# Patient Record
Sex: FEMALE | Race: WHITE | NOT HISPANIC OR LATINO | URBAN - METROPOLITAN AREA
[De-identification: names, ages, dates, MRNs, and addresses within clinical notes are randomized per-mention and may not be internally consistent; named-entity substitution may affect disease eponyms.]

---

## 2017-02-03 ENCOUNTER — EMERGENCY (EMERGENCY)
Facility: HOSPITAL | Age: 72
LOS: 1 days | Discharge: HOME | End: 2017-02-03
Attending: EMERGENCY MEDICINE
Payer: MEDICARE

## 2017-02-03 VITALS
SYSTOLIC BLOOD PRESSURE: 133 MMHG | HEART RATE: 95 BPM | RESPIRATION RATE: 18 BRPM | OXYGEN SATURATION: 98 % | TEMPERATURE: 99 F | DIASTOLIC BLOOD PRESSURE: 60 MMHG

## 2017-02-03 VITALS
DIASTOLIC BLOOD PRESSURE: 80 MMHG | WEIGHT: 145.06 LBS | HEIGHT: 64 IN | TEMPERATURE: 97 F | RESPIRATION RATE: 18 BRPM | SYSTOLIC BLOOD PRESSURE: 162 MMHG | HEART RATE: 100 BPM | OXYGEN SATURATION: 97 %

## 2017-02-03 DIAGNOSIS — Z95.2 PRESENCE OF PROSTHETIC HEART VALVE: Chronic | ICD-10-CM

## 2017-02-03 DIAGNOSIS — Z95.1 PRESENCE OF AORTOCORONARY BYPASS GRAFT: Chronic | ICD-10-CM

## 2017-02-03 DIAGNOSIS — I11.0 HYPERTENSIVE HEART DISEASE WITH HEART FAILURE: ICD-10-CM

## 2017-02-03 DIAGNOSIS — E78.00 PURE HYPERCHOLESTEROLEMIA, UNSPECIFIED: ICD-10-CM

## 2017-02-03 DIAGNOSIS — Z98.89 OTHER SPECIFIED POSTPROCEDURAL STATES: Chronic | ICD-10-CM

## 2017-02-03 DIAGNOSIS — E11.9 TYPE 2 DIABETES MELLITUS WITHOUT COMPLICATIONS: ICD-10-CM

## 2017-02-03 DIAGNOSIS — I50.9 HEART FAILURE, UNSPECIFIED: ICD-10-CM

## 2017-02-03 DIAGNOSIS — J45.909 UNSPECIFIED ASTHMA, UNCOMPLICATED: ICD-10-CM

## 2017-02-03 DIAGNOSIS — Z95.5 PRESENCE OF CORONARY ANGIOPLASTY IMPLANT AND GRAFT: ICD-10-CM

## 2017-02-03 DIAGNOSIS — Z95.4 PRESENCE OF OTHER HEART-VALVE REPLACEMENT: ICD-10-CM

## 2017-02-03 DIAGNOSIS — Z98.890 OTHER SPECIFIED POSTPROCEDURAL STATES: ICD-10-CM

## 2017-02-03 DIAGNOSIS — D64.9 ANEMIA, UNSPECIFIED: ICD-10-CM

## 2017-02-03 DIAGNOSIS — I25.10 ATHEROSCLEROTIC HEART DISEASE OF NATIVE CORONARY ARTERY WITHOUT ANGINA PECTORIS: ICD-10-CM

## 2017-02-03 DIAGNOSIS — Z88.8 ALLERGY STATUS TO OTHER DRUGS, MEDICAMENTS AND BIOLOGICAL SUBSTANCES STATUS: ICD-10-CM

## 2017-02-03 LAB
ALBUMIN SERPL ELPH-MCNC: 4 G/DL — SIGNIFICANT CHANGE UP (ref 3.3–5.2)
ALP SERPL-CCNC: 98 U/L — SIGNIFICANT CHANGE UP (ref 40–120)
ALT FLD-CCNC: 50 U/L — HIGH
ANION GAP SERPL CALC-SCNC: 15 MMOL/L — SIGNIFICANT CHANGE UP (ref 5–17)
APTT BLD: 33.8 SEC — SIGNIFICANT CHANGE UP (ref 27.5–37.4)
AST SERPL-CCNC: 86 U/L — HIGH
BILIRUB SERPL-MCNC: 0.5 MG/DL — SIGNIFICANT CHANGE UP (ref 0.4–2)
BUN SERPL-MCNC: 70 MG/DL — HIGH (ref 8–20)
CALCIUM SERPL-MCNC: 9.9 MG/DL — SIGNIFICANT CHANGE UP (ref 8.6–10.2)
CHLORIDE SERPL-SCNC: 100 MMOL/L — SIGNIFICANT CHANGE UP (ref 98–107)
CO2 SERPL-SCNC: 25 MMOL/L — SIGNIFICANT CHANGE UP (ref 22–29)
CREAT SERPL-MCNC: 1.72 MG/DL — HIGH (ref 0.5–1.3)
GLUCOSE SERPL-MCNC: 278 MG/DL — HIGH (ref 70–115)
HCT VFR BLD CALC: 26.8 % — LOW (ref 37–47)
HGB BLD-MCNC: 8.5 G/DL — LOW (ref 12–16)
INR BLD: 1.16 RATIO — SIGNIFICANT CHANGE UP (ref 0.88–1.16)
MCHC RBC-ENTMCNC: 26.1 PG — LOW (ref 27–31)
MCHC RBC-ENTMCNC: 31.7 G/DL — LOW (ref 32–36)
MCV RBC AUTO: 82.2 FL — SIGNIFICANT CHANGE UP (ref 81–99)
PLATELET # BLD AUTO: 173 K/UL — SIGNIFICANT CHANGE UP (ref 150–400)
POTASSIUM SERPL-MCNC: 3.8 MMOL/L — SIGNIFICANT CHANGE UP (ref 3.5–5.3)
POTASSIUM SERPL-SCNC: 3.8 MMOL/L — SIGNIFICANT CHANGE UP (ref 3.5–5.3)
PROT SERPL-MCNC: 8.5 G/DL — SIGNIFICANT CHANGE UP (ref 6.6–8.7)
PROTHROM AB SERPL-ACNC: 12.8 SEC — SIGNIFICANT CHANGE UP (ref 10–13.1)
RBC # BLD: 3.26 M/UL — LOW (ref 4.4–5.2)
RBC # FLD: 16.2 % — HIGH (ref 11–15.6)
SODIUM SERPL-SCNC: 140 MMOL/L — SIGNIFICANT CHANGE UP (ref 135–145)
TYPE + AB SCN PNL BLD: SIGNIFICANT CHANGE UP
WBC # BLD: 6.8 K/UL — SIGNIFICANT CHANGE UP (ref 4.8–10.8)
WBC # FLD AUTO: 6.8 K/UL — SIGNIFICANT CHANGE UP (ref 4.8–10.8)

## 2017-02-03 PROCEDURE — 36430 TRANSFUSION BLD/BLD COMPNT: CPT

## 2017-02-03 PROCEDURE — 85610 PROTHROMBIN TIME: CPT

## 2017-02-03 PROCEDURE — 86850 RBC ANTIBODY SCREEN: CPT

## 2017-02-03 PROCEDURE — 99284 EMERGENCY DEPT VISIT MOD MDM: CPT | Mod: 25

## 2017-02-03 PROCEDURE — 85027 COMPLETE CBC AUTOMATED: CPT

## 2017-02-03 PROCEDURE — 86920 COMPATIBILITY TEST SPIN: CPT

## 2017-02-03 PROCEDURE — 85730 THROMBOPLASTIN TIME PARTIAL: CPT

## 2017-02-03 PROCEDURE — 86901 BLOOD TYPING SEROLOGIC RH(D): CPT

## 2017-02-03 PROCEDURE — 80053 COMPREHEN METABOLIC PANEL: CPT

## 2017-02-03 PROCEDURE — P9016: CPT

## 2017-02-03 PROCEDURE — 86900 BLOOD TYPING SEROLOGIC ABO: CPT

## 2017-02-03 PROCEDURE — 99284 EMERGENCY DEPT VISIT MOD MDM: CPT

## 2017-02-03 PROCEDURE — 96374 THER/PROPH/DIAG INJ IV PUSH: CPT

## 2017-02-03 RX ORDER — FUROSEMIDE 40 MG
40 TABLET ORAL ONCE
Qty: 0 | Refills: 0 | Status: COMPLETED | OUTPATIENT
Start: 2017-02-03 | End: 2017-02-03

## 2017-02-03 RX ADMIN — Medication 40 MILLIGRAM(S): at 19:22

## 2017-02-03 NOTE — ED ADULT NURSE NOTE - PMH
Anemia, unspecified anemia type    Aortic stenosis    Asthma    CAD (coronary artery disease)    Diabetes    Heart failure    Hepatitis C    High cholesterol    Hypertension    Low back pain  chronic over a year now.

## 2017-02-03 NOTE — ED PROVIDER NOTE - OBJECTIVE STATEMENT
70 yo female with hx htn, chf, cad sent in by Dr. Pandey for transfusion. Patient with long history of anemia, with no obvious etiology. Patient has undergone extensive workup. denies any fever or chills. denies chest pain, c/o shortness of breath, which is chronic for patient.   Last month Hgb was 9.9, and yesterday was 8.4.  Patient with hx multiple transfusions in past

## 2017-02-03 NOTE — ED ADULT NURSE REASSESSMENT NOTE - NS ED NURSE REASSESS COMMENT FT1
PRBC transfusion completed, patient resting comfortable, no acute distress noted, VS stable, afebrile, will monitor patient.

## 2017-02-03 NOTE — ED ADULT NURSE NOTE - OBJECTIVE STATEMENT
Patient AOx4 presents to ED after appointment at Dr. Pandey office sent patient to ED to get transfusion for "low platelets" Regular brown bowel movements, negative complaints of GI pain Patient AOx4 presents to ED after appointment at Dr. Pandey office sent patient to ED to get transfusion for "low platelets" Regular brown bowel movements, negative complaints of GI pain.    Addendum by RN MAEGAN Block: Patient respirations even and unlabored. Lungs clear. Abdomen soft non-tender. No signs of bleeding. Pink conjunctiva. #20 placed right wrist. Labs drawn and sent. Results and transfusion pending.

## 2017-02-03 NOTE — ED ADULT NURSE REASSESSMENT NOTE - NS ED NURSE REASSESS COMMENT FT1
transfusion initiated. 2 rns at bedside RN (ROSITA) & (NATALIA). Patient educated regarding signs of transfusion reaction; repeated by teachback. Verbalized understanding. Transfusion initiated at 60 ml/hr. Vitals as documented. RN ANITA Avendano aware of above and will reassess vitals/monitor for transfusion reaction.

## 2017-02-17 ENCOUNTER — APPOINTMENT (OUTPATIENT)
Dept: PULMONOLOGY | Facility: CLINIC | Age: 72
End: 2017-02-17

## 2017-02-17 VITALS
WEIGHT: 174 LBS | DIASTOLIC BLOOD PRESSURE: 82 MMHG | BODY MASS INDEX: 35.08 KG/M2 | SYSTOLIC BLOOD PRESSURE: 126 MMHG | HEIGHT: 59 IN

## 2017-02-17 VITALS — OXYGEN SATURATION: 98 %

## 2017-02-17 RX ORDER — FUROSEMIDE 40 MG/1
40 TABLET ORAL
Refills: 0 | Status: ACTIVE | COMMUNITY

## 2017-02-22 ENCOUNTER — OTHER (OUTPATIENT)
Age: 72
End: 2017-02-22

## 2017-03-24 ENCOUNTER — APPOINTMENT (OUTPATIENT)
Dept: PULMONOLOGY | Facility: CLINIC | Age: 72
End: 2017-03-24

## 2017-03-24 VITALS — BODY MASS INDEX: 34.54 KG/M2 | WEIGHT: 171 LBS

## 2017-03-24 VITALS — SYSTOLIC BLOOD PRESSURE: 118 MMHG | DIASTOLIC BLOOD PRESSURE: 60 MMHG

## 2017-03-24 VITALS — OXYGEN SATURATION: 97 %

## 2017-03-24 RX ORDER — BLOOD SUGAR DIAGNOSTIC
STRIP MISCELLANEOUS
Qty: 100 | Refills: 0 | Status: ACTIVE | COMMUNITY
Start: 2016-04-18

## 2017-03-24 RX ORDER — CEFPODOXIME PROXETIL 200 MG/1
200 TABLET, FILM COATED ORAL
Qty: 14 | Refills: 0 | Status: DISCONTINUED | COMMUNITY
Start: 2017-02-02

## 2017-03-24 RX ORDER — CLOTRIMAZOLE AND BETAMETHASONE DIPROPIONATE 10; .5 MG/G; MG/G
1-0.05 CREAM TOPICAL
Qty: 15 | Refills: 0 | Status: DISCONTINUED | COMMUNITY
Start: 2017-02-02

## 2017-03-24 RX ORDER — PEN NEEDLE, DIABETIC 29 G X1/2"
29G X 12.7MM NEEDLE, DISPOSABLE MISCELLANEOUS
Qty: 120 | Refills: 0 | Status: ACTIVE | COMMUNITY
Start: 2016-12-02

## 2017-03-24 RX ORDER — TIZANIDINE 2 MG/1
2 TABLET ORAL
Qty: 45 | Refills: 0 | Status: DISCONTINUED | COMMUNITY
Start: 2016-10-28

## 2017-03-31 ENCOUNTER — APPOINTMENT (OUTPATIENT)
Dept: ORTHOPEDIC SURGERY | Facility: CLINIC | Age: 72
End: 2017-03-31

## 2017-04-07 ENCOUNTER — INPATIENT (INPATIENT)
Facility: HOSPITAL | Age: 72
LOS: 0 days | Discharge: HOME | DRG: 292 | End: 2017-04-08
Attending: INTERNAL MEDICINE | Admitting: INTERNAL MEDICINE
Payer: MEDICARE

## 2017-04-07 VITALS
SYSTOLIC BLOOD PRESSURE: 130 MMHG | OXYGEN SATURATION: 99 % | HEART RATE: 82 BPM | WEIGHT: 179.9 LBS | RESPIRATION RATE: 16 BRPM | HEIGHT: 63 IN | TEMPERATURE: 97 F | DIASTOLIC BLOOD PRESSURE: 84 MMHG

## 2017-04-07 DIAGNOSIS — Z98.89 OTHER SPECIFIED POSTPROCEDURAL STATES: Chronic | ICD-10-CM

## 2017-04-07 DIAGNOSIS — Z95.1 PRESENCE OF AORTOCORONARY BYPASS GRAFT: Chronic | ICD-10-CM

## 2017-04-07 DIAGNOSIS — Z95.2 PRESENCE OF PROSTHETIC HEART VALVE: Chronic | ICD-10-CM

## 2017-04-07 DIAGNOSIS — D64.9 ANEMIA, UNSPECIFIED: ICD-10-CM

## 2017-04-07 LAB
ALBUMIN SERPL ELPH-MCNC: 3.6 G/DL — SIGNIFICANT CHANGE UP (ref 3.3–5.2)
ALP SERPL-CCNC: 100 U/L — SIGNIFICANT CHANGE UP (ref 40–120)
ALT FLD-CCNC: 45 U/L — HIGH
ANION GAP SERPL CALC-SCNC: 13 MMOL/L — SIGNIFICANT CHANGE UP (ref 5–17)
APTT BLD: 32.7 SEC — SIGNIFICANT CHANGE UP (ref 27.5–37.4)
AST SERPL-CCNC: 92 U/L — HIGH
BASOPHILS # BLD AUTO: 0 K/UL — SIGNIFICANT CHANGE UP (ref 0–0.2)
BASOPHILS NFR BLD AUTO: 0.4 % — SIGNIFICANT CHANGE UP (ref 0–2)
BILIRUB SERPL-MCNC: 0.2 MG/DL — LOW (ref 0.4–2)
BLD GP AB SCN SERPL QL: SIGNIFICANT CHANGE UP
BUN SERPL-MCNC: 41 MG/DL — HIGH (ref 8–20)
CALCIUM SERPL-MCNC: 8.9 MG/DL — SIGNIFICANT CHANGE UP (ref 8.6–10.2)
CHLORIDE SERPL-SCNC: 99 MMOL/L — SIGNIFICANT CHANGE UP (ref 98–107)
CO2 SERPL-SCNC: 23 MMOL/L — SIGNIFICANT CHANGE UP (ref 22–29)
CREAT SERPL-MCNC: 1.32 MG/DL — HIGH (ref 0.5–1.3)
EOSINOPHIL # BLD AUTO: 0.2 K/UL — SIGNIFICANT CHANGE UP (ref 0–0.5)
EOSINOPHIL NFR BLD AUTO: 3.6 % — SIGNIFICANT CHANGE UP (ref 0–6)
GLUCOSE SERPL-MCNC: 300 MG/DL — HIGH (ref 70–115)
HCT VFR BLD CALC: 21.7 % — LOW (ref 37–47)
HGB BLD-MCNC: 7.1 G/DL — LOW (ref 12–16)
INR BLD: 1.05 RATIO — SIGNIFICANT CHANGE UP (ref 0.88–1.16)
LYMPHOCYTES # BLD AUTO: 1 K/UL — SIGNIFICANT CHANGE UP (ref 1–4.8)
LYMPHOCYTES # BLD AUTO: 22.1 % — SIGNIFICANT CHANGE UP (ref 20–55)
MCHC RBC-ENTMCNC: 26.5 PG — LOW (ref 27–31)
MCHC RBC-ENTMCNC: 32.7 G/DL — SIGNIFICANT CHANGE UP (ref 32–36)
MCV RBC AUTO: 81 FL — SIGNIFICANT CHANGE UP (ref 81–99)
MONOCYTES # BLD AUTO: 0.4 K/UL — SIGNIFICANT CHANGE UP (ref 0–0.8)
MONOCYTES NFR BLD AUTO: 7.4 % — SIGNIFICANT CHANGE UP (ref 3–10)
NEUTROPHILS # BLD AUTO: 3.1 K/UL — SIGNIFICANT CHANGE UP (ref 1.8–8)
NEUTROPHILS NFR BLD AUTO: 66.3 % — SIGNIFICANT CHANGE UP (ref 37–73)
NT-PROBNP SERPL-SCNC: 1516 PG/ML — HIGH (ref 0–300)
PLATELET # BLD AUTO: 163 K/UL — SIGNIFICANT CHANGE UP (ref 150–400)
POTASSIUM SERPL-MCNC: 4.3 MMOL/L — SIGNIFICANT CHANGE UP (ref 3.5–5.3)
POTASSIUM SERPL-SCNC: 4.3 MMOL/L — SIGNIFICANT CHANGE UP (ref 3.5–5.3)
PROT SERPL-MCNC: 7.6 G/DL — SIGNIFICANT CHANGE UP (ref 6.6–8.7)
PROTHROM AB SERPL-ACNC: 11.6 SEC — SIGNIFICANT CHANGE UP (ref 9.8–12.7)
RBC # BLD: 2.68 M/UL — LOW (ref 4.4–5.2)
RBC # FLD: 15.7 % — HIGH (ref 11–15.6)
SODIUM SERPL-SCNC: 135 MMOL/L — SIGNIFICANT CHANGE UP (ref 135–145)
TROPONIN T SERPL-MCNC: 0.07 NG/ML — HIGH (ref 0–0.06)
TYPE + AB SCN PNL BLD: SIGNIFICANT CHANGE UP
WBC # BLD: 4.7 K/UL — LOW (ref 4.8–10.8)
WBC # FLD AUTO: 4.7 K/UL — LOW (ref 4.8–10.8)

## 2017-04-07 PROCEDURE — 71010: CPT | Mod: 26

## 2017-04-07 PROCEDURE — 99285 EMERGENCY DEPT VISIT HI MDM: CPT

## 2017-04-07 PROCEDURE — 93010 ELECTROCARDIOGRAM REPORT: CPT

## 2017-04-07 RX ORDER — LEVOTHYROXINE SODIUM 125 MCG
100 TABLET ORAL DAILY
Qty: 0 | Refills: 0 | Status: DISCONTINUED | OUTPATIENT
Start: 2017-04-07 | End: 2017-04-08

## 2017-04-07 RX ORDER — FUROSEMIDE 40 MG
40 TABLET ORAL ONCE
Qty: 0 | Refills: 0 | Status: COMPLETED | OUTPATIENT
Start: 2017-04-07 | End: 2017-04-07

## 2017-04-07 RX ORDER — FUROSEMIDE 40 MG
40 TABLET ORAL
Qty: 0 | Refills: 0 | Status: DISCONTINUED | OUTPATIENT
Start: 2017-04-07 | End: 2017-04-07

## 2017-04-07 RX ORDER — AMLODIPINE BESYLATE 2.5 MG/1
10 TABLET ORAL DAILY
Qty: 0 | Refills: 0 | Status: DISCONTINUED | OUTPATIENT
Start: 2017-04-07 | End: 2017-04-08

## 2017-04-07 RX ORDER — GLUCAGON INJECTION, SOLUTION 0.5 MG/.1ML
1 INJECTION, SOLUTION SUBCUTANEOUS ONCE
Qty: 0 | Refills: 0 | Status: DISCONTINUED | OUTPATIENT
Start: 2017-04-07 | End: 2017-04-08

## 2017-04-07 RX ORDER — MONTELUKAST 4 MG/1
10 TABLET, CHEWABLE ORAL DAILY
Qty: 0 | Refills: 0 | Status: DISCONTINUED | OUTPATIENT
Start: 2017-04-07 | End: 2017-04-08

## 2017-04-07 RX ORDER — PANTOPRAZOLE SODIUM 20 MG/1
40 TABLET, DELAYED RELEASE ORAL
Qty: 0 | Refills: 0 | Status: DISCONTINUED | OUTPATIENT
Start: 2017-04-07 | End: 2017-04-08

## 2017-04-07 RX ORDER — GABAPENTIN 400 MG/1
300 CAPSULE ORAL DAILY
Qty: 0 | Refills: 0 | Status: DISCONTINUED | OUTPATIENT
Start: 2017-04-07 | End: 2017-04-08

## 2017-04-07 RX ORDER — DEXTROSE 50 % IN WATER 50 %
25 SYRINGE (ML) INTRAVENOUS ONCE
Qty: 0 | Refills: 0 | Status: DISCONTINUED | OUTPATIENT
Start: 2017-04-07 | End: 2017-04-08

## 2017-04-07 RX ORDER — FUROSEMIDE 40 MG
40 TABLET ORAL
Qty: 0 | Refills: 0 | Status: DISCONTINUED | OUTPATIENT
Start: 2017-04-08 | End: 2017-04-08

## 2017-04-07 RX ORDER — FLUTICASONE PROPIONATE 50 MCG
2 SPRAY, SUSPENSION NASAL DAILY
Qty: 0 | Refills: 0 | Status: DISCONTINUED | OUTPATIENT
Start: 2017-04-07 | End: 2017-04-08

## 2017-04-07 RX ORDER — INSULIN LISPRO 100/ML
VIAL (ML) SUBCUTANEOUS
Qty: 0 | Refills: 0 | Status: DISCONTINUED | OUTPATIENT
Start: 2017-04-07 | End: 2017-04-08

## 2017-04-07 RX ORDER — FUROSEMIDE 40 MG
20 TABLET ORAL ONCE
Qty: 0 | Refills: 0 | Status: COMPLETED | OUTPATIENT
Start: 2017-04-07 | End: 2017-04-07

## 2017-04-07 RX ORDER — FOLIC ACID 0.8 MG
1 TABLET ORAL DAILY
Qty: 0 | Refills: 0 | Status: DISCONTINUED | OUTPATIENT
Start: 2017-04-07 | End: 2017-04-08

## 2017-04-07 RX ORDER — FUROSEMIDE 40 MG
20 TABLET ORAL ONCE
Qty: 0 | Refills: 0 | Status: COMPLETED | OUTPATIENT
Start: 2017-04-07 | End: 2017-04-08

## 2017-04-07 RX ORDER — ALBUTEROL 90 UG/1
2 AEROSOL, METERED ORAL EVERY 6 HOURS
Qty: 0 | Refills: 0 | Status: DISCONTINUED | OUTPATIENT
Start: 2017-04-07 | End: 2017-04-08

## 2017-04-07 RX ORDER — SODIUM CHLORIDE 9 MG/ML
1000 INJECTION, SOLUTION INTRAVENOUS
Qty: 0 | Refills: 0 | Status: DISCONTINUED | OUTPATIENT
Start: 2017-04-07 | End: 2017-04-08

## 2017-04-07 RX ORDER — DEXTROSE 50 % IN WATER 50 %
1 SYRINGE (ML) INTRAVENOUS ONCE
Qty: 0 | Refills: 0 | Status: DISCONTINUED | OUTPATIENT
Start: 2017-04-07 | End: 2017-04-08

## 2017-04-07 RX ORDER — INSULIN GLARGINE 100 [IU]/ML
10 INJECTION, SOLUTION SUBCUTANEOUS AT BEDTIME
Qty: 0 | Refills: 0 | Status: DISCONTINUED | OUTPATIENT
Start: 2017-04-07 | End: 2017-04-08

## 2017-04-07 RX ORDER — METOPROLOL TARTRATE 50 MG
25 TABLET ORAL
Qty: 0 | Refills: 0 | Status: DISCONTINUED | OUTPATIENT
Start: 2017-04-07 | End: 2017-04-08

## 2017-04-07 RX ORDER — DEXTROSE 50 % IN WATER 50 %
12.5 SYRINGE (ML) INTRAVENOUS ONCE
Qty: 0 | Refills: 0 | Status: DISCONTINUED | OUTPATIENT
Start: 2017-04-07 | End: 2017-04-08

## 2017-04-07 RX ORDER — TRAMADOL HYDROCHLORIDE 50 MG/1
50 TABLET ORAL THREE TIMES A DAY
Qty: 0 | Refills: 0 | Status: DISCONTINUED | OUTPATIENT
Start: 2017-04-07 | End: 2017-04-08

## 2017-04-07 RX ADMIN — Medication 40 MILLIGRAM(S): at 18:15

## 2017-04-07 RX ADMIN — INSULIN GLARGINE 10 UNIT(S): 100 INJECTION, SOLUTION SUBCUTANEOUS at 22:33

## 2017-04-07 RX ADMIN — Medication 20 MILLIGRAM(S): at 23:51

## 2017-04-07 RX ADMIN — TRAMADOL HYDROCHLORIDE 50 MILLIGRAM(S): 50 TABLET ORAL at 22:36

## 2017-04-07 NOTE — ED ADULT NURSE REASSESSMENT NOTE - NS ED NURSE REASSESS COMMENT FT1
first PRBC complete as charted, pt remains AOX3 in no apparent distress. even and unlabored resps present, skin warm dry and in tact. pt using bedside commode without assistance. family at bedside, awaiting bed assignment at this time. IV site patent, will monitor.

## 2017-04-07 NOTE — H&P ADULT - HISTORY OF PRESENT ILLNESS
72 y F hx HTN, CHF, CAD s/p CABG, bioprosthetic AVR, DM2, CKD, longstanding anemia, referred to ED by PMD for low Hb on outpt labs. Last transfused in Feb. Has had extensive hematologic and GI w/up in the past. Denies CP, dizziness, F/C, N/v/D, dysuria, cough, LE edema, BRBPR or melena. Chronic SOB, fatigue since CABG last year.

## 2017-04-07 NOTE — ED ADULT NURSE REASSESSMENT NOTE - NS ED NURSE REASSESS COMMENT FT1
While spiking blood for 2nd unit PRBCs transfusion, spike went through the bag and began to leak. Blood bank called and advised RN to place blood in red bag and ask MD to reorder. Dr Meyer will write order for PRBCs.

## 2017-04-07 NOTE — H&P ADULT - NSHPLABSRESULTS_GEN_ALL_CORE
CXR: No radiologically active cardiopulmonary process seen.    EKG: SR, 1st degree AVB, nonspecific ST, T wave abnormalities

## 2017-04-07 NOTE — ED ADULT NURSE REASSESSMENT NOTE - NS ED NURSE REASSESS COMMENT FT1
report given to holding RN MIRIAN at this time. pt remains awake alert and oriented, awaiting bed assignment.

## 2017-04-07 NOTE — ED PROVIDER NOTE - MEDICAL DECISION MAKING DETAILS
The patient has symptomatic anemia and Dr Pandey her PMD requesting admission for blood transfusion and further evaluation

## 2017-04-07 NOTE — ED ADULT NURSE REASSESSMENT NOTE - NS ED NURSE REASSESS COMMENT FT1
pt remains awake alert and oriented, seen by Dr Yates, hospitalist. pt skin warm dry and in tact, placed on cardiac monitor. awaiting admit orders, also awaiting lab to draw confirmation specimen for PRBC transfusion.

## 2017-04-07 NOTE — ED PROVIDER NOTE - CONSTITUTIONAL, MLM
normal... Mildly pale appearing, well nourished, awake, alert, oriented to person, place, time/situation and in no apparent distress.

## 2017-04-07 NOTE — ED ADULT NURSE NOTE - OBJECTIVE STATEMENT
pt arrives to ED via EMS, reports she has a HGB of 7.0 from blood work that was completed yesterday. pt in no apparent distress, skin warm dry and in tact, skin color normal for race. pt with no reports of SOB, states he had a blood txfusion last 6 weeks ago. had CABG Feb of 2016 and has been feeling weak since than, reports no new symptoms of weakness. denies chest pain, no SOB noted, PUENTE with strength and purpose.

## 2017-04-07 NOTE — H&P ADULT - NSHPPHYSICALEXAM_GEN_ALL_CORE
Vital Signs Last 24 Hrs  T(C): 36.2, Max: 36.2 (04-07 @ 13:29)  T(F): 97.2, Max: 97.2 (04-07 @ 13:29)  HR: 82 (82 - 82)  BP: 130/84 (130/84 - 130/84)  BP(mean): --  RR: 16 (16 - 16)  SpO2: 99% (99% - 99%)  Vital Signs Last 24 Hrs  T(C): 36.2, Max: 36.2 (04-07 @ 13:29)  T(F): 97.2, Max: 97.2 (04-07 @ 13:29)  HR: 82 (82 - 82)  BP: 130/84 (130/84 - 130/84)  BP(mean): --  RR: 16 (16 - 16)  SpO2: 99% (99% - 99%)  PHYSICAL EXAM-  GENERAL: alert, NAD  HEAD:  Atraumatic, Normocephalic  EYES: EOMI, pale conjunctiva and sclera clear  NECK: Supple   CHEST/LUNG: CTA bilaterally   HEART: Regular rate and rhythm; No murmurs   ABDOMEN: Soft, Nontender, Nondistended; Bowel sounds present  EXTREMITIES:  2+ Peripheral Pulses, No clubbing, cyanosis, or edema  NERVOUS SYSTEM:  Alert & Oriented X3, Motor Strength 5/5 B/L upper and lower extremities; CN grossly intact  SKIN: No rashes or lesions  PSYCH: normal affect

## 2017-04-07 NOTE — CONSULT NOTE ADULT - SUBJECTIVE AND OBJECTIVE BOX
[facility], THE [facility]                                   [address] [place] [address]                                                      PHONE: [phone]                                                         FAX: [phone]  -------------------------------------------------------------------------------------------------------------------------------    72y Female with past medical history as under presents with fatigue and generalized weakness and told by PMD Dr Pandey to come to ED for low H/H. Hb level was 7. No CP, Mild SOB, No abd pain, No change in color of stool. At the time of evaluation, pt is    PAST MEDICAL & SURGICAL HISTORY:  CAD (coronary artery disease)  Heart failure  Hepatitis C  Aortic stenosis  Anemia, unspecified anemia type  Asthma  Low back pain: chronic over a year now.  High cholesterol  Hypertension  Diabetes  H/O aortic valve replacement  S/P CABG x 3  History of tonsillectomy      Biaxin (Joint Pain)      Review of Systems:   Positive for fatigue, weakness  Rest of the systems were reviewed and was negative.     Social History:  No smoking   No alcohol  No other drug use    Vital Signs Last 24 Hrs  T(C): 36.2, Max: 36.2 (04-07 @ 13:29)  T(F): 97.2, Max: 97.2 (04-07 @ 13:29)  HR: 82 (82 - 82)  BP: 130/84 (130/84 - 130/84)  BP(mean): --  RR: 16 (16 - 16)  SpO2: 99% (99% - 99%)    PHYSICAL EXAM:  Constitutional: Oriented to time, place and person. Appears well developed, well nourished; alert and co-operative  HEENT:     Conjunctiva normal, Normal oral mucosa, No JVD	  Cardiovascular: Normal S1 S2, No murmurs  Respiratory: Lungs clear to auscultation; no crepitations, no wheeze  Gastrointestinal:  Soft, Non-tender, + BS	  Extremities: No cyanosis, clubbing or edema  Skin: Warm and dry  Neurologic: Alert oriented to time place and person  Psychiatric: affect was normal        LABS:                        7.1    4.7   )-----------( 163      ( 07 Apr 2017 14:28 )             21.7     04-07    135  |  99  |  41.0<H>  ----------------------------<  300<H>  4.3   |  23.0  |  1.32<H>    Ca    8.9      07 Apr 2017 14:28    TPro  7.6  /  Alb  3.6  /  TBili  0.2<L>  /  DBili  x   /  AST  92<H>  /  ALT  45<H>  /  AlkPhos  100  04-07    CARDIAC MARKERS ( 07 Apr 2017 14:28 )  x     / 0.07 ng/mL / x     / x     / x          PT/INR - ( 07 Apr 2017 14:28 )   PT: 11.6 sec;   INR: 1.05 ratio         PTT - ( 07 Apr 2017 14:28 )  PTT:32.7 sec    RADIOLOGY & ADDITIONAL STUDIES:    CARDIOLOGY TESTING:     ECG: NSR with LVH repolarization abnormalities with anteroseptal infarct    Echocardiogram:  IMPRESSION:     1. Left ventricular ejection fraction, by visual estimation, is 65 to   70%.   2. Normal global left ventricular systolic function.   3. Spectral Doppler shows impaired relaxation pattern of left   ventricular myocardial filling (Grade I diastolic dysfunction).   4. There is moderate concentric left ventricular hypertrophy.   5. Severely dilated right atrium.   6. Moderate mitral annular calcification.   7. Thickening and calcification of the anterior and posterior mitral   valve leaflets.   8. Moderate tricuspid regurgitation.   9. Moderate aortic valve stenosis.  10. Estimated pulmonary artery systolic pressure is 36.5 mmHg assuming a   right atrial pressure of 3 mmHg, which is consistent with borderline   pulmonary hypertension.  11. Peak transaortic gradient equals 54.0 mmHg, mean transaortic gradient   equals 30.2 mmHg, the calculated aortic valve area equals 1.03 cm² by the   continuity equation consistent with moderate aortic stenosis.  12. Severely enlarged left atrium.    CARDIAC CATHETERIZATION:  DIAGNOSTIC IMPRESSIONS: Severe LCX and RCA disease with AS  DIAGNOSTIC RECOMMENDATIONS: Eval for AS and CABG  INTERVENTIONAL IMPRESSIONS: Severe LCX and RCA disease with AS  INTERVENTIONAL RECOMMENDATIONS: Eval for AS and CABG  Prepared and signed by  Nathan Swanson MD        MEDICATIONS:  MEDICATIONS  (STANDING):  traMADol 50milliGRAM(s) Oral three times a day  gabapentin 300milliGRAM(s) Oral daily  insulin glargine Injectable (LANTUS) 10Unit(s) SubCutaneous at bedtime  metoprolol 25milliGRAM(s) Oral two times a day  amLODIPine   Tablet 10milliGRAM(s) Oral daily  metolazone 2.5milliGRAM(s) Oral daily  montelukast 10milliGRAM(s) Oral daily  fluticasone propionate 50 MICROgram(s)/spray Nasal Spray 2Spray(s) Both Nostrils daily  pantoprazole    Tablet 40milliGRAM(s) Oral before breakfast  levothyroxine 100MICROGram(s) Oral daily  folic acid 1milliGRAM(s) Oral daily  furosemide   Injectable 40milliGRAM(s) IV Push once  insulin lispro (HumaLOG) corrective regimen sliding scale  SubCutaneous three times a day before meals  dextrose 5%. 1000milliLiter(s) IV Continuous <Continuous>  dextrose 50% Injectable 12.5Gram(s) IV Push once  dextrose 50% Injectable 25Gram(s) IV Push once  dextrose 50% Injectable 25Gram(s) IV Push once    MEDICATIONS  (PRN):  ALBUTerol    90 MICROgram(s) HFA Inhaler 2Puff(s) Inhalation every 6 hours PRN Shortness of Breath  dextrose Gel 1Dose(s) Oral once PRN Blood Glucose LESS THAN 70 milliGRAM(s)/deciliter  glucagon  Injectable 1milliGRAM(s) IntraMuscular once PRN Glucose LESS THAN 70 milligrams/deciliter      ASSESSMENT AND PLAN:    71y Female with past medical history significant for CKD with anemia chronic disease Hg ~ 8 HFpEF CAD s/p CABG with Bio AVR 2/2016 at  Hep C DM HTN [facility], [facility]                                   [address] [place] [address]                                                      PHONE: [phone]                                                         FAX: [phone]  -------------------------------------------------------------------------------------------------------------------------------    72y Female with past medical history as under presents with fatigue and generalized weakness and told by PMD Dr Pandey to come to ED for low H/H. Hb level was 7. No CP, Mild SOB, No abd pain, No change in color of stool. At the time of evaluation, pt reports fatigue but no other symptoms. She had been following with Dr. Alvarado and had been getting iron infusions as well as procrit injections but she has not been in follow up with hematology for about 1 yr.    PAST MEDICAL & SURGICAL HISTORY:  CAD (coronary artery disease)  Heart failure  Hepatitis C  Aortic stenosis  Anemia, unspecified anemia type  Asthma  Low back pain: chronic over a year now.  High cholesterol  Hypertension  Diabetes  H/O aortic valve replacement  S/P CABG x 3  History of tonsillectomy      Biaxin (Joint Pain)      Review of Systems:   Positive for fatigue, weakness  Rest of the systems were reviewed and was negative.     Social History:  No smoking   No alcohol  No other drug use    Vital Signs Last 24 Hrs  T(C): 36.2, Max: 36.2 (04-07 @ 13:29)  T(F): 97.2, Max: 97.2 (04-07 @ 13:29)  HR: 82 (82 - 82)  BP: 130/84 (130/84 - 130/84)  BP(mean): --  RR: 16 (16 - 16)  SpO2: 99% (99% - 99%)    PHYSICAL EXAM:  Constitutional: Oriented to time, place and person. Appears well developed, well nourished; alert and co-operative  HEENT:     Conjunctiva normal, Normal oral mucosa, No JVD	  Cardiovascular: Normal S1 S2, No murmurs  Respiratory: Lungs clear to auscultation; no crepitations, no wheeze  Gastrointestinal:  Soft, Non-tender, + BS	  Extremities: No cyanosis, clubbing or edema  Skin: Warm and dry  Neurologic: Alert oriented to time place and person  Psychiatric: affect was normal        LABS:                        7.1    4.7   )-----------( 163      ( 07 Apr 2017 14:28 )             21.7     04-07    135  |  99  |  41.0<H>  ----------------------------<  300<H>  4.3   |  23.0  |  1.32<H>    Ca    8.9      07 Apr 2017 14:28    TPro  7.6  /  Alb  3.6  /  TBili  0.2<L>  /  DBili  x   /  AST  92<H>  /  ALT  45<H>  /  AlkPhos  100  04-07    CARDIAC MARKERS ( 07 Apr 2017 14:28 )  x     / 0.07 ng/mL / x     / x     / x          PT/INR - ( 07 Apr 2017 14:28 )   PT: 11.6 sec;   INR: 1.05 ratio         PTT - ( 07 Apr 2017 14:28 )  PTT:32.7 sec    RADIOLOGY & ADDITIONAL STUDIES:    CARDIOLOGY TESTING:     ECG: NSR with LVH repolarization abnormalities with anteroseptal infarct    Echocardiogram:  IMPRESSION:     1. Left ventricular ejection fraction, by visual estimation, is 65 to   70%.   2. Normal global left ventricular systolic function.   3. Spectral Doppler shows impaired relaxation pattern of left   ventricular myocardial filling (Grade I diastolic dysfunction).   4. There is moderate concentric left ventricular hypertrophy.   5. Severely dilated right atrium.   6. Moderate mitral annular calcification.   7. Thickening and calcification of the anterior and posterior mitral   valve leaflets.   8. Moderate tricuspid regurgitation.   9. Moderate aortic valve stenosis.  10. Estimated pulmonary artery systolic pressure is 36.5 mmHg assuming a   right atrial pressure of 3 mmHg, which is consistent with borderline   pulmonary hypertension.  11. Peak transaortic gradient equals 54.0 mmHg, mean transaortic gradient   equals 30.2 mmHg, the calculated aortic valve area equals 1.03 cm² by the   continuity equation consistent with moderate aortic stenosis.  12. Severely enlarged left atrium.    CARDIAC CATHETERIZATION:  DIAGNOSTIC IMPRESSIONS: Severe LCX and RCA disease with AS  DIAGNOSTIC RECOMMENDATIONS: Eval for AS and CABG  INTERVENTIONAL IMPRESSIONS: Severe LCX and RCA disease with AS  INTERVENTIONAL RECOMMENDATIONS: Eval for AS and CABG  Prepared and signed by  Nathan Swanson MD        MEDICATIONS:  MEDICATIONS  (STANDING):  traMADol 50milliGRAM(s) Oral three times a day  gabapentin 300milliGRAM(s) Oral daily  insulin glargine Injectable (LANTUS) 10Unit(s) SubCutaneous at bedtime  metoprolol 25milliGRAM(s) Oral two times a day  amLODIPine   Tablet 10milliGRAM(s) Oral daily  metolazone 2.5milliGRAM(s) Oral daily  montelukast 10milliGRAM(s) Oral daily  fluticasone propionate 50 MICROgram(s)/spray Nasal Spray 2Spray(s) Both Nostrils daily  pantoprazole    Tablet 40milliGRAM(s) Oral before breakfast  levothyroxine 100MICROGram(s) Oral daily  folic acid 1milliGRAM(s) Oral daily  furosemide   Injectable 40milliGRAM(s) IV Push once  insulin lispro (HumaLOG) corrective regimen sliding scale  SubCutaneous three times a day before meals  dextrose 5%. 1000milliLiter(s) IV Continuous <Continuous>  dextrose 50% Injectable 12.5Gram(s) IV Push once  dextrose 50% Injectable 25Gram(s) IV Push once  dextrose 50% Injectable 25Gram(s) IV Push once    MEDICATIONS  (PRN):  ALBUTerol    90 MICROgram(s) HFA Inhaler 2Puff(s) Inhalation every 6 hours PRN Shortness of Breath  dextrose Gel 1Dose(s) Oral once PRN Blood Glucose LESS THAN 70 milliGRAM(s)/deciliter  glucagon  Injectable 1milliGRAM(s) IntraMuscular once PRN Glucose LESS THAN 70 milligrams/deciliter      ASSESSMENT AND PLAN:    71y Female with past medical history significant for CKD with anemia chronic disease, CAD s/p CABG with Bio AVR 2/2016 at [facility], DM HTN presents with symptomatic anemia.     -  Agree with transfusion  -  Hematology FU -inpt/outpt   -  Minimal troponin elevation likely secondary to CAD, anemia and CKD  -  Check Echo to assess LVEF and status of bio AVR. Pt wants to go home after transfusion. Recommend pt should at least get second troponin to check trend prior to going home. If she stays and gets the echocardiogram, we will follow up on the echo in AM.

## 2017-04-07 NOTE — ED PROVIDER NOTE - CARE PLAN
Principal Discharge DX:	Symptomatic anemia  Secondary Diagnosis:	CAD (coronary artery disease)  Secondary Diagnosis:	Diabetes

## 2017-04-07 NOTE — ED PROVIDER NOTE - OBJECTIVE STATEMENT
The patient is a 72 year old female presents with fatigue and generalized weakness and told by PMD Dr Pandey to come to ED for low H/H. Hb level was 7. No CP, Mild SOB, No abd pain, No change in color of stool. No back pain.  The patient has h/o HTN, DM, CRI not on HD, CHF.

## 2017-04-07 NOTE — H&P ADULT - ASSESSMENT
72 y F hx CHF, DM2, HTN, CAD s/p CABG, bioprosthetic AVR, CKD, anemia referred by PMD for transfusion.     Anemia: 2uprbc w lasix ordered, f/u rpt labs. chronic, extensive w/up in past, transfuse prn.   CAD: positive troponin, unclear significance in setting of CKD, check serial troponins, cardiology eval. pt asymptomatic.   DM: monitor FS, cont lantus, sliding scale  CHF: no overt signs of overload, lasix chaser w prbc.   CKD: stable, monitor Cr, electrolytes.

## 2017-04-08 ENCOUNTER — TRANSCRIPTION ENCOUNTER (OUTPATIENT)
Age: 72
End: 2017-04-08

## 2017-04-08 VITALS — WEIGHT: 179.9 LBS

## 2017-04-08 LAB
ANION GAP SERPL CALC-SCNC: 14 MMOL/L — SIGNIFICANT CHANGE UP (ref 5–17)
BUN SERPL-MCNC: 44 MG/DL — HIGH (ref 8–20)
CALCIUM SERPL-MCNC: 9.5 MG/DL — SIGNIFICANT CHANGE UP (ref 8.6–10.2)
CHLORIDE SERPL-SCNC: 98 MMOL/L — SIGNIFICANT CHANGE UP (ref 98–107)
CO2 SERPL-SCNC: 26 MMOL/L — SIGNIFICANT CHANGE UP (ref 22–29)
CREAT SERPL-MCNC: 1.42 MG/DL — HIGH (ref 0.5–1.3)
GLUCOSE SERPL-MCNC: 353 MG/DL — HIGH (ref 70–115)
HBA1C BLD-MCNC: 6.5 % — HIGH (ref 4–5.6)
HCT VFR BLD CALC: 29.7 % — LOW (ref 37–47)
HGB BLD-MCNC: 10 G/DL — LOW (ref 12–16)
MCHC RBC-ENTMCNC: 26.3 PG — LOW (ref 27–31)
MCHC RBC-ENTMCNC: 33.7 G/DL — SIGNIFICANT CHANGE UP (ref 32–36)
MCV RBC AUTO: 78.2 FL — LOW (ref 81–99)
PLATELET # BLD AUTO: 143 K/UL — LOW (ref 150–400)
POTASSIUM SERPL-MCNC: 3.7 MMOL/L — SIGNIFICANT CHANGE UP (ref 3.5–5.3)
POTASSIUM SERPL-SCNC: 3.7 MMOL/L — SIGNIFICANT CHANGE UP (ref 3.5–5.3)
RBC # BLD: 3.8 M/UL — LOW (ref 4.4–5.2)
RBC # FLD: 15.2 % — SIGNIFICANT CHANGE UP (ref 11–15.6)
SODIUM SERPL-SCNC: 138 MMOL/L — SIGNIFICANT CHANGE UP (ref 135–145)
TROPONIN T SERPL-MCNC: 0.05 NG/ML — SIGNIFICANT CHANGE UP (ref 0–0.06)
WBC # BLD: 5 K/UL — SIGNIFICANT CHANGE UP (ref 4.8–10.8)
WBC # FLD AUTO: 5 K/UL — SIGNIFICANT CHANGE UP (ref 4.8–10.8)

## 2017-04-08 PROCEDURE — 99239 HOSP IP/OBS DSCHRG MGMT >30: CPT

## 2017-04-08 PROCEDURE — 96374 THER/PROPH/DIAG INJ IV PUSH: CPT

## 2017-04-08 PROCEDURE — 84484 ASSAY OF TROPONIN QUANT: CPT

## 2017-04-08 PROCEDURE — 85610 PROTHROMBIN TIME: CPT

## 2017-04-08 PROCEDURE — 36415 COLL VENOUS BLD VENIPUNCTURE: CPT

## 2017-04-08 PROCEDURE — 71045 X-RAY EXAM CHEST 1 VIEW: CPT

## 2017-04-08 PROCEDURE — 86901 BLOOD TYPING SEROLOGIC RH(D): CPT

## 2017-04-08 PROCEDURE — 85730 THROMBOPLASTIN TIME PARTIAL: CPT

## 2017-04-08 PROCEDURE — 86850 RBC ANTIBODY SCREEN: CPT

## 2017-04-08 PROCEDURE — 80053 COMPREHEN METABOLIC PANEL: CPT

## 2017-04-08 PROCEDURE — P9016: CPT

## 2017-04-08 PROCEDURE — 80048 BASIC METABOLIC PNL TOTAL CA: CPT

## 2017-04-08 PROCEDURE — 93005 ELECTROCARDIOGRAM TRACING: CPT

## 2017-04-08 PROCEDURE — 85027 COMPLETE CBC AUTOMATED: CPT

## 2017-04-08 PROCEDURE — 99285 EMERGENCY DEPT VISIT HI MDM: CPT | Mod: 25

## 2017-04-08 PROCEDURE — 83036 HEMOGLOBIN GLYCOSYLATED A1C: CPT

## 2017-04-08 PROCEDURE — 36430 TRANSFUSION BLD/BLD COMPNT: CPT

## 2017-04-08 PROCEDURE — 86920 COMPATIBILITY TEST SPIN: CPT

## 2017-04-08 PROCEDURE — 83880 ASSAY OF NATRIURETIC PEPTIDE: CPT

## 2017-04-08 PROCEDURE — 86900 BLOOD TYPING SEROLOGIC ABO: CPT

## 2017-04-08 RX ORDER — MOMETASONE FUROATE AND FORMOTEROL FUMARATE DIHYDRATE 200; 5 UG/1; UG/1
0 AEROSOL RESPIRATORY (INHALATION)
Qty: 0 | Refills: 0 | COMMUNITY

## 2017-04-08 RX ORDER — ALBUTEROL 90 UG/1
0 AEROSOL, METERED ORAL
Qty: 0 | Refills: 0 | COMMUNITY

## 2017-04-08 RX ORDER — MOMETASONE FUROATE 50 UG/1
0 SPRAY NASAL
Qty: 0 | Refills: 0 | COMMUNITY

## 2017-04-08 RX ADMIN — Medication 20 MILLIGRAM(S): at 01:59

## 2017-04-08 RX ADMIN — PANTOPRAZOLE SODIUM 40 MILLIGRAM(S): 20 TABLET, DELAYED RELEASE ORAL at 05:32

## 2017-04-08 RX ADMIN — Medication 25 MILLIGRAM(S): at 05:32

## 2017-04-08 RX ADMIN — Medication 5: at 09:26

## 2017-04-08 RX ADMIN — Medication 100 MICROGRAM(S): at 05:32

## 2017-04-08 RX ADMIN — Medication 40 MILLIGRAM(S): at 05:32

## 2017-04-08 RX ADMIN — AMLODIPINE BESYLATE 10 MILLIGRAM(S): 2.5 TABLET ORAL at 05:32

## 2017-04-08 NOTE — DISCHARGE NOTE ADULT - HOSPITAL COURSE
72y Female with past medical history significant for CKD with anemia chronic disease, CAD s/p CABG with Bio AVR 2/2016 at [facility], DM HTN presents with symptomatic anemia.  Has had BM biopsies in past which were unrevealing and negative egd/colonoscopy with capsule study 2-3 years ago.  Underwent transfusion with improvement in h/h.  seen by cardiology for troponin leak, repeat negative and plan for outpatient echo.  d/c home   d/c planning 35 min. discussed with patient and daughter over the phone.    VSS afebrile, AAOX3, NAD RRR s1s2 CTAB soft +BS no edema nonfocal neuro.  anxious to go home.

## 2017-04-08 NOTE — DISCHARGE NOTE ADULT - PLAN OF CARE
stable after transfusion. please follow up with gastroenterology in 1-2 weeks.  extensive hematologic/GI workup in past  preferred CBC check within 1 week with primary care doctor. home medications  outpatient echocardiogram home medications stable

## 2017-04-08 NOTE — PROGRESS NOTE ADULT - SUBJECTIVE AND OBJECTIVE BOX
[place] [facility], [facility]                                   [address] [place] [address]                                                      PHONE: [phone]                                                         FAX: [phone]  [email or web address]  ---------------------------------------------------------------------------------------------------------------------------------    Overnight events/patient complaints:  Pt anxious to leave, feels well.    Biaxin (Joint Pain)    MEDICATIONS  (STANDING):  traMADol 50milliGRAM(s) Oral three times a day  gabapentin 300milliGRAM(s) Oral daily  insulin glargine Injectable (LANTUS) 10Unit(s) SubCutaneous at bedtime  metoprolol 25milliGRAM(s) Oral two times a day  amLODIPine   Tablet 10milliGRAM(s) Oral daily  metolazone 2.5milliGRAM(s) Oral daily  montelukast 10milliGRAM(s) Oral daily  fluticasone propionate 50 MICROgram(s)/spray Nasal Spray 2Spray(s) Both Nostrils daily  pantoprazole    Tablet 40milliGRAM(s) Oral before breakfast  levothyroxine 100MICROGram(s) Oral daily  folic acid 1milliGRAM(s) Oral daily  furosemide    Tablet 40milliGRAM(s) Oral two times a day  insulin lispro (HumaLOG) corrective regimen sliding scale  SubCutaneous three times a day before meals  dextrose 5%. 1000milliLiter(s) IV Continuous <Continuous>  dextrose 50% Injectable 12.5Gram(s) IV Push once  dextrose 50% Injectable 25Gram(s) IV Push once  dextrose 50% Injectable 25Gram(s) IV Push once    MEDICATIONS  (PRN):  ALBUTerol    90 MICROgram(s) HFA Inhaler 2Puff(s) Inhalation every 6 hours PRN Shortness of Breath  dextrose Gel 1Dose(s) Oral once PRN Blood Glucose LESS THAN 70 milliGRAM(s)/deciliter  glucagon  Injectable 1milliGRAM(s) IntraMuscular once PRN Glucose LESS THAN 70 milligrams/deciliter      Vital Signs Last 24 Hrs  T(C): 36.8, Max: 37.2 (04-07 @ 19:33)  T(F): 98.2, Max: 99 (04-07 @ 19:33)  HR: 82 (70 - 89)  BP: 147/62 (100/64 - 159/70)  BP(mean): --  RR: 18 (16 - 18)  SpO2: 100% (98% - 100%)  ICU Vital Signs Last 24 Hrs  [facility]  I&O's Detail    Drug Dosing Weight  [facility]      PHYSICAL EXAM:  General: Appears well developed, well nourished alert and cooperative.  HEENT: Head; normocephalic, atraumatic.  Eyes: Pupils reactive, cornea wnl.  Neck: Supple, no nodes adenopathy, no NVD or carotid bruit or thyromegaly.  CARDIOVASCULAR: Normal S1 and S2, No murmur, rub, gallop or lift.   LUNGS: No rales, rhonchi or wheeze. Normal breath sounds bilaterally.  ABDOMEN: Soft, nontender without mass or organomegaly. bowel sounds normoactive.  EXTREMITIES: No clubbing, cyanosis or edema. Distal pulses wnl.   SKIN: warm and dry with normal turgor.  NEURO: Alert/oriented x 3/normal motor exam. No pathologic reflexes.    PSYCH: normal affect.        LABS:                        10.0   5.0   )-----------( 143      ( 08 Apr 2017 08:18 )             29.7     04-08    138  |  98  |  44.0<H>  ----------------------------<  353<H>  3.7   |  26.0  |  1.42<H>    Ca    9.5      08 Apr 2017 08:18    TPro  7.6  /  Alb  3.6  /  TBili  0.2<L>  /  DBili  x   /  AST  92<H>  /  ALT  45<H>  /  AlkPhos  100  04-07    NICHELLE GILL  CARDIAC MARKERS ( 07 Apr 2017 14:28 )  x     / 0.07 ng/mL / x     / x     / x          PT/INR - ( 07 Apr 2017 14:28 )   PT: 11.6 sec;   INR: 1.05 ratio         PTT - ( 07 Apr 2017 14:28 )  PTT:32.7 sec      RADIOLOGY & ADDITIONAL STUDIES:    INTERPRETATION OF TELEMETRY (personally reviewed):        ASSESSMENT AND PLAN:  In summary, NICHELLE GILL is an 72y Female with past medical history significant for CKD with anemia chronic disease, CAD s/p CABG with Bio AVR 2/2016 at [facility], DM HTN presents with symptomatic anemia.     1) Await AM labs and troponins  2) Can have echo inpatient or outpatient  3) Discussed with daughter via telephone and patient

## 2017-04-08 NOTE — DISCHARGE NOTE ADULT - MEDICATION SUMMARY - MEDICATIONS TO TAKE
I will START or STAY ON the medications listed below when I get home from the hospital:    traMADol 50 mg oral tablet  -- 1 tab(s) by mouth 3 times a day  -- Indication: For pain    gabapentin 300 mg oral tablet  -- 1 tab(s) by mouth once a day  -- Indication: For pain    Lantus 100 units/mL subcutaneous solution  -- 10 unit(s) subcutaneous once a day (at bedtime)  -- Indication: For Diabetes    insulin lispro 100 units/mL subcutaneous solution  --  subcutaneous 3 times a day (with meals) ; 2 Unit(s) if Glucose 151 - 200  4 Unit(s) if Glucose 201 - 250  6 Unit(s) if Glucose 251 - 300  8 Unit(s) if Glucose 301 - 350  10 Unit(s) if Glucose 351 - 400  12 Unit(s) if Glucose GREATER THAN 400  -- Indication: For Diabetes    metoprolol tartrate 25 mg oral tablet  -- 1 tab(s) by mouth 2 times a day  -- Indication: For CAD (coronary artery disease)    Dulera 200 mcg-5 mcg/inh inhalation aerosol  -- 2 puff(s) inhaled 2 times a day  -- Indication: For Copd    ProAir HFA 90 mcg/inh inhalation aerosol  -- 2 puff(s) inhaled 4 times a day, As Needed  -- Indication: For Copd    amLODIPine 10 mg oral tablet  -- 1 tab(s) by mouth once a day  -- Indication: For Hypertension    metOLazone 2.5 mg oral tablet  -- 1 tab(s) by mouth once a day  -- Indication: For Chf    furosemide 40 mg oral tablet  -- 1 tab(s) by mouth 2 times a day  -- Indication: For Chf    montelukast 10 mg oral tablet  -- 1 tab(s) by mouth once a day  -- Indication: For Allergy    tiZANidine 4 mg oral tablet  -- 1 tab(s) by mouth 3 times a day  -- Indication: For muscle spasms    Nasonex 50 mcg/inh nasal spray  -- 2 spray(s) into nose once a day  -- Indication: For nasal congestion    melatonin 5 mg oral capsule  -- 2 cap(s) by mouth once a day (at bedtime)  -- Indication: For Sleep aide    Protonix 40 mg oral delayed release tablet  -- 1 tab(s) by mouth once a day  -- Indication: For Peptic ulcer disease    levothyroxine 100 mcg (0.1 mg) oral tablet  -- 1 tab(s) by mouth once a day  -- Indication: For Hypothyroidism    folic acid 1 mg oral tablet  -- 1 tab(s) by mouth once a day  -- Indication: For vitamin    Vitamin D3 1000 intl units oral tablet  -- 1 tab(s) by mouth once a day  -- Indication: For vitamin

## 2017-04-08 NOTE — DISCHARGE NOTE ADULT - PATIENT PORTAL LINK FT
“You can access the Follow[employer] Patient Portal, offered by [facility], by registering with the following website: [email or web address]”

## 2017-04-08 NOTE — DISCHARGE NOTE ADULT - CARE PROVIDER_API CALL
Helio Pandey (), Family Medicine  [address] [place] [id number]  Phone: [phone]  Fax: [phone]    Yarely Penny), Cardiovascular Disease; Internal Medicine  [address] [place] [id number]  Phone: [phone]  Fax: [phone]    Gustavo Howell), Gastroenterology; Internal Medicine  [address] [place] [address]  Phone: [phone]  Fax: [phone]

## 2017-04-08 NOTE — DISCHARGE NOTE ADULT - CARE PLAN
Principal Discharge DX:	Symptomatic anemia  Goal:	stable after transfusion.  Instructions for follow-up, activity and diet:	please follow up with gastroenterology in 1-2 weeks.  extensive hematologic/GI workup in past  preferred CBC check within 1 week with primary care doctor.  Secondary Diagnosis:	CAD (coronary artery disease)  Instructions for follow-up, activity and diet:	home medications  outpatient echocardiogram  Secondary Diagnosis:	Diabetes  Instructions for follow-up, activity and diet:	home medications  Secondary Diagnosis:	CKD (chronic kidney disease) stage 3, GFR 30-59 ml/min  Instructions for follow-up, activity and diet:	stable

## 2017-04-18 ENCOUNTER — OTHER (OUTPATIENT)
Age: 72
End: 2017-04-18

## 2017-04-21 ENCOUNTER — APPOINTMENT (OUTPATIENT)
Dept: ORTHOPEDIC SURGERY | Facility: CLINIC | Age: 72
End: 2017-04-21

## 2017-04-21 VITALS
SYSTOLIC BLOOD PRESSURE: 181 MMHG | WEIGHT: 171 LBS | DIASTOLIC BLOOD PRESSURE: 75 MMHG | HEIGHT: 59 IN | BODY MASS INDEX: 34.47 KG/M2 | HEART RATE: 82 BPM

## 2017-05-16 ENCOUNTER — APPOINTMENT (OUTPATIENT)
Dept: ORTHOPEDIC SURGERY | Facility: CLINIC | Age: 72
End: 2017-05-16

## 2017-05-16 DIAGNOSIS — M43.16 SPONDYLOLISTHESIS, LUMBAR REGION: ICD-10-CM

## 2017-05-16 DIAGNOSIS — M51.26 OTHER INTERVERTEBRAL DISC DISPLACEMENT, LUMBAR REGION: ICD-10-CM

## 2017-05-16 DIAGNOSIS — M48.06 SPINAL STENOSIS, LUMBAR REGION: ICD-10-CM

## 2017-06-02 ENCOUNTER — APPOINTMENT (OUTPATIENT)
Dept: PULMONOLOGY | Facility: CLINIC | Age: 72
End: 2017-06-02

## 2017-06-02 VITALS — OXYGEN SATURATION: 97 %

## 2017-06-02 VITALS — SYSTOLIC BLOOD PRESSURE: 140 MMHG | WEIGHT: 179 LBS | DIASTOLIC BLOOD PRESSURE: 80 MMHG | BODY MASS INDEX: 36.15 KG/M2

## 2017-06-02 DIAGNOSIS — R59.0 LOCALIZED ENLARGED LYMPH NODES: ICD-10-CM

## 2017-06-02 DIAGNOSIS — I35.0 NONRHEUMATIC AORTIC (VALVE) STENOSIS: ICD-10-CM

## 2017-06-02 DIAGNOSIS — I05.0 RHEUMATIC MITRAL STENOSIS: ICD-10-CM

## 2017-06-22 ENCOUNTER — EMERGENCY (EMERGENCY)
Facility: HOSPITAL | Age: 72
LOS: 1 days | Discharge: HOME | End: 2017-06-22
Attending: EMERGENCY MEDICINE
Payer: MEDICARE

## 2017-06-22 VITALS
HEART RATE: 69 BPM | RESPIRATION RATE: 18 BRPM | DIASTOLIC BLOOD PRESSURE: 67 MMHG | SYSTOLIC BLOOD PRESSURE: 146 MMHG | OXYGEN SATURATION: 98 % | WEIGHT: 169.98 LBS | TEMPERATURE: 98 F | HEIGHT: 59 IN

## 2017-06-22 DIAGNOSIS — Z95.1 PRESENCE OF AORTOCORONARY BYPASS GRAFT: Chronic | ICD-10-CM

## 2017-06-22 DIAGNOSIS — Z98.89 OTHER SPECIFIED POSTPROCEDURAL STATES: Chronic | ICD-10-CM

## 2017-06-22 DIAGNOSIS — Z95.2 PRESENCE OF PROSTHETIC HEART VALVE: Chronic | ICD-10-CM

## 2017-06-22 LAB
ALBUMIN SERPL ELPH-MCNC: 4.2 G/DL — SIGNIFICANT CHANGE UP (ref 3.3–5.2)
ALP SERPL-CCNC: 78 U/L — SIGNIFICANT CHANGE UP (ref 40–120)
ALT FLD-CCNC: 35 U/L — HIGH
ANION GAP SERPL CALC-SCNC: 21 MMOL/L — HIGH (ref 5–17)
AST SERPL-CCNC: 78 U/L — HIGH
BASOPHILS # BLD AUTO: 0 K/UL — SIGNIFICANT CHANGE UP (ref 0–0.2)
BASOPHILS NFR BLD AUTO: 0.2 % — SIGNIFICANT CHANGE UP (ref 0–2)
BILIRUB SERPL-MCNC: 0.4 MG/DL — SIGNIFICANT CHANGE UP (ref 0.4–2)
BLD GP AB SCN SERPL QL: SIGNIFICANT CHANGE UP
BUN SERPL-MCNC: 83 MG/DL — HIGH (ref 8–20)
CALCIUM SERPL-MCNC: 9.5 MG/DL — SIGNIFICANT CHANGE UP (ref 8.6–10.2)
CHLORIDE SERPL-SCNC: 92 MMOL/L — LOW (ref 98–107)
CO2 SERPL-SCNC: 25 MMOL/L — SIGNIFICANT CHANGE UP (ref 22–29)
CREAT SERPL-MCNC: 2.24 MG/DL — HIGH (ref 0.5–1.3)
EOSINOPHIL # BLD AUTO: 0.2 K/UL — SIGNIFICANT CHANGE UP (ref 0–0.5)
EOSINOPHIL NFR BLD AUTO: 4.8 % — SIGNIFICANT CHANGE UP (ref 0–6)
GLUCOSE SERPL-MCNC: 187 MG/DL — HIGH (ref 70–115)
HCT VFR BLD CALC: 22.6 % — LOW (ref 37–47)
HGB BLD-MCNC: 7.1 G/DL — LOW (ref 12–16)
LYMPHOCYTES # BLD AUTO: 1 K/UL — SIGNIFICANT CHANGE UP (ref 1–4.8)
LYMPHOCYTES # BLD AUTO: 18.9 % — LOW (ref 20–55)
MCHC RBC-ENTMCNC: 23.9 PG — LOW (ref 27–31)
MCHC RBC-ENTMCNC: 31.4 G/DL — LOW (ref 32–36)
MCV RBC AUTO: 76.1 FL — LOW (ref 81–99)
MONOCYTES # BLD AUTO: 0.3 K/UL — SIGNIFICANT CHANGE UP (ref 0–0.8)
MONOCYTES NFR BLD AUTO: 5.4 % — SIGNIFICANT CHANGE UP (ref 3–10)
NEUTROPHILS # BLD AUTO: 3.7 K/UL — SIGNIFICANT CHANGE UP (ref 1.8–8)
NEUTROPHILS NFR BLD AUTO: 70.5 % — SIGNIFICANT CHANGE UP (ref 37–73)
PLATELET # BLD AUTO: 180 K/UL — SIGNIFICANT CHANGE UP (ref 150–400)
POTASSIUM SERPL-MCNC: 4.4 MMOL/L — SIGNIFICANT CHANGE UP (ref 3.5–5.3)
POTASSIUM SERPL-SCNC: 4.4 MMOL/L — SIGNIFICANT CHANGE UP (ref 3.5–5.3)
PROT SERPL-MCNC: 8.7 G/DL — SIGNIFICANT CHANGE UP (ref 6.6–8.7)
RBC # BLD: 2.97 M/UL — LOW (ref 4.4–5.2)
RBC # FLD: 17.7 % — HIGH (ref 11–15.6)
SODIUM SERPL-SCNC: 138 MMOL/L — SIGNIFICANT CHANGE UP (ref 135–145)
TYPE + AB SCN PNL BLD: SIGNIFICANT CHANGE UP
WBC # BLD: 5.2 K/UL — SIGNIFICANT CHANGE UP (ref 4.8–10.8)
WBC # FLD AUTO: 5.2 K/UL — SIGNIFICANT CHANGE UP (ref 4.8–10.8)

## 2017-06-22 PROCEDURE — 93010 ELECTROCARDIOGRAM REPORT: CPT

## 2017-06-22 PROCEDURE — 71010: CPT | Mod: 26

## 2017-06-22 PROCEDURE — 99291 CRITICAL CARE FIRST HOUR: CPT

## 2017-06-22 RX ORDER — FUROSEMIDE 40 MG
20 TABLET ORAL ONCE
Qty: 0 | Refills: 0 | Status: COMPLETED | OUTPATIENT
Start: 2017-06-22 | End: 2017-06-22

## 2017-06-22 RX ORDER — FUROSEMIDE 40 MG
20 TABLET ORAL ONCE
Qty: 0 | Refills: 0 | Status: COMPLETED | OUTPATIENT
Start: 2017-06-22 | End: 2017-06-23

## 2017-06-22 RX ADMIN — Medication 20 MILLIGRAM(S): at 23:14

## 2017-06-22 NOTE — ED PROVIDER NOTE - CARE PLAN
Principal Discharge DX:	Anemia, unspecified anemia type Principal Discharge DX:	Anemia, unspecified anemia type  Secondary Diagnosis:	Hyperglycemia  Secondary Diagnosis:	Renal insufficiency Principal Discharge DX:	Anemia, unspecified anemia type  Secondary Diagnosis:	Diabetes mellitus of other type without complication

## 2017-06-22 NOTE — ED PROVIDER NOTE - OBJECTIVE STATEMENT
71 yo female presents from blood transfusion. Patient has a history of recurrent anemia with extensive workup( by gi and heme/onc)  and multiple transfusions. She was seen by Dr. Pandey yesterday and looked paler then normal .Routine blood work found her with hgb of 6.8; normally she is about 9. He attempted to arrange of out-patient transfusion this evening but was unable prompting presentation to the.    PMD: Dr. Pandey

## 2017-06-22 NOTE — ED PROVIDER NOTE - PROGRESS NOTE DETAILS
Patient to be transferred. Guaiac recommended but patient refuses, stating concern of hemorrhoid irritation, and multiple examinations being negative.. pt signed out to me awaiting repeat labs after rtransfusion. pt's hgb came up to 8.1 but she was noted to have a markedly elevated creatinine. spoke with her nephrologist dr crowe and he recommends that she continue on her present lasix dose and follow up with her pmd for repeat labs

## 2017-06-22 NOTE — ED ADULT NURSE NOTE - OBJECTIVE STATEMENT
72 year old female in no acute distress, reports being sent by primary physician for hemoglobin of 6.7. Pt reports history of anemia with no know cause and reports she has had complete work up done more than once with no answers. Labs drawn as ordered, unable to obtain IV access after one attempt an pt reports history of being hard stick and states "the doctor had to use ultrasound machine and said to use that for bloodwork". Fall precautions in place, daughter at bedside, will monitor.

## 2017-06-22 NOTE — ED STATDOCS - PROGRESS NOTE DETAILS
71 y/o F with hx of CHF, anemia, thyroid problem, and anemia presenting to the ED complaining of anemia. She states that she had blood work done and told to go to the ED for a transfusion. Pt reports that the blood count is 6.7. Pt reports that "they checked every orifice on my body and they can't find a source." Pt reports PET scan 6 days ago, she is awaking results.  Medication: Lantis  PMD: Dr. Pandey  Pulmonologist: Dr. Schneider

## 2017-06-22 NOTE — ED PROVIDER NOTE - CHPI ED SYMPTOMS NEG
no fever/no nausea/no chills/no numbness/no pain/no tingling/no dizziness/no vomiting/no decreased eating/drinking/no weakness

## 2017-06-22 NOTE — ED PROVIDER NOTE - CRITICAL CARE PROVIDED
direct patient care (not related to procedure)/consultation with other physicians/additional history taking/documentation/interpretation of diagnostic studies

## 2017-06-23 VITALS
OXYGEN SATURATION: 98 % | RESPIRATION RATE: 18 BRPM | SYSTOLIC BLOOD PRESSURE: 116 MMHG | DIASTOLIC BLOOD PRESSURE: 62 MMHG | HEART RATE: 58 BPM | TEMPERATURE: 97 F

## 2017-06-23 LAB
ANION GAP SERPL CALC-SCNC: 17 MMOL/L — SIGNIFICANT CHANGE UP (ref 5–17)
BUN SERPL-MCNC: 85 MG/DL — HIGH (ref 8–20)
CALCIUM SERPL-MCNC: 9.3 MG/DL — SIGNIFICANT CHANGE UP (ref 8.6–10.2)
CHLORIDE SERPL-SCNC: 92 MMOL/L — LOW (ref 98–107)
CO2 SERPL-SCNC: 27 MMOL/L — SIGNIFICANT CHANGE UP (ref 22–29)
CREAT SERPL-MCNC: 2.18 MG/DL — HIGH (ref 0.5–1.3)
GLUCOSE SERPL-MCNC: 397 MG/DL — HIGH (ref 70–115)
HCT VFR BLD CALC: 25.2 % — LOW (ref 37–47)
HGB BLD-MCNC: 8.1 G/DL — LOW (ref 12–16)
MCHC RBC-ENTMCNC: 24.6 PG — LOW (ref 27–31)
MCHC RBC-ENTMCNC: 32.1 G/DL — SIGNIFICANT CHANGE UP (ref 32–36)
MCV RBC AUTO: 76.6 FL — LOW (ref 81–99)
PLATELET # BLD AUTO: 120 K/UL — LOW (ref 150–400)
POTASSIUM SERPL-MCNC: 4 MMOL/L — SIGNIFICANT CHANGE UP (ref 3.5–5.3)
POTASSIUM SERPL-SCNC: 4 MMOL/L — SIGNIFICANT CHANGE UP (ref 3.5–5.3)
RBC # BLD: 3.29 M/UL — LOW (ref 4.4–5.2)
RBC # FLD: 17.4 % — HIGH (ref 11–15.6)
SODIUM SERPL-SCNC: 136 MMOL/L — SIGNIFICANT CHANGE UP (ref 135–145)
WBC # BLD: 3.6 K/UL — LOW (ref 4.8–10.8)
WBC # FLD AUTO: 3.6 K/UL — LOW (ref 4.8–10.8)

## 2017-06-23 PROCEDURE — 99285 EMERGENCY DEPT VISIT HI MDM: CPT | Mod: 25

## 2017-06-23 PROCEDURE — 36415 COLL VENOUS BLD VENIPUNCTURE: CPT

## 2017-06-23 PROCEDURE — 85730 THROMBOPLASTIN TIME PARTIAL: CPT

## 2017-06-23 PROCEDURE — 96375 TX/PRO/DX INJ NEW DRUG ADDON: CPT

## 2017-06-23 PROCEDURE — 80048 BASIC METABOLIC PNL TOTAL CA: CPT

## 2017-06-23 PROCEDURE — P9016: CPT

## 2017-06-23 PROCEDURE — 36430 TRANSFUSION BLD/BLD COMPNT: CPT

## 2017-06-23 PROCEDURE — 86850 RBC ANTIBODY SCREEN: CPT

## 2017-06-23 PROCEDURE — 93005 ELECTROCARDIOGRAM TRACING: CPT

## 2017-06-23 PROCEDURE — 80053 COMPREHEN METABOLIC PANEL: CPT

## 2017-06-23 PROCEDURE — 86901 BLOOD TYPING SEROLOGIC RH(D): CPT

## 2017-06-23 PROCEDURE — 96374 THER/PROPH/DIAG INJ IV PUSH: CPT

## 2017-06-23 PROCEDURE — 85610 PROTHROMBIN TIME: CPT

## 2017-06-23 PROCEDURE — 71045 X-RAY EXAM CHEST 1 VIEW: CPT

## 2017-06-23 PROCEDURE — 85027 COMPLETE CBC AUTOMATED: CPT

## 2017-06-23 PROCEDURE — 86900 BLOOD TYPING SEROLOGIC ABO: CPT

## 2017-06-23 PROCEDURE — 86920 COMPATIBILITY TEST SPIN: CPT

## 2017-06-23 RX ORDER — INSULIN HUMAN 100 [IU]/ML
6 INJECTION, SOLUTION SUBCUTANEOUS ONCE
Qty: 0 | Refills: 0 | Status: COMPLETED | OUTPATIENT
Start: 2017-06-23 | End: 2017-06-23

## 2017-06-23 RX ADMIN — INSULIN HUMAN 6 UNIT(S): 100 INJECTION, SOLUTION SUBCUTANEOUS at 10:45

## 2017-06-23 NOTE — ED ADULT NURSE REASSESSMENT NOTE - NS ED NURSE REASSESS COMMENT FT1
Assumed pt care @ 1900 Pt is A&Ox3 in NAD. Pt denies complaint.  Pt OOB independently. Safety maintained, Bed locked in lowest position. Pt pending 2 units PRBCs. Will continue to monitor.
Pt started on 1st unit of PRBC @ 2315 unit #[id number]. Pt did not have any s/s of transfusion reactions during this time. RN remained @ bedside during the first 15 mins of infusion. As per pt she always receives Lasix prior and in-between infusions. Dr. Multani aware and ordered Lasix. Will continue to monitor pt.
Second unit of PRBCs completed and pt to have repeat CBC @ 0900 and is aware of the plan of care. Will continue to monitor.
pt s/p blood transfusion, lab work repeated. pt with elevated blood glucose. Md aware. pt to be medicated as ordered and dispoed.

## 2017-06-27 ENCOUNTER — APPOINTMENT (OUTPATIENT)
Dept: PULMONOLOGY | Facility: CLINIC | Age: 72
End: 2017-06-27

## 2017-06-27 VITALS — BODY MASS INDEX: 36.15 KG/M2 | WEIGHT: 179 LBS | DIASTOLIC BLOOD PRESSURE: 60 MMHG | SYSTOLIC BLOOD PRESSURE: 134 MMHG

## 2017-09-14 ENCOUNTER — INPATIENT (INPATIENT)
Facility: HOSPITAL | Age: 72
LOS: 0 days | Discharge: HOME | DRG: 812 | End: 2017-09-15
Attending: HOSPITALIST | Admitting: HOSPITALIST
Payer: MEDICARE

## 2017-09-14 VITALS
OXYGEN SATURATION: 98 % | DIASTOLIC BLOOD PRESSURE: 73 MMHG | WEIGHT: 166.89 LBS | RESPIRATION RATE: 17 BRPM | SYSTOLIC BLOOD PRESSURE: 126 MMHG | HEART RATE: 77 BPM | TEMPERATURE: 98 F | HEIGHT: 59 IN

## 2017-09-14 DIAGNOSIS — Z98.89 OTHER SPECIFIED POSTPROCEDURAL STATES: Chronic | ICD-10-CM

## 2017-09-14 DIAGNOSIS — Z95.2 PRESENCE OF PROSTHETIC HEART VALVE: Chronic | ICD-10-CM

## 2017-09-14 DIAGNOSIS — Z95.1 PRESENCE OF AORTOCORONARY BYPASS GRAFT: Chronic | ICD-10-CM

## 2017-09-14 DIAGNOSIS — D64.9 ANEMIA, UNSPECIFIED: ICD-10-CM

## 2017-09-14 LAB
ALBUMIN SERPL ELPH-MCNC: 3.5 G/DL — SIGNIFICANT CHANGE UP (ref 3.3–5.2)
ALP SERPL-CCNC: 81 U/L — SIGNIFICANT CHANGE UP (ref 40–120)
ALT FLD-CCNC: 37 U/L — HIGH
ANION GAP SERPL CALC-SCNC: 20 MMOL/L — HIGH (ref 5–17)
APPEARANCE UR: CLEAR — SIGNIFICANT CHANGE UP
APTT BLD: 33.7 SEC — SIGNIFICANT CHANGE UP (ref 27.5–37.4)
AST SERPL-CCNC: 90 U/L — HIGH
BASOPHILS # BLD AUTO: 0 K/UL — SIGNIFICANT CHANGE UP (ref 0–0.2)
BASOPHILS NFR BLD AUTO: 0.2 % — SIGNIFICANT CHANGE UP (ref 0–2)
BILIRUB SERPL-MCNC: 0.3 MG/DL — LOW (ref 0.4–2)
BILIRUB UR-MCNC: NEGATIVE — SIGNIFICANT CHANGE UP
BLD GP AB SCN SERPL QL: SIGNIFICANT CHANGE UP
BUN SERPL-MCNC: 148 MG/DL — HIGH (ref 8–20)
CALCIUM SERPL-MCNC: 9.4 MG/DL — SIGNIFICANT CHANGE UP (ref 8.6–10.2)
CHLORIDE SERPL-SCNC: 90 MMOL/L — LOW (ref 98–107)
CK SERPL-CCNC: 125 U/L — SIGNIFICANT CHANGE UP (ref 25–170)
CO2 SERPL-SCNC: 29 MMOL/L — SIGNIFICANT CHANGE UP (ref 22–29)
COLOR SPEC: YELLOW — SIGNIFICANT CHANGE UP
CREAT SERPL-MCNC: 2.49 MG/DL — HIGH (ref 0.5–1.3)
DIFF PNL FLD: NEGATIVE — SIGNIFICANT CHANGE UP
EOSINOPHIL # BLD AUTO: 0.2 K/UL — SIGNIFICANT CHANGE UP (ref 0–0.5)
EOSINOPHIL NFR BLD AUTO: 2 % — SIGNIFICANT CHANGE UP (ref 0–6)
GLUCOSE SERPL-MCNC: 177 MG/DL — HIGH (ref 70–115)
GLUCOSE UR QL: NEGATIVE MG/DL — SIGNIFICANT CHANGE UP
HCT VFR BLD CALC: 22.6 % — LOW (ref 37–47)
HGB BLD-MCNC: 7.2 G/DL — LOW (ref 12–16)
INR BLD: 1.12 RATIO — SIGNIFICANT CHANGE UP (ref 0.88–1.16)
KETONES UR-MCNC: NEGATIVE — SIGNIFICANT CHANGE UP
LEUKOCYTE ESTERASE UR-ACNC: NEGATIVE — SIGNIFICANT CHANGE UP
LYMPHOCYTES # BLD AUTO: 1.8 K/UL — SIGNIFICANT CHANGE UP (ref 1–4.8)
LYMPHOCYTES # BLD AUTO: 22.2 % — SIGNIFICANT CHANGE UP (ref 20–55)
MCHC RBC-ENTMCNC: 26.4 PG — LOW (ref 27–31)
MCHC RBC-ENTMCNC: 31.9 G/DL — LOW (ref 32–36)
MCV RBC AUTO: 82.8 FL — SIGNIFICANT CHANGE UP (ref 81–99)
MONOCYTES # BLD AUTO: 0.8 K/UL — SIGNIFICANT CHANGE UP (ref 0–0.8)
MONOCYTES NFR BLD AUTO: 10.2 % — HIGH (ref 3–10)
NEUTROPHILS # BLD AUTO: 5.2 K/UL — SIGNIFICANT CHANGE UP (ref 1.8–8)
NEUTROPHILS NFR BLD AUTO: 64.7 % — SIGNIFICANT CHANGE UP (ref 37–73)
NITRITE UR-MCNC: NEGATIVE — SIGNIFICANT CHANGE UP
NT-PROBNP SERPL-SCNC: 1386 PG/ML — HIGH (ref 0–300)
OB PNL STL: NEGATIVE — SIGNIFICANT CHANGE UP
PH UR: 7 — SIGNIFICANT CHANGE UP (ref 5–8)
PLATELET # BLD AUTO: 162 K/UL — SIGNIFICANT CHANGE UP (ref 150–400)
POTASSIUM SERPL-MCNC: 4 MMOL/L — SIGNIFICANT CHANGE UP (ref 3.5–5.3)
POTASSIUM SERPL-SCNC: 4 MMOL/L — SIGNIFICANT CHANGE UP (ref 3.5–5.3)
PROT SERPL-MCNC: 7.3 G/DL — SIGNIFICANT CHANGE UP (ref 6.6–8.7)
PROT UR-MCNC: NEGATIVE MG/DL — SIGNIFICANT CHANGE UP
PROTHROM AB SERPL-ACNC: 12.4 SEC — SIGNIFICANT CHANGE UP (ref 9.8–12.7)
RBC # BLD: 2.73 M/UL — LOW (ref 4.4–5.2)
RBC # FLD: 18.1 % — HIGH (ref 11–15.6)
SODIUM SERPL-SCNC: 139 MMOL/L — SIGNIFICANT CHANGE UP (ref 135–145)
SP GR SPEC: 1 — LOW (ref 1.01–1.02)
TROPONIN T SERPL-MCNC: 0.08 NG/ML — HIGH (ref 0–0.06)
TYPE + AB SCN PNL BLD: SIGNIFICANT CHANGE UP
UROBILINOGEN FLD QL: NEGATIVE MG/DL — SIGNIFICANT CHANGE UP
WBC # BLD: 8 K/UL — SIGNIFICANT CHANGE UP (ref 4.8–10.8)
WBC # FLD AUTO: 8 K/UL — SIGNIFICANT CHANGE UP (ref 4.8–10.8)

## 2017-09-14 PROCEDURE — 71010: CPT | Mod: 26

## 2017-09-14 PROCEDURE — 99284 EMERGENCY DEPT VISIT MOD MDM: CPT | Mod: 25

## 2017-09-14 PROCEDURE — 93010 ELECTROCARDIOGRAM REPORT: CPT

## 2017-09-14 RX ORDER — IPRATROPIUM/ALBUTEROL SULFATE 18-103MCG
3 AEROSOL WITH ADAPTER (GRAM) INHALATION EVERY 6 HOURS
Qty: 0 | Refills: 0 | Status: DISCONTINUED | OUTPATIENT
Start: 2017-09-14 | End: 2017-09-15

## 2017-09-14 RX ORDER — ALBUTEROL 90 UG/1
1 AEROSOL, METERED ORAL EVERY 4 HOURS
Qty: 0 | Refills: 0 | Status: DISCONTINUED | OUTPATIENT
Start: 2017-09-14 | End: 2017-09-15

## 2017-09-14 RX ORDER — PANTOPRAZOLE SODIUM 20 MG/1
40 TABLET, DELAYED RELEASE ORAL
Qty: 0 | Refills: 0 | Status: DISCONTINUED | OUTPATIENT
Start: 2017-09-14 | End: 2017-09-15

## 2017-09-14 RX ORDER — GABAPENTIN 400 MG/1
300 CAPSULE ORAL DAILY
Qty: 0 | Refills: 0 | Status: DISCONTINUED | OUTPATIENT
Start: 2017-09-14 | End: 2017-09-15

## 2017-09-14 RX ORDER — GLUCAGON INJECTION, SOLUTION 0.5 MG/.1ML
1 INJECTION, SOLUTION SUBCUTANEOUS ONCE
Qty: 0 | Refills: 0 | Status: DISCONTINUED | OUTPATIENT
Start: 2017-09-14 | End: 2017-09-15

## 2017-09-14 RX ORDER — DEXTROSE 50 % IN WATER 50 %
25 SYRINGE (ML) INTRAVENOUS ONCE
Qty: 0 | Refills: 0 | Status: DISCONTINUED | OUTPATIENT
Start: 2017-09-14 | End: 2017-09-15

## 2017-09-14 RX ORDER — SODIUM CHLORIDE 9 MG/ML
1000 INJECTION, SOLUTION INTRAVENOUS
Qty: 0 | Refills: 0 | Status: DISCONTINUED | OUTPATIENT
Start: 2017-09-14 | End: 2017-09-15

## 2017-09-14 RX ORDER — FOLIC ACID 0.8 MG
1 TABLET ORAL DAILY
Qty: 0 | Refills: 0 | Status: DISCONTINUED | OUTPATIENT
Start: 2017-09-14 | End: 2017-09-15

## 2017-09-14 RX ORDER — INSULIN LISPRO 100/ML
VIAL (ML) SUBCUTANEOUS AT BEDTIME
Qty: 0 | Refills: 0 | Status: DISCONTINUED | OUTPATIENT
Start: 2017-09-14 | End: 2017-09-15

## 2017-09-14 RX ORDER — FUROSEMIDE 40 MG
20 TABLET ORAL ONCE
Qty: 0 | Refills: 0 | Status: COMPLETED | OUTPATIENT
Start: 2017-09-14 | End: 2017-09-15

## 2017-09-14 RX ORDER — DEXTROSE 50 % IN WATER 50 %
12.5 SYRINGE (ML) INTRAVENOUS ONCE
Qty: 0 | Refills: 0 | Status: DISCONTINUED | OUTPATIENT
Start: 2017-09-14 | End: 2017-09-15

## 2017-09-14 RX ORDER — INSULIN LISPRO 100/ML
VIAL (ML) SUBCUTANEOUS
Qty: 0 | Refills: 0 | Status: DISCONTINUED | OUTPATIENT
Start: 2017-09-14 | End: 2017-09-15

## 2017-09-14 RX ORDER — TIOTROPIUM BROMIDE 18 UG/1
1 CAPSULE ORAL; RESPIRATORY (INHALATION) DAILY
Qty: 0 | Refills: 0 | Status: DISCONTINUED | OUTPATIENT
Start: 2017-09-14 | End: 2017-09-15

## 2017-09-14 RX ORDER — LEVOTHYROXINE SODIUM 125 MCG
100 TABLET ORAL DAILY
Qty: 0 | Refills: 0 | Status: DISCONTINUED | OUTPATIENT
Start: 2017-09-14 | End: 2017-09-15

## 2017-09-14 RX ORDER — SODIUM CHLORIDE 9 MG/ML
1000 INJECTION INTRAMUSCULAR; INTRAVENOUS; SUBCUTANEOUS
Qty: 0 | Refills: 0 | Status: COMPLETED | OUTPATIENT
Start: 2017-09-14 | End: 2017-09-15

## 2017-09-14 RX ORDER — METOPROLOL TARTRATE 50 MG
25 TABLET ORAL
Qty: 0 | Refills: 0 | Status: DISCONTINUED | OUTPATIENT
Start: 2017-09-14 | End: 2017-09-15

## 2017-09-14 RX ORDER — DEXTROSE 50 % IN WATER 50 %
1 SYRINGE (ML) INTRAVENOUS ONCE
Qty: 0 | Refills: 0 | Status: DISCONTINUED | OUTPATIENT
Start: 2017-09-14 | End: 2017-09-15

## 2017-09-14 NOTE — H&P ADULT - NSHPLABSRESULTS_GEN_ALL_CORE
cxr reviewd by me and possible cardiomegaly, no pl. effusion or infiltrate noted.  sinus 80, inf. lateral ischemia, ant. infarct age undetermined. no sig. change from prior.

## 2017-09-14 NOTE — ED PROVIDER NOTE - OBJECTIVE STATEMENT
This patient is a 72 year old woman with hx of AS, CHF, CAD s/p CABG x3, HTN, HLD, Hepatitis C, and DM who was sent to the ER by her PMD Dr. Pandey for anemia and needing transfusion.  Patient c/o SOB on exertion, lightheadedness and tremor.  She denies blood in stool.  Patient states that she has these symptoms whenever her hemoglobin drops.  She has been told by her cardiologist Dr. Jack that due to her heart conditions she cannot tolerate low hemoglobin levels.  Patient was recently transfused 5 days ago at [facility].  Yesterday she had blood work done and was told her hemoglobin was 7.1.

## 2017-09-14 NOTE — H&P ADULT - ASSESSMENT
pt. is admitted for symptomatic anemia, will give 2 units of prbc, follow am labs. will call  dr. Enrique hematology group.    RENAL FAILURE STAGE 4, will request . pt. appears to be over diuresed, will hold lasix but give metalazone.  dr. zelaya group to follow.    Essential htn. will continue home meds.    ELEVATED TROP, likely related to elevated Cr, no acute ekg changes, no cp,  but has cad, will trend trop, echo, cardio consult K[place].    hypothyroidism, unspecified, will continue synthroid.    DM, humalog scale.

## 2017-09-14 NOTE — ED ADULT NURSE NOTE - OBJECTIVE STATEMENT
72 yof presents to ed with abnormal lab. hgb 7.9 and BUN/ Creatinine abnormal. lung sounds clear equal bilaterally abd soft nontender, ambulates with assistance. skin dry pink warm. iv inserted via ultrasound guidance for blood draw by MD ho.

## 2017-09-14 NOTE — ED ADULT NURSE NOTE - CHPI ED SYMPTOMS NEG
no pain/no tingling/no fever/no dizziness/no numbness/no decreased eating/drinking/no chills/no nausea/no vomiting

## 2017-09-14 NOTE — H&P ADULT - HISTORY OF PRESENT ILLNESS
73 y/o female with h/o chronic anemia and has been getting PRBC  usually every 6 weeks and has been following with dr. Iva conley. AS per daughter pt. has been told that her bone marrow does not make red cells. pt. got 2 units of prbc  at [facility] about 4 days ago and today she was feeling weak and tired again. pt. has reported no cp. no abd. pain. no cough, no fever. reports no blood per rectum or dark stools. pt. is not on any asa or plavix. pt. reports that she had colonoscopy more than a year ago and it was normal. Today pt's Hb is 7.2 .

## 2017-09-15 ENCOUNTER — TRANSCRIPTION ENCOUNTER (OUTPATIENT)
Age: 72
End: 2017-09-15

## 2017-09-15 VITALS — WEIGHT: 166.89 LBS

## 2017-09-15 DIAGNOSIS — B19.20 UNSPECIFIED VIRAL HEPATITIS C WITHOUT HEPATIC COMA: ICD-10-CM

## 2017-09-15 DIAGNOSIS — E03.9 HYPOTHYROIDISM, UNSPECIFIED: ICD-10-CM

## 2017-09-15 DIAGNOSIS — E11.9 TYPE 2 DIABETES MELLITUS WITHOUT COMPLICATIONS: ICD-10-CM

## 2017-09-15 DIAGNOSIS — I25.10 ATHEROSCLEROTIC HEART DISEASE OF NATIVE CORONARY ARTERY WITHOUT ANGINA PECTORIS: ICD-10-CM

## 2017-09-15 DIAGNOSIS — D64.9 ANEMIA, UNSPECIFIED: ICD-10-CM

## 2017-09-15 DIAGNOSIS — I10 ESSENTIAL (PRIMARY) HYPERTENSION: ICD-10-CM

## 2017-09-15 DIAGNOSIS — I50.9 HEART FAILURE, UNSPECIFIED: ICD-10-CM

## 2017-09-15 LAB
ANION GAP SERPL CALC-SCNC: 16 MMOL/L — SIGNIFICANT CHANGE UP (ref 5–17)
BASOPHILS # BLD AUTO: 0 K/UL — SIGNIFICANT CHANGE UP (ref 0–0.2)
BASOPHILS NFR BLD AUTO: 0.3 % — SIGNIFICANT CHANGE UP (ref 0–2)
BUN SERPL-MCNC: 142 MG/DL — HIGH (ref 8–20)
CALCIUM SERPL-MCNC: 9.2 MG/DL — SIGNIFICANT CHANGE UP (ref 8.6–10.2)
CHLORIDE SERPL-SCNC: 95 MMOL/L — LOW (ref 98–107)
CK SERPL-CCNC: 112 U/L — SIGNIFICANT CHANGE UP (ref 25–170)
CO2 SERPL-SCNC: 29 MMOL/L — SIGNIFICANT CHANGE UP (ref 22–29)
CREAT SERPL-MCNC: 2.39 MG/DL — HIGH (ref 0.5–1.3)
EOSINOPHIL # BLD AUTO: 0.1 K/UL — SIGNIFICANT CHANGE UP (ref 0–0.5)
EOSINOPHIL NFR BLD AUTO: 1.5 % — SIGNIFICANT CHANGE UP (ref 0–6)
FERRITIN SERPL-MCNC: 44.7 NG/ML — SIGNIFICANT CHANGE UP (ref 11–306)
FOLATE SERPL-MCNC: >20 NG/ML — HIGH (ref 4–16)
GLUCOSE SERPL-MCNC: 255 MG/DL — HIGH (ref 70–115)
HBA1C BLD-MCNC: 5.6 % — SIGNIFICANT CHANGE UP (ref 4–5.6)
HCT VFR BLD CALC: 28.7 % — LOW (ref 37–47)
HGB BLD-MCNC: 9.4 G/DL — LOW (ref 12–16)
IRON SATN MFR SERPL: 149 UG/DL — HIGH (ref 37–145)
IRON SATN MFR SERPL: 32 % — SIGNIFICANT CHANGE UP (ref 14–50)
LYMPHOCYTES # BLD AUTO: 1.3 K/UL — SIGNIFICANT CHANGE UP (ref 1–4.8)
LYMPHOCYTES # BLD AUTO: 17 % — LOW (ref 20–55)
MCHC RBC-ENTMCNC: 27.6 PG — SIGNIFICANT CHANGE UP (ref 27–31)
MCHC RBC-ENTMCNC: 32.8 G/DL — SIGNIFICANT CHANGE UP (ref 32–36)
MCV RBC AUTO: 84.2 FL — SIGNIFICANT CHANGE UP (ref 81–99)
MONOCYTES # BLD AUTO: 0.8 K/UL — SIGNIFICANT CHANGE UP (ref 0–0.8)
MONOCYTES NFR BLD AUTO: 10.7 % — HIGH (ref 3–10)
NEUTROPHILS # BLD AUTO: 5.3 K/UL — SIGNIFICANT CHANGE UP (ref 1.8–8)
NEUTROPHILS NFR BLD AUTO: 69.6 % — SIGNIFICANT CHANGE UP (ref 37–73)
PLATELET # BLD AUTO: 148 K/UL — LOW (ref 150–400)
POTASSIUM SERPL-MCNC: 3.4 MMOL/L — LOW (ref 3.5–5.3)
POTASSIUM SERPL-SCNC: 3.4 MMOL/L — LOW (ref 3.5–5.3)
RBC # BLD: 3.41 M/UL — LOW (ref 4.4–5.2)
RBC # BLD: 3.41 M/UL — LOW (ref 4.4–5.2)
RBC # FLD: 16.6 % — HIGH (ref 11–15.6)
RETICS #: 12.1 K/UL — LOW (ref 25–125)
RETICS/RBC NFR: 3.6 % — HIGH (ref 0.5–2.6)
SODIUM SERPL-SCNC: 140 MMOL/L — SIGNIFICANT CHANGE UP (ref 135–145)
TIBC SERPL-MCNC: 470 UG/DL — HIGH (ref 220–430)
TRANSFERRIN SERPL-MCNC: 329 MG/DL — SIGNIFICANT CHANGE UP (ref 192–382)
TROPONIN T SERPL-MCNC: 0.16 NG/ML — HIGH (ref 0–0.06)
VIT B12 SERPL-MCNC: 508 PG/ML — SIGNIFICANT CHANGE UP (ref 180–914)
WBC # BLD: 7.6 K/UL — SIGNIFICANT CHANGE UP (ref 4.8–10.8)
WBC # FLD AUTO: 7.6 K/UL — SIGNIFICANT CHANGE UP (ref 4.8–10.8)

## 2017-09-15 PROCEDURE — 93306 TTE W/DOPPLER COMPLETE: CPT | Mod: 26

## 2017-09-15 PROCEDURE — 99223 1ST HOSP IP/OBS HIGH 75: CPT

## 2017-09-15 RX ORDER — IPRATROPIUM/ALBUTEROL SULFATE 18-103MCG
3 AEROSOL WITH ADAPTER (GRAM) INHALATION EVERY 6 HOURS
Qty: 0 | Refills: 0 | Status: DISCONTINUED | OUTPATIENT
Start: 2017-09-15 | End: 2017-09-15

## 2017-09-15 RX ORDER — FUROSEMIDE 40 MG
1 TABLET ORAL
Qty: 0 | Refills: 0 | COMMUNITY

## 2017-09-15 RX ORDER — ONDANSETRON 8 MG/1
4 TABLET, FILM COATED ORAL ONCE
Qty: 0 | Refills: 0 | Status: COMPLETED | OUTPATIENT
Start: 2017-09-15 | End: 2017-09-15

## 2017-09-15 RX ORDER — POTASSIUM CHLORIDE 20 MEQ
40 PACKET (EA) ORAL ONCE
Qty: 0 | Refills: 0 | Status: COMPLETED | OUTPATIENT
Start: 2017-09-15 | End: 2017-09-15

## 2017-09-15 RX ADMIN — Medication 4: at 13:29

## 2017-09-15 RX ADMIN — Medication 25 MILLIGRAM(S): at 05:00

## 2017-09-15 RX ADMIN — Medication 40 MILLIEQUIVALENT(S): at 16:55

## 2017-09-15 RX ADMIN — ONDANSETRON 4 MILLIGRAM(S): 8 TABLET, FILM COATED ORAL at 02:50

## 2017-09-15 RX ADMIN — SODIUM CHLORIDE 70 MILLILITER(S): 9 INJECTION INTRAMUSCULAR; INTRAVENOUS; SUBCUTANEOUS at 00:36

## 2017-09-15 RX ADMIN — Medication 1 MILLIGRAM(S): at 13:32

## 2017-09-15 RX ADMIN — Medication 100 MICROGRAM(S): at 05:00

## 2017-09-15 RX ADMIN — Medication 20 MILLIGRAM(S): at 04:35

## 2017-09-15 RX ADMIN — GABAPENTIN 300 MILLIGRAM(S): 400 CAPSULE ORAL at 13:32

## 2017-09-15 RX ADMIN — PANTOPRAZOLE SODIUM 40 MILLIGRAM(S): 20 TABLET, DELAYED RELEASE ORAL at 05:01

## 2017-09-15 RX ADMIN — Medication 12: at 09:02

## 2017-09-15 NOTE — DISCHARGE NOTE ADULT - MEDICATION SUMMARY - MEDICATIONS TO STOP TAKING
I will STOP taking the medications listed below when I get home from the hospital:  None
I will STOP taking the medications listed below when I get home from the hospital:    metOLazone 2.5 mg oral tablet  -- 1 tab(s) by mouth once a day    furosemide 40 mg oral tablet  -- 1 tab(s) by mouth 2 times a day

## 2017-09-15 NOTE — DISCHARGE NOTE ADULT - NS MD DC FALL RISK RISK
For information on Fall & Injury Prevention, visit [email or web address]
For information on Fall & Injury Prevention, visit [email or web address]

## 2017-09-15 NOTE — PROGRESS NOTE ADULT - PROBLEM SELECTOR PLAN 1
Anemia of chronic disease   As per pts PMD baseline Hbg roughly 8-9  s/p 2 units PRBC in ED, finished around 9am  Follow up repeat labs in afternoon  Dr. Valdivia hematology group to see pt in ED today - f/u consult  Physical therapy consult   Plan for discharge today if goal hemoglobin levels are reached Anemia of chronic disease   As per patient has had extensive heme and GI work up in the past. Colonoscopy 2 years ago negative.   As per pts PMD baseline Hbg roughly 8-9  s/p 2 units PRBC in ED, finished around 9am  Follow up repeat labs in afternoon  Dr. Valdivia hematology group to see pt in ED today - f/u consult  Physical therapy consult   Plan for discharge today if goal hemoglobin levels are reached

## 2017-09-15 NOTE — CONSULT NOTE ADULT - ASSESSMENT
MARGUERITE on CKD ( 80/2.7 )  Followed by renal   Component of increased BUN related to recent augmented diuresis with Metolazone added to LAsix  Would hold both for now   S/P transfusion ( chronic issue )   Echo noted   Cleared from a renal perspective for discharge , as she does have an outpatient Nephrologist  Should have BMP repeated as an outpatient in a week
71y/o female with hx chronic anemia, hepatitis C, cirrhosis, GIB secondary varices and peptic ulcer admitted with symptomatic anemia.     1. multifactorial anemia - secondary iron deficiency, and AOCD secondary CRI.   2. check post transfusion h/h  3. monitor cbc with transfusion support as needed  4. check iron studies, ferritin, B12, folate levels  5. recommend GI evaluation

## 2017-09-15 NOTE — DISCHARGE NOTE ADULT - CARE PROVIDER_API CALL
Yarely Penny), Cardiovascular Disease; Internal Medicine  [address] [place] [id number]  Phone: [phone]  Fax: [phone]    Helio Pandey (DO), Family Medicine  [address] [place] [id number]  Phone: [phone]  Fax: [phone]    Nephrologist,   Phone: (   )    -  Fax: (   )    -
Ericka Cruz  Phone: (   )    -  Fax: (   )    -    Helio Pandey (DO), Family Medicine  [address] [place] [id number]  Phone: [phone]  Fax: [phone]    Yarely Penny), Cardiovascular Disease; Internal Medicine  [address] [place] [id number]  Phone: [phone]  Fax: [phone]

## 2017-09-15 NOTE — PROGRESS NOTE ADULT - SUBJECTIVE AND OBJECTIVE BOX
73 y/o female with PMH of stage 4 kidney disease (follows with o/p nephrologist Dr. Cruz),  CAD (follow with Dr. Shelton) and h/o chronic anemia (with extensive heme adn GI work up in the past), and has been getting PRBC usually every 6 weeks and following with dr. Enrique group now in ED complaining of fatigue. Pts most recent blood transfusion was last Saturday at [facility]. Pts baseline Hbg according to Dr. Pandey PMD is 8-9.  On presentation to ED pts Hbg 7.2. Received 2 units PRBC in ED.     Pt seen and examined. Laying in bed, eating breakfast. States no medical complaints. 2 units PRBC just finished running during exam. Pt states she will not know if she feels any less fatigued until she attempts to get out of bed and move around. No other complaints.     Spoke to pts PMD Dr. Pandey via phone this am. Dr. Pandey agrees with plan to transfuse pt until hemoglobin is 8-9 and then discharge given there are no other acute issues. As per Dr. Pandey, pts kidney function has been declining over the past couple months and wants her to follow up with her outpatient nephrologist ASAP for the possible need of HD. Pt has appointment scheduled with Dr. Cruz her nephrologist for next week and agrees to see him then.     INTERVAL HPI/OVERNIGHT EVENTS: No events overnight.     MEDICATIONS  (STANDING):  gabapentin 300 milliGRAM(s) Oral daily  metoprolol 25 milliGRAM(s) Oral two times a day  metolazone 2.5 milliGRAM(s) Oral daily  pantoprazole    Tablet 40 milliGRAM(s) Oral before breakfast  levothyroxine 100 MICROGram(s) Oral daily  folic acid 1 milliGRAM(s) Oral daily  ALBUTerol    90 MICROgram(s) HFA Inhaler 1 Puff(s) Inhalation every 4 hours  tiotropium 18 MICROgram(s) Capsule 1 Capsule(s) Inhalation daily  insulin lispro (HumaLOG) corrective regimen sliding scale   SubCutaneous three times a day before meals  insulin lispro (HumaLOG) corrective regimen sliding scale   SubCutaneous at bedtime  dextrose 5%. 1000 milliLiter(s) (50 mL/Hr) IV Continuous <Continuous>  dextrose 50% Injectable 12.5 Gram(s) IV Push once  dextrose 50% Injectable 25 Gram(s) IV Push once  dextrose 50% Injectable 25 Gram(s) IV Push once    MEDICATIONS  (PRN):  dextrose Gel 1 Dose(s) Oral once PRN Blood Glucose LESS THAN 70 milliGRAM(s)/deciliter  glucagon  Injectable 1 milliGRAM(s) IntraMuscular once PRN Glucose LESS THAN 70 milligrams/deciliter  ALBUTerol/ipratropium for Nebulization 3 milliLiter(s) Nebulizer every 6 hours PRN Bronchospasm      Allergies    Biaxin (Joint Pain)    Intolerances    PMH: CAD, chronic anemia, Heart failure, AS, Hep C, HTn, DM, HLD     REVIEW OF SYSTEMS:  CONSTITUTIONAL: SEE HPI. No fever, weight loss,   RESPIRATORY: No cough, wheezing, chills or hemoptysis; No shortness of breath  CARDIOVASCULAR: No chest pain, palpitations, dizziness, or leg swelling  GASTROINTESTINAL: No abdominal or epigastric pain. No nausea, vomiting, or hematemesis; No diarrhea or constipation. No melena or hematochezia.  GENITOURINARY: No dysuria, frequency, hematuria, or incontinence  NEUROLOGICAL: No headaches, memory loss, loss of strength, numbness  HEME/LYMPH: No easy bruising, or bleeding gums    Vital Signs Last 24 Hrs  T(C): 36.8 (15 Sep 2017 08:11), Max: 37.2 (15 Sep 2017 01:24)  T(F): 98.3 (15 Sep 2017 08:11), Max: 98.9 (15 Sep 2017 01:24)  HR: 83 (15 Sep 2017 08:11) (77 - 97)  BP: 173/74 (15 Sep 2017 08:11) (126/73 - 183/74)  BP(mean): --  RR: 20 (15 Sep 2017 08:11) (17 - 20)  SpO2: 99% (15 Sep 2017 07:50) (96% - 99%)    PHYSICAL EXAM:  GENERAL: NAD, well-groomed, well-developed  HEAD:  Atraumatic, Normocephalic  NERVOUS SYSTEM:  Alert & Oriented X3, Good concentration; No focal deficit noticed   CHEST/LUNG: CTA bilaterally; No rales, rhonchi, wheezing, or rubs  HEART: RRR. No murmurs, rubs, or gallops  ABDOMEN: Soft, Nontender, Nondistended; Bowel sounds present    LABS:                        7.2    8.0   )-----------( 162      ( 14 Sep 2017 18:09 )             22.6     14 Sep 2017 18:09    139    |  90     |  148.0  ----------------------------<  177    4.0     |  29.0   |  2.49     Ca    9.4        14 Sep 2017 18:09    TPro  7.3    /  Alb  3.5    /  TBili  0.3    /  DBili  x      /  AST  90     /  ALT  37     /  AlkPhos  81     14 Sep 2017 18:09    PT/INR - ( 14 Sep 2017 18:09 )   PT: 12.4 sec;   INR: 1.12 ratio         PTT - ( 14 Sep 2017 18:09 )  PTT:33.7 sec  Urinalysis Basic - ( 14 Sep 2017 19:41 )    Color: Yellow / Appearance: Clear / S.005 / pH: x  Gluc: x / Ketone: Negative  / Bili: Negative / Urobili: Negative mg/dL   Blood: x / Protein: Negative mg/dL / Nitrite: Negative   Leuk Esterase: Negative / RBC: x / WBC x   Sq Epi: x / Non Sq Epi: x / Bacteria: x          CAPILLARY BLOOD GLUCOSE  480 (15 Sep 2017 08:48)  469 (15 Sep 2017 08:47) 73 y/o female with PMH of stage 4 kidney disease, CAD and h/o of chronic anemia and has been getting PRBC usually every 6 weeks with Dr. Valdivia's group now in ED complaining of fatigue. Pt follows with outpatient nephrologist, cardiologist, hematologist and PMD regularly. Pts most recent blood transfusion was last Saturday at [facility]. Pts baseline Hbg according to Dr. Pandey PMD is 8-9.  On presentation to ED pts Hbg 7.2. Received 2 units PRBC in ED.     Pt seen and examined. Laying in bed, eating breakfast. States no medical complaints. 2 units PRBC just finished running during exam. Pt states she will not know if she feels any less fatigued until she attempts to get out of bed and move around. No other complaints.     Spoke to pts PMD Dr. Pandey via phone this am. Dr. Pandey agrees with plan to transfuse pt until hemoglobin is 8-9 and then discharge given there are no other acute issues. As per Dr. Pandey, pts kidney function has been declining over the past couple months and wants her to follow up with her outpatient nephrologist ASAP for the possible need of HD. Pt has appointment scheduled with Dr. Cruz her nephrologist for next week and agrees to see him then. Spoke to Dr. Valdivia's hematology group, they will see pt today.     INTERVAL HPI/OVERNIGHT EVENTS: No events overnight.     MEDICATIONS  (STANDING):  gabapentin 300 milliGRAM(s) Oral daily  metoprolol 25 milliGRAM(s) Oral two times a day  metolazone 2.5 milliGRAM(s) Oral daily  pantoprazole    Tablet 40 milliGRAM(s) Oral before breakfast  levothyroxine 100 MICROGram(s) Oral daily  folic acid 1 milliGRAM(s) Oral daily  ALBUTerol    90 MICROgram(s) HFA Inhaler 1 Puff(s) Inhalation every 4 hours  tiotropium 18 MICROgram(s) Capsule 1 Capsule(s) Inhalation daily  insulin lispro (HumaLOG) corrective regimen sliding scale   SubCutaneous three times a day before meals  insulin lispro (HumaLOG) corrective regimen sliding scale   SubCutaneous at bedtime  dextrose 5%. 1000 milliLiter(s) (50 mL/Hr) IV Continuous <Continuous>  dextrose 50% Injectable 12.5 Gram(s) IV Push once  dextrose 50% Injectable 25 Gram(s) IV Push once  dextrose 50% Injectable 25 Gram(s) IV Push once    MEDICATIONS  (PRN):  dextrose Gel 1 Dose(s) Oral once PRN Blood Glucose LESS THAN 70 milliGRAM(s)/deciliter  glucagon  Injectable 1 milliGRAM(s) IntraMuscular once PRN Glucose LESS THAN 70 milligrams/deciliter  ALBUTerol/ipratropium for Nebulization 3 milliLiter(s) Nebulizer every 6 hours PRN Bronchospasm      Allergies    Biaxin (Joint Pain)    Intolerances    PMH: CAD, chronic anemia, Heart failure, AS, Hep C, HTn, DM, HLD     REVIEW OF SYSTEMS:  CONSTITUTIONAL: SEE HPI. No fever, weight loss,   RESPIRATORY: No cough, wheezing, chills or hemoptysis; No shortness of breath  CARDIOVASCULAR: No chest pain, palpitations, dizziness, or leg swelling  GASTROINTESTINAL: No abdominal or epigastric pain. No nausea, vomiting, or hematemesis; No diarrhea or constipation. No melena or hematochezia.  GENITOURINARY: No dysuria, frequency, hematuria, or incontinence  NEUROLOGICAL: No headaches, memory loss, loss of strength, numbness  HEME/LYMPH: No easy bruising, or bleeding gums    Vital Signs Last 24 Hrs  T(C): 36.8 (15 Sep 2017 08:11), Max: 37.2 (15 Sep 2017 01:24)  T(F): 98.3 (15 Sep 2017 08:11), Max: 98.9 (15 Sep 2017 01:24)  HR: 83 (15 Sep 2017 08:11) (77 - 97)  BP: 173/74 (15 Sep 2017 08:11) (126/73 - 183/74)  BP(mean): --  RR: 20 (15 Sep 2017 08:11) (17 - 20)  SpO2: 99% (15 Sep 2017 07:50) (96% - 99%)    PHYSICAL EXAM:  GENERAL: NAD, well-groomed, well-developed  HEAD:  Atraumatic, Normocephalic  NERVOUS SYSTEM:  Alert & Oriented X3, Good concentration; No focal deficit noticed   CHEST/LUNG: CTA bilaterally; No rales, rhonchi, wheezing, or rubs  HEART: RRR. No murmurs, rubs, or gallops  ABDOMEN: Soft, Nontender, Nondistended; Bowel sounds present    LABS:                        7.2    8.0   )-----------( 162      ( 14 Sep 2017 18:09 )             22.6     14 Sep 2017 18:09    139    |  90     |  148.0  ----------------------------<  177    4.0     |  29.0   |  2.49     Ca    9.4        14 Sep 2017 18:09    TPro  7.3    /  Alb  3.5    /  TBili  0.3    /  DBili  x      /  AST  90     /  ALT  37     /  AlkPhos  81     14 Sep 2017 18:09    PT/INR - ( 14 Sep 2017 18:09 )   PT: 12.4 sec;   INR: 1.12 ratio         PTT - ( 14 Sep 2017 18:09 )  PTT:33.7 sec  Urinalysis Basic - ( 14 Sep 2017 19:41 )    Color: Yellow / Appearance: Clear / S.005 / pH: x  Gluc: x / Ketone: Negative  / Bili: Negative / Urobili: Negative mg/dL   Blood: x / Protein: Negative mg/dL / Nitrite: Negative   Leuk Esterase: Negative / RBC: x / WBC x   Sq Epi: x / Non Sq Epi: x / Bacteria: x          CAPILLARY BLOOD GLUCOSE  480 (15 Sep 2017 08:48)  469 (15 Sep 2017 08:47)

## 2017-09-15 NOTE — DISCHARGE NOTE ADULT - VISION (WITH CORRECTIVE LENSES IF THE PATIENT USUALLY WEARS THEM):
Normal vision: sees adequately in most situations; can see medication labels, newsprint
Normal vision: sees adequately in most situations; can see medication labels, newsprint

## 2017-09-15 NOTE — DISCHARGE NOTE ADULT - NS MD DC PLAN IMMU FLU PROVIDE INFO
Risks/benefits discussed with patient or patient surrogate
Risks/benefits discussed with patient or patient surrogate

## 2017-09-15 NOTE — DISCHARGE NOTE ADULT - PROVIDER TOKENS
TOKEN:'[id number]',TOKEN:'[id number]',FREE:[LAST:[Nephrologist],PHONE:[(   )    -],FAX:[(   )    -]]
FREE:[LAST:[Nancy],FIRST:[Ericka],PHONE:[(   )    -],FAX:[(   )    -]],TOKEN:'[id number]',TOKEN:'[id number]'

## 2017-09-15 NOTE — CONSULT NOTE ADULT - SUBJECTIVE AND OBJECTIVE BOX
Patient is a 72y old  Female who presents with a chief complaint of feeling tired (15 Sep 2017 11:35)      HPI:  71 y/o female with h/o chronic anemia and has been getting PRBC  usually every 6 weeks and has been following with dr. Iva conley. AS per daughter pt. has been told that her bone marrow does not make red cells. pt. got 2 units of prbc  at [facility] about 4 days ago and today she was feeling weak and tired again. pt. has reported no cp. no abd. pain. no cough, no fever. reports no blood per rectum or dark stools. pt. is not on any asa or plavix. pt. reports that she had colonoscopy more than a year ago and it was normal. Today pt's Hb is 7.2 . (14 Sep 2017 22:56)  Feels better   Above noted   cardio and Heme evaluations noted   Patient's baseline renal function = 80/2.7  She follows with Renal in [place] Dr Cruz  She denies obvious blood loss , steroids , NSAIDS  She recently had diuretic regimen augmented with Zaroxolyn 5/2.5 daily added to Lasix 40 po BID  She gets leg edema and reports CHF in past     PAST MEDICAL & SURGICAL HISTORY:  CAD (coronary artery disease)  Heart failure  Hepatitis C  Aortic stenosis  Anemia, unspecified anemia type  Asthma  Low back pain: chronic over a year now.  High cholesterol  Hypertension  Diabetes  H/O aortic valve replacement: cow valve.  S/P CABG x 3  History of tonsillectomy      FAMILY HISTORY:  Family history of diabetes mellitus (Sibling)      Social History:    MEDICATIONS  (STANDING):  gabapentin 300 milliGRAM(s) Oral daily  metoprolol 25 milliGRAM(s) Oral two times a day  metolazone 2.5 milliGRAM(s) Oral daily  pantoprazole    Tablet 40 milliGRAM(s) Oral before breakfast  levothyroxine 100 MICROGram(s) Oral daily  folic acid 1 milliGRAM(s) Oral daily  ALBUTerol    90 MICROgram(s) HFA Inhaler 1 Puff(s) Inhalation every 4 hours  tiotropium 18 MICROgram(s) Capsule 1 Capsule(s) Inhalation daily  insulin lispro (HumaLOG) corrective regimen sliding scale   SubCutaneous three times a day before meals  insulin lispro (HumaLOG) corrective regimen sliding scale   SubCutaneous at bedtime  dextrose 5%. 1000 milliLiter(s) (50 mL/Hr) IV Continuous <Continuous>  dextrose 50% Injectable 12.5 Gram(s) IV Push once  dextrose 50% Injectable 25 Gram(s) IV Push once  dextrose 50% Injectable 25 Gram(s) IV Push once    MEDICATIONS  (PRN):  dextrose Gel 1 Dose(s) Oral once PRN Blood Glucose LESS THAN 70 milliGRAM(s)/deciliter  glucagon  Injectable 1 milliGRAM(s) IntraMuscular once PRN Glucose LESS THAN 70 milligrams/deciliter  ALBUTerol/ipratropium for Nebulization 3 milliLiter(s) Nebulizer every 6 hours PRN Bronchospasm      Allergies    Biaxin (Joint Pain)    Intolerances        Vital Signs Last 24 Hrs  T(C): 36.8 (15 Sep 2017 11:11), Max: 37.2 (15 Sep 2017 01:24)  T(F): 98.2 (15 Sep 2017 11:11), Max: 98.9 (15 Sep 2017 01:24)  HR: 90 (15 Sep 2017 11:11) (77 - 97)  BP: 188/77 (15 Sep 2017 11:11) (126/73 - 188/77)  BP(mean): --  RR: 19 (15 Sep 2017 11:11) (17 - 20)  SpO2: 98% (15 Sep 2017 11:11) (96% - 99%)  Daily Height in cm: 149.86 (14 Sep 2017 15:45)    Daily Weight in k.7 (15 Sep 2017 11:35)  I&O's Detail    14 Sep 2017 07:  -  15 Sep 2017 07:00  --------------------------------------------------------  IN:  Total IN: 0 mL    OUT:    Voided: 600 mL  Total OUT: 600 mL    Total NET: -600 mL        I&O's Summary    14 Sep 2017 07:  -  15 Sep 2017 07:00  --------------------------------------------------------  IN: 0 mL / OUT: 600 mL / NET: -600 mL        PHYSICAL EXAM:    GENERAL: NAD  HEAD:  No edema   NECK: Supple, No JVD,   CHEST/LUNG: Clear/ EAE   HEART: Regular rate and rhythm; No rub  ABDOMEN: Soft, Nontender, Nondistended;   EXTREMITIES:  minimal edema           LABS:                        9.4    7.6   )-----------( 148      ( 15 Sep 2017 13:22 )             28.7     09-15    140  |  95<L>  |  142.0<H>  ----------------------------<  255<H>  3.4<L>   |  29.0  |  2.39<H>    Ca    9.2      15 Sep 2017 13:20    TPro  7.3  /  Alb  3.5  /  TBili  0.3<L>  /  DBili  x   /  AST  90<H>  /  ALT  37<H>  /  AlkPhos  81      PT/INR - ( 14 Sep 2017 18:09 )   PT: 12.4 sec;   INR: 1.12 ratio         PTT - ( 14 Sep 2017 18:09 )  PTT:33.7 sec  Urinalysis Basic - ( 14 Sep 2017 19:41 )    Color: Yellow / Appearance: Clear / S.005 / pH: x  Gluc: x / Ketone: Negative  / Bili: Negative / Urobili: Negative mg/dL   Blood: x / Protein: Negative mg/dL / Nitrite: Negative   Leuk Esterase: Negative / RBC: x / WBC x   Sq Epi: x / Non Sq Epi: x / Bacteria: x              RADIOLOGY & ADDITIONAL TESTS:  ECHO   Summary:   1. Normal biventricular systolic function. Left ventricular ejection   fraction, by visual estimation, is 65 to 70%.   2. Severely biatrial enlargement.   3. Severe mitral annular calcification.   4. Mild degenerative mitral stenosis.   5. Stable aortic bioprosthesis without significant stenosis or   insufficiency.   6. There is no evidence of pericardialeffusion.   7. ** Compared to TTE dated 2016, an aortic bioprosthesis is norw   present.     Nadiya Carbajal DO Electronically signed on 9/15/2017 at 1:38:33 PM
[facility] [facility]                                   [address] [place] [address]                                                      PHONE: [phone]                                                         FAX: [phone]  [email or web address]  ---------------------------------------------------------------------------------------------------------------------------------    Reason for Consult: + trop     HPI:  NICHELLE GILL is an 72y Female with history of CAD s/p CABG with Bio AVR HTN HLD CKD Hep C cirrhosis anemia MDS HFpEF chronic anemia getting PRBC about every 6 weeks followed by DR. Enrique group now presents with weakness fatigue without chest pain or orthopnea.  Patient feeling better after 2 untis PRBC      PAST MEDICAL & SURGICAL HISTORY:  CAD (coronary artery disease)  Heart failure  Hepatitis C  Aortic stenosis  Anemia, unspecified anemia type  Asthma  Low back pain: chronic over a year now.  High cholesterol  Hypertension  Diabetes  H/O aortic valve replacement: cow valve.  S/P CABG x 3  History of tonsillectomy      Biaxin (Joint Pain)      MEDICATIONS  (STANDING):  gabapentin 300 milliGRAM(s) Oral daily  metoprolol 25 milliGRAM(s) Oral two times a day  metolazone 2.5 milliGRAM(s) Oral daily  pantoprazole    Tablet 40 milliGRAM(s) Oral before breakfast  levothyroxine 100 MICROGram(s) Oral daily  folic acid 1 milliGRAM(s) Oral daily  ALBUTerol    90 MICROgram(s) HFA Inhaler 1 Puff(s) Inhalation every 4 hours  tiotropium 18 MICROgram(s) Capsule 1 Capsule(s) Inhalation daily  insulin lispro (HumaLOG) corrective regimen sliding scale   SubCutaneous three times a day before meals  insulin lispro (HumaLOG) corrective regimen sliding scale   SubCutaneous at bedtime  dextrose 5%. 1000 milliLiter(s) (50 mL/Hr) IV Continuous <Continuous>  dextrose 50% Injectable 12.5 Gram(s) IV Push once  dextrose 50% Injectable 25 Gram(s) IV Push once  dextrose 50% Injectable 25 Gram(s) IV Push once    MEDICATIONS  (PRN):  dextrose Gel 1 Dose(s) Oral once PRN Blood Glucose LESS THAN 70 milliGRAM(s)/deciliter  glucagon  Injectable 1 milliGRAM(s) IntraMuscular once PRN Glucose LESS THAN 70 milligrams/deciliter  ALBUTerol/ipratropium for Nebulization 3 milliLiter(s) Nebulizer every 6 hours PRN Bronchospasm      Social History:  Cigarettes:       none             Alchohol:       none          Illicit Drug Abuse:  none    ROS: Negative other than as mentioned in HPI.    Vital Signs Last 24 Hrs  T(C): 36.8 (15 Sep 2017 11:11), Max: 37.2 (15 Sep 2017 01:24)  T(F): 98.2 (15 Sep 2017 11:11), Max: 98.9 (15 Sep 2017 01:24)  HR: 90 (15 Sep 2017 11:11) (77 - 97)  BP: 188/77 (15 Sep 2017 11:11) (126/73 - 188/77)  BP(mean): --  RR: 19 (15 Sep 2017 11:11) (17 - 20)  SpO2: 98% (15 Sep 2017 11:11) (96% - 99%)  ICU Vital Signs Last 24 Hrs  NICHELLE GILL  I&O's Detail    14 Sep 2017 07:  -  15 Sep 2017 07:00  --------------------------------------------------------  IN:  Total IN: 0 mL    OUT:    Voided: 600 mL  Total OUT: 600 mL    Total NET: -600 mL        I&O's Summary    14 Sep 2017 07:01  -  15 Sep 2017 07:00  --------------------------------------------------------  IN: 0 mL / OUT: 600 mL / NET: -600 mL      Drug Dosing Weight  [facility]      PHYSICAL EXAM:  General: Appears well developed, well nourished alert and cooperative.  HEENT: Head; normocephalic, atraumatic.  Eyes: Pupils reactive, cornea wnl.  Neck: Supple, no nodes adenopathy, no NVD or carotid bruit or thyromegaly.  CARDIOVASCULAR: Normal S1 and S2, 2/6 murmur, rub, gallop or lift.   LUNGS: No rales, rhonchi or wheeze. Normal breath sounds bilaterally.  ABDOMEN: Soft, nontender without mass or organomegaly. bowel sounds normoactive.  EXTREMITIES: No clubbing, cyanosis or edema. Distal pulses wnl.   SKIN: warm and dry with normal turgor.  NEURO: Alert/oriented x 3/normal motor exam. No pathologic reflexes.    PSYCH: normal affect.        LABS:                        9.4    7.6   )-----------( 148      ( 15 Sep 2017 13:22 )             28.7     09-15    140  |  95<L>  |  142.0<H>  ----------------------------<  255<H>  3.4<L>   |  29.0  |  2.39<H>    Ca    9.2      15 Sep 2017 13:20    TPro  7.3  /  Alb  3.5  /  TBili  0.3<L>  /  DBili  x   /  AST  90<H>  /  ALT  37<H>  /  AlkPhos  81      [facility]  CARDIAC MARKERS ( 15 Sep 2017 13:20 )  x     / 0.16 ng/mL / 112 U/L / x     / x      CARDIAC MARKERS ( 14 Sep 2017 21:00 )  x     / 0.08 ng/mL / 125 U/L / x     / x      CARDIAC MARKERS ( 14 Sep 2017 18:09 )  x     / 0.10 ng/mL / 139 U/L / x     / x          PT/INR - ( 14 Sep 2017 18:09 )   PT: 12.4 sec;   INR: 1.12 ratio         PTT - ( 14 Sep 2017 18:09 )  PTT:33.7 sec  Urinalysis Basic - ( 14 Sep 2017 19:41 )    Color: Yellow / Appearance: Clear / S.005 / pH: x  Gluc: x / Ketone: Negative  / Bili: Negative / Urobili: Negative mg/dL   Blood: x / Protein: Negative mg/dL / Nitrite: Negative   Leuk Esterase: Negative / RBC: x / WBC x   Sq Epi: x / Non Sq Epi: x / Bacteria: x        RADIOLOGY & ADDITIONAL STUDIES:    INTERPRETATION OF TELEMETRY (personally reviewed):    ECG:  Diagnosis Line *** Poor data quality, interpretation may be adversely affected  Sinus rhythm with 1st degree A-V block  Cannot rule out Anterior infarct , age undetermined  ST & T wave abnormality, consider inferolateral ischemia  Abnormal ECG      ECHO:     Summary:   1. Normal biventricular systolic function. Left ventricular ejection   fraction, by visual estimation, is 65 to 70%.   2. Severely biatrial enlargement.   3. Severe mitral annular calcification.   4. Mild degenerative mitral stenosis.   5. Stable aortic bioprosthesis without significant stenosis or   insufficiency.   6. There is no evidence of pericardialeffusion.   7. ** Compared to TTE dated 2016, an aortic bioprosthesis is norw   present.      CARDIAC CATHETERIZATION:  VENTRICLES: LVEF 60% with PATRICIA 0.95 cm sq with mean gradient of 39 mm Hg  CORONARY VESSELS: The coronary circulation is right dominant.  LM:   --  LM: Normal.  LAD:   --  LAD: Angiography showed minor luminal irregularities with no  flow limiting lesions.  CX:   --  Mid circumflex: There was a 95 % stenosis.  RCA:   --  Distal RCA: There was a 95 % stenosis in the distal third of the  vessel segment.  --  RPDA: There was a 70 % stenosis at the ostium of the vessel segment.  --  RPLS: There was a 70 % stenosis at the ostium of the vessel segment.  COMPLICATIONS: There were no complications.  DIAGNOSTIC IMPRESSIONS: Severe LCX and RCA disease with AS  DIAGNOSTIC RECOMMENDATIONS: Eval for AS and CABG  INTERVENTIONAL IMPRESSIONS: Severe LCX and RCA disease with AS  INTERVENTIONAL RECOMMENDATIONS: Eval for AS and CABG  Prepared and signed by          CXR NAPD     Assessment and Plan:  In summary, NICHELLE GILL is an 72y Female with past medical history significant for CAD s/p CABG with Bio AVR  HTN HLD CKD Hep C cirrhosis anemia MDS HFpEF chronic anemia getting PRBC about every 6 weeks followed by DR. Enrique group now presents with weakness fatigue anemia improved with PRBC; + trop due to renal clearness not C/W with ACS.  Volume states stable; continue current CV medications and please recall if needed
Reason for consultation :  anemia      NICHELLE GILL is a 72y  Female admitted with Anemia  .    HPI :  71y/o female with hx chronic anemia, hepatitis C, cirrhosis, GIB secondary varices and peptic ulcer being admitted with symptomatic anemia.   Pt with multifactorial anemia secondary iron deficiency, and AOCD secondary CRI.   Previous bone marrow biopsies have been negative except for decreased iron.  Pt has declined outpt IV iron and ARACELI secondary to cost.  She has been monitored for her anemia as an outpt and managed with periodic transfusion.  Her last  outpt transfusion was this past saturday.  Pt admitted now with h/h 7.2/22.6.  Pt feeling better, s/p transfusion.    PAST MEDICAL & SURGICAL HISTORY:  CAD (coronary artery disease)  Heart failure  Hepatitis C  Aortic stenosis  Anemia, unspecified anemia type  Asthma  Low back pain: chronic over a year now.  High cholesterol  Hypertension  Diabetes  H/O aortic valve replacement: cow valve.  S/P CABG x 3  History of tonsillectomy    FAMILY HISTORY:  Family history of diabetes mellitus (Sibling)    Social history :  former smoker, negative alcohol    Allergies:  Biaxin (Joint Pain)    Medications:  gabapentin 300 milliGRAM(s) Oral daily  metoprolol 25 milliGRAM(s) Oral two times a day  metolazone 2.5 milliGRAM(s) Oral daily  pantoprazole    Tablet 40 milliGRAM(s) Oral before breakfast  levothyroxine 100 MICROGram(s) Oral daily  folic acid 1 milliGRAM(s) Oral daily  ALBUTerol    90 MICROgram(s) HFA Inhaler 1 Puff(s) Inhalation every 4 hours  tiotropium 18 MICROgram(s) Capsule 1 Capsule(s) Inhalation daily  insulin lispro (HumaLOG) corrective regimen sliding scale   SubCutaneous three times a day before meals  insulin lispro (HumaLOG) corrective regimen sliding scale   SubCutaneous at bedtime  dextrose 5%. 1000 milliLiter(s) IV Continuous <Continuous>  dextrose Gel 1 Dose(s) Oral once PRN  dextrose 50% Injectable 12.5 Gram(s) IV Push once  dextrose 50% Injectable 25 Gram(s) IV Push once  dextrose 50% Injectable 25 Gram(s) IV Push once  glucagon  Injectable 1 milliGRAM(s) IntraMuscular once PRN  ALBUTerol/ipratropium for Nebulization 3 milliLiter(s) Nebulizer every 6 hours PRN      PHYSICAL EXAM:    Constitutional: pt alert and oriented in NAD  Neck:  supple, no adenopathy  Respiratory:  clear  Cardiovascular:  S1S2  Gastrointestinal:  soft, nontender,  +BS  Extremities:  no edema                          7.2    8.0   )-----------( 162      ( 14 Sep 2017 18:09 )             22.6       09-14    139  |  90<L>  |  148.0<H>  ----------------------------<  177<H>  4.0   |  29.0  |  2.49<H>    Ca    9.4      14 Sep 2017 18:09    TPro  7.3  /  Alb  3.5  /  TBili  0.3<L>  /  DBili  x   /  AST  90<H>  /  ALT  37<H>  /  AlkPhos  81  09-14

## 2017-09-15 NOTE — PROGRESS NOTE ADULT - PROBLEM SELECTOR PLAN 2
Elevated trop on presentation to ED likely secondary to CKD  Repeat trop trending down: 0.10, 0.08  No acute EKG changes  Pt asymptomatic  Continue home meds  Pt follows with outpatient cardiologist Dr. Shelton regularly

## 2017-09-15 NOTE — DISCHARGE NOTE ADULT - CARE PROVIDERS DIRECT ADDRESSES
,DirectAddress_Unknown,DirectAddress_Unknown,DirectAddress_Unknown
,DirectAddress_Unknown,DirectAddress_Unknown,DirectAddress_Unknown

## 2017-09-15 NOTE — PATIENT PROFILE ADULT. - NS TRANSFER PATIENT BELONGINGS
cellphone with , rings, earrings/Cell Phone/PDA (specify)/Jewelry/Money (specify)/Clothing/Wrist Watch

## 2017-09-15 NOTE — PROGRESS NOTE ADULT - ASSESSMENT
71 y/o female with PMH of stage 4 kidney disease, CAD and h/o chronic anemia who has been getting PRBC transfusion usually every 6 weeks and following with Dr. Enrique group. Pt comes to ED complaining of fatigue. On presentation to ED pts Hbg 7.2 and minimally elevated trop 0.10 (likely secondary to CKD). Pt received 2 units PRBC in ED. Awaiting repeat CBC. Spoke to Dr. Pandey PMD, ok with discharge after hbg levels are between 8-9 and wants her to f/u with her nephrologist ASAP for continued decline of kidney function and possible need of HD . Pt has appointment scheduled with Dr. Cruz next week.  Dr. Valdivia hematologist office called, will see pt in house today.  Plan is to repeat CBC in afternoon and if goal lab values are reached and PT clears patient, plan for discharge later this afternoon.

## 2017-09-15 NOTE — DISCHARGE NOTE ADULT - HOSPITAL COURSE
71 y/o female with PMH of stage 4 kidney disease, CAD and h/o chronic anemia who has been getting PRBC transfusion usually every 6 weeks and following with Dr. Enrique group. Pt comes to ED complaining of fatigue. On presentation to ED pts Hbg 7.2 and minimally elevated trop 0.10 (likely secondary to CKD). Pt received 2 units PRBC in ED. Repeat CBC results: Pt also seen by physical therapy prior to discharge. Spoke to Dr. Pandey PMD, ok with discharge after hbg levels are between 8-9 and wants her to f/u with her nephrologist ASAP for continued decline of kidney function and possible need of HD . Pt has appointment scheduled with Dr. Cruz next week. Pt was seen by hematology in house and agreed with plan.
73 y/o female with PMH of stage 4 kidney disease, CAD and h/o chronic anemia who has been getting PRBC transfusion usually every 6 weeks and following with Dr. Enrique group. Pt comes to ED complaining of fatigue. On presentation to ED pts Hbg 7.2 and minimally elevated trop 0.10 (likely secondary to CKD). Pt received 2 units PRBC in ED. Awaiting repeat CBC. Spoke to Dr. Pandey PMD, ok with discharge after hbg levels are between 8-9 and wants her to f/u with her nephrologist ASAP for continued decline of kidney function and possible need of HD . Pt has appointment scheduled with Dr. Cruz next week.  Dr. Valdivia hematology group consulted pt in house, agreed with transfusion plan and suggested future anemia work up including iron studies/folate/B12. Pt had last colonoscopy 2 years ago, negative.  Repeat H/H after 2 units is 9.4/28.7.  Nephrology consulted pt and cleared pt from nephro stand point, recommends repeating BMP/CMP in 1 week and stopping lasix and metalazone due to ney on cki. Pt will be discharged home. Pt instructed to follow up with nephrologist in 1 week (pt has appointment next week) and to follow up with PMD in 1 week to repeat bmp/cmp.

## 2017-09-15 NOTE — ED ADULT NURSE REASSESSMENT NOTE - NS ED NURSE REASSESS COMMENT FT1
Report received from off going RN, chart as noted, pt a&ox3 resting on stretcher in no apparent distress. RR even and unlabored, abd soft, nondistended. denies cp denies sob denies n/v/d. #20G IV noted to Left AC, site asymp. Family @ bedside, Vitals per flowsheet, updated on POC, will continue to monitor and reassess
Pt baseline MS, PRBC's infusing with no s/s transfusion rxn @ this time, pt c/o nausea, medicated with IV zofran per MD Mejia's orders. Pt in no apparent distress, assisted to bedside commode for 1x urine occurrence per Output flowsheet, clear and yellow. Pt placed back on stretcher by RN, repositioned for comfort, denies cp denies sob denies dizziness. Updated on POC, will continue to monitor and reassess
Pt baseline ms, family @ bedside, on cm, vss per flowsheet, in no apparent distress. Updated on POC for admission, pending orders from MD @ this time. Denies cp denies sob denies n/v/d. will continue to monitor and reassess
Pt baseline ms, first PRBC infusion initiated @ this time, unit number [id number] verified with second RN ross layton. VSS per flowsheet, See transfusion administration record, RN remains bedside per protocol, will continue to monitor and reassess
RN remains bedside, Pt baseline mental status, no s/s transfusion rxn @ this time, vitals per flowsheet, PRBC's infusing @ 100ml/hr. RR even and unlabored, in no apparent distress. Will continue to monitor closely

## 2017-09-15 NOTE — DISCHARGE NOTE ADULT - PLAN OF CARE
Resolved Continue outpatient follow up with hematologist and PMD as needed  Follow up with PMD for further anemia work up including iron studies, ferritin, B12 and folate  Pt educated to return to ED if symptoms persist, worsen or return Continue all home meds  Continue outpatient follow up with PMD as needed Outpatient follow up with nephrologist in 1 week  Continue outpatient follow up with PMD as needed stable during hospital visit  continue all home meds  follow up with cardiologist and PMD as needed/requested

## 2017-09-15 NOTE — DISCHARGE NOTE ADULT - PLAN OF CARE
Resolved Follow up with outpatient hematologist for further treatment/work up  Pt has had extensive heme and GI work up in the past  Follow up with PMD as needed  Return to ED if symptoms return Chronic Follow up with o/p cardiologist as needed   Continue all home meds Continue all home meds  Follow up with PMD as needed Follow up o/p as needed chronic lasix and metalazone are being discontinued as per nephrology due to MARGUERITE on CKI  Continue all other home meds  follow up with cardiologist as needed/requested Continue all home meds   Follow up as needed Follow up with nephrologist in 1 week. As per pt, appointment scheduled for next week  Lasix and Metalazone are being discontinued as per nephrology

## 2017-09-15 NOTE — DISCHARGE NOTE ADULT - PATIENT PORTAL LINK FT
“You can access the [employer] Patient Portal, offered by [facility], by registering with the following website: [email or web address]”
“You can access the [employer] Patient Portal, offered by [facility], by registering with the following website: [email or web address]”

## 2017-09-15 NOTE — DISCHARGE NOTE ADULT - MEDICATION SUMMARY - MEDICATIONS TO TAKE
I will START or STAY ON the medications listed below when I get home from the hospital:    traMADol 50 mg oral tablet  -- 1 tab(s) by mouth 3 times a day  -- Indication: For low back pain    gabapentin 300 mg oral tablet  -- 1 tab(s) by mouth once a day  -- Indication: For DM    Lantus 100 units/mL subcutaneous solution  -- 10 unit(s) subcutaneous once a day (at bedtime)  -- Indication: For DM    insulin lispro 100 units/mL subcutaneous solution  --  subcutaneous 3 times a day (with meals) ; 2 Unit(s) if Glucose 151 - 200  4 Unit(s) if Glucose 201 - 250  6 Unit(s) if Glucose 251 - 300  8 Unit(s) if Glucose 301 - 350  10 Unit(s) if Glucose 351 - 400  12 Unit(s) if Glucose GREATER THAN 400  -- Indication: For DM    metoprolol tartrate 25 mg oral tablet  -- 1 tab(s) by mouth 2 times a day  -- Indication: For HTN    Dulera 200 mcg-5 mcg/inh inhalation aerosol  -- 2 puff(s) inhaled 2 times a day  -- Indication: For Heart failure    ProAir HFA 90 mcg/inh inhalation aerosol  -- 2 puff(s) inhaled 4 times a day, As Needed  -- Indication: For Heart failure    amLODIPine 10 mg oral tablet  -- 1 tab(s) by mouth once a day  -- Indication: For HTN    metOLazone 2.5 mg oral tablet  -- 1 tab(s) by mouth once a day  -- Indication: For Asthma    furosemide 40 mg oral tablet  -- 1 tab(s) by mouth 2 times a day  -- Indication: For Asthma    montelukast 10 mg oral tablet  -- 1 tab(s) by mouth once a day  -- Indication: For Asthma    tiZANidine 4 mg oral tablet  -- 1 tab(s) by mouth 3 times a day  -- Indication: For low back pain    cyclobenzaprine 10 mg oral tablet  -- 1 tab(s) by mouth 3 times a day  -- Indication: For low back pain    Nasonex 50 mcg/inh nasal spray  -- 2 spray(s) into nose once a day  -- Indication: For Sinusitis     melatonin 5 mg oral capsule  -- 2 cap(s) by mouth once a day (at bedtime)  -- Indication: For Sleep    Protonix 40 mg oral delayed release tablet  -- 1 tab(s) by mouth once a day  -- Indication: For GERD    levothyroxine 100 mcg (0.1 mg) oral tablet  -- 1 tab(s) by mouth once a day  -- Indication: For thyroid    folic acid 1 mg oral tablet  -- 1 tab(s) by mouth once a day  -- Indication: For general health    Vitamin D3 1000 intl units oral tablet  -- 1 tab(s) by mouth once a day  -- Indication: For general health
I will START or STAY ON the medications listed below when I get home from the hospital:    traMADol 50 mg oral tablet  -- 1 tab(s) by mouth 3 times a day  -- Indication: For low back pain    gabapentin 300 mg oral tablet  -- 1 tab(s) by mouth once a day  -- Indication: For DM    insulin lispro 100 units/mL subcutaneous solution  --  subcutaneous 3 times a day (with meals) ; 2 Unit(s) if Glucose 151 - 200  4 Unit(s) if Glucose 201 - 250  6 Unit(s) if Glucose 251 - 300  8 Unit(s) if Glucose 301 - 350  10 Unit(s) if Glucose 351 - 400  12 Unit(s) if Glucose GREATER THAN 400  -- Indication: For DM    Lantus 100 units/mL subcutaneous solution  -- 10 unit(s) subcutaneous once a day (at bedtime)  -- Indication: For DM    metoprolol tartrate 25 mg oral tablet  -- 1 tab(s) by mouth 2 times a day  -- Indication: For HTN    Dulera 200 mcg-5 mcg/inh inhalation aerosol  -- 2 puff(s) inhaled 2 times a day  -- Indication: For Asthma    ProAir HFA 90 mcg/inh inhalation aerosol  -- 2 puff(s) inhaled 4 times a day, As Needed  -- Indication: For Asthma    amLODIPine 10 mg oral tablet  -- 1 tab(s) by mouth once a day  -- Indication: For HTN    montelukast 10 mg oral tablet  -- 1 tab(s) by mouth once a day  -- Indication: For Asthma    tiZANidine 4 mg oral tablet  -- 1 tab(s) by mouth 3 times a day  -- Indication: For Low back pain    cyclobenzaprine 10 mg oral tablet  -- 1 tab(s) by mouth 3 times a day  -- Indication: For low back pain    Nasonex 50 mcg/inh nasal spray  -- 2 spray(s) into nose once a day  -- Indication: For Sinusitis     melatonin 5 mg oral capsule  -- 2 cap(s) by mouth once a day (at bedtime)  -- Indication: For Sleep     Protonix 40 mg oral delayed release tablet  -- 1 tab(s) by mouth once a day  -- Indication: For GERD    levothyroxine 100 mcg (0.1 mg) oral tablet  -- 1 tab(s) by mouth once a day  -- Indication: For Thyroid     folic acid 1 mg oral tablet  -- 1 tab(s) by mouth once a day  -- Indication: For general health    Vitamin D3 1000 intl units oral tablet  -- 1 tab(s) by mouth once a day  -- Indication: For general health

## 2017-09-15 NOTE — DISCHARGE NOTE ADULT - CARE PLAN
Principal Discharge DX:	Anemia, unspecified anemia type  Goal:	Resolved  Instructions for follow-up, activity and diet:	Continue outpatient follow up with hematologist and PMD as needed  Follow up with PMD for further anemia work up including iron studies, ferritin, B12 and folate  Pt educated to return to ED if symptoms persist, worsen or return  Secondary Diagnosis:	Diabetes  Instructions for follow-up, activity and diet:	Continue all home meds  Continue outpatient follow up with PMD as needed  Secondary Diagnosis:	Hypertension  Instructions for follow-up, activity and diet:	Continue all home meds  Continue outpatient follow up with PMD as needed  Secondary Diagnosis:	Hypothyroidism  Instructions for follow-up, activity and diet:	Continue all home meds  Continue outpatient follow up with PMD as needed  Secondary Diagnosis:	Stage 4 chronic kidney disease  Instructions for follow-up, activity and diet:	Outpatient follow up with nephrologist in 1 week  Continue outpatient follow up with PMD as needed  Secondary Diagnosis:	CAD (coronary artery disease)  Instructions for follow-up, activity and diet:	stable during hospital visit  continue all home meds  follow up with cardiologist and PMD as needed/requested
Principal Discharge DX:	Anemia, unspecified anemia type  Goal:	Resolved  Instructions for follow-up, activity and diet:	Follow up with outpatient hematologist for further treatment/work up  Pt has had extensive heme and GI work up in the past  Follow up with PMD as needed  Return to ED if symptoms return  Secondary Diagnosis:	CAD (coronary artery disease)  Goal:	Chronic  Instructions for follow-up, activity and diet:	Follow up with o/p cardiologist as needed   Continue all home meds  Secondary Diagnosis:	Diabetes  Goal:	Chronic  Instructions for follow-up, activity and diet:	Continue all home meds  Follow up with PMD as needed  Secondary Diagnosis:	Hepatitis C  Goal:	Chronic  Instructions for follow-up, activity and diet:	Follow up o/p as needed  Secondary Diagnosis:	Heart failure  Goal:	chronic  Instructions for follow-up, activity and diet:	lasix and metalazone are being discontinued as per nephrology due to MARGUERITE on CKI  Continue all other home meds  follow up with cardiologist as needed/requested  Secondary Diagnosis:	Hypertension  Instructions for follow-up, activity and diet:	Continue all home meds   Follow up as needed  Secondary Diagnosis:	Chronic kidney disease  Instructions for follow-up, activity and diet:	Follow up with nephrologist in 1 week. As per pt, appointment scheduled for next week  Lasix and Metalazone are being discontinued as per nephrology

## 2017-10-04 ENCOUNTER — INPATIENT (INPATIENT)
Facility: HOSPITAL | Age: 72
LOS: 4 days | Discharge: HOME | DRG: 41 | End: 2017-10-09
Attending: HOSPITALIST | Admitting: HOSPITALIST
Payer: MEDICARE

## 2017-10-04 VITALS
OXYGEN SATURATION: 99 % | HEART RATE: 75 BPM | SYSTOLIC BLOOD PRESSURE: 173 MMHG | DIASTOLIC BLOOD PRESSURE: 83 MMHG | RESPIRATION RATE: 16 BRPM | WEIGHT: 175.05 LBS | HEIGHT: 59 IN

## 2017-10-04 DIAGNOSIS — I10 ESSENTIAL (PRIMARY) HYPERTENSION: ICD-10-CM

## 2017-10-04 DIAGNOSIS — Z98.89 OTHER SPECIFIED POSTPROCEDURAL STATES: Chronic | ICD-10-CM

## 2017-10-04 DIAGNOSIS — Z95.1 PRESENCE OF AORTOCORONARY BYPASS GRAFT: Chronic | ICD-10-CM

## 2017-10-04 DIAGNOSIS — M54.40 LUMBAGO WITH SCIATICA, UNSPECIFIED SIDE: ICD-10-CM

## 2017-10-04 DIAGNOSIS — I63.9 CEREBRAL INFARCTION, UNSPECIFIED: ICD-10-CM

## 2017-10-04 DIAGNOSIS — B18.2 CHRONIC VIRAL HEPATITIS C: ICD-10-CM

## 2017-10-04 DIAGNOSIS — Z95.2 PRESENCE OF PROSTHETIC HEART VALVE: Chronic | ICD-10-CM

## 2017-10-04 DIAGNOSIS — G45.9 TRANSIENT CEREBRAL ISCHEMIC ATTACK, UNSPECIFIED: ICD-10-CM

## 2017-10-04 DIAGNOSIS — I25.110 ATHEROSCLEROTIC HEART DISEASE OF NATIVE CORONARY ARTERY WITH UNSTABLE ANGINA PECTORIS: ICD-10-CM

## 2017-10-04 LAB
ALBUMIN SERPL ELPH-MCNC: 4.1 G/DL — SIGNIFICANT CHANGE UP (ref 3.3–5.2)
ALP SERPL-CCNC: 123 U/L — HIGH (ref 40–120)
ALT FLD-CCNC: 62 U/L — HIGH
AMMONIA BLD-MCNC: 35 UMOL/L — SIGNIFICANT CHANGE UP (ref 11–55)
ANION GAP SERPL CALC-SCNC: 12 MMOL/L — SIGNIFICANT CHANGE UP (ref 5–17)
ANION GAP SERPL CALC-SCNC: 12 MMOL/L — SIGNIFICANT CHANGE UP (ref 5–17)
APPEARANCE UR: CLEAR — SIGNIFICANT CHANGE UP
APTT BLD: 34.5 SEC — SIGNIFICANT CHANGE UP (ref 27.5–37.4)
AST SERPL-CCNC: 149 U/L — HIGH
BACTERIA # UR AUTO: ABNORMAL
BILIRUB SERPL-MCNC: 0.5 MG/DL — SIGNIFICANT CHANGE UP (ref 0.4–2)
BILIRUB UR-MCNC: NEGATIVE — SIGNIFICANT CHANGE UP
BUN SERPL-MCNC: 28 MG/DL — HIGH (ref 8–20)
BUN SERPL-MCNC: 29 MG/DL — HIGH (ref 8–20)
CALCIUM SERPL-MCNC: 9.7 MG/DL — SIGNIFICANT CHANGE UP (ref 8.6–10.2)
CALCIUM SERPL-MCNC: 9.9 MG/DL — SIGNIFICANT CHANGE UP (ref 8.6–10.2)
CHLORIDE SERPL-SCNC: 102 MMOL/L — SIGNIFICANT CHANGE UP (ref 98–107)
CHLORIDE SERPL-SCNC: 103 MMOL/L — SIGNIFICANT CHANGE UP (ref 98–107)
CK MB CFR SERPL CALC: 4 NG/ML — SIGNIFICANT CHANGE UP (ref 0–6.7)
CK SERPL-CCNC: 155 U/L — SIGNIFICANT CHANGE UP (ref 25–170)
CO2 SERPL-SCNC: 25 MMOL/L — SIGNIFICANT CHANGE UP (ref 22–29)
CO2 SERPL-SCNC: 26 MMOL/L — SIGNIFICANT CHANGE UP (ref 22–29)
COLOR SPEC: YELLOW — SIGNIFICANT CHANGE UP
CREAT SERPL-MCNC: 1.39 MG/DL — HIGH (ref 0.5–1.3)
CREAT SERPL-MCNC: 1.56 MG/DL — HIGH (ref 0.5–1.3)
DIFF PNL FLD: ABNORMAL
GLUCOSE SERPL-MCNC: 112 MG/DL — SIGNIFICANT CHANGE UP (ref 70–115)
GLUCOSE SERPL-MCNC: 96 MG/DL — SIGNIFICANT CHANGE UP (ref 70–115)
GLUCOSE UR QL: 50 MG/DL
HCT VFR BLD CALC: 35.3 % — LOW (ref 37–47)
HGB BLD-MCNC: 11.5 G/DL — LOW (ref 12–16)
INR BLD: 1.03 RATIO — SIGNIFICANT CHANGE UP (ref 0.88–1.16)
KETONES UR-MCNC: NEGATIVE — SIGNIFICANT CHANGE UP
LEUKOCYTE ESTERASE UR-ACNC: ABNORMAL
MAGNESIUM SERPL-MCNC: 2.2 MG/DL — SIGNIFICANT CHANGE UP (ref 1.6–2.6)
MCHC RBC-ENTMCNC: 28.9 PG — SIGNIFICANT CHANGE UP (ref 27–31)
MCHC RBC-ENTMCNC: 32.6 G/DL — SIGNIFICANT CHANGE UP (ref 32–36)
MCV RBC AUTO: 88.7 FL — SIGNIFICANT CHANGE UP (ref 81–99)
NITRITE UR-MCNC: NEGATIVE — SIGNIFICANT CHANGE UP
PH UR: 7 — SIGNIFICANT CHANGE UP (ref 5–8)
PHOSPHATE SERPL-MCNC: 3.2 MG/DL — SIGNIFICANT CHANGE UP (ref 2.4–4.7)
PLATELET # BLD AUTO: 121 K/UL — LOW (ref 150–400)
POTASSIUM SERPL-MCNC: 5.2 MMOL/L — SIGNIFICANT CHANGE UP (ref 3.5–5.3)
POTASSIUM SERPL-MCNC: 6.1 MMOL/L — CRITICAL HIGH (ref 3.5–5.3)
POTASSIUM SERPL-SCNC: 5.2 MMOL/L — SIGNIFICANT CHANGE UP (ref 3.5–5.3)
POTASSIUM SERPL-SCNC: 6.1 MMOL/L — CRITICAL HIGH (ref 3.5–5.3)
PROT SERPL-MCNC: 8.6 G/DL — SIGNIFICANT CHANGE UP (ref 6.6–8.7)
PROT UR-MCNC: 100 MG/DL
PROTHROM AB SERPL-ACNC: 11.3 SEC — SIGNIFICANT CHANGE UP (ref 9.8–12.7)
RBC # BLD: 3.98 M/UL — LOW (ref 4.4–5.2)
RBC # FLD: 19.2 % — HIGH (ref 11–15.6)
RBC CASTS # UR COMP ASSIST: ABNORMAL /HPF (ref 0–4)
SODIUM SERPL-SCNC: 140 MMOL/L — SIGNIFICANT CHANGE UP (ref 135–145)
SODIUM SERPL-SCNC: 140 MMOL/L — SIGNIFICANT CHANGE UP (ref 135–145)
SP GR SPEC: 1 — LOW (ref 1.01–1.02)
TROPONIN T SERPL-MCNC: 0.04 NG/ML — SIGNIFICANT CHANGE UP (ref 0–0.06)
UROBILINOGEN FLD QL: NEGATIVE MG/DL — SIGNIFICANT CHANGE UP
WBC # BLD: 4.8 K/UL — SIGNIFICANT CHANGE UP (ref 4.8–10.8)
WBC # FLD AUTO: 4.8 K/UL — SIGNIFICANT CHANGE UP (ref 4.8–10.8)
WBC UR QL: SIGNIFICANT CHANGE UP

## 2017-10-04 PROCEDURE — 70450 CT HEAD/BRAIN W/O DYE: CPT | Mod: 26

## 2017-10-04 PROCEDURE — 99291 CRITICAL CARE FIRST HOUR: CPT

## 2017-10-04 PROCEDURE — 99223 1ST HOSP IP/OBS HIGH 75: CPT

## 2017-10-04 PROCEDURE — 93010 ELECTROCARDIOGRAM REPORT: CPT

## 2017-10-04 PROCEDURE — 71010: CPT | Mod: 26

## 2017-10-04 PROCEDURE — 99222 1ST HOSP IP/OBS MODERATE 55: CPT

## 2017-10-04 PROCEDURE — 70551 MRI BRAIN STEM W/O DYE: CPT | Mod: 26

## 2017-10-04 RX ORDER — TRAMADOL HYDROCHLORIDE 50 MG/1
50 TABLET ORAL THREE TIMES A DAY
Qty: 0 | Refills: 0 | Status: DISCONTINUED | OUTPATIENT
Start: 2017-10-04 | End: 2017-10-05

## 2017-10-04 RX ORDER — MONTELUKAST 4 MG/1
10 TABLET, CHEWABLE ORAL DAILY
Qty: 0 | Refills: 0 | Status: DISCONTINUED | OUTPATIENT
Start: 2017-10-04 | End: 2017-10-09

## 2017-10-04 RX ORDER — PANTOPRAZOLE SODIUM 20 MG/1
40 TABLET, DELAYED RELEASE ORAL
Qty: 0 | Refills: 0 | Status: DISCONTINUED | OUTPATIENT
Start: 2017-10-04 | End: 2017-10-09

## 2017-10-04 RX ORDER — AMLODIPINE BESYLATE 2.5 MG/1
10 TABLET ORAL DAILY
Qty: 0 | Refills: 0 | Status: DISCONTINUED | OUTPATIENT
Start: 2017-10-04 | End: 2017-10-09

## 2017-10-04 RX ORDER — FLUTICASONE PROPIONATE 50 MCG
2 SPRAY, SUSPENSION NASAL DAILY
Qty: 0 | Refills: 0 | Status: DISCONTINUED | OUTPATIENT
Start: 2017-10-04 | End: 2017-10-09

## 2017-10-04 RX ORDER — FOLIC ACID 0.8 MG
1 TABLET ORAL DAILY
Qty: 0 | Refills: 0 | Status: DISCONTINUED | OUTPATIENT
Start: 2017-10-04 | End: 2017-10-09

## 2017-10-04 RX ORDER — SODIUM CHLORIDE 9 MG/ML
3 INJECTION INTRAMUSCULAR; INTRAVENOUS; SUBCUTANEOUS ONCE
Qty: 0 | Refills: 0 | Status: COMPLETED | OUTPATIENT
Start: 2017-10-04 | End: 2017-10-04

## 2017-10-04 RX ORDER — METOPROLOL TARTRATE 50 MG
25 TABLET ORAL
Qty: 0 | Refills: 0 | Status: DISCONTINUED | OUTPATIENT
Start: 2017-10-04 | End: 2017-10-04

## 2017-10-04 RX ORDER — LEVOTHYROXINE SODIUM 125 MCG
100 TABLET ORAL DAILY
Qty: 0 | Refills: 0 | Status: DISCONTINUED | OUTPATIENT
Start: 2017-10-04 | End: 2017-10-09

## 2017-10-04 RX ORDER — ALBUTEROL 90 UG/1
2 AEROSOL, METERED ORAL EVERY 6 HOURS
Qty: 0 | Refills: 0 | Status: DISCONTINUED | OUTPATIENT
Start: 2017-10-04 | End: 2017-10-09

## 2017-10-04 RX ORDER — LANOLIN ALCOHOL/MO/W.PET/CERES
3 CREAM (GRAM) TOPICAL AT BEDTIME
Qty: 0 | Refills: 0 | Status: DISCONTINUED | OUTPATIENT
Start: 2017-10-04 | End: 2017-10-09

## 2017-10-04 RX ORDER — METOPROLOL TARTRATE 50 MG
25 TABLET ORAL EVERY 8 HOURS
Qty: 0 | Refills: 0 | Status: DISCONTINUED | OUTPATIENT
Start: 2017-10-04 | End: 2017-10-05

## 2017-10-04 RX ORDER — BUDESONIDE AND FORMOTEROL FUMARATE DIHYDRATE 160; 4.5 UG/1; UG/1
2 AEROSOL RESPIRATORY (INHALATION)
Qty: 0 | Refills: 0 | Status: DISCONTINUED | OUTPATIENT
Start: 2017-10-04 | End: 2017-10-09

## 2017-10-04 RX ORDER — CHOLECALCIFEROL (VITAMIN D3) 125 MCG
1000 CAPSULE ORAL DAILY
Qty: 0 | Refills: 0 | Status: DISCONTINUED | OUTPATIENT
Start: 2017-10-04 | End: 2017-10-09

## 2017-10-04 RX ORDER — LANOLIN ALCOHOL/MO/W.PET/CERES
5 CREAM (GRAM) TOPICAL AT BEDTIME
Qty: 0 | Refills: 0 | Status: DISCONTINUED | OUTPATIENT
Start: 2017-10-04 | End: 2017-10-04

## 2017-10-04 RX ADMIN — Medication 25 MILLIGRAM(S): at 21:50

## 2017-10-04 RX ADMIN — Medication 30 MILLILITER(S): at 22:24

## 2017-10-04 RX ADMIN — SODIUM CHLORIDE 3 MILLILITER(S): 9 INJECTION INTRAMUSCULAR; INTRAVENOUS; SUBCUTANEOUS at 10:29

## 2017-10-04 RX ADMIN — Medication 25 MILLIGRAM(S): at 15:37

## 2017-10-04 RX ADMIN — MORPHINE SULFATE 2 MILLIGRAM(S): 50 CAPSULE, EXTENDED RELEASE ORAL at 07:21

## 2017-10-04 NOTE — ED ADULT NURSE NOTE - CHIEF COMPLAINT QUOTE
BIBA, patient is awake and oriented times 3, complains of difficulty with speech, blood sugar 62, patients last known normal last night, patient refuses oral and rectal temperature during triage, hx of similar with kidney infections

## 2017-10-04 NOTE — H&P ADULT - NSHPPHYSICALEXAM_GEN_ALL_CORE
Gen: elderly female, lying back in bed, mentating, speaking full sentences  HEENT: able to move eyes, +even smile, no droop  CVS: S1S2 RRR  PULM: CTABL, good breath sounds  ABD: soft, nontender, no rebound, no guarding  EXTREM: +upper extremity strength, able to lift legs and shrug shoulders; her feet are slightly contracted  NEURO: seems at baseline, is intact  PSYCH: appropriate

## 2017-10-04 NOTE — H&P ADULT - PROBLEM SELECTOR PLAN 4
-d/c tramadol, d/c gabapentin, d/c flexeril, decrease melatonin to 3mg qhs  -just use tylenol prn for mild pain for now

## 2017-10-04 NOTE — H&P ADULT - PROBLEM SELECTOR PROBLEM 4
Chronic low back pain with sciatica, sciatica laterality unspecified, unspecified back pain laterality

## 2017-10-04 NOTE — ED ADULT NURSE NOTE - OBJECTIVE STATEMENT
Pt received Alert and Oriented to person, place, and time pts daughter states she woke up this morning with slurred speech, all over body weakness, and all over tremors.  Pt has a hx of high ammonia level and emergent dialysis.  HR is Normal Sinus Rhythm on cardiac monitor, lungs clear b/l abd soft with positve bowel sounds in all four quadrants will cont to monitor.

## 2017-10-04 NOTE — ED PROVIDER NOTE - MEDICAL DECISION MAKING DETAILS
PT WITH SLURRED SPEECH AND EXPRESSIVE APHASIA WITH WEAKNESS ARMS AND LEGS WHEN SHE AWOKE TODAY . COMPLEX MEDICAL ISSUES. NEEDS ADMISSION PT WITH SLURRED SPEECH AND EXPRESSIVE APHASIA WITH WEAKNESS ARMS AND LEGS WHEN SHE AWOKE TODAY . COMPLEX MEDICAL ISSUES. NEEDS ADMISSION  DISCUSSED WITH DR ROMEO WHO WILL ADMIT. CALL OUT TO NEURO

## 2017-10-04 NOTE — ED CLERICAL - NS ED CLERK UNITS
[facility] gripNote CICU/CICU#6 if not going home 4[facility]/[place] DC or DG [id number]

## 2017-10-04 NOTE — ED PROVIDER NOTE - CHPI ED SYMPTOMS NEG
no numbness/no blurred vision/no vomiting/no loss of consciousness/no change in level of consciousness/no confusion/no fever

## 2017-10-04 NOTE — ED PROVIDER NOTE - CRITICAL CARE PROVIDED
documentation/direct patient care (not related to procedure)/40 MINUTES/consult w/ pt's family directly relating to pts condition/consultation with other physicians/interpretation of diagnostic studies/additional history taking

## 2017-10-04 NOTE — ED PROVIDER NOTE - CRITICAL CARE INDICATION, MLM
patient was critically ill... WOKE UP WITH STROKE SYMPTOMS. STAT IV, LABS, EKG, CT. ADMISSION AND NEUROLOGY CONSULT

## 2017-10-04 NOTE — CONSULT NOTE ADULT - SUBJECTIVE AND OBJECTIVE BOX
[facility]                                     Neu[facility] at [place]                                 [address]                                  [address]                                        [place], [place], [address]                                             [phone]    HISTORY:    The patient is a 72y Female who presented to ED with shaking of 4 extremities causing difficulty with ambulation  She also had stuttering and word finding difficulties concurrently. This started last night and she came to ED. She is now speaking more clearly and has mild tremor.  She has had tremors like this before and has had a negative neurologic workup as an outpatient with a different neurologist.  Neuro eval is requested    PAST MEDICAL & SURGICAL HISTORY:  CAD (coronary artery disease)  Heart failure  Hepatitis C  Aortic stenosis  Anemia, unspecified anemia type  Asthma  Low back pain: chronic over a year now.  High cholesterol  Hypertension  Diabetes  H/O aortic valve replacement: cow valve.  S/P CABG x 3  History of tonsillectomy      MEDICATIONS  (STANDING):  buDESOnide 160 MICROgram(s)/formoterol 4.5 MICROgram(s) Inhaler 2 Puff(s) Inhalation two times a day  cholecalciferol 1000 Unit(s) Oral daily  fluticasone propionate 50 MICROgram(s)/spray Nasal Spray 2 Spray(s) Both Nostrils daily  folic acid 1 milliGRAM(s) Oral daily  levothyroxine 100 MICROGram(s) Oral daily  melatonin 5 milliGRAM(s) Oral at bedtime  montelukast 10 milliGRAM(s) Oral daily  pantoprazole    Tablet 40 milliGRAM(s) Oral before breakfast    MEDICATIONS  (PRN):  ALBUTerol    90 MICROgram(s) HFA Inhaler 2 Puff(s) Inhalation every 6 hours PRN Shortness of Breath  traMADol 50 milliGRAM(s) Oral three times a day PRN Moderate Pain (4 - 6)      Allergies    Biaxin (Joint Pain)    Intolerances        SOCIAL HISTORY:  denies etoh/drugs/tob    FAMILY HISTORY:  Family history of diabetes mellitus (Sibling)      ROS:  (+) tremor  (+) speech change  The patient denies fevers or weight changes.  Denies headache or dizziness.  Denies chest pain.  Denies shortness of breath.  Denies abdominal pain, nausea, or vomiting.  Denies change in urinary pattern.  Denies rash.  Denies recent mood changes.    Exam:  Vital Signs Last 24 Hrs  T(C): --  T(F): --  HR: 98 (04 Oct 2017 13:04) (75 - 98)  BP: 183/79 (04 Oct 2017 13:04) (173/83 - 183/79)  BP(mean): --  RR: 16 (04 Oct 2017 13:04) (16 - 16)  SpO2: 97% (04 Oct 2017 13:04) (97% - 99%)  General: NAD    Mental status: The patient is awake, alert, and fully oriented. There is no aphasia. mild dysarthria is present    Cranial nerves: . Pupils react Symmetrically to light. There is no visual field deficit to confrontation. Extraocular motion is full with no nystagmus. There is no ptosis. Facial sensation is intact. Facial musculature is symmetric. Palate elevates symmetrically. Tongue is midline.    Motor: There is normal bulk and tone.  Strength is 5/5 in the right arm   Strength is 5/5 in the left arm   mild b/l leg weakness  questionable postural tremor vs asterixis    Sensation: Intact to light touch and pin.    Reflexes: 1+ throughout and plantar responses are flexor.    Cerebellar: There is no dysmetria on finger to nose testing.    LABS:                         11.5   4.8   )-----------( 121      ( 04 Oct 2017 09:04 )             35.3       10-04    140  |  103  |  28.0<H>  ----------------------------<  112  5.2   |  25.0  |  1.39<H>    Ca    9.9      04 Oct 2017 10:05  Phos  3.2     10-04  Mg     2.2     10-04    TPro  8.6  /  Alb  4.1  /  TBili  0.5  /  DBili  x   /  AST  149<H>  /  ALT  62<H>  /  AlkPhos  123<H>  10-04      PT/INR - ( 04 Oct 2017 11:10 )   PT: 11.3 sec;   INR: 1.03 ratio         PTT - ( 04 Oct 2017 11:10 )  PTT:34.5 sec    RADIOLOGY & ADDITIONAL STUDIES:  CT head- SVID with no evidence of acute CVA, mass or blood

## 2017-10-04 NOTE — H&P ADULT - HISTORY OF PRESENT ILLNESS
is a 73 y/o female w/PMhx of CAD, DM, heart failure, hepC who presents after waking up today and discovering that she was unable to walk. Both her daughters Daniela and Parris were at the bedside and Daniela states that she was at home and her mother was in her usual state of health, but developed today am, a lot of weakness and was not able to speak. She was pointing to her back and when the paramedics arrived, she was so weak that she could not raise her arms up in the air.     During the time of my admission,  appears to be much more comfortable, is able to speak clearly and is awake, alert and able to describe the "weakness" she had today am. A review of her medications reveals that she's on high doses of tramadol, gabapentin, melatonin and she has chronic kidney disease. She had a negative CT scan and her MRI is pending. I suspect her pain medications and polypharmacy may have caused a lot of lethargy. She had improvement of her clinical symptoms after receiving IVF in the ER.     Of note,  has some kind of intolerance to aspirin and blood thinners.

## 2017-10-04 NOTE — CONSULT NOTE ADULT - ASSESSMENT
The patient is a 72y Female with transient, almost resolved neurological symptoms.  Possible asterixis, liver enzymes creeping up   Possible TIA  I agree with orders as written except I would add EC ASA 81 daily if not otherwise contraindicated.    will follow with you    Mandeep Turner MD PhD   [id number]

## 2017-10-04 NOTE — ED PROVIDER NOTE - OBJECTIVE STATEMENT
PT WITH MULTIPLE MEDICAL ISSUES PRESENTS WITH SHAKING OF HER BUE, LBLE WEAKNESS TO THE POINT THAT SHE CANNOT WALK, SLURRED SPEECH AND DIFFICULTY FINDING WORDS. LAST NORMAL, LAST NIGHT APPROX 22:30. STATES SHE FELT WELL YESTERDAY. NO FEVER, HEADACHE OR VISUAL CHANGES.  HX KIDNEY ISSUES AND WHEN ELECTROLYTES AND KIDNEY FUNCTION "GETS OFF" AS PER DAUGHTER, HER BUE SHAKE. ALSO CARDIAC ISSUES AND HEP C.  MED HX: Anemia, unspecified anemia type    Aortic stenosis    Asthma    CAD (coronary artery disease)    Diabetes    Heart failure    Hepatitis C    High cholesterol    Hypertension    Low back pain  chronic over a year now.

## 2017-10-04 NOTE — H&P ADULT - PROBLEM SELECTOR PLAN 1
-admit to stroke unit  -NO ASPIRIN, calling cardiology about details of aspirin intolerance and family states emphatically that she cannot take aspirin  -lethargy and weakness may also have been from polypharmacy, d/c tramadol, d/c gabapentin, d/c flexeril

## 2017-10-04 NOTE — ED ADULT TRIAGE NOTE - CHIEF COMPLAINT QUOTE
BIBA, patient is awake and oriented times 3, complains of difficulty with speech, blood sugar 62, patients last known normal last night, patient refuses oral and rectal temperature during triage BIBA, patient is awake and oriented times 3, complains of difficulty with speech, blood sugar 62, patients last known normal last night, patient refuses oral and rectal temperature during triage, hx of similar with kidney infections

## 2017-10-04 NOTE — ED ADULT NURSE NOTE - NIH STROKE SCALE: 9. BEST LANGUAGE, QM
(2) Severe aphasia; all communication is through fragmentary expression; great need for inference, questioning, and guessing by the listener.  Range of information that can be exchanged is limited; listener carries burden of communication.  Examiner cannot identify materials provided from patient response.

## 2017-10-05 DIAGNOSIS — E03.9 HYPOTHYROIDISM, UNSPECIFIED: ICD-10-CM

## 2017-10-05 DIAGNOSIS — E13.9 OTHER SPECIFIED DIABETES MELLITUS WITHOUT COMPLICATIONS: ICD-10-CM

## 2017-10-05 LAB
ALBUMIN SERPL ELPH-MCNC: 3.9 G/DL — SIGNIFICANT CHANGE UP (ref 3.3–5.2)
ALP SERPL-CCNC: 138 U/L — HIGH (ref 40–120)
ALT FLD-CCNC: 58 U/L — HIGH
AMYLASE P1 CFR SERPL: 44 U/L — SIGNIFICANT CHANGE UP (ref 36–128)
ANION GAP SERPL CALC-SCNC: 15 MMOL/L — SIGNIFICANT CHANGE UP (ref 5–17)
APTT BLD: 34.1 SEC — SIGNIFICANT CHANGE UP (ref 27.5–37.4)
AST SERPL-CCNC: 86 U/L — HIGH
BILIRUB DIRECT SERPL-MCNC: 0.2 MG/DL — SIGNIFICANT CHANGE UP (ref 0–0.3)
BILIRUB INDIRECT FLD-MCNC: 0.3 MG/DL — SIGNIFICANT CHANGE UP (ref 0.2–1)
BILIRUB SERPL-MCNC: 0.5 MG/DL — SIGNIFICANT CHANGE UP (ref 0.4–2)
BUN SERPL-MCNC: 33 MG/DL — HIGH (ref 8–20)
CALCIUM SERPL-MCNC: 9.2 MG/DL — SIGNIFICANT CHANGE UP (ref 8.6–10.2)
CHLORIDE SERPL-SCNC: 106 MMOL/L — SIGNIFICANT CHANGE UP (ref 98–107)
CHOLEST SERPL-MCNC: 139 MG/DL — SIGNIFICANT CHANGE UP (ref 110–199)
CO2 SERPL-SCNC: 23 MMOL/L — SIGNIFICANT CHANGE UP (ref 22–29)
CREAT SERPL-MCNC: 1.4 MG/DL — HIGH (ref 0.5–1.3)
GLUCOSE SERPL-MCNC: 415 MG/DL — HIGH (ref 70–115)
HBA1C BLD-MCNC: 5.6 % — SIGNIFICANT CHANGE UP (ref 4–5.6)
HCT VFR BLD CALC: 35.7 % — LOW (ref 37–47)
HDLC SERPL-MCNC: 54 MG/DL — LOW
HGB BLD-MCNC: 11.5 G/DL — LOW (ref 12–16)
INR BLD: 1.12 RATIO — SIGNIFICANT CHANGE UP (ref 0.88–1.16)
LIDOCAIN IGE QN: 21 U/L — LOW (ref 22–51)
LIPID PNL WITH DIRECT LDL SERPL: 63 MG/DL — SIGNIFICANT CHANGE UP
MAGNESIUM SERPL-MCNC: 2.2 MG/DL — SIGNIFICANT CHANGE UP (ref 1.6–2.6)
MCHC RBC-ENTMCNC: 28.9 PG — SIGNIFICANT CHANGE UP (ref 27–31)
MCHC RBC-ENTMCNC: 32.2 G/DL — SIGNIFICANT CHANGE UP (ref 32–36)
MCV RBC AUTO: 89.7 FL — SIGNIFICANT CHANGE UP (ref 81–99)
PHOSPHATE SERPL-MCNC: 2.2 MG/DL — LOW (ref 2.4–4.7)
PLATELET # BLD AUTO: 89 K/UL — LOW (ref 150–400)
POTASSIUM SERPL-MCNC: 4.3 MMOL/L — SIGNIFICANT CHANGE UP (ref 3.5–5.3)
POTASSIUM SERPL-SCNC: 4.3 MMOL/L — SIGNIFICANT CHANGE UP (ref 3.5–5.3)
PROT SERPL-MCNC: 8.1 G/DL — SIGNIFICANT CHANGE UP (ref 6.6–8.7)
PROTHROM AB SERPL-ACNC: 12.4 SEC — SIGNIFICANT CHANGE UP (ref 9.8–12.7)
RBC # BLD: 3.98 M/UL — LOW (ref 4.4–5.2)
RBC # FLD: 19.3 % — HIGH (ref 11–15.6)
SODIUM SERPL-SCNC: 144 MMOL/L — SIGNIFICANT CHANGE UP (ref 135–145)
TOTAL CHOLESTEROL/HDL RATIO MEASUREMENT: 3 RATIO — LOW (ref 3.3–7.1)
TRIGL SERPL-MCNC: 108 MG/DL — SIGNIFICANT CHANGE UP (ref 10–200)
WBC # BLD: 4.2 K/UL — LOW (ref 4.8–10.8)
WBC # FLD AUTO: 4.2 K/UL — LOW (ref 4.8–10.8)

## 2017-10-05 PROCEDURE — 76705 ECHO EXAM OF ABDOMEN: CPT | Mod: 26

## 2017-10-05 PROCEDURE — 99233 SBSQ HOSP IP/OBS HIGH 50: CPT

## 2017-10-05 PROCEDURE — 74176 CT ABD & PELVIS W/O CONTRAST: CPT | Mod: 26

## 2017-10-05 PROCEDURE — 99223 1ST HOSP IP/OBS HIGH 75: CPT

## 2017-10-05 RX ORDER — GLUCAGON INJECTION, SOLUTION 0.5 MG/.1ML
1 INJECTION, SOLUTION SUBCUTANEOUS ONCE
Qty: 0 | Refills: 0 | Status: DISCONTINUED | OUTPATIENT
Start: 2017-10-05 | End: 2017-10-09

## 2017-10-05 RX ORDER — PANTOPRAZOLE SODIUM 20 MG/1
40 TABLET, DELAYED RELEASE ORAL ONCE
Qty: 0 | Refills: 0 | Status: COMPLETED | OUTPATIENT
Start: 2017-10-05 | End: 2017-10-05

## 2017-10-05 RX ORDER — MORPHINE SULFATE 50 MG/1
2 CAPSULE, EXTENDED RELEASE ORAL EVERY 6 HOURS
Qty: 0 | Refills: 0 | Status: DISCONTINUED | OUTPATIENT
Start: 2017-10-05 | End: 2017-10-05

## 2017-10-05 RX ORDER — ONDANSETRON 8 MG/1
4 TABLET, FILM COATED ORAL EVERY 6 HOURS
Qty: 0 | Refills: 0 | Status: DISCONTINUED | OUTPATIENT
Start: 2017-10-05 | End: 2017-10-05

## 2017-10-05 RX ORDER — METRONIDAZOLE 500 MG
TABLET ORAL
Qty: 0 | Refills: 0 | Status: DISCONTINUED | OUTPATIENT
Start: 2017-10-05 | End: 2017-10-05

## 2017-10-05 RX ORDER — SIMETHICONE 80 MG/1
80 TABLET, CHEWABLE ORAL ONCE
Qty: 0 | Refills: 0 | Status: COMPLETED | OUTPATIENT
Start: 2017-10-05 | End: 2017-10-05

## 2017-10-05 RX ORDER — ONDANSETRON 8 MG/1
4 TABLET, FILM COATED ORAL EVERY 6 HOURS
Qty: 0 | Refills: 0 | Status: COMPLETED | OUTPATIENT
Start: 2017-10-05 | End: 2017-10-07

## 2017-10-05 RX ORDER — CIPROFLOXACIN LACTATE 400MG/40ML
VIAL (ML) INTRAVENOUS
Qty: 0 | Refills: 0 | Status: DISCONTINUED | OUTPATIENT
Start: 2017-10-05 | End: 2017-10-05

## 2017-10-05 RX ORDER — METRONIDAZOLE 500 MG
500 TABLET ORAL EVERY 8 HOURS
Qty: 0 | Refills: 0 | Status: DISCONTINUED | OUTPATIENT
Start: 2017-10-05 | End: 2017-10-05

## 2017-10-05 RX ORDER — METOPROLOL TARTRATE 50 MG
50 TABLET ORAL EVERY 8 HOURS
Qty: 0 | Refills: 0 | Status: DISCONTINUED | OUTPATIENT
Start: 2017-10-05 | End: 2017-10-09

## 2017-10-05 RX ORDER — METHOCARBAMOL 500 MG/1
500 TABLET, FILM COATED ORAL EVERY 8 HOURS
Qty: 0 | Refills: 0 | Status: DISCONTINUED | OUTPATIENT
Start: 2017-10-05 | End: 2017-10-09

## 2017-10-05 RX ORDER — HYDRALAZINE HCL 50 MG
50 TABLET ORAL THREE TIMES A DAY
Qty: 0 | Refills: 0 | Status: DISCONTINUED | OUTPATIENT
Start: 2017-10-05 | End: 2017-10-09

## 2017-10-05 RX ORDER — CIPROFLOXACIN LACTATE 400MG/40ML
400 VIAL (ML) INTRAVENOUS EVERY 12 HOURS
Qty: 0 | Refills: 0 | Status: DISCONTINUED | OUTPATIENT
Start: 2017-10-05 | End: 2017-10-05

## 2017-10-05 RX ORDER — DEXTROSE 50 % IN WATER 50 %
25 SYRINGE (ML) INTRAVENOUS ONCE
Qty: 0 | Refills: 0 | Status: DISCONTINUED | OUTPATIENT
Start: 2017-10-05 | End: 2017-10-09

## 2017-10-05 RX ORDER — HYDRALAZINE HCL 50 MG
25 TABLET ORAL THREE TIMES A DAY
Qty: 0 | Refills: 0 | Status: DISCONTINUED | OUTPATIENT
Start: 2017-10-05 | End: 2017-10-05

## 2017-10-05 RX ORDER — METRONIDAZOLE 500 MG
500 TABLET ORAL ONCE
Qty: 0 | Refills: 0 | Status: COMPLETED | OUTPATIENT
Start: 2017-10-05 | End: 2017-10-05

## 2017-10-05 RX ORDER — SODIUM CHLORIDE 9 MG/ML
1000 INJECTION, SOLUTION INTRAVENOUS
Qty: 0 | Refills: 0 | Status: DISCONTINUED | OUTPATIENT
Start: 2017-10-05 | End: 2017-10-09

## 2017-10-05 RX ORDER — HYDRALAZINE HCL 50 MG
10 TABLET ORAL ONCE
Qty: 0 | Refills: 0 | Status: DISCONTINUED | OUTPATIENT
Start: 2017-10-05 | End: 2017-10-09

## 2017-10-05 RX ORDER — METOCLOPRAMIDE HCL 10 MG
5 TABLET ORAL EVERY 6 HOURS
Qty: 0 | Refills: 0 | Status: DISCONTINUED | OUTPATIENT
Start: 2017-10-05 | End: 2017-10-06

## 2017-10-05 RX ORDER — INSULIN GLARGINE 100 [IU]/ML
10 INJECTION, SOLUTION SUBCUTANEOUS AT BEDTIME
Qty: 0 | Refills: 0 | Status: DISCONTINUED | OUTPATIENT
Start: 2017-10-05 | End: 2017-10-09

## 2017-10-05 RX ORDER — CIPROFLOXACIN LACTATE 400MG/40ML
400 VIAL (ML) INTRAVENOUS ONCE
Qty: 0 | Refills: 0 | Status: COMPLETED | OUTPATIENT
Start: 2017-10-05 | End: 2017-10-05

## 2017-10-05 RX ORDER — DEXTROSE 50 % IN WATER 50 %
1 SYRINGE (ML) INTRAVENOUS ONCE
Qty: 0 | Refills: 0 | Status: DISCONTINUED | OUTPATIENT
Start: 2017-10-05 | End: 2017-10-09

## 2017-10-05 RX ORDER — HYDROMORPHONE HYDROCHLORIDE 2 MG/ML
2 INJECTION INTRAMUSCULAR; INTRAVENOUS; SUBCUTANEOUS ONCE
Qty: 0 | Refills: 0 | Status: DISCONTINUED | OUTPATIENT
Start: 2017-10-05 | End: 2017-10-05

## 2017-10-05 RX ORDER — ONDANSETRON 8 MG/1
4 TABLET, FILM COATED ORAL ONCE
Qty: 0 | Refills: 0 | Status: COMPLETED | OUTPATIENT
Start: 2017-10-05 | End: 2017-10-05

## 2017-10-05 RX ORDER — DEXTROSE 50 % IN WATER 50 %
12.5 SYRINGE (ML) INTRAVENOUS ONCE
Qty: 0 | Refills: 0 | Status: DISCONTINUED | OUTPATIENT
Start: 2017-10-05 | End: 2017-10-09

## 2017-10-05 RX ADMIN — Medication 5 MILLIGRAM(S): at 17:51

## 2017-10-05 RX ADMIN — SIMETHICONE 80 MILLIGRAM(S): 80 TABLET, CHEWABLE ORAL at 19:03

## 2017-10-05 RX ADMIN — AMLODIPINE BESYLATE 10 MILLIGRAM(S): 2.5 TABLET ORAL at 05:52

## 2017-10-05 RX ADMIN — Medication 1000 UNIT(S): at 11:16

## 2017-10-05 RX ADMIN — Medication 1 MILLIGRAM(S): at 11:16

## 2017-10-05 RX ADMIN — TRAMADOL HYDROCHLORIDE 50 MILLIGRAM(S): 50 TABLET ORAL at 08:50

## 2017-10-05 RX ADMIN — ONDANSETRON 4 MILLIGRAM(S): 8 TABLET, FILM COATED ORAL at 18:47

## 2017-10-05 RX ADMIN — Medication 5 MILLIGRAM(S): at 23:06

## 2017-10-05 RX ADMIN — MORPHINE SULFATE 2 MILLIGRAM(S): 50 CAPSULE, EXTENDED RELEASE ORAL at 03:56

## 2017-10-05 RX ADMIN — Medication 3 MILLIGRAM(S): at 23:06

## 2017-10-05 RX ADMIN — Medication 25 MILLIGRAM(S): at 05:52

## 2017-10-05 RX ADMIN — MONTELUKAST 10 MILLIGRAM(S): 4 TABLET, CHEWABLE ORAL at 11:16

## 2017-10-05 RX ADMIN — Medication 100 MILLIGRAM(S): at 16:39

## 2017-10-05 RX ADMIN — ONDANSETRON 4 MILLIGRAM(S): 8 TABLET, FILM COATED ORAL at 14:30

## 2017-10-05 RX ADMIN — PANTOPRAZOLE SODIUM 40 MILLIGRAM(S): 20 TABLET, DELAYED RELEASE ORAL at 08:27

## 2017-10-05 RX ADMIN — Medication 50 MILLIGRAM(S): at 23:23

## 2017-10-05 RX ADMIN — Medication 5 MILLIGRAM(S): at 14:03

## 2017-10-05 RX ADMIN — Medication 200 MILLIGRAM(S): at 14:00

## 2017-10-05 RX ADMIN — HYDROMORPHONE HYDROCHLORIDE 2 MILLIGRAM(S): 2 INJECTION INTRAMUSCULAR; INTRAVENOUS; SUBCUTANEOUS at 06:58

## 2017-10-05 RX ADMIN — Medication 50 MILLIGRAM(S): at 23:11

## 2017-10-05 RX ADMIN — Medication 100 MICROGRAM(S): at 05:52

## 2017-10-05 RX ADMIN — HYDROMORPHONE HYDROCHLORIDE 2 MILLIGRAM(S): 2 INJECTION INTRAMUSCULAR; INTRAVENOUS; SUBCUTANEOUS at 06:32

## 2017-10-05 RX ADMIN — TRAMADOL HYDROCHLORIDE 50 MILLIGRAM(S): 50 TABLET ORAL at 10:04

## 2017-10-05 RX ADMIN — Medication 50 MILLIGRAM(S): at 16:27

## 2017-10-05 RX ADMIN — Medication 50 MILLIGRAM(S): at 14:30

## 2017-10-05 RX ADMIN — PANTOPRAZOLE SODIUM 40 MILLIGRAM(S): 20 TABLET, DELAYED RELEASE ORAL at 03:55

## 2017-10-05 RX ADMIN — INSULIN GLARGINE 10 UNIT(S): 100 INJECTION, SOLUTION SUBCUTANEOUS at 23:05

## 2017-10-05 RX ADMIN — Medication 25 MILLIGRAM(S): at 08:50

## 2017-10-05 RX ADMIN — ONDANSETRON 4 MILLIGRAM(S): 8 TABLET, FILM COATED ORAL at 23:10

## 2017-10-05 RX ADMIN — Medication 50 MILLIGRAM(S): at 09:32

## 2017-10-05 NOTE — PROGRESS NOTE ADULT - ASSESSMENT
73 y/o female with hx of CAD, DM, CHF, hep C who presented with weakness and inability to speak.  Currently sxs have resolved and Head CT/ MRI neg. - Mostly TIA.  Pt has developed abd pain with nausea and vomiting- considering pt's hx of hep C and abd CT showing Nonspecific gastrohepatic, periportal and portacaval lymph nodes measuring up to 2.1 x 1.1 cm in the portacaval region (2:55). Differential diagnosis includes hepatocellular carcinoma- GI consult requested 71 y/o female with hx of CAD, DM, CHF, hep C who presented with weakness and inability to speak. Her neurological symptoms have resolved:

## 2017-10-05 NOTE — CONSULT NOTE ADULT - ASSESSMENT
Patient with new onset weakness, with h/o CHF, DM, CKD, Cirrhosis related hepatitis C with Nausea, vomiting, abdominal pain and now with abdominal lymphadenopathy. She has elevated LFTs.    1. Nausea, vomiting and abdominal pain: Will recommend to check amylase, lipase and obtain US abdomen to rule out choledocholithiasis in setting of cholelithiasis. The other possibility is to rule out posterior circulation stroke. Please have neurology follow up. The other differential can be gastroparesis. Can give empiric trial of reglan 5 mg iv tid at this time.    2. Abdominal lymphadenopathy: Non specific lymphadenopathy can be seen in hepatitis C related cirrhosis. Agree with AFP level. May need contrast enhanced imaging of the liver once nausea, vomiting is better.

## 2017-10-05 NOTE — PROGRESS NOTE ADULT - SUBJECTIVE AND OBJECTIVE BOX
[facility]    [id number]    72y      Female    INTERVAL HPI/OVERNIGHT EVENTS:   is a 71 y/o female w/PMhx of CAD, DM, heart failure, hepC who presents after waking up today and discovering that she was unable to walk. Both her daughters Daniela and Parris were at the bedside and Daniela states that she was at home and her mother was in her usual state of health, but developed today am, a lot of weakness and was not able to speak. She was pointing to her back and when the paramedics arrived, she was so weak that she could not raise her arms up in the air.     During the time of my admission,  appears to be much more comfortable, is able to speak clearly and is awake, alert and able to describe the "weakness" she had today am. A review of her medications reveals that she's on high doses of tramadol, gabapentin, melatonin and she has chronic kidney disease. She had a negative CT scan and her MRI is pending. I suspect her pain medications and polypharmacy may have caused a lot of lethargy. She had improvement of her clinical symptoms after receiving IVF in the ER.     Of note,  has some kind of intolerance to aspirin and blood thinners.    This morning, pt reports all of sxs present on admission have resolved - she is able to move all extremities and speech is normal.  Overnight pt became nauseous and began to vomit.  Pt states she had chicken parm for dinner and tolerated fine and the became nauseous and vomited at 11 PM.  Last night abd CT was ordered by covering MD to evaluate.  She vomited again this AM and continues to be nauseous.  Last BM was last night. Denies fever, admits to diffuse abd pain.    REVIEW OF SYSTEMS:    CONSTITUTIONAL: No fever, weight loss, + fatigue  RESPIRATORY: No cough, wheezing, hemoptysis; No shortness of breath  CARDIOVASCULAR: No chest pain, palpitations  GASTROINTESTINAL: + abdominal, no epigastric pain. + nausea, + vomiting  NEUROLOGICAL: No headaches, memory loss, + loss of strength.      Vital Signs Last 24 Hrs  T(C): 36.6 (05 Oct 2017 07:30), Max: 37.2 (04 Oct 2017 15:33)  T(F): 97.8 (05 Oct 2017 07:30), Max: 99 (04 Oct 2017 15:33)  HR: 90 (05 Oct 2017 09:33) (85 - 98)  BP: 179/81 (05 Oct 2017 09:33) (147/67 - 214/88)  RR: 18 (05 Oct 2017 09:33) (16 - 18)  SpO2: 98% (05 Oct 2017 09:33) (96% - 98%)    PHYSICAL EXAM:    GENERAL: NAD, well-groomed  HEENT: PERRL, +EOMI  NECK: soft, Supple, No JVD,   CHEST/LUNG: Clear to ascultation bilaterally; No wheezing  HEART: S1S2+, Regular rate and rhythm; No murmurs, rubs, or gallops  ABDOMEN: Soft, + generalized tenderness - most intense on left upper and lower quadrant, Nondistended; Bowel sounds present  EXTREMITIES:  2+ Peripheral Pulses, No clubbing, cyanosis, or edema  SKIN: No rashes or lesions  NEURO: AAOX3, speech normal , able to move all 4 extremities, face symmetrical  PSYCH: normal mood      LABS:                        11.5   4.2   )-----------( 89       ( 05 Oct 2017 09:53 )             35.7     10-05    144  |  106  |  33.0<H>  ----------------------------<  415<H>  4.3   |  23.0  |  1.40<H>    Ca    9.2      05 Oct 2017 09:36  Phos  2.2     10-05  Mg     2.2     10-05    TPro  8.1  /  Alb  3.9  /  TBili  0.5  /  DBili  0.2  /  AST  86<H>  /  ALT  58<H>  /  AlkPhos  138<H>  10-05    PT/INR - ( 05 Oct 2017 07:52 )   PT: 12.4 sec;   INR: 1.12 ratio         PTT - ( 05 Oct 2017 07:52 )  PTT:34.1 sec  Urinalysis Basic - ( 04 Oct 2017 10:16 )    Color: Yellow / Appearance: Clear / S.005 / pH: x  Gluc: x / Ketone: Negative  / Bili: Negative / Urobili: Negative mg/dL   Blood: x / Protein: 100 mg/dL / Nitrite: Negative   Leuk Esterase: Trace / RBC: 3-5 /HPF / WBC 3-5   Sq Epi: x / Non Sq Epi: x / Bacteria: Few          MEDICATIONS  (STANDING):  amLODIPine   Tablet 10 milliGRAM(s) Oral daily  buDESOnide 160 MICROgram(s)/formoterol 4.5 MICROgram(s) Inhaler 2 Puff(s) Inhalation two times a day  cholecalciferol 1000 Unit(s) Oral daily  ciprofloxacin   IVPB      ciprofloxacin   IVPB 400 milliGRAM(s) IV Intermittent once  ciprofloxacin   IVPB 400 milliGRAM(s) IV Intermittent every 12 hours  dextrose 5%. 1000 milliLiter(s) (50 mL/Hr) IV Continuous <Continuous>  dextrose 50% Injectable 12.5 Gram(s) IV Push once  dextrose 50% Injectable 25 Gram(s) IV Push once  dextrose 50% Injectable 25 Gram(s) IV Push once  fluticasone propionate 50 MICROgram(s)/spray Nasal Spray 2 Spray(s) Both Nostrils daily  folic acid 1 milliGRAM(s) Oral daily  hydrALAZINE 50 milliGRAM(s) Oral three times a day  hydrALAZINE Injectable 10 milliGRAM(s) IV Push once  insulin glargine Injectable (LANTUS) 10 Unit(s) SubCutaneous at bedtime  levothyroxine 100 MICROGram(s) Oral daily  metoclopramide Injectable 5 milliGRAM(s) IV Push every 6 hours  metoprolol 50 milliGRAM(s) Oral every 8 hours  metroNIDAZOLE  IVPB      metroNIDAZOLE  IVPB 500 milliGRAM(s) IV Intermittent once  metroNIDAZOLE  IVPB 500 milliGRAM(s) IV Intermittent every 8 hours  montelukast 10 milliGRAM(s) Oral daily  ondansetron Injectable 4 milliGRAM(s) IV Push every 6 hours  pantoprazole    Tablet 40 milliGRAM(s) Oral before breakfast    MEDICATIONS  (PRN):  ALBUTerol    90 MICROgram(s) HFA Inhaler 2 Puff(s) Inhalation every 6 hours PRN Shortness of Breath  aluminum hydroxide/magnesium hydroxide/simethicone Suspension 30 milliLiter(s) Oral every 4 hours PRN Dyspepsia  dextrose Gel 1 Dose(s) Oral once PRN Blood Glucose LESS THAN 70 milliGRAM(s)/deciliter  glucagon  Injectable 1 milliGRAM(s) IntraMuscular once PRN Glucose LESS THAN 70 milligrams/deciliter  melatonin 3 milliGRAM(s) Oral at bedtime PRN Insomnia  methocarbamol 500 milliGRAM(s) Oral every 8 hours PRN pain      RADIOLOGY & ADDITIONAL TESTS:    CT Abd/ pelvis:  No evidence of bowel obstruction, active inflammatory process or   intra-abdominal source for infection, within limits of a noncontrast CT   scan.    Cholelithiasis without CT evidence of acute cholecystitis.  Right upper   quadrant ultrasound can be performed as clinically warranted.     Cirrhotic liver.  Splenomegaly and multiple varices suggesting portal   venous hypertension.  No ascites.  MRI surveillance can be performed as   clinically warranted to exclude hepatocellular carcinoma.    Head CT:  IMPRESSION:  Mild chronic microvascular changes without evidence of an   acute transcortical infarction or hemorrhage. MR is a more sensitive   imaging modality for the evaluation of an acute infarction.     Head MRI:  No acute infarction.    CXR:  No acute cardiopulmonary disease process. Cardiomegaly.    Neurology consult appreciated  is a 73y/o female w/PMhx of CAD, DM, heart failure, hepC who presented after feeling weak, lethargic, and had an episode of slurred speech. She began to have nausea and vomiting overnight and had a CT abd/pelvis concerning for lymph nodes. Given her hx of hepatitis C, we are considering differentials including acute hepatitis versus hepatocellular carcinoma. We sent an AFP and CEA, both are pending. Furthermore, she was having some LLQ pain, and I started cipro/flagyl to treat possible colitis. I've continued her lantus as her sugars are high and I've started around the clock anti-emetics to control her nausea/vomiting. I appreciate GI consult f/u.    Summary:   REVIEW OF SYSTEMS    General:	"I'm not feeling well."    Skin/Breast:  	  Ophthalmologic:  	  ENMT:	    Respiratory and Thorax:  	  Cardiovascular:	    Gastrointestinal:	 +nausea, vomiting, LLQ pain     Genitourinary:	    Musculoskeletal:	    Neurological:	    Psychiatric:	    Hematology/Lymphatics:	    Endocrine:	    Allergic/Immunologic:	  Vital Signs Last 24 Hrs  T(C): 36.6 (05 Oct 2017 07:30), Max: 37.2 (04 Oct 2017 15:33)  T(F): 97.8 (05 Oct 2017 07:30), Max: 99 (04 Oct 2017 15:33)  HR: 90 (05 Oct 2017 09:33) (85 - 96)  BP: 179/81 (05 Oct 2017 09:33) (147/67 - 214/88)  BP(mean): --  RR: 18 (05 Oct 2017 09:33) (16 - 18)  SpO2: 98% (05 Oct 2017 09:33) (96% - 98%)  PHYSICAL EXAM:  GEN: moderate distress, speaking clearly, awake, alert  HEENT: dry MM, vomiting  CVS: S1S2 HR=90s  PULM: CTABL, good breath sounds  ABD: +LLQ tenderness on palpation, +rebound/+guarding  EXTREM: no edema                          11.5   4.2   )-----------( 89       ( 05 Oct 2017 09:53 )             35.7     10-05    144  |  106  |  33.0<H>  ----------------------------<  415<H>  4.3   |  23.0  |  1.40<H>    Ca    9.2      05 Oct 2017 09:36  Phos  2.2     10-05  Mg     2.2     10-05    TPro  8.1  /  Alb  3.9  /  TBili  0.5  /  DBili  0.2  /  AST  86<H>  /  ALT  58<H>  /  AlkPhos  138<H>  10-05    LIVER FUNCTIONS - ( 05 Oct 2017 09:53 )  Alb: 3.9 g/dL / Pro: 8.1 g/dL / ALK PHOS: 138 U/L / ALT: 58 U/L / AST: 86 U/L / GGT: x           PT/INR - ( 05 Oct 2017 07:52 )   PT: 12.4 sec;   INR: 1.12 ratio         PTT - ( 05 Oct 2017 07:52 )  PTT:34.1 sec    Radiology:     MEDICATIONS  (STANDING):  amLODIPine   Tablet 10 milliGRAM(s) Oral daily  buDESOnide 160 MICROgram(s)/formoterol 4.5 MICROgram(s) Inhaler 2 Puff(s) Inhalation two times a day  cholecalciferol 1000 Unit(s) Oral daily  ciprofloxacin   IVPB      ciprofloxacin   IVPB 400 milliGRAM(s) IV Intermittent once  ciprofloxacin   IVPB 400 milliGRAM(s) IV Intermittent every 12 hours  dextrose 5%. 1000 milliLiter(s) (50 mL/Hr) IV Continuous <Continuous>  dextrose 50% Injectable 12.5 Gram(s) IV Push once  dextrose 50% Injectable 25 Gram(s) IV Push once  dextrose 50% Injectable 25 Gram(s) IV Push once  fluticasone propionate 50 MICROgram(s)/spray Nasal Spray 2 Spray(s) Both Nostrils daily  folic acid 1 milliGRAM(s) Oral daily  hydrALAZINE 50 milliGRAM(s) Oral three times a day  hydrALAZINE Injectable 10 milliGRAM(s) IV Push once  insulin glargine Injectable (LANTUS) 10 Unit(s) SubCutaneous at bedtime  levothyroxine 100 MICROGram(s) Oral daily  metoclopramide Injectable 5 milliGRAM(s) IV Push every 6 hours  metoprolol 50 milliGRAM(s) Oral every 8 hours  metroNIDAZOLE  IVPB      metroNIDAZOLE  IVPB 500 milliGRAM(s) IV Intermittent once  metroNIDAZOLE  IVPB 500 milliGRAM(s) IV Intermittent every 8 hours  montelukast 10 milliGRAM(s) Oral daily  ondansetron  IVPB 4 milliGRAM(s) IV Intermittent every 6 hours  pantoprazole    Tablet 40 milliGRAM(s) Oral before breakfast    MEDICATIONS  (PRN):  ALBUTerol    90 MICROgram(s) HFA Inhaler 2 Puff(s) Inhalation every 6 hours PRN Shortness of Breath  aluminum hydroxide/magnesium hydroxide/simethicone Suspension 30 milliLiter(s) Oral every 4 hours PRN Dyspepsia  dextrose Gel 1 Dose(s) Oral once PRN Blood Glucose LESS THAN 70 milliGRAM(s)/deciliter  glucagon  Injectable 1 milliGRAM(s) IntraMuscular once PRN Glucose LESS THAN 70 milligrams/deciliter  melatonin 3 milliGRAM(s) Oral at bedtime PRN Insomnia  methocarbamol 500 milliGRAM(s) Oral every 8 hours PRN pain

## 2017-10-05 NOTE — CHART NOTE - NSCHARTNOTEFT_GEN_A_CORE
PA called for patient asking questions about RUQ abd U/S results  Patient and daughter at bedside given results.  Patient having a lot of gas (flatulence and burping)- Simethicone 80mg X 1 dose given  as per Dr. Brown no tylenol to be given

## 2017-10-05 NOTE — ED ADULT NURSE REASSESSMENT NOTE - NS ED NURSE REASSESS COMMENT FT1
BRIAN Nye at bedside.
[employer] called and made aware pt is any monitored bed.
MD Padilla called and asked if she can come and speak to pts family to update them on plan of care. Pt AOX4 and in no apparent distress
2nd call placed to Dr. Kathryn MD states he will put order in for medications
Pt actively vomiting, c/o abd pain, admitting MD made aware of findings. BP elevated, pt reports being medicated for elevated BP from previous RN, with no relief and continuous abd pain, call made to admitting physician. Awaiting further orders.
Pt provided with complete am care, repositioned for comfort, cardiac monitor in place, left side deficit noted, pt continues to complain of gassy abd pain, bed pan provided pt voiding without difficulty, safety measure maintained.
Pt reports 10/10 abd pain with nausea, and associated elevated BP, MD called and made aware of findings, verbal order for 2mg Dilaudid. Order initiated.
Pt reports pain relief, reports feeling much more comfortably, denies nausea, reports pain as 4/10 from 10/10 pre-medication, sleeping comfortably on stretcher, safety measures maintained.
pt dry heaving after maloxx, Called placed to Dr. Peralta, no response. will call back again.
pt vomiting, attempted to call Dr. Peralta, no answer will try again.
report received, assumed pt care at this time, pt denies CP/ SOB, resp even unlabored, Neuro intact and symptoms have resolved since she came to the ED.
resting comfortably in no distress, resp even unlabored, pt and family aware of plan of care, will continue to monitor
resting comfortably, in no distress, resp even unlabored, MAEx4, neuro intact
pt in bed resting comfortably states her pain is now 4/10, pain is in her abdomen. Pt is feeling sleepy from the pain medication. Lab at bedside drawing new blood work. IV patent and flushing without difficulty.

## 2017-10-05 NOTE — CONSULT NOTE ADULT - SUBJECTIVE AND OBJECTIVE BOX
HPI:   is a 71 y/o female w/PMhx of CAD, DM, heart failure, hepC who presents after waking up today and discovering that she was unable to walk. Both her daughters Daniela and Parris were at the bedside and Daniela states that she was at home and her mother was in her usual state of health, but developed today am, a lot of weakness and was not able to speak. She was pointing to her back and when the paramedics arrived, she was so weak that she could not raise her arms up in the air.     During the time of my admission,  appears to be much more comfortable, is able to speak clearly and is awake, alert and able to describe the "weakness" she had today am. A review of her medications reveals that she's on high doses of tramadol, gabapentin, melatonin and she has chronic kidney disease. She had a negative CT scan and her MRI is pending. I suspect her pain medications and polypharmacy may have caused a lot of lethargy. She had improvement of her clinical symptoms after receiving IVF in the ER.     Of note,  has some kind of intolerance to aspirin and blood thinners. (04 Oct 2017 15:07)      PAST MEDICAL & SURGICAL HISTORY:  CAD (coronary artery disease)  Heart failure  Hepatitis C  Aortic stenosis  Anemia, unspecified anemia type  Asthma  Low back pain: chronic over a year now.  High cholesterol  Hypertension  Diabetes  H/O aortic valve replacement: cow valve.  S/P CABG x 3  History of tonsillectomy      ROS:  No Heartburn, regurgitation, dysphagia, odynophagia.  No dyspepsia  No abdominal pain.    No Nausea, vomiting.  No Bleeding.  No hematemesis.   No diarrhea.    No hematochesia.  No weight loss, anorexia.  No edema.      MEDICATIONS  (STANDING):  amLODIPine   Tablet 10 milliGRAM(s) Oral daily  buDESOnide 160 MICROgram(s)/formoterol 4.5 MICROgram(s) Inhaler 2 Puff(s) Inhalation two times a day  cholecalciferol 1000 Unit(s) Oral daily  ciprofloxacin   IVPB      ciprofloxacin   IVPB 400 milliGRAM(s) IV Intermittent once  ciprofloxacin   IVPB 400 milliGRAM(s) IV Intermittent every 12 hours  dextrose 5%. 1000 milliLiter(s) (50 mL/Hr) IV Continuous <Continuous>  dextrose 50% Injectable 12.5 Gram(s) IV Push once  dextrose 50% Injectable 25 Gram(s) IV Push once  dextrose 50% Injectable 25 Gram(s) IV Push once  fluticasone propionate 50 MICROgram(s)/spray Nasal Spray 2 Spray(s) Both Nostrils daily  folic acid 1 milliGRAM(s) Oral daily  hydrALAZINE 50 milliGRAM(s) Oral three times a day  hydrALAZINE Injectable 10 milliGRAM(s) IV Push once  insulin glargine Injectable (LANTUS) 10 Unit(s) SubCutaneous at bedtime  levothyroxine 100 MICROGram(s) Oral daily  metoclopramide Injectable 5 milliGRAM(s) IV Push every 6 hours  metoprolol 50 milliGRAM(s) Oral every 8 hours  metroNIDAZOLE  IVPB      metroNIDAZOLE  IVPB 500 milliGRAM(s) IV Intermittent once  metroNIDAZOLE  IVPB 500 milliGRAM(s) IV Intermittent every 8 hours  montelukast 10 milliGRAM(s) Oral daily  ondansetron  IVPB 4 milliGRAM(s) IV Intermittent every 6 hours  pantoprazole    Tablet 40 milliGRAM(s) Oral before breakfast    MEDICATIONS  (PRN):  ALBUTerol    90 MICROgram(s) HFA Inhaler 2 Puff(s) Inhalation every 6 hours PRN Shortness of Breath  aluminum hydroxide/magnesium hydroxide/simethicone Suspension 30 milliLiter(s) Oral every 4 hours PRN Dyspepsia  dextrose Gel 1 Dose(s) Oral once PRN Blood Glucose LESS THAN 70 milliGRAM(s)/deciliter  glucagon  Injectable 1 milliGRAM(s) IntraMuscular once PRN Glucose LESS THAN 70 milligrams/deciliter  melatonin 3 milliGRAM(s) Oral at bedtime PRN Insomnia  methocarbamol 500 milliGRAM(s) Oral every 8 hours PRN pain      Allergies    Biaxin (Joint Pain)    Intolerances        SOCIAL HISTORY:    ENDOSCOPIC/GI HISTORY:    FAMILY HISTORY:  Family history of diabetes mellitus (Sibling)      Vital Signs Last 24 Hrs  T(C): 36.6 (05 Oct 2017 07:30), Max: 37.2 (04 Oct 2017 15:33)  T(F): 97.8 (05 Oct 2017 07:30), Max: 99 (04 Oct 2017 15:33)  HR: 90 (05 Oct 2017 09:33) (85 - 98)  BP: 179/81 (05 Oct 2017 09:33) (147/67 - 214/88)  BP(mean): --  RR: 18 (05 Oct 2017 09:33) (16 - 18)  SpO2: 98% (05 Oct 2017 09:33) (96% - 98%)    PHYSICAL EXAM:    GENERAL: NAD, well-groomed, well-developed  HEAD:  Atraumatic, Normocephalic  EYES: EOMI, PERRLA, conjunctiva and sclera clear  ENMT: No tonsillar erythema, exudates, or enlargement; Moist mucous membranes, Good dentition, No lesions  NECK: Supple, No JVD, Normal thyroid  CHEST/LUNG: Clear to percussion bilaterally; No rales, rhonchi, wheezing, or rubs  HEART: Regular rate and rhythm; No murmurs, rubs, or gallops  ABDOMEN: Soft, Nontender, Nondistended; Bowel sounds present  EXTREMITIES:  2+ Peripheral Pulses, No clubbing, cyanosis, or edema  LYMPH: No lymphadenopathy noted  SKIN: No rashes or lesions      LABS:                        11.5   4.2   )-----------( 89       ( 05 Oct 2017 09:53 )             35.7     10-    144  |  106  |  33.0<H>  ----------------------------<  415<H>  4.3   |  23.0  |  1.40<H>    Ca    9.2      05 Oct 2017 09:36  Phos  2.2     10-05  Mg     2.2     10-    TPro  8.1  /  Alb  3.9  /  TBili  0.5  /  DBili  0.2  /  AST  86<H>  /  ALT  58<H>  /  AlkPhos  138<H>  10-05    PT/INR - ( 05 Oct 2017 07:52 )   PT: 12.4 sec;   INR: 1.12 ratio         PTT - ( 05 Oct 2017 07:52 )  PTT:34.1 sec   Urinalysis Basic - ( 04 Oct 2017 10:16 )    Color: Yellow / Appearance: Clear / S.005 / pH: x  Gluc: x / Ketone: Negative  / Bili: Negative / Urobili: Negative mg/dL   Blood: x / Protein: 100 mg/dL / Nitrite: Negative   Leuk Esterase: Trace / RBC: 3-5 /HPF / WBC 3-5   Sq Epi: x / Non Sq Epi: x / Bacteria: Few        LIVER FUNCTIONS - ( 05 Oct 2017 09:53 )  Alb: 3.9 g/dL / Pro: 8.1 g/dL / ALK PHOS: 138 U/L / ALT: 58 U/L / AST: 86 U/L / GGT: x               RADIOLOGY & ADDITIONAL STUDIES: HPI: 71 y/o  woman with w/PMhx of CAD, DM, CKD, heart failure, hepatitis C related cirrhosis who presented with weakness and slurred speech. Her weakness and speech improved significantly when she presented to ED. She was evaluated by neurology and ? TIA. She underwent MRI without contrast with no evidence of any acute infarction.     However, after that she had episodes of vomiting and also passed couple of BM. She denied any blood in the vomiting or in BM. She is complaining of abdominal pain. She denies any fever,or chills.     She has history of hepatitis C related cirrhosis. She has genotype 2A or C. She has never been treated in the past, however has been evaluated in the past by a hepatologist.     She had EGD and colonoscopy performed in the past (roughly about 2 years ago). Details are not known so far.     Lately she has been anemic and has been requiring frequent PRBC transfusion. She has not seen a hepatologist or GI physician in the past couple of years. Her last dedicated liver imaging was in  which revealed cirrhosis and changes of portal hypertension.     Lately her blood glucose control has been erratic. Fluctuating from less than . However HbA1c has been ok.     PAST MEDICAL & SURGICAL HISTORY:  CAD (coronary artery disease)  Heart failure  Hepatitis C genotype 2.  Cirrhosis  Anemia, unspecified anemia type  Asthma  Low back pain: chronic over a year now.  High cholesterol  Hypertension  Diabetes  Aortic stenosis H/O aortic valve replacement: bovine valve. 2016  S/P CABG x 3. 2016  History of tonsillectomy      ROS:  No Heartburn, regurgitation, dysphagia, odynophagia.  No dyspepsia  + abdominal pain.    + Nausea, vomiting.  No Bleeding.  No hematemesis.   No diarrhea.    No hematochezia  No weight loss, anorexia.  No edema.      MEDICATIONS  (STANDING):  amLODIPine   Tablet 10 milliGRAM(s) Oral daily  buDESOnide 160 MICROgram(s)/formoterol 4.5 MICROgram(s) Inhaler 2 Puff(s) Inhalation two times a day  cholecalciferol 1000 Unit(s) Oral daily  ciprofloxacin   IVPB      ciprofloxacin   IVPB 400 milliGRAM(s) IV Intermittent once  ciprofloxacin   IVPB 400 milliGRAM(s) IV Intermittent every 12 hours  dextrose 5%. 1000 milliLiter(s) (50 mL/Hr) IV Continuous <Continuous>  dextrose 50% Injectable 12.5 Gram(s) IV Push once  dextrose 50% Injectable 25 Gram(s) IV Push once  dextrose 50% Injectable 25 Gram(s) IV Push once  fluticasone propionate 50 MICROgram(s)/spray Nasal Spray 2 Spray(s) Both Nostrils daily  folic acid 1 milliGRAM(s) Oral daily  hydrALAZINE 50 milliGRAM(s) Oral three times a day  hydrALAZINE Injectable 10 milliGRAM(s) IV Push once  insulin glargine Injectable (LANTUS) 10 Unit(s) SubCutaneous at bedtime  levothyroxine 100 MICROGram(s) Oral daily  metoclopramide Injectable 5 milliGRAM(s) IV Push every 6 hours  metoprolol 50 milliGRAM(s) Oral every 8 hours  metroNIDAZOLE  IVPB      metroNIDAZOLE  IVPB 500 milliGRAM(s) IV Intermittent once  metroNIDAZOLE  IVPB 500 milliGRAM(s) IV Intermittent every 8 hours  montelukast 10 milliGRAM(s) Oral daily  ondansetron  IVPB 4 milliGRAM(s) IV Intermittent every 6 hours  pantoprazole    Tablet 40 milliGRAM(s) Oral before breakfast    MEDICATIONS  (PRN):  ALBUTerol    90 MICROgram(s) HFA Inhaler 2 Puff(s) Inhalation every 6 hours PRN Shortness of Breath  aluminum hydroxide/magnesium hydroxide/simethicone Suspension 30 milliLiter(s) Oral every 4 hours PRN Dyspepsia  dextrose Gel 1 Dose(s) Oral once PRN Blood Glucose LESS THAN 70 milliGRAM(s)/deciliter  glucagon  Injectable 1 milliGRAM(s) IntraMuscular once PRN Glucose LESS THAN 70 milligrams/deciliter  melatonin 3 milliGRAM(s) Oral at bedtime PRN Insomnia  methocarbamol 500 milliGRAM(s) Oral every 8 hours PRN pain      Allergies    Biaxin (Joint Pain)    Intolerances        SOCIAL HISTORY: Denies x 3. Ex smoker.  1/2 PPD for 20-25 years. No alcohol or drugs    ENDOSCOPIC/GI HISTORY: EGD and colonoscopy     FAMILY HISTORY:  Family history of diabetes mellitus (Sibling)      Vital Signs Last 24 Hrs  T(C): 36.6 (05 Oct 2017 07:30), Max: 37.2 (04 Oct 2017 15:33)  T(F): 97.8 (05 Oct 2017 07:30), Max: 99 (04 Oct 2017 15:33)  HR: 90 (05 Oct 2017 09:33) (85 - 98)  BP: 179/81 (05 Oct 2017 09:33) (147/67 - 214/88)  BP(mean): --  RR: 18 (05 Oct 2017 09:33) (16 - 18)  SpO2: 98% (05 Oct 2017 09:33) (96% - 98%)    PHYSICAL EXAM:    GENERAL: NAD, well-groomed, well-developed  HEAD:  Atraumatic, Normocephalic  EYES: EOMI, PERRLA, conjunctiva and sclera clear  ENMT: No tonsillar erythema, exudates, or enlargement; Moist mucous membranes, Good dentition, No lesions  NECK: Supple, No JVD, Normal thyroid  CHEST/LUNG: Clear to percussion bilaterally; No rales, rhonchi, wheezing, or rubs  HEART: Regular rate and rhythm; No murmurs, rubs, or gallops  ABDOMEN: Soft, tender, Nondistended; Bowel sounds present.   RECTAL: Patient refused  EXTREMITIES:  2+ Peripheral Pulses, No clubbing, cyanosis, or edema  LYMPH: No lymphadenopathy noted  SKIN: No rashes or lesions      LABS:                        11.5   4.2   )-----------( 89       ( 05 Oct 2017 09:53 )             35.7     10-05    144  |  106  |  33.0<H>  ----------------------------<  415<H>  4.3   |  23.0  |  1.40<H>    Ca    9.2      05 Oct 2017 09:36  Phos  2.2     10-05  Mg     2.2     10-05    TPro  8.1  /  Alb  3.9  /  TBili  0.5  /  DBili  0.2  /  AST  86<H>  /  ALT  58<H>  /  AlkPhos  138<H>  10-05    PT/INR - ( 05 Oct 2017 07:52 )   PT: 12.4 sec;   INR: 1.12 ratio         PTT - ( 05 Oct 2017 07:52 )  PTT:34.1 sec   Urinalysis Basic - ( 04 Oct 2017 10:16 )    Color: Yellow / Appearance: Clear / S.005 / pH: x  Gluc: x / Ketone: Negative  / Bili: Negative / Urobili: Negative mg/dL   Blood: x / Protein: 100 mg/dL / Nitrite: Negative   Leuk Esterase: Trace / RBC: 3-5 /HPF / WBC 3-5   Sq Epi: x / Non Sq Epi: x / Bacteria: Few        LIVER FUNCTIONS - ( 05 Oct 2017 09:53 )  Alb: 3.9 g/dL / Pro: 8.1 g/dL / ALK PHOS: 138 U/L / ALT: 58 U/L / AST: 86 U/L / GGT: x             RADIOLOGY & ADDITIONAL STUDIES:    TTE Echo Complete w/Doppler (09.15.17 @ 12:23) >  Summary:   1. Normal biventricular systolic function. Left ventricular ejection   fraction, by visual estimation, is 65 to 70%.   2. Severely biatrial enlargement.   3. Severe mitral annular calcification.   4. Mild degenerative mitral stenosis.   5. Stable aortic bioprosthesis without significant stenosis or   insufficiency.   6. There is no evidence of pericardialeffusion.   7. ** Compared to TTE dated 2016, an aortic bioprosthesis is norw   present.    US Abdomen Limited (11.25.15 @ 13:54) >  IMPRESSION: Mild heterogeneous liver without focal mass.  Cholelithiasis  Commonbile duct normal.  Splenic, portal and hepatic veins show patency and normal flow..    CT Abdomen and Pelvis No Cont (10.05.17 @ 05:22) >  FINDINGS:  Visualized lower chest: Patient is status post aortic valve replacement.    Median sternotomy wires are partially visualized heart is enlarged.    There are atherosclerotic calcifications of the visualized coronary   arteries.  There is dystrophic    Liver: Cirrhotic morphology of the liver.  Bile ducts: Unremarkable.  Gallbladder: Cholelithiasis without evidence of gallbladder wall   thickening or surrounding inflammatory changes.  Spleen: Enlarged spleen measuring approximately 13.3 x 4.5 x 14.7 cm.    Complex 1 cm cyst in the spleen demonstrating.  Peripheral calcifications.  Pancreas: Unremarkable.  Adrenal glands: Unremarkable.  Kidneys/ureters: Unremarkable.    Bladder: Unremarkable.  Reproductive organs: Uterus and adnexa appear unremarkable.    Bowel: No bowel obstruction.  Normal appendix.  Mild diverticulosis of   the distal descending and proximal-mid sigmoid colon.  Peritoneum: No ascites.    Retroperitoneum: Nonspecific gastrohepatic, periportal and portacaval   lymph nodes measuring up to 2.1 x 1.1 cm in the portacaval region (2:55).  Vasculature: Scattered atherosclerotic calcifications of the aorta.    Proximal thigh vasculature.  There appear to be gastrosplenic varices and   recanalized umbilical vein which are suboptimally assessed without   contrast.    Abdominal wall/soft tissues: Prominent vessels in the ventral abdominal   wall likely related to portosystemic shunt.  Bones: Multilevel degenerative changes in the spine.  At L4-L5 there is   grade 1 anterolisthesis with small to moderate disc bulge and marked   facet/ligament flavum hypertrophy resulting in moderate to severe spinal   canal stenosis.  There are small Schmorl's nodes in the superior   endplates of T10 and L2 breezy large Schmorl's node in the superior   endplate of T12.      IMPRESSION:  No evidence of bowel obstruction, active inflammatory process or   intra-abdominal source for infection, within limits of a noncontrast CT   scan.    Cholelithiasis without CT evidence of acute cholecystitis.  Right upper   quadrant ultrasound can be performed as clinically warranted.     Cirrhotic liver.  Splenomegaly and multiple varices suggesting portal   venous hypertension.  No ascites.  MRI surveillance can be performed as   clinically warranted to exclude hepatocellular carcinoma.

## 2017-10-06 DIAGNOSIS — E11.22 TYPE 2 DIABETES MELLITUS WITH DIABETIC CHRONIC KIDNEY DISEASE: ICD-10-CM

## 2017-10-06 DIAGNOSIS — R11.2 NAUSEA WITH VOMITING, UNSPECIFIED: ICD-10-CM

## 2017-10-06 DIAGNOSIS — D69.6 THROMBOCYTOPENIA, UNSPECIFIED: ICD-10-CM

## 2017-10-06 DIAGNOSIS — D64.9 ANEMIA, UNSPECIFIED: ICD-10-CM

## 2017-10-06 DIAGNOSIS — Z29.9 ENCOUNTER FOR PROPHYLACTIC MEASURES, UNSPECIFIED: ICD-10-CM

## 2017-10-06 DIAGNOSIS — R11.0 NAUSEA: ICD-10-CM

## 2017-10-06 DIAGNOSIS — K80.20 CALCULUS OF GALLBLADDER WITHOUT CHOLECYSTITIS WITHOUT OBSTRUCTION: ICD-10-CM

## 2017-10-06 LAB
ALBUMIN SERPL ELPH-MCNC: 3.6 G/DL — SIGNIFICANT CHANGE UP (ref 3.3–5.2)
ALP SERPL-CCNC: 114 U/L — SIGNIFICANT CHANGE UP (ref 40–120)
ALT FLD-CCNC: 41 U/L — HIGH
ANION GAP SERPL CALC-SCNC: 14 MMOL/L — SIGNIFICANT CHANGE UP (ref 5–17)
APTT BLD: 29.3 SEC — SIGNIFICANT CHANGE UP (ref 27.5–37.4)
AST SERPL-CCNC: 45 U/L — HIGH
BILIRUB DIRECT SERPL-MCNC: 0.2 MG/DL — SIGNIFICANT CHANGE UP (ref 0–0.3)
BILIRUB INDIRECT FLD-MCNC: 0.3 MG/DL — SIGNIFICANT CHANGE UP (ref 0.2–1)
BILIRUB SERPL-MCNC: 0.5 MG/DL — SIGNIFICANT CHANGE UP (ref 0.4–2)
BUN SERPL-MCNC: 34 MG/DL — HIGH (ref 8–20)
CALCIUM SERPL-MCNC: 9.1 MG/DL — SIGNIFICANT CHANGE UP (ref 8.6–10.2)
CEA SERPL-MCNC: 3.3 NG/ML — SIGNIFICANT CHANGE UP (ref 0–3.8)
CHLORIDE SERPL-SCNC: 99 MMOL/L — SIGNIFICANT CHANGE UP (ref 98–107)
CO2 SERPL-SCNC: 23 MMOL/L — SIGNIFICANT CHANGE UP (ref 22–29)
CREAT SERPL-MCNC: 1.54 MG/DL — HIGH (ref 0.5–1.3)
GLUCOSE SERPL-MCNC: 315 MG/DL — HIGH (ref 70–115)
HAV IGM SER-ACNC: SIGNIFICANT CHANGE UP
HBV CORE IGM SER-ACNC: SIGNIFICANT CHANGE UP
HBV SURFACE AG SER-ACNC: SIGNIFICANT CHANGE UP
HCT VFR BLD CALC: 32.7 % — LOW (ref 37–47)
HCV AB S/CO SERPL IA: 12.91 S/CO — SIGNIFICANT CHANGE UP
HCV AB SERPL-IMP: REACTIVE
HCV RNA FLD QL NAA+PROBE: SIGNIFICANT CHANGE UP
HCV RNA SPEC QL PROBE+SIG AMP: DETECTED
HGB BLD-MCNC: 10.5 G/DL — LOW (ref 12–16)
INR BLD: 1.06 RATIO — SIGNIFICANT CHANGE UP (ref 0.88–1.16)
LIDOCAIN IGE QN: 26 U/L — SIGNIFICANT CHANGE UP (ref 22–51)
MCHC RBC-ENTMCNC: 28.5 PG — SIGNIFICANT CHANGE UP (ref 27–31)
MCHC RBC-ENTMCNC: 32.1 G/DL — SIGNIFICANT CHANGE UP (ref 32–36)
MCV RBC AUTO: 88.9 FL — SIGNIFICANT CHANGE UP (ref 81–99)
PLATELET # BLD AUTO: 119 K/UL — LOW (ref 150–400)
POTASSIUM SERPL-MCNC: 3.8 MMOL/L — SIGNIFICANT CHANGE UP (ref 3.5–5.3)
POTASSIUM SERPL-SCNC: 3.8 MMOL/L — SIGNIFICANT CHANGE UP (ref 3.5–5.3)
PROT SERPL-MCNC: 7.1 G/DL — SIGNIFICANT CHANGE UP (ref 6.6–8.7)
PROTHROM AB SERPL-ACNC: 11.7 SEC — SIGNIFICANT CHANGE UP (ref 9.8–12.7)
RBC # BLD: 3.68 M/UL — LOW (ref 4.4–5.2)
RBC # FLD: 20.2 % — HIGH (ref 11–15.6)
SODIUM SERPL-SCNC: 136 MMOL/L — SIGNIFICANT CHANGE UP (ref 135–145)
WBC # BLD: 6.3 K/UL — SIGNIFICANT CHANGE UP (ref 4.8–10.8)
WBC # FLD AUTO: 6.3 K/UL — SIGNIFICANT CHANGE UP (ref 4.8–10.8)

## 2017-10-06 PROCEDURE — 99232 SBSQ HOSP IP/OBS MODERATE 35: CPT

## 2017-10-06 PROCEDURE — 99233 SBSQ HOSP IP/OBS HIGH 50: CPT

## 2017-10-06 PROCEDURE — 99231 SBSQ HOSP IP/OBS SF/LOW 25: CPT

## 2017-10-06 RX ORDER — LANOLIN ALCOHOL/MO/W.PET/CERES
3 CREAM (GRAM) TOPICAL ONCE
Qty: 0 | Refills: 0 | Status: COMPLETED | OUTPATIENT
Start: 2017-10-06 | End: 2017-10-06

## 2017-10-06 RX ORDER — INFLUENZA VIRUS VACCINE 15; 15; 15; 15 UG/.5ML; UG/.5ML; UG/.5ML; UG/.5ML
0.5 SUSPENSION INTRAMUSCULAR ONCE
Qty: 0 | Refills: 0 | Status: DISCONTINUED | OUTPATIENT
Start: 2017-10-06 | End: 2017-10-09

## 2017-10-06 RX ORDER — INSULIN LISPRO 100/ML
VIAL (ML) SUBCUTANEOUS
Qty: 0 | Refills: 0 | Status: DISCONTINUED | OUTPATIENT
Start: 2017-10-06 | End: 2017-10-09

## 2017-10-06 RX ORDER — ZOLPIDEM TARTRATE 10 MG/1
5 TABLET ORAL AT BEDTIME
Qty: 0 | Refills: 0 | Status: DISCONTINUED | OUTPATIENT
Start: 2017-10-06 | End: 2017-10-07

## 2017-10-06 RX ORDER — METOCLOPRAMIDE HCL 10 MG
5 TABLET ORAL
Qty: 0 | Refills: 0 | Status: DISCONTINUED | OUTPATIENT
Start: 2017-10-06 | End: 2017-10-09

## 2017-10-06 RX ADMIN — Medication 1 MILLIGRAM(S): at 12:32

## 2017-10-06 RX ADMIN — PANTOPRAZOLE SODIUM 40 MILLIGRAM(S): 20 TABLET, DELAYED RELEASE ORAL at 06:09

## 2017-10-06 RX ADMIN — Medication 1000 UNIT(S): at 12:32

## 2017-10-06 RX ADMIN — ONDANSETRON 4 MILLIGRAM(S): 8 TABLET, FILM COATED ORAL at 12:32

## 2017-10-06 RX ADMIN — Medication 50 MILLIGRAM(S): at 06:10

## 2017-10-06 RX ADMIN — Medication 5 MILLIGRAM(S): at 21:32

## 2017-10-06 RX ADMIN — INSULIN GLARGINE 10 UNIT(S): 100 INJECTION, SOLUTION SUBCUTANEOUS at 21:30

## 2017-10-06 RX ADMIN — Medication 50 MILLIGRAM(S): at 14:25

## 2017-10-06 RX ADMIN — Medication 5 MILLIGRAM(S): at 06:09

## 2017-10-06 RX ADMIN — Medication 100 MICROGRAM(S): at 06:10

## 2017-10-06 RX ADMIN — Medication 50 MILLIGRAM(S): at 21:31

## 2017-10-06 RX ADMIN — Medication 8: at 21:32

## 2017-10-06 RX ADMIN — Medication 5 MILLIGRAM(S): at 12:52

## 2017-10-06 RX ADMIN — Medication 50 MILLIGRAM(S): at 06:09

## 2017-10-06 RX ADMIN — ONDANSETRON 4 MILLIGRAM(S): 8 TABLET, FILM COATED ORAL at 06:09

## 2017-10-06 RX ADMIN — MONTELUKAST 10 MILLIGRAM(S): 4 TABLET, CHEWABLE ORAL at 12:31

## 2017-10-06 RX ADMIN — Medication 3 MILLIGRAM(S): at 01:31

## 2017-10-06 RX ADMIN — Medication 3 MILLIGRAM(S): at 21:31

## 2017-10-06 RX ADMIN — AMLODIPINE BESYLATE 10 MILLIGRAM(S): 2.5 TABLET ORAL at 06:09

## 2017-10-06 NOTE — PROGRESS NOTE ADULT - SUBJECTIVE AND OBJECTIVE BOX
Pt seen and examined f/u hep C with cirrhosis and possible TIA  This morning she feels better with no further nausea or vomiting and the vague diffuse abdominal pain has almost completely resolved. The slurred speech is much improved as well. The sono of abdomen revealed a large gallstone but no biliary dilation.    REVIEW OF SYSTEMS:    CONSTITUTIONAL: No fever, weight loss, or fatigue  EYES: No eye pain, visual disturbances, or discharge  ENMT:  No difficulty hearing, tinnitus, vertigo; No sinus or throat pain  RESPIRATORY: No cough, wheezing, chills or hemoptysis; No shortness of breath  CARDIOVASCULAR: No chest pain, palpitations, dizziness, or leg swelling  GASTROINTESTINAL: as aboe    MEDICATIONS:  MEDICATIONS  (STANDING):  amLODIPine   Tablet 10 milliGRAM(s) Oral daily  buDESOnide 160 MICROgram(s)/formoterol 4.5 MICROgram(s) Inhaler 2 Puff(s) Inhalation two times a day  cholecalciferol 1000 Unit(s) Oral daily  dextrose 5%. 1000 milliLiter(s) (50 mL/Hr) IV Continuous <Continuous>  dextrose 50% Injectable 12.5 Gram(s) IV Push once  dextrose 50% Injectable 25 Gram(s) IV Push once  dextrose 50% Injectable 25 Gram(s) IV Push once  fluticasone propionate 50 MICROgram(s)/spray Nasal Spray 2 Spray(s) Both Nostrils daily  folic acid 1 milliGRAM(s) Oral daily  hydrALAZINE 50 milliGRAM(s) Oral three times a day  hydrALAZINE Injectable 10 milliGRAM(s) IV Push once  influenza   Vaccine 0.5 milliLiter(s) IntraMuscular once  insulin glargine Injectable (LANTUS) 10 Unit(s) SubCutaneous at bedtime  levothyroxine 100 MICROGram(s) Oral daily  metoclopramide Injectable 5 milliGRAM(s) IV Push every 6 hours  metoprolol 50 milliGRAM(s) Oral every 8 hours  montelukast 10 milliGRAM(s) Oral daily  ondansetron Injectable 4 milliGRAM(s) IV Push every 6 hours  pantoprazole    Tablet 40 milliGRAM(s) Oral before breakfast    MEDICATIONS  (PRN):  ALBUTerol    90 MICROgram(s) HFA Inhaler 2 Puff(s) Inhalation every 6 hours PRN Shortness of Breath  aluminum hydroxide/magnesium hydroxide/simethicone Suspension 30 milliLiter(s) Oral every 4 hours PRN Dyspepsia  dextrose Gel 1 Dose(s) Oral once PRN Blood Glucose LESS THAN 70 milliGRAM(s)/deciliter  glucagon  Injectable 1 milliGRAM(s) IntraMuscular once PRN Glucose LESS THAN 70 milligrams/deciliter  melatonin 3 milliGRAM(s) Oral at bedtime PRN Insomnia  methocarbamol 500 milliGRAM(s) Oral every 8 hours PRN pain      Allergies    Biaxin (Joint Pain)  morphine (Short breath)    Intolerances        Vital Signs Last 24 Hrs  T(C): 37.2 (06 Oct 2017 04:53), Max: 37.2 (06 Oct 2017 04:53)  T(F): 99 (06 Oct 2017 04:53), Max: 99 (06 Oct 2017 04:53)  HR: 84 (06 Oct 2017 04:53) (80 - 90)  BP: 161/69 (06 Oct 2017 04:53) (122/82 - 179/81)  BP(mean): --  RR: 19 (05 Oct 2017 23:04) (18 - 19)  SpO2: 97% (06 Oct 2017 01:00) (97% - 98%)    10-05 @ 07:01  -  10-06 @ 07:00  --------------------------------------------------------  IN: 0 mL / OUT: 650 mL / NET: -650 mL        PHYSICAL EXAM:    General: Overweight white female in no acute distress  HEENT: MMM, conjunctiva and sclera clear  Gastrointestinal:Abdomen: Soft non-tender non-distended; Normal bowel sounds; No hepatosplenomegaly  Extremities: no cyanosis, clubbing or edema.  Skin: Warm and dry. No obvious rash    LABS:      CBC Full  -  ( 05 Oct 2017 09:53 )  WBC Count : 4.2 K/uL  Hemoglobin : 11.5 g/dL  Hematocrit : 35.7 %  Platelet Count - Automated : 89 K/uL  Mean Cell Volume : 89.7 fl  Mean Cell Hemoglobin : 28.9 pg  Mean Cell Hemoglobin Concentration : 32.2 g/dL  Auto Neutrophil # : x  Auto Lymphocyte # : x  Auto Monocyte # : x  Auto Eosinophil # : x  Auto Basophil # : x  Auto Neutrophil % : x  Auto Lymphocyte % : x  Auto Monocyte % : x  Auto Eosinophil % : x  Auto Basophil % : x    10    144  |  106  |  33.0<H>  ----------------------------<  415<H>  4.3   |  23.0  |  1.40<H>    Ca    9.2      05 Oct 2017 09:36  Phos  2.2     10  Mg     2.2     10-05    TPro  8.1  /  Alb  3.9  /  TBili  0.5  /  DBili  0.2  /  AST  86<H>  /  ALT  58<H>  /  AlkPhos  138<H>  10    PT/INR - ( 05 Oct 2017 07:52 )   PT: 12.4 sec;   INR: 1.12 ratio         PTT - ( 05 Oct 2017 07:52 )  PTT:34.1 sec      Urinalysis Basic - ( 04 Oct 2017 10:16 )    Color: Yellow / Appearance: Clear / S.005 / pH: x  Gluc: x / Ketone: Negative  / Bili: Negative / Urobili: Negative mg/dL   Blood: x / Protein: 100 mg/dL / Nitrite: Negative   Leuk Esterase: Trace / RBC: 3-5 /HPF / WBC 3-5   Sq Epi: x / Non Sq Epi: x / Bacteria: Few                RADIOLOGY & ADDITIONAL STUDIES (The following images were personally reviewed):  < from: US Abdomen Limited (10.05.17 @ 17:03) >   EXAM:  US ABDOMEN LIMITED                          PROCEDURE DATE:  10/05/2017          INTERPRETATION:  CLINICAL INFORMATION: sever upper abdominal pain and   vomiting    COMPARISON: CT 10/5    TECHNIQUE: Sonography of the right upper quadrant.     FINDINGS:    Liver: 19 cm, Cirrhosis    Bile ducts: Normal caliber. Common bile duct not visualized.     Gallbladder: 2.3 cm stone, the lumen is distended. The wall measures 3   mm. No pericholecystic fluid.        Pancreas: Limited    Right kidney: 10.3 cm. No hydronephrosis.        Ascites: None.    IVC: Visualized portions are within normal limits.    IMPRESSION:     Cholelithiasis. Cirrhosis.            < end of copied text >  < from: US Abdomen Limited (10.05.17 @ 17:03) >   EXAM:  US ABDOMEN LIMITED                          PROCEDURE DATE:  10/05/2017

## 2017-10-06 NOTE — PROGRESS NOTE ADULT - ASSESSMENT
The patient is a 72y Female with transient symptoms likely due to metabolic etiology.    Mobilize with physical therapy.

## 2017-10-06 NOTE — OCCUPATIONAL THERAPY INITIAL EVALUATION ADULT - NS ASR OT EQUIP NEEDS DISCH
bathing/dressing/rolling walker (5 inch wheels)/wheelchair/toileting/tub bench for safety transferring in/out tub (?)

## 2017-10-06 NOTE — PROGRESS NOTE ADULT - SUBJECTIVE AND OBJECTIVE BOX
[facility]                                        [facility] [place]                                 [address]                                  [address]                                        [place], [place], [address]                                             [phone]        No new complaints.  No tremors.     Vital Signs Last 24 Hrs  T(F): 99 (06 Oct 2017 04:53), Max: 99 (06 Oct 2017 04:53)  HR: 73 (06 Oct 2017 07:49) (73 - 88)  BP: 161/69 (06 Oct 2017 04:53) (122/82 - 166/88)  RR: 19 (05 Oct 2017 23:04) (18 - 19)  SpO2: 95% (06 Oct 2017 07:49) (95% - 98%)    Awake and alert.  Speech clear.   Pupils react.  Face symmetric.  Good strength bilaterally.

## 2017-10-06 NOTE — PHYSICAL THERAPY INITIAL EVALUATION ADULT - ADDITIONAL COMMENTS
Pt lives in a 1 story house with 2 daughters, that both work. 2 steps with 1 rail to enter. Modified Independent with ADLs and self care PTA, with RW.

## 2017-10-06 NOTE — PROGRESS NOTE ADULT - PROBLEM SELECTOR PLAN 1
-admit to stroke unit  -NO ASPIRIN, calling cardiology about details of aspirin intolerance and family states emphatically that she cannot take aspirin  -lethargy and weakness may also have been from polypharmacy, d/c tramadol, d/c gabapentin, d/c flexeril.  Check carotids.

## 2017-10-06 NOTE — PROGRESS NOTE ADULT - SUBJECTIVE AND OBJECTIVE BOX
HOSPITALIST PROGRESS NOTE    NICHELLE GILL  [id number]  72yFemale    Patient is a 72y old  Female who presents with a chief complaint of "I'm was having weakness, and I couldn't walk or talk today in the morning." (04 Oct 2017 15:07)      SUBJECTIVE:   Chart reviewed since last visit.  Patient seen and examined at bedside.      OBJECTIVE:  Vital Signs Last 24 Hrs  T(C): 36.8 (06 Oct 2017 18:02), Max: 37.2 (06 Oct 2017 04:53)  T(F): 98.2 (06 Oct 2017 18:02), Max: 99 (06 Oct 2017 04:53)  HR: 82 (06 Oct 2017 18:02) (73 - 89)  BP: 140/82 (06 Oct 2017 18:02) (138/64 - 174/78)  BP(mean): --  RR: 20 (06 Oct 2017 18:02) (18 - 20)  SpO2: 93% (06 Oct 2017 18:02) (93% - 98%)    PHYSICAL EXAMINATION  General: NAD[+]   nontoxic[+]  Sitting in bed  HEENT: AT/NC[+]  PERRLA[]  EOMI[]   Moist oral mucosa[]  Pharyngeal exudates[]  NECK: Supple[+]  JVD[] Carotid bruit[]  CVS: RRR[+]  Irregular[]  S1+S2[+]   Murmur[]  RESP: Fair air entry bilaterally[+]   Clear sounds[+]   poor effort []  wheeze[]   Crackles[]  GI: Soft[+]  Nondistended[+]   tender bilateral subcostal region  Bowel Sounds[+]  Mass[]   HSM[]   Ascites[]  : suprapubic tenderness[-]   CVA Tenderness[]   Mendoza[]  MS: FROM[+]   Edema[+/-]  CNS: AAOx3[+]    Motor strength 5/5 x 4    CN intact  INTEG: Skin is Warm[+]  dry[+] Lesion[] Decubitus[]  PSYCH: Mood, Affect    MONITOR:  CAPILLARY BLOOD GLUCOSE  356 (05 Oct 2017 23:04)            I&O's Summary    05 Oct 2017 07:01  -  06 Oct 2017 07:00  --------------------------------------------------------  IN: 0 mL / OUT: 650 mL / NET: -650 mL                            10.5   6.3   )-----------( 119      ( 06 Oct 2017 07:57 )             32.7     PT/INR - ( 06 Oct 2017 07:57 )   PT: 11.7 sec;   INR: 1.06 ratio         PTT - ( 06 Oct 2017 07:57 )  PTT:29.3 sec  10-06    136  |  99  |  34.0<H>  ----------------------------<  315<H>  3.8   |  23.0  |  1.54<H>    Ca    9.1      06 Oct 2017 07:57  Phos  2.2     10-05  Mg     2.2     10-05    TPro  7.1  /  Alb  3.6  /  TBili  0.5  /  DBili  0.2  /  AST  45<H>  /  ALT  41<H>  /  AlkPhos  114  10-06            Culture:    TTE:    RADIOLOGY        MEDICATIONS  (STANDING):  amLODIPine   Tablet 10 milliGRAM(s) Oral daily  buDESOnide 160 MICROgram(s)/formoterol 4.5 MICROgram(s) Inhaler 2 Puff(s) Inhalation two times a day  cholecalciferol 1000 Unit(s) Oral daily  dextrose 5%. 1000 milliLiter(s) (50 mL/Hr) IV Continuous <Continuous>  dextrose 50% Injectable 12.5 Gram(s) IV Push once  dextrose 50% Injectable 25 Gram(s) IV Push once  dextrose 50% Injectable 25 Gram(s) IV Push once  fluticasone propionate 50 MICROgram(s)/spray Nasal Spray 2 Spray(s) Both Nostrils daily  folic acid 1 milliGRAM(s) Oral daily  hydrALAZINE 50 milliGRAM(s) Oral three times a day  hydrALAZINE Injectable 10 milliGRAM(s) IV Push once  influenza   Vaccine 0.5 milliLiter(s) IntraMuscular once  insulin glargine Injectable (LANTUS) 10 Unit(s) SubCutaneous at bedtime  insulin lispro (HumaLOG) corrective regimen sliding scale   SubCutaneous three times a day before meals  levothyroxine 100 MICROGram(s) Oral daily  metoclopramide 5 milliGRAM(s) Oral Before meals and at bedtime  metoprolol 50 milliGRAM(s) Oral every 8 hours  montelukast 10 milliGRAM(s) Oral daily  ondansetron Injectable 4 milliGRAM(s) IV Push every 6 hours  pantoprazole    Tablet 40 milliGRAM(s) Oral before breakfast      MEDICATIONS  (PRN):  ALBUTerol    90 MICROgram(s) HFA Inhaler 2 Puff(s) Inhalation every 6 hours PRN Shortness of Breath  aluminum hydroxide/magnesium hydroxide/simethicone Suspension 30 milliLiter(s) Oral every 4 hours PRN Dyspepsia  dextrose Gel 1 Dose(s) Oral once PRN Blood Glucose LESS THAN 70 milliGRAM(s)/deciliter  glucagon  Injectable 1 milliGRAM(s) IntraMuscular once PRN Glucose LESS THAN 70 milligrams/deciliter  melatonin 3 milliGRAM(s) Oral at bedtime PRN Insomnia  methocarbamol 500 milliGRAM(s) Oral every 8 hours PRN pain HOSPITALIST PROGRESS NOTE    NICHELLE GILL  [id number]  72yFemale    Patient is a 72y old  Female who presents with a chief complaint of "I'm was having weakness, and I couldn't walk or talk today in the morning." (04 Oct 2017 15:07)      SUBJECTIVE:   Chart reviewed since last admission.   GI, Neurology consults appreciated  Patient seen and examined at bedside, daughter present.  Feel better, speech back to normal.   Denies any dizziness, headache, paresthesia, paresis, visual complaints.  Denies any chest pain, dyspnea, palpitations.  Nausea and vomiting overnight improved.  Abdominal pain in bilateral flanks attributed to vomiting, retching.  Denies dysuria.      OBJECTIVE:  Vital Signs Last 24 Hrs  T(C): 36.8 (06 Oct 2017 18:02), Max: 37.2 (06 Oct 2017 04:53)  T(F): 98.2 (06 Oct 2017 18:02), Max: 99 (06 Oct 2017 04:53)  HR: 82 (06 Oct 2017 18:02) (73 - 89)  BP: 140/82 (06 Oct 2017 18:02) (138/64 - 174/78)  BP(mean): --  RR: 20 (06 Oct 2017 18:02) (18 - 20)  SpO2: 93% (06 Oct 2017 18:02) (93% - 98%)    PHYSICAL EXAMINATION  General: NAD[+]   nontoxic[+]  Sitting in bed  HEENT: AT/NC[+]  PERRLA[]  EOMI[]   Moist oral mucosa[]  Pharyngeal exudates[]  NECK: Supple[+]  JVD[] Carotid bruit[]  CVS: RRR[+]  Irregular[]  S1+S2[+]   Murmur[]  RESP: Fair air entry bilaterally[+]   Clear sounds[+]   poor effort []  wheeze[]   Crackles[]  GI: Soft[+]  Nondistended[+]   tender bilateral subcostal region  Bowel Sounds[+]  Mass[]   HSM[]   Ascites[]  : suprapubic tenderness[-]   CVA Tenderness[]   Mendoza[]  MS: FROM[+]   Edema[+/-]  CNS: AAOx3[+]    Motor strength 5/5 x 4    CN intact  INTEG: Skin is Warm[+]  dry[+] Lesion[] Decubitus[]  PSYCH: Mood, Affect    MONITOR:  CAPILLARY BLOOD GLUCOSE  356 (05 Oct 2017 23:04)            I&O's Summary    05 Oct 2017 07:01  -  06 Oct 2017 07:00  --------------------------------------------------------  IN: 0 mL / OUT: 650 mL / NET: -650 mL                            10.5   6.3   )-----------( 119      ( 06 Oct 2017 07:57 )             32.7     PT/INR - ( 06 Oct 2017 07:57 )   PT: 11.7 sec;   INR: 1.06 ratio         PTT - ( 06 Oct 2017 07:57 )  PTT:29.3 sec  10-06    136  |  99  |  34.0<H>  ----------------------------<  315<H>  3.8   |  23.0  |  1.54<H>    Ca    9.1      06 Oct 2017 07:57  Phos  2.2     10  Mg     2.2     10-05    TPro  7.1  /  Alb  3.6  /  TBili  0.5  /  DBili  0.2  /  AST  45<H>  /  ALT  41<H>  /  AlkPhos  114  10-06  Ammonia, Serum (10.04.17 @ 09:04)    Ammonia, Serum: 35: Ammonia levels will be falsely elevated if specimen is NOT collected,  processed and maintained at 4-8 C umol/L      Hemoglobin A1C, Whole Blood (10.05.17 @ 07:52)    Hemoglobin A1C, Whole Blood: 5.6 %    Lipid Profile (10.05.17 @ 05:45)    HDL/Total Cholesterol Ratio Measurement: 3.0 Ratio    Cholesterol, Serum: 139 mg/dL    Triglycerides, Serum: 108 mg/dL    HDL Cholesterol, Serum: 54 mg/dL    Direct LDL: 63: LDL Cholesterol --- Interpretive Comment (for adults 18 and over)  Optimal LDL Level may vary based on clinical situation  Below 70                  Ideal for people at very high risk of heart  disease  Below 100                Ideal for people at risk of heart disease  100 - 129                   [place]  130 - 159                   Borderline high  160 - 189                   High  190 and Above          Very high mg/dL    Vitamin B12, Serum (09.15.17 @ 13:20)    Vitamin B12, Serum: 508 pg/mL    Folate, Serum (09.15.17 @ 13:20)    Folate, Serum: >20.0 ng/mL    Iron with Total Binding Capacity (09.15.17 @ 13:20)    % Saturation, Iron: 32 %    Iron - Total Binding Capacity.: 470 ug/dL    Iron Total, Serum: 149 ug/dL    Carcinoembryonic Antigen (10.05.17 @ 19:53)    Carcinoembryonic Antigen: 3.3: METHOD: Roche EIA   The CEA assay should not be used as a cancer screening test. Serum CEA  concentrations should only be used in conjunction with   information available from the clinical evaluation of the patien and  from other diagnostic procedures.   CEA Normal Ranges   _________________   Non-smoker: less than 3.9 ng/mL       Smoker: less than 5.5 ng/mL ng/mL            Culture:  None collected    TTE:  < from: TTE Echo Complete w/Doppler (09.15.17 @ 12:23) >  EXAM:  ECHO TRANSTHORACIC COMP W DOPP      PROCEDURE DATE:  Sep 15 2017   .      INTERPRETATION:  REPORT:    TRANSTHORACIC ECHOCARDIOGRAM REPORT           Patient Name:   NICHELLE GILL Patient Location: [facility] Rec #:  [id number]           Accession #:      [id number]  Account #:                            Height:           58.7 in 149.0 cm  YOB: 1945              Weight:           165.3 lb 75.00 kg  Patient Age:    72 years              BSA:              1.69 m²  Patient Gender: F                     BP:               148/68 mmHg        Date of Exam:        9/15/2017 12:23:35 PM  Sonographer:         Amber Barajas  Referring Physician: Christopher Mejia MD     Procedure:     2D Echo/Doppler/Color Doppler Complete.  Indications:   Dyspnea, unspecified - R06.00  Diagnosis:     Dyspnea, unspecified - R06.00;  Study Details: Technically adequate study. Study quality was adversely   affected                 due to patient obesity and body habitus.           2D AND M-MODE MEASUREMENTS (normal ranges within parentheses):  Left                Normal    Aorta/Left          Normal  Ventricle:                    Atrium:  IVSd (2D):    1.06  (0.7-1.1) Aortic Root  2.30   (2.4-3.7)                cm              (Mmode):     cm  LVPWd (2D):   1.63  (0.7-1.1) Left Atrium  5.10   (1.9-4.0)                cm              (2D):        cm  LVIDd (2D):   3.32  (3.4-5.7) Left Atrium  4.30   (1.9-4.0)                cm              (Mmode):     cm  LVIDs (2D):   2.43       LA Volume    49.7                cm              Index        ml/m²  LV FS (2D):   26.8  (>25%)                %  Relative Wall 0.98  (<0.42)  Thickness     LV SYSTOLIC FUNCTION BY 2D PLANIMETRY (MOD):  EF-A4C View: 68.6 %     SPECTRAL DOPPLERANALYSIS (where applicable):  Aortic Valve: AoV Max Anibal: 2.20 m/s AoV Peak P.3 mmHg AoV Mean PG:   10.4 mmHg     LVOT Vmax:  LVOT VTI:  LVOT Diameter: 1.71 cm     AoV Area, Vmax:  AoV Area, VTI:  AoV Area, Vmn:  Ao VTI: 0.400  Tricuspid Valve and PA/RV Systolic Pressure: TR Max Velocity: 1.91 m/s RA   Pressure: 3 mmHg RVSP/PASP: 17.6 mmHg     PHYSICIAN INTERPRETATION:  Left Ventricle: The left ventricular internal cavity size is normal.   There is concentric left ventricular hypertrophy. There are no regional   wall motion abnormalities. Global LV systolic function was normal. Left   ventricular ejection fraction, by visual estimation, is 65 to 70%. The   left ventricular diastolic function could not be assessed in this study.  Right Ventricle: Normal right ventricular size and function.  Left Atrium: Severely enlarged left atrium.  Right Atrium: The right atrium is severely dilated.  Pericardium: There is no evidence of pericardial effusion. A fat pad is   present.  Mitral Valve: Thickening and calcification of the anterior and posterior   mitral valve leaflets. There is severe mitral annular calcification. Mean   transmitral gradient of 5 mmHg (HR 91 bpm), suggestive of mild mitral   stenosis. Mild mitral valve regurgitation isseen.  Tricuspid Valve: The tricuspid valve is normal in structure. Mild   tricuspid regurgitation is visualized. Incomplete TR jet envelope may   underestimate PA systolic pressure.  Aortic Valve: A bioprosthetic valve is present. The prosthesis appears   well seated without evidence for rocking motion. Leaflets are not well   visualized. Peak velocity = 2.2 m/s with a mean gradient of 10 mmHg.   Dimensionless index = 0.45. No significant stenosis. Trivial aortic valve   regurgitation is seen.  Pulmonic Valve: Structurally normal pulmonic valve, with normal leaflet   excursion. Trace pulmonic valve regurgitation.  Pulmonary Artery: The main pulmonary artery is normal in size.  Venous: The proximal inferior vena cava was normal sized, with   respiratory size variation greater than 50%, consistent with normal   central venous pressure. Estimated RA pressure = 3 mmHg.        Summary:   1. Normal biventricular systolic function. Left ventricular ejection   fraction, by visual estimation, is 65 to 70%.   2. Severely biatrial enlargement.   3. Severe mitral annular calcification.   4. Mild degenerative mitral stenosis.   5. Stable aortic bioprosthesis without significant stenosis or   insufficiency.   6. There is no evidence of pericardialeffusion.   7. ** Compared to TTE dated 2016, an aortic bioprosthesis is norw   present.     Nadiya Carbajal DO Electronically signed on 9/15/2017 at 1:38:33 PM                *** Final ***                  NADIYA LEUNG   This document has been electronically signed. Sep 15 2017 12:23PM    < end of copied text >    RADIOLOGY  < from: CT Head No Cont (10.04.17 @ 09:22) >   EXAM:  CT BRAIN                          PROCEDURE DATE:  10/04/2017          INTERPRETATION:  CLINICAL HISTORY: Woke up can't walk slurred speech and   expressive aphasia    COMPARISON: None.    TECHNIQUE: Noncontrast CT of the head. Multiplanar reformations are   submitted.    FINDINGS:  There is periventricular and subcortical white matter hypodensity without   mass effect, nonspecific, likely representing mild chronic microvascular   ischemic changes. There is no compelling evidence for anacute   transcortical infarction. There is no evidence of mass, mass effect,   midline shift or extra-axial fluid collection. The lateral ventricles and   cortical sulci are age-appropriate in size and configuration. The orbits,   mastoid air cells and visualized paranasal sinuses are unremarkable. The   calvarium is intact.    IMPRESSION:  Mild chronic microvascular changes without evidence of an   acute transcortical infarction or hemorrhage. MR is a more sensitive   imaging modality for the evaluation of an acute infarction.     HALEY ROBERTS M.D., ATTENDING RADIOLOGIST  This document has been electronically signed. Oct  4 2017  9:53AM        < end of copied text >      < from: MRI Head w/o Cont (10.04.17 @ 14:00) >  EXAM:  BRAIN (MRI) W O CON                          PROCEDURE DATE:  10/04/2017          INTERPRETATION:  CLINICAL HISTORY: Rule out stroke, cerebral infarction,   TIA    COMPARISON: CT head dated 10/4/2017    TECHNIQUE: MRI brain: Multiplanar, multisequence MR imaging of the brain   are obtained without the administration of intravenous gadolinium.     FINDINGS:  There is no abnormal restricted diffusion to suggest acute infarction.   Scattered periventricular and subcortical white matter T2 /FLAIR   hyperintensities are seen without mass effect, nonspecific, likely   representing mild chronic microvascular changes.  Normal T2 flow-voids are seen within  the intracranial vasculature. The   lateral ventricles and cortical sulci are age-appropriate in size and   configuration. There is no mass, mass effect, or extra-axial fluid   collection. There is no susceptibility artifact to suggest hemorrhage.   Midline structures are normal.  The visualized paranasal sinuses, mastoid   air cells and orbits are unremarkable.      IMPRESSION: No acute infarction.    HALEY ROBERTS M.D., ATTENDING RADIOLOGIST  This document has been electronically signed. Oct  4 2017  3:05PM    < end of copied text >      < from: US Abdomen Limited (10.05.17 @ 17:03) >     EXAM:  US ABDOMEN LIMITED                          PROCEDURE DATE:  10/05/2017    INTERPRETATION:  CLINICAL INFORMATION: sever upper abdominal pain and   vomiting    COMPARISON: CT 10/5    TECHNIQUE: Sonography of the right upper quadrant.     FINDINGS:    Liver: 19 cm, Cirrhosis    Bile ducts: Normal caliber. Common bile duct not visualized.     Gallbladder: 2.3 cm stone, the lumen is distended. The wall measures 3   mm. No pericholecystic fluid.        Pancreas: Limited    Right kidney: 10.3 cm. No hydronephrosis.        Ascites: None.    IVC: Visualized portions are within normal limits.    IMPRESSION:     Cholelithiasis. Cirrhosis.  GEORGIA CLAYTON M.D., ATTENDING RADIOLOGIST  This document has been electronically signed. Oct  5 2017  5:07PM    < end of copied text >      < from: CT Abdomen and Pelvis No Cont (10.05.17 @ 05:22) >   EXAM:  CT ABDOMEN AND PELVIS                          PROCEDURE DATE:  10/05/2017          INTERPRETATION:  EXAM: CT ABDOMEN AND PELVIS WITHOUT CONTRAST    CLINICAL INFORMATION:  Upper abdominal pain and vomiting.    TECHNIQUE:   Axial CT images were acquired through the abdomen and pelvis   Intravenous contrast:  None.  Oral contrast: None.   Coronal and sagittal reformats were generated.     COMPARISON STUDY:  CT scan from 2015 and MRI from 2015 are   not currently available for review on PACS.    FINDINGS:  Visualized lower chest: Patient is status post aortic valve replacement.    Median sternotomy wires are partially visualized heart is enlarged.    There are atherosclerotic calcifications of the visualized coronary   arteries.  There is dystrophic    Liver: Cirrhotic morphology of the liver.  Bile ducts: Unremarkable.  Gallbladder: Cholelithiasis without evidence of gallbladder wall   thickening or surrounding inflammatory changes.  Spleen: Enlarged spleen measuring approximately 13.3 x 4.5 x 14.7 cm.    Complex 1 cm cyst in the spleen demonstrating.  Peripheral calcifications.  Pancreas: Unremarkable.  Adrenal glands: Unremarkable.  Kidneys/ureters: Unremarkable.    Bladder: Unremarkable.  Reproductive organs: Uterus and adnexa appear unremarkable.    Bowel: No bowel obstruction.  Normal appendix.  Mild diverticulosis of   the distal descending and proximal-mid sigmoid colon.  Peritoneum: No ascites.    Retroperitoneum: Nonspecific gastrohepatic, periportal and portacaval   lymph nodes measuring up to 2.1 x 1.1 cm in the portacaval region (2:55).  Vasculature: Scattered atherosclerotic calcifications of the aorta.    Proximal thigh vasculature.  There appear to be gastrosplenic varices and   recanalized umbilical vein which are suboptimally assessed without   contrast.    Abdominal wall/soft tissues: Prominent vessels in the ventral abdominal   wall likely related to portosystemic shunt.  Bones: Multilevel degenerative changes in the spine.  At L4-L5 there is   grade 1 anterolisthesis with small to moderate disc bulge and marked   facet/ligament flavum hypertrophy resulting in moderate to severe spinal   canal stenosis.  There are small Schmorl's nodes in the superior   endplates of T10 and L2 breezy large Schmorl's node in the superior   endplate of T12.      IMPRESSION:  No evidence of bowel obstruction, active inflammatory process or   intra-abdominal source for infection, within limits of a noncontrast CT   scan.  Cholelithiasis without CT evidence of acute cholecystitis.  Right upper   quadrant ultrasound can be performed as clinically warranted.   Cirrhotic liver.  Splenomegaly and multiple varices suggesting portal   venous hypertension.  No ascites.  MRI surveillance can be performed as   clinically warranted to exclude hepatocellular carcinoma.    NINFA GONZALEZ M.D.,ATTENDING RADIOLOGIST  This document has been electronically signed. Oct  5 2017  5:55AM    < end of copied text >        MEDICATIONS  (STANDING):  amLODIPine   Tablet 10 milliGRAM(s) Oral daily  buDESOnide 160 MICROgram(s)/formoterol 4.5 MICROgram(s) Inhaler 2 Puff(s) Inhalation two times a day  cholecalciferol 1000 Unit(s) Oral daily  dextrose 5%. 1000 milliLiter(s) (50 mL/Hr) IV Continuous <Continuous>  dextrose 50% Injectable 12.5 Gram(s) IV Push once  dextrose 50% Injectable 25 Gram(s) IV Push once  dextrose 50% Injectable 25 Gram(s) IV Push once  fluticasone propionate 50 MICROgram(s)/spray Nasal Spray 2 Spray(s) Both Nostrils daily  folic acid 1 milliGRAM(s) Oral daily  hydrALAZINE 50 milliGRAM(s) Oral three times a day  hydrALAZINE Injectable 10 milliGRAM(s) IV Push once  influenza   Vaccine 0.5 milliLiter(s) IntraMuscular once  insulin glargine Injectable (LANTUS) 10 Unit(s) SubCutaneous at bedtime  insulin lispro (HumaLOG) corrective regimen sliding scale   SubCutaneous three times a day before meals  levothyroxine 100 MICROGram(s) Oral daily  metoclopramide 5 milliGRAM(s) Oral Before meals and at bedtime  metoprolol 50 milliGRAM(s) Oral every 8 hours  montelukast 10 milliGRAM(s) Oral daily  ondansetron Injectable 4 milliGRAM(s) IV Push every 6 hours  pantoprazole    Tablet 40 milliGRAM(s) Oral before breakfast      MEDICATIONS  (PRN):  ALBUTerol    90 MICROgram(s) HFA Inhaler 2 Puff(s) Inhalation every 6 hours PRN Shortness of Breath  aluminum hydroxide/magnesium hydroxide/simethicone Suspension 30 milliLiter(s) Oral every 4 hours PRN Dyspepsia  dextrose Gel 1 Dose(s) Oral once PRN Blood Glucose LESS THAN 70 milliGRAM(s)/deciliter  glucagon  Injectable 1 milliGRAM(s) IntraMuscular once PRN Glucose LESS THAN 70 milligrams/deciliter  melatonin 3 milliGRAM(s) Oral at bedtime PRN Insomnia  methocarbamol 500 milliGRAM(s) Oral every 8 hours PRN pain

## 2017-10-06 NOTE — PROVIDER CONTACT NOTE (MEDICATION) - ACTION/TREATMENT ORDERED:
md aware md states not giving insulin as of now continue to monitor ac& hs and md will come evaluate.

## 2017-10-06 NOTE — OCCUPATIONAL THERAPY INITIAL EVALUATION ADULT - ADDITIONAL COMMENTS
[place] 2 daughters (Daniela & Marianela) who both work full time.  Pt is home alone during day.  She has 2 steps to enter with bilateral handrails (widely spaced).  She's been using a RW in the house, w/c for community mobility long distances.  She also has a SAC.  No stairs inside the home (extra freezer in basement but dtrs help transport items).  Pt has tub with glass doors, one grab bar on long wall, removeable seat in tub.  She alerts a friend over the phone prior to and after using the shower.  Pt is no longer driving secondary to falls/decreased balance past 6 months.

## 2017-10-06 NOTE — PROGRESS NOTE ADULT - PROBLEM SELECTOR PLAN 1
appears stable with evidence for portal hypertension but already on beta blocker. Will advance diet. appears stable with evidence for portal hypertension but already on beta blocker. Will advance diet and change reglan from IV to oral.

## 2017-10-06 NOTE — OCCUPATIONAL THERAPY INITIAL EVALUATION ADULT - RANGE OF MOTION EXAMINATION, UPPER EXTREMITY
bilateral UE Active ROM was WNL (within normal limits)/left shoulder injury '97 - unable to lift items but has functional range outside of end range external rotation

## 2017-10-06 NOTE — PROGRESS NOTE ADULT - ASSESSMENT
72 year old female with PMhx HCV with liver cirrhosis, ACD s/p CABG, BioAVR, DMT2 with CKD3 presented with weakness, aphasia and confusion and admitted and being managed for transient cerebral ischemia. CTH, MRI without any evidence of stroke. Well controlled DMT2, dyslipidemia.    Encephalopathy work up un revealing with normal NH3, B12 levels. UA dirty, no cultures collected, doubt infection as afebrile, normal WBC.  Nausea, vomiting possibly related to cholelithiasis ( no evidence of cholecystitis or choledocholithiasis) or gastroparesis. Transaminases downtrending. Chronic liver disease due to HCV, with imaging consistent for liver cirrhosis with varices, portal hypertension, splenomegaly and non specific abdominal lymphadenopathy. CEA normal. AFP pending.     As per family, patient is not on ASA, Plavix due to underlying anemia, necessitating PRBCs and Epogen for which work up has been negative. Her anemia is currently stable with adequate substrate stores.    Her DMT2 seems to be  well controlled with A1c 5.6, though has hyperglycemia in hospital. She also has CKD 3 with improvement in her renal function during her stay.    No evidence of CHF clinically. Recent TTE with biatrial enlargement, preserved LVEF and functioning BioAVR.

## 2017-10-07 DIAGNOSIS — K74.60 UNSPECIFIED CIRRHOSIS OF LIVER: ICD-10-CM

## 2017-10-07 DIAGNOSIS — D64.89 OTHER SPECIFIED ANEMIAS: ICD-10-CM

## 2017-10-07 LAB
ALBUMIN SERPL ELPH-MCNC: 3.2 G/DL — LOW (ref 3.3–5.2)
ALP SERPL-CCNC: 102 U/L — SIGNIFICANT CHANGE UP (ref 40–120)
ALT FLD-CCNC: 60 U/L — HIGH
ANION GAP SERPL CALC-SCNC: 14 MMOL/L — SIGNIFICANT CHANGE UP (ref 5–17)
ANISOCYTOSIS BLD QL: SLIGHT — SIGNIFICANT CHANGE UP
AST SERPL-CCNC: 105 U/L — HIGH
BASOPHILS # BLD AUTO: 0 K/UL — SIGNIFICANT CHANGE UP (ref 0–0.2)
BASOPHILS NFR BLD AUTO: 0.1 % — SIGNIFICANT CHANGE UP (ref 0–2)
BILIRUB SERPL-MCNC: 0.6 MG/DL — SIGNIFICANT CHANGE UP (ref 0.4–2)
BUN SERPL-MCNC: 39 MG/DL — HIGH (ref 8–20)
CALCIUM SERPL-MCNC: 8.6 MG/DL — SIGNIFICANT CHANGE UP (ref 8.6–10.2)
CHLORIDE SERPL-SCNC: 99 MMOL/L — SIGNIFICANT CHANGE UP (ref 98–107)
CO2 SERPL-SCNC: 22 MMOL/L — SIGNIFICANT CHANGE UP (ref 22–29)
CREAT SERPL-MCNC: 1.56 MG/DL — HIGH (ref 0.5–1.3)
DACRYOCYTES BLD QL SMEAR: SLIGHT — SIGNIFICANT CHANGE UP
ELLIPTOCYTES BLD QL SMEAR: SLIGHT — SIGNIFICANT CHANGE UP
EOSINOPHIL # BLD AUTO: 0.1 K/UL — SIGNIFICANT CHANGE UP (ref 0–0.5)
EOSINOPHIL NFR BLD AUTO: 1.5 % — SIGNIFICANT CHANGE UP (ref 0–6)
GLUCOSE SERPL-MCNC: 209 MG/DL — HIGH (ref 70–115)
HCT VFR BLD CALC: 34.4 % — LOW (ref 37–47)
HGB BLD-MCNC: 11 G/DL — LOW (ref 12–16)
LYMPHOCYTES # BLD AUTO: 1 K/UL — SIGNIFICANT CHANGE UP (ref 1–4.8)
LYMPHOCYTES # BLD AUTO: 14.4 % — LOW (ref 20–55)
MACROCYTES BLD QL: SLIGHT — SIGNIFICANT CHANGE UP
MAGNESIUM SERPL-MCNC: 2.3 MG/DL — SIGNIFICANT CHANGE UP (ref 1.6–2.6)
MANUAL DIF COMMENT BLD-IMP: SIGNIFICANT CHANGE UP
MCHC RBC-ENTMCNC: 28.6 PG — SIGNIFICANT CHANGE UP (ref 27–31)
MCHC RBC-ENTMCNC: 32 G/DL — SIGNIFICANT CHANGE UP (ref 32–36)
MCV RBC AUTO: 89.4 FL — SIGNIFICANT CHANGE UP (ref 81–99)
MICROCYTES BLD QL: SLIGHT — SIGNIFICANT CHANGE UP
MONOCYTES # BLD AUTO: 0.8 K/UL — SIGNIFICANT CHANGE UP (ref 0–0.8)
MONOCYTES NFR BLD AUTO: 11.5 % — HIGH (ref 3–10)
NEUTROPHILS # BLD AUTO: 4.9 K/UL — SIGNIFICANT CHANGE UP (ref 1.8–8)
NEUTROPHILS NFR BLD AUTO: 71.6 % — SIGNIFICANT CHANGE UP (ref 37–73)
OVALOCYTES BLD QL SMEAR: SLIGHT — SIGNIFICANT CHANGE UP
PHOSPHATE SERPL-MCNC: 1.5 MG/DL — LOW (ref 2.4–4.7)
PLAT MORPH BLD: NORMAL — SIGNIFICANT CHANGE UP
PLATELET # BLD AUTO: 125 K/UL — LOW (ref 150–400)
POIKILOCYTOSIS BLD QL AUTO: SLIGHT — SIGNIFICANT CHANGE UP
POLYCHROMASIA BLD QL SMEAR: SLIGHT — SIGNIFICANT CHANGE UP
POTASSIUM SERPL-MCNC: 3.8 MMOL/L — SIGNIFICANT CHANGE UP (ref 3.5–5.3)
POTASSIUM SERPL-SCNC: 3.8 MMOL/L — SIGNIFICANT CHANGE UP (ref 3.5–5.3)
PROT SERPL-MCNC: 6.9 G/DL — SIGNIFICANT CHANGE UP (ref 6.6–8.7)
RBC # BLD: 3.85 M/UL — LOW (ref 4.4–5.2)
RBC # FLD: 20.5 % — HIGH (ref 11–15.6)
RBC BLD AUTO: ABNORMAL
SODIUM SERPL-SCNC: 135 MMOL/L — SIGNIFICANT CHANGE UP (ref 135–145)
WBC # BLD: 6.8 K/UL — SIGNIFICANT CHANGE UP (ref 4.8–10.8)
WBC # FLD AUTO: 6.8 K/UL — SIGNIFICANT CHANGE UP (ref 4.8–10.8)

## 2017-10-07 PROCEDURE — 93010 ELECTROCARDIOGRAM REPORT: CPT

## 2017-10-07 PROCEDURE — 99233 SBSQ HOSP IP/OBS HIGH 50: CPT

## 2017-10-07 PROCEDURE — 99231 SBSQ HOSP IP/OBS SF/LOW 25: CPT

## 2017-10-07 RX ORDER — INSULIN LISPRO 100/ML
3 VIAL (ML) SUBCUTANEOUS
Qty: 0 | Refills: 0 | Status: DISCONTINUED | OUTPATIENT
Start: 2017-10-07 | End: 2017-10-08

## 2017-10-07 RX ADMIN — Medication 6: at 12:25

## 2017-10-07 RX ADMIN — Medication 1000 UNIT(S): at 12:25

## 2017-10-07 RX ADMIN — Medication 50 MILLIGRAM(S): at 14:03

## 2017-10-07 RX ADMIN — Medication 5 MILLIGRAM(S): at 05:45

## 2017-10-07 RX ADMIN — PANTOPRAZOLE SODIUM 40 MILLIGRAM(S): 20 TABLET, DELAYED RELEASE ORAL at 05:44

## 2017-10-07 RX ADMIN — INSULIN GLARGINE 10 UNIT(S): 100 INJECTION, SOLUTION SUBCUTANEOUS at 21:50

## 2017-10-07 RX ADMIN — ZOLPIDEM TARTRATE 5 MILLIGRAM(S): 10 TABLET ORAL at 21:49

## 2017-10-07 RX ADMIN — Medication 50 MILLIGRAM(S): at 21:50

## 2017-10-07 RX ADMIN — Medication 3 UNIT(S): at 17:41

## 2017-10-07 RX ADMIN — Medication 50 MILLIGRAM(S): at 05:44

## 2017-10-07 RX ADMIN — Medication 1 MILLIGRAM(S): at 12:25

## 2017-10-07 RX ADMIN — Medication 50 MILLIGRAM(S): at 05:45

## 2017-10-07 RX ADMIN — Medication 2 SPRAY(S): at 12:25

## 2017-10-07 RX ADMIN — Medication 4: at 17:41

## 2017-10-07 RX ADMIN — Medication 5 MILLIGRAM(S): at 21:49

## 2017-10-07 RX ADMIN — ONDANSETRON 4 MILLIGRAM(S): 8 TABLET, FILM COATED ORAL at 05:44

## 2017-10-07 RX ADMIN — Medication 5 MILLIGRAM(S): at 12:26

## 2017-10-07 RX ADMIN — MONTELUKAST 10 MILLIGRAM(S): 4 TABLET, CHEWABLE ORAL at 12:27

## 2017-10-07 RX ADMIN — ONDANSETRON 4 MILLIGRAM(S): 8 TABLET, FILM COATED ORAL at 12:26

## 2017-10-07 RX ADMIN — AMLODIPINE BESYLATE 10 MILLIGRAM(S): 2.5 TABLET ORAL at 05:45

## 2017-10-07 RX ADMIN — Medication 100 MICROGRAM(S): at 05:45

## 2017-10-07 RX ADMIN — Medication 6: at 08:23

## 2017-10-07 RX ADMIN — Medication 5 MILLIGRAM(S): at 17:42

## 2017-10-07 NOTE — CONSULT NOTE ADULT - ASSESSMENT
Assessment Assessment  TIA   Remote Af ? PAF  Prior CABG with Bio AVR, mild MR, normal EF  Cirrhosis with esoph varices  myelodysplastic syndrome      Rec  Pt cannot be on anticoags due to varices  Pt cannot have BREANN due to varices, will defer  Implanatable loop on mon day to exclude PAF,   carotid doppler  will follow

## 2017-10-07 NOTE — CONSULT NOTE ADULT - SUBJECTIVE AND OBJECTIVE BOX
[facility] - [facility], THE [facility]                                   [address] [place] [address]                                                      PHONE: [phone]                                                         FAX: [phone]  [email or web address]  ---------------------------------------------------------------------------------------------------------------------------------    Reason for Consult: ? TIA    HPI:  NICHELLE GILL is an 72y Female with htn, DM, CRI, chronic anemia ? myelodysplastic syndrome, s/p CABG and Bio AVR 12/2016, normal EF, mild MR, Hep C / cirrhosis with varices, bronchiectaiss, remote AFib postop 2016 in sinus rhythmn since admitted with TIA vs encephalopathy from cirrhosis.    PAST MEDICAL & SURGICAL HISTORY:  CAD (coronary artery disease)  Heart failure  Hepatitis C  Aortic stenosis  Anemia, unspecified anemia type  Asthma  Low back pain: chronic over a year now.  High cholesterol  Hypertension  Diabetes  H/O aortic valve replacement: cow valve.  S/P CABG x 3  History of tonsillectomy      Biaxin (Joint Pain)  morphine (Short breath)      MEDICATIONS  (STANDING):  amLODIPine   Tablet 10 milliGRAM(s) Oral daily  buDESOnide 160 MICROgram(s)/formoterol 4.5 MICROgram(s) Inhaler 2 Puff(s) Inhalation two times a day  cholecalciferol 1000 Unit(s) Oral daily  dextrose 5%. 1000 milliLiter(s) (50 mL/Hr) IV Continuous <Continuous>  dextrose 50% Injectable 12.5 Gram(s) IV Push once  dextrose 50% Injectable 25 Gram(s) IV Push once  dextrose 50% Injectable 25 Gram(s) IV Push once  fluticasone propionate 50 MICROgram(s)/spray Nasal Spray 2 Spray(s) Both Nostrils daily  folic acid 1 milliGRAM(s) Oral daily  hydrALAZINE 50 milliGRAM(s) Oral three times a day  hydrALAZINE Injectable 10 milliGRAM(s) IV Push once  influenza   Vaccine 0.5 milliLiter(s) IntraMuscular once  insulin glargine Injectable (LANTUS) 10 Unit(s) SubCutaneous at bedtime  insulin lispro (HumaLOG) corrective regimen sliding scale   SubCutaneous three times a day before meals  levothyroxine 100 MICROGram(s) Oral daily  metoclopramide 5 milliGRAM(s) Oral Before meals and at bedtime  metoprolol 50 milliGRAM(s) Oral every 8 hours  montelukast 10 milliGRAM(s) Oral daily  ondansetron Injectable 4 milliGRAM(s) IV Push every 6 hours  pantoprazole    Tablet 40 milliGRAM(s) Oral before breakfast    MEDICATIONS  (PRN):  ALBUTerol    90 MICROgram(s) HFA Inhaler 2 Puff(s) Inhalation every 6 hours PRN Shortness of Breath  aluminum hydroxide/magnesium hydroxide/simethicone Suspension 30 milliLiter(s) Oral every 4 hours PRN Dyspepsia  dextrose Gel 1 Dose(s) Oral once PRN Blood Glucose LESS THAN 70 milliGRAM(s)/deciliter  glucagon  Injectable 1 milliGRAM(s) IntraMuscular once PRN Glucose LESS THAN 70 milligrams/deciliter  melatonin 3 milliGRAM(s) Oral at bedtime PRN Insomnia  methocarbamol 500 milliGRAM(s) Oral every 8 hours PRN pain  zolpidem 5 milliGRAM(s) Oral at bedtime PRN Insomnia      Social History:  Cigarettes:    neg                Alchohol:    neg             Illicit Drug Abuse:  neg  neg FH CAD  ROS: Negative other than as mentioned in HPI.    Vital Signs Last 24 Hrs  T(C): 36.8 (07 Oct 2017 09:39), Max: 37.1 (06 Oct 2017 21:25)  T(F): 98.3 (07 Oct 2017 09:39), Max: 98.8 (06 Oct 2017 21:25)  HR: 74 (07 Oct 2017 09:39) (74 - 89)  BP: 154/71 (07 Oct 2017 09:39) (140/82 - 175/75)  BP(mean): --  RR: 17 (07 Oct 2017 09:39) (17 - 20)  SpO2: 96% (07 Oct 2017 09:39) (93% - 97%)  ICU Vital Signs Last 24 Hrs  NICHELLE [facility]  I&O's Detail    I&O's Summary    Drug Dosing Weight  NICHELLE [facility]      PHYSICAL EXAM:  General: Appears well developed, well nourished alert and cooperative.  HEENT: Head; normocephalic, atraumatic.  Eyes: Pupils reactive, cornea wnl.  Neck: Supple, no nodes adenopathy, no NVD or carotid bruit or thyromegaly.  CARDIOVASCULAR: Normal S1 and S2, No murmur, rub, gallop or lift.   LUNGS: No rales, rhonchi or wheeze. Normal breath sounds bilaterally.  ABDOMEN: Soft, nontender without mass or organomegaly. bowel sounds normoactive.  EXTREMITIES: No clubbing, cyanosis or edema. Distal pulses wnl.   SKIN: warm and dry with normal turgor.  NEURO: Alert/oriented x 3/normal motor exam. No pathologic reflexes.    PSYCH: normal affect.        LABS:                        10.5   6.3   )-----------( 119      ( 06 Oct 2017 07:57 )             32.7     10-06    136  |  99  |  34.0<H>  ----------------------------<  315<H>  3.8   |  23.0  |  1.54<H>    Ca    9.1      06 Oct 2017 07:57    TPro  7.1  /  Alb  3.6  /  TBili  0.5  /  DBili  0.2  /  AST  45<H>  /  ALT  41<H>  /  AlkPhos  114  10-06    [facility]      PT/INR - ( 06 Oct 2017 07:57 )   PT: 11.7 sec;   INR: 1.06 ratio         PTT - ( 06 Oct 2017 07:57 )  PTT:29.3 sec      RADIOLOGY & ADDITIONAL STUDIES:< from: Xray Chest 1 View AP/PA. (10.04.17 @ 10:36) >  IMPRESSION: No acute cardiopulmonary disease process. Cardiomegaly.                HALEY ROBERTS M.D., ATTENDING RADIOLOGIST  This document has been electronically signed. Oct  4 2017 11:23AM          < end of copied text >      INTERPRETATION OF TELEMETRY (personally reviewed): Sr with occ sinus tachy    ECG:< from: 12 Lead ECG (10.04.17 @ 10:34) >  Diagnosis Line Sinus rhythm with 1st degree A-V block  Possible Left atrial enlargement  Anteroseptal infarct , age undetermined  NSSTT changes  Abnormal ECG    Confirmed by JEREMÍAS BURTON ([id number]) on 10/4/2017 10:29:50 PM    < end of copied text >      ECHO:< from: TTE Echo Complete w/Doppler (09.15.17 @ 12:23) >     Summary:   1. Normal biventricular systolic function. Left ventricular ejection   fraction, by visual estimation, is 65 to 70%.   2. Severely biatrial enlargement.   3. Severe mitral annular calcification.   4. Mild degenerative mitral stenosis.   5. Stable aortic bioprosthesis without significant stenosis or   insufficiency.   6. There is no evidence of pericardialeffusion.   7. ** Compared to TTE dated 2/1/2016, an aortic bioprosthesis is norw   present.     Nadiya Carbajal DO Electronically signed on 9/15/2017 at 1:38:33 PM                *** Final ***      < end of copied text >

## 2017-10-07 NOTE — PROGRESS NOTE ADULT - SUBJECTIVE AND OBJECTIVE BOX
Patient is a 72y old  Female who presents with a chief complaint of "I'm was having weakness, and I couldn't walk or talk today in the morning." (04 Oct 2017 15:07)      HPI:   is a 71 y/o female w/ PMhx of CAD, DM, heart failure, hep C. She is awake and alter , oriented X 3.  No nausea, no vomiting, no abdominal pain. Tolerating diet. Pt is afebrile. Hgb 10.5. No melena. She is having a paulino recorder place on Monday.                REVIEW OF SYSTEMS:  Constitutional: No fever, weight loss or fatigue  ENMT:  No difficulty hearing, tinnitus, vertigo; No sinus or throat pain  Respiratory: No cough, wheezing, chills or hemoptysis  Cardiovascular: No chest pain, palpitations, dizziness or leg swelling  Gastrointestinal: No abdominal or epigastric pain. No nausea, vomiting or hematemesis; No diarrhea or constipation. No melena or hematochezia.  Skin: No itching, burning, rashes or lesions   Musculoskeletal: No joint pain or swelling; No muscle, back or extremity pain    PAST MEDICAL & SURGICAL HISTORY:  CAD (coronary artery disease)  Heart failure  Hepatitis C  Aortic stenosis  Anemia, unspecified anemia type  Asthma  Low back pain: chronic over a year now.  High cholesterol  Hypertension  Diabetes  H/O aortic valve replacement: cow valve.  S/P CABG x 3  History of tonsillectomy      FAMILY HISTORY:  Family history of diabetes mellitus (Sibling)      SOCIAL HISTORY:  Smoking Status: [ ] Current, [ ] Former, [ ] Never  Pack Years:  [  ] EtOH- no  [  ] IVDA-no    MEDICATIONS:  MEDICATIONS  (STANDING):  amLODIPine   Tablet 10 milliGRAM(s) Oral daily  buDESOnide 160 MICROgram(s)/formoterol 4.5 MICROgram(s) Inhaler 2 Puff(s) Inhalation two times a day  cholecalciferol 1000 Unit(s) Oral daily  dextrose 5%. 1000 milliLiter(s) (50 mL/Hr) IV Continuous <Continuous>  dextrose 50% Injectable 12.5 Gram(s) IV Push once  dextrose 50% Injectable 25 Gram(s) IV Push once  dextrose 50% Injectable 25 Gram(s) IV Push once  fluticasone propionate 50 MICROgram(s)/spray Nasal Spray 2 Spray(s) Both Nostrils daily  folic acid 1 milliGRAM(s) Oral daily  hydrALAZINE 50 milliGRAM(s) Oral three times a day  hydrALAZINE Injectable 10 milliGRAM(s) IV Push once  influenza   Vaccine 0.5 milliLiter(s) IntraMuscular once  insulin glargine Injectable (LANTUS) 10 Unit(s) SubCutaneous at bedtime  insulin lispro (HumaLOG) corrective regimen sliding scale   SubCutaneous three times a day before meals  levothyroxine 100 MICROGram(s) Oral daily  metoclopramide 5 milliGRAM(s) Oral Before meals and at bedtime  metoprolol 50 milliGRAM(s) Oral every 8 hours  montelukast 10 milliGRAM(s) Oral daily  pantoprazole    Tablet 40 milliGRAM(s) Oral before breakfast    MEDICATIONS  (PRN):  ALBUTerol    90 MICROgram(s) HFA Inhaler 2 Puff(s) Inhalation every 6 hours PRN Shortness of Breath  aluminum hydroxide/magnesium hydroxide/simethicone Suspension 30 milliLiter(s) Oral every 4 hours PRN Dyspepsia  dextrose Gel 1 Dose(s) Oral once PRN Blood Glucose LESS THAN 70 milliGRAM(s)/deciliter  glucagon  Injectable 1 milliGRAM(s) IntraMuscular once PRN Glucose LESS THAN 70 milligrams/deciliter  melatonin 3 milliGRAM(s) Oral at bedtime PRN Insomnia  methocarbamol 500 milliGRAM(s) Oral every 8 hours PRN pain  zolpidem 5 milliGRAM(s) Oral at bedtime PRN Insomnia      Allergies    Biaxin (Joint Pain)  morphine (Short breath)    Intolerances        Vital Signs Last 24 Hrs  T(C): 36.8 (07 Oct 2017 09:39), Max: 37.1 (06 Oct 2017 21:25)  T(F): 98.3 (07 Oct 2017 09:39), Max: 98.8 (06 Oct 2017 21:25)  HR: 74 (07 Oct 2017 09:39) (74 - 89)  BP: 154/71 (07 Oct 2017 09:39) (140/82 - 175/75)  BP(mean): --  RR: 17 (07 Oct 2017 09:39) (17 - 20)  SpO2: 96% (07 Oct 2017 09:39) (93% - 97%)        PHYSICAL EXAM:    General: Well developed; well nourished; in no acute distress  HEENT: MMM, conjunctiva and sclera clear  L-CTA  H- RRR  Gastrointestinal: Soft, non-tender non-distended; Normal bowel sounds; No rebound or guarding  Extremities: Normal range of motion, No clubbing, cyanosis or edema  Neurological: Alert and oriented x3  Skin: Warm and dry. No obvious rash      LABS:                        10.5   6.3   )-----------( 119      ( 06 Oct 2017 07:57 )             32.7     06 Oct 2017 07:57    136    |  99     |  34.0   ----------------------------<  315    3.8     |  23.0   |  1.54     Ca    9.1        06 Oct 2017 07:57    TPro  7.1    /  Alb  3.6    /  TBili  0.5    /  DBili  0.2    /  AST  45     /  ALT  41     /  AlkPhos  114    / Amylase x      /Lipase 26     06 Oct 2017 07:57              RADIOLOGY & ADDITIONAL STUDIES:

## 2017-10-07 NOTE — PROGRESS NOTE ADULT - ASSESSMENT
The patient is a 72y Female who had transient tremors.   For loop recorder Monday.    Stable from neuro standpoint.

## 2017-10-07 NOTE — PROGRESS NOTE ADULT - SUBJECTIVE AND OBJECTIVE BOX
HOSPITALIST PROGRESS NOTE    NICHELLE GILL  [id number]  72yFemale    Patient is a 72y old  Female who presents with a chief complaint of "I'm was having weakness, and I couldn't walk or talk today in the morning." (04 Oct 2017 15:07)      SUBJECTIVE:   Chart reviewed since last visit.  Patient seen and examined at bedside for TIA.  Denies any symptoms, wants to go home.  No aphasia, dysarthria, dizziness, headache, palpitations, chest pain, dyspnea, cough.  Nausea resolved, no further vomiting.      OBJECTIVE:  Vital Signs Last 24 Hrs  T(C): 36.8 (07 Oct 2017 09:39), Max: 37.1 (06 Oct 2017 21:25)  T(F): 98.3 (07 Oct 2017 09:39), Max: 98.8 (06 Oct 2017 21:25)  HR: 74 (07 Oct 2017 09:39) (74 - 88)  BP: 154/71 (07 Oct 2017 09:39) (140/82 - 175/75)  RR: 17 (07 Oct 2017 09:39) (17 - 20)  SpO2: 96% (07 Oct 2017 09:39) (93% - 97%)    PHYSICAL EXAMINATION  General: NAD[+]   nontoxic[+]  Sitting in bed  HEENT: AT/NC[+]  PERRLA[]  EOMI[]   Moist oral mucosa[]  Pharyngeal exudates[]  NECK: Supple[+]  JVD[] Carotid bruit[]  CVS: RRR[+]  Irregular[]  S1+S2[+]   Murmur[]  RESP: Fair air entry bilaterally[+]   Clear sounds[+]   poor effort []  wheeze[]   Crackles[]  GI: Soft[+]  Nondistended[+]   tender bilateral subcostal region  Bowel Sounds[+]  Mass[]   HSM[]   Ascites[]  : suprapubic tenderness[-]   CVA Tenderness[]   Mendoza[]  MS: FROM[+]   Edema[+/-]  CNS: AAOx3[+]    Motor strength 5/5 x 4    CN intact  INTEG: Skin is Warm[+]  dry[+] Lesion[] Decubitus[]  PSYCH: Mood, Affect      MONITOR:       CAPILLARY BLOOD GLUCOSE  295 (07 Oct 2017 12:24)  273 (07 Oct 2017 07:57)  354 (06 Oct 2017 21:25)  308 (06 Oct 2017 16:00)            I&O's Summary                          10.5   6.3   )-----------( 119      ( 06 Oct 2017 07:57 )             32.7     PT/INR - ( 06 Oct 2017 07:57 )   PT: 11.7 sec;   INR: 1.06 ratio         PTT - ( 06 Oct 2017 07:57 )  PTT:29.3 sec  10-06    136  |  99  |  34.0<H>  ----------------------------<  315<H>  3.8   |  23.0  |  1.54<H>    Ca    9.1      06 Oct 2017 07:57    TPro  7.1  /  Alb  3.6  /  TBili  0.5  /  DBili  0.2  /  AST  45<H>  /  ALT  41<H>  /  AlkPhos  114  10-06              MEDICATIONS  (STANDING):  amLODIPine   Tablet 10 milliGRAM(s) Oral daily  buDESOnide 160 MICROgram(s)/formoterol 4.5 MICROgram(s) Inhaler 2 Puff(s) Inhalation two times a day  cholecalciferol 1000 Unit(s) Oral daily  dextrose 5%. 1000 milliLiter(s) (50 mL/Hr) IV Continuous <Continuous>  dextrose 50% Injectable 12.5 Gram(s) IV Push once  dextrose 50% Injectable 25 Gram(s) IV Push once  dextrose 50% Injectable 25 Gram(s) IV Push once  fluticasone propionate 50 MICROgram(s)/spray Nasal Spray 2 Spray(s) Both Nostrils daily  folic acid 1 milliGRAM(s) Oral daily  hydrALAZINE 50 milliGRAM(s) Oral three times a day  hydrALAZINE Injectable 10 milliGRAM(s) IV Push once  influenza   Vaccine 0.5 milliLiter(s) IntraMuscular once  insulin glargine Injectable (LANTUS) 10 Unit(s) SubCutaneous at bedtime  insulin lispro (HumaLOG) corrective regimen sliding scale   SubCutaneous three times a day before meals  levothyroxine 100 MICROGram(s) Oral daily  metoclopramide 5 milliGRAM(s) Oral Before meals and at bedtime  metoprolol 50 milliGRAM(s) Oral every 8 hours  montelukast 10 milliGRAM(s) Oral daily  pantoprazole    Tablet 40 milliGRAM(s) Oral before breakfast      MEDICATIONS  (PRN):  ALBUTerol    90 MICROgram(s) HFA Inhaler 2 Puff(s) Inhalation every 6 hours PRN Shortness of Breath  aluminum hydroxide/magnesium hydroxide/simethicone Suspension 30 milliLiter(s) Oral every 4 hours PRN Dyspepsia  dextrose Gel 1 Dose(s) Oral once PRN Blood Glucose LESS THAN 70 milliGRAM(s)/deciliter  glucagon  Injectable 1 milliGRAM(s) IntraMuscular once PRN Glucose LESS THAN 70 milligrams/deciliter  melatonin 3 milliGRAM(s) Oral at bedtime PRN Insomnia  methocarbamol 500 milliGRAM(s) Oral every 8 hours PRN pain  zolpidem 5 milliGRAM(s) Oral at bedtime PRN Insomnia

## 2017-10-07 NOTE — CHART NOTE - NSCHARTNOTEFT_GEN_A_CORE
Went back to reassess patient. She states that she feels comfortable and denies all symptoms. Alert and oriented.

## 2017-10-07 NOTE — PROGRESS NOTE ADULT - SUBJECTIVE AND OBJECTIVE BOX
[facility]                                        [facility] [place]                                 [address]                                  [address]                                        [place], [place], [address]                                             [phone]        No new complaints.    Vital Signs Last 24 Hrs  T(F): 98.3 (07 Oct 2017 09:39), Max: 98.8 (06 Oct 2017 21:25)  HR: 74 (07 Oct 2017 09:39) (74 - 89)  BP: 154/71 (07 Oct 2017 09:39) (140/82 - 175/75)  RR: 17 (07 Oct 2017 09:39) (17 - 20)  SpO2: 96% (07 Oct 2017 09:39) (93% - 97%)    Awake and alert.  Pupils react.  Face symmetric.  Symmetric strength.   Ambulating with walker.     No tremor.

## 2017-10-07 NOTE — CHART NOTE - NSCHARTNOTEFT_GEN_A_CORE
72 year old female with PMhx HCV with liver cirrhosis, ACD s/p CABG, BioAVR, DMT2 with CKD3 presented with weakness, aphasia and confusion and admitted and being managed for transient cerebral ischemia. CTH, MRI without any evidence of stroke.   Rapid response was called because as per night nurse patient was given ambien half an hour ago and aftwards the nurse walked into the room and patient was non-responsive for 15 seconds, while she had her eyes closed. Patient became responsive after 15 seconds and opened her eyes.  Patient claims she was sleeping. Patient denies history of seizures. Patient currently denies any neurological symptoms including blurry vision, double vision, weakness, loss of sensations.     Vitals during rapid:    HR: 78   Blood pressure: 132/70 HR : 71 RR: 20   TL 98.2   Blood glucose: 201    Physical exam:   General: NAD, lying comofrtably in bed, alert and oriented x 3   HEENT: EOMI/SAVANNAH, throat no exudates, no erythema    Neck: No JVD, supple, normal thyroid gland  Cardiac: s1/s2/rrr/no s4 or s3, no murmurs  Pulmonary:   clear to auscultation bilaterally, no rales, no ronchi  Abdominal: soft, no guarding or rebound, bowel sounds positive  Extremities: no calf tednerness, no edema, no cyanosis   Neurological: alert and oriented x 3, motor 5/5, sensation are intact, cranial nerves 2-12 intact      Labs:                         10.5   6.3   )-----------( 119      ( 06 Oct 2017 07:57 )             32.7       10-06    136  |  99  |  34.0<H>  ----------------------------<  315<H>  3.8   |  23.0  |  1.54<H>    Ca    9.1      06 Oct 2017 07:57    TPro  7.1  /  Alb  3.6  /  TBili  0.5  /  DBili  0.2  /  AST  45<H>  /  ALT  41<H>  /  AlkPhos  114  10-06 72 year old female with PMhx HCV with liver cirrhosis, ACD s/p CABG, BioAVR, DMT2 with CKD3 presented with weakness, aphasia and confusion and admitted and being managed for transient cerebral ischemia. CTH, MRI without any evidence of stroke.   Rapid response was called because as per night nurse patient was given ambien half an hour ago and aftwards the nurse walked into the room and patient was non-responsive for 15 seconds, while she had her eyes closed. Patient became responsive after 15 seconds and opened her eyes.  Patient claims she was sleeping. Patient denies history of seizures. Patient currently denies any neurological symptoms including blurry vision, double vision, weakness, loss of sensations.     Vitals during rapid:    HR: 78   Blood pressure: 132/70 HR : 71 RR: 20   TL 98.2   Blood glucose: 201    Monitor:   patient had bigeminy during episode     Physical exam:   General: NAD, lying comofrtably in bed, alert and oriented x 3   HEENT: EOMI/SAVANNAH, throat no exudates, no erythema    Neck: No JVD, supple, normal thyroid gland  Cardiac: s1/s2/rrr/no s4 or s3, no murmurs  Pulmonary:   clear to auscultation bilaterally, no rales, no ronchi  Abdominal: soft, no guarding or rebound, bowel sounds positive  Extremities: no calf tednerness, no edema, no cyanosis   Neurological: alert and oriented x 3, motor 5/5, sensation are intact, cranial nerves 2-12 intact      Labs:                         10.5   6.3   )-----------( 119      ( 06 Oct 2017 07:57 )             32.7       10-06    136  |  99  |  34.0<H>  ----------------------------<  315<H>  3.8   |  23.0  |  1.54<H>    Ca    9.1      06 Oct 2017 07:57    TPro  7.1  /  Alb  3.6  /  TBili  0.5  /  DBili  0.2  /  AST  45<H>  /  ALT  41<H>  /  AlkPhos  114  10-06      A/P: 72 year old female with rapid response   called because as per  nurse patient was non-responsive for 15 seconds, while she had her eyes closed. Patient became responsive after 15 seconds and opened her eyes    - patient is now fully alert and oriented x 4, neurological exam is normal  - during episode patient had bigeminy which is most likely not clinically significant   - will do 12 lead ecg, CMP, Mg, Phos  - patient's vitals are stable  - doubt seizure (no shaking movements, no loss of bowel or bladder control), absence seizure possible. Patient states that she has been on gabapentine for a long time and it was discontinued, restarting gabapentine to be discussed with hospitalist.   - case discussed with eICU who agree with management 72 year old female with PMhx HCV with liver cirrhosis, ACD s/p CABG, BioAVR, DMT2 with CKD3 presented with weakness, aphasia and confusion and admitted and being managed for transient cerebral ischemia. CTH, MRI without any evidence of stroke.   Rapid response was called because as per night nurse patient was given ambien half an hour ago and aftwards the nurse walked into the room and patient was non-responsive for 15 seconds, while she had her eyes closed. Patient became responsive after 15 seconds and opened her eyes.  Patient claims she was sleeping. Patient denies history of seizures. Patient currently denies any neurological symptoms including blurry vision, double vision, weakness, loss of sensations.     Vitals during rapid:    HR: 78   Blood pressure: 132/70 HR : 71 RR: 20   TL 98.2   Blood glucose: 201    Monitor:   patient had bigeminy during episode     Physical exam:   General: NAD, lying comofrtably in bed, alert and oriented x 3   HEENT: EOMI/SAVANNAH, throat no exudates, no erythema    Neck: No JVD, supple, normal thyroid gland  Cardiac: s1/s2/rrr/no s4 or s3, no murmurs  Pulmonary:   clear to auscultation bilaterally, no rales, no ronchi  Abdominal: soft, no guarding or rebound, bowel sounds positive  Extremities: no calf tednerness, no edema, no cyanosis   Neurological: alert and oriented x 3, motor 5/5, sensation are intact, cranial nerves 2-12 intact      Labs:                         10.5   6.3   )-----------( 119      ( 06 Oct 2017 07:57 )             32.7       10-06    136  |  99  |  34.0<H>  ----------------------------<  315<H>  3.8   |  23.0  |  1.54<H>    Ca    9.1      06 Oct 2017 07:57    TPro  7.1  /  Alb  3.6  /  TBili  0.5  /  DBili  0.2  /  AST  45<H>  /  ALT  41<H>  /  AlkPhos  114  10-06      A/P: 72 year old female with rapid response   called because as per  nurse patient was non-responsive for 15 seconds, while she had her eyes closed. Patient became responsive after 15 seconds and opened her eyes    - non-responsiveness could be due to ambien, which now discontinued  - patient is now fully alert and oriented x 4, neurological exam is normal  - during episode patient had bigeminy which is most likely not clinically significant   - will do 12 lead ecg, CMP, Mg, Phos  - patient's vitals are stable  - doubt seizure (no shaking movements, no loss of bowel or bladder control), absence seizure possible. Patient states that she has been on gabapentine for a long time and it was discontinued, restarting gabapentine to be discussed with hospitalist.   - case discussed with eICU who agree with management 72 year old female with PMhx HCV with liver cirrhosis, ACD s/p CABG, BioAVR, DMT2 with CKD3 presented with weakness, aphasia and confusion and admitted and being managed for transient cerebral ischemia. CTH, MRI without any evidence of stroke.   Rapid response was called because as per night nurse patient was given ambien half an hour ago and aftwards the nurse walked into the room and patient was non-responsive for 15 seconds, while she had her eyes closed. Patient became responsive after 15 seconds and opened her eyes.  Patient claims she was sleeping. Patient denies history of seizures. Patient currently denies any neurological symptoms including blurry vision, double vision, weakness, loss of sensations.     Vitals during rapid:    HR: 78   Blood pressure: 132/70 HR : 71 RR: 20   TL 98.2   Blood glucose: 201    Monitor:   patient had bigeminy during episode     Physical exam:   General: NAD, lying comofrtably in bed, alert and oriented x 3   HEENT: EOMI/SAVANNAH, throat no exudates, no erythema    Neck: No JVD, supple, normal thyroid gland  Cardiac: s1/s2/rrr/no s4 or s3, no murmurs  Pulmonary:   clear to auscultation bilaterally, no rales, no ronchi  Abdominal: soft, no guarding or rebound, bowel sounds positive  Extremities: no calf tednerness, no edema, no cyanosis   Neurological: alert and oriented x 3, motor 5/5, sensation are intact, cranial nerves 2-12 intact      Labs:                         10.5   6.3   )-----------( 119      ( 06 Oct 2017 07:57 )             32.7       10-06    136  |  99  |  34.0<H>  ----------------------------<  315<H>  3.8   |  23.0  |  1.54<H>    Ca    9.1      06 Oct 2017 07:57    TPro  7.1  /  Alb  3.6  /  TBili  0.5  /  DBili  0.2  /  AST  45<H>  /  ALT  41<H>  /  AlkPhos  114  10-06      A/P: 72 year old female with rapid response   called because as per  nurse patient was non-responsive for 15 seconds, while she had her eyes closed. Patient became responsive after 15 seconds and opened her eyes    - non-responsiveness could be due to ambien, which now discontinued  - patient is now fully alert and oriented x 4, neurological exam is normal  - during episode patient had bigeminy which is most likely not clinically significant   - 12 lead ECG without acute changes, will do CMP, Mg, Phos  - patient's vitals are stable  - doubt seizure (no shaking movements, no loss of bowel or bladder control), absence seizure possible. Patient states that she has been on gabapentine for a long time and it was discontinued, restarting gabapentine to be discussed with hospitalist.   - case discussed with eICU who agree with management

## 2017-10-08 LAB
ALBUMIN SERPL ELPH-MCNC: 3.5 G/DL — SIGNIFICANT CHANGE UP (ref 3.3–5.2)
ALP SERPL-CCNC: 108 U/L — SIGNIFICANT CHANGE UP (ref 40–120)
ALT FLD-CCNC: 67 U/L — HIGH
ANION GAP SERPL CALC-SCNC: 11 MMOL/L — SIGNIFICANT CHANGE UP (ref 5–17)
AST SERPL-CCNC: 113 U/L — HIGH
BILIRUB SERPL-MCNC: 0.5 MG/DL — SIGNIFICANT CHANGE UP (ref 0.4–2)
BUN SERPL-MCNC: 35 MG/DL — HIGH (ref 8–20)
CALCIUM SERPL-MCNC: 8.6 MG/DL — SIGNIFICANT CHANGE UP (ref 8.6–10.2)
CHLORIDE SERPL-SCNC: 99 MMOL/L — SIGNIFICANT CHANGE UP (ref 98–107)
CO2 SERPL-SCNC: 22 MMOL/L — SIGNIFICANT CHANGE UP (ref 22–29)
CREAT SERPL-MCNC: 1.52 MG/DL — HIGH (ref 0.5–1.3)
GLUCOSE SERPL-MCNC: 268 MG/DL — HIGH (ref 70–115)
HCT VFR BLD CALC: 35.3 % — LOW (ref 37–47)
HGB BLD-MCNC: 11.3 G/DL — LOW (ref 12–16)
MCHC RBC-ENTMCNC: 28.9 PG — SIGNIFICANT CHANGE UP (ref 27–31)
MCHC RBC-ENTMCNC: 32 G/DL — SIGNIFICANT CHANGE UP (ref 32–36)
MCV RBC AUTO: 90.3 FL — SIGNIFICANT CHANGE UP (ref 81–99)
PLATELET # BLD AUTO: 112 K/UL — LOW (ref 150–400)
POTASSIUM SERPL-MCNC: 3.7 MMOL/L — SIGNIFICANT CHANGE UP (ref 3.5–5.3)
POTASSIUM SERPL-SCNC: 3.7 MMOL/L — SIGNIFICANT CHANGE UP (ref 3.5–5.3)
PROT SERPL-MCNC: 6.7 G/DL — SIGNIFICANT CHANGE UP (ref 6.6–8.7)
RBC # BLD: 3.91 M/UL — LOW (ref 4.4–5.2)
RBC # FLD: 20.4 % — HIGH (ref 11–15.6)
SODIUM SERPL-SCNC: 132 MMOL/L — LOW (ref 135–145)
WBC # BLD: 6.1 K/UL — SIGNIFICANT CHANGE UP (ref 4.8–10.8)
WBC # FLD AUTO: 6.1 K/UL — SIGNIFICANT CHANGE UP (ref 4.8–10.8)

## 2017-10-08 PROCEDURE — 99233 SBSQ HOSP IP/OBS HIGH 50: CPT

## 2017-10-08 RX ORDER — ZOLPIDEM TARTRATE 10 MG/1
5 TABLET ORAL AT BEDTIME
Qty: 0 | Refills: 0 | Status: DISCONTINUED | OUTPATIENT
Start: 2017-10-08 | End: 2017-10-09

## 2017-10-08 RX ORDER — INSULIN LISPRO 100/ML
4 VIAL (ML) SUBCUTANEOUS
Qty: 0 | Refills: 0 | Status: DISCONTINUED | OUTPATIENT
Start: 2017-10-08 | End: 2017-10-09

## 2017-10-08 RX ADMIN — Medication 5 MILLIGRAM(S): at 06:44

## 2017-10-08 RX ADMIN — Medication 50 MILLIGRAM(S): at 21:22

## 2017-10-08 RX ADMIN — Medication 6: at 16:37

## 2017-10-08 RX ADMIN — Medication 50 MILLIGRAM(S): at 21:23

## 2017-10-08 RX ADMIN — Medication 3 UNIT(S): at 12:02

## 2017-10-08 RX ADMIN — INSULIN GLARGINE 10 UNIT(S): 100 INJECTION, SOLUTION SUBCUTANEOUS at 21:23

## 2017-10-08 RX ADMIN — Medication 5 MILLIGRAM(S): at 16:38

## 2017-10-08 RX ADMIN — Medication 1 MILLIGRAM(S): at 12:02

## 2017-10-08 RX ADMIN — ZOLPIDEM TARTRATE 5 MILLIGRAM(S): 10 TABLET ORAL at 21:23

## 2017-10-08 RX ADMIN — PANTOPRAZOLE SODIUM 40 MILLIGRAM(S): 20 TABLET, DELAYED RELEASE ORAL at 06:44

## 2017-10-08 RX ADMIN — Medication 2: at 12:02

## 2017-10-08 RX ADMIN — Medication 5 MILLIGRAM(S): at 12:39

## 2017-10-08 RX ADMIN — Medication 1000 UNIT(S): at 12:02

## 2017-10-08 RX ADMIN — AMLODIPINE BESYLATE 10 MILLIGRAM(S): 2.5 TABLET ORAL at 06:43

## 2017-10-08 RX ADMIN — Medication 5 MILLIGRAM(S): at 21:22

## 2017-10-08 RX ADMIN — Medication 4: at 08:46

## 2017-10-08 RX ADMIN — Medication 100 MICROGRAM(S): at 06:43

## 2017-10-08 RX ADMIN — Medication 50 MILLIGRAM(S): at 06:43

## 2017-10-08 RX ADMIN — Medication 3 UNIT(S): at 08:47

## 2017-10-08 RX ADMIN — Medication 50 MILLIGRAM(S): at 15:31

## 2017-10-08 RX ADMIN — Medication 4 UNIT(S): at 17:32

## 2017-10-08 NOTE — PROGRESS NOTE ADULT - PROBLEM SELECTOR PLAN 6
FS, LETA, Lantus added  Carbohydrate consistent Fulls liquids as tolerated
Monitor Clinically.

## 2017-10-08 NOTE — PROGRESS NOTE ADULT - PROBLEM SELECTOR PROBLEM 3
Anemia due to other cause
Chronic hepatitis C without hepatic coma
Chronic hepatitis C without hepatic coma
Calculus of gallbladder without cholecystitis without obstruction
Chronic hepatitis C without hepatic coma
Chronic hepatitis C without hepatic coma

## 2017-10-08 NOTE — PROGRESS NOTE ADULT - PROBLEM SELECTOR PLAN 7
Continue statin/cont metoprolol.  No antiplatelet agents secondary to anemia, cirrhosis, MDS etc.
synthroid
Continue statin/cont metoprolol.  Patient and family refusing antiplatelet.  Cardiology called.
Continue statin/cont metoprolol.  No antiplatelet agents secondary to anemia, cirrhosis, MDS etc.

## 2017-10-08 NOTE — PROGRESS NOTE ADULT - SUBJECTIVE AND OBJECTIVE BOX
HOSPITALIST PROGRESS NOTE    NICHELLE GILL  [id number]  72yFemale    Patient is a 72y old  Female who presents with a chief complaint of "I'm was having weakness, and I couldn't walk or talk today in the morning." (04 Oct 2017 15:07)      SUBJECTIVE:   Chart reviewed since last visit.  Patient seen and examined at bedside for weakness, TIA.  Overnight events noted - apparently unresponsive after Ambien - patient states she was sleeping.  Denies any dizziness, headache, paresthesia, paresis, palpitations, chest pain, nausea or vomiting.    OBJECTIVE:  Vital Signs Last 24 Hrs  T(C): 37.1 (08 Oct 2017 10:05), Max: 37.1 (08 Oct 2017 10:05)  T(F): 98.8 (08 Oct 2017 10:05), Max: 98.8 (08 Oct 2017 10:05)  HR: 72 (08 Oct 2017 10:05) (72 - 77)  BP: 154/68 (08 Oct 2017 10:05) (152/68 - 156/84)  BP(mean): --  RR: 17 (08 Oct 2017 10:05) (17 - 19)  SpO2: 96% (08 Oct 2017 10:05) (95% - 96%)    PHYSICAL EXAMINATION  General: NAD[+]   nontoxic[+]  Sitting in bed  HEENT: AT/NC[+]  PERRLA[]  EOMI[]   Moist oral mucosa[]  Pharyngeal exudates[]  NECK: Supple[+]  JVD[] Carotid bruit[]  CVS: RRR[+]  Irregular[]  S1+S2[+]   Murmur[]  RESP: Fair air entry bilaterally[+]   Clear sounds[+]   poor effort []  wheeze[]   Crackles[]  GI: Soft[+]  Nondistended[+]   tender bilateral subcostal region  Bowel Sounds[+]  Mass[]   HSM[]   Ascites[]  : suprapubic tenderness[-]   CVA Tenderness[]   Mendoza[]  MS: FROM[+]   Edema[+/-]  CNS: AAOx3[+]    Motor strength 5/5 x 4    CN intact  INTEG: Skin is Warm[+]  dry[+] Lesion[] Decubitus[]  PSYCH: Fair Mood, Affect    MONITOR: Bigeminy    CAPILLARY BLOOD GLUCOSE  223 (08 Oct 2017 08:00)  191 (07 Oct 2017 21:48)  216 (07 Oct 2017 17:10)            I&O's Summary    07 Oct 2017 07:01  -  08 Oct 2017 07:00  --------------------------------------------------------  IN: 100 mL / OUT: 0 mL / NET: 100 mL    08 Oct 2017 07:01  -  08 Oct 2017 16:00  --------------------------------------------------------  IN: 800 mL / OUT: 0 mL / NET: 800 mL                            11.3   6.1   )-----------( 112      ( 08 Oct 2017 09:05 )             35.3       10-08    132<L>  |  99  |  35.0<H>  ----------------------------<  268<H>  3.7   |  22.0  |  1.52<H>    Ca    8.6      08 Oct 2017 09:05  Phos  1.5     10-07  Mg     2.3     10-07    TPro  6.7  /  Alb  3.5  /  TBili  0.5  /  DBili  x   /  AST  113<H>  /  ALT  67<H>  /  AlkPhos  108  10-08            MEDICATIONS  (STANDING):  amLODIPine   Tablet 10 milliGRAM(s) Oral daily  buDESOnide 160 MICROgram(s)/formoterol 4.5 MICROgram(s) Inhaler 2 Puff(s) Inhalation two times a day  cholecalciferol 1000 Unit(s) Oral daily  dextrose 5%. 1000 milliLiter(s) (50 mL/Hr) IV Continuous <Continuous>  dextrose 50% Injectable 12.5 Gram(s) IV Push once  dextrose 50% Injectable 25 Gram(s) IV Push once  dextrose 50% Injectable 25 Gram(s) IV Push once  fluticasone propionate 50 MICROgram(s)/spray Nasal Spray 2 Spray(s) Both Nostrils daily  folic acid 1 milliGRAM(s) Oral daily  hydrALAZINE 50 milliGRAM(s) Oral three times a day  hydrALAZINE Injectable 10 milliGRAM(s) IV Push once  influenza   Vaccine 0.5 milliLiter(s) IntraMuscular once  insulin glargine Injectable (LANTUS) 10 Unit(s) SubCutaneous at bedtime  insulin lispro (HumaLOG) corrective regimen sliding scale   SubCutaneous three times a day before meals  insulin lispro Injectable (HumaLOG) 3 Unit(s) SubCutaneous three times a day before meals  levothyroxine 100 MICROGram(s) Oral daily  metoclopramide 5 milliGRAM(s) Oral Before meals and at bedtime  metoprolol 50 milliGRAM(s) Oral every 8 hours  montelukast 10 milliGRAM(s) Oral daily  pantoprazole    Tablet 40 milliGRAM(s) Oral before breakfast      MEDICATIONS  (PRN):  ALBUTerol    90 MICROgram(s) HFA Inhaler 2 Puff(s) Inhalation every 6 hours PRN Shortness of Breath  aluminum hydroxide/magnesium hydroxide/simethicone Suspension 30 milliLiter(s) Oral every 4 hours PRN Dyspepsia  dextrose Gel 1 Dose(s) Oral once PRN Blood Glucose LESS THAN 70 milliGRAM(s)/deciliter  glucagon  Injectable 1 milliGRAM(s) IntraMuscular once PRN Glucose LESS THAN 70 milligrams/deciliter  melatonin 3 milliGRAM(s) Oral at bedtime PRN Insomnia  methocarbamol 500 milliGRAM(s) Oral every 8 hours PRN pain  zolpidem 5 milliGRAM(s) Oral at bedtime PRN Insomnia

## 2017-10-08 NOTE — PROGRESS NOTE ADULT - PROBLEM SELECTOR PLAN 1
-admit to stroke unit  -NO ASPIRIN, calling cardiology about details of aspirin intolerance and family states emphatically that she cannot take aspirin  -lethargy and weakness may also have been from polypharmacy, d/c tramadol, d/c gabapentin, d/c flexeril.  Check carotids.  Check EEG  Plan for loop recorder

## 2017-10-08 NOTE — PROGRESS NOTE ADULT - PROBLEM SELECTOR PLAN 1
Patient with Hepatitis C. Will F/U with Dr Tong as outpatient  Hx of varices on CT. I spoke to Dr contreras. Pt will get loop recorder tomorrow. , then if BREANN is needed, she will See Dr Tong for repeat EGD to R/O esophageal varices before BREANN.     Tolerating diet. Will sign off

## 2017-10-08 NOTE — PROGRESS NOTE ADULT - ASSESSMENT
72 year old female with PMhx HCV with liver cirrhosis, CAD s/p CABG, BioAVR, DMT2 with CKD3 presented with weakness, aphasia and confusion and admitted and being managed for transient cerebral ischemia. CTH, MRI without any evidence of stroke. Well controlled DMT2, dyslipidemia.    Encephalopathy work up un revealing with normal NH3, B12 levels. UA dirty, no cultures collected, doubt infection as afebrile, normal WBC. Repeat episode of unresponsiveness after Ambien, with return to baseline.    Nausea, vomiting possibly related to cholelithiasis ( no evidence of cholecystitis or choledocholithiasis) or gastroparesis. Transaminases downtrending. Chronic liver disease due to HCV, with imaging consistent for liver cirrhosis with varices, portal hypertension, splenomegaly and non specific abdominal lymphadenopathy. CEA normal. AFP pending.     As per family, patient is not on ASA, Plavix due to underlying chronic liver disease, anemia, necessitating PRBCs, iron and Epogen for which work up has been negative. Her anemia is currently stable with adequate substrate stores.    Her DMT2 seems to be  well controlled with A1c 5.6, though has hyperglycemia in hospital. She also has CKD 3 with improvement in her renal function during her stay.    No evidence of CHF clinically. Recent TTE with biatrial enlargement, preserved LVEF and functioning BioAVR.

## 2017-10-08 NOTE — PROGRESS NOTE ADULT - PROBLEM SELECTOR PLAN 2
Patient with varices on CT- on beta blocker( will need to check with cardiology if can change to coreg or cargard)  Hepatitis C. She will F/U with Dr Tong as outpatient  Gallstones- asymptomatic
improved
-cont metoprolol
Continue Reglan.
H+H stable. Follow with hematology (?myeloproliferative disease)
Continue Reglan.
Continue Reglan.  Changed to PO

## 2017-10-08 NOTE — PROGRESS NOTE ADULT - ASSESSMENT
Assessment  TIA ? PAF  s/p CABg  cirrhosis with prob varices  myeloproliferative disorder    Rec  carotid in am   implantable loop in am and skyla staley

## 2017-10-08 NOTE — PROGRESS NOTE ADULT - PROBLEM SELECTOR PROBLEM 6
Diabetes mellitus of other type without complication, unspecified long term insulin use status
Thrombocytopenia

## 2017-10-08 NOTE — PROGRESS NOTE ADULT - SUBJECTIVE AND OBJECTIVE BOX
[facility]                                   [address] [place] [address]                                                      PHONE: [phone]                                                         FAX: [phone]  [email or web address]  ---------------------------------------------------------------------------------------------------------------------------------    Overnight events/patient complaints: tele reviewed SR with apcs and PVCs no AF      Biaxin (Joint Pain)  morphine (Short breath)    MEDICATIONS  (STANDING):  amLODIPine   Tablet 10 milliGRAM(s) Oral daily  buDESOnide 160 MICROgram(s)/formoterol 4.5 MICROgram(s) Inhaler 2 Puff(s) Inhalation two times a day  cholecalciferol 1000 Unit(s) Oral daily  dextrose 5%. 1000 milliLiter(s) (50 mL/Hr) IV Continuous <Continuous>  dextrose 50% Injectable 12.5 Gram(s) IV Push once  dextrose 50% Injectable 25 Gram(s) IV Push once  dextrose 50% Injectable 25 Gram(s) IV Push once  fluticasone propionate 50 MICROgram(s)/spray Nasal Spray 2 Spray(s) Both Nostrils daily  folic acid 1 milliGRAM(s) Oral daily  hydrALAZINE 50 milliGRAM(s) Oral three times a day  hydrALAZINE Injectable 10 milliGRAM(s) IV Push once  influenza   Vaccine 0.5 milliLiter(s) IntraMuscular once  insulin glargine Injectable (LANTUS) 10 Unit(s) SubCutaneous at bedtime  insulin lispro (HumaLOG) corrective regimen sliding scale   SubCutaneous three times a day before meals  insulin lispro Injectable (HumaLOG) 3 Unit(s) SubCutaneous three times a day before meals  levothyroxine 100 MICROGram(s) Oral daily  metoclopramide 5 milliGRAM(s) Oral Before meals and at bedtime  metoprolol 50 milliGRAM(s) Oral every 8 hours  montelukast 10 milliGRAM(s) Oral daily  pantoprazole    Tablet 40 milliGRAM(s) Oral before breakfast    MEDICATIONS  (PRN):  ALBUTerol    90 MICROgram(s) HFA Inhaler 2 Puff(s) Inhalation every 6 hours PRN Shortness of Breath  aluminum hydroxide/magnesium hydroxide/simethicone Suspension 30 milliLiter(s) Oral every 4 hours PRN Dyspepsia  dextrose Gel 1 Dose(s) Oral once PRN Blood Glucose LESS THAN 70 milliGRAM(s)/deciliter  glucagon  Injectable 1 milliGRAM(s) IntraMuscular once PRN Glucose LESS THAN 70 milligrams/deciliter  melatonin 3 milliGRAM(s) Oral at bedtime PRN Insomnia  methocarbamol 500 milliGRAM(s) Oral every 8 hours PRN pain      Vital Signs Last 24 Hrs  T(C): 37.1 (08 Oct 2017 10:05), Max: 37.1 (08 Oct 2017 10:05)  T(F): 98.8 (08 Oct 2017 10:05), Max: 98.8 (08 Oct 2017 10:05)  HR: 72 (08 Oct 2017 10:05) (72 - 77)  BP: 154/68 (08 Oct 2017 10:05) (152/68 - 156/84)  BP(mean): --  RR: 17 (08 Oct 2017 10:05) (17 - 19)  SpO2: 96% (08 Oct 2017 10:05) (95% - 96%)  Daily     Daily   ICU Vital Signs Last 24 Hrs  NICHELLE GILL  I&O's Detail    07 Oct 2017 07:01  -  08 Oct 2017 07:00  --------------------------------------------------------  IN:    Oral Fluid: 100 mL  Total IN: 100 mL    OUT:  Total OUT: 0 mL    Total NET: 100 mL        I&O's Summary    07 Oct 2017 07:01  -  08 Oct 2017 07:00  --------------------------------------------------------  IN: 100 mL / OUT: 0 mL / NET: 100 mL      Drug Dosing Weight  NICHELLE [facility]      PHYSICAL EXAM:  General: Appears well developed, well nourished alert and cooperative.  HEENT: Head; normocephalic, atraumatic.  Eyes: Pupils reactive, cornea wnl.  Neck: Supple, no nodes adenopathy, no NVD or carotid bruit or thyromegaly.  CARDIOVASCULAR: Normal S1 and S2, No murmur, rub, gallop or lift.   LUNGS: No rales, rhonchi or wheeze. Normal breath sounds bilaterally.  ABDOMEN: Soft, nontender without mass or organomegaly. bowel sounds normoactive.  EXTREMITIES: No clubbing, cyanosis or edema. Distal pulses wnl.   SKIN: warm and dry with normal turgor.  NEURO: Alert/oriented x 3/normal motor exam. No pathologic reflexes.    PSYCH: normal affect.        LABS:                        11.3   6.1   )-----------( 112      ( 08 Oct 2017 09:05 )             35.3     10-08    132<L>  |  99  |  35.0<H>  ----------------------------<  268<H>  3.7   |  22.0  |  1.52<H>    Ca    8.6      08 Oct 2017 09:05  Phos  1.5     10-07  Mg     2.3     10-07    TPro  6.7  /  Alb  3.5  /  TBili  0.5  /  DBili  x   /  AST  113<H>  /  ALT  67<H>  /  AlkPhos  108  10-08    [facility]

## 2017-10-08 NOTE — PROGRESS NOTE ADULT - SUBJECTIVE AND OBJECTIVE BOX
Patient is a 72y old  Female who presents with a chief complaint of "I'm was having weakness, and I couldn't walk or talk today in the morning." (04 Oct 2017 15:07)      HPI:   is a 73 y/o female w/PMhx of CAD, DM, heart failure, hepC . Currently she has no nausea, vomting or abdominal pain. H+H stable. No rectal bleeding.         REVIEW OF SYSTEMS:  Constitutional: No fever, weight loss or fatigue  ENMT:  No difficulty hearing, tinnitus, vertigo; No sinus or throat pain  Respiratory: No cough, wheezing, chills or hemoptysis  Cardiovascular: No chest pain, palpitations, dizziness or leg swelling  Gastrointestinal: No abdominal or epigastric pain. No nausea, vomiting or hematemesis; No diarrhea or constipation. No melena or hematochezia.  Skin: No itching, burning, rashes or lesions   Musculoskeletal: No joint pain or swelling; No muscle, back or extremity pain    PAST MEDICAL & SURGICAL HISTORY:  CAD (coronary artery disease)  Heart failure  Hepatitis C  Aortic stenosis  Anemia, unspecified anemia type  Asthma  Low back pain: chronic over a year now.  High cholesterol  Hypertension  Diabetes  H/O aortic valve replacement: cow valve.  S/P CABG x 3  History of tonsillectomy      FAMILY HISTORY:  Family history of diabetes mellitus (Sibling)      SOCIAL HISTORY:  Smoking Status: [ ] Current, [ ] Former, [ ] Never  Pack Years:  [  ] EtOH-no  [  ] IVDA-no    MEDICATIONS:  MEDICATIONS  (STANDING):  amLODIPine   Tablet 10 milliGRAM(s) Oral daily  buDESOnide 160 MICROgram(s)/formoterol 4.5 MICROgram(s) Inhaler 2 Puff(s) Inhalation two times a day  cholecalciferol 1000 Unit(s) Oral daily  dextrose 5%. 1000 milliLiter(s) (50 mL/Hr) IV Continuous <Continuous>  dextrose 50% Injectable 12.5 Gram(s) IV Push once  dextrose 50% Injectable 25 Gram(s) IV Push once  dextrose 50% Injectable 25 Gram(s) IV Push once  fluticasone propionate 50 MICROgram(s)/spray Nasal Spray 2 Spray(s) Both Nostrils daily  folic acid 1 milliGRAM(s) Oral daily  hydrALAZINE 50 milliGRAM(s) Oral three times a day  hydrALAZINE Injectable 10 milliGRAM(s) IV Push once  influenza   Vaccine 0.5 milliLiter(s) IntraMuscular once  insulin glargine Injectable (LANTUS) 10 Unit(s) SubCutaneous at bedtime  insulin lispro (HumaLOG) corrective regimen sliding scale   SubCutaneous three times a day before meals  insulin lispro Injectable (HumaLOG) 3 Unit(s) SubCutaneous three times a day before meals  levothyroxine 100 MICROGram(s) Oral daily  metoclopramide 5 milliGRAM(s) Oral Before meals and at bedtime  metoprolol 50 milliGRAM(s) Oral every 8 hours  montelukast 10 milliGRAM(s) Oral daily  pantoprazole    Tablet 40 milliGRAM(s) Oral before breakfast    MEDICATIONS  (PRN):  ALBUTerol    90 MICROgram(s) HFA Inhaler 2 Puff(s) Inhalation every 6 hours PRN Shortness of Breath  aluminum hydroxide/magnesium hydroxide/simethicone Suspension 30 milliLiter(s) Oral every 4 hours PRN Dyspepsia  dextrose Gel 1 Dose(s) Oral once PRN Blood Glucose LESS THAN 70 milliGRAM(s)/deciliter  glucagon  Injectable 1 milliGRAM(s) IntraMuscular once PRN Glucose LESS THAN 70 milligrams/deciliter  melatonin 3 milliGRAM(s) Oral at bedtime PRN Insomnia  methocarbamol 500 milliGRAM(s) Oral every 8 hours PRN pain      Allergies    Biaxin (Joint Pain)  morphine (Short breath)    Intolerances        Vital Signs Last 24 Hrs  T(C): 36.6 (08 Oct 2017 06:45), Max: 36.9 (07 Oct 2017 21:10)  T(F): 97.9 (08 Oct 2017 06:45), Max: 98.4 (07 Oct 2017 21:10)  HR: 77 (08 Oct 2017 06:45) (74 - 77)  BP: 156/84 (08 Oct 2017 06:45) (152/68 - 156/84)  BP(mean): --  RR: 18 (08 Oct 2017 06:45) (17 - 19)  SpO2: 95% (08 Oct 2017 06:45) (95% - 96%)    10-07 @ 07:01  -  10-08 @ 07:00  --------------------------------------------------------  IN: 100 mL / OUT: 0 mL / NET: 100 mL          PHYSICAL EXAM:    General:overweight  in no acute distress  HEENT: MMM, conjunctiva and sclera clear  H- RRR  L- CTA  Gastrointestinal: Soft, non-tender non-distended; Normal bowel sounds; No rebound or guarding  Extremities: Normal range of motion, No clubbing, cyanosis or edema  Neurological: Alert and oriented x3  Skin: Warm and dry. No obvious rash      LABS:                        11.0   6.8   )-----------( 125      ( 07 Oct 2017 22:58 )             34.4     07 Oct 2017 22:58    135    |  99     |  39.0   ----------------------------<  209    3.8     |  22.0   |  1.56     Ca    8.6        07 Oct 2017 22:58  Phos  1.5       07 Oct 2017 22:58  Mg     2.3       07 Oct 2017 22:58    TPro  6.9    /  Alb  3.2    /  TBili  0.6    /  DBili  x      /  AST  105    /  ALT  60     /  AlkPhos  102    / Amylase x      /Lipase x      07 Oct 2017 22:58              RADIOLOGY & ADDITIONAL STUDIES:     < from: CT Abdomen and Pelvis No Cont (10.05.17 @ 05:22) >  IMPRESSION:  No evidence of bowel obstruction, active inflammatory process or   intra-abdominal source for infection, within limits of a noncontrast CT   scan.    Cholelithiasis without CT evidence of acute cholecystitis.  Right upper   quadrant ultrasound can be performed as clinically warranted.     Cirrhotic liver.  Splenomegaly and multiple varices suggesting portal   venous hypertension.  No ascites.  MRI surveillance can be performed as   clinically warranted to exclude hepatocellular carcinoma.    < end of copied text >

## 2017-10-08 NOTE — PROGRESS NOTE ADULT - PROBLEM SELECTOR PLAN 3
chronic . Follows with hematology .  Had egd in 2014- esophagitis and colonoscopy with small polyp, Capsule endoscopy in 2015 was negative
Amonia level 35  Pt has developed n/v/ abd pain  Abd CT showing enlarged LNs , cirrhotic liver, splenomegaly- GI consulted  Zofran and Reglan to alleviate n/v  Abd sono- pending  repeat labs in AM including LFTs  Cipro/Flagyl started prophylacticly
Follow up AFP.  GI follow up.  Palliative care - will discuss with patient and family
Follow up AFP.  GI follow up.  Palliative care - will discuss with patient and family
possible episode of biliary colic but not at all certain
Follow up AFP.  GI follow up.

## 2017-10-08 NOTE — PROGRESS NOTE ADULT - PROBLEM SELECTOR PLAN 4
Assisted up to bathroom voided 150 ml of clear urine, tolerated well. Scant amount of bright red blood noted when wiping . Pad and panties on.
-d/c tramadol, d/c gabapentin, d/c flexeril, decrease melatonin to 3mg qhs  -tylenol d/c'd due to elevated LFTs  -Robaxin prn pain
Continue Lantus, SSI  Add premeal 3U TID  Monitor SCr
Continue Insulins.  Monitor SCr
possibly due to TIA or diabetic gastroparesis-will change to oral reglan as noted above
Continue Lantus, SSI  Increase premeal 4U TID  Monitor SCr

## 2017-10-08 NOTE — PROGRESS NOTE ADULT - PROBLEM SELECTOR PROBLEM 7
Coronary artery disease involving native coronary artery with unstable angina pectoris, unspecified whether native or transplanted heart
Hypothyroidism, unspecified type
Coronary artery disease involving native coronary artery with unstable angina pectoris, unspecified whether native or transplanted heart
Coronary artery disease involving native coronary artery with unstable angina pectoris, unspecified whether native or transplanted heart

## 2017-10-08 NOTE — PROGRESS NOTE ADULT - PROBLEM SELECTOR PROBLEM 4
Chronic low back pain with sciatica, sciatica laterality unspecified, unspecified back pain laterality
Type 2 diabetes mellitus with stage 3 chronic kidney disease, with long-term current use of insulin
Nausea and vomiting, intractability of vomiting not specified, unspecified vomiting type
Type 2 diabetes mellitus with stage 3 chronic kidney disease, with long-term current use of insulin
Type 2 diabetes mellitus with stage 3 chronic kidney disease, with long-term current use of insulin

## 2017-10-08 NOTE — PROGRESS NOTE ADULT - PROBLEM SELECTOR PROBLEM 2
Cirrhosis of liver without ascites, unspecified hepatic cirrhosis type
Transient cerebral ischemia, unspecified type
Anemia due to other cause
Coronary artery disease involving native coronary artery with unstable angina pectoris, unspecified whether native or transplanted heart
Nausea

## 2017-10-08 NOTE — PROGRESS NOTE ADULT - PROBLEM SELECTOR PROBLEM 1
Chronic hepatitis C without hepatic coma
Nausea and vomiting, intractability of vomiting not specified, unspecified vomiting type
Cirrhosis of liver without ascites, unspecified hepatic cirrhosis type
Transient cerebral ischemia, unspecified type

## 2017-10-09 ENCOUNTER — TRANSCRIPTION ENCOUNTER (OUTPATIENT)
Age: 72
End: 2017-10-09

## 2017-10-09 VITALS — WEIGHT: 175.05 LBS

## 2017-10-09 LAB
ANION GAP SERPL CALC-SCNC: 11 MMOL/L — SIGNIFICANT CHANGE UP (ref 5–17)
BUN SERPL-MCNC: 35 MG/DL — HIGH (ref 8–20)
CALCIUM SERPL-MCNC: 8.6 MG/DL — SIGNIFICANT CHANGE UP (ref 8.6–10.2)
CHLORIDE SERPL-SCNC: 102 MMOL/L — SIGNIFICANT CHANGE UP (ref 98–107)
CO2 SERPL-SCNC: 25 MMOL/L — SIGNIFICANT CHANGE UP (ref 22–29)
CREAT SERPL-MCNC: 1.4 MG/DL — HIGH (ref 0.5–1.3)
GLUCOSE SERPL-MCNC: 170 MG/DL — HIGH (ref 70–115)
POTASSIUM SERPL-MCNC: 3.8 MMOL/L — SIGNIFICANT CHANGE UP (ref 3.5–5.3)
POTASSIUM SERPL-SCNC: 3.8 MMOL/L — SIGNIFICANT CHANGE UP (ref 3.5–5.3)
SODIUM SERPL-SCNC: 138 MMOL/L — SIGNIFICANT CHANGE UP (ref 135–145)

## 2017-10-09 PROCEDURE — 76705 ECHO EXAM OF ABDOMEN: CPT

## 2017-10-09 PROCEDURE — 97163 PT EVAL HIGH COMPLEX 45 MIN: CPT

## 2017-10-09 PROCEDURE — 85610 PROTHROMBIN TIME: CPT

## 2017-10-09 PROCEDURE — 99239 HOSP IP/OBS DSCHRG MGMT >30: CPT

## 2017-10-09 PROCEDURE — 82553 CREATINE MB FRACTION: CPT

## 2017-10-09 PROCEDURE — 81001 URINALYSIS AUTO W/SCOPE: CPT

## 2017-10-09 PROCEDURE — 82140 ASSAY OF AMMONIA: CPT

## 2017-10-09 PROCEDURE — 80053 COMPREHEN METABOLIC PANEL: CPT

## 2017-10-09 PROCEDURE — 99221 1ST HOSP IP/OBS SF/LOW 40: CPT

## 2017-10-09 PROCEDURE — 82962 GLUCOSE BLOOD TEST: CPT

## 2017-10-09 PROCEDURE — 85027 COMPLETE CBC AUTOMATED: CPT

## 2017-10-09 PROCEDURE — C1764: CPT

## 2017-10-09 PROCEDURE — 84484 ASSAY OF TROPONIN QUANT: CPT

## 2017-10-09 PROCEDURE — 99231 SBSQ HOSP IP/OBS SF/LOW 25: CPT

## 2017-10-09 PROCEDURE — 82378 CARCINOEMBRYONIC ANTIGEN: CPT

## 2017-10-09 PROCEDURE — 80076 HEPATIC FUNCTION PANEL: CPT

## 2017-10-09 PROCEDURE — 74176 CT ABD & PELVIS W/O CONTRAST: CPT

## 2017-10-09 PROCEDURE — 80061 LIPID PANEL: CPT

## 2017-10-09 PROCEDURE — 83036 HEMOGLOBIN GLYCOSYLATED A1C: CPT

## 2017-10-09 PROCEDURE — 80074 ACUTE HEPATITIS PANEL: CPT

## 2017-10-09 PROCEDURE — 84100 ASSAY OF PHOSPHORUS: CPT

## 2017-10-09 PROCEDURE — 36415 COLL VENOUS BLD VENIPUNCTURE: CPT

## 2017-10-09 PROCEDURE — 70551 MRI BRAIN STEM W/O DYE: CPT

## 2017-10-09 PROCEDURE — 71045 X-RAY EXAM CHEST 1 VIEW: CPT

## 2017-10-09 PROCEDURE — 97167 OT EVAL HIGH COMPLEX 60 MIN: CPT

## 2017-10-09 PROCEDURE — 83690 ASSAY OF LIPASE: CPT

## 2017-10-09 PROCEDURE — 99291 CRITICAL CARE FIRST HOUR: CPT | Mod: 25

## 2017-10-09 PROCEDURE — 83735 ASSAY OF MAGNESIUM: CPT

## 2017-10-09 PROCEDURE — 94760 N-INVAS EAR/PLS OXIMETRY 1: CPT

## 2017-10-09 PROCEDURE — 82150 ASSAY OF AMYLASE: CPT

## 2017-10-09 PROCEDURE — 93880 EXTRACRANIAL BILAT STUDY: CPT | Mod: 26

## 2017-10-09 PROCEDURE — 93880 EXTRACRANIAL BILAT STUDY: CPT

## 2017-10-09 PROCEDURE — 85730 THROMBOPLASTIN TIME PARTIAL: CPT

## 2017-10-09 PROCEDURE — 70450 CT HEAD/BRAIN W/O DYE: CPT

## 2017-10-09 PROCEDURE — 94640 AIRWAY INHALATION TREATMENT: CPT

## 2017-10-09 PROCEDURE — 87521 HEPATITIS C PROBE&RVRS TRNSC: CPT

## 2017-10-09 PROCEDURE — 80048 BASIC METABOLIC PNL TOTAL CA: CPT

## 2017-10-09 PROCEDURE — 93005 ELECTROCARDIOGRAM TRACING: CPT

## 2017-10-09 PROCEDURE — 82550 ASSAY OF CK (CPK): CPT

## 2017-10-09 RX ORDER — METOPROLOL TARTRATE 50 MG
1 TABLET ORAL
Qty: 0 | Refills: 0 | DISCHARGE
Start: 2017-10-09

## 2017-10-09 RX ORDER — METOPROLOL TARTRATE 50 MG
0.5 TABLET ORAL
Qty: 0 | Refills: 0 | DISCHARGE
Start: 2017-10-09

## 2017-10-09 RX ORDER — CYCLOBENZAPRINE HYDROCHLORIDE 10 MG/1
1 TABLET, FILM COATED ORAL
Qty: 0 | Refills: 0 | COMMUNITY

## 2017-10-09 RX ORDER — ALBUTEROL 90 UG/1
2 AEROSOL, METERED ORAL
Qty: 0 | Refills: 0 | COMMUNITY
Start: 2017-10-09

## 2017-10-09 RX ORDER — METOPROLOL TARTRATE 50 MG
1 TABLET ORAL
Qty: 0 | Refills: 0 | COMMUNITY
Start: 2017-10-09

## 2017-10-09 RX ORDER — TIZANIDINE 4 MG/1
1 TABLET ORAL
Qty: 0 | Refills: 0 | COMMUNITY

## 2017-10-09 RX ORDER — METOCLOPRAMIDE HCL 10 MG
1 TABLET ORAL
Qty: 120 | Refills: 0
Start: 2017-10-09

## 2017-10-09 RX ORDER — LEVOTHYROXINE SODIUM 125 MCG
1 TABLET ORAL
Qty: 0 | Refills: 0 | DISCHARGE
Start: 2017-10-09

## 2017-10-09 RX ORDER — METOPROLOL TARTRATE 50 MG
1 TABLET ORAL
Qty: 0 | Refills: 0 | COMMUNITY

## 2017-10-09 RX ORDER — METHOCARBAMOL 500 MG/1
1 TABLET, FILM COATED ORAL
Qty: 90 | Refills: 0
Start: 2017-10-09

## 2017-10-09 RX ORDER — CEPHALEXIN 500 MG
500 CAPSULE ORAL ONCE
Qty: 0 | Refills: 0 | Status: COMPLETED | OUTPATIENT
Start: 2017-10-09 | End: 2017-10-09

## 2017-10-09 RX ORDER — PANTOPRAZOLE SODIUM 20 MG/1
1 TABLET, DELAYED RELEASE ORAL
Qty: 0 | Refills: 0 | DISCHARGE
Start: 2017-10-09

## 2017-10-09 RX ADMIN — Medication 1 MILLIGRAM(S): at 12:48

## 2017-10-09 RX ADMIN — Medication 500 MILLIGRAM(S): at 17:08

## 2017-10-09 RX ADMIN — AMLODIPINE BESYLATE 10 MILLIGRAM(S): 2.5 TABLET ORAL at 05:29

## 2017-10-09 RX ADMIN — Medication 5 MILLIGRAM(S): at 12:48

## 2017-10-09 RX ADMIN — Medication 5 MILLIGRAM(S): at 08:17

## 2017-10-09 RX ADMIN — Medication 50 MILLIGRAM(S): at 05:29

## 2017-10-09 RX ADMIN — Medication 100 MICROGRAM(S): at 05:29

## 2017-10-09 RX ADMIN — Medication 4 UNIT(S): at 11:49

## 2017-10-09 RX ADMIN — Medication 2: at 18:08

## 2017-10-09 RX ADMIN — PANTOPRAZOLE SODIUM 40 MILLIGRAM(S): 20 TABLET, DELAYED RELEASE ORAL at 08:17

## 2017-10-09 RX ADMIN — Medication 4 UNIT(S): at 18:08

## 2017-10-09 RX ADMIN — Medication 50 MILLIGRAM(S): at 18:07

## 2017-10-09 RX ADMIN — Medication 2: at 08:17

## 2017-10-09 RX ADMIN — Medication 5 MILLIGRAM(S): at 18:07

## 2017-10-09 RX ADMIN — Medication 4 UNIT(S): at 08:18

## 2017-10-09 RX ADMIN — Medication 6: at 11:49

## 2017-10-09 RX ADMIN — Medication 1000 UNIT(S): at 12:48

## 2017-10-09 NOTE — DISCHARGE NOTE ADULT - CARE PROVIDER_API CALL
Helio Pandey (DO), Family Medicine  [address] [place] [address]  Phone: [phone]  Fax: [phone]    Yarely Penny), Cardiovascular Disease; Internal Medicine  [address] [place] [id number]  Phone: [phone]  Fax: [phone]    Jeremy Wheeler), Cardiac Electrophysiology; Cardiovascular Disease  [address] [place] [address]  Phone: [phone]  Fax: [phone]    Gustavo Howell), Gastroenterology; Internal Medicine  [address] [place] [address]  Phone: [phone]  Fax: [phone]

## 2017-10-09 NOTE — DISCHARGE NOTE ADULT - HOSPITAL COURSE
72 year old female with PMhx HCV with liver cirrhosis, CAD s/p CABG, BioAVR, DMT2 with CKD3 presented with weakness, aphasia and confusion and admitted and being managed for transient cerebral ischemia. CTH, MRI without any evidence of stroke. Carotid doppler with mild right and moderate left heterogenous plaque and possible Left ICA 70% stenosis. patient refused further imaging and will do so as outpatient. She underwent inplantable loop recordere placement by Dr Olivera (10/09/17).  Well controlled DMT2, dyslipidemia.    Encephalopathy work up un revealing with normal NH3, B12 levels. UA dirty, no cultures collected, doubt infection as afebrile, normal WBC. Repeat episode of unresponsiveness after Ambien, with return to baseline. EEG ordered, but patient refused, accepting risks.    Nausea, vomiting possibly related to cholelithiasis ( no evidence of cholecystitis or choledocholithiasis) or gastroparesis. Transaminases downtrending. Chronic liver disease due to HCV, with imaging consistent for liver cirrhosis with varices, portal hypertension, splenomegaly and non specific abdominal lymphadenopathy. CEA normal. Seen in consultation by GI who started Reglan with improvement of symptoms    As per family, patient is not on ASA, Plavix due to underlying chronic liver disease, anemia, necessitating PRBCs, iron and Epogen for which work up has been negative. Her anemia is currently stable with adequate substrate stores.    Her DMT2 seems to be  well controlled with A1c 5.6, though has hyperglycemia in hospital. She also has CKD 3 with improvement in her renal function during her stay.    No evidence of CHF clinically. Recent TTE with biatrial enlargement, preserved LVEF and functioning BioAVR.     Medically stable for discharge with close outpatient follow up.    Discharge time - 45 minutes 72 year old female with PMhx HCV with liver cirrhosis, CAD s/p CABG, BioAVR, DMT2 with CKD3 presented with weakness, aphasia and confusion and admitted and being managed for transient cerebral ischemia. CTH, MRI without any evidence of stroke. Carotid doppler with mild right and moderate left heterogenous plaque and possible Left ICA 70% stenosis. patient refused further imaging and will do so as outpatient. She underwent inplantable loop recordere placement by Dr Olivera (10/09/17).  Well controlled DMT2, dyslipidemia.    Encephalopathy work up un revealing with normal NH3, B12 levels. UA dirty, no cultures collected, doubt infection as afebrile, normal WBC. Repeat episode of unresponsiveness after Ambien, with return to baseline. EEG ordered, but patient refused, accepting risks of possible seizure with recurrence, and adverse outcomes.    Nausea, vomiting possibly related to cholelithiasis ( no evidence of cholecystitis or choledocholithiasis) or gastroparesis. Transaminases downtrending. Chronic liver disease due to HCV, with imaging consistent for liver cirrhosis with varices, portal hypertension, splenomegaly and non specific abdominal lymphadenopathy. CEA normal. Seen in consultation by GI who started Reglan with improvement of symptoms    As per family, patient is not on ASA, Plavix due to underlying chronic liver disease, anemia, necessitating PRBCs, iron and Epogen for which work up has been negative. Her anemia is currently stable with adequate substrate stores.    Her DMT2 seems to be  well controlled with A1c 5.6, though has hyperglycemia in hospital. She also has CKD 3 with improvement in her renal function during her stay.    No evidence of CHF clinically. Recent TTE with biatrial enlargement, preserved LVEF and functioning BioAVR.     Medically stable for discharge with close outpatient follow up.    Discharge time - 45 minutes

## 2017-10-09 NOTE — DISCHARGE NOTE ADULT - NS AS DC STROKE ED MATERIALS
Need for Followup After Discharge/Prescribed Medications/Stroke Warning Signs and Symptoms/Call 911 for Stroke/Risk Factors for Stroke/Stroke Education Booklet

## 2017-10-09 NOTE — DISCHARGE NOTE ADULT - CARE PROVIDERS DIRECT ADDRESSES
,DirectAddress_Unknown,DirectAddress_Unknown,DirectAddress_Unknown,[email or web address]

## 2017-10-09 NOTE — PROGRESS NOTE ADULT - SUBJECTIVE AND OBJECTIVE BOX
[facility]                                                                     NEUROLOGY AT [place]                                                                       Vilma Vargas [place]                                                                      [address]                                                                           [place], [place], [address]                                                                                 [phone]                                                                           Neurology Progress Note        No new complaints.  No further tremors    Awake and alert, fully oriented  Speech, comprehension intact  Pupils =, reactive  Full visual fields and extraocular movements  Face symmetric.  Good strength bilaterally  No drift  No tremors    Neuro status stable

## 2017-10-09 NOTE — DISCHARGE NOTE ADULT - MEDICATION SUMMARY - MEDICATIONS TO STOP TAKING
I will STOP taking the medications listed below when I get home from the hospital:    gabapentin 300 mg oral tablet  -- 1 tab(s) by mouth once a day    tiZANidine 4 mg oral tablet  -- 1 tab(s) by mouth 3 times a day    traMADol 50 mg oral tablet  -- 1 tab(s) by mouth 3 times a day    cyclobenzaprine 10 mg oral tablet  -- 1 tab(s) by mouth 3 times a day I will STOP taking the medications listed below when I get home from the hospital:    tiZANidine 4 mg oral tablet  -- 1 tab(s) by mouth 3 times a day    traMADol 50 mg oral tablet  -- 1 tab(s) by mouth 3 times a day    cyclobenzaprine 10 mg oral tablet  -- 1 tab(s) by mouth 3 times a day

## 2017-10-09 NOTE — DISCHARGE NOTE ADULT - CARE PLAN
Principal Discharge DX:	Transient cerebral ischemia, unspecified type  Goal:	resolved  Instructions for follow-up, activity and diet:	Diet and medications as prescribed.  Follow up with PMD  Secondary Diagnosis:	Chronic hepatitis C without hepatic coma  Instructions for follow-up, activity and diet:	Diet and medications as prescribed.  Follow up with GI  Secondary Diagnosis:	Diabetes mellitus of other type without complication, unspecified long term insulin use status  Instructions for follow-up, activity and diet:	Diet and medications as prescribed.  Follow up with PMD  Secondary Diagnosis:	Essential hypertension  Instructions for follow-up, activity and diet:	Diet and medications as prescribed.  Follow up with PMD  Secondary Diagnosis:	Coronary artery disease involving native coronary artery of native heart without angina pectoris  Instructions for follow-up, activity and diet:	Diet and medications as prescribed.  Follow up with Cardiology  Secondary Diagnosis:	Anemia, unspecified type  Instructions for follow-up, activity and diet:	Follow up with Hematology  Secondary Diagnosis:	Thrombocytopenia

## 2017-10-09 NOTE — BRIEF OPERATIVE NOTE - PRE-OP DX
Esophagogastroduodenoscopy Procedure Note    Place of Service: [place] [id number]    Patient Name: Reanna Lee    MRN# [id number]    Date of Procedure: 8/8/2017    Surgeon: Luke Crowe MD    Referring Physician: Rosibel Mckeon MD    Primary Care Provider: Rosibel Mckeon MD    Operative Procedure: EGD - esophagogastroduodenoscopy    Preoperative Diagnosis: Mild retrosternal discomfort in a.m. with meals but denies any specific dysphagia or heartburn    Anesthesia Medications administered: as per Anesthesia  Lidocaine 4% 15 mL gargle wasadministered.        Endoscope: [id number]     Accessories: Biopsy Forceps    Procedure Description: The patient was placed in the left lateral position and monitored continuously with automatic blood pressure, ECG tracing, pulse oximetry monitoring and direct observations. Bite block placed and oxygen administered by a nasal cannula as needed. Medications were administered incrementally over the course of the procedure to achieve an adequate level of conscious sedation. After adequate sedation, the endoscope was carefully introduced into the oropharynx and passed into the esophagus. There was normal pharyngeal and laryngeal structure.  The esophagus and GE junction were carefully examined. After advancement of the endoscope into the stomach, a careful examination was performed, including views of the antrum, incisura angularis, corpus and retroflexed views of the cardia and fundus.?   The pylorus was then intubated without any difficulty and the endoscope was advanced to the second portion of the duodenum. Careful examination of the second portion of the duodenum and the bulb was then performed. Findings and intervention are detailed below. The stomach was then decompressed and the endoscope withdrawn.  Overall, the patient tolerated the procedure well without undue discomfort, hypotension or desaturation. The patient recovered in the  observation area and was discharged when adequately recovered.?    Events:     Findings:   Esophagus:   The Z-line was measured at 39 cm, GEJ at 39 cm and hiatus at 40 cm from the incisors.?  Normal    Stomach:  Biopsies obtained to rule out helicobacter pylori, Gastric erythema and small amount of acid hematin without any overt source of bleeding    Duodenum/small bowel:   Normal    Other small submucosal nodule in hypopharynx     Interventions:   Cytology\biopsy: Antrum    Complications: no    Impression:   1. Small hypopharyngeal submucosal nodule   2. Antral erythema status post biopsies with small amount of acid hematin in stomach without any overt source of bleed.    Recommendations:   Avoid aspirin/NSAIDs indefinitely, Await pathology results and follow up with ENT.     Transient cerebral ischemia, unspecified type  10/09/2017    Active  Jeremy Wheeler

## 2017-10-09 NOTE — DISCHARGE NOTE ADULT - PATIENT PORTAL LINK FT
“You can access the [facility] Patient Portal, offered by [facility], by registering with the following website: [email or web address]”

## 2017-10-09 NOTE — DISCHARGE NOTE ADULT - SECONDARY DIAGNOSIS.
Chronic hepatitis C without hepatic coma Diabetes mellitus of other type without complication, unspecified long term insulin use status Essential hypertension Coronary artery disease involving native coronary artery of native heart without angina pectoris Anemia, unspecified type Thrombocytopenia

## 2017-10-09 NOTE — DISCHARGE NOTE ADULT - MEDICATION SUMMARY - MEDICATIONS TO TAKE
I will START or STAY ON the medications listed below when I get home from the hospital:    traMADol 50 mg oral tablet  -- 1 tab(s) by mouth 3 times a day  -- Indication: For Pain as needed    gabapentin 300 mg oral tablet  -- 1 tab(s) by mouth once a day  -- Indication: For Pain    Lantus 100 units/mL subcutaneous solution  -- 10 unit(s) subcutaneous once a day (at bedtime)  -- Indication: For Diabetes mellitus of other type without complication, unspecified long term insulin use status    insulin lispro 100 units/mL subcutaneous solution  --  subcutaneous 3 times a day (with meals) ; 2 Unit(s) if Glucose 151 - 200  4 Unit(s) if Glucose 201 - 250  6 Unit(s) if Glucose 251 - 300  8 Unit(s) if Glucose 301 - 350  10 Unit(s) if Glucose 351 - 400  12 Unit(s) if Glucose GREATER THAN 400  -- Indication: For Diabetes mellitus of other type without complication, unspecified long term insulin use status    metoclopramide 5 mg oral tablet  -- 1 tab(s) by mouth 4 times a day (before meals and at bedtime)  -- Indication: For Nausea    metoprolol tartrate 50 mg oral tablet  -- 1 tab(s) by mouth every 8 hours  -- Indication: For Essential hypertension    albuterol 90 mcg/inh inhalation aerosol  -- 2 puff(s) inhaled every 6 hours, As needed, Shortness of Breath  -- Indication: For breathing    Dulera 200 mcg-5 mcg/inh inhalation aerosol  -- 2 puff(s) inhaled 2 times a day  -- Indication: For breathing    amLODIPine 10 mg oral tablet  -- 1 tab(s) by mouth once a day  -- Indication: For Essential hypertension    montelukast 10 mg oral tablet  -- 1 tab(s) by mouth once a day  -- Indication: For Asthma    methocarbamol 500 mg oral tablet  -- 1 tab(s) by mouth every 8 hours, As needed, pain  -- Indication: For Pain    Nasonex 50 mcg/inh nasal spray  -- 2 spray(s) into nose once a day  -- Indication: For rhinitis    melatonin 5 mg oral capsule  -- 2 cap(s) by mouth once a day (at bedtime)  -- Indication: For Sleep    pantoprazole 40 mg oral delayed release tablet  -- 1 tab(s) by mouth once a day (before a meal)  -- Indication: For reflux    levothyroxine 100 mcg (0.1 mg) oral tablet  -- 1 tab(s) by mouth once a day  -- Indication: For Thyroid    folic acid 1 mg oral tablet  -- 1 tab(s) by mouth once a day  -- Indication: For Supplement    Vitamin D3 1000 intl units oral tablet  -- 1 tab(s) by mouth once a day  -- Indication: For Suplement I will START or STAY ON the medications listed below when I get home from the hospital:    traMADol 50 mg oral tablet  -- 1 tab(s) by mouth 3 times a day  -- Indication: For Discontinue    gabapentin 300 mg oral tablet  -- 1 tab(s) by mouth once a day  -- Indication: For Discontinue    Lantus 100 units/mL subcutaneous solution  -- 10 unit(s) subcutaneous once a day (at bedtime)  -- Indication: For Diabetes mellitus of other type without complication, unspecified long term insulin use status    insulin lispro 100 units/mL subcutaneous solution  --  subcutaneous 3 times a day (with meals) ; 2 Unit(s) if Glucose 151 - 200  4 Unit(s) if Glucose 201 - 250  6 Unit(s) if Glucose 251 - 300  8 Unit(s) if Glucose 301 - 350  10 Unit(s) if Glucose 351 - 400  12 Unit(s) if Glucose GREATER THAN 400  -- Indication: For Diabetes mellitus of other type without complication, unspecified long term insulin use status    metoclopramide 5 mg oral tablet  -- 1 tab(s) by mouth 4 times a day (before meals and at bedtime)  -- Indication: For Nausea    metoprolol tartrate 50 mg oral tablet  -- 1 tab(s) by mouth every 8 hours  -- Indication: For Coronary artery disease involving native coronary artery of native heart without angina pectoris    albuterol 90 mcg/inh inhalation aerosol  -- 2 puff(s) inhaled every 6 hours, As needed, Shortness of Breath  -- Indication: For breathing    Dulera 200 mcg-5 mcg/inh inhalation aerosol  -- 2 puff(s) inhaled 2 times a day  -- Indication: For breathing    amLODIPine 10 mg oral tablet  -- 1 tab(s) by mouth once a day  -- Indication: For Essential hypertension    montelukast 10 mg oral tablet  -- 1 tab(s) by mouth once a day  -- Indication: For breathing    methocarbamol 500 mg oral tablet  -- 1 tab(s) by mouth every 8 hours, As needed, pain  -- Indication: For Pain    Nasonex 50 mcg/inh nasal spray  -- 2 spray(s) into nose once a day  -- Indication: For rhinitis    melatonin 5 mg oral capsule  -- 2 cap(s) by mouth once a day (at bedtime)  -- Indication: For sleep    pantoprazole 40 mg oral delayed release tablet  -- 1 tab(s) by mouth once a day (before a meal)  -- Indication: For Cirrhosis of liver without ascites, unspecified hepatic cirrhosis type    levothyroxine 100 mcg (0.1 mg) oral tablet  -- 1 tab(s) by mouth once a day  -- Indication: For Thyroid    folic acid 1 mg oral tablet  -- 1 tab(s) by mouth once a day  -- Indication: For supplement    Vitamin D3 1000 intl units oral tablet  -- 1 tab(s) by mouth once a day  -- Indication: For supplement

## 2017-10-09 NOTE — DISCHARGE NOTE ADULT - PLAN OF CARE
resolved Diet and medications as prescribed.  Follow up with PMD Diet and medications as prescribed.  Follow up with GI Diet and medications as prescribed.  Follow up with Cardiology Follow up with Hematology

## 2017-10-09 NOTE — DISCHARGE NOTE ADULT - NS AS ACTIVITY OBS
No Heavy lifting/straining/Stairs allowed/Do not drive or operate machinery/Bathing allowed/Showering allowed/Walking-Indoors allowed/Do not make important decisions/Walking-Outdoors allowed

## 2017-10-09 NOTE — PROGRESS NOTE ADULT - PROVIDER SPECIALTY LIST ADULT
Gastroenterology
Gastroenterology
Hospitalist
Hospitalist
Neurology
Gastroenterology
Hospitalist
Cardiology
Hospitalist

## 2017-10-19 PROCEDURE — 85610 PROTHROMBIN TIME: CPT

## 2017-10-19 PROCEDURE — 93306 TTE W/DOPPLER COMPLETE: CPT

## 2017-10-19 PROCEDURE — 82728 ASSAY OF FERRITIN: CPT

## 2017-10-19 PROCEDURE — 84484 ASSAY OF TROPONIN QUANT: CPT

## 2017-10-19 PROCEDURE — 93005 ELECTROCARDIOGRAM TRACING: CPT

## 2017-10-19 PROCEDURE — 36430 TRANSFUSION BLD/BLD COMPNT: CPT

## 2017-10-19 PROCEDURE — 82746 ASSAY OF FOLIC ACID SERUM: CPT

## 2017-10-19 PROCEDURE — 85045 AUTOMATED RETICULOCYTE COUNT: CPT

## 2017-10-19 PROCEDURE — 86901 BLOOD TYPING SEROLOGIC RH(D): CPT

## 2017-10-19 PROCEDURE — 71045 X-RAY EXAM CHEST 1 VIEW: CPT

## 2017-10-19 PROCEDURE — 81003 URINALYSIS AUTO W/O SCOPE: CPT

## 2017-10-19 PROCEDURE — 36000 PLACE NEEDLE IN VEIN: CPT

## 2017-10-19 PROCEDURE — 82550 ASSAY OF CK (CPK): CPT

## 2017-10-19 PROCEDURE — 86850 RBC ANTIBODY SCREEN: CPT

## 2017-10-19 PROCEDURE — 83880 ASSAY OF NATRIURETIC PEPTIDE: CPT

## 2017-10-19 PROCEDURE — 82607 VITAMIN B-12: CPT

## 2017-10-19 PROCEDURE — 84466 ASSAY OF TRANSFERRIN: CPT

## 2017-10-19 PROCEDURE — 85027 COMPLETE CBC AUTOMATED: CPT

## 2017-10-19 PROCEDURE — 83036 HEMOGLOBIN GLYCOSYLATED A1C: CPT

## 2017-10-19 PROCEDURE — P9016: CPT

## 2017-10-19 PROCEDURE — 85730 THROMBOPLASTIN TIME PARTIAL: CPT

## 2017-10-19 PROCEDURE — 36415 COLL VENOUS BLD VENIPUNCTURE: CPT

## 2017-10-19 PROCEDURE — 80048 BASIC METABOLIC PNL TOTAL CA: CPT

## 2017-10-19 PROCEDURE — 86920 COMPATIBILITY TEST SPIN: CPT

## 2017-10-19 PROCEDURE — 82270 OCCULT BLOOD FECES: CPT

## 2017-10-19 PROCEDURE — 99285 EMERGENCY DEPT VISIT HI MDM: CPT | Mod: 25

## 2017-10-19 PROCEDURE — 86900 BLOOD TYPING SEROLOGIC ABO: CPT

## 2017-10-19 PROCEDURE — 83550 IRON BINDING TEST: CPT

## 2017-10-19 PROCEDURE — 80053 COMPREHEN METABOLIC PANEL: CPT

## 2018-02-07 ENCOUNTER — APPOINTMENT (OUTPATIENT)
Dept: PULMONOLOGY | Facility: CLINIC | Age: 73
End: 2018-02-07
Payer: MEDICARE

## 2018-02-07 VITALS
SYSTOLIC BLOOD PRESSURE: 124 MMHG | BODY MASS INDEX: 34.44 KG/M2 | OXYGEN SATURATION: 98 % | DIASTOLIC BLOOD PRESSURE: 80 MMHG | WEIGHT: 170.5 LBS | HEART RATE: 84 BPM

## 2018-02-07 DIAGNOSIS — J90 PLEURAL EFFUSION, NOT ELSEWHERE CLASSIFIED: ICD-10-CM

## 2018-02-07 DIAGNOSIS — Z87.09 PERSONAL HISTORY OF OTHER DISEASES OF THE RESPIRATORY SYSTEM: ICD-10-CM

## 2018-02-07 PROCEDURE — 99214 OFFICE O/P EST MOD 30 MIN: CPT

## 2018-02-07 RX ORDER — MONTELUKAST SODIUM 10 MG/1
10 TABLET, FILM COATED ORAL
Refills: 0 | Status: DISCONTINUED | COMMUNITY
End: 2018-02-07

## 2018-02-07 RX ORDER — DARBEPOETIN ALFA 300 UG/.6ML
300 INJECTION, SOLUTION INTRAVENOUS; SUBCUTANEOUS
Qty: 2 | Refills: 0 | Status: DISCONTINUED | COMMUNITY
Start: 2017-09-21

## 2018-02-07 RX ORDER — METOPROLOL TARTRATE 25 MG/1
25 TABLET, FILM COATED ORAL
Qty: 60 | Refills: 0 | Status: DISCONTINUED | COMMUNITY
Start: 2016-07-08 | End: 2018-02-07

## 2018-02-07 RX ORDER — DOXYCYCLINE HYCLATE 100 MG/1
100 TABLET ORAL
Qty: 14 | Refills: 0 | Status: DISCONTINUED | COMMUNITY
Start: 2017-09-05

## 2018-02-07 RX ORDER — INSULIN GLARGINE 100 [IU]/ML
100 INJECTION, SOLUTION SUBCUTANEOUS
Qty: 15 | Refills: 0 | Status: DISCONTINUED | COMMUNITY
Start: 2016-03-21 | End: 2018-02-07

## 2018-02-07 RX ORDER — NAPROXEN 500 MG/1
500 TABLET ORAL
Qty: 30 | Refills: 0 | Status: DISCONTINUED | COMMUNITY
Start: 2017-09-05

## 2018-02-07 RX ORDER — INSULIN GLARGINE 100 [IU]/ML
100 INJECTION, SOLUTION SUBCUTANEOUS
Refills: 0 | Status: DISCONTINUED | COMMUNITY
End: 2018-02-07

## 2018-02-07 RX ORDER — TIZANIDINE 4 MG/1
4 TABLET ORAL
Qty: 90 | Refills: 0 | Status: DISCONTINUED | COMMUNITY
Start: 2017-08-07

## 2018-02-07 RX ORDER — TIZANIDINE HYDROCHLORIDE 4 MG/1
4 CAPSULE ORAL
Refills: 0 | Status: DISCONTINUED | COMMUNITY
End: 2018-02-07

## 2018-02-07 RX ORDER — TOBRAMYCIN AND DEXAMETHASONE 3; 1 MG/G; MG/G
0.3-0.1 OINTMENT OPHTHALMIC
Qty: 4 | Refills: 0 | Status: DISCONTINUED | COMMUNITY
Start: 2016-09-26 | End: 2018-02-07

## 2018-02-07 RX ORDER — TIZANIDINE 2 MG/1
2 TABLET ORAL
Qty: 60 | Refills: 2 | Status: DISCONTINUED | COMMUNITY
Start: 2017-04-21 | End: 2018-02-07

## 2018-02-07 RX ORDER — INSULIN ASPART 100 [IU]/ML
100 INJECTION, SOLUTION INTRAVENOUS; SUBCUTANEOUS
Qty: 90 | Refills: 0 | Status: DISCONTINUED | COMMUNITY
Start: 2017-03-27

## 2018-03-10 ENCOUNTER — EMERGENCY (EMERGENCY)
Facility: HOSPITAL | Age: 73
LOS: 1 days | Discharge: HOME | End: 2018-03-10
Attending: EMERGENCY MEDICINE
Payer: MEDICARE

## 2018-03-10 VITALS
HEIGHT: 59 IN | DIASTOLIC BLOOD PRESSURE: 79 MMHG | RESPIRATION RATE: 18 BRPM | WEIGHT: 179.9 LBS | OXYGEN SATURATION: 98 % | SYSTOLIC BLOOD PRESSURE: 159 MMHG | HEART RATE: 84 BPM

## 2018-03-10 DIAGNOSIS — Z95.2 PRESENCE OF PROSTHETIC HEART VALVE: Chronic | ICD-10-CM

## 2018-03-10 DIAGNOSIS — Z98.89 OTHER SPECIFIED POSTPROCEDURAL STATES: Chronic | ICD-10-CM

## 2018-03-10 DIAGNOSIS — Z95.1 PRESENCE OF AORTOCORONARY BYPASS GRAFT: Chronic | ICD-10-CM

## 2018-03-10 LAB
ALBUMIN SERPL ELPH-MCNC: 3.5 G/DL — SIGNIFICANT CHANGE UP (ref 3.3–5.2)
ALP SERPL-CCNC: 127 U/L — HIGH (ref 40–120)
ALT FLD-CCNC: 53 U/L — HIGH
ANION GAP SERPL CALC-SCNC: 17 MMOL/L — SIGNIFICANT CHANGE UP (ref 5–17)
ANISOCYTOSIS BLD QL: SLIGHT — SIGNIFICANT CHANGE UP
APTT BLD: 33.3 SEC — SIGNIFICANT CHANGE UP (ref 27.5–37.4)
AST SERPL-CCNC: 83 U/L — HIGH
BASOPHILS # BLD AUTO: 0 K/UL — SIGNIFICANT CHANGE UP (ref 0–0.2)
BASOPHILS NFR BLD AUTO: 0.2 % — SIGNIFICANT CHANGE UP (ref 0–2)
BILIRUB SERPL-MCNC: 0.3 MG/DL — LOW (ref 0.4–2)
BLD GP AB SCN SERPL QL: SIGNIFICANT CHANGE UP
BUN SERPL-MCNC: 50 MG/DL — HIGH (ref 8–20)
CALCIUM SERPL-MCNC: 8.7 MG/DL — SIGNIFICANT CHANGE UP (ref 8.6–10.2)
CHLORIDE SERPL-SCNC: 103 MMOL/L — SIGNIFICANT CHANGE UP (ref 98–107)
CK SERPL-CCNC: 130 U/L — SIGNIFICANT CHANGE UP (ref 25–170)
CO2 SERPL-SCNC: 19 MMOL/L — LOW (ref 22–29)
CREAT SERPL-MCNC: 1.46 MG/DL — HIGH (ref 0.5–1.3)
DACRYOCYTES BLD QL SMEAR: SLIGHT — SIGNIFICANT CHANGE UP
EOSINOPHIL # BLD AUTO: 0.2 K/UL — SIGNIFICANT CHANGE UP (ref 0–0.5)
EOSINOPHIL NFR BLD AUTO: 3 % — SIGNIFICANT CHANGE UP (ref 0–6)
GLUCOSE SERPL-MCNC: 307 MG/DL — HIGH (ref 70–115)
HCT VFR BLD CALC: 21.9 % — LOW (ref 37–47)
HGB BLD-MCNC: 7.1 G/DL — LOW (ref 12–16)
HYPOCHROMIA BLD QL: SIGNIFICANT CHANGE UP
INR BLD: 1.08 RATIO — SIGNIFICANT CHANGE UP (ref 0.88–1.16)
LYMPHOCYTES # BLD AUTO: 0.6 K/UL — LOW (ref 1–4.8)
LYMPHOCYTES # BLD AUTO: 11.9 % — LOW (ref 20–55)
MACROCYTES BLD QL: SLIGHT — SIGNIFICANT CHANGE UP
MCHC RBC-ENTMCNC: 28.4 PG — SIGNIFICANT CHANGE UP (ref 27–31)
MCHC RBC-ENTMCNC: 32.4 G/DL — SIGNIFICANT CHANGE UP (ref 32–36)
MCV RBC AUTO: 87.6 FL — SIGNIFICANT CHANGE UP (ref 81–99)
MICROCYTES BLD QL: SLIGHT — SIGNIFICANT CHANGE UP
MONOCYTES # BLD AUTO: 0.2 K/UL — SIGNIFICANT CHANGE UP (ref 0–0.8)
MONOCYTES NFR BLD AUTO: 4.8 % — SIGNIFICANT CHANGE UP (ref 3–10)
NEUTROPHILS # BLD AUTO: 4 K/UL — SIGNIFICANT CHANGE UP (ref 1.8–8)
NEUTROPHILS NFR BLD AUTO: 79.5 % — HIGH (ref 37–73)
OVALOCYTES BLD QL SMEAR: SLIGHT — SIGNIFICANT CHANGE UP
PLAT MORPH BLD: NORMAL — SIGNIFICANT CHANGE UP
PLATELET # BLD AUTO: 151 K/UL — SIGNIFICANT CHANGE UP (ref 150–400)
POIKILOCYTOSIS BLD QL AUTO: SLIGHT — SIGNIFICANT CHANGE UP
POLYCHROMASIA BLD QL SMEAR: SLIGHT — SIGNIFICANT CHANGE UP
POTASSIUM SERPL-MCNC: 4.2 MMOL/L — SIGNIFICANT CHANGE UP (ref 3.5–5.3)
POTASSIUM SERPL-SCNC: 4.2 MMOL/L — SIGNIFICANT CHANGE UP (ref 3.5–5.3)
PROT SERPL-MCNC: 7.4 G/DL — SIGNIFICANT CHANGE UP (ref 6.6–8.7)
PROTHROM AB SERPL-ACNC: 11.9 SEC — SIGNIFICANT CHANGE UP (ref 9.8–12.7)
RBC # BLD: 2.5 M/UL — LOW (ref 4.4–5.2)
RBC # FLD: 16.9 % — HIGH (ref 11–15.6)
RBC BLD AUTO: ABNORMAL
SCHISTOCYTES BLD QL AUTO: SLIGHT — SIGNIFICANT CHANGE UP
SODIUM SERPL-SCNC: 139 MMOL/L — SIGNIFICANT CHANGE UP (ref 135–145)
TROPONIN T SERPL-MCNC: 0.03 NG/ML — SIGNIFICANT CHANGE UP (ref 0–0.06)
TYPE + AB SCN PNL BLD: SIGNIFICANT CHANGE UP
WBC # BLD: 5 K/UL — SIGNIFICANT CHANGE UP (ref 4.8–10.8)
WBC # FLD AUTO: 5 K/UL — SIGNIFICANT CHANGE UP (ref 4.8–10.8)

## 2018-03-10 PROCEDURE — 99284 EMERGENCY DEPT VISIT MOD MDM: CPT

## 2018-03-10 PROCEDURE — 93010 ELECTROCARDIOGRAM REPORT: CPT

## 2018-03-10 RX ORDER — MOMETASONE FUROATE AND FORMOTEROL FUMARATE DIHYDRATE 200; 5 UG/1; UG/1
2 AEROSOL RESPIRATORY (INHALATION)
Qty: 0 | Refills: 0 | COMMUNITY

## 2018-03-10 NOTE — ED ADULT NURSE NOTE - OBJECTIVE STATEMENT
pt a+ox4, presents to ED c/o sudden onset of dizziness, sweating and weakness. pt states she experienced 2 episodes of dizziness and profuse sweating today and both times sugar was elevated. pt states she was seen by hematologist yesterday, hbg was 7.6 and was supposed to follow up with BW tomorrow incase she needed a blood transfusion. pt denies recent fever, chills, night sweats. pt denies chest pain or chest discomfort. pt reports intermittent SOB and diff breathing, reports worsening with activity. pt sitting comfortably in bed, family @ bedside. will continue to monitor.

## 2018-03-10 NOTE — ED ADULT TRIAGE NOTE - CHIEF COMPLAINT QUOTE
Pt c/o dizziness, diaphoresis, and shortness of breath prior to arrival, pt "checked sugar at home and it was 177", pt with hx of anemia and last blood transfusion in December, weekly bloodwork was done yesterday with hemoglobin 7.6

## 2018-03-10 NOTE — ED PROVIDER NOTE - OBJECTIVE STATEMENT
73 y/o F, with hx of chronic anemia, CAD, DM, HTN, Hepatitis C, and hx of CABG, presents to the ED c/o dizziness, diaphoresis, and shortness of breath, throughout the day.  Pt also states that when she checked her blood sugar after lunch it was at 177.  15 minutes later, her blood sugar was at 546.  Pt states yesterday she saw her hematologist and received blood work.  It was found that her hemoglobin levels were at 7.6.  Pt has an appointment set for Monday for a blood type match to receive a transfusion.  Pt was referred to the ED by her doctor, who wants her to received a transfusion sooner.  Denies falling, LOC, or head trauma. denies fever. denies HA or neck pain. no chest pain. no abd pain. no n/v/d. no urinary f/u/d. no back pain. no motor or sensory deficits. denies illicit drug use. no recent travel. no rash. no other acute issues symptoms or concerns.  PCP: Dr. Helio Pandey  Hematologist: Dr. Pastor.

## 2018-03-10 NOTE — ED PROVIDER NOTE - MEDICAL DECISION MAKING DETAILS
hemodynamicaay stable no sigsn acute bleeding plan for tx 2 untis prbs observe for any reaction and d/c home pt agrees to plan of care

## 2018-03-11 VITALS
TEMPERATURE: 98 F | HEART RATE: 80 BPM | DIASTOLIC BLOOD PRESSURE: 82 MMHG | SYSTOLIC BLOOD PRESSURE: 171 MMHG | RESPIRATION RATE: 20 BRPM | OXYGEN SATURATION: 98 %

## 2018-03-11 LAB — ABO RH CONFIRMATION: SIGNIFICANT CHANGE UP

## 2018-03-11 PROCEDURE — 96374 THER/PROPH/DIAG INJ IV PUSH: CPT

## 2018-03-11 PROCEDURE — 80053 COMPREHEN METABOLIC PANEL: CPT

## 2018-03-11 PROCEDURE — 82550 ASSAY OF CK (CPK): CPT

## 2018-03-11 PROCEDURE — 86901 BLOOD TYPING SEROLOGIC RH(D): CPT

## 2018-03-11 PROCEDURE — 85730 THROMBOPLASTIN TIME PARTIAL: CPT

## 2018-03-11 PROCEDURE — 99285 EMERGENCY DEPT VISIT HI MDM: CPT | Mod: 25

## 2018-03-11 PROCEDURE — 86850 RBC ANTIBODY SCREEN: CPT

## 2018-03-11 PROCEDURE — 93005 ELECTROCARDIOGRAM TRACING: CPT

## 2018-03-11 PROCEDURE — 85027 COMPLETE CBC AUTOMATED: CPT

## 2018-03-11 PROCEDURE — P9016: CPT

## 2018-03-11 PROCEDURE — 86900 BLOOD TYPING SEROLOGIC ABO: CPT

## 2018-03-11 PROCEDURE — 36415 COLL VENOUS BLD VENIPUNCTURE: CPT

## 2018-03-11 PROCEDURE — 36430 TRANSFUSION BLD/BLD COMPNT: CPT

## 2018-03-11 PROCEDURE — 86923 COMPATIBILITY TEST ELECTRIC: CPT

## 2018-03-11 PROCEDURE — 85610 PROTHROMBIN TIME: CPT

## 2018-03-11 PROCEDURE — 84484 ASSAY OF TROPONIN QUANT: CPT

## 2018-03-11 RX ORDER — FUROSEMIDE 40 MG
20 TABLET ORAL ONCE
Qty: 0 | Refills: 0 | Status: COMPLETED | OUTPATIENT
Start: 2018-03-11 | End: 2018-03-11

## 2018-03-11 RX ADMIN — Medication 20 MILLIGRAM(S): at 05:57

## 2018-03-11 NOTE — ED ADULT NURSE REASSESSMENT NOTE - NS ED NURSE REASSESS COMMENT FT1
pt status unchanged, refer to flowsheet and chart, pt safety maintained, pt hemodynamically stable, discharge pending completion of second PRBC

## 2018-03-11 NOTE — ED ADULT NURSE REASSESSMENT NOTE - NS ED NURSE REASSESS COMMENT FT1
pt hemodynamically stable, PIV removed, refer to flowsheet and chart, pt to be discharged, pt understood discharge instructions and plan of care, pt will follow up with hematologist tmrw for repeat blood work

## 2018-03-11 NOTE — ED ADULT NURSE REASSESSMENT NOTE - NS ED NURSE REASSESS COMMENT FT1
1st unit PRBC complete @ this time. pt denies any pain or discomfort. pt denies any chest pain or SOB. pt NSR on monitor @ 77, maintaining 02 on RA. pt in no apparent distress or discomfort. will continue to monitor.

## 2018-03-11 NOTE — ED ADULT NURSE REASSESSMENT NOTE - NS ED NURSE REASSESS COMMENT FT1
Per MD, 2nd Unit PRBC initiated at this time. blood verified with ALBIN Johnson @ bedside. pt educated on adverse reactions to transfusions, pt verbalizes understanding. pt denies chest pain or SOB. pt denies pain or discomfort. pt in no apparent distress, NSR on monitor @ 77 and maintaining 02 on RA. will continue to monitor.

## 2018-03-11 NOTE — ED ADULT NURSE REASSESSMENT NOTE - NS ED NURSE REASSESS COMMENT FT1
per MD orders, 1st Unit PRBC initiated @ this time. blood verified with ALBIN Lerner @ bedside. pt a+ox4, denies any chest pain or chest discomfort. pt denies any SOB or diff breathing. pt NSR on monitor @84, maintaining 02 on RA and afebrile. pt educated on adverse reaction to transfusion, pt verbalizes understanding. pt in no apparent distress, will continue to monitor.

## 2018-03-22 NOTE — ED ADULT NURSE NOTE - NIH STROKE SCALE: 8. SENSORY, QM
REVIEW OF SYSTEMS  General:	  Skin/Breast:  Ophthalmologic:  ENMT:	  Respiratory and Thorax:  Cardiovascular:	  Gastrointestinal:	  Genitourinary:	  Musculoskeletal:	  Neurological: REVIEW OF SYSTEMS  General: + Fatigue, No fever or chills  Skin/Breast: No itching or rash  Ophthalmologic: No eye pain or vision changes  ENMT: + Dry mouth, No nasal congestion  Respiratory and Thorax: No shortness of breath or cough  Cardiovascular: No chest pain or palpitations  Gastrointestinal: + Abdominal pain, No vomiting or diarrhea  Genitourinary: No dysuria or hematuria  Musculoskeletal: No joint pain or back pain  Neurological: No headache or dizziness  Endocrine: + Polyuria, + Polydipsia (0) Normal; no sensory loss

## 2018-09-19 NOTE — DISCHARGE NOTE ADULT - SECONDARY DIAGNOSIS.
Patients mother notified. CAD (coronary artery disease) Diabetes Hepatitis C Heart failure Hypertension Chronic kidney disease

## 2018-11-30 NOTE — ED ADULT NURSE NOTE - BREATH SOUNDS, MLM
Refer To    [facility]               Reason for Referral: [ ]            NEUROLOGY REFER TO:  LUKE Castillo MD; Scott Schaffer MD; [address], [address]; [place], [place] Ph: [phone]; [address] [place] [address],[address], [place], Ph [phone]   [facility] ; Brenden Yang DO; [address], [place] Ph [phone] Fax [phone]   [facility]: Dr. Rick Molina, Dr. Lynette Rivera, Dr. aMson Lee, [facility] [address] [facility] Ph: [phone]     Kalin Baker M.D. 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    
Clear

## 2019-07-12 RX ORDER — AZTREONAM 2 G
1 VIAL (EA) INJECTION
Qty: 0 | Refills: 0 | DISCHARGE
Start: 2019-07-12 | End: 2019-07-18

## 2019-07-17 ENCOUNTER — INPATIENT (INPATIENT)
Facility: HOSPITAL | Age: 74
LOS: 4 days | Discharge: HOME HEALTH | DRG: 682 | End: 2019-07-22
Attending: INTERNAL MEDICINE | Admitting: EMERGENCY MEDICINE
Payer: COMMERCIAL

## 2019-07-17 VITALS — TEMPERATURE: 98 F | HEART RATE: 32 BPM | RESPIRATION RATE: 22 BRPM | OXYGEN SATURATION: 92 %

## 2019-07-17 DIAGNOSIS — Z98.89 OTHER SPECIFIED POSTPROCEDURAL STATES: Chronic | ICD-10-CM

## 2019-07-17 DIAGNOSIS — E87.5 HYPERKALEMIA: ICD-10-CM

## 2019-07-17 DIAGNOSIS — B18.2 CHRONIC VIRAL HEPATITIS C: ICD-10-CM

## 2019-07-17 DIAGNOSIS — R74.0 NONSPECIFIC ELEVATION OF LEVELS OF TRANSAMINASE AND LACTIC ACID DEHYDROGENASE [LDH]: ICD-10-CM

## 2019-07-17 DIAGNOSIS — R00.1 BRADYCARDIA, UNSPECIFIED: ICD-10-CM

## 2019-07-17 DIAGNOSIS — N17.9 ACUTE KIDNEY FAILURE, UNSPECIFIED: ICD-10-CM

## 2019-07-17 DIAGNOSIS — T44.7X5A ADVERSE EFFECT OF BETA-ADRENORECEPTOR ANTAGONISTS, INITIAL ENCOUNTER: ICD-10-CM

## 2019-07-17 DIAGNOSIS — R09.02 HYPOXEMIA: ICD-10-CM

## 2019-07-17 DIAGNOSIS — R55 SYNCOPE AND COLLAPSE: ICD-10-CM

## 2019-07-17 DIAGNOSIS — E87.2 ACIDOSIS: ICD-10-CM

## 2019-07-17 DIAGNOSIS — Z95.2 PRESENCE OF PROSTHETIC HEART VALVE: Chronic | ICD-10-CM

## 2019-07-17 DIAGNOSIS — I50.33 ACUTE ON CHRONIC DIASTOLIC (CONGESTIVE) HEART FAILURE: ICD-10-CM

## 2019-07-17 DIAGNOSIS — Z95.1 PRESENCE OF AORTOCORONARY BYPASS GRAFT: Chronic | ICD-10-CM

## 2019-07-17 LAB
ALBUMIN SERPL ELPH-MCNC: 2.7 G/DL — LOW (ref 3.3–5.2)
ALBUMIN SERPL ELPH-MCNC: 3.6 G/DL — SIGNIFICANT CHANGE UP (ref 3.3–5.2)
ALP SERPL-CCNC: 129 U/L — HIGH (ref 40–120)
ALP SERPL-CCNC: 97 U/L — SIGNIFICANT CHANGE UP (ref 40–120)
ALT FLD-CCNC: 31 U/L — SIGNIFICANT CHANGE UP
ALT FLD-CCNC: 42 U/L — HIGH
ANION GAP SERPL CALC-SCNC: 11 MMOL/L — SIGNIFICANT CHANGE UP (ref 5–17)
ANION GAP SERPL CALC-SCNC: 13 MMOL/L — SIGNIFICANT CHANGE UP (ref 5–17)
ANION GAP SERPL CALC-SCNC: 9 MMOL/L — SIGNIFICANT CHANGE UP (ref 5–17)
APPEARANCE UR: CLEAR — SIGNIFICANT CHANGE UP
APTT BLD: 25.2 SEC — LOW (ref 27.5–36.3)
AST SERPL-CCNC: 62 U/L — HIGH
AST SERPL-CCNC: 90 U/L — HIGH
BACTERIA # UR AUTO: ABNORMAL
BASE EXCESS BLDV CALC-SCNC: -2.5 MMOL/L — LOW (ref -2–2)
BILIRUB SERPL-MCNC: 0.2 MG/DL — LOW (ref 0.4–2)
BILIRUB SERPL-MCNC: <0.2 MG/DL — LOW (ref 0.4–2)
BILIRUB UR-MCNC: NEGATIVE — SIGNIFICANT CHANGE UP
BLD GP AB SCN SERPL QL: SIGNIFICANT CHANGE UP
BUN SERPL-MCNC: 31 MG/DL — HIGH (ref 8–20)
BUN SERPL-MCNC: 47 MG/DL — HIGH (ref 8–20)
BUN SERPL-MCNC: 53 MG/DL — HIGH (ref 8–20)
CA-I SERPL-SCNC: 1.07 MMOL/L — LOW (ref 1.15–1.33)
CALCIUM SERPL-MCNC: 7 MG/DL — LOW (ref 8.6–10.2)
CALCIUM SERPL-MCNC: 8.8 MG/DL — SIGNIFICANT CHANGE UP (ref 8.6–10.2)
CALCIUM SERPL-MCNC: 8.9 MG/DL — SIGNIFICANT CHANGE UP (ref 8.6–10.2)
CHLORIDE BLDV-SCNC: 108 MMOL/L — HIGH (ref 98–107)
CHLORIDE SERPL-SCNC: 100 MMOL/L — SIGNIFICANT CHANGE UP (ref 98–107)
CHLORIDE SERPL-SCNC: 106 MMOL/L — SIGNIFICANT CHANGE UP (ref 98–107)
CHLORIDE SERPL-SCNC: 112 MMOL/L — HIGH (ref 98–107)
CO2 SERPL-SCNC: 18 MMOL/L — LOW (ref 22–29)
CO2 SERPL-SCNC: 18 MMOL/L — LOW (ref 22–29)
CO2 SERPL-SCNC: 25 MMOL/L — SIGNIFICANT CHANGE UP (ref 22–29)
COLOR SPEC: YELLOW — SIGNIFICANT CHANGE UP
CREAT SERPL-MCNC: 2.46 MG/DL — HIGH (ref 0.5–1.3)
CREAT SERPL-MCNC: 2.9 MG/DL — HIGH (ref 0.5–1.3)
CREAT SERPL-MCNC: 3.41 MG/DL — HIGH (ref 0.5–1.3)
DIFF PNL FLD: NEGATIVE — SIGNIFICANT CHANGE UP
EPI CELLS # UR: SIGNIFICANT CHANGE UP
GAS PNL BLDV: 138 MMOL/L — SIGNIFICANT CHANGE UP (ref 135–145)
GAS PNL BLDV: SIGNIFICANT CHANGE UP
GAS PNL BLDV: SIGNIFICANT CHANGE UP
GLUCOSE BLDC GLUCOMTR-MCNC: 112 MG/DL — HIGH (ref 70–99)
GLUCOSE BLDC GLUCOMTR-MCNC: 174 MG/DL — HIGH (ref 70–99)
GLUCOSE BLDV-MCNC: 109 MG/DL — HIGH (ref 70–99)
GLUCOSE SERPL-MCNC: 102 MG/DL — SIGNIFICANT CHANGE UP (ref 70–115)
GLUCOSE SERPL-MCNC: 110 MG/DL — SIGNIFICANT CHANGE UP (ref 70–115)
GLUCOSE SERPL-MCNC: 136 MG/DL — HIGH (ref 70–115)
GLUCOSE UR QL: NEGATIVE MG/DL — SIGNIFICANT CHANGE UP
HCO3 BLDV-SCNC: 22 MMOL/L — SIGNIFICANT CHANGE UP (ref 20–26)
HCT VFR BLD CALC: 31.2 % — LOW (ref 34.5–45)
HCT VFR BLDA CALC: 28 — LOW (ref 39–50)
HGB BLD CALC-MCNC: 9.2 G/DL — LOW (ref 11.5–15.5)
HGB BLD-MCNC: 9.3 G/DL — LOW (ref 11.5–15.5)
INR BLD: 1.04 RATIO — SIGNIFICANT CHANGE UP (ref 0.88–1.16)
KETONES UR-MCNC: NEGATIVE — SIGNIFICANT CHANGE UP
LACTATE BLDV-MCNC: 1.6 MMOL/L — SIGNIFICANT CHANGE UP (ref 0.5–2)
LEUKOCYTE ESTERASE UR-ACNC: NEGATIVE — SIGNIFICANT CHANGE UP
MAGNESIUM SERPL-MCNC: 2 MG/DL — SIGNIFICANT CHANGE UP (ref 1.6–2.6)
MCHC RBC-ENTMCNC: 29.4 PG — SIGNIFICANT CHANGE UP (ref 27–34)
MCHC RBC-ENTMCNC: 29.8 GM/DL — LOW (ref 32–36)
MCV RBC AUTO: 98.7 FL — SIGNIFICANT CHANGE UP (ref 80–100)
NITRITE UR-MCNC: NEGATIVE — SIGNIFICANT CHANGE UP
NT-PROBNP SERPL-SCNC: 3172 PG/ML — HIGH (ref 0–300)
OTHER CELLS CSF MANUAL: 13 ML/DL — LOW (ref 18–22)
PCO2 BLDV: 41 MMHG — SIGNIFICANT CHANGE UP (ref 35–50)
PH BLDV: 7.36 — SIGNIFICANT CHANGE UP (ref 7.32–7.43)
PH UR: 6 — SIGNIFICANT CHANGE UP (ref 5–8)
PHOSPHATE SERPL-MCNC: 3.9 MG/DL — SIGNIFICANT CHANGE UP (ref 2.4–4.7)
PLATELET # BLD AUTO: 249 K/UL — SIGNIFICANT CHANGE UP (ref 150–400)
PO2 BLDV: 150 MMHG — HIGH (ref 25–45)
POTASSIUM BLDV-SCNC: 7.1 MMOL/L — CRITICAL HIGH (ref 3.4–4.5)
POTASSIUM SERPL-MCNC: 5.2 MMOL/L — SIGNIFICANT CHANGE UP (ref 3.5–5.3)
POTASSIUM SERPL-MCNC: 5.5 MMOL/L — HIGH (ref 3.5–5.3)
POTASSIUM SERPL-MCNC: 7.3 MMOL/L — CRITICAL HIGH (ref 3.5–5.3)
POTASSIUM SERPL-SCNC: 5.2 MMOL/L — SIGNIFICANT CHANGE UP (ref 3.5–5.3)
POTASSIUM SERPL-SCNC: 5.5 MMOL/L — HIGH (ref 3.5–5.3)
POTASSIUM SERPL-SCNC: 7.3 MMOL/L — CRITICAL HIGH (ref 3.5–5.3)
PROT SERPL-MCNC: 5.7 G/DL — LOW (ref 6.6–8.7)
PROT SERPL-MCNC: 7.3 G/DL — SIGNIFICANT CHANGE UP (ref 6.6–8.7)
PROT UR-MCNC: 100 MG/DL
PROTHROM AB SERPL-ACNC: 12 SEC — SIGNIFICANT CHANGE UP (ref 10–12.9)
RBC # BLD: 3.16 M/UL — LOW (ref 3.8–5.2)
RBC # FLD: 17.8 % — HIGH (ref 10.3–14.5)
RBC CASTS # UR COMP ASSIST: ABNORMAL /HPF (ref 0–4)
SAO2 % BLDV: 100 % — SIGNIFICANT CHANGE UP
SODIUM SERPL-SCNC: 136 MMOL/L — SIGNIFICANT CHANGE UP (ref 135–145)
SODIUM SERPL-SCNC: 137 MMOL/L — SIGNIFICANT CHANGE UP (ref 135–145)
SODIUM SERPL-SCNC: 139 MMOL/L — SIGNIFICANT CHANGE UP (ref 135–145)
SP GR SPEC: 1.01 — SIGNIFICANT CHANGE UP (ref 1.01–1.02)
TROPONIN T SERPL-MCNC: 0.06 NG/ML — SIGNIFICANT CHANGE UP (ref 0–0.06)
TSH SERPL-MCNC: 1.69 UIU/ML — SIGNIFICANT CHANGE UP (ref 0.27–4.2)
UROBILINOGEN FLD QL: NEGATIVE MG/DL — SIGNIFICANT CHANGE UP
WBC # BLD: 6.88 K/UL — SIGNIFICANT CHANGE UP (ref 3.8–10.5)
WBC # FLD AUTO: 6.88 K/UL — SIGNIFICANT CHANGE UP (ref 3.8–10.5)
WBC UR QL: SIGNIFICANT CHANGE UP

## 2019-07-17 PROCEDURE — 99291 CRITICAL CARE FIRST HOUR: CPT

## 2019-07-17 PROCEDURE — 93306 TTE W/DOPPLER COMPLETE: CPT | Mod: 26

## 2019-07-17 PROCEDURE — 99222 1ST HOSP IP/OBS MODERATE 55: CPT

## 2019-07-17 PROCEDURE — 71045 X-RAY EXAM CHEST 1 VIEW: CPT | Mod: 26,77

## 2019-07-17 PROCEDURE — 71045 X-RAY EXAM CHEST 1 VIEW: CPT | Mod: 26

## 2019-07-17 RX ORDER — SODIUM BICARBONATE 1 MEQ/ML
50 SYRINGE (ML) INTRAVENOUS ONCE
Refills: 0 | Status: COMPLETED | OUTPATIENT
Start: 2019-07-17 | End: 2019-07-17

## 2019-07-17 RX ORDER — DEXTROSE 50 % IN WATER 50 %
12.5 SYRINGE (ML) INTRAVENOUS ONCE
Refills: 0 | Status: DISCONTINUED | OUTPATIENT
Start: 2019-07-17 | End: 2019-07-22

## 2019-07-17 RX ORDER — ERYTHROPOIETIN 10000 [IU]/ML
10000 INJECTION, SOLUTION INTRAVENOUS; SUBCUTANEOUS ONCE
Refills: 0 | Status: COMPLETED | OUTPATIENT
Start: 2019-07-17 | End: 2019-07-17

## 2019-07-17 RX ORDER — HEPARIN SODIUM 5000 [USP'U]/ML
5000 INJECTION INTRAVENOUS; SUBCUTANEOUS EVERY 8 HOURS
Refills: 0 | Status: DISCONTINUED | OUTPATIENT
Start: 2019-07-17 | End: 2019-07-18

## 2019-07-17 RX ORDER — CHOLECALCIFEROL (VITAMIN D3) 125 MCG
1 CAPSULE ORAL
Qty: 0 | Refills: 0 | DISCHARGE

## 2019-07-17 RX ORDER — DEXTROSE 50 % IN WATER 50 %
50 SYRINGE (ML) INTRAVENOUS ONCE
Refills: 0 | Status: COMPLETED | OUTPATIENT
Start: 2019-07-17 | End: 2019-07-17

## 2019-07-17 RX ORDER — CHLORHEXIDINE GLUCONATE 213 G/1000ML
1 SOLUTION TOPICAL
Refills: 0 | Status: DISCONTINUED | OUTPATIENT
Start: 2019-07-17 | End: 2019-07-18

## 2019-07-17 RX ORDER — MOMETASONE FUROATE 50 UG/1
2 SPRAY NASAL
Qty: 0 | Refills: 0 | DISCHARGE

## 2019-07-17 RX ORDER — ONDANSETRON 8 MG/1
4 TABLET, FILM COATED ORAL ONCE
Refills: 0 | Status: COMPLETED | OUTPATIENT
Start: 2019-07-17 | End: 2019-07-17

## 2019-07-17 RX ORDER — INSULIN LISPRO 100/ML
VIAL (ML) SUBCUTANEOUS
Refills: 0 | Status: DISCONTINUED | OUTPATIENT
Start: 2019-07-17 | End: 2019-07-22

## 2019-07-17 RX ORDER — CALCIUM GLUCONATE 100 MG/ML
2 VIAL (ML) INTRAVENOUS ONCE
Refills: 0 | Status: COMPLETED | OUTPATIENT
Start: 2019-07-17 | End: 2019-07-17

## 2019-07-17 RX ORDER — DEXTROSE 50 % IN WATER 50 %
25 SYRINGE (ML) INTRAVENOUS ONCE
Refills: 0 | Status: DISCONTINUED | OUTPATIENT
Start: 2019-07-17 | End: 2019-07-22

## 2019-07-17 RX ORDER — INSULIN GLARGINE 100 [IU]/ML
10 INJECTION, SOLUTION SUBCUTANEOUS AT BEDTIME
Refills: 0 | Status: DISCONTINUED | OUTPATIENT
Start: 2019-07-17 | End: 2019-07-22

## 2019-07-17 RX ORDER — INSULIN HUMAN 100 [IU]/ML
10 INJECTION, SOLUTION SUBCUTANEOUS ONCE
Refills: 0 | Status: COMPLETED | OUTPATIENT
Start: 2019-07-17 | End: 2019-07-17

## 2019-07-17 RX ORDER — LEVOTHYROXINE SODIUM 125 MCG
100 TABLET ORAL DAILY
Refills: 0 | Status: DISCONTINUED | OUTPATIENT
Start: 2019-07-17 | End: 2019-07-22

## 2019-07-17 RX ORDER — INSULIN GLARGINE 100 [IU]/ML
10 INJECTION, SOLUTION SUBCUTANEOUS
Qty: 0 | Refills: 0 | DISCHARGE

## 2019-07-17 RX ORDER — GLUCAGON INJECTION, SOLUTION 0.5 MG/.1ML
5 INJECTION, SOLUTION SUBCUTANEOUS ONCE
Refills: 0 | Status: COMPLETED | OUTPATIENT
Start: 2019-07-17 | End: 2019-07-17

## 2019-07-17 RX ORDER — TRAMADOL HYDROCHLORIDE 50 MG/1
1 TABLET ORAL
Qty: 0 | Refills: 0 | DISCHARGE

## 2019-07-17 RX ORDER — GABAPENTIN 400 MG/1
1 CAPSULE ORAL
Qty: 0 | Refills: 0 | DISCHARGE

## 2019-07-17 RX ORDER — VANCOMYCIN HCL 1 G
1000 VIAL (EA) INTRAVENOUS ONCE
Refills: 0 | Status: COMPLETED | OUTPATIENT
Start: 2019-07-17 | End: 2019-07-17

## 2019-07-17 RX ORDER — DOPAMINE HYDROCHLORIDE 40 MG/ML
5 INJECTION, SOLUTION, CONCENTRATE INTRAVENOUS
Qty: 400 | Refills: 0 | Status: DISCONTINUED | OUTPATIENT
Start: 2019-07-17 | End: 2019-07-18

## 2019-07-17 RX ORDER — LANOLIN ALCOHOL/MO/W.PET/CERES
2 CREAM (GRAM) TOPICAL
Qty: 0 | Refills: 0 | DISCHARGE

## 2019-07-17 RX ORDER — SODIUM CHLORIDE 9 MG/ML
1000 INJECTION, SOLUTION INTRAVENOUS
Refills: 0 | Status: DISCONTINUED | OUTPATIENT
Start: 2019-07-17 | End: 2019-07-22

## 2019-07-17 RX ORDER — NYSTATIN CREAM 100000 [USP'U]/G
1 CREAM TOPICAL
Refills: 0 | Status: DISCONTINUED | OUTPATIENT
Start: 2019-07-17 | End: 2019-07-22

## 2019-07-17 RX ORDER — DEXTROSE 50 % IN WATER 50 %
15 SYRINGE (ML) INTRAVENOUS ONCE
Refills: 0 | Status: DISCONTINUED | OUTPATIENT
Start: 2019-07-17 | End: 2019-07-22

## 2019-07-17 RX ORDER — GLUCAGON INJECTION, SOLUTION 0.5 MG/.1ML
1 INJECTION, SOLUTION SUBCUTANEOUS ONCE
Refills: 0 | Status: DISCONTINUED | OUTPATIENT
Start: 2019-07-17 | End: 2019-07-22

## 2019-07-17 RX ORDER — FUROSEMIDE 40 MG
1 TABLET ORAL
Qty: 0 | Refills: 0 | DISCHARGE

## 2019-07-17 RX ADMIN — Medication 50 MILLILITER(S): at 12:25

## 2019-07-17 RX ADMIN — GLUCAGON INJECTION, SOLUTION 5 MILLIGRAM(S): 0.5 INJECTION, SOLUTION SUBCUTANEOUS at 11:50

## 2019-07-17 RX ADMIN — Medication 200 GRAM(S): at 12:16

## 2019-07-17 RX ADMIN — NYSTATIN CREAM 1 APPLICATION(S): 100000 CREAM TOPICAL at 21:38

## 2019-07-17 RX ADMIN — DOPAMINE HYDROCHLORIDE 31.12 MICROGRAM(S)/KG/MIN: 40 INJECTION, SOLUTION, CONCENTRATE INTRAVENOUS at 12:42

## 2019-07-17 RX ADMIN — Medication 250 MILLIGRAM(S): at 22:15

## 2019-07-17 RX ADMIN — INSULIN GLARGINE 10 UNIT(S): 100 INJECTION, SOLUTION SUBCUTANEOUS at 22:15

## 2019-07-17 RX ADMIN — ONDANSETRON 4 MILLIGRAM(S): 8 TABLET, FILM COATED ORAL at 11:50

## 2019-07-17 RX ADMIN — ERYTHROPOIETIN 10000 UNIT(S): 10000 INJECTION, SOLUTION INTRAVENOUS; SUBCUTANEOUS at 17:36

## 2019-07-17 RX ADMIN — Medication 50 MILLIEQUIVALENT(S): at 13:01

## 2019-07-17 RX ADMIN — INSULIN HUMAN 10 UNIT(S): 100 INJECTION, SOLUTION SUBCUTANEOUS at 12:35

## 2019-07-17 RX ADMIN — Medication 2 GRAM(S): at 12:42

## 2019-07-17 NOTE — ED PROVIDER NOTE - DISPOSITION TYPE
How Severe Is Your Rash?: moderate
Is This A New Presentation, Or A Follow-Up?: Rash
Additional History: Trying cedar drops x 6 weeks
ADMIT

## 2019-07-17 NOTE — ED ADULT NURSE REASSESSMENT NOTE - NS ED NURSE REASSESS COMMENT FT1
pt remains AOX3, reporting increase in dizziness. pt now hypotensive. pt given 5mg Glucagon as ordered for potential BB OD.pt with patent airway, Dopamine increased to 10 mcg/kg/min. pt remains AOX3, reporting increase in dizziness. pt now hypotensive. pt given 5mg Glucagon as ordered for potential BB OD.pt with patent airway, Dopamine increased to 10 mcg/kg/min. ICU BRIAN Jimenez remains at bedside.

## 2019-07-17 NOTE — CONSULT NOTE ADULT - SUBJECTIVE AND OBJECTIVE BOX
Patient is a 74y old  Female who presents with a chief complaint of Unstable Bradycardia, Hypotension, (2019 14:20)      HPI:    HPI: Patient is a 75 y/o woman with CAD s/p CABG and Bio AVR 2016 with brief post operative AF, normal EF, CKD requiring transient HD for hyperkalemia, mild MR, prior TIA s/p ILR, chronic Hep C complicated by cirrhosis and varices, bronchiectasias and chronic anemia s/p port placement who presents from her oncologist with brief unresponsiveness followed by chills and dizziness found to have hypoxia and bradycardia to the 30's. In the ER patient with intermittent high degree block with ventricular escape and with persistent dizziness. Additionally is concerned about increased LE edema and weight gain for which she had recent titration of her outpatient diuretic regimen. She was additionally started on antibiotics for reported intermittent chills without subjective fevers.   Above noted . Meds in ER noted for hyperkalemia . Currently on Dopamine infusion ---> HR in 70's now   known CKD ---> DM , Hep C Cirrhosis , chronic anemia   Baseline renal function 80/2.3   was on Irbesartan ??? ANITHA   Hematology recently added bactrim on for possible port erythema     PAST MEDICAL & SURGICAL HISTORY:  CAD (coronary artery disease)  Heart failure  Hepatitis C  Aortic stenosis  Anemia, unspecified anemia type  Asthma  Low back pain: chronic over a year now.  High cholesterol  Hypertension  Diabetes  H/O aortic valve replacement: cow valve.  S/P CABG x 3  History of tonsillectomy      FAMILY HISTORY:  Family history of diabetes mellitus (Sibling)      Social History:    MEDICATIONS  (STANDING):  chlorhexidine 4% Liquid 1 Application(s) Topical <User Schedule>  DOPamine Infusion 10 MICROgram(s)/kG/Min (31.125 mL/Hr) IV Continuous <Continuous>    MEDICATIONS  (PRN):      Allergies    Biaxin (Joint Pain)  morphine (Short breath)    Intolerances        Vital Signs Last 24 Hrs  T(C): 36.9 (2019 09:53), Max: 36.9 (2019 09:53)  T(F): 98.4 (2019 09:53), Max: 98.4 (2019 09:53)  HR: 73 (2019 14:00) (32 - 74)  BP: 102/50 (2019 14:00) (87/50 - 120/51)  BP(mean): 72 (2019 14:00) (72 - 76)  RR: 20 (2019 14:00) (20 - 23)  SpO2: 96% (2019 14:00) (91% - 96%)  Daily     Daily   I&O's Detail    2019 07:  -  2019 14:30  --------------------------------------------------------  IN:    DOPamine Infusion: 31.1 mL  Total IN: 31.1 mL    OUT:    Indwelling Catheter - Urethral: 20 mL  Total OUT: 20 mL    Total NET: 11.1 mL        I&O's Summary    2019 07:  -  2019 14:30  --------------------------------------------------------  IN: 31.1 mL / OUT: 20 mL / NET: 11.1 mL        PHYSICAL EXAM:    GENERAL: NAD, fees better   HEAD:  Atraumatic, No edema   NECK: Supple, No JVD, + right sided mediport with improved erythema   CHEST/LUNG: Clear / EAE . No wheeze   HEART: Regular rate and rhythm; No murmurs, rubs, or gallops  ABDOMEN: Soft, Nontender, Nondistended; Bowel sounds present  EXTREMITIES: + edema   LYMPH: No lymphadenopathy noted  SKIN: No rashes or lesions      LABS:                        9.3    6.88  )-----------( 249      ( 2019 10:48 )             31.2         137  |  106  |  53.0<H>  ----------------------------<  110  7.3<HH>   |  18.0<L>  |  3.41<H>    Ca    8.8      2019 11:38    TPro  7.3  /  Alb  3.6  /  TBili  0.2<L>  /  DBili  x   /  AST  90<H>  /  ALT  42<H>  /  AlkPhos  129<H>      PT/INR - ( 2019 10:48 )   PT: 12.0 sec;   INR: 1.04 ratio         PTT - ( 2019 10:48 )  PTT:25.2 sec  Urinalysis Basic - ( 2019 13:36 )    Color: Yellow / Appearance: Clear / S.010 / pH: x  Gluc: x / Ketone: Negative  / Bili: Negative / Urobili: Negative mg/dL   Blood: x / Protein: 100 mg/dL / Nitrite: Negative   Leuk Esterase: Negative / RBC: 3-5 /HPF / WBC 0-2   Sq Epi: x / Non Sq Epi: Occasional / Bacteria: Occasional              RADIOLOGY & ADDITIONAL TESTS:

## 2019-07-17 NOTE — CONSULT NOTE ADULT - SUBJECTIVE AND OBJECTIVE BOX
[facility], [facility]                [address] [address]                                     PHONE: [phone]                                       FAX: [phone]  [email or web address]      Reason for Consult:    HPI:      PAST MEDICAL & SURGICAL HISTORY:  CAD (coronary artery disease)  Heart failure  Hepatitis C  Aortic stenosis  Anemia, unspecified anemia type  Asthma  Low back pain: chronic over a year now.  High cholesterol  Hypertension  Diabetes  H/O aortic valve replacement: cow valve.  S/P CABG x 3  History of tonsillectomy      Telemetry:     PREVIOUS DIAGNOSTIC TESTING:      EKG:     ECHO FINDINGS:    STRESS FINDINGS:    CATHETERIZATION FINDINGS:    MEDICATIONS  (STANDING):    MEDICATIONS  (PRN):      FAMILY HISTORY:  Family history of diabetes mellitus (Sibling)      SOCIAL HISTORY:  CIGARETTES:  ALCOHOL:    ROS: Negative other than as mentioned in HPI.    Vital Signs Last 24 Hrs  T(C): 36.9 (17 Jul 2019 09:53), Max: 36.9 (17 Jul 2019 09:53)  T(F): 98.4 (17 Jul 2019 09:53), Max: 98.4 (17 Jul 2019 09:53)  HR: 32 (17 Jul 2019 09:53) (32 - 32)  BP: 105/54 (17 Jul 2019 09:56) (105/54 - 105/54)  BP(mean): --  RR: 22 (17 Jul 2019 09:53) (22 - 22)  SpO2: 92% (17 Jul 2019 09:53) (92% - 92%)    PHYSICAL EXAM:  General: Appears well developed, well nourished alert and cooperative.  HEENT: Head; normocephalic, atraumatic. sclera anicteric, MMM, no JVD, neck is supple   CARDIOVASCULAR; 1/6 NELI, no rub, gallop or lift. Normal S1 and S2.  LUNGS; No rales, rhonchi or wheeze. Normal breath sounds bilaterally.  ABDOMEN ; Soft, nontender without mass or organomegaly. bowel sounds normoactive.  EXTREMITIES; No clubbing, cyanosis or edema. Distal pulses wnl. ROM normal.  SKIN; warm and dry with normal turgor.  NEURO; Alert/oriented x 3/normal motor exam.     PSYCH; normal affect.        I&O's Detail      LABS:                  I&O's Summary      RADIOLOGY & ADDITIONAL STUDIES: [facility], [facility]                [address] [address]                                     PHONE: [phone]                                       FAX: [phone]  [email or web address]      Reason for Consult: syncope, bradycardia      HPI: Patient is a 75 y/o woman with CAD s/p CABG and Bio AVR 12/2016 with brief post operative AF, normal EF, CKD requiring transient HD for hyperkalemia, mild MR, prior TIA s/p ILR, chronic Hep C complicated by cirrhosis and varices, bronchiectasias and chronic anemia s/p port placement who presents from her oncologist with brief unresponsiveness followed by chills and dizziness found to have hypoxia and bradycardia to the 30's. In the ER patient with intermittent high degree block with ventricular escape and with persistent dizziness. Additionally is concerned about increased LE edema and weight gain for which she had recent titration of her outpatient diuretic regimen. She was additionally started on antibiotics for reported intermittent chills without subjective fevers.     PAST MEDICAL & SURGICAL HISTORY:  CAD (coronary artery disease)  Heart failure  Hepatitis C  Aortic stenosis  Anemia, unspecified anemia type  Asthma  Low back pain: chronic over a year now.  High cholesterol  Hypertension  Diabetes  H/O aortic valve replacement: cow valve.  S/P CABG x 3  History of tonsillectomy    PREVIOUS DIAGNOSTIC TESTING:      EKG: < from: 12 Lead ECG (07.17.19 @ 09:46) > Atrial fibrillation with slow ventricular response with a competing junctional pacemaker with premature ventricular or  aberrantly conducted complexes. Rightward axis, IVCD. Septal infarct , age undetermined    ECHO FINDINGS:  < from: TTE Echo Complete w/Doppler (09.15.17 @ 12:23) >   1. Normal biventricular systolic function. Left ventricular ejection fraction, by visual estimation, is 65 to 70%.   2. Severely biatrial enlargement.   3. Severe mitral annular calcification.   4. Mild degenerative mitral stenosis.   5. Stable aortic bioprosthesis without significant stenosis or insufficiency.   6. There is no evidence of pericardialeffusion.   7. ** Compared to TTE dated 2/1/2016, an aortic bioprosthesis is now present.    MEDICATIONS  (STANDING):  Home Medications:  amLODIPine 10 mg oral tablet: 1 tab(s) orally once a day (10 Mar 2018 22:57)  folic acid 1 mg oral tablet: 1 tab(s) orally once a day (10 Mar 2018 22:57)  gabapentin 300 mg oral tablet: 1 tab(s) orally once a day (10 Mar 2018 22:57)  insulin lispro 100 units/mL subcutaneous solution:  subcutaneous 3 times a day (with meals) ; 2 Unit(s) if Glucose 151 - 200  4 Unit(s) if Glucose 201 - 250  6 Unit(s) if Glucose 251 - 300  8 Unit(s) if Glucose 301 - 350  10 Unit(s) if Glucose 351 - 400  12 Unit(s) if Glucose GREATER THAN 400 (10 Mar 2018 22:57)  Lantus 100 units/mL subcutaneous solution: 10 unit(s) subcutaneous once a day (at bedtime) (10 Mar 2018 22:57)  levothyroxine 100 mcg (0.1 mg) oral tablet: 1 tab(s) orally once a day (10 Mar 2018 22:57)  melatonin 5 mg oral capsule: 2 cap(s) orally once a day (at bedtime) (10 Mar 2018 22:57)  metoprolol tartrate 50 mg oral tablet:  (10 Mar 2018 22:57)  Nasonex 50 mcg/inh nasal spray: 2 spray(s) nasal once a day (10 Mar 2018 22:57)  pantoprazole 40 mg oral delayed release tablet: 1 tab(s) orally once a day (before a meal) (10 Mar 2018 22:57)  traMADol 50 mg oral tablet: 1 tab(s) orally 3 times a day (10 Mar 2018 22:57)  Vitamin D3 1000 intl units oral tablet: 1 tab(s) orally once a day (10 Mar 2018 22:57)    MEDICATIONS  (PRN):    FAMILY HISTORY:  Family history of diabetes mellitus (Sibling)    SOCIAL HISTORY:  CIGARETTES: denies  ALCOHOL: denies     ROS: Negative other than as mentioned in HPI.    Vital Signs Last 24 Hrs  T(C): 36.9 (17 Jul 2019 09:53), Max: 36.9 (17 Jul 2019 09:53)  T(F): 98.4 (17 Jul 2019 09:53), Max: 98.4 (17 Jul 2019 09:53)  HR: 32 (17 Jul 2019 09:53) (32 - 32)  BP: 105/54 (17 Jul 2019 09:56) (105/54 - 105/54)  BP(mean): --  RR: 22 (17 Jul 2019 09:53) (22 - 22)  SpO2: 92% (17 Jul 2019 09:53) (92% - 92%)    PHYSICAL EXAM:  General: Appears well developed, well nourished alert and cooperative.  HEENT: Head; normocephalic, atraumatic. sclera anicteric, MMM, no JVD, neck is supple   CARDIOVASCULAR; 1/6 NELI, no rub, gallop or lift. Normal S1 and S2.  LUNGS; No rales, rhonchi or wheeze. Normal breath sounds bilaterally.  ABDOMEN ; Soft, nontender without mass or organomegaly. bowel sounds normoactive.  EXTREMITIES; No clubbing, cyanosis or edema. Distal pulses wnl. ROM normal.  SKIN; warm and dry with normal turgor.  NEURO; Alert/oriented x 3/normal motor exam.     PSYCH; normal affect.        I&O's Detail      LABS: pending                   I&O's Summary      RADIOLOGY & ADDITIONAL STUDIES:

## 2019-07-17 NOTE — H&P ADULT - NSICDXPASTSURGICALHX_GEN_ALL_CORE_FT
PAST SURGICAL HISTORY:  H/O aortic valve replacement cow valve.    History of tonsillectomy     S/P CABG x 3

## 2019-07-17 NOTE — ED ADULT NURSE REASSESSMENT NOTE - NS ED NURSE REASSESS COMMENT FT1
[employer] here for loop recorder interrogation. pt remains awake and alert, repositioned for comfort. still c.o dizziness. currently on 5LPM 02via nasal cannula. IV sites patent at this time. pt HR increasing now to 55-60 bpm. remains on dopamine gtt at 10 mcg/kg/min at this time.

## 2019-07-17 NOTE — ED ADULT NURSE NOTE - NSIMPLEMENTINTERV_GEN_ALL_ED
Implemented All Universal Safety Interventions:  [place] to call system. Call bell, personal items and telephone within reach. Instruct patient to call for assistance. Room bathroom lighting operational. Non-slip footwear when patient is off stretcher. Physically safe environment: no spills, clutter or unnecessary equipment. Stretcher in lowest position, wheels locked, appropriate side rails in place.

## 2019-07-17 NOTE — CONSULT NOTE ADULT - ASSESSMENT
Patient is a 75 y/o woman with CAD s/p CABG and Bio AVR 12/2016 with brief post operative AF, normal EF, CKD requiring transient HD for hyperkalemia, mild MR, prior TIA s/p ILR, chronic Hep C complicated by cirrhosis and varices, bronchiectasias and chronic anemia s/p port placement who presents from her oncologist with brief unresponsiveness followed by chills and dizziness found to have hypoxia and bradycardia to the 30's. In the ER patient with intermittent high degree block with ventricular escape and with persistent dizziness. Additionally with brief atrial fibrillation.     Syncope with high degree block with ventricular escape   - hold all brandy blocking agents   - start dopamine   - labs pending, if no hyperkalemia patient will be planned for PPM implantation and ILR explant   - EP consult pending   - step down admission    Acute on chronic HFpEF  - lasix 40mg Q12   - TTE   - close monitoring of renal indices

## 2019-07-17 NOTE — H&P ADULT - NSHPOUTPATIENTPROVIDERS_GEN_ALL_CORE
Cardiology- Dr. Yarely COLLAZO- [place]   PMD- unaffiliated  Renal- unafiliated Cardiology- Dr. Yarely Howell   PMD- unaffiliated  Renal- unaffiliated

## 2019-07-17 NOTE — GOALS OF CARE CONVERSATION - PERSONAL ADVANCE DIRECTIVE - CONVERSATION DETAILS
Patient appoints daughter Daniela as HCP  Would like to be full code; amenable to CPR, hemodialysis, vasopressors, central access, as well as intubation "if it is not going to be long-term".  She does state that she would not want "heroic measures"

## 2019-07-17 NOTE — H&P ADULT - HISTORY OF PRESENT ILLNESS
73 yo female, PMHx CHF, CAD s/p CABG, AS s/p AVR (both at [place]), CKD (baseline Cr 1.8), Hepatitis C type II, iron deficiency anemia of uncertain etiology (developed following cardiac surgery, has undergone 3 BM biopsies with no diagnosis; has required 26u PRBC over the past 3 years), who presented following evaluation at her PMD for dizziness and was found to be bradycardic with HR 30's. Patient states she had a poor 75 yo female, PMHx CHF, CAD s/p CABG, AS s/p AVR (both at [place]), CKD (baseline Cr 1.8; had 1 episode of ), Hepatitis C type II, iron deficiency anemia of uncertain etiology (developed following cardiac surgery, has undergone 3 BM biopsies with no diagnosis; has required 26u PRBC over the past 3 years), who presented following evaluation at her PMD for dizziness and was found to be bradycardic with HR 30's. Patient states she had a port placed for vascular access 2 weeks ago, at which time she was having chills but was told her white blood cell count was not indicative of infection. About 1 week ago, she developed redness over the site of her port and was started on Bactrim. Around that time, she also developed increasing shortness of breath, lower extremity edema, and decreased urination. She was seen by her PMD two days ago for worsening shortness of breath, her Cr was noted to be increased from 1.8 to 2.0, and was told to increase her Lasix from 40mg BID to 80mg in AM and 40mg PM. Her symptoms did not improve with increased diuresis. Today was found to have HR 20's and was sent to ED for evaluation of symptomatic bradycardia. Upon arrival to ED, patient was found to be in junctional rhythm in 30's 73 yo female, PMHx CHFpEf, CAD s/p CABG, AS s/p bovine AVR (both at [place]), CKD (baseline Cr 1.8; had 1 episode of emergent HD for ARF with hyperkalemia), T2 IDDM, bronchiectasis, Hepatitis C type II c/b cirrhosis, iron deficiency anemia of uncertain etiology (developed following cardiac surgery, has undergone 3 BM biopsies with no diagnosis; has required 26u PRBC over the past 3 years), who presented following evaluation at her PMD for dizziness and was found to be bradycardic with HR 30's. Patient states she had a port placed for vascular access 2 weeks ago, at which time she was having chills but was told her white blood cell count was not indicative of infection. About 1 week ago, she developed redness over the site of her port and was started on Bactrim. Around that time, she also developed increasing shortness of breath, lower extremity edema, and decreased urination; her Cr was noted to be increased from 1.8 to 2.0. She was seen by her PMD two days ago for worsening shortness of breath, found to have 6lb weight gain over the past 1 week, and was told to increase her Lasix from 40mg BID to 80mg in AM and 40mg PM. Her symptoms did not improve with increased diuresis. Today she had a brief syncopal episode at and was found to have HR 20's with hypoxia and was sent to ED for evaluation of symptomatic bradycardia. Upon arrival to ED, patient was found to have HR 30's with high grade AV block with ventricular escape beats and episodes of AFib. She was evaluated by Cardiology and started on Dopamine 2mcg/kg/min. ICU was asked to consult. Patient's medication list was reviewed, notable for Bactrim, Metoprolol, and an ARB, all of which she took this morning. I gave Glucagon 5mg IVPB to reverse beta blockade with improvement in HR to sinus bradycardia in 40's. Patient developed hypotension with pre-syncopal feelings of worsening dizziness, blurred vision, and depth perception issues. Dopamine was increased to 10mcg/kg/min with improvement in BP. Upon completion of Glucagon infusion, HR improved to NSR 65 bpm. Patient stated she had a similar admission in the past, at which time she was found to be in acute renal failure with hyperkalemia requiring 1 session of emergent hemodialysis. A VBG was sent, which revealed a K 7.1. Patient was given calcium gluconate, insulin/D50, and sodium bicarbonate. Labs returned confirming hyperkalemia 7.3 with mild hemolysis and SCr 3.41. Nephrology consult was called for emergent hemodialysis. An indwelling mg was placed with 25cc UOP.    Goals of care were discussed. Patient appoints daughter Daniela as HCP. She is a full code and is amenable to CPR, hemodialysis, and intubation, but not long-term intubation or "extraordinary measures" to keep her alive.

## 2019-07-17 NOTE — CONSULT NOTE ADULT - ASSESSMENT
MARGUERITE on CKD   Symptomatic hyperkalemia   Better with meds in ER and Dopamine   Likely related to combination of ARB and bactrim and MA  ---> Both held   I had long discussion with her and family  Will arrange emergent HD x 1 now for metabolic control   Pt signed consent   See orders   Repeat BMP 3 hours post HD   If Bicarb remains < 20 post HD , can add oral bicarbonate     Anemia - Epogen  with HD   Iron stores in am MARGUERITE on CKD   Symptomatic hyperkalemia   Better with meds in ER and Dopamine   Likely related to combination of ARB and bactrim and MA  ---> Both held   I had long discussion with her and family  Will arrange emergent HD x 1 now for metabolic control   Pt signed consent   See orders   Repeat BMP 3 hours post HD   If Bicarb remains < 20 post HD , can add oral bicarbonate   Check renal sono to R/O hydro     Anemia - Epogen  with HD   Iron stores in am

## 2019-07-17 NOTE — PROGRESS NOTE ADULT - SUBJECTIVE AND OBJECTIVE BOX
[facility]                                   [address] [place] [address]                                                      PHONE: [phone]                                                         FAX: [phone]  [email or web address]  ---------------------------------------------------------------------------------------------------------------------------------    ILR interrogated.  Implant date 10/7/17.  Prolonged periods of bradycardia today in the 30's.  Hyperkalemia noted (7.3); MARGUERITE on CKD.  For emergent dialysis.

## 2019-07-17 NOTE — H&P ADULT - ASSESSMENT
75 yo female, PMHx CHFpEf, CAD s/p CABG, AS s/p bovine AVR (both at [place]), CKD (baseline Cr 1.8; had 1 episode of emergent HD for ARF with hyperkalemia), T2 IDDM, bronchiectasis, Hepatitis C type II c/b cirrhosis, iron deficiency anemia, who presented with syncope from unstable symptomatic bradycardia, shock, hypoxia, pleural effusions/acute on chronic diastolic heart failure/volume overload, metabolic acidosis, due to hyperkalemia from acute renal failure on Bactrim and ARB, as well as component of beta blocker toxicity.    - Admit to MICU  - Patient to undergo emergent hemodialysis  - Medical treatment of hyperkalemia while awaiting HD  - q4h BMPs  - Monitor strict I&Os  - Continue Dopamine infusion at 10mcg/kg/min for now, attempt to wean off following HD  - HOLD bactrim, brandy blocking agents, ARB  - Check TSH, [place]  - Obtain formal TTE. Cardiology and EP following. If worsening symptoms or instability from bradycardia will place emergent TVP.  - Worsening hypoxia, supplemental O2 applied and titrated up to 5lpm; plan for emergent HD  - Elevated transaminases in the setting of known Hep C and cirrhosis. Likely passive hepatic congestion in the setting of volume overload from renal failure, though baseline unknown. Will check hep panel and trend.  - Will cover for port infection with Vancomycin for now. No lactate or leukocytosis, afebrile. Send blood cultures. Follow Vanco levels daily and renally dose as needed. Heme/Onc c/s.     CRITICAL CARE TIME SPENT: 90 minutes assessing presenting problems of acute illness, which pose high probability of life threatening deterioration or end organ damage/dysfunction, as well as medical decision making including initiating plan of care, reviewing data, reviewing radiologic exams, discussing with multidisciplinary team, non-inclusive of procedures performed, discussing goals of care with patient/family. Case was discussed with ICU Attending Dr. Peter.

## 2019-07-17 NOTE — ED ADULT TRIAGE NOTE - CHIEF COMPLAINT QUOTE
Pt BIBA A&Ox3 from Hem/Onc office c/o near syncopal episode and SOB. Pt's HR in 30's. UTO B/P in triage. Pt denies any CP or discomfort. #24g in place to right hand by ems. Brought directly to critical care. MD Mooney and CCRN at bedside.

## 2019-07-17 NOTE — H&P ADULT - RS GEN PE MLT RESP DETAILS PC
clear to auscultation bilaterally/respirations non-labored/no wheezes/no rales/no rhonchi/breath sounds equal/good air movement

## 2019-07-17 NOTE — H&P ADULT - NSICDXPASTMEDICALHX_GEN_ALL_CORE_FT
PAST MEDICAL HISTORY:  Anemia, unspecified anemia type     Aortic stenosis     Asthma     CAD (coronary artery disease)     CKD (chronic kidney disease)     Diabetes     Heart failure     Hepatitis C     High cholesterol     Hypertension     Low back pain chronic over a year now.

## 2019-07-17 NOTE — H&P ADULT - NEUROLOGICAL DETAILS
no gross focal deficits; develops intermittent full body tremor with exertion that spontaneously resolves/alert and oriented x 3

## 2019-07-17 NOTE — ED PROVIDER NOTE - OBJECTIVE STATEMENT
75 yo female with multiple medical problems comes to ed with feeling dizziness x 4 days; pt noted to be atrial fibrillation with slow heart rate; pt also with shortness of breath;

## 2019-07-17 NOTE — H&P ADULT - SKIN COMMENTS
R chest wall port site with resolving ecchymosis; steri-strips in place with dried blood at incision site

## 2019-07-17 NOTE — H&P ADULT - ATTENDING COMMENTS
*late entry*Pt admitted by [facility] I have seen and evaluated this patient independently and agree with the above findings except as follows: 73 yo female, PMHx CHFpEf, CAD s/p CABG, AS s/p bovine AVR, CKD, DMII, bronchiectasis, Hepatitis C/cirrhosis, iron deficiency anemia with a chemo port for recurrent transfusions who was recently started on Bactrim for skin infection (around port site) and ARB for CHF presenting with symptomatic bradycardia in the setting of MARGUERITE, hyperkalemia and hypotension. Poorly responsive to glucagon to reverse BB, needing emergent HD for hyperkalemia. Pt supported on Dopamine in the interim.

## 2019-07-18 LAB
ANION GAP SERPL CALC-SCNC: 11 MMOL/L — SIGNIFICANT CHANGE UP (ref 5–17)
ANION GAP SERPL CALC-SCNC: 11 MMOL/L — SIGNIFICANT CHANGE UP (ref 5–17)
ANION GAP SERPL CALC-SCNC: 12 MMOL/L — SIGNIFICANT CHANGE UP (ref 5–17)
BUN SERPL-MCNC: 35 MG/DL — HIGH (ref 8–20)
BUN SERPL-MCNC: 41 MG/DL — HIGH (ref 8–20)
BUN SERPL-MCNC: 43 MG/DL — HIGH (ref 8–20)
CALCIUM SERPL-MCNC: 8.3 MG/DL — LOW (ref 8.6–10.2)
CALCIUM SERPL-MCNC: 8.6 MG/DL — SIGNIFICANT CHANGE UP (ref 8.6–10.2)
CALCIUM SERPL-MCNC: 8.8 MG/DL — SIGNIFICANT CHANGE UP (ref 8.6–10.2)
CHLORIDE SERPL-SCNC: 100 MMOL/L — SIGNIFICANT CHANGE UP (ref 98–107)
CHLORIDE SERPL-SCNC: 102 MMOL/L — SIGNIFICANT CHANGE UP (ref 98–107)
CHLORIDE SERPL-SCNC: 102 MMOL/L — SIGNIFICANT CHANGE UP (ref 98–107)
CO2 SERPL-SCNC: 23 MMOL/L — SIGNIFICANT CHANGE UP (ref 22–29)
CO2 SERPL-SCNC: 24 MMOL/L — SIGNIFICANT CHANGE UP (ref 22–29)
CO2 SERPL-SCNC: 25 MMOL/L — SIGNIFICANT CHANGE UP (ref 22–29)
CREAT SERPL-MCNC: 2.6 MG/DL — HIGH (ref 0.5–1.3)
CREAT SERPL-MCNC: 2.7 MG/DL — HIGH (ref 0.5–1.3)
CREAT SERPL-MCNC: 3.21 MG/DL — HIGH (ref 0.5–1.3)
CULTURE RESULTS: NO GROWTH — SIGNIFICANT CHANGE UP
FERRITIN SERPL-MCNC: 128 NG/ML — SIGNIFICANT CHANGE UP (ref 15–150)
GLUCOSE BLDC GLUCOMTR-MCNC: 101 MG/DL — HIGH (ref 70–99)
GLUCOSE BLDC GLUCOMTR-MCNC: 111 MG/DL — HIGH (ref 70–99)
GLUCOSE BLDC GLUCOMTR-MCNC: 123 MG/DL — HIGH (ref 70–99)
GLUCOSE BLDC GLUCOMTR-MCNC: 134 MG/DL — HIGH (ref 70–99)
GLUCOSE BLDC GLUCOMTR-MCNC: 188 MG/DL — HIGH (ref 70–99)
GLUCOSE SERPL-MCNC: 121 MG/DL — HIGH (ref 70–115)
GLUCOSE SERPL-MCNC: 137 MG/DL — HIGH (ref 70–115)
GLUCOSE SERPL-MCNC: 153 MG/DL — HIGH (ref 70–115)
HBA1C BLD-MCNC: 5.3 % — SIGNIFICANT CHANGE UP (ref 4–5.6)
HBV CORE AB SER-ACNC: SIGNIFICANT CHANGE UP
HBV SURFACE AB SER-ACNC: <3 MIU/ML — LOW
HBV SURFACE AB SER-ACNC: SIGNIFICANT CHANGE UP
HBV SURFACE AG SER-ACNC: SIGNIFICANT CHANGE UP
HCT VFR BLD CALC: 22.2 % — LOW (ref 34.5–45)
HCT VFR BLD CALC: 22.6 % — LOW (ref 34.5–45)
HCV AB S/CO SERPL IA: 13.66 S/CO — HIGH (ref 0–0.99)
HCV AB SERPL-IMP: REACTIVE
HCV RNA FLD QL NAA+PROBE: SIGNIFICANT CHANGE UP
HCV RNA SPEC QL PROBE+SIG AMP: DETECTED
HGB BLD-MCNC: 7.1 G/DL — LOW (ref 11.5–15.5)
HGB BLD-MCNC: 7.1 G/DL — LOW (ref 11.5–15.5)
IRON SATN MFR SERPL: 11 % — LOW (ref 14–50)
IRON SATN MFR SERPL: 30 UG/DL — LOW (ref 37–145)
MAGNESIUM SERPL-MCNC: 2.2 MG/DL — SIGNIFICANT CHANGE UP (ref 1.6–2.6)
MAGNESIUM SERPL-MCNC: 2.2 MG/DL — SIGNIFICANT CHANGE UP (ref 1.6–2.6)
MCHC RBC-ENTMCNC: 30.1 PG — SIGNIFICANT CHANGE UP (ref 27–34)
MCHC RBC-ENTMCNC: 30.6 PG — SIGNIFICANT CHANGE UP (ref 27–34)
MCHC RBC-ENTMCNC: 31.4 GM/DL — LOW (ref 32–36)
MCHC RBC-ENTMCNC: 32 GM/DL — SIGNIFICANT CHANGE UP (ref 32–36)
MCV RBC AUTO: 95.7 FL — SIGNIFICANT CHANGE UP (ref 80–100)
MCV RBC AUTO: 95.8 FL — SIGNIFICANT CHANGE UP (ref 80–100)
PHOSPHATE SERPL-MCNC: 4.4 MG/DL — SIGNIFICANT CHANGE UP (ref 2.4–4.7)
PHOSPHATE SERPL-MCNC: 4.7 MG/DL — SIGNIFICANT CHANGE UP (ref 2.4–4.7)
PLATELET # BLD AUTO: 134 K/UL — LOW (ref 150–400)
PLATELET # BLD AUTO: 142 K/UL — LOW (ref 150–400)
POTASSIUM SERPL-MCNC: 5.3 MMOL/L — SIGNIFICANT CHANGE UP (ref 3.5–5.3)
POTASSIUM SERPL-MCNC: 5.6 MMOL/L — HIGH (ref 3.5–5.3)
POTASSIUM SERPL-MCNC: 5.8 MMOL/L — HIGH (ref 3.5–5.3)
POTASSIUM SERPL-SCNC: 5.3 MMOL/L — SIGNIFICANT CHANGE UP (ref 3.5–5.3)
POTASSIUM SERPL-SCNC: 5.6 MMOL/L — HIGH (ref 3.5–5.3)
POTASSIUM SERPL-SCNC: 5.8 MMOL/L — HIGH (ref 3.5–5.3)
PROCALCITONIN SERPL-MCNC: 3.14 NG/ML — HIGH (ref 0.02–0.1)
RBC # BLD: 2.32 M/UL — LOW (ref 3.8–5.2)
RBC # BLD: 2.36 M/UL — LOW (ref 3.8–5.2)
RBC # FLD: 17.4 % — HIGH (ref 10.3–14.5)
RBC # FLD: 17.7 % — HIGH (ref 10.3–14.5)
SODIUM SERPL-SCNC: 136 MMOL/L — SIGNIFICANT CHANGE UP (ref 135–145)
SODIUM SERPL-SCNC: 136 MMOL/L — SIGNIFICANT CHANGE UP (ref 135–145)
SODIUM SERPL-SCNC: 138 MMOL/L — SIGNIFICANT CHANGE UP (ref 135–145)
SPECIMEN SOURCE: SIGNIFICANT CHANGE UP
TIBC SERPL-MCNC: 269 UG/DL — SIGNIFICANT CHANGE UP (ref 220–430)
TRANSFERRIN SERPL-MCNC: 188 MG/DL — LOW (ref 192–382)
VANCOMYCIN TROUGH SERPL-MCNC: 9.9 UG/ML — LOW (ref 10–20)
WBC # BLD: 4.69 K/UL — SIGNIFICANT CHANGE UP (ref 3.8–10.5)
WBC # BLD: 4.74 K/UL — SIGNIFICANT CHANGE UP (ref 3.8–10.5)
WBC # FLD AUTO: 4.69 K/UL — SIGNIFICANT CHANGE UP (ref 3.8–10.5)
WBC # FLD AUTO: 4.74 K/UL — SIGNIFICANT CHANGE UP (ref 3.8–10.5)

## 2019-07-18 PROCEDURE — 99233 SBSQ HOSP IP/OBS HIGH 50: CPT

## 2019-07-18 PROCEDURE — 76775 US EXAM ABDO BACK WALL LIM: CPT | Mod: 26

## 2019-07-18 RX ORDER — SODIUM BICARBONATE 1 MEQ/ML
50 SYRINGE (ML) INTRAVENOUS ONCE
Refills: 0 | Status: COMPLETED | OUTPATIENT
Start: 2019-07-18 | End: 2019-07-18

## 2019-07-18 RX ORDER — FUROSEMIDE 40 MG
40 TABLET ORAL
Refills: 0 | Status: DISCONTINUED | OUTPATIENT
Start: 2019-07-18 | End: 2019-07-22

## 2019-07-18 RX ORDER — AMITRIPTYLINE HCL 25 MG
10 TABLET ORAL AT BEDTIME
Refills: 0 | Status: DISCONTINUED | OUTPATIENT
Start: 2019-07-18 | End: 2019-07-22

## 2019-07-18 RX ORDER — FOLIC ACID 0.8 MG
1 TABLET ORAL DAILY
Refills: 0 | Status: DISCONTINUED | OUTPATIENT
Start: 2019-07-18 | End: 2019-07-22

## 2019-07-18 RX ORDER — PANTOPRAZOLE SODIUM 20 MG/1
40 TABLET, DELAYED RELEASE ORAL
Refills: 0 | Status: DISCONTINUED | OUTPATIENT
Start: 2019-07-18 | End: 2019-07-22

## 2019-07-18 RX ORDER — MONTELUKAST 4 MG/1
10 TABLET, CHEWABLE ORAL DAILY
Refills: 0 | Status: DISCONTINUED | OUTPATIENT
Start: 2019-07-18 | End: 2019-07-22

## 2019-07-18 RX ORDER — CHOLECALCIFEROL (VITAMIN D3) 125 MCG
2000 CAPSULE ORAL DAILY
Refills: 0 | Status: DISCONTINUED | OUTPATIENT
Start: 2019-07-18 | End: 2019-07-22

## 2019-07-18 RX ORDER — VANCOMYCIN HCL 1 G
500 VIAL (EA) INTRAVENOUS EVERY 24 HOURS
Refills: 0 | Status: DISCONTINUED | OUTPATIENT
Start: 2019-07-18 | End: 2019-07-20

## 2019-07-18 RX ORDER — ASCORBIC ACID 60 MG
500 TABLET,CHEWABLE ORAL DAILY
Refills: 0 | Status: DISCONTINUED | OUTPATIENT
Start: 2019-07-18 | End: 2019-07-22

## 2019-07-18 RX ORDER — FERROUS SULFATE 325(65) MG
325 TABLET ORAL DAILY
Refills: 0 | Status: DISCONTINUED | OUTPATIENT
Start: 2019-07-18 | End: 2019-07-22

## 2019-07-18 RX ORDER — IRON SUCROSE 20 MG/ML
200 INJECTION, SOLUTION INTRAVENOUS ONCE
Refills: 0 | Status: COMPLETED | OUTPATIENT
Start: 2019-07-18 | End: 2019-07-18

## 2019-07-18 RX ORDER — INSULIN HUMAN 100 [IU]/ML
10 INJECTION, SOLUTION SUBCUTANEOUS ONCE
Refills: 0 | Status: COMPLETED | OUTPATIENT
Start: 2019-07-18 | End: 2019-07-18

## 2019-07-18 RX ORDER — DEXTROSE 50 % IN WATER 50 %
25 SYRINGE (ML) INTRAVENOUS ONCE
Refills: 0 | Status: COMPLETED | OUTPATIENT
Start: 2019-07-18 | End: 2019-07-18

## 2019-07-18 RX ORDER — CALCIUM GLUCONATE 100 MG/ML
1 VIAL (ML) INTRAVENOUS ONCE
Refills: 0 | Status: COMPLETED | OUTPATIENT
Start: 2019-07-18 | End: 2019-07-18

## 2019-07-18 RX ORDER — CHLORHEXIDINE GLUCONATE 213 G/1000ML
1 SOLUTION TOPICAL DAILY
Refills: 0 | Status: DISCONTINUED | OUTPATIENT
Start: 2019-07-18 | End: 2019-07-22

## 2019-07-18 RX ORDER — AMLODIPINE BESYLATE 2.5 MG/1
10 TABLET ORAL DAILY
Refills: 0 | Status: DISCONTINUED | OUTPATIENT
Start: 2019-07-18 | End: 2019-07-22

## 2019-07-18 RX ORDER — ATORVASTATIN CALCIUM 80 MG/1
20 TABLET, FILM COATED ORAL AT BEDTIME
Refills: 0 | Status: DISCONTINUED | OUTPATIENT
Start: 2019-07-18 | End: 2019-07-22

## 2019-07-18 RX ADMIN — Medication 10 MILLIGRAM(S): at 22:52

## 2019-07-18 RX ADMIN — NYSTATIN CREAM 1 APPLICATION(S): 100000 CREAM TOPICAL at 16:34

## 2019-07-18 RX ADMIN — Medication 1 MILLIGRAM(S): at 13:23

## 2019-07-18 RX ADMIN — Medication 2000 UNIT(S): at 13:24

## 2019-07-18 RX ADMIN — CHLORHEXIDINE GLUCONATE 1 APPLICATION(S): 213 SOLUTION TOPICAL at 04:24

## 2019-07-18 RX ADMIN — CHLORHEXIDINE GLUCONATE 1 APPLICATION(S): 213 SOLUTION TOPICAL at 11:54

## 2019-07-18 RX ADMIN — Medication 25 MILLILITER(S): at 15:39

## 2019-07-18 RX ADMIN — Medication 200 GRAM(S): at 15:30

## 2019-07-18 RX ADMIN — Medication 50 MILLIEQUIVALENT(S): at 15:35

## 2019-07-18 RX ADMIN — HEPARIN SODIUM 5000 UNIT(S): 5000 INJECTION INTRAVENOUS; SUBCUTANEOUS at 13:23

## 2019-07-18 RX ADMIN — AMLODIPINE BESYLATE 10 MILLIGRAM(S): 2.5 TABLET ORAL at 13:24

## 2019-07-18 RX ADMIN — INSULIN HUMAN 10 UNIT(S): 100 INJECTION, SOLUTION SUBCUTANEOUS at 15:44

## 2019-07-18 RX ADMIN — ATORVASTATIN CALCIUM 20 MILLIGRAM(S): 80 TABLET, FILM COATED ORAL at 22:52

## 2019-07-18 RX ADMIN — Medication 40 MILLIGRAM(S): at 13:25

## 2019-07-18 RX ADMIN — Medication 2: at 11:54

## 2019-07-18 RX ADMIN — Medication 100 MILLIGRAM(S): at 17:41

## 2019-07-18 RX ADMIN — Medication 100 MICROGRAM(S): at 05:28

## 2019-07-18 RX ADMIN — IRON SUCROSE 110 MILLIGRAM(S): 20 INJECTION, SOLUTION INTRAVENOUS at 17:13

## 2019-07-18 RX ADMIN — PANTOPRAZOLE SODIUM 40 MILLIGRAM(S): 20 TABLET, DELAYED RELEASE ORAL at 16:34

## 2019-07-18 RX ADMIN — MONTELUKAST 10 MILLIGRAM(S): 4 TABLET, CHEWABLE ORAL at 13:24

## 2019-07-18 RX ADMIN — INSULIN GLARGINE 10 UNIT(S): 100 INJECTION, SOLUTION SUBCUTANEOUS at 22:52

## 2019-07-18 NOTE — PROGRESS NOTE ADULT - ASSESSMENT
73 yo female, PMHx CHFpEf, CAD s/p CABG, AS s/p bovine AVR (both at [place]), CKD (baseline Cr 1.8; had 1 episode of emergent HD for ARF with hyperkalemia), T2 IDDM, bronchiectasis, Hepatitis C type II c/b cirrhosis, iron deficiency anemia, who presented with syncope from unstable symptomatic bradycardia, shock, hypoxia, pleural effusions/acute on chronic diastolic heart failure/volume overload, metabolic acidosis, due to hyperkalemia from acute renal failure on Bactrim and ARB, as well as component of beta blocker toxicity.

## 2019-07-18 NOTE — PROGRESS NOTE ADULT - SUBJECTIVE AND OBJECTIVE BOX
Patient is a 74y old  Female who presents with a chief complaint of Unstable Bradycardia, Hypotension, (17 Jul 2019 15:54)      BRIEF HOSPITAL COURSE: 75 y/o F with a h/o CHFpEf, CAD s/p CABG, AS s/p bovine AVR (both at [place]), CKD (baseline Cr 1.8; had 1 episode of emergent HD for ARF with hyperkalemia), T2 IDDM, bronchiectasis, Hepatitis C type II c/b cirrhosis, iron deficiency anemia of uncertain etiology (developed following cardiac surgery, has undergone 3 BM biopsies with no diagnosis; has required 26u PRBC over the past 3 years), recently placed port for vascular access- started on Bactrim a week ago for cellulitis around site, admitted on 7/17 with dizziness, bradycardia, syncope, MARGUERITE on CKD, metabolic acidosis, and hyperkalemia. She reported recent worsening shortness of breath, lower extremity edema, and decreased urination. Her Lasix dose was increased by her outpatient physician without improvement in symptoms. She is also on a BB and an ARB. Noted to be in high grade AV block with ventricular escape beats and episodes of AFib. Developed shock state. Given IV glucagon and started on dopamine infusion.    MARGUERITE on CKD likely multifactorial: Bactrim, ARB, increased loop diuretic. Now with symptomatic hyperkalemia and metabolic acidosis. Emergent hemodialysis. F/u repeat lab work.    Remains on dopamine infusion, actively titrating to maintain a MAP > 65. Hopefully can wean off as electrolyte and metabolic derangements improve. If not will consider potential underlying septic etiology given recent vascular port site infection. Blood cultures are pending. Was given a dose of vancomycin earlier. TSH was normal. F/u TTE results.      CRITICAL CARE TIME SPENT: 32 mins  Time spent evaluating/treating patient with medical issues that pose a high risk for life threatening deterioration and/or end-organ damage, reviewing data/labs/imaging, discussing case with multidisciplinary team, discussing plan/goals of care with patient/family. Non-inclusive of procedure time. Patient is a 74y old  Female who presents with a chief complaint of Unstable Bradycardia, Hypotension, (17 Jul 2019 15:54)      BRIEF HOSPITAL COURSE: 75 y/o F with a h/o CHFpEf, CAD s/p CABG, AS s/p bovine AVR (both at [place]), CKD (baseline Cr 1.8; had 1 episode of emergent HD for ARF with hyperkalemia), T2 IDDM, bronchiectasis, Hepatitis C type II c/b cirrhosis, iron deficiency anemia of uncertain etiology (developed following cardiac surgery, has undergone 3 BM biopsies with no diagnosis; has required 26u PRBC over the past 3 years), recently placed port for vascular access- started on Bactrim a week ago for cellulitis around site, admitted on 7/17 with dizziness, bradycardia, syncope, MARGUERITE on CKD, metabolic acidosis, and hyperkalemia. She reported recent worsening shortness of breath, lower extremity edema, and decreased urination. Her Lasix dose was increased by her outpatient physician without improvement in symptoms. She is also on a BB and an ARB. Noted to be in high grade AV block with ventricular escape beats and episodes of AFib. Developed cardiogenic shock state. Given IV glucagon and started on dopamine infusion.    MARGUERITE on CKD likely multifactorial: Bactrim, ARB, increased loop diuretic. Now with symptomatic hyperkalemia and metabolic acidosis. Emergent hemodialysis. F/u repeat lab work.    Remains on dopamine infusion, actively titrating to maintain a MAP > 65. Hopefully can wean off as electrolyte and metabolic derangements improve. If not will consider potential underlying septic etiology given recent vascular port site infection. Blood cultures are pending. Was given a dose of vancomycin earlier. Send procalcitonin level. TSH was normal. F/u TTE results.      CRITICAL CARE TIME SPENT: 32 mins  Time spent evaluating/treating patient with medical issues that pose a high risk for life threatening deterioration and/or end-organ damage, reviewing data/labs/imaging, discussing case with multidisciplinary team, discussing plan/goals of care with patient/family. Non-inclusive of procedure time. Patient is a 74y old  Female who presents with a chief complaint of Unstable Bradycardia, Hypotension, (2019 15:54)      BRIEF HOSPITAL COURSE: 73 y/o F with a h/o CHFpEf, CAD s/p CABG, AS s/p bovine AVR (both at [place]), CKD (baseline Cr 1.8; had 1 episode of emergent HD for ARF with hyperkalemia), T2 IDDM, bronchiectasis, Hepatitis C type II c/b cirrhosis, iron deficiency anemia of uncertain etiology (developed following cardiac surgery, has undergone 3 BM biopsies with no diagnosis; has required 26u PRBC over the past 3 years), recently placed port for vascular access- started on Bactrim a week ago for cellulitis around site, admitted on  with dizziness, bradycardia, syncope, MARGUERITE on CKD, metabolic acidosis, and hyperkalemia. She reported recent worsening shortness of breath, lower extremity edema, and decreased urination. Her Lasix dose was increased by her outpatient physician without improvement in symptoms. She is also on a BB and an ARB. Noted to be in high grade AV block with ventricular escape beats and episodes of AFib. Developed cardiogenic shock state. Given IV glucagon and started on dopamine infusion.    MARGUERITE on CKD likely multifactorial: Bactrim, ARB, increased loop diuretic. Now with symptomatic hyperkalemia and metabolic acidosis. S/p emergent hemodialysis. Repeat lab work shows improvement in K and serum bicarb.    Remains on dopamine infusion, actively titrating to maintain a MAP > 65. Weaning now as electrolyte and metabolic derangements have improved. If unable to come off vasopressor support will consider potential underlying septic etiology given recent vascular port site infection. Blood cultures are pending. Was given a dose of vancomycin earlier. Send procalcitonin level. TSH was normal. F/u TTE results.    At this time the patient is without complaints. She is adamantly refusing DVT prophylaxis with SQ heparin given the fact that she has a bovine AVR and her cardiologist told her to never take blood thinners. SCDs ordered.       PAST MEDICAL & SURGICAL HISTORY:  CKD (chronic kidney disease)  CAD (coronary artery disease)  Heart failure  Hepatitis C  Aortic stenosis  Anemia, unspecified anemia type  Asthma  Low back pain: chronic over a year now.  High cholesterol  Hypertension  Diabetes  H/O aortic valve replacement: cow valve.  S/P CABG x 3  History of tonsillectomy      Review of Systems:  CONSTITUTIONAL: No fever, chills, or fatigue  EYES: No eye pain, visual disturbances, or discharge  ENMT:  No difficulty hearing, tinnitus, vertigo; No sinus or throat pain  NECK: No pain or stiffness  RESPIRATORY: No cough, wheezing, chills or hemoptysis; No shortness of breath  CARDIOVASCULAR: No chest pain, palpitations, dizziness, or leg swelling  GASTROINTESTINAL: No abdominal or epigastric pain. No nausea, vomiting, or hematemesis; No diarrhea or constipation. No melena or hematochezia.  GENITOURINARY: No dysuria, frequency, hematuria, or incontinence  NEUROLOGICAL: No headaches, memory loss, loss of strength, numbness, or tremors  SKIN: No itching, burning, rashes, or lesions   MUSCULOSKELETAL: No joint pain or swelling; No muscle, back, or extremity pain  PSYCHIATRIC: No depression, anxiety, mood swings, or difficulty sleeping      Medications:    DOPamine Infusion 5 MICROgram(s)/kG/Min IV Continuous <Continuous>  heparin  Injectable 5000 Unit(s) SubCutaneous every 8 hours  dextrose 40% Gel 15 Gram(s) Oral once PRN  dextrose 50% Injectable 12.5 Gram(s) IV Push once  dextrose 50% Injectable 25 Gram(s) IV Push once  dextrose 50% Injectable 25 Gram(s) IV Push once  glucagon  Injectable 1 milliGRAM(s) IntraMuscular once PRN  insulin glargine Injectable (LANTUS) 10 Unit(s) SubCutaneous at bedtime  insulin lispro (HumaLOG) corrective regimen sliding scale   SubCutaneous Before meals and at bedtime  levothyroxine 100 MICROGram(s) Oral daily  dextrose 5%. 1000 milliLiter(s) IV Continuous <Continuous>  chlorhexidine 4% Liquid 1 Application(s) Topical <User Schedule>  nystatin Powder 1 Application(s) Topical two times a day            ICU Vital Signs Last 24 Hrs  T(C): 37.4 (2019 00:01), Max: 37.4 (2019 00:01)  T(F): 99.3 (2019 00:01), Max: 99.3 (2019 00:01)  HR: 80 (2019 01:00) (32 - 82)  BP: 132/62 (2019 01:00) (87/50 - 155/68)  BP(mean): 89 (2019 01:00) (66 - 98)  ABP: --  ABP(mean): --  RR: 26 (2019 01:00) (15 - 36)  SpO2: 93% (2019 01:00) (91% - 100%)          I&O's Detail    2019 07:01  -  2019 02:27  --------------------------------------------------------  IN:    DOPamine Infusion: 186.6 mL    DOPamine Infusion: 109.2 mL    Oral Fluid: 360 mL    Other: 900 mL  Total IN: 1555.8 mL    OUT:    Indwelling Catheter - Urethral: 210 mL    Other: 900 mL  Total OUT: 1110 mL    Total NET: 445.8 mL            LABS:                        9.3    6.88  )-----------( 249      ( 2019 10:48 )             31.2     07-17    136  |  100  |  31.0<H>  ----------------------------<  136<H>  5.2   |  25.0  |  2.46<H>    Ca    8.9      2019 21:16  Phos  3.9     -  Mg     2.0     07-17    TPro  5.7<L>  /  Alb  2.7<L>  /  TBili  <0.2<L>  /  DBili  x   /  AST  62<H>  /  ALT  31  /  AlkPhos  97  07-17      CARDIAC MARKERS ( 2019 11:38 )  x     / 0.06 ng/mL / x     / x     / x          CAPILLARY BLOOD GLUCOSE      POCT Blood Glucose.: 174 mg/dL (2019 22:14)    PT/INR - ( 2019 10:48 )   PT: 12.0 sec;   INR: 1.04 ratio         PTT - ( 2019 10:48 )  PTT:25.2 sec  Urinalysis Basic - ( 2019 13:36 )    Color: Yellow / Appearance: Clear / S.010 / pH: x  Gluc: x / Ketone: Negative  / Bili: Negative / Urobili: Negative mg/dL   Blood: x / Protein: 100 mg/dL / Nitrite: Negative   Leuk Esterase: Negative / RBC: 3-5 /HPF / WBC 0-2   Sq Epi: x / Non Sq Epi: Occasional / Bacteria: Occasional      CULTURES:        Physical Examination:    General: No acute distress.  Alert, oriented, interactive, nonfocal    HEENT: Pupils equal, reactive to light.  Symmetric.    PULM: Clear to auscultation bilaterally, no significant sputum production    CVS: Regular rate and rhythm, no murmurs, rubs, or gallops    ABD: Soft, nondistended, nontender, normoactive bowel sounds, no masses    EXT: b/l LE edema, nontender    SKIN: Warm and well perfused, right anterior chest wall port with some surrounding ecchymosis    NEURO: A&Ox3, strength 5/5 all extremities, cranial nerves grossly intact, no focal deficits      RADIOLOGY:     < from: Xray Chest 1 View-PORTABLE IMMEDIATE (19 @ 10:43) >  Findings:  Cardiomegaly. No hilar or mediastinal abnormality. A right internal   jugular central line with a subcutaneous port has been inserted since the   prior examination. The tip is located in the midsuperior vena cava. Post   cardiac surgery changes. A loop recorder is noted, a new finding. There   are small bilateral pleural effusions..    Impression:  Cardiomegaly. Small bilateral pleural effusions..    < end of copied text >

## 2019-07-18 NOTE — CONSULT NOTE ADULT - SUBJECTIVE AND OBJECTIVE BOX
Hematology Oncology Consult Note    The patient was seen and examined    NICHELLE GILL is a 74y Female with history of anemia admitted with Hyperkalemia  .    Interval History: syncope in our office, bradycardic so EMS called. Required emergency dialysis d/t hyperkalemia.     ROS Neg: neuro, no SOB now, GI, tolerating mg, states the redness around the port is less, no fevers, does not remember all of what happened in our office,         PAST MEDICAL & SURGICAL HISTORY:  CKD (chronic kidney disease)  CAD (coronary artery disease)  Heart failure  Hepatitis C  Aortic stenosis  Anemia, unspecified anemia type  Asthma  Low back pain: chronic over a year now.  High cholesterol  Hypertension  Diabetes  H/O aortic valve replacement: cow valve.  S/P CABG x 3  History of tonsillectomy      Allergies:  Biaxin (Joint Pain)  morphine (Short breath)  Biaxin (Joint Pain)  morphine (Short breath)      Medications:  amitriptyline 10 milliGRAM(s) Oral at bedtime  amLODIPine   Tablet 10 milliGRAM(s) Oral daily  atorvastatin 20 milliGRAM(s) Oral at bedtime  chlorhexidine 2% Cloths 1 Application(s) Topical daily  cholecalciferol 2000 Unit(s) Oral daily  dextrose 40% Gel 15 Gram(s) Oral once PRN  dextrose 5%. 1000 milliLiter(s) IV Continuous <Continuous>  dextrose 50% Injectable 12.5 Gram(s) IV Push once  dextrose 50% Injectable 25 Gram(s) IV Push once  dextrose 50% Injectable 25 Gram(s) IV Push once  folic acid 1 milliGRAM(s) Oral daily  furosemide    Tablet 40 milliGRAM(s) Oral two times a day  glucagon  Injectable 1 milliGRAM(s) IntraMuscular once PRN  insulin glargine Injectable (LANTUS) 10 Unit(s) SubCutaneous at bedtime  insulin lispro (HumaLOG) corrective regimen sliding scale   SubCutaneous Before meals and at bedtime  levothyroxine 100 MICROGram(s) Oral daily  montelukast 10 milliGRAM(s) Oral daily  nystatin Powder 1 Application(s) Topical two times a day  pantoprazole    Tablet 40 milliGRAM(s) Oral two times a day  vancomycin  IVPB 500 milliGRAM(s) IV Intermittent every 24 hours      Social History:    FAMILY HISTORY:  Family history of diabetes mellitus (Sibling)      PHYSICAL EXAM:    T(F): 100 (19 @ 18:00), Max: 100 (19 @ 11:24)  HR: 88 (19 @ 18:00) (72 - 88)  BP: 165/71 (19 @ 18:00) (99/47 - 165/71)  RR: 16 (19 @ 18:00) (15 - 36)  SpO2: 94% (19 @ 18:00) (92% - 100%)  Wt(kg): --    Daily     Daily Weight in k (2019 19:50)    Gen: well developed, well nourished, comfortable  HEENT: normocephalic/atraumatic,   Neck: supple, no masses, no JVD, TLC in place  Cardiovascular: Pulse 70 on monitor  Respiratory: clear air entry b/l anteriorly  Gastrointestinal: BS+, soft, NT/ND, no masses, no splenomegaly, no hepatomegaly, no evidence for ascites  Extremities: no clubbing/cyanosis, no edema, no calf tenderness  Neurological: no focal deficits  Skin: left chest port with some swelling and erythema but not worse than last week  Psychiatric:  mood stable            Labs:                          7.1    4.69  )-----------( 134      ( 2019 14:37 )             22.6     CBC Full  -  ( 2019 14:37 )  WBC Count : 4.69 K/uL  RBC Count : 2.36 M/uL  Hemoglobin : 7.1 g/dL  Hematocrit : 22.6 %  Platelet Count - Automated : 134 K/uL  Mean Cell Volume : 95.8 fl  Mean Cell Hemoglobin : 30.1 pg  Mean Cell Hemoglobin Concentration : 31.4 gm/dL  Auto Neutrophil # : x  Auto Lymphocyte # : x  Auto Monocyte # : x  Auto Eosinophil # : x  Auto Basophil # : x  Auto Neutrophil % : x  Auto Lymphocyte % : x  Auto Monocyte % : x  Auto Eosinophil % : x  Auto Basophil % : x    PT/INR - ( 2019 10:48 )   PT: 12.0 sec;   INR: 1.04 ratio         PTT - ( 2019 10:48 )  PTT:25.2 sec        136  |  102  |  41.0<H>  ----------------------------<  137<H>  5.8<H>   |  23.0  |  2.70<H>    Ca    8.6      2019 13:31  Phos  4.4     07-18  Mg     2.2     07-18    TPro  5.7<L>  /  Alb  2.7<L>  /  TBili  <0.2<L>  /  DBili  x   /  AST  62<H>  /  ALT  31  /  AlkPhos  97  07-17      Urinalysis Basic - ( 2019 13:36 )    Color: Yellow / Appearance: Clear / S.010 / pH: x  Gluc: x / Ketone: Negative  / Bili: Negative / Urobili: Negative mg/dL   Blood: x / Protein: 100 mg/dL / Nitrite: Negative   Leuk Esterase: Negative / RBC: 3-5 /HPF / WBC 0-2   Sq Epi: x / Non Sq Epi: Occasional / Bacteria: Occasional        Other Labs:    Cultures:    Pathology:    Imaging Studies:      Images:

## 2019-07-18 NOTE — PROGRESS NOTE ADULT - SUBJECTIVE AND OBJECTIVE BOX
[facility] - [facility]                                   [address] [place] [address]                                                      PHONE: [phone]                                                         FAX: [phone]  [email or web address]  ---------------------------------------------------------------------------------------------------------------------------------    Overnight events/patient complaints:  NAD feeling much better     Biaxin (Joint Pain)  morphine (Short breath)    MEDICATIONS  (STANDING):  amitriptyline 10 milliGRAM(s) Oral at bedtime  amLODIPine   Tablet 10 milliGRAM(s) Oral daily  atorvastatin 20 milliGRAM(s) Oral at bedtime  chlorhexidine 4% Liquid 1 Application(s) Topical <User Schedule>  cholecalciferol 2000 Unit(s) Oral daily  dextrose 5%. 1000 milliLiter(s) (50 mL/Hr) IV Continuous <Continuous>  dextrose 50% Injectable 12.5 Gram(s) IV Push once  dextrose 50% Injectable 25 Gram(s) IV Push once  dextrose 50% Injectable 25 Gram(s) IV Push once  DOPamine Infusion 5 MICROgram(s)/kG/Min (15.563 mL/Hr) IV Continuous <Continuous>  folic acid 1 milliGRAM(s) Oral daily  heparin  Injectable 5000 Unit(s) SubCutaneous every 8 hours  insulin glargine Injectable (LANTUS) 10 Unit(s) SubCutaneous at bedtime  insulin lispro (HumaLOG) corrective regimen sliding scale   SubCutaneous Before meals and at bedtime  levothyroxine 100 MICROGram(s) Oral daily  montelukast 10 milliGRAM(s) Oral daily  nystatin Powder 1 Application(s) Topical two times a day  pantoprazole    Tablet 40 milliGRAM(s) Oral two times a day    MEDICATIONS  (PRN):  dextrose 40% Gel 15 Gram(s) Oral once PRN Blood Glucose LESS THAN 70 milliGRAM(s)/deciLiter  glucagon  Injectable 1 milliGRAM(s) IntraMuscular once PRN Glucose <70 milliGRAM(s)/deciLiter      Vital Signs Last 24 Hrs  T(C): 37.4 (2019 07:28), Max: 37.4 (2019 00:01)  T(F): 99.3 (2019 07:28), Max: 99.4 (2019 05:00)  HR: 85 (2019 10:00) (32 - 85)  BP: 128/58 (2019 10:00) (87/50 - 155/68)  BP(mean): 83 (2019 10:00) (66 - 98)  RR: 21 (2019 10:00) (15 - 36)  SpO2: 94% (2019 10:00) (91% - 100%)  ICU Vital Signs Last 24 Hrs  NICHELLE GILL  I&O's Detail    2019 07:  -  2019 07:00  --------------------------------------------------------  IN:    DOPamine Infusion: 186.6 mL    DOPamine Infusion: 171.6 mL    Oral Fluid: 480 mL    Other: 900 mL    Solution: 250 mL  Total IN: 1988.2 mL    OUT:    Indwelling Catheter - Urethral: 460 mL    Other: 900 mL  Total OUT: 1360 mL    Total NET: 628.2 mL      2019 07:  -  2019 10:50  --------------------------------------------------------  IN:    Oral Fluid: 280 mL  Total IN: 280 mL    OUT:    Indwelling Catheter - Urethral: 245 mL  Total OUT: 245 mL    Total NET: 35 mL        I&O's Summary    2019 07:  -  2019 07:00  --------------------------------------------------------  IN: 1988.2 mL / OUT: 1360 mL / NET: 628.2 mL    2019 07:  -  2019 10:50  --------------------------------------------------------  IN: 280 mL / OUT: 245 mL / NET: 35 mL      Drug Dosing Weight  [facility]      PHYSICAL EXAM:  General: Appears well developed, well nourished alert and cooperative.  HEENT: Head; normocephalic, atraumatic.  Eyes: Pupils reactive, cornea wnl.  Neck: Supple, no nodes adenopathy, no NVD or carotid bruit or thyromegaly.  CARDIOVASCULAR: Normal S1 and S2, 1/6 murmur, rub, gallop or lift.   LUNGS: No rales, rhonchi or wheeze. Normal breath sounds bilaterally.  ABDOMEN: Soft, nontender without mass or organomegaly. bowel sounds normoactive.  EXTREMITIES: No clubbing, cyanosis or edema. Distal pulses wnl.   SKIN: warm and dry with normal turgor.  NEURO: Alert/oriented x 3/normal motor exam. No pathologic reflexes.    PSYCH: normal affect.        LABS:                        7.1    4.74  )-----------( 142      ( 2019 03:42 )             22.2         136  |  100  |  35.0<H>  ----------------------------<  121<H>  5.6<H>   |  25.0  |  2.60<H>    Ca    8.3<L>      2019 03:42  Phos  4.7       Mg     2.2         TPro  5.7<L>  /  Alb  2.7<L>  /  TBili  <0.2<L>  /  DBili  x   /  AST  62<H>  /  ALT  31  /  AlkPhos  97      [facility]  CARDIAC MARKERS ( 2019 11:38 )  x     / 0.06 ng/mL / x     / x     / x          PT/INR - ( 2019 10:48 )   PT: 12.0 sec;   INR: 1.04 ratio         PTT - ( 2019 10:48 )  PTT:25.2 sec  Urinalysis Basic - ( 2019 13:36 )    Color: Yellow / Appearance: Clear / S.010 / pH: x  Gluc: x / Ketone: Negative  / Bili: Negative / Urobili: Negative mg/dL   Blood: x / Protein: 100 mg/dL / Nitrite: Negative   Leuk Esterase: Negative / RBC: 3-5 /HPF / WBC 0-2   Sq Epi: x / Non Sq Epi: Occasional / Bacteria: Occasional        RADIOLOGY & ADDITIONAL STUDIES:    INTERPRETATION OF TELEMETRY (personally reviewed): HR 80's stable     ECHO:   Summary:   1. Normal biventricular systolic function. Left ventricular ejection   fraction, by visual estimation, is 65 to 70%.   2. Severely biatrial enlargement.   3. Severe mitral annular calcification.   4. Mild degenerative mitral stenosis.   5. Stable aortic bioprosthesis without significant stenosis or   insufficiency.   6. There is no evidence of pericardialeffusion.   7. ** Compared to TTE dated 2016, an aortic bioprosthesis is norw   present.      ASSESSMENT AND PLAN:  In summary, NICHELLE GILL is an 74y Female with past medical history significant for CAD s/p CABG and Bio AVR 2016 with brief post operative AF, normal EF, CKD requiring transient HD for hyperkalemia, mild MR, prior TIA s/p ILR, chronic Hep C complicated by cirrhosis and varices, bronchiectasias and chronic anemia s/p port placement who presents from her oncologist with brief unresponsiveness followed by chills and dizziness found to have hypoxia and bradycardia to the 30's hyperkalemia 7.3 s/p urgent HD; feeling well today HR 80's     Plan  1.  HD and hyperkaliemia management as per renal   2.  Check ECHO   3.  Hold AV brandy blockers at this time     No need for PPM at this time as per EPS     Ok for TELE

## 2019-07-18 NOTE — CONSULT NOTE ADULT - ASSESSMENT
Problem/plan:  1. Anemia - she has a long h/o anemia despite supportive therapy with iv iron prn, B-12 and erythropoietin. Several bone marrows have been normal, most recently 5/15/19. Due to her cardiac issues we try to keep her Hgb at least > 7. I explained to the family that if another session of dialysis is needed it may be safer for her to receive pRBC there.   2. Possibly infected port: there was erythema and she reported chills so Bactrim was started and she followed up with Dr. Pandey a few days later. Multiple antibiotics have now been given and Dr. Sales has been called in. Cultures pending.   3. Acute on chronic renal failure. She has needed dialysis transiently in the past, now s/p emergency dialysis, nephrology following. ARACELI given.  4. Possible iron deficiency; iron studies could also be d/t AOCD but no downside to the iv iron given.     It was a pleasure seeing Marlene today and being asked to participate in her care.

## 2019-07-18 NOTE — CHART NOTE - NSCHARTNOTEFT_GEN_A_CORE
CC:      Overnight/ Am events:  Patient seen and examined at bedside. No acute events overnight. Patient states           Vital Signs Last 24 Hrs  T(C): 37.8 (18 Jul 2019 18:00), Max: 37.8 (18 Jul 2019 11:24)  T(F): 100 (18 Jul 2019 18:00), Max: 100 (18 Jul 2019 11:24)  HR: 88 (18 Jul 2019 18:00) (72 - 88)  BP: 165/71 (18 Jul 2019 18:00) (99/47 - 165/71)  BP(mean): 102 (18 Jul 2019 18:00) (68 - 102)  RR: 16 (18 Jul 2019 18:00) (16 - 36)  SpO2: 94% (18 Jul 2019 18:00) (92% - 100%) CC: Syncope       Overnight/ Am events:  Patient seen and examined at bedside. No acute events overnight. Patient states she feels much better, she felt awful when she came in, reports this has happened to her before in terms of her requiring HD support. Aside from this feels it Is so hard for her to keep track of all of her medical issues. Improving sob, peripheral edema. Daughters at bedside and d/w them all the plan of care and plan for repeat labs in the evening to ensure potassium is improving. Understand and agree with the plan of care. Patient denies chest pain, SOB, abd pain, N/V, fever, chills, dysuria or any other complaints. All remainder ROS negative.           Vital Signs Last 24 Hrs  T(C): 37.8 (18 Jul 2019 18:00), Max: 37.8 (18 Jul 2019 11:24)  T(F): 100 (18 Jul 2019 18:00), Max: 100 (18 Jul 2019 11:24)  HR: 88 (18 Jul 2019 18:00) (72 - 88)  BP: 165/71 (18 Jul 2019 18:00) (99/47 - 165/71)  BP(mean): 102 (18 Jul 2019 18:00) (68 - 102)  RR: 16 (18 Jul 2019 18:00) (16 - 36)  SpO2: 94% (18 Jul 2019 18:00) (92% - 100%)        CONSTITUTIONAL: Obese Well appearing, well nourished, awake, alert and in no apparent distress  ENMT: LIJ in place  CARDIAC: Normal rate, regular rhythm.  Heart sounds S1, S2.  No murmurs, rubs or gallops   RESPIRATORY: Bibasilar crackles b/l fair air entry   Gastroenterology: Soft nt nd bs +  : mg in place and femoral quintron in place   EXTREMITIES: No edema, cyanosis or deformity   NEUROLOGICAL: Alert and oriented, no focal deficits, no motor or sensory deficits.  SKIN: No rash, skin turgor wnl     R port in place with surrounding healing scab, slight erythema         A/p: 75 yo female, Hx chronic diastolic HFpEf, CAD s/p CABG, AS s/p bovine AVR (both at [employer]), CKD3 (baseline Cr 1.8; had 1 episode of emergent HD for ARF with hyperkalemia few yrs ago), T2 IDDM, bronchiectasis, Hepatitis C type II c/b cirrhosis, iron deficiency anemia of uncertain etiology (developed following cardiac surgery, has undergone 3 BM biopsies with no diagnosis; has required multiple transfusions in the past 3 years), who presented following evaluation at her PMD for dizziness and was found to be bradycardic with HR 30's. Patient states she had a port placed for vascular access 2 weeks ago, at which time she was having chills but was told her white blood cell count was not indicative of infection. About 1 week ago, she developed redness over the site of her port and was started on Bactrim. Around that time, she also developed increasing shortness of breath, lower extremity edema, and decreased urination; her Cr was noted to be increased from 1.8 to 2.0. She was seen by her PMD two days ago for worsening shortness of breath, found to have 6lb weight gain over the past 1 week, and was told to increase her Lasix from 40mg BID to 80mg in AM and 40mg PM. Her symptoms did not improve with increased diuresis. Today she had a brief syncopal episode at and was found to have HR 20's with hypoxia and was sent to ED for evaluation of symptomatic bradycardia. Upon arrival to ED, patient was found to have HR 30's with high grade AV block with ventricular escape beats and episodes of AFib. She was evaluated by Cardiology and started on Dopamine 2mcg/kg/min. ICU was asked to consult. Patient's medication list was reviewed, notable for Bactrim, Metoprolol, and an ARB, all of which she took this morning. Glucagon 5mg IVPB was given to reverse beta blockade with improvement in HR to sinus bradycardia in 40's. Patient developed hypotension with pre-syncopal feelings of worsening dizziness, blurred vision, and depth perception issues. Dopamine was increased to 10mcg/kg/min with improvement in BP. Upon completion of Glucagon infusion, HR improved to NSR 65 bpm. Patient stated she had a similar admission in the past, at which time she was found to be in acute renal failure with hyperkalemia requiring 1 session of emergent hemodialysis. A VBG was sent, which revealed a K 7.1. Patient was given calcium gluconate, insulin/D50, and sodium bicarbonate. Labs returned confirming hyperkalemia 7.3 with mild hemolysis and SCr 3.41. Nephrology consult was called for emergent hemodialysis. An indwelling mg was placed with 25cc UOP. Eventually pt able to come off of dopamine support. Holding off on HD today, pt has femoral line in place, hyperkalemia was again reversed and repeat labs showed normalization of potassium. Monitoring renal fxn closley, likely secondary to ATN; drug induced. Also noted with acute on chronic anemia, noted hgb 7, given fluid overload state will monitor hgb closely if drops below 7 will transfuse. Will c/w ferrous sulfate po and vit c for now, will consider IV iron if Bcx return negative for bacteremia. Pt also was being tx for possible port infection with bactrm outpt, currently with improving erythema around the site. Will await Bcx results, pt to empirically remains on renally adjusted vanco; to be dosed per pharmacy. No dvt ppx given ongoing severe anemia.

## 2019-07-18 NOTE — PROGRESS NOTE ADULT - ASSESSMENT
MARGUERITE on CKD   Patient follows with Renal in [place]   Improved Symptomatic hyperkalemia , S/p HD x 1   Hyperkalemia  related to combination of ARB and bactrim and MA  ---> Both held   Await renal sonogram   Resume Lasix 40 mg po bid   BMP and CBC at 5 pm   If Potassium stable , can remove femoral marifer   Cardio follow up noted     Anemia - Epogen  7-17   Iron stores noted , can add Venofer   CBC at 5 pm

## 2019-07-18 NOTE — PROGRESS NOTE ADULT - SUBJECTIVE AND OBJECTIVE BOX
NEPHROLOGY INTERVAL HPI/OVERNIGHT EVENTS:    S/p acute HD 7-17 , (-) 900 ml   Feels much better   K+ 5.6   No SOB or CP   + edema   Normally takes Lasix 40 mg po bid   Off Dopamine drip , HR 76       MEDICATIONS  (STANDING):  amitriptyline 10 milliGRAM(s) Oral at bedtime  amLODIPine   Tablet 10 milliGRAM(s) Oral daily  atorvastatin 20 milliGRAM(s) Oral at bedtime  chlorhexidine 2% Cloths 1 Application(s) Topical daily  cholecalciferol 2000 Unit(s) Oral daily  dextrose 5%. 1000 milliLiter(s) (50 mL/Hr) IV Continuous <Continuous>  dextrose 50% Injectable 12.5 Gram(s) IV Push once  dextrose 50% Injectable 25 Gram(s) IV Push once  dextrose 50% Injectable 25 Gram(s) IV Push once  folic acid 1 milliGRAM(s) Oral daily  furosemide    Tablet 40 milliGRAM(s) Oral two times a day  heparin  Injectable 5000 Unit(s) SubCutaneous every 8 hours  insulin glargine Injectable (LANTUS) 10 Unit(s) SubCutaneous at bedtime  insulin lispro (HumaLOG) corrective regimen sliding scale   SubCutaneous Before meals and at bedtime  levothyroxine 100 MICROGram(s) Oral daily  montelukast 10 milliGRAM(s) Oral daily  nystatin Powder 1 Application(s) Topical two times a day  pantoprazole    Tablet 40 milliGRAM(s) Oral two times a day    MEDICATIONS  (PRN):  dextrose 40% Gel 15 Gram(s) Oral once PRN Blood Glucose LESS THAN 70 milliGRAM(s)/deciLiter  glucagon  Injectable 1 milliGRAM(s) IntraMuscular once PRN Glucose <70 milliGRAM(s)/deciLiter      Allergies    Biaxin (Joint Pain)  morphine (Short breath)    Intolerances    Vital Signs Last 24 Hrs  T(C): 37.8 (2019 11:24), Max: 37.8 (2019 11:24)  T(F): 100 (2019 11:24), Max: 100 (2019 11:24)  HR: 81 (2019 12:00) (47 - 85)  BP: 138/63 (2019 12:00) (91/46 - 155/68)  BP(mean): 91 (2019 12:00) (66 - 98)  RR: 20 (2019 12:00) (15 - 36)  SpO2: 94% (2019 12:00) (91% - 100%)  Daily     Daily Weight in k (2019 19:50)  I&O's Detail    2019 07:01  -  2019 07:00  --------------------------------------------------------  IN:    DOPamine Infusion: 186.6 mL    DOPamine Infusion: 171.6 mL    Oral Fluid: 480 mL    Other: 900 mL    Solution: 250 mL  Total IN: 1988.2 mL    OUT:    Indwelling Catheter - Urethral: 460 mL    Other: 900 mL  Total OUT: 1360 mL    Total NET: 628.2 mL      2019 07:  -  2019 12:18  --------------------------------------------------------  IN:    Oral Fluid: 280 mL  Total IN: 280 mL    OUT:    Indwelling Catheter - Urethral: 350 mL  Total OUT: 350 mL    Total NET: -70 mL        I&O's Summary    2019 07:  -  2019 07:00  --------------------------------------------------------  IN: 1988.2 mL / OUT: 1360 mL / NET: 628.2 mL    2019 07:  -  2019 12:18  --------------------------------------------------------  IN: 280 mL / OUT: 350 mL / NET: -70 mL        PHYSICAL EXAM:  GENERAL: NAD, feels better   HEAD:  Atraumatic, No edema   NECK: Supple, No JVD, + right sided mediport with improved erythema , + L sided TLC   CHEST/LUNG: Clear / EAE . No wheeze   HEART: Regular rate and rhythm; No murmurs, rubs, or gallops  ABDOMEN: Soft, Nontender, Nondistended; Bowel sounds present  EXTREMITIES: + edema , + femoral marifer             LABS:                        7.1    4.74  )-----------( 142      ( 2019 03:42 )             22.2     07-18    136  |  100  |  35.0<H>  ----------------------------<  121<H>  5.6<H>   |  25.0  |  2.60<H>      Ca    8.3<L>      2019 03:42  Phos  4.7       Mg     2.2         TPro  5.7<L>  /  Alb  2.7<L>  /  TBili  <0.2<L>  /  DBili  x   /  AST  62<H>  /  ALT  31  /  AlkPhos  97      PT/INR - ( 2019 10:48 )   PT: 12.0 sec;   INR: 1.04 ratio         PTT - ( 2019 10:48 )  PTT:25.2 sec  Urinalysis Basic - ( 2019 13:36 )    Color: Yellow / Appearance: Clear / S.010 / pH: x  Gluc: x / Ketone: Negative  / Bili: Negative / Urobili: Negative mg/dL   Blood: x / Protein: 100 mg/dL / Nitrite: Negative   Leuk Esterase: Negative / RBC: 3-5 /HPF / WBC 0-2   Sq Epi: x / Non Sq Epi: Occasional / Bacteria: Occasional      Magnesium, Serum: 2.2 mg/dL ( @ 03:42)  Phosphorus Level, Serum: 4.7 mg/dL ( @ 03:42)  Magnesium, Serum: 2.0 mg/dL ( @ 16:42)  Phosphorus Level, Serum: 3.9 mg/dL ( @ 16:42)          RADIOLOGY & ADDITIONAL TESTS:

## 2019-07-18 NOTE — CHART NOTE - NSCHARTNOTEFT_GEN_A_CORE
Patient was hesitant to remove TLC because she is a "difficult stick."  Convinced her that it was unsafe to leave in a central line longer than necessary.   Also, called a consult from her hematologist Dr Gregorio per her request.  Called ID consult as well to assist with antibiotic management (Dr Morrissey)

## 2019-07-18 NOTE — PROGRESS NOTE ADULT - SUBJECTIVE AND OBJECTIVE BOX
Patient is a 74y old  Female who presents with a chief complaint of Unstable Bradycardia, Shock, Acute Renal Failure (2019 10:50)      BRIEF HOSPITAL COURSE: ***    Events last 24 hours: ***    PAST MEDICAL & SURGICAL HISTORY:  CKD (chronic kidney disease)  CAD (coronary artery disease)  Heart failure  Hepatitis C  Aortic stenosis  Anemia, unspecified anemia type  Asthma  Low back pain: chronic over a year now.  High cholesterol  Hypertension  Diabetes  H/O aortic valve replacement: cow valve.  S/P CABG x 3  History of tonsillectomy        Medications:    amLODIPine   Tablet 10 milliGRAM(s) Oral daily    montelukast 10 milliGRAM(s) Oral daily    amitriptyline 10 milliGRAM(s) Oral at bedtime      heparin  Injectable 5000 Unit(s) SubCutaneous every 8 hours    pantoprazole    Tablet 40 milliGRAM(s) Oral two times a day      atorvastatin 20 milliGRAM(s) Oral at bedtime  dextrose 40% Gel 15 Gram(s) Oral once PRN  dextrose 50% Injectable 12.5 Gram(s) IV Push once  dextrose 50% Injectable 25 Gram(s) IV Push once  dextrose 50% Injectable 25 Gram(s) IV Push once  glucagon  Injectable 1 milliGRAM(s) IntraMuscular once PRN  insulin glargine Injectable (LANTUS) 10 Unit(s) SubCutaneous at bedtime  insulin lispro (HumaLOG) corrective regimen sliding scale   SubCutaneous Before meals and at bedtime  levothyroxine 100 MICROGram(s) Oral daily    cholecalciferol 2000 Unit(s) Oral daily  dextrose 5%. 1000 milliLiter(s) IV Continuous <Continuous>  folic acid 1 milliGRAM(s) Oral daily      chlorhexidine 2% Cloths 1 Application(s) Topical daily  nystatin Powder 1 Application(s) Topical two times a day            ICU Vital Signs Last 24 Hrs  T(C): 37.8 (2019 11:24), Max: 37.8 (2019 11:24)  T(F): 100 (2019 11:24), Max: 100 (2019 11:24)  HR: 83 (2019 11:00) (32 - 85)  BP: 131/60 (2019 11:00) (87/50 - 155/68)  BP(mean): 86 (2019 11:00) (66 - 98)  ABP: --  ABP(mean): --  RR: 25 (2019 11:00) (15 - 36)  SpO2: 94% (2019 11:00) (91% - 100%)          I&O's Detail    2019 07:01  -  2019 07:00  --------------------------------------------------------  IN:    DOPamine Infusion: 186.6 mL    DOPamine Infusion: 171.6 mL    Oral Fluid: 480 mL    Other: 900 mL    Solution: 250 mL  Total IN: 1988.2 mL    OUT:    Indwelling Catheter - Urethral: 460 mL    Other: 900 mL  Total OUT: 1360 mL    Total NET: 628.2 mL      2019 07:01  -  2019 11:30  --------------------------------------------------------  IN:    Oral Fluid: 280 mL  Total IN: 280 mL    OUT:    Indwelling Catheter - Urethral: 320 mL  Total OUT: 320 mL    Total NET: -40 mL            LABS:                        7.1    4.74  )-----------( 142      ( 2019 03:42 )             22.2     07-18    136  |  100  |  35.0<H>  ----------------------------<  121<H>  5.6<H>   |  25.0  |  2.60<H>    Ca    8.3<L>      2019 03:42  Phos  4.7     18  Mg     2.2     -18    TPro  5.7<L>  /  Alb  2.7<L>  /  TBili  <0.2<L>  /  DBili  x   /  AST  62<H>  /  ALT  31  /  AlkPhos  97  07-17      CARDIAC MARKERS ( 2019 11:38 )  x     / 0.06 ng/mL / x     / x     / x          CAPILLARY BLOOD GLUCOSE      POCT Blood Glucose.: 188 mg/dL (2019 11:11)    PT/INR - ( 2019 10:48 )   PT: 12.0 sec;   INR: 1.04 ratio         PTT - ( 2019 10:48 )  PTT:25.2 sec  Urinalysis Basic - ( 2019 13:36 )    Color: Yellow / Appearance: Clear / S.010 / pH: x  Gluc: x / Ketone: Negative  / Bili: Negative / Urobili: Negative mg/dL   Blood: x / Protein: 100 mg/dL / Nitrite: Negative   Leuk Esterase: Negative / RBC: 3-5 /HPF / WBC 0-2   Sq Epi: x / Non Sq Epi: Occasional / Bacteria: Occasional      CULTURES:  Culture Results:   No growth ( @ 13:35)      Physical Examination:    General: No acute distress.      HEENT: Pupils equal, reactive to light.  Symmetric.    PULM: Clear to auscultation bilaterally, no significant sputum production    NECK: Supple, no lymphadenopathy, trachea midline    CVS: Regular rate and rhythm, no murmurs, rubs, or gallops    GI: Soft, nondistended, nontender, normoactive bowel sounds, no masses    EXT: No edema, nontender    SKIN: Warm and well perfused, no rashes noted.    NEURO: Alert, oriented, interactive, nonfocal    DEVICES:     RADIOLOGY:     < from: Xray Chest 1 View-PORTABLE IMMEDIATE (19 @ 20:48) >   EXAM:  XR CHEST PORTABLE IMMED 1V                          PROCEDURE DATE:  2019          INTERPRETATION:  Portable chest radiograph        CLINICAL INFORMATION:   Line placement    TECHNIQUE:  Portable  AP view of the chest was obtained.    COMPARISON: No previous examinations are available for review.    FINDINGS: LEFT IJ catheter tip in SVC brachycephalic junction.. MediPort   catheter tip in SVC.  The lungs  are clear.  No pleural abnormality is seen.    The  heart is enlarged in transverse diameter. No hilar mass. Trachea   midline.  Status post median sternotomy.   Visualized osseous structures are intact.        IMPRESSION:   No evidence of active chest disease.  Catheters in place.                  ARIEL LANE M.D., ATTENDING RADIOLOGIST  This document has been electronically signed. 2019  8:07AM        < end of copied text >  CRITICAL CARE TIME SPENT: Patient is a 74y old  Female who presents with a chief complaint of Unstable Bradycardia, Shock, Acute Renal Failure (2019 10:50)      BRIEF HOSPITAL COURSE:  73 y/o F with a h/o CHFpEf, CAD s/p CABG, AS s/p bovine AVR (both at [place]), CKD (baseline Cr 1.8; had 1 episode of emergent HD for ARF with hyperkalemia), T2 IDDM, bronchiectasis, Hepatitis C type II c/b cirrhosis, iron deficiency anemia of uncertain etiology (developed following cardiac surgery, has undergone 3 BM biopsies with no diagnosis; has required 26u PRBC over the past 3 years), recently placed port for vascular access- started on Bactrim a week ago for cellulitis around site, admitted on  with dizziness, bradycardia, syncope, MARGUERITE on CKD, metabolic acidosis, and hyperkalemia. She reported recent worsening shortness of breath, lower extremity edema, and decreased urination. Her Lasix dose was increased by her outpatient physician without improvement in symptoms. She is also on a BB and an ARB. Noted to be in high grade AV block with ventricular escape beats and episodes of AFib. Developed cardiogenic shock state. Given IV glucagon and started on dopamine infusion.    Events last 24 hours: dopamine weaned off, pt dialyzed, now making urine, K improved, creat improved         PAST MEDICAL & SURGICAL HISTORY:  CKD (chronic kidney disease)  CAD (coronary artery disease)  Heart failure  Hepatitis C  Aortic stenosis  Anemia, unspecified anemia type  Asthma  Low back pain: chronic over a year now.  High cholesterol  Hypertension  Diabetes  H/O aortic valve replacement: cow valve.  S/P CABG x 3  History of tonsillectomy        Medications:    amLODIPine   Tablet 10 milliGRAM(s) Oral daily    montelukast 10 milliGRAM(s) Oral daily    amitriptyline 10 milliGRAM(s) Oral at bedtime      heparin  Injectable 5000 Unit(s) SubCutaneous every 8 hours    pantoprazole    Tablet 40 milliGRAM(s) Oral two times a day      atorvastatin 20 milliGRAM(s) Oral at bedtime  dextrose 40% Gel 15 Gram(s) Oral once PRN  dextrose 50% Injectable 12.5 Gram(s) IV Push once  dextrose 50% Injectable 25 Gram(s) IV Push once  dextrose 50% Injectable 25 Gram(s) IV Push once  glucagon  Injectable 1 milliGRAM(s) IntraMuscular once PRN  insulin glargine Injectable (LANTUS) 10 Unit(s) SubCutaneous at bedtime  insulin lispro (HumaLOG) corrective regimen sliding scale   SubCutaneous Before meals and at bedtime  levothyroxine 100 MICROGram(s) Oral daily    cholecalciferol 2000 Unit(s) Oral daily  dextrose 5%. 1000 milliLiter(s) IV Continuous <Continuous>  folic acid 1 milliGRAM(s) Oral daily      chlorhexidine 2% Cloths 1 Application(s) Topical daily  nystatin Powder 1 Application(s) Topical two times a day            ICU Vital Signs Last 24 Hrs  T(C): 37.8 (2019 11:24), Max: 37.8 (2019 11:24)  T(F): 100 (2019 11:24), Max: 100 (2019 11:24)  HR: 83 (2019 11:00) (32 - 85)  BP: 131/60 (2019 11:00) (87/50 - 155/68)  BP(mean): 86 (2019 11:00) (66 - 98)  ABP: --  ABP(mean): --  RR: 25 (2019 11:00) (15 - 36)  SpO2: 94% (2019 11:00) (91% - 100%)          I&O's Detail    2019 07:01  -  2019 07:00  --------------------------------------------------------  IN:    DOPamine Infusion: 186.6 mL    DOPamine Infusion: 171.6 mL    Oral Fluid: 480 mL    Other: 900 mL    Solution: 250 mL  Total IN: 1988.2 mL    OUT:    Indwelling Catheter - Urethral: 460 mL    Other: 900 mL  Total OUT: 1360 mL    Total NET: 628.2 mL      2019 07:01  -  2019 11:30  --------------------------------------------------------  IN:    Oral Fluid: 280 mL  Total IN: 280 mL    OUT:    Indwelling Catheter - Urethral: 320 mL  Total OUT: 320 mL    Total NET: -40 mL            LABS:                        7.1    4.74  )-----------( 142      ( 2019 03:42 )             22.2     07-18    136  |  100  |  35.0<H>  ----------------------------<  121<H>  5.6<H>   |  25.0  |  2.60<H>    Ca    8.3<L>      2019 03:42  Phos  4.7       Mg     2.2     18    TPro  5.7<L>  /  Alb  2.7<L>  /  TBili  <0.2<L>  /  DBili  x   /  AST  62<H>  /  ALT  31  /  AlkPhos  97        CARDIAC MARKERS ( 2019 11:38 )  x     / 0.06 ng/mL / x     / x     / x          CAPILLARY BLOOD GLUCOSE      POCT Blood Glucose.: 188 mg/dL (2019 11:11)    PT/INR - ( 2019 10:48 )   PT: 12.0 sec;   INR: 1.04 ratio         PTT - ( 2019 10:48 )  PTT:25.2 sec  Urinalysis Basic - ( 2019 13:36 )    Color: Yellow / Appearance: Clear / S.010 / pH: x  Gluc: x / Ketone: Negative  / Bili: Negative / Urobili: Negative mg/dL   Blood: x / Protein: 100 mg/dL / Nitrite: Negative   Leuk Esterase: Negative / RBC: 3-5 /HPF / WBC 0-2   Sq Epi: x / Non Sq Epi: Occasional / Bacteria: Occasional      CULTURES:  Culture Results:   No growth ( @ 13:35)      Physical Examination:    General: No acute distress.      HEENT: Pupils equal, reactive to light.  Symmetric.    PULM: Clear to auscultation bilaterally, no significant sputum production    NECK: Supple, no lymphadenopathy, trachea midline, R chest wall port area ecchymotic     CVS: Regular rate and rhythm, no murmurs, rubs, or gallops    GI: Soft, nondistended, nontender, normoactive bowel sounds, no masses    EXT: No edema, nontender    SKIN: Warm and well perfused, no rashes noted.    NEURO: Alert, oriented, interactive, nonfocal    DEVICES: LIJ TLC, L Femoral HD catheter     RADIOLOGY:     < from: Xray Chest 1 View-PORTABLE IMMEDIATE (19 @ 20:48) >   EXAM:  XR CHEST PORTABLE IMMED 1V                          PROCEDURE DATE:  2019          INTERPRETATION:  Portable chest radiograph        CLINICAL INFORMATION:   Line placement    TECHNIQUE:  Portable  AP view of the chest was obtained.    COMPARISON: No previous examinations are available for review.    FINDINGS: LEFT IJ catheter tip in SVC brachycephalic junction.. MediPort   catheter tip in SVC.  The lungs  are clear.  No pleural abnormality is seen.    The  heart is enlarged in transverse diameter. No hilar mass. Trachea   midline.  Status post median sternotomy.   Visualized osseous structures are intact.        IMPRESSION:   No evidence of active chest disease.  Catheters in place.                  ARIEL LANE M.D., ATTENDING RADIOLOGIST  This document has been electronically signed. 2019  8:07AM        < end of copied text >  CRITICAL CARE TIME SPENT:

## 2019-07-19 LAB
ANION GAP SERPL CALC-SCNC: 11 MMOL/L — SIGNIFICANT CHANGE UP (ref 5–17)
BUN SERPL-MCNC: 40 MG/DL — HIGH (ref 8–20)
CALCIUM SERPL-MCNC: 9 MG/DL — SIGNIFICANT CHANGE UP (ref 8.6–10.2)
CHLORIDE SERPL-SCNC: 103 MMOL/L — SIGNIFICANT CHANGE UP (ref 98–107)
CO2 SERPL-SCNC: 25 MMOL/L — SIGNIFICANT CHANGE UP (ref 22–29)
CREAT SERPL-MCNC: 3.04 MG/DL — HIGH (ref 0.5–1.3)
GLUCOSE BLDC GLUCOMTR-MCNC: 103 MG/DL — HIGH (ref 70–99)
GLUCOSE BLDC GLUCOMTR-MCNC: 172 MG/DL — HIGH (ref 70–99)
GLUCOSE BLDC GLUCOMTR-MCNC: 172 MG/DL — HIGH (ref 70–99)
GLUCOSE BLDC GLUCOMTR-MCNC: 178 MG/DL — HIGH (ref 70–99)
GLUCOSE BLDC GLUCOMTR-MCNC: 210 MG/DL — HIGH (ref 70–99)
GLUCOSE SERPL-MCNC: 101 MG/DL — SIGNIFICANT CHANGE UP (ref 70–115)
HCT VFR BLD CALC: 26.6 % — LOW (ref 34.5–45)
HGB BLD-MCNC: 8 G/DL — LOW (ref 11.5–15.5)
MAGNESIUM SERPL-MCNC: 2.3 MG/DL — SIGNIFICANT CHANGE UP (ref 1.6–2.6)
PHOSPHATE SERPL-MCNC: 4.2 MG/DL — SIGNIFICANT CHANGE UP (ref 2.4–4.7)
POTASSIUM SERPL-MCNC: 5.1 MMOL/L — SIGNIFICANT CHANGE UP (ref 3.5–5.3)
POTASSIUM SERPL-SCNC: 5.1 MMOL/L — SIGNIFICANT CHANGE UP (ref 3.5–5.3)
PROCALCITONIN SERPL-MCNC: 3.64 NG/ML — HIGH (ref 0.02–0.1)
SODIUM SERPL-SCNC: 139 MMOL/L — SIGNIFICANT CHANGE UP (ref 135–145)

## 2019-07-19 PROCEDURE — 99232 SBSQ HOSP IP/OBS MODERATE 35: CPT

## 2019-07-19 RX ORDER — IRON SUCROSE 20 MG/ML
200 INJECTION, SOLUTION INTRAVENOUS EVERY 24 HOURS
Refills: 0 | Status: DISCONTINUED | OUTPATIENT
Start: 2019-07-19 | End: 2019-07-22

## 2019-07-19 RX ORDER — ERYTHROPOIETIN 10000 [IU]/ML
10000 INJECTION, SOLUTION INTRAVENOUS; SUBCUTANEOUS ONCE
Refills: 0 | Status: DISCONTINUED | OUTPATIENT
Start: 2019-07-19 | End: 2019-07-19

## 2019-07-19 RX ORDER — ERYTHROPOIETIN 10000 [IU]/ML
10000 INJECTION, SOLUTION INTRAVENOUS; SUBCUTANEOUS ONCE
Refills: 0 | Status: COMPLETED | OUTPATIENT
Start: 2019-07-19 | End: 2019-07-19

## 2019-07-19 RX ADMIN — Medication 325 MILLIGRAM(S): at 12:27

## 2019-07-19 RX ADMIN — Medication 40 MILLIGRAM(S): at 18:31

## 2019-07-19 RX ADMIN — Medication 1 MILLIGRAM(S): at 12:27

## 2019-07-19 RX ADMIN — AMLODIPINE BESYLATE 10 MILLIGRAM(S): 2.5 TABLET ORAL at 05:24

## 2019-07-19 RX ADMIN — PANTOPRAZOLE SODIUM 40 MILLIGRAM(S): 20 TABLET, DELAYED RELEASE ORAL at 05:24

## 2019-07-19 RX ADMIN — Medication 2000 UNIT(S): at 12:27

## 2019-07-19 RX ADMIN — MONTELUKAST 10 MILLIGRAM(S): 4 TABLET, CHEWABLE ORAL at 15:31

## 2019-07-19 RX ADMIN — Medication 4: at 18:37

## 2019-07-19 RX ADMIN — NYSTATIN CREAM 1 APPLICATION(S): 100000 CREAM TOPICAL at 05:24

## 2019-07-19 RX ADMIN — Medication 2: at 12:26

## 2019-07-19 RX ADMIN — Medication 100 MICROGRAM(S): at 05:24

## 2019-07-19 RX ADMIN — PANTOPRAZOLE SODIUM 40 MILLIGRAM(S): 20 TABLET, DELAYED RELEASE ORAL at 18:31

## 2019-07-19 RX ADMIN — INSULIN GLARGINE 10 UNIT(S): 100 INJECTION, SOLUTION SUBCUTANEOUS at 21:42

## 2019-07-19 RX ADMIN — ATORVASTATIN CALCIUM 20 MILLIGRAM(S): 80 TABLET, FILM COATED ORAL at 21:43

## 2019-07-19 RX ADMIN — Medication 2: at 21:42

## 2019-07-19 RX ADMIN — Medication 10 MILLIGRAM(S): at 21:43

## 2019-07-19 RX ADMIN — IRON SUCROSE 110 MILLIGRAM(S): 20 INJECTION, SOLUTION INTRAVENOUS at 15:32

## 2019-07-19 RX ADMIN — NYSTATIN CREAM 1 APPLICATION(S): 100000 CREAM TOPICAL at 21:43

## 2019-07-19 RX ADMIN — CHLORHEXIDINE GLUCONATE 1 APPLICATION(S): 213 SOLUTION TOPICAL at 12:15

## 2019-07-19 RX ADMIN — Medication 40 MILLIGRAM(S): at 05:24

## 2019-07-19 RX ADMIN — Medication 500 MILLIGRAM(S): at 12:27

## 2019-07-19 RX ADMIN — ERYTHROPOIETIN 10000 UNIT(S): 10000 INJECTION, SOLUTION INTRAVENOUS; SUBCUTANEOUS at 21:44

## 2019-07-19 NOTE — CONSULT NOTE ADULT - SUBJECTIVE AND OBJECTIVE BOX
[facility]  INFECTIOUS DISEASES AND INTERNAL MEDICINE at [place]  =======================================================  Omi Brink MD  Diplomates American Board of Internal Medicine and Infectious Diseases  =======================================================    [id number]  NICHELLE GILL     CC: Syncope     HPI:  75 y/o woman with PMH CHFpEf, CAD s/p CABG, AS s/p bovine AVR (both at [place]), CKD, DM2,Hepatitis C with cirrhosis, iron deficiency anemia of uncertain etiology with 3 negative bone marrows, on regular iron supplements and transfusion, was admitted from hematologist office with syncope and bradycardia. Was found with hyperkalemia, was on urgent HD.   2 weeks ago she had R upper chest port placement as per her she had chills at that time, and later noticed redness on the port placement area for which started on bactrim oral.    ID has been called for concern about wound infection/cellulitis.    She has no pain on the area, no fever. She is on vancomycin to cover possible infection.     PAST MEDICAL & SURGICAL HISTORY:  CKD (chronic kidney disease)  CAD (coronary artery disease)  Heart failure  Hepatitis C  Aortic stenosis  Anemia, unspecified anemia type  Asthma  Low back pain: chronic over a year now.  High cholesterol  Hypertension  Diabetes  H/O aortic valve replacement: cow valve.  S/P CABG x 3  History of tonsillectomy    Social Hx: No smoking or any other toxic habits     FAMILY HISTORY:  Family history of diabetes mellitus (Sibling)    Allergies  Biaxin (Joint Pain)  morphine (Short breath)    Antibiotics:  vancomycin  IVPB 500 milliGRAM(s) IV Intermittent every 24 hours    REVIEW OF SYSTEMS:  CONSTITUTIONAL:  No Fever or chills  HEENT:  No diplopia or blurred vision.  No sore throat or runny nose.  CARDIOVASCULAR:  No chest pain or SOB.  RESPIRATORY:  No cough, shortness of breath, PND or orthopnea.  GASTROINTESTINAL:  No nausea, vomiting or diarrhea.  GENITOURINARY:  No dysuria, frequency or urgency. No Blood in urine  MUSCULOSKELETAL:  no joint aches, no muscle pain  SKIN:  No change in skin, hair or nails.  NEUROLOGIC:  No paresthesias, fasciculations, seizures or weakness.  PSYCHIATRIC:  No disorder of thought or mood.  ENDOCRINE:  No heat or cold intolerance, polyuria or polydipsia.  HEMATOLOGICAL:  No easy bruising or bleeding.     Physical Exam:  Vital Signs Last 24 Hrs  T(C): 36.8 (2019 07:15), Max: 37.8 (2019 15:00)  T(F): 98.3 (2019 07:15), Max: 100 (2019 15:00)  HR: 86 (2019 07:15) (79 - 88)  BP: 155/66 (2019 07:15) (102/47 - 165/71)  BP(mean): 91 (2019 22:00) (61 - 102)  RR: 18 (2019 07:15) (16 - 22)  SpO2: 96% (2019 07:15) (93% - 96%)  GEN: NAD, obese  HEENT: normocephalic and atraumatic. EOMI. PERRL.    NECK: Supple.  No lymphadenopathy   LUNGS: Clear to auscultation. R upper chest with port, nontender, no swelling or warmth, clean wound.   HEART: Regular rate and rhythm  ABDOMEN: Soft, nontender, and nondistended.  Positive bowel sounds.    : No CVA tenderness  EXTREMITIES: Without any cyanosis, clubbing, rash, lesions or edema.  NEUROLOGIC: grossly intact.  PSYCHIATRIC: Appropriate affect .  SKIN: No ulceration or induration present.    Labs:      139  |  103  |  40.0<H>  ----------------------------<  101  5.1   |  25.0  |  3.04<H>    Ca    9.0      2019 09:15  Phos  4.2       Mg     2.3         TPro  5.7<L>  /  Alb  2.7<L>  /  TBili  <0.2<L>  /  DBili  x   /  AST  62<H>  /  ALT  31  /  AlkPhos  97  07-17                        8.0    x     )-----------( x        ( 2019 09:15 )             26.6     Urinalysis Basic - ( 2019 13:36 )    Color: Yellow / Appearance: Clear / S.010 / pH: x  Gluc: x / Ketone: Negative  / Bili: Negative / Urobili: Negative mg/dL   Blood: x / Protein: 100 mg/dL / Nitrite: Negative   Leuk Esterase: Negative / RBC: 3-5 /HPF / WBC 0-2   Sq Epi: x / Non Sq Epi: Occasional / Bacteria: Occasional    LIVER FUNCTIONS - ( 2019 16:42 )  Alb: 2.7 g/dL / Pro: 5.7 g/dL / ALK PHOS: 97 U/L / ALT: 31 U/L / AST: 62 U/L / GGT: x           RECENT CULTURES:  -17 @ 13:35 .Urine     No growth    All imaging and other data have been reviewed.

## 2019-07-19 NOTE — PROGRESS NOTE ADULT - SUBJECTIVE AND OBJECTIVE BOX
NEPHROLOGY INTERVAL HPI/OVERNIGHT EVENTS:    Examined   Feeling better  Good UOP   has mg and femoral juan miguel cath- both could be removed    MEDICATIONS  (STANDING):  amitriptyline 10 milliGRAM(s) Oral at bedtime  amLODIPine   Tablet 10 milliGRAM(s) Oral daily  ascorbic acid 500 milliGRAM(s) Oral daily  atorvastatin 20 milliGRAM(s) Oral at bedtime  chlorhexidine 2% Cloths 1 Application(s) Topical daily  cholecalciferol 2000 Unit(s) Oral daily  dextrose 5%. 1000 milliLiter(s) (50 mL/Hr) IV Continuous <Continuous>  dextrose 50% Injectable 12.5 Gram(s) IV Push once  dextrose 50% Injectable 25 Gram(s) IV Push once  dextrose 50% Injectable 25 Gram(s) IV Push once  epoetin georgina Injectable 47088 Unit(s) IV Push once  ferrous    sulfate 325 milliGRAM(s) Oral daily  folic acid 1 milliGRAM(s) Oral daily  furosemide    Tablet 40 milliGRAM(s) Oral two times a day  insulin glargine Injectable (LANTUS) 10 Unit(s) SubCutaneous at bedtime  insulin lispro (HumaLOG) corrective regimen sliding scale   SubCutaneous Before meals and at bedtime  levothyroxine 100 MICROGram(s) Oral daily  montelukast 10 milliGRAM(s) Oral daily  nystatin Powder 1 Application(s) Topical two times a day  pantoprazole    Tablet 40 milliGRAM(s) Oral two times a day  vancomycin  IVPB 500 milliGRAM(s) IV Intermittent every 24 hours    MEDICATIONS  (PRN):  dextrose 40% Gel 15 Gram(s) Oral once PRN Blood Glucose LESS THAN 70 milliGRAM(s)/deciLiter  glucagon  Injectable 1 milliGRAM(s) IntraMuscular once PRN Glucose <70 milliGRAM(s)/deciLiter      Allergies    Biaxin (Joint Pain)  morphine (Short breath)    Intolerances        Vital Signs Last 24 Hrs  T(C): 36.8 (2019 07:15), Max: 37.8 (2019 15:00)  T(F): 98.3 (2019 07:15), Max: 100 (2019 15:00)  HR: 86 (2019 07:15) (79 - 88)  BP: 155/66 (2019 07:15) (102/47 - 165/71)  BP(mean): 91 (2019 22:00) (61 - 102)  RR: 18 (2019 07:15) (16 - 22)  SpO2: 96% (2019 07:15) (93% - 96%)  Daily     Daily     PHYSICAL EXAM:  GENERAL: NAD, feels better   HEAD:  Atraumatic, No edema   NECK: Supple, No JVD, + right sided mediport with improved erythema , + L sided TLC   CHEST/LUNG: Clear / EAE . No wheeze   HEART: Regular rate and rhythm; No murmurs, rubs, or gallops  ABDOMEN: Soft, Nontender, Nondistended; Bowel sounds present  EXTREMITIES: + edema , + femoral marifer     LABS:                        8.0    x     )-----------( x        ( 2019 09:15 )             26.6         139  |  103  |  40.0<H>  ----------------------------<  101  5.1   |  25.0  |  3.04<H>    Ca    9.0      2019 09:15  Phos  4.2       Mg     2.3         TPro  5.7<L>  /  Alb  2.7<L>  /  TBili  <0.2<L>  /  DBili  x   /  AST  62<H>  /  ALT  31  /  AlkPhos  97  07-17      Urinalysis Basic - ( 2019 13:36 )    Color: Yellow / Appearance: Clear / S.010 / pH: x  Gluc: x / Ketone: Negative  / Bili: Negative / Urobili: Negative mg/dL   Blood: x / Protein: 100 mg/dL / Nitrite: Negative   Leuk Esterase: Negative / RBC: 3-5 /HPF / WBC 0-2   Sq Epi: x / Non Sq Epi: Occasional / Bacteria: Occasional      Magnesium, Serum: 2.3 mg/dL ( @ 09:15)  Phosphorus Level, Serum: 4.2 mg/dL ( @ 09:15)  Magnesium, Serum: 2.2 mg/dL ( @ 13:31)  Phosphorus Level, Serum: 4.4 mg/dL ( @ 13:31)          RADIOLOGY & ADDITIONAL TESTS:

## 2019-07-19 NOTE — PROGRESS NOTE ADULT - SUBJECTIVE AND OBJECTIVE BOX
Hematology/Oncology Progress Note  74y  Biaxin (Joint Pain)  morphine (Short breath)      Patient seen for anemia     ROS feeling better, wants to go home. Dyspnea with minimal exertion. Urinating, no fever / chills, Neg: GI, neuro    PHYSICAL EXAM:    T(F): 98.9 (07-19-19 @ 15:26), Max: 98.9 (07-19-19 @ 15:26)  HR: 87 (07-19-19 @ 15:26) (83 - 87)  BP: 138/61 (07-19-19 @ 15:26) (133/63 - 155/66)  RR: 18 (07-19-19 @ 15:26) (16 - 22)  SpO2: 95% (07-19-19 @ 15:26) (93% - 96%)  Wt(kg): --    Gen: overweight, chronically ill  HEENT: no LN  Neck: bandage L neck  Cardiovascular: distant HS  Respiratory: B basilar crackles  Gastrointestinal: BS+, soft, NT/ND, no masses, no splenomegaly, no hepatomegaly, no evidence for ascites  Extremities: no clubbing/cyanosis, no edema, no calf tenderness  Neurological: CN 2-12 grossly intact, no focal deficits   Skin: echymosis and senile purpura. Left port site with red but not warm.   Lymph Nodes:  no cervical/supraclavicular LAD, no axillary/groin LAD  Musculoskeletal:  full ROM        Medications:  MEDICATIONS  (STANDING):  amitriptyline 10 milliGRAM(s) Oral at bedtime  amLODIPine   Tablet 10 milliGRAM(s) Oral daily  ascorbic acid 500 milliGRAM(s) Oral daily  atorvastatin 20 milliGRAM(s) Oral at bedtime  chlorhexidine 2% Cloths 1 Application(s) Topical daily  cholecalciferol 2000 Unit(s) Oral daily  dextrose 5%. 1000 milliLiter(s) (50 mL/Hr) IV Continuous <Continuous>  dextrose 50% Injectable 12.5 Gram(s) IV Push once  dextrose 50% Injectable 25 Gram(s) IV Push once  dextrose 50% Injectable 25 Gram(s) IV Push once  epoetin georgina Injectable 08507 Unit(s) SubCutaneous once  ferrous    sulfate 325 milliGRAM(s) Oral daily  folic acid 1 milliGRAM(s) Oral daily  furosemide    Tablet 40 milliGRAM(s) Oral two times a day  insulin glargine Injectable (LANTUS) 10 Unit(s) SubCutaneous at bedtime  insulin lispro (HumaLOG) corrective regimen sliding scale   SubCutaneous Before meals and at bedtime  iron sucrose IVPB 200 milliGRAM(s) IV Intermittent every 24 hours  levothyroxine 100 MICROGram(s) Oral daily  montelukast 10 milliGRAM(s) Oral daily  nystatin Powder 1 Application(s) Topical two times a day  pantoprazole    Tablet 40 milliGRAM(s) Oral two times a day  vancomycin  IVPB 500 milliGRAM(s) IV Intermittent every 24 hours    MEDICATIONS  (PRN):  dextrose 40% Gel 15 Gram(s) Oral once PRN Blood Glucose LESS THAN 70 milliGRAM(s)/deciLiter  glucagon  Injectable 1 milliGRAM(s) IntraMuscular once PRN Glucose <70 milliGRAM(s)/deciLiter      Labs:                          8.0    x     )-----------( x        ( 19 Jul 2019 09:15 )             26.6     CBC Full  -  ( 19 Jul 2019 09:15 )  WBC Count : x  RBC Count : x  Hemoglobin : 8.0 g/dL  Hematocrit : 26.6 %  Platelet Count - Automated : x  Mean Cell Volume : x  Mean Cell Hemoglobin : x  Mean Cell Hemoglobin Concentration : x  Auto Neutrophil # : x  Auto Lymphocyte # : x  Auto Monocyte # : x  Auto Eosinophil # : x  Auto Basophil # : x  Auto Neutrophil % : x  Auto Lymphocyte % : x  Auto Monocyte % : x  Auto Eosinophil % : x  Auto Basophil % : x      07-19    139  |  103  |  40.0<H>  ----------------------------<  101  5.1   |  25.0  |  3.04<H>    Ca    9.0      19 Jul 2019 09:15  Phos  4.2     07-19  Mg     2.3     07-19        Pathology:    Imaging Studies:

## 2019-07-19 NOTE — PROGRESS NOTE ADULT - ASSESSMENT
MARGUERITE on CKD cr trending down may be at new baseline  Hyperkalemia resolved had HD x1  No longer eeds HD ok to remove femoral juan miguel  Patient follows with [facility] in [place]     Hyperkalemia  related to combination of ARB and bactrim and MA  ---> Both held   Renal sonogram noted medical renal disease no hydronephrosis  Cont Lasix 40 mg po bid     Cardio follow up noted     Anemia - Epogen  7-17   Iron stores low , will start Venofer     Will follow

## 2019-07-19 NOTE — PROCEDURE NOTE - ADDITIONAL PROCEDURE DETAILS
2 sutures removed and entire catheter from right groin per unit protocol immediate pressure bandages applied for 10 min patient instructed to remain supine for 1 hour post procedure 2 sutures removed and entire catheter from left groin per unit protocol immediate pressure bandages applied for 10 min, no heme drainage note.  Patient instructed to remain supine for 1 hour post procedure

## 2019-07-19 NOTE — PROGRESS NOTE ADULT - SUBJECTIVE AND OBJECTIVE BOX
Patient is a 74y old  Female who presents with a chief complaint of Unstable Bradycardia, Shock, Acute Renal Failure (19 Jul 2019 12:29) improving fluid status, hyperkalemia and improving anemia       HEALTH ISSUES - PROBLEM Dx:  Chronic hepatitis C without hepatic coma: Chronic hepatitis C without hepatic coma  Transaminitis: Transaminitis  Adverse effect of beta-blocker, initial encounter: Adverse effect of beta-blocker, initial encounter  Metabolic acidosis: Metabolic acidosis  Hypoxia: Hypoxia  Acute on chronic diastolic heart failure: Acute on chronic diastolic heart failure  Bradycardia: Bradycardia  Hyperkalemia: Hyperkalemia  Acute renal failure: Acute renal failure  Syncope: Syncope      INTERVAL HPI/OVERNIGHT EVENTS:  Patient seen and examined at bedside. No acute events overnight. Patient states she is feeling better, d/w her that her current labs are improving, she feels her sob is stable and her legs remain without any swelling. Also aware ruling out port infection and she understands that B cx are pending and if negative will tx with IV iron. Also will have mg and femoral line removed. Patient denies chest pain, SOB, abd pain, N/V, fever, chills, dysuria or any other complaints. All remainder ROS negative.     Vital Signs Last 24 Hrs  T(C): 37.2 (19 Jul 2019 15:26), Max: 37.8 (18 Jul 2019 18:00)  T(F): 98.9 (19 Jul 2019 15:26), Max: 100 (18 Jul 2019 18:00)  HR: 87 (19 Jul 2019 15:26) (83 - 88)  BP: 138/61 (19 Jul 2019 15:26) (102/47 - 165/71)  BP(mean): 91 (18 Jul 2019 22:00) (61 - 102)  RR: 18 (19 Jul 2019 15:26) (16 - 22)  SpO2: 95% (19 Jul 2019 15:26) (93% - 96%)    CAPILLARY BLOOD GLUCOSE    POCT Blood Glucose.: 172 mg/dL (19 Jul 2019 12:12)  POCT Blood Glucose.: 103 mg/dL (19 Jul 2019 08:45)  POCT Blood Glucose.: 134 mg/dL (18 Jul 2019 22:03)      I&O's Summary    18 Jul 2019 07:01  -  19 Jul 2019 07:00  --------------------------------------------------------  IN: 970 mL / OUT: 1275 mL / NET: -305 mL    19 Jul 2019 07:01  -  19 Jul 2019 16:50  --------------------------------------------------------  IN: 0 mL / OUT: 2050 mL / NET: -2050 mL    CONSTITUTIONAL: Obese Well appearing, well nourished, awake, alert and in no apparent distress  ENMT: LIJ in place  CARDIAC: Normal rate, regular rhythm.  Heart sounds S1, S2.  No murmurs, rubs or gallops   RESPIRATORY: Bibasilar crackles b/l fair air entry   Gastroenterology: Soft nt nd bs +  : mg in place and femoral quintron in place   EXTREMITIES: No edema, cyanosis or deformity   NEUROLOGICAL: Alert and oriented, no focal deficits, no motor or sensory deficits.  SKIN: No rash, skin turgor wnl     R port in place with surrounding healing scab, slight erythema     MEDICATIONS  (STANDING):  amitriptyline 10 milliGRAM(s) Oral at bedtime  amLODIPine   Tablet 10 milliGRAM(s) Oral daily  ascorbic acid 500 milliGRAM(s) Oral daily  atorvastatin 20 milliGRAM(s) Oral at bedtime  chlorhexidine 2% Cloths 1 Application(s) Topical daily  cholecalciferol 2000 Unit(s) Oral daily  dextrose 5%. 1000 milliLiter(s) (50 mL/Hr) IV Continuous <Continuous>  dextrose 50% Injectable 12.5 Gram(s) IV Push once  dextrose 50% Injectable 25 Gram(s) IV Push once  dextrose 50% Injectable 25 Gram(s) IV Push once  epoetin georgina Injectable 41724 Unit(s) SubCutaneous once  ferrous    sulfate 325 milliGRAM(s) Oral daily  folic acid 1 milliGRAM(s) Oral daily  furosemide    Tablet 40 milliGRAM(s) Oral two times a day  insulin glargine Injectable (LANTUS) 10 Unit(s) SubCutaneous at bedtime  insulin lispro (HumaLOG) corrective regimen sliding scale   SubCutaneous Before meals and at bedtime  iron sucrose IVPB 200 milliGRAM(s) IV Intermittent every 24 hours  levothyroxine 100 MICROGram(s) Oral daily  montelukast 10 milliGRAM(s) Oral daily  nystatin Powder 1 Application(s) Topical two times a day  pantoprazole    Tablet 40 milliGRAM(s) Oral two times a day  vancomycin  IVPB 500 milliGRAM(s) IV Intermittent every 24 hours    MEDICATIONS  (PRN):  dextrose 40% Gel 15 Gram(s) Oral once PRN Blood Glucose LESS THAN 70 milliGRAM(s)/deciLiter  glucagon  Injectable 1 milliGRAM(s) IntraMuscular once PRN Glucose <70 milliGRAM(s)/deciLiter      LABS:                        8.0    x     )-----------( x        ( 19 Jul 2019 09:15 )             26.6     07-19    139  |  103  |  40.0<H>  ----------------------------<  101  5.1   |  25.0  |  3.04<H>    Ca    9.0      19 Jul 2019 09:15  Phos  4.2     07-19  Mg     2.3     07-19      RADIOLOGY & ADDITIONAL TESTS:  No new imaging studies Patient is a 74y old  Female who presents with a chief complaint of Unstable Bradycardia, Shock, Acute Renal Failure (19 Jul 2019 12:29) improving fluid status, hyperkalemia and improving anemia       HEALTH ISSUES - PROBLEM Dx:  Chronic hepatitis C without hepatic coma: Chronic hepatitis C without hepatic coma  Transaminitis: Transaminitis  Adverse effect of beta-blocker, initial encounter: Adverse effect of beta-blocker, initial encounter  Metabolic acidosis: Metabolic acidosis  Hypoxia: Hypoxia  Acute on chronic diastolic heart failure: Acute on chronic diastolic heart failure  Bradycardia: Bradycardia  Hyperkalemia: Hyperkalemia  Acute renal failure: Acute renal failure  Syncope: Syncope      INTERVAL HPI/OVERNIGHT EVENTS:  Patient seen and examined at bedside. No acute events overnight. Patient states she is feeling better, d/w her that her current labs are improving, she feels her sob is stable and her legs remain without any swelling. Also aware ruling out port infection and she understands that B cx are pending and if negative will tx with IV iron. Also will have mg and femoral line removed. Patient denies chest pain, SOB, abd pain, N/V, fever, chills, dysuria or any other complaints. All remainder ROS negative.     Vital Signs Last 24 Hrs  T(C): 37.2 (19 Jul 2019 15:26), Max: 37.8 (18 Jul 2019 18:00)  T(F): 98.9 (19 Jul 2019 15:26), Max: 100 (18 Jul 2019 18:00)  HR: 87 (19 Jul 2019 15:26) (83 - 88)  BP: 138/61 (19 Jul 2019 15:26) (102/47 - 165/71)  BP(mean): 91 (18 Jul 2019 22:00) (61 - 102)  RR: 18 (19 Jul 2019 15:26) (16 - 22)  SpO2: 95% (19 Jul 2019 15:26) (93% - 96%)    CAPILLARY BLOOD GLUCOSE  POCT Blood Glucose.: 172 mg/dL (19 Jul 2019 12:12)  POCT Blood Glucose.: 103 mg/dL (19 Jul 2019 08:45)  POCT Blood Glucose.: 134 mg/dL (18 Jul 2019 22:03)      I&O's Summary    18 Jul 2019 07:01  -  19 Jul 2019 07:00  --------------------------------------------------------  IN: 970 mL / OUT: 1275 mL / NET: -305 mL    19 Jul 2019 07:01  -  19 Jul 2019 16:50  --------------------------------------------------------  IN: 0 mL / OUT: 2050 mL / NET: -2050 mL    CONSTITUTIONAL: Obese Well appearing, well nourished, awake, alert and in no apparent distress  ENMT: LIJ in place  CARDIAC: Normal rate, regular rhythm.  Heart sounds S1, S2.  No murmurs, rubs or gallops   RESPIRATORY: Bibasilar crackles b/l fair air entry   Gastroenterology: Soft nt nd bs +  : mg in place and femoral quintron in place   EXTREMITIES: No edema, cyanosis or deformity   NEUROLOGICAL: Alert and oriented, no focal deficits, no motor or sensory deficits.  SKIN: No rash, skin turgor wnl     R port in place with surrounding healing scab, slight erythema     MEDICATIONS  (STANDING):  amitriptyline 10 milliGRAM(s) Oral at bedtime  amLODIPine   Tablet 10 milliGRAM(s) Oral daily  ascorbic acid 500 milliGRAM(s) Oral daily  atorvastatin 20 milliGRAM(s) Oral at bedtime  chlorhexidine 2% Cloths 1 Application(s) Topical daily  cholecalciferol 2000 Unit(s) Oral daily  dextrose 5%. 1000 milliLiter(s) (50 mL/Hr) IV Continuous <Continuous>  dextrose 50% Injectable 12.5 Gram(s) IV Push once  dextrose 50% Injectable 25 Gram(s) IV Push once  dextrose 50% Injectable 25 Gram(s) IV Push once  epoetin georgina Injectable 05902 Unit(s) SubCutaneous once  ferrous    sulfate 325 milliGRAM(s) Oral daily  folic acid 1 milliGRAM(s) Oral daily  furosemide    Tablet 40 milliGRAM(s) Oral two times a day  insulin glargine Injectable (LANTUS) 10 Unit(s) SubCutaneous at bedtime  insulin lispro (HumaLOG) corrective regimen sliding scale   SubCutaneous Before meals and at bedtime  iron sucrose IVPB 200 milliGRAM(s) IV Intermittent every 24 hours  levothyroxine 100 MICROGram(s) Oral daily  montelukast 10 milliGRAM(s) Oral daily  nystatin Powder 1 Application(s) Topical two times a day  pantoprazole    Tablet 40 milliGRAM(s) Oral two times a day  vancomycin  IVPB 500 milliGRAM(s) IV Intermittent every 24 hours    MEDICATIONS  (PRN):  dextrose 40% Gel 15 Gram(s) Oral once PRN Blood Glucose LESS THAN 70 milliGRAM(s)/deciLiter  glucagon  Injectable 1 milliGRAM(s) IntraMuscular once PRN Glucose <70 milliGRAM(s)/deciLiter      LABS:                        8.0    x     )-----------( x        ( 19 Jul 2019 09:15 )             26.6     07-19    139  |  103  |  40.0<H>  ----------------------------<  101  5.1   |  25.0  |  3.04<H>    Ca    9.0      19 Jul 2019 09:15  Phos  4.2     07-19  Mg     2.3     07-19      RADIOLOGY & ADDITIONAL TESTS:  No new imaging studies

## 2019-07-19 NOTE — PROGRESS NOTE ADULT - ASSESSMENT
A/p: 73 yo female, Hx chronic diastolic HFpEf, CAD s/p CABG, AS s/p bovine AVR (both at [place]), CKD3 (baseline Cr 1.8; had 1 episode of emergent HD for ARF with hyperkalemia few yrs ago), T2 IDDM, bronchiectasis, Hepatitis C type II c/b cirrhosis, iron deficiency anemia of uncertain etiology (developed following cardiac surgery, has undergone 3 BM biopsies with no diagnosis; has required multiple transfusions in the past 3 years), who presented following evaluation at her PMD for dizziness and was found to be bradycardic with HR 30's. Patient states she had a port placed for vascular access 2 weeks ago, at which time she was having chills but was told her white blood cell count was not indicative of infection. About 1 week ago, she developed redness over the site of her port and was started on Bactrim. Around that time, she also developed increasing shortness of breath, lower extremity edema, and decreased urination; her Cr was noted to be increased from 1.8 to 2.0. She was seen by her PMD two days ago for worsening shortness of breath, found to have 6lb weight gain over the past 1 week, and was told to increase her Lasix from 40mg BID to 80mg in AM and 40mg PM. Her symptoms did not improve with increased diuresis. Today she had a brief syncopal episode at and was found to have HR 20's with hypoxia and was sent to ED for evaluation of symptomatic bradycardia. Upon arrival to ED, patient was found to have HR 30's with high grade AV block with ventricular escape beats and episodes of AFib. She was evaluated by Cardiology and started on Dopamine 2mcg/kg/min. ICU was asked to consult. Patient's medication list was reviewed, notable for Bactrim, Metoprolol, and an ARB, all of which she took this morning. Glucagon 5mg IVPB was given to reverse beta blockade with improvement in HR to sinus bradycardia in 40's. Patient developed hypotension with pre-syncopal feelings of worsening dizziness, blurred vision, and depth perception issues. Dopamine was increased to 10mcg/kg/min with improvement in BP. Upon completion of Glucagon infusion, HR improved to NSR 65 bpm. Patient stated she had a similar admission in the past, at which time she was found to be in acute renal failure with hyperkalemia requiring 1 session of emergent hemodialysis. A VBG was sent, which revealed a K 7.1. Patient was given calcium gluconate, insulin/D50, and sodium bicarbonate. Labs returned confirming hyperkalemia 7.3 with mild hemolysis and SCr 3.41. Nephrology consult was called for emergent hemodialysis. An indwelling mg was placed with 25cc UOP. Eventually pt able to come off of dopamine support. Holding off on HD today, pt has femoral line in place, hyperkalemia was again reversed and repeat labs showed normalization of potassium. Monitoring renal fxn closley, likely secondary to ATN; drug induced. Also noted with acute on chronic anemia, noted hgb 7, given fluid overload state will monitor hgb closely if drops below 7 will transfuse. Will c/w ferrous sulfate po and vit c for now, will consider IV iron if Bcx return negative for bacteremia. Pt also was being tx for possible port infection with bactrm outpt, currently with improving erythema around the site. Will await Bcx results, pt to empirically remains on renally adjusted vanco; to be dosed per pharmacy. No dvt ppx given ongoing severe anemia. A/p: 75 yo female with Hx chronic diastolic HFpEf, CAD s/p CABG, AS s/p bovine AVR (both at [place]), CKD3 (baseline Cr 1.8; had 1 episode of emergent HD for ARF with hyperkalemia few yrs ago), T2 IDDM, bronchiectasis, Hepatitis C type II c/b cirrhosis, AOCD/ iron deficiency anemia of uncertain etiology (developed following cardiac surgery, has undergone 3 BM biopsies with no diagnosis; has required multiple transfusions in the past 3 years), who presented following evaluation at her PMD for dizziness and was found to be bradycardic with HR 30's. Patient states she had a port placed for vascular access 2 weeks ago for recurrent need of PRBC transfusions, at which time she was having chills but was told her white blood cell count was not indicative of infection. About 1 week ago, she developed redness over the site of her port and was started on Bactrim. Around that time, she also developed increasing shortness of breath, lower extremity edema, and decreased urination; her Cr was noted to be increased from 1.8 to 2.0. She was seen by her PMD two days ago for worsening shortness of breath, found to have 6lb weight gain over the past 1 week, and was told to increase her Lasix from 40mg BID to 80mg in AM and 40mg PM. Her symptoms did not improve with increased diuresis. While she was in the physician office she had a brief syncopal episode and was found to have HR 20's with hypoxia and was sent to ED for evaluation of symptomatic bradycardia. Upon arrival to ED, patient was found to have HR 30's with high grade AV block with ventricular escape beats and episodes of AFib. She was evaluated by Cardiology and started on Dopamine 2mcg/kg/min. ICU was consulted. Patient's medication list was reviewed, notable for Bactrim, Metoprolol, and an ARB, all of which she took the morning of arrival. Glucagon 5mg IVPB was given to reverse beta blockade with improvement in HR to sinus bradycardia in 40's. Patient developed hypotension with pre-syncopal feelings of worsening dizziness, blurred vision, and depth perception issues. Dopamine was increased to 10mcg/kg/min with improvement in BP. Upon completion of Glucagon infusion, HR improved to NSR 65 bpm. Patient stated she had a similar admission in the past, at which time she was found to be in acute renal failure with hyperkalemia requiring 1 session of emergent hemodialysis. A VBG was sent, which revealed a K 7.1. Patient was given calcium gluconate, insulin/D50, and sodium bicarbonate. Labs returned confirming hyperkalemia 7.3 with mild hemolysis and SCr 3.41. Nephrology consult was called for emergent hemodialysis. An indwelling mg was placed with 25cc UOP. Eventually pt able to come off of dopamine support. After one session of HD fluid status stabilized as did hyperkalemia with metabolic acidosis and potassium down trended to wnl. Given improvement in creatine; pt now producing good amount of urine, placed back on lasix bid therapy and femoral quintron removed today. Also noted with acute on chronic anemia, pt follows closely with heme/ onc fluctuating hgb 7-8 range, optimized on IV iron/ epogen. Also with concern for port infection; was on bactrim as an outpt currently remains on vancomycin; renally dosed and ID continues to follow the pt. B cx negative thus far; given this port will remain in place. Slow clinical improvement.     Syncope 2/2 high degree block and ventricular escape  - s/p shock and off of pressor support (dopamine)    -s/p glucagon   - likely related to hyperkalemia and worsening kidney failure   - HR has remained stable after correction of lytes   - Given cardiac co morbidities pt will need to be placed back on a BB as per cardio within 24 hours  - cardio f/u noted and appreciated     Acute fluid overload in the setting of acute on chronic renal failure requiring emergent HD as well as acute on chronic diastolic HF exacerbation   - s/p emergent HD on admission   - currently with improving volume status  - renal function remains stable    - pt continues to urinate   - net negative - 1 liter  - c/w lasix 40mg IV BID   - will start low dose BB in the am per cardio if pt remains with stable HR   - strict I/Os   - daily weight   -fluid restriction   - will taper o2 as tolerated   - cardio f/u noted and appreciated  - nephro f/u noted and appreciated     Acute on chronic renal failure   - ckd 3   - prior hx of transient HD in the past  - metabolic acidosis with hyperkalemia improving   - s/p one session of HD with improvement in fluid status and lytes  - c/w lasix IV bid   - pt continues to urinate   - avoid nephrotoxic medications  - renal US with medical renal dz, no evidence of hydronephrosis  - will c/w monitoring renal fxn  - d/c mg TOV   and d/c femoral quintron no need for further HD  - nephro f/u noted and appreciated; d/w Dr. Martinez the above plan of care    Acute on chronic anemia of chronic disease/ iron deficiency anemia   - h/h remains low, hgb 7-8 range  - pt is chronically low has required numberous transfusions in the past. Had port inserted for this purpose.  - for now will monitor h/h closely currently hgb ~8  - c/w IV iron since now B cx negative      - epogen per nephro   - will transfuse as needed; if symptomatic or hgb <7   - heme/ onc f/u noted and appreciated     R upper chest wound infection  - pt afebrile   - no leukocytosis    - wound is healing with scan, minimal erythema   - B cx negative; less likely port infection   - c/w vancomycin   - vanco trough per pharmacy   - ID f/u noted and appreciated     CAD  -c/w atorvastatin 20 mg po qhs   - not on asa at this time  - as per cardio will restart low dose BB in the am if pt remains stable     DM2   - FS Stable  - c/w accuchecks ACHS TID   - c/w HSS   - c/w lantus 10 u sq qhs  - c/w hypogluycemia protocol     Hypothyroid   - c/w synthroid po qd     Depression  - c/w elavil po qd     Chronic hepatitis c  - stable lfts  - pt was following with GI to begin tx; to be followed outpt with GI for continual management.     DVT ppx  - will hold off on any chemical AC given ongoing anemia and recurrent need for transfusions  - scd boots b/l    Activity level: Ambulates with cane at baseline     Advanced directive: Full code  Next of kin : three daughters     Dispo: Deconditioned and will need PT evaluation. Anticipated LOS 2-3 days.

## 2019-07-19 NOTE — PROCEDURE NOTE - NSPOSTCAREGUIDE_GEN_A_CORE
Instructed patient/caregiver to follow-up with primary care physician/Keep the cast/splint/dressing clean and dry/Care for catheter as per unit/ICU protocols/Verbal/written post procedure instructions were given to patient/caregiver Verbal/written post procedure instructions were given to patient/caregiver/Instructed patient/caregiver regarding signs and symptoms of infection/Instructed patient/caregiver to follow-up with primary care physician/Keep the cast/splint/dressing clean and dry/Care for catheter as per unit/ICU protocols

## 2019-07-19 NOTE — PROGRESS NOTE ADULT - ASSESSMENT
1. Long h/o anemia despite iv iron, B-12 and erythropoietin. Several bone marrows have been normal. Most recently 5/15/19. Hgb increase is most likely d/t fluid removal. Currently 8.   2. Iron studies may well be anemia of chronic disease, second dose of venofer given. No downside.   3. Possibly infected port that was recently placed. ID evaluation in progress, cultures pending.   4. Second episode of ARF requiring transient dialysis (episode 2 years ago was nonoliguric). Fortunately, she responded quickly.

## 2019-07-19 NOTE — PROGRESS NOTE ADULT - SUBJECTIVE AND OBJECTIVE BOX
[place] [facility], THE [facility]ART CENTER                                   [address] [place] [address]                                                      PHONE: [phone]                                                         FAX: [phone]  [email or web address]  ---------------------------------------------------------------------------------------------------------------------------------    Overnight events/patient complaints:  NAD     Biaxin (Joint Pain)  morphine (Short breath)    MEDICATIONS  (STANDING):  amitriptyline 10 milliGRAM(s) Oral at bedtime  amLODIPine   Tablet 10 milliGRAM(s) Oral daily  ascorbic acid 500 milliGRAM(s) Oral daily  atorvastatin 20 milliGRAM(s) Oral at bedtime  chlorhexidine 2% Cloths 1 Application(s) Topical daily  cholecalciferol 2000 Unit(s) Oral daily  dextrose 5%. 1000 milliLiter(s) (50 mL/Hr) IV Continuous <Continuous>  dextrose 50% Injectable 12.5 Gram(s) IV Push once  dextrose 50% Injectable 25 Gram(s) IV Push once  dextrose 50% Injectable 25 Gram(s) IV Push once  epoetin georgina Injectable 18660 Unit(s) IV Push once  ferrous    sulfate 325 milliGRAM(s) Oral daily  folic acid 1 milliGRAM(s) Oral daily  furosemide    Tablet 40 milliGRAM(s) Oral two times a day  insulin glargine Injectable (LANTUS) 10 Unit(s) SubCutaneous at bedtime  insulin lispro (HumaLOG) corrective regimen sliding scale   SubCutaneous Before meals and at bedtime  levothyroxine 100 MICROGram(s) Oral daily  montelukast 10 milliGRAM(s) Oral daily  nystatin Powder 1 Application(s) Topical two times a day  pantoprazole    Tablet 40 milliGRAM(s) Oral two times a day  vancomycin  IVPB 500 milliGRAM(s) IV Intermittent every 24 hours    MEDICATIONS  (PRN):  dextrose 40% Gel 15 Gram(s) Oral once PRN Blood Glucose LESS THAN 70 milliGRAM(s)/deciLiter  glucagon  Injectable 1 milliGRAM(s) IntraMuscular once PRN Glucose <70 milliGRAM(s)/deciLiter      Vital Signs Last 24 Hrs  T(C): 36.8 (2019 07:15), Max: 37.8 (2019 11:24)  T(F): 98.3 (2019 07:15), Max: 100 (2019 11:24)  HR: 86 (2019 07:15) (79 - 88)  BP: 155/66 (2019 07:15) (102/47 - 165/71)  BP(mean): 91 (2019 22:00) (61 - 102)  RR: 18 (2019 07:15) (16 - 22)  SpO2: 96% (2019 07:15) (93% - 96%)  ICU Vital Signs Last 24 Hrs  NICHELLE GILL  I&O's Detail    2019 07:01  -  2019 07:00  --------------------------------------------------------  IN:    Oral Fluid: 520 mL    Solution: 100 mL    Solution: 100 mL    Solution: 250 mL  Total IN: 970 mL    OUT:    Indwelling Catheter - Urethral: 1275 mL  Total OUT: 1275 mL    Total NET: -305 mL        I&O's Summary    2019 07:01  -  2019 07:00  --------------------------------------------------------  IN: 970 mL / OUT: 1275 mL / NET: -305 mL      Drug Dosing Weight  NICHELLE GILL      PHYSICAL EXAM:  General: Appears well developed, well nourished alert and cooperative.  HEENT: Head; normocephalic, atraumatic.  Eyes: Pupils reactive, cornea wnl.  Neck: Supple, no nodes adenopathy, no NVD or carotid bruit or thyromegaly.  CARDIOVASCULAR: Normal S1 and S2, 1/6 murmur, rub, gallop or lift.   LUNGS: Decrease BS B/L   ABDOMEN: Soft, nontender without mass or organomegaly. bowel sounds normoactive.  EXTREMITIES: No clubbing, cyanosis or edema. Distal pulses wnl.   SKIN: warm and dry with normal turgor.  NEURO: Alert/oriented x 3/normal motor exam. No pathologic reflexes.    PSYCH: normal affect.        LABS:                        8.0    x     )-----------( x        ( 2019 09:15 )             26.6     07-    139  |  103  |  40.0<H>  ----------------------------<  101  5.1   |  25.0  |  3.04<H>    Ca    9.0      2019 09:15  Phos  4.2     -  Mg     2.3         TPro  5.7<L>  /  Alb  2.7<L>  /  TBili  <0.2<L>  /  DBili  x   /  AST  62<H>  /  ALT  31  /  AlkPhos  97  -    [facility]  CARDIAC MARKERS ( 2019 11:38 )  x     / 0.06 ng/mL / x     / x     / x            Urinalysis Basic - ( 2019 13:36 )    Color: Yellow / Appearance: Clear / S.010 / pH: x  Gluc: x / Ketone: Negative  / Bili: Negative / Urobili: Negative mg/dL   Blood: x / Protein: 100 mg/dL / Nitrite: Negative   Leuk Esterase: Negative / RBC: 3-5 /HPF / WBC 0-2   Sq Epi: x / Non Sq Epi: Occasional / Bacteria: Occasional        RADIOLOGY & ADDITIONAL STUDIES:    INTERPRETATION OF TELEMETRY (personally reviewed): stable HR 80 to 90's         ECHO:  Summary:   1. Left ventricular ejection fraction, by visual estimation, is 55 to   60%.   2. Spectral Doppler shows impaired relaxation pattern of left   ventricular myocardial filling (Grade I diastolic dysfunction).   3. There is mild concentric left ventricular hypertrophy.   4. Severe mitral annular calcification.   5. Mild degenerative mitral stenosis.   6. Bioprosthesis inthe aortic position - normal function.   7. Estimated pulmonary artery systolic pressure is 37.2 mmHg assuming a   right atrial pressure of 3 mmHg, which is consistent with borderline   pulmonary hypertension.   8. There is no evidence of pericardialeffusion.   9. ** Compared to TTE dated 9/15/17, no significant changes.      ASSESSMENT AND PLAN:  In summary, NICHELLE GILL is an 74y Female with past medical history significant for CAD s/p CABG and Bio AVR 2016 with brief post operative AF, normal EF, CKD requiring transient HD for hyperkalemia, mild MR, prior TIA s/p ILR, chronic Hep C complicated by cirrhosis and varices, bronchiectasias and chronic anemia s/p port placement who presents from her oncologist with brief unresponsiveness followed by chills and dizziness found to have hypoxia and bradycardia to the 30's hyperkalemia 7.3 s/p urgent HD; feeling well today HR 80 to 90's; TTE stable see above; K today 5.1       Plan  1.  Low dose B-Blockers tomorrow if tele stable   2.  Continue other CV medications   3.  TELE for now     No need for PPM at this time as per EPS

## 2019-07-19 NOTE — CONSULT NOTE ADULT - ASSESSMENT
75 y/o woman with PMH CHFpEf, CAD s/p CABG, AS s/p bovine AVR (both at [place]), CKD, DM2,Hepatitis C with cirrhosis, iron deficiency anemia of uncertain etiology with 3 negative bone marrows, on regular iron supplements and transfusion, was admitted from hematologist office with syncope and bradycardia. Was found with hyperkalemia, was on urgent HD.   2 weeks ago she had R upper chest port placement as per her she had chills at that time, and later noticed redness on the port placement area for which started on bactrim oral.    ID has been called for concern about wound infection/cellulitis.      R/O Right upper chest wound infection  CKD  Anemia with R chest port placement     - Blood culture from 7/17 is pending  - No wound discharge or open wound.   - Since she is high risk patient due to history of AVR will keep her on Vancomycin until we have more culture information.    - can continue vancomycin 500mg daily with trough follow up, 15 to 20 until cultures are back.   - No bactrim due to hyperkalemia and CKD.     Will follow. 73 y/o woman with PMH CHFpEf, CAD s/p CABG, AS s/p bovine AVR (both at [place]), CKD, DM2,Hepatitis C with cirrhosis, iron deficiency anemia of uncertain etiology with 3 negative bone marrows, on regular iron supplements and transfusion, was admitted from hematologist office with syncope and bradycardia. Was found with hyperkalemia, was on urgent HD.   2 weeks ago she had R upper chest port placement as per her she had chills at that time, and later noticed redness on the port placement area for which started on bactrim oral.    ID has been called for concern about wound infection/cellulitis.      R/O Right upper chest wound infection  CKD  Anemia with R chest port placement     - Blood culture from 7/17 is pending  - No wound discharge or open wound.   - Since she is high risk patient due to history of AVR will keep her on Vancomycin until we have more culture information.    - can continue vancomycin 500mg daily with trough follow up, 15 to 20 until cultures are back.   - No bactrim due to hyperkalemia and CKD.   - If blood cultures negative, can stop vancomycin soon since no sign of infection on wound at this point.     Will follow.

## 2019-07-20 LAB
ALBUMIN SERPL ELPH-MCNC: 3.4 G/DL — SIGNIFICANT CHANGE UP (ref 3.3–5.2)
ALP SERPL-CCNC: 110 U/L — SIGNIFICANT CHANGE UP (ref 40–120)
ALT FLD-CCNC: 40 U/L — HIGH
ANION GAP SERPL CALC-SCNC: 11 MMOL/L — SIGNIFICANT CHANGE UP (ref 5–17)
ANISOCYTOSIS BLD QL: SLIGHT — SIGNIFICANT CHANGE UP
AST SERPL-CCNC: 85 U/L — HIGH
BASOPHILS # BLD AUTO: 0.04 K/UL — SIGNIFICANT CHANGE UP (ref 0–0.2)
BASOPHILS NFR BLD AUTO: 0.9 % — SIGNIFICANT CHANGE UP (ref 0–2)
BILIRUB SERPL-MCNC: 0.3 MG/DL — LOW (ref 0.4–2)
BUN SERPL-MCNC: 36 MG/DL — HIGH (ref 8–20)
CALCIUM SERPL-MCNC: 9.1 MG/DL — SIGNIFICANT CHANGE UP (ref 8.6–10.2)
CHLORIDE SERPL-SCNC: 104 MMOL/L — SIGNIFICANT CHANGE UP (ref 98–107)
CO2 SERPL-SCNC: 24 MMOL/L — SIGNIFICANT CHANGE UP (ref 22–29)
CREAT SERPL-MCNC: 2.76 MG/DL — HIGH (ref 0.5–1.3)
DACRYOCYTES BLD QL SMEAR: SLIGHT — SIGNIFICANT CHANGE UP
ELLIPTOCYTES BLD QL SMEAR: SLIGHT — SIGNIFICANT CHANGE UP
EOSINOPHIL # BLD AUTO: 0.34 K/UL — SIGNIFICANT CHANGE UP (ref 0–0.5)
EOSINOPHIL NFR BLD AUTO: 7.9 % — HIGH (ref 0–6)
GIANT PLATELETS BLD QL SMEAR: PRESENT — SIGNIFICANT CHANGE UP
GLUCOSE BLDC GLUCOMTR-MCNC: 128 MG/DL — HIGH (ref 70–99)
GLUCOSE BLDC GLUCOMTR-MCNC: 133 MG/DL — HIGH (ref 70–99)
GLUCOSE BLDC GLUCOMTR-MCNC: 151 MG/DL — HIGH (ref 70–99)
GLUCOSE BLDC GLUCOMTR-MCNC: 97 MG/DL — SIGNIFICANT CHANGE UP (ref 70–99)
GLUCOSE SERPL-MCNC: 122 MG/DL — HIGH (ref 70–115)
HCT VFR BLD CALC: 25.7 % — LOW (ref 34.5–45)
HGB BLD-MCNC: 7.9 G/DL — LOW (ref 11.5–15.5)
HYPOCHROMIA BLD QL: SLIGHT — SIGNIFICANT CHANGE UP
LYMPHOCYTES # BLD AUTO: 0.26 K/UL — LOW (ref 1–3.3)
LYMPHOCYTES # BLD AUTO: 6.1 % — LOW (ref 13–44)
MACROCYTES BLD QL: SLIGHT — SIGNIFICANT CHANGE UP
MANUAL SMEAR VERIFICATION: SIGNIFICANT CHANGE UP
MCHC RBC-ENTMCNC: 30.2 PG — SIGNIFICANT CHANGE UP (ref 27–34)
MCHC RBC-ENTMCNC: 30.7 GM/DL — LOW (ref 32–36)
MCV RBC AUTO: 98.1 FL — SIGNIFICANT CHANGE UP (ref 80–100)
METAMYELOCYTES # FLD: 0.9 % — HIGH (ref 0–0)
MONOCYTES # BLD AUTO: 0.26 K/UL — SIGNIFICANT CHANGE UP (ref 0–0.9)
MONOCYTES NFR BLD AUTO: 6.1 % — SIGNIFICANT CHANGE UP (ref 2–14)
MYELOCYTES NFR BLD: 1.8 % — HIGH (ref 0–0)
NEUTROPHILS # BLD AUTO: 3.3 K/UL — SIGNIFICANT CHANGE UP (ref 1.8–7.4)
NEUTROPHILS NFR BLD AUTO: 74.6 % — SIGNIFICANT CHANGE UP (ref 43–77)
NEUTS BAND # BLD: 1.7 % — SIGNIFICANT CHANGE UP (ref 0–8)
OVALOCYTES BLD QL SMEAR: SLIGHT — SIGNIFICANT CHANGE UP
PLAT MORPH BLD: ABNORMAL
PLATELET # BLD AUTO: 137 K/UL — LOW (ref 150–400)
PLATELET COUNT - ESTIMATE: NORMAL — SIGNIFICANT CHANGE UP
POIKILOCYTOSIS BLD QL AUTO: SLIGHT — SIGNIFICANT CHANGE UP
POLYCHROMASIA BLD QL SMEAR: SLIGHT — SIGNIFICANT CHANGE UP
POTASSIUM SERPL-MCNC: 4.3 MMOL/L — SIGNIFICANT CHANGE UP (ref 3.5–5.3)
POTASSIUM SERPL-SCNC: 4.3 MMOL/L — SIGNIFICANT CHANGE UP (ref 3.5–5.3)
PROT SERPL-MCNC: 6.7 G/DL — SIGNIFICANT CHANGE UP (ref 6.6–8.7)
RBC # BLD: 2.62 M/UL — LOW (ref 3.8–5.2)
RBC # FLD: 17.4 % — HIGH (ref 10.3–14.5)
RBC BLD AUTO: ABNORMAL
SODIUM SERPL-SCNC: 139 MMOL/L — SIGNIFICANT CHANGE UP (ref 135–145)
WBC # BLD: 4.32 K/UL — SIGNIFICANT CHANGE UP (ref 3.8–10.5)
WBC # FLD AUTO: 4.32 K/UL — SIGNIFICANT CHANGE UP (ref 3.8–10.5)

## 2019-07-20 PROCEDURE — 99232 SBSQ HOSP IP/OBS MODERATE 35: CPT

## 2019-07-20 PROCEDURE — 99222 1ST HOSP IP/OBS MODERATE 55: CPT

## 2019-07-20 RX ORDER — LINEZOLID 600 MG/300ML
1 INJECTION, SOLUTION INTRAVENOUS
Qty: 26 | Refills: 0
Start: 2019-07-20 | End: 2019-08-01

## 2019-07-20 RX ORDER — LINEZOLID 600 MG/300ML
600 INJECTION, SOLUTION INTRAVENOUS EVERY 12 HOURS
Refills: 0 | Status: DISCONTINUED | OUTPATIENT
Start: 2019-07-20 | End: 2019-07-22

## 2019-07-20 RX ORDER — VANCOMYCIN HCL 1 G
750 VIAL (EA) INTRAVENOUS
Refills: 0 | Status: DISCONTINUED | OUTPATIENT
Start: 2019-07-20 | End: 2019-07-20

## 2019-07-20 RX ORDER — METOPROLOL TARTRATE 50 MG
25 TABLET ORAL
Refills: 0 | Status: DISCONTINUED | OUTPATIENT
Start: 2019-07-20 | End: 2019-07-22

## 2019-07-20 RX ADMIN — Medication 100 MICROGRAM(S): at 06:21

## 2019-07-20 RX ADMIN — Medication 40 MILLIGRAM(S): at 06:22

## 2019-07-20 RX ADMIN — Medication 2000 UNIT(S): at 11:46

## 2019-07-20 RX ADMIN — Medication 10 MILLIGRAM(S): at 22:54

## 2019-07-20 RX ADMIN — CHLORHEXIDINE GLUCONATE 1 APPLICATION(S): 213 SOLUTION TOPICAL at 11:46

## 2019-07-20 RX ADMIN — AMLODIPINE BESYLATE 10 MILLIGRAM(S): 2.5 TABLET ORAL at 06:21

## 2019-07-20 RX ADMIN — NYSTATIN CREAM 1 APPLICATION(S): 100000 CREAM TOPICAL at 17:48

## 2019-07-20 RX ADMIN — IRON SUCROSE 110 MILLIGRAM(S): 20 INJECTION, SOLUTION INTRAVENOUS at 17:48

## 2019-07-20 RX ADMIN — Medication 40 MILLIGRAM(S): at 17:40

## 2019-07-20 RX ADMIN — ATORVASTATIN CALCIUM 20 MILLIGRAM(S): 80 TABLET, FILM COATED ORAL at 22:54

## 2019-07-20 RX ADMIN — Medication 2: at 17:40

## 2019-07-20 RX ADMIN — Medication 1 MILLIGRAM(S): at 11:45

## 2019-07-20 RX ADMIN — INSULIN GLARGINE 10 UNIT(S): 100 INJECTION, SOLUTION SUBCUTANEOUS at 22:53

## 2019-07-20 RX ADMIN — NYSTATIN CREAM 1 APPLICATION(S): 100000 CREAM TOPICAL at 06:22

## 2019-07-20 RX ADMIN — MONTELUKAST 10 MILLIGRAM(S): 4 TABLET, CHEWABLE ORAL at 11:47

## 2019-07-20 RX ADMIN — Medication 325 MILLIGRAM(S): at 11:45

## 2019-07-20 RX ADMIN — Medication 100 MILLIGRAM(S): at 00:14

## 2019-07-20 RX ADMIN — Medication 25 MILLIGRAM(S): at 17:47

## 2019-07-20 RX ADMIN — PANTOPRAZOLE SODIUM 40 MILLIGRAM(S): 20 TABLET, DELAYED RELEASE ORAL at 06:22

## 2019-07-20 RX ADMIN — PANTOPRAZOLE SODIUM 40 MILLIGRAM(S): 20 TABLET, DELAYED RELEASE ORAL at 17:47

## 2019-07-20 RX ADMIN — LINEZOLID 600 MILLIGRAM(S): 600 INJECTION, SOLUTION INTRAVENOUS at 18:03

## 2019-07-20 RX ADMIN — Medication 500 MILLIGRAM(S): at 11:46

## 2019-07-20 NOTE — PROGRESS NOTE ADULT - SUBJECTIVE AND OBJECTIVE BOX
[facility]  INFECTIOUS DISEASES AND INTERNAL MEDICINE at [place]  =======================================================  Omi Brink MD  Diplomates American Board of Internal Medicine and Infectious Diseases  Telephone [phone]  Fax            [phone]  =======================================================    MRN-[id number]  [facility]   follow up for: right chest wall port erythema  patient seen and examined.     no fevers  cultures from blood are negative    ===================================================  REVIEW OF SYSTEMS:  as above  all other ROS negative  =======================================================  Allergies  Biaxin (Joint Pain)  morphine (Short breath)    Antibiotics:  linezolid    Tablet 600 milliGRAM(s) Oral every 12 hours    ======================================================  Physical Exam:  ============  T(F): 97.4 (20 Jul 2019 08:55), Max: 98.9 (19 Jul 2019 15:26)  HR: 94 (20 Jul 2019 08:55)  BP: 153/72 (20 Jul 2019 08:55)  RR: 18 (20 Jul 2019 08:55)  SpO2: 98% (20 Jul 2019 08:55) (95% - 98%)  temp max in last 48H T(F): , Max: 100 (07-18-19 @ 11:24)    General:  No acute distress.  Eye: Pupils are equal, round and reactive to light, Extraocular movements are intact, Normal conjunctiva.  HENT: Normocephalic, Oral mucosa is moist, No pharyngeal erythema, No sinus tenderness.  Neck: Supple, No lymphadenopathy.  Respiratory: Lungs are clear to auscultation, Respirations are non-labored.  RIGHT chest wall with port in place; Scab in place; ecchymosis; no significant tenderness  Cardiovascular: Normal rate, Regular rhythm,    Gastrointestinal: Soft, Non-tender, Non-distended, Normal bowel sounds.  Genitourinary: No costovertebral angle tenderness.  Lymphatics: No lymphadenopathy neck,   Musculoskeletal: Normal range of motion, Normal strength.  Integumentary: No rash.  Neurologic: Alert, Oriented, No focal deficits, Cranial Nerves II-XII are grossly intact.  Psychiatric: Appropriate mood & affect.  =======================================================  Labs:                        8.0    x     )-----------( x        ( 19 Jul 2019 09:15 )             26.6       WBC Count: 4.69 K/uL (07-18-19 @ 14:37)  WBC Count: 4.74 K/uL (07-18-19 @ 03:42)  WBC Count: 6.88 K/uL (07-17-19 @ 10:48)      07-19    139  |  103  |  40.0<H>  ----------------------------<  101  5.1   |  25.0  |  3.04<H>    Ca    9.0      19 Jul 2019 09:15  Phos  4.2     07-19  Mg     2.3     07-19    Culture - Blood (collected 07-17-19 @ 16:48)  Source: .Blood    Culture - Blood (collected 07-17-19 @ 16:48)  Source: .Blood    Culture - Urine (collected 07-17-19 @ 13:35)  Source: .Urine  Final Report (07-18-19 @ 09:56):    No growth      Creatinine, Serum: 3.04 mg/dL (07-19-19 @ 09:15)  Creatinine, Serum: 3.21 mg/dL (07-18-19 @ 20:05)  Creatinine, Serum: 2.70 mg/dL (07-18-19 @ 13:31)  Creatinine, Serum: 2.60 mg/dL (07-18-19 @ 03:42)  Creatinine, Serum: 2.46 mg/dL (07-17-19 @ 21:16)  Creatinine, Serum: 2.90 mg/dL (07-17-19 @ 16:42)  Creatinine, Serum: 3.41 mg/dL (07-17-19 @ 11:38)

## 2019-07-20 NOTE — PROGRESS NOTE ADULT - SUBJECTIVE AND OBJECTIVE BOX
CC: hypoxia    Patient seen and examined at the bedside. No acute overnight events. sp removal of hd access yesterday. Complaining of nothing today. Denies fever/chills, headache, lightheadedness, dizziness, chest pain, palpitations, shortness of breath, cough, abd pain, nausea/vomiting/diarrhea, admits to back pain.      =========================================================================================  T(C): 37 (07-20-19 @ 15:35), Max: 37.1 (07-19-19 @ 23:10)  HR: 95 (07-20-19 @ 15:35) (93 - 98)  BP: 174/77 (07-20-19 @ 15:35) (153/72 - 174/77)  RR: 17 (07-20-19 @ 15:35) (17 - 18)  SpO2: 98% (07-20-19 @ 15:35) (97% - 98%)    PHYSICAL EXAM.    GEN - appears age appropriate. obese. pleasant. no distress.   HEENT - NCAT, EOMI, SAVANNAH. RT upper chest port wound, no surrounding cellulitis, no drainage  RESP - CTA BL, no wheeze/stridor/rhonchi/crackles. on supplemental O2, nc. able to speak in full sentences without distress.   CARDIO - NS1S2, RRR. No murmurs/rubs/gallops.  ABD - Soft/Non tender/Non distended. Normal BS x4 quadrants. no guarding/rebound tenderness.  Ext - No ANKIT.  MSK - BL 5/5 strength on upper and lower extremities.   Neuro - AAOx3. cn 2-12 grossly intact  Psych - normal affect  Skin - c/d/i. no rashes/lesions      I&O's Summary    19 Jul 2019 07:01  -  20 Jul 2019 07:00  --------------------------------------------------------  IN: 0 mL / OUT: 2050 mL / NET: -2050 mL      Daily     Daily     =========================================================================================  LABS.    07-20    139  |  104  |  36.0<H>  ----------------------------<  122<H>  4.3   |  24.0  |  2.76<H>    Ca    9.1      20 Jul 2019 12:02  Phos  4.2     07-19  Mg     2.3     07-19    TPro  6.7  /  Alb  3.4  /  TBili  0.3<L>  /  DBili  x   /  AST  85<H>  /  ALT  40<H>  /  AlkPhos  110  07-20                          7.9    4.32  )-----------( 137      ( 20 Jul 2019 12:02 )             25.7     LIVER FUNCTIONS - ( 20 Jul 2019 12:02 )  Alb: 3.4 g/dL / Pro: 6.7 g/dL / ALK PHOS: 110 U/L / ALT: 40 U/L / AST: 85 U/L / GGT: x                       =========================================================================================  IMAGING.     =========================================================================================    HOME MEDS.    Home Medications:  albuterol 2.5 mg/3 mL (0.083%) inhalation solution: 3 milliliter(s) inhaled every 6 hours, As Needed (17 Jul 2019 13:35)  amitriptyline 10 mg oral tablet: 1 tab(s) orally once a day (at bedtime) (17 Jul 2019 13:35)  amLODIPine 10 mg oral tablet: 1 tab(s) orally once a day (17 Jul 2019 13:35)  Aranesp Albumin Free: once weekly at MD&#x27;s office (17 Jul 2019 13:35)  Bactrim  mg-160 mg oral tablet: 1 tab(s) orally 2 times a day (17 Jul 2019 13:35)  cholecalciferol 2000 intl units oral tablet: 1 tab(s) orally once a day (17 Jul 2019 13:35)  folic acid 1 mg oral tablet: 1 tab(s) orally once a day (17 Jul 2019 13:35)  furosemide 40 mg oral tablet: 1 tab(s) orally 2 times a day (17 Jul 2019 13:35)  insulin lispro 100 units/mL subcutaneous solution:  subcutaneous 3 times a day (with meals) ; 2 Unit(s) if Glucose 151 - 200  4 Unit(s) if Glucose 201 - 250  6 Unit(s) if Glucose 251 - 300  8 Unit(s) if Glucose 301 - 350  10 Unit(s) if Glucose 351 - 400  12 Unit(s) if Glucose GREATER THAN 400 (17 Jul 2019 13:35)  levothyroxine 100 mcg (0.1 mg) oral tablet: 1 tab(s) orally once a day (17 Jul 2019 13:35)  magnesium oxide 400 mg (241.3 mg elemental magnesium) oral tablet: 1 tab(s) orally every other day (17 Jul 2019 13:35)  metoprolol tartrate 50 mg oral tablet: 1 tab(s) orally 2 times a day (17 Jul 2019 13:35)  pantoprazole 40 mg oral delayed release tablet: 1 tab(s) orally 2 times a day (17 Jul 2019 13:35)  pravastatin 80 mg oral tablet: 1 tab(s) orally once a day (17 Jul 2019 13:35)  Tresiba 100 units/mL subcutaneous solution: 10 unit(s) subcutaneous once a day (17 Jul 2019 13:35)  valsartan 80 mg oral tablet: 1 tab(s) orally once a day (17 Jul 2019 13:35)      =========================================================================================    HOSPITAL MEDS.    MEDICATIONS  (STANDING):  amitriptyline 10 milliGRAM(s) Oral at bedtime  amLODIPine   Tablet 10 milliGRAM(s) Oral daily  ascorbic acid 500 milliGRAM(s) Oral daily  atorvastatin 20 milliGRAM(s) Oral at bedtime  chlorhexidine 2% Cloths 1 Application(s) Topical daily  cholecalciferol 2000 Unit(s) Oral daily  dextrose 5%. 1000 milliLiter(s) (50 mL/Hr) IV Continuous <Continuous>  dextrose 50% Injectable 12.5 Gram(s) IV Push once  dextrose 50% Injectable 25 Gram(s) IV Push once  dextrose 50% Injectable 25 Gram(s) IV Push once  ferrous    sulfate 325 milliGRAM(s) Oral daily  folic acid 1 milliGRAM(s) Oral daily  furosemide    Tablet 40 milliGRAM(s) Oral two times a day  insulin glargine Injectable (LANTUS) 10 Unit(s) SubCutaneous at bedtime  insulin lispro (HumaLOG) corrective regimen sliding scale   SubCutaneous Before meals and at bedtime  iron sucrose IVPB 200 milliGRAM(s) IV Intermittent every 24 hours  levothyroxine 100 MICROGram(s) Oral daily  linezolid    Tablet 600 milliGRAM(s) Oral every 12 hours  metoprolol tartrate 25 milliGRAM(s) Oral two times a day  montelukast 10 milliGRAM(s) Oral daily  nystatin Powder 1 Application(s) Topical two times a day  pantoprazole    Tablet 40 milliGRAM(s) Oral two times a day    MEDICATIONS  (PRN):  dextrose 40% Gel 15 Gram(s) Oral once PRN Blood Glucose LESS THAN 70 milliGRAM(s)/deciLiter  glucagon  Injectable 1 milliGRAM(s) IntraMuscular once PRN Glucose <70 milliGRAM(s)/deciLiter

## 2019-07-20 NOTE — PROGRESS NOTE ADULT - SUBJECTIVE AND OBJECTIVE BOX
[place] [facility], THE [facility]                                   [address] [place] [address]                                                      PHONE: [phone]                                                         FAX: [phone]  [email or web address]  ---------------------------------------------------------------------------------------------------------------------------------    Overnight events/patient complaints:    No cp/sob.  PAST MEDICAL & SURGICAL HISTORY:  CKD (chronic kidney disease)  CAD (coronary artery disease)  Heart failure  Hepatitis C  Aortic stenosis  Anemia, unspecified anemia type  Asthma  Low back pain: chronic over a year now.  High cholesterol  Hypertension  Diabetes  H/O aortic valve replacement: cow valve.  S/P CABG x 3  History of tonsillectomy      Biaxin (Joint Pain)  morphine (Short breath)    MEDICATIONS  (STANDING):  amitriptyline 10 milliGRAM(s) Oral at bedtime  amLODIPine   Tablet 10 milliGRAM(s) Oral daily  ascorbic acid 500 milliGRAM(s) Oral daily  atorvastatin 20 milliGRAM(s) Oral at bedtime  chlorhexidine 2% Cloths 1 Application(s) Topical daily  cholecalciferol 2000 Unit(s) Oral daily  dextrose 5%. 1000 milliLiter(s) (50 mL/Hr) IV Continuous <Continuous>  dextrose 50% Injectable 12.5 Gram(s) IV Push once  dextrose 50% Injectable 25 Gram(s) IV Push once  dextrose 50% Injectable 25 Gram(s) IV Push once  ferrous    sulfate 325 milliGRAM(s) Oral daily  folic acid 1 milliGRAM(s) Oral daily  furosemide    Tablet 40 milliGRAM(s) Oral two times a day  insulin glargine Injectable (LANTUS) 10 Unit(s) SubCutaneous at bedtime  insulin lispro (HumaLOG) corrective regimen sliding scale   SubCutaneous Before meals and at bedtime  iron sucrose IVPB 200 milliGRAM(s) IV Intermittent every 24 hours  levothyroxine 100 MICROGram(s) Oral daily  linezolid    Tablet 600 milliGRAM(s) Oral every 12 hours  montelukast 10 milliGRAM(s) Oral daily  nystatin Powder 1 Application(s) Topical two times a day  pantoprazole    Tablet 40 milliGRAM(s) Oral two times a day    MEDICATIONS  (PRN):  dextrose 40% Gel 15 Gram(s) Oral once PRN Blood Glucose LESS THAN 70 milliGRAM(s)/deciLiter  glucagon  Injectable 1 milliGRAM(s) IntraMuscular once PRN Glucose <70 milliGRAM(s)/deciLiter      Vital Signs Last 24 Hrs  T(C): 37 (20 Jul 2019 15:35), Max: 37.1 (19 Jul 2019 23:10)  T(F): 98.6 (20 Jul 2019 15:35), Max: 98.8 (19 Jul 2019 23:10)  HR: 95 (20 Jul 2019 15:35) (93 - 98)  BP: 174/77 (20 Jul 2019 15:35) (153/72 - 174/77)  BP(mean): --  RR: 17 (20 Jul 2019 15:35) (17 - 18)  SpO2: 98% (20 Jul 2019 15:35) (97% - 98%)  ICU Vital Signs Last 24 Hrs  NICHELLE GILL  I&O's Detail    19 Jul 2019 07:01  -  20 Jul 2019 07:00  --------------------------------------------------------  IN:  Total IN: 0 mL    OUT:    Indwelling Catheter - Urethral: 2050 mL  Total OUT: 2050 mL    Total NET: -2050 mL        I&O's Summary    19 Jul 2019 07:01  -  20 Jul 2019 07:00  --------------------------------------------------------  IN: 0 mL / OUT: 2050 mL / NET: -2050 mL      Drug Dosing Weight  NICHELLE GILL      PHYSICAL EXAM:  General: Appears well developed, well nourished alert and cooperative.  HEENT: Head; normocephalic, atraumatic.  Eyes: Pupils reactive, cornea wnl.  Neck: Supple, no nodes adenopathy, no NVD or carotid bruit or thyromegaly.  CARDIOVASCULAR: Normal S1 and S2, No murmur, rub, gallop or lift.   LUNGS: No rales, rhonchi or wheeze. Normal breath sounds bilaterally.  ABDOMEN: Soft, nontender without mass or organomegaly. bowel sounds normoactive.  EXTREMITIES: No clubbing, cyanosis or edema. Distal pulses wnl.   SKIN: warm and dry with normal turgor.  NEURO: Alert/oriented x 3/normal motor exam. No pathologic reflexes.    PSYCH: normal affect.        LABS:                        7.9    4.32  )-----------( 137      ( 20 Jul 2019 12:02 )             25.7     07-20    139  |  104  |  36.0<H>  ----------------------------<  122<H>  4.3   |  24.0  |  2.76<H>    Ca    9.1      20 Jul 2019 12:02  Phos  4.2     07-19  Mg     2.3     07-19    TPro  6.7  /  Alb  3.4  /  TBili  0.3<L>  /  DBili  x   /  AST  85<H>  /  ALT  40<H>  /  AlkPhos  110  07-20    NICHELLE GILL            RADIOLOGY & ADDITIONAL STUDIES:    INTERPRETATION OF TELEMETRY (personally reviewed):    ECG:    ECHO:    STRESS TEST:    CARDIAC CATHETERIZATION:    ASSESSMENT AND PLAN:  In summary, NICHELLE GILL is an 74y Female with past medical history significant for CRI presented w bradycardia and hyperkalemia.  HR improved w normalization of K/no VT.  No MI.  Can d/c tele/will see as outpt/pt advised to lower the amt of bananas she eats ( to prevent leg cramps ).    Thank you for allowing [facility] to participate in the care of this patient.  Please feel free to call with any questions or concerns.

## 2019-07-20 NOTE — PROGRESS NOTE ADULT - ASSESSMENT
73 y/o woman with PMH CHFpEf, CAD s/p CABG, AS s/p bovine AVR (both at [place]), CKD, DM2,Hepatitis C with cirrhosis, iron deficiency anemia of uncertain etiology with 3 negative bone marrows, on regular iron supplements and transfusion, was admitted from hematologist office with syncope and bradycardia. Was found with hyperkalemia, was on urgent HD.   2 weeks ago she had R upper chest port placement as per her she had chills at that time, and later noticed redness on the port placement area for which started on bactrim oral.    ID has been called for concern about wound infection/cellulitis.      R/O Right upper chest wound infection  CKD  Anemia with R chest port placement     - Blood culture from 7/17 - No growth at 48 hours  - No wound discharge or open wound.   _ POOR renal function/ CKD  - will stop Vancomycin given negative cultures  in view of prior hx of AVR, will START LINEZOLID  600mg PO Q12H - anticipate 14 day course - last day 8/1/19    if BP remains stable and site remains stable, can consider discharge to community 7/21/19 AM       patient to follow in my office in 2 weeks.

## 2019-07-20 NOTE — PHARMACOTHERAPY INTERVENTION NOTE - COMMENTS
As per patient request, referenced outpatient heme/onc medication list for reconciliation. List notes irbesartan 75 mG qday, but as per pharmacy fill records, patient last filled valsartan 80 mG. Patient also recently picked [facility]/TMP on 7/12/19 for 7 days supply.
trough 9.9 on 7/18 will increase dose to vanco 750 mg qd est trough 15

## 2019-07-20 NOTE — PROGRESS NOTE ADULT - ASSESSMENT
A/p: 73 yo female with Hx chronic diastolic HFpEf, CAD s/p CABG, AS s/p bovine AVR (both at [place]), CKD3 (baseline Cr 1.8; had 1 episode of emergent HD for ARF with hyperkalemia few yrs ago), T2 IDDM, bronchiectasis, Hepatitis C type II c/b cirrhosis, AOCD/ iron deficiency anemia of uncertain etiology (developed following cardiac surgery, has undergone 3 BM biopsies with no diagnosis; has required multiple transfusions in the past 3 years), who presented following evaluation at her PMD for dizziness and was found to be bradycardic with HR 30's. Patient states she had a port placed for vascular access 2 weeks ago for recurrent need of PRBC transfusions, at which time she was having chills but was told her white blood cell count was not indicative of infection. About 1 week ago, she developed redness over the site of her port and was started on Bactrim. Around that time, she also developed increasing shortness of breath, lower extremity edema, and decreased urination; her Cr was noted to be increased from 1.8 to 2.0. She was seen by her PMD two days ago for worsening shortness of breath, found to have 6lb weight gain over the past 1 week, and was told to increase her Lasix from 40mg BID to 80mg in AM and 40mg PM. Her symptoms did not improve with increased diuresis. While she was in the physician office she had a brief syncopal episode and was found to have HR 20's with hypoxia and was sent to ED for evaluation of symptomatic bradycardia. Upon arrival to ED, patient was found to have HR 30's with high grade AV block with ventricular escape beats and episodes of AFib. She was evaluated by Cardiology and started on Dopamine 2mcg/kg/min. ICU was consulted. Patient's medication list was reviewed, notable for Bactrim, Metoprolol, and an ARB, all of which she took the morning of arrival. Glucagon 5mg IVPB was given to reverse beta blockade with improvement in HR to sinus bradycardia in 40's. Patient developed hypotension with pre-syncopal feelings of worsening dizziness, blurred vision, and depth perception issues. Dopamine was increased to 10mcg/kg/min with improvement in BP. Upon completion of Glucagon infusion, HR improved to NSR 65 bpm. Patient stated she had a similar admission in the past, at which time she was found to be in acute renal failure with hyperkalemia requiring 1 session of emergent hemodialysis. A VBG was sent, which revealed a K 7.1. Patient was given calcium gluconate, insulin/D50, and sodium bicarbonate. Labs returned confirming hyperkalemia 7.3 with mild hemolysis and SCr 3.41. Nephrology consult was called for emergent hemodialysis. An indwelling mg was placed with 25cc UOP. Eventually pt able to come off of dopamine support. After one session of HD fluid status stabilized as did hyperkalemia with metabolic acidosis and potassium down trended to wnl. Given improvement in creatine; pt now producing good amount of urine, placed back on lasix bid therapy and femoral quintron removed today. Also noted with acute on chronic anemia, pt follows closely with heme/ onc fluctuating hgb 7-8 range, optimized on IV iron/ epogen. Also with concern for port infection; was on bactrim as an outpt currently remains on vancomycin; renally dosed and ID continues to follow the pt. B cx negative thus far; given this port will remain in place. Slow clinical improvement.     Syncope 2/2 high degree block and ventricular escape. stable  - s/p shock and off of pressor support (dopamine)    -s/p glucagon   - likely related to hyperkalemia and worsening kidney failure, both resolved  - Given cardiac co morbidities pt will need to be placed back on a BB, resumed metoprolol low dose  - cardio f/u noted and appreciated, outpt fu w/ sb    Acute fluid overload in the setting of acute on chronic renal failure requiring emergent HD as well as acute on chronic diastolic HF exacerbation. resolved   - s/p emergent HD on admission    - renal function remains stable    - pt continues to urinate   - c/w lasix 40mg BID, changed to po   - strict I/Os   - daily weight   -fluid restriction   - cardio f/u noted and appreciated  - nephro f/u noted and appreciated    Acute Respiratory failure w/ hypoxia. stable  - initially thought to be sec to volume overload, persistent despite resolution of exac  - pt w/ underlying asthma, o2 sat during ambulation 87%, improves to 98% during ambulation w/ 2LNC, likely had chronic underlying undiagnosed hypoxia for some time  - given chronic and progressive nature of comorbid conditions which are currently optimized + w/o exacerbation, patient will need lifelong supplemental o2 therapy, set up prior to dc  - low prob PE on well criteria, echo w/ borderline pulm htn  -outpt fu w/ pulm for repeat pft's, may benefit also from w/u for poss pulm htn as outpt    Acute on chronic renal failure. resolved  - ckd 3/4   - prior hx of transient HD in the past  - metabolic acidosis with hyperkalemia resolved  - s/p HD with improvement in fluid status and lytes, access removed  - c/w lasix IV bid   - pt continues to urinate   - avoid nephrotoxic medications  - renal US with medical renal dz, no evidence of hydronephrosis  - will c/w monitoring renal fxn  - nephro f/u noted and appreciated; close outpt fu    Acute on chronic anemia of chronic disease/ iron deficiency anemia. stable  - h/h remains low, hgb 7-8 range  - pt is chronically low has required numerous transfusions in the past. Had port inserted for this purpose.  - for now will monitor h/h closely currently hgb ~8  - c/w IV iron since now B cx negative, start oral supplement w/ vit c to cont on dc  - epogen per nephro   - will transfuse as needed; if symptomatic or hgb <7   - heme/ onc f/u noted and appreciated     R upper chest wound infection  - pt afebrile   - no leukocytosis    - wound is healing with scan, minimal erythema   - B cx negative; less likely port infection   - change vanco to zyvox started 7/20, 14d course per ID, last day 8/1/19  - copay discussed w/ pt, she is willing to pay  - ID f/u noted and appreciated     DM2 on long term insulin complicated by asymptomatic hyperglycemia  - FS Stable  - c/w accuchecks ACHS TID   - c/w HSS   - c/w lantus 10 u sq qhs  - c/w hypogluycemia protocol     HTN. uncontrolled, asymptomatic  - serial bp  - bb as above, titrate to home dose as needed/tolerated    DVT ppx  - will hold off on any chemical AC given ongoing anemia and recurrent need for transfusions  - scd boots b/l    Activity level: Ambulates with cane at baseline     Advanced directive: Full code  Next of kin : three daughters     Dispo: Deconditioned and will need PT evaluation, pending. dc after o2 tank delivered likely in 48hrs    outpatient fu: pmd, nephro, cardio, pulm, id

## 2019-07-20 NOTE — PROGRESS NOTE ADULT - ASSESSMENT
MARGUERITE on CKD cr trending down as of yest awaiting todays labs  Suspect new baseline  Hyperkalemia resolved had HD x1 no longer needs HD    Patient follows with [facility] in [place]     Hyperkalemia  related to combination of ARB and bactrim and MA  ---> Both held   Renal sonogram noted medical renal disease no hydronephrosis  Cont Lasix 40 mg po bid     Cardio follow up noted     Anemia - Epogen  7-17   Iron stores low , cont Venofer     Will follow

## 2019-07-20 NOTE — PROGRESS NOTE ADULT - SUBJECTIVE AND OBJECTIVE BOX
NEPHROLOGY INTERVAL HPI/OVERNIGHT EVENTS:    Examined  Feeling better  mg and femoral juan miguel cath-  removed  denies HA CP N/V/D no SOB    MEDICATIONS  (STANDING):  amitriptyline 10 milliGRAM(s) Oral at bedtime  amLODIPine   Tablet 10 milliGRAM(s) Oral daily  ascorbic acid 500 milliGRAM(s) Oral daily  atorvastatin 20 milliGRAM(s) Oral at bedtime  chlorhexidine 2% Cloths 1 Application(s) Topical daily  cholecalciferol 2000 Unit(s) Oral daily  dextrose 5%. 1000 milliLiter(s) (50 mL/Hr) IV Continuous <Continuous>  dextrose 50% Injectable 12.5 Gram(s) IV Push once  dextrose 50% Injectable 25 Gram(s) IV Push once  dextrose 50% Injectable 25 Gram(s) IV Push once  ferrous    sulfate 325 milliGRAM(s) Oral daily  folic acid 1 milliGRAM(s) Oral daily  furosemide    Tablet 40 milliGRAM(s) Oral two times a day  insulin glargine Injectable (LANTUS) 10 Unit(s) SubCutaneous at bedtime  insulin lispro (HumaLOG) corrective regimen sliding scale   SubCutaneous Before meals and at bedtime  iron sucrose IVPB 200 milliGRAM(s) IV Intermittent every 24 hours  levothyroxine 100 MICROGram(s) Oral daily  linezolid    Tablet 600 milliGRAM(s) Oral every 12 hours  montelukast 10 milliGRAM(s) Oral daily  nystatin Powder 1 Application(s) Topical two times a day  pantoprazole    Tablet 40 milliGRAM(s) Oral two times a day    MEDICATIONS  (PRN):  dextrose 40% Gel 15 Gram(s) Oral once PRN Blood Glucose LESS THAN 70 milliGRAM(s)/deciLiter  glucagon  Injectable 1 milliGRAM(s) IntraMuscular once PRN Glucose <70 milliGRAM(s)/deciLiter      Allergies    Biaxin (Joint Pain)  morphine (Short breath)    Intolerances        Vital Signs Last 24 Hrs  T(C): 36.3 (20 Jul 2019 08:55), Max: 37.2 (19 Jul 2019 15:26)  T(F): 97.4 (20 Jul 2019 08:55), Max: 98.9 (19 Jul 2019 15:26)  HR: 94 (20 Jul 2019 08:55) (87 - 98)  BP: 153/72 (20 Jul 2019 08:55) (138/61 - 172/68)  BP(mean): --  RR: 18 (20 Jul 2019 08:55) (18 - 18)  SpO2: 98% (20 Jul 2019 08:55) (95% - 98%)  Daily     Daily     PHYSICAL EXAM:  GENERAL: NAD, feels better   HEAD:  Atraumatic, No edema   NECK: Supple, No JVD, + right sided mediport   CHEST/LUNG: Clear / EAE . No wheeze   HEART: Regular rate and rhythm; No murmurs, rubs, or gallops  ABDOMEN: Soft, Nontender, Nondistended; Bowel sounds present  EXTREMITIES: + trace edema     LABS:                        8.0    x     )-----------( x        ( 19 Jul 2019 09:15 )             26.6     07-19    139  |  103  |  40.0<H>  ----------------------------<  101  5.1   |  25.0  |  3.04<H>    Ca    9.0      19 Jul 2019 09:15  Phos  4.2     07-19  Mg     2.3     07-19                  RADIOLOGY & ADDITIONAL TESTS:

## 2019-07-21 LAB
ANION GAP SERPL CALC-SCNC: 11 MMOL/L — SIGNIFICANT CHANGE UP (ref 5–17)
APPEARANCE UR: CLEAR — SIGNIFICANT CHANGE UP
BACTERIA # UR AUTO: ABNORMAL
BILIRUB UR-MCNC: NEGATIVE — SIGNIFICANT CHANGE UP
BUN SERPL-MCNC: 33 MG/DL — HIGH (ref 8–20)
CALCIUM SERPL-MCNC: 9 MG/DL — SIGNIFICANT CHANGE UP (ref 8.6–10.2)
CHLORIDE SERPL-SCNC: 104 MMOL/L — SIGNIFICANT CHANGE UP (ref 98–107)
CO2 SERPL-SCNC: 27 MMOL/L — SIGNIFICANT CHANGE UP (ref 22–29)
COLOR SPEC: YELLOW — SIGNIFICANT CHANGE UP
CREAT SERPL-MCNC: 2.47 MG/DL — HIGH (ref 0.5–1.3)
DIFF PNL FLD: ABNORMAL
EPI CELLS # UR: SIGNIFICANT CHANGE UP
GLUCOSE BLDC GLUCOMTR-MCNC: 102 MG/DL — HIGH (ref 70–99)
GLUCOSE BLDC GLUCOMTR-MCNC: 126 MG/DL — HIGH (ref 70–99)
GLUCOSE BLDC GLUCOMTR-MCNC: 155 MG/DL — HIGH (ref 70–99)
GLUCOSE BLDC GLUCOMTR-MCNC: 155 MG/DL — HIGH (ref 70–99)
GLUCOSE BLDC GLUCOMTR-MCNC: 156 MG/DL — HIGH (ref 70–99)
GLUCOSE SERPL-MCNC: 98 MG/DL — SIGNIFICANT CHANGE UP (ref 70–115)
GLUCOSE UR QL: NEGATIVE MG/DL — SIGNIFICANT CHANGE UP
HCT VFR BLD CALC: 26.8 % — LOW (ref 34.5–45)
HGB BLD-MCNC: 8.1 G/DL — LOW (ref 11.5–15.5)
KETONES UR-MCNC: NEGATIVE — SIGNIFICANT CHANGE UP
LEUKOCYTE ESTERASE UR-ACNC: NEGATIVE — SIGNIFICANT CHANGE UP
MAGNESIUM SERPL-MCNC: 1.9 MG/DL — SIGNIFICANT CHANGE UP (ref 1.8–2.6)
MCHC RBC-ENTMCNC: 29.5 PG — SIGNIFICANT CHANGE UP (ref 27–34)
MCHC RBC-ENTMCNC: 30.2 GM/DL — LOW (ref 32–36)
MCV RBC AUTO: 97.5 FL — SIGNIFICANT CHANGE UP (ref 80–100)
NITRITE UR-MCNC: NEGATIVE — SIGNIFICANT CHANGE UP
PH UR: 6 — SIGNIFICANT CHANGE UP (ref 5–8)
PHOSPHATE SERPL-MCNC: 3 MG/DL — SIGNIFICANT CHANGE UP (ref 2.4–4.7)
PLATELET # BLD AUTO: 147 K/UL — LOW (ref 150–400)
POTASSIUM SERPL-MCNC: 4 MMOL/L — SIGNIFICANT CHANGE UP (ref 3.5–5.3)
POTASSIUM SERPL-SCNC: 4 MMOL/L — SIGNIFICANT CHANGE UP (ref 3.5–5.3)
PROT UR-MCNC: 100 MG/DL
RBC # BLD: 2.75 M/UL — LOW (ref 3.8–5.2)
RBC # FLD: 17.3 % — HIGH (ref 10.3–14.5)
RBC CASTS # UR COMP ASSIST: ABNORMAL /HPF (ref 0–4)
SODIUM SERPL-SCNC: 142 MMOL/L — SIGNIFICANT CHANGE UP (ref 135–145)
SP GR SPEC: 1.01 — SIGNIFICANT CHANGE UP (ref 1.01–1.02)
UROBILINOGEN FLD QL: NEGATIVE MG/DL — SIGNIFICANT CHANGE UP
WBC # BLD: 4.51 K/UL — SIGNIFICANT CHANGE UP (ref 3.8–10.5)
WBC # FLD AUTO: 4.51 K/UL — SIGNIFICANT CHANGE UP (ref 3.8–10.5)
WBC UR QL: SIGNIFICANT CHANGE UP

## 2019-07-21 PROCEDURE — 99233 SBSQ HOSP IP/OBS HIGH 50: CPT

## 2019-07-21 RX ORDER — CHOLESTYRAMINE 4 G/9G
4 POWDER, FOR SUSPENSION ORAL
Refills: 0 | Status: DISCONTINUED | OUTPATIENT
Start: 2019-07-21 | End: 2019-07-22

## 2019-07-21 RX ORDER — PHENYLEPHRINE-SHARK LIVER OIL-MINERAL OIL-PETROLATUM RECTAL OINTMENT
1 OINTMENT (GRAM) RECTAL
Refills: 0 | Status: DISCONTINUED | OUTPATIENT
Start: 2019-07-21 | End: 2019-07-22

## 2019-07-21 RX ORDER — LOPERAMIDE HCL 2 MG
2 TABLET ORAL EVERY 6 HOURS
Refills: 0 | Status: DISCONTINUED | OUTPATIENT
Start: 2019-07-21 | End: 2019-07-22

## 2019-07-21 RX ADMIN — Medication 1 MILLIGRAM(S): at 11:39

## 2019-07-21 RX ADMIN — Medication 100 MICROGRAM(S): at 05:12

## 2019-07-21 RX ADMIN — Medication 2: at 14:24

## 2019-07-21 RX ADMIN — LINEZOLID 600 MILLIGRAM(S): 600 INJECTION, SOLUTION INTRAVENOUS at 05:13

## 2019-07-21 RX ADMIN — AMLODIPINE BESYLATE 10 MILLIGRAM(S): 2.5 TABLET ORAL at 05:13

## 2019-07-21 RX ADMIN — CHOLESTYRAMINE 4 GRAM(S): 4 POWDER, FOR SUSPENSION ORAL at 21:03

## 2019-07-21 RX ADMIN — ATORVASTATIN CALCIUM 20 MILLIGRAM(S): 80 TABLET, FILM COATED ORAL at 21:02

## 2019-07-21 RX ADMIN — Medication 40 MILLIGRAM(S): at 05:15

## 2019-07-21 RX ADMIN — NYSTATIN CREAM 1 APPLICATION(S): 100000 CREAM TOPICAL at 05:13

## 2019-07-21 RX ADMIN — Medication 325 MILLIGRAM(S): at 11:39

## 2019-07-21 RX ADMIN — Medication 40 MILLIGRAM(S): at 17:04

## 2019-07-21 RX ADMIN — MONTELUKAST 10 MILLIGRAM(S): 4 TABLET, CHEWABLE ORAL at 11:39

## 2019-07-21 RX ADMIN — Medication 10 MILLIGRAM(S): at 21:02

## 2019-07-21 RX ADMIN — NYSTATIN CREAM 1 APPLICATION(S): 100000 CREAM TOPICAL at 17:04

## 2019-07-21 RX ADMIN — Medication 25 MILLIGRAM(S): at 17:04

## 2019-07-21 RX ADMIN — Medication 2 MILLIGRAM(S): at 21:02

## 2019-07-21 RX ADMIN — LINEZOLID 600 MILLIGRAM(S): 600 INJECTION, SOLUTION INTRAVENOUS at 17:04

## 2019-07-21 RX ADMIN — Medication 2000 UNIT(S): at 11:40

## 2019-07-21 RX ADMIN — PHENYLEPHRINE-SHARK LIVER OIL-MINERAL OIL-PETROLATUM RECTAL OINTMENT 1 APPLICATION(S): at 21:03

## 2019-07-21 RX ADMIN — PANTOPRAZOLE SODIUM 40 MILLIGRAM(S): 20 TABLET, DELAYED RELEASE ORAL at 05:13

## 2019-07-21 RX ADMIN — CHLORHEXIDINE GLUCONATE 1 APPLICATION(S): 213 SOLUTION TOPICAL at 11:39

## 2019-07-21 RX ADMIN — IRON SUCROSE 110 MILLIGRAM(S): 20 INJECTION, SOLUTION INTRAVENOUS at 15:43

## 2019-07-21 RX ADMIN — Medication 500 MILLIGRAM(S): at 11:37

## 2019-07-21 RX ADMIN — PANTOPRAZOLE SODIUM 40 MILLIGRAM(S): 20 TABLET, DELAYED RELEASE ORAL at 17:04

## 2019-07-21 RX ADMIN — Medication 25 MILLIGRAM(S): at 05:12

## 2019-07-21 RX ADMIN — INSULIN GLARGINE 10 UNIT(S): 100 INJECTION, SOLUTION SUBCUTANEOUS at 22:28

## 2019-07-21 RX ADMIN — Medication 2: at 17:10

## 2019-07-21 NOTE — PROGRESS NOTE ADULT - ATTENDING COMMENTS
called daughter Daniela. In depth phone conversation regarding patient's current clinical condition, diagnosis, therapy, further options for care, and plan moving forward held today due to patient request . All questions answered at length to the satisfaction of all participants. discussed likely upcoming dc, she is in agrees w/ plan. [phone]    Conversation start time:  Conversation end time:  Total time of conversation:
case also discussed w/ daughter @ bedside
await repeat lytes to determine whether or not  will need to dialyze again, pt is making a good amount of urine and has improved K. Pt no longer requires ICU LOC, will de-line pt prior to transfer to floor.

## 2019-07-21 NOTE — PROGRESS NOTE ADULT - ASSESSMENT
A/p: 73 yo female with Hx chronic diastolic HFpEf, CAD s/p CABG, AS s/p bovine AVR (both at [place]), CKD3 (baseline Cr 1.8; had 1 episode of emergent HD for ARF with hyperkalemia few yrs ago), T2 IDDM, bronchiectasis, Hepatitis C type II c/b cirrhosis, AOCD/ iron deficiency anemia of uncertain etiology (developed following cardiac surgery, has undergone 3 BM biopsies with no diagnosis; has required multiple transfusions in the past 3 years), who presented following evaluation at her PMD for dizziness and was found to be bradycardic with HR 30's. Patient states she had a port placed for vascular access 2 weeks ago for recurrent need of PRBC transfusions, at which time she was having chills but was told her white blood cell count was not indicative of infection. About 1 week ago, she developed redness over the site of her port and was started on Bactrim. Around that time, she also developed increasing shortness of breath, lower extremity edema, and decreased urination; her Cr was noted to be increased from 1.8 to 2.0. She was seen by her PMD two days ago for worsening shortness of breath, found to have 6lb weight gain over the past 1 week, and was told to increase her Lasix from 40mg BID to 80mg in AM and 40mg PM. Her symptoms did not improve with increased diuresis. While she was in the physician office she had a brief syncopal episode and was found to have HR 20's with hypoxia and was sent to ED for evaluation of symptomatic bradycardia. Upon arrival to ED, patient was found to have HR 30's with high grade AV block with ventricular escape beats and episodes of AFib. She was evaluated by Cardiology and started on Dopamine 2mcg/kg/min. ICU was consulted. Patient's medication list was reviewed, notable for Bactrim, Metoprolol, and an ARB, all of which she took the morning of arrival. Glucagon 5mg IVPB was given to reverse beta blockade with improvement in HR to sinus bradycardia in 40's. Patient developed hypotension with pre-syncopal feelings of worsening dizziness, blurred vision, and depth perception issues. Dopamine was increased to 10mcg/kg/min with improvement in BP. Upon completion of Glucagon infusion, HR improved to NSR 65 bpm. Patient stated she had a similar admission in the past, at which time she was found to be in acute renal failure with hyperkalemia requiring 1 session of emergent hemodialysis. A VBG was sent, which revealed a K 7.1. Patient was given calcium gluconate, insulin/D50, and sodium bicarbonate. Labs returned confirming hyperkalemia 7.3 with mild hemolysis and SCr 3.41. Nephrology consult was called for emergent hemodialysis. An indwelling mg was placed with 25cc UOP. Eventually pt able to come off of dopamine support. After one session of HD fluid status stabilized as did hyperkalemia with metabolic acidosis and potassium down trended to wnl. Given improvement in creatine; pt now producing good amount of urine, placed back on lasix bid therapy and femoral quintron removed today. Also noted with acute on chronic anemia, pt follows closely with heme/ onc fluctuating hgb 7-8 range, optimized on IV iron/ epogen. Also with concern for port infection; was on bactrim as an outpt currently remains on vancomycin; renally dosed and ID continues to follow the pt. B cx negative thus far; given this port will remain in place. Slow clinical improvement.     Ramiro Hematuria. new onset 7/21  - asymptomatic, noted by pt, confirmed by nursing  - last UA this admit w/ microscopic hematuria  - repeat UA, monitor for blood loss, h/h stable, trend  - if worsening, urology eval inpt, otherwise refer on dc    Hemorrhoids.   - supportive care, sitz baths + hemorrhoid cream  - monitor for worsening bleeding    Syncope 2/2 high degree block and ventricular escape. stable  - s/p shock and off of pressor support (dopamine)    -s/p glucagon   - likely related to hyperkalemia and worsening kidney failure, both resolved  - resumed metoprolol low dose  - cardio f/u noted and appreciated, outpt fu w/ sb    Acute fluid overload in the setting of acute on chronic renal failure requiring emergent HD as well as acute on chronic diastolic HF exacerbation. resolved   - s/p emergent HD on admission    - renal function remains stable  - c/w lasix  - daily weight   -fluid restriction   - cardio f/u noted and appreciated  - nephro f/u noted and appreciated    Acute Respiratory failure w/ hypoxia. stable  - initially thought to be sec to volume overload, persistent despite resolution of exac  - pt w/ underlying asthma, o2 sat during ambulation 87%, improves to 98% during ambulation w/ 2LNC, likely had chronic underlying undiagnosed hypoxia for some time  - given chronic and progressive nature of comorbid conditions which are currently optimized + w/o exacerbation, patient will need lifelong supplemental o2 therapy, set up prior to dc  - low prob PE on well criteria, echo w/ borderline pulm htn  -outpt fu w/ pulm for repeat pft's, may benefit also from w/u for poss pulm htn as outpt    Acute on chronic renal failure. resolved  - ckd 3/4   - prior hx of transient HD in the past  - metabolic acidosis with hyperkalemia resolved  - s/p HD with improvement in fluid status and lytes, access removed  - avoid nephrotoxic medications  - renal US with medical renal dz, no evidence of hydronephrosis  - will c/w monitoring renal fxn  - nephro f/u noted and appreciated; close outpt fu    Acute on chronic anemia of chronic disease/ iron deficiency anemia. stable  - asymptomatic  - pt is chronically low has required numerous transfusions in the past, approx 26. Had port inserted for this purpose.  - IV iron with transition to oral supplement w/ vit c to cont on dc  - epogen per nephro   - will transfuse as needed; if symptomatic or hgb <7   - heme/ onc f/u noted and appreciated     R upper chest wound infection. stable   - B cx negative; less likely port infection   - change vanco to zyvox started 7/20, 14d course per ID, last day 8/1/19  - copay discussed w/ pt, she is willing to pay  - ID f/u noted and appreciated     DM2 on long term insulin complicated by asymptomatic hyperglycemia  - FS Stable  - c/w accuchecks ACHS TID   - c/w HSS   - c/w lantus 10 u sq qhs  - c/w hypoglycemia protocol     HTN. now controlled, asymptomatic  - serial bp  - titrate back to home dose bb as needed/tolerated    DVT ppx  - will hold off on any chemical AC given ongoing anemia  - scd boots b/l    Activity level: Ambulates with cane at baseline     Advanced directive: Full code  Next of kin : three daughters     Dispo: Deconditioned and will need PT evaluation, pending. dc after o2 tank delivered likely in 48hrs +_PT eval done    outpatient fu: pmd, nephro, cardio, pulm, id

## 2019-07-21 NOTE — PROGRESS NOTE ADULT - SUBJECTIVE AND OBJECTIVE BOX
CC: hypoxia    Patient seen and examined at the bedside. No acute overnight events. no events yesterday. Complaining of hematuria today. Denies fever/chills, headache, lightheadedness, dizziness, chest pain, palpitations, shortness of breath, cough, abd pain, nausea/vomiting/diarrhea      =========================================================================================    PHYSICAL EXAM.    GEN - appears age appropriate. obese. pleasant. no distress.   HEENT - NCAT, EOMI, SAVANNAH. RT upper chest port wound, no surrounding cellulitis, no drainage  RESP - CTA BL, no wheeze/stridor/rhonchi/crackles. on supplemental O2, nc. able to speak in full sentences without distress.   CARDIO - NS1S2, RRR. No murmurs/rubs/gallops.  ABD - Soft/Non tender/Non distended. Normal BS x4 quadrants. no guarding/rebound tenderness.  Ext - No ANKIT.  MSK - BL 5/5 strength on upper and lower extremities.   Neuro - AAOx3. cn 2-12 grossly intact  Psych - normal affect  Skin - c/d/i. no rashes/lesions      VITAL SIGNS.    Vital Signs Last 24 Hrs  T(C): 37.1 (21 Jul 2019 00:00), Max: 37.1 (21 Jul 2019 00:00)  T(F): 98.7 (21 Jul 2019 00:00), Max: 98.7 (21 Jul 2019 00:00)  HR: 71 (21 Jul 2019 05:10) (71 - 95)  BP: 132/76 (21 Jul 2019 05:10) (132/76 - 174/77)  BP(mean): --  RR: 17 (21 Jul 2019 00:00) (17 - 17)  SpO2: 99% (21 Jul 2019 00:00) (98% - 99%)        =================================================    LABS.                          8.1    4.51  )-----------( 147      ( 21 Jul 2019 07:01 )             26.8     07-21    142  |  104  |  33.0<H>  ----------------------------<  98  4.0   |  27.0  |  2.47<H>    Ca    9.0      21 Jul 2019 07:01  Phos  3.0     07-21  Mg     1.9     07-21    TPro  6.7  /  Alb  3.4  /  TBili  0.3<L>  /  DBili  x   /  AST  85<H>  /  ALT  40<H>  /  AlkPhos  110  07-20    LIVER FUNCTIONS - ( 20 Jul 2019 12:02 )  Alb: 3.4 g/dL / Pro: 6.7 g/dL / ALK PHOS: 110 U/L / ALT: 40 U/L / AST: 85 U/L / GGT: x             I&O's Summary        ================================================    IMAGING.      ================================================    HOME MEDS.    Home Medications:  albuterol 2.5 mg/3 mL (0.083%) inhalation solution: 3 milliliter(s) inhaled every 6 hours, As Needed (17 Jul 2019 13:35)  amitriptyline 10 mg oral tablet: 1 tab(s) orally once a day (at bedtime) (17 Jul 2019 13:35)  amLODIPine 10 mg oral tablet: 1 tab(s) orally once a day (17 Jul 2019 13:35)  Aranesp Albumin Free: once weekly at MD&#x27;s office (17 Jul 2019 13:35)  Bactrim  mg-160 mg oral tablet: 1 tab(s) orally 2 times a day (17 Jul 2019 13:35)  cholecalciferol 2000 intl units oral tablet: 1 tab(s) orally once a day (17 Jul 2019 13:35)  folic acid 1 mg oral tablet: 1 tab(s) orally once a day (17 Jul 2019 13:35)  furosemide 40 mg oral tablet: 1 tab(s) orally 2 times a day (17 Jul 2019 13:35)  insulin lispro 100 units/mL subcutaneous solution:  subcutaneous 3 times a day (with meals) ; 2 Unit(s) if Glucose 151 - 200  4 Unit(s) if Glucose 201 - 250  6 Unit(s) if Glucose 251 - 300  8 Unit(s) if Glucose 301 - 350  10 Unit(s) if Glucose 351 - 400  12 Unit(s) if Glucose GREATER THAN 400 (17 Jul 2019 13:35)  levothyroxine 100 mcg (0.1 mg) oral tablet: 1 tab(s) orally once a day (17 Jul 2019 13:35)  magnesium oxide 400 mg (241.3 mg elemental magnesium) oral tablet: 1 tab(s) orally every other day (17 Jul 2019 13:35)  metoprolol tartrate 50 mg oral tablet: 1 tab(s) orally 2 times a day (17 Jul 2019 13:35)  pantoprazole 40 mg oral delayed release tablet: 1 tab(s) orally 2 times a day (17 Jul 2019 13:35)  pravastatin 80 mg oral tablet: 1 tab(s) orally once a day (17 Jul 2019 13:35)  Tresiba 100 units/mL subcutaneous solution: 10 unit(s) subcutaneous once a day (17 Jul 2019 13:35)  valsartan 80 mg oral tablet: 1 tab(s) orally once a day (17 Jul 2019 13:35)      ================================================    HOSPITAL MEDS.    MEDICATIONS  (STANDING):  amitriptyline 10 milliGRAM(s) Oral at bedtime  amLODIPine   Tablet 10 milliGRAM(s) Oral daily  ascorbic acid 500 milliGRAM(s) Oral daily  atorvastatin 20 milliGRAM(s) Oral at bedtime  chlorhexidine 2% Cloths 1 Application(s) Topical daily  cholecalciferol 2000 Unit(s) Oral daily  dextrose 5%. 1000 milliLiter(s) (50 mL/Hr) IV Continuous <Continuous>  dextrose 50% Injectable 12.5 Gram(s) IV Push once  dextrose 50% Injectable 25 Gram(s) IV Push once  dextrose 50% Injectable 25 Gram(s) IV Push once  ferrous    sulfate 325 milliGRAM(s) Oral daily  folic acid 1 milliGRAM(s) Oral daily  furosemide    Tablet 40 milliGRAM(s) Oral two times a day  insulin glargine Injectable (LANTUS) 10 Unit(s) SubCutaneous at bedtime  insulin lispro (HumaLOG) corrective regimen sliding scale   SubCutaneous Before meals and at bedtime  iron sucrose IVPB 200 milliGRAM(s) IV Intermittent every 24 hours  levothyroxine 100 MICROGram(s) Oral daily  linezolid    Tablet 600 milliGRAM(s) Oral every 12 hours  metoprolol tartrate 25 milliGRAM(s) Oral two times a day  montelukast 10 milliGRAM(s) Oral daily  nystatin Powder 1 Application(s) Topical two times a day  pantoprazole    Tablet 40 milliGRAM(s) Oral two times a day    MEDICATIONS  (PRN):  dextrose 40% Gel 15 Gram(s) Oral once PRN Blood Glucose LESS THAN 70 milliGRAM(s)/deciLiter  glucagon  Injectable 1 milliGRAM(s) IntraMuscular once PRN Glucose <70 milliGRAM(s)/deciLiter  hemorrhoidal Ointment 1 Application(s) Rectal four times a day PRN hemorrhoidal irritation/bleeding

## 2019-07-22 ENCOUNTER — TRANSCRIPTION ENCOUNTER (OUTPATIENT)
Age: 74
End: 2019-07-22

## 2019-07-22 VITALS — SYSTOLIC BLOOD PRESSURE: 143 MMHG | RESPIRATION RATE: 18 BRPM | HEART RATE: 89 BPM | DIASTOLIC BLOOD PRESSURE: 93 MMHG

## 2019-07-22 LAB
ANION GAP SERPL CALC-SCNC: 9 MMOL/L — SIGNIFICANT CHANGE UP (ref 5–17)
BUN SERPL-MCNC: 29 MG/DL — HIGH (ref 8–20)
CALCIUM SERPL-MCNC: 9.1 MG/DL — SIGNIFICANT CHANGE UP (ref 8.6–10.2)
CHLORIDE SERPL-SCNC: 105 MMOL/L — SIGNIFICANT CHANGE UP (ref 98–107)
CO2 SERPL-SCNC: 27 MMOL/L — SIGNIFICANT CHANGE UP (ref 22–29)
CREAT SERPL-MCNC: 2.09 MG/DL — HIGH (ref 0.5–1.3)
CULTURE RESULTS: SIGNIFICANT CHANGE UP
CULTURE RESULTS: SIGNIFICANT CHANGE UP
GLUCOSE BLDC GLUCOMTR-MCNC: 122 MG/DL — HIGH (ref 70–99)
GLUCOSE BLDC GLUCOMTR-MCNC: 237 MG/DL — HIGH (ref 70–99)
GLUCOSE BLDC GLUCOMTR-MCNC: 93 MG/DL — SIGNIFICANT CHANGE UP (ref 70–99)
GLUCOSE SERPL-MCNC: 87 MG/DL — SIGNIFICANT CHANGE UP (ref 70–115)
HCT VFR BLD CALC: 26 % — LOW (ref 34.5–45)
HGB BLD-MCNC: 8 G/DL — LOW (ref 11.5–15.5)
MAGNESIUM SERPL-MCNC: 1.8 MG/DL — SIGNIFICANT CHANGE UP (ref 1.6–2.6)
MCHC RBC-ENTMCNC: 29.6 PG — SIGNIFICANT CHANGE UP (ref 27–34)
MCHC RBC-ENTMCNC: 30.8 GM/DL — LOW (ref 32–36)
MCV RBC AUTO: 96.3 FL — SIGNIFICANT CHANGE UP (ref 80–100)
OB PNL STL: POSITIVE
PHOSPHATE SERPL-MCNC: 3 MG/DL — SIGNIFICANT CHANGE UP (ref 2.4–4.7)
PLATELET # BLD AUTO: 138 K/UL — LOW (ref 150–400)
POTASSIUM SERPL-MCNC: 3.9 MMOL/L — SIGNIFICANT CHANGE UP (ref 3.5–5.3)
POTASSIUM SERPL-SCNC: 3.9 MMOL/L — SIGNIFICANT CHANGE UP (ref 3.5–5.3)
RBC # BLD: 2.7 M/UL — LOW (ref 3.8–5.2)
RBC # FLD: 17.3 % — HIGH (ref 10.3–14.5)
SODIUM SERPL-SCNC: 141 MMOL/L — SIGNIFICANT CHANGE UP (ref 135–145)
SPECIMEN SOURCE: SIGNIFICANT CHANGE UP
SPECIMEN SOURCE: SIGNIFICANT CHANGE UP
WBC # BLD: 4.03 K/UL — SIGNIFICANT CHANGE UP (ref 3.8–10.5)
WBC # FLD AUTO: 4.03 K/UL — SIGNIFICANT CHANGE UP (ref 3.8–10.5)

## 2019-07-22 PROCEDURE — 80048 BASIC METABOLIC PNL TOTAL CA: CPT

## 2019-07-22 PROCEDURE — 87040 BLOOD CULTURE FOR BACTERIA: CPT

## 2019-07-22 PROCEDURE — 83735 ASSAY OF MAGNESIUM: CPT

## 2019-07-22 PROCEDURE — 85610 PROTHROMBIN TIME: CPT

## 2019-07-22 PROCEDURE — 99239 HOSP IP/OBS DSCHRG MGMT >30: CPT

## 2019-07-22 PROCEDURE — 76775 US EXAM ABDO BACK WALL LIM: CPT

## 2019-07-22 PROCEDURE — 82962 GLUCOSE BLOOD TEST: CPT

## 2019-07-22 PROCEDURE — 82803 BLOOD GASES ANY COMBINATION: CPT

## 2019-07-22 PROCEDURE — 87086 URINE CULTURE/COLONY COUNT: CPT

## 2019-07-22 PROCEDURE — 83550 IRON BINDING TEST: CPT

## 2019-07-22 PROCEDURE — 80053 COMPREHEN METABOLIC PANEL: CPT

## 2019-07-22 PROCEDURE — 82272 OCCULT BLD FECES 1-3 TESTS: CPT

## 2019-07-22 PROCEDURE — 97163 PT EVAL HIGH COMPLEX 45 MIN: CPT

## 2019-07-22 PROCEDURE — 84145 PROCALCITONIN (PCT): CPT

## 2019-07-22 PROCEDURE — 83605 ASSAY OF LACTIC ACID: CPT

## 2019-07-22 PROCEDURE — 84443 ASSAY THYROID STIM HORMONE: CPT

## 2019-07-22 PROCEDURE — 85018 HEMOGLOBIN: CPT

## 2019-07-22 PROCEDURE — 86900 BLOOD TYPING SEROLOGIC ABO: CPT

## 2019-07-22 PROCEDURE — 84295 ASSAY OF SERUM SODIUM: CPT

## 2019-07-22 PROCEDURE — 84100 ASSAY OF PHOSPHORUS: CPT

## 2019-07-22 PROCEDURE — 36415 COLL VENOUS BLD VENIPUNCTURE: CPT

## 2019-07-22 PROCEDURE — 87521 HEPATITIS C PROBE&RVRS TRNSC: CPT

## 2019-07-22 PROCEDURE — 84132 ASSAY OF SERUM POTASSIUM: CPT

## 2019-07-22 PROCEDURE — 86706 HEP B SURFACE ANTIBODY: CPT

## 2019-07-22 PROCEDURE — 85014 HEMATOCRIT: CPT

## 2019-07-22 PROCEDURE — 83540 ASSAY OF IRON: CPT

## 2019-07-22 PROCEDURE — 99291 CRITICAL CARE FIRST HOUR: CPT | Mod: 25

## 2019-07-22 PROCEDURE — 84466 ASSAY OF TRANSFERRIN: CPT

## 2019-07-22 PROCEDURE — 93005 ELECTROCARDIOGRAM TRACING: CPT

## 2019-07-22 PROCEDURE — 82728 ASSAY OF FERRITIN: CPT

## 2019-07-22 PROCEDURE — 82947 ASSAY GLUCOSE BLOOD QUANT: CPT

## 2019-07-22 PROCEDURE — 87340 HEPATITIS B SURFACE AG IA: CPT

## 2019-07-22 PROCEDURE — 71045 X-RAY EXAM CHEST 1 VIEW: CPT

## 2019-07-22 PROCEDURE — 85027 COMPLETE CBC AUTOMATED: CPT

## 2019-07-22 PROCEDURE — 82435 ASSAY OF BLOOD CHLORIDE: CPT

## 2019-07-22 PROCEDURE — 86803 HEPATITIS C AB TEST: CPT

## 2019-07-22 PROCEDURE — 85730 THROMBOPLASTIN TIME PARTIAL: CPT

## 2019-07-22 PROCEDURE — 81001 URINALYSIS AUTO W/SCOPE: CPT

## 2019-07-22 PROCEDURE — 83036 HEMOGLOBIN GLYCOSYLATED A1C: CPT

## 2019-07-22 PROCEDURE — 80202 ASSAY OF VANCOMYCIN: CPT

## 2019-07-22 PROCEDURE — 86850 RBC ANTIBODY SCREEN: CPT

## 2019-07-22 PROCEDURE — 82330 ASSAY OF CALCIUM: CPT

## 2019-07-22 PROCEDURE — 86704 HEP B CORE ANTIBODY TOTAL: CPT

## 2019-07-22 PROCEDURE — 83880 ASSAY OF NATRIURETIC PEPTIDE: CPT

## 2019-07-22 PROCEDURE — 86901 BLOOD TYPING SEROLOGIC RH(D): CPT

## 2019-07-22 PROCEDURE — 96365 THER/PROPH/DIAG IV INF INIT: CPT

## 2019-07-22 PROCEDURE — 84484 ASSAY OF TROPONIN QUANT: CPT

## 2019-07-22 PROCEDURE — C8929: CPT

## 2019-07-22 PROCEDURE — 96375 TX/PRO/DX INJ NEW DRUG ADDON: CPT

## 2019-07-22 RX ORDER — LOPERAMIDE HCL 2 MG
1 TABLET ORAL
Qty: 60 | Refills: 0
Start: 2019-07-22 | End: 2019-08-05

## 2019-07-22 RX ORDER — AMLODIPINE BESYLATE 2.5 MG/1
1 TABLET ORAL
Qty: 0 | Refills: 0 | DISCHARGE
Start: 2019-07-22

## 2019-07-22 RX ORDER — AMITRIPTYLINE HCL 25 MG
1 TABLET ORAL
Qty: 0 | Refills: 0 | DISCHARGE
Start: 2019-07-22

## 2019-07-22 RX ORDER — MONTELUKAST 4 MG/1
1 TABLET, CHEWABLE ORAL
Qty: 0 | Refills: 0 | DISCHARGE
Start: 2019-07-22

## 2019-07-22 RX ORDER — AMITRIPTYLINE HCL 25 MG
1 TABLET ORAL
Qty: 0 | Refills: 0 | DISCHARGE

## 2019-07-22 RX ORDER — PANTOPRAZOLE SODIUM 20 MG/1
1 TABLET, DELAYED RELEASE ORAL
Qty: 0 | Refills: 0 | DISCHARGE
Start: 2019-07-22

## 2019-07-22 RX ORDER — CHOLECALCIFEROL (VITAMIN D3) 125 MCG
1 CAPSULE ORAL
Qty: 0 | Refills: 0 | DISCHARGE

## 2019-07-22 RX ORDER — LEVOTHYROXINE SODIUM 125 MCG
1 TABLET ORAL
Qty: 0 | Refills: 0 | DISCHARGE
Start: 2019-07-22

## 2019-07-22 RX ORDER — LINEZOLID 600 MG/300ML
1 INJECTION, SOLUTION INTRAVENOUS
Qty: 20 | Refills: 0
Start: 2019-07-22 | End: 2019-07-31

## 2019-07-22 RX ORDER — CHOLECALCIFEROL (VITAMIN D3) 125 MCG
2000 CAPSULE ORAL
Qty: 0 | Refills: 0 | DISCHARGE
Start: 2019-07-22

## 2019-07-22 RX ORDER — METOPROLOL TARTRATE 50 MG
1 TABLET ORAL
Qty: 60 | Refills: 0
Start: 2019-07-22 | End: 2019-08-20

## 2019-07-22 RX ORDER — FERROUS SULFATE 325(65) MG
1 TABLET ORAL
Qty: 30 | Refills: 0
Start: 2019-07-22 | End: 2019-08-20

## 2019-07-22 RX ORDER — FOLIC ACID 0.8 MG
1 TABLET ORAL
Qty: 0 | Refills: 0 | DISCHARGE
Start: 2019-07-22

## 2019-07-22 RX ORDER — PHENYLEPHRINE-SHARK LIVER OIL-MINERAL OIL-PETROLATUM RECTAL OINTMENT
1 OINTMENT (GRAM) RECTAL
Qty: 0 | Refills: 0 | DISCHARGE
Start: 2019-07-22

## 2019-07-22 RX ORDER — ASCORBIC ACID 60 MG
1 TABLET,CHEWABLE ORAL
Qty: 0 | Refills: 0 | DISCHARGE
Start: 2019-07-22

## 2019-07-22 RX ORDER — FUROSEMIDE 40 MG
1 TABLET ORAL
Qty: 0 | Refills: 0 | DISCHARGE

## 2019-07-22 RX ORDER — PANTOPRAZOLE SODIUM 20 MG/1
1 TABLET, DELAYED RELEASE ORAL
Qty: 0 | Refills: 0 | DISCHARGE

## 2019-07-22 RX ORDER — VALSARTAN 80 MG/1
1 TABLET ORAL
Qty: 0 | Refills: 0 | DISCHARGE

## 2019-07-22 RX ORDER — FUROSEMIDE 40 MG
1 TABLET ORAL
Qty: 0 | Refills: 0 | DISCHARGE
Start: 2019-07-22

## 2019-07-22 RX ADMIN — Medication 25 MILLIGRAM(S): at 06:31

## 2019-07-22 RX ADMIN — Medication 25 MILLIGRAM(S): at 17:34

## 2019-07-22 RX ADMIN — Medication 4: at 13:22

## 2019-07-22 RX ADMIN — CHLORHEXIDINE GLUCONATE 1 APPLICATION(S): 213 SOLUTION TOPICAL at 12:17

## 2019-07-22 RX ADMIN — Medication 40 MILLIGRAM(S): at 06:32

## 2019-07-22 RX ADMIN — Medication 100 MICROGRAM(S): at 06:31

## 2019-07-22 RX ADMIN — PANTOPRAZOLE SODIUM 40 MILLIGRAM(S): 20 TABLET, DELAYED RELEASE ORAL at 06:36

## 2019-07-22 RX ADMIN — PANTOPRAZOLE SODIUM 40 MILLIGRAM(S): 20 TABLET, DELAYED RELEASE ORAL at 17:34

## 2019-07-22 RX ADMIN — MONTELUKAST 10 MILLIGRAM(S): 4 TABLET, CHEWABLE ORAL at 12:17

## 2019-07-22 RX ADMIN — LINEZOLID 600 MILLIGRAM(S): 600 INJECTION, SOLUTION INTRAVENOUS at 17:34

## 2019-07-22 RX ADMIN — Medication 40 MILLIGRAM(S): at 17:34

## 2019-07-22 RX ADMIN — IRON SUCROSE 110 MILLIGRAM(S): 20 INJECTION, SOLUTION INTRAVENOUS at 14:34

## 2019-07-22 RX ADMIN — LINEZOLID 600 MILLIGRAM(S): 600 INJECTION, SOLUTION INTRAVENOUS at 06:31

## 2019-07-22 RX ADMIN — AMLODIPINE BESYLATE 10 MILLIGRAM(S): 2.5 TABLET ORAL at 06:31

## 2019-07-22 RX ADMIN — NYSTATIN CREAM 1 APPLICATION(S): 100000 CREAM TOPICAL at 17:34

## 2019-07-22 RX ADMIN — Medication 500 MILLIGRAM(S): at 12:17

## 2019-07-22 RX ADMIN — NYSTATIN CREAM 1 APPLICATION(S): 100000 CREAM TOPICAL at 06:31

## 2019-07-22 RX ADMIN — Medication 325 MILLIGRAM(S): at 12:17

## 2019-07-22 RX ADMIN — Medication 2000 UNIT(S): at 12:17

## 2019-07-22 RX ADMIN — Medication 1 MILLIGRAM(S): at 12:17

## 2019-07-22 NOTE — PHYSICAL THERAPY INITIAL EVALUATION ADULT - ADDITIONAL COMMENTS
Pt lives in a private home with her daughters. 2 steps to enter with bilateral handrails (not within reach), No steps inside. Pt was independent PTA without assist device. Pt owns RW.

## 2019-07-22 NOTE — PROGRESS NOTE ADULT - ASSESSMENT
A/p: 73 yo female with Hx chronic diastolic HFpEf, CAD s/p CABG, AS s/p bovine AVR (both at [place]), CKD3 (baseline Cr 1.8; had 1 episode of emergent HD for ARF with hyperkalemia few yrs ago), T2 IDDM, bronchiectasis, Hepatitis C type II c/b cirrhosis, AOCD/ iron deficiency anemia of uncertain etiology (developed following cardiac surgery, has undergone 3 BM biopsies with no diagnosis; has required multiple transfusions in the past 3 years), who presented following evaluation at her PMD for dizziness and was found to be bradycardic with HR 30's. Patient states she had a port placed for vascular access 2 weeks ago for recurrent need of PRBC transfusions, at which time she was having chills but was told her white blood cell count was not indicative of infection. About 1 week ago, she developed redness over the site of her port and was started on Bactrim. Around that time, she also developed increasing shortness of breath, lower extremity edema, and decreased urination; her Cr was noted to be increased from 1.8 to 2.0. She was seen by her PMD two days ago for worsening shortness of breath, found to have 6lb weight gain over the past 1 week, and was told to increase her Lasix from 40mg BID to 80mg in AM and 40mg PM. Her symptoms did not improve with increased diuresis. While she was in the physician office she had a brief syncopal episode and was found to have HR 20's with hypoxia and was sent to ED for evaluation of symptomatic bradycardia. Upon arrival to ED, patient was found to have HR 30's with high grade AV block with ventricular escape beats and episodes of AFib. She was evaluated by Cardiology and started on Dopamine 2mcg/kg/min. ICU was consulted. Patient's medication list was reviewed, notable for Bactrim, Metoprolol, and an ARB, all of which she took the morning of arrival. Glucagon 5mg IVPB was given to reverse beta blockade with improvement in HR to sinus bradycardia in 40's. Patient developed hypotension with pre-syncopal feelings of worsening dizziness, blurred vision, and depth perception issues. Dopamine was increased to 10mcg/kg/min with improvement in BP. Upon completion of Glucagon infusion, HR improved to NSR 65 bpm. Patient stated she had a similar admission in the past, at which time she was found to be in acute renal failure with hyperkalemia requiring 1 session of emergent hemodialysis. A VBG was sent, which revealed a K 7.1. Patient was given calcium gluconate, insulin/D50, and sodium bicarbonate. Labs returned confirming hyperkalemia 7.3 with mild hemolysis and SCr 3.41. Nephrology consult was called for emergent hemodialysis. An indwelling mg was placed with 25cc UOP. Eventually pt able to come off of dopamine support. After one session of HD fluid status stabilized as did hyperkalemia with metabolic acidosis and potassium down trended to wnl. Given improvement in creatine; pt now producing good amount of urine, placed back on lasix bid therapy and femoral quintron removed today. Also noted with acute on chronic anemia, pt follows closely with heme/ onc fluctuating hgb 7-8 range, optimized on IV iron/ epogen    Hemorrhoids. stable  - still w/ intermittent ep of hematochezia, sec to irritation for excess BM from abx  - no s/s of infectious etiology behind diarrhea, no wbc elevation, afebrile, no abd pain/cramping/n/v  - supportive care, sitz baths + hemorrhoid cream  - loperamide prn  - monitor for worsening bleeding  - outpt fu    Microscopic Hematuria. stable  - asymptomatic, noted by pt, confirmed by nursing however on rectal exam yesterday pt noted to have hemorrhoidal irritation, more likely source of blood rather than vaginal  - last UA this admit w/ microscopic hematuria, confirmed still present on repeat, no acute uti  - urology eval outpt    Syncope 2/2 high degree block and ventricular escape. stable  - s/p shock and off of pressor support (dopamine)    - likely related to hyperkalemia and worsening kidney failure, both resolved  -  metoprolol low dose  - cardio f/u noted and appreciated, outpt fu w/ sb    Acute fluid overload in the setting of acute on chronic renal failure requiring emergent HD as well as acute on chronic diastolic HF exacerbation. resolved   - s/p emergent HD on admission    - renal function remains stable  - c/w lasix  - daily weight   -fluid restriction   - cardio f/u noted and appreciated  - nephro f/u noted and appreciated    Acute Respiratory failure w/ hypoxia. stable  - initially thought to be sec to volume overload, persistent despite resolution of exac  - pt w/ underlying asthma, o2 sat during ambulation 87%, improves to 98% during ambulation w/ 2LNC, likely had chronic underlying undiagnosed hypoxia for some time  - given chronic and progressive nature of comorbid conditions which are currently optimized + w/o exacerbation, patient will need lifelong supplemental o2 therapy, set up prior to dc  - low prob PE on well criteria, echo w/ borderline pulm htn  -outpt fu w/ pulm for repeat pft's, may benefit also from w/u for poss pulm htn as outpt    Acute on chronic renal failure. resolved  - ckd 4   - prior hx of transient HD in the past  - metabolic acidosis with hyperkalemia resolved  - s/p HD with improvement in fluid status and lytes, access removed  - avoid nephrotoxic medications  - renal US with medical renal dz, no evidence of hydronephrosis  - will c/w monitoring renal fxn  - nephro f/u noted and appreciated; close outpt fu    Acute on chronic anemia of chronic disease/ iron deficiency anemia. stable  - asymptomatic  - pt is chronically low has required numerous transfusions in the past, approx 26. Had port inserted for this purpose.  - IV iron with transition to oral supplement w/ vit c to cont on dc  - epogen per nephro   - will transfuse as needed; if symptomatic or hgb <7   - heme/ onc f/u noted and appreciated , outpt fu    R upper chest wound infection. stable   - B cx negative; less likely port infection   - change vanco to zyvox started 7/20, 14d course per ID, last day 8/1/19  - copay discussed w/ pt, she is willing to pay  - ID f/u noted and appreciated     DM2 on long term insulin complicated by asymptomatic hyperglycemia  - FS Stable  - c/w accuchecks ACHS TID   - c/w HSS   - c/w lantus 10 u sq qhs  - c/w hypoglycemia protocol     HTN. controlled, asymptomatic  - serial bp  - titrate back to home dose bb as needed/tolerated    DVT ppx  - will hold off on any chemical AC given ongoing anemia + hemorrhoidal bleed  - scd boots b/l    Activity level: Ambulates with cane at baseline     Advanced directive: Full code  Next of kin : three daughters     Dispo: Deconditioned and will need PT evaluation, pending. dc after o2 tank delivered if plan for home, otherwise LIVIA if rec by PT, pt amenable to either dispo plan    outpatient fu: pmd, nephro, cardio, pulm, id

## 2019-07-22 NOTE — DISCHARGE NOTE PROVIDER - PROVIDER TOKENS
PROVIDER:[TOKEN:[[id number]]],PROVIDER:[TOKEN:[[id number]]],FREE:[LAST:[PMD],PHONE:[(   )    -],FAX:[(   )    -]],FREE:[LAST:[nephrology],PHONE:[(   )    -],FAX:[(   )    -]] PROVIDER:[TOKEN:[[id number]]],PROVIDER:[TOKEN:[[id number]]],FREE:[LAST:[PMD],PHONE:[(   )    -],FAX:[(   )    -],FOLLOWUP:[1 week]],FREE:[LAST:[nephrology],PHONE:[(   )    -],FAX:[(   )    -]],PROVIDER:[TOKEN:[[id number]],FOLLOWUP:[2 weeks]],PROVIDER:[TOKEN:[[id number]],FOLLOWUP:[Routine]]

## 2019-07-22 NOTE — DISCHARGE NOTE NURSING/CASE MANAGEMENT/SOCIAL WORK - NSDCDPATPORTLINK_GEN_ALL_CORE
You can access the [facility], offered by [facility], by registering with the following website: [email or web address]

## 2019-07-22 NOTE — DISCHARGE NOTE PROVIDER - NSDCFUADDAPPT_GEN_ALL_CORE_FT
Pt uses [facility] in [place] [phone], The pt has no difficulty obtaining her prescriptions/medications.    Follow up with primary doctor, cardiology, nephrology, and hematology. Pt uses [facility] in [place] [phone], The pt has no difficulty obtaining her prescriptions/medications.    Follow up with primary doctor, cardiology, nephrology, and hematology.    -Follow up with Primary Care Doctor within 1 week. Be sure to bring a copy of your entire discharge documentation with you to your visit so that they can review what occurred during your hospitalization and make a copy for their records.

## 2019-07-22 NOTE — DISCHARGE NOTE PROVIDER - HOSPITAL COURSE
75 yo female with Hx chronic diastolic HFpEf, CAD s/p CABG, AS s/p bovine AVR (both at [place]), CKD3 (baseline Cr 1.8; had 1 episode of emergent HD for ARF with hyperkalemia few yrs ago), T2 IDDM, bronchiectasis, Hepatitis C type II c/b cirrhosis, AOCD/ iron deficiency anemia of uncertain etiology (developed following cardiac surgery, has undergone 3 BM biopsies with no diagnosis; has required multiple transfusions in the past 3 years), who presented following evaluation at her PMD for dizziness and was found to be bradycardic with HR 30's. Patient stated she had a port placed for vascular access 2 weeks ago for recurrent need of PRBC transfusions, at which time she was having chills but was told her white blood cell count was not indicative of infection. About 1 week ago, she developed redness over the site of her port and was started on Bactrim. Around that time, she also developed increasing shortness of breath, lower extremity edema, and decreased urination; her Cr was noted to be increased from 1.8 to 2.0. She was seen by her PMD for worsening shortness of breath, found to have 6lb weight gain over the past 1 week, and was told to increase her Lasix from 40mg BID to 80mg in AM and 40mg PM. Her symptoms did not improve with increased diuresis. While she was in the physician office she had a brief syncopal episode and was found to have HR 20's with hypoxia and was sent to ED for evaluation of symptomatic bradycardia. Upon arrival to ED, patient was found to have HR 30's with high grade AV block with ventricular escape beats and episodes of AFib. She was evaluated by Cardiology and started on Dopamine 2mcg/kg/min. ICU was consulted. Patient's medication list was reviewed, notable for Bactrim, Metoprolol, and an ARB, all of which she took the morning of arrival. Glucagon 5mg IVPB was given to reverse beta blockade with improvement in HR to sinus bradycardia in 40's. Patient developed hypotension with pre-syncopal feelings of worsening dizziness, blurred vision, and depth perception issues. Dopamine was increased to 10mcg/kg/min with improvement in BP. Upon completion of Glucagon infusion, HR improved to NSR 65 bpm. Patient stated she had a similar admission in the past, at which time she was found to be in acute renal failure with hyperkalemia requiring 1 session of emergent hemodialysis. A VBG was sent, which revealed a K 7.1. Patient was given calcium gluconate, insulin/D50, and sodium bicarbonate. Labs returned confirming hyperkalemia 7.3 with mild hemolysis and SCr 3.41. Nephrology consult was called for emergent hemodialysis. An indwelling mg was placed with 25cc UOP. Eventually pt able to come off of dopamine support. After one session of HD fluid status stabilized as did hyperkalemia with metabolic acidosis and potassium down trended to wnl. Given improvement in creatinine; pt now producing good amount of urine, placed back on lasix bid therapy and femoral Timmy removed. Also noted with acute on chronic anemia, pt follows closely with heme/ onc fluctuating hgb 7-8 range, optimized on IV iron/ epogen. Patient found to have hemorrhoids improving slowly with hemorrhoidal cream and sitz baths. Patient w/ underlying asthma, o2 sat during ambulation 87%, improves to 98% during ambulation w/ 2LNC, likely had chronic underlying undiagnosed hypoxia for some time    Given chronic and progressive nature of comorbid conditions which are currently optimized + w/o exacerbation, patient will need lifelong supplemental o2 therapy, set up prior to dc    Patient will need outpt fu w/ pulm for repeat pft's, may benefit also from w/u for poss pulm htn as outpt.  Patient was also found to have right upper chest wound infection, with negative blood cultures, treated with IV antibiotics and changed to PO Zyvox to complete 14day course 8/1/19.  PT evaluated patient and recommended home PT.  Patient stable for discharge to home with home PT and home O2.

## 2019-07-22 NOTE — DISCHARGE NOTE NURSING/CASE MANAGEMENT/SOCIAL WORK - NSDCPEPT PROEDHF_GEN_ALL_CORE
Call primary care provider for follow up after discharge/Report signs and symptoms to primary care provider/Activities as tolerated/Low salt diet/Monitor weight daily

## 2019-07-22 NOTE — PROGRESS NOTE ADULT - REASON FOR ADMISSION
Unstable Bradycardia, Shock, Acute Renal Failure
Unstable Bradycardia, Shock, Acute Renal Failure
Unstable Bradycardia, Hypotension,
Unstable Bradycardia, Shock, Acute Renal Failure

## 2019-07-22 NOTE — PROGRESS NOTE ADULT - ASSESSMENT
MARGUERITE on CKD cr 2.0 today has been trending down     Hyperkalemia resolved had HD x1 no longer needs HD    Patient follows [facility] in [place]     Hyperkalemia  related to combination of ARB and bactrim and MA  ---> Both held   Renal sonogram noted medical renal disease no hydronephrosis  Cont Lasix 40 mg po bid     Cardio follow up noted     Anemia - Epogen  7-17   Iron stores low , cont Venofer     Will follow

## 2019-07-22 NOTE — DISCHARGE NOTE NURSING/CASE MANAGEMENT/SOCIAL WORK - NSDCFUADDAPPT_GEN_ALL_CORE_FT
Pt uses [facility] in [place] [phone], The pt has no difficulty obtaining her prescriptions/medications.

## 2019-07-22 NOTE — DISCHARGE NOTE PROVIDER - CARE PROVIDER_API CALL
Yarely Penny)  Internal Medicine  [address] [place] [address] [id number]  Phone: [phone]  Fax: [phone]  Follow Up Time:     Silvio Valdivia)  Hematology; Medical Oncology  [address] [place] [address]  Phone: [phone]  Fax: [phone]  Follow Up Time:     PMD,   Phone: (   )    -  Fax: (   )    -  Follow Up Time:     nephrology,   Phone: (   )    -  Fax: (   )    -  Follow Up Time: Yarely Penny)  Internal Medicine  [address] [place] [address] [id number]  Phone: [phone]  Fax: [phone]  Follow Up Time:     Silvio Valdivia)  Hematology; Medical Oncology  [address] [place] [address]  Phone: [phone]  Fax: [phone]  Follow Up Time:     PMD,   Phone: (   )    -  Fax: (   )    -  Follow Up Time: 1 week    nephrology,   Phone: (   )    -  Fax: (   )    -  Follow Up Time:     Kieran Weaver)  Urology  [address] [facility], [address]  [place] [address]  Phone: [phone]  Fax: [phone]  Follow Up Time: 2 weeks    Gustavo Howell)  Gastroenterology; Internal Medicine  [address] [facility], [address]  [place] [address]  Phone: [phone]  Fax: [phone]  Follow Up Time: Routine

## 2019-07-22 NOTE — PROGRESS NOTE ADULT - SUBJECTIVE AND OBJECTIVE BOX
NEPHROLOGY INTERVAL HPI/OVERNIGHT EVENTS:    Examined  Feeling better  denies HA CP N/V/D no SOB    MEDICATIONS  (STANDING):  amitriptyline 10 milliGRAM(s) Oral at bedtime  amLODIPine   Tablet 10 milliGRAM(s) Oral daily  ascorbic acid 500 milliGRAM(s) Oral daily  atorvastatin 20 milliGRAM(s) Oral at bedtime  chlorhexidine 2% Cloths 1 Application(s) Topical daily  cholecalciferol 2000 Unit(s) Oral daily  dextrose 5%. 1000 milliLiter(s) (50 mL/Hr) IV Continuous <Continuous>  dextrose 50% Injectable 12.5 Gram(s) IV Push once  dextrose 50% Injectable 25 Gram(s) IV Push once  dextrose 50% Injectable 25 Gram(s) IV Push once  ferrous    sulfate 325 milliGRAM(s) Oral daily  folic acid 1 milliGRAM(s) Oral daily  furosemide    Tablet 40 milliGRAM(s) Oral two times a day  insulin glargine Injectable (LANTUS) 10 Unit(s) SubCutaneous at bedtime  insulin lispro (HumaLOG) corrective regimen sliding scale   SubCutaneous Before meals and at bedtime  iron sucrose IVPB 200 milliGRAM(s) IV Intermittent every 24 hours  levothyroxine 100 MICROGram(s) Oral daily  linezolid    Tablet 600 milliGRAM(s) Oral every 12 hours  metoprolol tartrate 25 milliGRAM(s) Oral two times a day  montelukast 10 milliGRAM(s) Oral daily  nystatin Powder 1 Application(s) Topical two times a day  pantoprazole    Tablet 40 milliGRAM(s) Oral two times a day    MEDICATIONS  (PRN):  dextrose 40% Gel 15 Gram(s) Oral once PRN Blood Glucose LESS THAN 70 milliGRAM(s)/deciLiter  glucagon  Injectable 1 milliGRAM(s) IntraMuscular once PRN Glucose <70 milliGRAM(s)/deciLiter  hemorrhoidal Ointment 1 Application(s) Rectal four times a day PRN hemorrhoidal irritation/bleeding  loperamide 2 milliGRAM(s) Oral every 6 hours PRN Diarrhea      Allergies    Biaxin (Joint Pain)  morphine (Short breath)    Intolerances        Vital Signs Last 24 Hrs  T(C): 36.8 (2019 07:56), Max: 37 (2019 23:00)  T(F): 98.3 (2019 07:56), Max: 98.6 (2019 23:00)  HR: 66 (2019 07:56) (66 - 89)  BP: 151/71 (2019 07:56) (139/72 - 168/77)  BP(mean): --  RR: 18 (2019 10:37) (18 - 18)  SpO2: 93% (2019 10:37) (93% - 99%)  Daily     Daily     PHYSICAL EXAM:  GENERAL: NAD, feels better   HEAD:  Atraumatic, No edema   NECK: Supple, No JVD, + right sided mediport   CHEST/LUNG: Clear / EAE . No wheeze   HEART: Regular rate and rhythm; No murmurs, rubs, or gallops  ABDOMEN: Soft, Nontender, Nondistended; Bowel sounds present  EXTREMITIES: + trace edema     LABS:                        8.0    4.03  )-----------( 138      ( 2019 07:13 )             26.0     07-22    141  |  105  |  29.0<H>  ----------------------------<  87  3.9   |  27.0  |  2.09<H>    Ca    9.1      2019 07:13  Phos  3.0     -  Mg     1.8     22        Urinalysis Basic - ( 2019 12:34 )    Color: Yellow / Appearance: Clear / S.010 / pH: x  Gluc: x / Ketone: Negative  / Bili: Negative / Urobili: Negative mg/dL   Blood: x / Protein: 100 mg/dL / Nitrite: Negative   Leuk Esterase: Negative / RBC: 6-10 /HPF / WBC 0-2   Sq Epi: x / Non Sq Epi: Occasional / Bacteria: Occasional      Magnesium, Serum: 1.8 mg/dL ( @ 07:13)  Phosphorus Level, Serum: 3.0 mg/dL ( @ 07:13)          RADIOLOGY & ADDITIONAL TESTS:

## 2019-07-22 NOTE — PROGRESS NOTE ADULT - NSHPATTENDINGPLANDISCUSS_GEN_ALL_CORE
Patient RN CM SW nephro
Dr. Kenny
the patient.  All imaging and results of lab/other studies reviewed by me. All questions answered to their satisfaction. At this time they agree with the current plan of therapy.

## 2019-07-22 NOTE — DISCHARGE NOTE PROVIDER - NSDCFUADDINST_GEN_ALL_CORE_FT
ECHOCARDIOGRAM: Summary:   1. Left ventricular ejection fraction, by visual estimation, is 55 to   60%.   2. Spectral Doppler shows impaired relaxation pattern of left   ventricular myocardial filling (Grade I diastolic dysfunction).   3. There is mild concentric left ventricular hypertrophy.   4. Severe mitral annular calcification.   5. Mild degenerative mitral stenosis.   6. Bioprosthesis inthe aortic position - normal function.   7. Estimated pulmonary artery systolic pressure is 37.2 mmHg assuming a   right atrial pressure of 3 mmHg, which is consistent with borderline   pulmonary hypertension.   8. There is no evidence of pericardialeffusion.   9. ** Compared to TTE dated 9/15/17, no significant changes.    XRAY CHEST: IMPRESSION:   No evidence of active chest disease.  Catheters in place.    US RENAL: Chronic bilateral medical renal disease withoutevidence for postrenal   obstruction.     Urinalysis (07.21.19 @ 12:34)    Glucose Qualitative, Urine: Negative mg/dL    Blood, Urine: Moderate    pH Urine: 6.0    Color: Yellow    Urine Appearance: Clear    Bilirubin: Negative    Ketone - Urine: Negative    Specific Gravity: 1.010    Protein, Urine: 100 mg/dL    Urobilinogen: Negative mg/dL    Nitrite: Negative    Leukocyte Esterase Concentration: Negative    Thyroid Stimulating Hormone, Serum: 1.69 uIU/mL (07.17.19 @ 21:16)  Hemoglobin A1C, Whole Blood: 5.3 % (07.18.19 @ 03:42)    Iron - Total Binding Capacity.: 269 ug/dL (07.18.19 @ 03:42)  Ferritin, Serum: 128 ng/mL (07.18.19 @ 03:42)  Transferrin, Serum: 188 mg/dL (07.18.19 @ 03:42)  % Saturation, Iron: 11 % (07.18.19 @ 03:42)  Occult Blood, Feces: Positive (07.22.19 @ 02:11)  07-22    141  |  105  |  29.0<H>  ----------------------------<  87  3.9   |  27.0  |  2.09<H>    Ca    9.1      22 Jul 2019 07:13  Phos  3.0     07-22  Mg     1.8     07-22                            8.0    4.03  )-----------( 138      ( 22 Jul 2019 07:13 )             26.0

## 2019-07-22 NOTE — DISCHARGE NOTE PROVIDER - CARE PROVIDERS DIRECT ADDRESSES
,DirectAddress_Unknown,[email or web address],DirectAddress_Unknown,DirectAddress_Unknown ,DirectAddress_Unknown,[email or web address],DirectAddress_Unknown,DirectAddress_Unknown,[email or web address]

## 2019-07-22 NOTE — PROGRESS NOTE ADULT - PROVIDER SPECIALTY LIST ADULT
Cardiology
Critical Care
Electrophysiology
Heme/Onc
Hospitalist
Infectious Disease
Nephrology
Cardiology
Cardiology
Hospitalist
Critical Care

## 2019-07-22 NOTE — DISCHARGE NOTE PROVIDER - NSDCCPCAREPLAN_GEN_ALL_CORE_FT
PRINCIPAL DISCHARGE DIAGNOSIS  Diagnosis: Acute renal failure  Assessment and Plan of Treatment: Continue current medications as prescribed.  Follow up with primary doctor and nephrology.      SECONDARY DISCHARGE DIAGNOSES  Diagnosis: Bradycardia  Assessment and Plan of Treatment: Continue current medications as prescribed.  Follow up with cardiology.    Diagnosis: Hypoxia  Assessment and Plan of Treatment: O2 supplementation.  Follow up with primary doctor and cardiology.    Diagnosis: Acute on chronic diastolic heart failure  Assessment and Plan of Treatment: Continue current medications as prescribed.  Follow up with cardiology. PRINCIPAL DISCHARGE DIAGNOSIS  Diagnosis: Infection of chest  Assessment and Plan of Treatment: Continue all antibiotics as prescribed. You may benefit from taking a probiotic such a florastor which can be bought over the counter for the duration of time that you are on antibiotics to help prevent an infectious diarrhea called Clostridium difficile. If you notice you are having more than 4-5 bowel movements that are very liquidy or diarrheal, be sure to see your primary care doctor or come to the ER to have your stool tested. If your original infection seems to get worse, if you are having lots of high fevers over 100.4F, chills, extreme shakiness, very bad difficulty breathing, cannot hold what you are eating down, or develop a bad rash, come to the ER immediately.      SECONDARY DISCHARGE DIAGNOSES  Diagnosis: Acute renal failure  Assessment and Plan of Treatment: You were treated for this condition and at this time it is considered resolved. You may follow up with your doctors as an outpatient. Your kidney function is not optimal. This can be due to a variety of reasons. If you were not aware of this, you should discuss with your Primary Care Doctor about seeing a Nephrologist. If you already have a nephrologist, be sure to continue following up as scheduled.    Diagnosis: Anemia  Assessment and Plan of Treatment: follow up with your Primary Care Doctor to review your blood work regularly. You can discuss doing a work up to find the source of your low blood counts when you are seen if the office if you desire further investigation. Be sure to alert a medical professional immediately if you have symptoms of acute or worsening anemia including but not limited to the following: lightheadedness, dizziness, paleness, palpitations, shortness of breath, abnormal bleeding. you were given iron through the iv during your stay.    Diagnosis: Heart failure  Assessment and Plan of Treatment: Continue all medications as prescribed. Be sure to follow up with your Primary Care Doctor and/or Cardiologist. Follow a low salt diet. Check your weight regularly, if you find that you have suddenly gained a large amount of weight, are having trouble breathing, can no longer lay flat on your back, or that you are becoming swollen (ex. swollen legs), be sure to contact your doctor or come to the ER.    Diagnosis: Syncope  Assessment and Plan of Treatment: due to heart block with ventricular escape. You were noted to have what is called, a syncopal episode, which is an event when you temporarily lose consciousness. These events happen for a variety of reasons and you were kept in the hospital to evaluate what the cause of your event was. Thankfully, these events in themselves do not leave any long lasting effects on your body, however, they may happen again if the cause of the problem is not found and treated if need be. Many people never find out what caused their event. If you have been advised to follow up with any doctors, or specialists, please be sure to do so.    Diagnosis: Hematuria  Assessment and Plan of Treatment: You were noted to have blood in the urine. This sometimes is a benign condition, but the only way to know is to have it formerly evaluated. You should follow up with a specialist called a Urologist so that they can evaluate it further to be sure that there is no intervention that is needed.    Diagnosis: Hemorrhoids  Assessment and Plan of Treatment: Continue all medications as prescribed. be sure to follow up with a gastroenterologist. if bleeding becomes worse and does not stop, or if you have symptoms of severe anemia like pallor, lightheadedeness, dizziness, palpitations, shortness of breath, please return to the emergency room for evaluation.    Diagnosis: Hypoxia  Assessment and Plan of Treatment: O2 supplementation.  Follow up with primary doctor and pulmonology. It was determined either prior to or during this hospitalization that you require the needed of extra supplemented oxygen to help you maintain a normal oxygen level. It is important that you use your oxygen as directed, if told to use it only while exerting yourself, do that, if told to use it at all times, be sure to always keep it on even while sleeping unless you use a CPAP or BiPAP machine to sleep at night. It is important that you are compliant with oxygen as failing to use it can leave your body straining and cause many complications. Be sure to follow up with your Primary Care Doctor and if directed, also with a Pulmonologist (Lung Doctor). Do NOT leave your oxygen tank near any exposed flames and DO NOT SMOKE while using your oxygen.       Diagnosis: Acute on chronic diastolic heart failure  Assessment and Plan of Treatment: Continue current medications as prescribed.  Follow up with cardiology.    Diagnosis: Bradycardia  Assessment and Plan of Treatment: Continue current medications as prescribed.  Follow up with cardiology.

## 2019-07-22 NOTE — PROGRESS NOTE ADULT - SUBJECTIVE AND OBJECTIVE BOX
CC: hypoxia    Patient seen and examined at the bedside. No acute overnight events. hematochezia + hematuria noted yesterday. Complaining of hemachezia w/ BM today. Denies fever/chills, headache, lightheadedness, dizziness, chest pain, palpitations, shortness of breath, cough, abd pain, nausea/vomiting/diarrhea      =========================================================================================    PHYSICAL EXAM.    GEN - appears age appropriate. obese. pleasant. no distress.   HEENT - NCAT, EOMI, SAVANNAH. RT upper chest port wound, no surrounding cellulitis, no drainage  RESP - CTA BL, no wheeze/stridor/rhonchi/crackles. on supplemental O2, nc. able to speak in full sentences without distress.   CARDIO - NS1S2, RRR. No murmurs/rubs/gallops.  ABD - Soft/Non tender/Non distended. Normal BS x4 quadrants. no guarding/rebound tenderness.  Ext - No ANKIT.  MSK - BL 5/5 strength on upper and lower extremities.   Neuro - AAOx3. cn 2-12 grossly intact  Psych - normal affect  Skin - c/d/i. no rashes/lesions      VITAL SIGNS.    Vital Signs Last 24 Hrs  T(C): 36.8 (22 Jul 2019 07:56), Max: 37 (21 Jul 2019 23:00)  T(F): 98.3 (22 Jul 2019 07:56), Max: 98.6 (21 Jul 2019 23:00)  HR: 66 (22 Jul 2019 07:56) (66 - 89)  BP: 151/71 (22 Jul 2019 07:56) (139/72 - 168/77)  BP(mean): --  RR: 18 (22 Jul 2019 10:37) (18 - 18)  SpO2: 93% (22 Jul 2019 10:37) (93% - 99%)        =================================================    LABS.                          8.0    4.03  )-----------( 138      ( 22 Jul 2019 07:13 )             26.0     07-22    141  |  105  |  29.0<H>  ----------------------------<  87  3.9   |  27.0  |  2.09<H>    Ca    9.1      22 Jul 2019 07:13  Phos  3.0     07-22  Mg     1.8     07-22          I&O's Summary    21 Jul 2019 07:01  -  22 Jul 2019 07:00  --------------------------------------------------------  IN: 0 mL / OUT: 2 mL / NET: -2 mL    Occult Blood, Feces: Positive (07.22.19 @ 02:11)          ================================================    IMAGING.      ================================================    HOME MEDS.    Home Medications:  albuterol 2.5 mg/3 mL (0.083%) inhalation solution: 3 milliliter(s) inhaled every 6 hours, As Needed (17 Jul 2019 13:35)  amitriptyline 10 mg oral tablet: 1 tab(s) orally once a day (at bedtime) (17 Jul 2019 13:35)  amLODIPine 10 mg oral tablet: 1 tab(s) orally once a day (17 Jul 2019 13:35)  Aranesp Albumin Free: once weekly at MD&#x27;s office (17 Jul 2019 13:35)  Bactrim  mg-160 mg oral tablet: 1 tab(s) orally 2 times a day (17 Jul 2019 13:35)  cholecalciferol 2000 intl units oral tablet: 1 tab(s) orally once a day (17 Jul 2019 13:35)  folic acid 1 mg oral tablet: 1 tab(s) orally once a day (17 Jul 2019 13:35)  furosemide 40 mg oral tablet: 1 tab(s) orally 2 times a day (17 Jul 2019 13:35)  insulin lispro 100 units/mL subcutaneous solution:  subcutaneous 3 times a day (with meals) ; 2 Unit(s) if Glucose 151 - 200  4 Unit(s) if Glucose 201 - 250  6 Unit(s) if Glucose 251 - 300  8 Unit(s) if Glucose 301 - 350  10 Unit(s) if Glucose 351 - 400  12 Unit(s) if Glucose GREATER THAN 400 (17 Jul 2019 13:35)  levothyroxine 100 mcg (0.1 mg) oral tablet: 1 tab(s) orally once a day (17 Jul 2019 13:35)  magnesium oxide 400 mg (241.3 mg elemental magnesium) oral tablet: 1 tab(s) orally every other day (17 Jul 2019 13:35)  metoprolol tartrate 50 mg oral tablet: 1 tab(s) orally 2 times a day (17 Jul 2019 13:35)  pantoprazole 40 mg oral delayed release tablet: 1 tab(s) orally 2 times a day (17 Jul 2019 13:35)  pravastatin 80 mg oral tablet: 1 tab(s) orally once a day (17 Jul 2019 13:35)  Tresiba 100 units/mL subcutaneous solution: 10 unit(s) subcutaneous once a day (17 Jul 2019 13:35)  valsartan 80 mg oral tablet: 1 tab(s) orally once a day (17 Jul 2019 13:35)      ================================================    HOSPITAL MEDS.    MEDICATIONS  (STANDING):  amitriptyline 10 milliGRAM(s) Oral at bedtime  amLODIPine   Tablet 10 milliGRAM(s) Oral daily  ascorbic acid 500 milliGRAM(s) Oral daily  atorvastatin 20 milliGRAM(s) Oral at bedtime  chlorhexidine 2% Cloths 1 Application(s) Topical daily  cholecalciferol 2000 Unit(s) Oral daily  dextrose 5%. 1000 milliLiter(s) (50 mL/Hr) IV Continuous <Continuous>  dextrose 50% Injectable 12.5 Gram(s) IV Push once  dextrose 50% Injectable 25 Gram(s) IV Push once  dextrose 50% Injectable 25 Gram(s) IV Push once  ferrous    sulfate 325 milliGRAM(s) Oral daily  folic acid 1 milliGRAM(s) Oral daily  furosemide    Tablet 40 milliGRAM(s) Oral two times a day  insulin glargine Injectable (LANTUS) 10 Unit(s) SubCutaneous at bedtime  insulin lispro (HumaLOG) corrective regimen sliding scale   SubCutaneous Before meals and at bedtime  iron sucrose IVPB 200 milliGRAM(s) IV Intermittent every 24 hours  levothyroxine 100 MICROGram(s) Oral daily  linezolid    Tablet 600 milliGRAM(s) Oral every 12 hours  metoprolol tartrate 25 milliGRAM(s) Oral two times a day  montelukast 10 milliGRAM(s) Oral daily  nystatin Powder 1 Application(s) Topical two times a day  pantoprazole    Tablet 40 milliGRAM(s) Oral two times a day    MEDICATIONS  (PRN):  dextrose 40% Gel 15 Gram(s) Oral once PRN Blood Glucose LESS THAN 70 milliGRAM(s)/deciLiter  glucagon  Injectable 1 milliGRAM(s) IntraMuscular once PRN Glucose <70 milliGRAM(s)/deciLiter  hemorrhoidal Ointment 1 Application(s) Rectal four times a day PRN hemorrhoidal irritation/bleeding  loperamide 2 milliGRAM(s) Oral every 6 hours PRN Diarrhea

## 2019-08-06 ENCOUNTER — INPATIENT (INPATIENT)
Facility: HOSPITAL | Age: 74
LOS: 8 days | Discharge: HOME | DRG: 811 | End: 2019-08-15
Attending: INTERNAL MEDICINE | Admitting: HOSPITALIST
Payer: MEDICARE

## 2019-08-06 VITALS
TEMPERATURE: 99 F | HEIGHT: 61 IN | WEIGHT: 173.94 LBS | OXYGEN SATURATION: 98 % | DIASTOLIC BLOOD PRESSURE: 44 MMHG | RESPIRATION RATE: 20 BRPM | SYSTOLIC BLOOD PRESSURE: 125 MMHG | HEART RATE: 70 BPM

## 2019-08-06 DIAGNOSIS — Z95.1 PRESENCE OF AORTOCORONARY BYPASS GRAFT: Chronic | ICD-10-CM

## 2019-08-06 DIAGNOSIS — Z98.89 OTHER SPECIFIED POSTPROCEDURAL STATES: Chronic | ICD-10-CM

## 2019-08-06 DIAGNOSIS — Z95.2 PRESENCE OF PROSTHETIC HEART VALVE: Chronic | ICD-10-CM

## 2019-08-06 DIAGNOSIS — D64.9 ANEMIA, UNSPECIFIED: ICD-10-CM

## 2019-08-06 LAB
ALBUMIN SERPL ELPH-MCNC: 3.9 G/DL — SIGNIFICANT CHANGE UP (ref 3.3–5.2)
ALP SERPL-CCNC: 105 U/L — SIGNIFICANT CHANGE UP (ref 40–120)
ALT FLD-CCNC: 50 U/L — HIGH
ANION GAP SERPL CALC-SCNC: 11 MMOL/L — SIGNIFICANT CHANGE UP (ref 5–17)
ANISOCYTOSIS BLD QL: SLIGHT — SIGNIFICANT CHANGE UP
APTT BLD: 30.1 SEC — SIGNIFICANT CHANGE UP (ref 27.5–36.3)
AST SERPL-CCNC: 66 U/L — HIGH
BASOPHILS # BLD AUTO: 0.08 K/UL — SIGNIFICANT CHANGE UP (ref 0–0.2)
BASOPHILS NFR BLD AUTO: 0.9 % — SIGNIFICANT CHANGE UP (ref 0–2)
BILIRUB SERPL-MCNC: <0.2 MG/DL — LOW (ref 0.4–2)
BLD GP AB SCN SERPL QL: SIGNIFICANT CHANGE UP
BUN SERPL-MCNC: 70 MG/DL — HIGH (ref 8–20)
CALCIUM SERPL-MCNC: 9.1 MG/DL — SIGNIFICANT CHANGE UP (ref 8.6–10.2)
CHLORIDE SERPL-SCNC: 110 MMOL/L — HIGH (ref 98–107)
CO2 SERPL-SCNC: 19 MMOL/L — LOW (ref 22–29)
CREAT SERPL-MCNC: 2.29 MG/DL — HIGH (ref 0.5–1.3)
DACRYOCYTES BLD QL SMEAR: SIGNIFICANT CHANGE UP
ELLIPTOCYTES BLD QL SMEAR: SIGNIFICANT CHANGE UP
EOSINOPHIL # BLD AUTO: 0.24 K/UL — SIGNIFICANT CHANGE UP (ref 0–0.5)
EOSINOPHIL NFR BLD AUTO: 2.7 % — SIGNIFICANT CHANGE UP (ref 0–6)
GIANT PLATELETS BLD QL SMEAR: PRESENT — SIGNIFICANT CHANGE UP
GLUCOSE SERPL-MCNC: 88 MG/DL — SIGNIFICANT CHANGE UP (ref 70–115)
HCT VFR BLD CALC: 16.7 % — CRITICAL LOW (ref 34.5–45)
HGB BLD-MCNC: 5.3 G/DL — CRITICAL LOW (ref 11.5–15.5)
HYPOCHROMIA BLD QL: SLIGHT — SIGNIFICANT CHANGE UP
INR BLD: 1.09 RATIO — SIGNIFICANT CHANGE UP (ref 0.88–1.16)
LYMPHOCYTES # BLD AUTO: 1.04 K/UL — SIGNIFICANT CHANGE UP (ref 1–3.3)
LYMPHOCYTES # BLD AUTO: 11.8 % — LOW (ref 13–44)
MACROCYTES BLD QL: SLIGHT — SIGNIFICANT CHANGE UP
MANUAL SMEAR VERIFICATION: SIGNIFICANT CHANGE UP
MCHC RBC-ENTMCNC: 30.5 PG — SIGNIFICANT CHANGE UP (ref 27–34)
MCHC RBC-ENTMCNC: 31.7 GM/DL — LOW (ref 32–36)
MCV RBC AUTO: 96 FL — SIGNIFICANT CHANGE UP (ref 80–100)
METAMYELOCYTES # FLD: 2.7 % — HIGH (ref 0–0)
MICROCYTES BLD QL: SLIGHT — SIGNIFICANT CHANGE UP
MONOCYTES # BLD AUTO: 0.57 K/UL — SIGNIFICANT CHANGE UP (ref 0–0.9)
MONOCYTES NFR BLD AUTO: 6.4 % — SIGNIFICANT CHANGE UP (ref 2–14)
NEUTROPHILS # BLD AUTO: 6.67 K/UL — SIGNIFICANT CHANGE UP (ref 1.8–7.4)
NEUTROPHILS NFR BLD AUTO: 74.6 % — SIGNIFICANT CHANGE UP (ref 43–77)
NEUTS BAND # BLD: 0.9 % — SIGNIFICANT CHANGE UP (ref 0–8)
NRBC # BLD: 1 /100 — HIGH (ref 0–0)
NT-PROBNP SERPL-SCNC: HIGH PG/ML (ref 0–300)
OB PNL STL: POSITIVE
OVALOCYTES BLD QL SMEAR: SIGNIFICANT CHANGE UP
PLAT MORPH BLD: NORMAL — SIGNIFICANT CHANGE UP
PLATELET # BLD AUTO: 88 K/UL — LOW (ref 150–400)
POIKILOCYTOSIS BLD QL AUTO: SIGNIFICANT CHANGE UP
POLYCHROMASIA BLD QL SMEAR: SIGNIFICANT CHANGE UP
POTASSIUM SERPL-MCNC: 4 MMOL/L — SIGNIFICANT CHANGE UP (ref 3.5–5.3)
POTASSIUM SERPL-SCNC: 4 MMOL/L — SIGNIFICANT CHANGE UP (ref 3.5–5.3)
PROT SERPL-MCNC: 7 G/DL — SIGNIFICANT CHANGE UP (ref 6.6–8.7)
PROTHROM AB SERPL-ACNC: 12.6 SEC — SIGNIFICANT CHANGE UP (ref 10–12.9)
RBC # BLD: 1.74 M/UL — LOW (ref 3.8–5.2)
RBC # FLD: 17.8 % — HIGH (ref 10.3–14.5)
RBC BLD AUTO: ABNORMAL
SODIUM SERPL-SCNC: 140 MMOL/L — SIGNIFICANT CHANGE UP (ref 135–145)
TROPONIN T SERPL-MCNC: 0.08 NG/ML — HIGH (ref 0–0.06)
WBC # BLD: 8.83 K/UL — SIGNIFICANT CHANGE UP (ref 3.8–10.5)
WBC # FLD AUTO: 8.83 K/UL — SIGNIFICANT CHANGE UP (ref 3.8–10.5)

## 2019-08-06 PROCEDURE — 71045 X-RAY EXAM CHEST 1 VIEW: CPT | Mod: 26

## 2019-08-06 PROCEDURE — 99223 1ST HOSP IP/OBS HIGH 75: CPT

## 2019-08-06 PROCEDURE — 99285 EMERGENCY DEPT VISIT HI MDM: CPT

## 2019-08-06 PROCEDURE — 93010 ELECTROCARDIOGRAM REPORT: CPT

## 2019-08-06 RX ORDER — IPRATROPIUM/ALBUTEROL SULFATE 18-103MCG
3 AEROSOL WITH ADAPTER (GRAM) INHALATION ONCE
Refills: 0 | Status: COMPLETED | OUTPATIENT
Start: 2019-08-06 | End: 2019-08-06

## 2019-08-06 RX ORDER — METOPROLOL TARTRATE 50 MG
25 TABLET ORAL
Refills: 0 | Status: DISCONTINUED | OUTPATIENT
Start: 2019-08-06 | End: 2019-08-12

## 2019-08-06 RX ORDER — DEXTROSE 50 % IN WATER 50 %
15 SYRINGE (ML) INTRAVENOUS ONCE
Refills: 0 | Status: DISCONTINUED | OUTPATIENT
Start: 2019-08-06 | End: 2019-08-15

## 2019-08-06 RX ORDER — DEXTROSE 50 % IN WATER 50 %
25 SYRINGE (ML) INTRAVENOUS ONCE
Refills: 0 | Status: DISCONTINUED | OUTPATIENT
Start: 2019-08-06 | End: 2019-08-15

## 2019-08-06 RX ORDER — FUROSEMIDE 40 MG
40 TABLET ORAL ONCE
Refills: 0 | Status: COMPLETED | OUTPATIENT
Start: 2019-08-06 | End: 2019-08-06

## 2019-08-06 RX ORDER — PANTOPRAZOLE SODIUM 20 MG/1
40 TABLET, DELAYED RELEASE ORAL
Refills: 0 | Status: DISCONTINUED | OUTPATIENT
Start: 2019-08-06 | End: 2019-08-15

## 2019-08-06 RX ORDER — INSULIN LISPRO 100/ML
VIAL (ML) SUBCUTANEOUS
Refills: 0 | Status: DISCONTINUED | OUTPATIENT
Start: 2019-08-06 | End: 2019-08-15

## 2019-08-06 RX ORDER — DEXTROSE 50 % IN WATER 50 %
12.5 SYRINGE (ML) INTRAVENOUS ONCE
Refills: 0 | Status: DISCONTINUED | OUTPATIENT
Start: 2019-08-06 | End: 2019-08-15

## 2019-08-06 RX ORDER — SODIUM CHLORIDE 9 MG/ML
1000 INJECTION, SOLUTION INTRAVENOUS
Refills: 0 | Status: DISCONTINUED | OUTPATIENT
Start: 2019-08-06 | End: 2019-08-15

## 2019-08-06 RX ORDER — GLUCAGON INJECTION, SOLUTION 0.5 MG/.1ML
1 INJECTION, SOLUTION SUBCUTANEOUS ONCE
Refills: 0 | Status: DISCONTINUED | OUTPATIENT
Start: 2019-08-06 | End: 2019-08-15

## 2019-08-06 RX ORDER — AMLODIPINE BESYLATE 2.5 MG/1
10 TABLET ORAL DAILY
Refills: 0 | Status: DISCONTINUED | OUTPATIENT
Start: 2019-08-06 | End: 2019-08-15

## 2019-08-06 RX ORDER — ASCORBIC ACID 60 MG
500 TABLET,CHEWABLE ORAL DAILY
Refills: 0 | Status: DISCONTINUED | OUTPATIENT
Start: 2019-08-06 | End: 2019-08-15

## 2019-08-06 RX ORDER — TIOTROPIUM BROMIDE 18 UG/1
1 CAPSULE ORAL; RESPIRATORY (INHALATION) DAILY
Refills: 0 | Status: DISCONTINUED | OUTPATIENT
Start: 2019-08-06 | End: 2019-08-15

## 2019-08-06 RX ORDER — INSULIN GLARGINE 100 [IU]/ML
10 INJECTION, SOLUTION SUBCUTANEOUS AT BEDTIME
Refills: 0 | Status: DISCONTINUED | OUTPATIENT
Start: 2019-08-06 | End: 2019-08-10

## 2019-08-06 RX ORDER — MONTELUKAST 4 MG/1
10 TABLET, CHEWABLE ORAL DAILY
Refills: 0 | Status: DISCONTINUED | OUTPATIENT
Start: 2019-08-06 | End: 2019-08-15

## 2019-08-06 RX ORDER — IPRATROPIUM/ALBUTEROL SULFATE 18-103MCG
3 AEROSOL WITH ADAPTER (GRAM) INHALATION EVERY 6 HOURS
Refills: 0 | Status: DISCONTINUED | OUTPATIENT
Start: 2019-08-06 | End: 2019-08-08

## 2019-08-06 RX ORDER — SODIUM CHLORIDE 9 MG/ML
3 INJECTION INTRAMUSCULAR; INTRAVENOUS; SUBCUTANEOUS EVERY 8 HOURS
Refills: 0 | Status: DISCONTINUED | OUTPATIENT
Start: 2019-08-06 | End: 2019-08-15

## 2019-08-06 RX ORDER — ONDANSETRON 8 MG/1
4 TABLET, FILM COATED ORAL EVERY 6 HOURS
Refills: 0 | Status: DISCONTINUED | OUTPATIENT
Start: 2019-08-06 | End: 2019-08-15

## 2019-08-06 RX ORDER — ATORVASTATIN CALCIUM 80 MG/1
20 TABLET, FILM COATED ORAL AT BEDTIME
Refills: 0 | Status: DISCONTINUED | OUTPATIENT
Start: 2019-08-06 | End: 2019-08-15

## 2019-08-06 RX ORDER — FOLIC ACID 0.8 MG
1 TABLET ORAL DAILY
Refills: 0 | Status: DISCONTINUED | OUTPATIENT
Start: 2019-08-06 | End: 2019-08-15

## 2019-08-06 RX ORDER — ALBUTEROL 90 UG/1
1 AEROSOL, METERED ORAL EVERY 4 HOURS
Refills: 0 | Status: DISCONTINUED | OUTPATIENT
Start: 2019-08-06 | End: 2019-08-15

## 2019-08-06 RX ORDER — FUROSEMIDE 40 MG
40 TABLET ORAL
Refills: 0 | Status: DISCONTINUED | OUTPATIENT
Start: 2019-08-06 | End: 2019-08-07

## 2019-08-06 RX ORDER — LEVOTHYROXINE SODIUM 125 MCG
100 TABLET ORAL DAILY
Refills: 0 | Status: DISCONTINUED | OUTPATIENT
Start: 2019-08-06 | End: 2019-08-15

## 2019-08-06 RX ADMIN — SODIUM CHLORIDE 3 MILLILITER(S): 9 INJECTION INTRAMUSCULAR; INTRAVENOUS; SUBCUTANEOUS at 21:35

## 2019-08-06 RX ADMIN — Medication 40 MILLIGRAM(S): at 16:30

## 2019-08-06 RX ADMIN — Medication 3 MILLILITER(S): at 16:30

## 2019-08-06 RX ADMIN — ATORVASTATIN CALCIUM 20 MILLIGRAM(S): 80 TABLET, FILM COATED ORAL at 22:18

## 2019-08-06 NOTE — ED PROVIDER NOTE - NS ED ROS FT
Constitutional: no fever, no chills  Eyes: no vision changes  ENT: no nasal congestion, no sore throat  CV: no chest pain, no palpitations, +leg swelling  Resp: no cough, +shortness of breath  GI: no abdominal pain, no vomiting, no diarrhea  : no dysuria  MSK: no joint pain  Skin: no rash  Neuro: no headache, no weakness, no paresthesias

## 2019-08-06 NOTE — ED PROVIDER NOTE - ATTENDING CONTRIBUTION TO CARE
I personally saw the patient with the resident, and completed the key components of the history and physical exam. I then discussed the management plan with the resident.  74 year old female with PMH CAD, CHF, recurrent anemia presents with SOB, no chest pain  Gen: NAD, well appearing  CV: RRR  Pul: CTA b/l  Abd: Soft, non-distended, non-tender  Neuro: no focal deficits  Pt with CHF and symptomatic anemia, will admit, no chets pain and ekg with no stemi,

## 2019-08-06 NOTE — CONSULT NOTE ADULT - SUBJECTIVE AND OBJECTIVE BOX
[facility], [facility]                                   [address] [place] [address]                                                      PHONE: [phone]                                                         FAX: [phone]  [email or web address]  ---------------------------------------------------------------------------------------------------------------------------------    74y Female with past medical history of 74y F w/ hx HFpEF, CAD s/p CABGx3, AS s/p bovine AVR, DM, HLD, chronic kidney disease stage III, chronic anemia, hep C w/ cirrhosis, presenting for dyspnea. Noted to have Hb of 5.3. Trop borderline positive. Denies any chest pain.     PAST MEDICAL & SURGICAL HISTORY:  CKD (chronic kidney disease)  CAD (coronary artery disease)  Heart failure  Hepatitis C  Aortic stenosis  Anemia, unspecified anemia type  Asthma  Low back pain: chronic over a year now.  High cholesterol  Hypertension  Diabetes  H/O aortic valve replacement: cow valve.  S/P CABG x 3  History of tonsillectomy      Bactrim (Unknown)  Biaxin (Joint Pain)  morphine (Short breath)      MEDICATIONS  (STANDING):    MEDICATIONS  (PRN):      Social History:  No smoking   No alcohol  No drug abuse    Family History: denies family history of early cad      ROS: Negative other than as mentioned in HPI.    Vital Signs Last 24 Hrs  T(C): 37.2 (06 Aug 2019 15:43), Max: 37.2 (06 Aug 2019 15:43)  T(F): 99 (06 Aug 2019 15:43), Max: 99 (06 Aug 2019 15:43)  HR: 70 (06 Aug 2019 15:43) (70 - 70)  BP: 125/44 (06 Aug 2019 15:43) (125/44 - 125/44)  BP(mean): --  RR: 20 (06 Aug 2019 15:43) (20 - 20)  SpO2: 98% (06 Aug 2019 15:43) (98% - 98%)  ICU Vital Signs Last 24 Hrs  NICHELLE GILL  I&O's Detail    I&O's Summary    Drug Dosing Weight  NICHELLE GILL      PHYSICAL EXAM:  Genral: well built, resting comfortably  HEENT:  No JVD  CARDIOVASCULAR: Normal S1 and S2, No murmur, rub, lift.   LUNGS: No rales, rhonchi or wheeze. Normal breath sounds bilaterally.  ABDOMEN: Soft, nontender without mass or organomegaly. bowel sounds normoactive.  EXTREMITIES: No clubbing, cyanosis or edema  Neuro: awake alert          LABS:                        5.3    8.83  )-----------( 88       ( 06 Aug 2019 16:32 )             16.7     08-06    140  |  110<H>  |  70.0<H>  ----------------------------<  88  4.0   |  19.0<L>  |  2.29<H>    Ca    9.1      06 Aug 2019 16:32    TPro  7.0  /  Alb  3.9  /  TBili  <0.2<L>  /  DBili  x   /  AST  66<H>  /  ALT  50<H>  /  AlkPhos  105  08-06    NICHELLE GILL  CARDIAC MARKERS ( 06 Aug 2019 16:32 )  x     / 0.08 ng/mL / x     / x     / x          PT/INR - ( 06 Aug 2019 16:32 )   PT: 12.6 sec;   INR: 1.09 ratio         PTT - ( 06 Aug 2019 16:32 )  PTT:30.1 sec      Assessment and Plan:  74y Female with past medical history of 74y F w/ hx HFpEF, CAD s/p CABGx3, AS s/p bovine AVR, DM, HLD, chronic kidney disease stage III, chronic anemia, hep C w/ cirrhosis, presenting for dyspnea. Noted to have Hb of 5.3. Trop borderline positive. Denies any chest pain.   SOB  In summary, NICHELLE GILL is an 74y Female with past medical history significant for [facility]                                   [address] [place] [address]                                                      PHONE: [phone]                                                         FAX: [phone]  [email or web address]  ---------------------------------------------------------------------------------------------------------------------------------    74y Female with past medical history of 74y F w/ hx HFpEF, CAD s/p CABGx3, AS s/p bovine AVR, DM, HLD, chronic kidney disease stage III, chronic anemia, hep C w/ cirrhosis, presenting for progressive dyspnea for last few days. Noted to have Hb of 5.3. Trop borderline positive. Denies any chest pain.     PAST MEDICAL & SURGICAL HISTORY:  CKD (chronic kidney disease)  CAD (coronary artery disease)  Heart failure  Hepatitis C  Aortic stenosis  Anemia, unspecified anemia type  Asthma  Low back pain: chronic over a year now.  High cholesterol  Hypertension  Diabetes  H/O aortic valve replacement: cow valve.  S/P CABG x 3  History of tonsillectomy      Bactrim (Unknown)  Biaxin (Joint Pain)  morphine (Short breath)      MEDICATIONS  (STANDING):    MEDICATIONS  (PRN):      Social History:  No smoking   No alcohol  No drug abuse    Family History: denies family history of early cad      ROS: Negative other than as mentioned in HPI.    Vital Signs Last 24 Hrs  T(C): 37.2 (06 Aug 2019 15:43), Max: 37.2 (06 Aug 2019 15:43)  T(F): 99 (06 Aug 2019 15:43), Max: 99 (06 Aug 2019 15:43)  HR: 70 (06 Aug 2019 15:43) (70 - 70)  BP: 125/44 (06 Aug 2019 15:43) (125/44 - 125/44)  BP(mean): --  RR: 20 (06 Aug 2019 15:43) (20 - 20)  SpO2: 98% (06 Aug 2019 15:43) (98% - 98%)  ICU Vital Signs Last 24 Hrs  [facility]  I&O's Detail    I&O's Summary    Drug Dosing Weight  [facility]      PHYSICAL EXAM:  Genral: well built, resting comfortably  HEENT:  No JVD  CARDIOVASCULAR: Normal S1 and S2, No murmur, rub, lift.   LUNGS: No rales, rhonchi or wheeze. Normal breath sounds bilaterally.  ABDOMEN: Soft, nontender without mass or organomegaly. bowel sounds normoactive.  EXTREMITIES: No clubbing, cyanosis or edema  Neuro: awake alert          LABS:                        5.3    8.83  )-----------( 88       ( 06 Aug 2019 16:32 )             16.7     08-06    140  |  110<H>  |  70.0<H>  ----------------------------<  88  4.0   |  19.0<L>  |  2.29<H>    Ca    9.1      06 Aug 2019 16:32    TPro  7.0  /  Alb  3.9  /  TBili  <0.2<L>  /  DBili  x   /  AST  66<H>  /  ALT  50<H>  /  AlkPhos  105  08-06    [facility]  CARDIAC MARKERS ( 06 Aug 2019 16:32 )  x     / 0.08 ng/mL / x     / x     / x          PT/INR - ( 06 Aug 2019 16:32 )   PT: 12.6 sec;   INR: 1.09 ratio         PTT - ( 06 Aug 2019 16:32 )  PTT:30.1 sec    Summary:   1. Left ventricular ejection fraction, by visual estimation, is 55 to   60%.   2. Spectral Doppler shows impaired relaxation pattern of left   ventricular myocardial filling (Grade I diastolic dysfunction).   3. There is mild concentric left ventricular hypertrophy.   4. Severe mitral annular calcification.   5. Mild degenerative mitral stenosis.   6. Bioprosthesis in the aortic position - normal function.   7. Estimated pulmonary artery systolic pressure is 37.2 mmHg assuming a   right atrial pressure of 3 mmHg, which is consistent with borderline   pulmonary hypertension.   8. There is no evidence of pericardial effusion.   9. ** Compared to TTE dated 9/15/17, no significant changes.    Nadiya Carbajal DO Electronically signed on 7/18/2019 at 12:31:05 PM    Assessment and Plan:  74y Female with past medical history of 74y F w/ hx HFpEF, CAD s/p CABGx3, AS s/p bovine AVR, DM, HLD, chronic kidney disease stage III, chronic anemia, hep C w/ cirrhosis, presenting for progressive dyspnea for last few days. Noted to have Hb of 5.3. Trop borderline positive. Denies any chest pain.   SOB: recent echo with normal EF. Mild CHF on Cxr. Combination of severe anemia along with diastolic dysfunction likely cause. Agree with IV diuresis for next 24-48 hours, Agree with blood transfusion  Anemia: Low RBC count and low platelet count. States has had three bone marrow biopsies without any definite diagnosis. Has had multiple transfusions in the past  Positive trop: not consistent with ACS, likely demand ischemia

## 2019-08-06 NOTE — ED ADULT TRIAGE NOTE - CHIEF COMPLAINT QUOTE
pt c/o increase SOB with exertion. Pt on home O2, PMD advise to go to ED for CHF. pt speaking in full sentences without difficulty, 98% on 4LNC.

## 2019-08-06 NOTE — ED ADULT NURSE REASSESSMENT NOTE - NS ED NURSE REASSESS COMMENT FT1
received pt at this time from ongoing shift. pt a&ox3, denies any pain/discomfort. pt states still feels DUPONT. o2 4l nc in place, resp spontaneous even and unlabored, lungs clear. skin warm and dry, color appropriate for race. receiving 1u prbc, no s/s adverse rx. pt pending admission. updated on plan of care, verbalize understanding. call bell in reach

## 2019-08-06 NOTE — ED PROVIDER NOTE - PROGRESS NOTE DETAILS
CBC shows Hgb 5.3.  Pt consented for 2 units PRBC.  She has hx of chronic anemia requiring multiple transfusions in the past, despite tx with IV iron, B12, EPO. She states that she just had her CBC checked by her hematologist 6 days ago, and was told that she had Hgb 7.3 at that time, and was likely due for a transfusion soon. CBC shows Hgb 5.3.  Pt consented for 2 units PRBC.  She has hx of chronic anemia requiring multiple transfusions in the past, despite tx with IV iron, B12, EPO. She states that she just had her CBC checked by her hematologist 6 days ago, and was told that she had Hgb 7.3 at that time, and was likely due for a transfusion soon.  Her platelet count was also noted to have dropped significantly at that time; pt unsure exact level.

## 2019-08-06 NOTE — ED PROVIDER NOTE - CLINICAL SUMMARY MEDICAL DECISION MAKING FREE TEXT BOX
74y F w/ complex PMH including HFpEF, CAD, AS, CKD3, DM, chronic anemia, presenting with increased dyspnea on exertion today.  Satting 82% on RA in the ED.  Will tx w/ Lasix, DuoNeb, obtain EKG, cardiac labs, CXR.

## 2019-08-06 NOTE — ED PROVIDER NOTE - OBJECTIVE STATEMENT
74y F w/ hx HFpEF, CAD s/p CABGx3, AS s/p bovine AVR, DM, HLD, chronic kidney disease stage III, chronic anemia, hep C w/ cirrhosis, presenting for dyspnea.  Pt was recently admitted to this hospital last month for bradycardia, hyperkalemia, acute renal failure requiring HD.  She was doing well after being discharged, but today became very short of breath and lightheaded when she tried to walk a short distance from her living room to the kitchen.  Denies chest pain, fever, chills, cough, n/v, abdominal pain.  She called her PMD Dr. Pandey, who told her to go the ER.    Cardiologist: Hemalatha  Heme/Onc: Abby  Nephro: Nancy

## 2019-08-06 NOTE — ED PROVIDER NOTE - PMH
Anemia, unspecified anemia type    Aortic stenosis    Asthma    CAD (coronary artery disease)    CKD (chronic kidney disease)    Diabetes    Heart failure    Hepatitis C    High cholesterol    Hypertension    Low back pain  chronic over a year now.

## 2019-08-06 NOTE — ED PROVIDER NOTE - PHYSICAL EXAMINATION
Constitutional: Awake, alert, in no mild respiratory distress, speaking in 5-6 word sentences.  Eyes: no scleral icterus  HENT: normocephalic, atraumatic, moist oral mucosa, +JVD  CV: RRR, no murmur  Pulm: equal breath sounds, diminished at bases, no wheezing  Abdomen: soft, non-tender, non-distended  Extremities: 2+ pitting edema b/l lower extremities, no deformity  Skin: no rash, no jaundice  Neuro: AAOx3, moving all extremities equally Constitutional: Awake, alert, in no mild respiratory distress, speaking in 5-6 word sentences.  Eyes: no scleral icterus  HENT: normocephalic, atraumatic, moist oral mucosa, +JVD  CV: RRR, no murmur.  Port in R chest wall, no overlying erythema or tenderness  Pulm: equal breath sounds, diminished at bases, no wheezing  Abdomen: soft, non-tender, non-distended  Extremities: 2+ pitting edema b/l lower extremities, no deformity  Skin: no rash, no jaundice  Neuro: AAOx3, moving all extremities equally Constitutional: Awake, alert, in no mild respiratory distress, speaking in 5-6 word sentences.  Eyes: no scleral icterus  HENT: normocephalic, atraumatic, moist oral mucosa, +JVD  CV: RRR, no murmur.  Port in R chest wall, no overlying erythema or tenderness  Pulm: equal breath sounds, diminished at bases, no wheezing  Abdomen: soft, non-tender, non-distended  Rectal: +external hemorrhoids, brown stool in vault.  Rectal exam performed with chaperone, Ama Barahona RN, at bedside.  Extremities: 2+ pitting edema b/l lower extremities, no deformity  Skin: no rash, no jaundice  Neuro: AAOx3, moving all extremities equally

## 2019-08-07 DIAGNOSIS — I50.32 CHRONIC DIASTOLIC (CONGESTIVE) HEART FAILURE: ICD-10-CM

## 2019-08-07 DIAGNOSIS — K74.60 UNSPECIFIED CIRRHOSIS OF LIVER: ICD-10-CM

## 2019-08-07 DIAGNOSIS — N18.4 CHRONIC KIDNEY DISEASE, STAGE 4 (SEVERE): ICD-10-CM

## 2019-08-07 DIAGNOSIS — E03.9 HYPOTHYROIDISM, UNSPECIFIED: ICD-10-CM

## 2019-08-07 DIAGNOSIS — E11.59 TYPE 2 DIABETES MELLITUS WITH OTHER CIRCULATORY COMPLICATIONS: ICD-10-CM

## 2019-08-07 DIAGNOSIS — D64.9 ANEMIA, UNSPECIFIED: ICD-10-CM

## 2019-08-07 DIAGNOSIS — I25.10 ATHEROSCLEROTIC HEART DISEASE OF NATIVE CORONARY ARTERY WITHOUT ANGINA PECTORIS: ICD-10-CM

## 2019-08-07 LAB
ANION GAP SERPL CALC-SCNC: 12 MMOL/L — SIGNIFICANT CHANGE UP (ref 5–17)
BUN SERPL-MCNC: 71 MG/DL — HIGH (ref 8–20)
CALCIUM SERPL-MCNC: 8.6 MG/DL — SIGNIFICANT CHANGE UP (ref 8.6–10.2)
CHLORIDE SERPL-SCNC: 110 MMOL/L — HIGH (ref 98–107)
CO2 SERPL-SCNC: 19 MMOL/L — LOW (ref 22–29)
CREAT SERPL-MCNC: 2.17 MG/DL — HIGH (ref 0.5–1.3)
GLUCOSE BLDC GLUCOMTR-MCNC: 237 MG/DL — HIGH (ref 70–99)
GLUCOSE BLDC GLUCOMTR-MCNC: 255 MG/DL — HIGH (ref 70–99)
GLUCOSE BLDC GLUCOMTR-MCNC: 321 MG/DL — HIGH (ref 70–99)
GLUCOSE SERPL-MCNC: 315 MG/DL — HIGH (ref 70–115)
HCT VFR BLD CALC: 20.8 % — CRITICAL LOW (ref 34.5–45)
HCT VFR BLD CALC: 21.4 % — LOW (ref 34.5–45)
HGB BLD-MCNC: 6.7 G/DL — CRITICAL LOW (ref 11.5–15.5)
HGB BLD-MCNC: 7 G/DL — CRITICAL LOW (ref 11.5–15.5)
MAGNESIUM SERPL-MCNC: 2.2 MG/DL — SIGNIFICANT CHANGE UP (ref 1.8–2.6)
MCHC RBC-ENTMCNC: 30.6 PG — SIGNIFICANT CHANGE UP (ref 27–34)
MCHC RBC-ENTMCNC: 32.2 GM/DL — SIGNIFICANT CHANGE UP (ref 32–36)
MCV RBC AUTO: 95 FL — SIGNIFICANT CHANGE UP (ref 80–100)
PHOSPHATE SERPL-MCNC: 1.3 MG/DL — LOW (ref 2.4–4.7)
PLATELET # BLD AUTO: 79 K/UL — LOW (ref 150–400)
POTASSIUM SERPL-MCNC: 4 MMOL/L — SIGNIFICANT CHANGE UP (ref 3.5–5.3)
POTASSIUM SERPL-SCNC: 4 MMOL/L — SIGNIFICANT CHANGE UP (ref 3.5–5.3)
RBC # BLD: 2.19 M/UL — LOW (ref 3.8–5.2)
RBC # FLD: 17 % — HIGH (ref 10.3–14.5)
SODIUM SERPL-SCNC: 141 MMOL/L — SIGNIFICANT CHANGE UP (ref 135–145)
WBC # BLD: 6.43 K/UL — SIGNIFICANT CHANGE UP (ref 3.8–10.5)
WBC # FLD AUTO: 6.43 K/UL — SIGNIFICANT CHANGE UP (ref 3.8–10.5)

## 2019-08-07 PROCEDURE — 93010 ELECTROCARDIOGRAM REPORT: CPT

## 2019-08-07 PROCEDURE — 99233 SBSQ HOSP IP/OBS HIGH 50: CPT

## 2019-08-07 RX ORDER — ACETAMINOPHEN 500 MG
650 TABLET ORAL EVERY 6 HOURS
Refills: 0 | Status: DISCONTINUED | OUTPATIENT
Start: 2019-08-07 | End: 2019-08-15

## 2019-08-07 RX ORDER — ACETAMINOPHEN 500 MG
650 TABLET ORAL EVERY 6 HOURS
Refills: 0 | Status: DISCONTINUED | OUTPATIENT
Start: 2019-08-07 | End: 2019-08-07

## 2019-08-07 RX ORDER — FUROSEMIDE 40 MG
40 TABLET ORAL ONCE
Refills: 0 | Status: COMPLETED | OUTPATIENT
Start: 2019-08-07 | End: 2019-08-07

## 2019-08-07 RX ORDER — ACETAMINOPHEN 500 MG
650 TABLET ORAL ONCE
Refills: 0 | Status: COMPLETED | OUTPATIENT
Start: 2019-08-07 | End: 2019-08-07

## 2019-08-07 RX ORDER — FUROSEMIDE 40 MG
60 TABLET ORAL ONCE
Refills: 0 | Status: COMPLETED | OUTPATIENT
Start: 2019-08-07 | End: 2019-08-07

## 2019-08-07 RX ORDER — FUROSEMIDE 40 MG
40 TABLET ORAL EVERY 12 HOURS
Refills: 0 | Status: DISCONTINUED | OUTPATIENT
Start: 2019-08-07 | End: 2019-08-07

## 2019-08-07 RX ORDER — FUROSEMIDE 40 MG
40 TABLET ORAL
Refills: 0 | Status: DISCONTINUED | OUTPATIENT
Start: 2019-08-07 | End: 2019-08-08

## 2019-08-07 RX ADMIN — INSULIN GLARGINE 10 UNIT(S): 100 INJECTION, SOLUTION SUBCUTANEOUS at 21:09

## 2019-08-07 RX ADMIN — SODIUM CHLORIDE 3 MILLILITER(S): 9 INJECTION INTRAMUSCULAR; INTRAVENOUS; SUBCUTANEOUS at 20:54

## 2019-08-07 RX ADMIN — Medication 8: at 12:07

## 2019-08-07 RX ADMIN — Medication 40 MILLIGRAM(S): at 17:43

## 2019-08-07 RX ADMIN — MONTELUKAST 10 MILLIGRAM(S): 4 TABLET, CHEWABLE ORAL at 12:09

## 2019-08-07 RX ADMIN — Medication 650 MILLIGRAM(S): at 05:22

## 2019-08-07 RX ADMIN — ATORVASTATIN CALCIUM 20 MILLIGRAM(S): 80 TABLET, FILM COATED ORAL at 21:08

## 2019-08-07 RX ADMIN — Medication 60 MILLIGRAM(S): at 05:21

## 2019-08-07 RX ADMIN — Medication 650 MILLIGRAM(S): at 17:00

## 2019-08-07 RX ADMIN — Medication 3 MILLILITER(S): at 04:21

## 2019-08-07 RX ADMIN — SODIUM CHLORIDE 3 MILLILITER(S): 9 INJECTION INTRAMUSCULAR; INTRAVENOUS; SUBCUTANEOUS at 13:02

## 2019-08-07 RX ADMIN — Medication 25 MILLIGRAM(S): at 17:42

## 2019-08-07 RX ADMIN — PANTOPRAZOLE SODIUM 40 MILLIGRAM(S): 20 TABLET, DELAYED RELEASE ORAL at 17:42

## 2019-08-07 RX ADMIN — Medication 40 MILLIGRAM(S): at 12:50

## 2019-08-07 RX ADMIN — Medication 3 MILLILITER(S): at 09:23

## 2019-08-07 RX ADMIN — Medication 500 MILLIGRAM(S): at 12:09

## 2019-08-07 RX ADMIN — Medication 100 MICROGRAM(S): at 05:21

## 2019-08-07 RX ADMIN — AMLODIPINE BESYLATE 10 MILLIGRAM(S): 2.5 TABLET ORAL at 12:08

## 2019-08-07 RX ADMIN — Medication 3 MILLILITER(S): at 21:09

## 2019-08-07 RX ADMIN — Medication 25 MILLIGRAM(S): at 12:09

## 2019-08-07 RX ADMIN — PANTOPRAZOLE SODIUM 40 MILLIGRAM(S): 20 TABLET, DELAYED RELEASE ORAL at 05:21

## 2019-08-07 RX ADMIN — Medication 650 MILLIGRAM(S): at 05:52

## 2019-08-07 RX ADMIN — Medication 1 MILLIGRAM(S): at 12:08

## 2019-08-07 RX ADMIN — Medication 40 MILLIGRAM(S): at 00:05

## 2019-08-07 RX ADMIN — Medication 650 MILLIGRAM(S): at 16:08

## 2019-08-07 RX ADMIN — Medication 3 MILLILITER(S): at 14:56

## 2019-08-07 RX ADMIN — SODIUM CHLORIDE 3 MILLILITER(S): 9 INJECTION INTRAMUSCULAR; INTRAVENOUS; SUBCUTANEOUS at 05:26

## 2019-08-07 RX ADMIN — Medication 6: at 17:41

## 2019-08-07 RX ADMIN — Medication 4: at 21:09

## 2019-08-07 RX ADMIN — Medication 40 MILLIGRAM(S): at 20:22

## 2019-08-07 NOTE — H&P ADULT - PROBLEM SELECTOR PLAN 4
EKG as above, trop not c/w ACS, elevated due to CKD/severe anemia and supply demand mismatch. Cont. monitor, check repeat ekg in AM after PRBC transfusion. No c/o CP at home or in hospital. Cont. o/p regimen. Cardiology following

## 2019-08-07 NOTE — PHYSICAL THERAPY INITIAL EVALUATION ADULT - ADDITIONAL COMMENTS
Pt lives in a one story house with 2 stairs to enter. She lives with her daughters who are not able to provide 24/7 assist, and she states that her neighbors often come to help her out. She uses a can at home for ambulation and was IPTA and on O2 2L at home.

## 2019-08-07 NOTE — H&P ADULT - PROBLEM SELECTOR PLAN 6
no signs of decompensated cirrhosis. Likely cause of thrombocytopenia. Follow with GI as o/p. Likely etiology from HCV.

## 2019-08-07 NOTE — H&P ADULT - PROBLEM SELECTOR PLAN 1
Transfuse 2 units PRBCs, check post transfusion cbc with goal 7-8. Cont. FA, VitC, oral iron. Cont. Aranesp injections as o/p. Patient states stool is always occult pos from oral iron? States previous GI w/u neg for any source of bleeding. Cont. PPI, OOB, fall risk, PT program. Received lasix in ED, will cont. PO for now, and give additional IV if necessary. Currently no SOB, hypoxia or increased WOB after 2 units PRBCs. VC boots

## 2019-08-07 NOTE — PROGRESS NOTE ADULT - SUBJECTIVE AND OBJECTIVE BOX
[facility], [facility]                              [address] [address]                                                 PHONE: [phone]                                                 FAX: [phone]  -----------------------------------------------------------------------------------------------  Pt seen and examined. FU for shortness of breath     INTERVAL HPI/SUBJECTIVE: Patient with significant shortness of breath that worsened, overnight, PND, worse with exertion and lying flat, no chest pain nausea or diaphoresis.    Vital Signs Last 24 Hrs  T(C): 36.9 (07 Aug 2019 07:20), Max: 37.2 (06 Aug 2019 15:43)  T(F): 98.4 (07 Aug 2019 07:20), Max: 99 (06 Aug 2019 15:43)  HR: 85 (07 Aug 2019 07:20) (70 - 93)  BP: 151/66 (07 Aug 2019 07:20) (124/74 - 152/67)  BP(mean): 95 (07 Aug 2019 00:14) (95 - 95)  RR: 20 (07 Aug 2019 07:20) (16 - 20)  SpO2: 95% (07 Aug 2019 07:20) (92% - 98%)  I&O's Summary      PHYSICAL EXAM:  Constitutional: Laying in bed, mildly Increased work-of breathing  HEENT:     Head: Normocephalic and atraumatic  Neck: supple. JVD not elevated, carotid exam normal  Cardiovascular: regular S1 S2 no murmur,   Respiratory: Lungs clear to auscultation with exp wheeze  Gastrointestinal:  Soft, Non-tender, + BS	  Musculoskeletal: Normal range of motion. Trace edema  Skin: Warm and dry. No new rash/ulceration noted. No cyanosis . No diaphoresis.  Neurologic: No tremor.        LABS:                        7.0    x     )-----------( x        ( 07 Aug 2019 02:34 )             21.4     08-06    140  |  110<H>  |  70.0<H>  ----------------------------<  88  4.0   |  19.0<L>  |  2.29<H>    Ca    9.1      06 Aug 2019 16:32    TPro  7.0  /  Alb  3.9  /  TBili  <0.2<L>  /  DBili  x   /  AST  66<H>  /  ALT  50<H>  /  AlkPhos  105  08-06    CARDIAC MARKERS ( 06 Aug 2019 16:32 )  x     / 0.08 ng/mL / x     / x     / x          PT/INR - ( 06 Aug 2019 16:32 )   PT: 12.6 sec;   INR: 1.09 ratio         PTT - ( 06 Aug 2019 16:32 )  PTT:30.1 sec    RADIOLOGY & ADDITIONAL STUDIES: (reviewed)    CARDIOLOGY TESTING:(reviewed)     TELEMETRY (Independent visualization) : No arrhythmias    ECHOCARDIOGRAM:  Summary:   1. Left ventricular ejection fraction, by visual estimation, is 55 to   60%.   2. Spectral Doppler shows impaired relaxation pattern of left   ventricular myocardial filling (Grade I diastolic dysfunction).   3. There is mild concentric left ventricular hypertrophy.   4. Severe mitral annular calcification.   5. Mild degenerative mitral stenosis.   6. Bioprosthesis in the aortic position - normal function.   7. Estimated pulmonary artery systolic pressure is 37.2 mmHg assuming a   right atrial pressure of 3 mmHg, which is consistent with borderline   pulmonary hypertension.   8. There is no evidence of pericardial effusion.   9. ** Compared to TTE dated 9/15/17, no significant changes.    Nadiya Carbajal DO Electronically signed on 7/18/2019 at 12:31:05 PM    CARDIAC CATHETERIZATION:  < from: Cardiac Cath Lab - Adult (02.01.16 @ 15:44) >  CORONARY VESSELS: The coronary circulation is right dominant.  LM:   --  LM: Normal.  LAD:   --  LAD: Angiography showed minor luminal irregularities with no  flow limiting lesions.  CX:   --  Mid circumflex: There was a 95 % stenosis.  RCA:   --  Distal RCA: There was a 95 % stenosis in the distal third of the  vessel segment.  --  RPDA: There was a 70 % stenosis at the ostium of the vessel segment.  --  RPLS: There was a 70 % stenosis at the ostium of the vessel segment.  COMPLICATIONS: There were no complications.  DIAGNOSTIC IMPRESSIONS: Severe LCX and RCA disease with AS  DIAGNOSTIC RECOMMENDATIONS: Eval for AS and CABG  INTERVENTIONAL IMPRESSIONS: Severe LCX and RCA disease with AS  INTERVENTIONAL RECOMMENDATIONS: Eval for AS and CABG    < end of copied text >      MEDICATIONS:(reviewed)  MEDICATIONS  (STANDING):  ALBUTerol    90 MICROgram(s) HFA Inhaler 1 Puff(s) Inhalation every 4 hours  ALBUTerol/ipratropium for Nebulization 3 milliLiter(s) Nebulizer every 6 hours  amLODIPine   Tablet 10 milliGRAM(s) Oral daily  ascorbic acid 500 milliGRAM(s) Oral daily  atorvastatin 20 milliGRAM(s) Oral at bedtime  dextrose 5%. 1000 milliLiter(s) (50 mL/Hr) IV Continuous <Continuous>  dextrose 50% Injectable 12.5 Gram(s) IV Push once  dextrose 50% Injectable 25 Gram(s) IV Push once  dextrose 50% Injectable 25 Gram(s) IV Push once  folic acid 1 milliGRAM(s) Oral daily  furosemide   Injectable 40 milliGRAM(s) IV Push every 12 hours  insulin glargine Injectable (LANTUS) 10 Unit(s) SubCutaneous at bedtime  insulin lispro (HumaLOG) corrective regimen sliding scale   SubCutaneous Before meals and at bedtime  levothyroxine 100 MICROGram(s) Oral daily  metoprolol tartrate 25 milliGRAM(s) Oral two times a day  montelukast 10 milliGRAM(s) Oral daily  pantoprazole    Tablet 40 milliGRAM(s) Oral two times a day  sodium chloride 0.9% lock flush 3 milliLiter(s) IV Push every 8 hours  tiotropium 18 MICROgram(s) Capsule 1 Capsule(s) Inhalation daily    ASSESSMENT AND PLAN:    74y Female with prior history of CAD s/p CABG and Bio AVR 12/2016 with brief post operative AF, normal EF, CKD requiring transient HD for hyperkalemia, mild MR, prior TIA s/p ILR, chronic Hep C complicated by cirrhosis and varices, bronchiectasias and chronic anemia with worsening anemia.    Shortness of breath  -  Likely diastolic HF with volume overload from transfusion  -  Continue lasix for now  -  Needs additional lasix with future transfusions    MARGUERITE on likely CKD  -  Monitor renal function closely    HTN: BP controlled    CAD s/p CABG  -  Continue metoprolol  -  Not on antiplatelet due to anemia    Acute on chronic anemia  -  Monitor H/H  -  Transfusion as needed    Likely Non-MI troponin elevation secondary to anemia. Would trend troponin for now

## 2019-08-07 NOTE — H&P ADULT - CARDIOVASCULAR SYMPTOMS
NURSE ANTICOAGULATION PHONE CONTACT    Hailee Sawant 1935 contacted by phone today for 2 week INR results  No outpatient prescriptions have been marked as taking for the 5/30/18 encounter (Telephone) with Lasha MALIK MD.       INR (no units)   Date Value   04/19/2018 2.1   02/23/2018 1.7     INR CAPILLARY (no units)   Date Value   05/30/2018 1.7     GOAL RANGE: 2.0-3.0    ALLERGIES:   Allergen Reactions   • Nexium HIVES       Patient Active Problem List   Diagnosis   • Spinal stenosis, lumbar region, without neurogenic claudication   • Right knee DJD   • Labile blood pressure   • Hyperlipidemia   • Osteopenia   • Colon polyps   • Reflux   • Hand tendonitis   • Sebaceous cyst   • H/O echocardiogram   • Bradycardia   • Diabetes mellitus without complication (CMS/HCC)   • Right thyroid nodule   • Thyroiditis, subacute   • Encounter for therapeutic drug monitoring [Z51.81]   • Long term (current) use of anticoagulants [Z79.01]   • Atrial flutter, unspecified type (CMS/HCC)   • HTN (hypertension)   • Hypothyroidism   • Leukocytosis   • Ischemic colitis, enteritis, or enterocolitis (CMS/HCC)   • Elevated LFTs   • Vitamin B12 deficiency   • Lower GI bleeding   • Hypokalemia       PATIENT REPORTED CHANGES: Patient reports no medication changes but has been watching her diet very closely, possibly too closely.    NURSE DOSING ADJUSTMENTS AND EDUCATION: Will increase her regular warfarin dosing.        Patient will recheck INR in 2 weeks,sooner if changes or concerns develop.  Warfarin management done via phone according to protocol.  Reviewed signs and symptoms of bleeding and clotting with patient.  Reviewed and reinforced with patient the importance of calling the clinic with any medication, diet, and health related changes. Education on importance of adherence to consistent diet in Vitamin K and ETOH also discussed.   Susan Urbina RN      
dyspnea on exertion

## 2019-08-07 NOTE — PHYSICAL THERAPY INITIAL EVALUATION ADULT - GENERAL OBSERVATIONS, REHAB EVAL
Consult received, chart reviewed. Patient received supine in bed, NAD, +NC (4L), +tele. Patient agreed to EVALUATION from Physical Therapist.

## 2019-08-07 NOTE — H&P ADULT - ASSESSMENT
73 y/o female with symptomatic anemia, recurrent need for PRBC transfusions, Hx of Diastolic CHF, CKD-4, HTN, HLD, s/p BioAVR, CAD s/p CABG, cirrhosis, thrombocytopenia, DM-2, Hypothyroid

## 2019-08-07 NOTE — H&P ADULT - HISTORY OF PRESENT ILLNESS
73 y/o female with hx of CKD-4, HTN, HLD, Bio-AVR, recurrent anemia w/ baseline hbg 7-8 s/p 28 PRBC transfusions s/p 3 BMB with no clear etiology of anemia, s/p EGD/Colonoscopy/Capsular endoscopies in past, HCV, Cirrhosis, Diastolic CHF, DM-2, recent prolonged hospital course in 7/19 for right upper chest wound infection related to port, MARGUERITE on CKD, metabolic acidosis, hyperkalemia and Heart block requiring transient HD. Patient was Dcd to home with home PT and family oversight. She states shes been doing well, using cane and no falls. Shes been eating well, and has followed up some of her Physicians. She was noted to have down trending Hbg again and was told she would likely need PRBC transfusion soon. Over the past 2 days she has been having increasing SOB with exertion, and generalized weakness. No CP, N/V/F/C. She went to see her PMD who advised she come to the ED. In the ED patient with hbg of 5.3. She denies any BRBPR or black stools. Cr. in baseline range. She was seen by Cardiology ED. Will be admitted for symptomatic anemia.

## 2019-08-08 ENCOUNTER — APPOINTMENT (OUTPATIENT)
Dept: INTERNAL MEDICINE | Facility: CLINIC | Age: 74
End: 2019-08-08

## 2019-08-08 LAB
ANION GAP SERPL CALC-SCNC: 13 MMOL/L — SIGNIFICANT CHANGE UP (ref 5–17)
BUN SERPL-MCNC: 64 MG/DL — HIGH (ref 8–20)
CALCIUM SERPL-MCNC: 8.9 MG/DL — SIGNIFICANT CHANGE UP (ref 8.6–10.2)
CHLORIDE SERPL-SCNC: 114 MMOL/L — HIGH (ref 98–107)
CO2 SERPL-SCNC: 20 MMOL/L — LOW (ref 22–29)
CREAT SERPL-MCNC: 2.01 MG/DL — HIGH (ref 0.5–1.3)
GLUCOSE BLDC GLUCOMTR-MCNC: 142 MG/DL — HIGH (ref 70–99)
GLUCOSE BLDC GLUCOMTR-MCNC: 191 MG/DL — HIGH (ref 70–99)
GLUCOSE BLDC GLUCOMTR-MCNC: 249 MG/DL — HIGH (ref 70–99)
GLUCOSE BLDC GLUCOMTR-MCNC: 280 MG/DL — HIGH (ref 70–99)
GLUCOSE SERPL-MCNC: 150 MG/DL — HIGH (ref 70–115)
HCT VFR BLD CALC: 23.8 % — LOW (ref 34.5–45)
HCT VFR BLD CALC: 25.3 % — LOW (ref 34.5–45)
HGB BLD-MCNC: 7.6 G/DL — LOW (ref 11.5–15.5)
HGB BLD-MCNC: 8.3 G/DL — LOW (ref 11.5–15.5)
MAGNESIUM SERPL-MCNC: 2.1 MG/DL — SIGNIFICANT CHANGE UP (ref 1.6–2.6)
MCHC RBC-ENTMCNC: 29.9 PG — SIGNIFICANT CHANGE UP (ref 27–34)
MCHC RBC-ENTMCNC: 31.9 GM/DL — LOW (ref 32–36)
MCV RBC AUTO: 93.7 FL — SIGNIFICANT CHANGE UP (ref 80–100)
NRBC # BLD: 7 /100 WBCS — HIGH (ref 0–0)
PHOSPHATE SERPL-MCNC: 2 MG/DL — LOW (ref 2.4–4.7)
PLATELET # BLD AUTO: 99 K/UL — LOW (ref 150–400)
POTASSIUM SERPL-MCNC: 3.9 MMOL/L — SIGNIFICANT CHANGE UP (ref 3.5–5.3)
POTASSIUM SERPL-SCNC: 3.9 MMOL/L — SIGNIFICANT CHANGE UP (ref 3.5–5.3)
RBC # BLD: 2.54 M/UL — LOW (ref 3.8–5.2)
RBC # FLD: 16.8 % — HIGH (ref 10.3–14.5)
SODIUM SERPL-SCNC: 147 MMOL/L — HIGH (ref 135–145)
WBC # BLD: 6.17 K/UL — SIGNIFICANT CHANGE UP (ref 3.8–10.5)
WBC # FLD AUTO: 6.17 K/UL — SIGNIFICANT CHANGE UP (ref 3.8–10.5)

## 2019-08-08 PROCEDURE — 99233 SBSQ HOSP IP/OBS HIGH 50: CPT

## 2019-08-08 RX ORDER — POTASSIUM PHOSPHATE, MONOBASIC POTASSIUM PHOSPHATE, DIBASIC 236; 224 MG/ML; MG/ML
30 INJECTION, SOLUTION INTRAVENOUS ONCE
Refills: 0 | Status: COMPLETED | OUTPATIENT
Start: 2019-08-08 | End: 2019-08-08

## 2019-08-08 RX ORDER — FUROSEMIDE 40 MG
40 TABLET ORAL
Refills: 0 | Status: DISCONTINUED | OUTPATIENT
Start: 2019-08-08 | End: 2019-08-10

## 2019-08-08 RX ADMIN — Medication 1 MILLIGRAM(S): at 11:46

## 2019-08-08 RX ADMIN — Medication 650 MILLIGRAM(S): at 19:58

## 2019-08-08 RX ADMIN — Medication 100 MICROGRAM(S): at 05:58

## 2019-08-08 RX ADMIN — Medication 40 MILLIGRAM(S): at 17:16

## 2019-08-08 RX ADMIN — Medication 3 MILLILITER(S): at 03:20

## 2019-08-08 RX ADMIN — Medication 2: at 22:36

## 2019-08-08 RX ADMIN — POTASSIUM PHOSPHATE, MONOBASIC POTASSIUM PHOSPHATE, DIBASIC 83.33 MILLIMOLE(S): 236; 224 INJECTION, SOLUTION INTRAVENOUS at 19:47

## 2019-08-08 RX ADMIN — Medication 40 MILLIGRAM(S): at 05:58

## 2019-08-08 RX ADMIN — SODIUM CHLORIDE 3 MILLILITER(S): 9 INJECTION INTRAMUSCULAR; INTRAVENOUS; SUBCUTANEOUS at 22:37

## 2019-08-08 RX ADMIN — AMLODIPINE BESYLATE 10 MILLIGRAM(S): 2.5 TABLET ORAL at 05:58

## 2019-08-08 RX ADMIN — Medication 25 MILLIGRAM(S): at 05:58

## 2019-08-08 RX ADMIN — MONTELUKAST 10 MILLIGRAM(S): 4 TABLET, CHEWABLE ORAL at 14:43

## 2019-08-08 RX ADMIN — Medication 25 MILLIGRAM(S): at 17:21

## 2019-08-08 RX ADMIN — Medication 6: at 11:42

## 2019-08-08 RX ADMIN — Medication 4: at 17:17

## 2019-08-08 RX ADMIN — INSULIN GLARGINE 10 UNIT(S): 100 INJECTION, SOLUTION SUBCUTANEOUS at 22:37

## 2019-08-08 RX ADMIN — ATORVASTATIN CALCIUM 20 MILLIGRAM(S): 80 TABLET, FILM COATED ORAL at 22:35

## 2019-08-08 RX ADMIN — SODIUM CHLORIDE 3 MILLILITER(S): 9 INJECTION INTRAMUSCULAR; INTRAVENOUS; SUBCUTANEOUS at 05:59

## 2019-08-08 RX ADMIN — Medication 500 MILLIGRAM(S): at 11:46

## 2019-08-08 RX ADMIN — PANTOPRAZOLE SODIUM 40 MILLIGRAM(S): 20 TABLET, DELAYED RELEASE ORAL at 17:16

## 2019-08-08 RX ADMIN — SODIUM CHLORIDE 3 MILLILITER(S): 9 INJECTION INTRAMUSCULAR; INTRAVENOUS; SUBCUTANEOUS at 11:18

## 2019-08-08 RX ADMIN — PANTOPRAZOLE SODIUM 40 MILLIGRAM(S): 20 TABLET, DELAYED RELEASE ORAL at 05:58

## 2019-08-08 RX ADMIN — Medication 100 MILLIGRAM(S): at 22:37

## 2019-08-08 RX ADMIN — Medication 650 MILLIGRAM(S): at 21:00

## 2019-08-08 NOTE — PROGRESS NOTE ADULT - SUBJECTIVE AND OBJECTIVE BOX
CC: anemia    Patient seen and examined at the bedside. No acute overnight events. admitted yesterday. Complaining of sob + cough causing rib pain today. Denies fever/chills, headache, lightheadedness, dizziness, chest pain, palpitations, abd pain, nausea/vomiting/diarrhea, muscle pain.      =========================================================================================    PHYSICAL EXAM.    GEN - appears age appropriate. well nourished. pleasant. no distress.   HEENT - NCAT, EOMI, SAVANNAH  RESP - CTA BL, HELIO at bases. no wheeze/stridor/rhonchi/crackles. on supplemental O2, nc. able to speak in full sentences without distress.   CARDIO - NS1S2, RRR. No murmurs/rubs/gallops. good cap refill  ABD - Soft/Non tender/Non distended. Normal BS x4 quadrants. no guarding/rebound tenderness.  Ext - No ANKIT.  MSK - BL 5/5 strength on upper and lower extremities.   Neuro - AAOx3. cn 2-12 grossly intact  Psych - normal affect  Skin - c/d/i. no rashes/lesions      I&O's Summary    Daily     Daily     =========================================================================================  =========================================================================================  IMAGING.     =========================================================================================    HOME MEDS.    Home Medications:  albuterol 2.5 mg/3 mL (0.083%) inhalation solution: 3 milliliter(s) inhaled every 6 hours, As Needed (17 Jul 2019 13:35)  Aranesp Albumin Free: once weekly at MD&#x27;s office (17 Jul 2019 13:35)  ascorbic acid 500 mg oral tablet: 1 tab(s) orally once a day (22 Jul 2019 14:30)  folic acid 1 mg oral tablet: 1 tab(s) orally once a day (22 Jul 2019 14:30)  insulin lispro 100 units/mL subcutaneous solution:  subcutaneous 3 times a day (with meals) ; 2 Unit(s) if Glucose 151 - 200  4 Unit(s) if Glucose 201 - 250  6 Unit(s) if Glucose 251 - 300  8 Unit(s) if Glucose 301 - 350  10 Unit(s) if Glucose 351 - 400  12 Unit(s) if Glucose GREATER THAN 400 (17 Jul 2019 13:35)  Lasix 40 mg oral tablet: 1 tab(s) orally 2 times a day (22 Jul 2019 14:30)  Levoxyl 100 mcg (0.1 mg) oral tablet: 1 tab(s) orally once a day (22 Jul 2019 14:30)  magnesium oxide 400 mg (241.3 mg elemental magnesium) oral tablet: 1 tab(s) orally every other day (17 Jul 2019 13:35)  Norvasc 10 mg oral tablet: 1 tab(s) orally once a day (22 Jul 2019 14:30)  phenylephrine 0.25%-3% rectal ointment: 1 application rectal 4 times a day, As needed, hemorrhoidal irritation/bleeding (22 Jul 2019 14:30)  pravastatin 80 mg oral tablet: 1 tab(s) orally once a day (17 Jul 2019 13:35)  Protonix 40 mg oral delayed release tablet: 1 tab(s) orally 2 times a day (22 Jul 2019 14:30)  Singulair 10 mg oral tablet: 1 tab(s) orally once a day (22 Jul 2019 14:30)  Thera-D Rapid Repletion oral tablet: 2000 unit(s) orally once a day (22 Jul 2019 14:30)  Tresiba 100 units/mL subcutaneous solution: 10 unit(s) subcutaneous once a day (17 Jul 2019 13:35)      =========================================================================================    HOSPITAL MEDS.    MEDICATIONS  (STANDING):  ALBUTerol    90 MICROgram(s) HFA Inhaler 1 Puff(s) Inhalation every 4 hours  ALBUTerol/ipratropium for Nebulization 3 milliLiter(s) Nebulizer every 6 hours  amLODIPine   Tablet 10 milliGRAM(s) Oral daily  ascorbic acid 500 milliGRAM(s) Oral daily  atorvastatin 20 milliGRAM(s) Oral at bedtime  dextrose 5%. 1000 milliLiter(s) (50 mL/Hr) IV Continuous <Continuous>  dextrose 50% Injectable 12.5 Gram(s) IV Push once  dextrose 50% Injectable 25 Gram(s) IV Push once  dextrose 50% Injectable 25 Gram(s) IV Push once  folic acid 1 milliGRAM(s) Oral daily  furosemide    Tablet 40 milliGRAM(s) Oral two times a day  insulin glargine Injectable (LANTUS) 10 Unit(s) SubCutaneous at bedtime  insulin lispro (HumaLOG) corrective regimen sliding scale   SubCutaneous Before meals and at bedtime  levothyroxine 100 MICROGram(s) Oral daily  metoprolol tartrate 25 milliGRAM(s) Oral two times a day  montelukast 10 milliGRAM(s) Oral daily  pantoprazole    Tablet 40 milliGRAM(s) Oral two times a day  sodium chloride 0.9% lock flush 3 milliLiter(s) IV Push every 8 hours  tiotropium 18 MICROgram(s) Capsule 1 Capsule(s) Inhalation daily    MEDICATIONS  (PRN):  acetaminophen   Tablet .. 650 milliGRAM(s) Oral every 6 hours PRN Moderate Pain (4 - 6)  benzonatate 100 milliGRAM(s) Oral every 8 hours PRN Cough  dextrose 40% Gel 15 Gram(s) Oral once PRN Blood Glucose LESS THAN 70 milliGRAM(s)/deciliter  glucagon  Injectable 1 milliGRAM(s) IntraMuscular once PRN Glucose LESS THAN 70 milligrams/deciliter  ondansetron Injectable 4 milliGRAM(s) IV Push every 6 hours PRN Nausea

## 2019-08-08 NOTE — PROGRESS NOTE ADULT - SUBJECTIVE AND OBJECTIVE BOX
[facility], [facility]                              [address] [address]                                                 PHONE: [phone]                                                 FAX: [phone]  -----------------------------------------------------------------------------------------------  Pt seen and examined. FU for shortness of breath     INTERVAL HPI/SUBJECTIVE: Pt with improvement in breathing. Cough improved.       Vital Signs Last 24 Hrs  T(C): 37.1 (07 Aug 2019 22:29), Max: 37.4 (07 Aug 2019 15:13)  T(F): 98.7 (07 Aug 2019 22:29), Max: 99.3 (07 Aug 2019 15:13)  HR: 90 (08 Aug 2019 05:57) (65 - 92)  BP: 147/65 (08 Aug 2019 05:57) (126/63 - 174/69)  BP(mean): --  RR: 24 (07 Aug 2019 22:29) (18 - 24)  SpO2: 95% (08 Aug 2019 04:49) (91% - 96%)  I&O's Summary    07 Aug 2019 07:01  -  08 Aug 2019 07:00  --------------------------------------------------------  IN: 0 mL / OUT: 100 mL / NET: -100 mL    PHYSICAL EXAM:  Constitutional: Laying in bed, mildly Increased work-of breathing  HEENT:     Head: Normocephalic and atraumatic  Neck: supple. JVD not elevated, carotid exam normal  Cardiovascular: regular S1 S2 no murmur,   Respiratory: Lungs clear to auscultation - wheeze  Gastrointestinal:  Soft, Non-tender, + BS	  Musculoskeletal: Normal range of motion. Trace edema  Skin: Warm and dry. No new rash/ulceration noted. No cyanosis . No diaphoresis.  Neurologic: No tremor.        LABS:                                     7.6    6.17  )-----------( 99       ( 08 Aug 2019 08:11 )             23.8     08-07    141  |  110<H>  |  71.0<H>  ----------------------------<  315<H>  4.0   |  19.0<L>  |  2.17<H>    Ca    8.6      07 Aug 2019 09:53  Phos  1.3     08-07  Mg     2.2     08-07    TPro  7.0  /  Alb  3.9  /  TBili  <0.2<L>  /  DBili  x   /  AST  66<H>  /  ALT  50<H>  /  AlkPhos  105  08-06    CARDIAC MARKERS ( 06 Aug 2019 16:32 )  x     / 0.08 ng/mL / x     / x     / x          PT/INR - ( 06 Aug 2019 16:32 )   PT: 12.6 sec;   INR: 1.09 ratio         PTT - ( 06 Aug 2019 16:32 )  PTT:30.1 sec        RADIOLOGY & ADDITIONAL STUDIES: (reviewed)    CARDIOLOGY TESTING:(reviewed)     TELEMETRY (Independent visualization) : No arrhythmias    ECHOCARDIOGRAM:  Summary:   1. Left ventricular ejection fraction, by visual estimation, is 55 to   60%.   2. Spectral Doppler shows impaired relaxation pattern of left   ventricular myocardial filling (Grade I diastolic dysfunction).   3. There is mild concentric left ventricular hypertrophy.   4. Severe mitral annular calcification.   5. Mild degenerative mitral stenosis.   6. Bioprosthesis in the aortic position - normal function.   7. Estimated pulmonary artery systolic pressure is 37.2 mmHg assuming a   right atrial pressure of 3 mmHg, which is consistent with borderline   pulmonary hypertension.   8. There is no evidence of pericardial effusion.   9. ** Compared to TTE dated 9/15/17, no significant changes.    Nadiya Carbajal DO Electronically signed on 7/18/2019 at 12:31:05 PM    CARDIAC CATHETERIZATION:  < from: Cardiac Cath Lab - Adult (02.01.16 @ 15:44) >  CORONARY VESSELS: The coronary circulation is right dominant.  LM:   --  LM: Normal.  LAD:   --  LAD: Angiography showed minor luminal irregularities with no  flow limiting lesions.  CX:   --  Mid circumflex: There was a 95 % stenosis.  RCA:   --  Distal RCA: There was a 95 % stenosis in the distal third of the  vessel segment.  --  RPDA: There was a 70 % stenosis at the ostium of the vessel segment.  --  RPLS: There was a 70 % stenosis at the ostium of the vessel segment.  COMPLICATIONS: There were no complications.  DIAGNOSTIC IMPRESSIONS: Severe LCX and RCA disease with AS  DIAGNOSTIC RECOMMENDATIONS: Eval for AS and CABG  INTERVENTIONAL IMPRESSIONS: Severe LCX and RCA disease with AS  INTERVENTIONAL RECOMMENDATIONS: Eval for AS and CABG    < end of copied text >      MEDICATIONS:(reviewed)  MEDICATIONS  (STANDING):  ALBUTerol    90 MICROgram(s) HFA Inhaler 1 Puff(s) Inhalation every 4 hours  ALBUTerol/ipratropium for Nebulization 3 milliLiter(s) Nebulizer every 6 hours  amLODIPine   Tablet 10 milliGRAM(s) Oral daily  ascorbic acid 500 milliGRAM(s) Oral daily  atorvastatin 20 milliGRAM(s) Oral at bedtime  dextrose 5%. 1000 milliLiter(s) (50 mL/Hr) IV Continuous <Continuous>  dextrose 50% Injectable 12.5 Gram(s) IV Push once  dextrose 50% Injectable 25 Gram(s) IV Push once  dextrose 50% Injectable 25 Gram(s) IV Push once  folic acid 1 milliGRAM(s) Oral daily  furosemide   Injectable 40 milliGRAM(s) IV Push two times a day  insulin glargine Injectable (LANTUS) 10 Unit(s) SubCutaneous at bedtime  insulin lispro (HumaLOG) corrective regimen sliding scale   SubCutaneous Before meals and at bedtime  levothyroxine 100 MICROGram(s) Oral daily  metoprolol tartrate 25 milliGRAM(s) Oral two times a day  montelukast 10 milliGRAM(s) Oral daily  pantoprazole    Tablet 40 milliGRAM(s) Oral two times a day  sodium chloride 0.9% lock flush 3 milliLiter(s) IV Push every 8 hours  tiotropium 18 MICROgram(s) Capsule 1 Capsule(s) Inhalation daily    ASSESSMENT AND PLAN:    74y Female with prior history of CAD s/p CABG and Bio AVR 12/2016 with brief post operative AF, normal EF, CKD requiring transient HD for hyperkalemia, mild MR, prior TIA s/p ILR, chronic Hep C complicated by cirrhosis and varices, bronchiectasias and chronic anemia with worsening anemia.    Shortness of breath  -  Likely diastolic HF with volume overload from transfusion  -  Change to po lasix for now  -  Needs additional  iv lasix with future transfusions    MARGUERITE on likely CKD  -  Monitor renal function closely    HTN: BP controlled    CAD s/p CABG  -  Continue metoprolol  -  Not on antiplatelet due to anemia    Acute on chronic anemia  -  Monitor H/H  -  Transfusion as needed

## 2019-08-09 LAB
ANION GAP SERPL CALC-SCNC: 9 MMOL/L — SIGNIFICANT CHANGE UP (ref 5–17)
BUN SERPL-MCNC: 58 MG/DL — HIGH (ref 8–20)
CALCIUM SERPL-MCNC: 8.6 MG/DL — SIGNIFICANT CHANGE UP (ref 8.6–10.2)
CHLORIDE SERPL-SCNC: 111 MMOL/L — HIGH (ref 98–107)
CO2 SERPL-SCNC: 21 MMOL/L — LOW (ref 22–29)
CREAT SERPL-MCNC: 1.99 MG/DL — HIGH (ref 0.5–1.3)
GLUCOSE BLDC GLUCOMTR-MCNC: 149 MG/DL — HIGH (ref 70–99)
GLUCOSE BLDC GLUCOMTR-MCNC: 157 MG/DL — HIGH (ref 70–99)
GLUCOSE BLDC GLUCOMTR-MCNC: 217 MG/DL — HIGH (ref 70–99)
GLUCOSE BLDC GLUCOMTR-MCNC: 254 MG/DL — HIGH (ref 70–99)
GLUCOSE SERPL-MCNC: 164 MG/DL — HIGH (ref 70–115)
HCT VFR BLD CALC: 24.4 % — LOW (ref 34.5–45)
HGB BLD-MCNC: 7.8 G/DL — LOW (ref 11.5–15.5)
MAGNESIUM SERPL-MCNC: 2.1 MG/DL — SIGNIFICANT CHANGE UP (ref 1.6–2.6)
MCHC RBC-ENTMCNC: 31 PG — SIGNIFICANT CHANGE UP (ref 27–34)
MCHC RBC-ENTMCNC: 32 GM/DL — SIGNIFICANT CHANGE UP (ref 32–36)
MCV RBC AUTO: 96.8 FL — SIGNIFICANT CHANGE UP (ref 80–100)
NRBC # BLD: 4 /100 WBCS — HIGH (ref 0–0)
PHOSPHATE SERPL-MCNC: 4 MG/DL — SIGNIFICANT CHANGE UP (ref 2.4–4.7)
PLATELET # BLD AUTO: 93 K/UL — LOW (ref 150–400)
POTASSIUM SERPL-MCNC: 4.5 MMOL/L — SIGNIFICANT CHANGE UP (ref 3.5–5.3)
POTASSIUM SERPL-SCNC: 4.5 MMOL/L — SIGNIFICANT CHANGE UP (ref 3.5–5.3)
RAPID RVP RESULT: SIGNIFICANT CHANGE UP
RBC # BLD: 2.52 M/UL — LOW (ref 3.8–5.2)
RBC # FLD: 17.3 % — HIGH (ref 10.3–14.5)
SODIUM SERPL-SCNC: 141 MMOL/L — SIGNIFICANT CHANGE UP (ref 135–145)
WBC # BLD: 6.27 K/UL — SIGNIFICANT CHANGE UP (ref 3.8–10.5)
WBC # FLD AUTO: 6.27 K/UL — SIGNIFICANT CHANGE UP (ref 3.8–10.5)

## 2019-08-09 PROCEDURE — 71250 CT THORAX DX C-: CPT | Mod: 26

## 2019-08-09 PROCEDURE — 99222 1ST HOSP IP/OBS MODERATE 55: CPT

## 2019-08-09 PROCEDURE — 99223 1ST HOSP IP/OBS HIGH 75: CPT

## 2019-08-09 PROCEDURE — 99233 SBSQ HOSP IP/OBS HIGH 50: CPT

## 2019-08-09 RX ORDER — LIDOCAINE 4 G/100G
1 CREAM TOPICAL DAILY
Refills: 0 | Status: DISCONTINUED | OUTPATIENT
Start: 2019-08-09 | End: 2019-08-10

## 2019-08-09 RX ORDER — FUROSEMIDE 40 MG
40 TABLET ORAL ONCE
Refills: 0 | Status: COMPLETED | OUTPATIENT
Start: 2019-08-09 | End: 2019-08-09

## 2019-08-09 RX ORDER — IPRATROPIUM/ALBUTEROL SULFATE 18-103MCG
3 AEROSOL WITH ADAPTER (GRAM) INHALATION ONCE
Refills: 0 | Status: COMPLETED | OUTPATIENT
Start: 2019-08-09 | End: 2019-08-09

## 2019-08-09 RX ORDER — IPRATROPIUM/ALBUTEROL SULFATE 18-103MCG
3 AEROSOL WITH ADAPTER (GRAM) INHALATION EVERY 6 HOURS
Refills: 0 | Status: DISCONTINUED | OUTPATIENT
Start: 2019-08-09 | End: 2019-08-12

## 2019-08-09 RX ADMIN — Medication 25 MILLIGRAM(S): at 18:14

## 2019-08-09 RX ADMIN — LIDOCAINE 1 PATCH: 4 CREAM TOPICAL at 12:36

## 2019-08-09 RX ADMIN — Medication 6: at 17:04

## 2019-08-09 RX ADMIN — Medication 2: at 08:06

## 2019-08-09 RX ADMIN — Medication 25 MILLIGRAM(S): at 05:07

## 2019-08-09 RX ADMIN — MONTELUKAST 10 MILLIGRAM(S): 4 TABLET, CHEWABLE ORAL at 12:36

## 2019-08-09 RX ADMIN — Medication 100 MILLIGRAM(S): at 12:34

## 2019-08-09 RX ADMIN — Medication 3 MILLILITER(S): at 03:10

## 2019-08-09 RX ADMIN — SODIUM CHLORIDE 3 MILLILITER(S): 9 INJECTION INTRAMUSCULAR; INTRAVENOUS; SUBCUTANEOUS at 14:56

## 2019-08-09 RX ADMIN — Medication 40 MILLIGRAM(S): at 05:06

## 2019-08-09 RX ADMIN — SODIUM CHLORIDE 3 MILLILITER(S): 9 INJECTION INTRAMUSCULAR; INTRAVENOUS; SUBCUTANEOUS at 05:07

## 2019-08-09 RX ADMIN — INSULIN GLARGINE 10 UNIT(S): 100 INJECTION, SOLUTION SUBCUTANEOUS at 22:04

## 2019-08-09 RX ADMIN — Medication 40 MILLIGRAM(S): at 18:17

## 2019-08-09 RX ADMIN — Medication 1 MILLIGRAM(S): at 12:35

## 2019-08-09 RX ADMIN — AMLODIPINE BESYLATE 10 MILLIGRAM(S): 2.5 TABLET ORAL at 05:06

## 2019-08-09 RX ADMIN — Medication 100 MILLIGRAM(S): at 22:04

## 2019-08-09 RX ADMIN — ATORVASTATIN CALCIUM 20 MILLIGRAM(S): 80 TABLET, FILM COATED ORAL at 22:04

## 2019-08-09 RX ADMIN — Medication 100 MICROGRAM(S): at 05:06

## 2019-08-09 RX ADMIN — Medication 500 MILLIGRAM(S): at 12:35

## 2019-08-09 RX ADMIN — Medication 650 MILLIGRAM(S): at 05:08

## 2019-08-09 RX ADMIN — Medication 3 MILLILITER(S): at 20:44

## 2019-08-09 RX ADMIN — LIDOCAINE 1 PATCH: 4 CREAM TOPICAL at 20:27

## 2019-08-09 RX ADMIN — PANTOPRAZOLE SODIUM 40 MILLIGRAM(S): 20 TABLET, DELAYED RELEASE ORAL at 05:06

## 2019-08-09 RX ADMIN — Medication 40 MILLIGRAM(S): at 20:28

## 2019-08-09 RX ADMIN — Medication 3 MILLILITER(S): at 16:22

## 2019-08-09 RX ADMIN — Medication 650 MILLIGRAM(S): at 05:38

## 2019-08-09 RX ADMIN — Medication 4: at 12:38

## 2019-08-09 RX ADMIN — PANTOPRAZOLE SODIUM 40 MILLIGRAM(S): 20 TABLET, DELAYED RELEASE ORAL at 18:14

## 2019-08-09 NOTE — CONSULT NOTE ADULT - SUBJECTIVE AND OBJECTIVE BOX
PULMONARY CONSULT NOTE      NICHELLE GILLREY-[id number]    Patient is a 74y old  Female who presents with a chief complaint of SOB  symptomatic anemia (09 Aug 2019 14:22)      INTERVAL HPI/OVERNIGHT EVENTS:This is a 74y old Female with a past medical history significant for CKD4, HTN, HLD, Bio-AVR, recurrent anemia w/ baseline hbg 7-8 s/p 28 PRBC transfusions s/p 3 bone marrow biopsies with no clear etiology of anemia, s/p EGD/Colonoscopy/Capsular endoscopies in 2014/2015, HCV Cirrhosis, Diastolic CHF, DM2, recent prolonged hospital course in 7/19 for right upper chest wound infection related to port, MARGUERITE on CKD requiring transient HD. Patient was discharged on home oxygen. She has a baseline anemia and has been receiving IV iron and blood transfusions. She was noted to have a decrease in hgb and she has been having increasing SOB with exertion, and generalized weakness.  She had a hbg of 5.3. She has occasional dark BMs but states she thinks it may be diet related. She has had hemorrhoid bleeding also but for 3-4 days prior to admission it was only a small amount in the toilet tissue. She has had significant hemorrhoid bleeding in the past the last episode was 4 weeks she saw a significant amount of blood in the toilet bowel.  She had a colonoscopy in 2014 with a 1 cm rectal polyp EGD and colonoscopy at that time were unremarkable. She states she has not had an endoscopic evaluation since 2014/2015. She had an appointment to see Dr. Bundy next month to discuss Hep C treatment and EGD/colonoscopy.  She feels as though her anemia has worsened since her AVR in 2016.  She denies abdominal pain and weight loss. She is currently experiencing SOB and cough.  Pt known to us.  Does not have COPD.  Spirometry normal, but mild diffusion defect.  Has been followed for lung nodules which exhibit benign behavior, also lingular ground-glass infiltrate that resolved.  Currently on albuterol inhaler as outpt with occasional neb.  some dry cough in hosp.  Did not receive nebs yesterday.  +edema.      MEDICATIONS  (STANDING):  ALBUTerol    90 MICROgram(s) HFA Inhaler 1 Puff(s) Inhalation every 4 hours  ALBUTerol/ipratropium for Nebulization 3 milliLiter(s) Nebulizer every 6 hours  amLODIPine   Tablet 10 milliGRAM(s) Oral daily  ascorbic acid 500 milliGRAM(s) Oral daily  atorvastatin 20 milliGRAM(s) Oral at bedtime  dextrose 5%. 1000 milliLiter(s) (50 mL/Hr) IV Continuous <Continuous>  dextrose 50% Injectable 12.5 Gram(s) IV Push once  dextrose 50% Injectable 25 Gram(s) IV Push once  dextrose 50% Injectable 25 Gram(s) IV Push once  folic acid 1 milliGRAM(s) Oral daily  furosemide    Tablet 40 milliGRAM(s) Oral two times a day  furosemide   Injectable 40 milliGRAM(s) IV Push once  insulin glargine Injectable (LANTUS) 10 Unit(s) SubCutaneous at bedtime  insulin lispro (HumaLOG) corrective regimen sliding scale   SubCutaneous Before meals and at bedtime  levothyroxine 100 MICROGram(s) Oral daily  lidocaine   Patch 1 Patch Transdermal daily  metoprolol tartrate 25 milliGRAM(s) Oral two times a day  montelukast 10 milliGRAM(s) Oral daily  pantoprazole    Tablet 40 milliGRAM(s) Oral two times a day  sodium chloride 0.9% lock flush 3 milliLiter(s) IV Push every 8 hours  tiotropium 18 MICROgram(s) Capsule 1 Capsule(s) Inhalation daily      MEDICATIONS  (PRN):  acetaminophen   Tablet .. 650 milliGRAM(s) Oral every 6 hours PRN Moderate Pain (4 - 6)  benzonatate 100 milliGRAM(s) Oral every 8 hours PRN Cough  dextrose 40% Gel 15 Gram(s) Oral once PRN Blood Glucose LESS THAN 70 milliGRAM(s)/deciliter  glucagon  Injectable 1 milliGRAM(s) IntraMuscular once PRN Glucose LESS THAN 70 milligrams/deciliter  ondansetron Injectable 4 milliGRAM(s) IV Push every 6 hours PRN Nausea      Allergies    Bactrim (Unknown)  Biaxin (Joint Pain)  morphine (Short breath)    Intolerances        PAST MEDICAL & SURGICAL HISTORY:  CKD (chronic kidney disease)  CAD (coronary artery disease)  Heart failure  Hepatitis C  Aortic stenosis  Anemia, unspecified anemia type  Asthma  Low back pain: chronic over a year now.  High cholesterol  Hypertension  Diabetes  H/O aortic valve replacement: cow valve.  S/P CABG x 3  History of tonsillectomy      FAMILY HISTORY:  Family history of diabetes mellitus (Sibling)      SOCIAL HISTORY  Smoking History:     REVIEW OF SYSTEMS:    CONSTITUTIONAL:  As per HPI.    HEENT:  Eyes:  No diplopia or blurred vision. ENT:  No earache, sore throat or runny nose.    CARDIOVASCULAR:  No pressure, squeezing, tightness, heaviness or aching about the chest; no palpitations.    RESPIRATORY:  Per HPI    GASTROINTESTINAL:  as above    GENITOURINARY:  No dysuria, frequency or urgency.    MUSCULOSKELETAL:  No joint pains    SKIN:  No new lesions.    NEUROLOGIC:  No paresthesias, fasciculations, seizures or weakness.    PSYCHIATRIC:  No disorder of thought or mood.    ENDOCRINE:  No heat or cold intolerance, polyuria or polydipsia.    HEMATOLOGICAL:  No easy bruising or bleeding.     Vital Signs Last 24 Hrs  T(C): 36.8 (09 Aug 2019 07:45), Max: 37 (08 Aug 2019 16:50)  T(F): 98.2 (09 Aug 2019 07:45), Max: 98.6 (08 Aug 2019 16:50)  HR: 66 (09 Aug 2019 07:45) (66 - 85)  BP: 136/72 (09 Aug 2019 07:45) (136/72 - 163/70)  BP(mean): --  RR: 19 (09 Aug 2019 07:45) (18 - 22)  SpO2: 93% (09 Aug 2019 07:45) (93% - 96%)    PHYSICAL EXAMINATION:    GENERAL: The patient is a well-developed, well-nourished __WF___in no apparent distress.     HEENT: Head is normocephalic and atraumatic. Extraocular muscles are intact. Mucous membranes are moist.     NECK: Supple.     LUNGS: Dry cough.  diminished bs and crackles at bases.  Dullness to percussion at bases.    HEART: Regular rate and rhythm sys murmur.    ABDOMEN: Soft, nontender, and nondistended.  No hepatosplenomegaly is noted.    EXTREMITIES: Without any cyanosis, clubbing, rash, lesions or edema.    NEUROLOGIC: Grossly intact.    SKIN: No ulceration or induration present.      LABS:                        7.8    6.27  )-----------( 93       ( 09 Aug 2019 07:53 )             24.4     08-09    141  |  111<H>  |  58.0<H>  ----------------------------<  164<H>  4.5   |  21.0<L>  |  1.99<H>    Ca    8.6      09 Aug 2019 07:53  Phos  4.0     08-09  Mg     2.1     08-09                  Serum Pro-Brain Natriuretic Peptide: [id number] pg/mL (08-06-19 @ 16:32)          MICROBIOLOGY:    RADIOLOGY & ADDITIONAL STUDIES:< from: CT Chest No Cont (08.09.19 @ 12:00) >    Right upper lobe opacity/nodule measuring 2.6 cm; the differential   includes infectious, inflammatory or neoplastic etiologies; a follow-up   noncontrast chest CT is recommended in 2-3 months.    Ground glass opacity in the lingula; the differential includes   atelectasis or pneumonia.    Small to moderate right and small left pleural effusions.    Cirrhotic liver.      < end of copied text >  < from: TTE Echo w/Cont Complete (07.17.19 @ 21:37) >  Summary:   1. Left ventricular ejection fraction, by visual estimation, is 55 to   60%.   2. Spectral Doppler shows impaired relaxation pattern of left   ventricular myocardial filling (Grade I diastolic dysfunction).   3. There is mild concentric left ventricular hypertrophy.   4. Severe mitral annular calcification.   5. Mild degenerative mitral stenosis.   6. Bioprosthesis inthe aortic position - normal function.   7. Estimated pulmonary artery systolic pressure is 37.2 mmHg assuming a   right atrial pressure of 3 mmHg, which is consistent with borderline   pulmonary hypertension.   8. There is no evidence of pericardialeffusion.   9. ** Compared to TTE dated 9/15/17, no significant changes.    < end of copied text >

## 2019-08-09 NOTE — CONSULT NOTE ADULT - SUBJECTIVE AND OBJECTIVE BOX
HISTORY OF PRESENT ILLNESS:  This is a 74y old Female with a past medical history significant for     REVIEW OF SYSTEMS:  Constitutional:  No unintentional weight loss, fevers, chills or night sweats	  Eyes: No eye pain, redness, discharge, or proptosis  ENMT: No sore throat, ear pain, mouth sores, or swollen glands in the neck  Respiratory: No dyspnea, cough or wheezing  Cardiovascular: No chest pain, dyspnea on exertion, or orthopnea  Gastrointestinal:	Please see HPI  Genitourinary: No dysuria or hematuria  Neurological:	 No changes in sleep/wake cycle, convulsions, confusion, dizziness or lightheadedness  Psychiatric: No changes in personality or emotional problems   Hematology: No easy bruising   Endocrine: No hot or cold flashes or deepening of voice	  All other review of systems were completed and were otherwise negative save what is reported in the HPI.    PAST MEDICAL/SURGICAL HISTORY:  CKD (chronic kidney disease)  CAD (coronary artery disease)  Heart failure  Hepatitis C  Aortic stenosis  Anemia, unspecified anemia type  Asthma  Low back pain: chronic over a year now.  High cholesterol  Hypertension  Diabetes  H/O aortic valve replacement: cow valve.  S/P CABG x 3  History of tonsillectomy    SOCIAL HISTORY:  - TOBACCO:  - ALCOHOL:  - ILLICIT DRUG USE: Denies    FAMILY HISTORY:  No known history of gastrointestinal or liver disease;  Family history of diabetes mellitus (Sibling)      HOME MEDICATIONS:  albuterol 2.5 mg/3 mL (0.083%) inhalation solution: 3 milliliter(s) inhaled every 6 hours, As Needed (17 Jul 2019 13:35)  Aranesp Albumin Free: once weekly at MD&#x27;s office (17 Jul 2019 13:35)  ascorbic acid 500 mg oral tablet: 1 tab(s) orally once a day (22 Jul 2019 14:30)  folic acid 1 mg oral tablet: 1 tab(s) orally once a day (22 Jul 2019 14:30)  insulin lispro 100 units/mL subcutaneous solution:  subcutaneous 3 times a day (with meals) ; 2 Unit(s) if Glucose 151 - 200  4 Unit(s) if Glucose 201 - 250  6 Unit(s) if Glucose 251 - 300  8 Unit(s) if Glucose 301 - 350  10 Unit(s) if Glucose 351 - 400  12 Unit(s) if Glucose GREATER THAN 400 (17 Jul 2019 13:35)  Lasix 40 mg oral tablet: 1 tab(s) orally 2 times a day (22 Jul 2019 14:30)  Levoxyl 100 mcg (0.1 mg) oral tablet: 1 tab(s) orally once a day (22 Jul 2019 14:30)  magnesium oxide 400 mg (241.3 mg elemental magnesium) oral tablet: 1 tab(s) orally every other day (17 Jul 2019 13:35)  Norvasc 10 mg oral tablet: 1 tab(s) orally once a day (22 Jul 2019 14:30)  phenylephrine 0.25%-3% rectal ointment: 1 application rectal 4 times a day, As needed, hemorrhoidal irritation/bleeding (22 Jul 2019 14:30)  pravastatin 80 mg oral tablet: 1 tab(s) orally once a day (17 Jul 2019 13:35)  Protonix 40 mg oral delayed release tablet: 1 tab(s) orally 2 times a day (22 Jul 2019 14:30)  Singulair 10 mg oral tablet: 1 tab(s) orally once a day (22 Jul 2019 14:30)  Thera-D Rapid Repletion oral tablet: 2000 unit(s) orally once a day (22 Jul 2019 14:30)  Tresiba 100 units/mL subcutaneous solution: 10 unit(s) subcutaneous once a day (17 Jul 2019 13:35)    INPATIENT MEDICATIONS:  MEDICATIONS  (STANDING):  ALBUTerol    90 MICROgram(s) HFA Inhaler 1 Puff(s) Inhalation every 4 hours  amLODIPine   Tablet 10 milliGRAM(s) Oral daily  ascorbic acid 500 milliGRAM(s) Oral daily  atorvastatin 20 milliGRAM(s) Oral at bedtime  dextrose 5%. 1000 milliLiter(s) (50 mL/Hr) IV Continuous <Continuous>  dextrose 50% Injectable 12.5 Gram(s) IV Push once  dextrose 50% Injectable 25 Gram(s) IV Push once  dextrose 50% Injectable 25 Gram(s) IV Push once  folic acid 1 milliGRAM(s) Oral daily  furosemide    Tablet 40 milliGRAM(s) Oral two times a day  insulin glargine Injectable (LANTUS) 10 Unit(s) SubCutaneous at bedtime  insulin lispro (HumaLOG) corrective regimen sliding scale   SubCutaneous Before meals and at bedtime  levothyroxine 100 MICROGram(s) Oral daily  metoprolol tartrate 25 milliGRAM(s) Oral two times a day  montelukast 10 milliGRAM(s) Oral daily  pantoprazole    Tablet 40 milliGRAM(s) Oral two times a day  sodium chloride 0.9% lock flush 3 milliLiter(s) IV Push every 8 hours  tiotropium 18 MICROgram(s) Capsule 1 Capsule(s) Inhalation daily    MEDICATIONS  (PRN):  acetaminophen   Tablet .. 650 milliGRAM(s) Oral every 6 hours PRN Moderate Pain (4 - 6)  dextrose 40% Gel 15 Gram(s) Oral once PRN Blood Glucose LESS THAN 70 milliGRAM(s)/deciliter  glucagon  Injectable 1 milliGRAM(s) IntraMuscular once PRN Glucose LESS THAN 70 milligrams/deciliter  ondansetron Injectable 4 milliGRAM(s) IV Push every 6 hours PRN Nausea    ALLERGIES:  Bactrim (Unknown)  Biaxin (Joint Pain)  morphine (Short breath)    VITAL SIGNS LAST 24 HOURS:  T(C): 36.8 (09 Aug 2019 07:45), Max: 37.2 (08 Aug 2019 09:47)  T(F): 98.2 (09 Aug 2019 07:45), Max: 98.9 (08 Aug 2019 09:47)  HR: 66 (09 Aug 2019 07:45) (66 - 85)  BP: 136/72 (09 Aug 2019 07:45) (136/72 - 163/70)  BP(mean): --  RR: 19 (09 Aug 2019 07:45) (18 - 22)  SpO2: 93% (09 Aug 2019 07:45) (93% - 96%)            PHYSICAL EXAM:  Constitutional: Well-developed, well-nourished, in no apparent distress  Eyes: Sclerae anicteric, conjunctivae normal  ENMT: Mucus membranes moist, no oropharyngeal thrush noted  Neck: No thyroid nodules appreciated, no significant cervical or supraclavicular lymphadenopathy  Respiratory: Breathing nonlabored; clear to auscultation  Cardiovascular: Regular rate and rhythm  Gastrointestinal: Soft, nontender, nondistended, normoactive bowel sounds; no hepatosplenomegaly appreciated; no rebound tenderness or involuntary guarding  Rectal: Perianal exam within normal limits; normal sphincter tone; brown stool on glove  Extremities: No clubbing, cyanosis or edema  Neurological: Alert and oriented to person, place and time; no asterixis  Skin: No jaundice  Lymph Nodes: No significant lymphadenopathy  Musculoskeletal: No significant peripheral atrophy  Psychiatric: Affect and mood appropriate      LABS:                        7.8    6.27  )-----------( 93       ( 09 Aug 2019 07:53 )             24.4       08-09    141  |  111<H>  |  58.0<H>  ----------------------------<  164<H>  4.5   |  21.0<L>  |  1.99<H>    Ca    8.6      09 Aug 2019 07:53  Phos  4.0     08-09  Mg     2.1     08-09              IMAGING:  I personally reviewed the XXXX, and I agree with the radiologist's interpretation. HISTORY OF PRESENT ILLNESS:  This is a 74y old Female with a past medical history significant for CKD4, HTN, HLD, Bio-AVR, recurrent anemia w/ baseline hbg 7-8 s/p 28 PRBC transfusions s/p 3 bone marrow biopsies with no clear etiology of anemia, s/p EGD/Colonoscopy/Capsular endoscopies in 2014/2015, HCV Cirrhosis, Diastolic CHF, DM2, recent prolonged hospital course in 7/19 for right upper chest wound infection related to port, MARGUERITE on CKD requiring transient HD. Patient was discharged on home oxygen. She has a baseline anemia and has been receiving IV iron and blood transfusions. She was noted to have a decrease in hgb and she has been having increasing SOB with exertion, and generalized weakness.  She had a hbg of 5.3. She has occasional dark BMs but states she thinks it may be diet related. She has had hemorrhoid bleeding also but for 3-4 days prior to admission it was only a small amount in the toilet tissue. She has had significant hemorrhoid bleeding in the past the last episode was 4 weeks she saw a significant amount of blood in the toilet bowel.  She had a colonoscopy in 2014 with a 1 cm rectal polyp EGD and colonoscopy at that time were unremarkable. She states she has not had an endoscopic evaluation since 2014/2015. She had an appointment to see Dr. Bundy next month to discuss Hep C treatment and EGD/colonoscopy.  She feels as though her anemia has worsened since her AVR in 2016.  She denies abdominal pain and weight loss. She is currently experiencing SOB and cough     REVIEW OF SYSTEMS:  Constitutional:  No unintentional weight loss, fevers, chills or night sweats	  Eyes: No eye pain, redness, discharge, or proptosis  ENMT: No sore throat, ear pain, mouth sores, or swollen glands in the neck  Respiratory: No dyspnea, cough or wheezing  Cardiovascular: No chest pain, dyspnea on exertion, or orthopnea  Gastrointestinal:	Please see HPI  Genitourinary: No dysuria or hematuria  Neurological:	 No changes in sleep/wake cycle, convulsions, confusion, dizziness or lightheadedness  Psychiatric: No changes in personality or emotional problems   Hematology: No easy bruising   Endocrine: No hot or cold flashes or deepening of voice	  All other review of systems were completed and were otherwise negative save what is reported in the HPI.    PAST MEDICAL/SURGICAL HISTORY:  CKD (chronic kidney disease)  CAD (coronary artery disease)  Heart failure  Hepatitis C  Aortic stenosis  Anemia, unspecified anemia type  Asthma  Low back pain: chronic over a year now.  High cholesterol  Hypertension  Diabetes  H/O aortic valve replacement: cow valve.  S/P CABG x 3  History of tonsillectomy    SOCIAL HISTORY:  - TOBACCO:  - ALCOHOL:  - ILLICIT DRUG USE: Denies    FAMILY HISTORY:  No known history of gastrointestinal or liver disease;  Family history of diabetes mellitus (Sibling)      HOME MEDICATIONS:  albuterol 2.5 mg/3 mL (0.083%) inhalation solution: 3 milliliter(s) inhaled every 6 hours, As Needed (17 Jul 2019 13:35)  Aranesp Albumin Free: once weekly at MD&#x27;s office (17 Jul 2019 13:35)  ascorbic acid 500 mg oral tablet: 1 tab(s) orally once a day (22 Jul 2019 14:30)  folic acid 1 mg oral tablet: 1 tab(s) orally once a day (22 Jul 2019 14:30)  insulin lispro 100 units/mL subcutaneous solution:  subcutaneous 3 times a day (with meals) ; 2 Unit(s) if Glucose 151 - 200  4 Unit(s) if Glucose 201 - 250  6 Unit(s) if Glucose 251 - 300  8 Unit(s) if Glucose 301 - 350  10 Unit(s) if Glucose 351 - 400  12 Unit(s) if Glucose GREATER THAN 400 (17 Jul 2019 13:35)  Lasix 40 mg oral tablet: 1 tab(s) orally 2 times a day (22 Jul 2019 14:30)  Levoxyl 100 mcg (0.1 mg) oral tablet: 1 tab(s) orally once a day (22 Jul 2019 14:30)  magnesium oxide 400 mg (241.3 mg elemental magnesium) oral tablet: 1 tab(s) orally every other day (17 Jul 2019 13:35)  Norvasc 10 mg oral tablet: 1 tab(s) orally once a day (22 Jul 2019 14:30)  phenylephrine 0.25%-3% rectal ointment: 1 application rectal 4 times a day, As needed, hemorrhoidal irritation/bleeding (22 Jul 2019 14:30)  pravastatin 80 mg oral tablet: 1 tab(s) orally once a day (17 Jul 2019 13:35)  Protonix 40 mg oral delayed release tablet: 1 tab(s) orally 2 times a day (22 Jul 2019 14:30)  Singulair 10 mg oral tablet: 1 tab(s) orally once a day (22 Jul 2019 14:30)  Thera-D Rapid Repletion oral tablet: 2000 unit(s) orally once a day (22 Jul 2019 14:30)  Tresiba 100 units/mL subcutaneous solution: 10 unit(s) subcutaneous once a day (17 Jul 2019 13:35)    INPATIENT MEDICATIONS:  MEDICATIONS  (STANDING):  ALBUTerol    90 MICROgram(s) HFA Inhaler 1 Puff(s) Inhalation every 4 hours  amLODIPine   Tablet 10 milliGRAM(s) Oral daily  ascorbic acid 500 milliGRAM(s) Oral daily  atorvastatin 20 milliGRAM(s) Oral at bedtime  dextrose 5%. 1000 milliLiter(s) (50 mL/Hr) IV Continuous <Continuous>  dextrose 50% Injectable 12.5 Gram(s) IV Push once  dextrose 50% Injectable 25 Gram(s) IV Push once  dextrose 50% Injectable 25 Gram(s) IV Push once  folic acid 1 milliGRAM(s) Oral daily  furosemide    Tablet 40 milliGRAM(s) Oral two times a day  insulin glargine Injectable (LANTUS) 10 Unit(s) SubCutaneous at bedtime  insulin lispro (HumaLOG) corrective regimen sliding scale   SubCutaneous Before meals and at bedtime  levothyroxine 100 MICROGram(s) Oral daily  metoprolol tartrate 25 milliGRAM(s) Oral two times a day  montelukast 10 milliGRAM(s) Oral daily  pantoprazole    Tablet 40 milliGRAM(s) Oral two times a day  sodium chloride 0.9% lock flush 3 milliLiter(s) IV Push every 8 hours  tiotropium 18 MICROgram(s) Capsule 1 Capsule(s) Inhalation daily    MEDICATIONS  (PRN):  acetaminophen   Tablet .. 650 milliGRAM(s) Oral every 6 hours PRN Moderate Pain (4 - 6)  dextrose 40% Gel 15 Gram(s) Oral once PRN Blood Glucose LESS THAN 70 milliGRAM(s)/deciliter  glucagon  Injectable 1 milliGRAM(s) IntraMuscular once PRN Glucose LESS THAN 70 milligrams/deciliter  ondansetron Injectable 4 milliGRAM(s) IV Push every 6 hours PRN Nausea    ALLERGIES:  Bactrim (Unknown)  Biaxin (Joint Pain)  morphine (Short breath)    VITAL SIGNS LAST 24 HOURS:  T(C): 36.8 (09 Aug 2019 07:45), Max: 37.2 (08 Aug 2019 09:47)  T(F): 98.2 (09 Aug 2019 07:45), Max: 98.9 (08 Aug 2019 09:47)  HR: 66 (09 Aug 2019 07:45) (66 - 85)  BP: 136/72 (09 Aug 2019 07:45) (136/72 - 163/70)  BP(mean): --  RR: 19 (09 Aug 2019 07:45) (18 - 22)  SpO2: 93% (09 Aug 2019 07:45) (93% - 96%)        PHYSICAL EXAM:  Constitutional: Well-developed, well-nourished, in no apparent distress on NC   Eyes: Sclerae anicteric, conjunctivae normal  ENMT: Mucus membranes moist, no oropharyngeal thrush noted  Neck: No thyroid nodules appreciated, no significant cervical or supraclavicular lymphadenopathy  Respiratory: Breathing nonlabored; rales at bases   Cardiovascular: Regular rate and rhythm  Gastrointestinal: Soft, nontender, nondistended, normoactive bowel sounds; no hepatosplenomegaly appreciated; no rebound tenderness or involuntary guarding  Extremities: No clubbing, cyanosis or edema  Neurological: Alert and oriented to person, place and time; no asterixis  Skin: No jaundice  Lymph Nodes: No significant lymphadenopathy  Musculoskeletal: No significant peripheral atrophy  Psychiatric: Affect and mood appropriate      LABS:                        7.8    6.27  )-----------( 93       ( 09 Aug 2019 07:53 )             24.4       08-09    141  |  111<H>  |  58.0<H>  ----------------------------<  164<H>  4.5   |  21.0<L>  |  1.99<H  Ca    8.6      09 Aug 2019 07:53  Phos  4.0     08-09  Mg     2.1     08-09    Ferritin, Serum: 128 ng/mL (07.18.19 @ 03:42)    Iron with Total Binding Capacity in AM (07.18.19 @ 03:42)    % Saturation, Iron: 11 %    Iron - Total Binding Capacity.: 269 ug/dL    Iron Total, Serum: 30 ug/dL      % Saturation, Iron: 11 % (07.18.19 @ 03:42)      IMAGING:  < from: CT Abdomen and Pelvis No Cont (10.05.17 @ 05:22) >  FINDINGS:  Visualized lower chest: Patient is status post aortic valve replacement.    Median sternotomy wires are partially visualized heart is enlarged.    There are atherosclerotic calcifications of the visualized coronary   arteries.  There is dystrophic    Liver: Cirrhotic morphology of the liver.  Bile ducts: Unremarkable.  Gallbladder: Cholelithiasis without evidence of gallbladder wall   thickening or surrounding inflammatory changes.  Spleen: Enlarged spleen measuring approximately 13.3 x 4.5 x 14.7 cm.    Complex 1 cm cyst in the spleen demonstrating.  Peripheral calcifications.  Pancreas: Unremarkable.  Adrenal glands: Unremarkable.  Kidneys/ureters: Unremarkable.    Bladder: Unremarkable.  Reproductive organs: Uterus and adnexa appear unremarkable.    Bowel: No bowel obstruction.  Normal appendix.  Mild diverticulosis of   the distal descending and proximal-mid sigmoid colon.  Peritoneum: No ascites.    Retroperitoneum: Nonspecific gastrohepatic, periportal and portacaval   lymph nodes measuring up to 2.1 x 1.1 cm in the portacaval region (2:55).  Vasculature: Scattered atherosclerotic calcifications of the aorta.    Proximal thigh vasculature.  There appear to be gastrosplenic varices and   recanalized umbilical vein which are suboptimally assessed without   contrast.    Abdominal wall/soft tissues: Prominent vessels in the ventral abdominal   wall likely related to portosystemic shunt.  Bones: Multilevel degenerative changes in the spine.  At L4-L5 there is   grade 1 anterolisthesis with small to moderate disc bulge and marked   facet/ligament flavum hypertrophy resulting in moderate to severe spinal   canal stenosis.  There are small Schmorl's nodes in the superior   endplates of T10 and L2 breezy large Schmorl's node in the superior   endplate of T12.      IMPRESSION:  No evidence of bowel obstruction, active inflammatory process or   intra-abdominal source for infection, within limits of a noncontrast CT   scan.  Cholelithiasis without CT evidence of acute cholecystitis.  Right upper   quadrant ultrasound can be performed as clinically warranted.   Cirrhotic liver.  Splenomegaly and multiple varices suggesting portal   venous hypertension.  No ascites.  MRI surveillance can be performed as   clinically warranted to exclude hepatocellular carcinoma.  < end of copied text >

## 2019-08-09 NOTE — PROGRESS NOTE ADULT - SUBJECTIVE AND OBJECTIVE BOX
[facility], THE HEART CENTER                                   [address] [place] [address]                                                      PHONE: [phone]                                                         FAX: [phone]  [email or web address]  ---------------------------------------------------------------------------------------------------------------------------------    Overnight events/patient complaints:      PAST MEDICAL & SURGICAL HISTORY:  CKD (chronic kidney disease)  CAD (coronary artery disease)  Heart failure  Hepatitis C  Aortic stenosis  Anemia, unspecified anemia type  Asthma  Low back pain: chronic over a year now.  High cholesterol  Hypertension  Diabetes  H/O aortic valve replacement: cow valve.  S/P CABG x 3  History of tonsillectomy      Bactrim (Unknown)  Biaxin (Joint Pain)  morphine (Short breath)    MEDICATIONS  (STANDING):  ALBUTerol    90 MICROgram(s) HFA Inhaler 1 Puff(s) Inhalation every 4 hours  amLODIPine   Tablet 10 milliGRAM(s) Oral daily  ascorbic acid 500 milliGRAM(s) Oral daily  atorvastatin 20 milliGRAM(s) Oral at bedtime  dextrose 5%. 1000 milliLiter(s) (50 mL/Hr) IV Continuous <Continuous>  dextrose 50% Injectable 12.5 Gram(s) IV Push once  dextrose 50% Injectable 25 Gram(s) IV Push once  dextrose 50% Injectable 25 Gram(s) IV Push once  folic acid 1 milliGRAM(s) Oral daily  furosemide    Tablet 40 milliGRAM(s) Oral two times a day  insulin glargine Injectable (LANTUS) 10 Unit(s) SubCutaneous at bedtime  insulin lispro (HumaLOG) corrective regimen sliding scale   SubCutaneous Before meals and at bedtime  levothyroxine 100 MICROGram(s) Oral daily  metoprolol tartrate 25 milliGRAM(s) Oral two times a day  montelukast 10 milliGRAM(s) Oral daily  pantoprazole    Tablet 40 milliGRAM(s) Oral two times a day  sodium chloride 0.9% lock flush 3 milliLiter(s) IV Push every 8 hours  tiotropium 18 MICROgram(s) Capsule 1 Capsule(s) Inhalation daily    MEDICATIONS  (PRN):  acetaminophen   Tablet .. 650 milliGRAM(s) Oral every 6 hours PRN Moderate Pain (4 - 6)  dextrose 40% Gel 15 Gram(s) Oral once PRN Blood Glucose LESS THAN 70 milliGRAM(s)/deciliter  glucagon  Injectable 1 milliGRAM(s) IntraMuscular once PRN Glucose LESS THAN 70 milligrams/deciliter  ondansetron Injectable 4 milliGRAM(s) IV Push every 6 hours PRN Nausea      Vital Signs Last 24 Hrs  T(C): 36.6 (08 Aug 2019 23:36), Max: 37.2 (08 Aug 2019 09:47)  T(F): 97.8 (08 Aug 2019 23:36), Max: 98.9 (08 Aug 2019 09:47)  HR: 85 (09 Aug 2019 05:04) (69 - 85)  BP: 163/70 (09 Aug 2019 05:04) (147/67 - 163/70)  BP(mean): --  RR: 22 (09 Aug 2019 03:00) (18 - 22)  SpO2: 94% (09 Aug 2019 07:19) (93% - 96%)  ICU Vital Signs Last 24 Hrs  [facility]  I&O's Detail    I&O's Summary    Drug Dosing Weight  [facility]      PHYSICAL EXAM:  General: Appears well developed, well nourished alert and cooperative.  HEENT: Head; normocephalic, atraumatic.  Eyes: Pupils reactive, cornea wnl.  Neck: Supple, no nodes adenopathy, no NVD or carotid bruit or thyromegaly.  CARDIOVASCULAR: Normal S1 and S2, No murmur, rub, gallop or lift.   LUNGS: No rales, rhonchi or wheeze. Normal breath sounds bilaterally.  ABDOMEN: Soft, nontender without mass or organomegaly. bowel sounds normoactive.  EXTREMITIES: No clubbing, cyanosis or edema. Distal pulses wnl.   SKIN: warm and dry with normal turgor.  NEURO: Alert/oriented x 3/normal motor exam. No pathologic reflexes.    PSYCH: normal affect.        LABS:                        7.6    6.17  )-----------( 99       ( 08 Aug 2019 08:11 )             23.8     08-08    147<H>  |  114<H>  |  64.0<H>  ----------------------------<  150<H>  3.9   |  20.0<L>  |  2.01<H>    Ca    8.9      08 Aug 2019 08:11  Phos  2.0     08-08  Mg     2.1     08-08      NICHELLE GILL            RADIOLOGY & ADDITIONAL STUDIES:    INTERPRETATION OF TELEMETRY (personally reviewed):         ASSESSMENT AND PLAN:  In summary, NICHELLE GILL is an 74y Female with past medical history significant for 74y Female with prior history of CAD s/p CABG and Bio AVR 12/2016 with brief post operative AF, normal EF, CKD requiring transient HD for hyperkalemia, mild MR, prior TIA s/p ILR, chronic Hep C complicated by cirrhosis and varices, bronchiectasias and chronic anemia with worsening anemia.    Shortness of breath  -  Likely diastolic HF with volume overload from transfusion  -  Change to po lasix for now  -  Needs additional  iv lasix with future transfusions    MARGUERITE on likely CKD  -  Monitor renal function closely    CAD s/p CABG  -  Continue metoprolol  -  Not on antiplatelet due to anemia    Acute on chronic anemia  -  Monitor H/H  -  Transfusion as needed    Thank you for allowing [facility] to participate in the care of this patient.  Please feel free to call with any questions or concerns. [facility], THE HEART CENTER                                   [address] [place] [address]                                                      PHONE: [phone]                                                         FAX: [phone]  [email or web address]  ---------------------------------------------------------------------------------------------------------------------------------    Overnight events/patient complaints:  no events  pt with mild edema     PAST MEDICAL & SURGICAL HISTORY:  CKD (chronic kidney disease)  CAD (coronary artery disease)  Heart failure  Hepatitis C  Aortic stenosis  Anemia, unspecified anemia type  Asthma  Low back pain: chronic over a year now.  High cholesterol  Hypertension  Diabetes  H/O aortic valve replacement: cow valve.  S/P CABG x 3  History of tonsillectomy      Bactrim (Unknown)  Biaxin (Joint Pain)  morphine (Short breath)    MEDICATIONS  (STANDING):  ALBUTerol    90 MICROgram(s) HFA Inhaler 1 Puff(s) Inhalation every 4 hours  amLODIPine   Tablet 10 milliGRAM(s) Oral daily  ascorbic acid 500 milliGRAM(s) Oral daily  atorvastatin 20 milliGRAM(s) Oral at bedtime  dextrose 5%. 1000 milliLiter(s) (50 mL/Hr) IV Continuous <Continuous>  dextrose 50% Injectable 12.5 Gram(s) IV Push once  dextrose 50% Injectable 25 Gram(s) IV Push once  dextrose 50% Injectable 25 Gram(s) IV Push once  folic acid 1 milliGRAM(s) Oral daily  furosemide    Tablet 40 milliGRAM(s) Oral two times a day  insulin glargine Injectable (LANTUS) 10 Unit(s) SubCutaneous at bedtime  insulin lispro (HumaLOG) corrective regimen sliding scale   SubCutaneous Before meals and at bedtime  levothyroxine 100 MICROGram(s) Oral daily  metoprolol tartrate 25 milliGRAM(s) Oral two times a day  montelukast 10 milliGRAM(s) Oral daily  pantoprazole    Tablet 40 milliGRAM(s) Oral two times a day  sodium chloride 0.9% lock flush 3 milliLiter(s) IV Push every 8 hours  tiotropium 18 MICROgram(s) Capsule 1 Capsule(s) Inhalation daily    MEDICATIONS  (PRN):  acetaminophen   Tablet .. 650 milliGRAM(s) Oral every 6 hours PRN Moderate Pain (4 - 6)  dextrose 40% Gel 15 Gram(s) Oral once PRN Blood Glucose LESS THAN 70 milliGRAM(s)/deciliter  glucagon  Injectable 1 milliGRAM(s) IntraMuscular once PRN Glucose LESS THAN 70 milligrams/deciliter  ondansetron Injectable 4 milliGRAM(s) IV Push every 6 hours PRN Nausea      Vital Signs Last 24 Hrs  T(C): 36.6 (08 Aug 2019 23:36), Max: 37.2 (08 Aug 2019 09:47)  T(F): 97.8 (08 Aug 2019 23:36), Max: 98.9 (08 Aug 2019 09:47)  HR: 85 (09 Aug 2019 05:04) (69 - 85)  BP: 163/70 (09 Aug 2019 05:04) (147/67 - 163/70)  BP(mean): --  RR: 22 (09 Aug 2019 03:00) (18 - 22)  SpO2: 94% (09 Aug 2019 07:19) (93% - 96%)  ICU Vital Signs Last 24 Hrs  [facility]  I&O's Detail    I&O's Summary    Drug Dosing Weight  [facility]      PHYSICAL EXAM:  General: Appears well developed, well nourished alert and cooperative.  HEENT: Head; normocephalic, atraumatic.  Eyes: Pupils reactive, cornea wnl.  Neck: Supple, no nodes adenopathy, no NVD or carotid bruit or thyromegaly.  CARDIOVASCULAR: Normal S1 and S2, No murmur, rub, gallop or lift.   LUNGS: No rales, rhonchi or wheeze. Normal breath sounds bilaterally.  ABDOMEN: Soft, nontender without mass or organomegaly. bowel sounds normoactive.  EXTREMITIES: No clubbing, cyanosis or edema. Distal pulses wnl.   SKIN: warm and dry with normal turgor.  NEURO: Alert/oriented x 3/normal motor exam. No pathologic reflexes.    PSYCH: normal affect.        LABS:                        7.6    6.17  )-----------( 99       ( 08 Aug 2019 08:11 )             23.8     08-08    147<H>  |  114<H>  |  64.0<H>  ----------------------------<  150<H>  3.9   |  20.0<L>  |  2.01<H>    Ca    8.9      08 Aug 2019 08:11  Phos  2.0     08-08  Mg     2.1     08-08      NICHELLE GILL            RADIOLOGY & ADDITIONAL STUDIES:    INTERPRETATION OF TELEMETRY (personally reviewed):         ASSESSMENT AND PLAN:  In summary, NICHELLE GILL is an 74y Female with past medical history significant for 74y Female with prior history of CAD s/p CABG and Bio AVR 12/2016 with brief post operative AF, normal EF, CKD requiring transient HD for hyperkalemia, mild MR, prior TIA s/p ILR, chronic Hep C complicated by cirrhosis and varices, bronchiectasias and chronic anemia with worsening anemia.    Shortness of breath:  -  Likely diastolic HF with volume overload from transfusion  -  lasix po BID     MARGUERITE on likely CKD:  -  Monitor renal function closely    CAD s/p CABG:  -  Continue metoprolol  -  Not on antiplatelet due to anemia    Acute on chronic anemia  -  Monitor H/H  -  Transfusion as needed  - GI/heme eval     Thank you for allowing [facility] to participate in the care of this patient.  Please feel free to call with any questions or concerns.

## 2019-08-09 NOTE — CONSULT NOTE ADULT - SUBJECTIVE AND OBJECTIVE BOX
[facility]  INFECTIOUS DISEASES AND INTERNAL MEDICINE at [place]  =======================================================  Omi Brink MD  Diplomates American Board of Internal Medicine and Infectious Diseases  Telephone [phone]  Fax            [phone]  =======================================================    [id number]  NICHELLE GILL   HPI:  73 y/o female with hx of CHFpEf, CAD s/p CABG, AS s/p bovine AVR (both at [place]),  DM2, Hepatitis C with cirrhosis, iron deficiency anemia of uncertain etiology with 3 negative bone marrows, on regular iron supplements and transfusion, CKD-4, HTN, HLD, Bio-AVR, recurrent anemia w/ baseline hbg 7-8 s/p 28 PRBC transfusions s/p 3 BMB with no clear etiology of anemia, s/p EGD/ Colonoscopy/ Capsular endoscopies in past, HCV, Cirrhosis, Diastolic CHF, DM-2, recent prolonged hospital course in 7/19 for right upper chest wound infection related to port, MARGUERITE on CKD, metabolic acidosis, hyperkalemia and Heart block requiring transient HD.      She was last seen by our service on 7/20/19 for concern about wound infection/cellulitis, in relation to R upper chest port, placed about 1st week of 7/2019.  She is now admitted for workup of anemia with hemoglobin of 5.3, requiring multiple transfusions.  Per last note, her Blood culture from 7/17/19 were negative.  her wound had no discharge or drainage.  She was recommenced to complete 2 weeks of linezolid, which ended on 8/1/19.    Workup in process for anemia.   Regarding her port, there is no pain, no tenderness.  She wants to know if it can be used.     =======================================================  Past Medical & Surgical Hx:  =====================  PAST MEDICAL & SURGICAL HISTORY:  CKD (chronic kidney disease)  CAD (coronary artery disease)  Heart failure  Hepatitis C  Aortic stenosis  Anemia, unspecified anemia type  Asthma  Low back pain: chronic over a year now.  High cholesterol  Hypertension  Diabetes  H/O aortic valve replacement: cow valve.  S/P CABG x 3  History of tonsillectomy    Problem List:  ==========  HEALTH ISSUES - PROBLEM Dx:  Hypothyroidism, unspecified type: Hypothyroidism, unspecified type  Cirrhosis of liver without ascites, unspecified hepatic cirrhosis type: Cirrhosis of liver without ascites, unspecified hepatic cirrhosis type  Type 2 diabetes mellitus with other circulatory complication, with long-term current use of insulin: Type 2 diabetes mellitus with other circulatory complication, with long-term current use of insulin  Coronary artery disease involving native coronary artery of native heart without angina pectoris: Coronary artery disease involving native coronary artery of native heart without angina pectoris  Stage 4 chronic kidney disease: Stage 4 chronic kidney disease  Chronic diastolic congestive heart failure: Chronic diastolic congestive heart failure  Symptomatic anemia: Symptomatic anemia      Social Hx:  =======  no toxic habits currently    FAMILY HISTORY:  Family history of diabetes mellitus (Sibling)  no significant family history of immunosuppressive disorders in mother or father     =======================================================  REVIEW OF SYSTEMS:  as above  all other ROS negative  =======================================================  Allergies  Bactrim (Unknown)  Biaxin (Joint Pain)  morphine (Short breath)    Antibiotics:    Other medications:  ALBUTerol    90 MICROgram(s) HFA Inhaler 1 Puff(s) Inhalation every 4 hours  ALBUTerol/ipratropium for Nebulization 3 milliLiter(s) Nebulizer every 6 hours  amLODIPine   Tablet 10 milliGRAM(s) Oral daily  ascorbic acid 500 milliGRAM(s) Oral daily  atorvastatin 20 milliGRAM(s) Oral at bedtime  dextrose 5%. 1000 milliLiter(s) IV Continuous <Continuous>  dextrose 50% Injectable 12.5 Gram(s) IV Push once  dextrose 50% Injectable 25 Gram(s) IV Push once  dextrose 50% Injectable 25 Gram(s) IV Push once  folic acid 1 milliGRAM(s) Oral daily  furosemide    Tablet 40 milliGRAM(s) Oral two times a day  insulin glargine Injectable (LANTUS) 10 Unit(s) SubCutaneous at bedtime  insulin lispro (HumaLOG) corrective regimen sliding scale   SubCutaneous Before meals and at bedtime  levothyroxine 100 MICROGram(s) Oral daily  lidocaine   Patch 1 Patch Transdermal daily  metoprolol tartrate 25 milliGRAM(s) Oral two times a day  montelukast 10 milliGRAM(s) Oral daily  pantoprazole    Tablet 40 milliGRAM(s) Oral two times a day  sodium chloride 0.9% lock flush 3 milliLiter(s) IV Push every 8 hours  tiotropium 18 MICROgram(s) Capsule 1 Capsule(s) Inhalation daily     linezolid    Tablet   600 milliGRAM(s) Oral (07-20-19 @ 18:03)   600 milliGRAM(s) Oral (07-21-19 @ 05:13)   600 milliGRAM(s) Oral (07-21-19 @ 17:04)   600 milliGRAM(s) Oral (07-22-19 @ 06:31)   600 milliGRAM(s) Oral (07-22-19 @ 17:34)    vancomycin  IVPB   100 mL/Hr IV Intermittent (07-18-19 @ 17:41)   100 mL/Hr IV Intermittent (07-20-19 @ 00:14)    vancomycin  IVPB   250 mL/Hr IV Intermittent (07-17-19 @ 22:15)      ======================================================  Physical Exam:  ============  T(F): 98.2 (09 Aug 2019 07:45), Max: 98.6 (08 Aug 2019 16:50)  HR: 66 (09 Aug 2019 07:45)  BP: 136/72 (09 Aug 2019 07:45)  RR: 19 (09 Aug 2019 07:45)  SpO2: 93% (09 Aug 2019 07:45) (93% - 96%)  temp max in last 48H T(F): , Max: 99.3 (08-07-19 @ 15:13)    General:  No acute distress.  Eye: Pupils are equal, round and reactive to light, Extraocular movements are intact, Normal conjunctiva.  HENT: Normocephalic, Oral mucosa is moist, No pharyngeal erythema, No sinus tenderness.  Neck: Supple, No lymphadenopathy.  Respiratory: Lungs are clear to auscultation, Respirations are non-labored.  RIGHT chest wall with port in place; NON TENDER TO Touch  Cardiovascular: Normal rate, Regular rhythm,    trace edema  Gastrointestinal: Soft, Non-tender, Non-distended, Normal bowel sounds.  Genitourinary: No costovertebral angle tenderness.  Lymphatics: No lymphadenopathy neck,   Musculoskeletal: Normal range of motion, Normal strength.  Integumentary: No rash.  Neurologic: Alert, Oriented, No focal deficits, Cranial Nerves II-XII are grossly intact.  Psychiatric: Appropriate mood & affect.  =======================================================  Labs:                        7.8    6.27  )-----------( 93       ( 09 Aug 2019 07:53 )             24.4       WBC Count: 6.27 K/uL (08-09-19 @ 07:53)  WBC Count: 6.17 K/uL (08-08-19 @ 08:11)  WBC Count: 6.43 K/uL (08-07-19 @ 09:53)  WBC Count: 8.83 K/uL (08-06-19 @ 16:32)      08-09    141  |  111<H>  |  58.0<H>  ----------------------------<  164<H>  4.5   |  21.0<L>  |  1.99<H>    Ca    8.6      09 Aug 2019 07:53  Phos  4.0     08-09  Mg     2.1     08-09      Creatinine, Serum: 1.99 mg/dL (08-09-19 @ 07:53)  Creatinine, Serum: 2.01 mg/dL (08-08-19 @ 08:11)  Creatinine, Serum: 2.17 mg/dL (08-07-19 @ 09:53)  Creatinine, Serum: 2.29 mg/dL (08-06-19 @ 16:32)

## 2019-08-09 NOTE — PROGRESS NOTE ADULT - SUBJECTIVE AND OBJECTIVE BOX
CC: anemia    Patient seen and examined at the bedside. No acute overnight events. no events yesterday. Complaining of sob + cough still present w/ rib pain subsequently today. Denies fever/chills, headache, lightheadedness, dizziness, chest pain, palpitations, abd pain, nausea/vomiting/diarrhea      =========================================================================================    PHYSICAL EXAM.    GEN - appears age appropriate. well nourished. pleasant. no distress.   HEENT - NCAT, EOMI, SAVANNAH  RESP - CTA BL, seen during breathing treatment. no wheeze/stridor/rhonchi/crackles. on supplemental O2, nc. able to speak in full sentences without distress but noted to have several coughing fits  CARDIO - NS1S2, RRR. No murmurs/rubs/gallops. good cap refill  ABD - Soft/Non tender/Non distended. Normal BS x4 quadrants. no guarding/rebound tenderness.  Ext - No ANKIT.  MSK - BL 5/5 strength on upper and lower extremities.   Neuro - AAOx3. cn 2-12 grossly intact  Psych - normal affect  Skin - c/d/i. no rashes/lesions      VITAL SIGNS.    Vital Signs Last 24 Hrs  T(C): 36.8 (09 Aug 2019 07:45), Max: 37 (08 Aug 2019 16:50)  T(F): 98.2 (09 Aug 2019 07:45), Max: 98.6 (08 Aug 2019 16:50)  HR: 66 (09 Aug 2019 07:45) (66 - 85)  BP: 136/72 (09 Aug 2019 07:45) (136/72 - 163/70)  BP(mean): --  RR: 19 (09 Aug 2019 07:45) (18 - 22)  SpO2: 93% (09 Aug 2019 07:45) (93% - 96%)        =================================================    LABS.                          7.8    6.27  )-----------( 93       ( 09 Aug 2019 07:53 )             24.4     08-09    141  |  111<H>  |  58.0<H>  ----------------------------<  164<H>  4.5   |  21.0<L>  |  1.99<H>    Ca    8.6      09 Aug 2019 07:53  Phos  4.0     08-09  Mg     2.1     08-09          I&O's Summary        ================================================    IMAGING.      ================================================    HOME MEDS.    Home Medications:  albuterol 2.5 mg/3 mL (0.083%) inhalation solution: 3 milliliter(s) inhaled every 6 hours, As Needed (17 Jul 2019 13:35)  Aranesp Albumin Free: once weekly at MD&#x27;s office (17 Jul 2019 13:35)  ascorbic acid 500 mg oral tablet: 1 tab(s) orally once a day (22 Jul 2019 14:30)  folic acid 1 mg oral tablet: 1 tab(s) orally once a day (22 Jul 2019 14:30)  insulin lispro 100 units/mL subcutaneous solution:  subcutaneous 3 times a day (with meals) ; 2 Unit(s) if Glucose 151 - 200  4 Unit(s) if Glucose 201 - 250  6 Unit(s) if Glucose 251 - 300  8 Unit(s) if Glucose 301 - 350  10 Unit(s) if Glucose 351 - 400  12 Unit(s) if Glucose GREATER THAN 400 (17 Jul 2019 13:35)  Lasix 40 mg oral tablet: 1 tab(s) orally 2 times a day (22 Jul 2019 14:30)  Levoxyl 100 mcg (0.1 mg) oral tablet: 1 tab(s) orally once a day (22 Jul 2019 14:30)  magnesium oxide 400 mg (241.3 mg elemental magnesium) oral tablet: 1 tab(s) orally every other day (17 Jul 2019 13:35)  Norvasc 10 mg oral tablet: 1 tab(s) orally once a day (22 Jul 2019 14:30)  phenylephrine 0.25%-3% rectal ointment: 1 application rectal 4 times a day, As needed, hemorrhoidal irritation/bleeding (22 Jul 2019 14:30)  pravastatin 80 mg oral tablet: 1 tab(s) orally once a day (17 Jul 2019 13:35)  Protonix 40 mg oral delayed release tablet: 1 tab(s) orally 2 times a day (22 Jul 2019 14:30)  Singulair 10 mg oral tablet: 1 tab(s) orally once a day (22 Jul 2019 14:30)  Thera-D Rapid Repletion oral tablet: 2000 unit(s) orally once a day (22 Jul 2019 14:30)  Tresiba 100 units/mL subcutaneous solution: 10 unit(s) subcutaneous once a day (17 Jul 2019 13:35)      ================================================    HOSPITAL MEDS.    MEDICATIONS  (STANDING):  ALBUTerol    90 MICROgram(s) HFA Inhaler 1 Puff(s) Inhalation every 4 hours  ALBUTerol/ipratropium for Nebulization 3 milliLiter(s) Nebulizer every 6 hours  amLODIPine   Tablet 10 milliGRAM(s) Oral daily  ascorbic acid 500 milliGRAM(s) Oral daily  atorvastatin 20 milliGRAM(s) Oral at bedtime  dextrose 5%. 1000 milliLiter(s) (50 mL/Hr) IV Continuous <Continuous>  dextrose 50% Injectable 12.5 Gram(s) IV Push once  dextrose 50% Injectable 25 Gram(s) IV Push once  dextrose 50% Injectable 25 Gram(s) IV Push once  folic acid 1 milliGRAM(s) Oral daily  furosemide    Tablet 40 milliGRAM(s) Oral two times a day  insulin glargine Injectable (LANTUS) 10 Unit(s) SubCutaneous at bedtime  insulin lispro (HumaLOG) corrective regimen sliding scale   SubCutaneous Before meals and at bedtime  levothyroxine 100 MICROGram(s) Oral daily  lidocaine   Patch 1 Patch Transdermal daily  metoprolol tartrate 25 milliGRAM(s) Oral two times a day  montelukast 10 milliGRAM(s) Oral daily  pantoprazole    Tablet 40 milliGRAM(s) Oral two times a day  sodium chloride 0.9% lock flush 3 milliLiter(s) IV Push every 8 hours  tiotropium 18 MICROgram(s) Capsule 1 Capsule(s) Inhalation daily    MEDICATIONS  (PRN):  acetaminophen   Tablet .. 650 milliGRAM(s) Oral every 6 hours PRN Moderate Pain (4 - 6)  benzonatate 100 milliGRAM(s) Oral every 8 hours PRN Cough  dextrose 40% Gel 15 Gram(s) Oral once PRN Blood Glucose LESS THAN 70 milliGRAM(s)/deciliter  glucagon  Injectable 1 milliGRAM(s) IntraMuscular once PRN Glucose LESS THAN 70 milligrams/deciliter  ondansetron Injectable 4 milliGRAM(s) IV Push every 6 hours PRN Nausea

## 2019-08-10 LAB
ANION GAP SERPL CALC-SCNC: 14 MMOL/L — SIGNIFICANT CHANGE UP (ref 5–17)
BUN SERPL-MCNC: 53 MG/DL — HIGH (ref 8–20)
CALCIUM SERPL-MCNC: 8.7 MG/DL — SIGNIFICANT CHANGE UP (ref 8.6–10.2)
CHLORIDE SERPL-SCNC: 110 MMOL/L — HIGH (ref 98–107)
CO2 SERPL-SCNC: 20 MMOL/L — LOW (ref 22–29)
CREAT SERPL-MCNC: 1.65 MG/DL — HIGH (ref 0.5–1.3)
GLUCOSE BLDC GLUCOMTR-MCNC: 174 MG/DL — HIGH (ref 70–99)
GLUCOSE BLDC GLUCOMTR-MCNC: 214 MG/DL — HIGH (ref 70–99)
GLUCOSE BLDC GLUCOMTR-MCNC: 233 MG/DL — HIGH (ref 70–99)
GLUCOSE BLDC GLUCOMTR-MCNC: 248 MG/DL — HIGH (ref 70–99)
GLUCOSE SERPL-MCNC: 174 MG/DL — HIGH (ref 70–115)
HCT VFR BLD CALC: 26.5 % — LOW (ref 34.5–45)
HGB BLD-MCNC: 8.3 G/DL — LOW (ref 11.5–15.5)
MAGNESIUM SERPL-MCNC: 2.1 MG/DL — SIGNIFICANT CHANGE UP (ref 1.6–2.6)
MCHC RBC-ENTMCNC: 29.5 PG — SIGNIFICANT CHANGE UP (ref 27–34)
MCHC RBC-ENTMCNC: 31.3 GM/DL — LOW (ref 32–36)
MCV RBC AUTO: 94.3 FL — SIGNIFICANT CHANGE UP (ref 80–100)
NRBC # BLD: 1 /100 WBCS — HIGH (ref 0–0)
PHOSPHATE SERPL-MCNC: 3.9 MG/DL — SIGNIFICANT CHANGE UP (ref 2.4–4.7)
PLATELET # BLD AUTO: 84 K/UL — LOW (ref 150–400)
POTASSIUM SERPL-MCNC: 3.9 MMOL/L — SIGNIFICANT CHANGE UP (ref 3.5–5.3)
POTASSIUM SERPL-SCNC: 3.9 MMOL/L — SIGNIFICANT CHANGE UP (ref 3.5–5.3)
RBC # BLD: 2.81 M/UL — LOW (ref 3.8–5.2)
RBC # FLD: 18.5 % — HIGH (ref 10.3–14.5)
SODIUM SERPL-SCNC: 144 MMOL/L — SIGNIFICANT CHANGE UP (ref 135–145)
WBC # BLD: 4.34 K/UL — SIGNIFICANT CHANGE UP (ref 3.8–10.5)
WBC # FLD AUTO: 4.34 K/UL — SIGNIFICANT CHANGE UP (ref 3.8–10.5)

## 2019-08-10 PROCEDURE — 99233 SBSQ HOSP IP/OBS HIGH 50: CPT

## 2019-08-10 PROCEDURE — 99232 SBSQ HOSP IP/OBS MODERATE 35: CPT

## 2019-08-10 RX ORDER — LIDOCAINE 4 G/100G
2 CREAM TOPICAL DAILY
Refills: 0 | Status: DISCONTINUED | OUTPATIENT
Start: 2019-08-10 | End: 2019-08-15

## 2019-08-10 RX ORDER — FUROSEMIDE 40 MG
40 TABLET ORAL
Refills: 0 | Status: DISCONTINUED | OUTPATIENT
Start: 2019-08-10 | End: 2019-08-14

## 2019-08-10 RX ORDER — INSULIN GLARGINE 100 [IU]/ML
5 INJECTION, SOLUTION SUBCUTANEOUS
Refills: 0 | Status: DISCONTINUED | OUTPATIENT
Start: 2019-08-10 | End: 2019-08-13

## 2019-08-10 RX ADMIN — LIDOCAINE 2 PATCH: 4 CREAM TOPICAL at 15:14

## 2019-08-10 RX ADMIN — Medication 100 MILLIGRAM(S): at 17:46

## 2019-08-10 RX ADMIN — Medication 4: at 21:06

## 2019-08-10 RX ADMIN — PANTOPRAZOLE SODIUM 40 MILLIGRAM(S): 20 TABLET, DELAYED RELEASE ORAL at 05:44

## 2019-08-10 RX ADMIN — Medication 100 MICROGRAM(S): at 05:43

## 2019-08-10 RX ADMIN — Medication 2: at 07:55

## 2019-08-10 RX ADMIN — INSULIN GLARGINE 5 UNIT(S): 100 INJECTION, SOLUTION SUBCUTANEOUS at 17:58

## 2019-08-10 RX ADMIN — Medication 3 MILLILITER(S): at 09:16

## 2019-08-10 RX ADMIN — LIDOCAINE 1 PATCH: 4 CREAM TOPICAL at 00:04

## 2019-08-10 RX ADMIN — Medication 25 MILLIGRAM(S): at 17:45

## 2019-08-10 RX ADMIN — Medication 500 MILLIGRAM(S): at 12:16

## 2019-08-10 RX ADMIN — Medication 3 MILLILITER(S): at 02:39

## 2019-08-10 RX ADMIN — SODIUM CHLORIDE 3 MILLILITER(S): 9 INJECTION INTRAMUSCULAR; INTRAVENOUS; SUBCUTANEOUS at 21:08

## 2019-08-10 RX ADMIN — Medication 100 MILLIGRAM(S): at 07:58

## 2019-08-10 RX ADMIN — Medication 1 MILLIGRAM(S): at 12:16

## 2019-08-10 RX ADMIN — Medication 4: at 17:45

## 2019-08-10 RX ADMIN — Medication 40 MILLIGRAM(S): at 05:44

## 2019-08-10 RX ADMIN — MONTELUKAST 10 MILLIGRAM(S): 4 TABLET, CHEWABLE ORAL at 12:16

## 2019-08-10 RX ADMIN — Medication 3 MILLILITER(S): at 20:43

## 2019-08-10 RX ADMIN — AMLODIPINE BESYLATE 10 MILLIGRAM(S): 2.5 TABLET ORAL at 05:43

## 2019-08-10 RX ADMIN — Medication 650 MILLIGRAM(S): at 05:44

## 2019-08-10 RX ADMIN — ATORVASTATIN CALCIUM 20 MILLIGRAM(S): 80 TABLET, FILM COATED ORAL at 21:06

## 2019-08-10 RX ADMIN — Medication 3 MILLILITER(S): at 15:27

## 2019-08-10 RX ADMIN — SODIUM CHLORIDE 3 MILLILITER(S): 9 INJECTION INTRAMUSCULAR; INTRAVENOUS; SUBCUTANEOUS at 15:09

## 2019-08-10 RX ADMIN — Medication 25 MILLIGRAM(S): at 05:43

## 2019-08-10 RX ADMIN — INSULIN GLARGINE 5 UNIT(S): 100 INJECTION, SOLUTION SUBCUTANEOUS at 21:07

## 2019-08-10 RX ADMIN — PANTOPRAZOLE SODIUM 40 MILLIGRAM(S): 20 TABLET, DELAYED RELEASE ORAL at 17:48

## 2019-08-10 RX ADMIN — Medication 650 MILLIGRAM(S): at 06:51

## 2019-08-10 RX ADMIN — Medication 40 MILLIGRAM(S): at 17:45

## 2019-08-10 RX ADMIN — Medication 4: at 12:16

## 2019-08-10 NOTE — PROGRESS NOTE ADULT - SUBJECTIVE AND OBJECTIVE BOX
[place] [facility] - [facility], THE HEART CENTER                                   [address] [place] [address]                                                      PHONE: [phone]                                                         FAX: [phone]  [email or web address]  ---------------------------------------------------------------------------------------------------------------------------------    Overnight events/patient complaints:    INTERPRETATION OF TELEMETRY (personally reviewed):    ECG:    ECHO:    STRESS TEST:    CARDIAC CATHETERIZATION:    I&O's Summary    09 Aug 2019 07:01  -  10 Aug 2019 07:00  --------------------------------------------------------  IN: 755.8 mL / OUT: 0 mL / NET: 755.8 mL        PAST MEDICAL & SURGICAL HISTORY:  CKD (chronic kidney disease)  CAD (coronary artery disease)  Heart failure  Hepatitis C  Aortic stenosis  Anemia, unspecified anemia type  Asthma  Low back pain: chronic over a year now.  High cholesterol  Hypertension  Diabetes  H/O aortic valve replacement: cow valve.  S/P CABG x 3  History of tonsillectomy      Bactrim (Unknown)  Biaxin (Joint Pain)  morphine (Short breath)    MEDICATIONS  (STANDING):  ALBUTerol    90 MICROgram(s) HFA Inhaler 1 Puff(s) Inhalation every 4 hours  ALBUTerol/ipratropium for Nebulization 3 milliLiter(s) Nebulizer every 6 hours  amLODIPine   Tablet 10 milliGRAM(s) Oral daily  ascorbic acid 500 milliGRAM(s) Oral daily  atorvastatin 20 milliGRAM(s) Oral at bedtime  dextrose 5%. 1000 milliLiter(s) (50 mL/Hr) IV Continuous <Continuous>  dextrose 50% Injectable 12.5 Gram(s) IV Push once  dextrose 50% Injectable 25 Gram(s) IV Push once  dextrose 50% Injectable 25 Gram(s) IV Push once  folic acid 1 milliGRAM(s) Oral daily  furosemide    Tablet 40 milliGRAM(s) Oral two times a day  insulin glargine Injectable (LANTUS) 10 Unit(s) SubCutaneous at bedtime  insulin lispro (HumaLOG) corrective regimen sliding scale   SubCutaneous Before meals and at bedtime  levothyroxine 100 MICROGram(s) Oral daily  lidocaine   Patch 1 Patch Transdermal daily  metoprolol tartrate 25 milliGRAM(s) Oral two times a day  montelukast 10 milliGRAM(s) Oral daily  pantoprazole    Tablet 40 milliGRAM(s) Oral two times a day  sodium chloride 0.9% lock flush 3 milliLiter(s) IV Push every 8 hours  tiotropium 18 MICROgram(s) Capsule 1 Capsule(s) Inhalation daily    MEDICATIONS  (PRN):  acetaminophen   Tablet .. 650 milliGRAM(s) Oral every 6 hours PRN Moderate Pain (4 - 6)  benzonatate 100 milliGRAM(s) Oral every 8 hours PRN Cough  dextrose 40% Gel 15 Gram(s) Oral once PRN Blood Glucose LESS THAN 70 milliGRAM(s)/deciliter  glucagon  Injectable 1 milliGRAM(s) IntraMuscular once PRN Glucose LESS THAN 70 milligrams/deciliter  ondansetron Injectable 4 milliGRAM(s) IV Push every 6 hours PRN Nausea      Vital Signs Last 24 Hrs  T(C): 36.6 (10 Aug 2019 01:14), Max: 36.8 (09 Aug 2019 16:22)  T(F): 97.9 (10 Aug 2019 01:14), Max: 98.2 (09 Aug 2019 16:22)  HR: 94 (10 Aug 2019 05:43) (70 - 94)  BP: 138/70 (10 Aug 2019 05:43) (138/70 - 156/74)  BP(mean): --  RR: 18 (10 Aug 2019 01:14) (18 - 18)  SpO2: 92% (10 Aug 2019 04:00) (91% - 95%)  ICU Vital Signs Last 24 Hrs    PHYSICAL EXAM:  General: Appears well developed, well nourished alert and cooperative.  HEENT: Head; normocephalic, atraumatic.Pupils reactive, cornea wnl. Neck supple, no nodes adenopathy, no JVD  CARDIOVASCULAR: Normal S1 and S2, 1/6 NELI, no rub, gallop or lift.   LUNGS: No rales, rhonchi or wheeze. Normal breath sounds bilaterally.  ABDOMEN: Soft, nontender without mass or organomegaly. bowel sounds normoactive.  EXTREMITIES: No clubbing, cyanosis or edema.   SKIN: warm and dry with normal turgor.  NEURO: Alert/oriented x 3/normal motor exam.   PSYCH: normal affect.        LABS:                        8.3    4.34  )-----------( 84       ( 10 Aug 2019 06:34 )             26.5     08-10    144  |  110<H>  |  53.0<H>  ----------------------------<  174<H>  3.9   |  20.0<L>  |  1.65<H>    Ca    8.7      10 Aug 2019 06:34  Phos  3.9     08-10  Mg     2.1     08-10    < from: CT Chest No Cont (08.09.19 @ 12:00) >  Right upper lobe opacity/nodule measuring 2.6 cm; the differential   includes infectious, inflammatory or neoplastic etiologies; a follow-up   noncontrast chest CT is recommended in 2-3 months.  Ground glass opacity in the lingula; the differential includes   atelectasis or pneumonia.  Small to moderate right and small left pleural effusions.  Cirrhotic liver.    ASSESSMENT AND PLAN:  In summary, NICHELLE GILL is a 74y Female with past medical history significant for  CAD s/p CABG and Bio AVR 12/2016 with brief post operative AF, normal EF, CKD requiring transient HD for hyperkalemia, mild MR, prior TIA s/p ILR, chronic Hep C complicated by cirrhosis and varices, bronchiectasias and chronic anemia with worsening anemia.    Shortness of breath:  - likely 2/2 acute on chronic diastolic HF 2/2 volume overload from transfusion  - continue Lasix PO BID   - given MARGUERITE on CKD continue to monitor renal indices closely; will likely need to tolerate some degree of azotemia to promote euvolemia     CAD s/p CABG:  - Continue metoprolol  - consider resumption of ASA     Acute on chronic anemia  -  Monitor H/H  -  Transfusion as needed  - GI/heme eval     Thank you for allowing [facility] to participate in the care of this patient.  Please feel free to call with any questions or concerns. formerly [facility], THE HEART CENTER                                   [address] [place] [address]                                                      PHONE: [phone]                                                         FAX: [phone]  [email or web address]  ---------------------------------------------------------------------------------------------------------------------------------    Overnight events/patient complaints: reports increased orthopnea and dyspnea with minimal movement     INTERPRETATION OF TELEMETRY (personally reviewed):    ECG: < from: 12 Lead ECG (08.07.19 @ 04:44) >  Sinus rhythm with 1st degree A-V block  Non-specific intra-ventricular conduction delay  non specific ST wave abnormality    ECHO: < from: TTE Echo w/Cont Complete (07.17.19 @ 21:37) >   1. Left ventricular ejection fraction, by visual estimation, is 55 to 60%.   2. Spectral Doppler shows impaired relaxation pattern of left   ventricular myocardial filling (Grade I diastolic dysfunction).   3. There is mild concentric left ventricular hypertrophy.   4. Severe mitral annular calcification.   5. Mild degenerative mitral stenosis.   6. Bioprosthesis inthe aortic position - normal function.   7. Estimated pulmonary artery systolic pressure is 37.2 mmHg assuming a   right atrial pressure of 3 mmHg, which is consistent with borderline   pulmonary hypertension.   8. There is no evidence of pericardialeffusion.   9. ** Compared to TTE dated 9/15/17, no significant changes.    I&O's Summary    09 Aug 2019 07:01  -  10 Aug 2019 07:00  --------------------------------------------------------  IN: 755.8 mL / OUT: 0 mL / NET: 755.8 mL        PAST MEDICAL & SURGICAL HISTORY:  CKD (chronic kidney disease)  CAD (coronary artery disease)  Heart failure  Hepatitis C  Aortic stenosis  Anemia, unspecified anemia type  Asthma  Low back pain: chronic over a year now.  High cholesterol  Hypertension  Diabetes  H/O aortic valve replacement: cow valve.  S/P CABG x 3  History of tonsillectomy      Bactrim (Unknown)  Biaxin (Joint Pain)  morphine (Short breath)    MEDICATIONS  (STANDING):  ALBUTerol    90 MICROgram(s) HFA Inhaler 1 Puff(s) Inhalation every 4 hours  ALBUTerol/ipratropium for Nebulization 3 milliLiter(s) Nebulizer every 6 hours  amLODIPine   Tablet 10 milliGRAM(s) Oral daily  ascorbic acid 500 milliGRAM(s) Oral daily  atorvastatin 20 milliGRAM(s) Oral at bedtime  dextrose 5%. 1000 milliLiter(s) (50 mL/Hr) IV Continuous <Continuous>  dextrose 50% Injectable 12.5 Gram(s) IV Push once  dextrose 50% Injectable 25 Gram(s) IV Push once  dextrose 50% Injectable 25 Gram(s) IV Push once  folic acid 1 milliGRAM(s) Oral daily  furosemide    Tablet 40 milliGRAM(s) Oral two times a day  insulin glargine Injectable (LANTUS) 10 Unit(s) SubCutaneous at bedtime  insulin lispro (HumaLOG) corrective regimen sliding scale   SubCutaneous Before meals and at bedtime  levothyroxine 100 MICROGram(s) Oral daily  lidocaine   Patch 1 Patch Transdermal daily  metoprolol tartrate 25 milliGRAM(s) Oral two times a day  montelukast 10 milliGRAM(s) Oral daily  pantoprazole    Tablet 40 milliGRAM(s) Oral two times a day  sodium chloride 0.9% lock flush 3 milliLiter(s) IV Push every 8 hours  tiotropium 18 MICROgram(s) Capsule 1 Capsule(s) Inhalation daily    MEDICATIONS  (PRN):  acetaminophen   Tablet .. 650 milliGRAM(s) Oral every 6 hours PRN Moderate Pain (4 - 6)  benzonatate 100 milliGRAM(s) Oral every 8 hours PRN Cough  dextrose 40% Gel 15 Gram(s) Oral once PRN Blood Glucose LESS THAN 70 milliGRAM(s)/deciliter  glucagon  Injectable 1 milliGRAM(s) IntraMuscular once PRN Glucose LESS THAN 70 milligrams/deciliter  ondansetron Injectable 4 milliGRAM(s) IV Push every 6 hours PRN Nausea      Vital Signs Last 24 Hrs  T(C): 36.6 (10 Aug 2019 01:14), Max: 36.8 (09 Aug 2019 16:22)  T(F): 97.9 (10 Aug 2019 01:14), Max: 98.2 (09 Aug 2019 16:22)  HR: 94 (10 Aug 2019 05:43) (70 - 94)  BP: 138/70 (10 Aug 2019 05:43) (138/70 - 156/74)  BP(mean): --  RR: 18 (10 Aug 2019 01:14) (18 - 18)  SpO2: 92% (10 Aug 2019 04:00) (91% - 95%)  ICU Vital Signs Last 24 Hrs    PHYSICAL EXAM:  General: Appears well developed, well nourished alert and cooperative.  HEENT: Head; normocephalic, atraumatic.Pupils reactive, cornea wnl. Neck supple, no nodes adenopathy, (+) JVD  CARDIOVASCULAR: Normal S1 and S2, 2/6 NELI, no rub, gallop or lift.   LUNGS: (+) rales at the bases, poor effort   ABDOMEN: Soft, nontender without mass or organomegaly. bowel sounds normoactive.  EXTREMITIES: No clubbing, cyanosis or edema.   SKIN: warm and dry with normal turgor.  NEURO: Alert/oriented x 3/normal motor exam.   PSYCH: normal affect.        LABS:                        8.3    4.34  )-----------( 84       ( 10 Aug 2019 06:34 )             26.5     08-10    144  |  110<H>  |  53.0<H>  ----------------------------<  174<H>  3.9   |  20.0<L>  |  1.65<H>    Ca    8.7      10 Aug 2019 06:34  Phos  3.9     08-10  Mg     2.1     08-10    < from: CT Chest No Cont (08.09.19 @ 12:00) >  Right upper lobe opacity/nodule measuring 2.6 cm; the differential   includes infectious, inflammatory or neoplastic etiologies; a follow-up   noncontrast chest CT is recommended in 2-3 months.  Ground glass opacity in the lingula; the differential includes   atelectasis or pneumonia.  Small to moderate right and small left pleural effusions.  Cirrhotic liver.    ASSESSMENT AND PLAN:  In summary, NICHELLE GILL is a 74y Female with past medical history significant for  CAD s/p CABG and Bio AVR 12/2016 with brief post operative AF, normal EF, CKD requiring transient HD for hyperkalemia, mild MR, prior TIA s/p ILR, chronic Hep C complicated by cirrhosis and varices, bronchiectasias and chronic anemia with worsening anemia.    Shortness of breath:  - likely 2/2 acute on chronic diastolic HF 2/2 volume overload from transfusion  - will add metolazone 2.5mg PO x1 today and transition to lasix 40mg IV Q12 for likely 24 hours     - given MARGUERITE on CKD continue to monitor renal indices closely; will likely need to tolerate some degree of azotemia to promote euvolemia     CAD s/p CABG:  - Continue metoprolol  - consider resumption of ASA     Acute on chronic anemia  -  Monitor H/H  -  Transfusion as needed  - GI/heme eval     Thank you for allowing [facility] to participate in the care of this patient.  Please feel free to call with any questions or concerns. Formerly [facility], THE HEART CENTER                                   [address] [place] [address]                                                      PHONE: [phone]                                                         FAX: [phone]  [email or web address]  ---------------------------------------------------------------------------------------------------------------------------------    Overnight events/patient complaints: reports increased orthopnea and dyspnea with minimal movement     INTERPRETATION OF TELEMETRY (personally reviewed): no events     ECG: < from: 12 Lead ECG (08.07.19 @ 04:44) >  Sinus rhythm with 1st degree A-V block  Non-specific intra-ventricular conduction delay  non specific ST wave abnormality    ECHO: < from: TTE Echo w/Cont Complete (07.17.19 @ 21:37) >   1. Left ventricular ejection fraction, by visual estimation, is 55 to 60%.   2. Spectral Doppler shows impaired relaxation pattern of left   ventricular myocardial filling (Grade I diastolic dysfunction).   3. There is mild concentric left ventricular hypertrophy.   4. Severe mitral annular calcification.   5. Mild degenerative mitral stenosis.   6. Bioprosthesis inthe aortic position - normal function.   7. Estimated pulmonary artery systolic pressure is 37.2 mmHg assuming a   right atrial pressure of 3 mmHg, which is consistent with borderline   pulmonary hypertension.   8. There is no evidence of pericardialeffusion.   9. ** Compared to TTE dated 9/15/17, no significant changes.    I&O's Summary    09 Aug 2019 07:01  -  10 Aug 2019 07:00  --------------------------------------------------------  IN: 755.8 mL / OUT: 0 mL / NET: 755.8 mL        PAST MEDICAL & SURGICAL HISTORY:  CKD (chronic kidney disease)  CAD (coronary artery disease)  Heart failure  Hepatitis C  Aortic stenosis  Anemia, unspecified anemia type  Asthma  Low back pain: chronic over a year now.  High cholesterol  Hypertension  Diabetes  H/O aortic valve replacement: cow valve.  S/P CABG x 3  History of tonsillectomy      Bactrim (Unknown)  Biaxin (Joint Pain)  morphine (Short breath)    MEDICATIONS  (STANDING):  ALBUTerol    90 MICROgram(s) HFA Inhaler 1 Puff(s) Inhalation every 4 hours  ALBUTerol/ipratropium for Nebulization 3 milliLiter(s) Nebulizer every 6 hours  amLODIPine   Tablet 10 milliGRAM(s) Oral daily  ascorbic acid 500 milliGRAM(s) Oral daily  atorvastatin 20 milliGRAM(s) Oral at bedtime  dextrose 5%. 1000 milliLiter(s) (50 mL/Hr) IV Continuous <Continuous>  dextrose 50% Injectable 12.5 Gram(s) IV Push once  dextrose 50% Injectable 25 Gram(s) IV Push once  dextrose 50% Injectable 25 Gram(s) IV Push once  folic acid 1 milliGRAM(s) Oral daily  furosemide    Tablet 40 milliGRAM(s) Oral two times a day  insulin glargine Injectable (LANTUS) 10 Unit(s) SubCutaneous at bedtime  insulin lispro (HumaLOG) corrective regimen sliding scale   SubCutaneous Before meals and at bedtime  levothyroxine 100 MICROGram(s) Oral daily  lidocaine   Patch 1 Patch Transdermal daily  metoprolol tartrate 25 milliGRAM(s) Oral two times a day  montelukast 10 milliGRAM(s) Oral daily  pantoprazole    Tablet 40 milliGRAM(s) Oral two times a day  sodium chloride 0.9% lock flush 3 milliLiter(s) IV Push every 8 hours  tiotropium 18 MICROgram(s) Capsule 1 Capsule(s) Inhalation daily    MEDICATIONS  (PRN):  acetaminophen   Tablet .. 650 milliGRAM(s) Oral every 6 hours PRN Moderate Pain (4 - 6)  benzonatate 100 milliGRAM(s) Oral every 8 hours PRN Cough  dextrose 40% Gel 15 Gram(s) Oral once PRN Blood Glucose LESS THAN 70 milliGRAM(s)/deciliter  glucagon  Injectable 1 milliGRAM(s) IntraMuscular once PRN Glucose LESS THAN 70 milligrams/deciliter  ondansetron Injectable 4 milliGRAM(s) IV Push every 6 hours PRN Nausea      Vital Signs Last 24 Hrs  T(C): 36.6 (10 Aug 2019 01:14), Max: 36.8 (09 Aug 2019 16:22)  T(F): 97.9 (10 Aug 2019 01:14), Max: 98.2 (09 Aug 2019 16:22)  HR: 94 (10 Aug 2019 05:43) (70 - 94)  BP: 138/70 (10 Aug 2019 05:43) (138/70 - 156/74)  BP(mean): --  RR: 18 (10 Aug 2019 01:14) (18 - 18)  SpO2: 92% (10 Aug 2019 04:00) (91% - 95%)  ICU Vital Signs Last 24 Hrs    PHYSICAL EXAM:  General: Appears well developed, well nourished alert and cooperative.  HEENT: Head; normocephalic, atraumatic.Pupils reactive, cornea wnl. Neck supple, no nodes adenopathy, (+) JVD  CARDIOVASCULAR: Normal S1 and S2, 2/6 NELI, no rub, gallop or lift.   LUNGS: (+) rales at the bases, poor effort   ABDOMEN: Soft, nontender without mass or organomegaly. bowel sounds normoactive.  EXTREMITIES: No clubbing, cyanosis or edema.   SKIN: warm and dry with normal turgor.  NEURO: Alert/oriented x 3/normal motor exam.   PSYCH: normal affect.        LABS:                        8.3    4.34  )-----------( 84       ( 10 Aug 2019 06:34 )             26.5     08-10    144  |  110<H>  |  53.0<H>  ----------------------------<  174<H>  3.9   |  20.0<L>  |  1.65<H>    Ca    8.7      10 Aug 2019 06:34  Phos  3.9     08-10  Mg     2.1     08-10    < from: CT Chest No Cont (08.09.19 @ 12:00) >  Right upper lobe opacity/nodule measuring 2.6 cm; the differential   includes infectious, inflammatory or neoplastic etiologies; a follow-up   noncontrast chest CT is recommended in 2-3 months.  Ground glass opacity in the lingula; the differential includes   atelectasis or pneumonia.  Small to moderate right and small left pleural effusions.  Cirrhotic liver.    ASSESSMENT AND PLAN:  In summary, NICHELLE GILL is a 74y Female with past medical history significant for  CAD s/p CABG and Bio AVR 12/2016 with brief post operative AF, normal EF, CKD requiring transient HD for hyperkalemia, mild MR, prior TIA s/p ILR, chronic Hep C complicated by cirrhosis and varices, bronchiectasias and chronic anemia with worsening anemia.    Shortness of breath:  - likely 2/2 acute on chronic diastolic HF 2/2 volume overload from transfusion  - will add metolazone 2.5mg PO x1 today and transition to lasix 40mg IV Q12 for likely 24 hours     - given MARGUERITE on CKD continue to monitor renal indices closely; will likely need to tolerate some degree of azotemia to promote euvolemia     CAD s/p CABG:  - Continue metoprolol  - consider resumption of ASA     Acute on chronic anemia  - Monitor H/H  - Transfusion as needed  - GI/heme eval     Thank you for allowing [facility] to participate in the care of this patient.  Please feel free to call with any questions or concerns.

## 2019-08-10 NOTE — PROGRESS NOTE ADULT - SUBJECTIVE AND OBJECTIVE BOX
CC: anemia    Patient seen and examined at the bedside. No acute overnight events. no events yesterday. Complaining of sob + cough, worse than prior day, w/ rib pain subsequently today. Denies fever/chills, headache, lightheadedness, dizziness, chest pain, palpitations, abd pain, nausea/vomiting/diarrhea      =========================================================================================    PHYSICAL EXAM.    GEN - appears age appropriate. well nourished. pleasant. mild distress sec to cough.   HEENT - NCAT, EOMI, SAVANNAH  RESP - mild bibasilar crackles, new onset compared to prior day. no wheeze/stridor. + coughing. on supplemental O2, nc. able to speak in full sentences without distress but noted to have several coughing fits  CARDIO - NS1S2, RRR. No murmurs/rubs/gallop  ABD - Soft/Non tender/Non distended. Normal BS x4 quadrants. no guarding/rebound tenderness.  Ext - No ANKIT.  MSK - BL 5/5 strength on upper and lower extremities.   Neuro - AAOx3. cn 2-12 grossly intact  Psych - normal affect  Skin - c/d/i. no rashes/lesions      VITAL SIGNS.    Vital Signs Last 24 Hrs  T(C): 37.1 (10 Aug 2019 16:38), Max: 37.1 (10 Aug 2019 16:38)  T(F): 98.7 (10 Aug 2019 16:38), Max: 98.7 (10 Aug 2019 16:38)  HR: 95 (10 Aug 2019 16:38) (70 - 95)  BP: 172/74 (10 Aug 2019 16:38) (138/70 - 172/74)  BP(mean): --  RR: 18 (10 Aug 2019 16:38) (18 - 18)  SpO2: 95% (10 Aug 2019 16:38) (91% - 98%)        =================================================    LABS.                          8.3    4.34  )-----------( 84       ( 10 Aug 2019 06:34 )             26.5     08-10    144  |  110<H>  |  53.0<H>  ----------------------------<  174<H>  3.9   |  20.0<L>  |  1.65<H>    Ca    8.7      10 Aug 2019 06:34  Phos  3.9     08-10  Mg     2.1     08-10          I&O's Summary    09 Aug 2019 07:01  -  10 Aug 2019 07:00  --------------------------------------------------------  IN: 755.8 mL / OUT: 0 mL / NET: 755.8 mL          ================================================    IMAGING.      ================================================    HOME MEDS.    Home Medications:  albuterol 2.5 mg/3 mL (0.083%) inhalation solution: 3 milliliter(s) inhaled every 6 hours, As Needed (17 Jul 2019 13:35)  Aranesp Albumin Free: once weekly at MD&#x27;s office (17 Jul 2019 13:35)  ascorbic acid 500 mg oral tablet: 1 tab(s) orally once a day (22 Jul 2019 14:30)  folic acid 1 mg oral tablet: 1 tab(s) orally once a day (22 Jul 2019 14:30)  insulin lispro 100 units/mL subcutaneous solution:  subcutaneous 3 times a day (with meals) ; 2 Unit(s) if Glucose 151 - 200  4 Unit(s) if Glucose 201 - 250  6 Unit(s) if Glucose 251 - 300  8 Unit(s) if Glucose 301 - 350  10 Unit(s) if Glucose 351 - 400  12 Unit(s) if Glucose GREATER THAN 400 (17 Jul 2019 13:35)  Lasix 40 mg oral tablet: 1 tab(s) orally 2 times a day (22 Jul 2019 14:30)  Levoxyl 100 mcg (0.1 mg) oral tablet: 1 tab(s) orally once a day (22 Jul 2019 14:30)  magnesium oxide 400 mg (241.3 mg elemental magnesium) oral tablet: 1 tab(s) orally every other day (17 Jul 2019 13:35)  Norvasc 10 mg oral tablet: 1 tab(s) orally once a day (22 Jul 2019 14:30)  phenylephrine 0.25%-3% rectal ointment: 1 application rectal 4 times a day, As needed, hemorrhoidal irritation/bleeding (22 Jul 2019 14:30)  pravastatin 80 mg oral tablet: 1 tab(s) orally once a day (17 Jul 2019 13:35)  Protonix 40 mg oral delayed release tablet: 1 tab(s) orally 2 times a day (22 Jul 2019 14:30)  Singulair 10 mg oral tablet: 1 tab(s) orally once a day (22 Jul 2019 14:30)  Thera-D Rapid Repletion oral tablet: 2000 unit(s) orally once a day (22 Jul 2019 14:30)  Tresiba 100 units/mL subcutaneous solution: 10 unit(s) subcutaneous once a day (17 Jul 2019 13:35)      ================================================    HOSPITAL MEDS.    MEDICATIONS  (STANDING):  ALBUTerol    90 MICROgram(s) HFA Inhaler 1 Puff(s) Inhalation every 4 hours  ALBUTerol/ipratropium for Nebulization 3 milliLiter(s) Nebulizer every 6 hours  amLODIPine   Tablet 10 milliGRAM(s) Oral daily  ascorbic acid 500 milliGRAM(s) Oral daily  atorvastatin 20 milliGRAM(s) Oral at bedtime  dextrose 5%. 1000 milliLiter(s) (50 mL/Hr) IV Continuous <Continuous>  dextrose 50% Injectable 12.5 Gram(s) IV Push once  dextrose 50% Injectable 25 Gram(s) IV Push once  dextrose 50% Injectable 25 Gram(s) IV Push once  folic acid 1 milliGRAM(s) Oral daily  furosemide   Injectable 40 milliGRAM(s) IV Push two times a day  insulin glargine Injectable (LANTUS) 5 Unit(s) SubCutaneous two times a day  insulin lispro (HumaLOG) corrective regimen sliding scale   SubCutaneous Before meals and at bedtime  levothyroxine 100 MICROGram(s) Oral daily  lidocaine   Patch 2 Patch Transdermal daily  metoprolol tartrate 25 milliGRAM(s) Oral two times a day  montelukast 10 milliGRAM(s) Oral daily  pantoprazole    Tablet 40 milliGRAM(s) Oral two times a day  sodium chloride 0.9% lock flush 3 milliLiter(s) IV Push every 8 hours  tiotropium 18 MICROgram(s) Capsule 1 Capsule(s) Inhalation daily    MEDICATIONS  (PRN):  acetaminophen   Tablet .. 650 milliGRAM(s) Oral every 6 hours PRN Moderate Pain (4 - 6)  benzonatate 100 milliGRAM(s) Oral every 8 hours PRN Cough  dextrose 40% Gel 15 Gram(s) Oral once PRN Blood Glucose LESS THAN 70 milliGRAM(s)/deciliter  glucagon  Injectable 1 milliGRAM(s) IntraMuscular once PRN Glucose LESS THAN 70 milligrams/deciliter  ondansetron Injectable 4 milliGRAM(s) IV Push every 6 hours PRN Nausea

## 2019-08-10 NOTE — PROGRESS NOTE ADULT - SUBJECTIVE AND OBJECTIVE BOX
INTERVAL HPI/OVERNIGHT EVENTS:FU for anemia. She is denying any complaints at this time. Not cleared by cardiology for the endoscopic procedures yet.     MEDICATIONS  (STANDING):  ALBUTerol    90 MICROgram(s) HFA Inhaler 1 Puff(s) Inhalation every 4 hours  ALBUTerol/ipratropium for Nebulization 3 milliLiter(s) Nebulizer every 6 hours  amLODIPine   Tablet 10 milliGRAM(s) Oral daily  ascorbic acid 500 milliGRAM(s) Oral daily  atorvastatin 20 milliGRAM(s) Oral at bedtime  dextrose 5%. 1000 milliLiter(s) (50 mL/Hr) IV Continuous <Continuous>  dextrose 50% Injectable 12.5 Gram(s) IV Push once  dextrose 50% Injectable 25 Gram(s) IV Push once  dextrose 50% Injectable 25 Gram(s) IV Push once  folic acid 1 milliGRAM(s) Oral daily  furosemide   Injectable 40 milliGRAM(s) IV Push two times a day  insulin glargine Injectable (LANTUS) 5 Unit(s) SubCutaneous two times a day  insulin lispro (HumaLOG) corrective regimen sliding scale   SubCutaneous Before meals and at bedtime  levothyroxine 100 MICROGram(s) Oral daily  lidocaine   Patch 2 Patch Transdermal daily  metoprolol tartrate 25 milliGRAM(s) Oral two times a day  montelukast 10 milliGRAM(s) Oral daily  pantoprazole    Tablet 40 milliGRAM(s) Oral two times a day  sodium chloride 0.9% lock flush 3 milliLiter(s) IV Push every 8 hours  tiotropium 18 MICROgram(s) Capsule 1 Capsule(s) Inhalation daily    MEDICATIONS  (PRN):  acetaminophen   Tablet .. 650 milliGRAM(s) Oral every 6 hours PRN Moderate Pain (4 - 6)  benzonatate 100 milliGRAM(s) Oral every 8 hours PRN Cough  dextrose 40% Gel 15 Gram(s) Oral once PRN Blood Glucose LESS THAN 70 milliGRAM(s)/deciliter  glucagon  Injectable 1 milliGRAM(s) IntraMuscular once PRN Glucose LESS THAN 70 milligrams/deciliter  guaiFENesin   Syrup  (Sugar-Free) 200 milliGRAM(s) Oral every 6 hours PRN Cough  ondansetron Injectable 4 milliGRAM(s) IV Push every 6 hours PRN Nausea      Allergies    Bactrim (Unknown)  Biaxin (Joint Pain)  morphine (Short breath)    Intolerances        Vital Signs Last 24 Hrs  T(C): 37.1 (10 Aug 2019 16:38), Max: 37.1 (10 Aug 2019 16:38)  T(F): 98.7 (10 Aug 2019 16:38), Max: 98.7 (10 Aug 2019 16:38)  HR: 95 (10 Aug 2019 16:38) (70 - 95)  BP: 172/74 (10 Aug 2019 16:38) (138/70 - 172/74)  BP(mean): --  RR: 18 (10 Aug 2019 16:38) (18 - 18)  SpO2: 95% (10 Aug 2019 16:38) (91% - 98%)    LABS:                        8.3    4.34  )-----------( 84       ( 10 Aug 2019 06:34 )             26.5     08-10    144  |  110<H>  |  53.0<H>  ----------------------------<  174<H>  3.9   |  20.0<L>  |  1.65<H>    Ca    8.7      10 Aug 2019 06:34  Phos  3.9     08-10  Mg     2.1     08-10            RADIOLOGY & ADDITIONAL TESTS:

## 2019-08-11 LAB
ANION GAP SERPL CALC-SCNC: 11 MMOL/L — SIGNIFICANT CHANGE UP (ref 5–17)
BUN SERPL-MCNC: 44 MG/DL — HIGH (ref 8–20)
CALCIUM SERPL-MCNC: 8.8 MG/DL — SIGNIFICANT CHANGE UP (ref 8.6–10.2)
CHLORIDE SERPL-SCNC: 109 MMOL/L — HIGH (ref 98–107)
CO2 SERPL-SCNC: 24 MMOL/L — SIGNIFICANT CHANGE UP (ref 22–29)
CREAT SERPL-MCNC: 1.64 MG/DL — HIGH (ref 0.5–1.3)
GLUCOSE BLDC GLUCOMTR-MCNC: 135 MG/DL — HIGH (ref 70–99)
GLUCOSE BLDC GLUCOMTR-MCNC: 167 MG/DL — HIGH (ref 70–99)
GLUCOSE BLDC GLUCOMTR-MCNC: 196 MG/DL — HIGH (ref 70–99)
GLUCOSE BLDC GLUCOMTR-MCNC: 218 MG/DL — HIGH (ref 70–99)
GLUCOSE SERPL-MCNC: 141 MG/DL — HIGH (ref 70–115)
HCT VFR BLD CALC: 28 % — LOW (ref 34.5–45)
HGB BLD-MCNC: 8.8 G/DL — LOW (ref 11.5–15.5)
MAGNESIUM SERPL-MCNC: 2.2 MG/DL — SIGNIFICANT CHANGE UP (ref 1.6–2.6)
MCHC RBC-ENTMCNC: 29.5 PG — SIGNIFICANT CHANGE UP (ref 27–34)
MCHC RBC-ENTMCNC: 31.4 GM/DL — LOW (ref 32–36)
MCV RBC AUTO: 94 FL — SIGNIFICANT CHANGE UP (ref 80–100)
PHOSPHATE SERPL-MCNC: 3.7 MG/DL — SIGNIFICANT CHANGE UP (ref 2.4–4.7)
PLATELET # BLD AUTO: 89 K/UL — LOW (ref 150–400)
POTASSIUM SERPL-MCNC: 4 MMOL/L — SIGNIFICANT CHANGE UP (ref 3.5–5.3)
POTASSIUM SERPL-SCNC: 4 MMOL/L — SIGNIFICANT CHANGE UP (ref 3.5–5.3)
RBC # BLD: 2.98 M/UL — LOW (ref 3.8–5.2)
RBC # FLD: 18.3 % — HIGH (ref 10.3–14.5)
SODIUM SERPL-SCNC: 144 MMOL/L — SIGNIFICANT CHANGE UP (ref 135–145)
WBC # BLD: 3.9 K/UL — SIGNIFICANT CHANGE UP (ref 3.8–10.5)
WBC # FLD AUTO: 3.9 K/UL — SIGNIFICANT CHANGE UP (ref 3.8–10.5)

## 2019-08-11 PROCEDURE — 99233 SBSQ HOSP IP/OBS HIGH 50: CPT

## 2019-08-11 PROCEDURE — 71045 X-RAY EXAM CHEST 1 VIEW: CPT | Mod: 26

## 2019-08-11 RX ADMIN — INSULIN GLARGINE 5 UNIT(S): 100 INJECTION, SOLUTION SUBCUTANEOUS at 12:13

## 2019-08-11 RX ADMIN — Medication 100 MICROGRAM(S): at 06:05

## 2019-08-11 RX ADMIN — SODIUM CHLORIDE 3 MILLILITER(S): 9 INJECTION INTRAMUSCULAR; INTRAVENOUS; SUBCUTANEOUS at 06:05

## 2019-08-11 RX ADMIN — Medication 2: at 22:30

## 2019-08-11 RX ADMIN — Medication 3 MILLILITER(S): at 15:13

## 2019-08-11 RX ADMIN — LIDOCAINE 2 PATCH: 4 CREAM TOPICAL at 06:05

## 2019-08-11 RX ADMIN — Medication 2: at 17:34

## 2019-08-11 RX ADMIN — PANTOPRAZOLE SODIUM 40 MILLIGRAM(S): 20 TABLET, DELAYED RELEASE ORAL at 17:30

## 2019-08-11 RX ADMIN — Medication 25 MILLIGRAM(S): at 06:05

## 2019-08-11 RX ADMIN — Medication 3 MILLILITER(S): at 09:01

## 2019-08-11 RX ADMIN — Medication 25 MILLIGRAM(S): at 17:30

## 2019-08-11 RX ADMIN — Medication 3 MILLILITER(S): at 20:20

## 2019-08-11 RX ADMIN — Medication 650 MILLIGRAM(S): at 02:36

## 2019-08-11 RX ADMIN — LIDOCAINE 2 PATCH: 4 CREAM TOPICAL at 12:15

## 2019-08-11 RX ADMIN — LIDOCAINE 2 PATCH: 4 CREAM TOPICAL at 20:30

## 2019-08-11 RX ADMIN — Medication 40 MILLIGRAM(S): at 06:05

## 2019-08-11 RX ADMIN — SODIUM CHLORIDE 3 MILLILITER(S): 9 INJECTION INTRAMUSCULAR; INTRAVENOUS; SUBCUTANEOUS at 21:29

## 2019-08-11 RX ADMIN — Medication 3 MILLILITER(S): at 03:20

## 2019-08-11 RX ADMIN — Medication 40 MILLIGRAM(S): at 17:30

## 2019-08-11 RX ADMIN — INSULIN GLARGINE 5 UNIT(S): 100 INJECTION, SOLUTION SUBCUTANEOUS at 22:29

## 2019-08-11 RX ADMIN — Medication 650 MILLIGRAM(S): at 04:19

## 2019-08-11 RX ADMIN — Medication 1 MILLIGRAM(S): at 12:13

## 2019-08-11 RX ADMIN — MONTELUKAST 10 MILLIGRAM(S): 4 TABLET, CHEWABLE ORAL at 12:17

## 2019-08-11 RX ADMIN — PANTOPRAZOLE SODIUM 40 MILLIGRAM(S): 20 TABLET, DELAYED RELEASE ORAL at 06:05

## 2019-08-11 RX ADMIN — ATORVASTATIN CALCIUM 20 MILLIGRAM(S): 80 TABLET, FILM COATED ORAL at 22:21

## 2019-08-11 RX ADMIN — Medication 500 MILLIGRAM(S): at 12:13

## 2019-08-11 RX ADMIN — AMLODIPINE BESYLATE 10 MILLIGRAM(S): 2.5 TABLET ORAL at 06:05

## 2019-08-11 RX ADMIN — Medication 100 MILLIGRAM(S): at 17:30

## 2019-08-11 RX ADMIN — SODIUM CHLORIDE 3 MILLILITER(S): 9 INJECTION INTRAMUSCULAR; INTRAVENOUS; SUBCUTANEOUS at 14:08

## 2019-08-11 RX ADMIN — Medication 100 MILLIGRAM(S): at 06:07

## 2019-08-11 RX ADMIN — Medication 4: at 12:14

## 2019-08-11 NOTE — PROGRESS NOTE ADULT - SUBJECTIVE AND OBJECTIVE BOX
[facility] [facility] - [facility]                                   [address] [place] [address]                                                      PHONE: [phone]                                                         FAX: [phone]  [email or web address]  ---------------------------------------------------------------------------------------------------------------------------------    FU for  Pt seen and examined.     Overnight events/patient complaints:    Bactrim (Unknown)  Biaxin (Joint Pain)  morphine (Short breath)    MEDICATIONS  (STANDING):  ALBUTerol    90 MICROgram(s) HFA Inhaler 1 Puff(s) Inhalation every 4 hours  ALBUTerol/ipratropium for Nebulization 3 milliLiter(s) Nebulizer every 6 hours  amLODIPine   Tablet 10 milliGRAM(s) Oral daily  ascorbic acid 500 milliGRAM(s) Oral daily  atorvastatin 20 milliGRAM(s) Oral at bedtime  dextrose 5%. 1000 milliLiter(s) (50 mL/Hr) IV Continuous <Continuous>  dextrose 50% Injectable 12.5 Gram(s) IV Push once  dextrose 50% Injectable 25 Gram(s) IV Push once  dextrose 50% Injectable 25 Gram(s) IV Push once  folic acid 1 milliGRAM(s) Oral daily  furosemide   Injectable 40 milliGRAM(s) IV Push two times a day  insulin glargine Injectable (LANTUS) 5 Unit(s) SubCutaneous two times a day  insulin lispro (HumaLOG) corrective regimen sliding scale   SubCutaneous Before meals and at bedtime  levothyroxine 100 MICROGram(s) Oral daily  lidocaine   Patch 2 Patch Transdermal daily  metoprolol tartrate 25 milliGRAM(s) Oral two times a day  montelukast 10 milliGRAM(s) Oral daily  pantoprazole    Tablet 40 milliGRAM(s) Oral two times a day  sodium chloride 0.9% lock flush 3 milliLiter(s) IV Push every 8 hours  tiotropium 18 MICROgram(s) Capsule 1 Capsule(s) Inhalation daily    MEDICATIONS  (PRN):  acetaminophen   Tablet .. 650 milliGRAM(s) Oral every 6 hours PRN Moderate Pain (4 - 6)  benzonatate 100 milliGRAM(s) Oral every 8 hours PRN Cough  dextrose 40% Gel 15 Gram(s) Oral once PRN Blood Glucose LESS THAN 70 milliGRAM(s)/deciliter  glucagon  Injectable 1 milliGRAM(s) IntraMuscular once PRN Glucose LESS THAN 70 milligrams/deciliter  guaiFENesin   Syrup  (Sugar-Free) 200 milliGRAM(s) Oral every 6 hours PRN Cough  ondansetron Injectable 4 milliGRAM(s) IV Push every 6 hours PRN Nausea      Vital Signs Last 24 Hrs  T(C): 36.6 (11 Aug 2019 08:28), Max: 37.1 (10 Aug 2019 16:38)  T(F): 97.9 (11 Aug 2019 08:28), Max: 98.7 (10 Aug 2019 16:38)  HR: 79 (11 Aug 2019 09:03) (75 - 95)  BP: 149/67 (11 Aug 2019 08:31) (144/72 - 172/74)  BP(mean): --  RR: 20 (11 Aug 2019 08:28) (18 - 20)  SpO2: 96% (11 Aug 2019 09:03) (95% - 98%)  ICU Vital Signs Last 24 Hrs  I&O's Summary      PHYSICAL EXAM:  General: well built, resting comfortably  HEENT: no JVD  CARDIOVASCULAR: Normal S1 and S2, No murmur, rub, gallop or lift.   LUNGS: No rales, rhonchi or wheeze. Normal breath sounds bilaterally.  ABDOMEN: Soft, nontender without mass or organomegaly. bowel sounds normoactive.  EXTREMITIES: no edema  Neuro: awake alert          LABS:                        8.8    3.90  )-----------( 89       ( 11 Aug 2019 08:46 )             28.0     08-11    144  |  109<H>  |  44.0<H>  ----------------------------<  141<H>  4.0   |  24.0  |  1.64<H>    Ca    8.8      11 Aug 2019 08:46  Phos  3.7     08-11  Mg     2.2     08-11      NICHELLE GILL            RADIOLOGY & ADDITIONAL STUDIES:    LABS:                        8.8    3.90  )-----------( 89       ( 11 Aug 2019 08:46 )             28.0     08-11    144  |  109<H>  |  44.0<H>  ----------------------------<  141<H>  4.0   |  24.0  |  1.64<H>    Ca    8.8      11 Aug 2019 08:46  Phos  3.7     08-11  Mg     2.2     08-11          Assessment and Plan:  In summary, NICHELLE GILL is a 74y Female with past medical history significant for  CAD s/p CABG and Bio AVR 12/2016 with brief post operative AF, normal EF, CKD requiring transient HD for hyperkalemia, mild MR, prior TIA s/p ILR, chronic Hep C complicated by cirrhosis and varices, bronchiectasias and chronic anemia with worsening anemia. [place] [facility] - [facility], [facility]                                   [address] [place] [address]                                                      PHONE: [phone]                                                         FAX: [phone]  [email or web address]  ---------------------------------------------------------------------------------------------------------------------------------    FU for  Pt seen and examined. States SOB improved, but still not back to baseline    Overnight events/patient complaints:    Bactrim (Unknown)  Biaxin (Joint Pain)  morphine (Short breath)    MEDICATIONS  (STANDING):  ALBUTerol    90 MICROgram(s) HFA Inhaler 1 Puff(s) Inhalation every 4 hours  ALBUTerol/ipratropium for Nebulization 3 milliLiter(s) Nebulizer every 6 hours  amLODIPine   Tablet 10 milliGRAM(s) Oral daily  ascorbic acid 500 milliGRAM(s) Oral daily  atorvastatin 20 milliGRAM(s) Oral at bedtime  dextrose 5%. 1000 milliLiter(s) (50 mL/Hr) IV Continuous <Continuous>  dextrose 50% Injectable 12.5 Gram(s) IV Push once  dextrose 50% Injectable 25 Gram(s) IV Push once  dextrose 50% Injectable 25 Gram(s) IV Push once  folic acid 1 milliGRAM(s) Oral daily  furosemide   Injectable 40 milliGRAM(s) IV Push two times a day  insulin glargine Injectable (LANTUS) 5 Unit(s) SubCutaneous two times a day  insulin lispro (HumaLOG) corrective regimen sliding scale   SubCutaneous Before meals and at bedtime  levothyroxine 100 MICROGram(s) Oral daily  lidocaine   Patch 2 Patch Transdermal daily  metoprolol tartrate 25 milliGRAM(s) Oral two times a day  montelukast 10 milliGRAM(s) Oral daily  pantoprazole    Tablet 40 milliGRAM(s) Oral two times a day  sodium chloride 0.9% lock flush 3 milliLiter(s) IV Push every 8 hours  tiotropium 18 MICROgram(s) Capsule 1 Capsule(s) Inhalation daily    MEDICATIONS  (PRN):  acetaminophen   Tablet .. 650 milliGRAM(s) Oral every 6 hours PRN Moderate Pain (4 - 6)  benzonatate 100 milliGRAM(s) Oral every 8 hours PRN Cough  dextrose 40% Gel 15 Gram(s) Oral once PRN Blood Glucose LESS THAN 70 milliGRAM(s)/deciliter  glucagon  Injectable 1 milliGRAM(s) IntraMuscular once PRN Glucose LESS THAN 70 milligrams/deciliter  guaiFENesin   Syrup  (Sugar-Free) 200 milliGRAM(s) Oral every 6 hours PRN Cough  ondansetron Injectable 4 milliGRAM(s) IV Push every 6 hours PRN Nausea      Vital Signs Last 24 Hrs  T(C): 36.6 (11 Aug 2019 08:28), Max: 37.1 (10 Aug 2019 16:38)  T(F): 97.9 (11 Aug 2019 08:28), Max: 98.7 (10 Aug 2019 16:38)  HR: 79 (11 Aug 2019 09:03) (75 - 95)  BP: 149/67 (11 Aug 2019 08:31) (144/72 - 172/74)  BP(mean): --  RR: 20 (11 Aug 2019 08:28) (18 - 20)  SpO2: 96% (11 Aug 2019 09:03) (95% - 98%)  ICU Vital Signs Last 24 Hrs  I&O's Summary      PHYSICAL EXAM:  General: well built, resting comfortably  HEENT: no JVD  CARDIOVASCULAR: Normal S1 and S2, No murmur, rub, gallop or lift.   LUNGS: mild wheeze b/l  ABDOMEN: Soft, nontender without mass or organomegaly. bowel sounds normoactive.  EXTREMITIES: + edema  Neuro: awake alert          LABS:                        8.8    3.90  )-----------( 89       ( 11 Aug 2019 08:46 )             28.0     08-11    144  |  109<H>  |  44.0<H>  ----------------------------<  141<H>  4.0   |  24.0  |  1.64<H>    Ca    8.8      11 Aug 2019 08:46  Phos  3.7     08-11  Mg     2.2     08-11      NICHELLE GILL            RADIOLOGY & ADDITIONAL STUDIES:    LABS:                        8.8    3.90  )-----------( 89       ( 11 Aug 2019 08:46 )             28.0     08-11    144  |  109<H>  |  44.0<H>  ----------------------------<  141<H>  4.0   |  24.0  |  1.64<H>    Ca    8.8      11 Aug 2019 08:46  Phos  3.7     08-11  Mg     2.2     08-11      Summary:   1. Left ventricular ejection fraction, by visual estimation, is 55 to   60%.   2. Spectral Doppler shows impaired relaxation pattern of left   ventricular myocardial filling (Grade I diastolic dysfunction).   3. There is mild concentric left ventricular hypertrophy.   4. Severe mitral annular calcification.   5. Mild degenerative mitral stenosis.   6. Bioprosthesis in the aortic position - normal function.   7. Estimated pulmonary artery systolic pressure is 37.2 mmHg assuming a   right atrial pressure of 3 mmHg, which is consistent with borderline   pulmonary hypertension.   8. There is no evidence of pericardial effusion.   9. ** Compared to TTE dated 9/15/17, no significant changes.    Nadiya Carbajal DO Electronically signed on 7/18/2019 at 12:31:05 PM  Assessment and Plan:  In summary, NICHELLE GILL is a 74y Female with past medical history significant for  CAD s/p CABG and Bio AVR 12/2016 with brief post operative AF, normal EF, CKD requiring transient HD for hyperkalemia, mild MR, prior TIA s/p ILR, chronic Hep C complicated by cirrhosis and varices, bronchiectasias and chronic anemia with worsening anemia.  SOB/HFpEF: likely multifactorial, c/w IV lasix, BUN/cr improving  Anemia: recurrent, s/p multiple transfusions in past. Aim for HB>8  CAD: C/w current meds  CKD: will closely monitor

## 2019-08-11 NOTE — PROGRESS NOTE ADULT - SUBJECTIVE AND OBJECTIVE BOX
CC: anemia    Patient seen and examined at the bedside. No acute overnight events. no events yesterday. Complaining of sob + cough, improved today. Denies fever/chills, headache, lightheadedness, dizziness, chest pain, palpitations, abd pain, nausea/vomiting/diarrhea      =========================================================================================    PHYSICAL EXAM.    GEN - appears age appropriate. well nourished. pleasant. no distress   HEENT - NCAT, EOMI, SAVANNAH  RESP - mild bibasilar crackles, unchanged, good airway movement otherwise. no wheeze/stridor. + coughing intermittent, improved from prior. on supplemental O2, nc. able to speak in full sentences without distress  CARDIO - NS1S2, RRR. No murmurs/rubs/gallop  ABD - Soft/Non tender/Non distended. Normal BS x4 quadrants. no guarding/rebound tenderness.  Ext - No ANKIT.  MSK - BL 5/5 strength on upper and lower extremities.   Neuro - AAOx3. cn 2-12 grossly intact  Psych - normal affect  Skin - c/d/i. no rashes/lesions      VITAL SIGNS.    Vital Signs Last 24 Hrs  T(C): 36.8 (11 Aug 2019 15:49), Max: 36.9 (11 Aug 2019 00:10)  T(F): 98.2 (11 Aug 2019 15:49), Max: 98.4 (11 Aug 2019 00:10)  HR: 90 (11 Aug 2019 15:49) (73 - 90)  BP: 166/74 (11 Aug 2019 15:49) (144/72 - 166/74)  BP(mean): --  RR: 18 (11 Aug 2019 15:49) (18 - 20)  SpO2: 99% (11 Aug 2019 15:49) (95% - 99%)        =================================================    LABS.                          8.8    3.90  )-----------( 89       ( 11 Aug 2019 08:46 )             28.0     08-11    144  |  109<H>  |  44.0<H>  ----------------------------<  141<H>  4.0   |  24.0  |  1.64<H>    Ca    8.8      11 Aug 2019 08:46  Phos  3.7     08-11  Mg     2.2     08-11          I&O's Summary        ================================================    IMAGING.      ================================================    HOME MEDS.    Home Medications:  albuterol 2.5 mg/3 mL (0.083%) inhalation solution: 3 milliliter(s) inhaled every 6 hours, As Needed (17 Jul 2019 13:35)  Aranesp Albumin Free: once weekly at MD&#x27;s office (17 Jul 2019 13:35)  ascorbic acid 500 mg oral tablet: 1 tab(s) orally once a day (22 Jul 2019 14:30)  folic acid 1 mg oral tablet: 1 tab(s) orally once a day (22 Jul 2019 14:30)  insulin lispro 100 units/mL subcutaneous solution:  subcutaneous 3 times a day (with meals) ; 2 Unit(s) if Glucose 151 - 200  4 Unit(s) if Glucose 201 - 250  6 Unit(s) if Glucose 251 - 300  8 Unit(s) if Glucose 301 - 350  10 Unit(s) if Glucose 351 - 400  12 Unit(s) if Glucose GREATER THAN 400 (17 Jul 2019 13:35)  Lasix 40 mg oral tablet: 1 tab(s) orally 2 times a day (22 Jul 2019 14:30)  Levoxyl 100 mcg (0.1 mg) oral tablet: 1 tab(s) orally once a day (22 Jul 2019 14:30)  magnesium oxide 400 mg (241.3 mg elemental magnesium) oral tablet: 1 tab(s) orally every other day (17 Jul 2019 13:35)  Norvasc 10 mg oral tablet: 1 tab(s) orally once a day (22 Jul 2019 14:30)  phenylephrine 0.25%-3% rectal ointment: 1 application rectal 4 times a day, As needed, hemorrhoidal irritation/bleeding (22 Jul 2019 14:30)  pravastatin 80 mg oral tablet: 1 tab(s) orally once a day (17 Jul 2019 13:35)  Protonix 40 mg oral delayed release tablet: 1 tab(s) orally 2 times a day (22 Jul 2019 14:30)  Singulair 10 mg oral tablet: 1 tab(s) orally once a day (22 Jul 2019 14:30)  Thera-D Rapid Repletion oral tablet: 2000 unit(s) orally once a day (22 Jul 2019 14:30)  Tresiba 100 units/mL subcutaneous solution: 10 unit(s) subcutaneous once a day (17 Jul 2019 13:35)      ================================================    HOSPITAL MEDS.    MEDICATIONS  (STANDING):  ALBUTerol    90 MICROgram(s) HFA Inhaler 1 Puff(s) Inhalation every 4 hours  ALBUTerol/ipratropium for Nebulization 3 milliLiter(s) Nebulizer every 6 hours  amLODIPine   Tablet 10 milliGRAM(s) Oral daily  ascorbic acid 500 milliGRAM(s) Oral daily  atorvastatin 20 milliGRAM(s) Oral at bedtime  dextrose 5%. 1000 milliLiter(s) (50 mL/Hr) IV Continuous <Continuous>  dextrose 50% Injectable 12.5 Gram(s) IV Push once  dextrose 50% Injectable 25 Gram(s) IV Push once  dextrose 50% Injectable 25 Gram(s) IV Push once  folic acid 1 milliGRAM(s) Oral daily  furosemide   Injectable 40 milliGRAM(s) IV Push two times a day  insulin glargine Injectable (LANTUS) 5 Unit(s) SubCutaneous two times a day  insulin lispro (HumaLOG) corrective regimen sliding scale   SubCutaneous Before meals and at bedtime  levothyroxine 100 MICROGram(s) Oral daily  lidocaine   Patch 2 Patch Transdermal daily  metoprolol tartrate 25 milliGRAM(s) Oral two times a day  montelukast 10 milliGRAM(s) Oral daily  pantoprazole    Tablet 40 milliGRAM(s) Oral two times a day  sodium chloride 0.9% lock flush 3 milliLiter(s) IV Push every 8 hours  tiotropium 18 MICROgram(s) Capsule 1 Capsule(s) Inhalation daily    MEDICATIONS  (PRN):  acetaminophen   Tablet .. 650 milliGRAM(s) Oral every 6 hours PRN Moderate Pain (4 - 6)  benzonatate 100 milliGRAM(s) Oral every 8 hours PRN Cough  dextrose 40% Gel 15 Gram(s) Oral once PRN Blood Glucose LESS THAN 70 milliGRAM(s)/deciliter  glucagon  Injectable 1 milliGRAM(s) IntraMuscular once PRN Glucose LESS THAN 70 milligrams/deciliter  ondansetron Injectable 4 milliGRAM(s) IV Push every 6 hours PRN Nausea

## 2019-08-12 LAB
ANION GAP SERPL CALC-SCNC: 11 MMOL/L — SIGNIFICANT CHANGE UP (ref 5–17)
BUN SERPL-MCNC: 41 MG/DL — HIGH (ref 8–20)
CALCIUM SERPL-MCNC: 8.8 MG/DL — SIGNIFICANT CHANGE UP (ref 8.6–10.2)
CHLORIDE SERPL-SCNC: 107 MMOL/L — SIGNIFICANT CHANGE UP (ref 98–107)
CO2 SERPL-SCNC: 24 MMOL/L — SIGNIFICANT CHANGE UP (ref 22–29)
CREAT SERPL-MCNC: 1.54 MG/DL — HIGH (ref 0.5–1.3)
FERRITIN SERPL-MCNC: 385 NG/ML — HIGH (ref 15–150)
GLUCOSE BLDC GLUCOMTR-MCNC: 143 MG/DL — HIGH (ref 70–99)
GLUCOSE BLDC GLUCOMTR-MCNC: 148 MG/DL — HIGH (ref 70–99)
GLUCOSE BLDC GLUCOMTR-MCNC: 195 MG/DL — HIGH (ref 70–99)
GLUCOSE BLDC GLUCOMTR-MCNC: 219 MG/DL — HIGH (ref 70–99)
GLUCOSE SERPL-MCNC: 140 MG/DL — HIGH (ref 70–115)
HCT VFR BLD CALC: 27.9 % — LOW (ref 34.5–45)
HGB BLD-MCNC: 8.7 G/DL — LOW (ref 11.5–15.5)
IRON SATN MFR SERPL: 28 % — SIGNIFICANT CHANGE UP (ref 14–50)
IRON SATN MFR SERPL: 64 UG/DL — SIGNIFICANT CHANGE UP (ref 37–145)
MAGNESIUM SERPL-MCNC: 2 MG/DL — SIGNIFICANT CHANGE UP (ref 1.6–2.6)
MCHC RBC-ENTMCNC: 29.1 PG — SIGNIFICANT CHANGE UP (ref 27–34)
MCHC RBC-ENTMCNC: 31.2 GM/DL — LOW (ref 32–36)
MCV RBC AUTO: 93.3 FL — SIGNIFICANT CHANGE UP (ref 80–100)
PHOSPHATE SERPL-MCNC: 3.5 MG/DL — SIGNIFICANT CHANGE UP (ref 2.4–4.7)
PLATELET # BLD AUTO: 109 K/UL — LOW (ref 150–400)
POTASSIUM SERPL-MCNC: 3.6 MMOL/L — SIGNIFICANT CHANGE UP (ref 3.5–5.3)
POTASSIUM SERPL-SCNC: 3.6 MMOL/L — SIGNIFICANT CHANGE UP (ref 3.5–5.3)
RBC # BLD: 2.99 M/UL — LOW (ref 3.8–5.2)
RBC # FLD: 17.5 % — HIGH (ref 10.3–14.5)
SODIUM SERPL-SCNC: 142 MMOL/L — SIGNIFICANT CHANGE UP (ref 135–145)
TIBC SERPL-MCNC: 230 UG/DL — SIGNIFICANT CHANGE UP (ref 220–430)
TRANSFERRIN SERPL-MCNC: 161 MG/DL — LOW (ref 192–382)
WBC # BLD: 3.46 K/UL — LOW (ref 3.8–10.5)
WBC # FLD AUTO: 3.46 K/UL — LOW (ref 3.8–10.5)

## 2019-08-12 PROCEDURE — 99233 SBSQ HOSP IP/OBS HIGH 50: CPT

## 2019-08-12 RX ORDER — IPRATROPIUM/ALBUTEROL SULFATE 18-103MCG
3 AEROSOL WITH ADAPTER (GRAM) INHALATION EVERY 6 HOURS
Refills: 0 | Status: DISCONTINUED | OUTPATIENT
Start: 2019-08-12 | End: 2019-08-15

## 2019-08-12 RX ORDER — METOPROLOL TARTRATE 50 MG
50 TABLET ORAL
Refills: 0 | Status: DISCONTINUED | OUTPATIENT
Start: 2019-08-12 | End: 2019-08-15

## 2019-08-12 RX ORDER — DARBEPOETIN ALFA IN POLYSORBAT 200MCG/0.4
200 PEN INJECTOR (ML) SUBCUTANEOUS
Refills: 0 | Status: DISCONTINUED | OUTPATIENT
Start: 2019-08-12 | End: 2019-08-15

## 2019-08-12 RX ADMIN — SODIUM CHLORIDE 3 MILLILITER(S): 9 INJECTION INTRAMUSCULAR; INTRAVENOUS; SUBCUTANEOUS at 05:44

## 2019-08-12 RX ADMIN — INSULIN GLARGINE 5 UNIT(S): 100 INJECTION, SOLUTION SUBCUTANEOUS at 08:01

## 2019-08-12 RX ADMIN — LIDOCAINE 2 PATCH: 4 CREAM TOPICAL at 00:58

## 2019-08-12 RX ADMIN — Medication 25 MILLIGRAM(S): at 16:45

## 2019-08-12 RX ADMIN — AMLODIPINE BESYLATE 10 MILLIGRAM(S): 2.5 TABLET ORAL at 05:43

## 2019-08-12 RX ADMIN — ATORVASTATIN CALCIUM 20 MILLIGRAM(S): 80 TABLET, FILM COATED ORAL at 22:53

## 2019-08-12 RX ADMIN — Medication 2: at 22:54

## 2019-08-12 RX ADMIN — Medication 1 MILLIGRAM(S): at 12:15

## 2019-08-12 RX ADMIN — Medication 4: at 12:22

## 2019-08-12 RX ADMIN — Medication 100 MICROGRAM(S): at 05:43

## 2019-08-12 RX ADMIN — LIDOCAINE 2 PATCH: 4 CREAM TOPICAL at 12:15

## 2019-08-12 RX ADMIN — Medication 40 MILLIGRAM(S): at 05:43

## 2019-08-12 RX ADMIN — Medication 650 MILLIGRAM(S): at 12:23

## 2019-08-12 RX ADMIN — Medication 650 MILLIGRAM(S): at 02:21

## 2019-08-12 RX ADMIN — Medication 25 MILLIGRAM(S): at 05:43

## 2019-08-12 RX ADMIN — Medication 3 MILLILITER(S): at 08:54

## 2019-08-12 RX ADMIN — MONTELUKAST 10 MILLIGRAM(S): 4 TABLET, CHEWABLE ORAL at 12:15

## 2019-08-12 RX ADMIN — Medication 3 MILLILITER(S): at 04:01

## 2019-08-12 RX ADMIN — Medication 650 MILLIGRAM(S): at 02:50

## 2019-08-12 RX ADMIN — LIDOCAINE 2 PATCH: 4 CREAM TOPICAL at 16:30

## 2019-08-12 RX ADMIN — SODIUM CHLORIDE 3 MILLILITER(S): 9 INJECTION INTRAMUSCULAR; INTRAVENOUS; SUBCUTANEOUS at 16:18

## 2019-08-12 RX ADMIN — PANTOPRAZOLE SODIUM 40 MILLIGRAM(S): 20 TABLET, DELAYED RELEASE ORAL at 16:45

## 2019-08-12 RX ADMIN — Medication 500 MILLIGRAM(S): at 12:15

## 2019-08-12 RX ADMIN — Medication 40 MILLIGRAM(S): at 16:45

## 2019-08-12 RX ADMIN — PANTOPRAZOLE SODIUM 40 MILLIGRAM(S): 20 TABLET, DELAYED RELEASE ORAL at 05:43

## 2019-08-12 RX ADMIN — Medication 650 MILLIGRAM(S): at 16:18

## 2019-08-12 RX ADMIN — INSULIN GLARGINE 5 UNIT(S): 100 INJECTION, SOLUTION SUBCUTANEOUS at 22:53

## 2019-08-12 NOTE — PROGRESS NOTE ADULT - SUBJECTIVE AND OBJECTIVE BOX
Pt seen and examined. Awaiting  cardiac clearance for GI w/u. No active GI bleeding presently    REVIEW OF SYSTEMS:  Constitutional: No fever, weight loss or fatigue  Cardiovascular: No chest pain, palpitations, dizziness or leg swelling  Gastrointestinal: As noted above. No abdominal or epigastric pain. No nausea, vomiting or hematemesis; No diarrhea or constipation. No melena or hematochezia.  Skin: No itching, burning, rashes or lesions       MEDICATIONS:  MEDICATIONS  (STANDING):  ALBUTerol    90 MICROgram(s) HFA Inhaler 1 Puff(s) Inhalation every 4 hours  ALBUTerol/ipratropium for Nebulization 3 milliLiter(s) Nebulizer every 6 hours  amLODIPine   Tablet 10 milliGRAM(s) Oral daily  ascorbic acid 500 milliGRAM(s) Oral daily  atorvastatin 20 milliGRAM(s) Oral at bedtime  dextrose 5%. 1000 milliLiter(s) (50 mL/Hr) IV Continuous <Continuous>  dextrose 50% Injectable 12.5 Gram(s) IV Push once  dextrose 50% Injectable 25 Gram(s) IV Push once  dextrose 50% Injectable 25 Gram(s) IV Push once  folic acid 1 milliGRAM(s) Oral daily  furosemide   Injectable 40 milliGRAM(s) IV Push two times a day  insulin glargine Injectable (LANTUS) 5 Unit(s) SubCutaneous two times a day  insulin lispro (HumaLOG) corrective regimen sliding scale   SubCutaneous Before meals and at bedtime  levothyroxine 100 MICROGram(s) Oral daily  lidocaine   Patch 2 Patch Transdermal daily  metoprolol tartrate 25 milliGRAM(s) Oral two times a day  montelukast 10 milliGRAM(s) Oral daily  pantoprazole    Tablet 40 milliGRAM(s) Oral two times a day  sodium chloride 0.9% lock flush 3 milliLiter(s) IV Push every 8 hours  tiotropium 18 MICROgram(s) Capsule 1 Capsule(s) Inhalation daily    MEDICATIONS  (PRN):  acetaminophen   Tablet .. 650 milliGRAM(s) Oral every 6 hours PRN Moderate Pain (4 - 6)  benzonatate 100 milliGRAM(s) Oral every 8 hours PRN Cough  dextrose 40% Gel 15 Gram(s) Oral once PRN Blood Glucose LESS THAN 70 milliGRAM(s)/deciliter  glucagon  Injectable 1 milliGRAM(s) IntraMuscular once PRN Glucose LESS THAN 70 milligrams/deciliter  ondansetron Injectable 4 milliGRAM(s) IV Push every 6 hours PRN Nausea      Allergies    Bactrim (Unknown)  Biaxin (Joint Pain)  morphine (Short breath)    Intolerances        Vital Signs Last 24 Hrs  T(C): 36.6 (12 Aug 2019 05:35), Max: 36.8 (11 Aug 2019 15:49)  T(F): 97.9 (12 Aug 2019 05:35), Max: 98.2 (11 Aug 2019 15:49)  HR: 78 (12 Aug 2019 05:35) (70 - 90)  BP: 160/74 (12 Aug 2019 05:35) (142/70 - 166/74)  BP(mean): --  RR: 20 (12 Aug 2019 05:35) (18 - 20)  SpO2: 97% (12 Aug 2019 04:04) (96% - 99%)    08-11 @ 07:01  -  08-12 @ 07:00  --------------------------------------------------------  IN: 0 mL / OUT: 1450 mL / NET: -1450 mL        PHYSICAL EXAM:    General: Well developed; well nourished; in no acute distress  HEENT: MMM, conjunctiva pink and sclera anicteric.  Lungs: clear to auscultation bilaterally.  Cor: RRR S1, S2 only.  Gastrointestinal: Abdomen: Soft non-tender non-distended; Normal bowel sounds; No hepatosplenomegaly.  Extremities: no cyanosis, clubbing or edema.  Skin: Warm and dry. No obvious rash  Neuro: Pt. a + o x 3    LABS:  CBC Full  -  ( 12 Aug 2019 08:01 )  WBC Count : 3.46 K/uL  RBC Count : 2.99 M/uL  Hemoglobin : 8.7 g/dL  Hematocrit : 27.9 %  Platelet Count - Automated : 109 K/uL  Mean Cell Volume : 93.3 fl  Mean Cell Hemoglobin : 29.1 pg  Mean Cell Hemoglobin Concentration : 31.2 gm/dL  Auto Neutrophil # : x  Auto Lymphocyte # : x  Auto Monocyte # : x  Auto Eosinophil # : x  Auto Basophil # : x  Auto Neutrophil % : x  Auto Lymphocyte % : x  Auto Monocyte % : x  Auto Eosinophil % : x  Auto Basophil % : x    08-12    142  |  107  |  41.0<H>  ----------------------------<  140<H>  3.6   |  24.0  |  1.54<H>    Ca    8.8      12 Aug 2019 08:01  Phos  3.5     08-12  Mg     2.0     08-12                        RADIOLOGY & ADDITIONAL STUDIES (The following images were personally reviewed):

## 2019-08-12 NOTE — PROGRESS NOTE ADULT - SUBJECTIVE AND OBJECTIVE BOX
[place] [facility] - [facility], THE HEART CENTER                                   [address] [place] [address]                                                      PHONE: [phone]                                                         FAX: [phone]  [email or web address]  ---------------------------------------------------------------------------------------------------------------------------------    Overnight events/patient complaints:      PAST MEDICAL & SURGICAL HISTORY:  CKD (chronic kidney disease)  CAD (coronary artery disease)  Heart failure  Hepatitis C  Aortic stenosis  Anemia, unspecified anemia type  Asthma  Low back pain: chronic over a year now.  High cholesterol  Hypertension  Diabetes  H/O aortic valve replacement: cow valve.  S/P CABG x 3  History of tonsillectomy      Bactrim (Unknown)  Biaxin (Joint Pain)  morphine (Short breath)    MEDICATIONS  (STANDING):  ALBUTerol    90 MICROgram(s) HFA Inhaler 1 Puff(s) Inhalation every 4 hours  ALBUTerol/ipratropium for Nebulization 3 milliLiter(s) Nebulizer every 6 hours  amLODIPine   Tablet 10 milliGRAM(s) Oral daily  ascorbic acid 500 milliGRAM(s) Oral daily  atorvastatin 20 milliGRAM(s) Oral at bedtime  dextrose 5%. 1000 milliLiter(s) (50 mL/Hr) IV Continuous <Continuous>  dextrose 50% Injectable 12.5 Gram(s) IV Push once  dextrose 50% Injectable 25 Gram(s) IV Push once  dextrose 50% Injectable 25 Gram(s) IV Push once  folic acid 1 milliGRAM(s) Oral daily  furosemide   Injectable 40 milliGRAM(s) IV Push two times a day  insulin glargine Injectable (LANTUS) 5 Unit(s) SubCutaneous two times a day  insulin lispro (HumaLOG) corrective regimen sliding scale   SubCutaneous Before meals and at bedtime  levothyroxine 100 MICROGram(s) Oral daily  lidocaine   Patch 2 Patch Transdermal daily  metoprolol tartrate 25 milliGRAM(s) Oral two times a day  montelukast 10 milliGRAM(s) Oral daily  pantoprazole    Tablet 40 milliGRAM(s) Oral two times a day  sodium chloride 0.9% lock flush 3 milliLiter(s) IV Push every 8 hours  tiotropium 18 MICROgram(s) Capsule 1 Capsule(s) Inhalation daily    MEDICATIONS  (PRN):  acetaminophen   Tablet .. 650 milliGRAM(s) Oral every 6 hours PRN Moderate Pain (4 - 6)  benzonatate 100 milliGRAM(s) Oral every 8 hours PRN Cough  dextrose 40% Gel 15 Gram(s) Oral once PRN Blood Glucose LESS THAN 70 milliGRAM(s)/deciliter  glucagon  Injectable 1 milliGRAM(s) IntraMuscular once PRN Glucose LESS THAN 70 milligrams/deciliter  ondansetron Injectable 4 milliGRAM(s) IV Push every 6 hours PRN Nausea      Vital Signs Last 24 Hrs  T(C): 36.6 (12 Aug 2019 05:35), Max: 36.8 (11 Aug 2019 15:49)  T(F): 97.9 (12 Aug 2019 05:35), Max: 98.2 (11 Aug 2019 15:49)  HR: 74 (12 Aug 2019 09:12) (70 - 90)  BP: 160/74 (12 Aug 2019 05:35) (142/70 - 166/74)  BP(mean): --  RR: 20 (12 Aug 2019 05:35) (18 - 20)  SpO2: 96% (12 Aug 2019 09:12) (96% - 99%)  ICU Vital Signs Last 24 Hrs  [facility]  I&O's Detail    11 Aug 2019 07:01  -  12 Aug 2019 07:00  --------------------------------------------------------  IN:  Total IN: 0 mL    OUT:    Voided: 1450 mL  Total OUT: 1450 mL    Total NET: -1450 mL        I&O's Summary    11 Aug 2019 07:01  -  12 Aug 2019 07:00  --------------------------------------------------------  IN: 0 mL / OUT: 1450 mL / NET: -1450 mL      Drug Dosing Weight  NICHELLE GILL      PHYSICAL EXAM:  General: Appears well developed, well nourished alert and cooperative.  HEENT: Head; normocephalic, atraumatic.  Eyes: Pupils reactive, cornea wnl.  Neck: Supple, no nodes adenopathy, no NVD or carotid bruit or thyromegaly.  CARDIOVASCULAR: Normal S1 and S2, No murmur, rub, gallop or lift.   LUNGS: No rales, rhonchi or wheeze. Normal breath sounds bilaterally.  ABDOMEN: Soft, nontender without mass or organomegaly. bowel sounds normoactive.  EXTREMITIES: No clubbing, cyanosis or edema. Distal pulses wnl.   SKIN: warm and dry with normal turgor.  NEURO: Alert/oriented x 3/normal motor exam. No pathologic reflexes.    PSYCH: normal affect.        LABS:                        8.7    3.46  )-----------( 109      ( 12 Aug 2019 08:01 )             27.9     08-12    142  |  107  |  41.0<H>  ----------------------------<  140<H>  3.6   |  24.0  |  1.54<H>    Ca    8.8      12 Aug 2019 08:01  Phos  3.5     08-12  Mg     2.0     08-12      Summary:   1. Left ventricular ejection fraction, by visual estimation, is 55 to   60%.   2. Spectral Doppler shows impaired relaxation pattern of left   ventricular myocardial filling (Grade I diastolic dysfunction).   3. There is mild concentric left ventricular hypertrophy.   4. Severe mitral annular calcification.   5. Mild degenerative mitral stenosis.   6. Bioprosthesis in the aortic position - normal function.   7. Estimated pulmonary artery systolic pressure is 37.2 mmHg assuming a   right atrial pressure of 3 mmHg, which is consistent with borderline   pulmonary hypertension.   8. There is no evidence of pericardial effusion.   9. ** Compared to TTE dated 9/15/17, no significant changes.    Nadiya Carbajal DO Electronically signed on 7/18/2019 at 12:31:05 PM    Assessment and Plan:  In summary, NICHELLE GILL is a 74y Female with past medical history significant for  CAD s/p CABG and Bio AVR 12/2016 with brief post operative AF, normal EF, CKD requiring transient HD for hyperkalemia, mild MR, prior TIA s/p ILR, chronic Hep C complicated by cirrhosis and varices, bronchiectasias and chronic anemia with worsening anemia.    SOB/HFpEF:   likely multifactorial, c/w IV lasix, BUN/cr improving    Anemia:   recurrent, s/p multiple transfusions in past. Aim for HB>8  -pt medically optimized for EGD/colo  -pt is a low cardio risk for low risk procedures  -no cardiac interventions warranted prior to procedure         Thank you for allowing [facility] to participate in the care of this patient.  Please feel free to call with any questions or concerns. [place] [facility] - [facility], THE HEART CENTER                                   [address] [place] [address]                                                      PHONE: [phone]                                                         FAX: [phone]  [email or web address]  ---------------------------------------------------------------------------------------------------------------------------------    Overnight events/patient complaints:  no events    PAST MEDICAL & SURGICAL HISTORY:  CKD (chronic kidney disease)  CAD (coronary artery disease)  Heart failure  Hepatitis C  Aortic stenosis  Anemia, unspecified anemia type  Asthma  Low back pain: chronic over a year now.  High cholesterol  Hypertension  Diabetes  H/O aortic valve replacement: cow valve.  S/P CABG x 3  History of tonsillectomy      Bactrim (Unknown)  Biaxin (Joint Pain)  morphine (Short breath)    MEDICATIONS  (STANDING):  ALBUTerol    90 MICROgram(s) HFA Inhaler 1 Puff(s) Inhalation every 4 hours  ALBUTerol/ipratropium for Nebulization 3 milliLiter(s) Nebulizer every 6 hours  amLODIPine   Tablet 10 milliGRAM(s) Oral daily  ascorbic acid 500 milliGRAM(s) Oral daily  atorvastatin 20 milliGRAM(s) Oral at bedtime  dextrose 5%. 1000 milliLiter(s) (50 mL/Hr) IV Continuous <Continuous>  dextrose 50% Injectable 12.5 Gram(s) IV Push once  dextrose 50% Injectable 25 Gram(s) IV Push once  dextrose 50% Injectable 25 Gram(s) IV Push once  folic acid 1 milliGRAM(s) Oral daily  furosemide   Injectable 40 milliGRAM(s) IV Push two times a day  insulin glargine Injectable (LANTUS) 5 Unit(s) SubCutaneous two times a day  insulin lispro (HumaLOG) corrective regimen sliding scale   SubCutaneous Before meals and at bedtime  levothyroxine 100 MICROGram(s) Oral daily  lidocaine   Patch 2 Patch Transdermal daily  metoprolol tartrate 25 milliGRAM(s) Oral two times a day  montelukast 10 milliGRAM(s) Oral daily  pantoprazole    Tablet 40 milliGRAM(s) Oral two times a day  sodium chloride 0.9% lock flush 3 milliLiter(s) IV Push every 8 hours  tiotropium 18 MICROgram(s) Capsule 1 Capsule(s) Inhalation daily    MEDICATIONS  (PRN):  acetaminophen   Tablet .. 650 milliGRAM(s) Oral every 6 hours PRN Moderate Pain (4 - 6)  benzonatate 100 milliGRAM(s) Oral every 8 hours PRN Cough  dextrose 40% Gel 15 Gram(s) Oral once PRN Blood Glucose LESS THAN 70 milliGRAM(s)/deciliter  glucagon  Injectable 1 milliGRAM(s) IntraMuscular once PRN Glucose LESS THAN 70 milligrams/deciliter  ondansetron Injectable 4 milliGRAM(s) IV Push every 6 hours PRN Nausea      Vital Signs Last 24 Hrs  T(C): 36.6 (12 Aug 2019 05:35), Max: 36.8 (11 Aug 2019 15:49)  T(F): 97.9 (12 Aug 2019 05:35), Max: 98.2 (11 Aug 2019 15:49)  HR: 74 (12 Aug 2019 09:12) (70 - 90)  BP: 160/74 (12 Aug 2019 05:35) (142/70 - 166/74)  BP(mean): --  RR: 20 (12 Aug 2019 05:35) (18 - 20)  SpO2: 96% (12 Aug 2019 09:12) (96% - 99%)  ICU Vital Signs Last 24 Hrs  NICHELLE GILL  I&O's Detail    11 Aug 2019 07:01  -  12 Aug 2019 07:00  --------------------------------------------------------  IN:  Total IN: 0 mL    OUT:    Voided: 1450 mL  Total OUT: 1450 mL    Total NET: -1450 mL        I&O's Summary    11 Aug 2019 07:01  -  12 Aug 2019 07:00  --------------------------------------------------------  IN: 0 mL / OUT: 1450 mL / NET: -1450 mL      Drug Dosing Weight  NICHELLE GILL      PHYSICAL EXAM:  General: Appears well developed, well nourished alert and cooperative.  HEENT: Head; normocephalic, atraumatic.  Eyes: Pupils reactive, cornea wnl.  Neck: Supple, no nodes adenopathy, no NVD or carotid bruit or thyromegaly.  CARDIOVASCULAR: Normal S1 and S2, No murmur, rub, gallop or lift.   LUNGS: No rales, rhonchi or wheeze. Normal breath sounds bilaterally.  ABDOMEN: Soft, nontender without mass or organomegaly. bowel sounds normoactive.  EXTREMITIES: No clubbing, cyanosis or edema. Distal pulses wnl.   SKIN: warm and dry with normal turgor.  NEURO: Alert/oriented x 3/normal motor exam. No pathologic reflexes.    PSYCH: normal affect.        LABS:                        8.7    3.46  )-----------( 109      ( 12 Aug 2019 08:01 )             27.9     08-12    142  |  107  |  41.0<H>  ----------------------------<  140<H>  3.6   |  24.0  |  1.54<H>    Ca    8.8      12 Aug 2019 08:01  Phos  3.5     08-12  Mg     2.0     08-12      Summary:   1. Left ventricular ejection fraction, by visual estimation, is 55 to   60%.   2. Spectral Doppler shows impaired relaxation pattern of left   ventricular myocardial filling (Grade I diastolic dysfunction).   3. There is mild concentric left ventricular hypertrophy.   4. Severe mitral annular calcification.   5. Mild degenerative mitral stenosis.   6. Bioprosthesis in the aortic position - normal function.   7. Estimated pulmonary artery systolic pressure is 37.2 mmHg assuming a   right atrial pressure of 3 mmHg, which is consistent with borderline   pulmonary hypertension.   8. There is no evidence of pericardial effusion.   9. ** Compared to TTE dated 9/15/17, no significant changes.    Nadiya Carbajal DO Electronically signed on 7/18/2019 at 12:31:05 PM    Assessment and Plan:  In summary, NICHELLE GILL is a 74y Female with past medical history significant for  CAD s/p CABG and Bio AVR 12/2016 with brief post operative AF, normal EF, CKD requiring transient HD for hyperkalemia, mild MR, prior TIA s/p ILR, chronic Hep C complicated by cirrhosis and varices, bronchiectasias and chronic anemia with worsening anemia.    SOB/HFpEF:   likely multifactorial, c/w IV lasix, BUN/cr improving    Anemia:   recurrent, s/p multiple transfusions in past. Aim for HB>8  -pt medically optimized for EGD/colo  -pt is a low cardio risk for low risk procedures  -no cardiac interventions warranted prior to procedure        Thank you for allowing [facility] to participate in the care of this patient.  Please feel free to call with any questions or concerns.

## 2019-08-12 NOTE — CONSULT NOTE ADULT - REASON FOR ADMISSION
SOB  symptomatic anemia
SOB  symptomatic anemia
shortness of breath
SOB  symptomatic anemia
SOB  symptomatic anemia

## 2019-08-12 NOTE — PROGRESS NOTE ADULT - SUBJECTIVE AND OBJECTIVE BOX
CC: anemia    Patient seen and examined at the bedside. No acute overnight events. no events yesterday. Complaining of nothing today. Denies fever/chills, headache, lightheadedness, dizziness, chest pain, palpitations, abd pain, nausea/vomiting/diarrhea      =========================================================================================    PHYSICAL EXAM.    GEN - appears age appropriate. well nourished. pleasant. no distress   HEENT - NCAT, EOMI, SAVANNAH  RESP - CTA BL, good airway movement. no wheeze/stridor. on supplemental O2, nc. able to speak in full sentences without distress  CARDIO - NS1S2, RRR. No murmurs/rubs/gallop  ABD - Soft/Non tender/Non distended. Normal BS x4 quadrants. no guarding/rebound tenderness.  Ext - No ANKIT.  MSK - BL 5/5 strength on upper and lower extremities.   Neuro - AAOx3. cn 2-12 grossly intact  Psych - normal affect  Skin - c/d/i. no rashes/lesions      VITAL SIGNS.    Vital Signs Last 24 Hrs  T(C): 37.2 (12 Aug 2019 16:42), Max: 37.2 (12 Aug 2019 16:42)  T(F): 98.9 (12 Aug 2019 16:42), Max: 98.9 (12 Aug 2019 16:42)  HR: 83 (12 Aug 2019 16:42) (70 - 83)  BP: 176/76 (12 Aug 2019 16:42) (142/70 - 176/76)  BP(mean): --  RR: 18 (12 Aug 2019 16:42) (18 - 20)  SpO2: 98% (12 Aug 2019 16:42) (96% - 98%)        =================================================    LABS.                          8.7    3.46  )-----------( 109      ( 12 Aug 2019 08:01 )             27.9     08-12    142  |  107  |  41.0<H>  ----------------------------<  140<H>  3.6   |  24.0  |  1.54<H>    Ca    8.8      12 Aug 2019 08:01  Phos  3.5     08-12  Mg     2.0     08-12          I&O's Summary    11 Aug 2019 07:01  -  12 Aug 2019 07:00  --------------------------------------------------------  IN: 0 mL / OUT: 1450 mL / NET: -1450 mL    12 Aug 2019 07:01  -  12 Aug 2019 18:08  --------------------------------------------------------  IN: 0 mL / OUT: 350 mL / NET: -350 mL          ================================================    IMAGING.    < from: Xray Chest 1 View- PORTABLE-Urgent (08.11.19 @ 20:13) >  Single frontal view of the chest demonstrates tiny left-sided pleural   effusion. Mild congestive changes. Right-sided MediPort catheter.   Left-sided loop recorder. Mediastinal sternotomy wires. The   cardiomediastinal silhouette is enlarged. No acute osseous abnormalities.   Overlying EKG leads and wires are noted. Chronic distal left clavicle   osteotomy defect.    IMPRESSION: Tiny left-sided pleural effusion, unchanged.    < end of copied text >      ================================================    HOME MEDS.    Home Medications:  albuterol 2.5 mg/3 mL (0.083%) inhalation solution: 3 milliliter(s) inhaled every 6 hours, As Needed (17 Jul 2019 13:35)  Aranesp Albumin Free: once weekly at MD&#x27;s office (17 Jul 2019 13:35)  ascorbic acid 500 mg oral tablet: 1 tab(s) orally once a day (22 Jul 2019 14:30)  folic acid 1 mg oral tablet: 1 tab(s) orally once a day (22 Jul 2019 14:30)  insulin lispro 100 units/mL subcutaneous solution:  subcutaneous 3 times a day (with meals) ; 2 Unit(s) if Glucose 151 - 200  4 Unit(s) if Glucose 201 - 250  6 Unit(s) if Glucose 251 - 300  8 Unit(s) if Glucose 301 - 350  10 Unit(s) if Glucose 351 - 400  12 Unit(s) if Glucose GREATER THAN 400 (17 Jul 2019 13:35)  Lasix 40 mg oral tablet: 1 tab(s) orally 2 times a day (22 Jul 2019 14:30)  Levoxyl 100 mcg (0.1 mg) oral tablet: 1 tab(s) orally once a day (22 Jul 2019 14:30)  magnesium oxide 400 mg (241.3 mg elemental magnesium) oral tablet: 1 tab(s) orally every other day (17 Jul 2019 13:35)  Norvasc 10 mg oral tablet: 1 tab(s) orally once a day (22 Jul 2019 14:30)  phenylephrine 0.25%-3% rectal ointment: 1 application rectal 4 times a day, As needed, hemorrhoidal irritation/bleeding (22 Jul 2019 14:30)  pravastatin 80 mg oral tablet: 1 tab(s) orally once a day (17 Jul 2019 13:35)  Protonix 40 mg oral delayed release tablet: 1 tab(s) orally 2 times a day (22 Jul 2019 14:30)  Singulair 10 mg oral tablet: 1 tab(s) orally once a day (22 Jul 2019 14:30)  Thera-D Rapid Repletion oral tablet: 2000 unit(s) orally once a day (22 Jul 2019 14:30)  Tresiba 100 units/mL subcutaneous solution: 10 unit(s) subcutaneous once a day (17 Jul 2019 13:35)      ================================================    HOSPITAL MEDS.    MEDICATIONS  (STANDING):  ALBUTerol    90 MICROgram(s) HFA Inhaler 1 Puff(s) Inhalation every 4 hours  ALBUTerol/ipratropium for Nebulization 3 milliLiter(s) Nebulizer every 6 hours  amLODIPine   Tablet 10 milliGRAM(s) Oral daily  ascorbic acid 500 milliGRAM(s) Oral daily  atorvastatin 20 milliGRAM(s) Oral at bedtime  dextrose 5%. 1000 milliLiter(s) (50 mL/Hr) IV Continuous <Continuous>  dextrose 50% Injectable 12.5 Gram(s) IV Push once  dextrose 50% Injectable 25 Gram(s) IV Push once  dextrose 50% Injectable 25 Gram(s) IV Push once  folic acid 1 milliGRAM(s) Oral daily  furosemide   Injectable 40 milliGRAM(s) IV Push two times a day  insulin glargine Injectable (LANTUS) 5 Unit(s) SubCutaneous two times a day  insulin lispro (HumaLOG) corrective regimen sliding scale   SubCutaneous Before meals and at bedtime  levothyroxine 100 MICROGram(s) Oral daily  lidocaine   Patch 2 Patch Transdermal daily  metoprolol tartrate 25 milliGRAM(s) Oral two times a day  montelukast 10 milliGRAM(s) Oral daily  pantoprazole    Tablet 40 milliGRAM(s) Oral two times a day  sodium chloride 0.9% lock flush 3 milliLiter(s) IV Push every 8 hours  tiotropium 18 MICROgram(s) Capsule 1 Capsule(s) Inhalation daily    MEDICATIONS  (PRN):  acetaminophen   Tablet .. 650 milliGRAM(s) Oral every 6 hours PRN Moderate Pain (4 - 6)  benzonatate 100 milliGRAM(s) Oral every 8 hours PRN Cough  dextrose 40% Gel 15 Gram(s) Oral once PRN Blood Glucose LESS THAN 70 milliGRAM(s)/deciliter  glucagon  Injectable 1 milliGRAM(s) IntraMuscular once PRN Glucose LESS THAN 70 milligrams/deciliter  ondansetron Injectable 4 milliGRAM(s) IV Push every 6 hours PRN Nausea

## 2019-08-12 NOTE — PROGRESS NOTE ADULT - PROBLEM SELECTOR PLAN 1
Await cardiac clearance for eventual EGD/ Colonoscopy. Continue current diet and management for now. Repeat labs ordered for tomorrow AM.

## 2019-08-12 NOTE — CONSULT NOTE ADULT - SUBJECTIVE AND OBJECTIVE BOX
Patient is a 74y old  Female who presents with a chief complaint of SOB  symptomatic anemia (12 Aug 2019 18:06)      HPI:  73 y/o female with hx of CKD-4, HTN, HLD, Bio-AVR, recurrent anemia recurring transfusion w/ baseline hgb 7-8, HCV, Cirrhosis, Diastolic CHF, DM-2, recent prolonged hospital course in 7/19 for right upper chest wound infection related to port, MARGUERITE on CKD, metabolic acidosis, hyperkalemia and Heart block requiring transient HD.  She p/w increasing SOB and weakness and was found to have hgb of 5.3.  Pt s/p transfusion 4u PRBC.  She is being followed by cardiology and is undergoing GI evaluation.  She is being scheduled for endoscopy/colonoscopy.  Pt is followed in our office for anemia and thrombocytopenia.  She has a multifactorial anemia secondary to iron deficiency, B12 deficiency;  as well as AOCD secondary to CKD.  She receives IV iron as needed; aranesp as needed for hgb <11 and B12 injections.  She last received B12 and aranesp 7/25/19.  Bone marrow biopys 5/2019 was normal.       ROS:  Negative except for: fatigue, DUPONT.  Pt with rectal bleeding several wks ago.  She denies any N/V/D or abd pain.     PAST MEDICAL & SURGICAL HISTORY:  CKD (chronic kidney disease)  CAD (coronary artery disease)  Heart failure  Hepatitis C  Aortic stenosis  Anemia, unspecified anemia type  Asthma  Low back pain: chronic over a year now.  High cholesterol  Hypertension  Diabetes  H/O aortic valve replacement: cow valve.  S/P CABG x 3  History of tonsillectomy      SOCIAL HISTORY:    FAMILY HISTORY:  Family history of diabetes mellitus (Sibling)      MEDICATIONS  (STANDING):  ALBUTerol    90 MICROgram(s) HFA Inhaler 1 Puff(s) Inhalation every 4 hours  amLODIPine   Tablet 10 milliGRAM(s) Oral daily  ascorbic acid 500 milliGRAM(s) Oral daily  atorvastatin 20 milliGRAM(s) Oral at bedtime  dextrose 5%. 1000 milliLiter(s) (50 mL/Hr) IV Continuous <Continuous>  dextrose 50% Injectable 12.5 Gram(s) IV Push once  dextrose 50% Injectable 25 Gram(s) IV Push once  dextrose 50% Injectable 25 Gram(s) IV Push once  folic acid 1 milliGRAM(s) Oral daily  furosemide   Injectable 40 milliGRAM(s) IV Push two times a day  insulin glargine Injectable (LANTUS) 5 Unit(s) SubCutaneous two times a day  insulin lispro (HumaLOG) corrective regimen sliding scale   SubCutaneous Before meals and at bedtime  levothyroxine 100 MICROGram(s) Oral daily  lidocaine   Patch 2 Patch Transdermal daily  metoprolol tartrate 50 milliGRAM(s) Oral two times a day  montelukast 10 milliGRAM(s) Oral daily  pantoprazole    Tablet 40 milliGRAM(s) Oral two times a day  sodium chloride 0.9% lock flush 3 milliLiter(s) IV Push every 8 hours  tiotropium 18 MICROgram(s) Capsule 1 Capsule(s) Inhalation daily    MEDICATIONS  (PRN):  acetaminophen   Tablet .. 650 milliGRAM(s) Oral every 6 hours PRN Moderate Pain (4 - 6)  ALBUTerol/ipratropium for Nebulization 3 milliLiter(s) Nebulizer every 6 hours PRN Shortness of Breath and/or Wheezing  benzonatate 100 milliGRAM(s) Oral every 8 hours PRN Cough  dextrose 40% Gel 15 Gram(s) Oral once PRN Blood Glucose LESS THAN 70 milliGRAM(s)/deciliter  glucagon  Injectable 1 milliGRAM(s) IntraMuscular once PRN Glucose LESS THAN 70 milligrams/deciliter  ondansetron Injectable 4 milliGRAM(s) IV Push every 6 hours PRN Nausea      Allergies  Bactrim (Unknown)  Biaxin (Joint Pain)  morphine (Short breath)    Vital Signs Last 24 Hrs  T(C): 37.2 (12 Aug 2019 16:42), Max: 37.2 (12 Aug 2019 16:42)  T(F): 98.9 (12 Aug 2019 16:42), Max: 98.9 (12 Aug 2019 16:42)  HR: 83 (12 Aug 2019 16:42) (70 - 83)  BP: 176/76 (12 Aug 2019 16:42) (142/70 - 176/76)  BP(mean): --  RR: 18 (12 Aug 2019 16:42) (18 - 20)  SpO2: 98% (12 Aug 2019 16:42) (96% - 98%)    PHYSICAL EXAM  General: adult in NAD  HEENT: clear oropharynx, anicteric sclera, pink conjunctiva  Neck: supple  CV: S1S2 3/6 murmur  Lungs: positive air movement b/l ant lungs,clear to auscultation, no wheezes, no rales  Abdomen: soft non-tender non-distended, no hepatosplenomegaly  Ext: tr-1+edema  Skin: no rashes and no petechiae  Neuro: alert and oriented X 4, no focal deficits      LABS:                8.7    3.46  )-----------( 109      ( 12 Aug 2019 08:01 )             27.9     Mean Cell Volume : 93.3 fl    Serial CBC's  08-12 @ 08:01  Hct-27.9 / Hgb-8.7 / Plat-109 / RBC-2.99 / WBC-3.46  Serial CBC's  08-11 @ 08:46    08-12    142  |  107  |  41.0<H>  ----------------------------<  140<H>  3.6   |  24.0  |  1.54<H>    Ca    8.8      12 Aug 2019 08:01  Phos  3.5     08-12  Mg     2.0     08-12    Ferritin, Serum: 385 ng/mL (08-12 @ 08:01)  Iron - Total Binding Capacity.: 230 ug/dL (08-12 @ 08:01)

## 2019-08-12 NOTE — CONSULT NOTE ADULT - ASSESSMENT
Imp--Pt with [place] effusions, chronic anemia.mild pulm HTN, probably multifactorial--valvular HD, barclay dysfunction,?cirrhosis.  Pl eff due to above.  No bronchospasm  Findings on CT likely benig.  Rt GG inf is new.  Pt is cleared for EGD and colonoscopy from pulm POV.  Recommend continuing duoneb RTC while in hosp.  Will need to follow up in office post d/c.
73 y/o female with hx of CKD-4, HTN, HLD, Bio-AVR, recurrent anemia recurring transfusion w/ baseline hgb 7-8, HCV, Cirrhosis, Diastolic CHF, DM-2, recent prolonged hospital course in 7/19 for right upper chest wound infection related to port, MARGUERITE on CKD, metabolic acidosis, hyperkalemia and Heart block requiring transient HD.  She was admitted now with symptomatic anemia with hgb 5.3.      1. anemia - improved post transfusion.  Pt has a multifactorial anemia secondary to iron deficiency, B12 deficiency;  as well as AOCD secondary to CKD.  She receives IV iron as needed; aranesp prn and B12 injections.  She last received B12 and aranesp 7/25/19.      2. thrombocytopenia - pt followed in our office for thrombocytopenia, likely related  to underlying liver disease.  Bone marrow biopsy 5/2019 was normal.        Plan:   1. monitor cbc  2. transfusion support as needed  3. aranesp weekly  4.  pt being scheduled for colonoscopy/endoscopy  5. continue present supportive care
75 y/o female with hx of CHFpEf, CAD s/p CABG, AS s/p bovine AVR (both at [facility]),  DM2, Hepatitis C with cirrhosis, iron deficiency anemia of uncertain etiology with 3 negative bone marrows, on regular iron supplements and transfusion, CKD-4, HTN, HLD, Bio-AVR, recurrent anemia w/ baseline hbg 7-8 s/p 28 PRBC transfusions s/p 3 BMB with no clear etiology of anemia, s/p EGD/ Colonoscopy/ Capsular endoscopies in past, HCV, Cirrhosis, Diastolic CHF, DM-2, recent prolonged hospital course in 7/19 for right upper chest wound infection related to port, MARGUERITE on CKD, metabolic acidosis, hyperkalemia and Heart block requiring transient HD.      She was last seen by our service on 7/20/19 for concern about wound infection/cellulitis, in relation to R upper chest port, placed about 1st week of 7/2019.  She is now admitted for workup of anemia with hemoglobin of 5.3, requiring multiple transfusions.  Per last note, her Blood culture from 7/17/19 were negative.  her wound had no discharge or drainage.  She was recommenced to complete 2 weeks of linezolid, which ended on 8/1/19.    Workup in process for anemia.   Regarding her port, there is no pain, no tenderness.  She wants to know if it can be used.     Port site cellulitis - now resolved  - defer additional antibiotics  - can use port from ID point of view.     Patient can follow up in office for any further concerns.    Thank you for the courtesy of your consultation request.
74y old Female with a past medical history significant for CKD4, HTN, HLD, Bio-AVR, recurrent anemia w/ baseline hbg 7-8 s/p 28 PRBC transfusions s/p 3 bone marrow biopsies with no clear etiology of anemia, s/p EGD/Colonoscopy/Capsular endoscopies in 2014/2015, HCV Cirrhosis, Diastolic CHF, DM2 who presents with severe anemia that is likely multifactorial. It is likely partially due to her CKD and cirrhosis but she may have a component of GI loss secondary to portal gastropathy/enteropathy vs AVMs. Will need to r/o malignancy. Her iron studies are not consistent with iron deficiency but difficult to interpret in the setting of frequent Iron infusions and transfusions.     Pt is currently being treated for diastolic HF  EGD and colonoscopy when is optimized from a cardiopulmonary standpoint   - monitor cbc    Thanks

## 2019-08-13 ENCOUNTER — RESULT REVIEW (OUTPATIENT)
Age: 74
End: 2019-08-13

## 2019-08-13 LAB
ANION GAP SERPL CALC-SCNC: 10 MMOL/L — SIGNIFICANT CHANGE UP (ref 5–17)
ANISOCYTOSIS BLD QL: SLIGHT — SIGNIFICANT CHANGE UP
BASOPHILS # BLD AUTO: 0.03 K/UL — SIGNIFICANT CHANGE UP (ref 0–0.2)
BASOPHILS NFR BLD AUTO: 0.9 % — SIGNIFICANT CHANGE UP (ref 0–2)
BUN SERPL-MCNC: 44 MG/DL — HIGH (ref 8–20)
CALCIUM SERPL-MCNC: 8.9 MG/DL — SIGNIFICANT CHANGE UP (ref 8.6–10.2)
CHLORIDE SERPL-SCNC: 105 MMOL/L — SIGNIFICANT CHANGE UP (ref 98–107)
CO2 SERPL-SCNC: 26 MMOL/L — SIGNIFICANT CHANGE UP (ref 22–29)
CREAT SERPL-MCNC: 1.47 MG/DL — HIGH (ref 0.5–1.3)
ELLIPTOCYTES BLD QL SMEAR: SLIGHT — SIGNIFICANT CHANGE UP
EOSINOPHIL # BLD AUTO: 0.27 K/UL — SIGNIFICANT CHANGE UP (ref 0–0.5)
EOSINOPHIL NFR BLD AUTO: 7.9 % — HIGH (ref 0–6)
GIANT PLATELETS BLD QL SMEAR: PRESENT — SIGNIFICANT CHANGE UP
GLUCOSE BLDC GLUCOMTR-MCNC: 112 MG/DL — HIGH (ref 70–99)
GLUCOSE BLDC GLUCOMTR-MCNC: 121 MG/DL — HIGH (ref 70–99)
GLUCOSE BLDC GLUCOMTR-MCNC: 153 MG/DL — HIGH (ref 70–99)
GLUCOSE SERPL-MCNC: 99 MG/DL — SIGNIFICANT CHANGE UP (ref 70–115)
HCT VFR BLD CALC: 26.9 % — LOW (ref 34.5–45)
HGB BLD-MCNC: 8.6 G/DL — LOW (ref 11.5–15.5)
HYPOCHROMIA BLD QL: SLIGHT — SIGNIFICANT CHANGE UP
LYMPHOCYTES # BLD AUTO: 0.39 K/UL — LOW (ref 1–3.3)
LYMPHOCYTES # BLD AUTO: 11.5 % — LOW (ref 13–44)
MACROCYTES BLD QL: SLIGHT — SIGNIFICANT CHANGE UP
MAGNESIUM SERPL-MCNC: 2.2 MG/DL — SIGNIFICANT CHANGE UP (ref 1.6–2.6)
MANUAL SMEAR VERIFICATION: SIGNIFICANT CHANGE UP
MCHC RBC-ENTMCNC: 29.4 PG — SIGNIFICANT CHANGE UP (ref 27–34)
MCHC RBC-ENTMCNC: 32 GM/DL — SIGNIFICANT CHANGE UP (ref 32–36)
MCV RBC AUTO: 91.8 FL — SIGNIFICANT CHANGE UP (ref 80–100)
MICROCYTES BLD QL: SLIGHT — SIGNIFICANT CHANGE UP
MONOCYTES # BLD AUTO: 0.06 K/UL — SIGNIFICANT CHANGE UP (ref 0–0.9)
MONOCYTES NFR BLD AUTO: 1.8 % — LOW (ref 2–14)
NEUTROPHILS # BLD AUTO: 2.63 K/UL — SIGNIFICANT CHANGE UP (ref 1.8–7.4)
NEUTROPHILS NFR BLD AUTO: 77 % — SIGNIFICANT CHANGE UP (ref 43–77)
OVALOCYTES BLD QL SMEAR: SLIGHT — SIGNIFICANT CHANGE UP
PHOSPHATE SERPL-MCNC: 3.1 MG/DL — SIGNIFICANT CHANGE UP (ref 2.4–4.7)
PLAT MORPH BLD: ABNORMAL
PLATELET # BLD AUTO: 122 K/UL — LOW (ref 150–400)
PLATELET COUNT - ESTIMATE: ABNORMAL
POIKILOCYTOSIS BLD QL AUTO: SLIGHT — SIGNIFICANT CHANGE UP
POTASSIUM SERPL-MCNC: 3.8 MMOL/L — SIGNIFICANT CHANGE UP (ref 3.5–5.3)
POTASSIUM SERPL-SCNC: 3.8 MMOL/L — SIGNIFICANT CHANGE UP (ref 3.5–5.3)
RBC # BLD: 2.93 M/UL — LOW (ref 3.8–5.2)
RBC # FLD: 17.1 % — HIGH (ref 10.3–14.5)
RBC BLD AUTO: ABNORMAL
SCHISTOCYTES BLD QL AUTO: SLIGHT — SIGNIFICANT CHANGE UP
SMUDGE CELLS # BLD: PRESENT — SIGNIFICANT CHANGE UP
SODIUM SERPL-SCNC: 141 MMOL/L — SIGNIFICANT CHANGE UP (ref 135–145)
VARIANT LYMPHS # BLD: 0.9 % — SIGNIFICANT CHANGE UP (ref 0–6)
WBC # BLD: 3.42 K/UL — LOW (ref 3.8–10.5)
WBC # FLD AUTO: 3.42 K/UL — LOW (ref 3.8–10.5)

## 2019-08-13 PROCEDURE — 88305 TISSUE EXAM BY PATHOLOGIST: CPT | Mod: 26

## 2019-08-13 PROCEDURE — 88342 IMHCHEM/IMCYTCHM 1ST ANTB: CPT | Mod: 26

## 2019-08-13 PROCEDURE — 99233 SBSQ HOSP IP/OBS HIGH 50: CPT

## 2019-08-13 RX ORDER — METOPROLOL TARTRATE 50 MG
25 TABLET ORAL ONCE
Refills: 0 | Status: COMPLETED | OUTPATIENT
Start: 2019-08-13 | End: 2019-08-13

## 2019-08-13 RX ORDER — CHLORHEXIDINE GLUCONATE 213 G/1000ML
1 SOLUTION TOPICAL DAILY
Refills: 0 | Status: DISCONTINUED | OUTPATIENT
Start: 2019-08-13 | End: 2019-08-15

## 2019-08-13 RX ORDER — HYDRALAZINE HCL 50 MG
10 TABLET ORAL EVERY 6 HOURS
Refills: 0 | Status: DISCONTINUED | OUTPATIENT
Start: 2019-08-13 | End: 2019-08-15

## 2019-08-13 RX ORDER — SOD SULF/SODIUM/NAHCO3/KCL/PEG
4000 SOLUTION, RECONSTITUTED, ORAL ORAL
Refills: 0 | Status: COMPLETED | OUTPATIENT
Start: 2019-08-13 | End: 2019-08-13

## 2019-08-13 RX ADMIN — Medication 50 MILLIGRAM(S): at 17:04

## 2019-08-13 RX ADMIN — Medication 650 MILLIGRAM(S): at 15:56

## 2019-08-13 RX ADMIN — INSULIN GLARGINE 5 UNIT(S): 100 INJECTION, SOLUTION SUBCUTANEOUS at 08:14

## 2019-08-13 RX ADMIN — Medication 1 MILLIGRAM(S): at 12:23

## 2019-08-13 RX ADMIN — SODIUM CHLORIDE 3 MILLILITER(S): 9 INJECTION INTRAMUSCULAR; INTRAVENOUS; SUBCUTANEOUS at 07:00

## 2019-08-13 RX ADMIN — Medication 50 MILLIGRAM(S): at 06:47

## 2019-08-13 RX ADMIN — Medication 2: at 17:04

## 2019-08-13 RX ADMIN — Medication 2: at 22:53

## 2019-08-13 RX ADMIN — SODIUM CHLORIDE 3 MILLILITER(S): 9 INJECTION INTRAMUSCULAR; INTRAVENOUS; SUBCUTANEOUS at 12:27

## 2019-08-13 RX ADMIN — LIDOCAINE 2 PATCH: 4 CREAM TOPICAL at 00:30

## 2019-08-13 RX ADMIN — Medication 500 MILLIGRAM(S): at 12:23

## 2019-08-13 RX ADMIN — Medication 40 MILLIGRAM(S): at 06:47

## 2019-08-13 RX ADMIN — Medication 25 MILLIGRAM(S): at 01:38

## 2019-08-13 RX ADMIN — Medication 4000 MILLILITER(S): at 18:43

## 2019-08-13 RX ADMIN — Medication 100 MICROGRAM(S): at 06:47

## 2019-08-13 RX ADMIN — AMLODIPINE BESYLATE 10 MILLIGRAM(S): 2.5 TABLET ORAL at 06:47

## 2019-08-13 RX ADMIN — PANTOPRAZOLE SODIUM 40 MILLIGRAM(S): 20 TABLET, DELAYED RELEASE ORAL at 06:47

## 2019-08-13 RX ADMIN — MONTELUKAST 10 MILLIGRAM(S): 4 TABLET, CHEWABLE ORAL at 12:23

## 2019-08-13 RX ADMIN — Medication 40 MILLIGRAM(S): at 17:04

## 2019-08-13 RX ADMIN — Medication 650 MILLIGRAM(S): at 17:16

## 2019-08-13 RX ADMIN — PANTOPRAZOLE SODIUM 40 MILLIGRAM(S): 20 TABLET, DELAYED RELEASE ORAL at 17:05

## 2019-08-13 RX ADMIN — ATORVASTATIN CALCIUM 20 MILLIGRAM(S): 80 TABLET, FILM COATED ORAL at 22:53

## 2019-08-13 NOTE — PROGRESS NOTE ADULT - SUBJECTIVE AND OBJECTIVE BOX
[facility], THE HEART [facility]ENTER                                   [address] [place] [address]                                                      PHONE: [phone]                                                         FAX: [phone]  [email or web address]  ---------------------------------------------------------------------------------------------------------------------------------    Overnight events/patient complaints:    INTERPRETATION OF TELEMETRY (personally reviewed):    ECG: < from: 12 Lead ECG (08.07.19 @ 04:44) >  Sinus rhythm with 1st degree A-V block  Non-specific intra-ventricular conduction delay  non specific ST wave abnormality    ECHO: < from: TTE Echo w/Cont Complete (07.17.19 @ 21:37) >   1. Left ventricular ejection fraction, by visual estimation, is 55 to 60%.   2. Spectral Doppler shows impaired relaxation pattern of left ventricular myocardial filling (Grade I diastolic dysfunction).   3. There is mild concentric left ventricular hypertrophy.   4. Severe mitral annular calcification.   5. Mild degenerative mitral stenosis.   6. Bioprosthesis inthe aortic position - normal function.   7. Estimated pulmonary artery systolic pressure is 37.2 mmHg assuming a   right atrial pressure of 3 mmHg, which is consistent with borderline   pulmonary hypertension.   8. There is no evidence of pericardialeffusion.   9. ** Compared to TTE dated 9/15/17, no significant changes.      I&O's Summary    12 Aug 2019 07:01  -  13 Aug 2019 07:00  --------------------------------------------------------  IN: 0 mL / OUT: 1150 mL / NET: -1150 mL        PAST MEDICAL & SURGICAL HISTORY:  CKD (chronic kidney disease)  CAD (coronary artery disease)  Heart failure  Hepatitis C  Aortic stenosis  Anemia, unspecified anemia type  Asthma  Low back pain: chronic over a year now.  High cholesterol  Hypertension  Diabetes  H/O aortic valve replacement: cow valve.  S/P CABG x 3  History of tonsillectomy      Bactrim (Unknown)  Biaxin (Joint Pain)  morphine (Short breath)    MEDICATIONS  (STANDING):  ALBUTerol    90 MICROgram(s) HFA Inhaler 1 Puff(s) Inhalation every 4 hours  amLODIPine   Tablet 10 milliGRAM(s) Oral daily  ascorbic acid 500 milliGRAM(s) Oral daily  atorvastatin 20 milliGRAM(s) Oral at bedtime  darbepoetin Injectable ViaL 200 MICROGram(s) SubCutaneous every 7 days  dextrose 5%. 1000 milliLiter(s) (50 mL/Hr) IV Continuous <Continuous>  dextrose 50% Injectable 12.5 Gram(s) IV Push once  dextrose 50% Injectable 25 Gram(s) IV Push once  dextrose 50% Injectable 25 Gram(s) IV Push once  folic acid 1 milliGRAM(s) Oral daily  furosemide   Injectable 40 milliGRAM(s) IV Push two times a day  insulin glargine Injectable (LANTUS) 5 Unit(s) SubCutaneous two times a day  insulin lispro (HumaLOG) corrective regimen sliding scale   SubCutaneous Before meals and at bedtime  levothyroxine 100 MICROGram(s) Oral daily  lidocaine   Patch 2 Patch Transdermal daily  metoprolol tartrate 50 milliGRAM(s) Oral two times a day  montelukast 10 milliGRAM(s) Oral daily  pantoprazole    Tablet 40 milliGRAM(s) Oral two times a day  polyethylene glycol/electrolyte Solution. 4000 milliLiter(s) Oral <User Schedule>  sodium chloride 0.9% lock flush 3 milliLiter(s) IV Push every 8 hours  tiotropium 18 MICROgram(s) Capsule 1 Capsule(s) Inhalation daily    MEDICATIONS  (PRN):  acetaminophen   Tablet .. 650 milliGRAM(s) Oral every 6 hours PRN Moderate Pain (4 - 6)  ALBUTerol/ipratropium for Nebulization 3 milliLiter(s) Nebulizer every 6 hours PRN Shortness of Breath and/or Wheezing  benzonatate 100 milliGRAM(s) Oral every 8 hours PRN Cough  dextrose 40% Gel 15 Gram(s) Oral once PRN Blood Glucose LESS THAN 70 milliGRAM(s)/deciliter  glucagon  Injectable 1 milliGRAM(s) IntraMuscular once PRN Glucose LESS THAN 70 milligrams/deciliter  ondansetron Injectable 4 milliGRAM(s) IV Push every 6 hours PRN Nausea      Vital Signs Last 24 Hrs  T(C): 36.8 (13 Aug 2019 07:42), Max: 37.2 (12 Aug 2019 16:42)  T(F): 98.2 (13 Aug 2019 07:42), Max: 98.9 (12 Aug 2019 16:42)  HR: 62 (13 Aug 2019 07:42) (62 - 83)  BP: 154/67 (13 Aug 2019 07:42) (154/67 - 186/69)  BP(mean): --  RR: 18 (13 Aug 2019 07:42) (18 - 18)  SpO2: 98% (13 Aug 2019 07:42) (97% - 98%)  ICU Vital Signs Last 24 Hrs    PHYSICAL EXAM:  General: Appears well developed, well nourished alert and cooperative.  HEENT: Head; normocephalic, atraumatic.Pupils reactive, cornea wnl. Neck supple, no nodes adenopathy, (+) JVD  CARDIOVASCULAR: Normal S1 and S2, 2/6 NELI, no rub, gallop or lift.   LUNGS: (+) rales at the bases, poor effort   ABDOMEN: Soft, nontender without mass or organomegaly. bowel sounds normoactive.  EXTREMITIES: No clubbing, cyanosis or edema.   SKIN: warm and dry with normal turgor.  NEURO: Alert/oriented x 3/normal motor exam.   PSYCH: normal affect.        LABS:                        8.6    3.42  )-----------( 122      ( 13 Aug 2019 07:39 )             26.9     08-13    141  |  105  |  44.0<H>  ----------------------------<  99  3.8   |  26.0  |  1.47<H>    Ca    8.9      13 Aug 2019 07:39  Phos  3.1     08-13  Mg     2.2     08-13      NICHELLE GILL            RADIOLOGY & ADDITIONAL STUDIES:    ASSESSMENT AND PLAN:  In summary, NICHELLE GILL is a 74y Female with past medical history significant for  CAD s/p CABG and Bio AVR 12/2016 with brief post operative AF, normal EF, CKD requiring transient HD for hyperkalemia, mild MR, prior TIA s/p ILR, chronic Hep C complicated by cirrhosis and varices, bronchiectasias and chronic anemia with worsening anemia.    Shortness of breath:  - likely 2/2 acute on chronic diastolic HF 2/2 volume overload from transfusion  - continue lasix 40mg IV Q12 for likely 24 hours     - given MARGUERITE on CKD continue to monitor renal indices closely; will likely need to tolerate some degree of azotemia to promote euvolemia     CAD s/p CABG:  - Continue metoprolol    Acute on chronic anemia  - Monitor H/H  - no cardiac contraindication to EGD, volume status markedly improved. Patient is at elevated risk for the proposed procedure but is currently optimized.     Thank you for allowing [facility] to participate in the care of this patient.  Please feel free to call with any questions or concerns. [facility], [facility]                                   [address] [place] [address]                                                      PHONE: [phone]                                                         FAX: [phone]  [email or web address]  ---------------------------------------------------------------------------------------------------------------------------------    Overnight events/patient complaints: reports dyspnea is improved and denies melenic stool     INTERPRETATION OF TELEMETRY (personally reviewed): off tele    ECG: < from: 12 Lead ECG (08.07.19 @ 04:44) >  Sinus rhythm with 1st degree A-V block  Non-specific intra-ventricular conduction delay  non specific ST wave abnormality    ECHO: < from: TTE Echo w/Cont Complete (07.17.19 @ 21:37) >   1. Left ventricular ejection fraction, by visual estimation, is 55 to 60%.   2. Spectral Doppler shows impaired relaxation pattern of left ventricular myocardial filling (Grade I diastolic dysfunction).   3. There is mild concentric left ventricular hypertrophy.   4. Severe mitral annular calcification.   5. Mild degenerative mitral stenosis.   6. Bioprosthesis inthe aortic position - normal function.   7. Estimated pulmonary artery systolic pressure is 37.2 mmHg assuming a   right atrial pressure of 3 mmHg, which is consistent with borderline   pulmonary hypertension.   8. There is no evidence of pericardialeffusion.   9. ** Compared to TTE dated 9/15/17, no significant changes.      I&O's Summary    12 Aug 2019 07:01  -  13 Aug 2019 07:00  --------------------------------------------------------  IN: 0 mL / OUT: 1150 mL / NET: -1150 mL        PAST MEDICAL & SURGICAL HISTORY:  CKD (chronic kidney disease)  CAD (coronary artery disease)  Heart failure  Hepatitis C  Aortic stenosis  Anemia, unspecified anemia type  Asthma  Low back pain: chronic over a year now.  High cholesterol  Hypertension  Diabetes  H/O aortic valve replacement: cow valve.  S/P CABG x 3  History of tonsillectomy      Bactrim (Unknown)  Biaxin (Joint Pain)  morphine (Short breath)    MEDICATIONS  (STANDING):  ALBUTerol    90 MICROgram(s) HFA Inhaler 1 Puff(s) Inhalation every 4 hours  amLODIPine   Tablet 10 milliGRAM(s) Oral daily  ascorbic acid 500 milliGRAM(s) Oral daily  atorvastatin 20 milliGRAM(s) Oral at bedtime  darbepoetin Injectable ViaL 200 MICROGram(s) SubCutaneous every 7 days  dextrose 5%. 1000 milliLiter(s) (50 mL/Hr) IV Continuous <Continuous>  dextrose 50% Injectable 12.5 Gram(s) IV Push once  dextrose 50% Injectable 25 Gram(s) IV Push once  dextrose 50% Injectable 25 Gram(s) IV Push once  folic acid 1 milliGRAM(s) Oral daily  furosemide   Injectable 40 milliGRAM(s) IV Push two times a day  insulin glargine Injectable (LANTUS) 5 Unit(s) SubCutaneous two times a day  insulin lispro (HumaLOG) corrective regimen sliding scale   SubCutaneous Before meals and at bedtime  levothyroxine 100 MICROGram(s) Oral daily  lidocaine   Patch 2 Patch Transdermal daily  metoprolol tartrate 50 milliGRAM(s) Oral two times a day  montelukast 10 milliGRAM(s) Oral daily  pantoprazole    Tablet 40 milliGRAM(s) Oral two times a day  polyethylene glycol/electrolyte Solution. 4000 milliLiter(s) Oral <User Schedule>  sodium chloride 0.9% lock flush 3 milliLiter(s) IV Push every 8 hours  tiotropium 18 MICROgram(s) Capsule 1 Capsule(s) Inhalation daily    MEDICATIONS  (PRN):  acetaminophen   Tablet .. 650 milliGRAM(s) Oral every 6 hours PRN Moderate Pain (4 - 6)  ALBUTerol/ipratropium for Nebulization 3 milliLiter(s) Nebulizer every 6 hours PRN Shortness of Breath and/or Wheezing  benzonatate 100 milliGRAM(s) Oral every 8 hours PRN Cough  dextrose 40% Gel 15 Gram(s) Oral once PRN Blood Glucose LESS THAN 70 milliGRAM(s)/deciliter  glucagon  Injectable 1 milliGRAM(s) IntraMuscular once PRN Glucose LESS THAN 70 milligrams/deciliter  ondansetron Injectable 4 milliGRAM(s) IV Push every 6 hours PRN Nausea      Vital Signs Last 24 Hrs  T(C): 36.8 (13 Aug 2019 07:42), Max: 37.2 (12 Aug 2019 16:42)  T(F): 98.2 (13 Aug 2019 07:42), Max: 98.9 (12 Aug 2019 16:42)  HR: 62 (13 Aug 2019 07:42) (62 - 83)  BP: 154/67 (13 Aug 2019 07:42) (154/67 - 186/69)  BP(mean): --  RR: 18 (13 Aug 2019 07:42) (18 - 18)  SpO2: 98% (13 Aug 2019 07:42) (97% - 98%)  ICU Vital Signs Last 24 Hrs    PHYSICAL EXAM:  General: Appears well developed, well nourished alert and cooperative.  HEENT: Head; normocephalic, atraumatic. Pupils reactive, cornea wnl. Neck supple, no nodes adenopathy, (+) JVD  CARDIOVASCULAR: Normal S1 and S2, 2/6 NELI, no rub, gallop or lift.   LUNGS: (+) rales at the bases, poor effort   ABDOMEN: Soft, nontender without mass or organomegaly. bowel sounds normoactive.  EXTREMITIES: No clubbing, cyanosis or (+) LE edema.   SKIN: warm and dry with normal turgor.  NEURO: Alert/oriented x 3/normal motor exam.   PSYCH: normal affect.        LABS:                        8.6    3.42  )-----------( 122      ( 13 Aug 2019 07:39 )             26.9     08-13    141  |  105  |  44.0<H>  ----------------------------<  99  3.8   |  26.0  |  1.47<H>    Ca    8.9      13 Aug 2019 07:39  Phos  3.1     08-13  Mg     2.2     08-13      ASSESSMENT AND PLAN:  In summary, NICHELLE GILL is a 74y Female with past medical history significant for  CAD s/p CABG and Bio AVR 12/2016 with brief post operative AF, normal EF, CKD requiring transient HD for hyperkalemia, mild MR, prior TIA s/p ILR, chronic Hep C complicated by cirrhosis and varices, bronchiectasias and chronic anemia with worsening anemia.    Shortness of breath:  - likely 2/2 acute on chronic diastolic HF 2/2 volume overload from transfusion  - continue lasix 40mg IV Q12 for likely 24 hours     - given MARGUERITE on CKD continue to monitor renal indices closely; improved; may need to tolerate some degree of azotemia to promote euvolemia     CAD s/p CABG:  - Continue metoprolol    Acute on chronic anemia  - Monitor H/H  - no cardiac contraindication to EGD, volume status markedly improved. Patient is at elevated risk for the proposed procedure but is currently optimized.   - caution with large fluid shifts     Thank you for allowing [facility] to participate in the care of this patient.  Please feel free to call with any questions or concerns.

## 2019-08-13 NOTE — PROGRESS NOTE ADULT - SUBJECTIVE AND OBJECTIVE BOX
CC: anemia    Patient seen and examined at the bedside. No acute overnight events. no events yesterday. Complaining of nothing today. Denies fever/chills, headache, lightheadedness, dizziness, chest pain, palpitations, abd pain, nausea/vomiting/diarrhea      =========================================================================================    PHYSICAL EXAM.    GEN - appears age appropriate. well nourished. pleasant. no distress   HEENT - NCAT, EOMI, SAVANNAH  RESP - CTA BL, good airway movement. no wheeze/stridor. on supplemental O2, nc. able to speak in full sentences without distress  CARDIO - NS1S2, RRR. No murmurs/rubs/gallop  ABD - Soft/Non tender/Non distended. Normal BS x4 quadrants. no guarding/rebound tenderness.  Ext - No ANKIT.  MSK - BL 5/5 strength on upper and lower extremities.  seen ambulating w/ walker, appears stable  Neuro - AAOx3. cn 2-12 grossly intact  Psych - normal affect  Skin - c/d/i. no rashes/lesions      VITAL SIGNS.    Vital Signs Last 24 Hrs  T(C): 36.8 (13 Aug 2019 07:42), Max: 37.2 (12 Aug 2019 16:42)  T(F): 98.2 (13 Aug 2019 07:42), Max: 98.9 (12 Aug 2019 16:42)  HR: 62 (13 Aug 2019 07:42) (62 - 83)  BP: 154/67 (13 Aug 2019 07:42) (154/67 - 186/69)  BP(mean): --  RR: 18 (13 Aug 2019 07:42) (18 - 18)  SpO2: 98% (13 Aug 2019 07:42) (97% - 98%)        =================================================    LABS.                          8.6    3.42  )-----------( 122      ( 13 Aug 2019 07:39 )             26.9     08-13    141  |  105  |  44.0<H>  ----------------------------<  99  3.8   |  26.0  |  1.47<H>    Ca    8.9      13 Aug 2019 07:39  Phos  3.1     08-13  Mg     2.2     08-13          I&O's Summary    12 Aug 2019 07:01  -  13 Aug 2019 07:00  --------------------------------------------------------  IN: 0 mL / OUT: 1150 mL / NET: -1150 mL          ================================================    IMAGING.      ================================================    HOME MEDS.    Home Medications:  albuterol 2.5 mg/3 mL (0.083%) inhalation solution: 3 milliliter(s) inhaled every 6 hours, As Needed (17 Jul 2019 13:35)  Aranesp Albumin Free: once weekly at MD&#x27;s office (17 Jul 2019 13:35)  ascorbic acid 500 mg oral tablet: 1 tab(s) orally once a day (22 Jul 2019 14:30)  folic acid 1 mg oral tablet: 1 tab(s) orally once a day (22 Jul 2019 14:30)  insulin lispro 100 units/mL subcutaneous solution:  subcutaneous 3 times a day (with meals) ; 2 Unit(s) if Glucose 151 - 200  4 Unit(s) if Glucose 201 - 250  6 Unit(s) if Glucose 251 - 300  8 Unit(s) if Glucose 301 - 350  10 Unit(s) if Glucose 351 - 400  12 Unit(s) if Glucose GREATER THAN 400 (17 Jul 2019 13:35)  Lasix 40 mg oral tablet: 1 tab(s) orally 2 times a day (22 Jul 2019 14:30)  Levoxyl 100 mcg (0.1 mg) oral tablet: 1 tab(s) orally once a day (22 Jul 2019 14:30)  magnesium oxide 400 mg (241.3 mg elemental magnesium) oral tablet: 1 tab(s) orally every other day (17 Jul 2019 13:35)  Norvasc 10 mg oral tablet: 1 tab(s) orally once a day (22 Jul 2019 14:30)  phenylephrine 0.25%-3% rectal ointment: 1 application rectal 4 times a day, As needed, hemorrhoidal irritation/bleeding (22 Jul 2019 14:30)  pravastatin 80 mg oral tablet: 1 tab(s) orally once a day (17 Jul 2019 13:35)  Protonix 40 mg oral delayed release tablet: 1 tab(s) orally 2 times a day (22 Jul 2019 14:30)  Singulair 10 mg oral tablet: 1 tab(s) orally once a day (22 Jul 2019 14:30)  Thera-D Rapid Repletion oral tablet: 2000 unit(s) orally once a day (22 Jul 2019 14:30)  Tresiba 100 units/mL subcutaneous solution: 10 unit(s) subcutaneous once a day (17 Jul 2019 13:35)      ================================================    HOSPITAL MEDS.    MEDICATIONS  (STANDING):  ALBUTerol    90 MICROgram(s) HFA Inhaler 1 Puff(s) Inhalation every 4 hours  amLODIPine   Tablet 10 milliGRAM(s) Oral daily  ascorbic acid 500 milliGRAM(s) Oral daily  atorvastatin 20 milliGRAM(s) Oral at bedtime  darbepoetin Injectable ViaL 200 MICROGram(s) SubCutaneous every 7 days  dextrose 5%. 1000 milliLiter(s) (50 mL/Hr) IV Continuous <Continuous>  dextrose 50% Injectable 12.5 Gram(s) IV Push once  dextrose 50% Injectable 25 Gram(s) IV Push once  dextrose 50% Injectable 25 Gram(s) IV Push once  folic acid 1 milliGRAM(s) Oral daily  furosemide   Injectable 40 milliGRAM(s) IV Push two times a day  insulin glargine Injectable (LANTUS) 5 Unit(s) SubCutaneous two times a day  insulin lispro (HumaLOG) corrective regimen sliding scale   SubCutaneous Before meals and at bedtime  levothyroxine 100 MICROGram(s) Oral daily  lidocaine   Patch 2 Patch Transdermal daily  metoprolol tartrate 50 milliGRAM(s) Oral two times a day  montelukast 10 milliGRAM(s) Oral daily  pantoprazole    Tablet 40 milliGRAM(s) Oral two times a day  polyethylene glycol/electrolyte Solution. 4000 milliLiter(s) Oral <User Schedule>  sodium chloride 0.9% lock flush 3 milliLiter(s) IV Push every 8 hours  tiotropium 18 MICROgram(s) Capsule 1 Capsule(s) Inhalation daily    MEDICATIONS  (PRN):  acetaminophen   Tablet .. 650 milliGRAM(s) Oral every 6 hours PRN Moderate Pain (4 - 6)  ALBUTerol/ipratropium for Nebulization 3 milliLiter(s) Nebulizer every 6 hours PRN Shortness of Breath and/or Wheezing  benzonatate 100 milliGRAM(s) Oral every 8 hours PRN Cough  dextrose 40% Gel 15 Gram(s) Oral once PRN Blood Glucose LESS THAN 70 milliGRAM(s)/deciliter  glucagon  Injectable 1 milliGRAM(s) IntraMuscular once PRN Glucose LESS THAN 70 milligrams/deciliter  ondansetron Injectable 4 milliGRAM(s) IV Push every 6 hours PRN Nausea

## 2019-08-13 NOTE — DIETITIAN INITIAL EVALUATION ADULT. - CONTINUE CURRENT NUTRITION CARE PLAN
yes/- Diabetishield provided automatically on CLD to provide an additional 150 kcal, 7g protein per serving

## 2019-08-13 NOTE — DIETITIAN INITIAL EVALUATION ADULT. - PERTINENT LABORATORY DATA
08-13 Na141 mmol/L Glu 99 mg/dL K+ 3.8 mmol/L Cr  1.47 mg/dL<H> BUN 44.0 mg/dL<H> Phos 3.1 mg/dL Alb n/a   PAB n/a

## 2019-08-13 NOTE — PROGRESS NOTE ADULT - SUBJECTIVE AND OBJECTIVE BOX
Patient seen and examined; chart reviewed.  Cardiac cleared yesterday.  Feels well.  Denies SOB, cough.  No leg edema.  Known HCV with cirrhosis and varices.  Not yet treated for HCV; Known Manuela 2.    Transfused 4 units on this admission.  Counts stable.      PAST MEDICAL & SURGICAL HISTORY:  CKD (chronic kidney disease)  CAD (coronary artery disease)  Heart failure  Hepatitis C  Aortic stenosis  Anemia, unspecified anemia type  Asthma  Low back pain: chronic over a year now.  High cholesterol  Hypertension  Diabetes  H/O aortic valve replacement: cow valve.  S/P CABG x 3  History of tonsillectomy      ROS:  No Heartburn, regurgitation, dysphagia, odynophagia.  No dyspepsia  No abdominal pain.    No Nausea, vomiting.  No Bleeding.  No hematemesis.   No diarrhea.    No hematochesia.  No weight loss, anorexia.  No edema.      MEDICATIONS  (STANDING):  ALBUTerol    90 MICROgram(s) HFA Inhaler 1 Puff(s) Inhalation every 4 hours  amLODIPine   Tablet 10 milliGRAM(s) Oral daily  ascorbic acid 500 milliGRAM(s) Oral daily  atorvastatin 20 milliGRAM(s) Oral at bedtime  darbepoetin Injectable ViaL 200 MICROGram(s) SubCutaneous every 7 days  dextrose 5%. 1000 milliLiter(s) (50 mL/Hr) IV Continuous <Continuous>  dextrose 50% Injectable 12.5 Gram(s) IV Push once  dextrose 50% Injectable 25 Gram(s) IV Push once  dextrose 50% Injectable 25 Gram(s) IV Push once  folic acid 1 milliGRAM(s) Oral daily  furosemide   Injectable 40 milliGRAM(s) IV Push two times a day  insulin glargine Injectable (LANTUS) 5 Unit(s) SubCutaneous two times a day  insulin lispro (HumaLOG) corrective regimen sliding scale   SubCutaneous Before meals and at bedtime  levothyroxine 100 MICROGram(s) Oral daily  lidocaine   Patch 2 Patch Transdermal daily  metoprolol tartrate 50 milliGRAM(s) Oral two times a day  montelukast 10 milliGRAM(s) Oral daily  pantoprazole    Tablet 40 milliGRAM(s) Oral two times a day  polyethylene glycol/electrolyte Solution. 4000 milliLiter(s) Oral once  sodium chloride 0.9% lock flush 3 milliLiter(s) IV Push every 8 hours  tiotropium 18 MICROgram(s) Capsule 1 Capsule(s) Inhalation daily    MEDICATIONS  (PRN):  acetaminophen   Tablet .. 650 milliGRAM(s) Oral every 6 hours PRN Moderate Pain (4 - 6)  ALBUTerol/ipratropium for Nebulization 3 milliLiter(s) Nebulizer every 6 hours PRN Shortness of Breath and/or Wheezing  benzonatate 100 milliGRAM(s) Oral every 8 hours PRN Cough  dextrose 40% Gel 15 Gram(s) Oral once PRN Blood Glucose LESS THAN 70 milliGRAM(s)/deciliter  glucagon  Injectable 1 milliGRAM(s) IntraMuscular once PRN Glucose LESS THAN 70 milligrams/deciliter  ondansetron Injectable 4 milliGRAM(s) IV Push every 6 hours PRN Nausea      Allergies    Bactrim (Unknown)  Biaxin (Joint Pain)  morphine (Short breath)    Intolerances        Vital Signs Last 24 Hrs  T(C): 37.1 (12 Aug 2019 23:55), Max: 37.2 (12 Aug 2019 16:42)  T(F): 98.7 (12 Aug 2019 23:55), Max: 98.9 (12 Aug 2019 16:42)  HR: 72 (13 Aug 2019 06:43) (72 - 83)  BP: 186/69 (13 Aug 2019 06:43) (145/70 - 186/69)  BP(mean): --  RR: 18 (12 Aug 2019 23:55) (18 - 20)  SpO2: 97% (12 Aug 2019 23:55) (96% - 98%)    PHYSICAL EXAM:    GENERAL: NAD, well-groomed, well-developed  HEAD:  Atraumatic, Normocephalic  EYES: EOMI, PERRLA, conjunctiva and sclera clear  ENMT: No tonsillar erythema, exudates, or enlargement; Moist mucous membranes, Good dentition, No lesions  NECK: Supple, No JVD, Normal thyroid  CHEST/LUNG: Clear to percussion bilaterally; No rales, rhonchi, wheezing, or rubs; sternotomy scar.    HEART: Regular rate and rhythm; No murmurs, rubs, or gallops  ABDOMEN: Soft, Nontender, Nondistended; Bowel sounds present  EXTREMITIES:  2+ Peripheral Pulses, No clubbing, cyanosis, or edema  LYMPH: No lymphadenopathy noted  SKIN: No rashes or lesions      LABS:                        8.6    3.42  )-----------( 122      ( 13 Aug 2019 07:39 )             26.9     08-13    141  |  105  |  44.0<H>  ----------------------------<  99  3.8   |  26.0  |  1.47<H>    Ca    8.9      13 Aug 2019 07:39  Phos  3.1     08-13  Mg     2.2     08-13               RADIOLOGY & ADDITIONAL STUDIES:

## 2019-08-13 NOTE — DIETITIAN INITIAL EVALUATION ADULT. - OTHER INFO
75 y/o female with hx of CKD-4, HTN, HLD, Bio-AVR, recurrent anemia recurring transfusion, HCV, cirrhosis, diastolic CHF, DM-2, recent prolonged hospital course in 7/19 for right upper chest wound infection related to port, MARGUERITE on CKD, metabolic acidosis, hyperkalemia and heart block requiring transient HD, presents with symptomatic anemia. Pt reports good appetite/po intake PTA. States she monitors carbohydrate and sodium intake at home. Reports she has lost an intentional 100 lbs over the last three years, recently, states she has gained some of the weight back. Denies chewing/swallowing difficulty. Pt states she dislikes hospital food and family has been bringing in food from home. Currently on CLD with plan for EGD/colonoscopy.

## 2019-08-14 ENCOUNTER — RESULT REVIEW (OUTPATIENT)
Age: 74
End: 2019-08-14

## 2019-08-14 LAB
ALBUMIN SERPL ELPH-MCNC: 3.4 G/DL — SIGNIFICANT CHANGE UP (ref 3.3–5.2)
ALP SERPL-CCNC: 102 U/L — SIGNIFICANT CHANGE UP (ref 40–120)
ALT FLD-CCNC: 39 U/L — HIGH
ANION GAP SERPL CALC-SCNC: 14 MMOL/L — SIGNIFICANT CHANGE UP (ref 5–17)
AST SERPL-CCNC: 67 U/L — HIGH
BILIRUB SERPL-MCNC: 0.4 MG/DL — SIGNIFICANT CHANGE UP (ref 0.4–2)
BUN SERPL-MCNC: 44 MG/DL — HIGH (ref 8–20)
CALCIUM SERPL-MCNC: 8.8 MG/DL — SIGNIFICANT CHANGE UP (ref 8.6–10.2)
CHLORIDE SERPL-SCNC: 103 MMOL/L — SIGNIFICANT CHANGE UP (ref 98–107)
CO2 SERPL-SCNC: 26 MMOL/L — SIGNIFICANT CHANGE UP (ref 22–29)
CREAT SERPL-MCNC: 1.28 MG/DL — SIGNIFICANT CHANGE UP (ref 0.5–1.3)
GLUCOSE BLDC GLUCOMTR-MCNC: 125 MG/DL — HIGH (ref 70–99)
GLUCOSE BLDC GLUCOMTR-MCNC: 127 MG/DL — HIGH (ref 70–99)
GLUCOSE BLDC GLUCOMTR-MCNC: 150 MG/DL — HIGH (ref 70–99)
GLUCOSE BLDC GLUCOMTR-MCNC: 161 MG/DL — HIGH (ref 70–99)
GLUCOSE BLDC GLUCOMTR-MCNC: 94 MG/DL — SIGNIFICANT CHANGE UP (ref 70–99)
GLUCOSE SERPL-MCNC: 97 MG/DL — SIGNIFICANT CHANGE UP (ref 70–115)
HCT VFR BLD CALC: 29 % — LOW (ref 34.5–45)
HGB BLD-MCNC: 9 G/DL — LOW (ref 11.5–15.5)
INR BLD: 1.11 RATIO — SIGNIFICANT CHANGE UP (ref 0.88–1.16)
MCHC RBC-ENTMCNC: 28.6 PG — SIGNIFICANT CHANGE UP (ref 27–34)
MCHC RBC-ENTMCNC: 31 GM/DL — LOW (ref 32–36)
MCV RBC AUTO: 92.1 FL — SIGNIFICANT CHANGE UP (ref 80–100)
PLATELET # BLD AUTO: 154 K/UL — SIGNIFICANT CHANGE UP (ref 150–400)
POTASSIUM SERPL-MCNC: 3.7 MMOL/L — SIGNIFICANT CHANGE UP (ref 3.5–5.3)
POTASSIUM SERPL-SCNC: 3.7 MMOL/L — SIGNIFICANT CHANGE UP (ref 3.5–5.3)
PROT SERPL-MCNC: 7.2 G/DL — SIGNIFICANT CHANGE UP (ref 6.6–8.7)
PROTHROM AB SERPL-ACNC: 12.8 SEC — SIGNIFICANT CHANGE UP (ref 10–12.9)
RBC # BLD: 3.15 M/UL — LOW (ref 3.8–5.2)
RBC # FLD: 16.8 % — HIGH (ref 10.3–14.5)
SODIUM SERPL-SCNC: 143 MMOL/L — SIGNIFICANT CHANGE UP (ref 135–145)
WBC # BLD: 4.51 K/UL — SIGNIFICANT CHANGE UP (ref 3.8–10.5)
WBC # FLD AUTO: 4.51 K/UL — SIGNIFICANT CHANGE UP (ref 3.8–10.5)

## 2019-08-14 PROCEDURE — 43239 EGD BIOPSY SINGLE/MULTIPLE: CPT | Mod: 59

## 2019-08-14 PROCEDURE — 43244 EGD VARICES LIGATION: CPT

## 2019-08-14 PROCEDURE — 88305 TISSUE EXAM BY PATHOLOGIST: CPT | Mod: 26

## 2019-08-14 PROCEDURE — 45385 COLONOSCOPY W/LESION REMOVAL: CPT | Mod: 59

## 2019-08-14 PROCEDURE — 99233 SBSQ HOSP IP/OBS HIGH 50: CPT

## 2019-08-14 RX ORDER — BENZOCAINE AND MENTHOL 5; 1 G/100ML; G/100ML
1 LIQUID ORAL EVERY 4 HOURS
Refills: 0 | Status: DISCONTINUED | OUTPATIENT
Start: 2019-08-14 | End: 2019-08-15

## 2019-08-14 RX ORDER — HYDRALAZINE HCL 50 MG
10 TABLET ORAL EVERY 8 HOURS
Refills: 0 | Status: DISCONTINUED | OUTPATIENT
Start: 2019-08-14 | End: 2019-08-15

## 2019-08-14 RX ORDER — INSULIN GLARGINE 100 [IU]/ML
5 INJECTION, SOLUTION SUBCUTANEOUS
Refills: 0 | Status: DISCONTINUED | OUTPATIENT
Start: 2019-08-14 | End: 2019-08-15

## 2019-08-14 RX ORDER — FUROSEMIDE 40 MG
40 TABLET ORAL DAILY
Refills: 0 | Status: DISCONTINUED | OUTPATIENT
Start: 2019-08-15 | End: 2019-08-15

## 2019-08-14 RX ADMIN — SODIUM CHLORIDE 3 MILLILITER(S): 9 INJECTION INTRAMUSCULAR; INTRAVENOUS; SUBCUTANEOUS at 07:27

## 2019-08-14 RX ADMIN — BENZOCAINE AND MENTHOL 1 LOZENGE: 5; 1 LIQUID ORAL at 19:44

## 2019-08-14 RX ADMIN — INSULIN GLARGINE 5 UNIT(S): 100 INJECTION, SOLUTION SUBCUTANEOUS at 19:02

## 2019-08-14 RX ADMIN — ATORVASTATIN CALCIUM 20 MILLIGRAM(S): 80 TABLET, FILM COATED ORAL at 21:27

## 2019-08-14 RX ADMIN — Medication 10 MILLIGRAM(S): at 21:27

## 2019-08-14 RX ADMIN — Medication 100 MICROGRAM(S): at 06:24

## 2019-08-14 RX ADMIN — PANTOPRAZOLE SODIUM 40 MILLIGRAM(S): 20 TABLET, DELAYED RELEASE ORAL at 17:13

## 2019-08-14 RX ADMIN — SODIUM CHLORIDE 3 MILLILITER(S): 9 INJECTION INTRAMUSCULAR; INTRAVENOUS; SUBCUTANEOUS at 21:24

## 2019-08-14 RX ADMIN — AMLODIPINE BESYLATE 10 MILLIGRAM(S): 2.5 TABLET ORAL at 06:24

## 2019-08-14 RX ADMIN — CHLORHEXIDINE GLUCONATE 1 APPLICATION(S): 213 SOLUTION TOPICAL at 12:04

## 2019-08-14 RX ADMIN — PANTOPRAZOLE SODIUM 40 MILLIGRAM(S): 20 TABLET, DELAYED RELEASE ORAL at 06:24

## 2019-08-14 RX ADMIN — Medication 40 MILLIGRAM(S): at 06:23

## 2019-08-14 RX ADMIN — SODIUM CHLORIDE 3 MILLILITER(S): 9 INJECTION INTRAMUSCULAR; INTRAVENOUS; SUBCUTANEOUS at 12:05

## 2019-08-14 RX ADMIN — Medication 50 MILLIGRAM(S): at 17:12

## 2019-08-14 RX ADMIN — MONTELUKAST 10 MILLIGRAM(S): 4 TABLET, CHEWABLE ORAL at 12:03

## 2019-08-14 RX ADMIN — Medication 2: at 17:12

## 2019-08-14 RX ADMIN — Medication 50 MILLIGRAM(S): at 06:24

## 2019-08-14 RX ADMIN — Medication 10 MILLIGRAM(S): at 12:18

## 2019-08-14 RX ADMIN — Medication 1 MILLIGRAM(S): at 12:04

## 2019-08-14 RX ADMIN — INSULIN GLARGINE 5 UNIT(S): 100 INJECTION, SOLUTION SUBCUTANEOUS at 21:27

## 2019-08-14 RX ADMIN — ONDANSETRON 4 MILLIGRAM(S): 8 TABLET, FILM COATED ORAL at 19:03

## 2019-08-14 RX ADMIN — Medication 500 MILLIGRAM(S): at 12:04

## 2019-08-14 RX ADMIN — ONDANSETRON 4 MILLIGRAM(S): 8 TABLET, FILM COATED ORAL at 13:02

## 2019-08-14 NOTE — PROGRESS NOTE ADULT - SUBJECTIVE AND OBJECTIVE BOX
[place] [facility]                                   [address] [place] [address]                                                      PHONE: [phone]                                                         FAX: [phone]  [email or web address]  ---------------------------------------------------------------------------------------------------------------------------------    Overnight events/patient complaints:      PAST MEDICAL & SURGICAL HISTORY:  CKD (chronic kidney disease)  CAD (coronary artery disease)  Heart failure  Hepatitis C  Aortic stenosis  Anemia, unspecified anemia type  Asthma  Low back pain: chronic over a year now.  High cholesterol  Hypertension  Diabetes  H/O aortic valve replacement: cow valve.  S/P CABG x 3  History of tonsillectomy      Bactrim (Unknown)  Biaxin (Joint Pain)  morphine (Short breath)    MEDICATIONS  (STANDING):  ALBUTerol    90 MICROgram(s) HFA Inhaler 1 Puff(s) Inhalation every 4 hours  amLODIPine   Tablet 10 milliGRAM(s) Oral daily  ascorbic acid 500 milliGRAM(s) Oral daily  atorvastatin 20 milliGRAM(s) Oral at bedtime  chlorhexidine 2% Cloths 1 Application(s) Topical daily  darbepoetin Injectable ViaL 200 MICROGram(s) SubCutaneous every 7 days  dextrose 5%. 1000 milliLiter(s) (50 mL/Hr) IV Continuous <Continuous>  dextrose 50% Injectable 12.5 Gram(s) IV Push once  dextrose 50% Injectable 25 Gram(s) IV Push once  dextrose 50% Injectable 25 Gram(s) IV Push once  folic acid 1 milliGRAM(s) Oral daily  furosemide   Injectable 40 milliGRAM(s) IV Push two times a day  insulin lispro (HumaLOG) corrective regimen sliding scale   SubCutaneous Before meals and at bedtime  levothyroxine 100 MICROGram(s) Oral daily  lidocaine   Patch 2 Patch Transdermal daily  metoprolol tartrate 50 milliGRAM(s) Oral two times a day  montelukast 10 milliGRAM(s) Oral daily  pantoprazole    Tablet 40 milliGRAM(s) Oral two times a day  sodium chloride 0.9% lock flush 3 milliLiter(s) IV Push every 8 hours  tiotropium 18 MICROgram(s) Capsule 1 Capsule(s) Inhalation daily    MEDICATIONS  (PRN):  acetaminophen   Tablet .. 650 milliGRAM(s) Oral every 6 hours PRN Moderate Pain (4 - 6)  ALBUTerol/ipratropium for Nebulization 3 milliLiter(s) Nebulizer every 6 hours PRN Shortness of Breath and/or Wheezing  benzonatate 100 milliGRAM(s) Oral every 8 hours PRN Cough  dextrose 40% Gel 15 Gram(s) Oral once PRN Blood Glucose LESS THAN 70 milliGRAM(s)/deciliter  glucagon  Injectable 1 milliGRAM(s) IntraMuscular once PRN Glucose LESS THAN 70 milligrams/deciliter  hydrALAZINE Injectable 10 milliGRAM(s) IV Push every 6 hours PRN sbp > 180 or dbp > 100  ondansetron Injectable 4 milliGRAM(s) IV Push every 6 hours PRN Nausea      Vital Signs Last 24 Hrs  T(C): 36.9 (13 Aug 2019 16:09), Max: 36.9 (13 Aug 2019 16:09)  T(F): 98.4 (13 Aug 2019 16:09), Max: 98.4 (13 Aug 2019 16:09)  HR: 88 (14 Aug 2019 06:21) (77 - 88)  BP: 188/72 (14 Aug 2019 06:21) (152/76 - 188/72)  BP(mean): --  RR: 18 (13 Aug 2019 16:09) (18 - 18)  SpO2: 98% (13 Aug 2019 16:09) (98% - 98%)  ICU Vital Signs Last 24 Hrs  NICHELLE GILL  I&O's Detail    13 Aug 2019 07:01  -  14 Aug 2019 07:00  --------------------------------------------------------  IN:    Oral Fluid: 400 mL  Total IN: 400 mL    OUT:    Voided: 302 mL  Total OUT: 302 mL    Total NET: 98 mL        I&O's Summary    13 Aug 2019 07:01  -  14 Aug 2019 07:00  --------------------------------------------------------  IN: 400 mL / OUT: 302 mL / NET: 98 mL      Drug Dosing Weight  NICHELLE GILL      PHYSICAL EXAM:  General: Appears well developed, well nourished alert and cooperative.  HEENT: Head; normocephalic, atraumatic.  Eyes: Pupils reactive, cornea wnl.  Neck: Supple, no nodes adenopathy, no NVD or carotid bruit or thyromegaly.  CARDIOVASCULAR: Normal S1 and S2, No murmur, rub, gallop or lift.   LUNGS: No rales, rhonchi or wheeze. Normal breath sounds bilaterally.  ABDOMEN: Soft, nontender without mass or organomegaly. bowel sounds normoactive.  EXTREMITIES: No clubbing, cyanosis or edema. Distal pulses wnl.   SKIN: warm and dry with normal turgor.  NEURO: Alert/oriented x 3/normal motor exam. No pathologic reflexes.    PSYCH: normal affect.        LABS:                        9.0    4.51  )-----------( 154      ( 14 Aug 2019 07:41 )             29.0     08-14    143  |  103  |  44.0<H>  ----------------------------<  97  3.7   |  26.0  |  1.28    Ca    8.8      14 Aug 2019 07:41  Phos  3.1     08-13  Mg     2.2     08-13    TPro  7.2  /  Alb  3.4  /  TBili  0.4  /  DBili  x   /  AST  67<H>  /  ALT  39<H>  /  AlkPhos  102  08-14    NICHELLE GILL      PT/INR - ( 14 Aug 2019 07:41 )   PT: 12.8 sec;   INR: 1.11 ratio       RADIOLOGY & ADDITIONAL STUDIES:      ASSESSMENT AND PLAN:  In summary, NICHELLE GILL is a 74y Female with past medical history significant for  CAD s/p CABG and Bio AVR 12/2016 with brief post operative AF, normal EF, CKD requiring transient HD for hyperkalemia, mild MR, prior TIA s/p ILR, chronic Hep C complicated by cirrhosis and varices, bronchiectasias and chronic anemia with worsening anemia.    Shortness of breath:  - likely 2/2 acute on chronic diastolic HF 2/2 volume overload from transfusion  - can change lasix to 40 mg po daily    - given MARGUERITE on CKD continue to monitor renal indices closely; improved; may need to tolerate some degree of azotemia to promote euvolemia     CAD s/p CABG:  - Continue metoprolol    -no further cardiac interventions warranted at this time     Thank you for allowing [facility] to participate in the care of this patient.  Please feel free to call with any questions or concerns. [place] [facility], [facility]                                   [address] [place] [address]                                                      PHONE: [phone]                                                         FAX: [phone]  [email or web address]  ---------------------------------------------------------------------------------------------------------------------------------    Overnight events/patient complaints:  no events     PAST MEDICAL & SURGICAL HISTORY:  CKD (chronic kidney disease)  CAD (coronary artery disease)  Heart failure  Hepatitis C  Aortic stenosis  Anemia, unspecified anemia type  Asthma  Low back pain: chronic over a year now.  High cholesterol  Hypertension  Diabetes  H/O aortic valve replacement: cow valve.  S/P CABG x 3  History of tonsillectomy      Bactrim (Unknown)  Biaxin (Joint Pain)  morphine (Short breath)    MEDICATIONS  (STANDING):  ALBUTerol    90 MICROgram(s) HFA Inhaler 1 Puff(s) Inhalation every 4 hours  amLODIPine   Tablet 10 milliGRAM(s) Oral daily  ascorbic acid 500 milliGRAM(s) Oral daily  atorvastatin 20 milliGRAM(s) Oral at bedtime  chlorhexidine 2% Cloths 1 Application(s) Topical daily  darbepoetin Injectable ViaL 200 MICROGram(s) SubCutaneous every 7 days  dextrose 5%. 1000 milliLiter(s) (50 mL/Hr) IV Continuous <Continuous>  dextrose 50% Injectable 12.5 Gram(s) IV Push once  dextrose 50% Injectable 25 Gram(s) IV Push once  dextrose 50% Injectable 25 Gram(s) IV Push once  folic acid 1 milliGRAM(s) Oral daily  furosemide   Injectable 40 milliGRAM(s) IV Push two times a day  insulin lispro (HumaLOG) corrective regimen sliding scale   SubCutaneous Before meals and at bedtime  levothyroxine 100 MICROGram(s) Oral daily  lidocaine   Patch 2 Patch Transdermal daily  metoprolol tartrate 50 milliGRAM(s) Oral two times a day  montelukast 10 milliGRAM(s) Oral daily  pantoprazole    Tablet 40 milliGRAM(s) Oral two times a day  sodium chloride 0.9% lock flush 3 milliLiter(s) IV Push every 8 hours  tiotropium 18 MICROgram(s) Capsule 1 Capsule(s) Inhalation daily    MEDICATIONS  (PRN):  acetaminophen   Tablet .. 650 milliGRAM(s) Oral every 6 hours PRN Moderate Pain (4 - 6)  ALBUTerol/ipratropium for Nebulization 3 milliLiter(s) Nebulizer every 6 hours PRN Shortness of Breath and/or Wheezing  benzonatate 100 milliGRAM(s) Oral every 8 hours PRN Cough  dextrose 40% Gel 15 Gram(s) Oral once PRN Blood Glucose LESS THAN 70 milliGRAM(s)/deciliter  glucagon  Injectable 1 milliGRAM(s) IntraMuscular once PRN Glucose LESS THAN 70 milligrams/deciliter  hydrALAZINE Injectable 10 milliGRAM(s) IV Push every 6 hours PRN sbp > 180 or dbp > 100  ondansetron Injectable 4 milliGRAM(s) IV Push every 6 hours PRN Nausea      Vital Signs Last 24 Hrs  T(C): 36.9 (13 Aug 2019 16:09), Max: 36.9 (13 Aug 2019 16:09)  T(F): 98.4 (13 Aug 2019 16:09), Max: 98.4 (13 Aug 2019 16:09)  HR: 88 (14 Aug 2019 06:21) (77 - 88)  BP: 188/72 (14 Aug 2019 06:21) (152/76 - 188/72)  BP(mean): --  RR: 18 (13 Aug 2019 16:09) (18 - 18)  SpO2: 98% (13 Aug 2019 16:09) (98% - 98%)  ICU Vital Signs Last 24 Hrs  NICHELLE GILL  I&O's Detail    13 Aug 2019 07:01  -  14 Aug 2019 07:00  --------------------------------------------------------  IN:    Oral Fluid: 400 mL  Total IN: 400 mL    OUT:    Voided: 302 mL  Total OUT: 302 mL    Total NET: 98 mL        I&O's Summary    13 Aug 2019 07:01  -  14 Aug 2019 07:00  --------------------------------------------------------  IN: 400 mL / OUT: 302 mL / NET: 98 mL      Drug Dosing Weight  NICHELLE GILL      PHYSICAL EXAM:  General: Appears well developed, well nourished alert and cooperative.  HEENT: Head; normocephalic, atraumatic.  Eyes: Pupils reactive, cornea wnl.  Neck: Supple, no nodes adenopathy, no NVD or carotid bruit or thyromegaly.  CARDIOVASCULAR: Normal S1 and S2, No murmur, rub, gallop or lift.   LUNGS: No rales, rhonchi or wheeze. Normal breath sounds bilaterally.  ABDOMEN: Soft, nontender without mass or organomegaly. bowel sounds normoactive.  EXTREMITIES: No clubbing, cyanosis or edema. Distal pulses wnl.   SKIN: warm and dry with normal turgor.  NEURO: Alert/oriented x 3/normal motor exam. No pathologic reflexes.    PSYCH: normal affect.        LABS:                        9.0    4.51  )-----------( 154      ( 14 Aug 2019 07:41 )             29.0     08-14    143  |  103  |  44.0<H>  ----------------------------<  97  3.7   |  26.0  |  1.28    Ca    8.8      14 Aug 2019 07:41  Phos  3.1     08-13  Mg     2.2     08-13    TPro  7.2  /  Alb  3.4  /  TBili  0.4  /  DBili  x   /  AST  67<H>  /  ALT  39<H>  /  AlkPhos  102  08-14    NICHELLE GILL      PT/INR - ( 14 Aug 2019 07:41 )   PT: 12.8 sec;   INR: 1.11 ratio       RADIOLOGY & ADDITIONAL STUDIES:      ASSESSMENT AND PLAN:  In summary, NICHELLE BRAUNEISEN is a 74y Female with past medical history significant for  CAD s/p CABG and Bio AVR 12/2016 with brief post operative AF, normal EF, CKD requiring transient HD for hyperkalemia, mild MR, prior TIA s/p ILR, chronic Hep C complicated by cirrhosis and varices, bronchiectasias and chronic anemia with worsening anemia.    Shortness of breath:  - likely 2/2 acute on chronic diastolic HF 2/2 volume overload from transfusion  - can change lasix to 40 mg po daily    - given MARGUERITE on CKD continue to monitor renal indices closely; improved; may need to tolerate some degree of azotemia to promote euvolemia     CAD s/p CABG:  - Continue metoprolol    -no further cardiac interventions warranted at this time     Thank you for allowing [facility] to participate in the care of this patient.  Please feel free to call with any questions or concerns.

## 2019-08-14 NOTE — PROGRESS NOTE ADULT - SUBJECTIVE AND OBJECTIVE BOX
Anemia      HPI:  75 y/o female with hx of CKD-4, HTN, HLD, Bio-AVR, recurrent anemia w/ baseline hbg 7-8 s/p 28 PRBC transfusions s/p 3 BMB with no clear etiology of anemia, s/p EGD/Colonoscopy/Capsular endoscopies in past, HCV, Cirrhosis, Diastolic CHF, DM-2, recent prolonged hospital course in 7/19 for right upper chest wound infection related to port, MARGUERITE on CKD, metabolic acidosis, hyperkalemia and Heart block requiring transient HD. Patient was Dcd to home with home PT and family oversight. She states shes been doing well, using cane and no falls. Shes been eating well, and has followed up some of her Physicians. She was noted to have down trending Hbg again and was told she would likely need PRBC transfusion soon. Over the past 2 days she has been having increasing SOB with exertion, and generalized weakness. No CP, N/V/F/C. She went to see her PMD who advised she come to the ED. In the ED patient with hbg of 5.3. She denies any BRBPR or black stools. Cr. in baseline range. She was seen by Cardiology ED. Will be admitted for symptomatic anemia. (07 Aug 2019 00:14)    Interval History:  Patient was seen and examined at bedside around 11:15 am. s/p EGD & Colonoscopy. Complaining of throat pain while swallowing.   Denies nausea, vomiting, abdominal pain, chest pain, palpitations, shortness of breath, headache, dizziness and visual symptoms.    ROS:  As per interval history otherwise unremarkable.    PHYSICAL EXAM:  Vital Signs   T(C): 37.1 (14 Aug 2019 16:23), Max: 37.1 (14 Aug 2019 16:23)  T(F): 98.7 (14 Aug 2019 16:23), Max: 98.7 (14 Aug 2019 16:23)  HR: 81 (14 Aug 2019 16:23) (81 - 90)  BP: 168/73 (14 Aug 2019 16:23) (160/60 - 188/72)  RR: 18 (14 Aug 2019 16:23) (18 - 18)  SpO2: 94% (14 Aug 2019 16:23) (94% - 97%)  General: Elderly female lying in bed comfortably. No acute distress  HEENT: PERRLA. EOMI. Clear conjunctivae. Moist mucus membrane. Throat with mild congestion.   Neck: Supple.    Chest: CTA bilaterally - no wheezing, rales or rhonchi.   Heart: Normal S1 & S2. RRR.   Abdomen: Soft. Non-tender. Non-distended. + BS  Ext: No pedal edema. No calf tenderness   Neuro: Active and alert. No focal deficit. No speech disorder  Skin: Warm and Dry  Psychiatry: Normal mood and affect    I&O's Summary    13 Aug 2019 07:01  -  14 Aug 2019 07:00  --------------------------------------------------------  IN: 400 mL / OUT: 302 mL / NET: 98 mL    MEDICATIONS  (STANDING):  ALBUTerol    90 MICROgram(s) HFA Inhaler 1 Puff(s) Inhalation every 4 hours  amLODIPine   Tablet 10 milliGRAM(s) Oral daily  ascorbic acid 500 milliGRAM(s) Oral daily  atorvastatin 20 milliGRAM(s) Oral at bedtime  chlorhexidine 2% Cloths 1 Application(s) Topical daily  darbepoetin Injectable ViaL 200 MICROGram(s) SubCutaneous every 7 days  dextrose 5%. 1000 milliLiter(s) (50 mL/Hr) IV Continuous <Continuous>  dextrose 50% Injectable 12.5 Gram(s) IV Push once  dextrose 50% Injectable 25 Gram(s) IV Push once  dextrose 50% Injectable 25 Gram(s) IV Push once  folic acid 1 milliGRAM(s) Oral daily  hydrALAZINE 10 milliGRAM(s) Oral every 8 hours  insulin glargine Injectable (LANTUS) 5 Unit(s) SubCutaneous two times a day  insulin lispro (HumaLOG) corrective regimen sliding scale   SubCutaneous Before meals and at bedtime  levothyroxine 100 MICROGram(s) Oral daily  lidocaine   Patch 2 Patch Transdermal daily  metoprolol tartrate 50 milliGRAM(s) Oral two times a day  montelukast 10 milliGRAM(s) Oral daily  pantoprazole    Tablet 40 milliGRAM(s) Oral two times a day  sodium chloride 0.9% lock flush 3 milliLiter(s) IV Push every 8 hours  tiotropium 18 MICROgram(s) Capsule 1 Capsule(s) Inhalation daily    MEDICATIONS  (PRN):  acetaminophen   Tablet .. 650 milliGRAM(s) Oral every 6 hours PRN Moderate Pain (4 - 6)  ALBUTerol/ipratropium for Nebulization 3 milliLiter(s) Nebulizer every 6 hours PRN Shortness of Breath and/or Wheezing  benzocaine 15 mG/menthol 3.6 mG (Sugar-Free) Lozenge 1 Lozenge Oral every 4 hours PRN Sore Throat  benzonatate 100 milliGRAM(s) Oral every 8 hours PRN Cough  dextrose 40% Gel 15 Gram(s) Oral once PRN Blood Glucose LESS THAN 70 milliGRAM(s)/deciliter  glucagon  Injectable 1 milliGRAM(s) IntraMuscular once PRN Glucose LESS THAN 70 milligrams/deciliter  hydrALAZINE Injectable 10 milliGRAM(s) IV Push every 6 hours PRN sbp > 180 or dbp > 100  ondansetron Injectable 4 milliGRAM(s) IV Push every 6 hours PRN Nausea    LABS:  CAPILLARY BLOOD GLUCOSE  POCT Blood Glucose.: 161 mg/dL (14 Aug 2019 16:51)  POCT Blood Glucose.: 125 mg/dL (14 Aug 2019 11:50)  POCT Blood Glucose.: 94 mg/dL (14 Aug 2019 07:26)                        9.0    4.51  )-----------( 154      ( 14 Aug 2019 07:41 )             29.0     08-14    143  |  103  |  44.0<H>  ----------------------------<  97  3.7   |  26.0  |  1.28    Ca    8.8      14 Aug 2019 07:41  Phos  3.1     08-13  Mg     2.2     08-13    TPro  7.2  /  Alb  3.4  /  TBili  0.4  /  DBili  x   /  AST  67<H>  /  ALT  39<H>  /  AlkPhos  102  08-14    PT/INR - ( 14 Aug 2019 07:41 )   PT: 12.8 sec;   INR: 1.11 ratio         RADIOLOGY & ADDITIONAL STUDIES:  Reviewed

## 2019-08-14 NOTE — BRIEF OPERATIVE NOTE - OPERATION/FINDINGS
EGD:   Esophagus: Two columns of grade II esophageal varices were seen with red azeb signs.  Four ligation bands were successfully deployed.  There was a nonobstructive Schatzki ring and a 2 cm hiatus hernia.  Stomach: Multiple fundic gland polyps were seen in the fundus and body.  An erythematous polyp with an adenomatous appearance was resected and retrieved using cold biopsy polypectomy.  A nonbleeding AVM was seen in the body.  In the antrum were three linear mass-like lesions with a few areas of clean based ulceration.  Multiple biopsies were taken.    Duodenum: Unremarkable duodenal bulb.  In the second portion was a whitish flat lesion and a 2.5 cm submucosal lesion.  No samples were taken.  Colonoscopy:  Terminal ileum: Melenic stool.  Cecum: Melenic stool.  Ascending colon: Melenic stool.  Transverse colon: Melenic stool, a 6 mm sessile polyp, resected and retrieved using cold snare polypectomy.  Descending colon: Melenic stool.  Sigmoid colon: Moderately severe diverticular disease with multiple small and moderately sized diverticula.  Rectum: Large external hemorrhoids seen on digital rectal examination and retroflexed views.  Small internal hemorrhoids also seen on retroflexed view.

## 2019-08-14 NOTE — BRIEF OPERATIVE NOTE - NSICDXBRIEFPOSTOP_GEN_ALL_CORE_FT
POST-OP DIAGNOSIS:  Gastric lesion 14-Aug-2019 09:32:06  CARMENZA Maya  Gastric polyps 14-Aug-2019 09:31:50  CARMENZA Maya  Esophageal varices in cirrhosis 14-Aug-2019 09:31:42  CARMENZA Maya

## 2019-08-14 NOTE — BRIEF OPERATIVE NOTE - NSICDXBRIEFPROCEDURE_GEN_ALL_CORE_FT
PROCEDURES:  Colonoscopy, with polypectomy or neoplasm removal using snare 14-Aug-2019 09:30:42  CARMENZA Maya  Esophagogastroduodenoscopy (EGD) with band ligation of varices 14-Aug-2019 09:30:10  CARMENZA Maya  EGD with biopsy 14-Aug-2019 09:29:52  CARMENZA Maya

## 2019-08-14 NOTE — BRIEF OPERATIVE NOTE - COMMENTS
Gastric mass of unknown significance was the most concerning finding.  Esophageal varices, banded.  Colon polyp.    - [facility]  - PPI  - OK to advance diet

## 2019-08-14 NOTE — BRIEF OPERATIVE NOTE - NSICDXBRIEFPREOP_GEN_ALL_CORE_FT
PRE-OP DIAGNOSIS:  Cirrhosis 14-Aug-2019 09:31:26  CARMENZA Maya  Anemia 14-Aug-2019 09:31:08  CARMENZA Maya

## 2019-08-15 ENCOUNTER — TRANSCRIPTION ENCOUNTER (OUTPATIENT)
Age: 74
End: 2019-08-15

## 2019-08-15 ENCOUNTER — INBOUND DOCUMENT (OUTPATIENT)
Age: 74
End: 2019-08-15

## 2019-08-15 VITALS
OXYGEN SATURATION: 95 % | RESPIRATION RATE: 20 BRPM | DIASTOLIC BLOOD PRESSURE: 78 MMHG | HEART RATE: 74 BPM | TEMPERATURE: 98 F | SYSTOLIC BLOOD PRESSURE: 129 MMHG

## 2019-08-15 DIAGNOSIS — K74.60 UNSPECIFIED CIRRHOSIS OF LIVER: ICD-10-CM

## 2019-08-15 DIAGNOSIS — B18.2 CHRONIC VIRAL HEPATITIS C: ICD-10-CM

## 2019-08-15 DIAGNOSIS — K63.5 POLYP OF COLON: ICD-10-CM

## 2019-08-15 DIAGNOSIS — K31.9 DISEASE OF STOMACH AND DUODENUM, UNSPECIFIED: ICD-10-CM

## 2019-08-15 LAB
GLUCOSE BLDC GLUCOMTR-MCNC: 130 MG/DL — HIGH (ref 70–99)
GLUCOSE BLDC GLUCOMTR-MCNC: 190 MG/DL — HIGH (ref 70–99)

## 2019-08-15 PROCEDURE — 93005 ELECTROCARDIOGRAM TRACING: CPT

## 2019-08-15 PROCEDURE — 82962 GLUCOSE BLOOD TEST: CPT

## 2019-08-15 PROCEDURE — 82728 ASSAY OF FERRITIN: CPT

## 2019-08-15 PROCEDURE — 88305 TISSUE EXAM BY PATHOLOGIST: CPT

## 2019-08-15 PROCEDURE — 82272 OCCULT BLD FECES 1-3 TESTS: CPT

## 2019-08-15 PROCEDURE — 85730 THROMBOPLASTIN TIME PARTIAL: CPT

## 2019-08-15 PROCEDURE — 96374 THER/PROPH/DIAG INJ IV PUSH: CPT

## 2019-08-15 PROCEDURE — 87581 M.PNEUMON DNA AMP PROBE: CPT

## 2019-08-15 PROCEDURE — 36430 TRANSFUSION BLD/BLD COMPNT: CPT

## 2019-08-15 PROCEDURE — 86901 BLOOD TYPING SEROLOGIC RH(D): CPT

## 2019-08-15 PROCEDURE — 86923 COMPATIBILITY TEST ELECTRIC: CPT

## 2019-08-15 PROCEDURE — 88342 IMHCHEM/IMCYTCHM 1ST ANTB: CPT

## 2019-08-15 PROCEDURE — 83735 ASSAY OF MAGNESIUM: CPT

## 2019-08-15 PROCEDURE — 86900 BLOOD TYPING SEROLOGIC ABO: CPT

## 2019-08-15 PROCEDURE — 80053 COMPREHEN METABOLIC PANEL: CPT

## 2019-08-15 PROCEDURE — 83540 ASSAY OF IRON: CPT

## 2019-08-15 PROCEDURE — 94760 N-INVAS EAR/PLS OXIMETRY 1: CPT

## 2019-08-15 PROCEDURE — 85014 HEMATOCRIT: CPT

## 2019-08-15 PROCEDURE — 84484 ASSAY OF TROPONIN QUANT: CPT

## 2019-08-15 PROCEDURE — 97116 GAIT TRAINING THERAPY: CPT

## 2019-08-15 PROCEDURE — 84466 ASSAY OF TRANSFERRIN: CPT

## 2019-08-15 PROCEDURE — 71250 CT THORAX DX C-: CPT

## 2019-08-15 PROCEDURE — 99232 SBSQ HOSP IP/OBS MODERATE 35: CPT

## 2019-08-15 PROCEDURE — 85027 COMPLETE CBC AUTOMATED: CPT

## 2019-08-15 PROCEDURE — 94640 AIRWAY INHALATION TREATMENT: CPT

## 2019-08-15 PROCEDURE — 87798 DETECT AGENT NOS DNA AMP: CPT

## 2019-08-15 PROCEDURE — 87633 RESP VIRUS 12-25 TARGETS: CPT

## 2019-08-15 PROCEDURE — 80048 BASIC METABOLIC PNL TOTAL CA: CPT

## 2019-08-15 PROCEDURE — 36415 COLL VENOUS BLD VENIPUNCTURE: CPT

## 2019-08-15 PROCEDURE — 86850 RBC ANTIBODY SCREEN: CPT

## 2019-08-15 PROCEDURE — 83550 IRON BINDING TEST: CPT

## 2019-08-15 PROCEDURE — 87486 CHLMYD PNEUM DNA AMP PROBE: CPT

## 2019-08-15 PROCEDURE — 97163 PT EVAL HIGH COMPLEX 45 MIN: CPT

## 2019-08-15 PROCEDURE — 84100 ASSAY OF PHOSPHORUS: CPT

## 2019-08-15 PROCEDURE — 99239 HOSP IP/OBS DSCHRG MGMT >30: CPT

## 2019-08-15 PROCEDURE — 85610 PROTHROMBIN TIME: CPT

## 2019-08-15 PROCEDURE — 71045 X-RAY EXAM CHEST 1 VIEW: CPT

## 2019-08-15 PROCEDURE — 99285 EMERGENCY DEPT VISIT HI MDM: CPT | Mod: 25

## 2019-08-15 PROCEDURE — 83880 ASSAY OF NATRIURETIC PEPTIDE: CPT

## 2019-08-15 PROCEDURE — 97530 THERAPEUTIC ACTIVITIES: CPT

## 2019-08-15 PROCEDURE — P9016: CPT

## 2019-08-15 PROCEDURE — 85018 HEMOGLOBIN: CPT

## 2019-08-15 PROCEDURE — C1889: CPT

## 2019-08-15 RX ORDER — HYDRALAZINE HCL 50 MG
1 TABLET ORAL
Qty: 90 | Refills: 0
Start: 2019-08-15 | End: 2019-09-13

## 2019-08-15 RX ORDER — FUROSEMIDE 40 MG
1 TABLET ORAL
Qty: 30 | Refills: 0
Start: 2019-08-15 | End: 2019-09-13

## 2019-08-15 RX ORDER — FUROSEMIDE 40 MG
1 TABLET ORAL
Qty: 0 | Refills: 0 | DISCHARGE
Start: 2019-08-15 | End: 2019-09-13

## 2019-08-15 RX ORDER — HEPARIN SODIUM 5000 [USP'U]/ML
300 INJECTION INTRAVENOUS; SUBCUTANEOUS ONCE
Refills: 0 | Status: DISCONTINUED | OUTPATIENT
Start: 2019-08-15 | End: 2019-08-15

## 2019-08-15 RX ORDER — SODIUM CHLORIDE 9 MG/ML
10 INJECTION INTRAMUSCULAR; INTRAVENOUS; SUBCUTANEOUS ONCE
Refills: 0 | Status: COMPLETED | OUTPATIENT
Start: 2019-08-15 | End: 2019-08-15

## 2019-08-15 RX ORDER — SENNA PLUS 8.6 MG/1
2 TABLET ORAL AT BEDTIME
Refills: 0 | Status: DISCONTINUED | OUTPATIENT
Start: 2019-08-15 | End: 2019-08-15

## 2019-08-15 RX ORDER — SENNA PLUS 8.6 MG/1
2 TABLET ORAL
Qty: 20 | Refills: 0
Start: 2019-08-15

## 2019-08-15 RX ORDER — TIOTROPIUM BROMIDE 18 UG/1
1 CAPSULE ORAL; RESPIRATORY (INHALATION)
Qty: 1 | Refills: 0
Start: 2019-08-15 | End: 2019-09-13

## 2019-08-15 RX ORDER — CARVEDILOL PHOSPHATE 80 MG/1
6.25 CAPSULE, EXTENDED RELEASE ORAL EVERY 12 HOURS
Refills: 0 | Status: DISCONTINUED | OUTPATIENT
Start: 2019-08-15 | End: 2019-08-15

## 2019-08-15 RX ORDER — BENZOCAINE AND MENTHOL 5; 1 G/100ML; G/100ML
1 LIQUID ORAL
Qty: 42 | Refills: 0
Start: 2019-08-15 | End: 2019-08-21

## 2019-08-15 RX ORDER — DOCUSATE SODIUM 100 MG
100 CAPSULE ORAL
Refills: 0 | Status: DISCONTINUED | OUTPATIENT
Start: 2019-08-15 | End: 2019-08-15

## 2019-08-15 RX ORDER — CARVEDILOL PHOSPHATE 80 MG/1
1 CAPSULE, EXTENDED RELEASE ORAL
Qty: 60 | Refills: 0
Start: 2019-08-15 | End: 2019-09-13

## 2019-08-15 RX ORDER — DOCUSATE SODIUM 100 MG
1 CAPSULE ORAL
Qty: 0 | Refills: 0 | DISCHARGE
Start: 2019-08-15

## 2019-08-15 RX ADMIN — PANTOPRAZOLE SODIUM 40 MILLIGRAM(S): 20 TABLET, DELAYED RELEASE ORAL at 06:09

## 2019-08-15 RX ADMIN — MONTELUKAST 10 MILLIGRAM(S): 4 TABLET, CHEWABLE ORAL at 12:15

## 2019-08-15 RX ADMIN — Medication 100 MICROGRAM(S): at 06:09

## 2019-08-15 RX ADMIN — CHLORHEXIDINE GLUCONATE 1 APPLICATION(S): 213 SOLUTION TOPICAL at 12:17

## 2019-08-15 RX ADMIN — Medication 500 MILLIGRAM(S): at 12:15

## 2019-08-15 RX ADMIN — INSULIN GLARGINE 5 UNIT(S): 100 INJECTION, SOLUTION SUBCUTANEOUS at 08:35

## 2019-08-15 RX ADMIN — Medication 10 MILLIGRAM(S): at 06:09

## 2019-08-15 RX ADMIN — AMLODIPINE BESYLATE 10 MILLIGRAM(S): 2.5 TABLET ORAL at 06:09

## 2019-08-15 RX ADMIN — Medication 2: at 12:19

## 2019-08-15 RX ADMIN — SODIUM CHLORIDE 3 MILLILITER(S): 9 INJECTION INTRAMUSCULAR; INTRAVENOUS; SUBCUTANEOUS at 06:07

## 2019-08-15 RX ADMIN — Medication 40 MILLIGRAM(S): at 06:09

## 2019-08-15 RX ADMIN — Medication 50 MILLIGRAM(S): at 06:09

## 2019-08-15 RX ADMIN — Medication 1 MILLIGRAM(S): at 12:15

## 2019-08-15 RX ADMIN — SODIUM CHLORIDE 10 MILLILITER(S): 9 INJECTION INTRAMUSCULAR; INTRAVENOUS; SUBCUTANEOUS at 12:49

## 2019-08-15 RX ADMIN — Medication 300 UNIT(S): at 12:05

## 2019-08-15 RX ADMIN — LIDOCAINE 2 PATCH: 4 CREAM TOPICAL at 12:15

## 2019-08-15 RX ADMIN — SODIUM CHLORIDE 3 MILLILITER(S): 9 INJECTION INTRAMUSCULAR; INTRAVENOUS; SUBCUTANEOUS at 13:58

## 2019-08-15 NOTE — PROGRESS NOTE ADULT - REASON FOR ADMISSION
SOB  symptomatic anemia

## 2019-08-15 NOTE — DISCHARGE NOTE PROVIDER - NSDCFUSCHEDAPPT_GEN_ALL_CORE_FT
NICHELLE GILL ; 08/28/2019 ; N[facility] [address] NICHELLE Fiore ; 09/04/2019 ; [facility] G[facility] [address] NICHELLE GILL ; 08/28/2019 ; [facility] [address] [place] NICHELLE Fiore ; 09/04/2019 ; N[facility] G[facility] [address] NICHELLE GILL ; 08/28/2019 ; [facility] [address] [place] NICHELLE Fiore ; 09/04/2019 ; N[facility] Gastro [address] NICHELLE GILL ; 08/28/2019 ; [facility] [address] [place] NICHELLE Fiore ; 09/04/2019 ; N[facility] G[facility] [address] NICHELLE GILL ; 08/28/2019 ; [facility] [address] [place] NICHELLE Fiore ; 09/04/2019 ; N[facility] G[facility] [address] Sep[address]

## 2019-08-15 NOTE — DISCHARGE NOTE PROVIDER - CARE PROVIDERS DIRECT ADDRESSES
[email or web address],[email or web address],[email or web address]

## 2019-08-15 NOTE — PROGRESS NOTE ADULT - PROBLEM SELECTOR PLAN 5
portal hypertensive with some ulcerations and ? mass but may just be severe gastritis. Should be discharged on pantoprazole 40mg PO BID and coreg 3.125 BID. F/U with Dr. Howell as outpatient who will review pathology when available. If H/H stable today OK to d/c from GI standpoint. portal hypertensive with some ulcerations and ? mass but may just be severe gastritis. Should be discharged on pantoprazole 40mg PO BID and coreg 6.25mg BID. ( Will stop metaprolol) F/U with Dr. Howell as outpatient who will review pathology when available. If H/H stable today OK to d/c from GI standpoint.

## 2019-08-15 NOTE — DISCHARGE NOTE PROVIDER - PROVIDER TOKENS
PROVIDER:[TOKEN:[[id number]]],PROVIDER:[TOKEN:[[id number]]],PROVIDER:[TOKEN:[[id number]]] PROVIDER:[TOKEN:[[id number]]],PROVIDER:[TOKEN:[[id number]]],PROVIDER:[TOKEN:[[id number]]],PROVIDER:[TOKEN:[[id number]]]

## 2019-08-15 NOTE — PROGRESS NOTE ADULT - PROBLEM SELECTOR PLAN 2
multifactorial with chronic disease, occasional low volume GI bleed due to PHG and hemorrhoids as well as renal insufficiency.

## 2019-08-15 NOTE — DISCHARGE NOTE PROVIDER - NSDCHHNEEDSERVICE_GEN_ALL_CORE
Medication teaching and assessment/Observation and assessment Medication teaching and assessment/Observation and assessment/Rehabilitation services

## 2019-08-15 NOTE — PROGRESS NOTE ADULT - SUBJECTIVE AND OBJECTIVE BOX
Pt seen and examined f/u hep C cirrhosis and anemia with PHG and esophageal varices    This morning c/o sore throat since EGD/ Colonoscopy yesterday. No other complaints. H/H stable.    REVIEW OF SYSTEMS:    CONSTITUTIONAL: No fever, weight loss, or fatigue  EYES: No eye pain, visual disturbances, or discharge  ENMT:  No difficulty hearing, tinnitus, vertigo; No sinus or throat pain  RESPIRATORY: No cough, wheezing, chills or hemoptysis; No shortness of breath  CARDIOVASCULAR: No chest pain, palpitations, dizziness, or leg swelling  GASTROINTESTINAL: as above    MEDICATIONS:  MEDICATIONS  (STANDING):  ALBUTerol    90 MICROgram(s) HFA Inhaler 1 Puff(s) Inhalation every 4 hours  amLODIPine   Tablet 10 milliGRAM(s) Oral daily  ascorbic acid 500 milliGRAM(s) Oral daily  atorvastatin 20 milliGRAM(s) Oral at bedtime  chlorhexidine 2% Cloths 1 Application(s) Topical daily  darbepoetin Injectable ViaL 200 MICROGram(s) SubCutaneous every 7 days  dextrose 5%. 1000 milliLiter(s) (50 mL/Hr) IV Continuous <Continuous>  dextrose 50% Injectable 12.5 Gram(s) IV Push once  dextrose 50% Injectable 25 Gram(s) IV Push once  dextrose 50% Injectable 25 Gram(s) IV Push once  folic acid 1 milliGRAM(s) Oral daily  furosemide    Tablet 40 milliGRAM(s) Oral daily  hydrALAZINE 10 milliGRAM(s) Oral every 8 hours  insulin glargine Injectable (LANTUS) 5 Unit(s) SubCutaneous two times a day  insulin lispro (HumaLOG) corrective regimen sliding scale   SubCutaneous Before meals and at bedtime  levothyroxine 100 MICROGram(s) Oral daily  lidocaine   Patch 2 Patch Transdermal daily  metoprolol tartrate 50 milliGRAM(s) Oral two times a day  montelukast 10 milliGRAM(s) Oral daily  pantoprazole    Tablet 40 milliGRAM(s) Oral two times a day  sodium chloride 0.9% lock flush 3 milliLiter(s) IV Push every 8 hours  tiotropium 18 MICROgram(s) Capsule 1 Capsule(s) Inhalation daily    MEDICATIONS  (PRN):  acetaminophen   Tablet .. 650 milliGRAM(s) Oral every 6 hours PRN Moderate Pain (4 - 6)  ALBUTerol/ipratropium for Nebulization 3 milliLiter(s) Nebulizer every 6 hours PRN Shortness of Breath and/or Wheezing  benzocaine 15 mG/menthol 3.6 mG (Sugar-Free) Lozenge 1 Lozenge Oral every 4 hours PRN Sore Throat  benzonatate 100 milliGRAM(s) Oral every 8 hours PRN Cough  dextrose 40% Gel 15 Gram(s) Oral once PRN Blood Glucose LESS THAN 70 milliGRAM(s)/deciliter  glucagon  Injectable 1 milliGRAM(s) IntraMuscular once PRN Glucose LESS THAN 70 milligrams/deciliter  hydrALAZINE Injectable 10 milliGRAM(s) IV Push every 6 hours PRN sbp > 180 or dbp > 100  ondansetron Injectable 4 milliGRAM(s) IV Push every 6 hours PRN Nausea      Allergies    Bactrim (Unknown)  Biaxin (Joint Pain)  morphine (Short breath)    Intolerances        Vital Signs Last 24 Hrs  T(C): 36.9 (14 Aug 2019 21:25), Max: 37.1 (14 Aug 2019 16:23)  T(F): 98.4 (14 Aug 2019 21:25), Max: 98.7 (14 Aug 2019 16:23)  HR: 82 (15 Aug 2019 06:00) (77 - 90)  BP: 150/74 (15 Aug 2019 06:00) (150/74 - 168/73)  BP(mean): --  RR: 18 (14 Aug 2019 21:25) (18 - 18)  SpO2: 97% (14 Aug 2019 21:25) (94% - 97%)    08-14 @ 07:01  -  08-15 @ 07:00  --------------------------------------------------------  IN: 0 mL / OUT: 1300 mL / NET: -1300 mL        PHYSICAL EXAM:    General: overweight femal, well nourished; in no acute distress  HEENT: MMM, conjunctiva and sclera clear  Gastrointestinal:Abdomen: Soft non-tender non-distended; Normal bowel sounds; No hepatosplenomegaly  Extremities: no cyanosis, clubbing or edema.  Skin: Warm and dry. No obvious rash    LABS:      CBC Full  -  ( 14 Aug 2019 07:41 )  WBC Count : 4.51 K/uL  RBC Count : 3.15 M/uL  Hemoglobin : 9.0 g/dL  Hematocrit : 29.0 %  Platelet Count - Automated : 154 K/uL  Mean Cell Volume : 92.1 fl  Mean Cell Hemoglobin : 28.6 pg  Mean Cell Hemoglobin Concentration : 31.0 gm/dL  Auto Neutrophil # : x  Auto Lymphocyte # : x  Auto Monocyte # : x  Auto Eosinophil # : x  Auto Basophil # : x  Auto Neutrophil % : x  Auto Lymphocyte % : x  Auto Monocyte % : x  Auto Eosinophil % : x  Auto Basophil % : x    08-14    143  |  103  |  44.0<H>  ----------------------------<  97  3.7   |  26.0  |  1.28    Ca    8.8      14 Aug 2019 07:41  Phos  3.1     08-13  Mg     2.2     08-13    TPro  7.2  /  Alb  3.4  /  TBili  0.4  /  DBili  x   /  AST  67<H>  /  ALT  39<H>  /  AlkPhos  102  08-14    PT/INR - ( 14 Aug 2019 07:41 )   PT: 12.8 sec;   INR: 1.11 ratio

## 2019-08-15 NOTE — DISCHARGE NOTE PROVIDER - NSDCCPCAREPLAN_GEN_ALL_CORE_FT
PRINCIPAL DISCHARGE DIAGNOSIS  Diagnosis: Symptomatic anemia  Assessment and Plan of Treatment: Post blood transfusion, now stable. Follow up w PMD and hematologist within 3 days for repeat blood work. Take medications as directed      SECONDARY DISCHARGE DIAGNOSES  Diagnosis: Colon polyp  Assessment and Plan of Treatment: Follow up GO within 2 weeks for further managment of colonoscopy findings- gastritis, ?? mass polyps etc.    Diagnosis: COPD with hypoxia  Assessment and Plan of Treatment: Cont home O2 and all medications as prescribed, follow up w pulmonology within 3 days.    Diagnosis: Lung nodules  Assessment and Plan of Treatment: Right upper lobe nodule seen on CT. Follow up CT in 2-3 months.    Diagnosis: CHF (congestive heart failure)  Assessment and Plan of Treatment: Follow up w cardiologist as directed. Take medications as directed. PRINCIPAL DISCHARGE DIAGNOSIS  Diagnosis: Symptomatic anemia  Assessment and Plan of Treatment: Post blood transfusion, now stable. Follow up w PMD and hematologist within 3-5 days for repeat blood work. Take medications as directed      SECONDARY DISCHARGE DIAGNOSES  Diagnosis: Colon polyp  Assessment and Plan of Treatment: Follow up GO within 2 weeks for further managment of colonoscopy findings- gastritis, ?? mass polyps etc.    Diagnosis: COPD with hypoxia  Assessment and Plan of Treatment: Cont home O2 and all medications as prescribed, follow up w pulmonology within 1 week.    Diagnosis: Lung nodules  Assessment and Plan of Treatment: Right upper lobe nodule seen on CT. Follow up CT in 2-3 months.    Diagnosis: CHF (congestive heart failure)  Assessment and Plan of Treatment: Follow up w cardiologist in 2 weeks. Take medications as directed.

## 2019-08-15 NOTE — PROGRESS NOTE ADULT - PROVIDER SPECIALTY LIST ADULT
Cardiology
Gastroenterology
Hospitalist
Cardiology
Hospitalist
Gastroenterology
Hospitalist

## 2019-08-15 NOTE — PROGRESS NOTE ADULT - ASSESSMENT
75 y/o female with symptomatic anemia, recurrent need for PRBC transfusions, Hx of Diastolic CHF, CKD-4, HTN, HLD, s/p BioAVR, CAD s/p CABG, thrombocytopenia sec to cirrhosis, class 2 pulm htn w/ chronic resp failure w/ hypoxia, DM-2, Hypothyroid     #acute on chronic fe deficiency anemia, symptomatic. stable  -hx of extensive w/u in past for anemia, unremarkable, seen by gi + heme as outpt  -has port for serial transfusions, ID eval appreciated, infection resolved, cleared for use of port  -sp 4u complicated by acute on chronic hfpef  -serial cbc, goal hgb per outpt providers >/= 8  -ppi bid, avoid nsaids/asa until gi w/u completed  -gi eval appreciated, pending repeat egd/colonoscopy when cleared from cardio + pulm, pulm clearance in chart, pending cardio, likely early next week  -close outpt fu w/ heme + gi    #acute on chronic hfpef. resolved initially, now recurrent  -sec transfusions  -sb cardio eval appreciated, change lasix back to iv, strict i/o, daily wt  -avoid ivf if not absolutely needed, fluid restrict, inc lasix if additional transfusions needed    #class 2 pulm htn complicated by chronic resp failure w/ hypoxia.  -stable, titrated back to baseline 2-3L nc  -ct chest discussed w/ radiology, pt w/ RUL nodule, 2-3mo fu rec, also w/ lingular PNA vs atelectasis, aside from cough no other clinical sign of pna, will hold off on abx, monitor clinically, IS, neg rvp, supportive care for cough + associated rib pain  -pt aware of nodules, follows w/ pulm for it  -nebs, prn + standing  -pulm eval appreciated    #ckd4. stable  -baseline cr 2.4-2.7, NO ney    #dm2 on long term insulin complicated by asymptomatic hyperglycemia. stable  -a1c 5.3, controlled  -iss + fs achs, change lantus to bid for better daytime control    #htn. controlled    #hypernatremia. resolved    #hypophosphatemia. resolved    #dvt ppx. no pharm agents, scd    #dispo. dc pending normalization/ stabilization of hgb, and gi w/u, pending cardiac clearance after resolution of acute hf exac, undetermined date    #outpt fu. pmd, gi, heme, pulm, repeat ct chest 2-3mo w/ iv contrast if poss
75 y/o female with symptomatic anemia, recurrent need for PRBC transfusions, Hx of Diastolic CHF, CKD-4, HTN, HLD, s/p BioAVR, CAD s/p CABG, thrombocytopenia sec to cirrhosis, hep c not yet treated, class 2 pulm htn w/ chronic resp failure w/ hypoxia, DM-2, Hypothyroid     #acute on chronic fe deficiency anemia, symptomatic. stable  -hx of extensive w/u in past for anemia, unremarkable, seen by gi + heme as outpt  -sp 4u complicated by acute on chronic hfpef  -serial cbc, goal hgb per outpt providers >/= 8. aslt h/h yesterday 9.0. no further bleeding  - ppi bid, avoid nsaid. resume ASA  -gi eval appreciated, s/p repeat egd/colonoscopy:   ~ Gastric mass of unknown significance,? gastritis, Esophageal varices, banded.  Colon polyp.   ~ [facility].  ~ PPI BID  ~ Tolerating diet  -fe studies w/o current deficiency  -close outpt fu w/ heme + gi, pending heme fu, consult recalled 8/12, Dr Valdivia group  - patient wants to leave. will send OP  - patient to f/u with Dr. Howell in 1-2 weeks OP, PCP in 1 week for repeat H/h    Constipation: laxatives ordered avoid constipation. drink upto 1L or otherwise instructed by cardio.  metamucil. avoid straining    #acute on chronic hfpef. resolved initially, recurrent, resolved again  -sec to transfusions  -sb cardio eval appreciated, lasix changed to po, strict i/o, daily wt  -avoid ivf if not absolutely needed, fluid restrict, inc lasix if additional transfusions needed  - DC home with PO lasix and cardio eval in 1-2 weeks    #class 2 pulm htn complicated by chronic resp failure w/ hypoxia.  -stable, baseline 2-3L nc  -ct chest discussed w/ radiology, pt w/ RUL nodule, 2-3mo fu rec, also w/ lingular PNA vs atelectasis, aside from cough no other clinical sign of pna, will hold off on abx, monitor clinically, IS, neg rvp, supportive care for cough + associated rib pain. currently denied any rib pain. cough baseline  -pt aware of nodules, follows w/ pulm for it  -nebs prn  -pulm eval appreciated    #ckd4. stable  -baseline cr 2.4-2.7, NO ney    #dm2 on long term insulin complicated by asymptomatic hyperglycemia. stable  -a1c 5.3, controlled  -iss + fs achs, lantus bid for better daytime control (resumed)   - DC with PO meds    #htn. : c/w BB< hydralazine. f/u PCP in 1 week     #hypernatremia. resolved    #hypophosphatemia. resolved    #dvt ppx. no pharm agents, scd    #dispo. PT eval appreciated--> [facility]. patient refused. seen by PT today and suggested to home with Home PT. fall precautions advised.       #outpt fu. pmd, gi, heme, pulm, repeat ct chest 2-3mo w/ iv contrast if poss, cardio
CHF improved.  Cardiac cleared by Dr Mckeon.    Recurrent anemia, transfusion dependent.    HCV, genotype 2 with cirrhosis;  awaiting treatment.  EGD/colonoscopy for tomorrow;  PRBP explained, understood ASA #3.  Consentable.  Orders written for prep.  AM BW requested.
73 y/o female with symptomatic anemia, recurrent need for PRBC transfusions, Hx of Diastolic CHF, CKD-4, HTN, HLD, s/p BioAVR, CAD s/p CABG, thrombocytopenia sec to cirrhosis, hep c not yet treated, class 2 pulm htn w/ chronic resp failure w/ hypoxia, DM-2, Hypothyroid     #acute on chronic fe deficiency anemia, symptomatic. stable  -hx of extensive w/u in past for anemia, unremarkable, seen by gi + heme as outpt  -sp 4u complicated by acute on chronic hfpef  -serial cbc, goal hgb per outpt providers >/= 8  -ppi bid, avoid nsaids/asa until gi w/u completed  -gi eval appreciated, s/p repeat egd/colonoscopy:   ~ Gastric mass of unknown significance was the most concerning finding.  Esophageal varices, banded.  Colon polyp.   ~ [facility].  ~ PPI BID  ~ OK to advance diet   -fe studies w/o current deficiency  -close outpt fu w/ heme + gi, pending heme fu, consult recalled 8/12, Dr Valdivia group    #acute on chronic hfpef. resolved initially, recurrent, resolved again  -sec to transfusions  -sb cardio eval appreciated, lasix changed to po, strict i/o, daily wt  -avoid ivf if not absolutely needed, fluid restrict, inc lasix if additional transfusions needed    #class 2 pulm htn complicated by chronic resp failure w/ hypoxia.  -stable, baseline 2-3L nc  -ct chest discussed w/ radiology, pt w/ RUL nodule, 2-3mo fu rec, also w/ lingular PNA vs atelectasis, aside from cough no other clinical sign of pna, will hold off on abx, monitor clinically, IS, neg rvp, supportive care for cough + associated rib pain  -pt aware of nodules, follows w/ pulm for it  -nebs prn  -pulm eval appreciated    #ckd4. stable  -baseline cr 2.4-2.7, NO ney    #dm2 on long term insulin complicated by asymptomatic hyperglycemia. stable  -a1c 5.3, controlled  -iss + fs achs, lantus bid for better daytime control (resumed)     #htn. uncontrolled, asymptomatic, sbp 160-180s  -serial bp, sp inc bb to 50mg bid  -added hydralazine 10 mg tid  -hydralazine ivp prn    #hypernatremia. resolved    #hypophosphatemia. resolved    #dvt ppx. no pharm agents, scd    #dispo. [facility] likely in 1-2 days    #outpt fu. pmd, gi, heme, pulm, repeat ct chest 2-3mo w/ iv contrast if poss, cardio
75 y/o female with symptomatic anemia, recurrent need for PRBC transfusions, Hx of Diastolic CHF, CKD-4, HTN, HLD, s/p BioAVR, CAD s/p CABG, thrombocytopenia sec to cirrhosis, DM-2, Hypothyroid     #acute on chronic fe deficiency anemia, symptomatic.   -hx of extensive w/u in past for anemia, unremarkable, seen by gi + heme as outpt  -has port for serial transfusions, currently not in use sec to recent port infection  -sp 3u complicated by acute on chronic hfpef, sp lasix iv trial w/ good response  -serial cbc, goal hgb per outpt providers >/= 8, repeat transfusion in am if hgb not @ goal  -monitor for need for PLT transfusion as well, transfuse for onset of active blood loss or PLT < 10  -ppi bid, avoid nsaids/asa  -gi eval in am to assess for need for repeat gi eval  -close outpt fu w/ heme + gi, age appropriate malignancy screening    #acute on chronic hfpef. resolved  -sec to above  -sb cardio eval appreciated, resumed po lasix    #ckd4. stable  -baseline cr 2.4-2.7, NO ney    #dm2 on long term insulin complicated by asymptomatic hyperglycemia. stable  -a1c 5.3, controlled  -iss + fs achs    #htn. controlled    #dvt ppx. no pharm agents, scd    #dispo. dc pending normalization/stablization of hgb, likely in 1-2 days    #outpt fu. pmd, gi, heme
75 y/o female with symptomatic anemia, recurrent need for PRBC transfusions, Hx of Diastolic CHF, CKD-4, HTN, HLD, s/p BioAVR, CAD s/p CABG, thrombocytopenia sec to cirrhosis, class 2 pulm htn w/ chronic resp failure w/ hypoxia, DM-2, Hypothyroid     #acute on chronic fe deficiency anemia, symptomatic. stable  -hx of extensive w/u in past for anemia, unremarkable, seen by gi + heme as outpt  -has port for serial transfusions, ID eval appreciated, infection resolved, cleared for use of port  -sp 4u complicated by acute on chronic hfpef  -serial cbc, goal hgb per outpt providers >/= 8  -ppi bid, avoid nsaids/asa until gi w/u completed  -gi eval appreciated, pending repeat egd/colonoscopy when cleared from cardio,  pulm clearance in chart  -fe studies in am per family request, recall heme for follow up in am  -close outpt fu w/ heme + gi    #acute on chronic hfpef. resolved initially, now recurrent  -sec transfusions  -sb cardio eval appreciated, lasix iv, strict i/o, daily wt  -avoid ivf if not absolutely needed, fluid restrict, inc lasix if additional transfusions needed  -cxr pending    #class 2 pulm htn complicated by chronic resp failure w/ hypoxia.  -stable, baseline 2-3L nc  -ct chest discussed w/ radiology, pt w/ RUL nodule, 2-3mo fu rec, also w/ lingular PNA vs atelectasis, aside from cough no other clinical sign of pna, will hold off on abx, monitor clinically, IS, neg rvp, supportive care for cough + associated rib pain  -pt aware of nodules, follows w/ pulm for it  -nebs, prn + standing  -pulm eval appreciated    #ckd4. stable  -baseline cr 2.4-2.7, NO ney    #dm2 on long term insulin complicated by asymptomatic hyperglycemia. stable  -a1c 5.3, controlled  -iss + fs achs, change lantus to bid for better daytime control, still suboptimal, trial 1 more day if still uncontrolled inc lantus    #htn. uncontrolled, asymptomatic, sbp 160-170s  -serial bp, titrate meds prn    #hypernatremia. resolved    #hypophosphatemia. resolved    #dvt ppx. no pharm agents, scd    #dispo. dc pending normalization/ stabilization of hgb, and gi w/u, pending cardiac clearance after resolution of acute hf exac, undetermined date    #outpt fu. pmd, gi, heme, pulm, repeat ct chest 2-3mo w/ iv contrast if poss
75 y/o female with symptomatic anemia, recurrent need for PRBC transfusions, Hx of Diastolic CHF, CKD-4, HTN, HLD, s/p BioAVR, CAD s/p CABG, thrombocytopenia sec to cirrhosis, class 2 pulm htn w/ chronic resp failure w/ hypoxia, DM-2, Hypothyroid     #acute on chronic fe deficiency anemia, symptomatic. stable  -hx of extensive w/u in past for anemia, unremarkable, seen by gi + heme as outpt  -sp 4u complicated by acute on chronic hfpef  -serial cbc, goal hgb per outpt providers >/= 8  -ppi bid, avoid nsaids/asa until gi w/u completed  -gi eval appreciated, pending repeat egd/colonoscopy, cleared from cardio + pulm, npo @ midnight tomorrow for testing wedn  -fe studies w/o current deficiency  -close outpt fu w/ heme + gi, pending heme fu, consult recalled 8/12    #acute on chronic hfpef. resolved initially, now recurrent, almost resolved again  -sec to transfusions  -sb cardio eval appreciated, lasix iv, strict i/o, daily wt  -avoid ivf if not absolutely needed, fluid restrict, inc lasix if additional transfusions needed    #class 2 pulm htn complicated by chronic resp failure w/ hypoxia.  -stable, baseline 2-3L nc  -ct chest discussed w/ radiology, pt w/ RUL nodule, 2-3mo fu rec, also w/ lingular PNA vs atelectasis, aside from cough no other clinical sign of pna, will hold off on abx, monitor clinically, IS, neg rvp, supportive care for cough + associated rib pain  -pt aware of nodules, follows w/ pulm for it  -nebs prn  -pulm eval appreciated    #ckd4. stable  -baseline cr 2.4-2.7, NO ney    #dm2 on long term insulin complicated by asymptomatic hyperglycemia. stable  -a1c 5.3, controlled  -iss + fs achs, lantus bid for better daytime control    #htn. uncontrolled, asymptomatic, sbp 160-170s  -serial bp, inc bb to 50mg bid    #hypernatremia. resolved    #hypophosphatemia. resolved    #dvt ppx. no pharm agents, scd    #dispo. dc pending completion of gi w/u likely in 3-4 days    #outpt fu. pmd, gi, heme, pulm, repeat ct chest 2-3mo w/ iv contrast if poss, cardio
75 y/o female with symptomatic anemia, recurrent need for PRBC transfusions, Hx of Diastolic CHF, CKD-4, HTN, HLD, s/p BioAVR, CAD s/p CABG, thrombocytopenia sec to cirrhosis, class 2 pulm htn w/ chronic resp failure w/ hypoxia, DM-2, Hypothyroid     #acute on chronic fe deficiency anemia, symptomatic. stable.  -hx of extensive w/u in past for anemia, unremarkable, seen by gi + heme as outpt  -has port for serial transfusions, ID eval appreciated, infection resolved, cleared for use of port  -sp 3u complicated by acute on chronic hfpef, sp lasix iv trial w/ good response  -serial cbc, goal hgb per outpt providers >/= 8, repeat transfusion x1u today  -monitor for need for PLT transfusion as well, transfuse for onset of active blood loss or PLT < 10  -ppi bid, avoid nsaids/asa  -gi eval appreciated, pending repeat egd/colonoscopy when cleared from cardio + pulm, likely early next week  -close outpt fu w/ heme + gi    #acute on chronic hfpef. resolved  -sec to above  -sb cardio eval appreciated, transitioned lasix iv to po  -monitor for volume overload w/ further transfusions, additional dose lasix iv post transfusion, avoid ivf if not absolutely needed, fluid restrict    #class 2 pulm htn complicated by chronic resp failure w/ hypoxia.  -stable, titrated back to baseline 2-3L nc  -ct chest discussed w/ radiology, pt w/ RUL nodule, 2-3mo fu rec, also w/ lingular PNA vs atelectasis, aside from cough no other clinical sign of pna, will hold off on abx, monitor clinically, IS, check rvp, supportive care for cough + associated rib pain  -nebs, prn + standing  -pulm eval pending    #ckd4. stable  -baseline cr 2.4-2.7, NO ney    #dm2 on long term insulin complicated by asymptomatic hyperglycemia. stable  -a1c 5.3, controlled  -iss + fs achs    #htn. controlled    #hypernatremia. resolved    #hypophosphatemia. resolved    #dvt ppx. no pharm agents, scd    #dispo. dc pending normalization/ stabilization of hgb, likely in 1-2 days    #outpt fu. pmd, gi, heme, pulm, repeat ct chest 2-3mo w/ iv contrast if poss
75 y/o female with symptomatic anemia, recurrent need for PRBC transfusions, Hx of Diastolic CHF, CKD-4, HTN, HLD, s/p BioAVR, CAD s/p CABG, thrombocytopenia sec to cirrhosis, hep c not yet treated, class 2 pulm htn w/ chronic resp failure w/ hypoxia, DM-2, Hypothyroid     #acute on chronic fe deficiency anemia, symptomatic. stable  -hx of extensive w/u in past for anemia, unremarkable, seen by gi + heme as outpt  -sp 4u complicated by acute on chronic hfpef  -serial cbc, goal hgb per outpt providers >/= 8  -ppi bid, avoid nsaids/asa until gi w/u completed  -gi eval appreciated, pending repeat egd/colonoscopy, cleared from cardio + pulm, npo @ midnight for testing tomorrow  -fe studies w/o current deficiency  -close outpt fu w/ heme + gi, pending heme fu, consult recalled 8/12, Dr Valdivia group    #acute on chronic hfpef. resolved initially, recurrent, resolved again  -sec to transfusions  -sb cardio eval appreciated, lasix iv, change to po tomorrow, strict i/o, daily wt  -avoid ivf if not absolutely needed, fluid restrict, inc lasix if additional transfusions needed    #class 2 pulm htn complicated by chronic resp failure w/ hypoxia.  -stable, baseline 2-3L nc  -ct chest discussed w/ radiology, pt w/ RUL nodule, 2-3mo fu rec, also w/ lingular PNA vs atelectasis, aside from cough no other clinical sign of pna, will hold off on abx, monitor clinically, IS, neg rvp, supportive care for cough + associated rib pain  -pt aware of nodules, follows w/ pulm for it  -nebs prn  -pulm eval appreciated    #ckd4. stable  -baseline cr 2.4-2.7, NO ney    #dm2 on long term insulin complicated by asymptomatic hyperglycemia. stable  -a1c 5.3, controlled  -iss + fs achs, lantus bid for better daytime control, hold evening + am dose for procedure tomorrow, resume lantus 5 bid post procedure    #htn. uncontrolled, asymptomatic, sbp 160-180s  -serial bp, sp inc bb to 50mg bid  -may need to consider addition of another agent for control, will not start now given impending procedure, reassess afterwards  -hydralazine prn    #hypernatremia. resolved    #hypophosphatemia. resolved    #dvt ppx. no pharm agents, scd    #dispo. dc pending completion of gi w/u likely in 2-3 days    #outpt fu. pmd, gi, heme, pulm, repeat ct chest 2-3mo w/ iv contrast if poss, cardio
Patient with anemia, rectal bleeding. Needs further evaluation with EGD and colonoscopy. Will await cardiac optimization at this time

## 2019-08-15 NOTE — DISCHARGE NOTE NURSING/CASE MANAGEMENT/SOCIAL WORK - NSDCDPATPORTLINK_GEN_ALL_CORE
You can access the [facility] Patient Portal, offered by [facility], by registering with the following website: [email or web address]

## 2019-08-15 NOTE — DISCHARGE NOTE PROVIDER - NSDCFUADDINST_GEN_ALL_CORE_FT
please call your doctors to schedule appointments. bring al meds and discharge papers to all health care visits. return if chest pain/shortness of breadth/palpitation/leg swelling/unusual weight gain or any other concerning symptoms

## 2019-08-15 NOTE — DISCHARGE NOTE PROVIDER - CARE PROVIDER_API CALL
Rachel Gross)  Hematology; Internal Medicine; Medical Oncology  [address] [place] [address]  Phone: [phone]  Fax: [phone]  Follow Up Time:     Ramiro Beckham)  Gastroenterology; Internal Medicine  [address] [facility], [address]  [place] [address]  Phone: [phone]  Fax: [phone]  Follow Up Time:     Hayde Moncada)  Internal Medicine  [address] [place] [address]  Phone: [phone]  Fax: [phone]  Follow Up Time: Rachel Gross)  Hematology; Internal Medicine; Medical Oncology  [address] [place] [address]  Phone: [phone]  Fax: [phone]  Follow Up Time:     Ramiro Beckham)  Gastroenterology; Internal Medicine  [address] [facility], [address] [place] [address]  Phone: [phone]  Fax: [phone]  Follow Up Time:     Hayde Moncada)  Internal Medicine  [address] [place] [address]  Phone: [phone]  Fax: [phone]  Follow Up Time:     Cely Brown)  Internal Medicine; Pulmonary Disease; Sleep Medicine  [address] [facility], [address]  [place] [address]  Phone: [phone]  Fax: [phone]  Follow Up Time:

## 2019-08-15 NOTE — PROGRESS NOTE ADULT - NSHPATTENDINGPLANDISCUSS_GEN_ALL_CORE
Called and spoke with Matthew Trinh to give him all scheduling info for CT of Pelvis as listed below    Ct Of Pelvis - w/o contrast   @[facility] 10/9/17 @ 11:30 arrive 11:15  No prep per Beto @ cs  F/u appt 10/12/17 @ 2:45 @ [facility]    Pt confirmed
the patient.  All imaging and results of lab/other studies reviewed by me. All questions answered to their satisfaction. At this time they agree with the current plan of therapy.
Nursing.
Patient and RN, PA
the patient.  All imaging and results of lab/other studies reviewed by me. All questions answered to their satisfaction. At this time they agree with the current plan of therapy.
Patient and RN
the patient.  All imaging and results of lab/other studies reviewed by me. All questions answered to their satisfaction. At this time they agree with the current plan of therapy.
the patient.  All imaging and results of lab/other studies reviewed by me. All questions answered to their satisfaction. At this time they agree with the current plan of therapy.
the patient

## 2019-08-16 LAB — SURGICAL PATHOLOGY STUDY: SIGNIFICANT CHANGE UP

## 2019-08-19 LAB — SURGICAL PATHOLOGY STUDY: SIGNIFICANT CHANGE UP

## 2019-08-22 ENCOUNTER — OTHER (OUTPATIENT)
Age: 74
End: 2019-08-22

## 2019-08-28 ENCOUNTER — APPOINTMENT (OUTPATIENT)
Dept: PULMONOLOGY | Facility: CLINIC | Age: 74
End: 2019-08-28
Payer: MEDICARE

## 2019-08-28 VITALS
DIASTOLIC BLOOD PRESSURE: 80 MMHG | BODY MASS INDEX: 34.74 KG/M2 | OXYGEN SATURATION: 96 % | WEIGHT: 172 LBS | RESPIRATION RATE: 14 BRPM | HEART RATE: 86 BPM | SYSTOLIC BLOOD PRESSURE: 128 MMHG

## 2019-08-28 DIAGNOSIS — Z09 ENCOUNTER FOR FOLLOW-UP EXAMINATION AFTER COMPLETED TREATMENT FOR CONDITIONS OTHER THAN MALIGNANT NEOPLASM: ICD-10-CM

## 2019-08-28 PROCEDURE — 99495 TRANSJ CARE MGMT MOD F2F 14D: CPT

## 2019-08-28 RX ORDER — LINEZOLID 600 MG/1
600 TABLET, FILM COATED ORAL
Qty: 20 | Refills: 0 | Status: DISCONTINUED | COMMUNITY
Start: 2019-07-22 | End: 2019-08-28

## 2019-08-28 RX ORDER — CHLORHEXIDINE GLUCONATE 4 %
5 LIQUID (ML) TOPICAL
Refills: 0 | Status: DISCONTINUED | COMMUNITY
End: 2019-08-28

## 2019-08-28 RX ORDER — METOPROLOL TARTRATE 50 MG/1
50 TABLET, FILM COATED ORAL
Qty: 90 | Refills: 0 | Status: DISCONTINUED | COMMUNITY
Start: 2017-10-10 | End: 2019-08-28

## 2019-08-28 RX ORDER — VALSARTAN 80 MG/1
80 TABLET, COATED ORAL
Qty: 90 | Refills: 0 | Status: DISCONTINUED | COMMUNITY
Start: 2019-05-14 | End: 2019-08-28

## 2019-08-28 RX ORDER — AMITRIPTYLINE HYDROCHLORIDE 10 MG/1
10 TABLET, FILM COATED ORAL
Qty: 120 | Refills: 0 | Status: DISCONTINUED | COMMUNITY
Start: 2019-03-17 | End: 2019-08-28

## 2019-08-28 RX ORDER — METOLAZONE 2.5 MG/1
2.5 TABLET ORAL
Refills: 0 | Status: DISCONTINUED | COMMUNITY
End: 2019-08-28

## 2019-08-28 RX ORDER — SULFAMETHOXAZOLE AND TRIMETHOPRIM 800; 160 MG/1; MG/1
800-160 TABLET ORAL
Qty: 20 | Refills: 0 | Status: DISCONTINUED | COMMUNITY
Start: 2019-07-12 | End: 2019-08-28

## 2019-08-28 RX ORDER — LOPERAMIDE HYDROCHLORIDE 2 MG/1
2 CAPSULE ORAL
Qty: 60 | Refills: 0 | Status: DISCONTINUED | COMMUNITY
Start: 2019-07-22 | End: 2019-08-28

## 2019-08-28 NOTE — PHYSICAL EXAM
[General Appearance - Well Developed] : well developed [General Appearance - Well Nourished] : well nourished [Normal Conjunctiva] : the conjunctiva exhibited no abnormalities [Normal Oropharynx] : normal oropharynx [Neck Appearance] : the appearance of the neck was normal [Neck Cervical Mass (___cm)] : no neck mass was observed [Thyroid Diffuse Enlargement] : the thyroid was not enlarged [Jugular Venous Distention Increased] : there was no jugular-venous distention [Heart Sounds] : normal S1 and S2 [Heart Rate And Rhythm] : heart rate and rhythm were normal [Murmurs] : no murmurs present [Arterial Pulses Normal] : the arterial pulses were normal [Edema] : no peripheral edema present [] : no respiratory distress [Respiration, Rhythm And Depth] : normal respiratory rhythm and effort [Exaggerated Use Of Accessory Muscles For Inspiration] : no accessory muscle use [Auscultation Breath Sounds / Voice Sounds] : lungs were clear to auscultation bilaterally [FreeTextEntry1] : decreased at bases [Abdomen Soft] : soft [Limping On The Right] : limping on the right [Nail Clubbing] : no clubbing of the fingernails [Cyanosis, Localized] : no localized cyanosis [2+ Pitting] : 2+  pitting [Oriented To Time, Place, And Person] : oriented to person, place, and time [No Focal Deficits] : no focal deficits [Impaired Insight] : insight and judgment were intact [Affect] : the affect was normal

## 2019-08-28 NOTE — REASON FOR VISIT
[Follow-Up - From Hospitalization] : a hospitalization follow-up [Abnormal CT Scan] : abnormal CT Scan [Shortness of Breath] : shortness of Breath [Family Member] : family member [FreeTextEntry2] : hypoxemia requring O2

## 2019-08-28 NOTE — REVIEW OF SYSTEMS
[As Noted in HPI] : as noted in HPI [Edema] : ~T edema was present [Back Pain] : ~T back pain [Anemia] : anemia [Diabetes] : diabetes mellitus [Negative] : Sleep Disorder [de-identified] : Secondary

## 2019-09-04 ENCOUNTER — APPOINTMENT (OUTPATIENT)
Dept: GASTROENTEROLOGY | Facility: CLINIC | Age: 74
End: 2019-09-04
Payer: MEDICARE

## 2019-09-04 VITALS — DIASTOLIC BLOOD PRESSURE: 70 MMHG | HEART RATE: 74 BPM | SYSTOLIC BLOOD PRESSURE: 160 MMHG | HEIGHT: 59 IN

## 2019-09-04 DIAGNOSIS — Z85.828 PERSONAL HISTORY OF OTHER MALIGNANT NEOPLASM OF SKIN: ICD-10-CM

## 2019-09-04 DIAGNOSIS — Z86.19 PERSONAL HISTORY OF OTHER INFECTIOUS AND PARASITIC DISEASES: ICD-10-CM

## 2019-09-04 DIAGNOSIS — Z86.010 PERSONAL HISTORY OF COLONIC POLYPS: ICD-10-CM

## 2019-09-04 PROCEDURE — 99214 OFFICE O/P EST MOD 30 MIN: CPT

## 2019-09-04 NOTE — HISTORY OF PRESENT ILLNESS
[Heartburn] : denies heartburn [Nausea] : denies nausea [Vomiting] : denies vomiting [Diarrhea] : denies diarrhea [Constipation] : denies constipation [Abdominal Pain] : denies abdominal pain [Yellow Skin Or Eyes (Jaundice)] : denies jaundice [Abdominal Swelling] : denies abdominal swelling [Rectal Pain] : denies rectal pain [_________] : Performed [unfilled] [de-identified] : NICHELLE GILL is a 74 year old female presenting today for cirrhosis and hepatitis C. She was diagnosed about 15 years ago with Hep C believed to be from several blood transfusions as a child. She was last seen in the office in 2015 and was referred to hepatology for further evaluation. She saw the hepatologist 3 times in total and then stopped following up with her. In august of this year she was seen in [facility] for cirrhosis. A colonosocpy with polypectomy and EGD with variceal banding were performed. She is looking to start treatment for Hep C. She denies nausea, vomiting, change in bowel habits, jaundice, or abdominal pain. She is oxygen dependent on 2L NC.

## 2019-09-04 NOTE — PHYSICAL EXAM
[General Appearance - Alert] : alert [General Appearance - In No Acute Distress] : in no acute distress [Sclera] : the sclera and conjunctiva were normal [PERRL With Normal Accommodation] : pupils were equal in size, round, and reactive to light [Extraocular Movements] : extraocular movements were intact [Outer Ear] : the ears and nose were normal in appearance [Oropharynx] : the oropharynx was normal [Neck Appearance] : the appearance of the neck was normal [Jugular Venous Distention Increased] : there was no jugular-venous distention [Neck Cervical Mass (___cm)] : no neck mass was observed [Thyroid Diffuse Enlargement] : the thyroid was not enlarged [Thyroid Nodule] : there were no palpable thyroid nodules [Auscultation Breath Sounds / Voice Sounds] : lungs were clear to auscultation bilaterally [Heart Rate And Rhythm] : heart rate was normal and rhythm regular [Heart Sounds] : normal S1 and S2 [Heart Sounds Gallop] : no gallops [Murmurs] : no murmurs [Heart Sounds Pericardial Friction Rub] : no pericardial rub [Bowel Sounds] : normal bowel sounds [Abdomen Soft] : soft [Abdomen Tenderness] : non-tender [Abdomen Mass (___ Cm)] : no abdominal mass palpated [Abnormal Walk] : normal gait [Nail Clubbing] : no clubbing  or cyanosis of the fingernails [Musculoskeletal - Swelling] : no joint swelling seen [Motor Tone] : muscle strength and tone were normal [Skin Turgor] : normal skin turgor [Skin Color & Pigmentation] : normal skin color and pigmentation [] : no rash [Deep Tendon Reflexes (DTR)] : deep tendon reflexes were 2+ and symmetric [Sensation] : the sensory exam was normal to light touch and pinprick [No Focal Deficits] : no focal deficits [Oriented To Time, Place, And Person] : oriented to person, place, and time [Impaired Insight] : insight and judgment were intact [Affect] : the affect was normal [FreeTextEntry1] : obese

## 2019-09-04 NOTE — ASSESSMENT
[FreeTextEntry1] : The patient presents with a 15 year history of Hepatitis C and cirrhosis. She will be referred to the liver center at [place] for further evaluation by hepatology. She will obtain a CT of the abdomen and pelvis as well as lab work. She will be referred to pharmacist Linda Swanson for possible treatment with Mavyret. \par I agree with the above assessment and management plan.Patient to be referred to hepatologist at [facility] for further evaluation and treatment of her chronic hepatitis C-induced decompensated cirrhosis. She was sent for repeat lab work as well as a CT of the abdomen and pelvis with oral and IV contrast. She and her daughter who accompanied the patient today appeared to understand all of the above instructions, information, and management plan.

## 2019-09-04 NOTE — REASON FOR VISIT
[Initial Evaluation] : an initial evaluation [Family Member] : family member [FreeTextEntry1] : Hep C, cirrhosis

## 2019-09-05 ENCOUNTER — OTHER (OUTPATIENT)
Age: 74
End: 2019-09-05

## 2019-09-11 ENCOUNTER — LABORATORY RESULT (OUTPATIENT)
Age: 74
End: 2019-09-11

## 2019-09-12 LAB
AFP-TM SERPL-MCNC: 6.4 NG/ML
ALBUMIN SERPL ELPH-MCNC: 3.6 G/DL
ALP BLD-CCNC: 89 U/L
ALT SERPL-CCNC: 38 U/L
ANION GAP SERPL CALC-SCNC: 12 MMOL/L
AST SERPL-CCNC: 61 U/L
BASOPHILS # BLD AUTO: 0.01 K/UL
BASOPHILS NFR BLD AUTO: 0.2 %
BILIRUB SERPL-MCNC: 0.4 MG/DL
BUN SERPL-MCNC: 46 MG/DL
CALCIUM SERPL-MCNC: 8.8 MG/DL
CHLORIDE SERPL-SCNC: 105 MMOL/L
CO2 SERPL-SCNC: 24 MMOL/L
CREAT SERPL-MCNC: 1.81 MG/DL
EOSINOPHIL # BLD AUTO: 0.43 K/UL
EOSINOPHIL NFR BLD AUTO: 9.5 %
GLUCOSE SERPL-MCNC: 139 MG/DL
HCT VFR BLD CALC: 25.9 %
HGB BLD-MCNC: 7.9 G/DL
IMM GRANULOCYTES NFR BLD AUTO: 0.4 %
INR PPP: 1.02 RATIO
LYMPHOCYTES # BLD AUTO: 0.44 K/UL
LYMPHOCYTES NFR BLD AUTO: 9.7 %
MAN DIFF?: NORMAL
MCHC RBC-ENTMCNC: 30.5 GM/DL
MCHC RBC-ENTMCNC: 30.9 PG
MCV RBC AUTO: 101.2 FL
MONOCYTES # BLD AUTO: 0.28 K/UL
MONOCYTES NFR BLD AUTO: 6.2 %
NEUTROPHILS # BLD AUTO: 3.36 K/UL
NEUTROPHILS NFR BLD AUTO: 74 %
PLATELET # BLD AUTO: 136 K/UL
POTASSIUM SERPL-SCNC: 4.2 MMOL/L
PROT SERPL-MCNC: 6.9 G/DL
PT BLD: 11.6 SEC
RBC # BLD: 2.56 M/UL
RBC # FLD: 17.5 %
SODIUM SERPL-SCNC: 141 MMOL/L
WBC # FLD AUTO: 4.54 K/UL

## 2019-09-13 LAB
HCV RNA SERPL NAA DL=5-ACNC: ABNORMAL IU/ML
HCV RNA SERPL NAA+PROBE-LOG IU: 6.33 LOGIU/ML

## 2019-09-16 LAB
FIBROSIS SCORE: 0.88
FIBROSIS STAGE: NORMAL
FIBROSURE ALPHA 2 MACROGLOBULINS: 320 MG/DL
FIBROSURE ALT (SGPT): 43 IU/L
FIBROSURE APOLIPOPROTEIN A1: 110 MG/DL
FIBROSURE COMMENT 2: NORMAL
FIBROSURE GGT: 68 IU/L
FIBROSURE HAPTOGLOBIN: <10 MG/DL
FIBROSURE INTERPRETATIONS: NORMAL
FIBROSURE LIMITATIONS: NORMAL
FIBROSURE NECROINFLAMM ACTIVITY SCORING: NORMAL
FIBROSURE NECROINFLAMMAT ACTIVITY GRPFIBROSURE NECROINFLAMMA: NORMAL
FIBROSURE NECROINFLAMMAT ACTIVITY SCORE: 0.43
FIBROSURE SCORING: NORMAL
FIBROSURE TOTAL BILIRUBIN: 0.3 MG/DL
HCV GENTYP BLD NAA+PROBE: NORMAL

## 2019-09-17 RX ORDER — VELPATASVIR AND SOFOSBUVIR 100; 400 MG/1; MG/1
400-100 TABLET, FILM COATED ORAL
Qty: 28 | Refills: 2 | Status: DISCONTINUED | COMMUNITY
Start: 2019-09-17 | End: 2019-09-17

## 2019-09-26 RX ORDER — GLECAPREVIR AND PIBRENTASVIR 40; 100 MG/1; MG/1
100-40 TABLET, FILM COATED ORAL
Qty: 84 | Refills: 2 | Status: DISCONTINUED | COMMUNITY
Start: 2019-09-17 | End: 2019-09-26

## 2019-10-07 ENCOUNTER — RESULT REVIEW (OUTPATIENT)
Age: 74
End: 2019-10-07

## 2019-10-07 LAB
HCV GENTYP SERPL NAA+PROBE: NORMAL
Lab: NORMAL

## 2019-11-06 ENCOUNTER — APPOINTMENT (OUTPATIENT)
Dept: PULMONOLOGY | Facility: CLINIC | Age: 74
End: 2019-11-06
Payer: MEDICARE

## 2019-11-06 VITALS
HEIGHT: 58.75 IN | OXYGEN SATURATION: 99 % | SYSTOLIC BLOOD PRESSURE: 116 MMHG | WEIGHT: 159 LBS | HEART RATE: 72 BPM | DIASTOLIC BLOOD PRESSURE: 60 MMHG | BODY MASS INDEX: 32.49 KG/M2

## 2019-11-06 DIAGNOSIS — R06.09 OTHER FORMS OF DYSPNEA: ICD-10-CM

## 2019-11-06 DIAGNOSIS — I27.20 PULMONARY HYPERTENSION, UNSPECIFIED: ICD-10-CM

## 2019-11-06 DIAGNOSIS — J90 PLEURAL EFFUSION, NOT ELSEWHERE CLASSIFIED: ICD-10-CM

## 2019-11-06 PROCEDURE — 99214 OFFICE O/P EST MOD 30 MIN: CPT

## 2019-11-06 RX ORDER — MONTELUKAST SODIUM 10 MG/1
10 TABLET, FILM COATED ORAL
Refills: 0 | Status: DISCONTINUED | COMMUNITY
Start: 2018-02-07 | End: 2019-11-06

## 2019-11-06 NOTE — PHYSICAL EXAM
[General Appearance - Well Developed] : well developed [General Appearance - Well Nourished] : well nourished [Normal Conjunctiva] : the conjunctiva exhibited no abnormalities [Normal Oropharynx] : normal oropharynx [Neck Appearance] : the appearance of the neck was normal [Neck Cervical Mass (___cm)] : no neck mass was observed [Jugular Venous Distention Increased] : there was no jugular-venous distention [Thyroid Diffuse Enlargement] : the thyroid was not enlarged [Heart Rate And Rhythm] : heart rate and rhythm were normal [Heart Sounds] : normal S1 and S2 [Murmurs] : no murmurs present [Arterial Pulses Normal] : the arterial pulses were normal [Edema] : no peripheral edema present [] : no respiratory distress [Respiration, Rhythm And Depth] : normal respiratory rhythm and effort [Exaggerated Use Of Accessory Muscles For Inspiration] : no accessory muscle use [Auscultation Breath Sounds / Voice Sounds] : lungs were clear to auscultation bilaterally [Abdomen Soft] : soft [Limping On The Right] : limping on the right [Nail Clubbing] : no clubbing of the fingernails [Cyanosis, Localized] : no localized cyanosis [FreeTextEntry2] : Minimal edema lower extremities [No Focal Deficits] : no focal deficits [Oriented To Time, Place, And Person] : oriented to person, place, and time [Impaired Insight] : insight and judgment were intact [Affect] : the affect was normal

## 2019-11-06 NOTE — REASON FOR VISIT
[Follow-Up] : a follow-up visit [Abnormal CT Scan] : abnormal CT Scan [Shortness of Breath] : shortness of Breath

## 2019-11-06 NOTE — REVIEW OF SYSTEMS
[As Noted in HPI] : as noted in HPI [Edema] : ~T edema was not present [Back Pain] : ~T back pain [Anemia] : anemia [Diabetes] : diabetes mellitus [Negative] : Sleep Disorder [de-identified] : Secondary

## 2019-11-17 ENCOUNTER — INPATIENT (INPATIENT)
Facility: HOSPITAL | Age: 74
LOS: 3 days | Discharge: HOME | DRG: 378 | End: 2019-11-21
Attending: INTERNAL MEDICINE | Admitting: HOSPITALIST
Payer: MEDICARE

## 2019-11-17 VITALS
WEIGHT: 162.04 LBS | OXYGEN SATURATION: 96 % | RESPIRATION RATE: 20 BRPM | TEMPERATURE: 98 F | HEART RATE: 76 BPM | DIASTOLIC BLOOD PRESSURE: 68 MMHG | HEIGHT: 59 IN | SYSTOLIC BLOOD PRESSURE: 188 MMHG

## 2019-11-17 DIAGNOSIS — Z95.2 PRESENCE OF PROSTHETIC HEART VALVE: Chronic | ICD-10-CM

## 2019-11-17 DIAGNOSIS — Z95.1 PRESENCE OF AORTOCORONARY BYPASS GRAFT: Chronic | ICD-10-CM

## 2019-11-17 DIAGNOSIS — Z98.89 OTHER SPECIFIED POSTPROCEDURAL STATES: Chronic | ICD-10-CM

## 2019-11-17 DIAGNOSIS — K62.5 HEMORRHAGE OF ANUS AND RECTUM: ICD-10-CM

## 2019-11-17 LAB
ALBUMIN SERPL ELPH-MCNC: 3.9 G/DL — SIGNIFICANT CHANGE UP (ref 3.3–5.2)
ALP SERPL-CCNC: 136 U/L — HIGH (ref 40–120)
ALT FLD-CCNC: 14 U/L — SIGNIFICANT CHANGE UP
ANION GAP SERPL CALC-SCNC: 16 MMOL/L — SIGNIFICANT CHANGE UP (ref 5–17)
APTT BLD: 35.2 SEC — SIGNIFICANT CHANGE UP (ref 27.5–36.3)
AST SERPL-CCNC: 27 U/L — SIGNIFICANT CHANGE UP
BASOPHILS # BLD AUTO: 0.02 K/UL — SIGNIFICANT CHANGE UP (ref 0–0.2)
BASOPHILS NFR BLD AUTO: 0.3 % — SIGNIFICANT CHANGE UP (ref 0–2)
BILIRUB SERPL-MCNC: 0.2 MG/DL — LOW (ref 0.4–2)
BUN SERPL-MCNC: 62 MG/DL — HIGH (ref 8–20)
CALCIUM SERPL-MCNC: 9.4 MG/DL — SIGNIFICANT CHANGE UP (ref 8.6–10.2)
CHLORIDE SERPL-SCNC: 105 MMOL/L — SIGNIFICANT CHANGE UP (ref 98–107)
CO2 SERPL-SCNC: 19 MMOL/L — LOW (ref 22–29)
CREAT SERPL-MCNC: 1.8 MG/DL — HIGH (ref 0.5–1.3)
EOSINOPHIL # BLD AUTO: 0.6 K/UL — HIGH (ref 0–0.5)
EOSINOPHIL NFR BLD AUTO: 9.9 % — HIGH (ref 0–6)
GLUCOSE SERPL-MCNC: 193 MG/DL — HIGH (ref 70–115)
HCT VFR BLD CALC: 24.7 % — LOW (ref 34.5–45)
HGB BLD-MCNC: 7.8 G/DL — LOW (ref 11.5–15.5)
IMM GRANULOCYTES NFR BLD AUTO: 0.3 % — SIGNIFICANT CHANGE UP (ref 0–1.5)
INR BLD: 1.12 RATIO — SIGNIFICANT CHANGE UP (ref 0.88–1.16)
LIDOCAIN IGE QN: 50 U/L — SIGNIFICANT CHANGE UP (ref 22–51)
LYMPHOCYTES # BLD AUTO: 0.71 K/UL — LOW (ref 1–3.3)
LYMPHOCYTES # BLD AUTO: 11.7 % — LOW (ref 13–44)
MCHC RBC-ENTMCNC: 27.5 PG — SIGNIFICANT CHANGE UP (ref 27–34)
MCHC RBC-ENTMCNC: 31.6 GM/DL — LOW (ref 32–36)
MCV RBC AUTO: 87 FL — SIGNIFICANT CHANGE UP (ref 80–100)
MONOCYTES # BLD AUTO: 0.35 K/UL — SIGNIFICANT CHANGE UP (ref 0–0.9)
MONOCYTES NFR BLD AUTO: 5.7 % — SIGNIFICANT CHANGE UP (ref 2–14)
NEUTROPHILS # BLD AUTO: 4.39 K/UL — SIGNIFICANT CHANGE UP (ref 1.8–7.4)
NEUTROPHILS NFR BLD AUTO: 72.1 % — SIGNIFICANT CHANGE UP (ref 43–77)
PLATELET # BLD AUTO: 175 K/UL — SIGNIFICANT CHANGE UP (ref 150–400)
POTASSIUM SERPL-MCNC: 4.5 MMOL/L — SIGNIFICANT CHANGE UP (ref 3.5–5.3)
POTASSIUM SERPL-SCNC: 4.5 MMOL/L — SIGNIFICANT CHANGE UP (ref 3.5–5.3)
PROT SERPL-MCNC: 8.1 G/DL — SIGNIFICANT CHANGE UP (ref 6.6–8.7)
PROTHROM AB SERPL-ACNC: 12.9 SEC — SIGNIFICANT CHANGE UP (ref 10–12.9)
RBC # BLD: 2.84 M/UL — LOW (ref 3.8–5.2)
RBC # FLD: 14.7 % — HIGH (ref 10.3–14.5)
SODIUM SERPL-SCNC: 140 MMOL/L — SIGNIFICANT CHANGE UP (ref 135–145)
WBC # BLD: 6.09 K/UL — SIGNIFICANT CHANGE UP (ref 3.8–10.5)
WBC # FLD AUTO: 6.09 K/UL — SIGNIFICANT CHANGE UP (ref 3.8–10.5)

## 2019-11-17 PROCEDURE — 99285 EMERGENCY DEPT VISIT HI MDM: CPT

## 2019-11-17 PROCEDURE — 93010 ELECTROCARDIOGRAM REPORT: CPT

## 2019-11-17 PROCEDURE — 99223 1ST HOSP IP/OBS HIGH 75: CPT

## 2019-11-17 RX ORDER — OCTREOTIDE ACETATE 200 UG/ML
50 INJECTION, SOLUTION INTRAVENOUS; SUBCUTANEOUS ONCE
Refills: 0 | Status: COMPLETED | OUTPATIENT
Start: 2019-11-17 | End: 2019-11-18

## 2019-11-17 RX ORDER — OCTREOTIDE ACETATE 200 UG/ML
50 INJECTION, SOLUTION INTRAVENOUS; SUBCUTANEOUS
Qty: 500 | Refills: 0 | Status: DISCONTINUED | OUTPATIENT
Start: 2019-11-17 | End: 2019-11-18

## 2019-11-17 RX ORDER — PANTOPRAZOLE SODIUM 20 MG/1
8 TABLET, DELAYED RELEASE ORAL
Qty: 80 | Refills: 0 | Status: DISCONTINUED | OUTPATIENT
Start: 2019-11-17 | End: 2019-11-18

## 2019-11-17 RX ORDER — PANTOPRAZOLE SODIUM 20 MG/1
40 TABLET, DELAYED RELEASE ORAL ONCE
Refills: 0 | Status: DISCONTINUED | OUTPATIENT
Start: 2019-11-17 | End: 2019-11-18

## 2019-11-17 NOTE — ED PROVIDER NOTE - CLINICAL SUMMARY MEDICAL DECISION MAKING FREE TEXT BOX
Lab Work, EKG, Cardiac Monitor, Reassess Lab Work, EKG, Cardiac Monitor, Reassess  hemodynamically stable with acute gi bleed iv PPI octreotide admission pt agrees to plan of care

## 2019-11-17 NOTE — ED PROVIDER NOTE - ATTENDING CONTRIBUTION TO CARE
I personally saw the patient with the PA, and completed the key components of the history and physical exam. I then discussed the management plan with the PA.   gen in nad resp clear cardiac no murmur abd soft nt nd rectal + BRBPR neuro intact

## 2019-11-17 NOTE — ED ADULT TRIAGE NOTE - CHIEF COMPLAINT QUOTE
patient states that all day has been bleeding rectally now passing clots patient states that she is anemic, patient states not allowed to have Nsaids, ACE, Arbs

## 2019-11-17 NOTE — ED PROVIDER NOTE - PHYSICAL EXAMINATION
Const: Awake, alert and oriented. In no acute distress. Well appearing.  HEENT: NC/AT. Moist mucous membranes.  Eyes: Pale conjunctiva bilaterally. EOMI.  Neck:. Soft and supple. Full ROM without pain.  Cardiac:  +S1/S2. No murmurs. Peripheral pulses 2+ and symmetric. No LE edema.  Resp: Speaking in full sentences. No evidence of respiratory distress. No wheezes, rales or rhonchi.  Abd: Soft, non-tender, non-distended. Normal bowel sounds in all 4 quadrants. No guarding or rebound.  Back: Spine midline and non-tender. No CVAT.  Skin: No rashes, abrasions or lacerations.  Lymph: No cervical lymphadenopathy.  Neuro: Awake, alert & oriented x 3. CN II-XII intact, Moves all extremities symmetrically. Const: Awake, alert and oriented. In no acute distress. Well appearing.  HEENT: NC/AT. Moist mucous membranes.  Eyes: Pale conjunctiva bilaterally. EOMI.  Neck:. Soft and supple. Full ROM without pain.  Cardiac:  +S1/S2. No murmurs. Peripheral pulses 2+ and symmetric. No LE edema.  Resp: Speaking in full sentences. No evidence of respiratory distress. No wheezes, rales or rhonchi.  Abd: Soft, non-tender, non-distended. Normal bowel sounds in all 4 quadrants. No guarding or rebound.  Back: Spine midline and non-tender. No CVAT.  rectal: external hemorrhoid noted not thrombosed or strangulated, bright red blood noted on digital rectal exam   Skin: No rashes, abrasions or lacerations.  Lymph: No cervical lymphadenopathy.  Neuro: Awake, alert & oriented x 3. CN II-XII intact, Moves all extremities symmetrically.

## 2019-11-17 NOTE — ED PROVIDER NOTE - OBJECTIVE STATEMENT
Patient is a 75 y/o female with a pmhx of hep c, CKD, triple bypass, loop recorder, DM, HTN, HLD, chronic anemia, liver disease presenting to the ed with rectal bleeding that started this afternoon. Patient admits to bright red clots since the afternoon. Patient stating has multiple blood transfusions in the past, has blood work drawn every monday to check hemoglobin level, last monday hemoglobin 7. 6. Patient follows up with heme/onc, last colonscopy/endo in august this year showed varices. Patient not on nsaids or blood thiners. Patient denies cp, SOB, fevers, nausea, vomiting, abd pain, back pain, dizziness Patient is a 73 y/o female with a pmhx of hep c, CKD, triple bypass, loop recorder, DM, HTN, HLD, chronic anemia, liver disease, hyperkalemia and heart block requiring transient HD, presenting to the ed with rectal bleeding that started this afternoon. Patient admits to bright red clots since the afternoon. Patient stating has multiple blood transfusions in the past, has blood work drawn every monday to check hemoglobin level, last monday hemoglobin 7. 6. Patient follows up with heme/onc, last colonscopy/endo in august this year showed varices. Patient not on nsaids or blood thiners. Patient denies cp, SOB, fevers, nausea, vomiting, abd pain, back pain, dizziness, melena, hematemsis

## 2019-11-17 NOTE — ED PROVIDER NOTE - CARE PLAN
Principal Discharge DX:	Rectal bleeding Principal Discharge DX:	Rectal bleeding  Secondary Diagnosis:	Anemia, unspecified anemia type

## 2019-11-18 ENCOUNTER — TRANSCRIPTION ENCOUNTER (OUTPATIENT)
Age: 74
End: 2019-11-18

## 2019-11-18 LAB
ANION GAP SERPL CALC-SCNC: 16 MMOL/L — SIGNIFICANT CHANGE UP (ref 5–17)
BASOPHILS # BLD AUTO: 0.03 K/UL — SIGNIFICANT CHANGE UP (ref 0–0.2)
BASOPHILS NFR BLD AUTO: 0.6 % — SIGNIFICANT CHANGE UP (ref 0–2)
BLD GP AB SCN SERPL QL: SIGNIFICANT CHANGE UP
BUN SERPL-MCNC: 61 MG/DL — HIGH (ref 8–20)
CALCIUM SERPL-MCNC: 9.2 MG/DL — SIGNIFICANT CHANGE UP (ref 8.6–10.2)
CHLORIDE SERPL-SCNC: 105 MMOL/L — SIGNIFICANT CHANGE UP (ref 98–107)
CO2 SERPL-SCNC: 19 MMOL/L — LOW (ref 22–29)
CREAT SERPL-MCNC: 1.98 MG/DL — HIGH (ref 0.5–1.3)
EOSINOPHIL # BLD AUTO: 0.59 K/UL — HIGH (ref 0–0.5)
EOSINOPHIL NFR BLD AUTO: 11.5 % — HIGH (ref 0–6)
GLUCOSE BLDC GLUCOMTR-MCNC: 143 MG/DL — HIGH (ref 70–99)
GLUCOSE SERPL-MCNC: 142 MG/DL — HIGH (ref 70–115)
HCT VFR BLD CALC: 23.1 % — LOW (ref 34.5–45)
HCT VFR BLD CALC: 25.9 % — LOW (ref 34.5–45)
HGB BLD-MCNC: 7.2 G/DL — LOW (ref 11.5–15.5)
HGB BLD-MCNC: 8 G/DL — LOW (ref 11.5–15.5)
IMM GRANULOCYTES NFR BLD AUTO: 0.4 % — SIGNIFICANT CHANGE UP (ref 0–1.5)
LYMPHOCYTES # BLD AUTO: 0.65 K/UL — LOW (ref 1–3.3)
LYMPHOCYTES # BLD AUTO: 12.6 % — LOW (ref 13–44)
MCHC RBC-ENTMCNC: 27.4 PG — SIGNIFICANT CHANGE UP (ref 27–34)
MCHC RBC-ENTMCNC: 30.9 GM/DL — LOW (ref 32–36)
MCV RBC AUTO: 88.7 FL — SIGNIFICANT CHANGE UP (ref 80–100)
MONOCYTES # BLD AUTO: 0.36 K/UL — SIGNIFICANT CHANGE UP (ref 0–0.9)
MONOCYTES NFR BLD AUTO: 7 % — SIGNIFICANT CHANGE UP (ref 2–14)
NEUTROPHILS # BLD AUTO: 3.49 K/UL — SIGNIFICANT CHANGE UP (ref 1.8–7.4)
NEUTROPHILS NFR BLD AUTO: 67.9 % — SIGNIFICANT CHANGE UP (ref 43–77)
PLATELET # BLD AUTO: 160 K/UL — SIGNIFICANT CHANGE UP (ref 150–400)
POTASSIUM SERPL-MCNC: 4.7 MMOL/L — SIGNIFICANT CHANGE UP (ref 3.5–5.3)
POTASSIUM SERPL-SCNC: 4.7 MMOL/L — SIGNIFICANT CHANGE UP (ref 3.5–5.3)
RBC # BLD: 2.92 M/UL — LOW (ref 3.8–5.2)
RBC # FLD: 14.6 % — HIGH (ref 10.3–14.5)
SODIUM SERPL-SCNC: 140 MMOL/L — SIGNIFICANT CHANGE UP (ref 135–145)
WBC # BLD: 5.14 K/UL — SIGNIFICANT CHANGE UP (ref 3.8–10.5)
WBC # FLD AUTO: 5.14 K/UL — SIGNIFICANT CHANGE UP (ref 3.8–10.5)

## 2019-11-18 PROCEDURE — 71045 X-RAY EXAM CHEST 1 VIEW: CPT | Mod: 26

## 2019-11-18 PROCEDURE — 99233 SBSQ HOSP IP/OBS HIGH 50: CPT

## 2019-11-18 PROCEDURE — 99222 1ST HOSP IP/OBS MODERATE 55: CPT | Mod: 25

## 2019-11-18 PROCEDURE — 43235 EGD DIAGNOSTIC BRUSH WASH: CPT

## 2019-11-18 RX ORDER — DEXTROSE 50 % IN WATER 50 %
12.5 SYRINGE (ML) INTRAVENOUS ONCE
Refills: 0 | Status: DISCONTINUED | OUTPATIENT
Start: 2019-11-18 | End: 2019-11-21

## 2019-11-18 RX ORDER — DEXTROSE 50 % IN WATER 50 %
25 SYRINGE (ML) INTRAVENOUS ONCE
Refills: 0 | Status: DISCONTINUED | OUTPATIENT
Start: 2019-11-18 | End: 2019-11-21

## 2019-11-18 RX ORDER — SOD SULF/SODIUM/NAHCO3/KCL/PEG
4000 SOLUTION, RECONSTITUTED, ORAL ORAL ONCE
Refills: 0 | Status: COMPLETED | OUTPATIENT
Start: 2019-11-18 | End: 2019-11-18

## 2019-11-18 RX ORDER — PANTOPRAZOLE SODIUM 20 MG/1
40 TABLET, DELAYED RELEASE ORAL
Refills: 0 | Status: DISCONTINUED | OUTPATIENT
Start: 2019-11-18 | End: 2019-11-21

## 2019-11-18 RX ORDER — ATORVASTATIN CALCIUM 80 MG/1
20 TABLET, FILM COATED ORAL AT BEDTIME
Refills: 0 | Status: DISCONTINUED | OUTPATIENT
Start: 2019-11-18 | End: 2019-11-21

## 2019-11-18 RX ORDER — DEXTROSE 50 % IN WATER 50 %
15 SYRINGE (ML) INTRAVENOUS ONCE
Refills: 0 | Status: DISCONTINUED | OUTPATIENT
Start: 2019-11-18 | End: 2019-11-21

## 2019-11-18 RX ORDER — HYDRALAZINE HCL 50 MG
5 TABLET ORAL EVERY 8 HOURS
Refills: 0 | Status: DISCONTINUED | OUTPATIENT
Start: 2019-11-18 | End: 2019-11-21

## 2019-11-18 RX ORDER — CHOLECALCIFEROL (VITAMIN D3) 125 MCG
2000 CAPSULE ORAL DAILY
Refills: 0 | Status: DISCONTINUED | OUTPATIENT
Start: 2019-11-18 | End: 2019-11-21

## 2019-11-18 RX ORDER — GABAPENTIN 400 MG/1
300 CAPSULE ORAL AT BEDTIME
Refills: 0 | Status: DISCONTINUED | OUTPATIENT
Start: 2019-11-18 | End: 2019-11-21

## 2019-11-18 RX ORDER — LEVOTHYROXINE SODIUM 125 MCG
100 TABLET ORAL DAILY
Refills: 0 | Status: DISCONTINUED | OUTPATIENT
Start: 2019-11-18 | End: 2019-11-21

## 2019-11-18 RX ORDER — SPIRONOLACTONE 25 MG/1
25 TABLET, FILM COATED ORAL DAILY
Refills: 0 | Status: DISCONTINUED | OUTPATIENT
Start: 2019-11-18 | End: 2019-11-21

## 2019-11-18 RX ORDER — FOLIC ACID 0.8 MG
1 TABLET ORAL DAILY
Refills: 0 | Status: DISCONTINUED | OUTPATIENT
Start: 2019-11-18 | End: 2019-11-21

## 2019-11-18 RX ORDER — HYDRALAZINE HCL 50 MG
10 TABLET ORAL ONCE
Refills: 0 | Status: COMPLETED | OUTPATIENT
Start: 2019-11-18 | End: 2019-11-18

## 2019-11-18 RX ORDER — INSULIN LISPRO 100/ML
VIAL (ML) SUBCUTANEOUS EVERY 6 HOURS
Refills: 0 | Status: DISCONTINUED | OUTPATIENT
Start: 2019-11-18 | End: 2019-11-18

## 2019-11-18 RX ORDER — GLUCAGON INJECTION, SOLUTION 0.5 MG/.1ML
1 INJECTION, SOLUTION SUBCUTANEOUS ONCE
Refills: 0 | Status: DISCONTINUED | OUTPATIENT
Start: 2019-11-18 | End: 2019-11-21

## 2019-11-18 RX ORDER — FUROSEMIDE 40 MG
40 TABLET ORAL DAILY
Refills: 0 | Status: DISCONTINUED | OUTPATIENT
Start: 2019-11-18 | End: 2019-11-19

## 2019-11-18 RX ORDER — CARVEDILOL PHOSPHATE 80 MG/1
6.25 CAPSULE, EXTENDED RELEASE ORAL EVERY 12 HOURS
Refills: 0 | Status: DISCONTINUED | OUTPATIENT
Start: 2019-11-18 | End: 2019-11-21

## 2019-11-18 RX ORDER — AMLODIPINE BESYLATE 2.5 MG/1
5 TABLET ORAL DAILY
Refills: 0 | Status: DISCONTINUED | OUTPATIENT
Start: 2019-11-18 | End: 2019-11-21

## 2019-11-18 RX ORDER — SODIUM CHLORIDE 9 MG/ML
1000 INJECTION, SOLUTION INTRAVENOUS
Refills: 0 | Status: DISCONTINUED | OUTPATIENT
Start: 2019-11-18 | End: 2019-11-21

## 2019-11-18 RX ORDER — MONTELUKAST 4 MG/1
10 TABLET, CHEWABLE ORAL DAILY
Refills: 0 | Status: DISCONTINUED | OUTPATIENT
Start: 2019-11-18 | End: 2019-11-21

## 2019-11-18 RX ADMIN — Medication 2000 UNIT(S): at 13:49

## 2019-11-18 RX ADMIN — Medication 10 MILLIGRAM(S): at 01:41

## 2019-11-18 RX ADMIN — CARVEDILOL PHOSPHATE 6.25 MILLIGRAM(S): 80 CAPSULE, EXTENDED RELEASE ORAL at 06:58

## 2019-11-18 RX ADMIN — OCTREOTIDE ACETATE 50 MICROGRAM(S): 200 INJECTION, SOLUTION INTRAVENOUS; SUBCUTANEOUS at 01:52

## 2019-11-18 RX ADMIN — CARVEDILOL PHOSPHATE 6.25 MILLIGRAM(S): 80 CAPSULE, EXTENDED RELEASE ORAL at 18:34

## 2019-11-18 RX ADMIN — Medication 4000 MILLILITER(S): at 15:00

## 2019-11-18 RX ADMIN — PANTOPRAZOLE SODIUM 40 MILLIGRAM(S): 20 TABLET, DELAYED RELEASE ORAL at 01:43

## 2019-11-18 RX ADMIN — GABAPENTIN 300 MILLIGRAM(S): 400 CAPSULE ORAL at 22:37

## 2019-11-18 RX ADMIN — PANTOPRAZOLE SODIUM 40 MILLIGRAM(S): 20 TABLET, DELAYED RELEASE ORAL at 18:34

## 2019-11-18 RX ADMIN — SPIRONOLACTONE 25 MILLIGRAM(S): 25 TABLET, FILM COATED ORAL at 06:58

## 2019-11-18 RX ADMIN — Medication 5 MILLIGRAM(S): at 18:34

## 2019-11-18 RX ADMIN — MONTELUKAST 10 MILLIGRAM(S): 4 TABLET, CHEWABLE ORAL at 13:49

## 2019-11-18 RX ADMIN — Medication 1 MILLIGRAM(S): at 13:50

## 2019-11-18 RX ADMIN — OCTREOTIDE ACETATE 10 MICROGRAM(S)/HR: 200 INJECTION, SOLUTION INTRAVENOUS; SUBCUTANEOUS at 01:43

## 2019-11-18 RX ADMIN — ATORVASTATIN CALCIUM 20 MILLIGRAM(S): 80 TABLET, FILM COATED ORAL at 22:37

## 2019-11-18 RX ADMIN — AMLODIPINE BESYLATE 5 MILLIGRAM(S): 2.5 TABLET ORAL at 06:58

## 2019-11-18 RX ADMIN — Medication 1 TABLET(S): at 13:48

## 2019-11-18 RX ADMIN — Medication 40 MILLIGRAM(S): at 16:34

## 2019-11-18 RX ADMIN — Medication 100 MICROGRAM(S): at 06:58

## 2019-11-18 NOTE — BRIEF OPERATIVE NOTE - OPERATION/FINDINGS
EGD: Previous banding scars noted in esophagus. No evidence of esophageal varices. Portal hypertensive gastropathy.  Polypoid antral gastropathy.  Mild duodenitis. No evidence of UGI bleeding

## 2019-11-18 NOTE — ED ADULT NURSE REASSESSMENT NOTE - NS ED NURSE REASSESS COMMENT FT1
Received pt from [facility]. PT resting in stretcher pt C/O B/L lower extremity pain and swelling due to decreased lasix dose. Admitting MD to be made aware. PT in NAD. CTM

## 2019-11-18 NOTE — H&P ADULT - NSHPPHYSICALEXAM_GEN_ALL_CORE
Vital Signs Last 24 Hrs  T(C): 36.9 (17 Nov 2019 21:18), Max: 36.9 (17 Nov 2019 21:18)  T(F): 98.4 (17 Nov 2019 21:18), Max: 98.4 (17 Nov 2019 21:18)  HR: 75 (18 Nov 2019 02:45) (64 - 76)  BP: 160/67 (18 Nov 2019 02:45) (160/67 - 210/84)  BP(mean): --  RR: 16 (18 Nov 2019 02:45) (16 - 20)  SpO2: 95% (18 Nov 2019 02:45) (95% - 98%)    GENERAL:  Well-appearing elderly female, not in acute distress  EYES:  Clear conjunctiva, extraocular movement intact  ENT: Moist mucous membranes  RESP:  Non-labored breathing pattern, lungs clear to ausculation   CV: Regular rate and rhythm, no murmurs appreciated, no lower extremity edema  GI: Soft, non-tender, non-distended  NEURO: Awake, alert, non-focal  PSYCH: Calm, cooperative  SKIN: No rash or lesions, warm and dry

## 2019-11-18 NOTE — CONSULT NOTE ADULT - SUBJECTIVE AND OBJECTIVE BOX
HPI:  74yoF with extensive PMHx including hepatitis C cirrhosis with esophageal varices, HFpEF, CAD s/p CABG, TIA s/p ILR (2017), AS s/p bovine AVR (2016) complicated by post-operative Afib, asthma, chronic anemia requiring pRBC also with hx of unstable bradycardia in setting of hyperkalemia and metabolic acidosis requiring MICU for dopamine gtt and temporary HD (7/2019) presenting with multiple episodes of bright red blood per rectum.  Pt was last hospitalized in 8/2019 for symptomatic anemia  and had EGD and colonoscopy during that time which showed grade 2 esophageal varices with red azeb sign, hiatal hernia, gastric non-bleeding AVM, three gastric masses with small ulceration, colonic polyp, moderately severe diverticulitis and large external hemorrhoids. Pathology from gastric masses was negative for dysplasia or malignancy. Pt comes to hospital today for multiple episodes of bright red blood per rectum associated with large clots.  Pt denies any abdominal pain, nausea, vomiting, dyspnea, hematemesis, melena, lightheadedness, syncope.  Her daughter at bedside reports that her outpatient Hgb has been gradually down-trending over the past few weeks from 9.5 to 8.4 and mostly recently to 7.6 last week on 11/11.  On admission, Hgb 7.8. (18 Nov 2019 01:52)      PAST MEDICAL & SURGICAL HISTORY:  CKD (chronic kidney disease)  CAD (coronary artery disease)  Heart failure  Hepatitis C  Aortic stenosis  Anemia, unspecified anemia type  Asthma  Low back pain: chronic over a year now.  High cholesterol  Hypertension  Diabetes  H/O aortic valve replacement: cow valve.  S/P CABG x 3  History of tonsillectomy      ROS:  No Heartburn, regurgitation, dysphagia, odynophagia.  No dyspepsia  No abdominal pain.    No Nausea, vomiting.  No Bleeding.  No hematemesis.   No diarrhea.    No hematochesia.  No weight loss, anorexia.  No edema.      MEDICATIONS  (STANDING):  amLODIPine   Tablet 5 milliGRAM(s) Oral daily  atorvastatin 20 milliGRAM(s) Oral at bedtime  carvedilol 6.25 milliGRAM(s) Oral every 12 hours  cholecalciferol 2000 Unit(s) Oral daily  dextrose 5%. 1000 milliLiter(s) (50 mL/Hr) IV Continuous <Continuous>  dextrose 50% Injectable 12.5 Gram(s) IV Push once  dextrose 50% Injectable 25 Gram(s) IV Push once  dextrose 50% Injectable 25 Gram(s) IV Push once  folic acid 1 milliGRAM(s) Oral daily  gabapentin 300 milliGRAM(s) Oral at bedtime  insulin lispro (HumaLOG) corrective regimen sliding scale   SubCutaneous every 6 hours  levothyroxine 100 MICROGram(s) Oral daily  montelukast 10 milliGRAM(s) Oral daily  multivitamin 1 Tablet(s) Oral daily  octreotide  Infusion 50 MICROgram(s)/Hr (10 mL/Hr) IV Continuous <Continuous>  pantoprazole  Injectable 40 milliGRAM(s) IV Push two times a day  spironolactone 25 milliGRAM(s) Oral daily    MEDICATIONS  (PRN):  dextrose 40% Gel 15 Gram(s) Oral once PRN Blood Glucose LESS THAN 70 milliGRAM(s)/deciliter  glucagon  Injectable 1 milliGRAM(s) IntraMuscular once PRN Glucose LESS THAN 70 milligrams/deciliter      Allergies    Bactrim (Nephrotoxicity)  Biaxin (Joint Pain)  morphine (Short breath)    Intolerances        SOCIAL HISTORY:    ENDOSCOPIC/GI HISTORY:    FAMILY HISTORY:  Family history of diabetes mellitus (Sibling)      Vital Signs Last 24 Hrs  T(C): 36.9 (18 Nov 2019 04:43), Max: 36.9 (17 Nov 2019 21:18)  T(F): 98.5 (18 Nov 2019 04:43), Max: 98.5 (18 Nov 2019 04:43)  HR: 64 (18 Nov 2019 04:43) (64 - 76)  BP: 192/78 (18 Nov 2019 04:43) (160/67 - 210/84)  BP(mean): --  RR: 18 (18 Nov 2019 04:43) (16 - 20)  SpO2: 94% (18 Nov 2019 04:43) (94% - 98%)    PHYSICAL EXAM:    GENERAL: NAD, well-groomed, well-developed  HEAD:  Atraumatic, Normocephalic  EYES: EOMI, PERRLA, conjunctiva and sclera clear  ENMT: No tonsillar erythema, exudates, or enlargement; Moist mucous membranes, Good dentition, No lesions  NECK: Supple, No JVD, Normal thyroid  CHEST/LUNG: Clear to percussion bilaterally; No rales, rhonchi, wheezing, or rubs  HEART: Regular rate and rhythm; No murmurs, rubs, or gallops  ABDOMEN: Soft, Nontender, Nondistended; Bowel sounds present  EXTREMITIES:  2+ Peripheral Pulses, No clubbing, cyanosis, or edema  LYMPH: No lymphadenopathy noted  SKIN: No rashes or lesions      LABS:                        7.2    x     )-----------( x        ( 18 Nov 2019 01:59 )             23.1     11-17    140  |  105  |  62.0<H>  ----------------------------<  193<H>  4.5   |  19.0<L>  |  1.80<H>    Ca    9.4      17 Nov 2019 22:49    TPro  8.1  /  Alb  3.9  /  TBili  0.2<L>  /  DBili  x   /  AST  27  /  ALT  14  /  AlkPhos  136<H>  11-17    PT/INR - ( 17 Nov 2019 22:49 )   PT: 12.9 sec;   INR: 1.12 ratio         PTT - ( 17 Nov 2019 22:49 )  PTT:35.2 sec       LIVER FUNCTIONS - ( 17 Nov 2019 22:49 )  Alb: 3.9 g/dL / Pro: 8.1 g/dL / ALK PHOS: 136 U/L / ALT: 14 U/L / AST: 27 U/L / GGT: x           Surgical Pathology Report (08.14.19 @ 17:00)    Surgical Pathology Report:   ACCESSION No:  [id number]  NICHELLE GILL                1        Surgical Final Report          Final Diagnosis  Surgical Pathology Report (08.13.19 @ 17:00)    Surgical Pathology Report:   ACCESSION No:  [id number]  NICHELLE GILL                2        Surgical Final Report          Final Diagnosis    1. Stomach, lesion NOS, biopsy  - Chemical gastropathy changes  - Mild chronic gastritis  - Negative for dysplasia or malignancy  - No Helicobacter pylori  identified  (immunostain)    2. Polyp, stomach, biopsy  - Hyperplastic polyp, inflamed  - Negative for dysplasia or malignancy    Verified by: Sylvie Salinas MD  (Electronic Signature)  Reported on: 08/19/19 15:28EDT, [address] [place] [id number]  _________________________________________________________________    Comment    Case reviewed  intradepartmentally    H. Pylori test and its performance characteristics have been  evaluated by [facility].    It has not been cleared or approved by the FDA.  The FDA has  determined that such clearance or approval is not necessary.  The  laboratory is regulated under the Clinical Laboratory Improvement  Amendments of 1988 (CLIA) as qualified to perform high complexity  testing.    Clinical History  Pre- iron deficiency anemia, R/O cancer    Specimen(s) Submitted  1     Biopsy gastric lesion  2     Gastric polyp    Gross Description  1. The specimen is received in formalin, with patient  identification, labeled "biopsy, gastric lesion".  It consists of  five fragment(s) of pink-tan, focally hemorrhagic soft tissue  measuring 0.2-0.3 cm. The tissue is entirely submitted in one  cassette.    2. The specimen is received in formalin, with patient  identification, labeled "gastric polyp".  It consists of five  0.1-0.4 fragment(s) of pink-tan, focally            BRAUNEISEN, NICHELLE                2        Surgical Final Report          hemorrhagic soft tissue. The two smallest fragments may not  survive processing.  The tissue is entirely submitted in one  cassette.    KV 08/14/2019 11:47    Transverse colon, polypectomy:  -Tubular adenoma, fragments    Verified by: Sylvie Salinas MD  (Electronic Signature)  Reported on: 08/16/19 11:24 EDT, [address] [place] [id number]  _________________________________________________________________    Clinical History  R/O adenoma?    Specimen(s) Submitted  1     Polyp transverse colon    Gross Description  The specimen is received in formalin, with patient  identification, labeled "polyp, transverse".  It consists of two  white-tan fragment(s) of soft tissue measuring 0.4 and 0.5 cm.  The tissue is entirely submitted in one cassette.    KV 08/15/2019 10:18        RADIOLOGY & ADDITIONAL STUDIES: HPI:  74yoF with extensive PMHx including hepatitis C cirrhosis with esophageal varices, on coreg, currently on Epclusa for treatment for hepatitis C, HFpEF, CAD s/p CABG, TIA, AS s/p bovine AVR (2016) complicated by post-operative Afib, asthma, chronic anemia requiring pRBC also with hx of unstable bradycardia in setting of hyperkalemia and metabolic acidosis requiring MICU for dopamine gtt and temporary HD (7/2019) presenting with multiple episodes of bright red blood per rectum.  Pt was last hospitalized in 8/2019 for symptomatic anemia  and had EGD and colonoscopy during that time which showed grade 2 esophageal varices with red azeb sign, hiatal hernia, gastric non-bleeding AVM, three gastric masses with small ulceration, colonic polyp, moderately severe diverticulosis and large external hemorrhoids. Pathology from gastric masses was negative for dysplasia or malignancy. Pt comes to hospital today for multiple episodes of bright red blood per rectum associated with large clots.  Pt denies any abdominal pain, nausea, vomiting, dyspnea, hematemesis, melena, lightheadedness, syncope.  Patient follows with Dr Howell of our group. She also follows up with hematology for anemia. She is currently on octreotide.       PAST MEDICAL & SURGICAL HISTORY:  CKD (chronic kidney disease)  CAD (coronary artery disease)  Heart failure  Hepatitis C  Aortic stenosis  Anemia, unspecified anemia type  Asthma  Low back pain: chronic over a year now.  High cholesterol  Hypertension  Diabetes  H/O aortic valve replacement: cow valve.  S/P CABG x 3  History of tonsillectomy      ROS:  No Heartburn, regurgitation, dysphagia, odynophagia.  No dyspepsia  No abdominal pain.    No Nausea, vomiting.  +Bleeding.  No hematemesis.   No diarrhea.    hematochezia  No weight loss, anorexia.  No edema.      MEDICATIONS  (STANDING):  amLODIPine   Tablet 5 milliGRAM(s) Oral daily  atorvastatin 20 milliGRAM(s) Oral at bedtime  carvedilol 6.25 milliGRAM(s) Oral every 12 hours  cholecalciferol 2000 Unit(s) Oral daily  dextrose 5%. 1000 milliLiter(s) (50 mL/Hr) IV Continuous <Continuous>  dextrose 50% Injectable 12.5 Gram(s) IV Push once  dextrose 50% Injectable 25 Gram(s) IV Push once  dextrose 50% Injectable 25 Gram(s) IV Push once  folic acid 1 milliGRAM(s) Oral daily  gabapentin 300 milliGRAM(s) Oral at bedtime  insulin lispro (HumaLOG) corrective regimen sliding scale   SubCutaneous every 6 hours  levothyroxine 100 MICROGram(s) Oral daily  montelukast 10 milliGRAM(s) Oral daily  multivitamin 1 Tablet(s) Oral daily  octreotide  Infusion 50 MICROgram(s)/Hr (10 mL/Hr) IV Continuous <Continuous>  pantoprazole  Injectable 40 milliGRAM(s) IV Push two times a day  spironolactone 25 milliGRAM(s) Oral daily    MEDICATIONS  (PRN):  dextrose 40% Gel 15 Gram(s) Oral once PRN Blood Glucose LESS THAN 70 milliGRAM(s)/deciliter  glucagon  Injectable 1 milliGRAM(s) IntraMuscular once PRN Glucose LESS THAN 70 milligrams/deciliter      Allergies    Bactrim (Nephrotoxicity)  Biaxin (Joint Pain)  morphine (Short breath)    Intolerances        SOCIAL HISTORY:Denies x 3    ENDOSCOPIC/GI HISTORY:EGD and colonoscopy in august 2019    FAMILY HISTORY:  Family history of diabetes mellitus (Sibling)      Vital Signs Last 24 Hrs  T(C): 36.9 (18 Nov 2019 04:43), Max: 36.9 (17 Nov 2019 21:18)  T(F): 98.5 (18 Nov 2019 04:43), Max: 98.5 (18 Nov 2019 04:43)  HR: 64 (18 Nov 2019 04:43) (64 - 76)  BP: 192/78 (18 Nov 2019 04:43) (160/67 - 210/84)  BP(mean): --  RR: 18 (18 Nov 2019 04:43) (16 - 20)  SpO2: 94% (18 Nov 2019 04:43) (94% - 98%)    PHYSICAL EXAM:    GENERAL: NAD, well-groomed, well-developed  HEAD:  Atraumatic, Normocephalic  EYES: EOMI, PERRLA, conjunctiva and sclera clear  ENMT: No tonsillar erythema, exudates, or enlargement; Moist mucous membranes, Good dentition, No lesions  NECK: Supple, No JVD, Normal thyroid  CHEST/LUNG: Clear to percussion bilaterally; No rales, rhonchi, wheezing, or rubs. Mediport  HEART: Regular rate and rhythm; No murmurs, rubs, or gallops  ABDOMEN: Soft, Nontender, Nondistended; Bowel sounds present  EXTREMITIES:  2+ Peripheral Pulses, No clubbing, cyanosis, or edema  LYMPH: No lymphadenopathy noted  SKIN: No rashes or lesions      LABS:                        7.2    x     )-----------( x        ( 18 Nov 2019 01:59 )             23.1     11-17    140  |  105  |  62.0<H>  ----------------------------<  193<H>  4.5   |  19.0<L>  |  1.80<H>    Ca    9.4      17 Nov 2019 22:49    TPro  8.1  /  Alb  3.9  /  TBili  0.2<L>  /  DBili  x   /  AST  27  /  ALT  14  /  AlkPhos  136<H>  11-17    PT/INR - ( 17 Nov 2019 22:49 )   PT: 12.9 sec;   INR: 1.12 ratio         PTT - ( 17 Nov 2019 22:49 )  PTT:35.2 sec       LIVER FUNCTIONS - ( 17 Nov 2019 22:49 )  Alb: 3.9 g/dL / Pro: 8.1 g/dL / ALK PHOS: 136 U/L / ALT: 14 U/L / AST: 27 U/L / GGT: x           Surgical Pathology Report (08.14.19 @ 17:00)    Surgical Pathology Report:   ACCESSION No:  [id number]  NICHELLE GILL                1        Surgical Final Report          Final Diagnosis  Surgical Pathology Report (08.13.19 @ 17:00)    Surgical Pathology Report:   ACCESSION No:  [id number]  NICHELLE GILL                2        Surgical Final Report          Final Diagnosis    1. Stomach, lesion NOS, biopsy  - Chemical gastropathy changes  - Mild chronic gastritis  - Negative for dysplasia or malignancy  - No Helicobacter pylori  identified  (immunostain)    2. Polyp, stomach, biopsy  - Hyperplastic polyp, inflamed  - Negative for dysplasia or malignancy    Verified by: Sylvie Salinas MD  (Electronic Signature)  Reported on: 08/19/19 15:28EDT, [address] [place] [id number]  _________________________________________________________________    Comment    Case reviewed  intradepartmentally    H. Pylori test and its performance characteristics have been  evaluated by [facility].    It has not been cleared or approved by the FDA.  The FDA has  determined that such clearance or approval is not necessary.  The  laboratory is regulated under the Clinical Laboratory Improvement  Amendments of 1988 (CLIA) as qualified to perform high complexity  testing.    Clinical History  Pre- iron deficiency anemia, R/O cancer    Specimen(s) Submitted  1     Biopsy gastric lesion  2     Gastric polyp    Gross Description  1. The specimen is received in formalin, with patient  identification, labeled "biopsy, gastric lesion".  It consists of  five fragment(s) of pink-tan, focally hemorrhagic soft tissue  measuring 0.2-0.3 cm. The tissue is entirely submitted in one  cassette.    2. The specimen is received in formalin, with patient  identification, labeled "gastric polyp".  It consists of five  0.1-0.4 fragment(s) of pink-tan, focally            BRAUNEISEN, NICHELLE                2        Surgical Final Report          hemorrhagic soft tissue. The two smallest fragments may not  survive processing.  The tissue is entirely submitted in one  cassette.    KV 08/14/2019 11:47    Transverse colon, polypectomy:  -Tubular adenoma, fragments    Verified by: Sylvie Salinas MD  (Electronic Signature)  Reported on: 08/16/19 11:24 EDT, [address] [place] [id number]  _________________________________________________________________    Clinical History  R/O adenoma?    Specimen(s) Submitted  1     Polyp transverse colon    Gross Description  The specimen is received in formalin, with patient  identification, labeled "polyp, transverse".  It consists of two  white-tan fragment(s) of soft tissue measuring 0.4 and 0.5 cm.  The tissue is entirely submitted in one cassette.    KV 08/15/2019 10:18      < from: 12 Lead ECG (08.07.19 @ 04:44) >    Ventricular Rate 84 BPM    Atrial Rate 84 BPM    P-R Interval 222 ms    QRS Duration 102 ms    Q-T Interval 384 ms    QTC Calculation(Bezet) 453 ms    P Axis 61 degrees    R Axis 76 degrees    T Axis 171 degrees    Diagnosis Line *** Poor data quality, interpretation may be adversely affected  Sinus rhythm with 1st degree A-V block  Non-specific intra-ventricular conduction delay  non specific ST wave abnormality  Abnormal ECG    < end of copied text >    RADIOLOGY & ADDITIONAL STUDIES:

## 2019-11-18 NOTE — ED ADULT NURSE NOTE - ED STAT RN HANDOFF DETAILS
Report given to Kimberly TALAMANTES. RN made aware regarding lasix order. BRIAN Vee to look into this

## 2019-11-18 NOTE — CONSULT NOTE ADULT - SUBJECTIVE AND OBJECTIVE BOX
Chief Complaint: 75 yo F with bleeding.    HPI: Old records reviewed. 75 yo F with one day of BRBPR-copious. No abd pain or diarrhea. Pt has an extensive hx of GIB-last episode in August-required transfusions and was found to have varices/AVM's etc. Hx of Hep C (on Epclusa) from transfusions with cirrhosis. PMH + cad-with cabg and Bio AVR in 2016-no hx of MI/IDDM/hypothyroidism/asthma/cri/anemia/hpt. Pt denies cp/mi/cva/seizure hx. Nonsmoker/etoh. Allergy to biaxin/sulfa/morphine. Echo from 2017 nl LVEF with MAC and mild functioonal MS and well functioning Bio AVR. Has an ILR. O/P meds: albuterol/aranesp/coreg 6.25 bid/neurontin/insulin/lasix 40 bid/synthroid 100/mag/norvasc 5/pravachol 80/protonix/octreotide/singulair/aldactone 25/tresiba. ROS neg for fever/orthopnea/pnd. + for mild pretib edema.    PAST MEDICAL & SURGICAL HISTORY:  CKD (chronic kidney disease)  CAD (coronary artery disease)  Heart failure  Hepatitis C  Aortic stenosis  Anemia, unspecified anemia type  Asthma  Low back pain: chronic over a year now.  High cholesterol  Hypertension  Diabetes  H/O aortic valve replacement: cow valve.  S/P CABG x 3  History of tonsillectomy      PREVIOUS DIAGNOSTIC TESTING:      ECHO  FINDINGS: see above    STRESS  FINDINGS:    CATHETERIZATION  FINDINGS: see above    MEDICATIONS  (STANDING):  amLODIPine   Tablet 5 milliGRAM(s) Oral daily  atorvastatin 20 milliGRAM(s) Oral at bedtime  carvedilol 6.25 milliGRAM(s) Oral every 12 hours  cholecalciferol 2000 Unit(s) Oral daily  dextrose 5%. 1000 milliLiter(s) (50 mL/Hr) IV Continuous <Continuous>  dextrose 50% Injectable 12.5 Gram(s) IV Push once  dextrose 50% Injectable 25 Gram(s) IV Push once  dextrose 50% Injectable 25 Gram(s) IV Push once  folic acid 1 milliGRAM(s) Oral daily  gabapentin 300 milliGRAM(s) Oral at bedtime  insulin lispro (HumaLOG) corrective regimen sliding scale   SubCutaneous every 6 hours  levothyroxine 100 MICROGram(s) Oral daily  montelukast 10 milliGRAM(s) Oral daily  multivitamin 1 Tablet(s) Oral daily  octreotide  Infusion 50 MICROgram(s)/Hr (10 mL/Hr) IV Continuous <Continuous>  pantoprazole  Injectable 40 milliGRAM(s) IV Push two times a day  spironolactone 25 milliGRAM(s) Oral daily    MEDICATIONS  (PRN):  dextrose 40% Gel 15 Gram(s) Oral once PRN Blood Glucose LESS THAN 70 milliGRAM(s)/deciliter  glucagon  Injectable 1 milliGRAM(s) IntraMuscular once PRN Glucose LESS THAN 70 milligrams/deciliter      FAMILY HISTORY:  Family history of diabetes mellitus (Sibling)      SOCIAL HISTORY:     CIGARETTES: 0    ALCOHOL: 0    ROS: Negative other than as mentioned in HPI.    Vital Signs Last 24 Hrs  T(C): 37.1 (18 Nov 2019 07:38), Max: 37.1 (18 Nov 2019 07:38)  T(F): 98.7 (18 Nov 2019 07:38), Max: 98.7 (18 Nov 2019 07:38)  HR: 65 reg. (18 Nov 2019 07:38) (64 - 76)  BP: 130/80 RA manually (18 Nov 2019 07:38) (160/67 - 210/84)  BP(mean): --  RR: 14 ora (18 Nov 2019 07:38) (16 - 20)  SpO2: 96% (18 Nov 2019 07:38) (94% - 98%)    PHYSICAL EXAM:  General: Appears well developed, well nourished alert and cooperative. Obese pale afebrile F nad  HEENT: Head; normocephalic, atraumatic.  Eyes;   Pupils reactive, cornea wnl.  Neck; Supple, no nodes adenopathy, no NVD or carotid bruit or thyromegaly.  CARDIOVASCULAR; soft basal systolic murmur, No rub, gallop or lift. Normal S1 and S2.  LUNGS; No rales, rhonchi or wheeze. Normal breath sounds bilaterally.  ABDOMEN ; Soft, nontender without mass or organomegaly. bowel sounds neg.  EXTREMITIES; No clubbing, cyanosis or edema. Distal pulses ? ROM normal.  SKIN; warm and dry with normal turgor.  NEURO; Alert/oriented x 3/normal motor exam. No pathologic reflexes.    PSYCH; normal affect.            INTERPRETATION OF TELEMETRY:    ECG: SR T inversions 1/L QS v1-3-old  cxr reviewed image/ sternotomy wires/right sided port and ILR.    I&O's Detail      LABS:                        7.2    x     )-----------( x        ( 18 Nov 2019 01:59 )             23.1     11-17    140  |  105  |  62.0<H>  ----------------------------<  193<H>  4.5   |  19.0<L>  |  1.80<H>    Ca    9.4      17 Nov 2019 22:49    TPro  8.1  /  Alb  3.9  /  TBili  0.2<L>  /  DBili  x   /  AST  27  /  ALT  14  /  AlkPhos  136<H>  11-17        PT/INR - ( 17 Nov 2019 22:49 )   PT: 12.9 sec;   INR: 1.12 ratio         PTT - ( 17 Nov 2019 22:49 )  PTT:35.2 sec    I&O's Summary      RADIOLOGY & ADDITIONAL STUDIES:

## 2019-11-18 NOTE — H&P ADULT - HISTORY OF PRESENT ILLNESS
74yoF with extensive PMHx including hepatitis C cirrhosis with esophageal varices, HFpEF, CAD s/p CABG, TIA s/p ILR (2017), AS s/p bovine AVR (2016) complicated by post-operative Afib, asthma, chronic anemia requiring pRBC also with hx of unstable bradycardia in setting of hyperkalemia and metabolic acidosis requiring MICU for dopamine gtt and temporary HD (7/2019) presenting with multiple episodes of bright red blood per rectum.  Pt was last hospitalized in 8/2019 for symptomatic anemia  and had EGD and colonoscopy during that time which showed grade 2 esophageal varices with red azeb sign, hiatal hernia, gastric non-bleeding AVM, three gastric masses with small ulceration, colonic polyp, moderately severe diverticulitis and large external hemorrhoids. Pathology from gastric masses was negative for dysplasia or malignancy. Pt comes to hospital today for multiple episodes of bright red blood per rectum associated with large clots.  Pt denies any abdominal pain, nausea, vomiting, dyspnea, hematemesis, melena, lightheadedness, syncope.  Her daughter at bedside reports that her outpatient Hgb has been gradually down-trending over the past few weeks from 9.5 to 8.4 and mostly recently to 7.6 last week on 11/11.  On admission, Hgb 7.8. 74yoF with extensive PMHx including hepatitis C cirrhosis with esophageal varices, HFpEF, CAD s/p CABG, TIA s/p ILR (2017), AS s/p bovine AVR (2016) complicated by post-operative Afib, asthma, chronic anemia requiring pRBC also with hx of unstable bradycardia in setting of hyperkalemia and metabolic acidosis requiring MICU for dopamine gtt and temporary HD (7/2019) presenting with multiple episodes of bright red blood per rectum.  Pt was last hospitalized in 8/2019 for symptomatic anemia  and had EGD and colonoscopy during that time which showed grade 2 esophageal varices with red azeb sign, hiatal hernia, gastric non-bleeding AVM, three gastric masses with small ulceration, colonic polyp, moderately severe diverticulosis and large external hemorrhoids. Pathology from gastric masses was negative for dysplasia or malignancy. Pt comes to hospital today for multiple episodes of bright red blood per rectum associated with large clots.  Pt denies any abdominal pain, nausea, vomiting, dyspnea, hematemesis, melena, lightheadedness, syncope.  Her daughter at bedside reports that her outpatient Hgb has been gradually down-trending over the past few weeks from 9.5 to 8.4 and mostly recently to 7.6 last week on 11/11.  On admission, Hgb 7.8.

## 2019-11-18 NOTE — H&P ADULT - NSHPOUTPATIENTPROVIDERS_GEN_ALL_CORE
PMD: Dr. Helio Pandey  Cards: [place]  Heme: Dr. Valdivia  Renal: Dr. Martinez  GI: Dr. Howell  Pulm: Dr. Schneider

## 2019-11-18 NOTE — H&P ADULT - ASSESSMENT
74yoF with extensive PMHx including hepatitis C cirrhosis with esophageal varices, HFpEF, CAD s/p CABG, TIA s/p ILR (2017), AS s/p bovine AVR (2016) complicated by post-operative Afib, asthma, chronic anemia requiring pRBC also with hx of unstable bradycardia in setting of hyperkalemia and metabolic acidosis requiring MICU for dopamine gtt and temporary HD (7/2019) presenting with multiple episodes of bright red blood per rectum    Acute blood loss anemia due to lower GI bleeding  -Pt had EGD and colonoscopy in 8/2019 with esophageal varices, gastric mass, polyps, diverticulosis, hemorrhoids, suspect that etiology likely diverticular vs. from hemorrhoids   -Admit to monitored bed  -Start octreotide gtt given hx of high grade esophageal varices  -IV protonix BID  -GI consulted  -NPO until GI evaluation in case they plan to do repeat endoscopy  -Cards evaluation for pre-op risk stratification   -pRBC consent obtained by ED staff and scanned into alpha  -Maintain active type and screen  -Trend CBC   -Folic acid resumed    Hepatitis C with cirrhosis  -On Epclusa, not available in hospital  -Does not appear decompensated on exam  -On Lasix and spironolactone     HFpEF  -Does not appear volume overloaded  -Hold PO Lasix in setting of GI bleeding  -B-blocker and spironolactone resumed with hold parameters    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 74yoF with extensive PMHx including hepatitis C cirrhosis with esophageal varices, HFpEF, CAD s/p CABG, TIA s/p ILR (2017), AS s/p bovine AVR (2016) complicated by post-operative Afib, asthma, chronic anemia requiring pRBC also with hx of unstable bradycardia in setting of hyperkalemia and metabolic acidosis requiring MICU for dopamine gtt and temporary HD (7/2019) admitted for acute on chronic blood loss anemia due to lower GI bleeding    Acute blood loss anemia due to lower GI bleeding  -Pt had EGD and colonoscopy in 8/2019 with esophageal varices, gastric mass, polyps, diverticulosis, hemorrhoids, suspect that etiology likely diverticular vs. from hemorrhoids   -Admit to monitored bed  -Start octreotide gtt given hx of high grade esophageal varices  -IV protonix BID  -GI consulted  -NPO until GI evaluation in case they plan to do repeat endoscopy  -Cards evaluation for pre-op risk stratification   -pRBC consent obtained by ED staff and scanned into alpha  -Maintain active type and screen  -Trend CBC   -Folic acid resumed    Hepatitis C with cirrhosis  -On Epclusa, not available in hospital  -Does not appear decompensated on exam  -On Lasix and spironolactone     HFpEF  -Does not appear volume overloaded  -Hold PO Lasix in setting of GI bleeding  -B-blocker and spironolactone resumed with hold parameters    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

## 2019-11-18 NOTE — CHART NOTE - NSCHARTNOTEFT_GEN_A_CORE
CC:    Overnight/ Am events:  Patient seen and examined at bedside. No acute events overnight. Patient states         Vital Signs Last 24 Hrs  T(C): 37.1 (18 Nov 2019 07:38), Max: 37.1 (18 Nov 2019 07:38)  T(F): 98.7 (18 Nov 2019 07:38), Max: 98.7 (18 Nov 2019 07:38)  HR: 66 (18 Nov 2019 07:38) (64 - 76)  BP: 184/73 (18 Nov 2019 07:38) (160/67 - 210/84)  BP(mean): --  RR: 16 (18 Nov 2019 07:38) (16 - 20)  SpO2: 96% (18 Nov 2019 07:38) (94% - 98%)        CONSTITUTIONAL: Well appearing, well nourished, awake, alert and in no apparent distress  CARDIAC: Normal rate, regular rhythm.  Heart sounds S1, S2.  No murmurs, rubs or gallops   RESPIRATORY: Breath sounds clear and equal bilaterally. No wheezes, rhales or rhonchi  GASTROENTEROLOGY: Soft nt nd bs + normoactive   EXTREMITIES: No edema, cyanosis or deformity   NEUROLOGICAL: Alert and oriented, no focal deficits, no motor or sensory deficits.  B/l le varicose veins   SKIN: No rash, skin turgor wnl CC: Bright red blood per rectum     Overnight/ Am events:  Patient seen and examined at bedside. No acute events overnight. Patient states she had no episodes of bleeding this am. Understands EGD did not show evidence of bleeding only duodentiis and wants to move forward with colonoscopy in the am. Pt was upset that her double dose of lasix was not given requested that  I speak to her nephrologist Dr. Cruz which I did and also requested her nephrologist to speak to her as well in regards to holding off on further lasix therapy in terms of a double dose. Pt in agreement after speaking to her nephrologist. Updated her daughter at bedside in regards to the plan of care. Patient denies chest pain, SOB,  N/V, fever, chills, dysuria or any other complaints. All remainder ROS negative.         Vital Signs Last 24 Hrs  T(C): 37.1 (18 Nov 2019 07:38), Max: 37.1 (18 Nov 2019 07:38)  T(F): 98.7 (18 Nov 2019 07:38), Max: 98.7 (18 Nov 2019 07:38)  HR: 66 (18 Nov 2019 07:38) (64 - 76)  BP: 184/73 (18 Nov 2019 07:38) (160/67 - 210/84)  BP(mean): --  RR: 16 (18 Nov 2019 07:38) (16 - 20)  SpO2: 96% (18 Nov 2019 07:38) (94% - 98%)        CONSTITUTIONAL: Well appearing, well nourished, awake, alert and in no apparent distress  CARDIAC: Normal rate, regular rhythm.  Heart sounds S1, S2.  No murmurs, rubs or gallops   RESPIRATORY: Breath sounds clear and equal bilaterally. No wheezes, rhales or rhonchi  GASTROENTEROLOGY: Soft nt nd bs + normoactive   EXTREMITIES: No edema, cyanosis or deformity   NEUROLOGICAL: Alert and oriented, no focal deficits, no motor or sensory deficits.  B/l le varicose veins   SKIN: No rash, skin turgor wnl      A/p: 74yoF with extensive Hx including hepatitis C cirrhosis with esophageal varices, chronic diastolic HFpEF, CAD s/p CABG, TIA s/p ILR (2017), AS s/p bovine AVR (2016) complicated by post-operative Afib, asthma, chronic anemia requiring pRBC transfusions also with hx of unstable bradycardia in setting of hyperkalemia and metabolic acidosis requiring MICU for dopamine gtt and temporary HD (7/2019) currently admitted for acute on chronic blood loss anemia due to lower GI bleeding, BRB per rectum. Given extensive hx GI consulted and pt s/p egd, showing duodenitis, but no evidence of bleeding. Pt to be prepped for colonoscopy in the am.     Acute blood loss anemia 2/2 suspected LGIB   - hx hepatitis C related cirrhosis, esophageal varices, diverticulosis   - hx of recurrent need for transfusions   -Pt had EGD and colonoscopy in 8/2019 with esophageal varices, gastric mass, polyps, diverticulosis, hemorrhoids, suspect that etiology likely diverticular   - h/h 8/25    -baseline is 8-9   - will monitor hgb closely   - d/c octreotide  - c/w IV protonix BID  - clear liquid diet and later today prep for colonoscopy  - npo at midnight   - GI f/u noted and appreciated     Chronic diastolic HFpEF  -Currently euvolemic   -c/w lasix po qd, will up titrate once gib improves  - c/w coreg po bid   - c/w aldactone po qd   -cardio consult noted and appreciated     CKD 3  -Cr appears to be at baseline of 1.4 to 1.8  - avoid nephrotoxic medications  - D/w patients outpt nephrologist Dr. 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

## 2019-11-18 NOTE — BRIEF OPERATIVE NOTE - NSICDXBRIEFPOSTOP_GEN_ALL_CORE_FT
POST-OP DIAGNOSIS:  Erythematous gastropathy 18-Nov-2019 10:58:40  Favio Lloyd  Duodenitis 18-Nov-2019 10:58:30  Favio Lloyd  Portal hypertensive gastropathy 18-Nov-2019 10:58:21  Favio Lloyd

## 2019-11-18 NOTE — CONSULT NOTE ADULT - ASSESSMENT
1. 75 yo F presents with BRBPR.  Hx of prior GIbleeds.-Known varices/cirrhosis and HEP C and AVM's. For endoscopy and colonoscopy.  2. severe anemia in part secondary to above-transfuse prn  3. CAD-prior cabg-ekg w/o change and pt wo cp.  4. bio AVR.  5. Hx of diastolic chf-pt compensated  6. IDDM  7. CRI  8. hypothyroidism   9. hpt  10 hx of asthma.  At present no cardiac contraindications to planned colonoscopy and endoscopy.
Patient with bright red blood per rectum in setting of hepatitis C related cirrhosis, esophageal varices, diverticulosis. The presentation is most likely LGIB. But there is also possibilty of UGIB. At this time, continue PPI bid and octreotide infusion.  Type and screen   Will check CBC every 6-8 hour and schedule for EGD today. If no evidence of UGIB, possible colonoscopy  INR is normal.

## 2019-11-18 NOTE — H&P ADULT - NSHPLABSRESULTS_GEN_ALL_CORE
7.2    x     )-----------( x        ( 18 Nov 2019 01:59 )             23.1       11-17    140  |  105  |  62.0<H>  ----------------------------<  193<H>  4.5   |  19.0<L>  |  1.80<H>    Ca    9.4      17 Nov 2019 22:49    TPro  8.1  /  Alb  3.9  /  TBili  0.2<L>  /  DBili  x   /  AST  27  /  ALT  14  /  AlkPhos  136<H>  11-17

## 2019-11-19 ENCOUNTER — RESULT REVIEW (OUTPATIENT)
Age: 74
End: 2019-11-19

## 2019-11-19 LAB
ANION GAP SERPL CALC-SCNC: 16 MMOL/L — SIGNIFICANT CHANGE UP (ref 5–17)
BASOPHILS # BLD AUTO: 0.02 K/UL — SIGNIFICANT CHANGE UP (ref 0–0.2)
BASOPHILS NFR BLD AUTO: 0.3 % — SIGNIFICANT CHANGE UP (ref 0–2)
BUN SERPL-MCNC: 59 MG/DL — HIGH (ref 8–20)
CALCIUM SERPL-MCNC: 8.9 MG/DL — SIGNIFICANT CHANGE UP (ref 8.6–10.2)
CHLORIDE SERPL-SCNC: 105 MMOL/L — SIGNIFICANT CHANGE UP (ref 98–107)
CO2 SERPL-SCNC: 19 MMOL/L — LOW (ref 22–29)
CREAT SERPL-MCNC: 1.9 MG/DL — HIGH (ref 0.5–1.3)
EOSINOPHIL # BLD AUTO: 0.35 K/UL — SIGNIFICANT CHANGE UP (ref 0–0.5)
EOSINOPHIL NFR BLD AUTO: 5.8 % — SIGNIFICANT CHANGE UP (ref 0–6)
GLUCOSE BLDC GLUCOMTR-MCNC: 183 MG/DL — HIGH (ref 70–99)
GLUCOSE BLDC GLUCOMTR-MCNC: 194 MG/DL — HIGH (ref 70–99)
GLUCOSE SERPL-MCNC: 166 MG/DL — HIGH (ref 70–115)
HBA1C BLD-MCNC: 6.1 % — HIGH (ref 4–5.6)
HCT VFR BLD CALC: 23.9 % — LOW (ref 34.5–45)
HGB BLD-MCNC: 7 G/DL — CRITICAL LOW (ref 11.5–15.5)
IMM GRANULOCYTES NFR BLD AUTO: 0.3 % — SIGNIFICANT CHANGE UP (ref 0–1.5)
LYMPHOCYTES # BLD AUTO: 0.51 K/UL — LOW (ref 1–3.3)
LYMPHOCYTES # BLD AUTO: 8.5 % — LOW (ref 13–44)
MCHC RBC-ENTMCNC: 26.1 PG — LOW (ref 27–34)
MCHC RBC-ENTMCNC: 29.3 GM/DL — LOW (ref 32–36)
MCV RBC AUTO: 89.2 FL — SIGNIFICANT CHANGE UP (ref 80–100)
MONOCYTES # BLD AUTO: 0.39 K/UL — SIGNIFICANT CHANGE UP (ref 0–0.9)
MONOCYTES NFR BLD AUTO: 6.5 % — SIGNIFICANT CHANGE UP (ref 2–14)
NEUTROPHILS # BLD AUTO: 4.74 K/UL — SIGNIFICANT CHANGE UP (ref 1.8–7.4)
NEUTROPHILS NFR BLD AUTO: 78.6 % — HIGH (ref 43–77)
PLATELET # BLD AUTO: 147 K/UL — LOW (ref 150–400)
POTASSIUM SERPL-MCNC: 4.5 MMOL/L — SIGNIFICANT CHANGE UP (ref 3.5–5.3)
POTASSIUM SERPL-SCNC: 4.5 MMOL/L — SIGNIFICANT CHANGE UP (ref 3.5–5.3)
RBC # BLD: 2.68 M/UL — LOW (ref 3.8–5.2)
RBC # FLD: 14.8 % — HIGH (ref 10.3–14.5)
SODIUM SERPL-SCNC: 140 MMOL/L — SIGNIFICANT CHANGE UP (ref 135–145)
WBC # BLD: 6.03 K/UL — SIGNIFICANT CHANGE UP (ref 3.8–10.5)
WBC # FLD AUTO: 6.03 K/UL — SIGNIFICANT CHANGE UP (ref 3.8–10.5)

## 2019-11-19 PROCEDURE — 99233 SBSQ HOSP IP/OBS HIGH 50: CPT

## 2019-11-19 PROCEDURE — 45380 COLONOSCOPY AND BIOPSY: CPT | Mod: 59

## 2019-11-19 PROCEDURE — 45385 COLONOSCOPY W/LESION REMOVAL: CPT | Mod: 22

## 2019-11-19 PROCEDURE — 99232 SBSQ HOSP IP/OBS MODERATE 35: CPT

## 2019-11-19 PROCEDURE — 88305 TISSUE EXAM BY PATHOLOGIST: CPT | Mod: 26

## 2019-11-19 RX ORDER — FUROSEMIDE 40 MG
40 TABLET ORAL ONCE
Refills: 0 | Status: COMPLETED | OUTPATIENT
Start: 2019-11-19 | End: 2019-11-19

## 2019-11-19 RX ORDER — MESALAMINE 400 MG
4 TABLET, DELAYED RELEASE (ENTERIC COATED) ORAL AT BEDTIME
Refills: 0 | Status: DISCONTINUED | OUTPATIENT
Start: 2019-11-19 | End: 2019-11-20

## 2019-11-19 RX ORDER — TRAMADOL HYDROCHLORIDE 50 MG/1
50 TABLET ORAL EVERY 8 HOURS
Refills: 0 | Status: DISCONTINUED | OUTPATIENT
Start: 2019-11-19 | End: 2019-11-21

## 2019-11-19 RX ORDER — TRAMADOL HYDROCHLORIDE 50 MG/1
25 TABLET ORAL EVERY 8 HOURS
Refills: 0 | Status: DISCONTINUED | OUTPATIENT
Start: 2019-11-19 | End: 2019-11-21

## 2019-11-19 RX ORDER — ACETAMINOPHEN 500 MG
1000 TABLET ORAL ONCE
Refills: 0 | Status: COMPLETED | OUTPATIENT
Start: 2019-11-19 | End: 2019-11-19

## 2019-11-19 RX ORDER — FUROSEMIDE 40 MG
40 TABLET ORAL
Refills: 0 | Status: DISCONTINUED | OUTPATIENT
Start: 2019-11-19 | End: 2019-11-20

## 2019-11-19 RX ADMIN — ATORVASTATIN CALCIUM 20 MILLIGRAM(S): 80 TABLET, FILM COATED ORAL at 21:39

## 2019-11-19 RX ADMIN — TRAMADOL HYDROCHLORIDE 50 MILLIGRAM(S): 50 TABLET ORAL at 11:25

## 2019-11-19 RX ADMIN — TRAMADOL HYDROCHLORIDE 50 MILLIGRAM(S): 50 TABLET ORAL at 22:00

## 2019-11-19 RX ADMIN — PANTOPRAZOLE SODIUM 40 MILLIGRAM(S): 20 TABLET, DELAYED RELEASE ORAL at 05:04

## 2019-11-19 RX ADMIN — CARVEDILOL PHOSPHATE 6.25 MILLIGRAM(S): 80 CAPSULE, EXTENDED RELEASE ORAL at 17:20

## 2019-11-19 RX ADMIN — Medication 2000 UNIT(S): at 11:28

## 2019-11-19 RX ADMIN — AMLODIPINE BESYLATE 5 MILLIGRAM(S): 2.5 TABLET ORAL at 05:05

## 2019-11-19 RX ADMIN — Medication 1 MILLIGRAM(S): at 11:29

## 2019-11-19 RX ADMIN — Medication 400 MILLIGRAM(S): at 01:32

## 2019-11-19 RX ADMIN — Medication 40 MILLIGRAM(S): at 05:04

## 2019-11-19 RX ADMIN — MONTELUKAST 10 MILLIGRAM(S): 4 TABLET, CHEWABLE ORAL at 11:29

## 2019-11-19 RX ADMIN — TRAMADOL HYDROCHLORIDE 50 MILLIGRAM(S): 50 TABLET ORAL at 12:07

## 2019-11-19 RX ADMIN — PANTOPRAZOLE SODIUM 40 MILLIGRAM(S): 20 TABLET, DELAYED RELEASE ORAL at 17:20

## 2019-11-19 RX ADMIN — Medication 100 MICROGRAM(S): at 05:04

## 2019-11-19 RX ADMIN — Medication 1 TABLET(S): at 11:28

## 2019-11-19 RX ADMIN — SPIRONOLACTONE 25 MILLIGRAM(S): 25 TABLET, FILM COATED ORAL at 05:04

## 2019-11-19 RX ADMIN — TRAMADOL HYDROCHLORIDE 50 MILLIGRAM(S): 50 TABLET ORAL at 21:38

## 2019-11-19 RX ADMIN — GABAPENTIN 300 MILLIGRAM(S): 400 CAPSULE ORAL at 21:39

## 2019-11-19 RX ADMIN — Medication 40 MILLIGRAM(S): at 17:20

## 2019-11-19 RX ADMIN — CARVEDILOL PHOSPHATE 6.25 MILLIGRAM(S): 80 CAPSULE, EXTENDED RELEASE ORAL at 05:04

## 2019-11-19 NOTE — BRIEF OPERATIVE NOTE - COMMENTS
- Multiple colon polyps seen in the proximal colon, resected and retrieved  - Moderately severe diverticulosis in the sigmoid colon  - Oozing proctitis of unclear etiology, biopsied  - Prolapsed external hemorrhoids    Begin mesalamine enemas  Follow up pathology

## 2019-11-19 NOTE — BRIEF OPERATIVE NOTE - NSICDXBRIEFPOSTOP_GEN_ALL_CORE_FT
POST-OP DIAGNOSIS:  Proctitis 19-Nov-2019 14:58:15  CARMENZA Maya  Sigmoid diverticulosis 19-Nov-2019 14:58:00  CARMENZA Maya  Prolapsed external hemorrhoids 19-Nov-2019 14:57:48  CARMENZA Maya  Colon polyps 19-Nov-2019 14:57:31  CARMENZA Maya

## 2019-11-19 NOTE — PROGRESS NOTE ADULT - ASSESSMENT
74yoF with extensive Hx including hepatitis C cirrhosis with esophageal varices, chronic diastolic HFpEF, CAD s/p CABG, TIA s/p ILR (2017), AS s/p bovine AVR (2016) complicated by post-operative Afib, asthma, chronic anemia requiring pRBC transfusions also with hx of unstable bradycardia in setting of hyperkalemia and metabolic acidosis requiring MICU for dopamine gtt and temporary HD (7/2019) currently admitted for acute on chronic blood loss anemia due to lower GI bleeding, BRB per rectum. Given extensive hx GI consulted and pt s/p egd, showing duodenitis, but no evidence of bleeding. Pt to be prepped for colonoscopy in the am.     Acute blood loss anemia 2/2 LGIB 2/2 proctitis bleeding   - Had multiple bms overnight secondary to colonoscopy prep   - hgb now down to hgb 7   - transfusing 2 u prbc   - s/p colonoscopy with multiple colon polyps seen in the proximal colon, resected and retrieved. Moderately severe diverticulosis in the sigmoid colon as well as oozing proctitis of unclear etiology, which was biopsied. Also noted with prolapsed external hemorrhoids  - hx hepatitis C related cirrhosis, esophageal varices, diverticulosis   - hx of recurrent need for transfusions   -Pt had EGD and colonoscopy in 8/2019 with esophageal varices, gastric mass, polyps, diverticulosis, hemorrhoids, suspect that etiology likely diverticular   - h/h 8/25    -baseline is 8-9   - will monitor hgb closely   - d/c octreotide  - c/w IV protonix BID  - clear liquid diet and later today prep for colonoscopy  - npo at midnight   - GI f/u noted and appreciated     Chronic diastolic HFpEF  -Currently euvolemic   -c/w lasix po qd, will up titrate once gib improves  - c/w coreg po bid   - c/w aldactone po qd   -cardio consult noted and appreciated     CKD 3  -Cr appears to be at baseline of 1.4 to 1.8  - avoid nephrotoxic medications  - D/w patients outpt nephrologist Dr. 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 74yoF with extensive Hx including hepatitis C cirrhosis with esophageal varices, chronic diastolic HFpEF, CAD s/p CABG, TIA s/p ILR (2017), AS s/p bovine AVR (2016) complicated by post-operative Afib, asthma, chronic anemia requiring pRBC transfusions also with hx of unstable bradycardia in setting of hyperkalemia and metabolic acidosis requiring MICU for dopamine gtt and temporary HD (7/2019) currently admitted for acute on chronic blood loss anemia due to lower GI bleeding, BRB per rectum. Given extensive hx GI consulted and pt s/p egd, showing duodenitis, but no evidence of bleeding. Pt to be prepped for colonoscopy in the am.     Acute blood loss anemia 2/2 LGIB 2/2 proctitis bleeding   - Had multiple bms overnight secondary to colonoscopy prep   - hgb now down to hgb 7   -baseline hgb is 8-9   - transfusing 2 u prbc   - s/p colonoscopy with multiple colon polyps seen in the proximal colon, resected and retrieved. Moderately severe diverticulosis in the sigmoid colon as well as oozing proctitis of unclear etiology, which was biopsied. Also noted with prolapsed external hemorrhoids  - hx hepatitis C related cirrhosis, esophageal varices, diverticulosis   - hx of recurrent need for transfusions   -Pt had EGD and colonoscopy in 8/2019 with esophageal varices, gastric mass, polyps, diverticulosis, hemorrhoids   - will monitor hgb closely   - c/w IV protonix BID  - c/w advancement in diet as tolerated   - GI f/u noted and appreciated     Chronic diastolic HFpEF  -Currently euvolemic   -c/w lasix po bid   - c/w coreg po bid   - c/w aldactone po qd   -cardio consult noted and appreciated     CKD 3  -Cr appears to be at baseline of 1.4 to 1.8  - avoid nephrotoxic medications  - D/w patients outpt nephrologist Dr. 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

## 2019-11-19 NOTE — CHART NOTE - NSCHARTNOTEFT_GEN_A_CORE
Called to evaluate pt who is complaining of RLQ abdominal pain 8/10 on pain scale.  Pt is unable to qualify the type of pain, but she has never had it before.  No Nausea/vomiting no fever or chills.  Pt is admitted for BRBPR with clots and is to have colonoscopy later this morning.  HPI:  74yoF with extensive PMHx including hepatitis C cirrhosis with esophageal varices, HFpEF, CAD s/p CABG, TIA s/p ILR (2017), AS s/p bovine AVR (2016) complicated by post-operative Afib, asthma, chronic anemia requiring pRBC also with hx of unstable bradycardia in setting of hyperkalemia and metabolic acidosis requiring MICU for dopamine gtt and temporary HD (7/2019) presenting with multiple episodes of bright red blood per rectum.  Pt was last hospitalized in 8/2019 for symptomatic anemia  and had EGD and colonoscopy during that time which showed grade 2 esophageal varices with red azeb sign, hiatal hernia, gastric non-bleeding AVM, three gastric masses with small ulceration, colonic polyp, moderately severe diverticulosis and large external hemorrhoids. Pathology from gastric masses was negative for dysplasia or malignancy. Pt comes to hospital today for multiple episodes of bright red blood per rectum associated with large clots.  Pt denies any abdominal pain, nausea, vomiting, dyspnea, hematemesis, melena, lightheadedness, syncope.  Her daughter at bedside reports that her outpatient Hgb has been gradually down-trending over the past few weeks from 9.5 to 8.4 and mostly recently to 7.6 last week on 11/11.  On admission, Hgb 7.8. (18 Nov 2019 01:52)    PAST MEDICAL & SURGICAL HISTORY:  CKD (chronic kidney disease)  CAD (coronary artery disease)  Heart failure  Hepatitis C  Aortic stenosis  Anemia, unspecified anemia type  Asthma  Low back pain: chronic over a year now.  High cholesterol  Hypertension  Diabetes  H/O aortic valve replacement: cow valve.  S/P CABG x 3  History of tonsillectomy    ROS: see HPI/current complaints.  otherwise denied by pt    Allergies:  Bactrim (Nephrotoxicity)  Biaxin (Joint Pain)  morphine (Short breath)  No known Intolerances    FAMILY HISTORY:  Family history of diabetes mellitus (Sibling)    Social hx:  no ETOH/Tobacco/Illicite drugs      Current Medications:  amLODIPine   Tablet 5 milliGRAM(s) Oral daily  atorvastatin 20 milliGRAM(s) Oral at bedtime  carvedilol 6.25 milliGRAM(s) Oral every 12 hours  cholecalciferol 2000 Unit(s) Oral daily  dextrose 40% Gel 15 Gram(s) Oral once PRN  dextrose 5%. 1000 milliLiter(s) IV Continuous <Continuous>  dextrose 50% Injectable 12.5 Gram(s) IV Push once  dextrose 50% Injectable 25 Gram(s) IV Push once  dextrose 50% Injectable 25 Gram(s) IV Push once  folic acid 1 milliGRAM(s) Oral daily  furosemide    Tablet 40 milliGRAM(s) Oral daily  gabapentin 300 milliGRAM(s) Oral at bedtime  glucagon  Injectable 1 milliGRAM(s) IntraMuscular once PRN  hydrALAZINE Injectable 5 milliGRAM(s) IV Push every 8 hours PRN  levothyroxine 100 MICROGram(s) Oral daily  montelukast 10 milliGRAM(s) Oral daily  multivitamin 1 Tablet(s) Oral daily  pantoprazole  Injectable 40 milliGRAM(s) IV Push two times a day  spironolactone 25 milliGRAM(s) Oral daily      On examination:   Vital Signs Last 24 Hrs  T(C): 36.9 (18 Nov 2019 23:36), Max: 37.1 (18 Nov 2019 07:38)  T(F): 98.4 (18 Nov 2019 23:36), Max: 98.7 (18 Nov 2019 07:38)  HR: 71 (18 Nov 2019 23:36) (64 - 75)  BP: 153/64 (18 Nov 2019 23:36) (153/64 - 227/98)  BP(mean): 94 (18 Nov 2019 23:36) (94 - 94)  RR: 18 (18 Nov 2019 23:36) (16 - 18)  SpO2: 97% (18 Nov 2019 21:14) (94% - 97%)    Pt is alert and oriented x 4 in NAD, supine on stretcher  Lungs- clear bilaterally without rales/rhonchi/wheezes  Heart- s1s2 heard, RRR no murmur appreciated  Abdomen- soft, +bowel sounds all quadrants, no appreciated masses/organomegaly.  Tender in region of RLQ towards inguinal area.  No hernia appreciated. No guarding or rebound on examination  Extremities- no cyanosis, clubbing or edema noted bilaterally  Skin- warm, dry no rash; turgor good    Impression;1- abdominal pain etiology unclear                   2- GI bleed  Plan: 1- vital signs stable, continue to monitor          2- I.V. Tylenol for pain x one dose          3- pt is already NPO for colonoscopy later this morning.          4-d/w patient plan of care, all questions answered          5- d/w RN plan of care, all questions answered          6- continue to observe, re-evaluate as necessary

## 2019-11-19 NOTE — PROGRESS NOTE ADULT - SUBJECTIVE AND OBJECTIVE BOX
Patient is a 74y old  Female who presents with a chief complaint of GI bleeding (19 Nov 2019 08:53)      HEALTH ISSUES - PROBLEM Dx:          INTERVAL HPI/OVERNIGHT EVENTS:    Vital Signs Last 24 Hrs  T(C): 37.1 (19 Nov 2019 16:01), Max: 37.1 (19 Nov 2019 16:01)  T(F): 98.8 (19 Nov 2019 16:01), Max: 98.8 (19 Nov 2019 16:01)  HR: 64 (19 Nov 2019 16:01) (64 - 71)  BP: 148/63 (19 Nov 2019 16:01) (148/63 - 227/98)  BP(mean): 94 (18 Nov 2019 23:36) (94 - 94)  RR: 18 (19 Nov 2019 16:01) (16 - 18)  SpO2: 95% (19 Nov 2019 16:01) (95% - 99%)    CAPILLARY BLOOD GLUCOSE      POCT Blood Glucose.: 183 mg/dL (19 Nov 2019 13:11)      I&O's Summary      CONSTITUTIONAL: Well appearing, well nourished, awake, alert and in no apparent distress  ENMT: Airway patent, Nasal mucosa clear. Mouth with normal mucosa. Throat has no vesicles, no oropharyngeal exudates and uvula is midline.  EYES: Clear bilaterally, pupils equal, round and reactive to light. EOMI.  CARDIAC: Normal rate, regular rhythm.  Heart sounds S1, S2.  No murmurs, rubs or gallops   RESPIRATORY: Breath sounds clear and equal bilaterally. No wheezes, rhales or rhonchi  MUSCULOSKELETAL: Spine appears normal, range of motion is not limited, no muscle or joint tenderness  EXTREMITIES: No edema, cyanosis or deformity   NEUROLOGICAL: Alert and oriented, no focal deficits, no motor or sensory deficits.  SKIN: No rash, skin turgor     MEDICATIONS  (STANDING):  amLODIPine   Tablet 5 milliGRAM(s) Oral daily  atorvastatin 20 milliGRAM(s) Oral at bedtime  carvedilol 6.25 milliGRAM(s) Oral every 12 hours  cholecalciferol 2000 Unit(s) Oral daily  dextrose 5%. 1000 milliLiter(s) (50 mL/Hr) IV Continuous <Continuous>  dextrose 50% Injectable 12.5 Gram(s) IV Push once  dextrose 50% Injectable 25 Gram(s) IV Push once  dextrose 50% Injectable 25 Gram(s) IV Push once  folic acid 1 milliGRAM(s) Oral daily  furosemide    Tablet 40 milliGRAM(s) Oral two times a day  gabapentin 300 milliGRAM(s) Oral at bedtime  levothyroxine 100 MICROGram(s) Oral daily  mesalamine Enema 4 Gram(s) Rectal at bedtime  montelukast 10 milliGRAM(s) Oral daily  multivitamin 1 Tablet(s) Oral daily  pantoprazole  Injectable 40 milliGRAM(s) IV Push two times a day  spironolactone 25 milliGRAM(s) Oral daily    MEDICATIONS  (PRN):  dextrose 40% Gel 15 Gram(s) Oral once PRN Blood Glucose LESS THAN 70 milliGRAM(s)/deciliter  glucagon  Injectable 1 milliGRAM(s) IntraMuscular once PRN Glucose LESS THAN 70 milligrams/deciliter  hydrALAZINE Injectable 5 milliGRAM(s) IV Push every 8 hours PRN if sbp >180 or dbp >100  traMADol 25 milliGRAM(s) Oral every 8 hours PRN Moderate Pain (4 - 6)  traMADol 50 milliGRAM(s) Oral every 8 hours PRN Severe Pain (7 - 10)      LABS:                        7.0    6.03  )-----------( 147      ( 19 Nov 2019 07:38 )             23.9     11-19    140  |  105  |  59.0<H>  ----------------------------<  166<H>  4.5   |  19.0<L>  |  1.90<H>    Ca    8.9      19 Nov 2019 07:38    TPro  8.1  /  Alb  3.9  /  TBili  0.2<L>  /  DBili  x   /  AST  27  /  ALT  14  /  AlkPhos  136<H>  11-17    PT/INR - ( 17 Nov 2019 22:49 )   PT: 12.9 sec;   INR: 1.12 ratio         PTT - ( 17 Nov 2019 22:49 )  PTT:35.2 sec    LIVER FUNCTIONS - ( 17 Nov 2019 22:49 )  Alb: 3.9 g/dL / Pro: 8.1 g/dL / ALK PHOS: 136 U/L / ALT: 14 U/L / AST: 27 U/L / GGT: x             RADIOLOGY & ADDITIONAL TESTS:  No new imaging studies Patient is a 74y old  Female who presents with a chief complaint of GI bleeding (19 Nov 2019 08:53) continues with bleeding plan for colonoscopy       HEALTH ISSUES - PROBLEM Dx:  BRB Per rectum           INTERVAL HPI/OVERNIGHT EVENTS:  Patient seen and examined at bedside. No acute events overnight. Patient states when she took the prep yest it made her have multiple bloody bright red bms. Today in agreement with being transfused 2 additional units. Understands she will be going for colonoscopy. Also reports new onset R sided groin/ sciatitca pain in agreement to trial tramadol. Patient denies chest pain, SOB, abd pain, N/V, fever, chills, dysuria or any other complaints. All remainder ROS negative.     Vital Signs Last 24 Hrs  T(C): 37.1 (19 Nov 2019 16:01), Max: 37.1 (19 Nov 2019 16:01)  T(F): 98.8 (19 Nov 2019 16:01), Max: 98.8 (19 Nov 2019 16:01)  HR: 64 (19 Nov 2019 16:01) (64 - 71)  BP: 148/63 (19 Nov 2019 16:01) (148/63 - 227/98)  BP(mean): 94 (18 Nov 2019 23:36) (94 - 94)  RR: 18 (19 Nov 2019 16:01) (16 - 18)  SpO2: 95% (19 Nov 2019 16:01) (95% - 99%)    CAPILLARY BLOOD GLUCOSE  POCT Blood Glucose.: 183 mg/dL (19 Nov 2019 13:11)            CONSTITUTIONAL: Well appearing, well nourished, awake, alert and in no apparent distress  CARDIAC: Normal rate, regular rhythm.  Heart sounds S1, S2.  No murmurs, rubs or gallops   RESPIRATORY: Breath sounds clear and equal bilaterally. No wheezes, rhales or rhonchi  GASTROENTEROLOGY: Soft nt nd bs + normoactive   EXTREMITIES: No edema, cyanosis or deformity   NEUROLOGICAL: Alert and oriented, no focal deficits, no motor or sensory deficits.  B/l le varicose veins   SKIN: No rash, skin turgor wnl    MEDICATIONS  (STANDING):  amLODIPine   Tablet 5 milliGRAM(s) Oral daily  atorvastatin 20 milliGRAM(s) Oral at bedtime  carvedilol 6.25 milliGRAM(s) Oral every 12 hours  cholecalciferol 2000 Unit(s) Oral daily  dextrose 5%. 1000 milliLiter(s) (50 mL/Hr) IV Continuous <Continuous>  dextrose 50% Injectable 12.5 Gram(s) IV Push once  dextrose 50% Injectable 25 Gram(s) IV Push once  dextrose 50% Injectable 25 Gram(s) IV Push once  folic acid 1 milliGRAM(s) Oral daily  furosemide    Tablet 40 milliGRAM(s) Oral two times a day  gabapentin 300 milliGRAM(s) Oral at bedtime  levothyroxine 100 MICROGram(s) Oral daily  mesalamine Enema 4 Gram(s) Rectal at bedtime  montelukast 10 milliGRAM(s) Oral daily  multivitamin 1 Tablet(s) Oral daily  pantoprazole  Injectable 40 milliGRAM(s) IV Push two times a day  spironolactone 25 milliGRAM(s) Oral daily    MEDICATIONS  (PRN):  dextrose 40% Gel 15 Gram(s) Oral once PRN Blood Glucose LESS THAN 70 milliGRAM(s)/deciliter  glucagon  Injectable 1 milliGRAM(s) IntraMuscular once PRN Glucose LESS THAN 70 milligrams/deciliter  hydrALAZINE Injectable 5 milliGRAM(s) IV Push every 8 hours PRN if sbp >180 or dbp >100  traMADol 25 milliGRAM(s) Oral every 8 hours PRN Moderate Pain (4 - 6)  traMADol 50 milliGRAM(s) Oral every 8 hours PRN Severe Pain (7 - 10)      LABS:                        7.0    6.03  )-----------( 147      ( 19 Nov 2019 07:38 )             23.9     11-19    140  |  105  |  59.0<H>  ----------------------------<  166<H>  4.5   |  19.0<L>  |  1.90<H>    Ca    8.9      19 Nov 2019 07:38    TPro  8.1  /  Alb  3.9  /  TBili  0.2<L>  /  DBili  x   /  AST  27  /  ALT  14  /  AlkPhos  136<H>  11-17    PT/INR - ( 17 Nov 2019 22:49 )   PT: 12.9 sec;   INR: 1.12 ratio         PTT - ( 17 Nov 2019 22:49 )  PTT:35.2 sec    LIVER FUNCTIONS - ( 17 Nov 2019 22:49 )  Alb: 3.9 g/dL / Pro: 8.1 g/dL / ALK PHOS: 136 U/L / ALT: 14 U/L / AST: 27 U/L / GGT: x             RADIOLOGY & ADDITIONAL TESTS:  No new imaging studies

## 2019-11-19 NOTE — BRIEF OPERATIVE NOTE - NSICDXBRIEFPREOP_GEN_ALL_CORE_FT
PRE-OP DIAGNOSIS:  Acute blood loss anemia 19-Nov-2019 14:57:17  CARMENZA Maya  Rectal bleeding 19-Nov-2019 14:57:08  CARMENZA Maya

## 2019-11-19 NOTE — BRIEF OPERATIVE NOTE - NSICDXBRIEFPROCEDURE_GEN_ALL_CORE_FT
PROCEDURES:  Diagnostic colonoscopy with biopsy 19-Nov-2019 14:56:50  CARMENZA Maya  Colonoscopy with use of snare 19-Nov-2019 14:56:13  CARMENZA Maya

## 2019-11-19 NOTE — BRIEF OPERATIVE NOTE - OPERATION/FINDINGS
Informed consent was obtained from the patient in the preprocedure area.  The procedure, its risks, benefits, and alternatives were discussed.  All questions were answered.  The patient was brought to the endoscopy suite and placed in the left lateral decubitus position.  A safety timeout was performed, confirming the patient by name, date of birth and medical record number.  All discrepancies were addressed, confirmed by all present personnel.  The patient was sedated with IV sedation provided by the anesthesiologist.  Once an adequate level of sedation was achieved, a digital rectal exam was performed, which demonstrated large, prolapsed external hemorrhoids but no masses.  The Olympus pediatric long colonoscope was inserted into the rectum and carbon dioxide was insufflated.  The scope was coursed through the rectum and sigmoid colon, descending colon, transverse colon, ascending colon to the level of the cecum, identified by the appendiceal orifice and ileocecal valve.  [The terminal ileum was intubated and was grossly normal in appearance].  The scope was then slowly withdrawn with careful examination of all walls and behind all folds.  A 4 mm sessile polyp was resected and retrieved from the cecum using cold snare polypectomy.  Two sessile polyps (~3-5 mm in diameter) and one 11 mm pedunculated polyp were resected and retrieved via cold snare polypectomy.  In the transverse colon were two 4 mm polyps resected via cold snare.  Moderately severe diverticulosis in the sigmoid colon.  Once in the rectum, patchy proctitis was seen and biopsied.  The scope was retroflexed revealing large external hemorrhoids and more proctitis.  The withdrawal time was 26 minutes.  The quality of the bowel prep was good.  The patient tolerated the procedure well and after a brief recovery period in the endoscopy suite was transferred to the recovery area in good condition.  There were no apparent complications, and the blood loss from the maneuvers was minimal.

## 2019-11-19 NOTE — PROGRESS NOTE ADULT - SUBJECTIVE AND OBJECTIVE BOX
Chief Complaint: chart and hx reviewed.    HPI: 73 yo F presenting with recurrent BRBPR and severe anemia. Hx of cad/remote cabg and bio avr/hep c/cirrhosis/varices. Endoscopy showed duodenitis and varices but no active bleeding. Feeling well w/o cp or dsypnea-scheduled for colonoscopy this AM.    PAST MEDICAL & SURGICAL HISTORY:  CKD (chronic kidney disease)  CAD (coronary artery disease)  Heart failure  Hepatitis C  Aortic stenosis  Anemia, unspecified anemia type  Asthma  Low back pain: chronic over a year now.  High cholesterol  Hypertension  Diabetes  H/O aortic valve replacement: cow valve.  S/P CABG x 3  History of tonsillectomy      PREVIOUS DIAGNOSTIC TESTING:      ECHO  FINDINGS:    STRESS  FINDINGS:    CATHETERIZATION  FINDINGS:    MEDICATIONS  (STANDING):  amLODIPine   Tablet 5 milliGRAM(s) Oral daily  atorvastatin 20 milliGRAM(s) Oral at bedtime  carvedilol 6.25 milliGRAM(s) Oral every 12 hours  cholecalciferol 2000 Unit(s) Oral daily  dextrose 5%. 1000 milliLiter(s) (50 mL/Hr) IV Continuous <Continuous>  dextrose 50% Injectable 12.5 Gram(s) IV Push once  dextrose 50% Injectable 25 Gram(s) IV Push once  dextrose 50% Injectable 25 Gram(s) IV Push once  folic acid 1 milliGRAM(s) Oral daily  furosemide    Tablet 40 milliGRAM(s) Oral two times a day  gabapentin 300 milliGRAM(s) Oral at bedtime  levothyroxine 100 MICROGram(s) Oral daily  montelukast 10 milliGRAM(s) Oral daily  multivitamin 1 Tablet(s) Oral daily  pantoprazole  Injectable 40 milliGRAM(s) IV Push two times a day  spironolactone 25 milliGRAM(s) Oral daily    MEDICATIONS  (PRN):  dextrose 40% Gel 15 Gram(s) Oral once PRN Blood Glucose LESS THAN 70 milliGRAM(s)/deciliter  glucagon  Injectable 1 milliGRAM(s) IntraMuscular once PRN Glucose LESS THAN 70 milligrams/deciliter  hydrALAZINE Injectable 5 milliGRAM(s) IV Push every 8 hours PRN if sbp >180 or dbp >100      FAMILY HISTORY:  Family history of diabetes mellitus (Sibling)      ROS: Negative other than as mentioned in HPI.    Vital Signs Last 24 Hrs  T(C): 37 (19 Nov 2019 07:47), Max: 37 (19 Nov 2019 07:47)  T(F): 98.6 (19 Nov 2019 07:47), Max: 98.6 (19 Nov 2019 07:47)  HR: 70 reg (19 Nov 2019 07:47) (66 - 72)  BP: 140/80 (19 Nov 2019 07:47) (153/64 - 227/98)  BP(mean): 94 (18 Nov 2019 23:36) (94 - 94)  RR: 14. (19 Nov 2019 07:47) (16 - 18)  SpO2: 99% (19 Nov 2019 07:47) (97% - 99%)    PHYSICAL EXAM:  General: Appears well developed, well nourished alert and cooperative. afebrile obese elderly F nad.  HEENT: Head; normocephalic, atraumatic.  Eyes;   Pupils reactive, cornea wnl.  Neck; Supple, no nodes adenopathy, no NVD or carotid bruit or thyromegaly.  CARDIOVASCULAR; No murmur, rub, gallop or lift. Normal S1 and S2.  LUNGS; No rales, rhonchi or wheeze. Normal breath sounds bilaterally.  ABDOMEN ; Soft, nontender without mass or organomegaly. bowel sounds normoactive.  EXTREMITIES; No clubbing, cyanosis or edema. Distal pulses wnl. ROM normal.  SKIN; warm and dry with normal turgor. ecchymotic.  NEURO; Alert/oriented x 3/normal motor exam.    PSYCH; normal affect.            INTERPRETATION OF TELEMETRY:    ECG: monitor show SR.    I&O's Detail      LABS:                        7.0    6.03  )-----------( 147      ( 19 Nov 2019 07:38 )             23.9     11-19    140  |  105  |  59.0<H>  ----------------------------<  166<H>  4.5   |  19.0<L>  |  1.90<H>    Ca    8.9      19 Nov 2019 07:38    TPro  8.1  /  Alb  3.9  /  TBili  0.2<L>  /  DBili  x   /  AST  27  /  ALT  14  /  AlkPhos  136<H>  11-17        PT/INR - ( 17 Nov 2019 22:49 )   PT: 12.9 sec;   INR: 1.12 ratio         PTT - ( 17 Nov 2019 22:49 )  PTT:35.2 sec    I&O's Summary      RADIOLOGY & ADDITIONAL STUDIES:

## 2019-11-19 NOTE — PROGRESS NOTE ADULT - ASSESSMENT
1. Recurrent GI bleed in pt with varices/cirrhosis/hep C. For colonoscopy this AM  2. remote cabg  3. remote bio AVR-could have colonic AVMs  4. CRI  5. severe anemia

## 2019-11-20 DIAGNOSIS — K64.0 FIRST DEGREE HEMORRHOIDS: ICD-10-CM

## 2019-11-20 DIAGNOSIS — B18.2 CHRONIC VIRAL HEPATITIS C: ICD-10-CM

## 2019-11-20 DIAGNOSIS — K62.5 HEMORRHAGE OF ANUS AND RECTUM: ICD-10-CM

## 2019-11-20 DIAGNOSIS — K62.89 OTHER SPECIFIED DISEASES OF ANUS AND RECTUM: ICD-10-CM

## 2019-11-20 DIAGNOSIS — D12.6 BENIGN NEOPLASM OF COLON, UNSPECIFIED: ICD-10-CM

## 2019-11-20 LAB
ANION GAP SERPL CALC-SCNC: 13 MMOL/L — SIGNIFICANT CHANGE UP (ref 5–17)
ANION GAP SERPL CALC-SCNC: 16 MMOL/L — SIGNIFICANT CHANGE UP (ref 5–17)
BUN SERPL-MCNC: 62 MG/DL — HIGH (ref 8–20)
BUN SERPL-MCNC: 65 MG/DL — HIGH (ref 8–20)
CALCIUM SERPL-MCNC: 8.4 MG/DL — LOW (ref 8.6–10.2)
CALCIUM SERPL-MCNC: 8.5 MG/DL — LOW (ref 8.6–10.2)
CHLORIDE SERPL-SCNC: 100 MMOL/L — SIGNIFICANT CHANGE UP (ref 98–107)
CHLORIDE SERPL-SCNC: 102 MMOL/L — SIGNIFICANT CHANGE UP (ref 98–107)
CO2 SERPL-SCNC: 18 MMOL/L — LOW (ref 22–29)
CO2 SERPL-SCNC: 20 MMOL/L — LOW (ref 22–29)
CREAT SERPL-MCNC: 2.17 MG/DL — HIGH (ref 0.5–1.3)
CREAT SERPL-MCNC: 2.3 MG/DL — HIGH (ref 0.5–1.3)
FERRITIN SERPL-MCNC: 69 NG/ML — SIGNIFICANT CHANGE UP (ref 15–150)
GLUCOSE BLDC GLUCOMTR-MCNC: 184 MG/DL — HIGH (ref 70–99)
GLUCOSE BLDC GLUCOMTR-MCNC: 195 MG/DL — HIGH (ref 70–99)
GLUCOSE BLDC GLUCOMTR-MCNC: 201 MG/DL — HIGH (ref 70–99)
GLUCOSE BLDC GLUCOMTR-MCNC: 237 MG/DL — HIGH (ref 70–99)
GLUCOSE BLDC GLUCOMTR-MCNC: 264 MG/DL — HIGH (ref 70–99)
GLUCOSE SERPL-MCNC: 183 MG/DL — HIGH (ref 70–115)
GLUCOSE SERPL-MCNC: 206 MG/DL — HIGH (ref 70–115)
HCT VFR BLD CALC: 27.2 % — LOW (ref 34.5–45)
HGB BLD-MCNC: 8.3 G/DL — LOW (ref 11.5–15.5)
IRON SATN MFR SERPL: 12 % — LOW (ref 14–50)
IRON SATN MFR SERPL: 37 UG/DL — SIGNIFICANT CHANGE UP (ref 37–145)
MAGNESIUM SERPL-MCNC: 2.1 MG/DL — SIGNIFICANT CHANGE UP (ref 1.8–2.6)
PHOSPHATE SERPL-MCNC: 4 MG/DL — SIGNIFICANT CHANGE UP (ref 2.4–4.7)
POTASSIUM SERPL-MCNC: 4.5 MMOL/L — SIGNIFICANT CHANGE UP (ref 3.5–5.3)
POTASSIUM SERPL-MCNC: 4.6 MMOL/L — SIGNIFICANT CHANGE UP (ref 3.5–5.3)
POTASSIUM SERPL-SCNC: 4.5 MMOL/L — SIGNIFICANT CHANGE UP (ref 3.5–5.3)
POTASSIUM SERPL-SCNC: 4.6 MMOL/L — SIGNIFICANT CHANGE UP (ref 3.5–5.3)
SODIUM SERPL-SCNC: 133 MMOL/L — LOW (ref 135–145)
SODIUM SERPL-SCNC: 136 MMOL/L — SIGNIFICANT CHANGE UP (ref 135–145)
TIBC SERPL-MCNC: 297 UG/DL — SIGNIFICANT CHANGE UP (ref 220–430)
TRANSFERRIN SERPL-MCNC: 208 MG/DL — SIGNIFICANT CHANGE UP (ref 192–382)

## 2019-11-20 PROCEDURE — 99233 SBSQ HOSP IP/OBS HIGH 50: CPT

## 2019-11-20 PROCEDURE — 99232 SBSQ HOSP IP/OBS MODERATE 35: CPT

## 2019-11-20 RX ORDER — INSULIN LISPRO 100/ML
VIAL (ML) SUBCUTANEOUS
Refills: 0 | Status: DISCONTINUED | OUTPATIENT
Start: 2019-11-20 | End: 2019-11-21

## 2019-11-20 RX ORDER — MESALAMINE 400 MG
1000 TABLET, DELAYED RELEASE (ENTERIC COATED) ORAL EVERY 24 HOURS
Refills: 0 | Status: DISCONTINUED | OUTPATIENT
Start: 2019-11-20 | End: 2019-11-21

## 2019-11-20 RX ORDER — CHLORHEXIDINE GLUCONATE 213 G/1000ML
1 SOLUTION TOPICAL DAILY
Refills: 0 | Status: DISCONTINUED | OUTPATIENT
Start: 2019-11-20 | End: 2019-11-21

## 2019-11-20 RX ORDER — INSULIN LISPRO 100/ML
2 VIAL (ML) SUBCUTANEOUS ONCE
Refills: 0 | Status: COMPLETED | OUTPATIENT
Start: 2019-11-20 | End: 2019-11-20

## 2019-11-20 RX ORDER — DIPHENHYDRAMINE HCL 50 MG
25 CAPSULE ORAL ONCE
Refills: 0 | Status: COMPLETED | OUTPATIENT
Start: 2019-11-20 | End: 2019-11-20

## 2019-11-20 RX ADMIN — Medication 100 MICROGRAM(S): at 05:18

## 2019-11-20 RX ADMIN — GABAPENTIN 300 MILLIGRAM(S): 400 CAPSULE ORAL at 22:10

## 2019-11-20 RX ADMIN — PANTOPRAZOLE SODIUM 40 MILLIGRAM(S): 20 TABLET, DELAYED RELEASE ORAL at 17:26

## 2019-11-20 RX ADMIN — Medication 1: at 12:23

## 2019-11-20 RX ADMIN — AMLODIPINE BESYLATE 5 MILLIGRAM(S): 2.5 TABLET ORAL at 05:18

## 2019-11-20 RX ADMIN — ATORVASTATIN CALCIUM 20 MILLIGRAM(S): 80 TABLET, FILM COATED ORAL at 22:10

## 2019-11-20 RX ADMIN — Medication 40 MILLIGRAM(S): at 05:17

## 2019-11-20 RX ADMIN — PANTOPRAZOLE SODIUM 40 MILLIGRAM(S): 20 TABLET, DELAYED RELEASE ORAL at 05:18

## 2019-11-20 RX ADMIN — Medication 5 MILLIGRAM(S): at 18:37

## 2019-11-20 RX ADMIN — Medication 2000 UNIT(S): at 14:09

## 2019-11-20 RX ADMIN — CARVEDILOL PHOSPHATE 6.25 MILLIGRAM(S): 80 CAPSULE, EXTENDED RELEASE ORAL at 05:17

## 2019-11-20 RX ADMIN — SPIRONOLACTONE 25 MILLIGRAM(S): 25 TABLET, FILM COATED ORAL at 05:18

## 2019-11-20 RX ADMIN — CARVEDILOL PHOSPHATE 6.25 MILLIGRAM(S): 80 CAPSULE, EXTENDED RELEASE ORAL at 17:26

## 2019-11-20 RX ADMIN — Medication 2 UNIT(S): at 23:41

## 2019-11-20 RX ADMIN — Medication 1 TABLET(S): at 14:10

## 2019-11-20 RX ADMIN — Medication 25 MILLIGRAM(S): at 23:38

## 2019-11-20 RX ADMIN — Medication 1 MILLIGRAM(S): at 14:10

## 2019-11-20 RX ADMIN — Medication 1: at 17:19

## 2019-11-20 RX ADMIN — Medication 1000 MILLIGRAM(S): at 14:10

## 2019-11-20 RX ADMIN — MONTELUKAST 10 MILLIGRAM(S): 4 TABLET, CHEWABLE ORAL at 14:10

## 2019-11-20 NOTE — PROGRESS NOTE ADULT - ASSESSMENT
75 yo F with extensive Hx including hepatitis C cirrhosis with esophageal varices, AVMs, chronic diastolic HFpEF, CAD s/p CABG, TIA s/p ILR (2017), AS s/p bovine AVR (2016) complicated by post-operative Afib, asthma, chronic anemia requiring pRBC transfusions also with hx of unstable bradycardia in setting of hyperkalemia and metabolic acidosis requiring MICU for dopamine gtt and temporary HD (7/2019) currently admitted for acute on chronic blood loss anemia due to lower GI bleeding, BRB per rectum. Given extensive hx GI consulted and pt s/p egd, showing duodenitis, but no evidence of bleeding, thereafter had colonoscopy performed on 11/19/19 showing multiple polyps as well as oozing proctitis. Recommended for mesalamine enemas which the pt initially refused. Thus far requiring 3 u prbc and pt continues to bleed from the rectum. Slow clinical improvement.     Acute blood loss anemia 2/2 LGIB 2/2 proctitis bleeding   -hs of recurrent anemia requiring transfusions with port in place   - Had one bloody bright red bleeding episode this am   - hgb ~8 with minimal response to two units prbc given yest     -baseline hgb is 8-9   - transfusing additional 1 u prbc today   - s/p egd/ colonoscopy   - will monitor hgb closely   - c/w IV protonix BID  - c/w mesalamine suppositories bc pt refused enemas   - c/w advancement in diet as tolerated   - GI f/u noted and appreciated     Chronic diastolic HFpEF  -Currently euvolemic   -will hold off on lasix bc of up trending creatine will monitor off lasix for 24 hours then see if can be restarted   - c/w coreg po bid   - c/w aldactone po qd   -cardio f/u noted and appreciated     MARGUERITE on CKD 3  -likely secondary to ATN   -Cr appears to be at baseline of 1.4 to 1.8  currently up trended to 2.3   - pt was adamant about receiving her lasix although she was losing blood; now understands will need to hold off on lasix therapy   - avoid nephrotoxic medications  - Pt follows with outpt nephro Dr. 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 Pt to return to home.

## 2019-11-20 NOTE — PROGRESS NOTE ADULT - SUBJECTIVE AND OBJECTIVE BOX
Patient is a 74y old  Female who presents with a chief complaint of GI bleeding (20 Nov 2019 13:58) remains with rectal bleeding       HEALTH ISSUES - PROBLEM Dx:  Adenomatous polyp of colon, unspecified part of colon: Adenomatous polyp of colon, unspecified part of colon  Grade I hemorrhoids: Grade I hemorrhoids  Proctitis: Proctitis  Rectal bleeding: Rectal bleeding  Chronic hepatitis C without hepatic coma: Chronic hepatitis C without hepatic coma      INTERVAL HPI/OVERNIGHT EVENTS:  Patient seen and examined at bedside. No acute events overnight. Patient states she is feeling upset bc she continues to have rectal bleeding, reports additional episode this morning. D/w her that she refused to take the rectal enema that was ordered yest, she stated she wanted a suppository that has been ordered and now she will take it. Aside from this understands she will be receiving another 1 u prbc, also d/w her that her renal fxn has trended upward to 2.3 and this is likely from excess lasix. She was adamant about receiving her home dose knowing this might happen, but she was more afraid of becoming more fluid overloaded. She now understands that if she is losing blood she should not have excess agents that cause further hypovolemia. In agreement to hold off on lasix for now. Patient denies chest pain, SOB, abd pain, N/V, fever, chills, dysuria or any other complaints. All remainder ROS negative.     Vital Signs Last 24 Hrs  T(C): 36.9 (21 Nov 2019 01:02), Max: 37.6 (20 Nov 2019 20:11)  T(F): 98.4 (21 Nov 2019 01:02), Max: 99.6 (20 Nov 2019 20:11)  HR: 67 (21 Nov 2019 01:02) (57 - 72)  BP: 163/64 (21 Nov 2019 01:02) (136/70 - 196/66)  BP(mean): --  RR: 18 (21 Nov 2019 01:02) (18 - 18)  SpO2: 99% (21 Nov 2019 01:02) (97% - 100%)    CAPILLARY BLOOD GLUCOSE  POCT Blood Glucose.: 237 mg/dL (20 Nov 2019 23:41)  POCT Blood Glucose.: 264 mg/dL (20 Nov 2019 22:13)  POCT Blood Glucose.: 195 mg/dL (20 Nov 2019 16:57)  POCT Blood Glucose.: 184 mg/dL (20 Nov 2019 12:21)  POCT Blood Glucose.: 201 mg/dL (20 Nov 2019 08:28)      I&O's Summary    20 Nov 2019 07:01  -  21 Nov 2019 03:56  --------------------------------------------------------  IN: 900 mL / OUT: 0 mL / NET: 900 mL    CONSTITUTIONAL: Well appearing, well nourished, awake, alert and in no apparent distress  CARDIAC: Normal rate, regular rhythm.  Heart sounds S1, S2.  No murmurs, rubs or gallops   RESPIRATORY: Breath sounds clear and equal bilaterally. No wheezes, rhales or rhonchi  GASTROENTEROLOGY: Soft nt nd bs + normoactive   EXTREMITIES: No edema, cyanosis or deformity   NEUROLOGICAL: Alert and oriented, no focal deficits, no motor or sensory deficits.  B/l le varicose veins   SKIN: No rash, skin turgor wnl  Chest: R port in place C/D/I     MEDICATIONS  (STANDING):  amLODIPine   Tablet 5 milliGRAM(s) Oral daily  atorvastatin 20 milliGRAM(s) Oral at bedtime  carvedilol 6.25 milliGRAM(s) Oral every 12 hours  chlorhexidine 2% Cloths 1 Application(s) Topical daily  cholecalciferol 2000 Unit(s) Oral daily  dextrose 5%. 1000 milliLiter(s) (50 mL/Hr) IV Continuous <Continuous>  dextrose 50% Injectable 12.5 Gram(s) IV Push once  dextrose 50% Injectable 25 Gram(s) IV Push once  dextrose 50% Injectable 25 Gram(s) IV Push once  folic acid 1 milliGRAM(s) Oral daily  gabapentin 300 milliGRAM(s) Oral at bedtime  insulin lispro (HumaLOG) corrective regimen sliding scale   SubCutaneous three times a day before meals  levothyroxine 100 MICROGram(s) Oral daily  mesalamine Suppository 1000 milliGRAM(s) Rectal every 24 hours  montelukast 10 milliGRAM(s) Oral daily  multivitamin 1 Tablet(s) Oral daily  pantoprazole  Injectable 40 milliGRAM(s) IV Push two times a day  spironolactone 25 milliGRAM(s) Oral daily    MEDICATIONS  (PRN):  dextrose 40% Gel 15 Gram(s) Oral once PRN Blood Glucose LESS THAN 70 milliGRAM(s)/deciliter  glucagon  Injectable 1 milliGRAM(s) IntraMuscular once PRN Glucose LESS THAN 70 milligrams/deciliter  hydrALAZINE Injectable 5 milliGRAM(s) IV Push every 8 hours PRN if sbp >180 or dbp >100  traMADol 25 milliGRAM(s) Oral every 8 hours PRN Moderate Pain (4 - 6)  traMADol 50 milliGRAM(s) Oral every 8 hours PRN Severe Pain (7 - 10)      LABS:                        8.3    x     )-----------( x        ( 20 Nov 2019 07:59 )             27.2     11-20    136  |  102  |  65.0<H>  ----------------------------<  183<H>  4.5   |  18.0<L>  |  2.17<H>    Ca    8.5<L>      20 Nov 2019 19:46  Phos  4.0     11-20  Mg     2.1     11-20        RADIOLOGY & ADDITIONAL TESTS:  No new imaging studies

## 2019-11-20 NOTE — PROGRESS NOTE ADULT - PROBLEM SELECTOR PLAN 2
Pt refused rowasa enema. I explained that it is used for proctitis  She refused enema, but will take suppository  - had blood transfusion yesterday. Check Hgb  - check proctitis bx and colon pathology  - check IBS panel  - diverticulosis- high fiber diet as outpatient

## 2019-11-20 NOTE — PROGRESS NOTE ADULT - SUBJECTIVE AND OBJECTIVE BOX
Patient is a 74y old  Female who presents with a chief complaint of GI bleeding (19 Nov 2019 17:45)      HPI:  74yoF with extensive PMHx including hepatitis C cirrhosis with esophageal varices, HFpEF, CAD s/p CABG, TIA s/p ILR (2017), AS s/p bovine AVR (2016) complicated by post-operative Afib, asthma, chronic anemia requiring pRBC also with hx of unstable bradycardia in setting of hyperkalemia and metabolic acidosis requiring MICU for dopamine gtt and temporary HD (7/2019) presenting with multiple episodes of bright red blood per rectum.  Pt was last hospitalized in 8/2019 for symptomatic anemia  and had EGD and colonoscopy during that time which showed grade 2 esophageal varices with red azeb sign, hiatal hernia, gastric non-bleeding AVM, three gastric masses with small ulceration, colonic polyp, moderately severe diverticulosis and large external hemorrhoids. Pathology from gastric masses was negative for dysplasia or malignancy.       Patient had multiple small polyp removed, diverticulosis, proctitis and hemorrhoids. Still with rectal bleeding. Refused rowasa enema. had 1 unit PRBC yesterday. Hgb pending    REVIEW OF SYSTEMS:  Constitutional: No fever, weight loss or fatigue  ENMT:  No difficulty hearing, tinnitus, vertigo; No sinus or throat pain  Respiratory: No cough, wheezing, chills or hemoptysis  Cardiovascular: No chest pain, palpitations, dizziness or leg swelling  Gastrointestinal: No abdominal or epigastric pain. No nausea, vomiting or hematemesis; No diarrhea or constipation.+ hematochezia.  Skin: No itching, burning, rashes or lesions   Musculoskeletal: No joint pain or swelling; No muscle, back or extremity pain    PAST MEDICAL & SURGICAL HISTORY:  CKD (chronic kidney disease)  CAD (coronary artery disease)  Heart failure  Hepatitis C  Aortic stenosis  Anemia, unspecified anemia type  Asthma  Low back pain: chronic over a year now.  High cholesterol  Hypertension  Diabetes  H/O aortic valve replacement: cow valve.  S/P CABG x 3  History of tonsillectomy      FAMILY HISTORY:  Family history of diabetes mellitus (Sibling)      SOCIAL HISTORY:  Smoking Status: [ ] Current, [ ] Former, [ ] Never  Pack Years:  [  ] EtOH-no  [  ] IVDA    MEDICATIONS:  MEDICATIONS  (STANDING):  amLODIPine   Tablet 5 milliGRAM(s) Oral daily  atorvastatin 20 milliGRAM(s) Oral at bedtime  carvedilol 6.25 milliGRAM(s) Oral every 12 hours  cholecalciferol 2000 Unit(s) Oral daily  dextrose 5%. 1000 milliLiter(s) (50 mL/Hr) IV Continuous <Continuous>  dextrose 50% Injectable 12.5 Gram(s) IV Push once  dextrose 50% Injectable 25 Gram(s) IV Push once  dextrose 50% Injectable 25 Gram(s) IV Push once  folic acid 1 milliGRAM(s) Oral daily  furosemide    Tablet 40 milliGRAM(s) Oral two times a day  gabapentin 300 milliGRAM(s) Oral at bedtime  levothyroxine 100 MICROGram(s) Oral daily  mesalamine Suppository 1000 milliGRAM(s) Rectal every 24 hours  montelukast 10 milliGRAM(s) Oral daily  multivitamin 1 Tablet(s) Oral daily  pantoprazole  Injectable 40 milliGRAM(s) IV Push two times a day  spironolactone 25 milliGRAM(s) Oral daily    MEDICATIONS  (PRN):  dextrose 40% Gel 15 Gram(s) Oral once PRN Blood Glucose LESS THAN 70 milliGRAM(s)/deciliter  glucagon  Injectable 1 milliGRAM(s) IntraMuscular once PRN Glucose LESS THAN 70 milligrams/deciliter  hydrALAZINE Injectable 5 milliGRAM(s) IV Push every 8 hours PRN if sbp >180 or dbp >100  traMADol 25 milliGRAM(s) Oral every 8 hours PRN Moderate Pain (4 - 6)  traMADol 50 milliGRAM(s) Oral every 8 hours PRN Severe Pain (7 - 10)      Allergies    Bactrim (Nephrotoxicity)  Biaxin (Joint Pain)  morphine (Short breath)    Intolerances        Vital Signs Last 24 Hrs  T(C): 36.8 (20 Nov 2019 06:29), Max: 37.1 (19 Nov 2019 16:01)  T(F): 98.2 (20 Nov 2019 06:29), Max: 98.8 (19 Nov 2019 16:01)  HR: 68 (20 Nov 2019 06:29) (63 - 68)  BP: 150/68 (20 Nov 2019 06:29) (148/63 - 164/69)  BP(mean): --  RR: 18 (20 Nov 2019 06:29) (17 - 18)  SpO2: 98% (20 Nov 2019 06:29) (95% - 98%)        PHYSICAL EXAM:    General: Well developed; well nourished; in no acute distress  HEENT: MMM, conjunctiva and sclera clear  H- RRR  L- CTA  Gastrointestinal: Soft, non-tender non-distended; Normal bowel sounds; No rebound or guarding  Extremities: Normal range of motion, No clubbing, cyanosis or edema  Neurological: Alert and oriented x3  Skin: Warm and dry. No obvious rash      LABS:                        8.3    x     )-----------( x        ( 20 Nov 2019 07:59 )             27.2     19 Nov 2019 07:38    140    |  105    |  59.0   ----------------------------<  166    4.5     |  19.0   |  1.90     Ca    8.9        19 Nov 2019 07:38                RADIOLOGY & ADDITIONAL STUDIES:     < from: CT Abdomen and Pelvis No Cont (10.05.17 @ 05:22) >  IMPRESSION:  No evidence of bowel obstruction, active inflammatory process or   intra-abdominal source for infection, within limits of a noncontrast CT   scan.    Cholelithiasis without CT evidence of acute cholecystitis.  Right upper   quadrant ultrasound can be performed as clinically warranted.     Cirrhotic liver.  Splenomegaly and multiple varices suggesting portal   venous hypertension.  No ascites.  MRI surveillance can be performed as   clinically warranted to exclude hepatocellular carcinoma.    < end of copied text >

## 2019-11-20 NOTE — PROGRESS NOTE ADULT - SUBJECTIVE AND OBJECTIVE BOX
[facility] - [facility]                                   [address] [place] [address]                                                      PHONE: [phone]                                                         FAX: [phone]  [email or web address]  ---------------------------------------------------------------------------------------------------------------------------------    Overnight events/patient complaints: patient seen at bedside. She is feeling okay.       Bactrim (Nephrotoxicity)  Biaxin (Joint Pain)  morphine (Short breath)    MEDICATIONS  (STANDING):  amLODIPine   Tablet 5 milliGRAM(s) Oral daily  atorvastatin 20 milliGRAM(s) Oral at bedtime  carvedilol 6.25 milliGRAM(s) Oral every 12 hours  cholecalciferol 2000 Unit(s) Oral daily  dextrose 5%. 1000 milliLiter(s) (50 mL/Hr) IV Continuous <Continuous>  dextrose 50% Injectable 12.5 Gram(s) IV Push once  dextrose 50% Injectable 25 Gram(s) IV Push once  dextrose 50% Injectable 25 Gram(s) IV Push once  folic acid 1 milliGRAM(s) Oral daily  furosemide    Tablet 40 milliGRAM(s) Oral two times a day  gabapentin 300 milliGRAM(s) Oral at bedtime  insulin lispro (HumaLOG) corrective regimen sliding scale   SubCutaneous three times a day before meals  levothyroxine 100 MICROGram(s) Oral daily  mesalamine Suppository 1000 milliGRAM(s) Rectal every 24 hours  montelukast 10 milliGRAM(s) Oral daily  multivitamin 1 Tablet(s) Oral daily  pantoprazole  Injectable 40 milliGRAM(s) IV Push two times a day  spironolactone 25 milliGRAM(s) Oral daily    MEDICATIONS  (PRN):  dextrose 40% Gel 15 Gram(s) Oral once PRN Blood Glucose LESS THAN 70 milliGRAM(s)/deciliter  glucagon  Injectable 1 milliGRAM(s) IntraMuscular once PRN Glucose LESS THAN 70 milligrams/deciliter  hydrALAZINE Injectable 5 milliGRAM(s) IV Push every 8 hours PRN if sbp >180 or dbp >100  traMADol 25 milliGRAM(s) Oral every 8 hours PRN Moderate Pain (4 - 6)  traMADol 50 milliGRAM(s) Oral every 8 hours PRN Severe Pain (7 - 10)      Vital Signs Last 24 Hrs  T(C): 36.8 (20 Nov 2019 08:07), Max: 37.1 (19 Nov 2019 16:01)  T(F): 98.2 (20 Nov 2019 08:07), Max: 98.8 (19 Nov 2019 16:01)  HR: 57 (20 Nov 2019 08:07) (57 - 68)  BP: 136/70 (20 Nov 2019 08:07) (136/70 - 164/69)  BP(mean): --  RR: 18 (20 Nov 2019 08:07) (17 - 18)  SpO2: 97% (20 Nov 2019 08:07) (95% - 98%)  ICU Vital Signs Last 24 Hrs  [facility]  I&O's Detail    Drug Dosing Weight  [facility]      PHYSICAL EXAM:  General: Appears well developed, well nourished alert and cooperative.  HEENT: Head; normocephalic, atraumatic.  Eyes: Pupils reactive, cornea wnl.  Neck: Supple, no nodes adenopathy, no NVD or carotid bruit or thyromegaly.  CARDIOVASCULAR: Normal S1 and S2, No murmur, rub, gallop or lift.   LUNGS: No rales, rhonchi or wheeze. Normal breath sounds bilaterally.  ABDOMEN: Soft, nontender without mass or organomegaly. bowel sounds normoactive.  EXTREMITIES: No clubbing, cyanosis or edema. Distal pulses wnl.   SKIN: warm and dry with normal turgor.  NEURO: Alert/oriented x 3/normal motor exam. No pathologic reflexes.    PSYCH: normal affect.        LABS:                        8.3    x     )-----------( x        ( 20 Nov 2019 07:59 )             27.2     11-20    133<L>  |  100  |  62.0<H>  ----------------------------<  206<H>  4.6   |  20.0<L>  |  2.30<H>    Ca    8.4<L>      20 Nov 2019 07:59      NICHELLE GILL            RADIOLOGY & ADDITIONAL STUDIES:    INTERPRETATION OF TELEMETRY (personally reviewed): sinus rhythm     ASSESSMENT AND PLAN:  In summary, NICHELLE GILL is an 74y Female with past medical history significant forHep C (on Epclusa) from transfusions with cirrhosis, cad-with cabg and Bio AVR in 2016 admitted with BRBPR.     Blood Per Rectum, CAD, CABG, BioAVR -- EGD showed portal gastropathy without active bleeding and colonoscopy showed multiple colonic polyps, severe diverticulosis, and oozing proctitis.  - continue PRBC as needed and GI follow up  - continue home cardiac regimen  - can resume ASA 81 mg daily if/when stable from GI perspective    I will arrange for close outpatient follow up with Dr. Penny.

## 2019-11-21 ENCOUNTER — TRANSCRIPTION ENCOUNTER (OUTPATIENT)
Age: 74
End: 2019-11-21

## 2019-11-21 VITALS
TEMPERATURE: 98 F | DIASTOLIC BLOOD PRESSURE: 69 MMHG | RESPIRATION RATE: 18 BRPM | SYSTOLIC BLOOD PRESSURE: 172 MMHG | HEART RATE: 66 BPM

## 2019-11-21 LAB
ANION GAP SERPL CALC-SCNC: 16 MMOL/L — SIGNIFICANT CHANGE UP (ref 5–17)
BUN SERPL-MCNC: 68 MG/DL — HIGH (ref 8–20)
CALCIUM SERPL-MCNC: 8.8 MG/DL — SIGNIFICANT CHANGE UP (ref 8.6–10.2)
CHLORIDE SERPL-SCNC: 105 MMOL/L — SIGNIFICANT CHANGE UP (ref 98–107)
CO2 SERPL-SCNC: 18 MMOL/L — LOW (ref 22–29)
CREAT SERPL-MCNC: 2.08 MG/DL — HIGH (ref 0.5–1.3)
GLUCOSE BLDC GLUCOMTR-MCNC: 170 MG/DL — HIGH (ref 70–99)
GLUCOSE BLDC GLUCOMTR-MCNC: 213 MG/DL — HIGH (ref 70–99)
GLUCOSE SERPL-MCNC: 167 MG/DL — HIGH (ref 70–115)
HCT VFR BLD CALC: 29.5 % — LOW (ref 34.5–45)
HGB BLD-MCNC: 9 G/DL — LOW (ref 11.5–15.5)
POTASSIUM SERPL-MCNC: 4.5 MMOL/L — SIGNIFICANT CHANGE UP (ref 3.5–5.3)
POTASSIUM SERPL-SCNC: 4.5 MMOL/L — SIGNIFICANT CHANGE UP (ref 3.5–5.3)
SODIUM SERPL-SCNC: 139 MMOL/L — SIGNIFICANT CHANGE UP (ref 135–145)
SURGICAL PATHOLOGY STUDY: SIGNIFICANT CHANGE UP

## 2019-11-21 PROCEDURE — 84100 ASSAY OF PHOSPHORUS: CPT

## 2019-11-21 PROCEDURE — 88305 TISSUE EXAM BY PATHOLOGIST: CPT

## 2019-11-21 PROCEDURE — 83036 HEMOGLOBIN GLYCOSYLATED A1C: CPT

## 2019-11-21 PROCEDURE — 81479 UNLISTED MOLECULAR PATHOLOGY: CPT

## 2019-11-21 PROCEDURE — 86850 RBC ANTIBODY SCREEN: CPT

## 2019-11-21 PROCEDURE — 85027 COMPLETE CBC AUTOMATED: CPT

## 2019-11-21 PROCEDURE — 83550 IRON BINDING TEST: CPT

## 2019-11-21 PROCEDURE — 71045 X-RAY EXAM CHEST 1 VIEW: CPT

## 2019-11-21 PROCEDURE — 80048 BASIC METABOLIC PNL TOTAL CA: CPT

## 2019-11-21 PROCEDURE — 80053 COMPREHEN METABOLIC PANEL: CPT

## 2019-11-21 PROCEDURE — 85610 PROTHROMBIN TIME: CPT

## 2019-11-21 PROCEDURE — P9016: CPT

## 2019-11-21 PROCEDURE — 86140 C-REACTIVE PROTEIN: CPT

## 2019-11-21 PROCEDURE — 99239 HOSP IP/OBS DSCHRG MGMT >30: CPT

## 2019-11-21 PROCEDURE — 36430 TRANSFUSION BLD/BLD COMPNT: CPT

## 2019-11-21 PROCEDURE — 99285 EMERGENCY DEPT VISIT HI MDM: CPT | Mod: 25

## 2019-11-21 PROCEDURE — 83735 ASSAY OF MAGNESIUM: CPT

## 2019-11-21 PROCEDURE — 85018 HEMOGLOBIN: CPT

## 2019-11-21 PROCEDURE — 83540 ASSAY OF IRON: CPT

## 2019-11-21 PROCEDURE — 86923 COMPATIBILITY TEST ELECTRIC: CPT

## 2019-11-21 PROCEDURE — 82962 GLUCOSE BLOOD TEST: CPT

## 2019-11-21 PROCEDURE — 36415 COLL VENOUS BLD VENIPUNCTURE: CPT

## 2019-11-21 PROCEDURE — 83520 IMMUNOASSAY QUANT NOS NONAB: CPT

## 2019-11-21 PROCEDURE — 85730 THROMBOPLASTIN TIME PARTIAL: CPT

## 2019-11-21 PROCEDURE — 99232 SBSQ HOSP IP/OBS MODERATE 35: CPT

## 2019-11-21 PROCEDURE — 82728 ASSAY OF FERRITIN: CPT

## 2019-11-21 PROCEDURE — 83690 ASSAY OF LIPASE: CPT

## 2019-11-21 PROCEDURE — 84466 ASSAY OF TRANSFERRIN: CPT

## 2019-11-21 PROCEDURE — 85014 HEMATOCRIT: CPT

## 2019-11-21 PROCEDURE — 86900 BLOOD TYPING SEROLOGIC ABO: CPT

## 2019-11-21 PROCEDURE — 88350 IMFLUOR EA ADDL 1ANTB STN PX: CPT

## 2019-11-21 PROCEDURE — 86901 BLOOD TYPING SEROLOGIC RH(D): CPT

## 2019-11-21 PROCEDURE — 88346 IMFLUOR 1ST 1ANTB STAIN PX: CPT

## 2019-11-21 PROCEDURE — 93005 ELECTROCARDIOGRAM TRACING: CPT

## 2019-11-21 RX ORDER — SPIRONOLACTONE 25 MG/1
1 TABLET, FILM COATED ORAL
Qty: 0 | Refills: 0 | DISCHARGE
Start: 2019-11-21

## 2019-11-21 RX ORDER — GABAPENTIN 400 MG/1
1 CAPSULE ORAL
Qty: 0 | Refills: 0 | DISCHARGE
Start: 2019-11-21

## 2019-11-21 RX ORDER — MONTELUKAST 4 MG/1
1 TABLET, CHEWABLE ORAL
Qty: 0 | Refills: 0 | DISCHARGE
Start: 2019-11-21

## 2019-11-21 RX ORDER — FOLIC ACID 0.8 MG
1 TABLET ORAL
Qty: 0 | Refills: 0 | DISCHARGE
Start: 2019-11-21

## 2019-11-21 RX ORDER — LEVOTHYROXINE SODIUM 125 MCG
1 TABLET ORAL
Qty: 0 | Refills: 0 | DISCHARGE
Start: 2019-11-21

## 2019-11-21 RX ORDER — CHOLECALCIFEROL (VITAMIN D3) 125 MCG
1 CAPSULE ORAL
Qty: 0 | Refills: 0 | DISCHARGE

## 2019-11-21 RX ORDER — SPIRONOLACTONE 25 MG/1
1 TABLET, FILM COATED ORAL
Qty: 0 | Refills: 0 | DISCHARGE

## 2019-11-21 RX ORDER — CHLORHEXIDINE GLUCONATE 213 G/1000ML
1 SOLUTION TOPICAL
Refills: 0 | Status: DISCONTINUED | OUTPATIENT
Start: 2019-11-21 | End: 2019-11-21

## 2019-11-21 RX ORDER — TRAMADOL HYDROCHLORIDE 50 MG/1
0.5 TABLET ORAL
Qty: 0 | Refills: 0 | DISCHARGE
Start: 2019-11-21

## 2019-11-21 RX ORDER — AMLODIPINE BESYLATE 2.5 MG/1
1 TABLET ORAL
Qty: 0 | Refills: 0 | DISCHARGE
Start: 2019-11-21

## 2019-11-21 RX ORDER — DIPHENHYDRAMINE HCL 50 MG
1 CAPSULE ORAL
Qty: 0 | Refills: 0 | DISCHARGE
Start: 2019-11-21

## 2019-11-21 RX ORDER — DIPHENHYDRAMINE HCL 50 MG
25 CAPSULE ORAL EVERY 8 HOURS
Refills: 0 | Status: DISCONTINUED | OUTPATIENT
Start: 2019-11-21 | End: 2019-11-21

## 2019-11-21 RX ORDER — GABAPENTIN 400 MG/1
1 CAPSULE ORAL
Qty: 0 | Refills: 0 | DISCHARGE

## 2019-11-21 RX ORDER — CHOLECALCIFEROL (VITAMIN D3) 125 MCG
2000 CAPSULE ORAL
Qty: 0 | Refills: 0 | DISCHARGE
Start: 2019-11-21

## 2019-11-21 RX ORDER — CARVEDILOL PHOSPHATE 80 MG/1
1 CAPSULE, EXTENDED RELEASE ORAL
Qty: 0 | Refills: 0 | DISCHARGE
Start: 2019-11-21

## 2019-11-21 RX ORDER — MESALAMINE 400 MG
1 TABLET, DELAYED RELEASE (ENTERIC COATED) ORAL
Qty: 30 | Refills: 0
Start: 2019-11-21 | End: 2019-12-20

## 2019-11-21 RX ADMIN — PANTOPRAZOLE SODIUM 40 MILLIGRAM(S): 20 TABLET, DELAYED RELEASE ORAL at 06:06

## 2019-11-21 RX ADMIN — Medication 300 UNIT(S): at 12:43

## 2019-11-21 RX ADMIN — CHLORHEXIDINE GLUCONATE 1 APPLICATION(S): 213 SOLUTION TOPICAL at 06:07

## 2019-11-21 RX ADMIN — CARVEDILOL PHOSPHATE 6.25 MILLIGRAM(S): 80 CAPSULE, EXTENDED RELEASE ORAL at 06:07

## 2019-11-21 RX ADMIN — Medication 100 MICROGRAM(S): at 06:06

## 2019-11-21 RX ADMIN — AMLODIPINE BESYLATE 5 MILLIGRAM(S): 2.5 TABLET ORAL at 06:06

## 2019-11-21 RX ADMIN — Medication 25 MILLIGRAM(S): at 12:20

## 2019-11-21 RX ADMIN — MONTELUKAST 10 MILLIGRAM(S): 4 TABLET, CHEWABLE ORAL at 12:21

## 2019-11-21 RX ADMIN — Medication 1 TABLET(S): at 12:20

## 2019-11-21 RX ADMIN — Medication 1000 MILLIGRAM(S): at 12:43

## 2019-11-21 RX ADMIN — Medication 1: at 07:38

## 2019-11-21 RX ADMIN — Medication 2000 UNIT(S): at 12:21

## 2019-11-21 RX ADMIN — Medication 2: at 12:21

## 2019-11-21 RX ADMIN — Medication 1 MILLIGRAM(S): at 12:21

## 2019-11-21 RX ADMIN — SPIRONOLACTONE 25 MILLIGRAM(S): 25 TABLET, FILM COATED ORAL at 06:07

## 2019-11-21 NOTE — DISCHARGE NOTE PROVIDER - NSDCMRMEDTOKEN_GEN_ALL_CORE_FT
albuterol 2.5 mg/3 mL (0.083%) inhalation solution: 3 milliliter(s) inhaled every 6 hours, As Needed  Aller-Juhi 10 mg oral tablet: 1 tab(s) orally once a day, As Needed  amLODIPine 5 mg oral tablet: 1 tab(s) orally once a day  Aranesp Albumin Free: once weekly at MD&#x27;s office  carvedilol 6.25 mg oral tablet: 1 tab(s) orally every 12 hours  cholecalciferol oral tablet: 2000 unit(s) orally once a day  diphenhydrAMINE 25 mg oral capsule: 1 cap(s) orally every 8 hours, As needed, Rash and/or Itching  Epclusa 400 mg-100 mg oral tablet: 1 tab(s) orally once a day  folic acid 1 mg oral tablet: 1 tab(s) orally once a day  gabapentin 300 mg oral capsule: 1 cap(s) orally once a day (at bedtime)  levothyroxine 100 mcg (0.1 mg) oral tablet: 1 tab(s) orally once a day  magnesium oxide 400 mg (241.3 mg elemental magnesium) oral tablet: 1 tab(s) orally every other day  mesalamine 1000 mg rectal suppository: 1 suppository(ies) rectal every 24 hours  montelukast 10 mg oral tablet: 1 tab(s) orally once a day  Multiple Vitamins oral tablet: 1 tab(s) orally once a day  pravastatin 80 mg oral tablet: 1 tab(s) orally once a day  Protonix 40 mg oral delayed release tablet: 1 tab(s) orally once a day  spironolactone 25 mg oral tablet: 1 tab(s) orally once a day  traMADol 50 mg oral tablet: 0.5 tab(s) orally every 8 hours, As needed, Moderate Pain (4 - 6)  Tresiba 100 units/mL subcutaneous solution: 10 unit(s) subcutaneous once a day

## 2019-11-21 NOTE — DISCHARGE NOTE PROVIDER - CARE PROVIDERS DIRECT ADDRESSES
[email or web address]

## 2019-11-21 NOTE — DISCHARGE NOTE PROVIDER - CARE PROVIDER_API CALL
Gustavo Howell)  Gastroenterology; Internal Medicine  [address], [address]  [place] [address]  Phone: [phone]  Fax: [phone]  Follow Up Time:     Helio Pandey (DO)  Family Medicine  [address] [place] [address]  Phone: [phone]  Fax: [phone]  Follow Up Time:

## 2019-11-21 NOTE — DISCHARGE NOTE PROVIDER - HOSPITAL COURSE
75 yo F with extensive Hx including hepatitis C cirrhosis with esophageal varices, AVMs, chronic diastolic HFpEF, CAD s/p CABG, TIA s/p ILR (2017), AS s/p bovine AVR (2016) complicated by post-operative Afib, asthma, chronic anemia requiring PRBC transfusions also with hx of unstable bradycardia in setting of hyperkalemia and metabolic acidosis requiring MICU for dopamine gtt and temporary HD (7/2019) currently admitted for acute on chronic blood loss anemia due to lower GI bleeding, BRB per rectum. Given extensive hx GI consulted and pt s/p egd, showing duodenitis, but no evidence of bleeding, thereafter had colonoscopy performed on 11/19/19 showing multiple polyps as well as oozing proctitis. Recommended for mesalamine enemas which the pt initially refused then accepted mesalamine suppositories. S/p 3 u prbc with stable hgb now 9. No longer bleeding but as per GI pt will likely continue to have intermittent low grade bleeding. Pt cleared by GI for disposition. Pt to c/w aranesp from pmd office. Hospital course complicated by MARGUERITE on CKD3, baseline creatine 1.8-1.9 and trended up to 2.8, now down trending to 2. Pt follows with nephrologist Dr. Cruz. Pt was advised to discontinue lasix when she was having GIB, she refused to not take the medication although risks were explained and given this likely cause of acute injury is from further hypovolemia. She has not taken lasix x 24 hours with improvement in renal fxn. Advised to f/u with Dr. Pandey tomorrow to have blood work checked and reinstate lasix as pr Dr. Pandey and Dr. Cruz. Pt to f/u with GI in 1-2 weeks Dr. Hull. Pt medically stable to be discharged home.             Slow clinical improvement.         Vital Signs Last 24 Hrs    T(C): 36.6 (21 Nov 2019 08:07), Max: 37.6 (20 Nov 2019 20:11)    T(F): 97.9 (21 Nov 2019 08:07), Max: 99.6 (20 Nov 2019 20:11)    HR: 66 (21 Nov 2019 08:07) (65 - 72)    BP: 172/69 (21 Nov 2019 08:07) (160/56 - 196/66)    BP(mean): --    RR: 18 (21 Nov 2019 08:07) (18 - 18)    SpO2: 97% (21 Nov 2019 06:02) (97% - 100%)        CONSTITUTIONAL: Well appearing, well nourished, awake, alert and in no apparent distress    CARDIAC: Normal rate, regular rhythm.  Heart sounds S1, S2.  No murmurs, rubs or gallops     RESPIRATORY: Breath sounds clear and equal bilaterally. No wheezes, rhales or rhonchi    GASTROENTEROLOGY: Soft nt nd bs + normoactive     EXTREMITIES: No edema, cyanosis or deformity     NEUROLOGICAL: Alert and oriented, no focal deficits, no motor or sensory deficits.  B/l le varicose veins     SKIN: No rash, skin turgor wnl    Chest: R port in place C/D/I                 Discharge planning took 46 minutes

## 2019-11-21 NOTE — PROGRESS NOTE ADULT - PROBLEM SELECTOR PLAN 2
- Patient on Epclusa. Continue. Will finish in about 3 weeks. Hep C RNA to be check upon completion of meds

## 2019-11-21 NOTE — PROGRESS NOTE ADULT - SUBJECTIVE AND OBJECTIVE BOX
Patient is a 74y old  Female who presents with a chief complaint of GI bleeding (20 Nov 2019 13:58)      HPI:  74yoF with extensive PMHx including hepatitis C cirrhosis with esophageal varices, HFpEF, CAD s/p CABG, TIA s/p ILR (2017), AS s/p bovine AVR (2016) complicated by post-operative Afib, asthma, chronic anemia requiring pRBC . Colonoscopy showed proctitis, diverituclosis, hemorrhoids and polyps.  NO further BM or rectal bleeding in 2 days. No abdominal pain        REVIEW OF SYSTEMS:  Constitutional: No fever, weight loss or fatigue  ENMT:  No difficulty hearing, tinnitus, vertigo; No sinus or throat pain  Respiratory: No cough, wheezing, chills or hemoptysis  Cardiovascular: No chest pain, palpitations, dizziness or leg swelling  Gastrointestinal: No abdominal or epigastric pain. No nausea, vomiting or hematemesis; No diarrhea or constipation. No melena or hematochezia.  Skin: No itching, burning, rashes or lesions   Musculoskeletal: No joint pain or swelling; No muscle, back or extremity pain    PAST MEDICAL & SURGICAL HISTORY:  CKD (chronic kidney disease)  CAD (coronary artery disease)  Heart failure  Hepatitis C  Aortic stenosis  Anemia, unspecified anemia type  Asthma  Low back pain: chronic over a year now.  High cholesterol  Hypertension  Diabetes  H/O aortic valve replacement: cow valve.  S/P CABG x 3  History of tonsillectomy      FAMILY HISTORY:  Family history of diabetes mellitus (Sibling)      SOCIAL HISTORY:  Smoking Status: [ ] Current, [ ] Former, [ ] Never  Pack Years:  [  ] EtOH-no  [  ] IVDA    MEDICATIONS:  MEDICATIONS  (STANDING):  amLODIPine   Tablet 5 milliGRAM(s) Oral daily  atorvastatin 20 milliGRAM(s) Oral at bedtime  carvedilol 6.25 milliGRAM(s) Oral every 12 hours  chlorhexidine 2% Cloths 1 Application(s) Topical two times a day  cholecalciferol 2000 Unit(s) Oral daily  dextrose 5%. 1000 milliLiter(s) (50 mL/Hr) IV Continuous <Continuous>  dextrose 50% Injectable 12.5 Gram(s) IV Push once  dextrose 50% Injectable 25 Gram(s) IV Push once  dextrose 50% Injectable 25 Gram(s) IV Push once  folic acid 1 milliGRAM(s) Oral daily  gabapentin 300 milliGRAM(s) Oral at bedtime  insulin lispro (HumaLOG) corrective regimen sliding scale   SubCutaneous three times a day before meals  levothyroxine 100 MICROGram(s) Oral daily  mesalamine Suppository 1000 milliGRAM(s) Rectal every 24 hours  montelukast 10 milliGRAM(s) Oral daily  multivitamin 1 Tablet(s) Oral daily  pantoprazole  Injectable 40 milliGRAM(s) IV Push two times a day  spironolactone 25 milliGRAM(s) Oral daily    MEDICATIONS  (PRN):  dextrose 40% Gel 15 Gram(s) Oral once PRN Blood Glucose LESS THAN 70 milliGRAM(s)/deciliter  glucagon  Injectable 1 milliGRAM(s) IntraMuscular once PRN Glucose LESS THAN 70 milligrams/deciliter  hydrALAZINE Injectable 5 milliGRAM(s) IV Push every 8 hours PRN if sbp >180 or dbp >100  traMADol 25 milliGRAM(s) Oral every 8 hours PRN Moderate Pain (4 - 6)  traMADol 50 milliGRAM(s) Oral every 8 hours PRN Severe Pain (7 - 10)      Allergies    Bactrim (Nephrotoxicity)  Biaxin (Joint Pain)  morphine (Short breath)    Intolerances        Vital Signs Last 24 Hrs  T(C): 36.8 (21 Nov 2019 06:02), Max: 37.6 (20 Nov 2019 20:11)  T(F): 98.3 (21 Nov 2019 06:02), Max: 99.6 (20 Nov 2019 20:11)  HR: 65 (21 Nov 2019 06:02) (57 - 72)  BP: 166/69 (21 Nov 2019 06:02) (136/70 - 196/66)  BP(mean): --  RR: 18 (21 Nov 2019 06:02) (18 - 18)  SpO2: 97% (21 Nov 2019 06:02) (97% - 100%)    11-20 @ 07:01  -  11-21 @ 07:00  --------------------------------------------------------  IN: 900 mL / OUT: 0 mL / NET: 900 mL          PHYSICAL EXAM:    General: Well developed; well nourished; in no acute distress  HEENT: MMM, conjunctiva and sclera clear  H- RRR  L- CTA  Gastrointestinal: Soft, non-tender non-distended; Normal bowel sounds; No rebound or guarding  Extremities: Normal range of motion, No clubbing, cyanosis or edema  Neurological: Alert and oriented x3  Skin: Warm and dry. No obvious rash      LABS:                        8.3    x     )-----------( x        ( 20 Nov 2019 07:59 )             27.2     20 Nov 2019 19:46    136    |  102    |  65.0   ----------------------------<  183    4.5     |  18.0   |  2.17     Ca    8.5        20 Nov 2019 19:46  Phos  4.0       20 Nov 2019 19:46  Mg     2.1       20 Nov 2019 19:46          Iron - Total Binding Capacity.: 297 ug/dL (11-20-19 @ 07:59)  Iron Total, Serum: 37 ug/dL (11-20-19 @ 07:59)  Ferritin, Serum: 69 ng/mL (11-20-19 @ 07:59)        RADIOLOGY & ADDITIONAL STUDIES:     < from: Xray Chest 1 View- PORTABLE-Urgent (11.18.19 @ 02:21) >  Impression:    Interval resolution of the previously noted small left-sided pleural   effusion.      < end of copied text >

## 2019-11-21 NOTE — DISCHARGE NOTE NURSING/CASE MANAGEMENT/SOCIAL WORK - PATIENT PORTAL LINK FT
You can access the FollowMyHealth Patient Portal offered by [facility] by registering at the following website: [email or web address]. By joining [email or web address]’s FollowMyHealth portal, you will also be able to view your health information using other applications (apps) compatible with our system.

## 2019-11-21 NOTE — PROGRESS NOTE ADULT - PROBLEM SELECTOR PLAN 1
- proctitis, hemorrhoids  - diverticulosis, colon polyps  - check IBD panel  _ check hemoglobin today. Pt maybe D/Lorenzo home today. F/U with Dr Tong in 2 weeks. I explained to pt that with proctitis, she should expect some blood with Bm, but should be less and less with each BM  - canasa suppository q hs

## 2019-11-21 NOTE — DISCHARGE NOTE PROVIDER - NSDCFUADDAPPT_GEN_ALL_CORE_FT
Please follow up with Dr. Hull in 2 weeks, please follow up with Dr. Cruz in 1-2 weeks. Please follow up with Dr. Pandey tomorrow.

## 2019-11-21 NOTE — DISCHARGE NOTE PROVIDER - NSDCCPCAREPLAN_GEN_ALL_CORE_FT
PRINCIPAL DISCHARGE DIAGNOSIS  Diagnosis: Rectal bleeding  Assessment and Plan of Treatment: Continue with suppositories as prescribed. You were found to have proctitis and this may cause you to continue to have some low volume bleeding. Please check you hemoglobin level with your primary care physician tomorrow. Please continue with aranesp as per your primary care physician. You also were noted to have polyps which were biopsied and need to follow up with Dr. Hull in 1-2 weeks for the results as well as further management. Current hemoglobin is 9/29.      SECONDARY DISCHARGE DIAGNOSES  Diagnosis: Acute kidney failure  Assessment and Plan of Treatment: creatine up trended to 2,8 now down trending to 2. Baseline is around 1.8. Please follow up with Dr. Pandey and check your kidney function. Please avoid kidney toxic medications. Continue to hold your lasix until Dr. Pandey advises you to restart it. Please follow up with the nephrologist in 1-2 weeks.    Diagnosis: Essential hypertension  Assessment and Plan of Treatment: Continue with home medicaitons, lasix is on hold.    Diagnosis: Chronic diastolic heart failure  Assessment and Plan of Treatment: Continue with home medicaitons, lasix is on hold. FLuid restriction 1.2 liters.    Diagnosis: Asthma  Assessment and Plan of Treatment: Continue with home medicaitons.    Diagnosis: Type 2 diabetes mellitus  Assessment and Plan of Treatment: HBA1C: 6.1, stable Continue with home medications and diet control.    Diagnosis: Chronic hepatitis C without hepatic coma  Assessment and Plan of Treatment: Continue with management as per the specialist.

## 2019-11-27 ENCOUNTER — OTHER (OUTPATIENT)
Age: 74
End: 2019-11-27

## 2019-12-11 LAB — IBD AB 7 PNL SER: SIGNIFICANT CHANGE UP

## 2019-12-13 ENCOUNTER — APPOINTMENT (OUTPATIENT)
Dept: GASTROENTEROLOGY | Facility: CLINIC | Age: 74
End: 2019-12-13
Payer: MEDICARE

## 2019-12-13 VITALS
DIASTOLIC BLOOD PRESSURE: 74 MMHG | WEIGHT: 160 LBS | BODY MASS INDEX: 32.69 KG/M2 | HEART RATE: 77 BPM | OXYGEN SATURATION: 97 % | SYSTOLIC BLOOD PRESSURE: 118 MMHG | HEIGHT: 58.75 IN | RESPIRATION RATE: 14 BRPM

## 2019-12-13 PROCEDURE — 99214 OFFICE O/P EST MOD 30 MIN: CPT

## 2019-12-13 NOTE — PHYSICAL EXAM
[General Appearance - Alert] : alert [General Appearance - In No Acute Distress] : in no acute distress [Sclera] : the sclera and conjunctiva were normal [PERRL With Normal Accommodation] : pupils were equal in size, round, and reactive to light [Extraocular Movements] : extraocular movements were intact [Outer Ear] : the ears and nose were normal in appearance [Neck Appearance] : the appearance of the neck was normal [Oropharynx] : the oropharynx was normal [Neck Cervical Mass (___cm)] : no neck mass was observed [Jugular Venous Distention Increased] : there was no jugular-venous distention [Thyroid Diffuse Enlargement] : the thyroid was not enlarged [Thyroid Nodule] : there were no palpable thyroid nodules [Auscultation Breath Sounds / Voice Sounds] : lungs were clear to auscultation bilaterally [Heart Rate And Rhythm] : heart rate was normal and rhythm regular [Heart Sounds] : normal S1 and S2 [Heart Sounds Gallop] : no gallops [Murmurs] : no murmurs [Heart Sounds Pericardial Friction Rub] : no pericardial rub [Full Pulse] : the pedal pulses are present [Edema] : there was no peripheral edema [Bowel Sounds] : normal bowel sounds [Abdomen Soft] : soft [Abdomen Tenderness] : non-tender [Abdomen Mass (___ Cm)] : no abdominal mass palpated [Cervical Lymph Nodes Enlarged Posterior Bilaterally] : posterior cervical [Cervical Lymph Nodes Enlarged Anterior Bilaterally] : anterior cervical [No CVA Tenderness] : no ~M costovertebral angle tenderness [Supraclavicular Lymph Nodes Enlarged Bilaterally] : supraclavicular [No Spinal Tenderness] : no spinal tenderness [Abnormal Walk] : normal gait [Nail Clubbing] : no clubbing  or cyanosis of the fingernails [Musculoskeletal - Swelling] : no joint swelling seen [Motor Tone] : muscle strength and tone were normal [Skin Color & Pigmentation] : normal skin color and pigmentation [Skin Turgor] : normal skin turgor [] : no rash [No Focal Deficits] : no focal deficits [Impaired Insight] : insight and judgment were intact [Oriented To Time, Place, And Person] : oriented to person, place, and time [Affect] : the affect was normal

## 2019-12-24 NOTE — HISTORY OF PRESENT ILLNESS
[de-identified] : Patient arrived for followup appointment. Patient was recently admitted to the hospital for rectal bleeding. She has history of hepatitis C genotype 2 related cirrhosis. She had esophageal varices requiring banding in the past. The recent EGD did not reveal any evidence of esophageal varices. She underwent colonoscopy which revealed diverticulosis, proctitis and colon polyps. She has been on epclusa for hepatitis C therapy. There was no check for the four-week blood and old. She has history of chronic anemia. She also has a history of diabetes. She has chronic kidney disease. She does not take any NSAIDs. She is still having some rectal bleeding. She is taking mesalamine suppositories.

## 2019-12-24 NOTE — ASSESSMENT
[FreeTextEntry1] : At this time on recommending to repeat the labs and finish up Epclusa. We will need to check the viral load. It may be a possibility of proctitis related to Epclusa.I recommended to continue mesalamine therapy. The patient does not want to use any enemas. We'll follow up in 4-6 weeks. We need to do HCC surveillance every 6 months.

## 2020-01-01 ENCOUNTER — TRANSCRIPTION ENCOUNTER (OUTPATIENT)
Age: 75
End: 2020-01-01

## 2020-01-01 ENCOUNTER — APPOINTMENT (OUTPATIENT)
Dept: GASTROENTEROLOGY | Facility: CLINIC | Age: 75
End: 2020-01-01
Payer: MEDICARE

## 2020-01-01 ENCOUNTER — INPATIENT (INPATIENT)
Facility: HOSPITAL | Age: 75
LOS: 2 days | Discharge: HOME | DRG: 811 | End: 2020-10-20
Attending: HOSPITALIST | Admitting: HOSPITALIST
Payer: MEDICARE

## 2020-01-01 ENCOUNTER — EMERGENCY (EMERGENCY)
Facility: HOSPITAL | Age: 75
LOS: 1 days | Discharge: HOME | End: 2020-01-01
Attending: EMERGENCY MEDICINE
Payer: MEDICARE

## 2020-01-01 ENCOUNTER — LABORATORY RESULT (OUTPATIENT)
Age: 75
End: 2020-01-01

## 2020-01-01 ENCOUNTER — APPOINTMENT (OUTPATIENT)
Dept: COLORECTAL SURGERY | Facility: CLINIC | Age: 75
End: 2020-01-01
Payer: MEDICARE

## 2020-01-01 VITALS
HEART RATE: 66 BPM | OXYGEN SATURATION: 96 % | RESPIRATION RATE: 20 BRPM | DIASTOLIC BLOOD PRESSURE: 68 MMHG | SYSTOLIC BLOOD PRESSURE: 135 MMHG

## 2020-01-01 VITALS
WEIGHT: 171.08 LBS | RESPIRATION RATE: 20 BRPM | DIASTOLIC BLOOD PRESSURE: 74 MMHG | SYSTOLIC BLOOD PRESSURE: 149 MMHG | HEART RATE: 77 BPM | HEIGHT: 58.5 IN | OXYGEN SATURATION: 96 % | TEMPERATURE: 98 F

## 2020-01-01 VITALS
BODY MASS INDEX: 37.72 KG/M2 | TEMPERATURE: 97.2 F | WEIGHT: 163 LBS | DIASTOLIC BLOOD PRESSURE: 68 MMHG | HEIGHT: 55 IN | SYSTOLIC BLOOD PRESSURE: 119 MMHG | HEART RATE: 76 BPM

## 2020-01-01 VITALS
TEMPERATURE: 98 F | HEART RATE: 83 BPM | HEIGHT: 58.5 IN | DIASTOLIC BLOOD PRESSURE: 89 MMHG | RESPIRATION RATE: 20 BRPM | OXYGEN SATURATION: 98 % | WEIGHT: 169.98 LBS | SYSTOLIC BLOOD PRESSURE: 177 MMHG

## 2020-01-01 VITALS
BODY MASS INDEX: 38.18 KG/M2 | SYSTOLIC BLOOD PRESSURE: 110 MMHG | WEIGHT: 165 LBS | OXYGEN SATURATION: 96 % | HEART RATE: 78 BPM | DIASTOLIC BLOOD PRESSURE: 64 MMHG | RESPIRATION RATE: 14 BRPM | TEMPERATURE: 97.3 F | HEIGHT: 55 IN

## 2020-01-01 VITALS
HEART RATE: 83 BPM | TEMPERATURE: 98 F | DIASTOLIC BLOOD PRESSURE: 74 MMHG | SYSTOLIC BLOOD PRESSURE: 170 MMHG | OXYGEN SATURATION: 99 %

## 2020-01-01 VITALS
DIASTOLIC BLOOD PRESSURE: 70 MMHG | TEMPERATURE: 99 F | SYSTOLIC BLOOD PRESSURE: 173 MMHG | OXYGEN SATURATION: 98 % | HEART RATE: 86 BPM | RESPIRATION RATE: 18 BRPM

## 2020-01-01 VITALS
OXYGEN SATURATION: 100 % | SYSTOLIC BLOOD PRESSURE: 129 MMHG | RESPIRATION RATE: 18 BRPM | DIASTOLIC BLOOD PRESSURE: 55 MMHG | TEMPERATURE: 99 F | HEART RATE: 51 BPM | HEIGHT: 58.5 IN

## 2020-01-01 DIAGNOSIS — Z95.2 PRESENCE OF PROSTHETIC HEART VALVE: Chronic | ICD-10-CM

## 2020-01-01 DIAGNOSIS — Z98.89 OTHER SPECIFIED POSTPROCEDURAL STATES: Chronic | ICD-10-CM

## 2020-01-01 DIAGNOSIS — Z95.1 PRESENCE OF AORTOCORONARY BYPASS GRAFT: Chronic | ICD-10-CM

## 2020-01-01 DIAGNOSIS — Z95.828 PRESENCE OF OTHER VASCULAR IMPLANTS AND GRAFTS: Chronic | ICD-10-CM

## 2020-01-01 DIAGNOSIS — Z98.890 OTHER SPECIFIED POSTPROCEDURAL STATES: Chronic | ICD-10-CM

## 2020-01-01 DIAGNOSIS — I50.9 HEART FAILURE, UNSPECIFIED: ICD-10-CM

## 2020-01-01 DIAGNOSIS — D01.3 CARCINOMA IN SITU OF ANUS AND ANAL CANAL: ICD-10-CM

## 2020-01-01 LAB
ALBUMIN SERPL ELPH-MCNC: 3.5 G/DL — SIGNIFICANT CHANGE UP (ref 3.3–5.2)
ALBUMIN SERPL ELPH-MCNC: 3.6 G/DL — SIGNIFICANT CHANGE UP (ref 3.3–5.2)
ALP SERPL-CCNC: 114 U/L — SIGNIFICANT CHANGE UP (ref 40–120)
ALP SERPL-CCNC: 142 U/L — HIGH (ref 40–120)
ALT FLD-CCNC: 17 U/L — SIGNIFICANT CHANGE UP
ALT FLD-CCNC: 21 U/L — SIGNIFICANT CHANGE UP
ANION GAP SERPL CALC-SCNC: 10 MMOL/L — SIGNIFICANT CHANGE UP (ref 5–17)
ANION GAP SERPL CALC-SCNC: 10 MMOL/L — SIGNIFICANT CHANGE UP (ref 5–17)
ANION GAP SERPL CALC-SCNC: 11 MMOL/L — SIGNIFICANT CHANGE UP (ref 5–17)
ANION GAP SERPL CALC-SCNC: 11 MMOL/L — SIGNIFICANT CHANGE UP (ref 5–17)
ANION GAP SERPL CALC-SCNC: 12 MMOL/L — SIGNIFICANT CHANGE UP (ref 5–17)
ANION GAP SERPL CALC-SCNC: 9 MMOL/L — SIGNIFICANT CHANGE UP (ref 5–17)
ANION GAP SERPL CALC-SCNC: 9 MMOL/L — SIGNIFICANT CHANGE UP (ref 5–17)
ANISOCYTOSIS BLD QL: SLIGHT — SIGNIFICANT CHANGE UP
APTT BLD: 30.7 SEC — SIGNIFICANT CHANGE UP (ref 27.5–35.5)
APTT BLD: 39.7 SEC — HIGH (ref 27.5–35.5)
APTT BLD: 98 SEC — HIGH (ref 27.5–35.5)
AST SERPL-CCNC: 27 U/L — SIGNIFICANT CHANGE UP
AST SERPL-CCNC: 30 U/L — SIGNIFICANT CHANGE UP
BASOPHILS # BLD AUTO: 0 K/UL — SIGNIFICANT CHANGE UP (ref 0–0.2)
BASOPHILS # BLD AUTO: 0.02 K/UL — SIGNIFICANT CHANGE UP (ref 0–0.2)
BASOPHILS # BLD AUTO: 0.03 K/UL — SIGNIFICANT CHANGE UP (ref 0–0.2)
BASOPHILS NFR BLD AUTO: 0 % — SIGNIFICANT CHANGE UP (ref 0–2)
BASOPHILS NFR BLD AUTO: 0.4 % — SIGNIFICANT CHANGE UP (ref 0–2)
BASOPHILS NFR BLD AUTO: 0.6 % — SIGNIFICANT CHANGE UP (ref 0–2)
BASOPHILS NFR BLD AUTO: 0.7 % — SIGNIFICANT CHANGE UP (ref 0–2)
BASOPHILS NFR BLD AUTO: 0.7 % — SIGNIFICANT CHANGE UP (ref 0–2)
BILIRUB SERPL-MCNC: 0.3 MG/DL — LOW (ref 0.4–2)
BILIRUB SERPL-MCNC: <0.2 MG/DL — LOW (ref 0.4–2)
BLD GP AB SCN SERPL QL: SIGNIFICANT CHANGE UP
BLD GP AB SCN SERPL QL: SIGNIFICANT CHANGE UP
BUN SERPL-MCNC: 39 MG/DL — HIGH (ref 8–20)
BUN SERPL-MCNC: 41 MG/DL — HIGH (ref 8–20)
BUN SERPL-MCNC: 48 MG/DL — HIGH (ref 8–20)
BUN SERPL-MCNC: 49 MG/DL — HIGH (ref 8–20)
BUN SERPL-MCNC: 53 MG/DL — HIGH (ref 8–20)
BUN SERPL-MCNC: 64 MG/DL — HIGH (ref 8–20)
BUN SERPL-MCNC: 79 MG/DL — HIGH (ref 8–20)
CALCIUM SERPL-MCNC: 8.6 MG/DL — SIGNIFICANT CHANGE UP (ref 8.6–10.2)
CALCIUM SERPL-MCNC: 8.7 MG/DL — SIGNIFICANT CHANGE UP (ref 8.6–10.2)
CALCIUM SERPL-MCNC: 8.9 MG/DL — SIGNIFICANT CHANGE UP (ref 8.6–10.2)
CHLORIDE SERPL-SCNC: 105 MMOL/L — SIGNIFICANT CHANGE UP (ref 98–107)
CHLORIDE SERPL-SCNC: 105 MMOL/L — SIGNIFICANT CHANGE UP (ref 98–107)
CHLORIDE SERPL-SCNC: 106 MMOL/L — SIGNIFICANT CHANGE UP (ref 98–107)
CHLORIDE SERPL-SCNC: 108 MMOL/L — HIGH (ref 98–107)
CHLORIDE SERPL-SCNC: 109 MMOL/L — HIGH (ref 98–107)
CHLORIDE SERPL-SCNC: 110 MMOL/L — HIGH (ref 98–107)
CHLORIDE SERPL-SCNC: 110 MMOL/L — HIGH (ref 98–107)
CO2 SERPL-SCNC: 20 MMOL/L — LOW (ref 22–29)
CO2 SERPL-SCNC: 21 MMOL/L — LOW (ref 22–29)
CO2 SERPL-SCNC: 22 MMOL/L — SIGNIFICANT CHANGE UP (ref 22–29)
CO2 SERPL-SCNC: 23 MMOL/L — SIGNIFICANT CHANGE UP (ref 22–29)
CO2 SERPL-SCNC: 24 MMOL/L — SIGNIFICANT CHANGE UP (ref 22–29)
CO2 SERPL-SCNC: 24 MMOL/L — SIGNIFICANT CHANGE UP (ref 22–29)
CO2 SERPL-SCNC: 25 MMOL/L — SIGNIFICANT CHANGE UP (ref 22–29)
CREAT SERPL-MCNC: 1.93 MG/DL — HIGH (ref 0.5–1.3)
CREAT SERPL-MCNC: 2.05 MG/DL — HIGH (ref 0.5–1.3)
CREAT SERPL-MCNC: 2.32 MG/DL — HIGH (ref 0.5–1.3)
CREAT SERPL-MCNC: 2.46 MG/DL — HIGH (ref 0.5–1.3)
CREAT SERPL-MCNC: 2.51 MG/DL — HIGH (ref 0.5–1.3)
CREAT SERPL-MCNC: 2.55 MG/DL — HIGH (ref 0.5–1.3)
CREAT SERPL-MCNC: 2.61 MG/DL — HIGH (ref 0.5–1.3)
DACRYOCYTES BLD QL SMEAR: SLIGHT — SIGNIFICANT CHANGE UP
ELLIPTOCYTES BLD QL SMEAR: SLIGHT — SIGNIFICANT CHANGE UP
EOSINOPHIL # BLD AUTO: 0.28 K/UL — SIGNIFICANT CHANGE UP (ref 0–0.5)
EOSINOPHIL # BLD AUTO: 0.32 K/UL — SIGNIFICANT CHANGE UP (ref 0–0.5)
EOSINOPHIL # BLD AUTO: 0.37 K/UL — SIGNIFICANT CHANGE UP (ref 0–0.5)
EOSINOPHIL # BLD AUTO: 0.39 K/UL — SIGNIFICANT CHANGE UP (ref 0–0.5)
EOSINOPHIL # BLD AUTO: 0.51 K/UL — HIGH (ref 0–0.5)
EOSINOPHIL NFR BLD AUTO: 10.4 % — HIGH (ref 0–6)
EOSINOPHIL NFR BLD AUTO: 6.5 % — HIGH (ref 0–6)
EOSINOPHIL NFR BLD AUTO: 7.1 % — HIGH (ref 0–6)
EOSINOPHIL NFR BLD AUTO: 7.1 % — HIGH (ref 0–6)
EOSINOPHIL NFR BLD AUTO: 7.4 % — HIGH (ref 0–6)
GLUCOSE BLDC GLUCOMTR-MCNC: 100 MG/DL — HIGH (ref 70–99)
GLUCOSE BLDC GLUCOMTR-MCNC: 137 MG/DL — HIGH (ref 70–99)
GLUCOSE BLDC GLUCOMTR-MCNC: 137 MG/DL — HIGH (ref 70–99)
GLUCOSE BLDC GLUCOMTR-MCNC: 139 MG/DL — HIGH (ref 70–99)
GLUCOSE BLDC GLUCOMTR-MCNC: 144 MG/DL — HIGH (ref 70–99)
GLUCOSE BLDC GLUCOMTR-MCNC: 148 MG/DL — HIGH (ref 70–99)
GLUCOSE BLDC GLUCOMTR-MCNC: 155 MG/DL — HIGH (ref 70–99)
GLUCOSE BLDC GLUCOMTR-MCNC: 177 MG/DL — HIGH (ref 70–99)
GLUCOSE BLDC GLUCOMTR-MCNC: 182 MG/DL — HIGH (ref 70–99)
GLUCOSE BLDC GLUCOMTR-MCNC: 208 MG/DL — HIGH (ref 70–99)
GLUCOSE BLDC GLUCOMTR-MCNC: 57 MG/DL — LOW (ref 70–99)
GLUCOSE BLDC GLUCOMTR-MCNC: 59 MG/DL — LOW (ref 70–99)
GLUCOSE BLDC GLUCOMTR-MCNC: 96 MG/DL — SIGNIFICANT CHANGE UP (ref 70–99)
GLUCOSE SERPL-MCNC: 119 MG/DL — HIGH (ref 70–99)
GLUCOSE SERPL-MCNC: 143 MG/DL — HIGH (ref 70–99)
GLUCOSE SERPL-MCNC: 155 MG/DL — HIGH (ref 70–99)
GLUCOSE SERPL-MCNC: 162 MG/DL — HIGH (ref 70–99)
GLUCOSE SERPL-MCNC: 201 MG/DL — HIGH (ref 70–99)
GLUCOSE SERPL-MCNC: 70 MG/DL — SIGNIFICANT CHANGE UP (ref 70–99)
GLUCOSE SERPL-MCNC: 81 MG/DL — SIGNIFICANT CHANGE UP (ref 70–99)
HCT VFR BLD CALC: 23.2 % — LOW (ref 34.5–45)
HCT VFR BLD CALC: 25.4 % — LOW (ref 34.5–45)
HCT VFR BLD CALC: 25.4 % — LOW (ref 34.5–45)
HCT VFR BLD CALC: 27.6 % — LOW (ref 34.5–45)
HCT VFR BLD CALC: 31 % — LOW (ref 34.5–45)
HCT VFR BLD CALC: 32.9 % — LOW (ref 34.5–45)
HGB BLD-MCNC: 10.2 G/DL — LOW (ref 11.5–15.5)
HGB BLD-MCNC: 7.3 G/DL — LOW (ref 11.5–15.5)
HGB BLD-MCNC: 8 G/DL — LOW (ref 11.5–15.5)
HGB BLD-MCNC: 8.1 G/DL — LOW (ref 11.5–15.5)
HGB BLD-MCNC: 8.5 G/DL — LOW (ref 11.5–15.5)
HGB BLD-MCNC: 9.6 G/DL — LOW (ref 11.5–15.5)
IMM GRANULOCYTES NFR BLD AUTO: 0.4 % — SIGNIFICANT CHANGE UP (ref 0–1.5)
IMM GRANULOCYTES NFR BLD AUTO: 0.6 % — SIGNIFICANT CHANGE UP (ref 0–1.5)
IMM GRANULOCYTES NFR BLD AUTO: 0.7 % — SIGNIFICANT CHANGE UP (ref 0–1.5)
IMM GRANULOCYTES NFR BLD AUTO: 1.1 % — SIGNIFICANT CHANGE UP (ref 0–1.5)
INR BLD: 1.1 RATIO — SIGNIFICANT CHANGE UP (ref 0.88–1.16)
INR BLD: 1.15 RATIO — SIGNIFICANT CHANGE UP (ref 0.88–1.16)
INR BLD: 1.19 RATIO — HIGH (ref 0.88–1.16)
LYMPHOCYTES # BLD AUTO: 0.51 K/UL — LOW (ref 1–3.3)
LYMPHOCYTES # BLD AUTO: 0.52 K/UL — LOW (ref 1–3.3)
LYMPHOCYTES # BLD AUTO: 0.6 K/UL — LOW (ref 1–3.3)
LYMPHOCYTES # BLD AUTO: 0.61 K/UL — LOW (ref 1–3.3)
LYMPHOCYTES # BLD AUTO: 0.62 K/UL — LOW (ref 1–3.3)
LYMPHOCYTES # BLD AUTO: 10.4 % — LOW (ref 13–44)
LYMPHOCYTES # BLD AUTO: 11.3 % — LOW (ref 13–44)
LYMPHOCYTES # BLD AUTO: 12 % — LOW (ref 13–44)
LYMPHOCYTES # BLD AUTO: 12.1 % — LOW (ref 13–44)
LYMPHOCYTES # BLD AUTO: 13.6 % — SIGNIFICANT CHANGE UP (ref 13–44)
MACROCYTES BLD QL: SLIGHT — SIGNIFICANT CHANGE UP
MAGNESIUM SERPL-MCNC: 2.2 MG/DL — SIGNIFICANT CHANGE UP (ref 1.6–2.6)
MAGNESIUM SERPL-MCNC: 2.3 MG/DL — SIGNIFICANT CHANGE UP (ref 1.6–2.6)
MAGNESIUM SERPL-MCNC: 2.3 MG/DL — SIGNIFICANT CHANGE UP (ref 1.6–2.6)
MAGNESIUM SERPL-MCNC: 2.4 MG/DL — SIGNIFICANT CHANGE UP (ref 1.6–2.6)
MANUAL SMEAR VERIFICATION: SIGNIFICANT CHANGE UP
MCHC RBC-ENTMCNC: 28.6 PG — SIGNIFICANT CHANGE UP (ref 27–34)
MCHC RBC-ENTMCNC: 28.7 PG — SIGNIFICANT CHANGE UP (ref 27–34)
MCHC RBC-ENTMCNC: 28.7 PG — SIGNIFICANT CHANGE UP (ref 27–34)
MCHC RBC-ENTMCNC: 29.3 PG — SIGNIFICANT CHANGE UP (ref 27–34)
MCHC RBC-ENTMCNC: 30.4 PG — SIGNIFICANT CHANGE UP (ref 27–34)
MCHC RBC-ENTMCNC: 30.7 PG — SIGNIFICANT CHANGE UP (ref 27–34)
MCHC RBC-ENTMCNC: 30.8 GM/DL — LOW (ref 32–36)
MCHC RBC-ENTMCNC: 31 GM/DL — LOW (ref 32–36)
MCHC RBC-ENTMCNC: 31 GM/DL — LOW (ref 32–36)
MCHC RBC-ENTMCNC: 31.5 GM/DL — LOW (ref 32–36)
MCHC RBC-ENTMCNC: 31.5 GM/DL — LOW (ref 32–36)
MCHC RBC-ENTMCNC: 31.9 GM/DL — LOW (ref 32–36)
MCV RBC AUTO: 92.3 FL — SIGNIFICANT CHANGE UP (ref 80–100)
MCV RBC AUTO: 92.4 FL — SIGNIFICANT CHANGE UP (ref 80–100)
MCV RBC AUTO: 93 FL — SIGNIFICANT CHANGE UP (ref 80–100)
MCV RBC AUTO: 93.2 FL — SIGNIFICANT CHANGE UP (ref 80–100)
MCV RBC AUTO: 96.2 FL — SIGNIFICANT CHANGE UP (ref 80–100)
MCV RBC AUTO: 96.7 FL — SIGNIFICANT CHANGE UP (ref 80–100)
METAMYELOCYTES # FLD: 1.7 % — HIGH (ref 0–0)
MONOCYTES # BLD AUTO: 0.21 K/UL — SIGNIFICANT CHANGE UP (ref 0–0.9)
MONOCYTES # BLD AUTO: 0.22 K/UL — SIGNIFICANT CHANGE UP (ref 0–0.9)
MONOCYTES # BLD AUTO: 0.26 K/UL — SIGNIFICANT CHANGE UP (ref 0–0.9)
MONOCYTES # BLD AUTO: 0.36 K/UL — SIGNIFICANT CHANGE UP (ref 0–0.9)
MONOCYTES # BLD AUTO: 0.37 K/UL — SIGNIFICANT CHANGE UP (ref 0–0.9)
MONOCYTES NFR BLD AUTO: 4.4 % — SIGNIFICANT CHANGE UP (ref 2–14)
MONOCYTES NFR BLD AUTO: 5.1 % — SIGNIFICANT CHANGE UP (ref 2–14)
MONOCYTES NFR BLD AUTO: 5.8 % — SIGNIFICANT CHANGE UP (ref 2–14)
MONOCYTES NFR BLD AUTO: 6.9 % — SIGNIFICANT CHANGE UP (ref 2–14)
MONOCYTES NFR BLD AUTO: 7 % — SIGNIFICANT CHANGE UP (ref 2–14)
MYELOCYTES NFR BLD: 0.9 % — HIGH (ref 0–0)
NEUTROPHILS # BLD AUTO: 3.22 K/UL — SIGNIFICANT CHANGE UP (ref 1.8–7.4)
NEUTROPHILS # BLD AUTO: 3.22 K/UL — SIGNIFICANT CHANGE UP (ref 1.8–7.4)
NEUTROPHILS # BLD AUTO: 3.52 K/UL — SIGNIFICANT CHANGE UP (ref 1.8–7.4)
NEUTROPHILS # BLD AUTO: 3.78 K/UL — SIGNIFICANT CHANGE UP (ref 1.8–7.4)
NEUTROPHILS # BLD AUTO: 3.88 K/UL — SIGNIFICANT CHANGE UP (ref 1.8–7.4)
NEUTROPHILS NFR BLD AUTO: 71.3 % — SIGNIFICANT CHANGE UP (ref 43–77)
NEUTROPHILS NFR BLD AUTO: 71.7 % — SIGNIFICANT CHANGE UP (ref 43–77)
NEUTROPHILS NFR BLD AUTO: 73 % — SIGNIFICANT CHANGE UP (ref 43–77)
NEUTROPHILS NFR BLD AUTO: 73.3 % — SIGNIFICANT CHANGE UP (ref 43–77)
NEUTROPHILS NFR BLD AUTO: 74.9 % — SIGNIFICANT CHANGE UP (ref 43–77)
NEUTS BAND # BLD: 0.9 % — SIGNIFICANT CHANGE UP (ref 0–8)
NT-PROBNP SERPL-SCNC: 3045 PG/ML — HIGH (ref 0–300)
OB PNL STL: NEGATIVE — SIGNIFICANT CHANGE UP
OVALOCYTES BLD QL SMEAR: SLIGHT — SIGNIFICANT CHANGE UP
PHOSPHATE SERPL-MCNC: 3.3 MG/DL — SIGNIFICANT CHANGE UP (ref 2.4–4.7)
PLAT MORPH BLD: NORMAL — SIGNIFICANT CHANGE UP
PLATELET # BLD AUTO: 103 K/UL — LOW (ref 150–400)
PLATELET # BLD AUTO: 105 K/UL — LOW (ref 150–400)
PLATELET # BLD AUTO: 108 K/UL — LOW (ref 150–400)
PLATELET # BLD AUTO: 117 K/UL — LOW (ref 150–400)
PLATELET # BLD AUTO: 88 K/UL — LOW (ref 150–400)
PLATELET # BLD AUTO: 91 K/UL — LOW (ref 150–400)
POIKILOCYTOSIS BLD QL AUTO: SLIGHT — SIGNIFICANT CHANGE UP
POLYCHROMASIA BLD QL SMEAR: SLIGHT — SIGNIFICANT CHANGE UP
POTASSIUM SERPL-MCNC: 4.5 MMOL/L — SIGNIFICANT CHANGE UP (ref 3.5–5.3)
POTASSIUM SERPL-MCNC: 5.2 MMOL/L — SIGNIFICANT CHANGE UP (ref 3.5–5.3)
POTASSIUM SERPL-MCNC: 5.4 MMOL/L — HIGH (ref 3.5–5.3)
POTASSIUM SERPL-MCNC: 5.4 MMOL/L — HIGH (ref 3.5–5.3)
POTASSIUM SERPL-MCNC: 5.5 MMOL/L — HIGH (ref 3.5–5.3)
POTASSIUM SERPL-MCNC: 5.5 MMOL/L — HIGH (ref 3.5–5.3)
POTASSIUM SERPL-MCNC: 5.6 MMOL/L — HIGH (ref 3.5–5.3)
POTASSIUM SERPL-SCNC: 4.5 MMOL/L — SIGNIFICANT CHANGE UP (ref 3.5–5.3)
POTASSIUM SERPL-SCNC: 5.2 MMOL/L — SIGNIFICANT CHANGE UP (ref 3.5–5.3)
POTASSIUM SERPL-SCNC: 5.4 MMOL/L — HIGH (ref 3.5–5.3)
POTASSIUM SERPL-SCNC: 5.4 MMOL/L — HIGH (ref 3.5–5.3)
POTASSIUM SERPL-SCNC: 5.5 MMOL/L — HIGH (ref 3.5–5.3)
POTASSIUM SERPL-SCNC: 5.5 MMOL/L — HIGH (ref 3.5–5.3)
POTASSIUM SERPL-SCNC: 5.6 MMOL/L — HIGH (ref 3.5–5.3)
PROT SERPL-MCNC: 6.6 G/DL — SIGNIFICANT CHANGE UP (ref 6.6–8.7)
PROT SERPL-MCNC: 6.8 G/DL — SIGNIFICANT CHANGE UP (ref 6.6–8.7)
PROTHROM AB SERPL-ACNC: 12.7 SEC — SIGNIFICANT CHANGE UP (ref 10.6–13.6)
PROTHROM AB SERPL-ACNC: 13.2 SEC — SIGNIFICANT CHANGE UP (ref 10.6–13.6)
PROTHROM AB SERPL-ACNC: 13.7 SEC — HIGH (ref 10.6–13.6)
RBC # BLD: 2.4 M/UL — LOW (ref 3.8–5.2)
RBC # BLD: 2.64 M/UL — LOW (ref 3.8–5.2)
RBC # BLD: 2.73 M/UL — LOW (ref 3.8–5.2)
RBC # BLD: 2.96 M/UL — LOW (ref 3.8–5.2)
RBC # BLD: 3.36 M/UL — LOW (ref 3.8–5.2)
RBC # BLD: 3.56 M/UL — LOW (ref 3.8–5.2)
RBC # FLD: 15.9 % — HIGH (ref 10.3–14.5)
RBC # FLD: 16.8 % — HIGH (ref 10.3–14.5)
RBC # FLD: 16.9 % — HIGH (ref 10.3–14.5)
RBC # FLD: 17 % — HIGH (ref 10.3–14.5)
RBC # FLD: 17.5 % — HIGH (ref 10.3–14.5)
RBC # FLD: 17.9 % — HIGH (ref 10.3–14.5)
RBC BLD AUTO: ABNORMAL
SARS-COV-2 IGG SERPL QL IA: NEGATIVE — SIGNIFICANT CHANGE UP
SARS-COV-2 IGM SERPL IA-ACNC: 0.09 INDEX — SIGNIFICANT CHANGE UP
SARS-COV-2 RNA SPEC QL NAA+PROBE: SIGNIFICANT CHANGE UP
SODIUM SERPL-SCNC: 138 MMOL/L — SIGNIFICANT CHANGE UP (ref 135–145)
SODIUM SERPL-SCNC: 139 MMOL/L — SIGNIFICANT CHANGE UP (ref 135–145)
SODIUM SERPL-SCNC: 140 MMOL/L — SIGNIFICANT CHANGE UP (ref 135–145)
SODIUM SERPL-SCNC: 141 MMOL/L — SIGNIFICANT CHANGE UP (ref 135–145)
SODIUM SERPL-SCNC: 141 MMOL/L — SIGNIFICANT CHANGE UP (ref 135–145)
SODIUM SERPL-SCNC: 142 MMOL/L — SIGNIFICANT CHANGE UP (ref 135–145)
SODIUM SERPL-SCNC: 143 MMOL/L — SIGNIFICANT CHANGE UP (ref 135–145)
TROPONIN T SERPL-MCNC: 0.02 NG/ML — SIGNIFICANT CHANGE UP (ref 0–0.06)
TROPONIN T SERPL-MCNC: 0.04 NG/ML — SIGNIFICANT CHANGE UP (ref 0–0.06)
WBC # BLD: 4.3 K/UL — SIGNIFICANT CHANGE UP (ref 3.8–10.5)
WBC # BLD: 4.49 K/UL — SIGNIFICANT CHANGE UP (ref 3.8–10.5)
WBC # BLD: 4.88 K/UL — SIGNIFICANT CHANGE UP (ref 3.8–10.5)
WBC # BLD: 5.18 K/UL — SIGNIFICANT CHANGE UP (ref 3.8–10.5)
WBC # BLD: 5.29 K/UL — SIGNIFICANT CHANGE UP (ref 3.8–10.5)
WBC # BLD: 6.21 K/UL — SIGNIFICANT CHANGE UP (ref 3.8–10.5)
WBC # FLD AUTO: 4.3 K/UL — SIGNIFICANT CHANGE UP (ref 3.8–10.5)
WBC # FLD AUTO: 4.49 K/UL — SIGNIFICANT CHANGE UP (ref 3.8–10.5)
WBC # FLD AUTO: 4.88 K/UL — SIGNIFICANT CHANGE UP (ref 3.8–10.5)
WBC # FLD AUTO: 5.18 K/UL — SIGNIFICANT CHANGE UP (ref 3.8–10.5)
WBC # FLD AUTO: 5.29 K/UL — SIGNIFICANT CHANGE UP (ref 3.8–10.5)
WBC # FLD AUTO: 6.21 K/UL — SIGNIFICANT CHANGE UP (ref 3.8–10.5)

## 2020-01-01 PROCEDURE — 80048 BASIC METABOLIC PNL TOTAL CA: CPT

## 2020-01-01 PROCEDURE — 96374 THER/PROPH/DIAG INJ IV PUSH: CPT

## 2020-01-01 PROCEDURE — 36415 COLL VENOUS BLD VENIPUNCTURE: CPT

## 2020-01-01 PROCEDURE — 86923 COMPATIBILITY TEST ELECTRIC: CPT

## 2020-01-01 PROCEDURE — 80053 COMPREHEN METABOLIC PANEL: CPT

## 2020-01-01 PROCEDURE — 93005 ELECTROCARDIOGRAM TRACING: CPT

## 2020-01-01 PROCEDURE — 86850 RBC ANTIBODY SCREEN: CPT

## 2020-01-01 PROCEDURE — 99233 SBSQ HOSP IP/OBS HIGH 50: CPT

## 2020-01-01 PROCEDURE — 86901 BLOOD TYPING SEROLOGIC RH(D): CPT

## 2020-01-01 PROCEDURE — 84484 ASSAY OF TROPONIN QUANT: CPT

## 2020-01-01 PROCEDURE — 99285 EMERGENCY DEPT VISIT HI MDM: CPT

## 2020-01-01 PROCEDURE — 36430 TRANSFUSION BLD/BLD COMPNT: CPT

## 2020-01-01 PROCEDURE — 83880 ASSAY OF NATRIURETIC PEPTIDE: CPT

## 2020-01-01 PROCEDURE — 71045 X-RAY EXAM CHEST 1 VIEW: CPT

## 2020-01-01 PROCEDURE — 85025 COMPLETE CBC W/AUTO DIFF WBC: CPT

## 2020-01-01 PROCEDURE — P9040: CPT

## 2020-01-01 PROCEDURE — 85730 THROMBOPLASTIN TIME PARTIAL: CPT

## 2020-01-01 PROCEDURE — 99217: CPT

## 2020-01-01 PROCEDURE — 85027 COMPLETE CBC AUTOMATED: CPT

## 2020-01-01 PROCEDURE — 85610 PROTHROMBIN TIME: CPT

## 2020-01-01 PROCEDURE — 99214 OFFICE O/P EST MOD 30 MIN: CPT

## 2020-01-01 PROCEDURE — U0003: CPT

## 2020-01-01 PROCEDURE — 97163 PT EVAL HIGH COMPLEX 45 MIN: CPT

## 2020-01-01 PROCEDURE — G0378: CPT

## 2020-01-01 PROCEDURE — 99218: CPT

## 2020-01-01 PROCEDURE — 93306 TTE W/DOPPLER COMPLETE: CPT

## 2020-01-01 PROCEDURE — 93010 ELECTROCARDIOGRAM REPORT: CPT

## 2020-01-01 PROCEDURE — 99285 EMERGENCY DEPT VISIT HI MDM: CPT | Mod: 25

## 2020-01-01 PROCEDURE — 83735 ASSAY OF MAGNESIUM: CPT

## 2020-01-01 PROCEDURE — 93306 TTE W/DOPPLER COMPLETE: CPT | Mod: 26

## 2020-01-01 PROCEDURE — 86900 BLOOD TYPING SEROLOGIC ABO: CPT

## 2020-01-01 PROCEDURE — 46600 DIAGNOSTIC ANOSCOPY SPX: CPT

## 2020-01-01 PROCEDURE — 82962 GLUCOSE BLOOD TEST: CPT

## 2020-01-01 PROCEDURE — 86769 SARS-COV-2 COVID-19 ANTIBODY: CPT

## 2020-01-01 PROCEDURE — 71045 X-RAY EXAM CHEST 1 VIEW: CPT | Mod: 26

## 2020-01-01 PROCEDURE — 99239 HOSP IP/OBS DSCHRG MGMT >30: CPT

## 2020-01-01 PROCEDURE — 82272 OCCULT BLD FECES 1-3 TESTS: CPT

## 2020-01-01 PROCEDURE — 99214 OFFICE O/P EST MOD 30 MIN: CPT | Mod: 25

## 2020-01-01 PROCEDURE — 84100 ASSAY OF PHOSPHORUS: CPT

## 2020-01-01 PROCEDURE — 99232 SBSQ HOSP IP/OBS MODERATE 35: CPT

## 2020-01-01 PROCEDURE — P9016: CPT

## 2020-01-01 PROCEDURE — 82550 ASSAY OF CK (CPK): CPT

## 2020-01-01 PROCEDURE — 99223 1ST HOSP IP/OBS HIGH 75: CPT

## 2020-01-01 RX ORDER — MONTELUKAST 4 MG/1
10 TABLET, CHEWABLE ORAL DAILY
Refills: 0 | Status: DISCONTINUED | OUTPATIENT
Start: 2020-01-01 | End: 2020-01-01

## 2020-01-01 RX ORDER — METOPROLOL TARTRATE 50 MG
5 TABLET ORAL ONCE
Refills: 0 | Status: COMPLETED | OUTPATIENT
Start: 2020-01-01 | End: 2020-01-01

## 2020-01-01 RX ORDER — FUROSEMIDE 40 MG
1 TABLET ORAL
Qty: 0 | Refills: 0 | DISCHARGE

## 2020-01-01 RX ORDER — FUROSEMIDE 40 MG
40 TABLET ORAL ONCE
Refills: 0 | Status: COMPLETED | OUTPATIENT
Start: 2020-01-01 | End: 2020-01-01

## 2020-01-01 RX ORDER — ALBUTEROL 90 UG/1
2 AEROSOL, METERED ORAL EVERY 6 HOURS
Refills: 0 | Status: DISCONTINUED | OUTPATIENT
Start: 2020-01-01 | End: 2020-01-01

## 2020-01-01 RX ORDER — METOPROLOL TARTRATE 50 MG
5 TABLET ORAL ONCE
Refills: 0 | Status: DISCONTINUED | OUTPATIENT
Start: 2020-01-01 | End: 2020-01-01

## 2020-01-01 RX ORDER — FUROSEMIDE 40 MG
1 TABLET ORAL
Qty: 0 | Refills: 0 | DISCHARGE
Start: 2020-01-01

## 2020-01-01 RX ORDER — CARVEDILOL PHOSPHATE 80 MG/1
6.25 CAPSULE, EXTENDED RELEASE ORAL EVERY 12 HOURS
Refills: 0 | Status: DISCONTINUED | OUTPATIENT
Start: 2020-01-01 | End: 2020-01-01

## 2020-01-01 RX ORDER — CARVEDILOL PHOSPHATE 80 MG/1
6.25 CAPSULE, EXTENDED RELEASE ORAL AT BEDTIME
Refills: 0 | Status: DISCONTINUED | OUTPATIENT
Start: 2020-01-01 | End: 2020-01-01

## 2020-01-01 RX ORDER — ATORVASTATIN CALCIUM 80 MG/1
20 TABLET, FILM COATED ORAL AT BEDTIME
Refills: 0 | Status: DISCONTINUED | OUTPATIENT
Start: 2020-01-01 | End: 2020-01-01

## 2020-01-01 RX ORDER — DEXTROSE 50 % IN WATER 50 %
15 SYRINGE (ML) INTRAVENOUS ONCE
Refills: 0 | Status: DISCONTINUED | OUTPATIENT
Start: 2020-01-01 | End: 2020-01-01

## 2020-01-01 RX ORDER — PANTOPRAZOLE SODIUM 20 MG/1
40 TABLET, DELAYED RELEASE ORAL
Refills: 0 | Status: DISCONTINUED | OUTPATIENT
Start: 2020-01-01 | End: 2020-01-01

## 2020-01-01 RX ORDER — LEVOTHYROXINE SODIUM 125 MCG
100 TABLET ORAL DAILY
Refills: 0 | Status: DISCONTINUED | OUTPATIENT
Start: 2020-01-01 | End: 2020-01-01

## 2020-01-01 RX ORDER — LORATADINE 10 MG/1
10 TABLET ORAL DAILY
Refills: 0 | Status: DISCONTINUED | OUTPATIENT
Start: 2020-01-01 | End: 2020-01-01

## 2020-01-01 RX ORDER — SODIUM POLYSTYRENE SULFONATE 4.1 MEQ/G
15 POWDER, FOR SUSPENSION ORAL ONCE
Refills: 0 | Status: COMPLETED | OUTPATIENT
Start: 2020-01-01 | End: 2020-01-01

## 2020-01-01 RX ORDER — AMLODIPINE BESYLATE 2.5 MG/1
5 TABLET ORAL DAILY
Refills: 0 | Status: DISCONTINUED | OUTPATIENT
Start: 2020-01-01 | End: 2020-01-01

## 2020-01-01 RX ORDER — DEXTROSE 50 % IN WATER 50 %
25 SYRINGE (ML) INTRAVENOUS ONCE
Refills: 0 | Status: DISCONTINUED | OUTPATIENT
Start: 2020-01-01 | End: 2020-01-01

## 2020-01-01 RX ORDER — SODIUM CHLORIDE 9 MG/ML
1000 INJECTION, SOLUTION INTRAVENOUS
Refills: 0 | Status: DISCONTINUED | OUTPATIENT
Start: 2020-01-01 | End: 2020-01-01

## 2020-01-01 RX ORDER — INSULIN LISPRO 100/ML
VIAL (ML) SUBCUTANEOUS
Refills: 0 | Status: DISCONTINUED | OUTPATIENT
Start: 2020-01-01 | End: 2020-01-01

## 2020-01-01 RX ORDER — MAGNESIUM OXIDE 400 MG ORAL TABLET 241.3 MG
400 TABLET ORAL
Refills: 0 | Status: DISCONTINUED | OUTPATIENT
Start: 2020-01-01 | End: 2020-01-01

## 2020-01-01 RX ORDER — DEXTROSE 50 % IN WATER 50 %
12.5 SYRINGE (ML) INTRAVENOUS ONCE
Refills: 0 | Status: DISCONTINUED | OUTPATIENT
Start: 2020-01-01 | End: 2020-01-01

## 2020-01-01 RX ORDER — GABAPENTIN 400 MG/1
300 CAPSULE ORAL AT BEDTIME
Refills: 0 | Status: DISCONTINUED | OUTPATIENT
Start: 2020-01-01 | End: 2020-01-01

## 2020-01-01 RX ORDER — HYDRALAZINE HCL 50 MG
10 TABLET ORAL EVERY 6 HOURS
Refills: 0 | Status: DISCONTINUED | OUTPATIENT
Start: 2020-01-01 | End: 2020-01-01

## 2020-01-01 RX ORDER — INSULIN GLARGINE 100 [IU]/ML
10 INJECTION, SOLUTION SUBCUTANEOUS AT BEDTIME
Refills: 0 | Status: DISCONTINUED | OUTPATIENT
Start: 2020-01-01 | End: 2020-01-01

## 2020-01-01 RX ORDER — FUROSEMIDE 40 MG
40 TABLET ORAL DAILY
Refills: 0 | Status: DISCONTINUED | OUTPATIENT
Start: 2020-01-01 | End: 2020-01-01

## 2020-01-01 RX ORDER — FOLIC ACID 0.8 MG
1 TABLET ORAL DAILY
Refills: 0 | Status: DISCONTINUED | OUTPATIENT
Start: 2020-01-01 | End: 2020-01-01

## 2020-01-01 RX ORDER — AMITRIPTYLINE HCL 25 MG
1 TABLET ORAL
Qty: 0 | Refills: 0 | DISCHARGE

## 2020-01-01 RX ORDER — FUROSEMIDE 40 MG
20 TABLET ORAL ONCE
Refills: 0 | Status: COMPLETED | OUTPATIENT
Start: 2020-01-01 | End: 2020-01-01

## 2020-01-01 RX ORDER — GLUCAGON INJECTION, SOLUTION 0.5 MG/.1ML
1 INJECTION, SOLUTION SUBCUTANEOUS ONCE
Refills: 0 | Status: DISCONTINUED | OUTPATIENT
Start: 2020-01-01 | End: 2020-01-01

## 2020-01-01 RX ORDER — FUROSEMIDE 40 MG
80 TABLET ORAL
Refills: 0 | Status: DISCONTINUED | OUTPATIENT
Start: 2020-01-01 | End: 2020-01-01

## 2020-01-01 RX ADMIN — Medication 2: at 11:59

## 2020-01-01 RX ADMIN — GABAPENTIN 300 MILLIGRAM(S): 400 CAPSULE ORAL at 21:02

## 2020-01-01 RX ADMIN — GABAPENTIN 300 MILLIGRAM(S): 400 CAPSULE ORAL at 00:17

## 2020-01-01 RX ADMIN — Medication 1 MILLIGRAM(S): at 08:26

## 2020-01-01 RX ADMIN — GABAPENTIN 300 MILLIGRAM(S): 400 CAPSULE ORAL at 22:12

## 2020-01-01 RX ADMIN — INSULIN GLARGINE 10 UNIT(S): 100 INJECTION, SOLUTION SUBCUTANEOUS at 22:12

## 2020-01-01 RX ADMIN — Medication 40 MILLIGRAM(S): at 19:59

## 2020-01-01 RX ADMIN — PANTOPRAZOLE SODIUM 40 MILLIGRAM(S): 20 TABLET, DELAYED RELEASE ORAL at 05:50

## 2020-01-01 RX ADMIN — LORATADINE 10 MILLIGRAM(S): 10 TABLET ORAL at 08:10

## 2020-01-01 RX ADMIN — Medication 40 MILLIGRAM(S): at 06:12

## 2020-01-01 RX ADMIN — AMLODIPINE BESYLATE 5 MILLIGRAM(S): 2.5 TABLET ORAL at 05:50

## 2020-01-01 RX ADMIN — MONTELUKAST 10 MILLIGRAM(S): 4 TABLET, CHEWABLE ORAL at 08:25

## 2020-01-01 RX ADMIN — Medication 4: at 10:53

## 2020-01-01 RX ADMIN — MONTELUKAST 10 MILLIGRAM(S): 4 TABLET, CHEWABLE ORAL at 08:02

## 2020-01-01 RX ADMIN — Medication 5 MILLIGRAM(S): at 00:01

## 2020-01-01 RX ADMIN — Medication 100 MICROGRAM(S): at 05:25

## 2020-01-01 RX ADMIN — LORATADINE 10 MILLIGRAM(S): 10 TABLET ORAL at 00:12

## 2020-01-01 RX ADMIN — ATORVASTATIN CALCIUM 20 MILLIGRAM(S): 80 TABLET, FILM COATED ORAL at 23:59

## 2020-01-01 RX ADMIN — CARVEDILOL PHOSPHATE 6.25 MILLIGRAM(S): 80 CAPSULE, EXTENDED RELEASE ORAL at 17:01

## 2020-01-01 RX ADMIN — CARVEDILOL PHOSPHATE 6.25 MILLIGRAM(S): 80 CAPSULE, EXTENDED RELEASE ORAL at 00:12

## 2020-01-01 RX ADMIN — LORATADINE 10 MILLIGRAM(S): 10 TABLET ORAL at 08:26

## 2020-01-01 RX ADMIN — MAGNESIUM OXIDE 400 MG ORAL TABLET 400 MILLIGRAM(S): 241.3 TABLET ORAL at 00:12

## 2020-01-01 RX ADMIN — CARVEDILOL PHOSPHATE 6.25 MILLIGRAM(S): 80 CAPSULE, EXTENDED RELEASE ORAL at 16:52

## 2020-01-01 RX ADMIN — Medication 80 MILLIGRAM(S): at 18:50

## 2020-01-01 RX ADMIN — Medication 1 MILLIGRAM(S): at 08:09

## 2020-01-01 RX ADMIN — PANTOPRAZOLE SODIUM 40 MILLIGRAM(S): 20 TABLET, DELAYED RELEASE ORAL at 06:13

## 2020-01-01 RX ADMIN — ATORVASTATIN CALCIUM 20 MILLIGRAM(S): 80 TABLET, FILM COATED ORAL at 21:03

## 2020-01-01 RX ADMIN — Medication 100 MICROGRAM(S): at 06:13

## 2020-01-01 RX ADMIN — GABAPENTIN 300 MILLIGRAM(S): 400 CAPSULE ORAL at 23:59

## 2020-01-01 RX ADMIN — MONTELUKAST 10 MILLIGRAM(S): 4 TABLET, CHEWABLE ORAL at 08:09

## 2020-01-01 RX ADMIN — AMLODIPINE BESYLATE 5 MILLIGRAM(S): 2.5 TABLET ORAL at 05:24

## 2020-01-01 RX ADMIN — AMLODIPINE BESYLATE 5 MILLIGRAM(S): 2.5 TABLET ORAL at 06:13

## 2020-01-01 RX ADMIN — CARVEDILOL PHOSPHATE 6.25 MILLIGRAM(S): 80 CAPSULE, EXTENDED RELEASE ORAL at 05:24

## 2020-01-01 RX ADMIN — LORATADINE 10 MILLIGRAM(S): 10 TABLET ORAL at 08:02

## 2020-01-01 RX ADMIN — Medication 10 MILLIGRAM(S): at 21:03

## 2020-01-01 RX ADMIN — MAGNESIUM OXIDE 400 MG ORAL TABLET 400 MILLIGRAM(S): 241.3 TABLET ORAL at 08:09

## 2020-01-01 RX ADMIN — MONTELUKAST 10 MILLIGRAM(S): 4 TABLET, CHEWABLE ORAL at 00:12

## 2020-01-01 RX ADMIN — INSULIN GLARGINE 10 UNIT(S): 100 INJECTION, SOLUTION SUBCUTANEOUS at 00:03

## 2020-01-01 RX ADMIN — SODIUM POLYSTYRENE SULFONATE 15 GRAM(S): 4.1 POWDER, FOR SUSPENSION ORAL at 14:05

## 2020-01-01 RX ADMIN — Medication 20 MILLIGRAM(S): at 00:30

## 2020-01-01 RX ADMIN — Medication 1 MILLIGRAM(S): at 08:02

## 2020-01-01 RX ADMIN — ATORVASTATIN CALCIUM 20 MILLIGRAM(S): 80 TABLET, FILM COATED ORAL at 22:12

## 2020-01-01 RX ADMIN — Medication 10 MILLIGRAM(S): at 23:47

## 2020-01-01 RX ADMIN — ATORVASTATIN CALCIUM 20 MILLIGRAM(S): 80 TABLET, FILM COATED ORAL at 00:02

## 2020-01-01 RX ADMIN — PANTOPRAZOLE SODIUM 40 MILLIGRAM(S): 20 TABLET, DELAYED RELEASE ORAL at 23:59

## 2020-01-01 RX ADMIN — MAGNESIUM OXIDE 400 MG ORAL TABLET 400 MILLIGRAM(S): 241.3 TABLET ORAL at 08:25

## 2020-01-01 RX ADMIN — PANTOPRAZOLE SODIUM 40 MILLIGRAM(S): 20 TABLET, DELAYED RELEASE ORAL at 05:26

## 2020-01-01 RX ADMIN — INSULIN GLARGINE 10 UNIT(S): 100 INJECTION, SOLUTION SUBCUTANEOUS at 00:11

## 2020-01-01 RX ADMIN — CARVEDILOL PHOSPHATE 6.25 MILLIGRAM(S): 80 CAPSULE, EXTENDED RELEASE ORAL at 06:13

## 2020-01-01 RX ADMIN — INSULIN GLARGINE 10 UNIT(S): 100 INJECTION, SOLUTION SUBCUTANEOUS at 21:03

## 2020-01-01 RX ADMIN — CARVEDILOL PHOSPHATE 6.25 MILLIGRAM(S): 80 CAPSULE, EXTENDED RELEASE ORAL at 05:50

## 2020-01-01 RX ADMIN — Medication 100 MICROGRAM(S): at 05:50

## 2020-01-01 RX ADMIN — CARVEDILOL PHOSPHATE 6.25 MILLIGRAM(S): 80 CAPSULE, EXTENDED RELEASE ORAL at 00:02

## 2020-01-01 RX ADMIN — Medication 80 MILLIGRAM(S): at 05:24

## 2020-01-17 ENCOUNTER — APPOINTMENT (OUTPATIENT)
Dept: GASTROENTEROLOGY | Facility: CLINIC | Age: 75
End: 2020-01-17
Payer: MEDICARE

## 2020-01-17 VITALS
HEIGHT: 58 IN | BODY MASS INDEX: 34.85 KG/M2 | SYSTOLIC BLOOD PRESSURE: 184 MMHG | DIASTOLIC BLOOD PRESSURE: 66 MMHG | RESPIRATION RATE: 14 BRPM | HEART RATE: 77 BPM | WEIGHT: 166 LBS

## 2020-01-17 PROCEDURE — 99214 OFFICE O/P EST MOD 30 MIN: CPT

## 2020-01-17 RX ORDER — VELPATASVIR AND SOFOSBUVIR 100; 400 MG/1; MG/1
400-100 TABLET, FILM COATED ORAL
Refills: 0 | Status: COMPLETED | COMMUNITY
End: 2020-01-17

## 2020-01-17 NOTE — HISTORY OF PRESENT ILLNESS
[de-identified] : Patient arrived for followup appointment. Patient was recently admitted to the hospital for rectal bleeding. She has history of hepatitis C genotype 2 related cirrhosis. She had esophageal varices requiring banding in the past. The recent EGD did not reveal any evidence of esophageal varices. She underwent colonoscopy which revealed diverticulosis, proctitis and colon polyps. She has been on epclusa for hepatitis C therapy. She has history of chronic anemia. She also has a history of diabetes. She has chronic kidney disease. She does not take any NSAIDs. She was admitted in the end of 2019 to [facility] for anemia. She required 3 units of blood. She was complaining of rectal bleeding. She is on mesalamine suppositories. She is complaining of rectal discomfort. Her hemoglobin is around 9 now. The patient is having URI and is on amoxicillin. HCV viral load is undetectable.

## 2020-01-17 NOTE — ASSESSMENT
[FreeTextEntry1] : Cirrhosis/hepatitis C: Check viral load again in 8 weeks. US abdomen, AFP for HCC surveillance\par \par Anemia: FU with hematologist\par \par Rectal pain/proctitis: Continue mesalamine suppository and add proctofoam.FU in 2 months

## 2020-01-17 NOTE — PHYSICAL EXAM
[General Appearance - In No Acute Distress] : in no acute distress [General Appearance - Alert] : alert [Sclera] : the sclera and conjunctiva were normal [PERRL With Normal Accommodation] : pupils were equal in size, round, and reactive to light [Extraocular Movements] : extraocular movements were intact [Outer Ear] : the ears and nose were normal in appearance [Neck Cervical Mass (___cm)] : no neck mass was observed [Oropharynx] : the oropharynx was normal [Neck Appearance] : the appearance of the neck was normal [Jugular Venous Distention Increased] : there was no jugular-venous distention [Thyroid Diffuse Enlargement] : the thyroid was not enlarged [Thyroid Nodule] : there were no palpable thyroid nodules [Auscultation Breath Sounds / Voice Sounds] : lungs were clear to auscultation bilaterally [Heart Rate And Rhythm] : heart rate was normal and rhythm regular [Heart Sounds Gallop] : no gallops [Heart Sounds] : normal S1 and S2 [Murmurs] : no murmurs [Full Pulse] : the pedal pulses are present [Heart Sounds Pericardial Friction Rub] : no pericardial rub [Bowel Sounds] : normal bowel sounds [Abdomen Soft] : soft [Edema] : there was no peripheral edema [Abdomen Mass (___ Cm)] : no abdominal mass palpated [Abdomen Tenderness] : non-tender [Cervical Lymph Nodes Enlarged Posterior Bilaterally] : posterior cervical [Cervical Lymph Nodes Enlarged Anterior Bilaterally] : anterior cervical [Supraclavicular Lymph Nodes Enlarged Bilaterally] : supraclavicular [No CVA Tenderness] : no ~M costovertebral angle tenderness [No Spinal Tenderness] : no spinal tenderness [Musculoskeletal - Swelling] : no joint swelling seen [Abnormal Walk] : normal gait [Nail Clubbing] : no clubbing  or cyanosis of the fingernails [Motor Tone] : muscle strength and tone were normal [Skin Turgor] : normal skin turgor [Skin Color & Pigmentation] : normal skin color and pigmentation [] : no rash [No Focal Deficits] : no focal deficits [Oriented To Time, Place, And Person] : oriented to person, place, and time [Impaired Insight] : insight and judgment were intact [Affect] : the affect was normal

## 2020-02-26 NOTE — DIETITIAN INITIAL EVALUATION ADULT. - PROBLEM SELECTOR PROBLEM 4
The form was sent to the Physician's Nurse MSG Pool via In-Basket for review and signature.    Completed form will be faxed to Ermelinda at [facility]   Coronary artery disease involving native coronary artery of native heart without angina pectoris

## 2020-03-11 NOTE — ED PROVIDER NOTE - OTHER FINDINGS
QTc 467 QTc 467; Lateral ischemia Metronidazole Pregnancy And Lactation Text: This medication is Pregnancy Category B and considered safe during pregnancy.  It is also excreted in breast milk.

## 2020-03-19 NOTE — PATIENT PROFILE ADULT - IS THERE A SUSPICION OF ABUSE/NEGLIGENCE?
Patient is having a Telemed visit with you today at 2:00 p.m.  
She is taking vicodin and I cannot give her xanax with that nor can she take ambien - I can send in trazodone or mirtazipine for sleep or we can talk about medication ( SSRI) for anxiety - she would need to have a telemedicine visit with me to discuss all of this   
no

## 2020-04-13 ENCOUNTER — APPOINTMENT (OUTPATIENT)
Dept: GASTROENTEROLOGY | Facility: CLINIC | Age: 75
End: 2020-04-13
Payer: MEDICARE

## 2020-04-13 VITALS
WEIGHT: 162 LBS | DIASTOLIC BLOOD PRESSURE: 78 MMHG | HEIGHT: 58 IN | SYSTOLIC BLOOD PRESSURE: 140 MMHG | HEART RATE: 74 BPM | RESPIRATION RATE: 94 BRPM | BODY MASS INDEX: 34 KG/M2

## 2020-04-13 DIAGNOSIS — K62.89 OTHER SPECIFIED DISEASES OF ANUS AND RECTUM: ICD-10-CM

## 2020-04-13 DIAGNOSIS — K64.4 RESIDUAL HEMORRHOIDAL SKIN TAGS: ICD-10-CM

## 2020-04-13 PROCEDURE — 99214 OFFICE O/P EST MOD 30 MIN: CPT

## 2020-04-13 NOTE — ASSESSMENT
[FreeTextEntry1] : Rectal bleeding seems to be related to the external hemorrhoids and internal hemorrhoids. At this time, I am recommending to consider metamucil fiber supplementation, Sitz bath with epsom salt and also proctozone cream bid. Patient is not interested in the suppositories. I also recommended to resume the mesalamine suppositories in 1-2 week. Recommended telephonic visit FU in 7-10 days

## 2020-04-13 NOTE — HISTORY OF PRESENT ILLNESS
[de-identified] :  Patient arrived for an appointment. Patient was admitted to the hospital in january for rectal bleeding. She has history of hepatitis C genotype 2 related cirrhosis. She had esophageal varices requiring banding in the past. The recent EGD did not reveal any evidence of esophageal varices. She underwent colonoscopy which revealed diverticulosis, proctitis and colon polyps. She has completed the therapy on epclusa for hepatitis C therapy. She has history of chronic anemia. She also has a history of diabetes. She has chronic kidney disease. She does not take any NSAIDs. She was admitted in the end of 2019 to [facility] for anemia. She required 3 units of blood. She was complaining of rectal bleeding. She is on mesalamine suppositories. She is complaining of rectal discomfort and continues to have rectal bleeding. She is receiving PRBC transfusion intermittently. HCV viral load is undetectable.

## 2020-04-13 NOTE — PHYSICAL EXAM
[General Appearance - Alert] : alert [General Appearance - In No Acute Distress] : in no acute distress [Sclera] : the sclera and conjunctiva were normal [PERRL With Normal Accommodation] : pupils were equal in size, round, and reactive to light [Extraocular Movements] : extraocular movements were intact [Neck Appearance] : the appearance of the neck was normal [Neck Cervical Mass (___cm)] : no neck mass was observed [Jugular Venous Distention Increased] : there was no jugular-venous distention [Thyroid Diffuse Enlargement] : the thyroid was not enlarged [Thyroid Nodule] : there were no palpable thyroid nodules [Auscultation Breath Sounds / Voice Sounds] : lungs were clear to auscultation bilaterally [Heart Rate And Rhythm] : heart rate was normal and rhythm regular [Heart Sounds] : normal S1 and S2 [Heart Sounds Gallop] : no gallops [Murmurs] : no murmurs [Heart Sounds Pericardial Friction Rub] : no pericardial rub [Full Pulse] : the pedal pulses are present [Edema] : there was no peripheral edema [Bowel Sounds] : normal bowel sounds [Abdomen Soft] : soft [Abdomen Tenderness] : non-tender [Abdomen Mass (___ Cm)] : no abdominal mass palpated [Cervical Lymph Nodes Enlarged Posterior Bilaterally] : posterior cervical [Cervical Lymph Nodes Enlarged Anterior Bilaterally] : anterior cervical [Supraclavicular Lymph Nodes Enlarged Bilaterally] : supraclavicular [No CVA Tenderness] : no ~M costovertebral angle tenderness [No Spinal Tenderness] : no spinal tenderness [Abnormal Walk] : normal gait [Nail Clubbing] : no clubbing  or cyanosis of the fingernails [Musculoskeletal - Swelling] : no joint swelling seen [Motor Tone] : muscle strength and tone were normal [Skin Color & Pigmentation] : normal skin color and pigmentation [Skin Turgor] : normal skin turgor [] : no rash [No Focal Deficits] : no focal deficits [Oriented To Time, Place, And Person] : oriented to person, place, and time [Impaired Insight] : insight and judgment were intact [Affect] : the affect was normal [Outer Ear] : the ears and nose were normal in appearance [Internal Hemorrhoid] : internal hemorrhoids [External Hemorrhoid] : external hemorrhoids

## 2020-05-06 ENCOUNTER — APPOINTMENT (OUTPATIENT)
Dept: COLORECTAL SURGERY | Facility: CLINIC | Age: 75
End: 2020-05-06
Payer: MEDICARE

## 2020-05-06 VITALS
BODY MASS INDEX: 34 KG/M2 | WEIGHT: 162 LBS | SYSTOLIC BLOOD PRESSURE: 181 MMHG | RESPIRATION RATE: 14 BRPM | HEIGHT: 58 IN | HEART RATE: 81 BPM | DIASTOLIC BLOOD PRESSURE: 81 MMHG

## 2020-05-06 DIAGNOSIS — D64.9 ANEMIA, UNSPECIFIED: ICD-10-CM

## 2020-05-06 PROCEDURE — 46600 DIAGNOSTIC ANOSCOPY SPX: CPT

## 2020-05-06 PROCEDURE — 99203 OFFICE O/P NEW LOW 30 MIN: CPT

## 2020-05-06 NOTE — CONSULT LETTER
[Dear  ___] : Dear  [unfilled], [Consult Letter:] : I had the pleasure of evaluating your patient, [unfilled]. [Please see my note below.] : Please see my note below. [Consult Closing:] : Thank you very much for allowing me to participate in the care of this patient.  If you have any questions, please do not hesitate to contact me. [Sincerely,] : Sincerely, [FreeTextEntry3] : Kate Dickerson MD\par

## 2020-05-06 NOTE — ASSESSMENT
[FreeTextEntry1] : Ms. Collier presents to the office for discussion of rectal bleeding and anemia requiring transfusions. \par She has had an extensive workup of this anemia including bone marrow biopsy EGD, colonoscopy and capsule endoscopy. In the past, she has undergone banding of esophageal varices. She continues to have rectal bleeding despite improvement in her bowel regimen.\par GLADYS and anoscopy revealed friable and inflamed Grade II IH as well as mildly edematous external hemorrhoids.\par I have discussed with her that given her cirrhosis, office rubber band ligation is not recommended as she is at high risk for post procedural hemorrhage given portal hypertension. \par We discussed the option of surgical hemorrhoidectomy. \par I have discussed with her the risks/benefits/alternatives for internal and external hemorrhoidectomy. Although the surgery is performed as a same-day procedure, and does not take longer than an hour to complete, the postoperative pain is the most difficult obstacle for patients to overcome. She is to expect rectal pain, pressure and discomfort for anywhere from 2-4 weeks duration during which time activity will likely be kept to a minimum. She will be provided with narcotic medication to alleviate the discomfort, and she should perform sitz baths frequently for comfort. Postoperatively, she will have open perianal wounds that will heal by secondary intention, so she is to expect serosanguineous drainage that will gradually improve. She was also advised to maintain a good bowel regimen in the postoperative period to facilitate passage of a nonpainful stools that would otherwise interfere with her recovery.  \par She is aware of the need to await resolution of covid pandemic before procedure can be completed. She should be a priority 2 given anemia requiring transfusion. Medical/cardiac clearance will be required. She is opting for oral bowel prep rather than enema prep. We will schedule this to be completed at the hospital and have blood made available in the event of acute blood loss anemia.\par After all questions and concerns were addressed, she consented to proceed.

## 2020-05-06 NOTE — HISTORY OF PRESENT ILLNESS
[FreeTextEntry1] : Ms. Collier presents to the office for consultation, accompanied by her daughter who is also HCP.\par She is referred by GI, Dr. Lloyd for rectal bleeding from internal hemorrhoids in the setting of cirrhosis.\par Daughter reports that pt has required 4u PRBC over past 3 weeks for anemia with Hgb 7. \par \par In Nov 2019, had EGD and colonoscopy at [facility] as well as outpt pill endoscopy. Underwent banding of esophageal varcies at the time of EGD.  Anemia also worked up with bone marrow biopsy.\par Has undergone treatment for Hep C and now has undetectable viral loads.\par Remote /o CABG with bioprosthetic aortic valve replacement.\par \par Pt has started on a bowel regimen including metamucil, prunes and stool softener which has allowed her to pass loose stools several times daily, however, continues to have rectal bleeding. Has utilized mesalamine suppositories for proctitis as well as hydrocortisone creams for hemorrhoidal irritation.\par Here, for surgical evaluation and consultation.

## 2020-05-06 NOTE — PHYSICAL EXAM
[Normal rectal exam] : exam was normal [Reduce Spontaneously] : a spontaneously reducible (grade II) [Skin Tags] : residual hemorrhoidal skin tags were noted [Normal] : was normal [None] : there was no rectal mass  [Stool Sample Taken] : no stool obtained on rectal exam [Occult Blood Positive] : was negative for occult blood [Gross Blood] : no gross blood [Fecal Impaction] : no fecal impaction was present [No Rash or Lesion] : No rash or lesion [Alert] : alert [Oriented to Person] : oriented to person [Oriented to Place] : oriented to place [Oriented to Time] : oriented to time [Calm] : calm [de-identified] : soft NTND [de-identified] : Inflamed and friable [de-identified] : NAD [de-identified] : NCAT [de-identified] : PUENTE x 4

## 2020-05-08 NOTE — PHYSICAL THERAPY INITIAL EVALUATION ADULT - SHORT TERM MEMORY, REHAB EVAL
47 yr old female with Hx of obesity. PT had failed numerous weight loss  attempts. She is electing to have laparoscopic sleeve gastrectomy possible open possible intraoperative endoscopy with Dr. Bermeo 12/11/18
intact
flu-like symptoms

## 2020-06-04 ENCOUNTER — OUTPATIENT (OUTPATIENT)
Dept: OUTPATIENT SERVICES | Facility: HOSPITAL | Age: 75
LOS: 1 days | End: 2020-06-04
Payer: MEDICARE

## 2020-06-04 VITALS
HEIGHT: 58.5 IN | HEART RATE: 82 BPM | TEMPERATURE: 98 F | DIASTOLIC BLOOD PRESSURE: 85 MMHG | WEIGHT: 164.91 LBS | SYSTOLIC BLOOD PRESSURE: 182 MMHG | RESPIRATION RATE: 20 BRPM

## 2020-06-04 DIAGNOSIS — K64.8 OTHER HEMORRHOIDS: ICD-10-CM

## 2020-06-04 DIAGNOSIS — Z01.818 ENCOUNTER FOR OTHER PREPROCEDURAL EXAMINATION: ICD-10-CM

## 2020-06-04 DIAGNOSIS — Z98.89 OTHER SPECIFIED POSTPROCEDURAL STATES: Chronic | ICD-10-CM

## 2020-06-04 DIAGNOSIS — K64.9 UNSPECIFIED HEMORRHOIDS: ICD-10-CM

## 2020-06-04 DIAGNOSIS — Z95.828 PRESENCE OF OTHER VASCULAR IMPLANTS AND GRAFTS: Chronic | ICD-10-CM

## 2020-06-04 DIAGNOSIS — Z95.1 PRESENCE OF AORTOCORONARY BYPASS GRAFT: Chronic | ICD-10-CM

## 2020-06-04 DIAGNOSIS — Z98.890 OTHER SPECIFIED POSTPROCEDURAL STATES: Chronic | ICD-10-CM

## 2020-06-04 DIAGNOSIS — Z29.9 ENCOUNTER FOR PROPHYLACTIC MEASURES, UNSPECIFIED: ICD-10-CM

## 2020-06-04 DIAGNOSIS — Z95.2 PRESENCE OF PROSTHETIC HEART VALVE: Chronic | ICD-10-CM

## 2020-06-04 LAB
A1C WITH ESTIMATED AVERAGE GLUCOSE RESULT: 6.1 % — HIGH (ref 4–5.6)
ALBUMIN SERPL ELPH-MCNC: 3.8 G/DL — SIGNIFICANT CHANGE UP (ref 3.3–5.2)
ALP SERPL-CCNC: 217 U/L — HIGH (ref 40–120)
ALT FLD-CCNC: 26 U/L — SIGNIFICANT CHANGE UP
ANION GAP SERPL CALC-SCNC: 12 MMOL/L — SIGNIFICANT CHANGE UP (ref 5–17)
APTT BLD: 35.2 SEC — SIGNIFICANT CHANGE UP (ref 27.5–36.3)
AST SERPL-CCNC: 33 U/L — HIGH
BASOPHILS # BLD AUTO: 0.03 K/UL — SIGNIFICANT CHANGE UP (ref 0–0.2)
BASOPHILS NFR BLD AUTO: 0.4 % — SIGNIFICANT CHANGE UP (ref 0–2)
BILIRUB SERPL-MCNC: 0.3 MG/DL — LOW (ref 0.4–2)
BLD GP AB SCN SERPL QL: SIGNIFICANT CHANGE UP
BUN SERPL-MCNC: 64 MG/DL — HIGH (ref 8–20)
CALCIUM SERPL-MCNC: 9.4 MG/DL — SIGNIFICANT CHANGE UP (ref 8.6–10.2)
CHLORIDE SERPL-SCNC: 105 MMOL/L — SIGNIFICANT CHANGE UP (ref 98–107)
CO2 SERPL-SCNC: 22 MMOL/L — SIGNIFICANT CHANGE UP (ref 22–29)
COMMENT - BLOOD BANK: SIGNIFICANT CHANGE UP
CREAT SERPL-MCNC: 2.4 MG/DL — HIGH (ref 0.5–1.3)
EOSINOPHIL # BLD AUTO: 0.5 K/UL — SIGNIFICANT CHANGE UP (ref 0–0.5)
EOSINOPHIL NFR BLD AUTO: 7.2 % — HIGH (ref 0–6)
ESTIMATED AVERAGE GLUCOSE: 128 MG/DL — HIGH (ref 68–114)
GLUCOSE SERPL-MCNC: 65 MG/DL — LOW (ref 70–99)
HCT VFR BLD CALC: 33.6 % — LOW (ref 34.5–45)
HGB BLD-MCNC: 10.7 G/DL — LOW (ref 11.5–15.5)
IMM GRANULOCYTES NFR BLD AUTO: 0.6 % — SIGNIFICANT CHANGE UP (ref 0–1.5)
INR BLD: 1.13 RATIO — SIGNIFICANT CHANGE UP (ref 0.88–1.16)
LYMPHOCYTES # BLD AUTO: 0.7 K/UL — LOW (ref 1–3.3)
LYMPHOCYTES # BLD AUTO: 10 % — LOW (ref 13–44)
MCHC RBC-ENTMCNC: 28.3 PG — SIGNIFICANT CHANGE UP (ref 27–34)
MCHC RBC-ENTMCNC: 31.8 GM/DL — LOW (ref 32–36)
MCV RBC AUTO: 88.9 FL — SIGNIFICANT CHANGE UP (ref 80–100)
MONOCYTES # BLD AUTO: 0.48 K/UL — SIGNIFICANT CHANGE UP (ref 0–0.9)
MONOCYTES NFR BLD AUTO: 6.9 % — SIGNIFICANT CHANGE UP (ref 2–14)
NEUTROPHILS # BLD AUTO: 5.22 K/UL — SIGNIFICANT CHANGE UP (ref 1.8–7.4)
NEUTROPHILS NFR BLD AUTO: 74.9 % — SIGNIFICANT CHANGE UP (ref 43–77)
PLATELET # BLD AUTO: 157 K/UL — SIGNIFICANT CHANGE UP (ref 150–400)
POTASSIUM SERPL-MCNC: 4.8 MMOL/L — SIGNIFICANT CHANGE UP (ref 3.5–5.3)
POTASSIUM SERPL-SCNC: 4.8 MMOL/L — SIGNIFICANT CHANGE UP (ref 3.5–5.3)
PROT SERPL-MCNC: 8 G/DL — SIGNIFICANT CHANGE UP (ref 6.6–8.7)
PROTHROM AB SERPL-ACNC: 12.8 SEC — SIGNIFICANT CHANGE UP (ref 10–12.9)
RBC # BLD: 3.78 M/UL — LOW (ref 3.8–5.2)
RBC # FLD: 16.7 % — HIGH (ref 10.3–14.5)
SODIUM SERPL-SCNC: 139 MMOL/L — SIGNIFICANT CHANGE UP (ref 135–145)
WBC # BLD: 6.97 K/UL — SIGNIFICANT CHANGE UP (ref 3.8–10.5)
WBC # FLD AUTO: 6.97 K/UL — SIGNIFICANT CHANGE UP (ref 3.8–10.5)

## 2020-06-04 PROCEDURE — G0463: CPT

## 2020-06-04 PROCEDURE — 93010 ELECTROCARDIOGRAM REPORT: CPT

## 2020-06-04 PROCEDURE — 93005 ELECTROCARDIOGRAM TRACING: CPT

## 2020-06-04 RX ORDER — VELPATASVIR AND SOFOSBUVIR 37.5; 15 MG/1; MG/1
1 PELLET ORAL
Qty: 0 | Refills: 0 | DISCHARGE

## 2020-06-04 NOTE — H&P PST ADULT - EKG AND INTERPRETATION
SR w/1st degree AV block, anterior infarct age undetermined, ST & T wave abnormality, unchanged from ecg on 11/17/2019, official reading pending

## 2020-06-04 NOTE — H&P PST ADULT - NSICDXPASTMEDICALHX_GEN_ALL_CORE_FT
PAST MEDICAL HISTORY:  Anemia, unspecified anemia type     Aortic stenosis     Asthma     CAD (coronary artery disease)     CKD (chronic kidney disease)     Diabetes     Heart failure     Hepatitis C     High cholesterol     Hypertension     Low back pain chronic over a year now. PAST MEDICAL HISTORY:  Anemia, unspecified anemia type     Aortic stenosis     Asthma     CAD (coronary artery disease)     Chronic hepatitis C     Cirrhosis     CKD (chronic kidney disease)     Heart failure     Hemorrhoids     High cholesterol     Hypertension     Low back pain chronic over a year    Type 2 diabetes mellitus PAST MEDICAL HISTORY:  Anemia, unspecified anemia type     Aortic stenosis     Asthma     CAD (coronary artery disease)     Chronic hepatitis C     Cirrhosis     CKD (chronic kidney disease)     Heart failure     Hemorrhoids     High cholesterol     Hypertension     Internal hemorrhoids     Low back pain chronic over a year    Type 2 diabetes mellitus

## 2020-06-04 NOTE — H&P PST ADULT - NSICDXPROBLEM_GEN_ALL_CORE_FT
PROBLEM DIAGNOSES  Problem: Internal hemorrhoids  Assessment and Plan: preop assessment, med and card clearances pending, hemorrhoidectomy 6/15    Problem: Need for prophylactic measure  Assessment and Plan: caprini score 5, moderate risk for dvt, SCD ordered, surgical team to assess for dvt prophylaxis

## 2020-06-04 NOTE — H&P PST ADULT - HISTORY OF PRESENT ILLNESS
73yo F Hx of hepatitis C cirrhosis with esophageal  varices, HFpEF, CAD s/p CABG, TIA s/p ILR (2017), AS s/p bovine AVR (2016)  complicated by post-operative Afib, asthma, chronic anemia requiring pRBC .  Colonoscopy showed proctitis, diverituclosis, hemorrhoids and polyps. No abdominal or epigastric pain. No nausea, vomiting or hematemesis; No diarrhea or constipation. No melena or hematochezia.    PAST MEDICAL & SURGICAL HISTORY:  CKD (chronic kidney disease)  CAD (coronary artery disease)  Heart failure  Hepatitis C  Aortic stenosis  Anemia, unspecified anemia type  Asthma  Low back pain: chronic over a year now.  High cholesterol  Hypertension  Diabetes  H/O aortic valve replacement: cow valve.  S/P CABG x 3  History of tonsillectomy 75yo F Hx of hepatitis C cirrhosis with esophageal  varices, HFpEF, CAD s/p CABG, TIA s/p ILR (2017), AS s/p bovine AVR (2016)  complicated by post-operative Afib, asthma, chronic anemia requiring pRBC . Pt endorses >50 transusions of PRBCs in 2 yrs  Colonoscopy showed proctitis, diverituclosis, hemorrhoids and polyps. No abdominal or epigastric pain. No nausea, vomiting or hematemesis; No diarrhea or constipation. No melena or hematochezia.    PAST MEDICAL & SURGICAL HISTORY:  CKD (chronic kidney disease)  CAD (coronary artery disease)  Heart failure  Hepatitis C  Aortic stenosis  Anemia, unspecified anemia type  Asthma  Low back pain: chronic over a year now.  High cholesterol  Hypertension  Diabetes  H/O aortic valve replacement: cow valve.  S/P CABG x 3  History of tonsillectomy 73yo F Hx of HTN, HLD, DM2, asthma, on 2L home O2, Hep C+, cirrhosis with esophageal  varices, HFpEF, CAD s/p CABG x3, CKD, TIA s/p ILR (2017), AS s/p bovine AVR (2016)  complicated by post-operative Afib, chronic anemia requiring multiple PRBC. Pt endorses >50 transfusions of PRBC in 2 yrs. Colonoscopy showed proctitis, diverticulosis, hemorrhoids and polyps. Pt denies abdominal or epigastric pain, nausea, vomiting, hematemesis, diarrhea or constipation. Endorses frequent BRBPR. Presents for preop assessment prior to hemorrhoidectomy w/Dr Dickerson on 6/15        Diabetes  H/O aortic valve replacement: cow valve.  S/P CABG x 3  History of tonsillectomy

## 2020-06-04 NOTE — H&P PST ADULT - NSICDXPASTSURGICALHX_GEN_ALL_CORE_FT
PAST SURGICAL HISTORY:  H/O aortic valve replacement cow valve.    History of tonsillectomy     S/P CABG x 3 PAST SURGICAL HISTORY:  H/O aortic valve replacement bovine, 2016    History of tonsillectomy     S/P CABG x 3 2016 PAST SURGICAL HISTORY:  H/O aortic valve replacement bovine, 2016    History of loop recorder 2017    History of tonsillectomy     Port-A-Cath in place 7/2019, right upper chest    S/P CABG x 3 2016

## 2020-06-04 NOTE — ASU PATIENT PROFILE, ADULT - PMH
Anemia, unspecified anemia type    Aortic stenosis    Asthma    CAD (coronary artery disease)    Chronic hepatitis C    Cirrhosis    CKD (chronic kidney disease)    Heart failure    Hemorrhoids    High cholesterol    Hypertension    Low back pain  chronic over a year  Type 2 diabetes mellitus

## 2020-06-04 NOTE — ASU PATIENT PROFILE, ADULT - LEARNING ASSESSMENT (PATIENT) ADDITIONAL COMMENTS
NP, Natalie Maddox, instructed pt on pre-op instructions/teaching, tips for safer surgery, pain management scale, covid swab appt info given & to be scheduled by pt. Pt verbalized understanding of all instructions given by NP.

## 2020-06-04 NOTE — ASU PATIENT PROFILE, ADULT - PSH
H/O aortic valve replacement  bovine, 2016  History of loop recorder  2017  History of tonsillectomy    Port-A-Cath in place  7/2019, right upper chest  S/P CABG x 3  2016

## 2020-06-04 NOTE — H&P PST ADULT - ASSESSMENT
73yo F Hx of HTN, HLD, DM2, asthma, on 2L home O2, Hep C+, cirrhosis with esophageal  varices, HFpEF, CAD s/p CABG x3, CKD, TIA s/p ILR (2017), AS s/p bovine AVR (2016)  complicated by post-operative Afib, chronic anemia requiring multiple PRBC. Pt endorses >50 transfusions of PRBC in 2 yrs. Colonoscopy showed proctitis, diverticulosis, hemorrhoids and polyps. Pt denies abdominal or epigastric pain, nausea, vomiting, hematemesis, diarrhea or constipation. Endorses frequent BRBPR. Presents for preop assessment prior to hemorrhoidectomy w/Dr Dickerson on 6/15    OPIOID RISK TOOL    BRENNA EACH BOX THAT APPLIES AND ADD TOTALS AT THE END    FAMILY HISTORY OF SUBSTANCE ABUSE                 FEMALE         MALE                                                Alcohol                             [  ]1 pt          [  ]3pts                                               Illegal Durgs                     [  ]2 pts        [  ]3pts                                               Rx Drugs                           [  ]4 pts        [  ]4 pts    PERSONAL HISTORY OF SUBSTANCE ABUSE                                                                                          Alcohol                             [  ]3 pts       [  ]3 pts                                               Illegal Drugs                     [  ]4 pts        [  ]4 pts                                               Rx Drugs                           [  ]5 pts        [  ]5 pts    AGE BETWEEN 16-45 YEARS                                      [  ]1 pt         [  ]1 pt    HISTORY OF PREADOLESCENT   SEXUAL ABUSE                                                             [  ]3 pts        [  ]0pts    PSYCHOLOGICAL DISEASE                     ADD, OCD, Bipolar, Schizophrenia        [  ]2 pts         [  ]2 pts                      Depression                                               [  ]1 pt           [  ]1 pt           SCORING TOTAL   (add numbers and type here)              ( 0 )                                     A score of 3 or lower indicated LOW risk for future opioid abuse  A score of 4 to 7 indicated moderate risk for future opioid abuse  A score of 8 or higher indicates a high risk for opioid abuse    CAPRINI SCORE [CLOT]    AGE RELATED RISK FACTORS                                                       MOBILITY RELATED FACTORS  [ ] Age 41-60 years                                            (1 Point)                  [ ] Bed rest                                                        (1 Point)  [ ] Age: 61-74 years                                           (2 Points)                 [ ] Plaster cast                                                   (2 Points)  [ x] Age= 75 years                                              (3 Points)                 [ ] Bed bound for more than 72 hours                 (2 Points)    DISEASE RELATED RISK FACTORS                                               GENDER SPECIFIC FACTORS  [ ] Edema in the lower extremities                       (1 Point)                  [ ] Pregnancy                                                     (1 Point)  [ ] Varicose veins                                               (1 Point)                  [ ] Post-partum < 6 weeks                                   (1 Point)             [x ] BMI > 25 Kg/m2                                            (1 Point)                  [ ] Hormonal therapy  or oral contraception          (1 Point)                 [ ] Sepsis (in the previous month)                        (1 Point)                  [ ] History of pregnancy complications                 (1 point)  [ ] Pneumonia or serious lung disease                                               [ ] Unexplained or recurrent                     (1 Point)           (in the previous month)                               (1 Point)  [ ] Abnormal pulmonary function test                     (1 Point)                 SURGERY RELATED RISK FACTORS  [ ] Acute myocardial infarction                              (1 Point)                 [ ]  Section                                             (1 Point)  [ ] Congestive heart failure (in the previous month)  (1 Point)               [ ] Minor surgery                                                  (1 Point)   [ ] Inflammatory bowel disease                             (1 Point)                 [ ] Arthroscopic surgery                                        (2 Points)  [ ] Central venous access                                      (2 Points)                [ x] General surgery lasting more than 45 minutes   (2 Points)       [ ] Stroke (in the previous month)                          (5 Points)               [ ] Elective arthroplasty                                         (5 Points)                                                                                                                                               HEMATOLOGY RELATED FACTORS                                                 TRAUMA RELATED RISK FACTORS  [ ] Prior episodes of VTE                                     (3 Points)                [ ] Fracture of the hip, pelvis, or leg                       (5 Points)  [ ] Positive family history for VTE                         (3 Points)                 [ ] Acute spinal cord injury (in the previous month)  (5 Points)  [ ] Prothrombin 36569 A                                     (3 Points)                 [ ] Paralysis  (less than 1 month)                             (5 Points)  [ ] Factor V Leiden                                             (3 Points)                  [ ] Multiple Trauma within 1 month                        (5 Points)  [ ] Lupus anticoagulants                                     (3 Points)                                                           [ ] Anticardiolipin antibodies                               (3 Points)                                                       [ ] High homocysteine in the blood                      (3 Points)                                             [ ] Other congenital or acquired thrombophilia      (3 Points)                                                [ ] Heparin induced thrombocytopenia                  (3 Points)                                          Total Score [       5   ]    Caprini Score 0 - 2:  Low Risk, No VTE Prophylaxis required for most patients, encourage ambulation  Caprini Score 3 - 6:  At Risk, pharmacologic VTE prophylaxis is indicated for most patients (in the absence of a contraindication)  Caprini Score Greater than or = 7:  High Risk, pharmacologic VTE prophylaxis is indicated for most patients (in the absence of a contraindication)

## 2020-06-12 ENCOUNTER — APPOINTMENT (OUTPATIENT)
Dept: DISASTER EMERGENCY | Facility: CLINIC | Age: 75
End: 2020-06-12

## 2020-06-12 LAB — SARS-COV-2 N GENE NPH QL NAA+PROBE: NOT DETECTED

## 2020-06-14 ENCOUNTER — TRANSCRIPTION ENCOUNTER (OUTPATIENT)
Age: 75
End: 2020-06-14

## 2020-06-15 ENCOUNTER — OUTPATIENT (OUTPATIENT)
Dept: INPATIENT UNIT | Facility: HOSPITAL | Age: 75
LOS: 1 days | End: 2020-06-15
Payer: MEDICARE

## 2020-06-15 ENCOUNTER — RESULT REVIEW (OUTPATIENT)
Age: 75
End: 2020-06-15

## 2020-06-15 ENCOUNTER — APPOINTMENT (OUTPATIENT)
Dept: COLORECTAL SURGERY | Facility: HOSPITAL | Age: 75
End: 2020-06-15
Payer: MEDICARE

## 2020-06-15 ENCOUNTER — APPOINTMENT (OUTPATIENT)
Dept: COLORECTAL SURGERY | Facility: HOSPITAL | Age: 75
End: 2020-06-15

## 2020-06-15 VITALS
SYSTOLIC BLOOD PRESSURE: 155 MMHG | WEIGHT: 164.91 LBS | HEART RATE: 71 BPM | OXYGEN SATURATION: 100 % | RESPIRATION RATE: 20 BRPM | TEMPERATURE: 98 F | HEIGHT: 55 IN | DIASTOLIC BLOOD PRESSURE: 51 MMHG

## 2020-06-15 VITALS
DIASTOLIC BLOOD PRESSURE: 74 MMHG | SYSTOLIC BLOOD PRESSURE: 150 MMHG | RESPIRATION RATE: 20 BRPM | OXYGEN SATURATION: 95 % | HEART RATE: 65 BPM

## 2020-06-15 DIAGNOSIS — B18.2 CHRONIC VIRAL HEPATITIS C: ICD-10-CM

## 2020-06-15 DIAGNOSIS — K64.8 OTHER HEMORRHOIDS: ICD-10-CM

## 2020-06-15 DIAGNOSIS — K74.60 UNSPECIFIED CIRRHOSIS OF LIVER: ICD-10-CM

## 2020-06-15 DIAGNOSIS — K64.4 RESIDUAL HEMORRHOIDAL SKIN TAGS: ICD-10-CM

## 2020-06-15 DIAGNOSIS — Z98.890 OTHER SPECIFIED POSTPROCEDURAL STATES: Chronic | ICD-10-CM

## 2020-06-15 DIAGNOSIS — D64.9 ANEMIA, UNSPECIFIED: ICD-10-CM

## 2020-06-15 DIAGNOSIS — Z95.1 PRESENCE OF AORTOCORONARY BYPASS GRAFT: Chronic | ICD-10-CM

## 2020-06-15 DIAGNOSIS — Z95.828 PRESENCE OF OTHER VASCULAR IMPLANTS AND GRAFTS: Chronic | ICD-10-CM

## 2020-06-15 DIAGNOSIS — Z98.89 OTHER SPECIFIED POSTPROCEDURAL STATES: Chronic | ICD-10-CM

## 2020-06-15 DIAGNOSIS — Z95.2 PRESENCE OF PROSTHETIC HEART VALVE: Chronic | ICD-10-CM

## 2020-06-15 DIAGNOSIS — K62.5 HEMORRHAGE OF ANUS AND RECTUM: ICD-10-CM

## 2020-06-15 LAB
BASOPHILS # BLD AUTO: 0.03 K/UL — SIGNIFICANT CHANGE UP (ref 0–0.2)
BASOPHILS NFR BLD AUTO: 0.6 % — SIGNIFICANT CHANGE UP (ref 0–2)
BLD GP AB SCN SERPL QL: SIGNIFICANT CHANGE UP
EOSINOPHIL # BLD AUTO: 0.47 K/UL — SIGNIFICANT CHANGE UP (ref 0–0.5)
EOSINOPHIL NFR BLD AUTO: 9.2 % — HIGH (ref 0–6)
GLUCOSE BLDC GLUCOMTR-MCNC: 104 MG/DL — HIGH (ref 70–99)
HCT VFR BLD CALC: 35.6 % — SIGNIFICANT CHANGE UP (ref 34.5–45)
HGB BLD-MCNC: 11.8 G/DL — SIGNIFICANT CHANGE UP (ref 11.5–15.5)
IMM GRANULOCYTES NFR BLD AUTO: 0.4 % — SIGNIFICANT CHANGE UP (ref 0–1.5)
LYMPHOCYTES # BLD AUTO: 0.66 K/UL — LOW (ref 1–3.3)
LYMPHOCYTES # BLD AUTO: 13 % — SIGNIFICANT CHANGE UP (ref 13–44)
MCHC RBC-ENTMCNC: 28.6 PG — SIGNIFICANT CHANGE UP (ref 27–34)
MCHC RBC-ENTMCNC: 33.1 GM/DL — SIGNIFICANT CHANGE UP (ref 32–36)
MCV RBC AUTO: 86.2 FL — SIGNIFICANT CHANGE UP (ref 80–100)
MONOCYTES # BLD AUTO: 0.31 K/UL — SIGNIFICANT CHANGE UP (ref 0–0.9)
MONOCYTES NFR BLD AUTO: 6.1 % — SIGNIFICANT CHANGE UP (ref 2–14)
NEUTROPHILS # BLD AUTO: 3.6 K/UL — SIGNIFICANT CHANGE UP (ref 1.8–7.4)
NEUTROPHILS NFR BLD AUTO: 70.7 % — SIGNIFICANT CHANGE UP (ref 43–77)
PLATELET # BLD AUTO: 149 K/UL — LOW (ref 150–400)
RBC # BLD: 4.13 M/UL — SIGNIFICANT CHANGE UP (ref 3.8–5.2)
RBC # FLD: 15.5 % — HIGH (ref 10.3–14.5)
WBC # BLD: 5.09 K/UL — SIGNIFICANT CHANGE UP (ref 3.8–10.5)
WBC # FLD AUTO: 5.09 K/UL — SIGNIFICANT CHANGE UP (ref 3.8–10.5)

## 2020-06-15 PROCEDURE — 86901 BLOOD TYPING SEROLOGIC RH(D): CPT

## 2020-06-15 PROCEDURE — 36415 COLL VENOUS BLD VENIPUNCTURE: CPT

## 2020-06-15 PROCEDURE — 86850 RBC ANTIBODY SCREEN: CPT

## 2020-06-15 PROCEDURE — 46260 REMOVE IN/EX HEM GROUPS 2+: CPT

## 2020-06-15 PROCEDURE — 85027 COMPLETE CBC AUTOMATED: CPT

## 2020-06-15 PROCEDURE — 64630 INJECTION TREATMENT OF NERVE: CPT

## 2020-06-15 PROCEDURE — 86900 BLOOD TYPING SEROLOGIC ABO: CPT

## 2020-06-15 PROCEDURE — 88304 TISSUE EXAM BY PATHOLOGIST: CPT

## 2020-06-15 PROCEDURE — 82962 GLUCOSE BLOOD TEST: CPT

## 2020-06-15 PROCEDURE — 88304 TISSUE EXAM BY PATHOLOGIST: CPT | Mod: 26

## 2020-06-15 RX ORDER — HYDROMORPHONE HYDROCHLORIDE 2 MG/ML
1 INJECTION INTRAMUSCULAR; INTRAVENOUS; SUBCUTANEOUS
Refills: 0 | Status: DISCONTINUED | OUTPATIENT
Start: 2020-06-15 | End: 2020-06-16

## 2020-06-15 RX ORDER — SODIUM CHLORIDE 9 MG/ML
1000 INJECTION, SOLUTION INTRAVENOUS
Refills: 0 | Status: DISCONTINUED | OUTPATIENT
Start: 2020-06-15 | End: 2020-06-16

## 2020-06-15 RX ORDER — AMPICILLIN TRIHYDRATE 250 MG
2000 CAPSULE ORAL ONCE
Refills: 0 | Status: DISCONTINUED | OUTPATIENT
Start: 2020-06-15 | End: 2020-06-15

## 2020-06-15 RX ORDER — ONDANSETRON 8 MG/1
4 TABLET, FILM COATED ORAL ONCE
Refills: 0 | Status: DISCONTINUED | OUTPATIENT
Start: 2020-06-15 | End: 2020-06-16

## 2020-06-15 RX ORDER — BUPIVACAINE 13.3 MG/ML
20 INJECTION, SUSPENSION, LIPOSOMAL INFILTRATION ONCE
Refills: 0 | Status: DISCONTINUED | OUTPATIENT
Start: 2020-06-15 | End: 2020-06-30

## 2020-06-15 RX ORDER — AMPICILLIN TRIHYDRATE 250 MG
2000 CAPSULE ORAL ONCE
Refills: 0 | Status: DISCONTINUED | OUTPATIENT
Start: 2020-06-15 | End: 2020-06-30

## 2020-06-15 RX ORDER — FENTANYL CITRATE 50 UG/ML
50 INJECTION INTRAVENOUS
Refills: 0 | Status: DISCONTINUED | OUTPATIENT
Start: 2020-06-15 | End: 2020-06-16

## 2020-06-15 RX ORDER — SODIUM CHLORIDE 9 MG/ML
3 INJECTION INTRAMUSCULAR; INTRAVENOUS; SUBCUTANEOUS ONCE
Refills: 0 | Status: DISCONTINUED | OUTPATIENT
Start: 2020-06-15 | End: 2020-06-15

## 2020-06-15 NOTE — BRIEF OPERATIVE NOTE - OPERATION/FINDINGS
open internal/external hemorrhoidectomy x3 with closure with chromic sutures. anal canal packed with gel foam.

## 2020-06-15 NOTE — ASU DISCHARGE PLAN (ADULT/PEDIATRIC) - CALL YOUR DOCTOR IF YOU HAVE ANY OF THE FOLLOWING:
Unable to urinate/Bleeding that does not stop/Numbness, tingling, color or temperature change to extremity/Pain not relieved by Medications

## 2020-06-15 NOTE — ASU DISCHARGE PLAN (ADULT/PEDIATRIC) - CARE PROVIDER_API CALL
Kate Dickerson  COLON/RECTAL SURGERY  [address] [facility], [place] [address] [place] [address]  Phone: [phone]  Fax: [phone]  Follow Up Time: 2 weeks

## 2020-06-15 NOTE — ASU DISCHARGE PLAN (ADULT/PEDIATRIC) - ASU DC SPECIAL INSTRUCTIONSFT
Please follow up with Dr. Dickerson in clinic in 2 weeks  - please follow attached handout for hemorrhoid care

## 2020-06-15 NOTE — ASU PREOP CHECKLIST - ANTIBIOTIC
Last 5 Patient Entered Readings                                      Current 30 Day Average: 156/97     Recent Readings 6/6/2019 6/5/2019 6/4/2019 6/3/2019 6/2/2019    SBP (mmHg) 162 154 162 151 151    DBP (mmHg) 97 94 105 93 89    Pulse 91 89 87 91 94        Mr. Melendez has not heard back from the sleep medicine clinic to set up an appointment. He will call to get an appointment. Will increase amlodipine dose to 10 mg QD. Reviewed possible side effect of RAFIA with dose increase. Asked patient to contact me should swelling of LE occur. Patient verbalized understanding.     Per 30 day average, 156/97 mmHg, patient's BP is not at goal.     Will continue to monitor regularly. Will follow up in 2-3 weeks, sooner if BP begins to trend upward or downward.    Asked patient to call or message with questions or concerns.     Current HTN regimen:  Hypertension Medications             amLODIPine (NORVASC) 10 MG tablet Take 1 tablet (10 mg total) by mouth once daily.                         n/a

## 2020-06-15 NOTE — BRIEF OPERATIVE NOTE - NSICDXBRIEFPROCEDURE_GEN_ALL_CORE_FT
PROCEDURES:  External hemorrhoidectomy involving multiple anal columns 15-Franco-2020 11:29:10  Rich Messer

## 2020-06-18 LAB — SURGICAL PATHOLOGY STUDY: SIGNIFICANT CHANGE UP

## 2020-06-23 PROBLEM — K64.8 OTHER HEMORRHOIDS: Chronic | Status: ACTIVE | Noted: 2020-06-04

## 2020-06-23 PROBLEM — K64.9 UNSPECIFIED HEMORRHOIDS: Chronic | Status: ACTIVE | Noted: 2020-06-04

## 2020-06-23 NOTE — ED ADULT NURSE NOTE - DRUG PRE-SCREENING (DAST -1)
Left message for mother requesting return call to discuss scheduling an allergy injection for patient.    Statement Selected

## 2020-07-07 ENCOUNTER — APPOINTMENT (OUTPATIENT)
Dept: COLORECTAL SURGERY | Facility: CLINIC | Age: 75
End: 2020-07-07
Payer: MEDICARE

## 2020-07-07 VITALS
DIASTOLIC BLOOD PRESSURE: 81 MMHG | SYSTOLIC BLOOD PRESSURE: 156 MMHG | BODY MASS INDEX: 34 KG/M2 | HEART RATE: 79 BPM | TEMPERATURE: 97.9 F | HEIGHT: 58 IN | WEIGHT: 162 LBS

## 2020-07-07 DIAGNOSIS — Z09 ENCOUNTER FOR FOLLOW-UP EXAMINATION AFTER COMPLETED TREATMENT FOR CONDITIONS OTHER THAN MALIGNANT NEOPLASM: ICD-10-CM

## 2020-07-07 PROCEDURE — 99024 POSTOP FOLLOW-UP VISIT: CPT

## 2020-07-07 NOTE — ASSESSMENT
[FreeTextEntry1] : Internet presents to the office for a postoperative visit.  Overall, she is doing remarkably well after undergoing a 3 quadrant hemorrhoidectomy for rectal bleeding from her internal and external hemorrhoids.  She declined office examination today though from her reports, she appears to be healing without incident.  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  I recommended that she return to the office in approximately 6 months time for an anal Pap to continue surveillance of this area of incidental dysplasia.  She understands, and is agreeable.

## 2020-07-07 NOTE — HISTORY OF PRESENT ILLNESS
[FreeTextEntry1] : Marlene presents to the office for a postoperative visit after undergoing 3 quadrant hemorrhoidectomy on Juliette 15, 2020.  She reports that the postoperative pain was much more tolerable than she was prepared for.  She has not taken any of the narcotic pain medication that was prescribed.  She denies any rectal bleeding since the surgery and denies any significant anorectal discharge.  Her bowel movements are passed without issue and without significant pain.

## 2020-07-07 NOTE — REASON FOR VISIT
[Post Op: _________] : a [unfilled] post op visit [FreeTextEntry1] : 6/15/20 Three quadrant hemorrhoidectomy

## 2020-07-07 NOTE — PHYSICAL EXAM
[No Rash or Lesion] : No rash or lesion [Alert] : alert [Oriented to Person] : oriented to person [Oriented to Place] : oriented to place [Oriented to Time] : oriented to time [de-identified] : No apparent distress [Calm] : calm [de-identified] : Normocephalic atraumatic [de-identified] : Moving all extremities x4

## 2020-07-22 NOTE — INPATIENT CERTIFICATION FOR MEDICARE PATIENTS - THE STATUS OF COMORBIDITIES.
2. The status of comorbities. (See ED/admit documents) Tazorac Pregnancy And Lactation Text: This medication is not safe during pregnancy. It is unknown if this medication is excreted in breast milk.

## 2020-10-17 NOTE — ED PROVIDER NOTE - CLINICAL SUMMARY MEDICAL DECISION MAKING FREE TEXT BOX
pt has been taking less lasix due to worsening renal dysnfuction, now in acute CHF, requiring 2L O2 wchich she uses at home, but states before coming in her O2 was in 80s and wasn't comign up. no infectious sx. no CP. will check labs. lasix. reasses

## 2020-10-17 NOTE — ED ADULT NURSE NOTE - OBJECTIVE STATEMENT
Pt. presents alert and oriented x 4 to ED complaining of SOB x 2 days. Pt. reports she received an iron infusion yesterday as prescribed by her hematologist at an outpatient infusion center, states she has been feeling SOB since the infusion. Pt. reports she normally uses 2L NC at home PRN and has needed to use it more than normal. Pt also reports she was taken off Lasix ~2 weeks ago by her nephrologist. No noted in edema in BLE. Pt. reports extensive cardiac hx including CHF and anemia, hx of blood transfusions in the past. Pt. is smiling, calm, and cooperative, denies pain. R. anterior chest wall port in place, accessed yesterday for iron infusion, pt. reports she has it due to poor peripheral access. Pt. is tachypneic in the 20's, speaking in full sentences, spO2 99% on 2L NC at this time. Pt. in NSR, normotensive, in NAD.

## 2020-10-17 NOTE — ED PROVIDER NOTE - CARE PLAN
Principal Discharge DX:	Acute CHF  Secondary Diagnosis:	CKD (chronic kidney disease)  Secondary Diagnosis:	Symptomatic anemia  Secondary Diagnosis:	Hyperkalemia

## 2020-10-17 NOTE — H&P ADULT - NSICDXPASTSURGICALHX_GEN_ALL_CORE_FT
PAST SURGICAL HISTORY:  H/O aortic valve replacement bovine, 2016    History of loop recorder 2017    History of tonsillectomy     Port-A-Cath in place 7/2019, right upper chest    S/P CABG x 3 2016

## 2020-10-17 NOTE — H&P ADULT - HISTORY OF PRESENT ILLNESS
74 Yy/o female with pmhf of CHFpEF, Chronic AMELIA on  Iron infusions (port in chest cavity) CAD s/p CABG, AS s/p bovine AVR (2016) DM2, HTN, HLD, presenting to [facility] ED after being told her Hg levels dropped , along with pt feeling SOB, DUPONT, and very lethargic this past week. Pt has chronic anemia and has had multiple blood transfusions, now on weekly iron infusions, last infused yesterday @ [facility] infusion center. Pt states she also gets weekly blood draws, to monitor her Hg and creatinine functions  and recently had her lasix dosage decreased by nephrologist, and has very close follow up with all her other specialists. Pt recently was at [facility] in June for internal/external hemorrhoidectomy after have BRBR. Pt currently denies any recent COVID exposure, no chest pain, + for SOB, DUPONT, mild palpitations, no nausea, vomiting, no diarrhea, constipation, no recent blood in urine or stools. ROS otherwise negative     In ED, PT Hgb @ 8.4. Pending prbc units, Xray with congestion, BP elevated, BNP >2700     74 Yy/o female with pmhf of CHFpEF, Chronic AMELIA on  Iron infusions (port in chest cavity) CAD s/p CABG, AS s/p bovine AVR (2016) DM2, HTN, HLD, presenting to [facility] ED after being told her Hg levels dropped , along with pt feeling SOB, DUPONT, and very lethargic this past week. Pt has chronic anemia and has had multiple blood transfusions, now on weekly iron infusions, last infused yesterday @ [facility] infusion center. Pt states she also gets weekly blood draws, to monitor her Hg and creatinine functions  and recently had her lasix dosage decreased by nephrologist, and has very close follow up with all her other specialists. Pt recently was at [facility] in June for internal/external hemorrhoidectomy after have BRBR. Pt currently denies any recent COVID exposure, no chest pain, + for SOB, DUPONT, mild palpitations, no nausea, vomiting, no diarrhea, constipation, no recent blood in urine or stools. ROS otherwise negative     In ED, PT Hgb @ 8.4. S/P 1 unit PRBC, Xray with congestion, BP elevated, BNP >2700, S/P Lasix 40 mg IVP x 1

## 2020-10-17 NOTE — ED ADULT NURSE NOTE - CHPI ED NUR SYMPTOMS NEG
no back pain/no vomiting/no syncope/no diaphoresis/no congestion/no fever/no nausea/no dizziness/no chest pain/no chills

## 2020-10-17 NOTE — ED ADULT NURSE NOTE - PMH
Anemia, unspecified anemia type    Aortic stenosis    Asthma    CAD (coronary artery disease)    Chronic hepatitis C    Cirrhosis    CKD (chronic kidney disease)    Heart failure    Hemorrhoids    High cholesterol    Hypertension    Internal hemorrhoids    Low back pain  chronic over a year  Type 2 diabetes mellitus

## 2020-10-17 NOTE — H&P ADULT - NSHPPHYSICALEXAM_GEN_ALL_CORE
T(C): 36.9 (10-17-20 @ 18:38), Max: 36.9 (10-17-20 @ 18:38)  HR: 83 (10-17-20 @ 18:38) (83 - 83)  BP: 177/89 (10-17-20 @ 18:38) (177/89 - 177/89)  RR: 20 (10-17-20 @ 18:38) (20 - 20)  SpO2: 98% (10-17-20 @ 18:38) (98% - 98%)    PHYSICAL EXAM:  GENERAL: NAD, well-developed, in mild respiratory distress  HEAD:  Atraumatic, Normocephalic  EYES: EOMI, PERRLA, conjunctiva and sclera clear  ENT: Dry mucous membranes, Normal tympanic membrane. No nasal obstruction or discharge. No tonsillar exudate, swelling or erythema.  NECK: Supple, No JVD  RESP: Clear to auscultation bilaterally; No wheeze  CARD: Regular rate and rhythm; No murmurs, rubs, or gallops  GASTRO: Soft, Nontender, Nondistended; Bowel sounds present  EXTREMITIES:  2+ Peripheral Pulses, No clubbing, cyanosis, or edema  PSYCH: Pleasant female  NEUROLOGY: CN-12, non-focal  SKIN: Poor skin turgor. Good capillary refill

## 2020-10-17 NOTE — H&P ADULT - NSHPOUTPATIENTPROVIDERS_GEN_ALL_CORE
PMD: Dr Pandey  Cardio: Dr. Penny ([facility])   Nephrologist: Dr. Cruz  GI: Dr. Howell  Pulm: Dr. Schneider

## 2020-10-17 NOTE — ED PROVIDER NOTE - OBJECTIVE STATEMENT
74yo F with CABG, CHF intermittent home O2, DM, iron def anemia requiring Fe infusions and transfusions. last few days worsening DUPONT and orthopnea, significantly worse today, Hgb down to 8, received Fe transfusion yesterday. due to worsening Sx called PCP and came to ED. +dry cough. no fever/chills. no CP. no leg swelling. +wt gain but not sure how much. has been decreasing lasix from 40 --> 20 d/t renal dysfunction and then recently completely stopped.

## 2020-10-17 NOTE — ED PROVIDER NOTE - NS ED ROS FT
ROS: no CP +SOB. + cough. no fever. no n/v/d/c. no abd pain. no rash. no bleeding. no urinary complaints. no weakness. no vision changes. no HA. no neck/back pain. no extremity swelling/deformity. No change in mental status.

## 2020-10-17 NOTE — ED ADULT TRIAGE NOTE - CHIEF COMPLAINT QUOTE
pt A&Ox 4 BIBA from home c/o difficulty breathing since she received her iron infusion yesterday. Pt with extensive cardiac history. NAD, respirations even & unlabored. Denies any known covid contacts

## 2020-10-17 NOTE — H&P ADULT - NSHPLABSRESULTS_GEN_ALL_CORE
8.0    4.49  )-----------( 105      ( 17 Oct 2020 19:36 )             25.4   10-17    140  |  110<H>  |  41.0<H>  ----------------------------<  201<H>  5.5<H>   |  20.0<L>  |  1.93<H>    Ca    8.7      17 Oct 2020 19:36  Mg     2.4     10-17    TPro  6.6  /  Alb  3.5  /  TBili  0.3<L>  /  DBili  x   /  AST  27  /  ALT  17  /  AlkPhos  114  10-17    Serum Pro-Brain Natriuretic Peptide (10.17.20 @ 19:36)    Serum Pro-Brain Natriuretic Peptide: 2713: NT-proBNP Interpretive comments:  Acute Congestive Heart Failure is unlikely if NT-proBNP is less than 300  pg/mL, for any age.  Consider Acute Congestive Heart Failure if:  AGE               NT-proBNP Result  ___               ________________  < 50year           > 450 pg/mL  50 - 75 years     > 900 pg/mL  > 75 years         > 1800 pg/ml  All results require clinical correlation. Consider obtaining a baseline or  "dry" NT-proBNP level when the patient is stabilized, so that subsequent  levels can be related to that. Patients with recurrent CHF may have  elevated  NT-proBNP levels. Acute failure episodes generally produce levels at  least 25  % greater than base line levels. The above values are derived from a large  multi-center international study, "Rowdy, ART, et al, European Heart  Journal,  2006; 27:330-337. pg/mL

## 2020-10-17 NOTE — H&P ADULT - NSICDXPASTMEDICALHX_GEN_ALL_CORE_FT
PAST MEDICAL HISTORY:  Anemia, unspecified anemia type     Aortic stenosis     Asthma     CAD (coronary artery disease)     Chronic hepatitis C     Cirrhosis     CKD (chronic kidney disease)     Heart failure     Hemorrhoids     High cholesterol     Hypertension     Internal hemorrhoids     Low back pain chronic over a year    Type 2 diabetes mellitus

## 2020-10-17 NOTE — H&P ADULT - ASSESSMENT
74 Yy/o female with pmhf of CHFpEF, Chronic AMELIA on  Iron infusions (port in chest cavity) CAD s/p CABG, AS s/p bovine AVR (2016) DM2, HTN, HLD, presenting to [facility] ED after being told her Hg levels dropped , along with pt feeling SOB, DUPONT, admitted for DUPONT multifactorial in the setting of CHF exacerbation and Acute on chronic anemia      admit to medicine under Dr. Suhas noble per routine  Telemetry  Activity bedrest with bathroom privileges   Diet Dash/Tlc      DUPONT multifactorial  - Pt has a long standing hx of CHFpEF, non compliant with diet, and recently taken off lasix given worsening renal function  - Pt has longstanding AMELIA requiring weekly iron infusions and requiring multiple blood transfusions in the paste  - Pt tachypneic >25RR on monitor, hypoxic at home with Pulse ox reading <86%  - First trop negative, continue to trend  - Continue 2L NC and titrate as tolerated  - Telemetry with   - Cardio consulted in ED([facility])    CHF exacerbation  - Pt with SOB, DUPONT, recently taken off lasix for worsening creatinine function  - Pt is non compliant with diet  - XR with congestion  - BNP >2700  - NC 2L continuous and titrate as tolerated  - Daily weight, strict intake/outake  - TTE in am  - Fluid restriction to 1200ml  - Cardio ([place] consulted in ED)    Acute on Chronic Anemia  - Pt has longstanding AMELIA requiring blood transfusions in the past, weekly iron infusions  - SOB, lethargy, weakness for this past week  - Sent in by PMD for low Hbg, On arrival 8.   - PRBC unit ordered. Pending  - H/H after transfusion completion.   - FOBT completed. Pending result   - Continue iron tablets      Stage 3 CKD  - Cr @ 1.93 which has improved according to pt  - Avoid nephrotoxic agents  - Continue daily magnesium supplement  - Fluid restriction given CHF exacerbation   - Monitor daily     CAD s/p CABG  - Continue with Coreg with holding parameters    Hypothyroidism  - Continue with levothyroxine 100 mcg    DM2  - Lantus 10 at night  - SS, Hypoglycemia protocol  - Diabetic/ dash/tlc diet    HTN  - Continue with Amlodipine and Coreg with holding parameters      HLD  - Atorvastatin 20mg daily     DVT Prophylaxis: Hold AC given Hgb drop. SCD boots in bed    DISPO: Daughter Daniela present at bedside is HCP, updated on plan of care. Pt is Full code.    74 Yy/o female with pmhf of CHFpEF, Chronic AMELIA on  Iron infusions (port in chest cavity) CAD s/p CABG, AS s/p bovine AVR (2016) DM2, HTN, HLD, presenting to [facility] ED after being told her Hg levels dropped , along with pt feeling SOB, DUPONT, admitted for DUPONT multifactorial in the setting of CHF exacerbation and Acute on chronic anemia      admit to medicine under Dr. Suhas noble per routine  Telemetry  Activity bedrest with bathroom privileges   Diet Dash/Tlc      DUPONT multifactorial  - Pt has a long standing hx of CHFpEF, non compliant with diet, and recently taken off lasix given worsening renal function  - Pt has longstanding AMELIA requiring weekly iron infusions and requiring multiple blood transfusions in the paste  - Pt tachypneic >25RR on monitor, hypoxic at home with Pulse ox reading <86%  - First trop negative, continue to trend  - Continue 2L NC and titrate as tolerated  - Telemetry with   - Cardio consulted in ED([facility])    CHF exacerbation  - Pt with SOB, DUPONT, recently taken off lasix for worsening creatinine function  - Pt is non compliant with diet  - XR with congestion  - BNP >2700  - NC 2L continuous and titrate as tolerated  - Daily weight, strict intake/outake  - TTE in am  - Fluid restriction to 1200ml  - Cardio ([place] consulted in ED)    Acute on Chronic Anemia  - Pt has longstanding AMELIA requiring blood transfusions in the past, weekly iron infusions  - SOB, lethargy, weakness for this past week  - Sent in by PMD for low Hbg, On arrival 8.   - PRBC unit ordered. Pending  - H/H after transfusion completion.   - FOBT completed. Pending result   - Continue iron tablets      Stage 3 CKD  - Cr @ 1.93 which has improved according to pt  - Avoid nephrotoxic agents  - Continue daily magnesium supplement  - Fluid restriction given CHF exacerbation   - Monitor daily     CAD s/p CABG  - Continue with Coreg with holding parameters    COPD  - Continue with as needed albuterol  - Continue with Montelukast daily  - Continue with Oxygen via 2L NC, , titrate as tolerated.     Hypothyroidism  - Continue with levothyroxine 100 mcg    DM2  - Lantus 10 at night  - SS, Hypoglycemia protocol  - Diabetic/ dash/tlc diet    HTN  - Continue with Amlodipine and Coreg with holding parameters      HLD  - Atorvastatin 20mg daily     DVT Prophylaxis: Hold AC given Hgb drop. SCD boots in bed    DISPO: Daughter Daniela present at bedside is HCP, updated on plan of care. Pt is Full code.    74 Yy/o female with pmhf of CHFpEF, Chronic AMELIA on  Iron infusions (port in chest cavity) CAD s/p CABG, AS s/p bovine AVR (2016) DM2, HTN, HLD, presenting to [facility] ED after being told her Hg levels dropped , along with pt feeling SOB, DUPONT, admitted for DUPONT multifactorial in the setting of CHF exacerbation and Acute on chronic anemia      admit to medicine under Dr. Suhas noble per routine  Telemetry  Activity bedrest with bathroom privileges   Diet Dash/Tlc      DUPONT multifactorial  - Pt has a long standing hx of CHFpEF, non compliant with diet, and recently taken off lasix given worsening renal function  - Pt has longstanding AMELIA requiring weekly iron infusions and requiring multiple blood transfusions in the paste  - Pt tachypneic >25RR on monitor, hypoxic at home with Pulse ox reading <86%  - First trop negative, continue to trend  - Continue 2L NC and titrate as tolerated  - Telemetry with   - Cardio consulted in ED([facility])    CHF exacerbation  - Pt with SOB, DUPONT, recently taken off lasix for worsening creatinine function  - Pt is non compliant with diet  - XR with congestion  - S/P Lasix 40 mg IVP in ED. Continue daily   - BNP >2700  - NC 2L continuous and titrate as tolerated  - Daily weight, strict intake/outake  - TTE in am  - Fluid restriction to 1200ml  - Cardio ([place] consulted in ED)    Acute on Chronic Anemia  - Pt has longstanding AMELIA requiring blood transfusions in the past, weekly iron infusions  - SOB, lethargy, weakness for this past week  - Sent in by PMD for low Hbg, On arrival 8.   - PRBC unit ordered. Pending  - H/H after transfusion completion.   - FOBT completed. Pending result   - Continue iron tablets      Stage 3 CKD  - Cr @ 1.93 which has improved according to pt  - Avoid nephrotoxic agents  - Continue daily magnesium supplement  - Fluid restriction given CHF exacerbation   - Monitor daily     CAD s/p CABG  - Continue with Coreg with holding parameters    COPD  - Continue with as needed albuterol  - Continue with Montelukast daily  - Continue with Oxygen via 2L NC, , titrate as tolerated.     Hypothyroidism  - Continue with levothyroxine 100 mcg    DM2  - Lantus 10 at night  - SS, Hypoglycemia protocol  - Diabetic/ dash/tlc diet    HTN  - Continue with Amlodipine and Coreg with holding parameters      HLD  - Atorvastatin 20mg daily     DVT Prophylaxis: Hold AC given Hgb drop. SCD boots in bed    DISPO: Daughter Daniela present at bedside is HCP, updated on plan of care. Pt is Full code.    74 Yy/o female with pmhf of CHFpEF, Chronic AMELIA on  Iron infusions (port in chest cavity) CAD s/p CABG, AS s/p bovine AVR (2016) DM2, HTN, HLD, presenting to [facility] ED after being told her Hg levels dropped , along with pt feeling SOB, DUPONT, admitted for DUPONT multifactorial in the setting of CHF exacerbation and Acute on chronic anemia      admit to medicine under Dr. Suhas noble per routine  Telemetry  Activity bedrest with bathroom privileges   Diet Dash/Tlc      DUPONT multifactorial  - Pt has a long standing hx of CHFpEF, non compliant with diet, and recently taken off lasix given worsening renal function  - Pt has longstanding AMELIA requiring weekly iron infusions and requiring multiple blood transfusions in the paste  - Pt tachypneic >25RR on monitor, hypoxic at home with Pulse ox reading <86%  - First trop negative, continue to trend  - Continue 2L NC and titrate as tolerated  - Telemetry with   - Cardio consulted in ED([facility])    HTVE urgency  - Pt presented with SBP >170  - Repeat in holding area was >200 SBP  - IV lopressor 5mg x 1 stat ordered. Advised RN to give evening Coreg medication.   - Repeat in 1 hour and continue to monitor on Telemetry     CHF exacerbation  - Pt with SOB, DUPONT, recently taken off lasix for worsening creatinine function  - Pt is non compliant with diet  - XR with congestion  - S/P Lasix 40 mg IVP in ED. Continue daily   - BNP >2700  - NC 2L continuous and titrate as tolerated  - Daily weight, strict intake/outake  - TTE in am  - Fluid restriction to 1200ml  - Cardio ([place] consulted in ED)    Acute on Chronic Anemia  - Pt has longstanding AMELIA requiring blood transfusions in the past, weekly iron infusions  - SOB, lethargy, weakness for this past week  - Sent in by PMD for low Hbg, On arrival 8.   - PRBC unit ordered. Pending  - H/H after transfusion completion.   - FOBT completed. Pending result   - Continue iron tablets      Stage 3 CKD  - Cr @ 1.93 which has improved according to pt  - Avoid nephrotoxic agents  - Continue daily magnesium supplement  - Fluid restriction given CHF exacerbation   - Monitor daily     CAD s/p CABG  - Continue with Coreg with holding parameters    COPD  - Continue with as needed albuterol  - Continue with Montelukast daily  - Continue with Oxygen via 2L NC, , titrate as tolerated.     Hypothyroidism  - Continue with levothyroxine 100 mcg    DM2  - Lantus 10 at night  - SS, Hypoglycemia protocol  - Diabetic/ dash/tlc diet    HTN  - Continue with Amlodipine and Coreg with holding parameters      HLD  - Atorvastatin 20mg daily     DVT Prophylaxis: Hold AC given Hgb drop. SCD boots in bed    DISPO: Daughter Daniela present at bedside is HCP, updated on plan of care. Pt is Full code.    74 Yy/o female with pmhf of CHFpEF, Chronic AMELIA on  Iron infusions (port in chest cavity) CAD s/p CABG, AS s/p bovine AVR (2016) DM2, HTN, HLD, presenting to [facility] ED after being told her Hg levels dropped , along with pt feeling SOB, DUPONT, admitted for DUPONT multifactorial in the setting of CHF exacerbation and Acute on chronic anemia      admit to medicine   vitals per routine  Telemetry  Activity bedrest with bathroom privileges   Diet Dash/Tlc      DUPONT multifactorial  - Pt has a long standing hx of CHFpEF, non compliant with diet, and recently taken off lasix given worsening renal function  - Pt has longstanding AMELIA requiring weekly iron infusions and requiring multiple blood transfusions in the paste  - Pt tachypneic >25RR on monitor, hypoxic at home with Pulse ox reading <86%  - First trop negative, continue to trend  - Continue 2L NC and titrate as tolerated  - Telemetry with   - Cardio consulted ([place])    HTVE urgency  - Pt presented with SBP >170  - Repeat in holding area was >200 SBP  - IV lopressor 5mg x 1 stat ordered. Advised RN to give evening Coreg medication.   - Repeat in 1 hour and continue to monitor on Telemetry     CHF exacerbation  - Pt with SOB, DUPONT, recently taken off lasix for worsening creatinine function  - Pt is non compliant with diet  - XR with congestion  - S/P Lasix 40 mg IVP in ED. Continue daily   - BNP >2700  - NC 2L continuous and titrate as tolerated  - Daily weight, strict intake/outake  - TTE in am  - Fluid restriction to 1200ml  - Cardio ([place] consulted in ED)    Acute on Chronic Anemia  - Pt has longstanding AMELIA requiring blood transfusions in the past, weekly iron infusions  - SOB, lethargy, weakness for this past week  - Sent in by PMD for low Hbg, On arrival 8.   - PRBC unit ordered. Pending  - H/H after transfusion completion.   - FOBT completed. Pending result   - Continue iron tablets      Stage 3 CKD  - Cr @ 1.93 which has improved according to pt  - Avoid nephrotoxic agents  - Continue daily magnesium supplement  - Fluid restriction given CHF exacerbation   - Monitor daily     CAD s/p CABG  - Continue with Coreg with holding parameters    COPD  - Continue with as needed albuterol  - Continue with Montelukast daily  - Continue with Oxygen via 2L NC, , titrate as tolerated.     Hypothyroidism  - Continue with levothyroxine 100 mcg    DM2  - Lantus 10 at night  - SS, Hypoglycemia protocol  - Diabetic/ dash/tlc diet    HTN  - Continue with Amlodipine and Coreg with holding parameters      HLD  - Atorvastatin 20mg daily     DVT Prophylaxis: Hold AC given Hgb drop. SCD boots in bed    DISPO: Daughter Daniela present at bedside is HCP, updated on plan of care. Pt is Full code.

## 2020-10-17 NOTE — ED PROVIDER NOTE - PHYSICAL EXAMINATION
Gen: NAD, AOx3  Head: NCAT  HEENT: EOMI, oral mucosa moist, normal conjunctiva, neck supple  Lung: mild ltachypnea, crackles b/l bases  CV: rrr, no murmur, Normal perfusion  Abd: soft, NTND  MSK: No edema, no visible deformities  Neuro: No focal neurologic deficits  Skin: No rash   Psych: normal affect

## 2020-10-18 NOTE — PROGRESS NOTE ADULT - SUBJECTIVE AND OBJECTIVE BOX
Formerly [facility], [facility]                                   [address] [place] [address]                                                      PHONE: [phone]                                                         FAX: [phone]  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 Her SOB has been worsening for the past few weeks and recent blood work showed worsening anemia and she was instructed to come to the ED for blood transfusion. She denies chest pain or any other associated cardiovascular symptoms. She denies palpitations, diaphoresis, near syncope, or syncope. She says she was told to stop her Lasix two weeks ago because of worsening renal function.     PAST MEDICAL & SURGICAL HISTORY:  Internal hemorrhoids    Type 2 diabetes mellitus    Hemorrhoids    Cirrhosis    Chronic hepatitis C    CKD (chronic kidney disease)    CAD (coronary artery disease)    Heart failure    Aortic stenosis    Anemia, unspecified anemia type    Asthma    Low back pain  chronic over a year    High cholesterol    Hypertension    History of loop recorder  2017    Port-A-Cath in place  7/2019, right upper chest    H/O aortic valve replacement  bovine, 2016    S/P CABG x 3  2016    History of tonsillectomy        ACE inhibitors (Unknown)  Bactrim (Nephrotoxicity)  Biaxin (Joint Pain)  morphine (Short breath)  NSAIDs (Unknown)      MEDICATIONS  (STANDING):  amLODIPine   Tablet 5 milliGRAM(s) Oral daily  atorvastatin 20 milliGRAM(s) Oral at bedtime  carvedilol 6.25 milliGRAM(s) Oral every 12 hours  dextrose 5%. 1000 milliLiter(s) (50 mL/Hr) IV Continuous <Continuous>  dextrose 50% Injectable 12.5 Gram(s) IV Push once  dextrose 50% Injectable 25 Gram(s) IV Push once  dextrose 50% Injectable 25 Gram(s) IV Push once  folic acid 1 milliGRAM(s) Oral daily  gabapentin 300 milliGRAM(s) Oral at bedtime  insulin glargine Injectable (LANTUS) 10 Unit(s) SubCutaneous at bedtime  insulin lispro (HumaLOG) corrective regimen sliding scale   SubCutaneous three times a day before meals  levothyroxine 100 MICROGram(s) Oral daily  loratadine 10 milliGRAM(s) Oral daily  magnesium oxide 400 milliGRAM(s) Oral <User Schedule>  montelukast 10 milliGRAM(s) Oral daily  pantoprazole    Tablet 40 milliGRAM(s) Oral before breakfast    MEDICATIONS  (PRN):  dextrose 40% Gel 15 Gram(s) Oral once PRN Blood Glucose LESS THAN 70 milliGRAM(s)/deciliter  glucagon  Injectable 1 milliGRAM(s) IntraMuscular once PRN Glucose LESS THAN 70 milligrams/deciliter  hydrALAZINE Injectable 10 milliGRAM(s) IV Push every 6 hours PRN for SBP > 150  metoprolol tartrate Injectable 5 milliGRAM(s) IV Push once PRN HTVE urgency      Social History:  Cigarettes:   none                  Alcohol:    denies             Illicit Drug Abuse:  denies    Family History:  denies CAD, MI, CVA, or sudden death.     ROS: Negative other than as mentioned in HPI.    Vital Signs Last 24 Hrs  T(C): 36.9 (18 Oct 2020 13:29), Max: 36.9 (17 Oct 2020 18:38)  T(F): 98.5 (18 Oct 2020 13:29), Max: 98.5 (17 Oct 2020 18:38)  HR: 76 (18 Oct 2020 13:29) (72 - 83)  BP: 170/76 (18 Oct 2020 13:29) (136/68 - 203/79)  BP(mean): --  RR: 20 (18 Oct 2020 13:29) (18 - 22)  SpO2: 98% (18 Oct 2020 13:29) (98% - 100%)  ICU Vital Signs Last 24 Hrs  NICHELLE [facility]  I&O's Detail    17 Oct 2020 07:01  -  18 Oct 2020 07:00  --------------------------------------------------------  IN:    Oral Fluid: 160 mL    PRBCs (Packed Red Blood Cells): 276 mL  Total IN: 436 mL    OUT:    Voided (mL): 650 mL  Total OUT: 650 mL    Total NET: -214 mL        I&O's Summary    17 Oct 2020 07:01  -  18 Oct 2020 07:00  --------------------------------------------------------  IN: 436 mL / OUT: 650 mL / NET: -214 mL      Drug Dosing Weight  NICHELLE [facility]      PHYSICAL EXAM:  General: Appears well developed, well nourished alert and cooperative.  HEENT: Head; normocephalic, atraumatic.  Eyes: Pupils reactive, cornea wnl.  Neck: Supple, no nodes adenopathy, no NVD or carotid bruit or thyromegaly.  CARDIOVASCULAR: Normal S1 and S2, No murmur, rub, gallop or lift.   LUNGS: reduced breath sounds b/l  ABDOMEN: Soft, nontender without mass or organomegaly. bowel sounds normoactive.  EXTREMITIES: trace edema b/l  SKIN: warm and dry with normal turgor.  NEURO: Alert/oriented x 3/normal motor exam. No pathologic reflexes.    PSYCH: normal affect.        LABS:                        8.5    4.88  )-----------( 103      ( 18 Oct 2020 04:11 )             27.6     10-18    143  |  110<H>  |  39.0<H>  ----------------------------<  81  5.2   |  23.0  |  2.05<H>    Ca    8.6      18 Oct 2020 04:11  Phos  3.3     10-18  Mg     2.3     10-18    TPro  6.6  /  Alb  3.5  /  TBili  0.3<L>  /  DBili  x   /  AST  27  /  ALT  17  /  AlkPhos  114  10-17    NICHELLE GILL  CARDIAC MARKERS ( 17 Oct 2020 19:36 )  x     / 0.02 ng/mL / 80 U/L / x     / x          PT/INR - ( 17 Oct 2020 19:38 )   PT: 13.7 sec;   INR: 1.19 ratio         PTT - ( 17 Oct 2020 19:38 )  PTT:98.0 sec      RADIOLOGY & ADDITIONAL STUDIES:    INTERPRETATION OF TELEMETRY (personally reviewed): sinus rhythm     ECG: sinus rhythm 82 bpm, normal axis, 1st degree AV delay, nonspecific ST abnormality    ECHO: 7/18/19   1. Left ventricular ejection fraction, by visual estimation, is 55 to 60%.   2. Spectral Doppler shows impaired relaxation pattern of left   ventricular myocardial filling (Grade I diastolic dysfunction).   3. There is mild concentric left ventricular hypertrophy.   4. Severe mitral annular calcification.   5. Mild degenerative mitral stenosis.   6. Bioprosthesis in the aortic position - normal function.   7. Estimated pulmonary artery systolic pressure is 37.2 mmHg assuming a   right atrial pressure of 3 mmHg, which is consistent with borderline   pulmonary hypertension.   8. There is no evidence of pericardial effusion.      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     Acute on Chronic HFpEF, Acute Hypoxic Respiratory Failure, CAD, CABG, bioAVR, HTN, Cirrhosis -- etiology of her acute HFpEF is likely not taking her diuretics for the past two weeks at instruction of her doctor due to worsening renal function.   - start Lasix 80 mg IVP twice daily  - daily BMP while on high dose diuretic to monitor to renal toxicity  - low salt diet  - check TTE  - PRBCs as needed  - wean oxygen as the patient tolerates    Will follow closely with you.

## 2020-10-18 NOTE — PROGRESS NOTE ADULT - ASSESSMENT
74 Yy/o female with pmhf of CHFpEF, Chronic AMELIA on  Iron infusions (port in chest cavity) CAD s/p CABG, AS s/p bovine AVR (2016) DM2, HTN, HLD, presenting to [facility] ED after being told her Hg levels dropped , along with pt feeling SOB, DUPONT, admitted for DUPONT multifactorial in the setting of CHF exacerbation and Acute on chronic anemia        DUPONT multifactorial but likely form chronic anemia  - Pt also has a long standing hx of CHFpEF, non compliant with diet, and recently taken off lasix given worsening renal function  - Pt has longstanding AMELIA requiring weekly iron infusions and requiring multiple blood transfusions in the paste  - Pt tachypneic >25RR on monitor, hypoxic at home with Pulse ox reading <86%  - First trop negative, continue to trend  - Continue 2L NC and titrate as tolerated  - Telemetry with   - Cardio consulted ([place])    HTVE urgency  - Pt presented with SBP >170  c/w coreg & norvasc  Hydralazine prn added  monitor BP    Chronic diastolic CHF  - Pt with SOB, DUPONT, recently taken off lasix for worsening creatinine function  - Pt is non compliant with diet  - XR without congestion  - S/P Lasix 40 mg IVP in ED. Currently appears euvolemic. Lungs clear. No leg edema. No orthopnea/ PND. Will d/c IV laisx. Switch to PO in am  - BNP >2700 liekly from poor clearance due to CKD  - NC 2L continuous and titrate as tolerated  - TTE pending  - Fluid restriction to 1200ml  - Cardio ([place] consulted in ED)    Acute on Chronic Anemia  - Pt has longstanding AMELIA requiring blood transfusions in the past, weekly iron infusions  - SOB, lethargy, weakness for this past week  - Sent in by PMD for low Hbg, On arrival 8.   - s/p 1 PRBC unit. Hg 8.5 today. WIll order 1 more PRBC. Keep Hg 9-10 due to hx of CAD  - FOBT -ve  - Continue iron tablets      Stage 3 CKD  - Cr @ 1.93 which has improved according to pt  - Avoid nephrotoxic agents  - Continue daily magnesium supplement  - Monitor daily   No acidosis  Will monitor    CAD s/p CABG  - Continue with Coreg with holding parameters    COPD  - Continue with as needed albuterol  - Continue with Montelukast daily  - Continue with Oxygen via 2L NC, , titrate as tolerated.     Hypothyroidism  - Continue with levothyroxine 100 mcg    DM2  - Lantus 10 at night  - SS, Hypoglycemia protocol  - Diabetic/ dash/tlc diet    HTN  - Continue with Amlodipine and Coreg with holding parameters      HLD  - Atorvastatin 20mg daily     DVT Prophylaxis: Hold AC given Hgb drop. SCD boots in bed    DISPO: Daughter Daniela is HCP, updated on plan of care. Pt is Full code.    74 Yy/o female with pmhf of CHFpEF, Chronic AMELIA on  Iron infusions (port in chest cavity) CAD s/p CABG, AS s/p bovine AVR (2016) DM2, HTN, HLD, presenting to [facility] ED after being told her Hg levels dropped , along with pt feeling SOB, DUPONT, admitted for DUPONT multifactorial in the setting of CHF exacerbation and Acute on chronic anemia        DUPONT multifactorial but likely form chronic anemia  - Pt also has a long standing hx of CHFpEF, non compliant with diet, and recently taken off lasix given worsening renal function  - Pt has longstanding AMELIA requiring weekly iron infusions and requiring multiple blood transfusions in the paste  - Pt tachypneic >25RR on monitor, hypoxic at home with Pulse ox reading <86%  - First trop negative, continue to trend  - Continue 2L NC and titrate as tolerated  - Telemetry with   - Cardio consulted ([place])    HTVE urgency  - Pt presented with SBP >170  c/w coreg & norvasc  Hydralazine prn added  monitor BP    Chronic diastolic CHF  - Pt with SOB, DUPONT, recently taken off lasix for worsening creatinine function  - Pt is non compliant with diet  - XR without congestion  - S/P Lasix 40 mg IVP in ED. Currently appears euvolemic. Lungs clear. No leg edema. No orthopnea/ PND. Will d/c IV laisx. Switch to PO in am  - BNP >2700 liekly from poor clearance due to CKD  - NC 2L continuous and titrate as tolerated  - TTE pending  - Fluid restriction to 1200ml  - Cardio ([place] consulted in ED)    Acute on Chronic Anemia  anemia - improved post transfusion.  Pt has a multifactorial anemia secondary to iron deficiency, B12 deficiency;  as well as AOCD secondary to CKD.  She receives IV iron as needed; aranesp prn and B12 injections.  She last received B12 and aranesp 7/25/19.    - Pt has longstanding AMELIA requiring blood transfusions in the past, weekly iron infusions   monitor cbc  - SOB, lethargy, weakness for this past week  - Sent in by PMD for low Hbg, On arrival 8.  s/p 2 PRBC. Transfusion support as needed   Keep Hg 9-10 due to hx of CAD  - FOBT -ve  - Continue iron tablets  monitor cbc  transfusion support as needed  aranesp weekly  colonoscopy done on last admission with internal hemorrhoids       Thrombocytopenia - pt followed in our office for thrombocytopenia, likely related  to underlying liver disease.  Bone marrow biopsy 5/2019 was normal.        Stage 3 CKD  - Cr @ 1.93 which has improved according to pt  - Avoid nephrotoxic agents  - Continue daily magnesium supplement  - Monitor daily   No acidosis  Will monitor    CAD s/p CABG  - Continue with Coreg with holding parameters    COPD  - Continue with as needed albuterol  - Continue with Montelukast daily  - Continue with Oxygen via 2L NC, , titrate as tolerated.     Hypothyroidism  - Continue with levothyroxine 100 mcg    DM2  - Lantus 10 at night  - SS, Hypoglycemia protocol  - Diabetic/ dash/tlc diet    HTN  - Continue with Amlodipine and Coreg with holding parameters      HLD  - Atorvastatin 20mg daily     DVT Prophylaxis: Hold AC given Hgb drop. SCD boots in bed    DISPO: Daughter Daniela is HCP, updated on plan of care. Pt is Full code.

## 2020-10-18 NOTE — PROGRESS NOTE ADULT - SUBJECTIVE AND OBJECTIVE BOX
CHIEF COMPLAINT/INTERVAL HISTORY:    Patient is a 75y old  Female who presents with a chief complaint of DUPONT, SOB, Anemia (17 Oct 2020 22:44)      HPI:  74 Yy/o female with pmhf of CHFpEF, Chronic AMELIA on  Iron infusions (port in chest cavity) CAD s/p CABG, AS s/p bovine AVR (2016) DM2, HTN, HLD, presenting to [facility] ED after being told her Hg levels dropped , along with pt feeling SOB, DUPONT, and very lethargic this past week. Pt has chronic anemia and has had multiple blood transfusions, now on weekly iron infusions, last infused yesterday @ [facility] infusion center. Pt states she also gets weekly blood draws, to monitor her Hg and creatinine functions  and recently had her lasix dosage decreased by nephrologist, and has very close follow up with all her other specialists. Pt recently was at [facility] in June for internal/external hemorrhoidectomy after have BRBR. Pt currently denies any recent COVID exposure, no chest pain, + for SOB, DUPONT, mild palpitations, no nausea, vomiting, no diarrhea, constipation, no recent blood in urine or stools. ROS otherwise negative     In ED, PT Hgb @ 8.4. S/P 1 unit PRBC, Xray with congestion, BP elevated, BNP >2700, S/P Lasix 40 mg IVP x 1     (17 Oct 2020 22:44)      SUBJECTIVE & OBJECTIVE/ ROS: Pt seen and examined at bedside. Pt c/o exertional SOB. No signs of GI bleed. No chest pain, palpitations, light headedness/dizziness, cough, fevers/chills, abdominal pain, n/v, diarrhea/constipation, dysuria or increased urinary frequency.     ICU Vital Signs Last 24 Hrs  T(C): 36.9 (18 Oct 2020 13:29), Max: 36.9 (17 Oct 2020 18:38)  T(F): 98.5 (18 Oct 2020 13:29), Max: 98.5 (17 Oct 2020 18:38)  HR: 76 (18 Oct 2020 13:29) (72 - 83)  BP: 170/76 (18 Oct 2020 13:29) (136/68 - 203/79)  BP(mean): --  ABP: --  ABP(mean): --  RR: 20 (18 Oct 2020 13:29) (18 - 22)  SpO2: 98% (18 Oct 2020 13:29) (98% - 100%)      REVIEW OF SYSTEMS:    CONSTITUTIONAL: No weakness, fevers or chills  EYES/ENT: No visual changes;  No vertigo or throat pain   NECK: No pain or stiffness  RESPIRATORY: No cough, wheezing, hemoptysis; No shortness of breath  CARDIOVASCULAR: No chest pain or palpitations  GASTROINTESTINAL: No abdominal or epigastric pain. No nausea, vomiting, or hematemesis; No diarrhea or constipation. No melena or hematochezia.  GENITOURINARY: No dysuria, frequency or hematuria  NEUROLOGICAL: No numbness or weakness  SKIN: No itching, rashes        MEDICATIONS  (STANDING):  amLODIPine   Tablet 5 milliGRAM(s) Oral daily  atorvastatin 20 milliGRAM(s) Oral at bedtime  carvedilol 6.25 milliGRAM(s) Oral every 12 hours  dextrose 5%. 1000 milliLiter(s) (50 mL/Hr) IV Continuous <Continuous>  dextrose 50% Injectable 12.5 Gram(s) IV Push once  dextrose 50% Injectable 25 Gram(s) IV Push once  dextrose 50% Injectable 25 Gram(s) IV Push once  folic acid 1 milliGRAM(s) Oral daily  gabapentin 300 milliGRAM(s) Oral at bedtime  insulin glargine Injectable (LANTUS) 10 Unit(s) SubCutaneous at bedtime  insulin lispro (HumaLOG) corrective regimen sliding scale   SubCutaneous three times a day before meals  levothyroxine 100 MICROGram(s) Oral daily  loratadine 10 milliGRAM(s) Oral daily  magnesium oxide 400 milliGRAM(s) Oral <User Schedule>  montelukast 10 milliGRAM(s) Oral daily  pantoprazole    Tablet 40 milliGRAM(s) Oral before breakfast    MEDICATIONS  (PRN):  dextrose 40% Gel 15 Gram(s) Oral once PRN Blood Glucose LESS THAN 70 milliGRAM(s)/deciliter  glucagon  Injectable 1 milliGRAM(s) IntraMuscular once PRN Glucose LESS THAN 70 milligrams/deciliter  metoprolol tartrate Injectable 5 milliGRAM(s) IV Push once PRN HTVE urgency      LABS:                        8.5    4.88  )-----------( 103      ( 18 Oct 2020 04:11 )             27.6     10-18    143  |  110<H>  |  39.0<H>  ----------------------------<  81  5.2   |  23.0  |  2.05<H>    Ca    8.6      18 Oct 2020 04:11  Phos  3.3     10-18  Mg     2.3     10-18    TPro  6.6  /  Alb  3.5  /  TBili  0.3<L>  /  DBili  x   /  AST  27  /  ALT  17  /  AlkPhos  114  10-17    PT/INR - ( 17 Oct 2020 19:38 )   PT: 13.7 sec;   INR: 1.19 ratio         PTT - ( 17 Oct 2020 19:38 )  PTT:98.0 sec      CAPILLARY BLOOD GLUCOSE      POCT Blood Glucose.: 100 mg/dL (18 Oct 2020 12:12)  POCT Blood Glucose.: 59 mg/dL (18 Oct 2020 08:12)  POCT Blood Glucose.: 57 mg/dL (18 Oct 2020 08:05)  POCT Blood Glucose.: 137 mg/dL (17 Oct 2020 23:35)      RECENT CULTURES:        10-17-20 @ 07:01  -  10-18-20 @ 07:00  --------------------------------------------------------  IN: 436 mL / OUT: 650 mL / NET: -214 mL          RADIOLOGY & ADDITIONAL TESTS:      PHYSICAL EXAM:    GENERAL: NAD, well-groomed, well-developed  HEAD:  Atraumatic, Normocephalic  EYES: EOMI, PERRLA, conjunctiva and sclera clear  ENMT: Moist mucous membranes  NECK: Supple, No JVD  NERVOUS SYSTEM:  Alert & Oriented X3, Motor Strength 5/5 B/L upper and lower extremities; DTRs 2+ intact and symmetric  CHEST/LUNG: Clear to auscultation bilaterally; No rales, rhonchi, wheezing, or rubs, +port  HEART: Regular rate and rhythm; No murmurs, rubs, or gallops  ABDOMEN: Soft, Nontender, Nondistended; Bowel sounds present  EXTREMITIES:  2+ Peripheral Pulses, No clubbing, cyanosis, or edema

## 2020-10-19 NOTE — PROGRESS NOTE ADULT - ASSESSMENT
74 Yy/o female with pmhf of CHFpEF, Chronic AMELIA on  Iron infusions (port in chest cavity) CAD s/p CABG, AS s/p bovine AVR (2016) DM2, HTN, HLD, presenting to [facility] ED after being told her Hg levels dropped , along with pt feeling SOB, DUPONT, admitted for DUPONT multifactorial in the setting of CHF exacerbation and Acute on chronic anemia        DUPONT multifactorial but likely form chronic anemia  - Pt also has a long standing hx of CHFpEF, non compliant with diet, and recently taken off lasix given worsening renal function  - Pt has longstanding AMELIA requiring weekly iron infusions and requiring multiple blood transfusions in the paste  - Continue 2L NC (home O2)  - Pt was given IV lasix while in house but her kidney function got worse. lasix IV held as per discussion with Dr. Mckeon. Kayexalate given. f/u BMP with K wnl. Pt to f/u with cardio as outpatient in 1 week. Pt cleared by cardio for d/c home    HTVE urgency  c/w coreg & norvasc  BP acceptable    Chronic diastolic CHF  - Pt with SOB, DUPONT, recently taken off lasix for worsening creatinine function  - Pt is non compliant with diet  - XR without congestion  - BNP >2700 liekly from poor clearance due to CKD  - NC 2L continuous (home O2)  - TTE noted  - Fluid restriction to 1200ml  - S/P Lasix 40 mg IVP in ED. Currently appears euvolemic. Lungs clear. No leg edema. No orthopnea/ PND. Will d/c IV laisx as per discussion with cardio.   - Pt was given IV lasix while in house but her kidney function got worse. lasix IV held as per discussion with Dr. Mckeon. Kayexalate given. f/u BMP with K wnl. Pt to f/u with cardio as outpatient in 1 week. Pt cleared by cardio for d/c home    Acute on Chronic Anemia  anemia - improved post transfusion.  Pt has a multifactorial anemia secondary to iron deficiency, B12 deficiency;  as well as AOCD secondary to CKD.  She receives IV iron as needed; aranesp prn and B12 injections.  She last received B12 and aranesp 7/25/19.    - Pt has longstanding AMELIA requiring blood transfusions in the past, weekly iron infusions   monitor cbc  - Sent in by PMD for low Hbg, On arrival 8.  s/p 2 PRBC. Now Hg 9.5. Transfusion support as needed   Keep Hg 9-10 due to hx of CAD  - FOBT -ve  - Continue iron tablets  monitor cbc  transfusion support as needed  aranesp weekly  colonoscopy done on last admission with internal hemorrhoids   f/u with hematology as an outpatient       Thrombocytopenia - pt followed in our office for thrombocytopenia, likely related  to underlying liver disease.  Bone marrow biopsy 5/2019 was normal.        Stage 3 CKD  - Cr @ 1.93 which has improved according to pt  - Avoid nephrotoxic agents  - Continue daily magnesium supplement  - Monitor daily   No acidosis  Will monitor  f/u BMP with pmd & nephrology in 1 week     CAD s/p CABG  - Continue with Coreg with holding parameters    COPD  - Continue with as needed albuterol  - Continue with Montelukast daily  - Continue with Oxygen via 2L NC, , titrate as tolerated.     Hypothyroidism  - Continue with levothyroxine 100 mcg    DM2  - Lantus 10 at night, switch to treiseba on d/c  - Diabetic/ dash/tlc diet    HTN  - Continue with Amlodipine and Coreg with holding parameters      HLD  - Atorvastatin 20mg daily     Daughter Daniela is HCP, updated on plan of care. Pt is Full code.

## 2020-10-19 NOTE — DISCHARGE NOTE PROVIDER - NSDCCPCAREPLAN_GEN_ALL_CORE_FT
PRINCIPAL DISCHARGE DIAGNOSIS  Diagnosis: Acute CHF  Assessment and Plan of Treatment: Stop using lasix until seen by cardiology. Follow up with your primary doctor & cardiology within 1 week of discharge      SECONDARY DISCHARGE DIAGNOSES  Diagnosis: Hyperkalemia  Assessment and Plan of Treatment: Follow up with your primary doctor within 1 week of discharge for BMP check    Diagnosis: Symptomatic anemia  Assessment and Plan of Treatment: Follow up with your primary doctor & hematology    Diagnosis: CKD (chronic kidney disease)  Assessment and Plan of Treatment: Follow up with your primary doctor & nephrology     PRINCIPAL DISCHARGE DIAGNOSIS  Diagnosis: Acute CHF  Assessment and Plan of Treatment: Stop using lasix until seen by cardiology. Follow up with your primary doctor & cardiology within 1 week of discharge      SECONDARY DISCHARGE DIAGNOSES  Diagnosis: Hyperkalemia  Assessment and Plan of Treatment: Follow up with your primary doctor within 1 week of discharge for BMP check    Diagnosis: Symptomatic anemia  Assessment and Plan of Treatment: Follow up with your primary doctor & hematology    Diagnosis: CKD (chronic kidney disease)  Assessment and Plan of Treatment: Follow up with your primary doctor & nephrology. Follow up with your Dr. Cruz within 1 week of discharge

## 2020-10-19 NOTE — DISCHARGE NOTE PROVIDER - NSDCMRMEDTOKEN_GEN_ALL_CORE_FT
albuterol 2.5 mg/3 mL (0.083%) inhalation solution: 3 milliliter(s) inhaled every 6 hours, As Needed  Aller-Juhi 10 mg oral tablet: 1 tab(s) orally once a day, As Needed  amLODIPine 5 mg oral tablet: 1 tab(s) orally once a day  Aranesp Albumin Free: once weekly at MD&#x27;s office  carvedilol 6.25 mg oral tablet: 1 tab(s) orally every 12 hours  folic acid 1 mg oral tablet: 1 tab(s) orally once a day  gabapentin 300 mg oral capsule: 1 cap(s) orally once a day (at bedtime)  levothyroxine 100 mcg (0.1 mg) oral tablet: 1 tab(s) orally once a day  magnesium oxide 400 mg (241.3 mg elemental magnesium) oral tablet: 1 tab(s) orally every other day  montelukast 10 mg oral tablet: 1 tab(s) orally once a day  NovoLOG 100 units/mL injectable solution: per sliding scale x3 per day before meals  pravastatin 80 mg oral tablet: 1 tab(s) orally once a day  Protonix 40 mg oral delayed release tablet: 1 tab(s) orally once a day  Tresiba 100 units/mL subcutaneous solution: 10 unit(s) subcutaneous once a day  Vitamin D3 2000 intl units (50 mcg) oral tablet: orally once a day   albuterol 2.5 mg/3 mL (0.083%) inhalation solution: 3 milliliter(s) inhaled every 6 hours, As Needed  Aller-Juhi 10 mg oral tablet: 1 tab(s) orally once a day, As Needed  amLODIPine 5 mg oral tablet: 1 tab(s) orally once a day  Aranesp Albumin Free: once weekly at MD&#x27;s office  carvedilol 6.25 mg oral tablet: 1 tab(s) orally every 12 hours  folic acid 1 mg oral tablet: 1 tab(s) orally once a day  furosemide 40 mg oral tablet: 1 tab(s) orally once a day  gabapentin 300 mg oral capsule: 1 cap(s) orally once a day (at bedtime)  levothyroxine 100 mcg (0.1 mg) oral tablet: 1 tab(s) orally once a day  magnesium oxide 400 mg (241.3 mg elemental magnesium) oral tablet: 1 tab(s) orally every other day  montelukast 10 mg oral tablet: 1 tab(s) orally once a day  NovoLOG 100 units/mL injectable solution: per sliding scale x3 per day before meals  pravastatin 80 mg oral tablet: 1 tab(s) orally once a day  Protonix 40 mg oral delayed release tablet: 1 tab(s) orally once a day  Tresiba 100 units/mL subcutaneous solution: 10 unit(s) subcutaneous once a day  Vitamin D3 2000 intl units (50 mcg) oral tablet: orally once a day

## 2020-10-19 NOTE — DISCHARGE NOTE PROVIDER - HOSPITAL COURSE
74 Yy/o female with pmhf of CHFpEF, Chronic AMELIA on  Iron infusions (port in chest cavity) CAD s/p CABG, AS s/p bovine AVR (2016) DM2, HTN, HLD, presenting to [facility] ED after being told her Hg levels dropped , along with pt feeling SOB, DUPONT, admitted for DUPONT multifactorial in the setting of CHF exacerbation and Acute on chronic anemia        DUPONT multifactorial but likely form chronic anemia  - Pt also has a long standing hx of CHFpEF, non compliant with diet, and recently taken off lasix given worsening renal function  - Pt has longstanding AMELIA requiring weekly iron infusions and requiring multiple blood transfusions in the paste  - Continue 2L NC (home O2)  - Pt was given IV lasix while in house but her kidney function got worse. lasix IV held as per discussion with Dr. Mckeon. Kayexalate given. f/u BMP with K wnl. Pt to f/u with cardio as outpatient in 1 week. Pt cleared by cardio for d/c home    HTVE urgency  c/w coreg & norvasc  BP acceptable    Chronic diastolic CHF  - Pt with SOB, DUPONT, recently taken off lasix for worsening creatinine function  - Pt is non compliant with diet  - XR without congestion  - BNP >2700 liekly from poor clearance due to CKD  - NC 2L continuous (home O2)  - TTE noted  - Fluid restriction to 1200ml  - S/P Lasix 40 mg IVP in ED. Currently appears euvolemic. Lungs clear. No leg edema. No orthopnea/ PND. Will d/c IV laisx as per discussion with cardio.   - Pt was given IV lasix while in house but her kidney function got worse. lasix IV held as per discussion with Dr. Mckeon. Kayexalate given. f/u BMP with K wnl. Pt to f/u with cardio as outpatient in 1 week. Pt cleared by cardio for d/c home    Acute on Chronic Anemia  anemia - improved post transfusion.  Pt has a multifactorial anemia secondary to iron deficiency, B12 deficiency;  as well as AOCD secondary to CKD.  She receives IV iron as needed; aranesp prn and B12 injections.  She last received B12 and aranesp 7/25/19.    - Pt has longstanding AMELIA requiring blood transfusions in the past, weekly iron infusions   monitor cbc  - Sent in by PMD for low Hbg, On arrival 8.  s/p 2 PRBC. Now Hg 9.5. Transfusion support as needed   Keep Hg 9-10 due to hx of CAD  - FOBT -ve  - Continue iron tablets  monitor cbc  transfusion support as needed  aranesp weekly  colonoscopy done on last admission with internal hemorrhoids   f/u with hematology as an outpatient       Thrombocytopenia - pt followed in our office for thrombocytopenia, likely related  to underlying liver disease.  Bone marrow biopsy 5/2019 was normal.        Stage 3 CKD  - Cr @ 1.93 which has improved according to pt  - Avoid nephrotoxic agents  - Continue daily magnesium supplement  - Monitor daily   No acidosis  Will monitor  f/u BMP with pmd & nephrology in 1 week     CAD s/p CABG  - Continue with Coreg with holding parameters    COPD  - Continue with as needed albuterol  - Continue with Montelukast daily  - Continue with Oxygen via 2L NC, , titrate as tolerated.     Hypothyroidism  - Continue with levothyroxine 100 mcg    DM2  - Lantus 10 at night, switch to treiseba on d/c  - Diabetic/ dash/tlc diet    HTN  - Continue with Amlodipine and Coreg with holding parameters      HLD  - Atorvastatin 20mg daily     Daughter Daniela is HCP, updated on plan of care. Pt is Full code.     PHYSICAL EXAM:    GENERAL: NAD, well-groomed, well-developed  HEAD:  Atraumatic, Normocephalic  EYES: EOMI, PERRLA, conjunctiva and sclera clear  ENMT: Moist mucous membranes  NECK: Supple, No JVD  NERVOUS SYSTEM:  Alert & Oriented X3, Motor Strength 5/5 B/L upper and lower extremities; DTRs 2+ intact and symmetric  CHEST/LUNG: Clear to auscultation bilaterally; No rales, rhonchi, wheezing, or rubs, +port  HEART: Regular rate and rhythm; No murmurs, rubs, or gallops  ABDOMEN: Soft, Nontender, Nondistended; Bowel sounds present  EXTREMITIES:  2+ Peripheral Pulses, No clubbing, cyanosis, or edema             74 Yy/o female with pmhf of CHFpEF, Chronic AMELIA on  Iron infusions (port in chest cavity) CAD s/p CABG, AS s/p bovine AVR (2016) DM2, HTN, HLD, presenting to [facility] ED after being told her Hg levels dropped , along with pt feeling SOB, DUPONT, admitted for DUPONT multifactorial in the setting of CHF exacerbation and Acute on chronic anemia        DUPONT multifactorial but likely form chronic anemia  - Pt also has a long standing hx of CHFpEF, non compliant with diet, and recently taken off lasix given worsening renal function  - Pt has longstanding AMELIA requiring weekly iron infusions and requiring multiple blood transfusions in the paste  - Continue 2L NC (home O2)  - Pt was given IV lasix while in house but her kidney function got worse. lasix IV held as per discussion with Dr. Mckeon. Kayexalate given. f/u BMP with K wnl. Pt to f/u with cardio as outpatient in 1 week. Pt cleared by cardio for d/c home    HTVE urgency  c/w coreg & norvasc  BP acceptable    Chronic diastolic CHF  - Pt with SOB, DUPONT, recently taken off lasix for worsening creatinine function  - Pt is non compliant with diet  - XR without congestion  - BNP >2700 liekly from poor clearance due to CKD  - NC 2L continuous (home O2)  - TTE noted  - Fluid restriction to 1200ml  - S/P Lasix 40 mg IVP in ED. Currently appears euvolemic. Lungs clear. No leg edema. No orthopnea/ PND. Will d/c IV laisx as per discussion with cardio.   - Pt was given IV lasix while in house but her kidney function got worse. lasix IV held as per discussion with Dr. Mckeon. Kayexalate given. f/u BMP with K wnl. Pt to f/u with cardio as outpatient in 1 week. Pt cleared by cardio for d/c home  Discussed with pt's nephrologist Dr Nancy Will who is okay with pt's being d/thuy on PO lasix 40mg daily. He will follow up with her in the office. Cr 2.5 noted.     Acute on Chronic Anemia  anemia - improved post transfusion.  Pt has a multifactorial anemia secondary to iron deficiency, B12 deficiency;  as well as AOCD secondary to CKD.  She receives IV iron as needed; aranesp prn and B12 injections.  She last received B12 and aranesp 7/25/19.    - Pt has longstanding AMELIA requiring blood transfusions in the past, weekly iron infusions   monitor cbc  - Sent in by PMD for low Hbg, On arrival 8.  s/p 2 PRBC. Now Hg 9.5. Transfusion support as needed   Keep Hg 9-10 due to hx of CAD  - FOBT -ve  - Continue iron tablets  monitor cbc  transfusion support as needed  aranesp weekly  colonoscopy done on last admission with internal hemorrhoids   f/u with hematology as an outpatient       Thrombocytopenia - pt followed in our office for thrombocytopenia, likely related  to underlying liver disease.  Bone marrow biopsy 5/2019 was normal.        Stage 3 CKD  - Cr @ 1.93 which has improved according to pt  - Avoid nephrotoxic agents  - Continue daily magnesium supplement  - Monitor daily   No acidosis  Discussed with pt's nephrologist Dr Nancy Will who is okay with pt's being d/thuy on PO lasix 40mg daily. He will follow up with her in the office. Cr 2.5 noted.   f/u BMP with pmd & nephrology in 1 week     CAD s/p CABG  - Continue with Coreg with holding parameters    COPD  - Continue with as needed albuterol  - Continue with Montelukast daily  - Continue with Oxygen via 2L NC, , titrate as tolerated.     Hypothyroidism  - Continue with levothyroxine 100 mcg    DM2  - Lantus 10 at night, switch to treiseba on d/c  - Diabetic/ dash/tlc diet    HTN  - Continue with Amlodipine and Coreg with holding parameters      HLD  - Atorvastatin 20mg daily     Daughter Daniela is HCP, updated on plan of care. Pt is Full code.     PHYSICAL EXAM:    GENERAL: NAD, well-groomed, well-developed  HEAD:  Atraumatic, Normocephalic  EYES: EOMI, PERRLA, conjunctiva and sclera clear  ENMT: Moist mucous membranes  NECK: Supple, No JVD  NERVOUS SYSTEM:  Alert & Oriented X3, Motor Strength 5/5 B/L upper and lower extremities; DTRs 2+ intact and symmetric  CHEST/LUNG: Clear to auscultation bilaterally; No rales, rhonchi, wheezing, or rubs, +port  HEART: Regular rate and rhythm; No murmurs, rubs, or gallops  ABDOMEN: Soft, Nontender, Nondistended; Bowel sounds present  EXTREMITIES:  2+ Peripheral Pulses, No clubbing, cyanosis, or edema

## 2020-10-19 NOTE — PROGRESS NOTE ADULT - SUBJECTIVE AND OBJECTIVE BOX
CHIEF COMPLAINT/INTERVAL HISTORY:    Patient is a 75y old  Female who presents with a chief complaint of DUPONT, SOB, Anemia (17 Oct 2020 22:44)      HPI:  74 Yy/o female with pmhf of CHFpEF, Chronic AMELIA on  Iron infusions (port in chest cavity) CAD s/p CABG, AS s/p bovine AVR (2016) DM2, HTN, HLD, presenting to [facility] ED after being told her Hg levels dropped , along with pt feeling SOB, DUPONT, and very lethargic this past week. Pt has chronic anemia and has had multiple blood transfusions, now on weekly iron infusions, last infused yesterday @ [facility] infusion center. Pt states she also gets weekly blood draws, to monitor her Hg and creatinine functions  and recently had her lasix dosage decreased by nephrologist, and has very close follow up with all her other specialists. Pt recently was at [facility] in June for internal/external hemorrhoidectomy after have BRBR. Pt currently denies any recent COVID exposure, no chest pain, + for SOB, DUPONT, mild palpitations, no nausea, vomiting, no diarrhea, constipation, no recent blood in urine or stools. ROS otherwise negative     In ED, PT Hgb @ 8.4. S/P 1 unit PRBC, Xray with congestion, BP elevated, BNP >2700, S/P Lasix 40 mg IVP x 1     (17 Oct 2020 22:44)      SUBJECTIVE & OBJECTIVE/ ROS: Pt seen and examined at bedside. Pt feels a lot better.  No signs of GI bleed. No chest pain, palpitations, light headedness/dizziness, cough, fevers/chills, abdominal pain, n/v, diarrhea/constipation, dysuria or increased urinary frequency.     ICU Vital Signs Last 24 Hrs  T(C): 36.9 (18 Oct 2020 13:29), Max: 36.9 (17 Oct 2020 18:38)  T(F): 98.5 (18 Oct 2020 13:29), Max: 98.5 (17 Oct 2020 18:38)  HR: 76 (18 Oct 2020 13:29) (72 - 83)  BP: 170/76 (18 Oct 2020 13:29) (136/68 - 203/79)  BP(mean): --  ABP: --  ABP(mean): --  RR: 20 (18 Oct 2020 13:29) (18 - 22)  SpO2: 98% (18 Oct 2020 13:29) (98% - 100%)      REVIEW OF SYSTEMS:    CONSTITUTIONAL: No weakness, fevers or chills  EYES/ENT: No visual changes;  No vertigo or throat pain   NECK: No pain or stiffness  RESPIRATORY: No cough, wheezing, hemoptysis; No shortness of breath  CARDIOVASCULAR: No chest pain or palpitations  GASTROINTESTINAL: No abdominal or epigastric pain. No nausea, vomiting, or hematemesis; No diarrhea or constipation. No melena or hematochezia.  GENITOURINARY: No dysuria, frequency or hematuria  NEUROLOGICAL: No numbness or weakness  SKIN: No itching, rashes        MEDICATIONS  (STANDING):  amLODIPine   Tablet 5 milliGRAM(s) Oral daily  atorvastatin 20 milliGRAM(s) Oral at bedtime  carvedilol 6.25 milliGRAM(s) Oral every 12 hours  dextrose 5%. 1000 milliLiter(s) (50 mL/Hr) IV Continuous <Continuous>  dextrose 50% Injectable 12.5 Gram(s) IV Push once  dextrose 50% Injectable 25 Gram(s) IV Push once  dextrose 50% Injectable 25 Gram(s) IV Push once  folic acid 1 milliGRAM(s) Oral daily  gabapentin 300 milliGRAM(s) Oral at bedtime  insulin glargine Injectable (LANTUS) 10 Unit(s) SubCutaneous at bedtime  insulin lispro (HumaLOG) corrective regimen sliding scale   SubCutaneous three times a day before meals  levothyroxine 100 MICROGram(s) Oral daily  loratadine 10 milliGRAM(s) Oral daily  magnesium oxide 400 milliGRAM(s) Oral <User Schedule>  montelukast 10 milliGRAM(s) Oral daily  pantoprazole    Tablet 40 milliGRAM(s) Oral before breakfast    MEDICATIONS  (PRN):  dextrose 40% Gel 15 Gram(s) Oral once PRN Blood Glucose LESS THAN 70 milliGRAM(s)/deciliter  glucagon  Injectable 1 milliGRAM(s) IntraMuscular once PRN Glucose LESS THAN 70 milligrams/deciliter  metoprolol tartrate Injectable 5 milliGRAM(s) IV Push once PRN HTVE urgency      LABS:                        8.5    4.88  )-----------( 103      ( 18 Oct 2020 04:11 )             27.6     10-18    143  |  110<H>  |  39.0<H>  ----------------------------<  81  5.2   |  23.0  |  2.05<H>    Ca    8.6      18 Oct 2020 04:11  Phos  3.3     10-18  Mg     2.3     10-18    TPro  6.6  /  Alb  3.5  /  TBili  0.3<L>  /  DBili  x   /  AST  27  /  ALT  17  /  AlkPhos  114  10-17    PT/INR - ( 17 Oct 2020 19:38 )   PT: 13.7 sec;   INR: 1.19 ratio         PTT - ( 17 Oct 2020 19:38 )  PTT:98.0 sec      CAPILLARY BLOOD GLUCOSE      POCT Blood Glucose.: 100 mg/dL (18 Oct 2020 12:12)  POCT Blood Glucose.: 59 mg/dL (18 Oct 2020 08:12)  POCT Blood Glucose.: 57 mg/dL (18 Oct 2020 08:05)  POCT Blood Glucose.: 137 mg/dL (17 Oct 2020 23:35)      RECENT CULTURES:        10-17-20 @ 07:01  -  10-18-20 @ 07:00  --------------------------------------------------------  IN: 436 mL / OUT: 650 mL / NET: -214 mL          RADIOLOGY & ADDITIONAL TESTS:      PHYSICAL EXAM:    GENERAL: NAD, well-groomed, well-developed  HEAD:  Atraumatic, Normocephalic  EYES: EOMI, PERRLA, conjunctiva and sclera clear  ENMT: Moist mucous membranes  NECK: Supple, No JVD  NERVOUS SYSTEM:  Alert & Oriented X3, Motor Strength 5/5 B/L upper and lower extremities; DTRs 2+ intact and symmetric  CHEST/LUNG: Clear to auscultation bilaterally; No rales, rhonchi, wheezing, or rubs, +port  HEART: Regular rate and rhythm; No murmurs, rubs, or gallops  ABDOMEN: Soft, Nontender, Nondistended; Bowel sounds present  EXTREMITIES:  2+ Peripheral Pulses, No clubbing, cyanosis, or edema

## 2020-10-19 NOTE — DISCHARGE NOTE PROVIDER - CARE PROVIDER_API CALL
Yarely Penny  INTERNAL MEDICINE  [address] [place] [id number]  Phone: [phone]  Fax: [phone]  Follow Up Time:     Helio Pandey  FAMILY MEDICINE  [address] [place] [id number]  Phone: [phone]  Fax: [phone]  Follow Up Time:

## 2020-10-19 NOTE — PROGRESS NOTE ADULT - SUBJECTIVE AND OBJECTIVE BOX
[place] [facility] - [facility], THE HEART CENTER                                   [address] [place] [address]                                                      PHONE: [phone]                                                         FAX: [phone]  [email or web address]  ---------------------------------------------------------------------------------------------------------------------------------    Overnight events/patient complaints:      PAST MEDICAL & SURGICAL HISTORY:  Internal hemorrhoids    Type 2 diabetes mellitus    Hemorrhoids    Cirrhosis    Chronic hepatitis C    CKD (chronic kidney disease)    CAD (coronary artery disease)    Heart failure    Aortic stenosis    Anemia, unspecified anemia type    Asthma    Low back pain  chronic over a year    High cholesterol    Hypertension    History of loop recorder  2017    Port-A-Cath in place  7/2019, right upper chest    H/O aortic valve replacement  bovine, 2016    S/P CABG x 3  2016    History of tonsillectomy        ACE inhibitors (Unknown)  Bactrim (Nephrotoxicity)  Biaxin (Joint Pain)  morphine (Short breath)  NSAIDs (Unknown)    MEDICATIONS  (STANDING):  amLODIPine   Tablet 5 milliGRAM(s) Oral daily  atorvastatin 20 milliGRAM(s) Oral at bedtime  carvedilol 6.25 milliGRAM(s) Oral every 12 hours  dextrose 5%. 1000 milliLiter(s) (50 mL/Hr) IV Continuous <Continuous>  dextrose 50% Injectable 12.5 Gram(s) IV Push once  dextrose 50% Injectable 25 Gram(s) IV Push once  dextrose 50% Injectable 25 Gram(s) IV Push once  folic acid 1 milliGRAM(s) Oral daily  furosemide   Injectable 80 milliGRAM(s) IV Push two times a day  gabapentin 300 milliGRAM(s) Oral at bedtime  insulin glargine Injectable (LANTUS) 10 Unit(s) SubCutaneous at bedtime  insulin lispro (HumaLOG) corrective regimen sliding scale   SubCutaneous three times a day before meals  levothyroxine 100 MICROGram(s) Oral daily  loratadine 10 milliGRAM(s) Oral daily  magnesium oxide 400 milliGRAM(s) Oral <User Schedule>  montelukast 10 milliGRAM(s) Oral daily  pantoprazole    Tablet 40 milliGRAM(s) Oral before breakfast    MEDICATIONS  (PRN):  dextrose 40% Gel 15 Gram(s) Oral once PRN Blood Glucose LESS THAN 70 milliGRAM(s)/deciliter  glucagon  Injectable 1 milliGRAM(s) IntraMuscular once PRN Glucose LESS THAN 70 milligrams/deciliter  hydrALAZINE Injectable 10 milliGRAM(s) IV Push every 6 hours PRN for SBP > 150  metoprolol tartrate Injectable 5 milliGRAM(s) IV Push once PRN HTVE urgency      Vital Signs Last 24 Hrs  T(C): 36.5 (19 Oct 2020 07:48), Max: 36.9 (18 Oct 2020 13:29)  T(F): 97.7 (19 Oct 2020 07:48), Max: 98.5 (18 Oct 2020 13:29)  HR: 68 (19 Oct 2020 07:48) (68 - 77)  BP: 123/71 (19 Oct 2020 07:48) (123/71 - 170/76)  BP(mean): --  RR: 18 (19 Oct 2020 07:48) (18 - 20)  SpO2: 98% (19 Oct 2020 07:48) (96% - 100%)  ICU Vital Signs Last 24 Hrs  [facility]  I&O's Detail    I&O's Summary    Drug Dosing Weight  [facility]      PHYSICAL EXAM:  General: Appears well developed, well nourished alert and cooperative.  HEENT: Head; normocephalic, atraumatic.  Eyes: Pupils reactive, cornea wnl.  Neck: Supple, no nodes adenopathy, no NVD or carotid bruit or thyromegaly.  CARDIOVASCULAR: Normal S1 and S2, No murmur, rub, gallop or lift.   LUNGS: No rales, rhonchi or wheeze. Normal breath sounds bilaterally.  ABDOMEN: Soft, nontender without mass or organomegaly. bowel sounds normoactive.  EXTREMITIES: No clubbing, cyanosis or edema. Distal pulses wnl.   SKIN: warm and dry with normal turgor.  NEURO: Alert/oriented x 3/normal motor exam. No pathologic reflexes.    PSYCH: normal affect.        LABS:                        9.6    5.18  )-----------( 88       ( 19 Oct 2020 04:14 )             31.0     10-19    139  |  106  |  49.0<H>  ----------------------------<  155<H>  5.4<H>   |  24.0  |  2.32<H>    Ca    8.6      19 Oct 2020 04:14  Phos  3.3     10-19  Mg     2.2     10-19    TPro  6.6  /  Alb  3.5  /  TBili  0.3<L>  /  DBili  x   /  AST  27  /  ALT  17  /  AlkPhos  114  10-17    NICHELLE GILL  CARDIAC MARKERS ( 17 Oct 2020 19:36 )  x     / 0.02 ng/mL / 80 U/L / x     / x          PT/INR - ( 17 Oct 2020 19:38 )   PT: 13.7 sec;   INR: 1.19 ratio         PTT - ( 17 Oct 2020 19:38 )  PTT:98.0 sec      ECG: sinus rhythm 82 bpm, normal axis, 1st degree AV delay, nonspecific ST abnormality    ECHO: 7/18/19   1. Left ventricular ejection fraction, by visual estimation, is 55 to 60%.   2. Spectral Doppler shows impaired relaxation pattern of left   ventricular myocardial filling (Grade I diastolic dysfunction).   3. There is mild concentric left ventricular hypertrophy.   4. Severe mitral annular calcification.   5. Mild degenerative mitral stenosis.   6. Bioprosthesis in the aortic position - normal function.   7. Estimated pulmonary artery systolic pressure is 37.2 mmHg assuming a   right atrial pressure of 3 mmHg, which is consistent with borderline   pulmonary hypertension.   8. There is no evidence of pericardial effusion.      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     Acute on Chronic HFpEF, Acute Hypoxic Respiratory Failure, CAD, CABG, bioAVR, HTN, Cirrhosis -- etiology of her acute HFpEF is likely not taking her diuretics for the past two weeks at instruction of her doctor due to worsening renal function.   - Lasix 80 mg IVP twice daily  - daily BMP while on high dose diuretic to monitor to renal toxicity  - low salt diet  - check TTE            Thank you for allowing [facility] to participate in the care of this patient.  Please feel free to call with any questions or concerns. [place] [facility] - [facility], THE HEART CENTER                                   [address] [place] [address]                                                      PHONE: [phone]                                                         FAX: [phone]  [email or web address]  ---------------------------------------------------------------------------------------------------------------------------------    Overnight events/patient complaints:  no events     PAST MEDICAL & SURGICAL HISTORY:  Internal hemorrhoids    Type 2 diabetes mellitus    Hemorrhoids    Cirrhosis    Chronic hepatitis C    CKD (chronic kidney disease)    CAD (coronary artery disease)    Heart failure    Aortic stenosis    Anemia, unspecified anemia type    Asthma    Low back pain  chronic over a year    High cholesterol    Hypertension    History of loop recorder  2017    Port-A-Cath in place  7/2019, right upper chest    H/O aortic valve replacement  bovine, 2016    S/P CABG x 3  2016    History of tonsillectomy        ACE inhibitors (Unknown)  Bactrim (Nephrotoxicity)  Biaxin (Joint Pain)  morphine (Short breath)  NSAIDs (Unknown)    MEDICATIONS  (STANDING):  amLODIPine   Tablet 5 milliGRAM(s) Oral daily  atorvastatin 20 milliGRAM(s) Oral at bedtime  carvedilol 6.25 milliGRAM(s) Oral every 12 hours  dextrose 5%. 1000 milliLiter(s) (50 mL/Hr) IV Continuous <Continuous>  dextrose 50% Injectable 12.5 Gram(s) IV Push once  dextrose 50% Injectable 25 Gram(s) IV Push once  dextrose 50% Injectable 25 Gram(s) IV Push once  folic acid 1 milliGRAM(s) Oral daily  furosemide   Injectable 80 milliGRAM(s) IV Push two times a day  gabapentin 300 milliGRAM(s) Oral at bedtime  insulin glargine Injectable (LANTUS) 10 Unit(s) SubCutaneous at bedtime  insulin lispro (HumaLOG) corrective regimen sliding scale   SubCutaneous three times a day before meals  levothyroxine 100 MICROGram(s) Oral daily  loratadine 10 milliGRAM(s) Oral daily  magnesium oxide 400 milliGRAM(s) Oral <User Schedule>  montelukast 10 milliGRAM(s) Oral daily  pantoprazole    Tablet 40 milliGRAM(s) Oral before breakfast    MEDICATIONS  (PRN):  dextrose 40% Gel 15 Gram(s) Oral once PRN Blood Glucose LESS THAN 70 milliGRAM(s)/deciliter  glucagon  Injectable 1 milliGRAM(s) IntraMuscular once PRN Glucose LESS THAN 70 milligrams/deciliter  hydrALAZINE Injectable 10 milliGRAM(s) IV Push every 6 hours PRN for SBP > 150  metoprolol tartrate Injectable 5 milliGRAM(s) IV Push once PRN HTVE urgency      Vital Signs Last 24 Hrs  T(C): 36.5 (19 Oct 2020 07:48), Max: 36.9 (18 Oct 2020 13:29)  T(F): 97.7 (19 Oct 2020 07:48), Max: 98.5 (18 Oct 2020 13:29)  HR: 68 (19 Oct 2020 07:48) (68 - 77)  BP: 123/71 (19 Oct 2020 07:48) (123/71 - 170/76)  BP(mean): --  RR: 18 (19 Oct 2020 07:48) (18 - 20)  SpO2: 98% (19 Oct 2020 07:48) (96% - 100%)  ICU Vital Signs Last 24 Hrs  [facility]  I&O's Detail    I&O's Summary    Drug Dosing Weight  [facility]      PHYSICAL EXAM:  General: Appears well developed, well nourished alert and cooperative.  HEENT: Head; normocephalic, atraumatic.  Eyes: Pupils reactive, cornea wnl.  Neck: Supple, no nodes adenopathy, no NVD or carotid bruit or thyromegaly.  CARDIOVASCULAR: Normal S1 and S2, No murmur, rub, gallop or lift.   LUNGS: No rales, rhonchi or wheeze. Normal breath sounds bilaterally.  ABDOMEN: Soft, nontender without mass or organomegaly. bowel sounds normoactive.  EXTREMITIES: No clubbing, cyanosis or edema. Distal pulses wnl.   SKIN: warm and dry with normal turgor.  NEURO: Alert/oriented x 3/normal motor exam. No pathologic reflexes.    PSYCH: normal affect.        LABS:                        9.6    5.18  )-----------( 88       ( 19 Oct 2020 04:14 )             31.0     10-19    139  |  106  |  49.0<H>  ----------------------------<  155<H>  5.4<H>   |  24.0  |  2.32<H>    Ca    8.6      19 Oct 2020 04:14  Phos  3.3     10-19  Mg     2.2     10-19    TPro  6.6  /  Alb  3.5  /  TBili  0.3<L>  /  DBili  x   /  AST  27  /  ALT  17  /  AlkPhos  114  10-17    NICHELLE GILL  CARDIAC MARKERS ( 17 Oct 2020 19:36 )  x     / 0.02 ng/mL / 80 U/L / x     / x          PT/INR - ( 17 Oct 2020 19:38 )   PT: 13.7 sec;   INR: 1.19 ratio         PTT - ( 17 Oct 2020 19:38 )  PTT:98.0 sec      ECG: sinus rhythm 82 bpm, normal axis, 1st degree AV delay, nonspecific ST abnormality    ECHO: 7/18/19   1. Left ventricular ejection fraction, by visual estimation, is 55 to 60%.   2. Spectral Doppler shows impaired relaxation pattern of left   ventricular myocardial filling (Grade I diastolic dysfunction).   3. There is mild concentric left ventricular hypertrophy.   4. Severe mitral annular calcification.   5. Mild degenerative mitral stenosis.   6. Bioprosthesis in the aortic position - normal function.   7. Estimated pulmonary artery systolic pressure is 37.2 mmHg assuming a   right atrial pressure of 3 mmHg, which is consistent with borderline   pulmonary hypertension.   8. There is no evidence of pericardial effusion.      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     Acute on Chronic HFpEF, Acute Hypoxic Respiratory Failure, CAD, CABG, bioAVR, HTN, Cirrhosis -- etiology of her acute HFpEF is likely not taking her diuretics for the past two weeks at instruction of her doctor due to worsening renal function.   - Lasix 80 mg IVP twice daily  - daily BMP while on high dose diuretic to monitor to renal toxicity  - low salt diet  - check TTE      Thank you for allowing [facility] to participate in the care of this patient.  Please feel free to call with any questions or concerns.

## 2020-10-19 NOTE — PHYSICAL THERAPY INITIAL EVALUATION ADULT - ADDITIONAL COMMENTS
Pt reports living in a 1 story house with 2 steps to enter.  Modified Independent with all with SAC, PTA. Has a RW, SAC, W/C, and shower chair.  Family member is always home with patient.

## 2020-10-20 NOTE — DISCHARGE NOTE NURSING/CASE MANAGEMENT/SOCIAL WORK - PATIENT PORTAL LINK FT
You can access the FollowMyHealth Patient Portal offered by [facility] by registering at the following website: [email or web address]. By joining [employer]’s FollowMyHealth portal, you will also be able to view your health information using other applications (apps) compatible with our system.

## 2020-10-20 NOTE — PROGRESS NOTE ADULT - SUBJECTIVE AND OBJECTIVE BOX
[place] [facility] - [facility], THE HEART CENTER                                   [address] [place] [address]                                                      PHONE: [phone]                                                         FAX: [phone]  [email or web address]  ---------------------------------------------------------------------------------------------------------------------------------    Overnight events/patient complaints:      PAST MEDICAL & SURGICAL HISTORY:  Internal hemorrhoids    Type 2 diabetes mellitus    Hemorrhoids    Cirrhosis    Chronic hepatitis C    CKD (chronic kidney disease)    CAD (coronary artery disease)    Heart failure    Aortic stenosis    Anemia, unspecified anemia type    Asthma    Low back pain  chronic over a year    High cholesterol    Hypertension    History of loop recorder  2017    Port-A-Cath in place  7/2019, right upper chest    H/O aortic valve replacement  bovine, 2016    S/P CABG x 3  2016    History of tonsillectomy        ACE inhibitors (Unknown)  Bactrim (Nephrotoxicity)  Biaxin (Joint Pain)  morphine (Short breath)  NSAIDs (Unknown)    MEDICATIONS  (STANDING):  amLODIPine   Tablet 5 milliGRAM(s) Oral daily  atorvastatin 20 milliGRAM(s) Oral at bedtime  carvedilol 6.25 milliGRAM(s) Oral every 12 hours  dextrose 5%. 1000 milliLiter(s) (50 mL/Hr) IV Continuous <Continuous>  dextrose 50% Injectable 12.5 Gram(s) IV Push once  dextrose 50% Injectable 25 Gram(s) IV Push once  dextrose 50% Injectable 25 Gram(s) IV Push once  folic acid 1 milliGRAM(s) Oral daily  gabapentin 300 milliGRAM(s) Oral at bedtime  insulin glargine Injectable (LANTUS) 10 Unit(s) SubCutaneous at bedtime  insulin lispro (HumaLOG) corrective regimen sliding scale   SubCutaneous three times a day before meals  levothyroxine 100 MICROGram(s) Oral daily  loratadine 10 milliGRAM(s) Oral daily  magnesium oxide 400 milliGRAM(s) Oral <User Schedule>  montelukast 10 milliGRAM(s) Oral daily  pantoprazole    Tablet 40 milliGRAM(s) Oral before breakfast    MEDICATIONS  (PRN):  dextrose 40% Gel 15 Gram(s) Oral once PRN Blood Glucose LESS THAN 70 milliGRAM(s)/deciliter  glucagon  Injectable 1 milliGRAM(s) IntraMuscular once PRN Glucose LESS THAN 70 milligrams/deciliter  hydrALAZINE Injectable 10 milliGRAM(s) IV Push every 6 hours PRN for SBP > 150  metoprolol tartrate Injectable 5 milliGRAM(s) IV Push once PRN HTVE urgency      Vital Signs Last 24 Hrs  T(C): 36.8 (20 Oct 2020 07:49), Max: 36.8 (20 Oct 2020 07:49)  T(F): 98.3 (20 Oct 2020 07:49), Max: 98.3 (20 Oct 2020 07:49)  HR: 72 (20 Oct 2020 07:49) (72 - 87)  BP: 151/70 (20 Oct 2020 07:49) (151/70 - 181/77)  BP(mean): --  RR: 19 (20 Oct 2020 07:49) (19 - 20)  SpO2: 99% (20 Oct 2020 07:49) (96% - 99%)  ICU Vital Signs Last 24 Hrs  [facility]  I&O's Detail    I&O's Summary    Drug Dosing Weight  [facility]      PHYSICAL EXAM:  General: Appears well developed, well nourished alert and cooperative.  HEENT: Head; normocephalic, atraumatic.  Eyes: Pupils reactive, cornea wnl.  Neck: Supple, no nodes adenopathy, no NVD or carotid bruit or thyromegaly.  CARDIOVASCULAR: Normal S1 and S2, No murmur, rub, gallop or lift.   LUNGS: No rales, rhonchi or wheeze. Normal breath sounds bilaterally.  ABDOMEN: Soft, nontender without mass or organomegaly. bowel sounds normoactive.  EXTREMITIES: No clubbing, cyanosis or edema. Distal pulses wnl.   SKIN: warm and dry with normal turgor.  NEURO: Alert/oriented x 3/normal motor exam. No pathologic reflexes.    PSYCH: normal affect.        LABS:                        10.2   5.29  )-----------( 91       ( 20 Oct 2020 06:28 )             32.9     10-20    142  |  105  |  53.0<H>  ----------------------------<  162<H>  4.5   |  25.0  |  2.55<H>    Ca    8.6      20 Oct 2020 06:28  Phos  3.3     10-20  Mg     2.3     10-20      ECG: sinus rhythm 82 bpm, normal axis, 1st degree AV delay, nonspecific ST abnormality    ECHO: 7/18/19   1. Left ventricular ejection fraction, by visual estimation, is 55 to 60%.   2. Spectral Doppler shows impaired relaxation pattern of left   ventricular myocardial filling (Grade I diastolic dysfunction).   3. There is mild concentric left ventricular hypertrophy.   4. Severe mitral annular calcification.   5. Mild degenerative mitral stenosis.   6. Bioprosthesis in the aortic position - normal function.   7. Estimated pulmonary artery systolic pressure is 37.2 mmHg assuming a   right atrial pressure of 3 mmHg, which is consistent with borderline   pulmonary hypertension.   8. There is no evidence of pericardial effusion.      ECHO 10/19/46399  Summary:   1. Normal global left ventricular systolic function.   2. Left ventricular ejection fraction, by visual estimation, is 60 to 65%.   3. Moderately increased LV wall thickness.   4. Normal right ventricle size and systolic function.   5. Severely enlarged left atrium.   6. Right atrium not well visualized, appears within normal limits on subcostal view.   7. Mild thickening of the anterior and posterior mitral valve leaflets.   8. Moderate mitral annular calcification.   9. Trace mitral valve regurgitation.  10. Elevated mean mitral valve gradient (   5 mmHg at HR 72 BPM).  11. Mild tricuspid regurgitation.  12. There is a prosthetic valve in the aortic position, peak velocity 2.3 m/s.  13. Mild aortic regurgitation.  14. There is no evidence of pericardial effusion.  15. Recommend clinical correlation with the above findings.      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     Acute on Chronic HFpEF, Acute Hypoxic Respiratory Failure, CAD, CABG, bioAVR, HTN, Cirrhosis -- etiology of her acute HFpEF is likely not taking her diuretics for the past two weeks at instruction of her doctor due to worsening renal function.    -resolved   -diuretic held as pt with with ney   -pt to hold for next few days  -will arrange outpt f/u on friday in office   -will likelyl restart home diurectics at that time  -will arrange for outpt labs to be drawn on thursday     - low salt diet  - echo 0 preserved ef and no valvulopathy         Thank you for allowing [facility] to participate in the care of this patient.  Please feel free to call with any questions or concerns. [place] [facility] - [facility], THE HEART CENTER                                   [address] [place] [address]                                                      PHONE: [phone]                                                         FAX: [phone]  [email or web address]  ---------------------------------------------------------------------------------------------------------------------------------    Overnight events/patient complaints:      PAST MEDICAL & SURGICAL HISTORY:  Internal hemorrhoids    Type 2 diabetes mellitus    Hemorrhoids    Cirrhosis    Chronic hepatitis C    CKD (chronic kidney disease)    CAD (coronary artery disease)    Heart failure    Aortic stenosis    Anemia, unspecified anemia type    Asthma    Low back pain  chronic over a year    High cholesterol    Hypertension    History of loop recorder  2017    Port-A-Cath in place  7/2019, right upper chest    H/O aortic valve replacement  bovine, 2016    S/P CABG x 3  2016    History of tonsillectomy        ACE inhibitors (Unknown)  Bactrim (Nephrotoxicity)  Biaxin (Joint Pain)  morphine (Short breath)  NSAIDs (Unknown)    MEDICATIONS  (STANDING):  amLODIPine   Tablet 5 milliGRAM(s) Oral daily  atorvastatin 20 milliGRAM(s) Oral at bedtime  carvedilol 6.25 milliGRAM(s) Oral every 12 hours  dextrose 5%. 1000 milliLiter(s) (50 mL/Hr) IV Continuous <Continuous>  dextrose 50% Injectable 12.5 Gram(s) IV Push once  dextrose 50% Injectable 25 Gram(s) IV Push once  dextrose 50% Injectable 25 Gram(s) IV Push once  folic acid 1 milliGRAM(s) Oral daily  gabapentin 300 milliGRAM(s) Oral at bedtime  insulin glargine Injectable (LANTUS) 10 Unit(s) SubCutaneous at bedtime  insulin lispro (HumaLOG) corrective regimen sliding scale   SubCutaneous three times a day before meals  levothyroxine 100 MICROGram(s) Oral daily  loratadine 10 milliGRAM(s) Oral daily  magnesium oxide 400 milliGRAM(s) Oral <User Schedule>  montelukast 10 milliGRAM(s) Oral daily  pantoprazole    Tablet 40 milliGRAM(s) Oral before breakfast    MEDICATIONS  (PRN):  dextrose 40% Gel 15 Gram(s) Oral once PRN Blood Glucose LESS THAN 70 milliGRAM(s)/deciliter  glucagon  Injectable 1 milliGRAM(s) IntraMuscular once PRN Glucose LESS THAN 70 milligrams/deciliter  hydrALAZINE Injectable 10 milliGRAM(s) IV Push every 6 hours PRN for SBP > 150  metoprolol tartrate Injectable 5 milliGRAM(s) IV Push once PRN HTVE urgency      Vital Signs Last 24 Hrs  T(C): 36.8 (20 Oct 2020 07:49), Max: 36.8 (20 Oct 2020 07:49)  T(F): 98.3 (20 Oct 2020 07:49), Max: 98.3 (20 Oct 2020 07:49)  HR: 72 (20 Oct 2020 07:49) (72 - 87)  BP: 151/70 (20 Oct 2020 07:49) (151/70 - 181/77)  BP(mean): --  RR: 19 (20 Oct 2020 07:49) (19 - 20)  SpO2: 99% (20 Oct 2020 07:49) (96% - 99%)  ICU Vital Signs Last 24 Hrs  [facility]  I&O's Detail    I&O's Summary    Drug Dosing Weight  [facility]      PHYSICAL EXAM:  General: Appears well developed, well nourished alert and cooperative.  HEENT: Head; normocephalic, atraumatic.  Eyes: Pupils reactive, cornea wnl.  Neck: Supple, no nodes adenopathy, no NVD or carotid bruit or thyromegaly.  CARDIOVASCULAR: Normal S1 and S2, No murmur, rub, gallop or lift.   LUNGS: No rales, rhonchi or wheeze. Normal breath sounds bilaterally.  ABDOMEN: Soft, nontender without mass or organomegaly. bowel sounds normoactive.  EXTREMITIES: No clubbing, cyanosis or edema. Distal pulses wnl.   SKIN: warm and dry with normal turgor.  NEURO: Alert/oriented x 3/normal motor exam. No pathologic reflexes.    PSYCH: normal affect.        LABS:                        10.2   5.29  )-----------( 91       ( 20 Oct 2020 06:28 )             32.9     10-20    142  |  105  |  53.0<H>  ----------------------------<  162<H>  4.5   |  25.0  |  2.55<H>    Ca    8.6      20 Oct 2020 06:28  Phos  3.3     10-20  Mg     2.3     10-20      ECG: sinus rhythm 82 bpm, normal axis, 1st degree AV delay, nonspecific ST abnormality    ECHO: 7/18/19   1. Left ventricular ejection fraction, by visual estimation, is 55 to 60%.   2. Spectral Doppler shows impaired relaxation pattern of left   ventricular myocardial filling (Grade I diastolic dysfunction).   3. There is mild concentric left ventricular hypertrophy.   4. Severe mitral annular calcification.   5. Mild degenerative mitral stenosis.   6. Bioprosthesis in the aortic position - normal function.   7. Estimated pulmonary artery systolic pressure is 37.2 mmHg assuming a   right atrial pressure of 3 mmHg, which is consistent with borderline   pulmonary hypertension.   8. There is no evidence of pericardial effusion.      ECHO 10/19/86137  Summary:   1. Normal global left ventricular systolic function.   2. Left ventricular ejection fraction, by visual estimation, is 60 to 65%.   3. Moderately increased LV wall thickness.   4. Normal right ventricle size and systolic function.   5. Severely enlarged left atrium.   6. Right atrium not well visualized, appears within normal limits on subcostal view.   7. Mild thickening of the anterior and posterior mitral valve leaflets.   8. Moderate mitral annular calcification.   9. Trace mitral valve regurgitation.  10. Elevated mean mitral valve gradient (   5 mmHg at HR 72 BPM).  11. Mild tricuspid regurgitation.  12. There is a prosthetic valve in the aortic position, peak velocity 2.3 m/s.  13. Mild aortic regurgitation.  14. There is no evidence of pericardial effusion.  15. Recommend clinical correlation with the above findings.      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     Acute on Chronic HFpEF, Acute Hypoxic Respiratory Failure, CAD, CABG, bioAVR, HTN, Cirrhosis -- etiology of her acute HFpEF is likely not taking her diuretics for the past two weeks at instruction of her doctor due to worsening renal function.    -resolved   -diuretic held as pt with with ney   -pt to hold for next few days  -will arrange outpt f/u on friday in office   -will likelyl restart home diurectics at that time  -will arrange for outpt labs to be drawn on thursday   - low salt diet  - echo - preserved ef and no valvulopathy         Thank you for allowing [facility] to participate in the care of this patient.  Please feel free to call with any questions or concerns.

## 2020-11-24 PROBLEM — D01.3 AIN GRADE III: Status: ACTIVE | Noted: 2020-01-01

## 2020-11-24 NOTE — PHYSICAL EXAM
[General Appearance - Alert] : alert [General Appearance - In No Acute Distress] : in no acute distress [Sclera] : the sclera and conjunctiva were normal [PERRL With Normal Accommodation] : pupils were equal in size, round, and reactive to light [Extraocular Movements] : extraocular movements were intact [Outer Ear] : the ears and nose were normal in appearance [Neck Appearance] : the appearance of the neck was normal [Neck Cervical Mass (___cm)] : no neck mass was observed [Jugular Venous Distention Increased] : there was no jugular-venous distention [Thyroid Diffuse Enlargement] : the thyroid was not enlarged [Thyroid Nodule] : there were no palpable thyroid nodules [Auscultation Breath Sounds / Voice Sounds] : lungs were clear to auscultation bilaterally [Heart Rate And Rhythm] : heart rate was normal and rhythm regular [Heart Sounds] : normal S1 and S2 [Heart Sounds Gallop] : no gallops [Murmurs] : no murmurs [Heart Sounds Pericardial Friction Rub] : no pericardial rub [Full Pulse] : the pedal pulses are present [Edema] : there was no peripheral edema [Bowel Sounds] : normal bowel sounds [Abdomen Soft] : soft [Abdomen Tenderness] : non-tender [Abdomen Mass (___ Cm)] : no abdominal mass palpated [Cervical Lymph Nodes Enlarged Posterior Bilaterally] : posterior cervical [Cervical Lymph Nodes Enlarged Anterior Bilaterally] : anterior cervical [Supraclavicular Lymph Nodes Enlarged Bilaterally] : supraclavicular [No CVA Tenderness] : no ~M costovertebral angle tenderness [No Spinal Tenderness] : no spinal tenderness [Abnormal Walk] : normal gait [Nail Clubbing] : no clubbing  or cyanosis of the fingernails [Musculoskeletal - Swelling] : no joint swelling seen [Motor Tone] : muscle strength and tone were normal [Skin Color & Pigmentation] : normal skin color and pigmentation [Skin Turgor] : normal skin turgor [] : no rash [No Focal Deficits] : no focal deficits [Oriented To Time, Place, And Person] : oriented to person, place, and time [Impaired Insight] : insight and judgment were intact [Affect] : the affect was normal

## 2020-11-24 NOTE — PHYSICAL EXAM
[No Rash or Lesion] : No rash or lesion [Alert] : alert [Oriented to Person] : oriented to person [Oriented to Place] : oriented to place [Oriented to Time] : oriented to time [Calm] : calm [Normal rectal exam] : exam was normal [Reduce Spontaneously] : a spontaneously reducible (grade II) [Skin Tags] : residual hemorrhoidal skin tags were noted [Normal] : was normal [None] : there was no rectal mass  [de-identified] : Circumferential nonedematous external hemorrhoids [de-identified] : No apparent distress [de-identified] : Normocephalic atraumatic [de-identified] : Moving all extremities x4

## 2020-11-24 NOTE — HISTORY OF PRESENT ILLNESS
[de-identified] : The patient arrived for followup. She has been evaluated in the past for hepatitis C related cirrhosis. She has history of esophageal varices which have been obliterated, large hemorrhoids. She was having bleeding and was evaluated by colorectal surgery team and underwent hemorrhoidectomy. On the hemorrhoidectomy, there was squamous high grade dysplasia. She follows up regularly with nephrology for stage IV CKD and also with hematology for anemia. No active bleeding reported. No vertigo complaints. Last imaging for the liver HCC surveillance was in 2019. Recent labs were reviewed.

## 2020-11-24 NOTE — ASSESSMENT
[FreeTextEntry1] : I have recommended the alpha-fetoprotein, US  abdomen in followup regularly with hematology and nephrology. She has already appointment with Dr. Marcelino GONZALEZ in 3-4 months. We will schedule her for EGD on next visit.\par \par Favio Lloyd MD\par Gastroenterology \par \par

## 2020-11-24 NOTE — REASON FOR VISIT
[Follow-Up: _____] : a [unfilled] follow-up visit [FreeTextEntry1] : 6/15/20 Three quadrant hemorrhoidectomy

## 2020-11-24 NOTE — HISTORY OF PRESENT ILLNESS
[FreeTextEntry1] : Marlene presents to the office for a postoperative visit after undergoing 3 quadrant hemorrhoidectomy on Juliette 15, 2020.  She reports that the postoperative pain was much more tolerable than she was prepared for.  She has not taken any of the narcotic pain medication that was prescribed.  She denies any rectal bleeding since the surgery and denies any significant anorectal discharge.  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  She reports some issues with constipation despite adherence to Metamucil daily, regular consumption of prunes as well as Colace usage.  Stools are passed approximately every 3 days.  She is unable to increase daily water intake secondary to fluid restriction.  She otherwise denies any issues with rectal bleeding.

## 2020-11-24 NOTE — ASSESSMENT
[FreeTextEntry1] : Internet presents to the office for a postoperative visit.  Overall, she is doing remarkably well after undergoing a 3 quadrant hemorrhoidectomy for rectal bleeding from her internal and external hemorrhoids.  She declined office examination today though from her reports, she appears to be healing without incident.  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  I recommended that she return to the office in approximately 6 months time for an anal Pap to continue surveillance of this area of incidental dysplasia.  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  In office today, anal Pap was obtained via anoscopy without issues.  We will follow-up with results, but advised repeat exam likely in 6 months time.  With regards to constipation, recommend continued Metamucil usage, prune consumption, but change to MiraLAX nightly.

## 2020-11-24 NOTE — PROCEDURE
[FreeTextEntry1] : Anoscopy performed after pt consent was obtained. Circumferential visualization of the anal canal  above the level of the dentate line was completed.  Anal Pap smear obtained\par

## 2020-12-06 NOTE — ED ADULT NURSE NOTE - PRIMARY CARE PROVIDER
1  Decrease topiramate to 50 mg twice daily  2  Continue carbamazepine unchanged  3  Let us know if there are seizures  4  Return in 4 months to see [employer], CRNP  Return in 10 months to see Dr Lena Shaw  5  Will consider stopping topiramate at next visit 
.

## 2020-12-06 NOTE — ED ADULT TRIAGE NOTE - ISOLATION TYPE:
Nutrition Care Plan    Nutrition Diagnosis:   Inadequate intake related to ARF s/p partial nephrectomy, intubated again 6/6/17 as evidenced by NPO without nutrition.    Intervention:    Enteral formula/solution: Tube Feeding Recommendations - Renal Formula (Nepro), start at 20 mL/hr and increase by 10-20 mL/hr every 8 hours as tolerated to goal rate of 45 mL/hr.  TF provides: 1944 calories, 87 gm protein, 788 mL free water from formula    Monitoring and Evaluation:  Total energy intake:  Goal is to meet nutrition needs (not met).     None

## 2020-12-06 NOTE — ED PROVIDER NOTE - PATIENT PORTAL LINK FT
You can access the FollowMyHealth Patient Portal offered by [facility] by registering at the following website: [email or web address]. By joining [employer]’s FollowMyHealth portal, you will also be able to view your health information using other applications (apps) compatible with our system.

## 2020-12-06 NOTE — ED PROVIDER NOTE - OBJECTIVE STATEMENT
74 y/o F pt with hx of anemia, asthma, CAD, chronic Hep C, CKD, CHF, HLD, HTN, DM, aortic valve replacement, and CABG x3 presents to ED c/o SOB that onset today. Pt also notes DUPONT. Pt states she has been compliant with her medication. Pt states she is on 3L O2 at baseline. denies fever. denies HA or neck pain. no chest pain. no abd pain. no n/v/d. no urinary f/u/d. no back pain. no motor or sensory deficits. denies illicit drug use. no recent travel. no rash. no other acute issues symptoms or concerns.  Cardio: Dr. Penny

## 2020-12-06 NOTE — ED ADULT NURSE NOTE - OBJECTIVE STATEMENT
pt comes to ED with reports of feeling tired and slightly SOB. pt awake alert and oriented. even and unlabored resps present. no nausea, no vomiting, no  chest pain. pt seen here recently, has low hgb and has f.u with her hematologist on wednesday. no distress noted, skin warm dry and itnact. pt with no other comlpaints.

## 2020-12-06 NOTE — ED PROVIDER NOTE - CLINICAL SUMMARY MEDICAL DECISION MAKING FREE TEXT BOX
pt appears comfrotable no overt distress her last echo oct 60 percent ef her ekg is unchanged no signs acute disease on cxr she is scheduled for her iron infusions and blood tranfusion already outpt f.u pcp jaida return to ed for worsening cp sob pt agrees to plan of care

## 2020-12-06 NOTE — ED ADULT TRIAGE NOTE - CHIEF COMPLAINT QUOTE
pt a+ox3, BIBA c/o worsening SOB throughout the day, states she checked pulse ox at home and HR was in low 40's. pt reports hx of CHF, states bilat LE have become more "shaky" and "crampy".

## 2020-12-06 NOTE — ED PROVIDER NOTE - NEURO NEGATIVE STATEMENT, MLM
Left Abnormal/Right Normal
no loss of consciousness, no gait abnormality, no headache, no sensory deficits, and no weakness.

## 2020-12-06 NOTE — ED PROVIDER NOTE - NS ED ATTENDING STATEMENT MOD
NEW PATIENT EXAM ENCOUNTER TO ESTABLISH CARE      HISTORY:    The patient is a 40year old female who comes in for a new patient exam. Toro Holland has the following medical issues:    Encounter to establish care  Toro Holland is a 40year old female who presents to the clinic for an encounter to establish care. 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 Age of menarche onset was about 12-13 years old. She is  4 para 3, 1 . She has 1 grandchild. She does not have regular cycles because she has an IUD, May 2018 it was placed. Hobbies include kayaking, swimming, yoga, and hiking, and she does not normally exercise. Typically she follows a regular diet and eats healthy. Usually she drinks water and coffee throughout the day, and drinks about 0-6 alcohol containing beverages in a week. Toro Holland is not a regular smoker. She denies any recreational drug use. She wears a seat belt every time she is in the car, and reports infrequent dental exams, and her last eye exam was several years ago. She denies performing self breast exams. Health maintenance:  Breast cancer screening  - MAMMO SCREENING BILATERAL; Future    Laboratory examination ordered as part of a complete physical examination  - CBC & AUTO DIFFERENTIAL; Future  - COMPREHENSIVE METABOLIC PANEL; Future  - LIPID PANEL WITH REFLEX; Future  - VITAMIN D -25 HYDROXY; Future    Major depressive disorder, recurrent episode, moderate (CMS/HCC) / Bipolar 1 disorder (CMS/HCC)  - SERVICE TO BEHAVIORAL HEALTH  - PARoxetine (PAXIL) 30 MG tablet; Take 1 tablet by mouth every morning. Dispense: 90 tablet; Refill: 0    Depression for which she is on 30 mg daily, which was just increased 2 days ago, she feels it is already improving with this dose adjustment, but only moderately controlled at this time on this medication.   Toro Holland has not been taking the medication as prescribed, and been on it for over 10 years. She states she was previously on Prozac and it worked well for her for about 10 years, but then stopped working. She was diagnosed bipolar after having her 2nd child at about 24. She was started on lithium and Prozac, which worked well for her for a few years, then she decided to stop the lithium. She then continued the Prozac for another 10 years and it worked well for her, until it stopped working, seemingly over night. She was then changed to paxil 10 mg daily for a few years, with a dose increase about 2-3 years into taking it. She started with worsening depression about a week ago, and went to the urgent care for a possible dose increase. She saw Diego Bread on Monday, and increased her dose to 30 mg, and she states it is already starting to help. Janel Phillips tolerates this medication well, denies any side effects or problems such as nausea, headache, insomnia, diarrhea, worsening depression thoughts, somnolence or any thoughts of suicidality or self harm. Compliance with this medication is good, and she rates her overall mood 5/10, with 0 being poor mood and 10 being the best possible mood, and ranks her anxiety a 2-3/10, with 10 being the highest possible anxiety. Janel Phillips would like to continue with this medication. Janel Phillips has attended counseling in the past, and did find it beneficial, but mainly only CBT counseling seems to work for her. She is requesting a referral to counseling again.      Last four PHQ 2/9 Test Results     PHQ9 SCREENING FLOWSHEET 4/10/2019 4/8/2019   Adult PHQ2 Score 4 6   Adult PHQ9 Score 16 26   Little interest or pleasure in activity 2 3   Feeling down, depressed or hopeless 2 3   Trouble falling or staying asleep or sleeping all the time 2 3   Feeling tired or having little energy 2 3   Poor appetite or overeating 2 3   Feeling bad about yourself or that you are a failure or have let yourself or family down 2 "3   Trouble concentrating on things such as reading hte newspaper or watching TV 1 3   Moving or speaking slowly that other people have noticed or the opposite - being so fidgety or restless that you have been moving around a lot more than usual 2 3   Thoughts that you would be better off dead or of hurting yourself in some way 1 2     Last four GAD7 Assessments     GAD7 4/10/2019 4/8/2019   GAD7 Score 2 5   Feeling nervous, anxious or on edge Not at all Several days   Not being able to stop or control worrying Not at all Not at all   Worrying too much about different things Not at all Not at all   Trouble relaxing Not at all Not at all   Being so restless that it's hard to sit still Not at all Not at all   Becoming easily annoyed or irritable More than half the days Nearly every day   Feeling afraid as if something awful might happen Not at all Several days   Ability to handle work, home and other people Not difficult at all Not difficult at all     Rectal bleeding / Hemorrhoids, internal, with bleeding / Rectal pain / Slow transit constipation  - hydroCORTisone (ANUSOL-HC) 25 MG suppository; Place 1 suppository rectally 2 times daily. Dispense: 12 each; Refill: 0    Toribio Cid also complained of intermittent bright red rectal bleeding, that is most common with hard stools or constipation. She has tried taking magnesium supplements and a probiotic, but still struggled with hard stools and constipation. She drinks about 40-60 ounces of water a day, and typically drinks coffee as well. This is been an ongoing issue for her over the past 2-3 years. Her previous provider had ""looked\"" but did not find any sign of hemorrhoids or fissures. Occasionally she has some discomfort, most notably when she is trying to have a bowel movement. She sometimes feels like \""something is there\"" on the right side near the rectal opening that is uncomfortable. She currently denies any pain.   She denies diarrhea, constipation, nausea, " bloody stools, or abdominal pain. She denies any change in dietary habits or eating patterns. MEDICATIONS:  Outpatient Medications Marked as Taking for the 4/10/19 encounter (Office Visit) with OLU Joy   Medication Sig Dispense Refill   â¢ PARoxetine (PAXIL) 30 MG tablet Take 1 tablet by mouth every morning. 90 tablet 0   â¢ rOPINIRole (REQUIP) 0.5 MG tablet TAKE 1 TABLET BY MOUTH THREE TIMES DAILY( EVERY EIGHT HOURS) 90 tablet 0   â¢ cetirizine (ZYRTEC) 10 MG tablet Take 1 tablet by mouth daily. 30 tablet 5   â¢ triamcinolone (ARISTOCORT) 0.1 % cream Apply sparingly to affected area twice daily. 80 g 1     ALLERGIES:  Allergies as of 04/10/2019   â¢ (No Known Allergies)     HISTORIES:  Patient Active Problem List    Diagnosis Date Noted   â¢ Anxiety and depression 01/20/2015     Priority: Low   â¢ Restless legs 01/20/2015     Priority: Low   â¢ Acne 01/20/2015     Priority: Low     Past Surgical History:   Procedure Laterality Date   â¢ Iud mirena  12/2012    needs to be removed 05/2023     Family History   Problem Relation Age of Onset   â¢ High blood pressure Mother    â¢ High cholesterol Father    â¢ Psychiatric Sister         Depression and anxiety   â¢ Psychiatric Brother         Depression and anxiety   â¢ Stroke Maternal Grandfather      Social History     Occupational History   â¢ Not on file   Tobacco Use   â¢ Smoking status: Never Smoker   â¢ Smokeless tobacco: Never Used   Substance and Sexual Activity   â¢ Alcohol use: Yes     Alcohol/week: 2.4 oz     Types: 4 Glasses of wine per week   â¢ Drug use: No   â¢ Sexual activity: Yes     Partners: Male     Birth control/protection: IUD     REVIEW OF SYSTEMS:  -positives are bolded. Remainder below negative. Constitutional:  Excessive fatigue, unexplained weight loss or gain, excessive sweating or night sweats. Eyes:  Vision change, eye pain. HENT:  Hearing loss, tinnitus, nasal obstruction or discharge, hoarseness, painful swallowing.   Respiratory: "Chronic cough, wheezing, shortness of breath. Cardiovascular:  Chest pain, heart murmur, palpitations, claudication symptoms, ankle swelling. Gastrointestinal:  Poor appetite, heartburn, black or bloody stools, recurrent abdominal pain, frequent nausea or vomiting, persistent constipation, persistent diarrhea. Rectal bleeding. Genitourinary:  Dysuria, nocturia, hematuria, urinary incontinence. Endocrine:  Heat or cold intolerance, excessive thirst, excessive urination. Musculoskeletal:  Swollen or painful joints, neck pain, back pain, morning stiffness greater than 15 minutes. Integument:  Recurrent rash, changes in moles, abnormal hair growth. Neurologic:  Headache, unexplained dizziness, loss of consciousness, head injury, focal weakness, sensory changes, tremors, difficulty talking, loss of balance, falls. Lymphatic:  Swollen lymph nodes, easy bruising or bleeding, history of blood clots. Psychiatric:  Insomnia, anxiety, irritability or mood swings, depression, suicidal thoughts, difficulty with concentration or memory. Reproductive:  History of sexually transmitted infection, genital warts or HPV, more than 5 partners in lifetime, exposure to AIDS. PHYSICAL EXAM  Vitals:  Blood pressure 118/76, pulse 84, resp. rate 16, height 5' 9.5"" (1.765 m), weight 81.6 kg..  Constitutional:  Well developed, well nourished, no acute distress, non-toxic appearance. Eyes:  Pupils equal, conjunctivae normal, no icterus. HENT:  Atraumatic, external ears normal, nose normal, oropharynx moist, no pharyngeal exudates. Neck- Normal range of motion, no tenderness, supple. No carotid bruits. No masses. No thyromegaly. Respiratory:  No respiratory distress, normal breath sounds, no rales, no wheezing. Cardiovascular:  Normal rate, normal rhythm, no murmurs, no gallops, no rubs.    Gastrointestinal:  Soft, nondistended, normal bowel sounds, nontender, no hepatosplenomegaly, no mass, no rebound, no " guarding. Genitourinary:  No costovertebral angle tenderness. Musculoskeletal: No clubbing or cyanosis. Full range of motion in all 4 extremities proximal and distal.   Spine is straight without obvious deformity when erect. Muscles and extremities are symmetric. Muscle strength appropriate and equal bilaterally. Active range of motion without  locking, clicking or limitations in all joints. Back- No tenderness. Integument:  Well hydrated, no rash. No malignant-appearing lesions noted. Lymphatic:  No lymphadenopathy in submental, submandibular or cervical chain. No supraclavicular or infraclavicular lymphadenopathy. Neurologic:  Alert and oriented, normal motor function, no focal deficits noted, reflexes symmetric. No tremor. Psychiatric:  Speech and behavior appropriate. Cooperative. Normal judgment. Rectal: Normal tone, no masses or tenderness; guaiac negative stool    ASSESSMENT/PLAN:    Discussed immunization that is (flu) due, reviewed risk and benefits today. Patient declines having them done today. Encounter to establish care  Tracy Macario was welcomed to my practice and congratulated on the healthy habits she is continuing. Healthy lifestyle recommendations were provided in today's after visit summary. It was recommended to the patient that she have routine dental exams every 6 months and an eye exam every other year. Health maintenance:  Breast cancer screening  - MAMMO SCREENING BILATERAL; Future    Laboratory examination ordered as part of a complete physical examination  - CBC & AUTO DIFFERENTIAL; Future  - COMPREHENSIVE METABOLIC PANEL; Future  - LIPID PANEL WITH REFLEX; Future  - VITAMIN D -25 HYDROXY; Future    Major depressive disorder, recurrent episode, moderate (CMS/HCC) / Bipolar 1 disorder (CMS/HCC)  Improving, stable. No changes were made to current medication treatment at this time, no new medications were started today, as noted below.   We discussed when doing dose adjustment it may take 6-8 weeks to become therapeutic. During today's visit we discussed side effects of the medication and it's use, the importance of counseling and having someone to talk to, and when to proceed to the ER should she develop any thoughts of self harm, or suicidal ideation. Lamar More was instructed to notify me of any side effects, concerns, or changes. We discussed identifying someone as her go-to person should she develop any thoughts of self-harm or if she needed someone to talk to. The [facility] and Crisis Line information was provided in today's after visit summary and discussed with the patient today. We discussed the fact that most of these medications should never be stopped without discussing it first with the provider who started the medication. I recommended that she attend counseling, a referral was placed, but if they do not take her insurance, she should check with the county. Lamar More verbalizes understanding and denies any questions or concerns, she and is in agreement with this plan of care. Refill:  - PARoxetine (PAXIL) 30 MG tablet; Take 1 tablet by mouth every morning. Dispense: 90 tablet; Refill: 0  - SERVICE TO BEHAVIORAL HEALTH    Rectal bleeding / Hemorrhoids, internal, with bleeding / Rectal pain / Slow transit constipation  Stable, moderately controlled. I would anticipate that this is internal hemorrhoids and she was given a prescription for hydrocortisone suppositories to use as needed over the next 6 days. We discussed the fact that this may not be covered by her insurance, but I will try to put it through. If this is not covered and she is not willing to pay out of pocket for them, I recommended that she use over-the-counter hydrocortisone for hemorrhoids. Side effects and use this medication were reviewed with the patient today. She was encouraged to notify me of any concerns or changes.   We reviewed symptoms that she should monitor for when to return to the clinic. She verbalizes understanding and is in agreement with this plan of care. She denies any questions or concerns. New PRN medication:     - hydroCORTisone (ANUSOL-HC) 25 MG suppository; Place 1 suppository rectally 2 times daily. Dispense: 12 each;  Refill: 0        Return in about 1 month (around 5/8/2019) for medication follow up, depression follow up, fasting labs prior to next visit, annual exam. Attending Only

## 2020-12-06 NOTE — ED ADULT NURSE NOTE - ISOLATION TYPE:
CC:   Chief Complaint   Patient presents with   [facility] Follow Up     patient went to [facility]. mother thought he had pink eye, now she thinks maybe allergy     Eye Swelling     Right Eye swollen, same eye that is red        HPI: Jessica Hugo is a 5 wk. o. male who presents today accompanied by mom for f/u after ER visit on 3/3/0/19 for concerns about pink eye  Seen there for c/c eye redness and discharge  Was sent home with erythromycin  Mom does not feel its helping  In addition, yesterday he woke up with swelling around the eye  Does not open the eye  Feels warm / ? Fevers but mom does not have a thermometer  More fussy/ irritable  Continues with eye discharge  At home with mom  Does not go to   Feeding well  Voiding and stooling normally  No rashes      ROS:   No cough, nasal congestion/drainage, rhinorrhea, oral lesions,  ear drainage,  wheezing, shortness of breath, vomiting, abdominal pain or distention,  bowel or bladder problems,  changes in appetite or activity levels,  rashes, petechiae, bruising or other lesions. Rest of 12 point ROS is otherwise negative    Past medical, surgical, Social, and Family history reviewed   Medications reviewed and updated. OBJECTIVE:   Visit Vitals  Pulse 162   Temp 98.5 °F (36.9 °C) (Axillary)   Resp 38   Ht 1' 9.26\" (0.54 m)   Wt 9 lb 15.6 oz (4.525 kg)   HC 38.5 cm   BMI 15.52 kg/m²     Vitals reviewed  Growth parameters are noted and are appropriate for age.     General:  alert, crying with exam, appears stated age well hydrated cap refill < 3sec   Skin:   baby acne on the cheeks   Head:  normal fontanelles, nl appearance, nl palate, supple neck   Eyes:   right eyelid edema difficulties opening eye, eye discharge, conjunctival injection on the right. Ears:  normal bilateral   Mouth:  No perioral or gingival cyanosis or lesions. Tongue is normal in appearance.    Lungs:  clear to auscultation bilaterally   Heart:  regular rate and rhythm, S1, S2 normal, no murmur, click, rub or gallop   Abdomen:  soft, non-tender. Bowel sounds normal. No masses,  no organomegaly   Cord stump:  cord stump absent   Screening DDH:  Ortolani's and Mckinney's signs absent bilaterally, leg length symmetrical, hip position symmetrical, thigh & gluteal folds symmetrical, hip ROM normal bilaterally   :  normal male - testes descended bilaterally, SMR1   Femoral pulses:  present bilaterally   Extremities:  extremities normal, atraumatic, no cyanosis or edema   Neuro:  alert, moves all extremities spontaneously, good 3-phase Chase reflex, good suck reflex, good rooting reflex        A/P:       ICD-10-CM ICD-9-CM    1. Edema of right eyelid H02.843 374.82    2. History of fever Z87.898 V13.89    3. Conjunctival hyperemia of right eye H11.431 372.71    4. Eye discharge H57.89 379.93    5. Irritability R45.4 799.22    6. Fussiness in baby R68.12 780.91      1/2/3/4/5/6: discussed possible etiologies to his symptoms, given failed tx with erythro and edema around the eyelid, concerns about preseptal cellulitis and given his age, opted to refer to ED for further eval/possible need for imaging/ IV antibiotics  Dicussed plan with mom who voiced understanding and agreed to take to the ER for further  eval  Spoke with nurse in the ED re: pt and his arrival.   F/u after ER visit        >25 minutes time spent discussing symptoms, reviewing possible etiologies evaluation and possible tx recommendations with mom  with >50% in counseling and coordination of care       Follow-up and Dispositions    · Return for please go to the ER for further evluation. spoke with ER nurse re: pt already .        lab results and schedule of future lab studies reviewed with patient   reviewed medications and side effects in detail  Reviewed and summarized past medical records       Cedric Ordaz DO None

## 2020-12-09 NOTE — H&P ADULT - PROBLEM/PLAN-8
Tested COVID-19 positive on 12/8.  Oxygen requirements starting to improve.   S/p Decadron, remdesivir.  ICS, encourage proning, and mobilization.   Completed 2 courses of antibiotics for pneumonia.   Ultrasound negative for DVT in bilateral lower extremities, echocardiogram reveals normal LVEF.   DISPLAY PLAN FREE TEXT

## 2020-12-23 NOTE — ED CDU PROVIDER INITIAL DAY NOTE - OBJECTIVE STATEMENT
76 y/o F pt with hx of anemia, asthma, CAD, chronic Hep C, CKD stage 4 no dialysis , CHF, HLD, HTN, DM, aortic valve replacement, and CABG x3 presents to ED for low hemoglobin. Pt. has hx of multiple blood transfusions and is followed by a hematologist, Dr. Dennis with hx of bone marrow injections of aranesp. Pt. had hemoglobin of 9.1 One week ago and was notified that her hemoglobin from yesterday was 7.5.  at her weekly blood draw. pt admits to DUPONT/SOB, fatigue. states that she was unable to get into her  usual transfusion center  Pt. denies any chest pain. No Abdominal pain. No melena. No noticeable blood in her stool.   non smoker

## 2020-12-23 NOTE — ED ADULT TRIAGE NOTE - CHIEF COMPLAINT QUOTE
Pt. complaining of low hemoglobin.  Pt. with history of chronic anemia gets weekly blood draw for H&H levels.  Pt. states last week hemoglobin was 9.1 and level drawn yesterday resulted at 7.5.  Pt. complaining of associated SOB on arrival.  Pt. denies chest pain and/or dizziness on arrival

## 2020-12-23 NOTE — ED CDU PROVIDER INITIAL DAY NOTE - MEDICAL DECISION MAKING DETAILS
74 yo female multiple chronic health issues with need for multiple transfusions in the past 4 years. presenting to Er with symptomatic anemia. unable to be transfused at her normal center. 2 units with lasix inla.

## 2020-12-23 NOTE — ED PROVIDER NOTE - OBJECTIVE STATEMENT
74 y/o F pt with hx of anemia, asthma, CAD, chronic Hep C, CKD, CHF, HLD, HTN, DM, aortic valve replacement, and CABG x3 presents to ED for low hemoglobin. Pt. has hx of multiple blood transfusions and is followed by a hematologist, Dr. Dennis. Pt. had hemoglobin of 9.1 One week ago and was notified that her hemoglobin from yesterday was 7.5. Pt. also c/o increase shortness of breath and feeling fatigue/tired for the past 3-4 days. Pt. also receives Iron infusion. As per patient, the infusion center could not accommodate her today. Pt. denies any chest pain. No Abdominal pain. No melena. No noticeable blood in her stool.

## 2020-12-23 NOTE — ED ADULT NURSE REASSESSMENT NOTE - NS ED NURSE REASSESS COMMENT FT1
received pt from previous RN. pt aao x4. respirations even and unlabored. skin color wnl warm and dry. pt denies dizziness, sob,. chest pain, nausea, vomiting, headache. pt has no complaints. resting comfortably. no distress noted. will continue to monitor.

## 2020-12-23 NOTE — ED CDU PROVIDER INITIAL DAY NOTE - CARDIAC, MLM
Normal rate, regular rhythm.  Heart sounds S1, S2.  No murmurs, rubs or gallops. port to the right upper chest. no pretibial or pedal edema. no calf tenderness

## 2020-12-23 NOTE — ED CDU PROVIDER INITIAL DAY NOTE - FAMILY HISTORY
Sibling  Still living? Unknown  Family history of diabetes mellitus, Age at diagnosis: Age Unknown

## 2020-12-23 NOTE — ED CDU PROVIDER INITIAL DAY NOTE - ATTENDING CONTRIBUTION TO CARE
I, Sandoval Dewey, have personally performed a face to face diagnostic evaluation on this patient. I have reviewed the ACP note and agree with the history, exam and plan of care, except as noted.    76 yo F  hx of stage 4 CKD not on HD, chronic anemia, CHF, HDL, HTN, DM, bovine valve replacement. patient found have Hb 7.3, down from 9.1 previously. Patient denies blood in stool. She has had multiple blood transfusion. She follows with hematology with multiple work up for anemia. will give 2 U PRBC with lasix in between.

## 2020-12-23 NOTE — ED PROVIDER NOTE - CLINICAL SUMMARY MEDICAL DECISION MAKING FREE TEXT BOX
Pt. with symptomatic anemia requiring blood transfusion. Pt. stable at this time. Will repeat labs and transfuse blood.

## 2020-12-23 NOTE — ED ADULT NURSE REASSESSMENT NOTE - NS ED NURSE REASSESS COMMENT FT1
received pt alert and orientedx3, vitals are stable, sitting upright in stretcher in no apparent signs of distress. Pt remains stable on monitor, PIV site WNL. blood is running as ordered, pt has no distress, sating well on room air, Pt status unchanged, refer to flowsheet and chart, pt ID band in place, pt safety maintained, pt ambulates with a cane , updated on plan of care. Call light provided, fall safety reinforced. Will continue to monitor

## 2020-12-24 NOTE — ED CDU PROVIDER DISPOSITION NOTE - NSFOLLOWUPINSTRUCTIONS_ED_ALL_ED_FT
please follow with nephrologist, primary care and hematologist   new or worsening symptoms return to the ER

## 2020-12-24 NOTE — ED CDU PROVIDER DISPOSITION NOTE - PATIENT PORTAL LINK FT
You can access the FollowMyHealth Patient Portal offered by [facility] by registering at the following website: [email or web address]. By joining [facility]’s FollowMyHealth portal, you will also be able to view your health information using other applications (apps) compatible with our system.

## 2020-12-24 NOTE — ED CDU PROVIDER DISPOSITION NOTE - CLINICAL COURSE
76 yo female various chronic illnesses with CKD and chronic anemia presenting with symptomatic anemia, placed in observation for transfusion with improvement of symptoms. plan to dc with fu with nephro, primarhy care and hematologist. pt verbalizes understanding of importance of fu and given strict return precautions

## 2020-12-24 NOTE — ED CDU PROVIDER DISPOSITION NOTE - ATTENDING CONTRIBUTION TO CARE
I agree with the PA's note and was available for any issues/concerns. I was directly involved in patient care. My brief overall assessment is as follows: symptomatic anemia with ckd. received 2 uprbc, no complications, breathing comfortable and otherwise well. to f/u renal and heme. return precautions.

## 2020-12-24 NOTE — ED CDU PROVIDER SUBSEQUENT DAY NOTE - MEDICAL DECISION MAKING DETAILS
74 yo female with multiple medical issues presenting to ER with symptomatic anemia. TB transfused 2 units with lasix in between. plan for dc post transfusion with fu with nephrologist, hematologist and pcp. pt verbalizes understanding

## 2020-12-24 NOTE — ED CDU PROVIDER SUBSEQUENT DAY NOTE - HISTORY
patient being held in observation for blood transfusion. to receive lasix between units due to kidney and cardiac hx

## 2020-12-24 NOTE — ED CDU PROVIDER SUBSEQUENT DAY NOTE - ATTENDING CONTRIBUTION TO CARE
I agree with the PA's note and was available for any issues/concerns. I was directly involved in patient care. My brief overall assessment is as follows: receiving transfusion

## 2021-01-01 ENCOUNTER — APPOINTMENT (OUTPATIENT)
Dept: CT IMAGING | Facility: HOSPITAL | Age: 76
End: 2021-01-01

## 2021-01-01 ENCOUNTER — RESULT REVIEW (OUTPATIENT)
Age: 76
End: 2021-01-01

## 2021-01-01 ENCOUNTER — APPOINTMENT (OUTPATIENT)
Dept: INTERVENTIONAL RADIOLOGY/VASCULAR | Facility: CLINIC | Age: 76
End: 2021-01-01
Payer: MEDICARE

## 2021-01-01 ENCOUNTER — NON-APPOINTMENT (OUTPATIENT)
Age: 76
End: 2021-01-01

## 2021-01-01 ENCOUNTER — TRANSCRIPTION ENCOUNTER (OUTPATIENT)
Age: 76
End: 2021-01-01

## 2021-01-01 ENCOUNTER — OUTPATIENT (OUTPATIENT)
Dept: OUTPATIENT SERVICES | Facility: HOSPITAL | Age: 76
LOS: 1 days | End: 2021-01-01
Payer: MEDICARE

## 2021-01-01 ENCOUNTER — APPOINTMENT (OUTPATIENT)
Dept: ULTRASOUND IMAGING | Facility: HOSPITAL | Age: 76
End: 2021-01-01

## 2021-01-01 ENCOUNTER — OUTPATIENT (OUTPATIENT)
Dept: OUTPATIENT SERVICES | Facility: HOSPITAL | Age: 76
LOS: 1 days | End: 2021-01-01
Payer: COMMERCIAL

## 2021-01-01 ENCOUNTER — INPATIENT (INPATIENT)
Facility: HOSPITAL | Age: 76
LOS: 1 days | Discharge: HOME | DRG: 423 | End: 2021-04-07
Attending: HOSPITALIST | Admitting: HOSPITALIST
Payer: MEDICARE

## 2021-01-01 ENCOUNTER — APPOINTMENT (OUTPATIENT)
Dept: GASTROENTEROLOGY | Facility: CLINIC | Age: 76
End: 2021-01-01

## 2021-01-01 ENCOUNTER — APPOINTMENT (OUTPATIENT)
Dept: GASTROENTEROLOGY | Facility: CLINIC | Age: 76
End: 2021-01-01
Payer: MEDICARE

## 2021-01-01 ENCOUNTER — APPOINTMENT (OUTPATIENT)
Dept: DISASTER EMERGENCY | Facility: CLINIC | Age: 76
End: 2021-01-01

## 2021-01-01 ENCOUNTER — INPATIENT (INPATIENT)
Facility: HOSPITAL | Age: 76
LOS: 21 days | Discharge: HOME | DRG: 264 | End: 2021-06-10
Attending: HOSPITALIST | Admitting: FAMILY MEDICINE
Payer: MEDICARE

## 2021-01-01 ENCOUNTER — INPATIENT (INPATIENT)
Facility: HOSPITAL | Age: 76
LOS: 10 days | Discharge: HOSPICE | DRG: 314 | End: 2021-07-12
Attending: HOSPITALIST | Admitting: HOSPITALIST
Payer: MEDICARE

## 2021-01-01 ENCOUNTER — APPOINTMENT (OUTPATIENT)
Dept: VASCULAR SURGERY | Facility: CLINIC | Age: 76
End: 2021-01-01
Payer: MEDICARE

## 2021-01-01 ENCOUNTER — APPOINTMENT (OUTPATIENT)
Dept: ULTRASOUND IMAGING | Facility: CLINIC | Age: 76
End: 2021-01-01
Payer: MEDICARE

## 2021-01-01 ENCOUNTER — APPOINTMENT (OUTPATIENT)
Dept: COLORECTAL SURGERY | Facility: CLINIC | Age: 76
End: 2021-01-01
Payer: MEDICARE

## 2021-01-01 ENCOUNTER — APPOINTMENT (OUTPATIENT)
Dept: HEPATOLOGY | Facility: CLINIC | Age: 76
End: 2021-01-01
Payer: MEDICARE

## 2021-01-01 ENCOUNTER — APPOINTMENT (OUTPATIENT)
Dept: MRI IMAGING | Facility: CLINIC | Age: 76
End: 2021-01-01
Payer: MEDICARE

## 2021-01-01 ENCOUNTER — APPOINTMENT (OUTPATIENT)
Dept: HEPATOLOGY | Facility: CLINIC | Age: 76
End: 2021-01-01

## 2021-01-01 ENCOUNTER — APPOINTMENT (OUTPATIENT)
Dept: VASCULAR SURGERY | Facility: HOSPITAL | Age: 76
End: 2021-01-01

## 2021-01-01 VITALS
DIASTOLIC BLOOD PRESSURE: 70 MMHG | RESPIRATION RATE: 18 BRPM | TEMPERATURE: 99 F | OXYGEN SATURATION: 98 % | HEART RATE: 88 BPM | SYSTOLIC BLOOD PRESSURE: 128 MMHG

## 2021-01-01 VITALS
DIASTOLIC BLOOD PRESSURE: 48 MMHG | SYSTOLIC BLOOD PRESSURE: 104 MMHG | WEIGHT: 199.96 LBS | OXYGEN SATURATION: 100 % | HEIGHT: 59 IN | HEART RATE: 69 BPM | RESPIRATION RATE: 24 BRPM

## 2021-01-01 VITALS
HEART RATE: 93 BPM | RESPIRATION RATE: 29 BRPM | OXYGEN SATURATION: 83 % | SYSTOLIC BLOOD PRESSURE: 148 MMHG | TEMPERATURE: 99 F | HEIGHT: 59 IN | WEIGHT: 169.98 LBS | DIASTOLIC BLOOD PRESSURE: 79 MMHG

## 2021-01-01 VITALS
TEMPERATURE: 97.3 F | HEIGHT: 55 IN | WEIGHT: 170 LBS | BODY MASS INDEX: 39.34 KG/M2 | SYSTOLIC BLOOD PRESSURE: 137 MMHG | DIASTOLIC BLOOD PRESSURE: 71 MMHG | RESPIRATION RATE: 14 BRPM | OXYGEN SATURATION: 96 % | HEART RATE: 77 BPM

## 2021-01-01 VITALS
RESPIRATION RATE: 16 BRPM | SYSTOLIC BLOOD PRESSURE: 136 MMHG | WEIGHT: 171.08 LBS | HEART RATE: 74 BPM | OXYGEN SATURATION: 96 % | DIASTOLIC BLOOD PRESSURE: 73 MMHG | HEIGHT: 59 IN | TEMPERATURE: 98 F

## 2021-01-01 VITALS
HEART RATE: 78 BPM | WEIGHT: 170 LBS | BODY MASS INDEX: 39.34 KG/M2 | OXYGEN SATURATION: 100 % | HEIGHT: 55 IN | RESPIRATION RATE: 12 BRPM | SYSTOLIC BLOOD PRESSURE: 136 MMHG | DIASTOLIC BLOOD PRESSURE: 62 MMHG | TEMPERATURE: 98 F

## 2021-01-01 VITALS
OXYGEN SATURATION: 93 % | DIASTOLIC BLOOD PRESSURE: 73 MMHG | TEMPERATURE: 98 F | HEART RATE: 83 BPM | SYSTOLIC BLOOD PRESSURE: 175 MMHG | RESPIRATION RATE: 16 BRPM

## 2021-01-01 VITALS
HEART RATE: 98 BPM | OXYGEN SATURATION: 92 % | SYSTOLIC BLOOD PRESSURE: 146 MMHG | HEIGHT: 55 IN | DIASTOLIC BLOOD PRESSURE: 72 MMHG | TEMPERATURE: 97.3 F

## 2021-01-01 VITALS
HEART RATE: 72 BPM | RESPIRATION RATE: 16 BRPM | SYSTOLIC BLOOD PRESSURE: 197 MMHG | TEMPERATURE: 98 F | HEIGHT: 59 IN | OXYGEN SATURATION: 97 % | WEIGHT: 169.98 LBS | DIASTOLIC BLOOD PRESSURE: 66 MMHG

## 2021-01-01 VITALS
HEART RATE: 77 BPM | DIASTOLIC BLOOD PRESSURE: 76 MMHG | WEIGHT: 170 LBS | SYSTOLIC BLOOD PRESSURE: 155 MMHG | BODY MASS INDEX: 39.34 KG/M2 | HEIGHT: 55 IN | TEMPERATURE: 97 F

## 2021-01-01 VITALS
TEMPERATURE: 98 F | SYSTOLIC BLOOD PRESSURE: 141 MMHG | OXYGEN SATURATION: 95 % | DIASTOLIC BLOOD PRESSURE: 62 MMHG | RESPIRATION RATE: 18 BRPM | HEART RATE: 55 BPM

## 2021-01-01 DIAGNOSIS — N18.6 END STAGE RENAL DISEASE: ICD-10-CM

## 2021-01-01 DIAGNOSIS — E11.65 TYPE 2 DIABETES MELLITUS WITH HYPERGLYCEMIA: ICD-10-CM

## 2021-01-01 DIAGNOSIS — Z95.1 PRESENCE OF AORTOCORONARY BYPASS GRAFT: Chronic | ICD-10-CM

## 2021-01-01 DIAGNOSIS — Z98.89 OTHER SPECIFIED POSTPROCEDURAL STATES: Chronic | ICD-10-CM

## 2021-01-01 DIAGNOSIS — Z95.2 PRESENCE OF PROSTHETIC HEART VALVE: Chronic | ICD-10-CM

## 2021-01-01 DIAGNOSIS — Z98.890 OTHER SPECIFIED POSTPROCEDURAL STATES: Chronic | ICD-10-CM

## 2021-01-01 DIAGNOSIS — E78.5 HYPERLIPIDEMIA, UNSPECIFIED: ICD-10-CM

## 2021-01-01 DIAGNOSIS — C22.0 LIVER CELL CARCINOMA: ICD-10-CM

## 2021-01-01 DIAGNOSIS — K74.60 UNSPECIFIED CIRRHOSIS OF LIVER: ICD-10-CM

## 2021-01-01 DIAGNOSIS — C22.9 MALIGNANT NEOPLASM OF LIVER, NOT SPECIFIED AS PRIMARY OR SECONDARY: ICD-10-CM

## 2021-01-01 DIAGNOSIS — I10 ESSENTIAL (PRIMARY) HYPERTENSION: ICD-10-CM

## 2021-01-01 DIAGNOSIS — D64.9 ANEMIA, UNSPECIFIED: ICD-10-CM

## 2021-01-01 DIAGNOSIS — Z95.828 PRESENCE OF OTHER VASCULAR IMPLANTS AND GRAFTS: Chronic | ICD-10-CM

## 2021-01-01 DIAGNOSIS — Z00.8 ENCOUNTER FOR OTHER GENERAL EXAMINATION: ICD-10-CM

## 2021-01-01 DIAGNOSIS — K64.8 RESIDUAL HEMORRHOIDAL SKIN TAGS: ICD-10-CM

## 2021-01-01 DIAGNOSIS — E11.649 TYPE 2 DIABETES MELLITUS WITH HYPOGLYCEMIA WITHOUT COMA: ICD-10-CM

## 2021-01-01 DIAGNOSIS — B18.2 CHRONIC VIRAL HEPATITIS C: ICD-10-CM

## 2021-01-01 DIAGNOSIS — E11.69 TYPE 2 DIABETES MELLITUS WITH OTHER SPECIFIED COMPLICATION: ICD-10-CM

## 2021-01-01 DIAGNOSIS — I50.33 ACUTE ON CHRONIC DIASTOLIC (CONGESTIVE) HEART FAILURE: ICD-10-CM

## 2021-01-01 DIAGNOSIS — I85.11 SECONDARY ESOPHAGEAL VARICES WITH BLEEDING: ICD-10-CM

## 2021-01-01 DIAGNOSIS — Z01.818 ENCOUNTER FOR OTHER PREPROCEDURAL EXAMINATION: ICD-10-CM

## 2021-01-01 DIAGNOSIS — R93.5 ABNORMAL FINDINGS ON DIAGNOSTIC IMAGING OF OTHER ABDOMINAL REGIONS, INCLUDING RETROPERITONEUM: ICD-10-CM

## 2021-01-01 DIAGNOSIS — E03.9 HYPOTHYROIDISM, UNSPECIFIED: ICD-10-CM

## 2021-01-01 DIAGNOSIS — J90 PLEURAL EFFUSION, NOT ELSEWHERE CLASSIFIED: ICD-10-CM

## 2021-01-01 DIAGNOSIS — Z63.4 DISAPPEARANCE AND DEATH OF FAMILY MEMBER: ICD-10-CM

## 2021-01-01 DIAGNOSIS — Z79.4 TYPE 2 DIABETES MELLITUS WITH OTHER SPECIFIED COMPLICATION: ICD-10-CM

## 2021-01-01 DIAGNOSIS — E11.628 TYPE 2 DIABETES MELLITUS WITH OTHER SKIN COMPLICATIONS: ICD-10-CM

## 2021-01-01 DIAGNOSIS — I48.0 PAROXYSMAL ATRIAL FIBRILLATION: ICD-10-CM

## 2021-01-01 DIAGNOSIS — K64.4 RESIDUAL HEMORRHOIDAL SKIN TAGS: ICD-10-CM

## 2021-01-01 DIAGNOSIS — R93.89 ABNORMAL FINDINGS ON DIAGNOSTIC IMAGING OF OTHER SPECIFIED BODY STRUCTURES: ICD-10-CM

## 2021-01-01 DIAGNOSIS — N18.9 CHRONIC KIDNEY DISEASE, UNSPECIFIED: ICD-10-CM

## 2021-01-01 DIAGNOSIS — I25.10 ATHEROSCLEROTIC HEART DISEASE OF NATIVE CORONARY ARTERY W/OUT ANGINA PECTORIS: ICD-10-CM

## 2021-01-01 DIAGNOSIS — K74.60 CHRONIC VIRAL HEPATITIS C: ICD-10-CM

## 2021-01-01 DIAGNOSIS — Z99.2 END STAGE RENAL DISEASE: ICD-10-CM

## 2021-01-01 DIAGNOSIS — N18.4 CHRONIC KIDNEY DISEASE, STAGE 4 (SEVERE): ICD-10-CM

## 2021-01-01 DIAGNOSIS — I50.9 HEART FAILURE, UNSPECIFIED: ICD-10-CM

## 2021-01-01 DIAGNOSIS — K76.9 LIVER DISEASE, UNSPECIFIED: ICD-10-CM

## 2021-01-01 DIAGNOSIS — Z29.9 ENCOUNTER FOR PROPHYLACTIC MEASURES, UNSPECIFIED: ICD-10-CM

## 2021-01-01 DIAGNOSIS — K62.5 HEMORRHAGE OF ANUS AND RECTUM: ICD-10-CM

## 2021-01-01 DIAGNOSIS — Z99.2 DEPENDENCE ON RENAL DIALYSIS: ICD-10-CM

## 2021-01-01 DIAGNOSIS — Z99.81 DEPENDENCE ON SUPPLEMENTAL OXYGEN: ICD-10-CM

## 2021-01-01 DIAGNOSIS — I50.30 UNSPECIFIED DIASTOLIC (CONGESTIVE) HEART FAILURE: ICD-10-CM

## 2021-01-01 DIAGNOSIS — K64.9 UNSPECIFIED HEMORRHOIDS: ICD-10-CM

## 2021-01-01 DIAGNOSIS — I50.31 ACUTE DIASTOLIC (CONGESTIVE) HEART FAILURE: ICD-10-CM

## 2021-01-01 DIAGNOSIS — I33.0 ACUTE AND SUBACUTE INFECTIVE ENDOCARDITIS: ICD-10-CM

## 2021-01-01 DIAGNOSIS — R53.83 OTHER FATIGUE: ICD-10-CM

## 2021-01-01 DIAGNOSIS — J45.909 UNSPECIFIED ASTHMA, UNCOMPLICATED: ICD-10-CM

## 2021-01-01 DIAGNOSIS — A41.9 SEPSIS, UNSPECIFIED ORGANISM: ICD-10-CM

## 2021-01-01 LAB
-  AMPICILLIN/SULBACTAM: SIGNIFICANT CHANGE UP
-  CEFAZOLIN: SIGNIFICANT CHANGE UP
-  CIPROFLOXACIN: SIGNIFICANT CHANGE UP
-  CLINDAMYCIN: SIGNIFICANT CHANGE UP
-  COAGULASE NEGATIVE STAPHYLOCOCCUS: SIGNIFICANT CHANGE UP
-  DAPTOMYCIN: SIGNIFICANT CHANGE UP
-  ERYTHROMYCIN: SIGNIFICANT CHANGE UP
-  GENTAMICIN: SIGNIFICANT CHANGE UP
-  OXACILLIN: SIGNIFICANT CHANGE UP
-  PENICILLIN: SIGNIFICANT CHANGE UP
-  RIFAMPIN: SIGNIFICANT CHANGE UP
-  TETRACYCLINE: SIGNIFICANT CHANGE UP
-  TRIMETHOPRIM/SULFAMETHOXAZOLE: SIGNIFICANT CHANGE UP
-  VANCOMYCIN: SIGNIFICANT CHANGE UP
A1C WITH ESTIMATED AVERAGE GLUCOSE RESULT: 4.9 % — SIGNIFICANT CHANGE UP (ref 4–5.6)
A1C WITH ESTIMATED AVERAGE GLUCOSE RESULT: 5.2 % — SIGNIFICANT CHANGE UP (ref 4–5.6)
A1C WITH ESTIMATED AVERAGE GLUCOSE RESULT: 6.2 % — HIGH (ref 4–5.6)
ALBUMIN SERPL ELPH-MCNC: 3.4 G/DL — SIGNIFICANT CHANGE UP (ref 3.3–5)
ALBUMIN SERPL ELPH-MCNC: 3.4 G/DL — SIGNIFICANT CHANGE UP (ref 3.3–5.2)
ALBUMIN SERPL ELPH-MCNC: 3.6 G/DL — SIGNIFICANT CHANGE UP (ref 3.3–5.2)
ALBUMIN SERPL ELPH-MCNC: 3.7 G/DL — SIGNIFICANT CHANGE UP (ref 3.3–5.2)
ALBUMIN SERPL ELPH-MCNC: 3.8 G/DL — SIGNIFICANT CHANGE UP (ref 3.3–5)
ALBUMIN SERPL ELPH-MCNC: 3.8 G/DL — SIGNIFICANT CHANGE UP (ref 3.3–5.2)
ALBUMIN SERPL ELPH-MCNC: 3.8 G/DL — SIGNIFICANT CHANGE UP (ref 3.3–5.2)
ALBUMIN SERPL ELPH-MCNC: 3.9 G/DL — SIGNIFICANT CHANGE UP (ref 3.3–5.2)
ALP SERPL-CCNC: 106 U/L — SIGNIFICANT CHANGE UP (ref 40–120)
ALP SERPL-CCNC: 110 U/L — SIGNIFICANT CHANGE UP (ref 40–120)
ALP SERPL-CCNC: 115 U/L — SIGNIFICANT CHANGE UP (ref 40–120)
ALP SERPL-CCNC: 116 U/L — SIGNIFICANT CHANGE UP (ref 40–120)
ALP SERPL-CCNC: 117 U/L — SIGNIFICANT CHANGE UP (ref 40–120)
ALP SERPL-CCNC: 117 U/L — SIGNIFICANT CHANGE UP (ref 40–120)
ALP SERPL-CCNC: 121 U/L — HIGH (ref 40–120)
ALP SERPL-CCNC: 133 U/L — HIGH (ref 40–120)
ALT FLD-CCNC: 10 U/L — SIGNIFICANT CHANGE UP (ref 10–45)
ALT FLD-CCNC: 12 U/L — SIGNIFICANT CHANGE UP
ALT FLD-CCNC: 12 U/L — SIGNIFICANT CHANGE UP
ALT FLD-CCNC: 16 U/L — SIGNIFICANT CHANGE UP
ALT FLD-CCNC: 19 U/L — SIGNIFICANT CHANGE UP
ALT FLD-CCNC: 20 U/L — SIGNIFICANT CHANGE UP
ALT FLD-CCNC: 30 U/L — SIGNIFICANT CHANGE UP
ALT FLD-CCNC: 7 U/L — LOW (ref 10–45)
ANION GAP SERPL CALC-SCNC: 10 MMOL/L — SIGNIFICANT CHANGE UP (ref 5–17)
ANION GAP SERPL CALC-SCNC: 11 MMOL/L — SIGNIFICANT CHANGE UP (ref 5–17)
ANION GAP SERPL CALC-SCNC: 12 MMOL/L — SIGNIFICANT CHANGE UP (ref 5–17)
ANION GAP SERPL CALC-SCNC: 13 MMOL/L — SIGNIFICANT CHANGE UP (ref 5–17)
ANION GAP SERPL CALC-SCNC: 14 MMOL/L — SIGNIFICANT CHANGE UP (ref 5–17)
ANION GAP SERPL CALC-SCNC: 15 MMOL/L — SIGNIFICANT CHANGE UP (ref 5–17)
ANION GAP SERPL CALC-SCNC: 16 MMOL/L — SIGNIFICANT CHANGE UP (ref 5–17)
ANION GAP SERPL CALC-SCNC: 17 MMOL/L — SIGNIFICANT CHANGE UP (ref 5–17)
ANION GAP SERPL CALC-SCNC: 17 MMOL/L — SIGNIFICANT CHANGE UP (ref 5–17)
ANION GAP SERPL CALC-SCNC: 18 MMOL/L — HIGH (ref 5–17)
ANION GAP SERPL CALC-SCNC: 19 MMOL/L — HIGH (ref 5–17)
ANION GAP SERPL CALC-SCNC: 19 MMOL/L — HIGH (ref 5–17)
ANION GAP SERPL CALC-SCNC: 21 MMOL/L — HIGH (ref 5–17)
ANION GAP SERPL CALC-SCNC: 9 MMOL/L — SIGNIFICANT CHANGE UP (ref 5–17)
ANISOCYTOSIS BLD QL: SLIGHT — SIGNIFICANT CHANGE UP
ANISOCYTOSIS BLD QL: SLIGHT — SIGNIFICANT CHANGE UP
APPEARANCE UR: ABNORMAL
APPEARANCE UR: CLEAR — SIGNIFICANT CHANGE UP
APTT BLD: 28.6 SEC — SIGNIFICANT CHANGE UP (ref 27.5–35.5)
APTT BLD: 32.5 SEC — SIGNIFICANT CHANGE UP (ref 27.5–35.5)
APTT BLD: 32.6 SEC — SIGNIFICANT CHANGE UP (ref 27.5–35.5)
APTT BLD: 71.5 SEC — HIGH (ref 27.5–35.5)
AST SERPL-CCNC: 19 U/L — SIGNIFICANT CHANGE UP (ref 10–40)
AST SERPL-CCNC: 22 U/L — SIGNIFICANT CHANGE UP (ref 10–40)
AST SERPL-CCNC: 23 U/L — SIGNIFICANT CHANGE UP
AST SERPL-CCNC: 23 U/L — SIGNIFICANT CHANGE UP
AST SERPL-CCNC: 30 U/L — SIGNIFICANT CHANGE UP
AST SERPL-CCNC: 34 U/L — HIGH
AST SERPL-CCNC: 35 U/L — HIGH
AST SERPL-CCNC: 67 U/L — HIGH
BACTERIA # UR AUTO: ABNORMAL
BASE EXCESS BLDV CALC-SCNC: -0.6 MMOL/L — SIGNIFICANT CHANGE UP (ref -2–2)
BASOPHILS # BLD AUTO: 0 K/UL — SIGNIFICANT CHANGE UP (ref 0–0.2)
BASOPHILS # BLD AUTO: 0 K/UL — SIGNIFICANT CHANGE UP (ref 0–0.2)
BASOPHILS # BLD AUTO: 0.03 K/UL — SIGNIFICANT CHANGE UP (ref 0–0.2)
BASOPHILS # BLD AUTO: 0.04 K/UL — SIGNIFICANT CHANGE UP (ref 0–0.2)
BASOPHILS NFR BLD AUTO: 0 % — SIGNIFICANT CHANGE UP (ref 0–2)
BASOPHILS NFR BLD AUTO: 0 % — SIGNIFICANT CHANGE UP (ref 0–2)
BASOPHILS NFR BLD AUTO: 0.3 % — SIGNIFICANT CHANGE UP (ref 0–2)
BASOPHILS NFR BLD AUTO: 0.3 % — SIGNIFICANT CHANGE UP (ref 0–2)
BASOPHILS NFR BLD AUTO: 0.4 % — SIGNIFICANT CHANGE UP (ref 0–2)
BASOPHILS NFR BLD AUTO: 0.8 % — SIGNIFICANT CHANGE UP (ref 0–2)
BILIRUB DIRECT SERPL-MCNC: 0.2 MG/DL — SIGNIFICANT CHANGE UP (ref 0–0.3)
BILIRUB INDIRECT FLD-MCNC: 0.3 MG/DL — SIGNIFICANT CHANGE UP (ref 0.2–1)
BILIRUB SERPL-MCNC: 0.2 MG/DL — SIGNIFICANT CHANGE UP (ref 0.2–1.2)
BILIRUB SERPL-MCNC: 0.2 MG/DL — SIGNIFICANT CHANGE UP (ref 0.2–1.2)
BILIRUB SERPL-MCNC: 0.3 MG/DL — LOW (ref 0.4–2)
BILIRUB SERPL-MCNC: 0.5 MG/DL — SIGNIFICANT CHANGE UP (ref 0.4–2)
BILIRUB SERPL-MCNC: 0.6 MG/DL — SIGNIFICANT CHANGE UP (ref 0.4–2)
BILIRUB UR-MCNC: ABNORMAL
BILIRUB UR-MCNC: NEGATIVE — SIGNIFICANT CHANGE UP
BLD GP AB SCN SERPL QL: NEGATIVE — SIGNIFICANT CHANGE UP
BLD GP AB SCN SERPL QL: SIGNIFICANT CHANGE UP
BUN SERPL-MCNC: 108 MG/DL — HIGH (ref 8–20)
BUN SERPL-MCNC: 20.8 MG/DL — HIGH (ref 8–20)
BUN SERPL-MCNC: 25.3 MG/DL — HIGH (ref 8–20)
BUN SERPL-MCNC: 36 MG/DL — HIGH (ref 8–20)
BUN SERPL-MCNC: 37 MG/DL — HIGH (ref 8–20)
BUN SERPL-MCNC: 37.2 MG/DL — HIGH (ref 8–20)
BUN SERPL-MCNC: 40.8 MG/DL — HIGH (ref 8–20)
BUN SERPL-MCNC: 43 MG/DL — HIGH (ref 7–23)
BUN SERPL-MCNC: 43.3 MG/DL — HIGH (ref 8–20)
BUN SERPL-MCNC: 47.4 MG/DL — HIGH (ref 8–20)
BUN SERPL-MCNC: 48.3 MG/DL — HIGH (ref 8–20)
BUN SERPL-MCNC: 50 MG/DL — HIGH (ref 7–23)
BUN SERPL-MCNC: 55.1 MG/DL — HIGH (ref 8–20)
BUN SERPL-MCNC: 55.1 MG/DL — HIGH (ref 8–20)
BUN SERPL-MCNC: 57.7 MG/DL — HIGH (ref 8–20)
BUN SERPL-MCNC: 58 MG/DL — HIGH (ref 8–20)
BUN SERPL-MCNC: 59 MG/DL — HIGH (ref 7–23)
BUN SERPL-MCNC: 59.3 MG/DL — HIGH (ref 8–20)
BUN SERPL-MCNC: 59.3 MG/DL — HIGH (ref 8–20)
BUN SERPL-MCNC: 61 MG/DL — HIGH (ref 8–20)
BUN SERPL-MCNC: 61 MG/DL — HIGH (ref 8–20)
BUN SERPL-MCNC: 62.6 MG/DL — HIGH (ref 8–20)
BUN SERPL-MCNC: 66 MG/DL — HIGH (ref 8–20)
BUN SERPL-MCNC: 67 MG/DL — HIGH (ref 8–20)
BUN SERPL-MCNC: 67.5 MG/DL — HIGH (ref 8–20)
BUN SERPL-MCNC: 70 MG/DL — HIGH (ref 8–20)
BUN SERPL-MCNC: 70 MG/DL — HIGH (ref 8–20)
BUN SERPL-MCNC: 72 MG/DL — HIGH (ref 8–20)
BUN SERPL-MCNC: 73 MG/DL — HIGH (ref 8–20)
BUN SERPL-MCNC: 73.1 MG/DL — HIGH (ref 8–20)
BUN SERPL-MCNC: 73.8 MG/DL — HIGH (ref 8–20)
BUN SERPL-MCNC: 74 MG/DL — HIGH (ref 8–20)
BUN SERPL-MCNC: 76 MG/DL — HIGH (ref 8–20)
BUN SERPL-MCNC: 77.6 MG/DL — HIGH (ref 8–20)
BURR CELLS BLD QL SMEAR: PRESENT — SIGNIFICANT CHANGE UP
CALCIUM SERPL-MCNC: 6 MG/DL — CRITICAL LOW (ref 8.6–10.2)
CALCIUM SERPL-MCNC: 7.5 MG/DL — LOW (ref 8.6–10.2)
CALCIUM SERPL-MCNC: 7.9 MG/DL — LOW (ref 8.6–10.2)
CALCIUM SERPL-MCNC: 8.1 MG/DL — LOW (ref 8.6–10.2)
CALCIUM SERPL-MCNC: 8.2 MG/DL — LOW (ref 8.6–10.2)
CALCIUM SERPL-MCNC: 8.2 MG/DL — LOW (ref 8.6–10.2)
CALCIUM SERPL-MCNC: 8.3 MG/DL — LOW (ref 8.6–10.2)
CALCIUM SERPL-MCNC: 8.4 MG/DL — LOW (ref 8.6–10.2)
CALCIUM SERPL-MCNC: 8.5 MG/DL — LOW (ref 8.6–10.2)
CALCIUM SERPL-MCNC: 8.5 MG/DL — SIGNIFICANT CHANGE UP (ref 8.4–10.5)
CALCIUM SERPL-MCNC: 8.6 MG/DL — SIGNIFICANT CHANGE UP (ref 8.4–10.5)
CALCIUM SERPL-MCNC: 8.6 MG/DL — SIGNIFICANT CHANGE UP (ref 8.6–10.2)
CALCIUM SERPL-MCNC: 8.7 MG/DL — SIGNIFICANT CHANGE UP (ref 8.6–10.2)
CALCIUM SERPL-MCNC: 8.8 MG/DL — SIGNIFICANT CHANGE UP (ref 8.4–10.5)
CALCIUM SERPL-MCNC: 8.8 MG/DL — SIGNIFICANT CHANGE UP (ref 8.6–10.2)
CALCIUM SERPL-MCNC: 8.9 MG/DL — SIGNIFICANT CHANGE UP (ref 8.6–10.2)
CALCIUM SERPL-MCNC: 8.9 MG/DL — SIGNIFICANT CHANGE UP (ref 8.6–10.2)
CALCIUM SERPL-MCNC: 9.1 MG/DL — SIGNIFICANT CHANGE UP (ref 8.6–10.2)
CALCIUM SERPL-MCNC: 9.2 MG/DL — SIGNIFICANT CHANGE UP (ref 8.6–10.2)
CHLORIDE SERPL-SCNC: 100 MMOL/L — SIGNIFICANT CHANGE UP (ref 98–107)
CHLORIDE SERPL-SCNC: 101 MMOL/L — SIGNIFICANT CHANGE UP (ref 98–107)
CHLORIDE SERPL-SCNC: 103 MMOL/L — SIGNIFICANT CHANGE UP (ref 98–107)
CHLORIDE SERPL-SCNC: 104 MMOL/L — SIGNIFICANT CHANGE UP (ref 96–108)
CHLORIDE SERPL-SCNC: 104 MMOL/L — SIGNIFICANT CHANGE UP (ref 98–107)
CHLORIDE SERPL-SCNC: 104 MMOL/L — SIGNIFICANT CHANGE UP (ref 98–107)
CHLORIDE SERPL-SCNC: 105 MMOL/L — SIGNIFICANT CHANGE UP (ref 98–107)
CHLORIDE SERPL-SCNC: 105 MMOL/L — SIGNIFICANT CHANGE UP (ref 98–107)
CHLORIDE SERPL-SCNC: 106 MMOL/L — SIGNIFICANT CHANGE UP (ref 98–107)
CHLORIDE SERPL-SCNC: 107 MMOL/L — SIGNIFICANT CHANGE UP (ref 98–107)
CHLORIDE SERPL-SCNC: 108 MMOL/L — HIGH (ref 98–107)
CHLORIDE SERPL-SCNC: 108 MMOL/L — SIGNIFICANT CHANGE UP (ref 96–108)
CHLORIDE SERPL-SCNC: 109 MMOL/L — HIGH (ref 96–108)
CHLORIDE SERPL-SCNC: 109 MMOL/L — HIGH (ref 98–107)
CHLORIDE SERPL-SCNC: 109 MMOL/L — HIGH (ref 98–107)
CHLORIDE SERPL-SCNC: 88 MMOL/L — LOW (ref 98–107)
CHLORIDE SERPL-SCNC: 91 MMOL/L — LOW (ref 98–107)
CHLORIDE SERPL-SCNC: 92 MMOL/L — LOW (ref 98–107)
CHLORIDE SERPL-SCNC: 93 MMOL/L — LOW (ref 98–107)
CHLORIDE SERPL-SCNC: 94 MMOL/L — LOW (ref 98–107)
CHLORIDE SERPL-SCNC: 95 MMOL/L — LOW (ref 98–107)
CHLORIDE SERPL-SCNC: 95 MMOL/L — LOW (ref 98–107)
CO2 SERPL-SCNC: 14 MMOL/L — LOW (ref 22–29)
CO2 SERPL-SCNC: 19 MMOL/L — LOW (ref 22–29)
CO2 SERPL-SCNC: 19 MMOL/L — LOW (ref 22–31)
CO2 SERPL-SCNC: 20 MMOL/L — LOW (ref 22–29)
CO2 SERPL-SCNC: 20 MMOL/L — LOW (ref 22–31)
CO2 SERPL-SCNC: 21 MMOL/L — LOW (ref 22–29)
CO2 SERPL-SCNC: 21 MMOL/L — LOW (ref 22–31)
CO2 SERPL-SCNC: 22 MMOL/L — SIGNIFICANT CHANGE UP (ref 22–29)
CO2 SERPL-SCNC: 23 MMOL/L — SIGNIFICANT CHANGE UP (ref 22–29)
CO2 SERPL-SCNC: 24 MMOL/L — SIGNIFICANT CHANGE UP (ref 22–29)
CO2 SERPL-SCNC: 25 MMOL/L — SIGNIFICANT CHANGE UP (ref 22–29)
CO2 SERPL-SCNC: 25 MMOL/L — SIGNIFICANT CHANGE UP (ref 22–29)
CO2 SERPL-SCNC: 26 MMOL/L — SIGNIFICANT CHANGE UP (ref 22–29)
CO2 SERPL-SCNC: 27 MMOL/L — SIGNIFICANT CHANGE UP (ref 22–29)
CO2 SERPL-SCNC: 28 MMOL/L — SIGNIFICANT CHANGE UP (ref 22–29)
CO2 SERPL-SCNC: 28 MMOL/L — SIGNIFICANT CHANGE UP (ref 22–29)
COLOR SPEC: ABNORMAL
COLOR SPEC: YELLOW — SIGNIFICANT CHANGE UP
COVID-19 SPIKE DOMAIN AB INTERP: POSITIVE
COVID-19 SPIKE DOMAIN AB INTERP: POSITIVE
COVID-19 SPIKE DOMAIN ANTIBODY RESULT: 4.6 U/ML — HIGH
COVID-19 SPIKE DOMAIN ANTIBODY RESULT: 5.71 U/ML — HIGH
CREAT SERPL-MCNC: 2.35 MG/DL — HIGH (ref 0.5–1.3)
CREAT SERPL-MCNC: 2.5 MG/DL — HIGH (ref 0.5–1.3)
CREAT SERPL-MCNC: 2.51 MG/DL — HIGH (ref 0.5–1.3)
CREAT SERPL-MCNC: 2.53 MG/DL — HIGH (ref 0.5–1.3)
CREAT SERPL-MCNC: 2.62 MG/DL — HIGH (ref 0.5–1.3)
CREAT SERPL-MCNC: 2.63 MG/DL — HIGH (ref 0.5–1.3)
CREAT SERPL-MCNC: 2.66 MG/DL — HIGH (ref 0.5–1.3)
CREAT SERPL-MCNC: 2.67 MG/DL — HIGH (ref 0.5–1.3)
CREAT SERPL-MCNC: 2.79 MG/DL — HIGH (ref 0.5–1.3)
CREAT SERPL-MCNC: 2.81 MG/DL — HIGH (ref 0.5–1.3)
CREAT SERPL-MCNC: 2.85 MG/DL — HIGH (ref 0.5–1.3)
CREAT SERPL-MCNC: 2.85 MG/DL — HIGH (ref 0.5–1.3)
CREAT SERPL-MCNC: 2.86 MG/DL — HIGH (ref 0.5–1.3)
CREAT SERPL-MCNC: 2.89 MG/DL — HIGH (ref 0.5–1.3)
CREAT SERPL-MCNC: 2.9 MG/DL — HIGH (ref 0.5–1.3)
CREAT SERPL-MCNC: 3.06 MG/DL — HIGH (ref 0.5–1.3)
CREAT SERPL-MCNC: 3.17 MG/DL — HIGH (ref 0.5–1.3)
CREAT SERPL-MCNC: 3.17 MG/DL — HIGH (ref 0.5–1.3)
CREAT SERPL-MCNC: 3.29 MG/DL — HIGH (ref 0.5–1.3)
CREAT SERPL-MCNC: 3.31 MG/DL — HIGH (ref 0.5–1.3)
CREAT SERPL-MCNC: 3.53 MG/DL — HIGH (ref 0.5–1.3)
CREAT SERPL-MCNC: 3.66 MG/DL — HIGH (ref 0.5–1.3)
CREAT SERPL-MCNC: 3.68 MG/DL — HIGH (ref 0.5–1.3)
CREAT SERPL-MCNC: 3.79 MG/DL — HIGH (ref 0.5–1.3)
CREAT SERPL-MCNC: 3.83 MG/DL — HIGH (ref 0.5–1.3)
CREAT SERPL-MCNC: 3.89 MG/DL — HIGH (ref 0.5–1.3)
CREAT SERPL-MCNC: 4.07 MG/DL — HIGH (ref 0.5–1.3)
CREAT SERPL-MCNC: 4.15 MG/DL — HIGH (ref 0.5–1.3)
CREAT SERPL-MCNC: 4.23 MG/DL — HIGH (ref 0.5–1.3)
CREAT SERPL-MCNC: 4.48 MG/DL — HIGH (ref 0.5–1.3)
CREAT SERPL-MCNC: 4.79 MG/DL — HIGH (ref 0.5–1.3)
CREAT SERPL-MCNC: 4.98 MG/DL — HIGH (ref 0.5–1.3)
CREAT SERPL-MCNC: 5.65 MG/DL — HIGH (ref 0.5–1.3)
CREAT SERPL-MCNC: 5.71 MG/DL — HIGH (ref 0.5–1.3)
CULTURE RESULTS: NO GROWTH — SIGNIFICANT CHANGE UP
CULTURE RESULTS: SIGNIFICANT CHANGE UP
DIFF PNL FLD: ABNORMAL
DIFF PNL FLD: ABNORMAL
EOSINOPHIL # BLD AUTO: 0.14 K/UL — SIGNIFICANT CHANGE UP (ref 0–0.5)
EOSINOPHIL # BLD AUTO: 0.16 K/UL — SIGNIFICANT CHANGE UP (ref 0–0.5)
EOSINOPHIL # BLD AUTO: 0.32 K/UL — SIGNIFICANT CHANGE UP (ref 0–0.5)
EOSINOPHIL # BLD AUTO: 0.35 K/UL — SIGNIFICANT CHANGE UP (ref 0–0.5)
EOSINOPHIL # BLD AUTO: 0.48 K/UL — SIGNIFICANT CHANGE UP (ref 0–0.5)
EOSINOPHIL # BLD AUTO: 0.69 K/UL — HIGH (ref 0–0.5)
EOSINOPHIL NFR BLD AUTO: 1.1 % — SIGNIFICANT CHANGE UP (ref 0–6)
EOSINOPHIL NFR BLD AUTO: 1.5 % — SIGNIFICANT CHANGE UP (ref 0–6)
EOSINOPHIL NFR BLD AUTO: 13 % — HIGH (ref 0–6)
EOSINOPHIL NFR BLD AUTO: 3 % — SIGNIFICANT CHANGE UP (ref 0–6)
EOSINOPHIL NFR BLD AUTO: 6.8 % — HIGH (ref 0–6)
EOSINOPHIL NFR BLD AUTO: 7.8 % — HIGH (ref 0–6)
EPI CELLS # UR: ABNORMAL
EPI CELLS # UR: SIGNIFICANT CHANGE UP
ESTIMATED AVERAGE GLUCOSE: 103 MG/DL — SIGNIFICANT CHANGE UP (ref 68–114)
ESTIMATED AVERAGE GLUCOSE: 131 MG/DL — HIGH (ref 68–114)
ESTIMATED AVERAGE GLUCOSE: 94 MG/DL — SIGNIFICANT CHANGE UP (ref 68–114)
FERRITIN SERPL-MCNC: 84 NG/ML — SIGNIFICANT CHANGE UP (ref 15–150)
FERRITIN SERPL-MCNC: 86 NG/ML — SIGNIFICANT CHANGE UP (ref 15–150)
GAS PNL BLDV: SIGNIFICANT CHANGE UP
GIANT PLATELETS BLD QL SMEAR: PRESENT — SIGNIFICANT CHANGE UP
GLUCOSE BLDC GLUCOMTR-MCNC: 100 MG/DL — HIGH (ref 70–99)
GLUCOSE BLDC GLUCOMTR-MCNC: 100 MG/DL — HIGH (ref 70–99)
GLUCOSE BLDC GLUCOMTR-MCNC: 101 MG/DL — HIGH (ref 70–99)
GLUCOSE BLDC GLUCOMTR-MCNC: 106 MG/DL — HIGH (ref 70–99)
GLUCOSE BLDC GLUCOMTR-MCNC: 106 MG/DL — HIGH (ref 70–99)
GLUCOSE BLDC GLUCOMTR-MCNC: 107 MG/DL — HIGH (ref 70–99)
GLUCOSE BLDC GLUCOMTR-MCNC: 110 MG/DL — HIGH (ref 70–99)
GLUCOSE BLDC GLUCOMTR-MCNC: 114 MG/DL — HIGH (ref 70–99)
GLUCOSE BLDC GLUCOMTR-MCNC: 116 MG/DL — HIGH (ref 70–99)
GLUCOSE BLDC GLUCOMTR-MCNC: 118 MG/DL — HIGH (ref 70–99)
GLUCOSE BLDC GLUCOMTR-MCNC: 120 MG/DL — HIGH (ref 70–99)
GLUCOSE BLDC GLUCOMTR-MCNC: 121 MG/DL — HIGH (ref 70–99)
GLUCOSE BLDC GLUCOMTR-MCNC: 123 MG/DL — HIGH (ref 70–99)
GLUCOSE BLDC GLUCOMTR-MCNC: 124 MG/DL — HIGH (ref 70–99)
GLUCOSE BLDC GLUCOMTR-MCNC: 124 MG/DL — HIGH (ref 70–99)
GLUCOSE BLDC GLUCOMTR-MCNC: 125 MG/DL — HIGH (ref 70–99)
GLUCOSE BLDC GLUCOMTR-MCNC: 126 MG/DL — HIGH (ref 70–99)
GLUCOSE BLDC GLUCOMTR-MCNC: 128 MG/DL — HIGH (ref 70–99)
GLUCOSE BLDC GLUCOMTR-MCNC: 131 MG/DL — HIGH (ref 70–99)
GLUCOSE BLDC GLUCOMTR-MCNC: 132 MG/DL — HIGH (ref 70–99)
GLUCOSE BLDC GLUCOMTR-MCNC: 133 MG/DL — HIGH (ref 70–99)
GLUCOSE BLDC GLUCOMTR-MCNC: 134 MG/DL — HIGH (ref 70–99)
GLUCOSE BLDC GLUCOMTR-MCNC: 135 MG/DL — HIGH (ref 70–99)
GLUCOSE BLDC GLUCOMTR-MCNC: 135 MG/DL — HIGH (ref 70–99)
GLUCOSE BLDC GLUCOMTR-MCNC: 137 MG/DL — HIGH (ref 70–99)
GLUCOSE BLDC GLUCOMTR-MCNC: 138 MG/DL — HIGH (ref 70–99)
GLUCOSE BLDC GLUCOMTR-MCNC: 138 MG/DL — HIGH (ref 70–99)
GLUCOSE BLDC GLUCOMTR-MCNC: 140 MG/DL — HIGH (ref 70–99)
GLUCOSE BLDC GLUCOMTR-MCNC: 141 MG/DL — HIGH (ref 70–99)
GLUCOSE BLDC GLUCOMTR-MCNC: 143 MG/DL — HIGH (ref 70–99)
GLUCOSE BLDC GLUCOMTR-MCNC: 144 MG/DL — HIGH (ref 70–99)
GLUCOSE BLDC GLUCOMTR-MCNC: 145 MG/DL — HIGH (ref 70–99)
GLUCOSE BLDC GLUCOMTR-MCNC: 147 MG/DL — HIGH (ref 70–99)
GLUCOSE BLDC GLUCOMTR-MCNC: 149 MG/DL — HIGH (ref 70–99)
GLUCOSE BLDC GLUCOMTR-MCNC: 149 MG/DL — HIGH (ref 70–99)
GLUCOSE BLDC GLUCOMTR-MCNC: 153 MG/DL — HIGH (ref 70–99)
GLUCOSE BLDC GLUCOMTR-MCNC: 158 MG/DL — HIGH (ref 70–99)
GLUCOSE BLDC GLUCOMTR-MCNC: 160 MG/DL — HIGH (ref 70–99)
GLUCOSE BLDC GLUCOMTR-MCNC: 162 MG/DL — HIGH (ref 70–99)
GLUCOSE BLDC GLUCOMTR-MCNC: 162 MG/DL — HIGH (ref 70–99)
GLUCOSE BLDC GLUCOMTR-MCNC: 165 MG/DL — HIGH (ref 70–99)
GLUCOSE BLDC GLUCOMTR-MCNC: 168 MG/DL — HIGH (ref 70–99)
GLUCOSE BLDC GLUCOMTR-MCNC: 168 MG/DL — HIGH (ref 70–99)
GLUCOSE BLDC GLUCOMTR-MCNC: 172 MG/DL — HIGH (ref 70–99)
GLUCOSE BLDC GLUCOMTR-MCNC: 173 MG/DL — HIGH (ref 70–99)
GLUCOSE BLDC GLUCOMTR-MCNC: 175 MG/DL — HIGH (ref 70–99)
GLUCOSE BLDC GLUCOMTR-MCNC: 176 MG/DL — HIGH (ref 70–99)
GLUCOSE BLDC GLUCOMTR-MCNC: 176 MG/DL — HIGH (ref 70–99)
GLUCOSE BLDC GLUCOMTR-MCNC: 178 MG/DL — HIGH (ref 70–99)
GLUCOSE BLDC GLUCOMTR-MCNC: 179 MG/DL — HIGH (ref 70–99)
GLUCOSE BLDC GLUCOMTR-MCNC: 179 MG/DL — HIGH (ref 70–99)
GLUCOSE BLDC GLUCOMTR-MCNC: 180 MG/DL — HIGH (ref 70–99)
GLUCOSE BLDC GLUCOMTR-MCNC: 180 MG/DL — HIGH (ref 70–99)
GLUCOSE BLDC GLUCOMTR-MCNC: 181 MG/DL — HIGH (ref 70–99)
GLUCOSE BLDC GLUCOMTR-MCNC: 182 MG/DL — HIGH (ref 70–99)
GLUCOSE BLDC GLUCOMTR-MCNC: 183 MG/DL — HIGH (ref 70–99)
GLUCOSE BLDC GLUCOMTR-MCNC: 184 MG/DL — HIGH (ref 70–99)
GLUCOSE BLDC GLUCOMTR-MCNC: 187 MG/DL — HIGH (ref 70–99)
GLUCOSE BLDC GLUCOMTR-MCNC: 188 MG/DL — HIGH (ref 70–99)
GLUCOSE BLDC GLUCOMTR-MCNC: 189 MG/DL — HIGH (ref 70–99)
GLUCOSE BLDC GLUCOMTR-MCNC: 191 MG/DL — HIGH (ref 70–99)
GLUCOSE BLDC GLUCOMTR-MCNC: 191 MG/DL — HIGH (ref 70–99)
GLUCOSE BLDC GLUCOMTR-MCNC: 192 MG/DL — HIGH (ref 70–99)
GLUCOSE BLDC GLUCOMTR-MCNC: 195 MG/DL — HIGH (ref 70–99)
GLUCOSE BLDC GLUCOMTR-MCNC: 197 MG/DL — HIGH (ref 70–99)
GLUCOSE BLDC GLUCOMTR-MCNC: 198 MG/DL — HIGH (ref 70–99)
GLUCOSE BLDC GLUCOMTR-MCNC: 201 MG/DL — HIGH (ref 70–99)
GLUCOSE BLDC GLUCOMTR-MCNC: 201 MG/DL — HIGH (ref 70–99)
GLUCOSE BLDC GLUCOMTR-MCNC: 204 MG/DL — HIGH (ref 70–99)
GLUCOSE BLDC GLUCOMTR-MCNC: 207 MG/DL — HIGH (ref 70–99)
GLUCOSE BLDC GLUCOMTR-MCNC: 212 MG/DL — HIGH (ref 70–99)
GLUCOSE BLDC GLUCOMTR-MCNC: 214 MG/DL — HIGH (ref 70–99)
GLUCOSE BLDC GLUCOMTR-MCNC: 215 MG/DL — HIGH (ref 70–99)
GLUCOSE BLDC GLUCOMTR-MCNC: 215 MG/DL — HIGH (ref 70–99)
GLUCOSE BLDC GLUCOMTR-MCNC: 217 MG/DL — HIGH (ref 70–99)
GLUCOSE BLDC GLUCOMTR-MCNC: 218 MG/DL — HIGH (ref 70–99)
GLUCOSE BLDC GLUCOMTR-MCNC: 219 MG/DL — HIGH (ref 70–99)
GLUCOSE BLDC GLUCOMTR-MCNC: 220 MG/DL — HIGH (ref 70–99)
GLUCOSE BLDC GLUCOMTR-MCNC: 222 MG/DL — HIGH (ref 70–99)
GLUCOSE BLDC GLUCOMTR-MCNC: 223 MG/DL — HIGH (ref 70–99)
GLUCOSE BLDC GLUCOMTR-MCNC: 223 MG/DL — HIGH (ref 70–99)
GLUCOSE BLDC GLUCOMTR-MCNC: 224 MG/DL — HIGH (ref 70–99)
GLUCOSE BLDC GLUCOMTR-MCNC: 226 MG/DL — HIGH (ref 70–99)
GLUCOSE BLDC GLUCOMTR-MCNC: 232 MG/DL — HIGH (ref 70–99)
GLUCOSE BLDC GLUCOMTR-MCNC: 235 MG/DL — HIGH (ref 70–99)
GLUCOSE BLDC GLUCOMTR-MCNC: 239 MG/DL — HIGH (ref 70–99)
GLUCOSE BLDC GLUCOMTR-MCNC: 241 MG/DL — HIGH (ref 70–99)
GLUCOSE BLDC GLUCOMTR-MCNC: 245 MG/DL — HIGH (ref 70–99)
GLUCOSE BLDC GLUCOMTR-MCNC: 247 MG/DL — HIGH (ref 70–99)
GLUCOSE BLDC GLUCOMTR-MCNC: 250 MG/DL — HIGH (ref 70–99)
GLUCOSE BLDC GLUCOMTR-MCNC: 256 MG/DL — HIGH (ref 70–99)
GLUCOSE BLDC GLUCOMTR-MCNC: 258 MG/DL — HIGH (ref 70–99)
GLUCOSE BLDC GLUCOMTR-MCNC: 259 MG/DL — HIGH (ref 70–99)
GLUCOSE BLDC GLUCOMTR-MCNC: 261 MG/DL — HIGH (ref 70–99)
GLUCOSE BLDC GLUCOMTR-MCNC: 262 MG/DL — HIGH (ref 70–99)
GLUCOSE BLDC GLUCOMTR-MCNC: 266 MG/DL — HIGH (ref 70–99)
GLUCOSE BLDC GLUCOMTR-MCNC: 266 MG/DL — HIGH (ref 70–99)
GLUCOSE BLDC GLUCOMTR-MCNC: 268 MG/DL — HIGH (ref 70–99)
GLUCOSE BLDC GLUCOMTR-MCNC: 271 MG/DL — HIGH (ref 70–99)
GLUCOSE BLDC GLUCOMTR-MCNC: 271 MG/DL — HIGH (ref 70–99)
GLUCOSE BLDC GLUCOMTR-MCNC: 278 MG/DL — HIGH (ref 70–99)
GLUCOSE BLDC GLUCOMTR-MCNC: 288 MG/DL — HIGH (ref 70–99)
GLUCOSE BLDC GLUCOMTR-MCNC: 288 MG/DL — HIGH (ref 70–99)
GLUCOSE BLDC GLUCOMTR-MCNC: 292 MG/DL — HIGH (ref 70–99)
GLUCOSE BLDC GLUCOMTR-MCNC: 295 MG/DL — HIGH (ref 70–99)
GLUCOSE BLDC GLUCOMTR-MCNC: 302 MG/DL — HIGH (ref 70–99)
GLUCOSE BLDC GLUCOMTR-MCNC: 302 MG/DL — HIGH (ref 70–99)
GLUCOSE BLDC GLUCOMTR-MCNC: 304 MG/DL — HIGH (ref 70–99)
GLUCOSE BLDC GLUCOMTR-MCNC: 305 MG/DL — HIGH (ref 70–99)
GLUCOSE BLDC GLUCOMTR-MCNC: 319 MG/DL — HIGH (ref 70–99)
GLUCOSE BLDC GLUCOMTR-MCNC: 321 MG/DL — HIGH (ref 70–99)
GLUCOSE BLDC GLUCOMTR-MCNC: 33 MG/DL — CRITICAL LOW (ref 70–99)
GLUCOSE BLDC GLUCOMTR-MCNC: 360 MG/DL — HIGH (ref 70–99)
GLUCOSE BLDC GLUCOMTR-MCNC: 48 MG/DL — CRITICAL LOW (ref 70–99)
GLUCOSE BLDC GLUCOMTR-MCNC: 51 MG/DL — CRITICAL LOW (ref 70–99)
GLUCOSE BLDC GLUCOMTR-MCNC: 52 MG/DL — CRITICAL LOW (ref 70–99)
GLUCOSE BLDC GLUCOMTR-MCNC: 58 MG/DL — LOW (ref 70–99)
GLUCOSE BLDC GLUCOMTR-MCNC: 77 MG/DL — SIGNIFICANT CHANGE UP (ref 70–99)
GLUCOSE BLDC GLUCOMTR-MCNC: 83 MG/DL — SIGNIFICANT CHANGE UP (ref 70–99)
GLUCOSE BLDC GLUCOMTR-MCNC: 84 MG/DL — SIGNIFICANT CHANGE UP (ref 70–99)
GLUCOSE BLDC GLUCOMTR-MCNC: 85 MG/DL — SIGNIFICANT CHANGE UP (ref 70–99)
GLUCOSE BLDC GLUCOMTR-MCNC: 94 MG/DL — SIGNIFICANT CHANGE UP (ref 70–99)
GLUCOSE BLDC GLUCOMTR-MCNC: 95 MG/DL — SIGNIFICANT CHANGE UP (ref 70–99)
GLUCOSE BLDC GLUCOMTR-MCNC: 95 MG/DL — SIGNIFICANT CHANGE UP (ref 70–99)
GLUCOSE BLDC GLUCOMTR-MCNC: 97 MG/DL — SIGNIFICANT CHANGE UP (ref 70–99)
GLUCOSE BLDC GLUCOMTR-MCNC: 99 MG/DL — SIGNIFICANT CHANGE UP (ref 70–99)
GLUCOSE SERPL-MCNC: 101 MG/DL — HIGH (ref 70–99)
GLUCOSE SERPL-MCNC: 104 MG/DL — HIGH (ref 70–99)
GLUCOSE SERPL-MCNC: 110 MG/DL — HIGH (ref 70–99)
GLUCOSE SERPL-MCNC: 116 MG/DL — HIGH (ref 70–99)
GLUCOSE SERPL-MCNC: 121 MG/DL — HIGH (ref 70–99)
GLUCOSE SERPL-MCNC: 126 MG/DL — HIGH (ref 70–99)
GLUCOSE SERPL-MCNC: 128 MG/DL — HIGH (ref 70–99)
GLUCOSE SERPL-MCNC: 131 MG/DL — HIGH (ref 70–99)
GLUCOSE SERPL-MCNC: 134 MG/DL — HIGH (ref 70–99)
GLUCOSE SERPL-MCNC: 142 MG/DL — HIGH (ref 70–99)
GLUCOSE SERPL-MCNC: 143 MG/DL — HIGH (ref 70–99)
GLUCOSE SERPL-MCNC: 152 MG/DL — HIGH (ref 70–99)
GLUCOSE SERPL-MCNC: 168 MG/DL — HIGH (ref 70–99)
GLUCOSE SERPL-MCNC: 177 MG/DL — HIGH (ref 70–99)
GLUCOSE SERPL-MCNC: 180 MG/DL — HIGH (ref 70–99)
GLUCOSE SERPL-MCNC: 182 MG/DL — HIGH (ref 70–99)
GLUCOSE SERPL-MCNC: 183 MG/DL — HIGH (ref 70–99)
GLUCOSE SERPL-MCNC: 193 MG/DL — HIGH (ref 70–99)
GLUCOSE SERPL-MCNC: 197 MG/DL — HIGH (ref 70–99)
GLUCOSE SERPL-MCNC: 204 MG/DL — HIGH (ref 70–99)
GLUCOSE SERPL-MCNC: 213 MG/DL — HIGH (ref 70–99)
GLUCOSE SERPL-MCNC: 219 MG/DL — HIGH (ref 70–99)
GLUCOSE SERPL-MCNC: 233 MG/DL — HIGH (ref 70–99)
GLUCOSE SERPL-MCNC: 250 MG/DL — HIGH (ref 70–99)
GLUCOSE SERPL-MCNC: 250 MG/DL — HIGH (ref 70–99)
GLUCOSE SERPL-MCNC: 269 MG/DL — HIGH (ref 70–99)
GLUCOSE SERPL-MCNC: 281 MG/DL — HIGH (ref 70–99)
GLUCOSE SERPL-MCNC: 281 MG/DL — HIGH (ref 70–99)
GLUCOSE SERPL-MCNC: 295 MG/DL — HIGH (ref 70–99)
GLUCOSE SERPL-MCNC: 69 MG/DL — LOW (ref 70–99)
GLUCOSE SERPL-MCNC: 78 MG/DL — SIGNIFICANT CHANGE UP (ref 70–99)
GLUCOSE SERPL-MCNC: 80 MG/DL — SIGNIFICANT CHANGE UP (ref 70–99)
GLUCOSE SERPL-MCNC: 84 MG/DL — SIGNIFICANT CHANGE UP (ref 70–99)
GLUCOSE SERPL-MCNC: 91 MG/DL — SIGNIFICANT CHANGE UP (ref 70–99)
GLUCOSE UR QL: 100 MG/DL
GLUCOSE UR QL: NEGATIVE MG/DL — SIGNIFICANT CHANGE UP
GRAM STN FLD: SIGNIFICANT CHANGE UP
HBV CORE AB SER-ACNC: SIGNIFICANT CHANGE UP
HBV CORE AB SER-ACNC: SIGNIFICANT CHANGE UP
HBV CORE IGM SER-ACNC: SIGNIFICANT CHANGE UP
HBV CORE IGM SER-ACNC: SIGNIFICANT CHANGE UP
HBV SURFACE AB SER-ACNC: <3 MIU/ML — LOW
HBV SURFACE AB SER-ACNC: <3 MIU/ML — LOW
HBV SURFACE AB SER-ACNC: SIGNIFICANT CHANGE UP
HBV SURFACE AG SER-ACNC: SIGNIFICANT CHANGE UP
HBV SURFACE AG SER-ACNC: SIGNIFICANT CHANGE UP
HBV SURFACE AG SERPL QL IA: SIGNIFICANT CHANGE UP
HCO3 BLDV-SCNC: 24 MMOL/L — SIGNIFICANT CHANGE UP (ref 20–26)
HCT VFR BLD CALC: 23.7 % — LOW (ref 34.5–45)
HCT VFR BLD CALC: 25.4 % — LOW (ref 34.5–45)
HCT VFR BLD CALC: 26.5 % — LOW (ref 34.5–45)
HCT VFR BLD CALC: 26.6 % — LOW (ref 34.5–45)
HCT VFR BLD CALC: 27.3 % — LOW (ref 34.5–45)
HCT VFR BLD CALC: 27.4 % — LOW (ref 34.5–45)
HCT VFR BLD CALC: 27.6 % — LOW (ref 34.5–45)
HCT VFR BLD CALC: 27.8 % — LOW (ref 34.5–45)
HCT VFR BLD CALC: 28.2 % — LOW (ref 34.5–45)
HCT VFR BLD CALC: 28.4 % — LOW (ref 34.5–45)
HCT VFR BLD CALC: 28.5 % — LOW (ref 34.5–45)
HCT VFR BLD CALC: 28.5 % — LOW (ref 34.5–45)
HCT VFR BLD CALC: 28.6 % — LOW (ref 34.5–45)
HCT VFR BLD CALC: 28.6 % — LOW (ref 34.5–45)
HCT VFR BLD CALC: 28.7 % — LOW (ref 34.5–45)
HCT VFR BLD CALC: 29 % — LOW (ref 34.5–45)
HCT VFR BLD CALC: 29.2 % — LOW (ref 34.5–45)
HCT VFR BLD CALC: 29.4 % — LOW (ref 34.5–45)
HCT VFR BLD CALC: 29.4 % — LOW (ref 34.5–45)
HCT VFR BLD CALC: 30 % — LOW (ref 34.5–45)
HCT VFR BLD CALC: 30.1 % — LOW (ref 34.5–45)
HCT VFR BLD CALC: 30.6 % — LOW (ref 34.5–45)
HCT VFR BLD CALC: 30.7 % — LOW (ref 34.5–45)
HCT VFR BLD CALC: 32.1 % — LOW (ref 34.5–45)
HCT VFR BLD CALC: 33.3 % — LOW (ref 34.5–45)
HCV AB S/CO SERPL IA: 14.78 S/CO — HIGH (ref 0–0.99)
HCV AB S/CO SERPL IA: 15.05 S/CO — HIGH (ref 0–0.99)
HCV AB SERPL-IMP: REACTIVE
HCV AB SERPL-IMP: REACTIVE
HCV RNA FLD QL NAA+PROBE: SIGNIFICANT CHANGE UP
HGB BLD-MCNC: 10 G/DL — LOW (ref 11.5–15.5)
HGB BLD-MCNC: 10.8 G/DL — LOW (ref 11.5–15.5)
HGB BLD-MCNC: 7.5 G/DL — LOW (ref 11.5–15.5)
HGB BLD-MCNC: 7.9 G/DL — LOW (ref 11.5–15.5)
HGB BLD-MCNC: 8 G/DL — LOW (ref 11.5–15.5)
HGB BLD-MCNC: 8.2 G/DL — LOW (ref 11.5–15.5)
HGB BLD-MCNC: 8.4 G/DL — LOW (ref 11.5–15.5)
HGB BLD-MCNC: 8.5 G/DL — LOW (ref 11.5–15.5)
HGB BLD-MCNC: 8.5 G/DL — LOW (ref 11.5–15.5)
HGB BLD-MCNC: 8.6 G/DL — LOW (ref 11.5–15.5)
HGB BLD-MCNC: 8.7 G/DL — LOW (ref 11.5–15.5)
HGB BLD-MCNC: 8.8 G/DL — LOW (ref 11.5–15.5)
HGB BLD-MCNC: 8.9 G/DL — LOW (ref 11.5–15.5)
HGB BLD-MCNC: 9 G/DL — LOW (ref 11.5–15.5)
HGB BLD-MCNC: 9.1 G/DL — LOW (ref 11.5–15.5)
HGB BLD-MCNC: 9.1 G/DL — LOW (ref 11.5–15.5)
HGB BLD-MCNC: 9.2 G/DL — LOW (ref 11.5–15.5)
HGB BLD-MCNC: 9.3 G/DL — LOW (ref 11.5–15.5)
HGB BLD-MCNC: 9.4 G/DL — LOW (ref 11.5–15.5)
HGB BLD-MCNC: 9.4 G/DL — LOW (ref 11.5–15.5)
HGB BLD-MCNC: 9.5 G/DL — LOW (ref 11.5–15.5)
HGB BLD-MCNC: 9.6 G/DL — LOW (ref 11.5–15.5)
HGB BLD-MCNC: 9.6 G/DL — LOW (ref 11.5–15.5)
HGB BLD-MCNC: 9.7 G/DL — LOW (ref 11.5–15.5)
HGB BLD-MCNC: 9.9 G/DL — LOW (ref 11.5–15.5)
IMM GRANULOCYTES NFR BLD AUTO: 0.6 % — SIGNIFICANT CHANGE UP (ref 0–1.5)
IMM GRANULOCYTES NFR BLD AUTO: 0.7 % — SIGNIFICANT CHANGE UP (ref 0–1.5)
IMM GRANULOCYTES NFR BLD AUTO: 0.7 % — SIGNIFICANT CHANGE UP (ref 0–1.5)
INR BLD: 1.07 RATIO — SIGNIFICANT CHANGE UP (ref 0.88–1.16)
INR BLD: 1.23 RATIO — HIGH (ref 0.88–1.16)
INR BLD: 1.27 RATIO — HIGH (ref 0.88–1.16)
INR BLD: 1.38 RATIO — HIGH (ref 0.88–1.16)
IRON SATN MFR SERPL: 13 % — LOW (ref 14–50)
IRON SATN MFR SERPL: 14 % — SIGNIFICANT CHANGE UP (ref 14–50)
IRON SATN MFR SERPL: 38 UG/DL — SIGNIFICANT CHANGE UP (ref 37–145)
IRON SATN MFR SERPL: 39 UG/DL — SIGNIFICANT CHANGE UP (ref 37–145)
KETONES UR-MCNC: ABNORMAL
KETONES UR-MCNC: NEGATIVE — SIGNIFICANT CHANGE UP
LEUKOCYTE ESTERASE UR-ACNC: ABNORMAL
LEUKOCYTE ESTERASE UR-ACNC: ABNORMAL
LYMPHOCYTES # BLD AUTO: 0.22 K/UL — LOW (ref 1–3.3)
LYMPHOCYTES # BLD AUTO: 0.26 K/UL — LOW (ref 1–3.3)
LYMPHOCYTES # BLD AUTO: 0.32 K/UL — LOW (ref 1–3.3)
LYMPHOCYTES # BLD AUTO: 0.38 K/UL — LOW (ref 1–3.3)
LYMPHOCYTES # BLD AUTO: 0.5 K/UL — LOW (ref 1–3.3)
LYMPHOCYTES # BLD AUTO: 0.51 K/UL — LOW (ref 1–3.3)
LYMPHOCYTES # BLD AUTO: 1.7 % — LOW (ref 13–44)
LYMPHOCYTES # BLD AUTO: 2.5 % — LOW (ref 13–44)
LYMPHOCYTES # BLD AUTO: 3.5 % — LOW (ref 13–44)
LYMPHOCYTES # BLD AUTO: 5.2 % — LOW (ref 13–44)
LYMPHOCYTES # BLD AUTO: 9.6 % — LOW (ref 13–44)
LYMPHOCYTES # BLD AUTO: 9.7 % — LOW (ref 13–44)
MAGNESIUM SERPL-MCNC: 1.9 MG/DL — SIGNIFICANT CHANGE UP (ref 1.6–2.6)
MAGNESIUM SERPL-MCNC: 1.9 MG/DL — SIGNIFICANT CHANGE UP (ref 1.6–2.6)
MAGNESIUM SERPL-MCNC: 1.9 MG/DL — SIGNIFICANT CHANGE UP (ref 1.8–2.6)
MAGNESIUM SERPL-MCNC: 2 MG/DL — SIGNIFICANT CHANGE UP (ref 1.6–2.6)
MAGNESIUM SERPL-MCNC: 2.1 MG/DL — SIGNIFICANT CHANGE UP (ref 1.6–2.6)
MAGNESIUM SERPL-MCNC: 2.4 MG/DL — SIGNIFICANT CHANGE UP (ref 1.6–2.6)
MAGNESIUM SERPL-MCNC: 2.7 MG/DL — HIGH (ref 1.6–2.6)
MANUAL SMEAR VERIFICATION: SIGNIFICANT CHANGE UP
MANUAL SMEAR VERIFICATION: SIGNIFICANT CHANGE UP
MCHC RBC-ENTMCNC: 27.9 PG — SIGNIFICANT CHANGE UP (ref 27–34)
MCHC RBC-ENTMCNC: 28 PG — SIGNIFICANT CHANGE UP (ref 27–34)
MCHC RBC-ENTMCNC: 28.4 PG — SIGNIFICANT CHANGE UP (ref 27–34)
MCHC RBC-ENTMCNC: 28.5 PG — SIGNIFICANT CHANGE UP (ref 27–34)
MCHC RBC-ENTMCNC: 28.6 PG — SIGNIFICANT CHANGE UP (ref 27–34)
MCHC RBC-ENTMCNC: 28.6 PG — SIGNIFICANT CHANGE UP (ref 27–34)
MCHC RBC-ENTMCNC: 28.8 PG — SIGNIFICANT CHANGE UP (ref 27–34)
MCHC RBC-ENTMCNC: 28.9 PG — SIGNIFICANT CHANGE UP (ref 27–34)
MCHC RBC-ENTMCNC: 29 PG — SIGNIFICANT CHANGE UP (ref 27–34)
MCHC RBC-ENTMCNC: 29.1 PG — SIGNIFICANT CHANGE UP (ref 27–34)
MCHC RBC-ENTMCNC: 29.3 PG — SIGNIFICANT CHANGE UP (ref 27–34)
MCHC RBC-ENTMCNC: 29.5 PG — SIGNIFICANT CHANGE UP (ref 27–34)
MCHC RBC-ENTMCNC: 29.5 PG — SIGNIFICANT CHANGE UP (ref 27–34)
MCHC RBC-ENTMCNC: 29.6 PG — SIGNIFICANT CHANGE UP (ref 27–34)
MCHC RBC-ENTMCNC: 29.6 PG — SIGNIFICANT CHANGE UP (ref 27–34)
MCHC RBC-ENTMCNC: 29.7 PG — SIGNIFICANT CHANGE UP (ref 27–34)
MCHC RBC-ENTMCNC: 29.7 PG — SIGNIFICANT CHANGE UP (ref 27–34)
MCHC RBC-ENTMCNC: 29.8 PG — SIGNIFICANT CHANGE UP (ref 27–34)
MCHC RBC-ENTMCNC: 29.9 GM/DL — LOW (ref 32–36)
MCHC RBC-ENTMCNC: 29.9 GM/DL — LOW (ref 32–36)
MCHC RBC-ENTMCNC: 29.9 PG — SIGNIFICANT CHANGE UP (ref 27–34)
MCHC RBC-ENTMCNC: 29.9 PG — SIGNIFICANT CHANGE UP (ref 27–34)
MCHC RBC-ENTMCNC: 30.1 GM/DL — LOW (ref 32–36)
MCHC RBC-ENTMCNC: 30.1 GM/DL — LOW (ref 32–36)
MCHC RBC-ENTMCNC: 30.1 PG — SIGNIFICANT CHANGE UP (ref 27–34)
MCHC RBC-ENTMCNC: 30.2 GM/DL — LOW (ref 32–36)
MCHC RBC-ENTMCNC: 30.3 GM/DL — LOW (ref 32–36)
MCHC RBC-ENTMCNC: 30.4 PG — SIGNIFICANT CHANGE UP (ref 27–34)
MCHC RBC-ENTMCNC: 30.5 GM/DL — LOW (ref 32–36)
MCHC RBC-ENTMCNC: 30.7 GM/DL — LOW (ref 32–36)
MCHC RBC-ENTMCNC: 30.7 PG — SIGNIFICANT CHANGE UP (ref 27–34)
MCHC RBC-ENTMCNC: 30.8 GM/DL — LOW (ref 32–36)
MCHC RBC-ENTMCNC: 30.9 GM/DL — LOW (ref 32–36)
MCHC RBC-ENTMCNC: 31 GM/DL — LOW (ref 32–36)
MCHC RBC-ENTMCNC: 31 PG — SIGNIFICANT CHANGE UP (ref 27–34)
MCHC RBC-ENTMCNC: 31.1 GM/DL — LOW (ref 32–36)
MCHC RBC-ENTMCNC: 31.2 GM/DL — LOW (ref 32–36)
MCHC RBC-ENTMCNC: 31.3 GM/DL — LOW (ref 32–36)
MCHC RBC-ENTMCNC: 31.3 GM/DL — LOW (ref 32–36)
MCHC RBC-ENTMCNC: 31.4 GM/DL — LOW (ref 32–36)
MCHC RBC-ENTMCNC: 31.4 GM/DL — LOW (ref 32–36)
MCHC RBC-ENTMCNC: 31.5 GM/DL — LOW (ref 32–36)
MCHC RBC-ENTMCNC: 31.6 GM/DL — LOW (ref 32–36)
MCHC RBC-ENTMCNC: 32.2 GM/DL — SIGNIFICANT CHANGE UP (ref 32–36)
MCHC RBC-ENTMCNC: 32.2 GM/DL — SIGNIFICANT CHANGE UP (ref 32–36)
MCHC RBC-ENTMCNC: 32.4 GM/DL — SIGNIFICANT CHANGE UP (ref 32–36)
MCHC RBC-ENTMCNC: 32.4 GM/DL — SIGNIFICANT CHANGE UP (ref 32–36)
MCV RBC AUTO: 86.1 FL — SIGNIFICANT CHANGE UP (ref 80–100)
MCV RBC AUTO: 88.3 FL — SIGNIFICANT CHANGE UP (ref 80–100)
MCV RBC AUTO: 88.7 FL — SIGNIFICANT CHANGE UP (ref 80–100)
MCV RBC AUTO: 89 FL — SIGNIFICANT CHANGE UP (ref 80–100)
MCV RBC AUTO: 89.4 FL — SIGNIFICANT CHANGE UP (ref 80–100)
MCV RBC AUTO: 89.6 FL — SIGNIFICANT CHANGE UP (ref 80–100)
MCV RBC AUTO: 90.8 FL — SIGNIFICANT CHANGE UP (ref 80–100)
MCV RBC AUTO: 92.2 FL — SIGNIFICANT CHANGE UP (ref 80–100)
MCV RBC AUTO: 93.6 FL — SIGNIFICANT CHANGE UP (ref 80–100)
MCV RBC AUTO: 93.8 FL — SIGNIFICANT CHANGE UP (ref 80–100)
MCV RBC AUTO: 93.8 FL — SIGNIFICANT CHANGE UP (ref 80–100)
MCV RBC AUTO: 93.9 FL — SIGNIFICANT CHANGE UP (ref 80–100)
MCV RBC AUTO: 94.7 FL — SIGNIFICANT CHANGE UP (ref 80–100)
MCV RBC AUTO: 94.7 FL — SIGNIFICANT CHANGE UP (ref 80–100)
MCV RBC AUTO: 94.8 FL — SIGNIFICANT CHANGE UP (ref 80–100)
MCV RBC AUTO: 95.2 FL — SIGNIFICANT CHANGE UP (ref 80–100)
MCV RBC AUTO: 95.5 FL — SIGNIFICANT CHANGE UP (ref 80–100)
MCV RBC AUTO: 95.6 FL — SIGNIFICANT CHANGE UP (ref 80–100)
MCV RBC AUTO: 95.9 FL — SIGNIFICANT CHANGE UP (ref 80–100)
MCV RBC AUTO: 96.2 FL — SIGNIFICANT CHANGE UP (ref 80–100)
MCV RBC AUTO: 96.5 FL — SIGNIFICANT CHANGE UP (ref 80–100)
MCV RBC AUTO: 96.5 FL — SIGNIFICANT CHANGE UP (ref 80–100)
MCV RBC AUTO: 96.6 FL — SIGNIFICANT CHANGE UP (ref 80–100)
MCV RBC AUTO: 96.6 FL — SIGNIFICANT CHANGE UP (ref 80–100)
MCV RBC AUTO: 97.1 FL — SIGNIFICANT CHANGE UP (ref 80–100)
MCV RBC AUTO: 97.9 FL — SIGNIFICANT CHANGE UP (ref 80–100)
MCV RBC AUTO: 98.3 FL — SIGNIFICANT CHANGE UP (ref 80–100)
MCV RBC AUTO: 98.6 FL — SIGNIFICANT CHANGE UP (ref 80–100)
METHOD TYPE: SIGNIFICANT CHANGE UP
MICROCYTES BLD QL: SLIGHT — SIGNIFICANT CHANGE UP
MONOCYTES # BLD AUTO: 0.16 K/UL — SIGNIFICANT CHANGE UP (ref 0–0.9)
MONOCYTES # BLD AUTO: 0.29 K/UL — SIGNIFICANT CHANGE UP (ref 0–0.9)
MONOCYTES # BLD AUTO: 0.39 K/UL — SIGNIFICANT CHANGE UP (ref 0–0.9)
MONOCYTES # BLD AUTO: 0.91 K/UL — HIGH (ref 0–0.9)
MONOCYTES # BLD AUTO: 0.94 K/UL — HIGH (ref 0–0.9)
MONOCYTES # BLD AUTO: 1.03 K/UL — HIGH (ref 0–0.9)
MONOCYTES NFR BLD AUTO: 2.6 % — SIGNIFICANT CHANGE UP (ref 2–14)
MONOCYTES NFR BLD AUTO: 5.6 % — SIGNIFICANT CHANGE UP (ref 2–14)
MONOCYTES NFR BLD AUTO: 7.2 % — SIGNIFICANT CHANGE UP (ref 2–14)
MONOCYTES NFR BLD AUTO: 7.3 % — SIGNIFICANT CHANGE UP (ref 2–14)
MONOCYTES NFR BLD AUTO: 8.6 % — SIGNIFICANT CHANGE UP (ref 2–14)
MONOCYTES NFR BLD AUTO: 9.5 % — SIGNIFICANT CHANGE UP (ref 2–14)
MRSA PCR RESULT.: SIGNIFICANT CHANGE UP
MYELOCYTES NFR BLD: 0.9 % — HIGH (ref 0–0)
NEUTROPHILS # BLD AUTO: 11.71 K/UL — HIGH (ref 1.8–7.4)
NEUTROPHILS # BLD AUTO: 3.7 K/UL — SIGNIFICANT CHANGE UP (ref 1.8–7.4)
NEUTROPHILS # BLD AUTO: 3.97 K/UL — SIGNIFICANT CHANGE UP (ref 1.8–7.4)
NEUTROPHILS # BLD AUTO: 5.17 K/UL — SIGNIFICANT CHANGE UP (ref 1.8–7.4)
NEUTROPHILS # BLD AUTO: 9 K/UL — HIGH (ref 1.8–7.4)
NEUTROPHILS # BLD AUTO: 9.12 K/UL — HIGH (ref 1.8–7.4)
NEUTROPHILS NFR BLD AUTO: 69.5 % — SIGNIFICANT CHANGE UP (ref 43–77)
NEUTROPHILS NFR BLD AUTO: 76.5 % — SIGNIFICANT CHANGE UP (ref 43–77)
NEUTROPHILS NFR BLD AUTO: 82.6 % — HIGH (ref 43–77)
NEUTROPHILS NFR BLD AUTO: 84.5 % — HIGH (ref 43–77)
NEUTROPHILS NFR BLD AUTO: 84.9 % — HIGH (ref 43–77)
NEUTROPHILS NFR BLD AUTO: 89 % — HIGH (ref 43–77)
NEUTS BAND # BLD: 0.9 % — SIGNIFICANT CHANGE UP (ref 0–8)
NITRITE UR-MCNC: NEGATIVE — SIGNIFICANT CHANGE UP
NITRITE UR-MCNC: NEGATIVE — SIGNIFICANT CHANGE UP
NRBC # BLD: 0 /100 WBCS — SIGNIFICANT CHANGE UP (ref 0–0)
NT-PROBNP SERPL-SCNC: 8868 PG/ML — HIGH (ref 0–300)
ORGANISM # SPEC MICROSCOPIC CNT: SIGNIFICANT CHANGE UP
OVALOCYTES BLD QL SMEAR: SIGNIFICANT CHANGE UP
OVALOCYTES BLD QL SMEAR: SLIGHT — SIGNIFICANT CHANGE UP
PCO2 BLDV: 44 MMHG — SIGNIFICANT CHANGE UP (ref 35–50)
PH BLDV: 7.36 — SIGNIFICANT CHANGE UP (ref 7.32–7.43)
PH UR: 5 — SIGNIFICANT CHANGE UP (ref 5–8)
PH UR: 6 — SIGNIFICANT CHANGE UP (ref 5–8)
PHOSPHATE SERPL-MCNC: 2.9 MG/DL — SIGNIFICANT CHANGE UP (ref 2.4–4.7)
PHOSPHATE SERPL-MCNC: 3.1 MG/DL — SIGNIFICANT CHANGE UP (ref 2.4–4.7)
PHOSPHATE SERPL-MCNC: 3.6 MG/DL — SIGNIFICANT CHANGE UP (ref 2.4–4.7)
PHOSPHATE SERPL-MCNC: 3.9 MG/DL — SIGNIFICANT CHANGE UP (ref 2.4–4.7)
PHOSPHATE SERPL-MCNC: 4.3 MG/DL — SIGNIFICANT CHANGE UP (ref 2.4–4.7)
PHOSPHATE SERPL-MCNC: 4.8 MG/DL — HIGH (ref 2.5–4.5)
PHOSPHATE SERPL-MCNC: 5.1 MG/DL — HIGH (ref 2.4–4.7)
PHOSPHATE SERPL-MCNC: 5.3 MG/DL — HIGH (ref 2.4–4.7)
PHOSPHATE SERPL-MCNC: 7 MG/DL — HIGH (ref 2.4–4.7)
PHOSPHATE SERPL-MCNC: 7.1 MG/DL — HIGH (ref 2.4–4.7)
PHOSPHATE SERPL-MCNC: 8.7 MG/DL — HIGH (ref 2.4–4.7)
PLAT MORPH BLD: NORMAL — SIGNIFICANT CHANGE UP
PLAT MORPH BLD: NORMAL — SIGNIFICANT CHANGE UP
PLATELET # BLD AUTO: 100 K/UL — LOW (ref 150–400)
PLATELET # BLD AUTO: 100 K/UL — LOW (ref 150–400)
PLATELET # BLD AUTO: 101 K/UL — LOW (ref 150–400)
PLATELET # BLD AUTO: 105 K/UL — LOW (ref 150–400)
PLATELET # BLD AUTO: 106 K/UL — LOW (ref 150–400)
PLATELET # BLD AUTO: 107 K/UL — LOW (ref 150–400)
PLATELET # BLD AUTO: 108 K/UL — LOW (ref 150–400)
PLATELET # BLD AUTO: 108 K/UL — LOW (ref 150–400)
PLATELET # BLD AUTO: 111 K/UL — LOW (ref 150–400)
PLATELET # BLD AUTO: 114 K/UL — LOW (ref 150–400)
PLATELET # BLD AUTO: 114 K/UL — LOW (ref 150–400)
PLATELET # BLD AUTO: 118 K/UL — LOW (ref 150–400)
PLATELET # BLD AUTO: 119 K/UL — LOW (ref 150–400)
PLATELET # BLD AUTO: 131 K/UL — LOW (ref 150–400)
PLATELET # BLD AUTO: 145 K/UL — LOW (ref 150–400)
PLATELET # BLD AUTO: 150 K/UL — SIGNIFICANT CHANGE UP (ref 150–400)
PLATELET # BLD AUTO: 61 K/UL — LOW (ref 150–400)
PLATELET # BLD AUTO: 67 K/UL — LOW (ref 150–400)
PLATELET # BLD AUTO: 72 K/UL — LOW (ref 150–400)
PLATELET # BLD AUTO: 75 K/UL — LOW (ref 150–400)
PLATELET # BLD AUTO: 77 K/UL — LOW (ref 150–400)
PLATELET # BLD AUTO: 81 K/UL — LOW (ref 150–400)
PLATELET # BLD AUTO: 85 K/UL — LOW (ref 150–400)
PLATELET # BLD AUTO: 91 K/UL — LOW (ref 150–400)
PLATELET # BLD AUTO: 98 K/UL — LOW (ref 150–400)
PLATELET # BLD AUTO: 99 K/UL — LOW (ref 150–400)
PO2 BLDV: 187 MMHG — HIGH (ref 25–45)
POIKILOCYTOSIS BLD QL AUTO: SIGNIFICANT CHANGE UP
POIKILOCYTOSIS BLD QL AUTO: SLIGHT — SIGNIFICANT CHANGE UP
POLYCHROMASIA BLD QL SMEAR: SIGNIFICANT CHANGE UP
POLYCHROMASIA BLD QL SMEAR: SLIGHT — SIGNIFICANT CHANGE UP
POTASSIUM SERPL-MCNC: 3.1 MMOL/L — LOW (ref 3.5–5.3)
POTASSIUM SERPL-MCNC: 3.4 MMOL/L — LOW (ref 3.5–5.3)
POTASSIUM SERPL-MCNC: 3.5 MMOL/L — SIGNIFICANT CHANGE UP (ref 3.5–5.3)
POTASSIUM SERPL-MCNC: 3.6 MMOL/L — SIGNIFICANT CHANGE UP (ref 3.5–5.3)
POTASSIUM SERPL-MCNC: 3.7 MMOL/L — SIGNIFICANT CHANGE UP (ref 3.5–5.3)
POTASSIUM SERPL-MCNC: 3.8 MMOL/L — SIGNIFICANT CHANGE UP (ref 3.5–5.3)
POTASSIUM SERPL-MCNC: 3.8 MMOL/L — SIGNIFICANT CHANGE UP (ref 3.5–5.3)
POTASSIUM SERPL-MCNC: 3.9 MMOL/L — SIGNIFICANT CHANGE UP (ref 3.5–5.3)
POTASSIUM SERPL-MCNC: 4 MMOL/L — SIGNIFICANT CHANGE UP (ref 3.5–5.3)
POTASSIUM SERPL-MCNC: 4.1 MMOL/L — SIGNIFICANT CHANGE UP (ref 3.5–5.3)
POTASSIUM SERPL-MCNC: 4.2 MMOL/L — SIGNIFICANT CHANGE UP (ref 3.5–5.3)
POTASSIUM SERPL-MCNC: 4.2 MMOL/L — SIGNIFICANT CHANGE UP (ref 3.5–5.3)
POTASSIUM SERPL-MCNC: 4.3 MMOL/L — SIGNIFICANT CHANGE UP (ref 3.5–5.3)
POTASSIUM SERPL-MCNC: 4.3 MMOL/L — SIGNIFICANT CHANGE UP (ref 3.5–5.3)
POTASSIUM SERPL-MCNC: 4.4 MMOL/L — SIGNIFICANT CHANGE UP (ref 3.5–5.3)
POTASSIUM SERPL-MCNC: 4.5 MMOL/L — SIGNIFICANT CHANGE UP (ref 3.5–5.3)
POTASSIUM SERPL-MCNC: 4.6 MMOL/L — SIGNIFICANT CHANGE UP (ref 3.5–5.3)
POTASSIUM SERPL-MCNC: 4.6 MMOL/L — SIGNIFICANT CHANGE UP (ref 3.5–5.3)
POTASSIUM SERPL-MCNC: 4.7 MMOL/L — SIGNIFICANT CHANGE UP (ref 3.5–5.3)
POTASSIUM SERPL-MCNC: 4.8 MMOL/L — SIGNIFICANT CHANGE UP (ref 3.5–5.3)
POTASSIUM SERPL-MCNC: 4.8 MMOL/L — SIGNIFICANT CHANGE UP (ref 3.5–5.3)
POTASSIUM SERPL-MCNC: 5.5 MMOL/L — HIGH (ref 3.5–5.3)
POTASSIUM SERPL-SCNC: 3.1 MMOL/L — LOW (ref 3.5–5.3)
POTASSIUM SERPL-SCNC: 3.4 MMOL/L — LOW (ref 3.5–5.3)
POTASSIUM SERPL-SCNC: 3.5 MMOL/L — SIGNIFICANT CHANGE UP (ref 3.5–5.3)
POTASSIUM SERPL-SCNC: 3.6 MMOL/L — SIGNIFICANT CHANGE UP (ref 3.5–5.3)
POTASSIUM SERPL-SCNC: 3.7 MMOL/L — SIGNIFICANT CHANGE UP (ref 3.5–5.3)
POTASSIUM SERPL-SCNC: 3.8 MMOL/L — SIGNIFICANT CHANGE UP (ref 3.5–5.3)
POTASSIUM SERPL-SCNC: 3.8 MMOL/L — SIGNIFICANT CHANGE UP (ref 3.5–5.3)
POTASSIUM SERPL-SCNC: 3.9 MMOL/L — SIGNIFICANT CHANGE UP (ref 3.5–5.3)
POTASSIUM SERPL-SCNC: 4 MMOL/L — SIGNIFICANT CHANGE UP (ref 3.5–5.3)
POTASSIUM SERPL-SCNC: 4.1 MMOL/L — SIGNIFICANT CHANGE UP (ref 3.5–5.3)
POTASSIUM SERPL-SCNC: 4.2 MMOL/L — SIGNIFICANT CHANGE UP (ref 3.5–5.3)
POTASSIUM SERPL-SCNC: 4.2 MMOL/L — SIGNIFICANT CHANGE UP (ref 3.5–5.3)
POTASSIUM SERPL-SCNC: 4.3 MMOL/L — SIGNIFICANT CHANGE UP (ref 3.5–5.3)
POTASSIUM SERPL-SCNC: 4.3 MMOL/L — SIGNIFICANT CHANGE UP (ref 3.5–5.3)
POTASSIUM SERPL-SCNC: 4.4 MMOL/L — SIGNIFICANT CHANGE UP (ref 3.5–5.3)
POTASSIUM SERPL-SCNC: 4.5 MMOL/L — SIGNIFICANT CHANGE UP (ref 3.5–5.3)
POTASSIUM SERPL-SCNC: 4.6 MMOL/L — SIGNIFICANT CHANGE UP (ref 3.5–5.3)
POTASSIUM SERPL-SCNC: 4.6 MMOL/L — SIGNIFICANT CHANGE UP (ref 3.5–5.3)
POTASSIUM SERPL-SCNC: 4.7 MMOL/L — SIGNIFICANT CHANGE UP (ref 3.5–5.3)
POTASSIUM SERPL-SCNC: 4.8 MMOL/L — SIGNIFICANT CHANGE UP (ref 3.5–5.3)
POTASSIUM SERPL-SCNC: 4.8 MMOL/L — SIGNIFICANT CHANGE UP (ref 3.5–5.3)
POTASSIUM SERPL-SCNC: 5.5 MMOL/L — HIGH (ref 3.5–5.3)
PROT SERPL-MCNC: 6.3 G/DL — LOW (ref 6.6–8.7)
PROT SERPL-MCNC: 6.3 G/DL — SIGNIFICANT CHANGE UP (ref 6–8.3)
PROT SERPL-MCNC: 6.9 G/DL — SIGNIFICANT CHANGE UP (ref 6.6–8.7)
PROT SERPL-MCNC: 6.9 G/DL — SIGNIFICANT CHANGE UP (ref 6–8.3)
PROT SERPL-MCNC: 7 G/DL — SIGNIFICANT CHANGE UP (ref 6.6–8.7)
PROT SERPL-MCNC: 7.1 G/DL — SIGNIFICANT CHANGE UP (ref 6.6–8.7)
PROT SERPL-MCNC: 7.3 G/DL — SIGNIFICANT CHANGE UP (ref 6.6–8.7)
PROT SERPL-MCNC: 8.3 G/DL — SIGNIFICANT CHANGE UP (ref 6.6–8.7)
PROT UR-MCNC: 100 MG/DL
PROT UR-MCNC: 100 MG/DL
PROTHROM AB SERPL-ACNC: 12.8 SEC — SIGNIFICANT CHANGE UP (ref 10.6–13.6)
PROTHROM AB SERPL-ACNC: 14.1 SEC — HIGH (ref 10.6–13.6)
PROTHROM AB SERPL-ACNC: 14.6 SEC — HIGH (ref 10.6–13.6)
PROTHROM AB SERPL-ACNC: 15.8 SEC — HIGH (ref 10.6–13.6)
RAPID RVP RESULT: SIGNIFICANT CHANGE UP
RBC # BLD: 2.42 M/UL — LOW (ref 3.8–5.2)
RBC # BLD: 2.66 M/UL — LOW (ref 3.8–5.2)
RBC # BLD: 2.74 M/UL — LOW (ref 3.8–5.2)
RBC # BLD: 2.83 M/UL — LOW (ref 3.8–5.2)
RBC # BLD: 2.84 M/UL — LOW (ref 3.8–5.2)
RBC # BLD: 2.87 M/UL — LOW (ref 3.8–5.2)
RBC # BLD: 2.88 M/UL — LOW (ref 3.8–5.2)
RBC # BLD: 2.9 M/UL — LOW (ref 3.8–5.2)
RBC # BLD: 2.96 M/UL — LOW (ref 3.8–5.2)
RBC # BLD: 2.97 M/UL — LOW (ref 3.8–5.2)
RBC # BLD: 2.98 M/UL — LOW (ref 3.8–5.2)
RBC # BLD: 3.01 M/UL — LOW (ref 3.8–5.2)
RBC # BLD: 3.05 M/UL — LOW (ref 3.8–5.2)
RBC # BLD: 3.12 M/UL — LOW (ref 3.8–5.2)
RBC # BLD: 3.12 M/UL — LOW (ref 3.8–5.2)
RBC # BLD: 3.13 M/UL — LOW (ref 3.8–5.2)
RBC # BLD: 3.14 M/UL — LOW (ref 3.8–5.2)
RBC # BLD: 3.18 M/UL — LOW (ref 3.8–5.2)
RBC # BLD: 3.18 M/UL — LOW (ref 3.8–5.2)
RBC # BLD: 3.21 M/UL — LOW (ref 3.8–5.2)
RBC # BLD: 3.23 M/UL — LOW (ref 3.8–5.2)
RBC # BLD: 3.24 M/UL — LOW (ref 3.8–5.2)
RBC # BLD: 3.26 M/UL — LOW (ref 3.8–5.2)
RBC # BLD: 3.27 M/UL — LOW (ref 3.8–5.2)
RBC # BLD: 3.28 M/UL — LOW (ref 3.8–5.2)
RBC # BLD: 3.33 M/UL — LOW (ref 3.8–5.2)
RBC # BLD: 3.37 M/UL — LOW (ref 3.8–5.2)
RBC # BLD: 3.46 M/UL — LOW (ref 3.8–5.2)
RBC # BLD: 3.59 M/UL — LOW (ref 3.8–5.2)
RBC # BLD: 3.77 M/UL — LOW (ref 3.8–5.2)
RBC # FLD: 15.9 % — HIGH (ref 10.3–14.5)
RBC # FLD: 16.1 % — HIGH (ref 10.3–14.5)
RBC # FLD: 16.2 % — HIGH (ref 10.3–14.5)
RBC # FLD: 16.6 % — HIGH (ref 10.3–14.5)
RBC # FLD: 16.9 % — HIGH (ref 10.3–14.5)
RBC # FLD: 17.2 % — HIGH (ref 10.3–14.5)
RBC # FLD: 17.2 % — HIGH (ref 10.3–14.5)
RBC # FLD: 17.3 % — HIGH (ref 10.3–14.5)
RBC # FLD: 17.6 % — HIGH (ref 10.3–14.5)
RBC # FLD: 18.2 % — HIGH (ref 10.3–14.5)
RBC # FLD: 18.3 % — HIGH (ref 10.3–14.5)
RBC # FLD: 18.4 % — HIGH (ref 10.3–14.5)
RBC # FLD: 18.4 % — HIGH (ref 10.3–14.5)
RBC # FLD: 18.6 % — HIGH (ref 10.3–14.5)
RBC # FLD: 18.6 % — HIGH (ref 10.3–14.5)
RBC # FLD: 19 % — HIGH (ref 10.3–14.5)
RBC # FLD: 19.3 % — HIGH (ref 10.3–14.5)
RBC # FLD: 19.3 % — HIGH (ref 10.3–14.5)
RBC # FLD: 19.4 % — HIGH (ref 10.3–14.5)
RBC # FLD: 19.5 % — HIGH (ref 10.3–14.5)
RBC # FLD: 19.6 % — HIGH (ref 10.3–14.5)
RBC # FLD: 19.6 % — HIGH (ref 10.3–14.5)
RBC # FLD: 20.1 % — HIGH (ref 10.3–14.5)
RBC # FLD: 20.2 % — HIGH (ref 10.3–14.5)
RBC BLD AUTO: ABNORMAL
RBC BLD AUTO: ABNORMAL
RBC CASTS # UR COMP ASSIST: SIGNIFICANT CHANGE UP /HPF (ref 0–4)
RBC CASTS # UR COMP ASSIST: SIGNIFICANT CHANGE UP /HPF (ref 0–4)
RETICS #: 127.2 K/UL — HIGH (ref 25–125)
RETICS #: 148.8 K/UL — HIGH (ref 25–125)
RETICS/RBC NFR: 4 % — HIGH (ref 0.5–2.5)
RETICS/RBC NFR: 4.6 % — HIGH (ref 0.5–2.5)
RH IG SCN BLD-IMP: POSITIVE — SIGNIFICANT CHANGE UP
S AUREUS DNA NOSE QL NAA+PROBE: SIGNIFICANT CHANGE UP
SAO2 % BLDV: >100 % — SIGNIFICANT CHANGE UP
SARS-COV-2 IGG+IGM SERPL QL IA: 4.6 U/ML — HIGH
SARS-COV-2 IGG+IGM SERPL QL IA: 5.71 U/ML — HIGH
SARS-COV-2 IGG+IGM SERPL QL IA: POSITIVE
SARS-COV-2 IGG+IGM SERPL QL IA: POSITIVE
SARS-COV-2 N GENE NPH QL NAA+PROBE: NOT DETECTED
SARS-COV-2 RNA SPEC QL NAA+PROBE: SIGNIFICANT CHANGE UP
SODIUM SERPL-SCNC: 129 MMOL/L — LOW (ref 135–145)
SODIUM SERPL-SCNC: 130 MMOL/L — LOW (ref 135–145)
SODIUM SERPL-SCNC: 132 MMOL/L — LOW (ref 135–145)
SODIUM SERPL-SCNC: 132 MMOL/L — LOW (ref 135–145)
SODIUM SERPL-SCNC: 133 MMOL/L — LOW (ref 135–145)
SODIUM SERPL-SCNC: 137 MMOL/L — SIGNIFICANT CHANGE UP (ref 135–145)
SODIUM SERPL-SCNC: 137 MMOL/L — SIGNIFICANT CHANGE UP (ref 135–145)
SODIUM SERPL-SCNC: 138 MMOL/L — SIGNIFICANT CHANGE UP (ref 135–145)
SODIUM SERPL-SCNC: 139 MMOL/L — SIGNIFICANT CHANGE UP (ref 135–145)
SODIUM SERPL-SCNC: 139 MMOL/L — SIGNIFICANT CHANGE UP (ref 135–145)
SODIUM SERPL-SCNC: 140 MMOL/L — SIGNIFICANT CHANGE UP (ref 135–145)
SODIUM SERPL-SCNC: 141 MMOL/L — SIGNIFICANT CHANGE UP (ref 135–145)
SODIUM SERPL-SCNC: 142 MMOL/L — SIGNIFICANT CHANGE UP (ref 135–145)
SODIUM SERPL-SCNC: 143 MMOL/L — SIGNIFICANT CHANGE UP (ref 135–145)
SODIUM SERPL-SCNC: 144 MMOL/L — SIGNIFICANT CHANGE UP (ref 135–145)
SP GR SPEC: 1.01 — SIGNIFICANT CHANGE UP (ref 1.01–1.02)
SP GR SPEC: 1.02 — SIGNIFICANT CHANGE UP (ref 1.01–1.02)
SPECIMEN SOURCE: SIGNIFICANT CHANGE UP
SURGICAL PATHOLOGY STUDY: SIGNIFICANT CHANGE UP
SURGICAL PATHOLOGY STUDY: SIGNIFICANT CHANGE UP
TIBC SERPL-MCNC: 279 UG/DL — SIGNIFICANT CHANGE UP (ref 220–430)
TIBC SERPL-MCNC: 289 UG/DL — SIGNIFICANT CHANGE UP (ref 220–430)
TRANSFERRIN SERPL-MCNC: 195 MG/DL — SIGNIFICANT CHANGE UP (ref 192–382)
TRANSFERRIN SERPL-MCNC: 202 MG/DL — SIGNIFICANT CHANGE UP (ref 192–382)
TROPONIN T SERPL-MCNC: 0.03 NG/ML — SIGNIFICANT CHANGE UP (ref 0–0.06)
TROPONIN T SERPL-MCNC: 0.04 NG/ML — SIGNIFICANT CHANGE UP (ref 0–0.06)
UROBILINOGEN FLD QL: 1 MG/DL
UROBILINOGEN FLD QL: NEGATIVE MG/DL — SIGNIFICANT CHANGE UP
VANCOMYCIN FLD-MCNC: 16 UG/ML — SIGNIFICANT CHANGE UP
VANCOMYCIN FLD-MCNC: 17.1 UG/ML — SIGNIFICANT CHANGE UP
VANCOMYCIN FLD-MCNC: 18.9 UG/ML — SIGNIFICANT CHANGE UP
VANCOMYCIN FLD-MCNC: 22.5 UG/ML — SIGNIFICANT CHANGE UP
VANCOMYCIN FLD-MCNC: 22.8 UG/ML — SIGNIFICANT CHANGE UP
VANCOMYCIN FLD-MCNC: 25.2 UG/ML
VANCOMYCIN TROUGH SERPL-MCNC: 14.5 UG/ML — SIGNIFICANT CHANGE UP (ref 10–20)
WBC # BLD: 10.59 K/UL — HIGH (ref 3.8–10.5)
WBC # BLD: 10.66 K/UL — HIGH (ref 3.8–10.5)
WBC # BLD: 10.8 K/UL — HIGH (ref 3.8–10.5)
WBC # BLD: 11.82 K/UL — HIGH (ref 3.8–10.5)
WBC # BLD: 12.75 K/UL — HIGH (ref 3.8–10.5)
WBC # BLD: 13.14 K/UL — HIGH (ref 3.8–10.5)
WBC # BLD: 13.5 K/UL — HIGH (ref 3.8–10.5)
WBC # BLD: 16.04 K/UL — HIGH (ref 3.8–10.5)
WBC # BLD: 4.89 K/UL — SIGNIFICANT CHANGE UP (ref 3.8–10.5)
WBC # BLD: 5.18 K/UL — SIGNIFICANT CHANGE UP (ref 3.8–10.5)
WBC # BLD: 5.22 K/UL — SIGNIFICANT CHANGE UP (ref 3.8–10.5)
WBC # BLD: 5.32 K/UL — SIGNIFICANT CHANGE UP (ref 3.8–10.5)
WBC # BLD: 5.36 K/UL — SIGNIFICANT CHANGE UP (ref 3.8–10.5)
WBC # BLD: 5.37 K/UL — SIGNIFICANT CHANGE UP (ref 3.8–10.5)
WBC # BLD: 5.39 K/UL — SIGNIFICANT CHANGE UP (ref 3.8–10.5)
WBC # BLD: 5.57 K/UL — SIGNIFICANT CHANGE UP (ref 3.8–10.5)
WBC # BLD: 5.71 K/UL — SIGNIFICANT CHANGE UP (ref 3.8–10.5)
WBC # BLD: 5.79 K/UL — SIGNIFICANT CHANGE UP (ref 3.8–10.5)
WBC # BLD: 6.01 K/UL — SIGNIFICANT CHANGE UP (ref 3.8–10.5)
WBC # BLD: 6.06 K/UL — SIGNIFICANT CHANGE UP (ref 3.8–10.5)
WBC # BLD: 6.09 K/UL — SIGNIFICANT CHANGE UP (ref 3.8–10.5)
WBC # BLD: 6.13 K/UL — SIGNIFICANT CHANGE UP (ref 3.8–10.5)
WBC # BLD: 6.19 K/UL — SIGNIFICANT CHANGE UP (ref 3.8–10.5)
WBC # BLD: 6.45 K/UL — SIGNIFICANT CHANGE UP (ref 3.8–10.5)
WBC # BLD: 6.58 K/UL — SIGNIFICANT CHANGE UP (ref 3.8–10.5)
WBC # BLD: 7.07 K/UL — SIGNIFICANT CHANGE UP (ref 3.8–10.5)
WBC # BLD: 7.2 K/UL — SIGNIFICANT CHANGE UP (ref 3.8–10.5)
WBC # BLD: 7.83 K/UL — SIGNIFICANT CHANGE UP (ref 3.8–10.5)
WBC # FLD AUTO: 10.59 K/UL — HIGH (ref 3.8–10.5)
WBC # FLD AUTO: 10.66 K/UL — HIGH (ref 3.8–10.5)
WBC # FLD AUTO: 10.8 K/UL — HIGH (ref 3.8–10.5)
WBC # FLD AUTO: 11.82 K/UL — HIGH (ref 3.8–10.5)
WBC # FLD AUTO: 12.75 K/UL — HIGH (ref 3.8–10.5)
WBC # FLD AUTO: 13.14 K/UL — HIGH (ref 3.8–10.5)
WBC # FLD AUTO: 13.5 K/UL — HIGH (ref 3.8–10.5)
WBC # FLD AUTO: 16.04 K/UL — HIGH (ref 3.8–10.5)
WBC # FLD AUTO: 4.89 K/UL — SIGNIFICANT CHANGE UP (ref 3.8–10.5)
WBC # FLD AUTO: 5.18 K/UL — SIGNIFICANT CHANGE UP (ref 3.8–10.5)
WBC # FLD AUTO: 5.22 K/UL — SIGNIFICANT CHANGE UP (ref 3.8–10.5)
WBC # FLD AUTO: 5.32 K/UL — SIGNIFICANT CHANGE UP (ref 3.8–10.5)
WBC # FLD AUTO: 5.36 K/UL — SIGNIFICANT CHANGE UP (ref 3.8–10.5)
WBC # FLD AUTO: 5.37 K/UL — SIGNIFICANT CHANGE UP (ref 3.8–10.5)
WBC # FLD AUTO: 5.39 K/UL — SIGNIFICANT CHANGE UP (ref 3.8–10.5)
WBC # FLD AUTO: 5.57 K/UL — SIGNIFICANT CHANGE UP (ref 3.8–10.5)
WBC # FLD AUTO: 5.71 K/UL — SIGNIFICANT CHANGE UP (ref 3.8–10.5)
WBC # FLD AUTO: 5.79 K/UL — SIGNIFICANT CHANGE UP (ref 3.8–10.5)
WBC # FLD AUTO: 6.01 K/UL — SIGNIFICANT CHANGE UP (ref 3.8–10.5)
WBC # FLD AUTO: 6.06 K/UL — SIGNIFICANT CHANGE UP (ref 3.8–10.5)
WBC # FLD AUTO: 6.09 K/UL — SIGNIFICANT CHANGE UP (ref 3.8–10.5)
WBC # FLD AUTO: 6.13 K/UL — SIGNIFICANT CHANGE UP (ref 3.8–10.5)
WBC # FLD AUTO: 6.19 K/UL — SIGNIFICANT CHANGE UP (ref 3.8–10.5)
WBC # FLD AUTO: 6.45 K/UL — SIGNIFICANT CHANGE UP (ref 3.8–10.5)
WBC # FLD AUTO: 6.58 K/UL — SIGNIFICANT CHANGE UP (ref 3.8–10.5)
WBC # FLD AUTO: 7.07 K/UL — SIGNIFICANT CHANGE UP (ref 3.8–10.5)
WBC # FLD AUTO: 7.2 K/UL — SIGNIFICANT CHANGE UP (ref 3.8–10.5)
WBC # FLD AUTO: 7.83 K/UL — SIGNIFICANT CHANGE UP (ref 3.8–10.5)
WBC UR QL: ABNORMAL
WBC UR QL: SIGNIFICANT CHANGE UP

## 2021-01-01 PROCEDURE — 99232 SBSQ HOSP IP/OBS MODERATE 35: CPT

## 2021-01-01 PROCEDURE — 36248 INS CATH ABD/L-EXT ART ADDL: CPT

## 2021-01-01 PROCEDURE — 85018 HEMOGLOBIN: CPT

## 2021-01-01 PROCEDURE — 86803 HEPATITIS C AB TEST: CPT

## 2021-01-01 PROCEDURE — 86923 COMPATIBILITY TEST ELECTRIC: CPT

## 2021-01-01 PROCEDURE — 99233 SBSQ HOSP IP/OBS HIGH 50: CPT

## 2021-01-01 PROCEDURE — 74183 MRI ABD W/O CNTR FLWD CNTR: CPT | Mod: 26

## 2021-01-01 PROCEDURE — 99072 ADDL SUPL MATRL&STAF TM PHE: CPT

## 2021-01-01 PROCEDURE — 76937 US GUIDE VASCULAR ACCESS: CPT

## 2021-01-01 PROCEDURE — 86901 BLOOD TYPING SEROLOGIC RH(D): CPT

## 2021-01-01 PROCEDURE — 87641 MR-STAPH DNA AMP PROBE: CPT

## 2021-01-01 PROCEDURE — 87635 SARS-COV-2 COVID-19 AMP PRB: CPT

## 2021-01-01 PROCEDURE — 36558 INSERT TUNNELED CV CATH: CPT

## 2021-01-01 PROCEDURE — 85025 COMPLETE CBC W/AUTO DIFF WBC: CPT

## 2021-01-01 PROCEDURE — 99231 SBSQ HOSP IP/OBS SF/LOW 25: CPT

## 2021-01-01 PROCEDURE — 71045 X-RAY EXAM CHEST 1 VIEW: CPT | Mod: 26

## 2021-01-01 PROCEDURE — 99204 OFFICE O/P NEW MOD 45 MIN: CPT

## 2021-01-01 PROCEDURE — 83880 ASSAY OF NATRIURETIC PEPTIDE: CPT

## 2021-01-01 PROCEDURE — 77001 FLUOROGUIDE FOR VEIN DEVICE: CPT

## 2021-01-01 PROCEDURE — 71250 CT THORAX DX C-: CPT | Mod: 26

## 2021-01-01 PROCEDURE — 97110 THERAPEUTIC EXERCISES: CPT

## 2021-01-01 PROCEDURE — 36430 TRANSFUSION BLD/BLD COMPNT: CPT

## 2021-01-01 PROCEDURE — 99497 ADVNCD CARE PLAN 30 MIN: CPT | Mod: 25

## 2021-01-01 PROCEDURE — 46600 DIAGNOSTIC ANOSCOPY SPX: CPT

## 2021-01-01 PROCEDURE — 99497 ADVNCD CARE PLAN 30 MIN: CPT

## 2021-01-01 PROCEDURE — 36415 COLL VENOUS BLD VENIPUNCTURE: CPT

## 2021-01-01 PROCEDURE — 76937 US GUIDE VASCULAR ACCESS: CPT | Mod: 26,GC

## 2021-01-01 PROCEDURE — 36248 INS CATH ABD/L-EXT ART ADDL: CPT | Mod: 59

## 2021-01-01 PROCEDURE — 99221 1ST HOSP IP/OBS SF/LOW 40: CPT

## 2021-01-01 PROCEDURE — 99223 1ST HOSP IP/OBS HIGH 75: CPT

## 2021-01-01 PROCEDURE — 93306 TTE W/DOPPLER COMPLETE: CPT | Mod: 26

## 2021-01-01 PROCEDURE — U0005: CPT

## 2021-01-01 PROCEDURE — 97116 GAIT TRAINING THERAPY: CPT

## 2021-01-01 PROCEDURE — C1889: CPT

## 2021-01-01 PROCEDURE — 88305 TISSUE EXAM BY PATHOLOGIST: CPT | Mod: 26

## 2021-01-01 PROCEDURE — C1894: CPT

## 2021-01-01 PROCEDURE — 71045 X-RAY EXAM CHEST 1 VIEW: CPT

## 2021-01-01 PROCEDURE — 99239 HOSP IP/OBS DSCHRG MGMT >30: CPT

## 2021-01-01 PROCEDURE — 85045 AUTOMATED RETICULOCYTE COUNT: CPT

## 2021-01-01 PROCEDURE — 99285 EMERGENCY DEPT VISIT HI MDM: CPT

## 2021-01-01 PROCEDURE — 80048 BASIC METABOLIC PNL TOTAL CA: CPT

## 2021-01-01 PROCEDURE — 93312 ECHO TRANSESOPHAGEAL: CPT

## 2021-01-01 PROCEDURE — 88342 IMHCHEM/IMCYTCHM 1ST ANTB: CPT | Mod: 26

## 2021-01-01 PROCEDURE — 43239 EGD BIOPSY SINGLE/MULTIPLE: CPT

## 2021-01-01 PROCEDURE — 86705 HEP B CORE ANTIBODY IGM: CPT

## 2021-01-01 PROCEDURE — 86769 SARS-COV-2 COVID-19 ANTIBODY: CPT

## 2021-01-01 PROCEDURE — 99152 MOD SED SAME PHYS/QHP 5/>YRS: CPT | Mod: GC

## 2021-01-01 PROCEDURE — 86900 BLOOD TYPING SEROLOGIC ABO: CPT

## 2021-01-01 PROCEDURE — 33289 TCAT IMPL WRLS P-ART PRS SNR: CPT

## 2021-01-01 PROCEDURE — 74183 MRI ABD W/O CNTR FLWD CNTR: CPT

## 2021-01-01 PROCEDURE — 96374 THER/PROPH/DIAG INJ IV PUSH: CPT

## 2021-01-01 PROCEDURE — 36558 INSERT TUNNELED CV CATH: CPT | Mod: GC

## 2021-01-01 PROCEDURE — 80053 COMPREHEN METABOLIC PANEL: CPT

## 2021-01-01 PROCEDURE — 85014 HEMATOCRIT: CPT

## 2021-01-01 PROCEDURE — 93010 ELECTROCARDIOGRAM REPORT: CPT

## 2021-01-01 PROCEDURE — 36247 INS CATH ABD/L-EXT ART 3RD: CPT

## 2021-01-01 PROCEDURE — 37243 VASC EMBOLIZE/OCCLUDE ORGAN: CPT

## 2021-01-01 PROCEDURE — 81001 URINALYSIS AUTO W/SCOPE: CPT

## 2021-01-01 PROCEDURE — 99223 1ST HOSP IP/OBS HIGH 75: CPT | Mod: GC

## 2021-01-01 PROCEDURE — 93306 TTE W/DOPPLER COMPLETE: CPT

## 2021-01-01 PROCEDURE — 80202 ASSAY OF VANCOMYCIN: CPT

## 2021-01-01 PROCEDURE — 76705 ECHO EXAM OF ABDOMEN: CPT | Mod: 26

## 2021-01-01 PROCEDURE — 99214 OFFICE O/P EST MOD 30 MIN: CPT | Mod: 25

## 2021-01-01 PROCEDURE — 99443: CPT

## 2021-01-01 PROCEDURE — 85730 THROMBOPLASTIN TIME PARTIAL: CPT

## 2021-01-01 PROCEDURE — 76937 US GUIDE VASCULAR ACCESS: CPT | Mod: 26

## 2021-01-01 PROCEDURE — 84484 ASSAY OF TROPONIN QUANT: CPT

## 2021-01-01 PROCEDURE — 87640 STAPH A DNA AMP PROBE: CPT

## 2021-01-01 PROCEDURE — 97163 PT EVAL HIGH COMPLEX 45 MIN: CPT

## 2021-01-01 PROCEDURE — 87040 BLOOD CULTURE FOR BACTERIA: CPT

## 2021-01-01 PROCEDURE — 83605 ASSAY OF LACTIC ACID: CPT

## 2021-01-01 PROCEDURE — 74176 CT ABD & PELVIS W/O CONTRAST: CPT

## 2021-01-01 PROCEDURE — 99233 SBSQ HOSP IP/OBS HIGH 50: CPT | Mod: GC

## 2021-01-01 PROCEDURE — A9585: CPT

## 2021-01-01 PROCEDURE — 0225U NFCT DS DNA&RNA 21 SARSCOV2: CPT

## 2021-01-01 PROCEDURE — 87340 HEPATITIS B SURFACE AG IA: CPT

## 2021-01-01 PROCEDURE — 99153 MOD SED SAME PHYS/QHP EA: CPT

## 2021-01-01 PROCEDURE — 86704 HEP B CORE ANTIBODY TOTAL: CPT

## 2021-01-01 PROCEDURE — 82962 GLUCOSE BLOOD TEST: CPT

## 2021-01-01 PROCEDURE — 85610 PROTHROMBIN TIME: CPT

## 2021-01-01 PROCEDURE — 88305 TISSUE EXAM BY PATHOLOGIST: CPT

## 2021-01-01 PROCEDURE — 83550 IRON BINDING TEST: CPT

## 2021-01-01 PROCEDURE — 36620 INSERTION CATHETER ARTERY: CPT

## 2021-01-01 PROCEDURE — 83540 ASSAY OF IRON: CPT

## 2021-01-01 PROCEDURE — 93005 ELECTROCARDIOGRAM TRACING: CPT

## 2021-01-01 PROCEDURE — 93325 DOPPLER ECHO COLOR FLOW MAPG: CPT

## 2021-01-01 PROCEDURE — 85027 COMPLETE CBC AUTOMATED: CPT

## 2021-01-01 PROCEDURE — C1750: CPT

## 2021-01-01 PROCEDURE — 76705 ECHO EXAM OF ABDOMEN: CPT

## 2021-01-01 PROCEDURE — 45388 COLONOSCOPY W/ABLATION: CPT

## 2021-01-01 PROCEDURE — 87077 CULTURE AEROBIC IDENTIFY: CPT

## 2021-01-01 PROCEDURE — 99205 OFFICE O/P NEW HI 60 MIN: CPT | Mod: 95

## 2021-01-01 PROCEDURE — 97530 THERAPEUTIC ACTIVITIES: CPT

## 2021-01-01 PROCEDURE — 80076 HEPATIC FUNCTION PANEL: CPT

## 2021-01-01 PROCEDURE — 86850 RBC ANTIBODY SCREEN: CPT

## 2021-01-01 PROCEDURE — 86706 HEP B SURFACE ANTIBODY: CPT

## 2021-01-01 PROCEDURE — 83036 HEMOGLOBIN GLYCOSYLATED A1C: CPT

## 2021-01-01 PROCEDURE — 99214 OFFICE O/P EST MOD 30 MIN: CPT

## 2021-01-01 PROCEDURE — 76380 CAT SCAN FOLLOW-UP STUDY: CPT | Mod: XU

## 2021-01-01 PROCEDURE — 94640 AIRWAY INHALATION TREATMENT: CPT

## 2021-01-01 PROCEDURE — 99285 EMERGENCY DEPT VISIT HI MDM: CPT | Mod: 25

## 2021-01-01 PROCEDURE — 83735 ASSAY OF MAGNESIUM: CPT

## 2021-01-01 PROCEDURE — C2624: CPT

## 2021-01-01 PROCEDURE — 99261: CPT

## 2021-01-01 PROCEDURE — 84100 ASSAY OF PHOSPHORUS: CPT

## 2021-01-01 PROCEDURE — 99213 OFFICE O/P EST LOW 20 MIN: CPT

## 2021-01-01 PROCEDURE — C1769: CPT

## 2021-01-01 PROCEDURE — C1887: CPT

## 2021-01-01 PROCEDURE — 93307 TTE W/O DOPPLER COMPLETE: CPT

## 2021-01-01 PROCEDURE — P9016: CPT

## 2021-01-01 PROCEDURE — 94660 CPAP INITIATION&MGMT: CPT

## 2021-01-01 PROCEDURE — 87086 URINE CULTURE/COLONY COUNT: CPT

## 2021-01-01 PROCEDURE — 87521 HEPATITIS C PROBE&RVRS TRNSC: CPT

## 2021-01-01 PROCEDURE — 82803 BLOOD GASES ANY COMBINATION: CPT

## 2021-01-01 PROCEDURE — 36245 INS CATH ABD/L-EXT ART 1ST: CPT | Mod: XS

## 2021-01-01 PROCEDURE — P9047: CPT

## 2021-01-01 PROCEDURE — 76978 US TRGT DYN MBUBB 1ST LES: CPT | Mod: 26

## 2021-01-01 PROCEDURE — 82728 ASSAY OF FERRITIN: CPT

## 2021-01-01 PROCEDURE — 71250 CT THORAX DX C-: CPT

## 2021-01-01 PROCEDURE — 88342 IMHCHEM/IMCYTCHM 1ST ANTB: CPT

## 2021-01-01 PROCEDURE — U0003: CPT

## 2021-01-01 PROCEDURE — G0463: CPT

## 2021-01-01 PROCEDURE — 76978 US TRGT DYN MBUBB 1ST LES: CPT

## 2021-01-01 PROCEDURE — 87186 SC STD MICRODIL/AGAR DIL: CPT

## 2021-01-01 PROCEDURE — 87150 DNA/RNA AMPLIFIED PROBE: CPT

## 2021-01-01 PROCEDURE — 74176 CT ABD & PELVIS W/O CONTRAST: CPT | Mod: 26

## 2021-01-01 PROCEDURE — 99443: CPT | Mod: 95

## 2021-01-01 PROCEDURE — 99239 HOSP IP/OBS DSCHRG MGMT >30: CPT | Mod: GC

## 2021-01-01 PROCEDURE — 93320 DOPPLER ECHO COMPLETE: CPT

## 2021-01-01 PROCEDURE — 99152 MOD SED SAME PHYS/QHP 5/>YRS: CPT

## 2021-01-01 PROCEDURE — 84466 ASSAY OF TRANSFERRIN: CPT

## 2021-01-01 PROCEDURE — 76380 CAT SCAN FOLLOW-UP STUDY: CPT | Mod: 26,XU

## 2021-01-01 PROCEDURE — 99222 1ST HOSP IP/OBS MODERATE 55: CPT

## 2021-01-01 RX ORDER — HYDRALAZINE HCL 50 MG
1 TABLET ORAL
Qty: 90 | Refills: 0
Start: 2021-01-01 | End: 2021-01-01

## 2021-01-01 RX ORDER — CHOLECALCIFEROL (VITAMIN D3) 125 MCG
1 CAPSULE ORAL
Qty: 0 | Refills: 0 | DISCHARGE

## 2021-01-01 RX ORDER — GABAPENTIN 400 MG/1
300 CAPSULE ORAL AT BEDTIME
Refills: 0 | Status: DISCONTINUED | OUTPATIENT
Start: 2021-01-01 | End: 2021-01-01

## 2021-01-01 RX ORDER — FOLIC ACID/VIT B COMPLEX AND C 0.8 MG
TABLET ORAL
Refills: 0 | Status: ACTIVE | COMMUNITY

## 2021-01-01 RX ORDER — DEXTROSE 50 % IN WATER 50 %
25 SYRINGE (ML) INTRAVENOUS ONCE
Refills: 0 | Status: DISCONTINUED | OUTPATIENT
Start: 2021-01-01 | End: 2021-01-01

## 2021-01-01 RX ORDER — AMITRIPTYLINE HCL 25 MG
40 TABLET ORAL AT BEDTIME
Refills: 0 | Status: DISCONTINUED | OUTPATIENT
Start: 2021-01-01 | End: 2021-01-01

## 2021-01-01 RX ORDER — PANTOPRAZOLE SODIUM 20 MG/1
40 TABLET, DELAYED RELEASE ORAL EVERY 12 HOURS
Refills: 0 | Status: DISCONTINUED | OUTPATIENT
Start: 2021-01-01 | End: 2021-01-01

## 2021-01-01 RX ORDER — MOMETASONE 50 UG/1
50 SPRAY, METERED NASAL DAILY
Qty: 1 | Refills: 5 | Status: DISCONTINUED | COMMUNITY
End: 2021-01-01

## 2021-01-01 RX ORDER — ACETAMINOPHEN 500 MG
650 TABLET ORAL ONCE
Refills: 0 | Status: COMPLETED | OUTPATIENT
Start: 2021-01-01 | End: 2021-01-01

## 2021-01-01 RX ORDER — HUMAN INSULIN 100 [IU]/ML
10 INJECTION, SUSPENSION SUBCUTANEOUS AT BEDTIME
Refills: 0 | Status: DISCONTINUED | OUTPATIENT
Start: 2021-01-01 | End: 2021-01-01

## 2021-01-01 RX ORDER — IPRATROPIUM/ALBUTEROL SULFATE 18-103MCG
3 AEROSOL WITH ADAPTER (GRAM) INHALATION
Refills: 0 | Status: DISCONTINUED | OUTPATIENT
Start: 2021-01-01 | End: 2021-01-01

## 2021-01-01 RX ORDER — DARBEPOETIN ALFA 10 UG/.4ML
10 INJECTION, SOLUTION INTRAVENOUS; SUBCUTANEOUS WEEKLY
Refills: 0 | Status: ACTIVE | COMMUNITY
Start: 2021-01-01

## 2021-01-01 RX ORDER — SODIUM CHLORIDE 9 MG/ML
1000 INJECTION INTRAMUSCULAR; INTRAVENOUS; SUBCUTANEOUS
Refills: 0 | Status: DISCONTINUED | OUTPATIENT
Start: 2021-01-01 | End: 2021-01-01

## 2021-01-01 RX ORDER — HYDRALAZINE HYDROCHLORIDE 25 MG/1
25 TABLET ORAL
Refills: 0 | Status: ACTIVE | COMMUNITY

## 2021-01-01 RX ORDER — PANTOPRAZOLE SODIUM 20 MG/1
40 TABLET, DELAYED RELEASE ORAL DAILY
Refills: 0 | Status: DISCONTINUED | OUTPATIENT
Start: 2021-01-01 | End: 2021-01-01

## 2021-01-01 RX ORDER — DIPHENHYDRAMINE HCL 50 MG
25 CAPSULE ORAL ONCE
Refills: 0 | Status: COMPLETED | OUTPATIENT
Start: 2021-01-01 | End: 2021-01-01

## 2021-01-01 RX ORDER — SODIUM ZIRCONIUM CYCLOSILICATE 10 G/10G
5 POWDER, FOR SUSPENSION ORAL DAILY
Refills: 0 | Status: DISCONTINUED | OUTPATIENT
Start: 2021-01-01 | End: 2021-01-01

## 2021-01-01 RX ORDER — AMITRIPTYLINE HCL 25 MG
37.5 TABLET ORAL AT BEDTIME
Refills: 0 | Status: COMPLETED | OUTPATIENT
Start: 2021-01-01 | End: 2021-01-01

## 2021-01-01 RX ORDER — INSULIN ASPART 100 [IU]/ML
0 INJECTION, SOLUTION SUBCUTANEOUS
Qty: 0 | Refills: 0 | DISCHARGE

## 2021-01-01 RX ORDER — CHOLECALCIFEROL (VITAMIN D3) 125 MCG
2000 CAPSULE ORAL DAILY
Refills: 0 | Status: DISCONTINUED | OUTPATIENT
Start: 2021-01-01 | End: 2021-01-01

## 2021-01-01 RX ORDER — ENOXAPARIN SODIUM 100 MG/ML
12 INJECTION SUBCUTANEOUS
Qty: 5 | Refills: 0
Start: 2021-01-01 | End: 2021-01-01

## 2021-01-01 RX ORDER — ERYTHROPOIETIN 10000 [IU]/ML
10000 INJECTION, SOLUTION INTRAVENOUS; SUBCUTANEOUS
Refills: 0 | Status: DISCONTINUED | OUTPATIENT
Start: 2021-01-01 | End: 2021-01-01

## 2021-01-01 RX ORDER — ALBUMIN HUMAN 25 %
50 VIAL (ML) INTRAVENOUS ONCE
Refills: 0 | Status: COMPLETED | OUTPATIENT
Start: 2021-01-01 | End: 2021-01-01

## 2021-01-01 RX ORDER — MIDODRINE HYDROCHLORIDE 2.5 MG/1
5 TABLET ORAL ONCE
Refills: 0 | Status: COMPLETED | OUTPATIENT
Start: 2021-01-01 | End: 2021-01-01

## 2021-01-01 RX ORDER — HYDROXYZINE HCL 10 MG
25 TABLET ORAL
Refills: 0 | Status: DISCONTINUED | OUTPATIENT
Start: 2021-01-01 | End: 2021-01-01

## 2021-01-01 RX ORDER — INSULIN GLARGINE 100 [IU]/ML
12 INJECTION, SOLUTION SUBCUTANEOUS AT BEDTIME
Refills: 0 | Status: DISCONTINUED | OUTPATIENT
Start: 2021-01-01 | End: 2021-01-01

## 2021-01-01 RX ORDER — HYDROMORPHONE HYDROCHLORIDE 2 MG/ML
0.5 INJECTION INTRAMUSCULAR; INTRAVENOUS; SUBCUTANEOUS
Refills: 0 | Status: DISCONTINUED | OUTPATIENT
Start: 2021-01-01 | End: 2021-01-01

## 2021-01-01 RX ORDER — INSULIN GLARGINE 100 [IU]/ML
10 INJECTION, SOLUTION SUBCUTANEOUS AT BEDTIME
Refills: 0 | Status: DISCONTINUED | OUTPATIENT
Start: 2021-01-01 | End: 2021-01-01

## 2021-01-01 RX ORDER — DAPTOMYCIN 500 MG/10ML
600 INJECTION, POWDER, LYOPHILIZED, FOR SOLUTION INTRAVENOUS
Refills: 0 | Status: DISCONTINUED | OUTPATIENT
Start: 2021-01-01 | End: 2021-01-01

## 2021-01-01 RX ORDER — SODIUM POLYSTYRENE SULFONATE 15 G/60ML
15 SUSPENSION ORAL; RECTAL
Qty: 360 | Refills: 0 | Status: DISCONTINUED | COMMUNITY
Start: 2020-02-13 | End: 2021-01-01

## 2021-01-01 RX ORDER — CARVEDILOL 6.25 MG/1
6.25 TABLET, FILM COATED ORAL TWICE DAILY
Refills: 0 | Status: DISCONTINUED | COMMUNITY
Start: 2019-08-22 | End: 2021-01-01

## 2021-01-01 RX ORDER — FUROSEMIDE 40 MG
20 TABLET ORAL ONCE
Refills: 0 | Status: COMPLETED | OUTPATIENT
Start: 2021-01-01 | End: 2021-01-01

## 2021-01-01 RX ORDER — IRON SUCROSE 20 MG/ML
250 INJECTION, SOLUTION INTRAVENOUS
Refills: 0 | Status: COMPLETED | OUTPATIENT
Start: 2021-01-01 | End: 2021-01-01

## 2021-01-01 RX ORDER — MONTELUKAST 4 MG/1
10 TABLET, CHEWABLE ORAL AT BEDTIME
Refills: 0 | Status: DISCONTINUED | OUTPATIENT
Start: 2021-01-01 | End: 2021-01-01

## 2021-01-01 RX ORDER — AMLODIPINE BESYLATE 10 MG/1
10 TABLET ORAL
Refills: 0 | Status: DISCONTINUED | COMMUNITY
Start: 2017-02-17 | End: 2021-01-01

## 2021-01-01 RX ORDER — MESALAMINE 1000 MG/1
1000 SUPPOSITORY RECTAL
Qty: 30 | Refills: 0 | Status: DISCONTINUED | COMMUNITY
Start: 2019-11-21 | End: 2021-01-01

## 2021-01-01 RX ORDER — SEVELAMER CARBONATE 2400 MG/1
800 POWDER, FOR SUSPENSION ORAL
Refills: 0 | Status: DISCONTINUED | OUTPATIENT
Start: 2021-01-01 | End: 2021-01-01

## 2021-01-01 RX ORDER — LABETALOL HCL 100 MG
10 TABLET ORAL ONCE
Refills: 0 | Status: COMPLETED | OUTPATIENT
Start: 2021-01-01 | End: 2021-01-01

## 2021-01-01 RX ORDER — INSULIN LISPRO 100/ML
VIAL (ML) SUBCUTANEOUS
Refills: 0 | Status: DISCONTINUED | OUTPATIENT
Start: 2021-01-01 | End: 2021-01-01

## 2021-01-01 RX ORDER — SODIUM CHLORIDE 9 MG/ML
2200 INJECTION, SOLUTION INTRAVENOUS ONCE
Refills: 0 | Status: COMPLETED | OUTPATIENT
Start: 2021-01-01 | End: 2021-01-01

## 2021-01-01 RX ORDER — LEVOTHYROXINE SODIUM 125 MCG
100 TABLET ORAL DAILY
Refills: 0 | Status: DISCONTINUED | OUTPATIENT
Start: 2021-01-01 | End: 2021-01-01

## 2021-01-01 RX ORDER — CHLORHEXIDINE GLUCONATE 213 G/1000ML
1 SOLUTION TOPICAL DAILY
Refills: 0 | Status: DISCONTINUED | OUTPATIENT
Start: 2021-01-01 | End: 2021-01-01

## 2021-01-01 RX ORDER — LANOLIN ALCOHOL/MO/W.PET/CERES
5 CREAM (GRAM) TOPICAL AT BEDTIME
Refills: 0 | Status: COMPLETED | OUTPATIENT
Start: 2021-01-01 | End: 2021-01-01

## 2021-01-01 RX ORDER — DEXTROSE 50 % IN WATER 50 %
15 SYRINGE (ML) INTRAVENOUS ONCE
Refills: 0 | Status: DISCONTINUED | OUTPATIENT
Start: 2021-01-01 | End: 2021-01-01

## 2021-01-01 RX ORDER — VANCOMYCIN HCL 1 G
500 VIAL (EA) INTRAVENOUS ONCE
Refills: 0 | Status: COMPLETED | OUTPATIENT
Start: 2021-01-01 | End: 2021-01-01

## 2021-01-01 RX ORDER — GLUCAGON INJECTION, SOLUTION 0.5 MG/.1ML
1 INJECTION, SOLUTION SUBCUTANEOUS ONCE
Refills: 0 | Status: DISCONTINUED | OUTPATIENT
Start: 2021-01-01 | End: 2021-01-01

## 2021-01-01 RX ORDER — INSULIN LISPRO 100/ML
6 VIAL (ML) SUBCUTANEOUS ONCE
Refills: 0 | Status: COMPLETED | OUTPATIENT
Start: 2021-01-01 | End: 2021-01-01

## 2021-01-01 RX ORDER — PRAMOXINE HYDROCHLORIDE 150 MG/15G
1 AEROSOL, FOAM RECTAL TWICE DAILY
Qty: 1 | Refills: 0 | Status: DISCONTINUED | COMMUNITY
Start: 2020-01-17 | End: 2021-01-01

## 2021-01-01 RX ORDER — ATORVASTATIN CALCIUM 40 MG/1
40 TABLET, FILM COATED ORAL
Refills: 0 | Status: ACTIVE | COMMUNITY

## 2021-01-01 RX ORDER — INSULIN GLARGINE 100 [IU]/ML
14 INJECTION, SOLUTION SUBCUTANEOUS AT BEDTIME
Refills: 0 | Status: DISCONTINUED | OUTPATIENT
Start: 2021-01-01 | End: 2021-01-01

## 2021-01-01 RX ORDER — HYDRALAZINE HCL 50 MG
25 TABLET ORAL EVERY 8 HOURS
Refills: 0 | Status: DISCONTINUED | OUTPATIENT
Start: 2021-01-01 | End: 2021-01-01

## 2021-01-01 RX ORDER — SODIUM CHLORIDE 9 MG/ML
10 INJECTION INTRAMUSCULAR; INTRAVENOUS; SUBCUTANEOUS
Refills: 0 | Status: DISCONTINUED | OUTPATIENT
Start: 2021-01-01 | End: 2021-01-01

## 2021-01-01 RX ORDER — DOCUSATE SODIUM 100 MG
2 CAPSULE ORAL
Qty: 0 | Refills: 0 | DISCHARGE

## 2021-01-01 RX ORDER — HYDROMORPHONE HYDROCHLORIDE 2 MG/ML
0 INJECTION INTRAMUSCULAR; INTRAVENOUS; SUBCUTANEOUS
Qty: 0 | Refills: 0 | DISCHARGE
Start: 2021-01-01

## 2021-01-01 RX ORDER — VANCOMYCIN HCL 1 G
1000 VIAL (EA) INTRAVENOUS
Refills: 0 | Status: DISCONTINUED | OUTPATIENT
Start: 2021-01-01 | End: 2021-01-01

## 2021-01-01 RX ORDER — ATORVASTATIN CALCIUM 80 MG/1
40 TABLET, FILM COATED ORAL AT BEDTIME
Refills: 0 | Status: DISCONTINUED | OUTPATIENT
Start: 2021-01-01 | End: 2021-01-01

## 2021-01-01 RX ORDER — AMLODIPINE BESYLATE 2.5 MG/1
5 TABLET ORAL ONCE
Refills: 0 | Status: COMPLETED | OUTPATIENT
Start: 2021-01-01 | End: 2021-01-01

## 2021-01-01 RX ORDER — DIPHENHYDRAMINE HCL 50 MG
25 CAPSULE ORAL EVERY 6 HOURS
Refills: 0 | Status: DISCONTINUED | OUTPATIENT
Start: 2021-01-01 | End: 2021-01-01

## 2021-01-01 RX ORDER — SENNA PLUS 8.6 MG/1
2 TABLET ORAL AT BEDTIME
Refills: 0 | Status: DISCONTINUED | OUTPATIENT
Start: 2021-01-01 | End: 2021-01-01

## 2021-01-01 RX ORDER — LORATADINE 10 MG/1
10 TABLET ORAL DAILY
Refills: 0 | Status: DISCONTINUED | OUTPATIENT
Start: 2021-01-01 | End: 2021-01-01

## 2021-01-01 RX ORDER — OMEGA-3/DHA/EPA/FISH OIL 300-1000MG
400 CAPSULE ORAL
Refills: 0 | Status: DISCONTINUED | COMMUNITY
Start: 2018-02-07 | End: 2021-01-01

## 2021-01-01 RX ORDER — INSULIN LISPRO 100 [IU]/ML
100 INJECTION, SOLUTION INTRAVENOUS; SUBCUTANEOUS
Refills: 0 | Status: DISCONTINUED | COMMUNITY
End: 2021-01-01

## 2021-01-01 RX ORDER — SODIUM CHLORIDE 9 MG/ML
1000 INJECTION, SOLUTION INTRAVENOUS
Refills: 0 | Status: DISCONTINUED | OUTPATIENT
Start: 2021-01-01 | End: 2021-01-01

## 2021-01-01 RX ORDER — FOLIC ACID 0.8 MG
1 TABLET ORAL DAILY
Refills: 0 | Status: DISCONTINUED | OUTPATIENT
Start: 2021-01-01 | End: 2021-01-01

## 2021-01-01 RX ORDER — HYDROXYZINE HCL 10 MG
1 TABLET ORAL
Qty: 0 | Refills: 0 | DISCHARGE
Start: 2021-01-01

## 2021-01-01 RX ORDER — TUBERCULIN PURIFIED PROTEIN DERIVATIVE 5 [IU]/.1ML
5 INJECTION, SOLUTION INTRADERMAL ONCE
Refills: 0 | Status: COMPLETED | OUTPATIENT
Start: 2021-01-01 | End: 2021-01-01

## 2021-01-01 RX ORDER — HYDROMORPHONE HYDROCHLORIDE 2 MG/ML
0.5 INJECTION INTRAMUSCULAR; INTRAVENOUS; SUBCUTANEOUS ONCE
Refills: 0 | Status: DISCONTINUED | OUTPATIENT
Start: 2021-01-01 | End: 2021-01-01

## 2021-01-01 RX ORDER — SODIUM BICARBONATE 1 MEQ/ML
650 SYRINGE (ML) INTRAVENOUS THREE TIMES A DAY
Refills: 0 | Status: DISCONTINUED | OUTPATIENT
Start: 2021-01-01 | End: 2021-01-01

## 2021-01-01 RX ORDER — ACETAMINOPHEN 500 MG
650 TABLET ORAL EVERY 6 HOURS
Refills: 0 | Status: DISCONTINUED | OUTPATIENT
Start: 2021-01-01 | End: 2021-01-01

## 2021-01-01 RX ORDER — FUROSEMIDE 40 MG
40 TABLET ORAL
Refills: 0 | Status: DISCONTINUED | OUTPATIENT
Start: 2021-01-01 | End: 2021-01-01

## 2021-01-01 RX ORDER — HYDROCORTISONE 2.5% 25 MG/G
2.5 CREAM TOPICAL TWICE DAILY
Qty: 2 | Refills: 0 | Status: DISCONTINUED | COMMUNITY
Start: 2020-04-13 | End: 2021-01-01

## 2021-01-01 RX ORDER — PIPERACILLIN AND TAZOBACTAM 4; .5 G/20ML; G/20ML
3.38 INJECTION, POWDER, LYOPHILIZED, FOR SOLUTION INTRAVENOUS EVERY 12 HOURS
Refills: 0 | Status: DISCONTINUED | OUTPATIENT
Start: 2021-01-01 | End: 2021-01-01

## 2021-01-01 RX ORDER — AMLODIPINE BESYLATE 2.5 MG/1
5 TABLET ORAL DAILY
Refills: 0 | Status: DISCONTINUED | OUTPATIENT
Start: 2021-01-01 | End: 2021-01-01

## 2021-01-01 RX ORDER — CARVEDILOL PHOSPHATE 80 MG/1
6.25 CAPSULE, EXTENDED RELEASE ORAL EVERY 12 HOURS
Refills: 0 | Status: DISCONTINUED | OUTPATIENT
Start: 2021-01-01 | End: 2021-01-01

## 2021-01-01 RX ORDER — ATORVASTATIN CALCIUM 80 MG/1
1 TABLET, FILM COATED ORAL
Qty: 30 | Refills: 0
Start: 2021-01-01 | End: 2021-01-01

## 2021-01-01 RX ORDER — PANTOPRAZOLE SODIUM 20 MG/1
40 TABLET, DELAYED RELEASE ORAL
Qty: 0 | Refills: 0 | DISCHARGE
Start: 2021-01-01

## 2021-01-01 RX ORDER — SOD SULF/SODIUM/NAHCO3/KCL/PEG
4000 SOLUTION, RECONSTITUTED, ORAL ORAL ONCE
Refills: 0 | Status: DISCONTINUED | OUTPATIENT
Start: 2021-01-01 | End: 2021-01-01

## 2021-01-01 RX ORDER — AMITRIPTYLINE HYDROCHLORIDE 10 MG/1
10 TABLET, FILM COATED ORAL EVERY 6 HOURS
Refills: 0 | Status: ACTIVE | COMMUNITY
Start: 2021-01-01

## 2021-01-01 RX ORDER — SPIRONOLACTONE 25 MG/1
12.5 TABLET, FILM COATED ORAL DAILY
Refills: 0 | Status: DISCONTINUED | OUTPATIENT
Start: 2021-01-01 | End: 2021-01-01

## 2021-01-01 RX ORDER — ACETAMINOPHEN 500 MG
325 TABLET ORAL EVERY 4 HOURS
Refills: 0 | Status: DISCONTINUED | OUTPATIENT
Start: 2021-01-01 | End: 2021-01-01

## 2021-01-01 RX ORDER — FUROSEMIDE 40 MG
80 TABLET ORAL
Refills: 0 | Status: DISCONTINUED | OUTPATIENT
Start: 2021-01-01 | End: 2021-01-01

## 2021-01-01 RX ORDER — OXYCODONE HYDROCHLORIDE 5 MG/1
5 TABLET ORAL EVERY 6 HOURS
Refills: 0 | Status: DISCONTINUED | OUTPATIENT
Start: 2021-01-01 | End: 2021-01-01

## 2021-01-01 RX ORDER — BENZONATATE 100 MG/1
100 CAPSULE ORAL
Qty: 45 | Refills: 0 | Status: DISCONTINUED | COMMUNITY
Start: 2020-01-09 | End: 2021-01-01

## 2021-01-01 RX ORDER — DEXTROSE 50 % IN WATER 50 %
12.5 SYRINGE (ML) INTRAVENOUS ONCE
Refills: 0 | Status: DISCONTINUED | OUTPATIENT
Start: 2021-01-01 | End: 2021-01-01

## 2021-01-01 RX ORDER — CETIRIZINE HYDROCHLORIDE 10 MG/1
1 TABLET ORAL
Qty: 0 | Refills: 0 | DISCHARGE

## 2021-01-01 RX ORDER — PRAVASTATIN SODIUM 80 MG/1
80 TABLET ORAL DAILY
Refills: 0 | Status: DISCONTINUED | COMMUNITY
Start: 2018-02-07 | End: 2021-01-01

## 2021-01-01 RX ORDER — FUROSEMIDE 40 MG
60 TABLET ORAL
Refills: 0 | Status: DISCONTINUED | OUTPATIENT
Start: 2021-01-01 | End: 2021-01-01

## 2021-01-01 RX ORDER — PSYLLIUM SEED (WITH DEXTROSE)
2 POWDER (GRAM) ORAL
Qty: 0 | Refills: 0 | DISCHARGE

## 2021-01-01 RX ORDER — INSULIN ASPART 100 [IU]/ML
INJECTION, SOLUTION INTRAVENOUS; SUBCUTANEOUS
Refills: 0 | Status: ACTIVE | COMMUNITY

## 2021-01-01 RX ORDER — MORPHINE SULFATE 50 MG/1
2 CAPSULE, EXTENDED RELEASE ORAL EVERY 4 HOURS
Refills: 0 | Status: DISCONTINUED | OUTPATIENT
Start: 2021-01-01 | End: 2021-01-01

## 2021-01-01 RX ORDER — AMITRIPTYLINE HCL 25 MG
4 TABLET ORAL
Qty: 0 | Refills: 0 | DISCHARGE

## 2021-01-01 RX ORDER — INSULIN LISPRO 100/ML
VIAL (ML) SUBCUTANEOUS AT BEDTIME
Refills: 0 | Status: DISCONTINUED | OUTPATIENT
Start: 2021-01-01 | End: 2021-01-01

## 2021-01-01 RX ORDER — PANTOPRAZOLE 40 MG/1
40 TABLET, DELAYED RELEASE ORAL DAILY
Refills: 0 | Status: ACTIVE | COMMUNITY

## 2021-01-01 RX ORDER — SPIRONOLACTONE 25 MG/1
25 TABLET ORAL
Refills: 0 | Status: DISCONTINUED | COMMUNITY
End: 2021-01-01

## 2021-01-01 RX ORDER — ROBINUL 0.2 MG/ML
0.4 INJECTION INTRAMUSCULAR; INTRAVENOUS
Refills: 0 | Status: DISCONTINUED | OUTPATIENT
Start: 2021-01-01 | End: 2021-01-01

## 2021-01-01 RX ORDER — INSULIN GLARGINE 100 [IU]/ML
8 INJECTION, SOLUTION SUBCUTANEOUS AT BEDTIME
Refills: 0 | Status: DISCONTINUED | OUTPATIENT
Start: 2021-01-01 | End: 2021-01-01

## 2021-01-01 RX ORDER — ACETAMINOPHEN 500 MG
1000 TABLET ORAL ONCE
Refills: 0 | Status: COMPLETED | OUTPATIENT
Start: 2021-01-01 | End: 2021-01-01

## 2021-01-01 RX ORDER — ATORVASTATIN CALCIUM 80 MG/1
20 TABLET, FILM COATED ORAL AT BEDTIME
Refills: 0 | Status: DISCONTINUED | OUTPATIENT
Start: 2021-01-01 | End: 2021-01-01

## 2021-01-01 RX ORDER — ALBUTEROL SULFATE 2.5 MG/3ML
(2.5 MG/3ML) SOLUTION RESPIRATORY (INHALATION)
Refills: 5 | Status: ACTIVE | COMMUNITY
Start: 2018-02-07

## 2021-01-01 RX ORDER — HYDROCORTISONE 2.5% 25 MG/G
2.5 CREAM TOPICAL
Qty: 30 | Refills: 0 | Status: DISCONTINUED | COMMUNITY
Start: 2020-01-03 | End: 2021-01-01

## 2021-01-01 RX ORDER — VANCOMYCIN HCL 1 G
1000 VIAL (EA) INTRAVENOUS ONCE
Refills: 0 | Status: COMPLETED | OUTPATIENT
Start: 2021-01-01 | End: 2021-01-01

## 2021-01-01 RX ORDER — FOLIC ACID 1 MG/1
1 TABLET ORAL DAILY
Refills: 0 | Status: ACTIVE | COMMUNITY

## 2021-01-01 RX ORDER — ALPRAZOLAM 0.25 MG
0.25 TABLET ORAL ONCE
Refills: 0 | Status: DISCONTINUED | OUTPATIENT
Start: 2021-01-01 | End: 2021-01-01

## 2021-01-01 RX ORDER — METOPROLOL TARTRATE 50 MG
1 TABLET ORAL
Qty: 30 | Refills: 0
Start: 2021-01-01 | End: 2021-01-01

## 2021-01-01 RX ORDER — ALBUTEROL 90 UG/1
2.5 AEROSOL, METERED ORAL ONCE
Refills: 0 | Status: DISCONTINUED | OUTPATIENT
Start: 2021-01-01 | End: 2021-01-01

## 2021-01-01 RX ORDER — INSULIN GLARGINE 100 [IU]/ML
20 INJECTION, SOLUTION SUBCUTANEOUS AT BEDTIME
Refills: 0 | Status: DISCONTINUED | OUTPATIENT
Start: 2021-01-01 | End: 2021-01-01

## 2021-01-01 RX ORDER — ALBUTEROL 90 UG/1
2.5 AEROSOL, METERED ORAL EVERY 6 HOURS
Refills: 0 | Status: DISCONTINUED | OUTPATIENT
Start: 2021-01-01 | End: 2021-01-01

## 2021-01-01 RX ORDER — ALBUTEROL 90 UG/1
2 AEROSOL, METERED ORAL
Qty: 0 | Refills: 0 | DISCHARGE

## 2021-01-01 RX ORDER — LANOLIN ALCOHOL/MO/W.PET/CERES
3 CREAM (GRAM) TOPICAL ONCE
Refills: 0 | Status: COMPLETED | OUTPATIENT
Start: 2021-01-01 | End: 2021-01-01

## 2021-01-01 RX ORDER — CHLORHEXIDINE GLUCONATE 213 G/1000ML
1 SOLUTION TOPICAL
Refills: 0 | Status: DISCONTINUED | OUTPATIENT
Start: 2021-01-01 | End: 2021-01-01

## 2021-01-01 RX ORDER — ERYTHROPOIETIN 10000 [IU]/ML
20000 INJECTION, SOLUTION INTRAVENOUS; SUBCUTANEOUS
Refills: 0 | Status: DISCONTINUED | OUTPATIENT
Start: 2021-01-01 | End: 2021-01-01

## 2021-01-01 RX ORDER — SODIUM CHLORIDE 9 MG/ML
250 INJECTION INTRAMUSCULAR; INTRAVENOUS; SUBCUTANEOUS ONCE
Refills: 0 | Status: COMPLETED | OUTPATIENT
Start: 2021-01-01 | End: 2021-01-01

## 2021-01-01 RX ORDER — INSULIN DEGLUDEC 100 U/ML
10 INJECTION, SOLUTION SUBCUTANEOUS
Qty: 0 | Refills: 0 | DISCHARGE

## 2021-01-01 RX ORDER — INSULIN DEGLUDEC 100 U/ML
15 INJECTION, SOLUTION SUBCUTANEOUS
Qty: 0 | Refills: 0 | DISCHARGE

## 2021-01-01 RX ORDER — INSULIN GLARGINE 100 [IU]/ML
100 INJECTION, SOLUTION SUBCUTANEOUS
Refills: 0 | Status: ACTIVE | COMMUNITY

## 2021-01-01 RX ORDER — POTASSIUM CHLORIDE 20 MEQ
40 PACKET (EA) ORAL ONCE
Refills: 0 | Status: COMPLETED | OUTPATIENT
Start: 2021-01-01 | End: 2021-01-01

## 2021-01-01 RX ORDER — LEVOTHYROXINE SODIUM 0.1 MG/1
100 TABLET ORAL DAILY
Refills: 0 | Status: ACTIVE | COMMUNITY

## 2021-01-01 RX ORDER — PANTOPRAZOLE SODIUM 20 MG/1
40 TABLET, DELAYED RELEASE ORAL
Refills: 0 | Status: DISCONTINUED | OUTPATIENT
Start: 2021-01-01 | End: 2021-01-01

## 2021-01-01 RX ORDER — HEPARIN SODIUM 5000 [USP'U]/ML
5000 INJECTION INTRAVENOUS; SUBCUTANEOUS EVERY 8 HOURS
Refills: 0 | Status: DISCONTINUED | OUTPATIENT
Start: 2021-01-01 | End: 2021-01-01

## 2021-01-01 RX ORDER — CHOLECALCIFEROL (VITAMIN D3) 125 MCG
0 CAPSULE ORAL
Qty: 0 | Refills: 0 | DISCHARGE

## 2021-01-01 RX ORDER — METOPROLOL TARTRATE 50 MG
25 TABLET ORAL EVERY 6 HOURS
Refills: 0 | Status: DISCONTINUED | OUTPATIENT
Start: 2021-01-01 | End: 2021-01-01

## 2021-01-01 RX ORDER — BENZOCAINE AND MENTHOL 5; 1 G/100ML; G/100ML
1 LIQUID ORAL
Refills: 0 | Status: DISCONTINUED | OUTPATIENT
Start: 2021-01-01 | End: 2021-01-01

## 2021-01-01 RX ORDER — CHOLECALCIFEROL (VITAMIN D3) 25 MCG
25 MCG TABLET ORAL
Refills: 0 | Status: DISCONTINUED | COMMUNITY
End: 2021-01-01

## 2021-01-01 RX ORDER — INSULIN DEGLUDEC INJECTION 100 U/ML
100 INJECTION, SOLUTION SUBCUTANEOUS AT BEDTIME
Refills: 0 | Status: DISCONTINUED | COMMUNITY
Start: 2017-04-10 | End: 2021-01-01

## 2021-01-01 RX ORDER — DARBEPOETIN ALFA IN POLYSORBAT 200MCG/0.4
0 PEN INJECTOR (ML) SUBCUTANEOUS
Qty: 0 | Refills: 0 | DISCHARGE

## 2021-01-01 RX ORDER — PANTOPRAZOLE SODIUM 20 MG/1
1 TABLET, DELAYED RELEASE ORAL
Qty: 0 | Refills: 0 | DISCHARGE

## 2021-01-01 RX ORDER — MONTELUKAST SODIUM 10 MG/1
10 TABLET, FILM COATED ORAL DAILY
Refills: 0 | Status: ACTIVE | COMMUNITY
Start: 2021-01-01

## 2021-01-01 RX ORDER — LACTOBACILLUS ACIDOPHILUS 100MM CELL
1 CAPSULE ORAL
Refills: 0 | Status: DISCONTINUED | OUTPATIENT
Start: 2021-01-01 | End: 2021-01-01

## 2021-01-01 RX ORDER — TUBERCULIN PURIFIED PROTEIN DERIVATIVE 5 [IU]/.1ML
5 INJECTION, SOLUTION INTRADERMAL ONCE
Refills: 0 | Status: DISCONTINUED | OUTPATIENT
Start: 2021-01-01 | End: 2021-01-01

## 2021-01-01 RX ORDER — MAGNESIUM OXIDE 400 MG ORAL TABLET 241.3 MG
1 TABLET ORAL
Qty: 0 | Refills: 0 | DISCHARGE

## 2021-01-01 RX ORDER — ONDANSETRON 8 MG/1
4 TABLET, FILM COATED ORAL ONCE
Refills: 0 | Status: COMPLETED | OUTPATIENT
Start: 2021-01-01 | End: 2021-01-01

## 2021-01-01 RX ORDER — IPRATROPIUM/ALBUTEROL SULFATE 18-103MCG
3 AEROSOL WITH ADAPTER (GRAM) INHALATION EVERY 6 HOURS
Refills: 0 | Status: DISCONTINUED | OUTPATIENT
Start: 2021-01-01 | End: 2021-01-01

## 2021-01-01 RX ORDER — SODIUM BICARBONATE 1 MEQ/ML
1300 SYRINGE (ML) INTRAVENOUS
Refills: 0 | Status: DISCONTINUED | OUTPATIENT
Start: 2021-01-01 | End: 2021-01-01

## 2021-01-01 RX ORDER — FUROSEMIDE 40 MG
40 TABLET ORAL ONCE
Refills: 0 | Status: COMPLETED | OUTPATIENT
Start: 2021-01-01 | End: 2021-01-01

## 2021-01-01 RX ORDER — HEPARIN SODIUM 5000 [USP'U]/ML
5000 INJECTION INTRAVENOUS; SUBCUTANEOUS EVERY 12 HOURS
Refills: 0 | Status: DISCONTINUED | OUTPATIENT
Start: 2021-01-01 | End: 2021-01-01

## 2021-01-01 RX ORDER — SOD SULF/SODIUM/NAHCO3/KCL/PEG
2000 SOLUTION, RECONSTITUTED, ORAL ORAL ONCE
Refills: 0 | Status: COMPLETED | OUTPATIENT
Start: 2021-01-01 | End: 2021-01-01

## 2021-01-01 RX ORDER — POLYETHYLENE GLYCOL 3350, SODIUM SULFATE, SODIUM CHLORIDE, POTASSIUM CHLORIDE, ASCORBIC ACID, SODIUM ASCORBATE 7.5-2.691G
100 KIT ORAL
Qty: 1 | Refills: 0 | Status: DISCONTINUED | COMMUNITY
Start: 2020-06-12 | End: 2021-01-01

## 2021-01-01 RX ORDER — DOCUSATE SODIUM 100 MG
1 CAPSULE ORAL
Qty: 0 | Refills: 0 | DISCHARGE

## 2021-01-01 RX ORDER — NYSTATIN 100000 [USP'U]/ML
100000 SUSPENSION ORAL
Qty: 180 | Refills: 0 | Status: DISCONTINUED | COMMUNITY
Start: 2020-01-22 | End: 2021-01-01

## 2021-01-01 RX ORDER — INSULIN DEGLUDEC 100 U/ML
20 INJECTION, SOLUTION SUBCUTANEOUS
Qty: 0 | Refills: 0 | DISCHARGE

## 2021-01-01 RX ORDER — PSYLLIUM SEED (WITH DEXTROSE)
0 POWDER (GRAM) ORAL
Qty: 0 | Refills: 0 | DISCHARGE

## 2021-01-01 RX ORDER — L.ACIDOPH/B.ANIMALIS/B.LONGUM 15B CELL
1 CAPSULE ORAL
Qty: 0 | Refills: 0 | DISCHARGE

## 2021-01-01 RX ORDER — METOPROLOL SUCCINATE 100 MG/1
100 TABLET, EXTENDED RELEASE ORAL
Refills: 0 | Status: ACTIVE | COMMUNITY

## 2021-01-01 RX ORDER — PROCHLORPERAZINE MALEATE 5 MG
2.5 TABLET ORAL ONCE
Refills: 0 | Status: COMPLETED | OUTPATIENT
Start: 2021-01-01 | End: 2021-01-01

## 2021-01-01 RX ORDER — AMLODIPINE BESYLATE 5 MG/1
5 TABLET ORAL DAILY
Refills: 0 | Status: DISCONTINUED | COMMUNITY
Start: 2020-01-15 | End: 2021-01-01

## 2021-01-01 RX ORDER — ALBUTEROL 90 UG/1
3 AEROSOL, METERED ORAL
Qty: 0 | Refills: 0 | DISCHARGE

## 2021-01-01 RX ORDER — SODIUM ZIRCONIUM CYCLOSILICATE 10 G/10G
5 POWDER, FOR SUSPENSION ORAL ONCE
Refills: 0 | Status: COMPLETED | OUTPATIENT
Start: 2021-01-01 | End: 2021-01-01

## 2021-01-01 RX ORDER — PIPERACILLIN AND TAZOBACTAM 4; .5 G/20ML; G/20ML
3.38 INJECTION, POWDER, LYOPHILIZED, FOR SOLUTION INTRAVENOUS ONCE
Refills: 0 | Status: COMPLETED | OUTPATIENT
Start: 2021-01-01 | End: 2021-01-01

## 2021-01-01 RX ORDER — AMOXICILLIN AND CLAVULANATE POTASSIUM 875; 125 MG/1; MG/1
875-125 TABLET, COATED ORAL
Qty: 14 | Refills: 0 | Status: DISCONTINUED | COMMUNITY
Start: 2020-01-16 | End: 2021-01-01

## 2021-01-01 RX ADMIN — Medication 25 MILLIGRAM(S): at 12:32

## 2021-01-01 RX ADMIN — Medication 100 MICROGRAM(S): at 05:43

## 2021-01-01 RX ADMIN — Medication 25 MILLIGRAM(S): at 21:55

## 2021-01-01 RX ADMIN — Medication 25 MILLIGRAM(S): at 14:41

## 2021-01-01 RX ADMIN — Medication 25 MILLIGRAM(S): at 21:48

## 2021-01-01 RX ADMIN — GABAPENTIN 300 MILLIGRAM(S): 400 CAPSULE ORAL at 22:37

## 2021-01-01 RX ADMIN — Medication 1 MILLIGRAM(S): at 13:08

## 2021-01-01 RX ADMIN — Medication 25 MILLIGRAM(S): at 05:24

## 2021-01-01 RX ADMIN — Medication 2000 UNIT(S): at 12:27

## 2021-01-01 RX ADMIN — GABAPENTIN 300 MILLIGRAM(S): 400 CAPSULE ORAL at 00:12

## 2021-01-01 RX ADMIN — Medication 2000 UNIT(S): at 13:01

## 2021-01-01 RX ADMIN — Medication 2000 MILLILITER(S): at 18:06

## 2021-01-01 RX ADMIN — Medication 2000 UNIT(S): at 13:35

## 2021-01-01 RX ADMIN — BENZOCAINE AND MENTHOL 1 LOZENGE: 5; 1 LIQUID ORAL at 13:34

## 2021-01-01 RX ADMIN — ATORVASTATIN CALCIUM 40 MILLIGRAM(S): 80 TABLET, FILM COATED ORAL at 21:50

## 2021-01-01 RX ADMIN — GABAPENTIN 300 MILLIGRAM(S): 400 CAPSULE ORAL at 21:56

## 2021-01-01 RX ADMIN — Medication 25 MILLIGRAM(S): at 22:11

## 2021-01-01 RX ADMIN — INSULIN GLARGINE 12 UNIT(S): 100 INJECTION, SOLUTION SUBCUTANEOUS at 22:28

## 2021-01-01 RX ADMIN — Medication 25 MILLIGRAM(S): at 20:10

## 2021-01-01 RX ADMIN — Medication 20 MILLIGRAM(S): at 06:25

## 2021-01-01 RX ADMIN — Medication 100 MILLIGRAM(S): at 14:17

## 2021-01-01 RX ADMIN — Medication 1: at 17:45

## 2021-01-01 RX ADMIN — Medication 25 MILLIGRAM(S): at 11:51

## 2021-01-01 RX ADMIN — Medication 1 TABLET(S): at 11:51

## 2021-01-01 RX ADMIN — Medication 25 MILLIGRAM(S): at 22:33

## 2021-01-01 RX ADMIN — GABAPENTIN 300 MILLIGRAM(S): 400 CAPSULE ORAL at 21:30

## 2021-01-01 RX ADMIN — Medication 1 MILLIGRAM(S): at 21:59

## 2021-01-01 RX ADMIN — Medication 25 MILLIGRAM(S): at 16:40

## 2021-01-01 RX ADMIN — ATORVASTATIN CALCIUM 40 MILLIGRAM(S): 80 TABLET, FILM COATED ORAL at 22:30

## 2021-01-01 RX ADMIN — Medication 2: at 21:48

## 2021-01-01 RX ADMIN — Medication 8: at 11:00

## 2021-01-01 RX ADMIN — Medication 80 MILLIGRAM(S): at 17:53

## 2021-01-01 RX ADMIN — Medication 25 MILLIGRAM(S): at 02:59

## 2021-01-01 RX ADMIN — CHLORHEXIDINE GLUCONATE 1 APPLICATION(S): 213 SOLUTION TOPICAL at 11:01

## 2021-01-01 RX ADMIN — Medication 20 MILLIGRAM(S): at 06:03

## 2021-01-01 RX ADMIN — Medication 20 MILLIGRAM(S): at 06:34

## 2021-01-01 RX ADMIN — PANTOPRAZOLE SODIUM 40 MILLIGRAM(S): 20 TABLET, DELAYED RELEASE ORAL at 06:29

## 2021-01-01 RX ADMIN — Medication 40 MILLIGRAM(S): at 05:21

## 2021-01-01 RX ADMIN — Medication 25 MILLIGRAM(S): at 06:20

## 2021-01-01 RX ADMIN — Medication 1: at 12:36

## 2021-01-01 RX ADMIN — ATORVASTATIN CALCIUM 20 MILLIGRAM(S): 80 TABLET, FILM COATED ORAL at 22:21

## 2021-01-01 RX ADMIN — Medication 25 MILLIGRAM(S): at 05:28

## 2021-01-01 RX ADMIN — Medication 650 MILLIGRAM(S): at 21:24

## 2021-01-01 RX ADMIN — Medication 1 TABLET(S): at 13:01

## 2021-01-01 RX ADMIN — Medication 25 MILLIGRAM(S): at 23:03

## 2021-01-01 RX ADMIN — MONTELUKAST 10 MILLIGRAM(S): 4 TABLET, CHEWABLE ORAL at 23:00

## 2021-01-01 RX ADMIN — Medication 25 MILLIGRAM(S): at 05:35

## 2021-01-01 RX ADMIN — Medication 25 MILLIGRAM(S): at 22:38

## 2021-01-01 RX ADMIN — Medication 25 MILLIGRAM(S): at 06:15

## 2021-01-01 RX ADMIN — Medication 40 MILLIGRAM(S): at 21:31

## 2021-01-01 RX ADMIN — Medication 40 MILLIGRAM(S): at 06:13

## 2021-01-01 RX ADMIN — GABAPENTIN 300 MILLIGRAM(S): 400 CAPSULE ORAL at 21:52

## 2021-01-01 RX ADMIN — CARVEDILOL PHOSPHATE 6.25 MILLIGRAM(S): 80 CAPSULE, EXTENDED RELEASE ORAL at 05:42

## 2021-01-01 RX ADMIN — MONTELUKAST 10 MILLIGRAM(S): 4 TABLET, CHEWABLE ORAL at 22:08

## 2021-01-01 RX ADMIN — Medication 2000 UNIT(S): at 12:57

## 2021-01-01 RX ADMIN — Medication 25 MILLIGRAM(S): at 06:25

## 2021-01-01 RX ADMIN — Medication 1 TABLET(S): at 11:58

## 2021-01-01 RX ADMIN — MONTELUKAST 10 MILLIGRAM(S): 4 TABLET, CHEWABLE ORAL at 00:18

## 2021-01-01 RX ADMIN — Medication 1 MILLIGRAM(S): at 08:48

## 2021-01-01 RX ADMIN — Medication 100 MICROGRAM(S): at 06:00

## 2021-01-01 RX ADMIN — Medication 3 MILLILITER(S): at 07:50

## 2021-01-01 RX ADMIN — Medication 25 MILLIGRAM(S): at 14:03

## 2021-01-01 RX ADMIN — Medication 40 MILLIGRAM(S): at 21:29

## 2021-01-01 RX ADMIN — Medication 2.5 MILLIGRAM(S): at 01:37

## 2021-01-01 RX ADMIN — PANTOPRAZOLE SODIUM 40 MILLIGRAM(S): 20 TABLET, DELAYED RELEASE ORAL at 06:27

## 2021-01-01 RX ADMIN — Medication 25 MILLIGRAM(S): at 11:07

## 2021-01-01 RX ADMIN — Medication 25 MILLIGRAM(S): at 05:40

## 2021-01-01 RX ADMIN — Medication 25 MILLIGRAM(S): at 22:58

## 2021-01-01 RX ADMIN — INSULIN GLARGINE 12 UNIT(S): 100 INJECTION, SOLUTION SUBCUTANEOUS at 22:27

## 2021-01-01 RX ADMIN — Medication 4: at 16:31

## 2021-01-01 RX ADMIN — Medication 1300 MILLIGRAM(S): at 05:16

## 2021-01-01 RX ADMIN — GABAPENTIN 300 MILLIGRAM(S): 400 CAPSULE ORAL at 22:28

## 2021-01-01 RX ADMIN — Medication 1 TABLET(S): at 13:11

## 2021-01-01 RX ADMIN — Medication 1 TABLET(S): at 12:18

## 2021-01-01 RX ADMIN — Medication 4: at 08:15

## 2021-01-01 RX ADMIN — Medication 25 MILLIGRAM(S): at 22:00

## 2021-01-01 RX ADMIN — CHLORHEXIDINE GLUCONATE 1 APPLICATION(S): 213 SOLUTION TOPICAL at 14:10

## 2021-01-01 RX ADMIN — Medication 4: at 11:47

## 2021-01-01 RX ADMIN — Medication 25 MILLIGRAM(S): at 23:27

## 2021-01-01 RX ADMIN — PANTOPRAZOLE SODIUM 40 MILLIGRAM(S): 20 TABLET, DELAYED RELEASE ORAL at 06:03

## 2021-01-01 RX ADMIN — Medication 2000 UNIT(S): at 12:05

## 2021-01-01 RX ADMIN — ATORVASTATIN CALCIUM 40 MILLIGRAM(S): 80 TABLET, FILM COATED ORAL at 23:07

## 2021-01-01 RX ADMIN — CHLORHEXIDINE GLUCONATE 1 APPLICATION(S): 213 SOLUTION TOPICAL at 12:03

## 2021-01-01 RX ADMIN — Medication 2000 UNIT(S): at 12:00

## 2021-01-01 RX ADMIN — GABAPENTIN 300 MILLIGRAM(S): 400 CAPSULE ORAL at 22:38

## 2021-01-01 RX ADMIN — MONTELUKAST 10 MILLIGRAM(S): 4 TABLET, CHEWABLE ORAL at 22:48

## 2021-01-01 RX ADMIN — GABAPENTIN 300 MILLIGRAM(S): 400 CAPSULE ORAL at 21:48

## 2021-01-01 RX ADMIN — Medication 1 TABLET(S): at 08:10

## 2021-01-01 RX ADMIN — Medication 25 MILLIGRAM(S): at 05:20

## 2021-01-01 RX ADMIN — Medication 25 MILLIGRAM(S): at 16:39

## 2021-01-01 RX ADMIN — Medication 25 MILLIGRAM(S): at 12:43

## 2021-01-01 RX ADMIN — Medication 1 TABLET(S): at 11:06

## 2021-01-01 RX ADMIN — Medication 25 MILLIGRAM(S): at 17:34

## 2021-01-01 RX ADMIN — Medication 2: at 21:15

## 2021-01-01 RX ADMIN — CHLORHEXIDINE GLUCONATE 1 APPLICATION(S): 213 SOLUTION TOPICAL at 06:05

## 2021-01-01 RX ADMIN — Medication 25 MILLIGRAM(S): at 17:53

## 2021-01-01 RX ADMIN — Medication 6: at 17:49

## 2021-01-01 RX ADMIN — Medication 1 TABLET(S): at 14:26

## 2021-01-01 RX ADMIN — PIPERACILLIN AND TAZOBACTAM 25 GRAM(S): 4; .5 INJECTION, POWDER, LYOPHILIZED, FOR SOLUTION INTRAVENOUS at 17:48

## 2021-01-01 RX ADMIN — Medication 1 MILLIGRAM(S): at 11:27

## 2021-01-01 RX ADMIN — Medication 100 MICROGRAM(S): at 06:08

## 2021-01-01 RX ADMIN — Medication 2000 UNIT(S): at 13:09

## 2021-01-01 RX ADMIN — Medication 25 MILLIGRAM(S): at 14:10

## 2021-01-01 RX ADMIN — INSULIN GLARGINE 14 UNIT(S): 100 INJECTION, SOLUTION SUBCUTANEOUS at 21:51

## 2021-01-01 RX ADMIN — PANTOPRAZOLE SODIUM 40 MILLIGRAM(S): 20 TABLET, DELAYED RELEASE ORAL at 18:14

## 2021-01-01 RX ADMIN — SENNA PLUS 2 TABLET(S): 8.6 TABLET ORAL at 23:03

## 2021-01-01 RX ADMIN — SENNA PLUS 2 TABLET(S): 8.6 TABLET ORAL at 22:13

## 2021-01-01 RX ADMIN — Medication 2: at 11:18

## 2021-01-01 RX ADMIN — Medication 25 MILLIGRAM(S): at 23:12

## 2021-01-01 RX ADMIN — Medication 2000 UNIT(S): at 11:58

## 2021-01-01 RX ADMIN — Medication 25 MILLIGRAM(S): at 23:00

## 2021-01-01 RX ADMIN — Medication 25 MILLIGRAM(S): at 13:21

## 2021-01-01 RX ADMIN — Medication 6: at 08:39

## 2021-01-01 RX ADMIN — Medication 1 MILLIGRAM(S): at 13:10

## 2021-01-01 RX ADMIN — Medication 25 MILLIGRAM(S): at 13:11

## 2021-01-01 RX ADMIN — Medication 1 TABLET(S): at 17:22

## 2021-01-01 RX ADMIN — SODIUM CHLORIDE 1000 MILLILITER(S): 9 INJECTION INTRAMUSCULAR; INTRAVENOUS; SUBCUTANEOUS at 21:52

## 2021-01-01 RX ADMIN — PANTOPRAZOLE SODIUM 40 MILLIGRAM(S): 20 TABLET, DELAYED RELEASE ORAL at 17:00

## 2021-01-01 RX ADMIN — MONTELUKAST 10 MILLIGRAM(S): 4 TABLET, CHEWABLE ORAL at 22:11

## 2021-01-01 RX ADMIN — INSULIN GLARGINE 12 UNIT(S): 100 INJECTION, SOLUTION SUBCUTANEOUS at 21:56

## 2021-01-01 RX ADMIN — ATORVASTATIN CALCIUM 40 MILLIGRAM(S): 80 TABLET, FILM COATED ORAL at 22:13

## 2021-01-01 RX ADMIN — Medication 25 MILLIGRAM(S): at 13:47

## 2021-01-01 RX ADMIN — Medication 100 MICROGRAM(S): at 06:20

## 2021-01-01 RX ADMIN — SENNA PLUS 2 TABLET(S): 8.6 TABLET ORAL at 22:10

## 2021-01-01 RX ADMIN — SENNA PLUS 2 TABLET(S): 8.6 TABLET ORAL at 23:26

## 2021-01-01 RX ADMIN — Medication 100 MICROGRAM(S): at 05:28

## 2021-01-01 RX ADMIN — Medication 2000 UNIT(S): at 12:08

## 2021-01-01 RX ADMIN — HEPARIN SODIUM 5000 UNIT(S): 5000 INJECTION INTRAVENOUS; SUBCUTANEOUS at 18:14

## 2021-01-01 RX ADMIN — Medication 3 MILLILITER(S): at 03:01

## 2021-01-01 RX ADMIN — Medication 1 MILLIGRAM(S): at 11:07

## 2021-01-01 RX ADMIN — Medication 40 MILLIGRAM(S): at 05:16

## 2021-01-01 RX ADMIN — Medication 25 MILLIGRAM(S): at 01:56

## 2021-01-01 RX ADMIN — AMLODIPINE BESYLATE 5 MILLIGRAM(S): 2.5 TABLET ORAL at 06:26

## 2021-01-01 RX ADMIN — Medication 1 MILLIGRAM(S): at 12:00

## 2021-01-01 RX ADMIN — HYDROMORPHONE HYDROCHLORIDE 0.5 MILLIGRAM(S): 2 INJECTION INTRAMUSCULAR; INTRAVENOUS; SUBCUTANEOUS at 01:11

## 2021-01-01 RX ADMIN — Medication 25 MILLIGRAM(S): at 01:25

## 2021-01-01 RX ADMIN — Medication 25 MILLIGRAM(S): at 06:05

## 2021-01-01 RX ADMIN — PANTOPRAZOLE SODIUM 40 MILLIGRAM(S): 20 TABLET, DELAYED RELEASE ORAL at 05:29

## 2021-01-01 RX ADMIN — MONTELUKAST 10 MILLIGRAM(S): 4 TABLET, CHEWABLE ORAL at 22:14

## 2021-01-01 RX ADMIN — Medication 40 MILLIGRAM(S): at 23:11

## 2021-01-01 RX ADMIN — PANTOPRAZOLE SODIUM 40 MILLIGRAM(S): 20 TABLET, DELAYED RELEASE ORAL at 05:36

## 2021-01-01 RX ADMIN — Medication 40 MILLIGRAM(S): at 21:52

## 2021-01-01 RX ADMIN — Medication 2: at 12:36

## 2021-01-01 RX ADMIN — Medication 1 TABLET(S): at 12:44

## 2021-01-01 RX ADMIN — HEPARIN SODIUM 5000 UNIT(S): 5000 INJECTION INTRAVENOUS; SUBCUTANEOUS at 05:15

## 2021-01-01 RX ADMIN — GABAPENTIN 300 MILLIGRAM(S): 400 CAPSULE ORAL at 22:08

## 2021-01-01 RX ADMIN — PANTOPRAZOLE SODIUM 40 MILLIGRAM(S): 20 TABLET, DELAYED RELEASE ORAL at 09:07

## 2021-01-01 RX ADMIN — Medication 25 MILLIGRAM(S): at 17:13

## 2021-01-01 RX ADMIN — GABAPENTIN 300 MILLIGRAM(S): 400 CAPSULE ORAL at 21:55

## 2021-01-01 RX ADMIN — CARVEDILOL PHOSPHATE 6.25 MILLIGRAM(S): 80 CAPSULE, EXTENDED RELEASE ORAL at 06:26

## 2021-01-01 RX ADMIN — Medication 1 TABLET(S): at 08:46

## 2021-01-01 RX ADMIN — ATORVASTATIN CALCIUM 40 MILLIGRAM(S): 80 TABLET, FILM COATED ORAL at 20:39

## 2021-01-01 RX ADMIN — Medication 650 MILLIGRAM(S): at 10:17

## 2021-01-01 RX ADMIN — HEPARIN SODIUM 5000 UNIT(S): 5000 INJECTION INTRAVENOUS; SUBCUTANEOUS at 06:25

## 2021-01-01 RX ADMIN — SENNA PLUS 2 TABLET(S): 8.6 TABLET ORAL at 22:48

## 2021-01-01 RX ADMIN — Medication 400 MILLIGRAM(S): at 17:48

## 2021-01-01 RX ADMIN — GABAPENTIN 300 MILLIGRAM(S): 400 CAPSULE ORAL at 22:10

## 2021-01-01 RX ADMIN — Medication 250 MILLIGRAM(S): at 22:03

## 2021-01-01 RX ADMIN — Medication 20 MILLIGRAM(S): at 06:15

## 2021-01-01 RX ADMIN — Medication 0: at 21:54

## 2021-01-01 RX ADMIN — CHLORHEXIDINE GLUCONATE 1 APPLICATION(S): 213 SOLUTION TOPICAL at 14:26

## 2021-01-01 RX ADMIN — Medication 10 MILLIGRAM(S): at 18:55

## 2021-01-01 RX ADMIN — Medication 25 MILLIGRAM(S): at 06:24

## 2021-01-01 RX ADMIN — SENNA PLUS 2 TABLET(S): 8.6 TABLET ORAL at 21:55

## 2021-01-01 RX ADMIN — Medication 40 MILLIGRAM(S): at 22:26

## 2021-01-01 RX ADMIN — Medication 25 MILLIGRAM(S): at 05:14

## 2021-01-01 RX ADMIN — Medication 25 MILLIGRAM(S): at 15:42

## 2021-01-01 RX ADMIN — SENNA PLUS 2 TABLET(S): 8.6 TABLET ORAL at 22:28

## 2021-01-01 RX ADMIN — PANTOPRAZOLE SODIUM 40 MILLIGRAM(S): 20 TABLET, DELAYED RELEASE ORAL at 06:20

## 2021-01-01 RX ADMIN — MONTELUKAST 10 MILLIGRAM(S): 4 TABLET, CHEWABLE ORAL at 22:28

## 2021-01-01 RX ADMIN — Medication 25 MILLIGRAM(S): at 06:08

## 2021-01-01 RX ADMIN — PIPERACILLIN AND TAZOBACTAM 25 GRAM(S): 4; .5 INJECTION, POWDER, LYOPHILIZED, FOR SOLUTION INTRAVENOUS at 05:00

## 2021-01-01 RX ADMIN — Medication 25 MILLIGRAM(S): at 13:28

## 2021-01-01 RX ADMIN — Medication 25 MILLIGRAM(S): at 15:44

## 2021-01-01 RX ADMIN — Medication 2000 UNIT(S): at 12:47

## 2021-01-01 RX ADMIN — Medication 40 MILLIGRAM(S): at 23:03

## 2021-01-01 RX ADMIN — CHLORHEXIDINE GLUCONATE 1 APPLICATION(S): 213 SOLUTION TOPICAL at 11:06

## 2021-01-01 RX ADMIN — AMLODIPINE BESYLATE 5 MILLIGRAM(S): 2.5 TABLET ORAL at 19:33

## 2021-01-01 RX ADMIN — Medication 2: at 07:39

## 2021-01-01 RX ADMIN — OXYCODONE HYDROCHLORIDE 5 MILLIGRAM(S): 5 TABLET ORAL at 06:08

## 2021-01-01 RX ADMIN — Medication 25 MILLIGRAM(S): at 17:46

## 2021-01-01 RX ADMIN — PANTOPRAZOLE SODIUM 40 MILLIGRAM(S): 20 TABLET, DELAYED RELEASE ORAL at 05:23

## 2021-01-01 RX ADMIN — Medication 25 MILLIGRAM(S): at 06:29

## 2021-01-01 RX ADMIN — Medication 1 MILLIGRAM(S): at 12:05

## 2021-01-01 RX ADMIN — Medication 2000 UNIT(S): at 14:22

## 2021-01-01 RX ADMIN — Medication 25 MILLIGRAM(S): at 21:29

## 2021-01-01 RX ADMIN — PANTOPRAZOLE SODIUM 40 MILLIGRAM(S): 20 TABLET, DELAYED RELEASE ORAL at 06:10

## 2021-01-01 RX ADMIN — Medication 4: at 12:08

## 2021-01-01 RX ADMIN — SENNA PLUS 2 TABLET(S): 8.6 TABLET ORAL at 00:12

## 2021-01-01 RX ADMIN — Medication 40 MILLIGRAM(S): at 21:54

## 2021-01-01 RX ADMIN — PANTOPRAZOLE SODIUM 40 MILLIGRAM(S): 20 TABLET, DELAYED RELEASE ORAL at 06:26

## 2021-01-01 RX ADMIN — Medication 25 MILLIGRAM(S): at 21:15

## 2021-01-01 RX ADMIN — Medication 1 MILLIGRAM(S): at 11:22

## 2021-01-01 RX ADMIN — Medication 100 MICROGRAM(S): at 05:14

## 2021-01-01 RX ADMIN — OXYCODONE HYDROCHLORIDE 5 MILLIGRAM(S): 5 TABLET ORAL at 22:30

## 2021-01-01 RX ADMIN — Medication 650 MILLIGRAM(S): at 03:38

## 2021-01-01 RX ADMIN — GABAPENTIN 300 MILLIGRAM(S): 400 CAPSULE ORAL at 23:00

## 2021-01-01 RX ADMIN — SENNA PLUS 2 TABLET(S): 8.6 TABLET ORAL at 22:14

## 2021-01-01 RX ADMIN — PANTOPRAZOLE SODIUM 40 MILLIGRAM(S): 20 TABLET, DELAYED RELEASE ORAL at 05:45

## 2021-01-01 RX ADMIN — Medication 25 MILLIGRAM(S): at 21:30

## 2021-01-01 RX ADMIN — Medication 25 MILLIGRAM(S): at 06:17

## 2021-01-01 RX ADMIN — Medication 2000 UNIT(S): at 08:48

## 2021-01-01 RX ADMIN — Medication 10 MILLIGRAM(S): at 17:10

## 2021-01-01 RX ADMIN — Medication 25 MILLIGRAM(S): at 15:07

## 2021-01-01 RX ADMIN — Medication 100 MICROGRAM(S): at 06:26

## 2021-01-01 RX ADMIN — INSULIN GLARGINE 12 UNIT(S): 100 INJECTION, SOLUTION SUBCUTANEOUS at 21:05

## 2021-01-01 RX ADMIN — PANTOPRAZOLE SODIUM 40 MILLIGRAM(S): 20 TABLET, DELAYED RELEASE ORAL at 06:05

## 2021-01-01 RX ADMIN — PANTOPRAZOLE SODIUM 40 MILLIGRAM(S): 20 TABLET, DELAYED RELEASE ORAL at 06:25

## 2021-01-01 RX ADMIN — Medication 25 MILLIGRAM(S): at 21:56

## 2021-01-01 RX ADMIN — Medication 40 MILLIGRAM(S): at 17:41

## 2021-01-01 RX ADMIN — Medication 30 MILLIGRAM(S): at 17:20

## 2021-01-01 RX ADMIN — Medication 25 MILLIGRAM(S): at 17:33

## 2021-01-01 RX ADMIN — Medication 60 MILLIGRAM(S): at 17:35

## 2021-01-01 RX ADMIN — CHLORHEXIDINE GLUCONATE 1 APPLICATION(S): 213 SOLUTION TOPICAL at 14:24

## 2021-01-01 RX ADMIN — Medication 2: at 12:06

## 2021-01-01 RX ADMIN — Medication 25 MILLIGRAM(S): at 06:18

## 2021-01-01 RX ADMIN — SODIUM ZIRCONIUM CYCLOSILICATE 5 GRAM(S): 10 POWDER, FOR SUSPENSION ORAL at 00:19

## 2021-01-01 RX ADMIN — Medication 25 MILLIGRAM(S): at 14:58

## 2021-01-01 RX ADMIN — Medication 25 MILLIGRAM(S): at 12:37

## 2021-01-01 RX ADMIN — INSULIN GLARGINE 8 UNIT(S): 100 INJECTION, SOLUTION SUBCUTANEOUS at 22:25

## 2021-01-01 RX ADMIN — MONTELUKAST 10 MILLIGRAM(S): 4 TABLET, CHEWABLE ORAL at 22:13

## 2021-01-01 RX ADMIN — Medication 25 MILLIGRAM(S): at 23:05

## 2021-01-01 RX ADMIN — Medication 40 MILLIGRAM(S): at 22:14

## 2021-01-01 RX ADMIN — Medication 25 MILLIGRAM(S): at 23:11

## 2021-01-01 RX ADMIN — Medication 25 MILLIGRAM(S): at 23:26

## 2021-01-01 RX ADMIN — Medication 4: at 16:58

## 2021-01-01 RX ADMIN — AMLODIPINE BESYLATE 5 MILLIGRAM(S): 2.5 TABLET ORAL at 05:43

## 2021-01-01 RX ADMIN — Medication 250 MILLIGRAM(S): at 20:37

## 2021-01-01 RX ADMIN — Medication 25 MILLIGRAM(S): at 13:34

## 2021-01-01 RX ADMIN — Medication 1 TABLET(S): at 11:00

## 2021-01-01 RX ADMIN — MONTELUKAST 10 MILLIGRAM(S): 4 TABLET, CHEWABLE ORAL at 21:55

## 2021-01-01 RX ADMIN — Medication 25 MILLIGRAM(S): at 10:54

## 2021-01-01 RX ADMIN — ATORVASTATIN CALCIUM 40 MILLIGRAM(S): 80 TABLET, FILM COATED ORAL at 23:11

## 2021-01-01 RX ADMIN — Medication 40 MILLIGRAM(S): at 22:21

## 2021-01-01 RX ADMIN — Medication 25 MILLIGRAM(S): at 21:50

## 2021-01-01 RX ADMIN — Medication 650 MILLIGRAM(S): at 06:26

## 2021-01-01 RX ADMIN — Medication 25 MILLIGRAM(S): at 17:37

## 2021-01-01 RX ADMIN — PANTOPRAZOLE SODIUM 40 MILLIGRAM(S): 20 TABLET, DELAYED RELEASE ORAL at 17:22

## 2021-01-01 RX ADMIN — ERYTHROPOIETIN 20000 UNIT(S): 10000 INJECTION, SOLUTION INTRAVENOUS; SUBCUTANEOUS at 23:16

## 2021-01-01 RX ADMIN — Medication 1 TABLET(S): at 13:02

## 2021-01-01 RX ADMIN — Medication 25 MILLIGRAM(S): at 22:30

## 2021-01-01 RX ADMIN — Medication 25 MILLIGRAM(S): at 13:09

## 2021-01-01 RX ADMIN — OXYCODONE HYDROCHLORIDE 5 MILLIGRAM(S): 5 TABLET ORAL at 21:31

## 2021-01-01 RX ADMIN — ATORVASTATIN CALCIUM 40 MILLIGRAM(S): 80 TABLET, FILM COATED ORAL at 22:26

## 2021-01-01 RX ADMIN — Medication 25 MILLIGRAM(S): at 23:16

## 2021-01-01 RX ADMIN — Medication 6: at 11:52

## 2021-01-01 RX ADMIN — Medication 25 MILLIGRAM(S): at 13:10

## 2021-01-01 RX ADMIN — Medication 40 MILLIGRAM(S): at 06:00

## 2021-01-01 RX ADMIN — Medication 2000 UNIT(S): at 11:22

## 2021-01-01 RX ADMIN — Medication 650 MILLIGRAM(S): at 21:50

## 2021-01-01 RX ADMIN — Medication 25 MILLIGRAM(S): at 16:07

## 2021-01-01 RX ADMIN — Medication 6: at 17:17

## 2021-01-01 RX ADMIN — Medication 100 MICROGRAM(S): at 05:36

## 2021-01-01 RX ADMIN — Medication 1 TABLET(S): at 08:47

## 2021-01-01 RX ADMIN — Medication 1 MILLIGRAM(S): at 12:44

## 2021-01-01 RX ADMIN — Medication 250 MILLIGRAM(S): at 16:52

## 2021-01-01 RX ADMIN — Medication 40 MILLIGRAM(S): at 16:33

## 2021-01-01 RX ADMIN — Medication 25 MILLIGRAM(S): at 22:08

## 2021-01-01 RX ADMIN — PANTOPRAZOLE SODIUM 40 MILLIGRAM(S): 20 TABLET, DELAYED RELEASE ORAL at 05:38

## 2021-01-01 RX ADMIN — Medication 2: at 16:17

## 2021-01-01 RX ADMIN — PIPERACILLIN AND TAZOBACTAM 25 GRAM(S): 4; .5 INJECTION, POWDER, LYOPHILIZED, FOR SOLUTION INTRAVENOUS at 20:37

## 2021-01-01 RX ADMIN — Medication 1 MILLIGRAM(S): at 11:33

## 2021-01-01 RX ADMIN — Medication 4: at 18:13

## 2021-01-01 RX ADMIN — GABAPENTIN 300 MILLIGRAM(S): 400 CAPSULE ORAL at 23:11

## 2021-01-01 RX ADMIN — Medication 25 MILLIGRAM(S): at 01:33

## 2021-01-01 RX ADMIN — GABAPENTIN 300 MILLIGRAM(S): 400 CAPSULE ORAL at 23:03

## 2021-01-01 RX ADMIN — INSULIN GLARGINE 8 UNIT(S): 100 INJECTION, SOLUTION SUBCUTANEOUS at 21:59

## 2021-01-01 RX ADMIN — Medication 25 MILLIGRAM(S): at 18:09

## 2021-01-01 RX ADMIN — HYDROMORPHONE HYDROCHLORIDE 0.5 MILLIGRAM(S): 2 INJECTION INTRAMUSCULAR; INTRAVENOUS; SUBCUTANEOUS at 13:10

## 2021-01-01 RX ADMIN — PANTOPRAZOLE SODIUM 40 MILLIGRAM(S): 20 TABLET, DELAYED RELEASE ORAL at 05:30

## 2021-01-01 RX ADMIN — Medication 25 MILLIGRAM(S): at 14:48

## 2021-01-01 RX ADMIN — Medication 25 MILLIGRAM(S): at 06:22

## 2021-01-01 RX ADMIN — PANTOPRAZOLE SODIUM 40 MILLIGRAM(S): 20 TABLET, DELAYED RELEASE ORAL at 06:01

## 2021-01-01 RX ADMIN — Medication 1 TABLET(S): at 13:10

## 2021-01-01 RX ADMIN — PANTOPRAZOLE SODIUM 40 MILLIGRAM(S): 20 TABLET, DELAYED RELEASE ORAL at 05:40

## 2021-01-01 RX ADMIN — Medication 2000 UNIT(S): at 08:10

## 2021-01-01 RX ADMIN — HYDROMORPHONE HYDROCHLORIDE 0.5 MILLIGRAM(S): 2 INJECTION INTRAMUSCULAR; INTRAVENOUS; SUBCUTANEOUS at 09:07

## 2021-01-01 RX ADMIN — Medication 25 MILLIGRAM(S): at 11:59

## 2021-01-01 RX ADMIN — GABAPENTIN 300 MILLIGRAM(S): 400 CAPSULE ORAL at 23:29

## 2021-01-01 RX ADMIN — Medication 40 MILLIGRAM(S): at 23:15

## 2021-01-01 RX ADMIN — GABAPENTIN 300 MILLIGRAM(S): 400 CAPSULE ORAL at 22:12

## 2021-01-01 RX ADMIN — SENNA PLUS 2 TABLET(S): 8.6 TABLET ORAL at 22:08

## 2021-01-01 RX ADMIN — INSULIN GLARGINE 12 UNIT(S): 100 INJECTION, SOLUTION SUBCUTANEOUS at 22:22

## 2021-01-01 RX ADMIN — GABAPENTIN 300 MILLIGRAM(S): 400 CAPSULE ORAL at 22:48

## 2021-01-01 RX ADMIN — Medication 6: at 09:18

## 2021-01-01 RX ADMIN — Medication 650 MILLIGRAM(S): at 17:30

## 2021-01-01 RX ADMIN — Medication 1 TABLET(S): at 12:47

## 2021-01-01 RX ADMIN — LORATADINE 10 MILLIGRAM(S): 10 TABLET ORAL at 11:46

## 2021-01-01 RX ADMIN — INSULIN GLARGINE 12 UNIT(S): 100 INJECTION, SOLUTION SUBCUTANEOUS at 00:26

## 2021-01-01 RX ADMIN — PANTOPRAZOLE SODIUM 40 MILLIGRAM(S): 20 TABLET, DELAYED RELEASE ORAL at 05:16

## 2021-01-01 RX ADMIN — INSULIN GLARGINE 12 UNIT(S): 100 INJECTION, SOLUTION SUBCUTANEOUS at 22:14

## 2021-01-01 RX ADMIN — PANTOPRAZOLE SODIUM 40 MILLIGRAM(S): 20 TABLET, DELAYED RELEASE ORAL at 06:22

## 2021-01-01 RX ADMIN — Medication 20 MILLIGRAM(S): at 05:43

## 2021-01-01 RX ADMIN — Medication 4: at 13:35

## 2021-01-01 RX ADMIN — Medication 25 MILLIGRAM(S): at 22:03

## 2021-01-01 RX ADMIN — Medication 1300 MILLIGRAM(S): at 17:53

## 2021-01-01 RX ADMIN — INSULIN GLARGINE 12 UNIT(S): 100 INJECTION, SOLUTION SUBCUTANEOUS at 22:42

## 2021-01-01 RX ADMIN — INSULIN GLARGINE 8 UNIT(S): 100 INJECTION, SOLUTION SUBCUTANEOUS at 21:29

## 2021-01-01 RX ADMIN — Medication 25 MILLIGRAM(S): at 22:28

## 2021-01-01 RX ADMIN — Medication 4: at 16:52

## 2021-01-01 RX ADMIN — TUBERCULIN PURIFIED PROTEIN DERIVATIVE 5 UNIT(S): 5 INJECTION, SOLUTION INTRADERMAL at 15:48

## 2021-01-01 RX ADMIN — MONTELUKAST 10 MILLIGRAM(S): 4 TABLET, CHEWABLE ORAL at 21:56

## 2021-01-01 RX ADMIN — Medication 2: at 11:36

## 2021-01-01 RX ADMIN — Medication 1 TABLET(S): at 17:53

## 2021-01-01 RX ADMIN — Medication 10 MILLIGRAM(S): at 06:30

## 2021-01-01 RX ADMIN — Medication 100 MICROGRAM(S): at 06:03

## 2021-01-01 RX ADMIN — Medication 1 MILLIGRAM(S): at 13:01

## 2021-01-01 RX ADMIN — DAPTOMYCIN 124 MILLIGRAM(S): 500 INJECTION, POWDER, LYOPHILIZED, FOR SOLUTION INTRAVENOUS at 11:44

## 2021-01-01 RX ADMIN — Medication 40 MILLIGRAM(S): at 22:28

## 2021-01-01 RX ADMIN — SODIUM ZIRCONIUM CYCLOSILICATE 5 GRAM(S): 10 POWDER, FOR SUSPENSION ORAL at 14:41

## 2021-01-01 RX ADMIN — ONDANSETRON 4 MILLIGRAM(S): 8 TABLET, FILM COATED ORAL at 20:37

## 2021-01-01 RX ADMIN — Medication 25 MILLIGRAM(S): at 13:41

## 2021-01-01 RX ADMIN — PANTOPRAZOLE SODIUM 40 MILLIGRAM(S): 20 TABLET, DELAYED RELEASE ORAL at 05:47

## 2021-01-01 RX ADMIN — Medication 40 MILLIGRAM(S): at 23:00

## 2021-01-01 RX ADMIN — Medication 40 MILLIGRAM(S): at 00:12

## 2021-01-01 RX ADMIN — Medication 1300 MILLIGRAM(S): at 06:22

## 2021-01-01 RX ADMIN — INSULIN GLARGINE 12 UNIT(S): 100 INJECTION, SOLUTION SUBCUTANEOUS at 23:27

## 2021-01-01 RX ADMIN — MIDODRINE HYDROCHLORIDE 5 MILLIGRAM(S): 2.5 TABLET ORAL at 13:36

## 2021-01-01 RX ADMIN — Medication 6 UNIT(S): at 17:59

## 2021-01-01 RX ADMIN — Medication 8: at 12:17

## 2021-01-01 RX ADMIN — CHLORHEXIDINE GLUCONATE 1 APPLICATION(S): 213 SOLUTION TOPICAL at 13:29

## 2021-01-01 RX ADMIN — INSULIN GLARGINE 12 UNIT(S): 100 INJECTION, SOLUTION SUBCUTANEOUS at 22:30

## 2021-01-01 RX ADMIN — Medication 1 TABLET(S): at 12:08

## 2021-01-01 RX ADMIN — ATORVASTATIN CALCIUM 20 MILLIGRAM(S): 80 TABLET, FILM COATED ORAL at 21:52

## 2021-01-01 RX ADMIN — Medication 4: at 16:38

## 2021-01-01 RX ADMIN — SODIUM CHLORIDE 1100 MILLILITER(S): 9 INJECTION, SOLUTION INTRAVENOUS at 22:07

## 2021-01-01 RX ADMIN — Medication 40 MILLIGRAM(S): at 02:05

## 2021-01-01 RX ADMIN — SENNA PLUS 2 TABLET(S): 8.6 TABLET ORAL at 22:29

## 2021-01-01 RX ADMIN — Medication 25 MILLIGRAM(S): at 11:00

## 2021-01-01 RX ADMIN — Medication 2000 UNIT(S): at 11:51

## 2021-01-01 RX ADMIN — ATORVASTATIN CALCIUM 40 MILLIGRAM(S): 80 TABLET, FILM COATED ORAL at 22:11

## 2021-01-01 RX ADMIN — Medication 25 MILLIGRAM(S): at 06:34

## 2021-01-01 RX ADMIN — Medication 4: at 08:45

## 2021-01-01 RX ADMIN — Medication 2000 UNIT(S): at 11:27

## 2021-01-01 RX ADMIN — Medication 1 MILLIGRAM(S): at 12:18

## 2021-01-01 RX ADMIN — Medication 25 MILLIGRAM(S): at 11:33

## 2021-01-01 RX ADMIN — CHLORHEXIDINE GLUCONATE 1 APPLICATION(S): 213 SOLUTION TOPICAL at 06:20

## 2021-01-01 RX ADMIN — CARVEDILOL PHOSPHATE 6.25 MILLIGRAM(S): 80 CAPSULE, EXTENDED RELEASE ORAL at 17:06

## 2021-01-01 RX ADMIN — IRON SUCROSE 262.5 MILLIGRAM(S): 20 INJECTION, SOLUTION INTRAVENOUS at 14:55

## 2021-01-01 RX ADMIN — Medication 1 TABLET(S): at 12:00

## 2021-01-01 RX ADMIN — Medication 2: at 08:38

## 2021-01-01 RX ADMIN — Medication 650 MILLIGRAM(S): at 13:46

## 2021-01-01 RX ADMIN — Medication 650 MILLIGRAM(S): at 17:12

## 2021-01-01 RX ADMIN — Medication 1 TABLET(S): at 17:49

## 2021-01-01 RX ADMIN — Medication 40 MILLIGRAM(S): at 22:13

## 2021-01-01 RX ADMIN — GABAPENTIN 300 MILLIGRAM(S): 400 CAPSULE ORAL at 22:31

## 2021-01-01 RX ADMIN — Medication 1 MILLIGRAM(S): at 11:44

## 2021-01-01 RX ADMIN — Medication 2: at 11:58

## 2021-01-01 RX ADMIN — Medication 25 MILLIGRAM(S): at 17:07

## 2021-01-01 RX ADMIN — Medication 25 MILLIGRAM(S): at 14:26

## 2021-01-01 RX ADMIN — Medication 20 MILLIGRAM(S): at 06:20

## 2021-01-01 RX ADMIN — SENNA PLUS 2 TABLET(S): 8.6 TABLET ORAL at 22:37

## 2021-01-01 RX ADMIN — Medication 25 MILLIGRAM(S): at 21:54

## 2021-01-01 RX ADMIN — Medication 25 MILLIGRAM(S): at 05:16

## 2021-01-01 RX ADMIN — Medication 1 MILLIGRAM(S): at 11:59

## 2021-01-01 RX ADMIN — ATORVASTATIN CALCIUM 40 MILLIGRAM(S): 80 TABLET, FILM COATED ORAL at 22:12

## 2021-01-01 RX ADMIN — Medication 30 MILLIGRAM(S): at 05:47

## 2021-01-01 RX ADMIN — INSULIN GLARGINE 12 UNIT(S): 100 INJECTION, SOLUTION SUBCUTANEOUS at 23:05

## 2021-01-01 RX ADMIN — Medication 6: at 17:57

## 2021-01-01 RX ADMIN — Medication 25 MILLIGRAM(S): at 05:38

## 2021-01-01 RX ADMIN — Medication 25 MILLIGRAM(S): at 23:33

## 2021-01-01 RX ADMIN — MONTELUKAST 10 MILLIGRAM(S): 4 TABLET, CHEWABLE ORAL at 22:40

## 2021-01-01 RX ADMIN — Medication 1 TABLET(S): at 13:34

## 2021-01-01 RX ADMIN — Medication 30 MILLIGRAM(S): at 05:38

## 2021-01-01 RX ADMIN — Medication 100 MICROGRAM(S): at 06:29

## 2021-01-01 RX ADMIN — ATORVASTATIN CALCIUM 40 MILLIGRAM(S): 80 TABLET, FILM COATED ORAL at 22:29

## 2021-01-01 RX ADMIN — Medication 2: at 12:04

## 2021-01-01 RX ADMIN — Medication 1 TABLET(S): at 08:16

## 2021-01-01 RX ADMIN — Medication 25 MILLIGRAM(S): at 17:10

## 2021-01-01 RX ADMIN — SODIUM ZIRCONIUM CYCLOSILICATE 5 GRAM(S): 10 POWDER, FOR SUSPENSION ORAL at 16:54

## 2021-01-01 RX ADMIN — Medication 25 MILLIGRAM(S): at 14:24

## 2021-01-01 RX ADMIN — Medication 100 MICROGRAM(S): at 05:47

## 2021-01-01 RX ADMIN — Medication 25 MILLIGRAM(S): at 05:43

## 2021-01-01 RX ADMIN — IRON SUCROSE 262.5 MILLIGRAM(S): 20 INJECTION, SOLUTION INTRAVENOUS at 15:01

## 2021-01-01 RX ADMIN — Medication 2: at 12:17

## 2021-01-01 RX ADMIN — Medication 5 MILLIGRAM(S): at 01:55

## 2021-01-01 RX ADMIN — Medication 100 MICROGRAM(S): at 06:04

## 2021-01-01 RX ADMIN — INSULIN GLARGINE 12 UNIT(S): 100 INJECTION, SOLUTION SUBCUTANEOUS at 22:29

## 2021-01-01 RX ADMIN — Medication 37.5 MILLIGRAM(S): at 22:26

## 2021-01-01 RX ADMIN — Medication 1 TABLET(S): at 11:23

## 2021-01-01 RX ADMIN — INSULIN GLARGINE 8 UNIT(S): 100 INJECTION, SOLUTION SUBCUTANEOUS at 21:49

## 2021-01-01 RX ADMIN — SENNA PLUS 2 TABLET(S): 8.6 TABLET ORAL at 23:11

## 2021-01-01 RX ADMIN — Medication 25 MILLIGRAM(S): at 22:13

## 2021-01-01 RX ADMIN — ATORVASTATIN CALCIUM 40 MILLIGRAM(S): 80 TABLET, FILM COATED ORAL at 22:14

## 2021-01-01 RX ADMIN — Medication 25 MILLIGRAM(S): at 22:49

## 2021-01-01 RX ADMIN — Medication 1 TABLET(S): at 16:53

## 2021-01-01 RX ADMIN — Medication 25 MILLIGRAM(S): at 12:05

## 2021-01-01 RX ADMIN — Medication 37.5 MILLIGRAM(S): at 21:20

## 2021-01-01 RX ADMIN — Medication 25 MILLIGRAM(S): at 05:47

## 2021-01-01 RX ADMIN — Medication 100 MICROGRAM(S): at 05:40

## 2021-01-01 RX ADMIN — Medication 25 MILLIGRAM(S): at 16:21

## 2021-01-01 RX ADMIN — Medication 1 TABLET(S): at 18:14

## 2021-01-01 RX ADMIN — Medication 8: at 12:14

## 2021-01-01 RX ADMIN — Medication 1 MILLIGRAM(S): at 10:54

## 2021-01-01 RX ADMIN — Medication 1 TABLET(S): at 14:22

## 2021-01-01 RX ADMIN — Medication 40 MILLIGRAM(S): at 16:39

## 2021-01-01 RX ADMIN — Medication 60 MILLIGRAM(S): at 06:21

## 2021-01-01 RX ADMIN — Medication 4: at 10:53

## 2021-01-01 RX ADMIN — Medication 25 MILLIGRAM(S): at 06:00

## 2021-01-01 RX ADMIN — TUBERCULIN PURIFIED PROTEIN DERIVATIVE 5 UNIT(S): 5 INJECTION, SOLUTION INTRADERMAL at 15:56

## 2021-01-01 RX ADMIN — BENZOCAINE AND MENTHOL 1 LOZENGE: 5; 1 LIQUID ORAL at 11:38

## 2021-01-01 RX ADMIN — Medication 40 MILLIGRAM(S): at 21:08

## 2021-01-01 RX ADMIN — Medication 25 MILLIGRAM(S): at 22:31

## 2021-01-01 RX ADMIN — Medication 1 TABLET(S): at 12:32

## 2021-01-01 RX ADMIN — GABAPENTIN 300 MILLIGRAM(S): 400 CAPSULE ORAL at 21:50

## 2021-01-01 RX ADMIN — Medication 1 TABLET(S): at 07:44

## 2021-01-01 RX ADMIN — HEPARIN SODIUM 5000 UNIT(S): 5000 INJECTION INTRAVENOUS; SUBCUTANEOUS at 17:58

## 2021-01-01 RX ADMIN — Medication 2: at 12:01

## 2021-01-01 RX ADMIN — PIPERACILLIN AND TAZOBACTAM 25 GRAM(S): 4; .5 INJECTION, POWDER, LYOPHILIZED, FOR SOLUTION INTRAVENOUS at 09:05

## 2021-01-01 RX ADMIN — Medication 1300 MILLIGRAM(S): at 05:22

## 2021-01-01 RX ADMIN — Medication 2: at 16:56

## 2021-01-01 RX ADMIN — Medication 25 MILLIGRAM(S): at 14:35

## 2021-01-01 RX ADMIN — Medication 25 MILLIGRAM(S): at 13:48

## 2021-01-01 RX ADMIN — GABAPENTIN 300 MILLIGRAM(S): 400 CAPSULE ORAL at 22:30

## 2021-01-01 RX ADMIN — PANTOPRAZOLE SODIUM 40 MILLIGRAM(S): 20 TABLET, DELAYED RELEASE ORAL at 06:24

## 2021-01-01 RX ADMIN — PANTOPRAZOLE SODIUM 40 MILLIGRAM(S): 20 TABLET, DELAYED RELEASE ORAL at 05:28

## 2021-01-01 RX ADMIN — HYDROMORPHONE HYDROCHLORIDE 0.5 MILLIGRAM(S): 2 INJECTION INTRAMUSCULAR; INTRAVENOUS; SUBCUTANEOUS at 01:30

## 2021-01-01 RX ADMIN — Medication 2000 UNIT(S): at 11:59

## 2021-01-01 RX ADMIN — Medication 25 MILLIGRAM(S): at 05:36

## 2021-01-01 RX ADMIN — GABAPENTIN 300 MILLIGRAM(S): 400 CAPSULE ORAL at 22:21

## 2021-01-01 RX ADMIN — Medication 1 MILLIGRAM(S): at 08:51

## 2021-01-01 RX ADMIN — CHLORHEXIDINE GLUCONATE 1 APPLICATION(S): 213 SOLUTION TOPICAL at 06:11

## 2021-01-01 RX ADMIN — GABAPENTIN 300 MILLIGRAM(S): 400 CAPSULE ORAL at 22:27

## 2021-01-01 RX ADMIN — GABAPENTIN 300 MILLIGRAM(S): 400 CAPSULE ORAL at 22:29

## 2021-01-01 RX ADMIN — ERYTHROPOIETIN 10000 UNIT(S): 10000 INJECTION, SOLUTION INTRAVENOUS; SUBCUTANEOUS at 18:18

## 2021-01-01 RX ADMIN — Medication 4: at 17:56

## 2021-01-01 RX ADMIN — Medication 40 MILLIGRAM(S): at 21:55

## 2021-01-01 RX ADMIN — Medication 650 MILLIGRAM(S): at 16:19

## 2021-01-01 RX ADMIN — ATORVASTATIN CALCIUM 40 MILLIGRAM(S): 80 TABLET, FILM COATED ORAL at 21:54

## 2021-01-01 RX ADMIN — Medication 1 TABLET(S): at 12:58

## 2021-01-01 RX ADMIN — Medication 40 MILLIGRAM(S): at 22:30

## 2021-01-01 RX ADMIN — Medication 25 MILLIGRAM(S): at 18:18

## 2021-01-01 RX ADMIN — Medication 650 MILLIGRAM(S): at 17:11

## 2021-01-01 RX ADMIN — HEPARIN SODIUM 5000 UNIT(S): 5000 INJECTION INTRAVENOUS; SUBCUTANEOUS at 06:05

## 2021-01-01 RX ADMIN — ERYTHROPOIETIN 10000 UNIT(S): 10000 INJECTION, SOLUTION INTRAVENOUS; SUBCUTANEOUS at 16:06

## 2021-01-01 RX ADMIN — ATORVASTATIN CALCIUM 40 MILLIGRAM(S): 80 TABLET, FILM COATED ORAL at 21:07

## 2021-01-01 RX ADMIN — Medication 325 MILLIGRAM(S): at 10:17

## 2021-01-01 RX ADMIN — INSULIN GLARGINE 8 UNIT(S): 100 INJECTION, SOLUTION SUBCUTANEOUS at 21:57

## 2021-01-01 RX ADMIN — Medication 2: at 07:44

## 2021-01-01 RX ADMIN — Medication 1 TABLET(S): at 11:59

## 2021-01-01 RX ADMIN — Medication 1 MILLIGRAM(S): at 12:08

## 2021-01-01 RX ADMIN — Medication 650 MILLIGRAM(S): at 06:25

## 2021-01-01 RX ADMIN — Medication 2: at 09:09

## 2021-01-01 RX ADMIN — Medication 4: at 21:29

## 2021-01-01 RX ADMIN — ATORVASTATIN CALCIUM 40 MILLIGRAM(S): 80 TABLET, FILM COATED ORAL at 22:48

## 2021-01-01 RX ADMIN — CHLORHEXIDINE GLUCONATE 1 APPLICATION(S): 213 SOLUTION TOPICAL at 11:58

## 2021-01-01 RX ADMIN — MONTELUKAST 10 MILLIGRAM(S): 4 TABLET, CHEWABLE ORAL at 22:26

## 2021-01-01 RX ADMIN — MONTELUKAST 10 MILLIGRAM(S): 4 TABLET, CHEWABLE ORAL at 21:54

## 2021-01-01 RX ADMIN — Medication 100 MICROGRAM(S): at 06:17

## 2021-01-01 RX ADMIN — ATORVASTATIN CALCIUM 40 MILLIGRAM(S): 80 TABLET, FILM COATED ORAL at 22:49

## 2021-01-01 RX ADMIN — GABAPENTIN 300 MILLIGRAM(S): 400 CAPSULE ORAL at 22:00

## 2021-01-01 RX ADMIN — GABAPENTIN 300 MILLIGRAM(S): 400 CAPSULE ORAL at 21:54

## 2021-01-01 RX ADMIN — Medication 25 MILLIGRAM(S): at 13:35

## 2021-01-01 RX ADMIN — Medication 40 MILLIGRAM(S): at 22:59

## 2021-01-01 RX ADMIN — Medication 1 TABLET(S): at 11:33

## 2021-01-01 RX ADMIN — Medication 1 MILLIGRAM(S): at 12:57

## 2021-01-01 RX ADMIN — Medication 25 MILLIGRAM(S): at 06:04

## 2021-01-01 RX ADMIN — Medication 1 MILLIGRAM(S): at 12:26

## 2021-01-01 RX ADMIN — Medication 2000 UNIT(S): at 12:44

## 2021-01-01 RX ADMIN — Medication 1 MILLIGRAM(S): at 11:00

## 2021-01-01 RX ADMIN — ATORVASTATIN CALCIUM 40 MILLIGRAM(S): 80 TABLET, FILM COATED ORAL at 21:56

## 2021-01-01 RX ADMIN — Medication 1 TABLET(S): at 08:51

## 2021-01-01 RX ADMIN — Medication 2000 UNIT(S): at 11:00

## 2021-01-01 RX ADMIN — ATORVASTATIN CALCIUM 40 MILLIGRAM(S): 80 TABLET, FILM COATED ORAL at 22:58

## 2021-01-01 RX ADMIN — Medication 40 MILLIGRAM(S): at 06:20

## 2021-01-01 RX ADMIN — Medication 1000 MILLIGRAM(S): at 10:17

## 2021-01-01 RX ADMIN — Medication 2: at 17:17

## 2021-01-01 RX ADMIN — OXYCODONE HYDROCHLORIDE 5 MILLIGRAM(S): 5 TABLET ORAL at 09:25

## 2021-01-01 RX ADMIN — GABAPENTIN 300 MILLIGRAM(S): 400 CAPSULE ORAL at 22:14

## 2021-01-01 RX ADMIN — Medication 650 MILLIGRAM(S): at 21:55

## 2021-01-01 RX ADMIN — Medication 650 MILLIGRAM(S): at 21:08

## 2021-01-01 RX ADMIN — ATORVASTATIN CALCIUM 40 MILLIGRAM(S): 80 TABLET, FILM COATED ORAL at 21:15

## 2021-01-01 RX ADMIN — Medication 2000 UNIT(S): at 08:51

## 2021-01-01 RX ADMIN — Medication 1 TABLET(S): at 11:27

## 2021-01-01 RX ADMIN — ATORVASTATIN CALCIUM 40 MILLIGRAM(S): 80 TABLET, FILM COATED ORAL at 21:48

## 2021-01-01 RX ADMIN — Medication 30 MILLIGRAM(S): at 17:26

## 2021-01-01 RX ADMIN — CHLORHEXIDINE GLUCONATE 1 APPLICATION(S): 213 SOLUTION TOPICAL at 11:59

## 2021-01-01 RX ADMIN — CHLORHEXIDINE GLUCONATE 1 APPLICATION(S): 213 SOLUTION TOPICAL at 11:46

## 2021-01-01 RX ADMIN — Medication 40 MILLIGRAM(S): at 22:49

## 2021-01-01 RX ADMIN — Medication 1 MILLIGRAM(S): at 12:43

## 2021-01-01 RX ADMIN — Medication 1 TABLET(S): at 09:19

## 2021-01-01 RX ADMIN — Medication 2000 UNIT(S): at 11:43

## 2021-01-01 RX ADMIN — Medication 100 MICROGRAM(S): at 06:35

## 2021-01-01 RX ADMIN — Medication 25 MILLIGRAM(S): at 21:07

## 2021-01-01 RX ADMIN — SENNA PLUS 2 TABLET(S): 8.6 TABLET ORAL at 23:17

## 2021-01-01 RX ADMIN — Medication 25 MILLIGRAM(S): at 05:29

## 2021-01-01 RX ADMIN — INSULIN GLARGINE 12 UNIT(S): 100 INJECTION, SOLUTION SUBCUTANEOUS at 23:11

## 2021-01-01 RX ADMIN — Medication 100 MICROGRAM(S): at 05:29

## 2021-01-01 RX ADMIN — INSULIN GLARGINE 12 UNIT(S): 100 INJECTION, SOLUTION SUBCUTANEOUS at 21:43

## 2021-01-01 RX ADMIN — Medication 650 MILLIGRAM(S): at 13:36

## 2021-01-01 RX ADMIN — Medication 25 MILLIGRAM(S): at 16:54

## 2021-01-01 RX ADMIN — OXYCODONE HYDROCHLORIDE 5 MILLIGRAM(S): 5 TABLET ORAL at 08:51

## 2021-01-01 RX ADMIN — Medication 25 MILLIGRAM(S): at 20:39

## 2021-01-01 RX ADMIN — Medication 6: at 09:04

## 2021-01-01 RX ADMIN — Medication 25 MILLIGRAM(S): at 17:28

## 2021-01-01 RX ADMIN — CHLORHEXIDINE GLUCONATE 1 APPLICATION(S): 213 SOLUTION TOPICAL at 06:25

## 2021-01-01 RX ADMIN — Medication 2: at 16:06

## 2021-01-01 RX ADMIN — Medication 650 MILLIGRAM(S): at 20:12

## 2021-01-01 RX ADMIN — Medication 650 MILLIGRAM(S): at 16:50

## 2021-01-01 RX ADMIN — CARVEDILOL PHOSPHATE 6.25 MILLIGRAM(S): 80 CAPSULE, EXTENDED RELEASE ORAL at 17:45

## 2021-01-01 RX ADMIN — Medication 6: at 07:55

## 2021-01-01 RX ADMIN — ATORVASTATIN CALCIUM 40 MILLIGRAM(S): 80 TABLET, FILM COATED ORAL at 23:27

## 2021-01-01 RX ADMIN — ATORVASTATIN CALCIUM 40 MILLIGRAM(S): 80 TABLET, FILM COATED ORAL at 21:30

## 2021-01-01 RX ADMIN — Medication 25 MILLIGRAM(S): at 22:14

## 2021-01-01 RX ADMIN — Medication 1 MILLIGRAM(S): at 12:04

## 2021-01-01 RX ADMIN — Medication 2: at 16:43

## 2021-01-01 RX ADMIN — Medication 3 MILLIGRAM(S): at 22:13

## 2021-01-01 RX ADMIN — Medication 25 MILLIGRAM(S): at 15:47

## 2021-01-01 RX ADMIN — Medication 40 MILLIGRAM(S): at 23:27

## 2021-01-01 RX ADMIN — Medication 100 MICROGRAM(S): at 05:16

## 2021-01-01 RX ADMIN — Medication 25 MILLIGRAM(S): at 22:29

## 2021-01-01 RX ADMIN — Medication 4: at 16:56

## 2021-01-01 RX ADMIN — PIPERACILLIN AND TAZOBACTAM 200 GRAM(S): 4; .5 INJECTION, POWDER, LYOPHILIZED, FOR SOLUTION INTRAVENOUS at 20:11

## 2021-01-01 RX ADMIN — PANTOPRAZOLE SODIUM 40 MILLIGRAM(S): 20 TABLET, DELAYED RELEASE ORAL at 17:48

## 2021-01-01 RX ADMIN — MONTELUKAST 10 MILLIGRAM(S): 4 TABLET, CHEWABLE ORAL at 23:27

## 2021-01-01 RX ADMIN — Medication 650 MILLIGRAM(S): at 00:00

## 2021-01-01 RX ADMIN — Medication 1 MILLIGRAM(S): at 11:58

## 2021-01-01 RX ADMIN — PANTOPRAZOLE SODIUM 40 MILLIGRAM(S): 20 TABLET, DELAYED RELEASE ORAL at 05:25

## 2021-01-01 RX ADMIN — Medication 2: at 16:59

## 2021-01-01 RX ADMIN — Medication 6: at 11:47

## 2021-01-01 RX ADMIN — PANTOPRAZOLE SODIUM 40 MILLIGRAM(S): 20 TABLET, DELAYED RELEASE ORAL at 05:14

## 2021-01-01 RX ADMIN — PANTOPRAZOLE SODIUM 40 MILLIGRAM(S): 20 TABLET, DELAYED RELEASE ORAL at 06:08

## 2021-01-01 RX ADMIN — Medication 40 MILLIGRAM(S): at 22:10

## 2021-01-01 RX ADMIN — PANTOPRAZOLE SODIUM 40 MILLIGRAM(S): 20 TABLET, DELAYED RELEASE ORAL at 17:17

## 2021-01-01 RX ADMIN — Medication 60 MILLIGRAM(S): at 05:36

## 2021-01-01 RX ADMIN — ERYTHROPOIETIN 20000 UNIT(S): 10000 INJECTION, SOLUTION INTRAVENOUS; SUBCUTANEOUS at 22:30

## 2021-01-01 RX ADMIN — Medication 1 TABLET(S): at 13:48

## 2021-01-01 RX ADMIN — Medication 25 MILLIGRAM(S): at 14:23

## 2021-01-01 RX ADMIN — Medication 25 MILLIGRAM(S): at 09:06

## 2021-01-01 RX ADMIN — Medication 25 MILLIGRAM(S): at 17:51

## 2021-01-01 RX ADMIN — OXYCODONE HYDROCHLORIDE 5 MILLIGRAM(S): 5 TABLET ORAL at 07:00

## 2021-01-01 RX ADMIN — Medication 2: at 08:09

## 2021-01-01 RX ADMIN — Medication 25 MILLIGRAM(S): at 11:58

## 2021-01-01 RX ADMIN — CHLORHEXIDINE GLUCONATE 1 APPLICATION(S): 213 SOLUTION TOPICAL at 12:37

## 2021-01-01 RX ADMIN — Medication 100 MICROGRAM(S): at 06:22

## 2021-01-01 RX ADMIN — Medication 25 MILLIGRAM(S): at 14:42

## 2021-01-01 RX ADMIN — INSULIN GLARGINE 8 UNIT(S): 100 INJECTION, SOLUTION SUBCUTANEOUS at 22:59

## 2021-01-01 RX ADMIN — Medication 4: at 16:35

## 2021-01-01 RX ADMIN — INSULIN GLARGINE 12 UNIT(S): 100 INJECTION, SOLUTION SUBCUTANEOUS at 22:11

## 2021-01-01 RX ADMIN — GABAPENTIN 300 MILLIGRAM(S): 400 CAPSULE ORAL at 23:16

## 2021-01-01 RX ADMIN — OXYCODONE HYDROCHLORIDE 5 MILLIGRAM(S): 5 TABLET ORAL at 22:19

## 2021-01-01 RX ADMIN — Medication 325 MILLIGRAM(S): at 17:57

## 2021-01-01 RX ADMIN — Medication 40 MILLIGRAM(S): at 22:08

## 2021-01-01 RX ADMIN — Medication 1300 MILLIGRAM(S): at 16:33

## 2021-01-01 RX ADMIN — Medication 650 MILLIGRAM(S): at 17:17

## 2021-01-01 RX ADMIN — GABAPENTIN 300 MILLIGRAM(S): 400 CAPSULE ORAL at 21:22

## 2021-01-01 RX ADMIN — Medication 650 MILLIGRAM(S): at 06:05

## 2021-01-01 RX ADMIN — ATORVASTATIN CALCIUM 40 MILLIGRAM(S): 80 TABLET, FILM COATED ORAL at 22:37

## 2021-01-01 RX ADMIN — Medication 4: at 07:21

## 2021-01-01 RX ADMIN — Medication 1 MILLIGRAM(S): at 11:52

## 2021-01-01 RX ADMIN — Medication 8: at 08:36

## 2021-01-01 RX ADMIN — Medication 25 MILLIGRAM(S): at 22:26

## 2021-01-01 RX ADMIN — INSULIN GLARGINE 12 UNIT(S): 100 INJECTION, SOLUTION SUBCUTANEOUS at 22:49

## 2021-01-01 RX ADMIN — Medication 80 MILLIGRAM(S): at 05:22

## 2021-01-01 RX ADMIN — Medication 20 MILLIGRAM(S): at 05:29

## 2021-01-01 RX ADMIN — Medication 4: at 07:46

## 2021-01-01 RX ADMIN — Medication 25 MILLIGRAM(S): at 13:00

## 2021-01-01 RX ADMIN — Medication 2: at 12:46

## 2021-01-01 RX ADMIN — GABAPENTIN 300 MILLIGRAM(S): 400 CAPSULE ORAL at 21:08

## 2021-01-01 RX ADMIN — ATORVASTATIN CALCIUM 40 MILLIGRAM(S): 80 TABLET, FILM COATED ORAL at 21:59

## 2021-01-01 RX ADMIN — Medication 25 MILLIGRAM(S): at 18:24

## 2021-01-01 RX ADMIN — Medication 100 MICROGRAM(S): at 06:18

## 2021-01-01 RX ADMIN — Medication 25 MILLIGRAM(S): at 02:34

## 2021-01-01 RX ADMIN — ATORVASTATIN CALCIUM 40 MILLIGRAM(S): 80 TABLET, FILM COATED ORAL at 22:00

## 2021-01-01 RX ADMIN — Medication 100 MILLIGRAM(S): at 12:34

## 2021-01-01 RX ADMIN — ATORVASTATIN CALCIUM 40 MILLIGRAM(S): 80 TABLET, FILM COATED ORAL at 23:16

## 2021-01-01 RX ADMIN — Medication 650 MILLIGRAM(S): at 15:45

## 2021-01-01 RX ADMIN — Medication 40 MILLIGRAM(S): at 16:40

## 2021-01-01 RX ADMIN — Medication 100 MICROGRAM(S): at 05:00

## 2021-01-01 RX ADMIN — Medication 6: at 17:22

## 2021-01-01 RX ADMIN — MONTELUKAST 10 MILLIGRAM(S): 4 TABLET, CHEWABLE ORAL at 22:37

## 2021-01-01 RX ADMIN — Medication 60 MILLIGRAM(S): at 18:07

## 2021-01-01 RX ADMIN — Medication 40 MILLIGRAM(S): at 21:51

## 2021-01-01 RX ADMIN — Medication 100 MICROGRAM(S): at 05:21

## 2021-01-01 RX ADMIN — Medication 2000 UNIT(S): at 13:11

## 2021-01-01 RX ADMIN — Medication 40 MILLIGRAM(S): at 17:10

## 2021-01-01 RX ADMIN — CHLORHEXIDINE GLUCONATE 1 APPLICATION(S): 213 SOLUTION TOPICAL at 13:02

## 2021-01-01 RX ADMIN — Medication 25 MILLIGRAM(S): at 12:44

## 2021-01-01 RX ADMIN — Medication 25 MILLIGRAM(S): at 20:40

## 2021-01-01 RX ADMIN — Medication 1300 MILLIGRAM(S): at 17:36

## 2021-01-01 RX ADMIN — Medication 40 MILLIEQUIVALENT(S): at 13:00

## 2021-01-01 RX ADMIN — INSULIN GLARGINE 12 UNIT(S): 100 INJECTION, SOLUTION SUBCUTANEOUS at 21:34

## 2021-01-01 RX ADMIN — Medication 650 MILLIGRAM(S): at 23:06

## 2021-01-01 RX ADMIN — PANTOPRAZOLE SODIUM 40 MILLIGRAM(S): 20 TABLET, DELAYED RELEASE ORAL at 17:53

## 2021-01-01 RX ADMIN — Medication 40 MILLIGRAM(S): at 22:12

## 2021-01-01 RX ADMIN — PANTOPRAZOLE SODIUM 40 MILLIGRAM(S): 20 TABLET, DELAYED RELEASE ORAL at 05:00

## 2021-01-01 RX ADMIN — Medication 2: at 12:58

## 2021-01-01 RX ADMIN — MONTELUKAST 10 MILLIGRAM(S): 4 TABLET, CHEWABLE ORAL at 23:03

## 2021-01-01 RX ADMIN — Medication 50 MILLILITER(S): at 15:42

## 2021-01-01 RX ADMIN — ATORVASTATIN CALCIUM 40 MILLIGRAM(S): 80 TABLET, FILM COATED ORAL at 22:27

## 2021-01-01 RX ADMIN — Medication 2000 UNIT(S): at 11:07

## 2021-01-01 RX ADMIN — SODIUM CHLORIDE 75 MILLILITER(S): 9 INJECTION INTRAMUSCULAR; INTRAVENOUS; SUBCUTANEOUS at 17:23

## 2021-01-01 RX ADMIN — MONTELUKAST 10 MILLIGRAM(S): 4 TABLET, CHEWABLE ORAL at 23:11

## 2021-01-01 RX ADMIN — Medication 1 MILLIGRAM(S): at 13:34

## 2021-01-01 RX ADMIN — Medication 1 TABLET(S): at 07:55

## 2021-01-01 RX ADMIN — MONTELUKAST 10 MILLIGRAM(S): 4 TABLET, CHEWABLE ORAL at 23:15

## 2021-01-01 RX ADMIN — Medication 6: at 11:58

## 2021-01-01 RX ADMIN — PANTOPRAZOLE SODIUM 40 MILLIGRAM(S): 20 TABLET, DELAYED RELEASE ORAL at 06:18

## 2021-01-01 RX ADMIN — IRON SUCROSE 262.5 MILLIGRAM(S): 20 INJECTION, SOLUTION INTRAVENOUS at 15:00

## 2021-01-01 RX ADMIN — HEPARIN SODIUM 5000 UNIT(S): 5000 INJECTION INTRAVENOUS; SUBCUTANEOUS at 05:24

## 2021-01-01 RX ADMIN — Medication 1 TABLET(S): at 17:17

## 2021-01-01 RX ADMIN — CHLORHEXIDINE GLUCONATE 1 APPLICATION(S): 213 SOLUTION TOPICAL at 11:36

## 2021-01-01 RX ADMIN — PANTOPRAZOLE SODIUM 40 MILLIGRAM(S): 20 TABLET, DELAYED RELEASE ORAL at 16:53

## 2021-01-01 RX ADMIN — IRON SUCROSE 262.5 MILLIGRAM(S): 20 INJECTION, SOLUTION INTRAVENOUS at 14:35

## 2021-01-01 RX ADMIN — SENNA PLUS 2 TABLET(S): 8.6 TABLET ORAL at 23:00

## 2021-01-01 RX ADMIN — Medication 650 MILLIGRAM(S): at 04:38

## 2021-01-01 RX ADMIN — Medication 25 MILLIGRAM(S): at 13:01

## 2021-01-01 RX ADMIN — INSULIN GLARGINE 8 UNIT(S): 100 INJECTION, SOLUTION SUBCUTANEOUS at 00:52

## 2021-01-01 RX ADMIN — Medication 100 MICROGRAM(S): at 05:38

## 2021-01-01 RX ADMIN — Medication 650 MILLIGRAM(S): at 15:54

## 2021-01-01 RX ADMIN — Medication 1 MILLIGRAM(S): at 12:47

## 2021-01-01 RX ADMIN — Medication 25 MILLIGRAM(S): at 12:08

## 2021-01-01 RX ADMIN — Medication 25 MILLIGRAM(S): at 12:46

## 2021-01-01 RX ADMIN — Medication 20 MILLIGRAM(S): at 17:44

## 2021-01-01 RX ADMIN — Medication 1 MILLIGRAM(S): at 12:32

## 2021-01-01 RX ADMIN — Medication 25 MILLIGRAM(S): at 14:54

## 2021-01-01 RX ADMIN — Medication 40 MILLIGRAM(S): at 22:48

## 2021-01-01 RX ADMIN — Medication 2000 UNIT(S): at 11:33

## 2021-01-01 RX ADMIN — Medication 40 MILLIGRAM(S): at 22:00

## 2021-01-01 RX ADMIN — Medication 100 MICROGRAM(S): at 06:25

## 2021-01-01 RX ADMIN — Medication 40 MILLIGRAM(S): at 05:40

## 2021-01-01 RX ADMIN — Medication 25 MILLIGRAM(S): at 22:37

## 2021-01-01 RX ADMIN — HYDROMORPHONE HYDROCHLORIDE 0.5 MILLIGRAM(S): 2 INJECTION INTRAMUSCULAR; INTRAVENOUS; SUBCUTANEOUS at 12:38

## 2021-01-01 RX ADMIN — Medication 25 MILLIGRAM(S): at 17:18

## 2021-01-01 RX ADMIN — CHLORHEXIDINE GLUCONATE 1 APPLICATION(S): 213 SOLUTION TOPICAL at 15:08

## 2021-01-01 RX ADMIN — CHLORHEXIDINE GLUCONATE 1 APPLICATION(S): 213 SOLUTION TOPICAL at 12:07

## 2021-01-01 RX ADMIN — Medication 2000 UNIT(S): at 12:04

## 2021-01-01 RX ADMIN — HEPARIN SODIUM 5000 UNIT(S): 5000 INJECTION INTRAVENOUS; SUBCUTANEOUS at 17:53

## 2021-01-01 RX ADMIN — Medication 1 MILLIGRAM(S): at 14:23

## 2021-01-01 RX ADMIN — Medication 1 TABLET(S): at 12:42

## 2021-01-01 RX ADMIN — SENNA PLUS 2 TABLET(S): 8.6 TABLET ORAL at 22:26

## 2021-01-01 RX ADMIN — GABAPENTIN 300 MILLIGRAM(S): 400 CAPSULE ORAL at 22:58

## 2021-01-01 RX ADMIN — Medication 10 MILLIGRAM(S): at 06:18

## 2021-01-01 RX ADMIN — CHLORHEXIDINE GLUCONATE 1 APPLICATION(S): 213 SOLUTION TOPICAL at 12:55

## 2021-01-01 RX ADMIN — Medication 0.25 MILLIGRAM(S): at 02:04

## 2021-01-01 RX ADMIN — ATORVASTATIN CALCIUM 40 MILLIGRAM(S): 80 TABLET, FILM COATED ORAL at 22:28

## 2021-01-01 RX ADMIN — Medication 25 MILLIGRAM(S): at 22:48

## 2021-01-01 RX ADMIN — Medication 25 MILLIGRAM(S): at 11:47

## 2021-01-01 RX ADMIN — Medication 1 TABLET(S): at 17:57

## 2021-01-01 RX ADMIN — INSULIN GLARGINE 8 UNIT(S): 100 INJECTION, SOLUTION SUBCUTANEOUS at 21:15

## 2021-01-01 RX ADMIN — Medication 3 MILLILITER(S): at 01:24

## 2021-01-01 RX ADMIN — INSULIN GLARGINE 12 UNIT(S): 100 INJECTION, SOLUTION SUBCUTANEOUS at 23:16

## 2021-01-01 RX ADMIN — OXYCODONE HYDROCHLORIDE 5 MILLIGRAM(S): 5 TABLET ORAL at 20:20

## 2021-01-01 RX ADMIN — MONTELUKAST 10 MILLIGRAM(S): 4 TABLET, CHEWABLE ORAL at 22:30

## 2021-01-01 RX ADMIN — LORATADINE 10 MILLIGRAM(S): 10 TABLET ORAL at 12:27

## 2021-01-01 RX ADMIN — INSULIN GLARGINE 10 UNIT(S): 100 INJECTION, SOLUTION SUBCUTANEOUS at 22:27

## 2021-01-01 RX ADMIN — INSULIN GLARGINE 8 UNIT(S): 100 INJECTION, SOLUTION SUBCUTANEOUS at 21:16

## 2021-01-01 RX ADMIN — Medication 2000 UNIT(S): at 12:18

## 2021-01-01 RX ADMIN — Medication 100 MICROGRAM(S): at 05:25

## 2021-01-01 RX ADMIN — Medication 2000 UNIT(S): at 10:53

## 2021-01-01 RX ADMIN — Medication 25 MILLIGRAM(S): at 12:00

## 2021-01-01 RX ADMIN — Medication 650 MILLIGRAM(S): at 13:34

## 2021-01-01 RX ADMIN — Medication 25 MILLIGRAM(S): at 17:26

## 2021-01-01 RX ADMIN — Medication 25 MILLIGRAM(S): at 16:33

## 2021-01-01 RX ADMIN — Medication 6: at 17:06

## 2021-01-01 RX ADMIN — Medication 40 MILLIGRAM(S): at 17:46

## 2021-01-01 RX ADMIN — PANTOPRAZOLE SODIUM 40 MILLIGRAM(S): 20 TABLET, DELAYED RELEASE ORAL at 05:43

## 2021-01-01 RX ADMIN — PANTOPRAZOLE SODIUM 40 MILLIGRAM(S): 20 TABLET, DELAYED RELEASE ORAL at 17:57

## 2021-01-01 RX ADMIN — ATORVASTATIN CALCIUM 40 MILLIGRAM(S): 80 TABLET, FILM COATED ORAL at 23:00

## 2021-01-01 RX ADMIN — PIPERACILLIN AND TAZOBACTAM 25 GRAM(S): 4; .5 INJECTION, POWDER, LYOPHILIZED, FOR SOLUTION INTRAVENOUS at 08:09

## 2021-01-01 RX ADMIN — ATORVASTATIN CALCIUM 40 MILLIGRAM(S): 80 TABLET, FILM COATED ORAL at 22:09

## 2021-01-01 RX ADMIN — MONTELUKAST 10 MILLIGRAM(S): 4 TABLET, CHEWABLE ORAL at 22:29

## 2021-01-01 RX ADMIN — HYDROMORPHONE HYDROCHLORIDE 0.5 MILLIGRAM(S): 2 INJECTION INTRAMUSCULAR; INTRAVENOUS; SUBCUTANEOUS at 09:40

## 2021-01-01 RX ADMIN — Medication 4: at 11:28

## 2021-01-01 RX ADMIN — INSULIN GLARGINE 14 UNIT(S): 100 INJECTION, SOLUTION SUBCUTANEOUS at 22:44

## 2021-01-01 RX ADMIN — Medication 300 UNIT(S): at 16:27

## 2021-01-01 RX ADMIN — OXYCODONE HYDROCHLORIDE 5 MILLIGRAM(S): 5 TABLET ORAL at 23:00

## 2021-01-01 RX ADMIN — Medication 2: at 11:06

## 2021-01-01 RX ADMIN — Medication 25 MILLIGRAM(S): at 05:00

## 2021-01-01 RX ADMIN — Medication 6: at 11:21

## 2021-01-01 RX ADMIN — Medication 1 MILLIGRAM(S): at 11:51

## 2021-01-01 RX ADMIN — Medication 1 TABLET(S): at 12:05

## 2021-01-01 RX ADMIN — Medication 2000 UNIT(S): at 12:32

## 2021-01-01 RX ADMIN — Medication 2: at 07:32

## 2021-01-01 RX ADMIN — INSULIN GLARGINE 8 UNIT(S): 100 INJECTION, SOLUTION SUBCUTANEOUS at 21:47

## 2021-01-01 RX ADMIN — OXYCODONE HYDROCHLORIDE 5 MILLIGRAM(S): 5 TABLET ORAL at 21:20

## 2021-01-01 RX ADMIN — Medication 25 MILLIGRAM(S): at 06:02

## 2021-01-01 RX ADMIN — Medication 1 TABLET(S): at 16:55

## 2021-01-01 RX ADMIN — PANTOPRAZOLE SODIUM 40 MILLIGRAM(S): 20 TABLET, DELAYED RELEASE ORAL at 06:34

## 2021-01-01 RX ADMIN — Medication 20 MILLIGRAM(S): at 06:24

## 2021-01-01 SDOH — SOCIAL STABILITY - SOCIAL INSECURITY: DISSAPEARANCE AND DEATH OF FAMILY MEMBER: Z63.4

## 2021-01-29 NOTE — H&P ADULT - DOES THIS PATIENT HAVE A HISTORY OF OR HAS BEEN DX WITH HEART FAILURE?
[FreeTextEntry1] : 68 yo male with hx of BPH and urinary retention.  He returns today for trial of void.  He notes that over the past week he has had episodes of bladder spasms with leakage of urine around the mackey which would suggest he has bladder function.  His Ucx from last visit was negative.\par \par \par Trial of void:\par -Patient instilled with 300 cc sterile water\par -Mackey catheter balloon deflated and removed in its entirety\par -Pt voided 100 cc right after the Mackey was removed\par -Patient waited additional 2 hours and could not void additional urine
yes

## 2021-02-05 NOTE — DISCHARGE NOTE NURSING/CASE MANAGEMENT/SOCIAL WORK - NSDCPEPT PROEDHF_GEN_ALL_CORE
Call primary care provider for follow up after discharge/Low salt diet/Activities as tolerated/Report signs and symptoms to primary care provider/Monitor weight daily
118

## 2021-02-11 PROBLEM — I85.11 SECONDARY ESOPHAGEAL VARICES WITH BLEEDING: Status: ACTIVE | Noted: 2019-09-04

## 2021-02-11 PROBLEM — R93.89 ABNORMAL CHEST CT: Status: ACTIVE | Noted: 2017-03-24

## 2021-02-16 NOTE — ED PROVIDER NOTE - NS ED MD DISPO DISCHARGE CCDA
Needle stick, hypodermic, accidental, initial encounter
Patient/Caregiver provided printed discharge information.

## 2021-02-19 PROBLEM — N18.4 CKD (CHRONIC KIDNEY DISEASE), STAGE IV: Status: ACTIVE | Noted: 2021-01-01

## 2021-02-19 PROBLEM — E11.628 TYPE 2 DIABETES MELLITUS WITH OTHER SKIN COMPLICATION: Status: ACTIVE | Noted: 2021-01-01

## 2021-02-19 PROBLEM — E11.69 TYPE 2 DIABETES MELLITUS WITH OTHER SPECIFIED COMPLICATION, WITH LONG-TERM CURRENT USE OF INSULIN: Status: ACTIVE | Noted: 2021-01-01

## 2021-02-19 PROBLEM — I25.10 CAD (CORONARY ARTERY DISEASE): Status: ACTIVE | Noted: 2021-01-01

## 2021-02-19 PROBLEM — Z63.4 WIDOWED: Status: ACTIVE | Noted: 2021-01-01

## 2021-03-08 NOTE — HISTORY OF PRESENT ILLNESS
[de-identified] : The patient arrived for a follow-up.  She has been evaluated by the hepatology and interventional radiology for segment 6 HCC.  She is denying any rectal bleeding.  She continues to require packed RBC transfusions.  She has a scheduled procedure for RFA by Dr. Marmolejo in April.

## 2021-03-08 NOTE — ASSESSMENT
[FreeTextEntry1] : The patient is going to have treatment with interventional radiology.  After that we will schedule her for esophageal variceal screening.  She will be followed up with hepatology, IR and us.  She already follows up with hematology.  EGD to be scheduled in [facility].\par \par Favio Lloyd MD\par Gastroenterology \par \par

## 2021-03-09 NOTE — ED ADULT NURSE NOTE - PERIPHERAL PULSES
Validate Anticipated Plan: No Was A Bandage Applied: Yes Anesthesia Type: 1% lidocaine with epinephrine Billing Type: Third-Party Bill Biopsy Type: H and E Anesthesia Volume In Cc (Will Not Render If 0): 0 Information: Selecting Yes will display possible errors in your note based on the variables you have selected. This validation is only offered as a suggestion for you. PLEASE NOTE THAT THE VALIDATION TEXT WILL BE REMOVED WHEN YOU FINALIZE YOUR NOTE. IF YOU WANT TO FAX A PRELIMINARY NOTE YOU WILL NEED TO TOGGLE THIS TO 'NO' IF YOU DO NOT WANT IT IN YOUR FAXED NOTE. Silver Nitrate Text: The wound bed was treated with silver nitrate after the biopsy was performed. Notification Instructions: Patient understands to return to office in 2-4 weeks for biopsy results Electrodesiccation And Curettage Text: The wound bed was treated with electrodesiccation and curettage after the biopsy was performed. Consent: Verbal consent was obtained and risks were reviewed including but not limited to scarring, infection, bleeding, scabbing, incomplete removal, nerve damage and allergy to anesthesia. Hemostasis: Aluminum Chloride Post-Care Instructions: I reviewed with the patient in detail post-care instructions. Patient is to keep the biopsy site dry overnight, and then apply aquaphor twice daily until healed. Patient may apply hydrogen peroxide soaks to remove any crusting.  Call the office if the area appears infected. Type Of Destruction Used: Curettage Wound Care: Aquaphor Cryotherapy Text: The wound bed was treated with cryotherapy after the biopsy was performed. Detail Level: Detailed Depth Of Biopsy: dermis Electrodesiccation Text: The wound bed was treated with electrodesiccation after the biopsy was performed. Curettage Text: The wound bed was treated with curettage after the biopsy was performed. Dressing: bandage equal bilaterally

## 2021-03-19 PROBLEM — I50.9 CONGESTIVE HEART FAILURE, UNSPECIFIED HF CHRONICITY, UNSPECIFIED HEART FAILURE TYPE: Status: ACTIVE | Noted: 2021-01-01

## 2021-03-31 NOTE — H&P PST ADULT - NSICDXPROBLEM_GEN_ALL_CORE_FT
PROBLEM DIAGNOSES  Problem: Liver cancer  Assessment and Plan: CT guided liver MWA with anesthesia on 4/5/21  Pre-op instructions provided - all questions answered      R/O PROBLEM DIAGNOSES  Problem: Hypertension  Assessment and Plan: Continue BP and cardiac meds as indicated, including am of surgery with sip of water    Problem: DM2 (diabetes mellitus, type 2)  Assessment and Plan: FS on arrival to IR    Problem: Asthma  Assessment and Plan: Continue montekulast as indicated, will bring Pro-Air DOS

## 2021-03-31 NOTE — H&P PST ADULT - HISTORY OF PRESENT ILLNESS
73yo F Hx of HTN, HLD, DM2, asthma, hypothyroid, anemia since childhodd, on 2L home O2 PRN depends an anemia, Hep C which was treated, cirrhosis with esophageal varices, CAD s/p CABG x3 2016, CKD stage 4,  TIA s/p ILR (2017), AS s/p bovine AVR (2016), complicated by post-operative Afib - resolved, chronic anemia requiring multiple PRBC. Pt endorses >50 transfusions of PRBC in 2 yrs, receives iron infusions Pt. had a u/s in December for liver follow up with her GI MD which revealed a liver lesion, diagnosed with liver CA in January 2021,  she underwent further   evaluation,t. was diagnosed with liver Ca in January and presents to PST for scheduled CT guided liver MWA with anesthesia. Pt reports having u/s in December with her GI, Dr. Favio Lloyd, and was found to have liver lesion. She was referred to Dr. Phillips for further evaluation. Her case was discussed at 2/18/21 HB tumor board and she presents to IR for liver directed therapy.  Colonoscopy showed proctitis, diverticulosis, hemorrhoids and polyps. Pt denies abdominal or epigastric pain, nausea, vomiting, hematemesis, diarrhea or constipation. Endorses frequent BRBPR. Presents for preop assessment prior to hemorrhoidectomy w/Dr Dickerson on 6/15  H/O aortic valve replacement: cow valve.  3 bone marrow  S/P CABG x 3  History of tonsillecto COVID-19 testing 4/2 at [facility]    his a 75 year old female with pmhx of cirrhosis secondary to hepatitis C, status post treatment, type 2 diabetes mellitus, hypothyroidism, history of 3v CABG, aortic valve replacement, CKD, HTN, CAD, asthma, CHF, GIB 2/2 bleeding esophageal varices, chronic anemia (requiring multiple transfusions) who presents today for consultation for liver directed therapy.          74 yo female, PMH HTN, HLD, DM2, asthma, hypothyroid, anemia since childhood, O2 2L at home PRN - depends an anemia, Hep C which was treated, cirrhosis with esophageal varices, hemorrhoidal bleeding, CAD s/p CABG x3 2016, CKD stage 4,  TIA s/p ILR 2017, AS s/p bovine AVR 2016, complicated by post-operative Afib - resolved, due to chronic anemia has required multiple PRBC - as per pt., total of 62 (has Medport on right upper ACW), last one 1/28/21, last iron transfusion 3/16/21. Pt. had a u/s in December for liver follow up with her GI MD which revealed a liver lesion, diagnosed with liver CA in January 2021 and pt. presents to PST for scheduled CT Guided liver MWA with anesthesia. Pt. denies COVID-19 infection in 2020/2021, denies close contact with anyone that has been ill, no fever, cough, dyspnea in past two weeks. Pt. uses a cane to ambulate at home due to poor balance, on wheelchair today. Pt. received two doses of Pfizer COVID-19 vaccine    pt. is scheduled for COVID-19 testing on 4/2/21 at [facility]       Cardiac note from 1/27/21 in chart

## 2021-03-31 NOTE — H&P PST ADULT - NSICDXPASTMEDICALHX_GEN_ALL_CORE_FT
PAST MEDICAL HISTORY:  Anemia, unspecified anemia type     Aortic stenosis     Asthma     CAD (coronary artery disease)     Chronic hepatitis C     Cirrhosis     CKD (chronic kidney disease)     Heart failure     Hemorrhoids     High cholesterol     Hypertension     Internal hemorrhoids     Low back pain chronic over a year    Type 2 diabetes mellitus      PAST MEDICAL HISTORY:  Anemia, unspecified anemia type uses O2 at 2L at home PRN, due to anemia    Aortic stenosis s/p bovine AVR    Asthma well controlled, never intubated for exacerbation    CAD (coronary artery disease) s/p OHS    Chronic hepatitis C Treated    Cirrhosis 2/2 hepatitis    CKD (chronic kidney disease) Stage 4    Heart failure Echo  10/2020 EF 60%, mild AR    Hemorrhoids     High cholesterol     Hypertension well controlled    Internal hemorrhoids     Liver carcinoma Dx 1/2021    Low back pain chronic over a year

## 2021-03-31 NOTE — H&P PST ADULT - NS MD HP INPLANTS MED DEV
loop recorder, Medport, aortic valve, marker on left breast/Heart valve loop recorder, Med port, aortic valve, marker on left breast/Heart valve

## 2021-03-31 NOTE — H&P PST ADULT - ACTIVITY
Walks in patio in the back on nice days, has poor balance - uses cane Walks in backyard on nice days, has poor balance - uses cane

## 2021-03-31 NOTE — H&P PST ADULT - TRANSFUSION HX COMMENT, PROFILE
>50 transfusions of PRBC in 2 yrs, received blood transfusion 1/28/2021, Iron infusion 3/16/21 Pt. states she has reeived total of 62 PRBC units in her lifetime, Last blood transfusion 1/28/2021, Iron infusion 3/16/21

## 2021-03-31 NOTE — H&P PST ADULT - NSICDXPASTSURGICALHX_GEN_ALL_CORE_FT
PAST SURGICAL HISTORY:  H/O aortic valve replacement bovine, 2016    History of loop recorder 2017    History of tonsillectomy     Port-A-Cath in place 7/2019, right upper chest    S/P CABG x 3 2016     PAST SURGICAL HISTORY:  H/O aortic valve replacement bovine, 2016    History of loop recorder 2017    History of tonsillectomy as a child    Port-A-Cath in place 7/2019, right upper chest    S/P CABG x 3 2016

## 2021-04-05 NOTE — H&P ADULT - PROBLEM SELECTOR PLAN 4
-History of chronic kidney disease noted; likely secondary to type II hepatorenal syndrome   -In setting of need for administration of venous iodinated contrast in setting of procedure today, administering 75 cc/hr of intravenous crystalloid for five hours subsequent to procedure   -Will follow repeat metabolic panel in am

## 2021-04-05 NOTE — H&P ADULT - PROBLEM SELECTOR PLAN 6
-History of elevated blood pressure noted; managed at home with oral amlodipine, carvediolol, torsemide   -Asymptomatic hypertension noted subsequent to procedure; no signs or symptoms of end-organ damage concerning for development of hypertensive emergency; will re-start home blood pressure medications

## 2021-04-05 NOTE — H&P ADULT - NSHPSOCIALHISTORY_GEN_ALL_CORE
The patient is a never smoker. She has never used alcohol or illicit drugs. She lives at home with her  and with two of her four adult children in [place]. She is a retired book-keeper. She believes she acquired her hepatitis c infection through a blood transfusion when she was a child.

## 2021-04-05 NOTE — H&P ADULT - NSHPLABSRESULTS_GEN_ALL_CORE
LABS personally reviewed:	 	    FS: CAPILLARY BLOOD GLUCOSE      POCT Blood Glucose.: 121 mg/dL (05 Apr 2021 18:52)  POCT Blood Glucose.: 180 mg/dL (05 Apr 2021 15:15)  POCT Blood Glucose.: 58 mg/dL (05 Apr 2021 14:27)  POCT Blood Glucose.: 51 mg/dL (05 Apr 2021 14:03)  POCT Blood Glucose.: 52 mg/dL (05 Apr 2021 14:02)  POCT Blood Glucose.: 106 mg/dL (05 Apr 2021 13:41)  POCT Blood Glucose.: 33 mg/dL (05 Apr 2021 13:23)  POCT Blood Glucose.: 48 mg/dL (05 Apr 2021 13:06)

## 2021-04-05 NOTE — PRE PROCEDURE NOTE - PRE PROCEDURE EVALUATION
HPI:  74 yo female, PMH HTN, HLD, DM2, asthma, hypothyroid, anemia since childhood, O2 2L at home PRN - depends an anemia, Hep C which was treated, cirrhosis with esophageal varices, hemorrhoidal bleeding, CAD s/p CABG x3 2016, CKD stage 4,  TIA s/p ILR 2017, AS s/p bovine AVR 2016, complicated by post-operative Afib - resolved, due to chronic anemia has required multiple PRBC - as per pt., total of 62 (has Medport on right upper ACW), last one 1/28/21, last iron transfusion 3/16/21. Pt. had a u/s in December for liver follow up with her GI MD which revealed a liver lesion, diagnosed with liver CA in January 2021 and pt. presents to PST for scheduled CT Guided liver MWA with anesthesia. Pt. denies COVID-19 infection in 2020/2021, denies close contact with anyone that has been ill, no fever, cough, dyspnea in past two weeks. Pt. uses a cane to ambulate at home due to poor balance, on wheelchair today. Pt. received two doses of Pfizer COVID-19 vaccine    pt. is scheduled for COVID-19 testing on 4/2/21 at [facility]       Cardiac note from 1/27/21.    IR consulted for hepatic mass liver      NPO status: Since midnight  Anticoagulation:  Antibiotics:     Allergies:ACE inhibitors (Other)  Bactrim (Nephrotoxicity)  Biaxin (Joint Pain)  morphine (Short breath)  NSAIDs (Other)      PAST MEDICAL & SURGICAL HISTORY:  Hypertension  well controlled    High cholesterol    Low back pain  chronic over a year    Asthma  well controlled, never intubated for exacerbation    Anemia, unspecified anemia type  uses O2 at 2L at home PRN, due to anemia    Aortic stenosis  s/p bovine AVR    Heart failure  Echo  10/2020 EF 60%, mild AR    CAD (coronary artery disease)  s/p OHS    CKD (chronic kidney disease)  Stage 4    Chronic hepatitis C  Treated    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          Pertinent labs:    Consent: Procedure/risks/ Benefits explained. Informed consent obtained. Pt verbalizes understanding.          HPI:  74 yo female, PMH HTN, HLD, DM2, asthma, hypothyroid, anemia since childhood, O2 2L at home PRN - depends an anemia, Hep C which was treated, cirrhosis with esophageal varices, hemorrhoidal bleeding, CAD s/p CABG x3 2016, CKD stage 4,  TIA s/p ILR 2017, AS s/p bovine AVR 2016, complicated by post-operative Afib - resolved, due to chronic anemia has required multiple PRBC - as per pt., total of 62 (has Medport on right upper ACW), last one 1/28/21, last iron transfusion 3/16/21. Pt. had a u/s in December for liver follow up with her GI MD which revealed a liver lesion, diagnosed with liver CA in January 2021.      IR consulted for hepatic mass- liver directed therapy.  Patient evaluated by anesthesiology attending on 4/5 and after detailed evaluation of the patients cardiac function, respiratory status--determined the patient is NOT a appropriate procedural candidate for general anesthesia.  Case discussed with patient at length at bedside  length as well as her Nephrologist Dr. Jose Cruz via telephone on 4/5/21, and Dr. Phillips, who stated IR may proceed with angiogram and possible embolization.  Nephrologist Dr. Cruz recommended giving the patient a bolus of 500 cc of normal saline pre-procedure and IVF normal saline 75 cc/ hr for 5 hours and proceeding with the angiogram and possible embolization today (4/5/21).     Procedure, including risks of IV contrast administration for the hepatic angiogram, including but not limited to worsening of renal function/ renal failure, were discussed with the patient, who consented to proceed with the procedure.     NPO status: Since midnight  Anticoagulation: None  Antibiotics: None     Allergies: ACE inhibitors (Other)  Bactrim (Nephrotoxicity)  Biaxin (Joint Pain)  morphine (Short breath)  NSAIDs (Other)      PAST MEDICAL & SURGICAL HISTORY:  Hypertension  well controlled    High cholesterol    Low back pain  chronic over a year    Asthma  well controlled, never intubated for exacerbation    Anemia, unspecified anemia type  uses O2 at 2L at home PRN, due to anemia    Aortic stenosis  s/p bovine AVR    Heart failure  Echo  10/2020 EF 60%, mild AR    CAD (coronary artery disease)  s/p OHS    CKD (chronic kidney disease)  Stage 4    Chronic hepatitis C  Treated    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    Consent: Procedure/risks/ Benefits explained. Informed consent obtained. Pt verbalizes understanding.          HPI:  76 yo female, PMH HTN, HLD, DM2, asthma, hypothyroid, anemia since childhood, O2 2L at home PRN - depends an anemia, Hep C which was treated, cirrhosis with esophageal varices, hemorrhoidal bleeding, CAD s/p CABG x3 2016, CKD stage 4,  TIA s/p ILR 2017, AS s/p bovine AVR 2016, complicated by post-operative Afib - resolved, due to chronic anemia has required multiple PRBC - as per pt., total of 62 (has Medport on right upper ACW), last one 1/28/21, last iron transfusion 3/16/21. Pt. had a u/s in December for liver follow up with her GI MD which revealed a liver lesion, diagnosed with liver CA in January 2021.      IR consulted for hepatic mass- liver directed therapy.  Patient evaluated by anesthesiology attending on 4/5 and after detailed evaluation of the patients cardiac function, respiratory status--determined the patient is NOT a appropriate procedural candidate for general anesthesia.  Case discussed with patient at length at bedside as well as her Nephrologist Dr. Jose Cruz via telephone on 4/5/21, and Dr. Phillips, who stated IR may proceed with angiogram and possible embolization.  Nephrologist Dr. Cruz recommended giving the patient a bolus of ~500 cc of normal saline pre-procedure and IVF normal saline 75 cc/ hr for 5 hours and proceeding with the angiogram and possible embolization today (4/5/21).     Procedure, including risks of IV contrast administration for the hepatic angiogram, including but not limited to worsening of renal function/ renal failure, were discussed with the patient, who consented to proceed with the procedure.     NPO status: Since midnight  Anticoagulation: None  Antibiotics: None     Allergies: ACE inhibitors (Other)  Bactrim (Nephrotoxicity)  Biaxin (Joint Pain)  morphine (Short breath)  NSAIDs (Other)      PAST MEDICAL & SURGICAL HISTORY:  Hypertension  well controlled    High cholesterol    Low back pain  chronic over a year    Asthma  well controlled, never intubated for exacerbation    Anemia, unspecified anemia type  uses O2 at 2L at home PRN, due to anemia    Aortic stenosis  s/p bovine AVR    Heart failure  Echo  10/2020 EF 60%, mild AR    CAD (coronary artery disease)  s/p OHS    CKD (chronic kidney disease)  Stage 4    Chronic hepatitis C  Treated    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    Consent: Procedure/risks/ Benefits explained. Informed consent obtained. Pt verbalizes understanding.     Plan: Visceral angio and liver embolization

## 2021-04-05 NOTE — ASU PATIENT PROFILE, PEDIATRIC - PSH
H/O aortic valve replacement  bovine, 2016  History of loop recorder  2017  History of tonsillectomy  as a child  Port-A-Cath in place  7/2019, right upper chest  S/P CABG x 3  2016

## 2021-04-05 NOTE — H&P ADULT - HISTORY OF PRESENT ILLNESS
The patient is a 75-year-old woman with a history of hepatitis c infection status post treatment with epclusa with a sustained viremic response who is now being followed for cirrhosis, complicated by the development of suspected hepatocellular carcinoma, and for chronic kidney disease who presented today for a scheduled microwave ablation with interventional radiology. In the setting of her procedure, the patient was noted to have a low fingerstick glucose measurement, at minimum to less than 40. Per the patient, she has a history of type II diabetes mellitus and typically takes 20 units of basal insulin lightly. In the setting of her procedure, the patient decreased her dose to 13 units last night. However, this morning, the patient was noted to have a fingerstick glucose measurement greater 300. For that reason, she self administered about 25 units of sliding scale insulin this morning, and then was noted to have a fingerstick glucose measurement in the 40s at the time of presentation for her procedure. The patient denies any symptoms of fevers, chills, cough, shortness of breath, pain or burning with urination, abdominal pain, nausea, vomiting, headaches, or neck pain. She does not take any beta blockers or any oral anti-diabetic agents. The patient does not drink any alcohol. She has not had any symptoms of weakness, dizziness, or diaphoresis. Typically, her fingerstick glucose level is well controlled in the low-to-mid 100s on insulin.     Of note, the patient has a history of chronic kidney disease, and required administration of iodinated contrast in the setting of her endovascular procedure today. For that reason, she was administered  The patient is a 75-year-old woman with a history of hepatitis c infection status post treatment with epclusa with a sustained viremic response who is now being followed for cirrhosis, complicated by the development of suspected hepatocellular carcinoma, and for chronic kidney disease who presented today for a scheduled microwave ablation with interventional radiology. In the setting of her procedure, the patient was noted to have a low fingerstick glucose measurement, at minimum to less than 40. Per the patient, she has a history of type II diabetes mellitus and typically takes 20 units of basal insulin lightly. In the setting of her procedure, the patient decreased her dose to 13 units last night. However, this morning, the patient was noted to have a fingerstick glucose measurement greater 300. For that reason, she self administered about 25 units of sliding scale insulin this morning, and then was noted to have a fingerstick glucose measurement in the 40s at the time of presentation for her procedure. The patient denies any symptoms of fevers, chills, cough, shortness of breath, pain or burning with urination, abdominal pain, nausea, vomiting, headaches, or neck pain. She does not take any beta blockers or any oral anti-diabetic agents. The patient does not drink any alcohol. She has not had any symptoms of weakness, dizziness, or diaphoresis. Typically, her fingerstick glucose level is well controlled in the low-to-mid 100s on insulin.     Of note, the patient has a history of chronic kidney disease, and required administration of iodinated contrast in the setting of her endovascular procedure today. For that reason, she was administered 75 ccs/hr of normal saline for five hours total subsequent to her procedure.

## 2021-04-05 NOTE — H&P ADULT - PROBLEM SELECTOR PLAN 7
-History of asthma noted; will continue management with daily oral montelukast and inhaled albuterol as needed

## 2021-04-05 NOTE — PROCEDURE NOTE - PROCEDURE FINDINGS AND DETAILS
Right hepatic mass angiogram and embolization with 40 um embozene particles to stasis.  No acute complications.

## 2021-04-05 NOTE — PRE-ANESTHESIA EVALUATION ADULT - HEIGHT IN CM
149.86 W Plasty Text: The lesion was extirpated to the level of the fat with a #15 scalpel blade.  Given the location of the defect, shape of the defect and the proximity to free margins a W-plasty was deemed most appropriate for repair.  Using a sterile surgical marker, the appropriate transposition arms of the W-plasty were drawn incorporating the defect and placing the expected incisions within the relaxed skin tension lines where possible.    The area thus outlined was incised deep to adipose tissue with a #15 scalpel blade.  The skin margins were undermined to an appropriate distance in all directions utilizing iris scissors.  The opposing transposition arms were then transposed into place in opposite direction and anchored with interrupted buried subcutaneous sutures.

## 2021-04-05 NOTE — H&P ADULT - PROBLEM SELECTOR PLAN 5
-History of heart failure with preserved ejection fraction noted; currently managing with 6.25 mg of oral carvedilol daily   -No further workup or management necessary at this time

## 2021-04-05 NOTE — PROCEDURE NOTE - PLAN
-Admit for overnight inpatient monitoring, close observation and care  -Keep right groin (puncture via the right CFA) for 4 hours.  Keep overlying dressing clean/ dry/ intact.  -Patient may ambulate after 4 hours with bathroom privileges  -Patient has CKD with baseline creatinine of 2.3; patient received 72 cc Omnipaque for the procedure; as per patients outpatient Nephrologist (Dr. Jose Cruz) recommendation, c/w IV NS 75 cc/ hr for 5-6 hours (patient received 500 cc IV NS bolus prior to the procedure), and consider in-patient Nephrology consult for further management and care.  Closely monitor urine output for renal failure/ acute worsening of renal function.  AM BMP.   -Closely monitor and treat diabetes, patient originally presented to IR with hyperglycemia but labile blood sugars--became hypoglycemia soon thereafter (finger stick 34).   -Pain control as per primary team if needed.   -IR to see patient in AM prior to discharge home.   -Care and procedural findings discussed with admitting hospitalist, Dr. Bauer on 4/5/21 at 4:25 pm.

## 2021-04-05 NOTE — H&P ADULT - NSICDXPASTSURGICALHX_GEN_ALL_CORE_FT
PAST SURGICAL HISTORY:  H/O aortic valve replacement bovine, 2016    History of loop recorder 2017    History of tonsillectomy as a child    Port-A-Cath in place 7/2019, right upper chest    S/P CABG x 3 2016

## 2021-04-05 NOTE — H&P ADULT - NSHPREVIEWOFSYSTEMS_GEN_ALL_CORE
CONSTITUTIONAL: No fever, weight loss, or fatigue  EYES: No eye pain, visual disturbances, or discharge  ENMT:  No difficulty hearing, tinnitus, vertigo; No sinus or throat pain  NECK: No pain, no stiffness  BREASTS: No pain, masses, or nipple discharge  RESPIRATORY: No cough, wheezing, chills or hemoptysis; No shortness of breath  CARDIOVASCULAR: No chest pain, palpitations, dizziness, or leg swelling  GASTROINTESTINAL: No abdominal or epigastric pain; no swelling. No nausea, vomiting, or hematemesis; No diarrhea or constipation. No melena or hematochezia.  GENITOURINARY: No dysuria, frequency, hematuria, or incontinence  NEUROLOGICAL: No headaches, memory loss, loss of strength, numbness, or tremors  EXTREMITIES: No pain or palpable pulsations in either lower extremity; no groin swelling   SKIN: No itching, burning, rashes, or lesions   LYMPH NODES: No enlarged glands  ENDOCRINE: No heat or cold intolerance; No hair loss  MUSCULOSKELETAL: No joint pain or swelling; No muscle, back, or extremity pain  PSYCHIATRIC: No depression, anxiety, mood swings, or difficulty sleeping  HEME/LYMPH: No easy bruising, no bleeding gums  ALLERY AND IMMUNOLOGIC: No hives, no eczema

## 2021-04-05 NOTE — H&P ADULT - NSHPPHYSICALEXAM_GEN_ALL_CORE
GENERAL: NAD, well-groomed, well-developed  EYES: EOMI, PERRLA, conjunctiva and sclera clear  ENMT: No tonsillar erythema, exudates, or enlargement; Moist mucous membranes, Good dentition, No lesions; no sub-lingual icterus   NECK: Supple, No JVD, Normal thyroid  NERVOUS SYSTEM:  Alert & Oriented X3, Good concentration; Motor Strength 5/5 B/L upper and lower extremities; DTRs 2+ intact and symmetric  CHEST/LUNG: Clear to auscultation and tympanitic to percussion bilaterally; No rales, rhonchi, wheezing, or rubs  HEART: Regular rate and rhythm; No murmurs, rubs, or gallops  ABDOMEN: Abdomen not distended; no caput medusae; no hepato-splenomegaly; no rebound tenderness; no guarding   GROIN: No expansile masses, no palpable thrill; no bruit auscultated   EXTREMITIES:  2+anterior tibial pulses bilaterally, No clubbing, cyanosis, or edema  LYMPH: No lymphadenopathy noted  SKIN: Few spider angiomata on anterior chest; no palmar erythema; no felix's nails

## 2021-04-05 NOTE — H&P ADULT - PROBLEM SELECTOR PLAN 1
-Hypoglycemia noted today in the setting of procedure; per reported history from patient, hypoglycemia most likely secondary to administration of excessive sliding scale insulin this morning in setting of ckd and npo status despite decreased dose of basal insulin last night   -No evidence of infection; no administration of oral anti-diabetic agents, no extra carvedilol taken, no changes to blood pressure medication regimen, no antibiotic administration, no alcohol ingestion, no acetaminophen administration; no history of additional endocrinopathy; no history of symptoms of dizziness or of diaphoresis to suggest insulinoma; history of hepatic and renal disease likely contributory   -History of type II diabetes mellitus noted; patient administers insulin at home--20 units of basal insulin daily   -Most recent hemoglobin a1c noted to be 4.9--indicates likely too high of an insulin dose (target a1c about 7.5); may be able to transition to oral anti-diabetic agents as outpatient

## 2021-04-05 NOTE — H&P ADULT - ASSESSMENT
The patient is a 75-year-old woman who is being managed for alcoholic cirrhosis, secondary to hepatitis c infection, now status post treatment with a reported sustained viremic response, but complicated by the development of likely hepatocellular carcinoma, who is admitted for monitoring subsequent to a microwave ablation of her hepatic mass, and whose treatment course was complicated by the development of hypoglyemia secondary to insulin administration despite npo status.

## 2021-04-05 NOTE — H&P ADULT - ATTENDING COMMENTS
Patient seen and examined. Plan as discussed w/ Dr. Murrell: s/p scheduled microwave ablation with interventional radiology today, c/b sp-procedure hypoglycemia in setting of insulin self-administration this AM despite being NPO; will continue to closely monitor FS (ISS for now) while resuming diet and renal function (in setting of known CKD and contrast administration during the procedure). Will touch base with outpatient providers: hepatology and nephrology for further collateral and dispo planning after monitoring.

## 2021-04-05 NOTE — H&P ADULT - PROBLEM SELECTOR PLAN 3
-History of cirrhosis noted, most likely secondary to previous hepatitis c infection   -No evidence of ascites or of pitting edema on exam, but patient is managed with oral torsemide 20 mg daily (will continue management); of note, no spironolactone   -No reported history of spontaneous bacterial peritonitis   -History of upper esophageal varices noted, with reported history of sub-clinical bleeding, status post incomplete banding; reports will follow up as outpatient   -No known history of hepatic encephalopathy   -Screening for hepatocellular carcinoma as above  -Bicytopenia (anemia and thrombocytopenia) noted in setting of cirrhosis (and ckd); no further workup or management necessary at this time

## 2021-04-05 NOTE — H&P ADULT - PROBLEM SELECTOR PLAN 2
-Per patient, isolated hepatic mass noted, less than 3 cm in size, now status post radiofrequency ablation today  -As isolated mass that is under 3 cm in size, not large enough to increase patient's meld score  -Will monitor cmp in am to assess for expected elevation in serum transaminases  -Follow-up screening with hepatology team as outpatient

## 2021-04-05 NOTE — H&P ADULT - NSICDXPASTMEDICALHX_GEN_ALL_CORE_FT
PAST MEDICAL HISTORY:  Anemia, unspecified anemia type uses O2 at 2L at home PRN, due to anemia    Aortic stenosis s/p bovine AVR    Asthma well controlled, never intubated for exacerbation    CAD (coronary artery disease) s/p OHS    Chronic hepatitis C Sustained viremic response documented    Cirrhosis 2/2 hepatitis    CKD (chronic kidney disease) Stage 4    Heart failure Echo  10/2020 EF 60%, mild AR    Hemorrhoids     High cholesterol     Hypertension well controlled    Internal hemorrhoids     Liver carcinoma Dx 1/2021    Low back pain chronic over a year

## 2021-04-05 NOTE — ASU PATIENT PROFILE, PEDIATRIC - PMH
Anemia, unspecified anemia type  uses O2 at 2L at home PRN, due to anemia  Aortic stenosis  s/p bovine AVR  Asthma  well controlled, never intubated for exacerbation  CAD (coronary artery disease)  s/p OHS  Chronic hepatitis C  Treated  Cirrhosis  2/2 hepatitis  CKD (chronic kidney disease)  Stage 4  Heart failure  Echo  10/2020 EF 60%, mild AR  Hemorrhoids    High cholesterol    Hypertension  well controlled  Internal hemorrhoids    Liver carcinoma  Dx 1/2021  Low back pain  chronic over a year

## 2021-04-05 NOTE — H&P ADULT - PROBLEM SELECTOR PLAN 8
-History of hypothyroidism noted; will administer home levothyroxine dose   -Can monitor yearly tsh as outpatient

## 2021-04-05 NOTE — PRE-ANESTHESIA EVALUATION ADULT - SPO2 (%)
97 Methotrexate Pregnancy And Lactation Text: This medication is Pregnancy Category X and is known to cause fetal harm. This medication is excreted in breast milk.

## 2021-04-06 NOTE — PROGRESS NOTE ADULT - PROBLEM SELECTOR PLAN 5
-History of heart failure with preserved ejection fraction noted; currently managing with 6.25 mg of oral carvedilol daily   -No further workup or management necessary at this time -History of chronic kidney disease noted; likely secondary to type II hepatorenal syndrome   -In setting of need for administration of venous iodinated contrast in setting of procedure today, administered 75 cc/hr of intravenous crystalloid for five hours subsequent to procedure   -Repeat metabolic panel today notes only slight elevation in serum creatinine from about 2.3 to about 2.5, likely in setting of npo status sp-procedure and less likely secondary to contrast administration   -Will follow up with primary nephrologist for repeat metabolic panel within two weeks of discharge

## 2021-04-06 NOTE — DISCHARGE NOTE NURSING/CASE MANAGEMENT/SOCIAL WORK - PATIENT PORTAL LINK FT
You can access the FollowMyHealth Patient Portal offered by [facility] by registering at the following website: [email or web address]. By joining [employer]’s FollowMyHealth portal, you will also be able to view your health information using other applications (apps) compatible with our system.

## 2021-04-06 NOTE — PROGRESS NOTE ADULT - PROBLEM SELECTOR PLAN 4
-History of chronic kidney disease noted; likely secondary to type II hepatorenal syndrome   -In setting of need for administration of venous iodinated contrast in setting of procedure today, administering 75 cc/hr of intravenous crystalloid for five hours subsequent to procedure   -Will follow repeat metabolic panel in am -History of cirrhosis noted, most likely secondary to previous hepatitis c infection   -No evidence of ascites or of pitting edema on exam, but patient is managed with oral torsemide 20 mg daily (will continue management); of note, no spironolactone   -No reported history of spontaneous bacterial peritonitis   -History of upper esophageal varices noted, with reported history of sub-clinical bleeding, status post incomplete banding; reports will follow up as outpatient   -No known history of hepatic encephalopathy   -Screening for hepatocellular carcinoma as above

## 2021-04-06 NOTE — DISCHARGE NOTE PROVIDER - NSDCFUADDAPPT_GEN_ALL_CORE_FT
Please call [phone] to get a repeat mri within one month of discharge and prior to your follow up appointment with Dr. Marmolejo.     Please make a follow-up appointment with your kidney doctor, Dr. Ericka Cruz in [place]. His phone number is [phone]. Please call that number to schedule an appointment within the next two weeks.

## 2021-04-06 NOTE — DISCHARGE NOTE PROVIDER - CARE PROVIDERS DIRECT ADDRESSES
[email or web address]

## 2021-04-06 NOTE — PROGRESS NOTE ADULT - PROBLEM SELECTOR PLAN 8
-History of hypothyroidism noted; will administer home levothyroxine dose   -Can monitor yearly tsh as outpatient -History of asthma noted; will continue management with daily oral montelukast and inhaled albuterol as needed

## 2021-04-06 NOTE — PROGRESS NOTE ADULT - PROBLEM SELECTOR PLAN 2
-Per patient, isolated hepatic mass noted, less than 3 cm in size, now status post radiofrequency ablation today  -As isolated mass that is under 3 cm in size, not large enough to increase patient's meld score  -Will monitor cmp in am to assess for expected elevation in serum transaminases  -Follow-up screening with hepatology team as outpatient -Normocytic anemia and thrombocytopenia noted  -Likely multifactorial in setting of cirrhosis and splenic sequestration in conjunction with chronic kidney disease and possible slow bleeding from esophageal varices   -Serum hemoglobin level today noted to be 7.5; in setting of history of coronary artery disease, status post cabg, will administer two units of packed red blood cells today prior to discharge  -Discussed case with patient's hematologist, Dr. Jacobsen

## 2021-04-06 NOTE — PROGRESS NOTE ADULT - PROBLEM SELECTOR PLAN 1
-Hypoglycemia noted today in the setting of procedure; per reported history from patient, hypoglycemia most likely secondary to administration of excessive sliding scale insulin this morning in setting of ckd and npo status despite decreased dose of basal insulin last night   -No evidence of infection; no administration of oral anti-diabetic agents, no extra carvedilol taken, no changes to blood pressure medication regimen, no antibiotic administration, no alcohol ingestion, no acetaminophen administration; no history of additional endocrinopathy; no history of symptoms of dizziness or of diaphoresis to suggest insulinoma; history of hepatic and renal disease likely contributory   -History of type II diabetes mellitus noted; patient administers insulin at home--20 units of basal insulin daily   -Most recent hemoglobin a1c noted to be 4.9--indicates likely too high of an insulin dose (target a1c about 7.5); may be able to transition to oral anti-diabetic agents as outpatient -Hypoglycemia noted in the setting of procedure; per reported history from patient, hypoglycemia most likely secondary to administration of excessive sliding scale insulin morning of procedure in setting of ckd and npo status despite decreased dose of basal insulin last night   -No evidence of infection; no administration of oral anti-diabetic agents, no extra carvedilol taken, no changes to blood pressure medication regimen, no antibiotic administration, no alcohol ingestion, no acetaminophen administration; no history of additional endocrinopathy; no history of symptoms of dizziness or of diaphoresis to suggest insulinoma; history of hepatic and renal disease likely contributory   -History of type II diabetes mellitus noted; patient administers insulin at home--20 units of basal insulin daily   -Most recent hemoglobin a1c noted to be 4.9--indicates likely too high of an insulin dose (target a1c about 7.5); may be able to transition to oral anti-diabetic agents as outpatient  -Hypoglycemia resolved today; clinically stable and ready for discharge

## 2021-04-06 NOTE — PROGRESS NOTE ADULT - SUBJECTIVE AND OBJECTIVE BOX
Kip Murrell [id number]   [phone]    INTERVAL HPI/OVERNIGHT EVENTS:    SUBJECTIVE: Patient seen and examined at bedside.     CONSTITUTIONAL: No weakness, fevers, or chills  EYES/ENT: No visual changes;  No vertigo or throat pain   NECK: No pain, no stiffness  RESPIRATORY: No cough, no shortness of breath  CARDIOVASCULAR: No chest pain, no palpitations  GASTROINTESTINAL: No abdominal or epigastric pain. No nausea, vomiting, or hematemesis; No diarrhea or constipation. No melena or hematochezia.  GENITOURINARY: No dysuria, frequency, or hematuria  NEUROLOGICAL: No numbness, no weakness  SKIN: No itching, no rashes    OBJECTIVE:    VITAL SIGNS:  ICU Vital Signs Last 24 Hrs  T(C): 36.8 (06 Apr 2021 04:49), Max: 36.8 (05 Apr 2021 12:58)  T(F): 98.2 (06 Apr 2021 04:49), Max: 98.2 (05 Apr 2021 12:58)  HR: 69 (06 Apr 2021 04:49) (55 - 72)  BP: 147/72 (06 Apr 2021 04:49) (147/72 - 200/79)  BP(mean): 100 (05 Apr 2021 19:45) (99 - 116)  ABP: --  ABP(mean): --  RR: 18 (06 Apr 2021 04:49) (14 - 20)  SpO2: 97% (06 Apr 2021 04:49) (96% - 100%)        04-05 @ 07:01  -  04-06 @ 06:33  --------------------------------------------------------  IN: 300 mL / OUT: 510 mL / NET: -210 mL      CAPILLARY BLOOD GLUCOSE      POCT Blood Glucose.: 195 mg/dL (05 Apr 2021 22:43)      PHYSICAL EXAM:    General: NAD; awake and alert   HEENT: PERRL grossly, clear conjunctiva  Neck: No jvd, no lymphadenopathy  Respiratory: CTA b/l, no rales, no wheezes; equal respiratory excursion   Cardiovascular: +S1/S2; RRR  Abdomen: soft, NT/ND; +BS x4  Extremities: WWP, 2+ peripheral pulses b/l; no LE edema  Skin: normal color and turgor; no rash  Neurological: No gross motor or sensory deficits; coordination intact     MEDICATIONS:  MEDICATIONS  (STANDING):  amitriptyline 40 milliGRAM(s) Oral at bedtime  amLODIPine   Tablet 5 milliGRAM(s) Oral daily  atorvastatin 20 milliGRAM(s) Oral at bedtime  carvedilol 6.25 milliGRAM(s) Oral every 12 hours  cholecalciferol 2000 Unit(s) Oral daily  dextrose 40% Gel 15 Gram(s) Oral once  dextrose 5%. 1000 milliLiter(s) (50 mL/Hr) IV Continuous <Continuous>  dextrose 5%. 1000 milliLiter(s) (100 mL/Hr) IV Continuous <Continuous>  dextrose 50% Injectable 25 Gram(s) IV Push once  dextrose 50% Injectable 12.5 Gram(s) IV Push once  dextrose 50% Injectable 25 Gram(s) IV Push once  dextrose 50% Injectable 25 milliLiter(s) IV Push once  folic acid 1 milliGRAM(s) Oral daily  gabapentin 300 milliGRAM(s) Oral at bedtime  glucagon  Injectable 1 milliGRAM(s) IntraMuscular once  heparin   Injectable 5000 Unit(s) SubCutaneous every 8 hours  insulin glargine Injectable (LANTUS) 14 Unit(s) SubCutaneous at bedtime  insulin lispro (ADMELOG) corrective regimen sliding scale   SubCutaneous three times a day before meals  insulin lispro (ADMELOG) corrective regimen sliding scale   SubCutaneous at bedtime  levothyroxine 100 MICROGram(s) Oral daily  loratadine 10 milliGRAM(s) Oral daily  montelukast 10 milliGRAM(s) Oral at bedtime  pantoprazole    Tablet 40 milliGRAM(s) Oral before breakfast  sodium chloride 0.9%. 1000 milliLiter(s) (75 mL/Hr) IV Continuous <Continuous>  torsemide 20 milliGRAM(s) Oral daily    MEDICATIONS  (PRN):  ALBUTerol    0.083%. 2.5 milliGRAM(s) Nebulizer once PRN Shortness of Breath and/or Wheezing      ALLERGIES:  Allergies    ACE inhibitors (Other)  Bactrim (Nephrotoxicity)  Biaxin (Joint Pain)  morphine (Short breath)  NSAIDs (Other)    Intolerances        LABS:                RADIOLOGY & ADDITIONAL TESTS: Reviewed. Kip Murrell y1   [phone]    INTERVAL HPI/OVERNIGHT EVENTS: Patient's care was transitioned to internal medicine floor from the pacu. Her fingerstick glucose levels were noted to be in the range of 140 to 200. She was noted to have a serum hemoglobin count of 7.5, so two units of packed red blood cells were ordered. Her serum creatinine level increased only to 2.53 in spite of the oral contrast load and lack of consistent po intake over preceding 24 hours.     SUBJECTIVE: Patient seen and examined at bedside.     CONSTITUTIONAL: No weakness, fevers, or chills  EYES/ENT: No visual changes; no dizziness    NECK: No pain, no stiffness  RESPIRATORY: No cough, no shortness of breath  CARDIOVASCULAR: No chest pain, no palpitations  GASTROINTESTINAL: No abdominal or epigastric pain. No nausea, no vomiting; No diarrhea or constipation.   GENITOURINARY: No dysuria, frequency, or hematuria  NEUROLOGICAL: No numbness, no weakness  SKIN: No itching, no rashes    OBJECTIVE:    VITAL SIGNS:  T(C): 36.8 (06 Apr 2021 04:49), Max: 36.8 (05 Apr 2021 12:58)  T(F): 98.2 (06 Apr 2021 04:49), Max: 98.2 (05 Apr 2021 12:58)  HR: 69 (06 Apr 2021 04:49) (55 - 72)  BP: 147/72 (06 Apr 2021 04:49) (147/72 - 200/79)  BP(mean): 100 (05 Apr 2021 19:45) (99 - 116)  ABP: --  ABP(mean): --  RR: 18 (06 Apr 2021 04:49) (14 - 20)  SpO2: 97% (06 Apr 2021 04:49) (96% - 100%)        04-05 @ 07:01  -  04-06 @ 06:33  --------------------------------------------------------  IN: 300 mL / OUT: 510 mL / NET: -210 mL      CAPILLARY BLOOD GLUCOSE      POCT Blood Glucose.: 195 mg/dL (05 Apr 2021 22:43)      PHYSICAL EXAM:    General: NAD; awake and alert   HEENT: PERRL grossly, clear conjunctiva; no conjunctival icterus; no conjunctival rim pallor   Neck: No jvd, no lymphadenopathy  Respiratory: CTA b/l, no rales, no wheezes; equal respiratory excursion   Chest: Subclavian vein Mediport noted in right chest; midline scar present    Cardiovascular: +S1/S2; RRR  Abdomen: soft, NT/ND; +BS x4; no shifting dullness, no fluid wave   Extremities: WWP, 2+ peripheral pulses b/l; no LE edema  Skin: normal color and turgor; no rash; no spider angioma; no palmar erythema   Neurological: No gross motor or sensory deficits; coordination intact     MEDICATIONS:  MEDICATIONS  (STANDING):  amitriptyline 40 milliGRAM(s) Oral at bedtime  amLODIPine   Tablet 5 milliGRAM(s) Oral daily  atorvastatin 20 milliGRAM(s) Oral at bedtime  carvedilol 6.25 milliGRAM(s) Oral every 12 hours  cholecalciferol 2000 Unit(s) Oral daily  dextrose 40% Gel 15 Gram(s) Oral once  dextrose 5%. 1000 milliLiter(s) (50 mL/Hr) IV Continuous <Continuous>  dextrose 5%. 1000 milliLiter(s) (100 mL/Hr) IV Continuous <Continuous>  dextrose 50% Injectable 25 Gram(s) IV Push once  dextrose 50% Injectable 12.5 Gram(s) IV Push once  dextrose 50% Injectable 25 Gram(s) IV Push once  dextrose 50% Injectable 25 milliLiter(s) IV Push once  folic acid 1 milliGRAM(s) Oral daily  gabapentin 300 milliGRAM(s) Oral at bedtime  glucagon  Injectable 1 milliGRAM(s) IntraMuscular once  heparin   Injectable 5000 Unit(s) SubCutaneous every 8 hours  insulin glargine Injectable (LANTUS) 14 Unit(s) SubCutaneous at bedtime  insulin lispro (ADMELOG) corrective regimen sliding scale   SubCutaneous three times a day before meals  insulin lispro (ADMELOG) corrective regimen sliding scale   SubCutaneous at bedtime  levothyroxine 100 MICROGram(s) Oral daily  loratadine 10 milliGRAM(s) Oral daily  montelukast 10 milliGRAM(s) Oral at bedtime  pantoprazole    Tablet 40 milliGRAM(s) Oral before breakfast  sodium chloride 0.9%. 1000 milliLiter(s) (75 mL/Hr) IV Continuous <Continuous>  torsemide 20 milliGRAM(s) Oral daily    MEDICATIONS  (PRN):  ALBUTerol    0.083%. 2.5 milliGRAM(s) Nebulizer once PRN Shortness of Breath and/or Wheezing      ALLERGIES:  Allergies    ACE inhibitors (Other)  Bactrim (Nephrotoxicity)  Biaxin (Joint Pain)  morphine (Short breath)  NSAIDs (Other)    Intolerances        LABS:                RADIOLOGY & ADDITIONAL TESTS: Reviewed.

## 2021-04-06 NOTE — DISCHARGE NOTE PROVIDER - CARE PROVIDER_API CALL
Juliocesar Marmolejo (MD)  Diagnostic Radiology  [address] [place] [address], [address] [place] [address]  Phone: [phone]  Fax: [phone]  Follow Up Time: 1 month   Juliocesar Marmolejo)  Diagnostic Radiology  [address] [facility], [address] [place] [address]  Phone: [phone]  Fax: [phone]  Established Patient  Follow Up Time: 1 month    Favio Lloyd)  Gastroenterology; Internal Medicine  [address] [place] [address]  Phone: [phone]  Fax: [phone]  Established Patient  Follow Up Time: 2 weeks

## 2021-04-06 NOTE — PROGRESS NOTE ADULT - ASSESSMENT
The patient is a 75-year-old woman who is being managed for alcoholic cirrhosis, secondary to hepatitis c infection, now status post treatment with a reported sustained viremic response, but complicated by the development of likely hepatocellular carcinoma, who is admitted for monitoring subsequent to a microwave ablation of her hepatic mass, and whose treatment course was complicated by the development of hypoglyemia secondary to insulin administration despite npo status.  The patient is a 75-year-old woman who is being managed for alcoholic cirrhosis, secondary to hepatitis c infection, now status post treatment with a reported sustained viremic response, but complicated by the development of likely hepatocellular carcinoma, who is admitted for monitoring subsequent to a microwave ablation of her hepatic mass, and whose treatment course was complicated by the development of hypoglyemia secondary to insulin administration despite npo status. She is clinically stable and ready for discharge home today.

## 2021-04-06 NOTE — PROVIDER CONTACT NOTE (CHANGE IN STATUS NOTIFICATION) - BACKGROUND
Pt has history of hepatocellular carcinoma admitted for observation s/p Liver angiogram embolization in IR.  Patient Hgb dropped below 8 today and 2 units of PRBC ordered.

## 2021-04-06 NOTE — DISCHARGE NOTE PROVIDER - PROVIDER TOKENS
PROVIDER:[TOKEN:[[id number]],FOLLOWUP:[1 month]] PROVIDER:[TOKEN:[[id number]],FOLLOWUP:[1 month],ESTABLISHEDPATIENT:[T]],PROVIDER:[TOKEN:[[id number]],FOLLOWUP:[2 weeks],ESTABLISHEDPATIENT:[T]]

## 2021-04-06 NOTE — PROGRESS NOTE ADULT - PROBLEM SELECTOR PLAN 7
-History of asthma noted; will continue management with daily oral montelukast and inhaled albuterol as needed -History of elevated blood pressure noted; managed at home with oral amlodipine, carvediolol, torsemide   -Asymptomatic hypertension noted subsequent to procedure; no signs or symptoms of end-organ damage concerning for development of hypertensive emergency; will re-start home blood pressure medications

## 2021-04-06 NOTE — PROGRESS NOTE ADULT - PROBLEM SELECTOR PLAN 3
-History of cirrhosis noted, most likely secondary to previous hepatitis c infection   -No evidence of ascites or of pitting edema on exam, but patient is managed with oral torsemide 20 mg daily (will continue management); of note, no spironolactone   -No reported history of spontaneous bacterial peritonitis   -History of upper esophageal varices noted, with reported history of sub-clinical bleeding, status post incomplete banding; reports will follow up as outpatient   -No known history of hepatic encephalopathy   -Screening for hepatocellular carcinoma as above  -Bicytopenia (anemia and thrombocytopenia) noted in setting of cirrhosis (and ckd); no further workup or management necessary at this time -Per patient, isolated hepatic mass noted, less than 3 cm in size, now status post radiofrequency ablation today  -As isolated mass that is under 3 cm in size, not large enough to increase patient's meld score  -Per interventional radiology note today, patient expected to obtain repeat mri within one month of discharge and subsequently to follow up with Dr. Marmolejo for further management

## 2021-04-06 NOTE — PROGRESS NOTE ADULT - PROBLEM SELECTOR PLAN 9
-Heparin administered subcutaneously for dvt prophylaxis  -Consistent carbohydrate diet -History of hypothyroidism noted; will administer home levothyroxine dose   -Can monitor yearly tsh as outpatient

## 2021-04-06 NOTE — CHART NOTE - NSCHARTNOTEFT_GEN_A_CORE
Per my discussion with the blood bank today, in the setting of the patient's hemoglobin level of 7.5, and given her history of coronary artery disease, as per the request of the patient and her hematologist, Dr. Elizabeth Jacobsen, it is okay to administer the patient two units of packed red blood cells (one after the other with administration of intravenous furosemide in between the two doses). Per my discussion with the blood bank today, in the setting of the patient's hemoglobin level of 7.5, and given her history of coronary artery disease, as per the request of the patient and her hematologist, Dr. Elizabeth Jacobsen, and at the recommendation of IR post-procedure, it is okay to administer the patient two units of packed red blood cells (one after the other with administration of intravenous furosemide in between the two doses).

## 2021-04-06 NOTE — DISCHARGE NOTE PROVIDER - HOSPITAL COURSE
The patient is a 76-year-old woman with a history of hepatitis c status post treatment with a sustained viremic response but complicated by the development of cirrhosis with hepatocellular carcinoma. She presented initially for microwave ablation of her hepatocellular carcinoma, mass measuring less than 3 cm, and was admitted to medicine for monitoring for about twenty four hors subsequent to her procedure. Given administration of insulin despite her npo status prior to the procedure, the patient developed hypoglycemia prior to her procedure, which resolved subsequent to administration of po food. In the setting of her procedure, the patient required administration of intravenous iodinated contrast, and sp-procedurally, in an attempt to prevent development of acute kidney injury superimposed on her history of chronic kidney disease, she was administered intravenous crystalloid. The morning following her procedure, the patient was noted to have a very mild elevation in her serum creatinine level from about 2.35 to 2.53. However, she had no notable decrease in urine output. However, her serum hemoglobin level was noted to be 7.5, marginally less than 8, in the setting of her history of known coronary artery disease. For that reason, the patient was administered two units of packed red blood cells intravenously. At this time, the patient is clinically stable and ready for discharge home with outpatient follow-up with her nephrologist and with her interventional radiologist. The patient is a 76-year-old woman with a history of hepatitis c status post treatment with a sustained viremic response but complicated by the development of cirrhosis with hepatocellular carcinoma. She presented initially for microwave ablation of her hepatocellular carcinoma, mass measuring less than 3 cm, and was admitted to medicine for monitoring for about twenty four hors subsequent to her procedure. Given administration of insulin despite her npo status prior to the procedure, the patient developed hypoglycemia prior to her procedure, which resolved subsequent to administration of po food. In the setting of her procedure, the patient required administration of intravenous iodinated contrast, and sp-procedurally, in an attempt to prevent development of acute kidney injury superimposed on her history of chronic kidney disease, she was administered intravenous crystalloid. The morning following her procedure, the patient was noted to have a very mild elevation in her serum creatinine level from about 2.35 to 2.53. However, she had no notable decrease in urine output. However, her serum hemoglobin level was noted to be 7.5, marginally less than 8, in the setting of her history of known coronary artery disease. For that reason, the patient was administered two units of packed red blood cells intravenously. Subsequently, the patient was noted to have a small amount of bright red blood per rectum. However, she was not noted to be tachycardic, tachypneic, or hypotensive, and she had no elevation in her serum bun concentration. For that reason, per discussion with the patient's outpatient gastrotenterologist, Dr. Favio Lloyd, and per discussion with the house hepatology team, the patient was planned for discharge home with outpatient followup. At this time, the patient is clinically stable and ready for discharge home with outpatient follow-up with her nephrologist and with her interventional radiologist.

## 2021-04-06 NOTE — DISCHARGE NOTE PROVIDER - NSDCMRMEDTOKEN_GEN_ALL_CORE_FT
albuterol 2.5 mg/3 mL (0.083%) inhalation solution: 3 milliliter(s) inhaled every 6 hours, As Needed  Aller-Juhi 10 mg oral tablet: 1 tab(s) orally once a day, As Needed  amitriptyline 10 mg oral tablet: 4 tab(s) orally once a day (at bedtime)  amLODIPine 5 mg oral tablet: 1 tab(s) orally once a day  Aranesp Albumin Free: once weekly at MD&#x27;s office  carvedilol 6.25 mg oral tablet: 1 tab(s) orally every 12 hours  folic acid 1 mg oral tablet: 1 tab(s) orally once a day  gabapentin 300 mg oral capsule: 1 cap(s) orally once a day (at bedtime)  levothyroxine 100 mcg (0.1 mg) oral tablet: 1 tab(s) orally once a day  magnesium oxide 400 mg (241.3 mg elemental magnesium) oral tablet: 1 tab(s) orally once a day (at bedtime)  Metamucil 1.7 g oral wafer: orally once a day (at bedtime)  montelukast 10 mg oral tablet: 1 tab(s) orally once a day (at bedtime)  NovoLOG 100 units/mL injectable solution: per sliding scale x3 per day before meals  pantoprazole 40 mg oral delayed release tablet: 1 tab(s) orally once a day  pravastatin 80 mg oral tablet: 1 tab(s) orally once a day (at bedtime)  probiotic: once a day  stool softener: 2   once a day (at bedtime)  torsemide 20 mg oral tablet: 1 tab(s) orally once a day  Tresiba 100 units/mL subcutaneous solution: 15 unit(s) subcutaneous once a day  Vitamin D3 2000 intl units (50 mcg) oral tablet: orally once a day   albuterol 2.5 mg/3 mL (0.083%) inhalation solution: 3 milliliter(s) inhaled every 6 hours, As Needed  Aller-Juhi 10 mg oral tablet: 1 tab(s) orally once a day, As Needed  amitriptyline 10 mg oral tablet: 4 tab(s) orally once a day (at bedtime)  amLODIPine 5 mg oral tablet: 1 tab(s) orally once a day  Aranesp Albumin Free: once weekly at MD&#x27;s office  carvedilol 6.25 mg oral tablet: 1 tab(s) orally every 12 hours  folic acid 1 mg oral tablet: 1 tab(s) orally once a day  gabapentin 300 mg oral capsule: 1 cap(s) orally once a day (at bedtime)  levothyroxine 100 mcg (0.1 mg) oral tablet: 1 tab(s) orally once a day  magnesium oxide 400 mg (241.3 mg elemental magnesium) oral tablet: 1 tab(s) orally once a day (at bedtime)  Metamucil 1.7 g oral wafer: orally once a day (at bedtime)  montelukast 10 mg oral tablet: 1 tab(s) orally once a day (at bedtime)  NovoLOG 100 units/mL injectable solution: per sliding scale x3 per day before meals  pantoprazole 40 mg oral delayed release tablet: 1 tab(s) orally once a day  pravastatin 80 mg oral tablet: 1 tab(s) orally once a day (at bedtime)  torsemide 20 mg oral tablet: 1 tab(s) orally once a day  Tresiba 100 units/mL subcutaneous solution: 15 unit(s) subcutaneous once a day  Vitamin D3 2000 intl units (50 mcg) oral tablet: orally once a day

## 2021-04-06 NOTE — PROGRESS NOTE ADULT - PROBLEM SELECTOR PLAN 6
-History of elevated blood pressure noted; managed at home with oral amlodipine, carvediolol, torsemide   -Asymptomatic hypertension noted subsequent to procedure; no signs or symptoms of end-organ damage concerning for development of hypertensive emergency; will re-start home blood pressure medications -History of heart failure with preserved ejection fraction noted; currently managing with 6.25 mg of oral carvedilol daily   -No further workup or management necessary at this time

## 2021-04-06 NOTE — DISCHARGE NOTE NURSING/CASE MANAGEMENT/SOCIAL WORK - NSDCFUADDAPPT_GEN_ALL_CORE_FT
Please call [phone] to get a repeat mri within one month of discharge and prior to your follow up appointment with Dr. Marmolejo.     Please make a follow-up appointment with your kidney doctor, Dr. Ericka Cruz in [place]. His phone number is [phone]. Please call that number to schedule an appointment within the next two weeks.

## 2021-04-06 NOTE — CHART NOTE - NSCHARTNOTEFT_GEN_A_CORE
75 yo woman w/ PMH HTN, HLD, DM2, asthma, hypothyroid, O2 2L at home PRN, Hep C (treated) c/b cirrhosis with esophageal varices, hemorrhoids, CAD s/p CABG x3 2016, CKD stage 4,  TIA s/p ILR 2017, AS s/p bovine AVR 2016, chronic anemia requiring multiple PRBC (has [place] on right upper ACW), last one 1/28/21, last iron transfusion 3/16/21, HFpEF. recently diagnosed liver CA in 01/2021. presented for IR embolization of liver lesions; found to have hypoglycemia in the setting of NPO and insulin use.    Called to bedside by RN for BRBPR. Patient stated she had noticed some bright red blood after using the bathroom. Confirmed by nurse, who noted she was dripping blood on the floor as she made her way back to bed. Nurse reported seeing blood dripping from patient's anus/external hemorrhoids, with some associated lightheadedness after seeing the blood. Patient reports a hx of internal and external hemorrhoids s/p hemorrhoidectomy in 6/2020 by Dr. Moulton & had not had BRBPR ever since her surgery. This is the first time she noticed blood since 6/2020. Patient also noted to have melena since 6/2020 which she attributed to drinking prune juice. She noted to have continued melena (dark colored stools) throughout this hospital admission. Had been having soft stools, no constipation. Patient denies any CP, abdominal pain, and lightheadedness on my interview. Fells well overall.     /68, HR 79, afebrile, 1L NC 97%   PE  Abd: Soft, nontender, non-distended, normoactive bowel sounds  Rectal exam: External hemorrhoids appreciated, non-bleeding, clean. Upon digital exam: scant serosanguinous fluid noted, no stool.     Outpt records  Last EGD 2014 done for anemia: esophagitis  Last Colonoscopy done for anemia: 2014: extensive diverticulosis, external hemorrhoids, no internal hemorrhoids, rectum polyp removed  Outpatient GI doctor: Dr. Favio Lloyd: Last follow up note: 3/8/2021: Plan was for esophageal variceal screening (EGD) after liver directed therapy for hepatic mass by IR.    A/P: ddx: likely hemorrhoidal bleeding vs diverticular, less likely upper GIB, but has a hx of esophageal varices from cirrhosis   -patient is s/p 1u pRBC, c/w 2nd unit and check post transfusion CBC, if responds appropriately, repeat CBC in the AM  -continue to monitor VS  -patient is agree-able to stay for further evaluation/monitoring. Daughter (Milagros) updated with plan  -GI eval in the AM for possible colonoscopy/EGD given outpatient GI doctor had planned for EGD after IR intervention, will need to touch base with Dr. Favio Lloyd as well.

## 2021-04-06 NOTE — PROVIDER CONTACT NOTE (CHANGE IN STATUS NOTIFICATION) - ASSESSMENT
Patient alert and oriented times four; In no distress. Patient s/p one unit of PRBC and currently being transfused with second unit. Tolerating Well. Vital signs WNL

## 2021-04-06 NOTE — PROGRESS NOTE ADULT - SUBJECTIVE AND OBJECTIVE BOX
Interventional Radiology Follow-Up Note    This is a 76y Female s/p Right hepatic mass angiogram and embolization with embozene particles on 4/5 in Interventional Radiology with Dr. Marmolejo.     S: Patient seen and examined @ bedside. No complaints offered.   Denies abdominal pain. Tolerating PO     Medication:     amLODIPine   Tablet: (04-06)  carvedilol: (04-06)  heparin   Injectable: (04-06)  labetalol Injectable: (04-05)  labetalol Injectable: (04-05)  torsemide: (04-06)    Vitals:   T(F): 98.2, Max: 98.2 (12:58)  HR: 69  BP: 147/72  RR: 18  SpO2: 97%    Physical Exam:  General: Nontoxic, in NAD,  Abdomen: soft, NTND, no peritoneal signs.    LABS:  WBC -- / Hgb -- / Hct -- / Plt --  Na 138 / K 4.6 / CO2 19 / Cl 108 / BUN 50 / Cr 2.53 / Glucose 131  ALT 7 / AST 19 / Alk Phos 110 / Tbili 0.2  Ptt -- / Pt -- / INR --      Assessment/Plan:  76y Female admitted with Hepatocellular carcinoma s/p Right hepatic mass angiogram and embolization with embozene particles.     - Pt to be transfused with 1 unit PRBC prior to discharge per team.  - Pt should follow up in one month for MRI and outpt visit. Call to make appt at [phone].   - Orders are on All scripts.  - D/w Team and Dr. Marmolejo.      Please call IR at extension [phone] with any questions, concerns, or issues regarding above.

## 2021-04-06 NOTE — DISCHARGE NOTE PROVIDER - NSDCCPCAREPLAN_GEN_ALL_CORE_FT
PRINCIPAL DISCHARGE DIAGNOSIS  Diagnosis: Hypoglycemia  Assessment and Plan of Treatment: When you first came to the hospital for your procedure, you had a very low blood sugar level. Most likely, this was because you did not eat or drink anything prior to your procedure. We know that you had a smaller dose of insulin the night before, but when that caused your blood sugar level to be a bit high in the morning, and you gave yourself some extra insulin, this may have caused your blood sugar level to drop too low. We think also that your history of liver disease and of kidney disease pre-disposed you to developing trouble with your blood sugar level. At this time, your condition is stable, and you are ready for discharge home. Please continue to take your insulin as prescribed and to eat a low carbohydrate diet. If you ever feel weakness or dizziness, please check your blood glucose level and call your doctor or return to the hospital if it is too high (above 250) or too low (below 80(.      SECONDARY DISCHARGE DIAGNOSES  Diagnosis: Liver cancer  Assessment and Plan of Treatment: When you first came to the hospital, you had a procedure performed by the interventional radiologists for management of your liver cancer. Please follow up with Dr. Marmolejo after you leave the hospital to assess when you need to get an additional procedure.    Diagnosis: Stage 3 chronic kidney disease  Assessment and Plan of Treatment: Because you have a history of kidney disease, and because your radiology doctors needed to give you contrast when you had your procedure performed on your liver, we were concerned that this would lead to injury to your kidneys. For that reason, we gave you some additional fluids through the vein to try to prevent any injury to the kidney. We noticed on your blood tests this morning that your blood level of creatinine, a protein that is filtered by the kidneys, was only very slightly elevated as compared to yesterday. Because creatinine is a protein that is filtered by the kidneys, we use the blood level of creatinine as a test to determine if there has been any injury to the kidneys. Because there was no change in the blood creatinine level, we do not feel that you have any new injury to your kidneys. At this time, your condition is stable, and you are ready for discharge home. Please follow up with your kidney physician, your nephrologist, to get a repeat blood test following discharge from the hospital. In this way, you can be completely sure that there has not been any injury to the kidneys.     PRINCIPAL DISCHARGE DIAGNOSIS  Diagnosis: Hypoglycemia  Assessment and Plan of Treatment: When you first came to the hospital for your procedure, you had a very low blood sugar level. Most likely, this was because you did not eat or drink anything prior to your procedure. We know that you had a smaller dose of insulin the night before, but when that caused your blood sugar level to be a bit high in the morning, and you gave yourself some extra insulin, this may have caused your blood sugar level to drop too low. We think also that your history of liver disease and of kidney disease pre-disposed you to developing trouble with your blood sugar level. At this time, your condition is stable, and you are ready for discharge home. Please continue to take your insulin as prescribed and to eat a low carbohydrate diet. If you ever feel weakness or dizziness, please check your blood glucose level and call your doctor or return to the hospital if it is too high (above 250) or too low (below 80(.      SECONDARY DISCHARGE DIAGNOSES  Diagnosis: Liver cancer  Assessment and Plan of Treatment: When you first came to the hospital, you had a procedure performed by the interventional radiologists for management of your liver cancer. Please follow up with Dr. Marmolejo after you leave the hospital to assess when you need to get an additional procedure.    Diagnosis: Stage 3 chronic kidney disease  Assessment and Plan of Treatment: Because you have a history of kidney disease, and because your radiology doctors needed to give you contrast when you had your procedure performed on your liver, we were concerned that this would lead to injury to your kidneys. For that reason, we gave you some additional fluids through the vein to try to prevent any injury to the kidney. We noticed on your blood tests this morning that your blood level of creatinine, a protein that is filtered by the kidneys, was only very slightly elevated as compared to yesterday. Because creatinine is a protein that is filtered by the kidneys, we use the blood level of creatinine as a test to determine if there has been any injury to the kidneys. Because there was no change in the blood creatinine level, we do not feel that you have any new injury to your kidneys. At this time, your condition is stable, and you are ready for discharge home. Please follow up with your kidney physician, your nephrologist, to get a repeat blood test following discharge from the hospital. In this way, you can be completely sure that there has not been any injury to the kidneys.    Diagnosis: Drop in hemoglobin  Assessment and Plan of Treatment: We know that you have a history of a low blood count. We think that this most likely is because you have a history of liver problems and of kidney problems, which decrease your blood counts. Additionally, your history of liver problems has resulted in the development of large blood vessels in your esophagus, which occasionally cause a small leakage of blood into the stool. This morning, following your procedure, your hemoglobin count was a bit low. It was 7.5, and we know that your doctors like it to be above 8 because of your history of heart problems. For that reason, we gave you two units of packed red blood cells. At this point, your condition is stable, and you are ready for discharge. If at any point you ever feel weak or dizzy or if you notice large amounts of blood or of dark color in your stool, please return to the hospital.     PRINCIPAL DISCHARGE DIAGNOSIS  Diagnosis: Hypoglycemia  Assessment and Plan of Treatment: When you first came to the hospital for your procedure, you had a very low blood sugar level. Most likely, this was because you did not eat or drink anything prior to your procedure. We know that you had a smaller dose of insulin the night before, but when that caused your blood sugar level to be a bit high in the morning, and you gave yourself some extra insulin, this may have caused your blood sugar level to drop too low. We think also that your history of liver disease and of kidney disease pre-disposed you to developing trouble with your blood sugar level. At this time, your condition is stable, and you are ready for discharge home. Please continue to take your insulin as prescribed and to eat a low carbohydrate diet. If you ever feel weakness or dizziness, please check your blood glucose level and call your doctor or return to the hospital if it is too high (above 250) or too low (below 80(.      SECONDARY DISCHARGE DIAGNOSES  Diagnosis: Liver cancer  Assessment and Plan of Treatment: When you first came to the hospital, you had a procedure performed by the interventional radiologists for management of your liver cancer. Please follow up with Dr. Marmolejo after you leave the hospital to assess when you need to get an additional procedure.    Diagnosis: Stage 3 chronic kidney disease  Assessment and Plan of Treatment: Because you have a history of kidney disease, and because your radiology doctors needed to give you contrast when you had your procedure performed on your liver, we were concerned that this would lead to injury to your kidneys. For that reason, we gave you some additional fluids through the vein to try to prevent any injury to the kidney. We noticed on your blood tests this morning that your blood level of creatinine, a protein that is filtered by the kidneys, was only very slightly elevated as compared to yesterday. Because creatinine is a protein that is filtered by the kidneys, we use the blood level of creatinine as a test to determine if there has been any injury to the kidneys. Because there was no change in the blood creatinine level, we do not feel that you have any new injury to your kidneys. At this time, your condition is stable, and you are ready for discharge home. Please follow up with your kidney physician, your nephrologist, to get a repeat blood test following discharge from the hospital. In this way, you can be completely sure that there has not been any injury to the kidneys.    Diagnosis: Drop in hemoglobin  Assessment and Plan of Treatment: We know that you have a history of a low blood count. We think that this most likely is because you have a history of liver problems and of kidney problems, which decrease your blood counts. Additionally, your history of liver problems has resulted in the development of large blood vessels in your esophagus, which occasionally cause a small leakage of blood into the stool. This morning, following your procedure, your hemoglobin count was a bit low. It was 7.5, and we know that your doctors like it to be above 8 because of your history of heart problems. For that reason, we gave you two units of packed red blood cells. At this point, your condition is stable, and you are ready for discharge. If at any point you ever feel weak or dizzy or if you notice large amounts of blood or of dark color in your stool, please return to the hospital.    Diagnosis: Rectal bleeding  Assessment and Plan of Treatment: When you were just about set to go home, we noticed that you had a little bit of bleeding coming from your rectum. We were happy to see that despite this bleeding, you did not have any additional decrease in your serum hemoglobin number. Also, your blood pressure did not decrease, and your heart rate and respiratory rate did not increase. For that reason, we feel that it is most likely that your bleeding was very slow and came from somewhere low in your colon (either close to the rectum or in your rectum or anus). Although the proctologists previously treated your hemorrhoids, we wonder if you have any new ones or if they have returned. We discussed your case with your gastroenterologist, Dr. Lloyd, and with your team of liver doctors, who work with Dr. Phillips, your primary liver doctor. They feel that your condition is stable, and you are ready for discharge home. Please follow up with Dr. Lloyd after you are discharged from the hospital. Please return to the hospital if at any point you develop additional bleeding or any associated weakness or dizziness.

## 2021-04-07 NOTE — PROGRESS NOTE ADULT - ASSESSMENT
The patient is a 75-year-old woman who is being managed for alcoholic cirrhosis, secondary to hepatitis c infection, now status post treatment with a reported sustained viremic response, but complicated by the development of likely hepatocellular carcinoma, who is admitted for monitoring subsequent to a microwave ablation of her hepatic mass, and whose treatment course was complicated by the development of hypoglyemia secondary to insulin administration despite npo status. She is clinically stable and ready for discharge home today.  The patient is a 75-year-old woman who is being managed for alcoholic cirrhosis, secondary to hepatitis c infection, now status post treatment with a reported sustained viremic response, but complicated by the development of likely hepatocellular carcinoma, who is admitted for monitoring subsequent to a microwave ablation of her hepatic mass, and whose treatment course was complicated by the development of hypoglyemia secondary to insulin administration despite npo status. Small amount of bright red blood per rectum noted overnight most likely hemorrhoidal. She is clinically stable and ready for discharge home today.

## 2021-04-07 NOTE — PROGRESS NOTE ADULT - PROBLEM SELECTOR PLAN 6
-History of heart failure with preserved ejection fraction noted; currently managing with 6.25 mg of oral carvedilol daily   -No further workup or management necessary at this time -History of chronic kidney disease noted; likely secondary to type II hepatorenal syndrome   -In setting of need for administration of venous iodinated contrast in setting of procedure today, administered 75 cc/hr of intravenous crystalloid for five hours subsequent to procedure   -Repeat metabolic panel today notes only slight elevation in serum creatinine from about 2.3 to about 2.5, likely in setting of npo status sp-procedure and less likely secondary to contrast administration   -Will follow up with primary nephrologist for repeat metabolic panel within two weeks of discharge -History of chronic kidney disease noted; likely secondary to type II hepatorenal syndrome   -In setting of need for administration of venous iodinated contrast in setting of procedure, administered 75 cc/hr of intravenous crystalloid for five hours subsequent to procedure   -Repeat metabolic panel today notes only slight elevation in serum creatinine from about 2.3 to about 2.5, likely in setting of npo status sp-procedure and less likely secondary to contrast administration   -Will follow up with primary nephrologist for repeat metabolic panel within two weeks of discharge

## 2021-04-07 NOTE — PROGRESS NOTE ADULT - PROBLEM SELECTOR PLAN 3
-Per patient, isolated hepatic mass noted, less than 3 cm in size, now status post radiofrequency ablation today  -As isolated mass that is under 3 cm in size, not large enough to increase patient's meld score  -Per interventional radiology note today, patient expected to obtain repeat mri within one month of discharge and subsequently to follow up with Dr. Marmolejo for further management -Normocytic anemia and thrombocytopenia noted  -Likely multifactorial in setting of cirrhosis and splenic sequestration in conjunction with chronic kidney disease and possible slow bleeding from esophageal varices   -Serum hemoglobin level today noted to be 7.5; in setting of history of coronary artery disease, status post cabg, will administer two units of packed red blood cells today prior to discharge  -Discussed case with patient's hematologist, Dr. Jacobsen -Normocytic anemia and thrombocytopenia noted  -Likely multifactorial in setting of cirrhosis and splenic sequestration in conjunction with chronic kidney disease and possible slow bleeding from esophageal varices   -Serum hemoglobin level 4/6 noted to be 7.5; in setting of history of coronary artery disease, status post cabg, administered two units of packed red blood cells prior to discharge  -Discussed case with patient's hematologist, Dr. Jaocbsen

## 2021-04-07 NOTE — PROGRESS NOTE ADULT - PROBLEM SELECTOR PLAN 5
-History of chronic kidney disease noted; likely secondary to type II hepatorenal syndrome   -In setting of need for administration of venous iodinated contrast in setting of procedure today, administered 75 cc/hr of intravenous crystalloid for five hours subsequent to procedure   -Repeat metabolic panel today notes only slight elevation in serum creatinine from about 2.3 to about 2.5, likely in setting of npo status sp-procedure and less likely secondary to contrast administration   -Will follow up with primary nephrologist for repeat metabolic panel within two weeks of discharge -History of cirrhosis noted, most likely secondary to previous hepatitis c infection   -No evidence of ascites or of pitting edema on exam, but patient is managed with oral torsemide 20 mg daily (will continue management); of note, no spironolactone   -No reported history of spontaneous bacterial peritonitis   -History of upper esophageal varices noted, with reported history of sub-clinical bleeding, status post incomplete banding; reports will follow up as outpatient   -No known history of hepatic encephalopathy   -Screening for hepatocellular carcinoma as above

## 2021-04-07 NOTE — PROGRESS NOTE ADULT - REASON FOR ADMISSION
post-procedural monitoring

## 2021-04-07 NOTE — PROGRESS NOTE ADULT - PROBLEM SELECTOR PLAN 4
-History of cirrhosis noted, most likely secondary to previous hepatitis c infection   -No evidence of ascites or of pitting edema on exam, but patient is managed with oral torsemide 20 mg daily (will continue management); of note, no spironolactone   -No reported history of spontaneous bacterial peritonitis   -History of upper esophageal varices noted, with reported history of sub-clinical bleeding, status post incomplete banding; reports will follow up as outpatient   -No known history of hepatic encephalopathy   -Screening for hepatocellular carcinoma as above -Per patient, isolated hepatic mass noted, less than 3 cm in size, now status post radiofrequency ablation today  -As isolated mass that is under 3 cm in size, not large enough to increase patient's meld score  -Per interventional radiology note today, patient expected to obtain repeat mri within one month of discharge and subsequently to follow up with Dr. Marmolejo for further management -Per patient, isolated hepatic mass noted, less than 3 cm in size, now status post radiofrequency ablation today  -As isolated mass that is under 3 cm in size, not large enough to increase patient's meld score  -Per interventional radiology note, patient expected to obtain repeat mri within one month of discharge and subsequently to follow up with Dr. Marmolejo for further management

## 2021-04-07 NOTE — PROGRESS NOTE ADULT - SUBJECTIVE AND OBJECTIVE BOX
Interventional Radiology Follow-Up Note    This is a 76y Female s/p Right hepatic mass angiogram and embolization with embozene particles on 4/5 in Interventional Radiology with Dr. Marmolejo.   Pt had bleeding after BM yesterday likely from her hemorrhoids.   Received 2 units of PRBC.    Medication:   amLODIPine   Tablet: (04-07)  carvedilol: (04-07)  furosemide   Injectable: (04-06)  heparin   Injectable: (04-06)  labetalol Injectable: (04-05)  labetalol Injectable: (04-05)  torsemide: (04-07)    Vitals:   T(F): 97.3, Max: 99.2 (17:39)  HR: 67  BP: 162/78  RR: 18  SpO2: 99%    Physical Exam:  General: Nontoxic, in NAD,  Abdomen: soft, NTND, no peritoneal signs.      Assessment/Plan:  76y Female admitted with Hepatocellular carcinoma s/p Right hepatic mass angiogram and embolization with embozene particles.     - Primary team contacting GI in regards to BRPR  - Upon discharge please follow up with IR in one month and to obtain MRI at that time. Call to make appt [phone].  - D/w team resident and Dr. Marmolejo.     Please call IR at extension [phone] with any questions, concerns, or issues regarding above.

## 2021-04-07 NOTE — PROGRESS NOTE ADULT - PROBLEM SELECTOR PLAN 2
-Normocytic anemia and thrombocytopenia noted  -Likely multifactorial in setting of cirrhosis and splenic sequestration in conjunction with chronic kidney disease and possible slow bleeding from esophageal varices   -Serum hemoglobin level today noted to be 7.5; in setting of history of coronary artery disease, status post cabg, will administer two units of packed red blood cells today prior to discharge  -Discussed case with patient's hematologist, Dr. Jacobsen -Hypoglycemia noted in the setting of procedure; per reported history from patient, hypoglycemia most likely secondary to administration of excessive sliding scale insulin morning of procedure in setting of ckd and npo status despite decreased dose of basal insulin last night   -No evidence of infection; no administration of oral anti-diabetic agents, no extra carvedilol taken, no changes to blood pressure medication regimen, no antibiotic administration, no alcohol ingestion, no acetaminophen administration; no history of additional endocrinopathy; no history of symptoms of dizziness or of diaphoresis to suggest insulinoma; history of hepatic and renal disease likely contributory   -History of type II diabetes mellitus noted; patient administers insulin at home--20 units of basal insulin daily   -Most recent hemoglobin a1c noted to be 4.9--indicates likely too high of an insulin dose (target a1c about 7.5); may be able to transition to oral anti-diabetic agents as outpatient  -Hypoglycemia resolved today; clinically stable and ready for discharge

## 2021-04-07 NOTE — PROGRESS NOTE ADULT - PROBLEM SELECTOR PLAN 1
-Hypoglycemia noted in the setting of procedure; per reported history from patient, hypoglycemia most likely secondary to administration of excessive sliding scale insulin morning of procedure in setting of ckd and npo status despite decreased dose of basal insulin last night   -No evidence of infection; no administration of oral anti-diabetic agents, no extra carvedilol taken, no changes to blood pressure medication regimen, no antibiotic administration, no alcohol ingestion, no acetaminophen administration; no history of additional endocrinopathy; no history of symptoms of dizziness or of diaphoresis to suggest insulinoma; history of hepatic and renal disease likely contributory   -History of type II diabetes mellitus noted; patient administers insulin at home--20 units of basal insulin daily   -Most recent hemoglobin a1c noted to be 4.9--indicates likely too high of an insulin dose (target a1c about 7.5); may be able to transition to oral anti-diabetic agents as outpatient  -Hypoglycemia resolved today; clinically stable and ready for discharge -Bright red blood per rectum noted shortly prior to planned discharge  -History of esophageal varices noted, status post banding in June 2020   -Hemorrhoids noted and status post resection in 2020 -Bright red blood per rectum noted shortly prior to planned discharge  -History of esophageal varices noted, status post banding in June 2020   -Hemorrhoids noted and status post resection in 2020  -History of diverticulosis documented in 2014; no epigastric pain, no nsaid use, no hematemesis   -Suspicion greatest at this time for hemorrhoidal bleeding; no hypotension, no tachycardia, no tachypnea, no bun elevation   -Spoke to patient's outpatient gastroenterologist, Dr. Favio Lloyd, who agreed that patient is stable for discharge but recommended evaluation by inpatient team   -Following discussion with hepatology service, patient is deemed clinically stable and ready for discharge home with outpatient follow-up with gastroenterology

## 2021-04-07 NOTE — PROGRESS NOTE ADULT - PROBLEM SELECTOR PLAN 10
-Heparin administered subcutaneously for dvt prophylaxis  -Consistent carbohydrate diet -Pharmacologic dvt prophylaxis held as per patient request   -Consistent carbohydrate diet

## 2021-04-07 NOTE — PROGRESS NOTE ADULT - ATTENDING COMMENTS
Patient seen and examined. Plan as discussed w/ Dr. Murrell: patient stable post-procedure, TOV today, appreciate IR's evaluation and recommendation for 1unit PRBC to be administered post-procedure as drop in Hgb to 7.5 and patient w/ known CAD history; FS have improved, patient will resume home regimen on D/C; total discharge planning time spent = 35minutes.
Patient seen and examined. Plan as discussed w/ Dr. Murrell: s/p 2units PRBC w/ appropriate rise in Hgb; reported hematochezia last night; discussed w/ patient's O/P GI: likely can be D/[facility] home w/ O/P f/u as already planned in setting of known esophageal varices/hemorrhoids but request for internal GI eval to confirm prior to D/C; appreciate IR's evaluation; will touch base w/ patient's hepatologist prior to D/C; total discharge planning time spent = 40minutes.

## 2021-04-07 NOTE — PROGRESS NOTE ADULT - SUBJECTIVE AND OBJECTIVE BOX
Kip Murrell y1   [phone]    INTERVAL HPI/OVERNIGHT EVENTS:    SUBJECTIVE: Patient seen and examined at bedside.     CONSTITUTIONAL: No weakness, fevers, or chills  EYES/ENT: No visual changes;  No vertigo or throat pain   NECK: No pain, no stiffness  RESPIRATORY: No cough, no shortness of breath  CARDIOVASCULAR: No chest pain, no palpitations  GASTROINTESTINAL: No abdominal or epigastric pain. No nausea, vomiting, or hematemesis; No diarrhea or constipation. No melena or hematochezia.  GENITOURINARY: No dysuria, frequency, or hematuria  NEUROLOGICAL: No numbness, no weakness  SKIN: No itching, no rashes    OBJECTIVE:    VITAL SIGNS:  ICU Vital Signs Last 24 Hrs  T(C): 36.3 (07 Apr 2021 04:36), Max: 37.3 (06 Apr 2021 17:39)  T(F): 97.3 (07 Apr 2021 04:36), Max: 99.2 (06 Apr 2021 17:39)  HR: 67 (07 Apr 2021 04:36) (67 - 85)  BP: 162/78 (07 Apr 2021 04:36) (118/58 - 174/68)  BP(mean): --  ABP: --  ABP(mean): --  RR: 18 (07 Apr 2021 04:36) (18 - 18)  SpO2: 99% (07 Apr 2021 04:36) (94% - 99%)        04-05 @ 07:01 - 04-06 @ 07:00  --------------------------------------------------------  IN: 300 mL / OUT: 510 mL / NET: -210 mL    04-06 @ 07:01 - 04-07 @ 06:48  --------------------------------------------------------  IN: 860 mL / OUT: 1000 mL / NET: -140 mL      CAPILLARY BLOOD GLUCOSE      POCT Blood Glucose.: 145 mg/dL (06 Apr 2021 21:42)      PHYSICAL EXAM:    General: NAD; awake and alert   HEENT: PERRL grossly, clear conjunctiva  Neck: No jvd, no lymphadenopathy  Respiratory: CTA b/l, no rales, no wheezes; equal respiratory excursion   Cardiovascular: +S1/S2; RRR  Abdomen: soft, NT/ND; +BS x4  Extremities: WWP, 2+ peripheral pulses b/l; no LE edema  Skin: normal color and turgor; no rash  Neurological: No gross motor or sensory deficits; coordination intact     MEDICATIONS:  MEDICATIONS  (STANDING):  amitriptyline 40 milliGRAM(s) Oral at bedtime  amLODIPine   Tablet 5 milliGRAM(s) Oral daily  atorvastatin 20 milliGRAM(s) Oral at bedtime  carvedilol 6.25 milliGRAM(s) Oral every 12 hours  cholecalciferol 2000 Unit(s) Oral daily  dextrose 40% Gel 15 Gram(s) Oral once  dextrose 5%. 1000 milliLiter(s) (50 mL/Hr) IV Continuous <Continuous>  dextrose 5%. 1000 milliLiter(s) (100 mL/Hr) IV Continuous <Continuous>  dextrose 50% Injectable 25 Gram(s) IV Push once  dextrose 50% Injectable 12.5 Gram(s) IV Push once  dextrose 50% Injectable 25 Gram(s) IV Push once  dextrose 50% Injectable 25 milliLiter(s) IV Push once  folic acid 1 milliGRAM(s) Oral daily  gabapentin 300 milliGRAM(s) Oral at bedtime  glucagon  Injectable 1 milliGRAM(s) IntraMuscular once  insulin glargine Injectable (LANTUS) 14 Unit(s) SubCutaneous at bedtime  insulin lispro (ADMELOG) corrective regimen sliding scale   SubCutaneous three times a day before meals  insulin lispro (ADMELOG) corrective regimen sliding scale   SubCutaneous at bedtime  levothyroxine 100 MICROGram(s) Oral daily  loratadine 10 milliGRAM(s) Oral daily  montelukast 10 milliGRAM(s) Oral at bedtime  pantoprazole    Tablet 40 milliGRAM(s) Oral before breakfast  torsemide 20 milliGRAM(s) Oral daily    MEDICATIONS  (PRN):  ALBUTerol    0.083%. 2.5 milliGRAM(s) Nebulizer once PRN Shortness of Breath and/or Wheezing      ALLERGIES:  Allergies    ACE inhibitors (Other)  Bactrim (Nephrotoxicity)  Biaxin (Joint Pain)  morphine (Short breath)  NSAIDs (Other)    Intolerances        LABS:                        9.7    6.09  )-----------( 101      ( 06 Apr 2021 22:48 )             30.7     04-06    138  |  108  |  50<H>  ----------------------------<  131<H>  4.6   |  19<L>  |  2.53<H>    Ca    8.5      06 Apr 2021 06:56  Phos  4.8     04-06  Mg     2.7     04-06    TPro  6.3  /  Alb  3.4  /  TBili  0.2  /  DBili  x   /  AST  19  /  ALT  7<L>  /  AlkPhos  110  04-06          RADIOLOGY & ADDITIONAL TESTS: Reviewed. Kip Murrell pgy1   [phone]    INTERVAL HPI/OVERNIGHT EVENTS: The patient was transfused two units of packed red blood cells in the setting of a serum hemoglobin concentration less than 8. She was planned for discharge; however, she developed a small amount of bright red blood per rectum. She remained without tachycardia, tachypnea, or hypotension. Her repeat hemoglobin count was unchanged from that on her post-transfusion complete blood count.     SUBJECTIVE: Patient seen and examined at bedside.     CONSTITUTIONAL: No weakness, fevers, or chills  EYES/ENT: No visual changes; no dizziness   NECK: No pain, no stiffness  RESPIRATORY: No cough, no shortness of breath  CARDIOVASCULAR: No chest pain, no palpitations  GASTROINTESTINAL: No abdominal or epigastric pain. No nausea, vomiting, or hematemesis; no additional bowel movements since last evening   GENITOURINARY: No dysuria, frequency, or hematuria  NEUROLOGICAL: No numbness, no weakness  SKIN: No itching; some ecchymoses, unchanged from previous days     OBJECTIVE:    VITAL SIGNS:  T(C): 36.3 (07 Apr 2021 04:36), Max: 37.3 (06 Apr 2021 17:39)  T(F): 97.3 (07 Apr 2021 04:36), Max: 99.2 (06 Apr 2021 17:39)  HR: 67 (07 Apr 2021 04:36) (67 - 85)  BP: 162/78 (07 Apr 2021 04:36) (118/58 - 174/68)  BP(mean): --  ABP: --  ABP(mean): --  RR: 18 (07 Apr 2021 04:36) (18 - 18)  SpO2: 99% (07 Apr 2021 04:36) (94% - 99%)        04-05 @ 07:01 - 04-06 @ 07:00  --------------------------------------------------------  IN: 300 mL / OUT: 510 mL / NET: -210 mL    04-06 @ 07:01 - 04-07 @ 06:48  --------------------------------------------------------  IN: 860 mL / OUT: 1000 mL / NET: -140 mL      CAPILLARY BLOOD GLUCOSE      POCT Blood Glucose.: 145 mg/dL (06 Apr 2021 21:42)      PHYSICAL EXAM:    General: NAD; awake and alert   HEENT: PERRL grossly, clear conjunctiva; no conjunctival rim pallor   Neck: No jvd, no lymphadenopathy  Respiratory: CTA b/l, no rales, no wheezes; equal respiratory excursion   Cardiovascular: +S1/S2; RRR  Abdomen: Non-distended abdomen; bowel sounds present; no tenderness to deep palpation; no rebound tenderness   Extremities: WWP, 2+ peripheral pulses b/l; no LE edema  Skin: normal color and turgor; some ecchymoses noted   Neurological: No gross motor or sensory deficits; coordination intact     MEDICATIONS:  MEDICATIONS  (STANDING):  amitriptyline 40 milliGRAM(s) Oral at bedtime  amLODIPine   Tablet 5 milliGRAM(s) Oral daily  atorvastatin 20 milliGRAM(s) Oral at bedtime  carvedilol 6.25 milliGRAM(s) Oral every 12 hours  cholecalciferol 2000 Unit(s) Oral daily  dextrose 40% Gel 15 Gram(s) Oral once  dextrose 5%. 1000 milliLiter(s) (50 mL/Hr) IV Continuous <Continuous>  dextrose 5%. 1000 milliLiter(s) (100 mL/Hr) IV Continuous <Continuous>  dextrose 50% Injectable 25 Gram(s) IV Push once  dextrose 50% Injectable 12.5 Gram(s) IV Push once  dextrose 50% Injectable 25 Gram(s) IV Push once  dextrose 50% Injectable 25 milliLiter(s) IV Push once  folic acid 1 milliGRAM(s) Oral daily  gabapentin 300 milliGRAM(s) Oral at bedtime  glucagon  Injectable 1 milliGRAM(s) IntraMuscular once  insulin glargine Injectable (LANTUS) 14 Unit(s) SubCutaneous at bedtime  insulin lispro (ADMELOG) corrective regimen sliding scale   SubCutaneous three times a day before meals  insulin lispro (ADMELOG) corrective regimen sliding scale   SubCutaneous at bedtime  levothyroxine 100 MICROGram(s) Oral daily  loratadine 10 milliGRAM(s) Oral daily  montelukast 10 milliGRAM(s) Oral at bedtime  pantoprazole    Tablet 40 milliGRAM(s) Oral before breakfast  torsemide 20 milliGRAM(s) Oral daily    MEDICATIONS  (PRN):  ALBUTerol    0.083%. 2.5 milliGRAM(s) Nebulizer once PRN Shortness of Breath and/or Wheezing      ALLERGIES:  Allergies    ACE inhibitors (Other)  Bactrim (Nephrotoxicity)  Biaxin (Joint Pain)  morphine (Short breath)  NSAIDs (Other)    Intolerances        LABS:                        9.7    6.09  )-----------( 101      ( 06 Apr 2021 22:48 )             30.7     04-06    138  |  108  |  50<H>  ----------------------------<  131<H>  4.6   |  19<L>  |  2.53<H>    Ca    8.5      06 Apr 2021 06:56  Phos  4.8     04-06  Mg     2.7     04-06    TPro  6.3  /  Alb  3.4  /  TBili  0.2  /  DBili  x   /  AST  19  /  ALT  7<L>  /  AlkPhos  110  04-06          RADIOLOGY & ADDITIONAL TESTS: Reviewed. Kip Murrell pgy1   [phone]    INTERVAL HPI/OVERNIGHT EVENTS: The patient was transfused two units of packed red blood cells in the setting of a serum hemoglobin concentration less than 8. She was planned for discharge; however, she developed a small amount of bright red blood per rectum. She remained without tachycardia, tachypnea, or hypotension. Her repeat hemoglobin count was unchanged from that on her post-transfusion complete blood count.     SUBJECTIVE: Patient seen and examined at bedside.     CONSTITUTIONAL: No weakness, fevers, or chills  EYES/ENT: No visual changes; no dizziness   NECK: No pain, no stiffness  RESPIRATORY: No cough, no shortness of breath  CARDIOVASCULAR: No chest pain, no palpitations  GASTROINTESTINAL: No abdominal or epigastric pain. No nausea, vomiting, or hematemesis; no additional bowel movements since last evening   GENITOURINARY: No dysuria, frequency, or hematuria  NEUROLOGICAL: No numbness, no weakness  SKIN: No itching; some ecchymoses, unchanged from previous days     OBJECTIVE:    VITAL SIGNS:  T(C): 36.3 (07 Apr 2021 04:36), Max: 37.3 (06 Apr 2021 17:39)  T(F): 97.3 (07 Apr 2021 04:36), Max: 99.2 (06 Apr 2021 17:39)  HR: 67 (07 Apr 2021 04:36) (67 - 85)  BP: 162/78 (07 Apr 2021 04:36) (118/58 - 174/68)  BP(mean): --  ABP: --  ABP(mean): --  RR: 18 (07 Apr 2021 04:36) (18 - 18)  SpO2: 99% (07 Apr 2021 04:36) (94% - 99%)        04-05 @ 07:01 - 04-06 @ 07:00  --------------------------------------------------------  IN: 300 mL / OUT: 510 mL / NET: -210 mL    04-06 @ 07:01 - 04-07 @ 06:48  --------------------------------------------------------  IN: 860 mL / OUT: 1000 mL / NET: -140 mL      CAPILLARY BLOOD GLUCOSE      POCT Blood Glucose.: 145 mg/dL (06 Apr 2021 21:42)      PHYSICAL EXAM:    General: NAD; awake and alert   HEENT: PERRL grossly, clear conjunctiva; no conjunctival rim pallor   Neck: No jvd, no lymphadenopathy  Respiratory: CTA b/l, no rales, no wheezes; equal respiratory excursion   Cardiovascular: +S1/S2; RRR  Abdomen: Non-distended abdomen; bowel sounds present; no tenderness to deep palpation; no rebound tenderness; no hepato-splenomegaly   Extremities: WWP, 2+ peripheral pulses b/l; no LE edema  Skin: normal color and turgor; some ecchymoses noted   Neurological: No gross motor or sensory deficits; coordination intact     MEDICATIONS:  MEDICATIONS  (STANDING):  amitriptyline 40 milliGRAM(s) Oral at bedtime  amLODIPine   Tablet 5 milliGRAM(s) Oral daily  atorvastatin 20 milliGRAM(s) Oral at bedtime  carvedilol 6.25 milliGRAM(s) Oral every 12 hours  cholecalciferol 2000 Unit(s) Oral daily  dextrose 40% Gel 15 Gram(s) Oral once  dextrose 5%. 1000 milliLiter(s) (50 mL/Hr) IV Continuous <Continuous>  dextrose 5%. 1000 milliLiter(s) (100 mL/Hr) IV Continuous <Continuous>  dextrose 50% Injectable 25 Gram(s) IV Push once  dextrose 50% Injectable 12.5 Gram(s) IV Push once  dextrose 50% Injectable 25 Gram(s) IV Push once  dextrose 50% Injectable 25 milliLiter(s) IV Push once  folic acid 1 milliGRAM(s) Oral daily  gabapentin 300 milliGRAM(s) Oral at bedtime  glucagon  Injectable 1 milliGRAM(s) IntraMuscular once  insulin glargine Injectable (LANTUS) 14 Unit(s) SubCutaneous at bedtime  insulin lispro (ADMELOG) corrective regimen sliding scale   SubCutaneous three times a day before meals  insulin lispro (ADMELOG) corrective regimen sliding scale   SubCutaneous at bedtime  levothyroxine 100 MICROGram(s) Oral daily  loratadine 10 milliGRAM(s) Oral daily  montelukast 10 milliGRAM(s) Oral at bedtime  pantoprazole    Tablet 40 milliGRAM(s) Oral before breakfast  torsemide 20 milliGRAM(s) Oral daily    MEDICATIONS  (PRN):  ALBUTerol    0.083%. 2.5 milliGRAM(s) Nebulizer once PRN Shortness of Breath and/or Wheezing      ALLERGIES:  Allergies    ACE inhibitors (Other)  Bactrim (Nephrotoxicity)  Biaxin (Joint Pain)  morphine (Short breath)  NSAIDs (Other)    Intolerances        LABS:                        9.7    6.09  )-----------( 101      ( 06 Apr 2021 22:48 )             30.7     04-06    138  |  108  |  50<H>  ----------------------------<  131<H>  4.6   |  19<L>  |  2.53<H>    Ca    8.5      06 Apr 2021 06:56  Phos  4.8     04-06  Mg     2.7     04-06    TPro  6.3  /  Alb  3.4  /  TBili  0.2  /  DBili  x   /  AST  19  /  ALT  7<L>  /  AlkPhos  110  04-06          RADIOLOGY & ADDITIONAL TESTS: Reviewed.

## 2021-04-09 PROBLEM — B18.2 CHRONIC VIRAL HEPATITIS C: Chronic | Status: ACTIVE | Noted: 2020-06-04

## 2021-04-09 PROBLEM — K74.60 UNSPECIFIED CIRRHOSIS OF LIVER: Chronic | Status: ACTIVE | Noted: 2020-06-04

## 2021-04-09 PROBLEM — C22.0 LIVER CELL CARCINOMA: Chronic | Status: ACTIVE | Noted: 2021-01-01

## 2021-04-21 PROBLEM — K62.5 RECTAL BLEEDING: Status: ACTIVE | Noted: 2020-05-06

## 2021-04-21 PROBLEM — K64.4 INTERNAL AND EXTERNAL BLEEDING HEMORRHOIDS: Status: ACTIVE | Noted: 2020-05-06

## 2021-04-21 NOTE — HISTORY OF PRESENT ILLNESS
[FreeTextEntry1] : Marlene presents to the office for a postoperative visit after undergoing 3 quadrant hemorrhoidectomy on Juliette 15, 2020.  She reports that the postoperative pain was much more tolerable than she was prepared for.  She has not taken any of the narcotic pain medication that was prescribed.  She denies any rectal bleeding since the surgery and denies any significant anorectal discharge.  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  She reports some issues with constipation despite adherence to Metamucil daily, regular consumption of prunes as well as Colace usage.  Stools are passed approximately every 3 days.  She is unable to increase daily water intake secondary to fluid restriction.  She otherwise denies any issues with rectal bleeding.\par \par 4/21/21 Ms Collier returns to the office for followup. Since her last office appt, she has been diagnosed with HCC of segment 6. Earlier this month, she underwent RFA of this segment and during her hospitalization, she had an episode of rectal bleeding that led to drop in Hgb 7.5. Per pt report, GLADYS by medical resident was negative for blood and after she was transfused 2 units, she has not had any recurrent rectal bleeding since day of discharge 4/7/21. She continues on her bowel regimen of prunes and colace and passes soft stools daily without issue. She denies any hemorrhoidal burning or itching.

## 2021-04-21 NOTE — PROCEDURE
[FreeTextEntry1] : Anoscopy performed after pt consent was obtained. Circumferential visualization of the anal canal  above the level of the dentate line was completed.

## 2021-04-21 NOTE — PHYSICAL EXAM
[Normal rectal exam] : exam was normal [Reduce Spontaneously] : a spontaneously reducible (grade II) [Skin Tags] : residual hemorrhoidal skin tags were noted [Normal] : was normal [None] : there was no rectal mass  [Gross Blood] : no gross blood [No Rash or Lesion] : No rash or lesion [Alert] : alert [Oriented to Person] : oriented to person [Oriented to Place] : oriented to place [Oriented to Time] : oriented to time [Calm] : calm [de-identified] : Circumferential nonedematous external hemorrhoids [de-identified] : No apparent distress [de-identified] : Normocephalic atraumatic [de-identified] : Moving all extremities x4

## 2021-04-21 NOTE — ASSESSMENT
[FreeTextEntry1] : Internet presents to the office for a postoperative visit.  Overall, she is doing remarkably well after undergoing a 3 quadrant hemorrhoidectomy for rectal bleeding from her internal and external hemorrhoids.  She declined office examination today though from her reports, she appears to be healing without incident.  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  I recommended that she return to the office in approximately 6 months time for an anal Pap to continue surveillance of this area of incidental dysplasia.  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  In office today, anal Pap was obtained via anoscopy without issues.  We will follow-up with results, but advised repeat exam likely in 6 months time.  With regards to constipation, recommend continued Metamucil usage, prune consumption, but change to MiraLAX nightly.\par \par 4/21/21 Ms. Collier returns to the office for followup. She has not had any episodes of rectal bleeding since her surgery in 6/2020 until recently when hospitalized to undergo RFA of HCC. Since that day (4/6/21), she has not had any recurrence. GLADYS in office today without masses and anoscopy revealed grade 2 mildly inflamed hemorrhoids. Her external hemorrhoids have recurred. I have discussed with her that recurrence is likely secondary to h/o cirrhosis and portal HTN rather than her bowel regimen. Given absence of recurrent bleeding, will just observe for now. If recurrence with increased frequency and amount of blood per rectum, advised to return to office for bedside RBL rather than repeat surgical intervention given other major active medical issues. If no issues, plan for 6 month followup. Pt and daughter understand and are agreeable.

## 2021-05-19 NOTE — H&P ADULT - NSHPPHYSICALEXAM_GEN_ALL_CORE
General: Pt. sitting up in bed not in distress.  HEENT: AT, NC. PERRL. intact EOM. no throat erythema or exudate.   Neck: supple. + JVD.   Chest: Crackles bilaterally to mid lung fields. no sig. wheeze. + chest wall port.   Heart: S1,S2. IRRR. no heart murmur. No edema of extremities noted.  Abdomen: soft. non-tender. obese, + BS.   rectal : deferred by pt.  Ext: no calf tenderness on either side. ROM of all ext. intact. distal pulses intact.  Neuro: AAO x3. no focal weakness. no speech disorder, Cns ii to xii intact. m /r/s intact.  Skin: warm and dry, no cyanosis.   Psych : mood ok, co-operative, no si/hi.

## 2021-05-19 NOTE — H&P ADULT - ASSESSMENT
pt. 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 no cp, no leg edema. pt and her daughter stated that she was admitted at [facility] last week for about 4 days after she fell at her pcp office, CT head was negative, was found to be anemic and got 2 units of prbc. Pt. stated that she cannot be on aspirin or any blood thinners due to anemia and bleeding risk. I spoke to cardiologist dr. John loredo who saw pt. in the ER and also recommended no Aspirin, heparin/ lovenox for pt. as she bleeds easily. Pt. has port for blood transfusions and iron infusions by her hematologist. no abd. pain, no n/v/d, no melena, no hematemesis, no hemoptysis, no sig. cough, no fever, no sick contact. Initially in the ER pt. was on bipap but off the bipap at the time of admission and is on o2 via NC.  pt. 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 no cp, no leg edema. pt and her daughter stated that she was admitted at [facility] last week for about 4 days after she fell at her pcp office, CT head was negative, was found to be anemic and got 2 units of prbc. Pt. stated that she cannot be on aspirin or any blood thinners due to anemia and bleeding risk. I spoke to cardiologist dr. Shelton who saw pt. in the ER and also recommended no Aspirin, heparin/ lovenox for pt. as she bleeds easily. Pt. has port for blood transfusions and iron infusions by her hematologist. no abd. pain, no n/v/d, no melena, no hematemesis, no hemoptysis, no sig. cough, no fever, no sick contact. Initially in the ER pt. was on bipap but off the bipap at the time of admission and is on o2 via NC.

## 2021-05-19 NOTE — ED ADULT NURSE NOTE - NS ED PATIENT SAFETY CONCERN
Message  Return to work or school:   Irasema Mcneal is under my professional care  She was seen in my office on 01/25/2016     She is able to return to school on 01/27/2016    [place]          Signatures   Electronically signed by : Shari Man, ; Jan 25 2016  3:29PM EST                       (Author) No

## 2021-05-19 NOTE — CONSULT NOTE ADULT - SUBJECTIVE AND OBJECTIVE BOX
[place] [facility], [facility]                                   [address] [place] [address]                                                      PHONE: [phone]                                                         FAX: [phone]  [email or web address]. 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 hepatoma admitted through ER with dyspnea.    PAST MEDICAL & SURGICAL HISTORY:  Hypertension  well controlled    High cholesterol    Low back pain  chronic over a year    Asthma  well controlled, never intubated for exacerbation    Anemia, unspecified anemia type  uses O2 at 2L at home PRN, due to anemia    Aortic stenosis  s/p bovine AVR    Heart failure  Echo  10/2020 EF 60%, mild AR    CAD (coronary artery disease)  s/p OHS    CKD (chronic kidney disease)  Stage 4    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        ACE inhibitors (Other)  Bactrim (Nephrotoxicity)  Biaxin (Joint Pain)  morphine (Short breath)  NSAIDs (Other)      MEDICATIONS  (STANDING):    MEDICATIONS  (PRN):      Social History:  Cigarettes:         neg           Alchohol:    neg  neg   neg FH CAD             Illicit Drug Abuse:      ROS: Negative other than as mentioned in HPI.    Vital Signs Last 24 Hrs  T(C): 37 (19 May 2021 13:43), Max: 37 (19 May 2021 13:43)  T(F): 98.6 (19 May 2021 13:43), Max: 98.6 (19 May 2021 13:43)  HR: 89 (19 May 2021 15:10) (88 - 93)  BP: 199/80 (19 May 2021 15:10) (148/79 - 199/80)  BP(mean): --  RR: 22 (19 May 2021 15:10) (22 - 29)  SpO2: 99% (19 May 2021 15:10) (83% - 100%)  ICU Vital Signs Last 24 Hrs  [facility]  I&O's Detail    I&O's Summary    Drug Dosing Weight  [facility]      PHYSICAL EXAM:  General: Appears well developed, well nourished alert and cooperative.  HEENT: Head; normocephalic, atraumatic.  Eyes: Pupils reactive, cornea wnl.  Neck: Supple, no nodes adenopathy, no NVD or carotid bruit or thyromegaly.  CARDIOVASCULAR: Normal S1 and S2, No murmur, rub, gallop or lift.   LUNGS: No rales, rhonchi or wheeze. Normal breath sounds bilaterally.  ABDOMEN: Soft, nontender without mass or organomegaly. bowel sounds normoactive.  EXTREMITIES: No clubbing, cyanosis or edema. Distal pulses wnl.   SKIN: warm and dry with normal turgor.  NEURO: Alert/oriented x 3/normal motor exam. No pathologic reflexes.    PSYCH: normal affect.        LABS:                        9.1    6.19  )-----------( 119      ( 19 May 2021 14:03 )             30.1     05-19    141  |  109<H>  |  61.0<H>  ----------------------------<  295<H>  5.5<H>   |  22.0  |  2.50<H>    Ca    8.9      19 May 2021 14:03    TPro  7.3  /  Alb  3.9  /  TBili  0.3<L>  /  DBili  x   /  AST  23  /  ALT  12  /  AlkPhos  117  05-19    [facility]  CARDIAC MARKERS ( 19 May 2021 14:03 )  x     / 0.04 ng/mL / x     / x     / x                RADIOLOGY & ADDITIONAL STUDIES: CXR rebiewd poor inspiration with increased markings/effusions    INTERPRETATION OF TELEMETRY (personally reviewed):    ECG:    ECHO:< from: TTE Echo Complete w/o Contrast w/ Doppler (10.19.20 @ 11:55) >    Summary:   1. Normal global left ventricular systolic function.   2. Left ventricular ejection fraction, by visual estimation, is 60 to 65%.   3. Moderately increased LV wall thickness.   4. Normal right ventricle size and systolic function.   5. Severely enlarged left atrium.   6. Right atrium not well visualized, appears within normal limits on subcostal view.   7. Mild thickening of the anterior and posterior mitral valve leaflets.   8. Moderate mitral annular calcification.   9. Trace mitral valve regurgitation.  10. Elevated mean mitral valve gradient (   5 mmHg at HR 72 BPM).  11. Mild tricuspid regurgitation.  12. There is a prosthetic valve in the aortic position, peak velocity 2.3 m/s.  13. Mild aortic regurgitation.  14. Thereis no evidence of pericardial effusion.  15. Recommend clinical correlation with the above findings.    Yovani Cormier MD Electronically signed on 10/19/2020 at 1:24:19 PM            *** Final ***              < end of copied text >      STRESS TEST:    CARDIAC CATHETERIZATION:< from: Cardiac Cath Lab - Adult (02.01.16 @ 15:44) >  CONTRASTGIVEN: Omnipaque 128 ml.  MEDICATIONS GIVEN: Midazolam, 1 mg, IV. Fentanyl, 50 mcg, IV. 1% Lidocaine,  10 ml, subcutaneously.  VENTRICLES: LVEF 60% with PATRICIA 0.95 cm sq with mean gradient of 39 mm Hg  CORONARY VESSELS: The coronary circulation is right dominant.  LM:   --  LM: Normal.  LAD:   --  LAD: Angiography showed minor luminal irregularities with no  flow limiting lesions.  CX:   --  Mid circumflex: There was a 95 % stenosis.  RCA:   --  Distal RCA: There was a 95 % stenosis in the distal third of the  vessel segment.  --  RPDA: There was a 70 % stenosis at the ostium of the vessel segment.  --  RPLS: There was a 70 % stenosis at the ostium of the vessel segment.  COMPLICATIONS: There were no complications.  DIAGNOSTIC IMPRESSIONS: Severe LCX and RCA disease with AS  DIAGNOSTIC RECOMMENDATIONS: Eval for AS and CABG  INTERVENTIONAL IMPRESSIONS: Severe LCX and RCA disease with AS  INTERVENTIONAL RECOMMENDATIONS: Eval for AS and CABG  Prepared and signed by  Nathan Swanson MD    < end of copied text >                         [place] [facility], [facility]                                   [address] [place] [address]                                                      PHONE: [phone]                                                         FAX: [phone]  [email or web address]  ---------------------------------------------------------------------------------------------------------------------------------    Reason for Consult: dyspnea    HPI:  NICHELLE GILL is an 76y Female with HTN HLD CAD s/p remote CABg Bio AVR solitary episode of Af w/o recurrence, ILR in place, CRI, MDS transfusion dependent, 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  Pt has been exeperiencing several days of increasing DUPONT, PNFD orthopnea and elevated BP.  Discussion with renal re HD lately as per dtr.  In ER pt initially on BIPAP with elevated BP, no assoc cp    PAST MEDICAL & SURGICAL HISTORY:  Hypertension  well controlled    High cholesterol    Low back pain  chronic over a year    Asthma  well controlled, never intubated for exacerbation    Anemia, unspecified anemia type  uses O2 at 2L at home PRN, due to anemia    Aortic stenosis  s/p bovine AVR    Heart failure  Echo  10/2020 EF 60%, mild AR    CAD (coronary artery disease)  s/p OHS    CKD (chronic kidney disease)  Stage 4    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        ACE inhibitors (Other)  Bactrim (Nephrotoxicity)  Biaxin (Joint Pain)  morphine (Short breath)  NSAIDs (Other)      MEDICATIONS  (STANDING):    MEDICATIONS  (PRN):      Social History:  Cigarettes:         neg           Alchohol:    neg  neg   neg FH CAD             Illicit Drug Abuse:      ROS: Negative other than as mentioned in HPI.    Vital Signs Last 24 Hrs  T(C): 37 (19 May 2021 13:43), Max: 37 (19 May 2021 13:43)  T(F): 98.6 (19 May 2021 13:43), Max: 98.6 (19 May 2021 13:43)  HR: 89 (19 May 2021 15:10) (88 - 93)  BP: 199/80 (19 May 2021 15:10) (148/79 - 199/80)  BP(mean): --  RR: 22 (19 May 2021 15:10) (22 - 29)  SpO2: 99% (19 May 2021 15:10) (83% - 100%)  ICU Vital Signs Last 24 Hrs  [facility]  I&O's Detail    I&O's Summary    Drug Dosing Weight  [facility]      PHYSICAL EXAM:  General: Appears well developed, well nourished alert and cooperative.  HEENT: Head; normocephalic, atraumatic. pos JVD  Eyes: Pupils reactive, cornea wnl.  Neck: Supple, no nodes adenopathy, no NVD or carotid bruit or thyromegaly.  CARDIOVASCULAR: Normal S1 and S2, No murmur, rub, gallop or lift.   LUNGSrales bilat 1/2 up  ABDOMEN: Soft, nontender without mass or organomegaly. bowel sounds normoactive.  EXTREMITIES: No clubbing, cyanosis or edema. Distal pulses wnl.   SKIN: warm and dry with normal turgor.  NEURO: Alert/oriented x 3/normal motor exam. No pathologic reflexes.    PSYCH: normal affect.        LABS:                        9.1    6.19  )-----------( 119      ( 19 May 2021 14:03 )             30.1     05-19    141  |  109<H>  |  61.0<H>  ----------------------------<  295<H>  5.5<H>   |  22.0  |  2.50<H>    Ca    8.9      19 May 2021 14:03    TPro  7.3  /  Alb  3.9  /  TBili  0.3<L>  /  DBili  x   /  AST  23  /  ALT  12  /  AlkPhos  117  05-19    NICHELLEJOSEPH CURRANSEN  CARDIAC MARKERS ( 19 May 2021 14:03 )  x     / 0.04 ng/mL / x     / x     / x                RADIOLOGY & ADDITIONAL STUDIES: CXR rebiewd poor inspiration with increased markings/effusions    INTERPRETATION OF TELEMETRY (personally reviewed):    ECG: reviewed AFib/fluttert with MVR no ischemic changes    ECHO:< from: TTE Echo Complete w/o Contrast w/ Doppler (10.19.20 @ 11:55) >    Summary:   1. Normal global left ventricular systolic function.   2. Left ventricular ejection fraction, by visual estimation, is 60 to 65%.   3. Moderately increased LV wall thickness.   4. Normal right ventricle size and systolic function.   5. Severely enlarged left atrium.   6. Right atrium not well visualized, appears within normal limits on subcostal view.   7. Mild thickening of the anterior and posterior mitral valve leaflets.   8. Moderate mitral annular calcification.   9. Trace mitral valve regurgitation.  10. Elevated mean mitral valve gradient (   5 mmHg at HR 72 BPM).  11. Mild tricuspid regurgitation.  12. There is a prosthetic valve in the aortic position, peak velocity 2.3 m/s.  13. Mild aortic regurgitation.  14. Thereis no evidence of pericardial effusion.  15. Recommend clinical correlation with the above findings.    Yovani Cormier MD Electronically signed on 10/19/2020 at 1:24:19 PM            *** Final ***              < end of copied text >      STRESS TEST:    CARDIAC CATHETERIZATION:< from: Cardiac Cath Lab - Adult (02.01.16 @ 15:44) >  CONTRASTGIVEN: Omnipaque 128 ml.  MEDICATIONS GIVEN: Midazolam, 1 mg, IV. Fentanyl, 50 mcg, IV. 1% Lidocaine,  10 ml, subcutaneously.  VENTRICLES: LVEF 60% with PATRICIA 0.95 cm sq with mean gradient of 39 mm Hg  CORONARY VESSELS: The coronary circulation is right dominant.  LM:   --  LM: Normal.  LAD:   --  LAD: Angiography showed minor luminal irregularities with no  flow limiting lesions.  CX:   --  Mid circumflex: There was a 95 % stenosis.  RCA:   --  Distal RCA: There was a 95 % stenosis in the distal third of the  vessel segment.  --  RPDA: There was a 70 % stenosis at the ostium of the vessel segment.  --  RPLS: There was a 70 % stenosis at the ostium of the vessel segment.  COMPLICATIONS: There were no complications.  DIAGNOSTIC IMPRESSIONS: Severe LCX and RCA disease with AS  DIAGNOSTIC RECOMMENDATIONS: Eval for AS and CABG  INTERVENTIONAL IMPRESSIONS: Severe LCX and RCA disease with AS  INTERVENTIONAL RECOMMENDATIONS: Eval for AS and CABG  Prepared and signed by  Nathan Swanson MD    < end of copied text >

## 2021-05-19 NOTE — PATIENT PROFILE ADULT - BILL PAYMENT
Infant had initial CBC with predominant neutrophils, 9 bands. Risk index low for sepsis, with observation x 48 hrs recommended pending clinical course and blood culture. Thus far negative. Infant nursing plus supplement as colostrum seemed less volume than desired. Lactation consultant involved. Mo A+/ infant A-, Ramos negative.  
no

## 2021-05-19 NOTE — ED PROVIDER NOTE - PMH
Anemia, unspecified anemia type  uses O2 at 2L at home PRN, due to anemia  Aortic stenosis  s/p bovine AVR  Asthma  well controlled, never intubated for exacerbation  CAD (coronary artery disease)  s/p OHS  Chronic hepatitis C  Sustained viremic response documented  Cirrhosis  2/2 hepatitis  CKD (chronic kidney disease)  Stage 4  Heart failure  Echo  10/2020 EF 60%, mild AR  Hemorrhoids    High cholesterol    Hypertension  well controlled  Internal hemorrhoids    Liver carcinoma  Dx 1/2021  Low back pain  chronic over a year

## 2021-05-19 NOTE — PATIENT PROFILE ADULT - NSPRONUTRITIONRISK_GEN_A_NUR
----- Message from Penny Harveyreal sent at 12/22/2017 10:05 AM CST -----  Contact: KRISTINA JOHNSON [[id number]]  x_  1st Request  _  2nd Request  _  3rd Request        Who:  KRISTINA JOHNSON [[id number]]    Why: Requesting a call back in regards to her driving she need to ask about that. Please call her.     What Number to Call Back: [phone]  When to Expect a call back: (Within 24 hours)    Please return the call at earliest convenience. Thanks!                             No indicators present

## 2021-05-19 NOTE — ED PROVIDER NOTE - OBJECTIVE STATEMENT
75 y/o female with PMHx of anemia, asthma HTN, CHF, CKD, CAD, Liver CA, hepatitis, cirrhosis, HLD, c/o SOB. Pt was at PMD and sent here due to abnormal lung sounds. Pt speaking full sentences. Pt is tachypneic.

## 2021-05-19 NOTE — H&P ADULT - PROBLEM SELECTOR PLAN 1
Clinically pt. volume over loaded and has B/L pleural effusions, will be on iv lasix 40 mg bid for now, ct chest ordered to better evaluated pleural effusions and further plan as per findings, if significant may need ct surgery consult. weight daily, monitor I/O. o2 support, mike on board. Clinically pt. volume over loaded and has B/L pleural effusions, will be on iv lasix 40 mg bid for now, ct chest ordered to better evaluate pleural effusions and further plan as per findings, if significant may need ct surgery consult. weight daily, monitor I/O. o2 support, b.blk on board. follow echo. Clinically pt. volume over loaded and has B/L pleural effusions, will be on iv lasix 40 mg bid for now, ct chest ordered to better evaluate pleural effusions and further plan as per findings, if significant may need ct surgery consult. weight daily, monitor I/O. o2 support, mike on board. follow echo.    addendum : pt's ct chest report reviewed, ct surgery consult requested for pleural effusions. dr. izaguirre

## 2021-05-19 NOTE — H&P ADULT - PROBLEM SELECTOR PLAN 6
H/o MDS transfusion dependent, Hb 9.1 stable compared to old, close f/u of cbc, as per pt. and her daughter last week Hb dropped and got 2 units of prbc. H/o esophageal varices as well, avoid aspirin , lovenox, heparin , pt. does not want to be on these meds as well, discussed with cardiologist Dr. Shelton who is in agreement.  will use vc boots for dvt prophylaxis.

## 2021-05-19 NOTE — H&P ADULT - HISTORY OF PRESENT ILLNESS
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  Pt has been exeperiencing several days of increasing DUPONT, PNFD orthopnea and elevated BP pt. is a 76y Female with HTN HLD CAD s/p remote CABg Bio AVR solitary episode of Af w/o recurrence, ILR in place, CRI, MDS transfusion dependent, cirrhosis sec to hep B with esophageal varices and prior banding, recent finding of hepatic mass/hepatoma s/p embolization, came to the ER for worsening dyspnea for several days worse with activity, orthopnea and elevated BP. no cp, no leg edema. pt and her daughter stated that she was admitted at [facility] last week for about 4 days after she fell at her pcp office, CT head was negative, was found to be anemic and got 2 units of prbc. Pt. stated that she cannot be on aspirin or any blood thinners due to anemia and bleeding risk. I spoke to cardiologist dr. John loredo who saw pt. in the ER and also recommended no Aspirin, heparin/ lovenox for pt. as she bleeds easily. Pt. has port for blood transfusions and iron infusions by her hematologist. no abd. pain, no n/v/d, no melena, no hematemesis, no hemoptysis, no sig. cough, no fever, no sick contact. Initially in the ER pt. was on bipap but off the bipap at the time of admission and is on o2 via NC.  pt. 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 no cp, no leg edema. pt and her daughter stated that she was admitted at [facility] last week for about 4 days after she fell at her pcp office, CT head was negative, was found to be anemic and got 2 units of prbc. Pt. stated that she cannot be on aspirin or any blood thinners due to anemia and bleeding risk. I spoke to cardiologist dr. John loredo who saw pt. in the ER and also recommended no Aspirin, heparin/ lovenox for pt. as she bleeds easily. Pt. has port for blood transfusions and iron infusions by her hematologist. no abd. pain, no n/v/d, no melena, no hematemesis, no hemoptysis, no sig. cough, no fever, no sick contact. Initially in the ER pt. was on bipap but off the bipap at the time of admission and is on o2 via NC.  pt. 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 no cp, no leg edema. pt and her daughter stated that she was admitted at [facility] last week for about 4 days after she fell at her pcp office, CT head was negative, was found to be anemic and got 2 units of prbc. Pt. stated that she cannot be on aspirin or any blood thinners due to anemia and bleeding risk. I spoke to cardiologist dr. Shelton who saw pt. in the ER and also recommended no Aspirin, heparin/ lovenox for pt. as she bleeds easily. Pt. has port for blood transfusions and iron infusions by her hematologist. no abd. pain, no n/v/d, no melena, no hematemesis, no hemoptysis, no sig. cough, no fever, no sick contact. Initially in the ER pt. was on bipap but off the bipap at the time of admission and is on o2 via NC.

## 2021-05-19 NOTE — ED ADULT NURSE NOTE - OBJECTIVE STATEMENT
pt alert and oriented x4 comes in  c/o SOB. as per daughter pt fell tuesday hit her head, received blood at [facility], due to hx aNEMIA. pt reports increased SOB since. pt labored breathing. as per daughter pt kidney enzymes worsening at home and may need dialysis. pt placed on bipap. pt educated on plan of care, pt able to successfully teach back plan of care to RN, RN will continue to reeducate pt during hospital stay.

## 2021-05-19 NOTE — H&P ADULT - PROBLEM SELECTOR PLAN 2
Pt's RF close to her baseline as per pt's daughter but considering pt. will be on iv lasix 40 mg bid, renal functions needs to be watched closely, Cr may go up. pt. needs lasix at this point clinically volume over loaded. nephrology consult called, spoke to dr. Castillo. avoid nephrotoxic meds.

## 2021-05-19 NOTE — CONSULT NOTE ADULT - ASSESSMENT
Assessment  Dyspnea  CAd s/p remote CABG/Bio AVR normal EF mod MR  CRI  cirrhosis ? hepatoma  MDS transfusion dependent  HTN  HLD  remote Af in SR s/p ILR Assessment  Dyspnea c/w HFpEF  recurrent AF ? duration not AC candidate  s/p ILR  CAd s/p remote CABG/Bio AVR normal EF mod MR  CRI awaiting ? HD  cirrhosis /  hepatoma s/p embolization  MDS transfusion dependent  HTN  HLD  remote Af in SR s/p ILR      Rec  admit tele   IV lasix 40 BID  dc steroids/nebs  switch from coreg to lopressor 25 Q 6  cont norvasc  add hydralazine 25 Q 8  no AC in light of recent variceal/hemorrhoidal bleed  FU echo  will follow

## 2021-05-19 NOTE — PHARMACOTHERAPY INTERVENTION NOTE - COMMENTS
Medication list updated based on list provided by patient Medication list updated based on list provided by patient. Patient stated she takes a probiotic once daily in the morning but is unsure of which brand

## 2021-05-19 NOTE — H&P ADULT - PROBLEM SELECTOR PLAN 5
Goal is rate control cardiologist starting on metoprolol, switched from coreg, not a candidate for AC for h/o MDS transfusion dependent.

## 2021-05-19 NOTE — ED ADULT TRIAGE NOTE - CHIEF COMPLAINT QUOTE
patient c/o shortness of breath from MD office. hx of CHF, patient on home oxygen. patient brought to critical care

## 2021-05-20 NOTE — CONSULT NOTE ADULT - ASSESSMENT
ASSESSMENT:   76 year old Female with a medical history of HTN, HLD, CAD s/p remote CABG Bio AVR, solitary episode of Af w/o recurrence, ILR in place, CRI, MDS transfusion dependent, cirrhosis secondary to Hep C (contracted from a blood transfusion when she was an infant) with esophageal varices and prior banding, recent finding of hepatic mass/hepatoma s/p embolization, came to the ER for worsening dyspnea for several days worse with activity, orthopnea and elevated BP.    Recent admission at [facility] s/p fall last week resulting in a "hematoma" of her head as per patient. CT head was negative as per chart. Patient was found to be anemic and got 2 units of prbc. Pt stated that she cannot be on ASA or any blood thinners due to anemia and bleeding risk. Pt has a port for blood transfusions and iron infusions by her hematologist since she has required chronic transfusions. As per patient, she has received "over 60 units of blood" since her open heart surgery a few years ago.    CT chest performed for SOB/DUPONT showing small-moderate b/l pleural effusions R>L.     PLAN:  Admitted to Medicine.  Continuous SpO2 monitoring.  Patient currently stable with SpO2 >92% on 3L O2 via NC while sitting/talking.  Maintain SpO2 >92%. Titrate O2 requirements PRN.   Patient requesting to wait until later in the day for further evaluation.   POCUS to be performed at bedside later today to further evaluate b/l pleural effusions / evaluation for a chest tube.   Will consider placing a chest tube sooner if patient experiences increased work of breathing, SpO2 starts to trend down, O2 requirements start to trend up.   Further plan as per primary team.   Will continue to follow along.

## 2021-05-20 NOTE — CHART NOTE - NSCHARTNOTEFT_GEN_A_CORE
CTS ACP Addendum    Briefly, 76F h/o AVR, cirrhosis, hepatitis C and recently found to have recurrent liver CA p/w bilateral pleural effusions quantified as small to moderate on Left and moderate on Right.     Patient seen and examined. Notes, flowsheets, medications, radiologic images and labs reviewed.     Plan:  - Discussed images and history with Dr Sheridan  - Given history of pleural effusions (chronic in nature), and bleeding risk with cirrhosis, will hold off on placement of chest tubes at this time.  - Continue diuresis as possible in setting of kidney disease  - Will follow    Case discussed with Dr. Sheridan

## 2021-05-20 NOTE — CONSULT NOTE ADULT - SUBJECTIVE AND OBJECTIVE BOX
Thoracic Surgery Consult called for Bilateral Pleural Effusions    HPI:  pt. is a 76y Female with HTN HLD CAD s/p remote CABG Bio AVR solitary episode of Af w/o recurrence, ILR in place, CRI, MDS transfusion dependent, cirrhosis sec to hep c with esophageal varices and prior banding, recent finding of hepatic mass/hepatoma s/p embolization, came to the ER for worsening dyspnea for several days worse with activity, orthopnea and elevated BP. no cp, no leg edema. pt and her daughter stated that she was admitted at [facility] last week for about 4 days after she fell at her pcp office, CT head was negative, was found to be anemic and got 2 units of prbc. Pt. stated that she cannot be on aspirin or any blood thinners due to anemia and bleeding risk. I spoke to cardiologist dr. Shelton who saw pt. in the ER and also recommended no Aspirin, heparin/ lovenox for pt. as she bleeds easily. Pt. has port for blood transfusions and iron infusions by her hematologist. no abd. pain, no n/v/d, no melena, no hematemesis, no hemoptysis, no sig. cough, no fever, no sick contact. Initially in the ER pt. was on bipap but off the bipap at the time of admission and is on o2 via NC.  (19 May 2021 18:10)    PAST MEDICAL & SURGICAL HISTORY:  Hypertension, controlled  High cholesterol  Low back pain, chronic over a year  Asthma, well controlled, never intubated for exacerbation  Anemia, unspecified anemia type, uses O2 at 2L at home PRN, due to anemia  Aortic stenosis s/p bovine AVR  Heart failure, Echo 10/2020 EF 60%, mild AR  CAD (coronary artery disease) s/p OHS  CKD (chronic kidney disease), Stage 4  Chronic hepatitis C, Sustained viremic response documented  Cirrhosis 2/2 hepatitis  Hemorrhoids  Internal hemorrhoids  Liver carcinoma, Dx 2021  History of tonsillectomy, as a child  S/P CABG x 3,   H/O aortic valve replacement  bovine, 2016  Port-A-Cath in place, 2019, right upper chest  History of loop recorder, 2017    REVIEW OF SYSTEMS:  General: +Fatigue. No HA/ Dizziness reported. 	  Skin: No Rashes/ Lesions elicited  Ophthalmologic: No change in vision reported  ENMT: No change in hearing/ Drainage/ Lesions reported	  Respiratory and Thorax: +SOB, +DUPONT. No Cough/ Wheezing/ Hemoptysis/ Sputum production reported  Cardiovascular: No Chest pain/ Palpitations/ Diaphoresis	reported  Gastrointestinal: No Nausea/ Vomiting/ diarrhea reported	  Genitourinary: No Heamturia/ Dysuria reported	  Musculoskeletal: No new Pain/ Weakness noted	  Neurological: No Seizures/ TIA/ CVA reported  Psychiatric: No Dementia/ Depression/ SI/ HI reported	  Hematology/Lymphatics: +Increased bleeding risk in the setting of chronic anemia requiring blood transfusions. No hx of Edema	    MEDICATIONS  (STANDING):  amitriptyline 40 milliGRAM(s) Oral at bedtime  atorvastatin 40 milliGRAM(s) Oral at bedtime  cholecalciferol 2000 Unit(s) Oral daily  folic acid 1 milliGRAM(s) Oral daily  furosemide   Injectable 40 milliGRAM(s) IV Push two times a day  gabapentin 300 milliGRAM(s) Oral at bedtime  glucagon  Injectable 1 milliGRAM(s) IntraMuscular once  hydrALAZINE 25 milliGRAM(s) Oral every 8 hours  insulin glargine Injectable (LANTUS) 10 Unit(s) SubCutaneous at bedtime  insulin lispro (ADMELOG) corrective regimen sliding scale   SubCutaneous three times a day before meals  insulin lispro (ADMELOG) corrective regimen sliding scale   SubCutaneous at bedtime  levothyroxine 100 MICROGram(s) Oral daily  metoprolol tartrate 25 milliGRAM(s) Oral every 6 hours  montelukast 10 milliGRAM(s) Oral at bedtime  multivitamin 1 Tablet(s) Oral daily  pantoprazole    Tablet 40 milliGRAM(s) Oral before breakfast  senna 2 Tablet(s) Oral at bedtime    MEDICATIONS  (PRN):  albuterol/ipratropium for Nebulization 3 milliLiter(s) Nebulizer every 6 hours PRN Shortness of Breath and/or Wheezing    Allergies:  ACE inhibitors (Other)  Bactrim (Nephrotoxicity)  Biaxin (Joint Pain)  morphine (Short breath)  NSAIDs (Other)    SOCIAL HISTORY:  Former Smoker, 0.5PPD X 20 years, Quit >20 years ago.  Denies any alcohol use / abuse.  Denies any drug use / abuse.     FAMILY HISTORY:  Family history of diabetes mellitus (Sibling)    Vital Signs Last 24 Hrs  T(C): 37.1 (19 May 2021 20:13), Max: 37.1 (19 May 2021 20:13)  T(F): 98.8 (19 May 2021 20:13), Max: 98.8 (19 May 2021 20:13)  HR: 97 (19 May 2021 20:13) (88 - 97)  BP: 189/78 (19 May 2021 20:13) (148/79 - 199/80)  RR: 20 (19 May 2021 20:13) (20 - 33)  SpO2: 98% (20 May 2021 00:18) (83% - 100%)    PHYSICAL EXAMINATION:  General: WN/WD, NAD, sitting up on stretcher in the ED on O2 via NC  Neurology: Awake, nonfocal, PUENTE x 4  Eyes: PERRLA/ EOMI, Gross vision intact  ENT: Gross hearing intact, grossly patent pharynx, no stridor  Neck: Neck supple, trachea midline, No JVD,   Respiratory: +Diminished BSs b/l bases, No accessory muscle use noted.   CV: RRR, S1S2  Right chest port  Abdominal: Soft, NT, ND +BS,   Extremities: scant LE edema, + peripheral pulses  Skin: Warm, dry  Psych: Oriented x 3, normal affect    LABS:                        9.1    6.19  )-----------( 119      ( 19 May 2021 14:03 )             30.1     05-19    141  |  109<H>  |  61.0<H>  ----------------------------<  295<H>  5.5<H>   |  22.0  |  2.50<H>    Ca    8.9      19 May 2021 14:03    TPro  7.3  /  Alb  3.9  /  TBili  0.3<L>  /  DBili  x   /  AST  23  /  ALT  12  /  AlkPhos  117  05-19    PT/INR - ( 19 May 2021 18:03 )   PT: 14.1 sec;   INR: 1.23 ratio    PTT - ( 19 May 2021 18:03 )  PTT:32.6 sec    Urinalysis Basic - ( 19 May 2021 20:52 )  Color: Yellow / Appearance: Clear / S.010 / pH: x  Gluc: x / Ketone: Negative  / Bili: Negative / Urobili: Negative mg/dL   Blood: x / Protein: 100 mg/dL / Nitrite: Negative   Leuk Esterase: Trace / RBC: 0-2 /HPF / WBC 0-2   Sq Epi: x / Non Sq Epi: Occasional / Bacteria: x    RADIOLOGY & ADDITIONAL STUDIES:    < from: CT Chest No Cont (21 @ 20:06) >  FINDINGS:  LUNGS AND AIRWAYS: Patent central airways.  Small to moderate left and moderate right pleural effusion. Bibasilar dependent consolidation/atelectasis and segmental consolidation is atelectasis in the right middle lobe. Correlate clinically for infection. Mild emphysematous change.  PLEURA: As above.  MEDIASTINUM AND AVANI: No lymphadenopathy.  VESSELS: Nonaneurysmal  HEART: Cardiomegaly. Cardiac vascular calcification. status post CABG.. No pericardial effusion.  CHEST WALL AND LOWER NECK: Median sternotomy.  VISUALIZED UPPER ABDOMEN: Cirrhotic hepatic morphology. Small rim calcified splenic cyst.  BONES: Stable  IMPRESSION:  Bilateral pleural effusions right larger than left.  Bilateral lower lobe and right middle lobe consolidation/atelectasis. Correlate clinically for infection.  Mild emphysema  < end of copied text >

## 2021-05-20 NOTE — PROGRESS NOTE ADULT - ASSESSMENT
76y Female with HTN HLD CAD s/p remote CABg Bio AVR solitary episode of Af w/o recurrence, ILR in place, CRI, MDS transfusion dependent, cirrhosis sec to chronic hep c with esophageal varices and prior banding, hepatocellular carcinoma, recent finding of hepatic mass/hepatoma s/p embolization with embozene particles on 4/5/21, came to the ER for worsening dyspnea for several days worse with activity, orthopnea and elevated BP. Patient was admitted at [facility] last week for about 4 days after she fell at her pcp office, CT head was negative, was found to be anemic and transfused 2 units of prbc. Pt. stated that she cannot be on aspirin or any blood thinners due to anemia and bleeding risk. I spoke to cardiologist dr. Shelton who saw pt. in the ER and also recommended no Aspirin, heparin/ lovenox for pt as she bleeds easily. Pt. has port for blood transfusions and iron infusions by her hematologist. Initially in the ER pt was on bipap but off now, on supplemental O2 at 3L/min.     #Decompensated Heart Failure   #HFpEF  -Pro-BNP 8868  -CT chest: B/l pleural effusions, R>L, mild emphysema   -Cardiology consult appreciated   -Continue Furosemide 40mg Iv q12hrs   -Echo 10/2020: LVEF 60-65%, Echo 2019: Grade I diastolic dysfunction   -Troponin negative x 2   -F/u repeat Echocardiogram   -Continue supplemental O2 at 3L/min   -F/u CT surgery recommendations s/p POCUS for possible chest tube placement     #Hypertension   -DASH/TLC  -Continue Hydralazine 25mg q8hr, metoprolol 25mg q6hrs   -Monitor BP & titrate BP meds PRN 76y Female with HTN HLD CAD s/p remote CABG, Bio AVR solitary episode of Af w/o recurrence, ILR in place, CRI, MDS transfusion dependent, cirrhosis sec to chronic hep c with esophageal varices and prior banding, hepatocellular carcinoma, recent finding of hepatic mass/hepatoma s/p embolization with embozene particles on 4/5/21, came to the ER for worsening dyspnea for several days worse with activity, orthopnea and elevated BP. Patient was admitted at [facility] last week for about 4 days after she fell at her pcp office, CT head was negative, was found to be anemic and transfused 2 units of prbc. Pt. stated that she cannot be on aspirin or any blood thinners due to anemia and bleeding risk. I spoke to cardiologist dr. Shelton who saw pt. in the ER and also recommended no Aspirin, heparin/ lovenox for pt as she bleeds easily. Pt. has port for blood transfusions and iron infusions by her hematologist. Initially in the ER pt was on bipap but off now, on supplemental O2 at 3L/min.     #Decompensated Heart Failure   #HFpEF  -Pro-BNP 8868  -CT chest: B/l pleural effusions, R>L, mild emphysema   -Cardiology consult appreciated   -Continue Furosemide 40mg Iv q12hrs   -Echo 10/2020: LVEF 60-65%, Echo 2019: Grade I diastolic dysfunction   -Troponin negative x 2   -F/u repeat Echocardiogram   -Continue supplemental O2 at 3L/min   -F/u CT surgery recommendations s/p POCUS for possible chest tube placement     #Hypertension   -DASH/TLC  -Continue Hydralazine 25mg q8hr, metoprolol 25mg q6hrs   -Monitor BP & titrate BP meds PRN    #Diabetes Mellitus   -Hypoglycemia Protocol, LDISS, Accuchecks AC&HS  -Continue Lantus 10u subQ qhs   -Diet: Consistent Carbs w/ Evening Snack    #CAD, S/p CABG  -Continue Lipitor 40mg po qhs    #MDS   -Recent transfusion at [facility] - 2units pRBCs  -Transfusion dependent   -No AC as per cardiology, patient bleeds easily     #Cirrhosis   #Hepatocellular carcinoma   -Likely secondary  to chronic Hepatitis C infection, Pt was treated with epclusa  -Hx esophageal varices and hemorrhoids   -s/p embolization with embozene particles on 4/5/21   -Recent MRI: segment 6 location of recent embolization) lesion - 2.3cm, diffuse nodular enhancement throughout the lesion, 5 mm focus of arterial enhancement at the junction of the right lobe and caudate lobe   -Continue to follow up as outpatient    #Chronic Kidney disease  -Cr close to baseline  -Avoid nephrotoxins  -Trend renal function   -Nephrology consult appreciated     #Hyperkalemia  -Now resolved   -K: 5.5 on admission   -4.6 now     #Hypothyroidism  -Continue levothyroxine 100mcg po daily     #VTE prophylaxis  -SCDs  -No AC as patient with MDS and bleeds easily     Above discussed with Dr. Mendes  76y Female with HTN HLD CAD s/p remote CABG, Bio AVR solitary episode of Af w/o recurrence, ILR in place, CRI, MDS transfusion dependent, cirrhosis sec to chronic hep c with esophageal varices and prior banding, hepatocellular carcinoma, recent finding of hepatic mass/hepatoma s/p embolization with embozene particles on 4/5/21, came to the ER for worsening dyspnea for several days worse with activity, orthopnea and elevated BP. Patient was admitted at [facility] last week for about 4 days after she fell at her pcp office, CT head was negative, was found to be anemic and transfused 2 units of prbc. Pt. stated that she cannot be on aspirin or any blood thinners due to anemia and bleeding risk. I spoke to cardiologist dr. Shelton who saw pt. in the ER and also recommended no Aspirin, heparin/ lovenox for pt as she bleeds easily. Pt. has port for blood transfusions and iron infusions by her hematologist. Initially in the ER pt was on bipap but off now, on supplemental O2 at 3L/min.     #Decompensated Heart Failure   #HFpEF  -Pro-BNP 8868  -CT chest: B/l pleural effusions, R>L, mild emphysema   -Cardiology consult appreciated   -Continue Furosemide 40mg Iv q12hrs   -Echo 10/2020: LVEF 60-65%, Echo 2019: Grade I diastolic dysfunction   -Troponin negative x 2   -F/u repeat Echocardiogram   -Continue supplemental O2 at 3L/min   -F/u CT surgery recommendations s/p POCUS for possible chest tube placement     #Hypertension   -DASH/TLC  -Continue Hydralazine 25mg q8hr, metoprolol 25mg q6hrs   -Monitor BP & titrate BP meds PRN    #Diabetes Mellitus   -Hypoglycemia Protocol, LDISS, Accuchecks AC&HS  -Continue Lantus 10u subQ qhs   -Diet: Consistent Carbs w/ Evening Snack    #CAD, S/p CABG  -Continue Lipitor 40mg po qhs    #MDS   -Recent transfusion at [facility] - 2units pRBCs  -Transfusion dependent   -No AC as per cardiology, patient bleeds easily     #Cirrhosis   #Hepatocellular carcinoma   -Likely secondary  to chronic Hepatitis C infection, Pt was treated with epclusa  -Hx esophageal varices and hemorrhoids   -s/p embolization with embozene particles on 4/5/21   -Recent MRI: segment 6 location of recent embolization) lesion - 2.3cm, diffuse nodular enhancement throughout the lesion, 5 mm focus of arterial enhancement at the junction of the right lobe and caudate lobe   -Continue to follow up as outpatient    #Chronic Kidney disease  -Cr close to baseline  -Avoid nephrotoxins  -Trend renal function   -Nephrology consult appreciated     #Hyperkalemia  -Now resolved   -K: 5.5 on admission   -4.6 now     #Hypothyroidism  -Continue levothyroxine 100mcg po daily     #VTE prophylaxis  -SCDs  -No AC as patient with MDS and bleeds easily     #Advanced Directives   -Full Code  -HCP Daniela Collier     Above discussed with Dr. Mendes

## 2021-05-20 NOTE — CONSULT NOTE ADULT - ASSESSMENT
CKD(IV): + pre renal component  CHF with chronic fluid overload; B/L pleural effusions  - avoid potential nephrotoxins  - cont IV Lasix; will need to keep azotemic  - consider thoracentesis  - follow labs    Anemia: +CKD + MDS (transfusion dependent)  - check Fe stores   - add ARACELI  - PRBCs as needed

## 2021-05-20 NOTE — PROGRESS NOTE ADULT - SUBJECTIVE AND OBJECTIVE BOX
[facility]                              [address] [address]                                                 PHONE: [phone]                                                 FAX: [phone]  -----------------------------------------------------------------------------------------------  Pt seen and examined. FU for  shortness of breath   AF      Overnight events/Complaints: Pt still reports significant shortness of breath at rest despite BiPAP last nite. Edema improved.    Vital Signs Last 24 Hrs  T(C): 36.4 (20 May 2021 07:39), Max: 37.1 (19 May 2021 20:13)  T(F): 97.6 (20 May 2021 07:39), Max: 98.8 (19 May 2021 20:13)  HR: 73 (20 May 2021 07:39) (73 - 97)  BP: 149/76 (20 May 2021 07:39) (143/65 - 199/80)  BP(mean): --  RR: 20 (20 May 2021 07:39) (20 - 33)  SpO2: 100% (20 May 2021 07:39) (83% - 100%)  I&O's Summary    19 May 2021 07:01  -  20 May 2021 07:00  --------------------------------------------------------  IN: 250 mL / OUT: 1000 mL / NET: -750 mL        PHYSICAL EXAM:  Constitutional: alert and co-operative  HEENT:     Head: Normocephalic and atraumatic  Neck: supple. No JVD  Cardiovascular: regular S1 S2  Respiratory: Lungs clear to auscultation; no crepitations, no wheeze  Gastrointestinal:  Soft, Non-tender, + BS	  Musculoskeletal: Normal range of motion. No edema  Skin: Warm and dry. No cyanosis . No diaphoresis.  Neurologic: Alert oriented to time place and person. Normal strength and no tremor.        LABS:                        8.8    5.18  )-----------( 114      ( 20 May 2021 07:01 )             28.6     05-20    140  |  108<H>  |  58.0<H>  ----------------------------<  281<H>  4.6   |  22.0  |  2.66<H>    Ca    8.7      20 May 2021 07:01    TPro  7.3  /  Alb  3.9  /  TBili  0.3<L>  /  DBili  x   /  AST  23  /  ALT  12  /  AlkPhos  117  05-19    CARDIAC MARKERS ( 20 May 2021 07:01 )  x     / 0.03 ng/mL / x     / x     / x      CARDIAC MARKERS ( 19 May 2021 14:03 )  x     / 0.04 ng/mL / x     / x     / x          PT/INR - ( 19 May 2021 18:03 )   PT: 14.1 sec;   INR: 1.23 ratio         PTT - ( 19 May 2021 18:03 )  PTT:32.6 sec    RADIOLOGY & ADDITIONAL STUDIES: (reviewed)  CXR was independently visualized/reviewed  and demonstrated:   Bilateral pleural effusions right larger than left.  Bilateral lower lobe and right middle lobe consolidation/atelectasis. Correlate clinically for infection.  Mild emphysema    CARDIOLOGY TESTING:(reviewed)     12 lead EKG independently visualized/reviewed  and demonstrated AFib/flutter with MVR no ischemic changes    ECHO:< from: TTE Echo Complete w/o Contrast w/ Doppler (10.19.20 @ 11:55) >    Summary:   1. Normal global left ventricular systolic function.   2. Left ventricular ejection fraction, by visual estimation, is 60 to 65%.   3. Moderately increased LV wall thickness.   4. Normal right ventricle size and systolic function.   5. Severely enlarged left atrium.   6. Right atrium not well visualized, appears within normal limits on subcostal view.   7. Mild thickening of the anterior and posterior mitral valve leaflets.   8. Moderate mitral annular calcification.   9. Trace mitral valve regurgitation.  10. Elevated mean mitral valve gradient (   5 mmHg at HR 72 BPM).  11. Mild tricuspid regurgitation.  12. There is a prosthetic valve in the aortic position, peak velocity 2.3 m/s.  13. Mild aortic regurgitation.  14. Thereis no evidence of pericardial effusion.  15. Recommend clinical correlation with the above findings.    Yovani Cormier MD Electronically signed on 10/19/2020 at 1:24:19 PM      MEDICATIONS:(reviewed)  MEDICATIONS  (STANDING):  amitriptyline 40 milliGRAM(s) Oral at bedtime  atorvastatin 40 milliGRAM(s) Oral at bedtime  cholecalciferol 2000 Unit(s) Oral daily  dextrose 40% Gel 15 Gram(s) Oral once  dextrose 5%. 1000 milliLiter(s) (50 mL/Hr) IV Continuous <Continuous>  dextrose 5%. 1000 milliLiter(s) (100 mL/Hr) IV Continuous <Continuous>  dextrose 50% Injectable 25 Gram(s) IV Push once  dextrose 50% Injectable 12.5 Gram(s) IV Push once  dextrose 50% Injectable 25 Gram(s) IV Push once  folic acid 1 milliGRAM(s) Oral daily  furosemide   Injectable 40 milliGRAM(s) IV Push two times a day  gabapentin 300 milliGRAM(s) Oral at bedtime  glucagon  Injectable 1 milliGRAM(s) IntraMuscular once  hydrALAZINE 25 milliGRAM(s) Oral every 8 hours  insulin glargine Injectable (LANTUS) 10 Unit(s) SubCutaneous at bedtime  insulin lispro (ADMELOG) corrective regimen sliding scale   SubCutaneous three times a day before meals  insulin lispro (ADMELOG) corrective regimen sliding scale   SubCutaneous at bedtime  levothyroxine 100 MICROGram(s) Oral daily  metoprolol tartrate 25 milliGRAM(s) Oral every 6 hours  montelukast 10 milliGRAM(s) Oral at bedtime  multivitamin 1 Tablet(s) Oral daily  pantoprazole    Tablet 40 milliGRAM(s) Oral before breakfast  senna 2 Tablet(s) Oral at bedtime      ASSESSMENT AND PLAN:    76y Female with prior history of Dyspnea c/w HFpEF, recurrent AF not AC candidate, s/p ILR, CAd s/p remote CABG/Bio AVR normal EF mod MR, CRI awaiting ? HD, cirrhosis /  hepatoma s/p embolization, MDS transfusion dependent, HTN, HLD with AF    Continue IV lasix 40 BID today   Continue lopressor 25 Q 6  cont norvasc with hydralazine  no AC in light of recent variceal/hemorrhoidal bleed  Will FU on echo

## 2021-05-20 NOTE — CONSULT NOTE ADULT - SUBJECTIVE AND OBJECTIVE BOX
HPI: The pt is a 76y WF PMH+ HTN, HLD, CAD s/p CABG + AVR, + MDS transfusion dependent, Hep C/ hepatic mass/hepatoma s/p embolization, +CKD(IV) who presented to the ED with progressive dyspnea and orthopnea. and elevated BP.  Pt recently admitted to [facility] post fall in which she sustained a concussion; also required several units of PRBCs.  We are called for her renal insufficiency and hyperkalemia.  Pt feels slightly better since admission; less dyspnea noted.      PAST MEDICAL & SURGICAL HISTORY:  Hypertension  well controlled    High cholesterol    Low back pain  chronic over a year    Asthma  well controlled, never intubated for exacerbation    Anemia, unspecified anemia type  uses O2 at 2L at home PRN, due to anemia    Aortic stenosis  s/p bovine AVR    Heart failure  Echo  10/2020 EF 60%, mild AR    CAD (coronary artery disease)  s/p OHS    CKD (chronic kidney disease)  Stage 4    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    Hemorrhoids    Internal hemorrhoids    Liver carcinoma  Dx 2021    History of tonsillectomy  as a child    S/P CABG x 3      H/O aortic valve replacement  bovine, 2016    Port-A-Cath in place  2019, right upper chest    History of loop recorder  2017        FAMILY HISTORY:  Family history of diabetes mellitus (Sibling)        Social History:  No current tobacco; no etoh nor drugs    MEDICATIONS  (STANDING):  amitriptyline 40 milliGRAM(s) Oral at bedtime  atorvastatin 40 milliGRAM(s) Oral at bedtime  cholecalciferol 2000 Unit(s) Oral daily  dextrose 40% Gel 15 Gram(s) Oral once  dextrose 5%. 1000 milliLiter(s) (50 mL/Hr) IV Continuous <Continuous>  dextrose 5%. 1000 milliLiter(s) (100 mL/Hr) IV Continuous <Continuous>  dextrose 50% Injectable 25 Gram(s) IV Push once  dextrose 50% Injectable 12.5 Gram(s) IV Push once  dextrose 50% Injectable 25 Gram(s) IV Push once  folic acid 1 milliGRAM(s) Oral daily  furosemide   Injectable 40 milliGRAM(s) IV Push two times a day  gabapentin 300 milliGRAM(s) Oral at bedtime  glucagon  Injectable 1 milliGRAM(s) IntraMuscular once  hydrALAZINE 25 milliGRAM(s) Oral every 8 hours  insulin glargine Injectable (LANTUS) 10 Unit(s) SubCutaneous at bedtime  insulin lispro (ADMELOG) corrective regimen sliding scale   SubCutaneous three times a day before meals  insulin lispro (ADMELOG) corrective regimen sliding scale   SubCutaneous at bedtime  levothyroxine 100 MICROGram(s) Oral daily  metoprolol tartrate 25 milliGRAM(s) Oral every 6 hours  montelukast 10 milliGRAM(s) Oral at bedtime  multivitamin 1 Tablet(s) Oral daily  pantoprazole    Tablet 40 milliGRAM(s) Oral before breakfast  senna 2 Tablet(s) Oral at bedtime    MEDICATIONS  (PRN):  albuterol/ipratropium for Nebulization 3 milliLiter(s) Nebulizer every 6 hours PRN Shortness of Breath and/or Wheezing      Allergies    ACE inhibitors (Other)  Bactrim (Nephrotoxicity)  Biaxin (Joint Pain)  morphine (Short breath)  NSAIDs (Other)    Intolerances        REVIEW OF SYSTEMS:    CONSTITUTIONAL: No fever, weight loss, + fatigue, + weakness  EYES: No eye pain, visual disturbances, or discharge  ENMT:  No difficulty hearing, tinnitus, vertigo; No sinus or throat pain  NECK: No pain or stiffness  BREASTS: No pain, masses, or nipple discharge  RESPIRATORY: +cough; no chills or hemoptysis; + shortness of breath  CARDIOVASCULAR: No chest pain, palpitations, dizziness, or leg swelling  GASTROINTESTINAL: No abdominal or epigastric pain. No nausea, vomiting, or hematemesis; No diarrhea or constipation. No melena or hematochezia.  GENITOURINARY: No dysuria, frequency, hematuria, or incontinence  NEUROLOGICAL: No headaches, memory loss, loss of strength, numbness, or tremors  SKIN: No itching, burning, rashes, or lesions   MUSCULOSKELETAL: No joint pain or swelling; No muscle, back, or extremity pain      Vital Signs Last 24 Hrs  T(C): 36.4 (20 May 2021 07:39), Max: 37.1 (19 May 2021 20:13)  T(F): 97.6 (20 May 2021 07:39), Max: 98.8 (19 May 2021 20:13)  HR: 73 (20 May 2021 07:39) (73 - 97)  BP: 149/76 (20 May 2021 07:39) (143/65 - 199/80)  BP(mean): --  RR: 20 (20 May 2021 07:39) (20 - 33)  SpO2: 100% (20 May 2021 07:39) (83% - 100%)    PHYSICAL EXAM:    GENERAL: Appears sob and chronically ill  HEAD:  Atraumatic, Normocephalic  EYES: Conjunctiva and sclera clear  ENMT: Dry mucous membranes  NECK: Supple, +JVD  NERVOUS SYSTEM:  Alert & Oriented X3,  intact and symmetric  CHEST/LUNG: Diminished BS at bases bilaterally (R>L)  HEART: No rub  ABDOMEN: Soft, Nontender, Nondistended; BS+  EXTREMITIES:  2+ Peripheral Pulses, + LE edema  LYMPH: No lymphadenopathy noted  SKIN: No rashes or lesions      LABS:                        8.8    5.18  )-----------( 114      ( 20 May 2021 07:01 )             28.6     05-20    140  |  108<H>  |  58.0<H>  ----------------------------<  281<H>  4.6   |  22.0  |  2.66<H>    Ca    8.7      20 May 2021 07:01    TPro  7.3  /  Alb  3.9  /  TBili  0.3<L>  /  DBili  x   /  AST  23  /  ALT  12  /  AlkPhos  117  -19    PT/INR - ( 19 May 2021 18:03 )   PT: 14.1 sec;   INR: 1.23 ratio         PTT - ( 19 May 2021 18:03 )  PTT:32.6 sec  Urinalysis Basic - ( 19 May 2021 20:52 )    Color: Yellow / Appearance: Clear / S.010 / pH: x  Gluc: x / Ketone: Negative  / Bili: Negative / Urobili: Negative mg/dL   Blood: x / Protein: 100 mg/dL / Nitrite: Negative   Leuk Esterase: Trace / RBC: 0-2 /HPF / WBC 0-2   Sq Epi: x / Non Sq Epi: Occasional / Bacteria: x          RADIOLOGY & ADDITIONAL TESTS:  < from: Xray Chest 1 View-PORTABLE IMMEDIATE (Xray Chest 1 View-PORTABLE IMMEDIATE .) (21 @ 14:27) >   EXAM:  XR CHEST PORTABLE IMMED 1V                          PROCEDURE DATE:  2021          INTERPRETATION:  AP erect chest on May 19, 2021 at 1:59 PM. Patient is short of breath. Patient has cirrhosis and hepatocellular cancer. Patient had hepatic embolization.    Heart is likely enlarged. Sternotomy and right-sided [id number] again noted. Left loop recorder again seen.    On 2020 at the lungs are clear.    Presently there are bibasilar effusions right greater than left andlateral small perihilar infiltrates.    IMPRESSION: Increasing lung and pleural findings.    < end of copied text >  < from: CT Chest No Cont (21 @ 20:06) >     EXAM:  CT CHEST                          PROCEDURE DATE:  2021          INTERPRETATION:  CLINICAL INFORMATION: Short of breath    COMPARISON: 2019    CONTRAST/COMPLICATIONS:  IV Contrast: NONE  Oral Contrast: NONE  Complications: None reported at time of study completion    PROCEDURE:  CT of the Chest was performed.  Sagittal and coronal reformats were performed.    FINDINGS:    LUNGS AND AIRWAYS: Patent central airways.  Small to moderate left and moderate right pleural effusion. Bibasilar dependent consolidation/atelectasis and segmental consolidation is atelectasis in the right middle lobe. Correlate clinically for infection. Mild emphysematous change.  PLEURA: As above.  MEDIASTINUM AND AVANI: No lymphadenopathy.  VESSELS: Nonaneurysmal  HEART: Cardiomegaly. Cardiac vascular calcification. status post CABG.. No pericardial effusion.  CHEST WALL AND LOWER NECK: Median sternotomy.  VISUALIZED UPPER ABDOMEN: Cirrhotic hepatic morphology. Small rim calcified splenic cyst.  BONES: Stable    IMPRESSION:  Bilateral pleural effusions right larger than left.  Bilateral lower lobe and right middle lobe consolidation/atelectasis. Correlate clinically for infection.  Mild emphysema    < end of copied text >

## 2021-05-20 NOTE — PROGRESS NOTE ADULT - SUBJECTIVE AND OBJECTIVE BOX
HPI: 76y Female with HTN HLD CAD s/p remote CABg Bio AVR solitary episode of Af w/o recurrence, ILR in place, CRI, MDS transfusion dependent, cirrhosis sec to chronic hep c with esophageal varices and prior banding, hepatocellular carcinoma, recent finding of hepatic mass/hepatoma s/p embolization with embozene particles on 21, came to the ER for worsening dyspnea for several days worse with activity, orthopnea and elevated BP. Patient was admitted at [facility] last week for about 4 days after she fell at her pcp office, CT head was negative, was found to be anemic and transfused 2 units of prbc. Pt. stated that she cannot be on aspirin or any blood thinners due to anemia and bleeding risk. I spoke to cardiologist dr. Shelton who saw pt. in the ER and also recommended no Aspirin, heparin/ lovenox for pt as she bleeds easily. Pt. has port for blood transfusions and iron infusions by her hematologist. Initially in the ER pt was on bipap but off now, on supplemental O2 at 3L/min.     Subjective: Patient seen and evaluated at bedside, with O2 via NC @ 3L/min with SpO2 >95%, able to speak in full sentences. Patent c/o chest pain on deep inhalation and intermittent bouts of cough.    REVIEW OF SYSTEMS:    CONSTITUTIONAL: No weakness, fevers or chills  EYES/ENT: No visual changes;  No vertigo or throat pain   NECK: No pain or stiffness  RESPIRATORY: No cough, wheezing, hemoptysis; No shortness of breath  CARDIOVASCULAR: No chest pain or palpitations  GASTROINTESTINAL: No abdominal or epigastric pain. No nausea, vomiting, or hematemesis; No diarrhea or constipation. No melena or hematochezia.  GENITOURINARY: No dysuria, frequency or hematuria  NEUROLOGICAL: No numbness or weakness  SKIN: No itching, rashes                      Vital Signs Last 24 Hrs  T(C): 36.7 (20 May 2021 11:18), Max: 37.1 (19 May 2021 20:13)  T(F): 98 (20 May 2021 11:18), Max: 98.8 (19 May 2021 20:13)  HR: 87 (20 May 2021 11:18) (73 - 97)  BP: 119/63 (20 May 2021 11:18) (119/63 - 199/80)  BP(mean): --  RR: 16 (20 May 2021 11:18) (16 - 33)  SpO2: 98% (20 May 2021 11:18) (83% - 100%)    GEN: NAD, comfortable, resting in bed  HEENT: NC/AT, EOMI, PERRLA, MMM, clear conjunctiva and sclera, normal hearing, no nasal discharge, throat clear, no thrush, normal dentition.   NECK: supple, no JVD, no LAD, no thyromegaly  BACK:  ROM intact, no spinal/paraspinal tenderness  CV: S1S2, RRR, no mumur  RESP: good air movement, CTABL, no rales, rhonchi or wheezing, respirations unlabored  ABD: +BS, soft, ND, NT, no guarding, no rigidity, no HSM  EXT: +2 radial and pedal pulses, no edema, no calve tenderness  SKIN: No visible Rashes or Ulcers  MSK: ROM intact in all extremities  NEURO:  sensory and CN 2-12 Grossly intact, no motor deficits, no, saddle anesthesia, AOx3  PSYCH: normal behavior         LABS:                          8.8    5.18  )-----------( 114      ( 20 May 2021 07:01 )             28.6     20 May 2021 07:01    140    |  108    |  58.0   ----------------------------<  281    4.6     |  22.0   |  2.66     Ca    8.7        20 May 2021 07:01    TPro  7.3    /  Alb  3.9    /  TBili  0.3    /  DBili  x      /  AST  23     /  ALT  12     /  AlkPhos  117    19 May 2021 14:03    LIVER FUNCTIONS - ( 19 May 2021 14:03 )  Alb: 3.9 g/dL / Pro: 7.3 g/dL / ALK PHOS: 117 U/L / ALT: 12 U/L / AST: 23 U/L / GGT: x           PT/INR - ( 19 May 2021 18:03 )   PT: 14.1 sec;   INR: 1.23 ratio         PTT - ( 19 May 2021 18:03 )  PTT:32.6 sec  CAPILLARY BLOOD GLUCOSE      POCT Blood Glucose.: 195 mg/dL (20 May 2021 11:46)  POCT Blood Glucose.: 241 mg/dL (20 May 2021 07:16)  POCT Blood Glucose.: 175 mg/dL (20 May 2021 01:20)  POCT Blood Glucose.: 83 mg/dL (19 May 2021 23:46)    CARDIAC MARKERS ( 20 May 2021 07:01 )  x     / 0.03 ng/mL / x     / x     / x      CARDIAC MARKERS ( 19 May 2021 14:03 )  x     / 0.04 ng/mL / x     / x     / x          Urinalysis Basic - ( 19 May 2021 20:52 )    Color: Yellow / Appearance: Clear / S.010 / pH: x  Gluc: x / Ketone: Negative  / Bili: Negative / Urobili: Negative mg/dL   Blood: x / Protein: 100 mg/dL / Nitrite: Negative   Leuk Esterase: Trace / RBC: 0-2 /HPF / WBC 0-2   Sq Epi: x / Non Sq Epi: Occasional / Bacteria: x        RADIOLOGY:         HPI: 76y Female with HTN HLD CAD s/p remote CABg Bio AVR solitary episode of Af w/o recurrence, ILR in place, CRI, MDS transfusion dependent, cirrhosis sec to chronic hep c with esophageal varices and prior banding, hepatocellular carcinoma, recent finding of hepatic mass/hepatoma s/p embolization with embozene particles on 21, came to the ER for worsening dyspnea for several days worse with activity, orthopnea and elevated BP. Patient was admitted at [facility] last week for about 4 days after she fell at her pcp office, CT head was negative, was found to be anemic and transfused 2 units of prbc. Pt. stated that she cannot be on aspirin or any blood thinners due to anemia and bleeding risk. I spoke to cardiologist dr. Shelton who saw pt. in the ER and also recommended no Aspirin, heparin/ lovenox for pt as she bleeds easily. Pt. has port for blood transfusions and iron infusions by her hematologist. Initially in the ER pt was on bipap but off now, on supplemental O2 at 3L/min.     Subjective: Patient seen and evaluated at bedside, with O2 via NC @ 3L/min with SpO2 >95%, able to speak in full sentences. Patent c/o chest pain on deep inhalation and intermittent bouts of cough.    REVIEW OF SYSTEMS:    CONSTITUTIONAL: No weakness, fevers or chills  EYES/ENT: No visual changes;  No vertigo or throat pain   NECK: No pain or stiffness  RESPIRATORY: No cough, wheezing, hemoptysis; No shortness of breath  CARDIOVASCULAR: No chest pain or palpitations  GASTROINTESTINAL: No abdominal or epigastric pain. No nausea, vomiting, or hematemesis; No diarrhea or constipation. No melena or hematochezia.  GENITOURINARY: No dysuria, frequency or hematuria  NEUROLOGICAL: No numbness or weakness  SKIN: No itching, rashes    Vital Signs Last 24 Hrs  T(C): 36.7 (20 May 2021 11:18), Max: 37.1 (19 May 2021 20:13)  T(F): 98 (20 May 2021 11:18), Max: 98.8 (19 May 2021 20:13)  HR: 87 (20 May 2021 11:18) (73 - 97)  BP: 119/63 (20 May 2021 11:18) (119/63 - 199/80)  RR: 16 (20 May 2021 11:18) (16 - 33)  SpO2: 98% (20 May 2021 11:18) (83% - 100%)    PHYSICAL EXAM:  GENERAL: NAD, lying in bed comfortably on O2 via NC  HEAD:  hematoma to R parietal region   EYES: conjunctiva and sclera clear  ENT: Moist mucous membranes  CHEST/LUNG: breath sounds decreased to b/l lung base, + crackles   HEART: Regular rate and rhythm; No murmurs  ABDOMEN: Bowel sounds present; Soft, Nontender, Nondistended.   EXTREMITIES:  2+ Peripheral Pulses, brisk capillary refill. No clubbing, cyanosis, or edema  NERVOUS SYSTEM:  Alert & Oriented X3, speech clear. No deficits   MSK: FROM all 4 extremities, full and equal strength    MEDICATIONS  (STANDING):  amitriptyline 40 milliGRAM(s) Oral at bedtime  atorvastatin 40 milliGRAM(s) Oral at bedtime  cholecalciferol 2000 Unit(s) Oral daily  dextrose 40% Gel 15 Gram(s) Oral once  dextrose 5%. 1000 milliLiter(s) (50 mL/Hr) IV Continuous <Continuous>  dextrose 5%. 1000 milliLiter(s) (100 mL/Hr) IV Continuous <Continuous>  dextrose 50% Injectable 25 Gram(s) IV Push once  dextrose 50% Injectable 12.5 Gram(s) IV Push once  dextrose 50% Injectable 25 Gram(s) IV Push once  folic acid 1 milliGRAM(s) Oral daily  furosemide   Injectable 40 milliGRAM(s) IV Push two times a day  gabapentin 300 milliGRAM(s) Oral at bedtime  glucagon  Injectable 1 milliGRAM(s) IntraMuscular once  hydrALAZINE 25 milliGRAM(s) Oral every 8 hours  insulin glargine Injectable (LANTUS) 10 Unit(s) SubCutaneous at bedtime  insulin lispro (ADMELOG) corrective regimen sliding scale   SubCutaneous three times a day before meals  insulin lispro (ADMELOG) corrective regimen sliding scale   SubCutaneous at bedtime  levothyroxine 100 MICROGram(s) Oral daily  metoprolol tartrate 25 milliGRAM(s) Oral every 6 hours  montelukast 10 milliGRAM(s) Oral at bedtime  multivitamin 1 Tablet(s) Oral daily  pantoprazole    Tablet 40 milliGRAM(s) Oral before breakfast  senna 2 Tablet(s) Oral at bedtime    MEDICATIONS  (PRN):  albuterol/ipratropium for Nebulization 3 milliLiter(s) Nebulizer every 6 hours PRN Shortness of Breath and/or Wheezing        LABS:                          8.8    5.18  )-----------( 114      ( 20 May 2021 07:01 )             28.6     20 May 2021 07:01    140    |  108    |  58.0   ----------------------------<  281    4.6     |  22.0   |  2.66     Ca    8.7        20 May 2021 07:01    TPro  7.3    /  Alb  3.9    /  TBili  0.3    /  DBili  x      /  AST  23     /  ALT  12     /  AlkPhos  117    19 May 2021 14:03    LIVER FUNCTIONS - ( 19 May 2021 14:03 )  Alb: 3.9 g/dL / Pro: 7.3 g/dL / ALK PHOS: 117 U/L / ALT: 12 U/L / AST: 23 U/L / GGT: x           PT/INR - ( 19 May 2021 18:03 )   PT: 14.1 sec;   INR: 1.23 ratio         PTT - ( 19 May 2021 18:03 )  PTT:32.6 sec  CAPILLARY BLOOD GLUCOSE      POCT Blood Glucose.: 195 mg/dL (20 May 2021 11:46)  POCT Blood Glucose.: 241 mg/dL (20 May 2021 07:16)  POCT Blood Glucose.: 175 mg/dL (20 May 2021 01:20)  POCT Blood Glucose.: 83 mg/dL (19 May 2021 23:46)    CARDIAC MARKERS ( 20 May 2021 07:01 )  x     / 0.03 ng/mL / x     / x     / x      CARDIAC MARKERS ( 19 May 2021 14:03 )  x     / 0.04 ng/mL / x     / x     / x          Urinalysis Basic - ( 19 May 2021 20:52 )    Color: Yellow / Appearance: Clear / S.010 / pH: x  Gluc: x / Ketone: Negative  / Bili: Negative / Urobili: Negative mg/dL   Blood: x / Protein: 100 mg/dL / Nitrite: Negative   Leuk Esterase: Trace / RBC: 0-2 /HPF / WBC 0-2   Sq Epi: x / Non Sq Epi: Occasional / Bacteria: x        RADIOLOGY:    < from: CT Chest No Cont (05.19.21 @ 20:06) >  FINDINGS:    LUNGS AND AIRWAYS: Patent central airways.  Small to moderate left and moderate right pleural effusion. Bibasilar dependent consolidation/atelectasis and segmental consolidation is atelectasis in the right middle lobe. Correlate clinically for infection. Mild emphysematous change.  PLEURA: As above.  MEDIASTINUM AND AVANI: No lymphadenopathy.  VESSELS: Nonaneurysmal  HEART: Cardiomegaly. Cardiac vascular calcification. status post CABG.. No pericardial effusion.  CHEST WALL AND LOWER NECK: Median sternotomy.  VISUALIZED UPPER ABDOMEN: Cirrhotic hepatic morphology. Small rim calcified splenic cyst.  BONES: Stable    IMPRESSION:  Bilateral pleural effusions right larger than left.  Bilateral lower lobe and right middle lobe consolidation/atelectasis. Correlate clinically for infection.  Mild emphysema           HPI: 76y Female with HTN HLD CAD s/p remote CABg Bio AVR solitary episode of Af w/o recurrence, ILR in place, CRI, MDS transfusion dependent, cirrhosis sec to chronic hep c with esophageal varices and prior banding, hepatocellular carcinoma, recent finding of hepatic mass/hepatoma s/p embolization with embozene particles on 21, came to the ER for worsening dyspnea for several days worse with activity, orthopnea and elevated BP. Patient was admitted at [facility] last week for about 4 days after she fell at her pcp office, CT head was negative, was found to be anemic and transfused 2 units of prbc. Pt. stated that she cannot be on aspirin or any blood thinners due to anemia and bleeding risk. I spoke to cardiologist dr. Shelton who saw pt. in the ER and also recommended no Aspirin, heparin/ lovenox for pt as she bleeds easily. Pt. has port for blood transfusions and iron infusions by her hematologist. Initially in the ER pt was on bipap but off now, on supplemental O2 at 3L/min.     Subjective: Patient seen and evaluated at bedside, with O2 via NC @ 3L/min with SpO2 >95%, able to speak in full sentences. Patent c/o chest pain on deep inhalation and intermittent bouts of cough.    REVIEW OF SYSTEMS:    CONSTITUTIONAL: No weakness, fevers or chills  EYES/ENT: No visual changes;  No vertigo or throat pain   NECK: No pain or stiffness  RESPIRATORY: No cough, wheezing, hemoptysis; No shortness of breath  CARDIOVASCULAR: No chest pain or palpitations  GASTROINTESTINAL: No abdominal or epigastric pain. No nausea, vomiting, or hematemesis; No diarrhea or constipation. No melena or hematochezia.  GENITOURINARY: No dysuria, frequency or hematuria  NEUROLOGICAL: No numbness or weakness  SKIN: No itching, rashes    Vital Signs Last 24 Hrs  T(C): 36.7 (20 May 2021 11:18), Max: 37.1 (19 May 2021 20:13)  T(F): 98 (20 May 2021 11:18), Max: 98.8 (19 May 2021 20:13)  HR: 87 (20 May 2021 11:18) (73 - 97)  BP: 119/63 (20 May 2021 11:18) (119/63 - 199/80)  RR: 16 (20 May 2021 11:18) (16 - 33)  SpO2: 98% (20 May 2021 11:18) (83% - 100%)    PHYSICAL EXAM:  GENERAL: NAD, lying in bed comfortably on O2 via NC  HEAD:  hematoma to R parietal region   EYES: conjunctiva and sclera clear  ENT: Moist mucous membranes  CHEST/LUNG: breath sounds decreased to b/l lung base, + crackles   HEART: Regular rate and rhythm; No murmurs  ABDOMEN: Bowel sounds present; Soft, Nontender, Nondistended.   EXTREMITIES:  2+ Peripheral Pulses, brisk capillary refill. No clubbing, cyanosis, or edema  NERVOUS SYSTEM:  Alert & Oriented X3, speech clear. No deficits   MSK: FROM all 4 extremities, full and equal strength    MEDICATIONS  (STANDING):  amitriptyline 40 milliGRAM(s) Oral at bedtime  atorvastatin 40 milliGRAM(s) Oral at bedtime  cholecalciferol 2000 Unit(s) Oral daily  dextrose 40% Gel 15 Gram(s) Oral once  dextrose 5%. 1000 milliLiter(s) (50 mL/Hr) IV Continuous <Continuous>  dextrose 5%. 1000 milliLiter(s) (100 mL/Hr) IV Continuous <Continuous>  dextrose 50% Injectable 25 Gram(s) IV Push once  dextrose 50% Injectable 12.5 Gram(s) IV Push once  dextrose 50% Injectable 25 Gram(s) IV Push once  folic acid 1 milliGRAM(s) Oral daily  furosemide   Injectable 40 milliGRAM(s) IV Push two times a day  gabapentin 300 milliGRAM(s) Oral at bedtime  glucagon  Injectable 1 milliGRAM(s) IntraMuscular once  hydrALAZINE 25 milliGRAM(s) Oral every 8 hours  insulin glargine Injectable (LANTUS) 10 Unit(s) SubCutaneous at bedtime  insulin lispro (ADMELOG) corrective regimen sliding scale   SubCutaneous three times a day before meals  insulin lispro (ADMELOG) corrective regimen sliding scale   SubCutaneous at bedtime  levothyroxine 100 MICROGram(s) Oral daily  metoprolol tartrate 25 milliGRAM(s) Oral every 6 hours  montelukast 10 milliGRAM(s) Oral at bedtime  multivitamin 1 Tablet(s) Oral daily  pantoprazole    Tablet 40 milliGRAM(s) Oral before breakfast  senna 2 Tablet(s) Oral at bedtime    MEDICATIONS  (PRN):  albuterol/ipratropium for Nebulization 3 milliLiter(s) Nebulizer every 6 hours PRN Shortness of Breath and/or Wheezing        LABS:                          8.8    5.18  )-----------( 114      ( 20 May 2021 07:01 )             28.6     20 May 2021 07:01    140    |  108    |  58.0   ----------------------------<  281    4.6     |  22.0   |  2.66     Ca    8.7        20 May 2021 07:01    TPro  7.3    /  Alb  3.9    /  TBili  0.3    /  DBili  x      /  AST  23     /  ALT  12     /  AlkPhos  117    19 May 2021 14:03    LIVER FUNCTIONS - ( 19 May 2021 14:03 )  Alb: 3.9 g/dL / Pro: 7.3 g/dL / ALK PHOS: 117 U/L / ALT: 12 U/L / AST: 23 U/L / GGT: x           PT/INR - ( 19 May 2021 18:03 )   PT: 14.1 sec;   INR: 1.23 ratio         PTT - ( 19 May 2021 18:03 )  PTT:32.6 sec  CAPILLARY BLOOD GLUCOSE      POCT Blood Glucose.: 195 mg/dL (20 May 2021 11:46)  POCT Blood Glucose.: 241 mg/dL (20 May 2021 07:16)  POCT Blood Glucose.: 175 mg/dL (20 May 2021 01:20)  POCT Blood Glucose.: 83 mg/dL (19 May 2021 23:46)    CARDIAC MARKERS ( 20 May 2021 07:01 )  x     / 0.03 ng/mL / x     / x     / x      CARDIAC MARKERS ( 19 May 2021 14:03 )  x     / 0.04 ng/mL / x     / x     / x          Urinalysis Basic - ( 19 May 2021 20:52 )    Color: Yellow / Appearance: Clear / S.010 / pH: x  Gluc: x / Ketone: Negative  / Bili: Negative / Urobili: Negative mg/dL   Blood: x / Protein: 100 mg/dL / Nitrite: Negative   Leuk Esterase: Trace / RBC: 0-2 /HPF / WBC 0-2   Sq Epi: x / Non Sq Epi: Occasional / Bacteria: x        RADIOLOGY:    < from: CT Chest No Cont (05.19.21 @ 20:06) >  FINDINGS:    LUNGS AND AIRWAYS: Patent central airways.  Small to moderate left and moderate right pleural effusion. Bibasilar dependent consolidation/atelectasis and segmental consolidation is atelectasis in the right middle lobe. Correlate clinically for infection. Mild emphysematous change.  PLEURA: As above.  MEDIASTINUM AND AVANI: No lymphadenopathy.  VESSELS: Nonaneurysmal  HEART: Cardiomegaly. Cardiac vascular calcification. status post CABG.. No pericardial effusion.  CHEST WALL AND LOWER NECK: Median sternotomy.  VISUALIZED UPPER ABDOMEN: Cirrhotic hepatic morphology. Small rim calcified splenic cyst.  BONES: Stable    IMPRESSION:  Bilateral pleural effusions right larger than left.  Bilateral lower lobe and right middle lobe consolidation/atelectasis. Correlate clinically for infection.  Mild emphysema        < from: TTE Echo Complete w/o Contrast w/ Doppler (10.19.20 @ 11:55) >  Summary:   1. Normal global left ventricular systolic function.   2. Left ventricular ejection fraction, by visual estimation, is 60 to 65%.   3. Moderately increased LV wall thickness.   4. Normal right ventricle size and systolic function.   5. Severely enlarged left atrium.   6. Right atrium not well visualized, appears within normal limits on subcostal view.   7. Mild thickening of the anterior and posterior mitral valve leaflets.   8. Moderate mitral annular calcification.   9. Trace mitral valve regurgitation.  10. Elevated mean mitral valve gradient (   5 mmHg at HR 72 BPM).  11. Mild tricuspid regurgitation.  12. There is a prosthetic valve in the aortic position, peak velocity 2.3 m/s.  13. Mild aortic regurgitation.

## 2021-05-20 NOTE — PROGRESS NOTE ADULT - ATTENDING COMMENTS
Attending Attestation:  I personally saw and evaluated the patient and agree with the above assessment and plan  Changes made above where appropriate    EXAM:  General: NAD  Lungs: decreased lung sounds to bases  Heart: nml RR  Abdomen: benign, soft, non-tender  Ext: bilateral pitting edema  Neuro: alert and oriented to person place and time  Skin: intact    Brief A/P:  Patient admitted with fluid overload thought to be 2/2 CHF exacerbation - patient EF appears preserved; valvular function preserved; states compliance with medications. Continue with lasix IV BID. Given cirrhosis would recommend adding spironolactone - can start with 50 and increase to 100:40 ratio of spironolactone:lasix if patient can tolerate.

## 2021-05-21 NOTE — PROGRESS NOTE ADULT - SUBJECTIVE AND OBJECTIVE BOX
NEPHROLOGY INTERVAL HPI/OVERNIGHT EVENTS:    No new events.    MEDICATIONS  (STANDING):  amitriptyline 40 milliGRAM(s) Oral at bedtime  atorvastatin 40 milliGRAM(s) Oral at bedtime  cholecalciferol 2000 Unit(s) Oral daily  dextrose 40% Gel 15 Gram(s) Oral once  dextrose 5%. 1000 milliLiter(s) (50 mL/Hr) IV Continuous <Continuous>  dextrose 5%. 1000 milliLiter(s) (100 mL/Hr) IV Continuous <Continuous>  dextrose 50% Injectable 25 Gram(s) IV Push once  dextrose 50% Injectable 12.5 Gram(s) IV Push once  dextrose 50% Injectable 25 Gram(s) IV Push once  epoetin georgina-epbx (RETACRIT) Injectable 60098 Unit(s) SubCutaneous every 7 days  folic acid 1 milliGRAM(s) Oral daily  furosemide   Injectable 40 milliGRAM(s) IV Push two times a day  gabapentin 300 milliGRAM(s) Oral at bedtime  glucagon  Injectable 1 milliGRAM(s) IntraMuscular once  hydrALAZINE 25 milliGRAM(s) Oral every 8 hours  insulin glargine Injectable (LANTUS) 10 Unit(s) SubCutaneous at bedtime  insulin lispro (ADMELOG) corrective regimen sliding scale   SubCutaneous three times a day before meals  insulin lispro (ADMELOG) corrective regimen sliding scale   SubCutaneous at bedtime  levothyroxine 100 MICROGram(s) Oral daily  metoprolol tartrate 25 milliGRAM(s) Oral every 6 hours  montelukast 10 milliGRAM(s) Oral at bedtime  multivitamin 1 Tablet(s) Oral daily  Nephro-nunu 1 Tablet(s) Oral daily  pantoprazole    Tablet 40 milliGRAM(s) Oral before breakfast  senna 2 Tablet(s) Oral at bedtime  sodium bicarbonate 1300 milliGRAM(s) Oral two times a day    MEDICATIONS  (PRN):  albuterol/ipratropium for Nebulization 3 milliLiter(s) Nebulizer every 6 hours PRN Shortness of Breath and/or Wheezing      Allergies    ACE inhibitors (Other)  Bactrim (Nephrotoxicity)  Biaxin (Joint Pain)  morphine (Short breath)  NSAIDs (Other)            Vital Signs Last 24 Hrs  T(C): 36.4 (21 May 2021 04:39), Max: 37.2 (20 May 2021 22:30)  T(F): 97.6 (21 May 2021 04:39), Max: 98.9 (20 May 2021 22:30)  HR: 77 (21 May 2021 04:39) (77 - 87)  BP: 121/68 (21 May 2021 04:39) (119/63 - 160/75)  BP(mean): --  RR: 18 (21 May 2021 04:39) (16 - 18)  SpO2: 97% (21 May 2021 04:39) (97% - 98%)      PHYSICAL EXAM:    GENERAL: Comfortable in bed  HEAD:    EYES:   ENMT: wnl   NECK: veins  wnl  NERVOUS SYSTEM:  alert, verbal, oriented  CHEST/LUNG: no wheezes  HEART: sternal scar, +  murmur; RR  ABDOMEN: soft, NT  EXTREMITIES:  no leg edema  LYMPH:   SKIN: No rash  : Neg    LABS:                        9.6    5.22  )-----------( 91       ( 21 May 2021 07:03 )             30.7     05-21    138  |  108<H>  |  61.0<H>  ----------------------------<  152<H>  4.8   |  20.0<L>  |  2.51<H>    Ca    9.1      21 May 2021 07:03    TPro  7.3  /  Alb  3.9  /  TBili  0.3<L>  /  DBili  x   /  AST  23  /  ALT  12  /  AlkPhos  117  05-19    PT/INR - ( 19 May 2021 18:03 )   PT: 14.1 sec;   INR: 1.23 ratio         PTT - ( 19 May 2021 18:03 )  PTT:32.6 sec  Urinalysis Basic - ( 19 May 2021 20:52 )    Color: Yellow / Appearance: Clear / S.010 / pH: x  Gluc: x / Ketone: Negative  / Bili: Negative / Urobili: Negative mg/dL   Blood: x / Protein: 100 mg/dL / Nitrite: Negative   Leuk Esterase: Trace / RBC: 0-2 /HPF / WBC 0-2   Sq Epi: x / Non Sq Epi: Occasional / Bacteria: x              RADIOLOGY & ADDITIONAL TESTS:

## 2021-05-21 NOTE — PROGRESS NOTE ADULT - PROBLEM SELECTOR PLAN 1
Admitted to Medicine.  Continuous SpO2 monitoring.  Patient currently stable with SpO2 >92% on 3L O2 via NC while sitting/talking.  Maintain SpO2 >92%. Titrate O2 requirements PRN.   Patient with multiple prior pleural effusions, responds well to diuresis. Plan to continue IV diuresis for now with daily CXR and hold off on chest tube for now.  If patient further decompensates or if effusions appear to be worsening on CXR, will re-consider chest tube.   Further plan as per primary team.   Will continue to follow along.

## 2021-05-21 NOTE — PROGRESS NOTE ADULT - SUBJECTIVE AND OBJECTIVE BOX
[place] [facility], [facility]                                   [address] [place] [address]                                                      PHONE: [phone]                                                         FAX: [phone]  [email or web address]  ---------------------------------------------------------------------------------------------------------------------------------    Overnight events/patient complaints:  Patient feeling well.  Breathing is improving.     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 1000 milliLiter(s) (50 mL/Hr) IV Continuous <Continuous>  dextrose 5%. 1000 milliLiter(s) (100 mL/Hr) IV Continuous <Continuous>  dextrose 50% Injectable 25 Gram(s) IV Push once  dextrose 50% Injectable 12.5 Gram(s) IV Push once  dextrose 50% Injectable 25 Gram(s) IV Push once  epoetin georgina-epbx (RETACRIT) Injectable 84124 Unit(s) SubCutaneous every 7 days  folic acid 1 milliGRAM(s) Oral daily  furosemide   Injectable 40 milliGRAM(s) IV Push two times a day  gabapentin 300 milliGRAM(s) Oral at bedtime  glucagon  Injectable 1 milliGRAM(s) IntraMuscular once  hydrALAZINE 25 milliGRAM(s) Oral every 8 hours  insulin glargine Injectable (LANTUS) 10 Unit(s) SubCutaneous at bedtime  insulin lispro (ADMELOG) corrective regimen sliding scale   SubCutaneous three times a day before meals  insulin lispro (ADMELOG) corrective regimen sliding scale   SubCutaneous at bedtime  levothyroxine 100 MICROGram(s) Oral daily  metoprolol tartrate 25 milliGRAM(s) Oral every 6 hours  montelukast 10 milliGRAM(s) Oral at bedtime  multivitamin 1 Tablet(s) Oral daily  Nephro-nunu 1 Tablet(s) Oral daily  pantoprazole    Tablet 40 milliGRAM(s) Oral before breakfast  senna 2 Tablet(s) Oral at bedtime  sodium bicarbonate 1300 milliGRAM(s) Oral two times a day    MEDICATIONS  (PRN):  albuterol/ipratropium for Nebulization 3 milliLiter(s) Nebulizer every 6 hours PRN Shortness of Breath and/or Wheezing      Vital Signs Last 24 Hrs  T(C): 36.4 (21 May 2021 04:39), Max: 37.2 (20 May 2021 22:30)  T(F): 97.6 (21 May 2021 04:39), Max: 98.9 (20 May 2021 22:30)  HR: 77 (21 May 2021 04:39) (77 - 87)  BP: 121/68 (21 May 2021 04:39) (119/63 - 160/75)  BP(mean): --  RR: 18 (21 May 2021 04:39) (16 - 18)  SpO2: 97% (21 May 2021 04:39) (97% - 98%)  ICU Vital Signs Last 24 Hrs  NICHELLE [facility]  I&O's Detail    20 May 2021 07:01  -  21 May 2021 07:00  --------------------------------------------------------  IN:    Oral Fluid: 640 mL  Total IN: 640 mL    OUT:    Voided (mL): 300 mL  Total OUT: 300 mL    Total NET: 340 mL        I&O's Summary    20 May 2021 07:01  -  21 May 2021 07:00  --------------------------------------------------------  IN: 640 mL / OUT: 300 mL / NET: 340 mL      Drug Dosing Weight  NICHELLE [facility]      PHYSICAL EXAM:  General: Appears well developed, well nourished alert and cooperative.  HEENT: Head; normocephalic, atraumatic.  Eyes: Pupils reactive, cornea wnl.  Neck: Supple, no nodes adenopathy, no NVD or carotid bruit or thyromegaly.  CARDIOVASCULAR: irregular S1 and S2, 1/6 kulwant/decres murmur at rusb  LUNGS: +rales rales, rhonchi or wheeze. Normal breath sounds bilaterally.  ABDOMEN: Soft, nontender without mass or organomegaly. bowel sounds normoactive.  EXTREMITIES: No clubbing, cyanosis or edema. Distal pulses wnl.   SKIN: warm and dry with normal turgor.  NEURO: Alert/oriented x 3/normal motor exam. No pathologic reflexes.    PSYCH: normal affect.        LABS:                        9.6    5.22  )-----------( 91       ( 21 May 2021 07:03 )             30.7     05-    138  |  108<H>  |  61.0<H>  ----------------------------<  152<H>  4.8   |  20.0<L>  |  2.51<H>    Ca    9.1      21 May 2021 07:03    TPro  7.3  /  Alb  3.9  /  TBili  0.3<L>  /  DBili  x   /  AST  23  /  ALT  12  /  AlkPhos  117  05-19    NICHELLE GILL  CARDIAC MARKERS ( 20 May 2021 07:01 )  x     / 0.03 ng/mL / x     / x     / x      CARDIAC MARKERS ( 19 May 2021 14:03 )  x     / 0.04 ng/mL / x     / x     / x          PT/INR - ( 19 May 2021 18:03 )   PT: 14.1 sec;   INR: 1.23 ratio         PTT - ( 19 May 2021 18:03 )  PTT:32.6 sec  Urinalysis Basic - ( 19 May 2021 20:52 )    Color: Yellow / Appearance: Clear / S.010 / pH: x  Gluc: x / Ketone: Negative  / Bili: Negative / Urobili: Negative mg/dL   Blood: x / Protein: 100 mg/dL / Nitrite: Negative   Leuk Esterase: Trace / RBC: 0-2 /HPF / WBC 0-2   Sq Epi: x / Non Sq Epi: Occasional / Bacteria: x        RADIOLOGY & ADDITIONAL STUDIES:    INTERPRETATION OF TELEMETRY (personally reviewed):    ECG:    ECHO:    STRESS TEST:    CARDIAC CATHETERIZATION:    ASSESSMENT AND PLAN:  In summary, NICHELLE GILL is an 76y Female with past medical history significant for HFpEF, afib not on AC due to anemia, CAD s/p CABG and bioavr p/w ADHF    Acute on chronic HFpEF- continue 40mg iv lasix bid  -strict I/O  -daily weights    HLD- continue atrovastatin    HTN- c/w norvasc and hydralazine      Thank you for allowing [facility] to participate in the care of this patient.  Please feel free to call with any questions or concerns.

## 2021-05-21 NOTE — PROGRESS NOTE ADULT - ASSESSMENT
76y Female with HTN HLD CAD s/p remote CABG, Bio AVR solitary episode of Af w/o recurrence, ILR in place, CRI, MDS transfusion dependent, cirrhosis sec to chronic hep c with esophageal varices and prior banding, hepatocellular carcinoma, recent finding of hepatic mass/hepatoma s/p embolization with embozene particles on 4/5/21, came to the ER for worsening dyspnea for several days worse with activity, orthopnea and elevated BP. Patient was admitted at [facility] last week for about 4 days after she fell at her pcp office, CT head was negative, was found to be anemic and transfused 2 units of prbc. Pt. stated that she cannot be on aspirin or any blood thinners due to anemia and bleeding risk. I spoke to cardiologist dr. Shelton who saw pt. in the ER and also recommended no Aspirin, heparin/ lovenox for pt as she bleeds easily. Pt. has port for blood transfusions and iron infusions by her hematologist. Initially in the ER pt was on bipap but off now, on supplemental O2 at 3L/min.     #Decompensated Heart Failure   #HFpEF  -Pro-BNP 8868  -CT chest: B/l pleural effusions, R>L, mild emphysema   -Cardiology consult appreciated   -Continue Furosemide 40mg Iv q12hrs   -Echo 10/2020: LVEF 60-65%, Echo 2019: Grade I diastolic dysfunction   -Troponin negative x 2   -F/u repeat Echocardiogram   -Continue supplemental O2 at 3L/min   -F/u CT surgery recommendations s/p POCUS for possible chest tube placement     #Hypertension   -DASH/TLC  -Continue Hydralazine 25mg q8hr, metoprolol 25mg q6hrs   -Monitor BP & titrate BP meds PRN    #Diabetes Mellitus   -Hypoglycemia Protocol, LDISS, Accuchecks AC&HS  -Continue Lantus 10u subQ qhs   -Diet: Consistent Carbs w/ Evening Snack    #CAD, S/p CABG  -Continue Lipitor 40mg po qhs    #MDS   -Recent transfusion at [facility] - 2units pRBCs  -Transfusion dependent   -No AC as per cardiology, patient bleeds easily     #Cirrhosis   #Hepatocellular carcinoma   -Likely secondary  to chronic Hepatitis C infection, Pt was treated with epclusa  -Hx esophageal varices and hemorrhoids   -s/p embolization with embozene particles on 4/5/21   -Recent MRI: segment 6 location of recent embolization) lesion - 2.3cm, diffuse nodular enhancement throughout the lesion, 5 mm focus of arterial enhancement at the junction of the right lobe and caudate lobe   -Continue to follow up as outpatient    #Chronic Kidney disease  -Cr close to baseline  -Avoid nephrotoxins  -Trend renal function   -Nephrology consult appreciated     #Hyperkalemia  -Now resolved   -K: 5.5 on admission   -4.6 now     #Hypothyroidism  -Continue levothyroxine 100mcg po daily     #VTE prophylaxis  -SCDs  -No AC as patient with MDS and bleeds easily     #Advanced Directives   -Full Code  -HCP Daniela Collier     Above discussed with Dr. Mendes  76y Female with HTN HLD CAD s/p remote CABG, Bio AVR solitary episode of Af w/o recurrence, ILR in place, CRI, MDS transfusion dependent, cirrhosis sec to chronic hep c with esophageal varices and prior banding, hepatocellular carcinoma, recent finding of hepatic mass/hepatoma s/p embolization with embozene particles on 4/5/21, came to the ER for worsening dyspnea for several days worse with activity, orthopnea and elevated BP. Patient was admitted at [facility] last week for about 4 days after she fell at her pcp office, CT head was negative, was found to be anemic and transfused 2 units of prbc. Pt. stated that she cannot be on aspirin or any blood thinners due to anemia and bleeding risk. I spoke to cardiologist dr. Shelton who saw pt. in the ER and also recommended no Aspirin, heparin/ lovenox for pt as she bleeds easily. Pt. has port for blood transfusions and iron infusions by her hematologist. Initially in the ER pt was on bipap but off now, on supplemental O2 at 3L/min.     #Decompensated Heart Failure   #HFpEF  -Pro-BNP 8868  -CT chest: B/l pleural effusions, R>L, mild emphysema   -Cardiology consult appreciated   -Continue Furosemide 40mg Iv q12hrs   -Echo 10/2020: LVEF 60-65%, Echo 2019: Grade I diastolic dysfunction   -Echo 5/20/2021: LVEF 60-65%,   -Troponin negative x 2   -F/u repeat Echocardiogram   -Continue supplemental O2 at 3L/min   -F/u CT surgery recommendations s/p POCUS for possible chest tube placement     #Hypertension   -DASH/TLC  -Continue Hydralazine 25mg q8hr, metoprolol 25mg q6hrs   -Monitor BP & titrate BP meds PRN    #Diabetes Mellitus   -Hypoglycemia Protocol, LDISS, Accuchecks AC&HS  -Continue Lantus 10u subQ qhs   -Diet: Consistent Carbs w/ Evening Snack    #CAD, S/p CABG  -Continue Lipitor 40mg po qhs    #MDS   -Recent transfusion at [facility] - 2units pRBCs  -Transfusion dependent   -No AC as per cardiology, patient bleeds easily     #Cirrhosis   #Hepatocellular carcinoma   -Likely secondary  to chronic Hepatitis C infection, Pt was treated with epclusa  -Hx esophageal varices and hemorrhoids   -s/p embolization with embozene particles on 4/5/21   -Recent MRI: segment 6 location of recent embolization) lesion - 2.3cm, diffuse nodular enhancement throughout the lesion, 5 mm focus of arterial enhancement at the junction of the right lobe and caudate lobe   -Continue to follow up as outpatient    #Chronic Kidney disease  -Cr close to baseline  -Avoid nephrotoxins  -Trend renal function   -Nephrology consult appreciated     #Hyperkalemia  -Now resolved   -K: 5.5 on admission   -4.6 now     #Hypothyroidism  -Continue levothyroxine 100mcg po daily     #VTE prophylaxis  -SCDs  -No AC as patient with MDS and bleeds easily     #Advanced Directives   -Full Code  -HCP Daniela Collier     Above discussed with Dr. Mendes  76y Female with HTN HLD CAD s/p remote CABG, Bio AVR solitary episode of Af w/o recurrence, ILR in place, CRI, MDS transfusion dependent, cirrhosis sec to chronic hep c with esophageal varices and prior banding, hepatocellular carcinoma, recent finding of hepatic mass/hepatoma s/p embolization with embozene particles on 4/5/21, came to the ER for worsening dyspnea for several days worse with activity, orthopnea and elevated BP. Patient was admitted at [facility] last week for about 4 days after she fell at her pcp office, CT head was negative, was found to be anemic and transfused 2 units of prbc. Pt. stated that she cannot be on aspirin or any blood thinners due to anemia and bleeding risk. I spoke to cardiologist dr. Shelton who saw pt. in the ER and also recommended no Aspirin, heparin/ lovenox for pt as she bleeds easily. Pt. has port for blood transfusions and iron infusions by her hematologist. Initially in the ER pt was on bipap but off now, on supplemental O2 at 3L/min.     #Decompensated Heart Failure   #HFpEF  -Pro-BNP 8868  -CT chest: B/l pleural effusions, R>L, mild emphysema   -Cardiology consult appreciated   -Continue Furosemide 40mg Iv q12hrs   -Echo 10/2020: LVEF 60-65%, Echo 2019: Grade I diastolic dysfunction   -Echo 5/20/2021: LVEF 60-65%,   -Troponin negative x 2   -Continue supplemental O2 at 3L/min   -Trend renal function closely     #Hypertension   -DASH/TLC  -Continue Hydralazine 25mg q8hr, metoprolol 25mg q6hrs   -Monitor BP & titrate BP meds PRN    #Diabetes Mellitus   -Hypoglycemia Protocol, LDISS, Accuchecks AC&HS  -Increase Lantus 12u subQ qhs   -Diet: Consistent Carbs w/ Evening Snack    #CAD, S/p CABG  -Continue Lipitor 40mg po qhs    #MDS   -Recent transfusion at [facility] - 2units pRBCs  -Transfusion dependent   -No AC as per cardiology, patient bleeds easily     #Cirrhosis   #Hepatocellular carcinoma   -Likely secondary  to chronic Hepatitis C infection, Pt was treated with epclusa  -Hx esophageal varices and hemorrhoids   -s/p embolization with embozene particles on 4/5/21   -Recent MRI: segment 6 location of recent embolization) lesion - 2.3cm, diffuse nodular enhancement throughout the lesion, 5 mm focus of arterial enhancement at the junction of the right lobe and caudate lobe   -Continue to follow up as outpatient  -Restart Spironolactone at 12.5mg daily   -Trend K levels closely    #Chronic Kidney disease  -Cr close to baseline  -Avoid nephrotoxins  -Trend renal function   -Nephrology consult appreciated     #Hyperkalemia  -Now resolved   -K: 5.5 on admission   -4.8 now     #Hypothyroidism  -Continue levothyroxine 100mcg po daily     #VTE prophylaxis  -SCDs  -No AC as patient with MDS and bleeds easily     #Advanced Directives   -Full Code  -HCP Daniela Collier     Above discussed with Dr. Mendes  [Mother] : mother [Good Dental Hygiene] : Good [Needs Immunization] : Needs immunizations [No School Concerns] : school [No Developmental Concerns] : development [No Elimination Concerns] : elimination [Diverse, Healthy Diet] : his current diet is diverse and healthy [Social/Birth Hx Reviewed] : Social and birth history reviewed [Good] : good [No Nutrition Concerns] : nutrition [No Sleep Concerns] : sleep [No Behavior Concerns] : behavior [Happy] : happy [Independent] : independent [Energetic] : energetic [None] : No significant risk factors are identified [TB Risk] : no tuberculosis risk factors [Adequate] : safety elements were discussed and are adequate [Exercises ___ Hr/Day] : [unfilled] hour(s) of exercise per day [Exercises ___ x/Wk] : ~he/she~ gets exercise [unfilled] times per week [Screen Time ___Hr/Day] : [unfilled] hour(s) of screen time per day [Stays Home Alone] : stays home alone [Relative] : receives care from a relative [Before/After School Program] : in a before and after school program [Grade ___] : in grade [unfilled] [___ High School] : in [unfilled] high school [Public School] : in a public school [Excellent] : excellent [de-identified] : mom declined HPV [FreeTextEntry1] : 15 year old going into 10th grade. Did well last year. \par Diet: healthy and variety. \par Sleep no concerns\par Social: no issues, denies drugs alcohol or sexual activity. \par

## 2021-05-21 NOTE — PROGRESS NOTE ADULT - ATTENDING COMMENTS
"Attending Attestation:    I saw and examined the patient, and was physically present for the key portions of the Evaluation and Management service provided. I agree with the above history, physical, and plan with the following additions:    EXAM:  General: no acute distress  Lungs: crackles bilaterally are improving  Heart: HS nml  Abdomen: benign  Ext: improved edema  Neuro: AOx3  Skin: intact    Brief A/P:  Patient admitted with fluid overload thought to be 2/2 CHF exacerbation - patient EF appears preserved; valvular function preserved; states compliance with medications. Continue with lasix IV BID. Given cirrhosis agree with adding spironolactone - discussed with nephrology who agree with starting low dose. Cardiology deferring further eval at this time.

## 2021-05-21 NOTE — CHART NOTE - NSCHARTNOTEFT_GEN_A_CORE
Continuous BiPAP converted to nocturnal. Per RN patient not allowed to be on unit with continuous BiPAP, according to unit protocol. Patient also refusing to be on continuous BiPAP.

## 2021-05-21 NOTE — PROGRESS NOTE ADULT - ASSESSMENT
ASSESSMENT:   76 year old Female with a medical history of HTN, HLD, CAD s/p remote CABG Bio AVR, solitary episode of Af w/o recurrence, ILR in place, CRI, MDS transfusion dependent, cirrhosis secondary to Hep C (contracted from a blood transfusion when she was an infant) with esophageal varices and prior banding, recent finding of hepatic mass/hepatoma s/p embolization, came to the ER for worsening dyspnea for several days worse with activity, orthopnea and elevated BP.    Recent admission at [facility] s/p fall last week resulting in a "hematoma" of her head as per patient. CT head was negative as per chart. Patient was found to be anemic and got 2 units of prbc. Pt stated that she cannot be on ASA or any blood thinners due to anemia and bleeding risk. Pt has a port for blood transfusions and iron infusions by her hematologist since she has required chronic transfusions. As per patient, she has received "over 60 units of blood" since her open heart surgery a few years ago.    CT chest performed for SOB/DUPONT showing small-moderate b/l pleural effusions R>L.

## 2021-05-21 NOTE — PROGRESS NOTE ADULT - SUBJECTIVE AND OBJECTIVE BOX
HPI: 76y Female with HTN HLD CAD s/p remote CABg Bio AVR solitary episode of Af w/o recurrence, ILR in place, CRI, MDS transfusion dependent, cirrhosis sec to chronic hep c with esophageal varices and prior banding, hepatocellular carcinoma, recent finding of hepatic mass/hepatoma s/p embolization with embozene particles on 21, came to the ER for worsening dyspnea for several days worse with activity, orthopnea and elevated BP. Patient was admitted at [facility] last week for about 4 days after she fell at her pcp office, CT head was negative, was found to be anemic and transfused 2 units of prbc. Pt. stated that she cannot be on aspirin or any blood thinners due to anemia and bleeding risk. I spoke to cardiologist dr. Shelton who saw pt. in the ER and also recommended no Aspirin, heparin/ lovenox for pt as she bleeds easily. Pt. has port for blood transfusions and iron infusions by her hematologist. Initially in the ER pt was on bipap but off now, on supplemental O2 at 3L/min.     Subjective: Patient seen and evaluated at bedside, with O2 via NC @ 3L/min with SpO2 >95%, able to speak in full sentences. Patent c/o chest pain on deep inhalation and intermittent bouts of cough.    REVIEW OF SYSTEMS:    CONSTITUTIONAL: No weakness, fevers or chills  EYES/ENT: No visual changes;  No vertigo or throat pain   NECK: No pain or stiffness  RESPIRATORY: No cough, wheezing, hemoptysis; No shortness of breath  CARDIOVASCULAR: No chest pain or palpitations  GASTROINTESTINAL: No abdominal or epigastric pain. No nausea, vomiting, or hematemesis; No diarrhea or constipation. No melena or hematochezia.  GENITOURINARY: No dysuria, frequency or hematuria  NEUROLOGICAL: No numbness or weakness  SKIN: No itching, rashes    Vital Signs Last 24 Hrs  T(C): 36.4 (21 May 2021 11:00), Max: 37.2 (20 May 2021 22:30)  T(F): 97.6 (21 May 2021 11:00), Max: 98.9 (20 May 2021 22:30)  HR: 81 (21 May 2021 11:00) (77 - 87)  BP: 134/74 (21 May 2021 11:00) (121/68 - 160/75)  BP(mean): --  RR: 18 (21 May 2021 11:00) (17 - 18)  SpO2: 90% (21 May 2021 11:00) (90% - 97%)      PHYSICAL EXAM:  GENERAL: NAD, lying in bed comfortably on O2 via NC  HEAD:  hematoma to R parietal region   EYES: conjunctiva and sclera clear  ENT: Moist mucous membranes  CHEST/LUNG: breath sounds decreased to b/l lung base, + crackles   HEART: Regular rate and rhythm; No murmurs  ABDOMEN: Bowel sounds present; Soft, Nontender, Nondistended.   EXTREMITIES:  2+ Peripheral Pulses, brisk capillary refill. No clubbing, cyanosis, or edema  NERVOUS SYSTEM:  Alert & Oriented X3, speech clear. No deficits   MSK: FROM all 4 extremities, full and equal strength    MEDICATIONS  (STANDING):  amitriptyline 40 milliGRAM(s) Oral at bedtime  atorvastatin 40 milliGRAM(s) Oral at bedtime  cholecalciferol 2000 Unit(s) Oral daily  dextrose 40% Gel 15 Gram(s) Oral once  dextrose 5%. 1000 milliLiter(s) (50 mL/Hr) IV Continuous <Continuous>  dextrose 5%. 1000 milliLiter(s) (100 mL/Hr) IV Continuous <Continuous>  dextrose 50% Injectable 25 Gram(s) IV Push once  dextrose 50% Injectable 12.5 Gram(s) IV Push once  dextrose 50% Injectable 25 Gram(s) IV Push once  folic acid 1 milliGRAM(s) Oral daily  furosemide   Injectable 40 milliGRAM(s) IV Push two times a day  gabapentin 300 milliGRAM(s) Oral at bedtime  glucagon  Injectable 1 milliGRAM(s) IntraMuscular once  hydrALAZINE 25 milliGRAM(s) Oral every 8 hours  insulin glargine Injectable (LANTUS) 10 Unit(s) SubCutaneous at bedtime  insulin lispro (ADMELOG) corrective regimen sliding scale   SubCutaneous three times a day before meals  insulin lispro (ADMELOG) corrective regimen sliding scale   SubCutaneous at bedtime  levothyroxine 100 MICROGram(s) Oral daily  metoprolol tartrate 25 milliGRAM(s) Oral every 6 hours  montelukast 10 milliGRAM(s) Oral at bedtime  multivitamin 1 Tablet(s) Oral daily  pantoprazole    Tablet 40 milliGRAM(s) Oral before breakfast  senna 2 Tablet(s) Oral at bedtime    MEDICATIONS  (PRN):  albuterol/ipratropium for Nebulization 3 milliLiter(s) Nebulizer every 6 hours PRN Shortness of Breath and/or Wheezing        LABS:                          8.8    5.18  )-----------( 114      ( 20 May 2021 07:01 )             28.6     20 May 2021 07:01    140    |  108    |  58.0   ----------------------------<  281    4.6     |  22.0   |  2.66     Ca    8.7        20 May 2021 07:01    TPro  7.3    /  Alb  3.9    /  TBili  0.3    /  DBili  x      /  AST  23     /  ALT  12     /  AlkPhos  117    19 May 2021 14:03    LIVER FUNCTIONS - ( 19 May 2021 14:03 )  Alb: 3.9 g/dL / Pro: 7.3 g/dL / ALK PHOS: 117 U/L / ALT: 12 U/L / AST: 23 U/L / GGT: x           PT/INR - ( 19 May 2021 18:03 )   PT: 14.1 sec;   INR: 1.23 ratio         PTT - ( 19 May 2021 18:03 )  PTT:32.6 sec  CAPILLARY BLOOD GLUCOSE      POCT Blood Glucose.: 195 mg/dL (20 May 2021 11:46)  POCT Blood Glucose.: 241 mg/dL (20 May 2021 07:16)  POCT Blood Glucose.: 175 mg/dL (20 May 2021 01:20)  POCT Blood Glucose.: 83 mg/dL (19 May 2021 23:46)    CARDIAC MARKERS ( 20 May 2021 07:01 )  x     / 0.03 ng/mL / x     / x     / x      CARDIAC MARKERS ( 19 May 2021 14:03 )  x     / 0.04 ng/mL / x     / x     / x          Urinalysis Basic - ( 19 May 2021 20:52 )    Color: Yellow / Appearance: Clear / S.010 / pH: x  Gluc: x / Ketone: Negative  / Bili: Negative / Urobili: Negative mg/dL   Blood: x / Protein: 100 mg/dL / Nitrite: Negative   Leuk Esterase: Trace / RBC: 0-2 /HPF / WBC 0-2   Sq Epi: x / Non Sq Epi: Occasional / Bacteria: x        RADIOLOGY:    < from: CT Chest No Cont (21 @ 20:06) >  FINDINGS:    LUNGS AND AIRWAYS: Patent central airways.  Small to moderate left and moderate right pleural effusion. Bibasilar dependent consolidation/atelectasis and segmental consolidation is atelectasis in the right middle lobe. Correlate clinically for infection. Mild emphysematous change.  PLEURA: As above.  MEDIASTINUM AND AVANI: No lymphadenopathy.  VESSELS: Nonaneurysmal  HEART: Cardiomegaly. Cardiac vascular calcification. status post CABG.. No pericardial effusion.  CHEST WALL AND LOWER NECK: Median sternotomy.  VISUALIZED UPPER ABDOMEN: Cirrhotic hepatic morphology. Small rim calcified splenic cyst.  BONES: Stable    IMPRESSION:  Bilateral pleural effusions right larger than left.  Bilateral lower lobe and right middle lobe consolidation/atelectasis. Correlate clinically for infection.  Mild emphysema        < from: TTE Echo Complete w/o Contrast w/ Doppler (10.19.20 @ 11:55) >  Summary:   1. Normal global left ventricular systolic function.   2. Left ventricular ejection fraction, by visual estimation, is 60 to 65%.   3. Moderately increased LV wall thickness.   4. Normal right ventricle size and systolic function.   5. Severely enlarged left atrium.   6. Right atrium not well visualized, appears within normal limits on subcostal view.   7. Mild thickening of the anterior and posterior mitral valve leaflets.   8. Moderate mitral annular calcification.   9. Trace mitral valve regurgitation.  10. Elevated mean mitral valve gradient (   5 mmHg at HR 72 BPM).  11. Mild tricuspid regurgitation.  12. There is a prosthetic valve in the aortic position, peak velocity 2.3 m/s.  13. Mild aortic regurgitation.         HPI: 76y Female with HTN HLD CAD s/p remote CABg Bio AVR solitary episode of Af w/o recurrence, ILR in place, CRI, MDS transfusion dependent, cirrhosis sec to chronic hep c with esophageal varices and prior banding, hepatocellular carcinoma, recent finding of hepatic mass/hepatoma s/p embolization with embozene particles on 21, came to the ER for worsening dyspnea for several days worse with activity, orthopnea and elevated BP. Patient was admitted at [facility] last week for about 4 days after she fell at her pcp office, CT head was negative, was found to be anemic and transfused 2 units of prbc. Pt. stated that she cannot be on aspirin or any blood thinners due to anemia and bleeding risk. I spoke to cardiologist dr. Shelton who saw pt. in the ER and also recommended no Aspirin, heparin/ lovenox for pt as she bleeds easily. Pt. has port for blood transfusions and iron infusions by her hematologist. Initially in the ER pt was on bipap but off now, on supplemental O2 at 3L/min.     Subjective: Patient seen and evaluated at bedside, with O2 via NC @ 3L/min, much improved, states that she is feeling much better today. C/o to Iv access site to E, requests blood draws from R chest port. No overnight issues reported.     REVIEW OF SYSTEMS:    CONSTITUTIONAL: No weakness, fevers or chills  EYES/ENT: No visual changes;  No vertigo or throat pain   NECK: No pain or stiffness  RESPIRATORY: No cough, wheezing, hemoptysis; No shortness of breath  CARDIOVASCULAR: No chest pain or palpitations  GASTROINTESTINAL: No abdominal or epigastric pain. No nausea, vomiting, or hematemesis; No diarrhea or constipation. No melena or hematochezia.  GENITOURINARY: No dysuria, frequency or hematuria  NEUROLOGICAL: No numbness or weakness  SKIN: No itching, rashes    Vital Signs Last 24 Hrs  T(C): 36.4 (21 May 2021 11:00), Max: 37.2 (20 May 2021 22:30)  T(F): 97.6 (21 May 2021 11:00), Max: 98.9 (20 May 2021 22:30)  HR: 81 (21 May 2021 11:00) (77 - 87)  BP: 134/74 (21 May 2021 11:00) (121/68 - 160/75)  RR: 18 (21 May 2021 11:00) (17 - 18)  SpO2: 90% (21 May 2021 11:00) (90% - 97%)    PHYSICAL EXAM:  GENERAL: NAD, lying in bed comfortably on O2 via NC  HEAD:  hematoma to R parietal region   EYES: conjunctiva and sclera clear  ENT: Moist mucous membranes  CHEST/LUNG: breath sounds decreased to b/l lung base, + crackles   HEART: Regular rate and rhythm; No murmurs  ABDOMEN: Bowel sounds present; Soft, Nontender, Nondistended.   EXTREMITIES:  2+ Peripheral Pulses, brisk capillary refill. No clubbing, cyanosis, or edema  NERVOUS SYSTEM:  Alert & Oriented X3, speech clear. No deficits   MSK: FROM all 4 extremities, full and equal strength    MEDICATIONS  (STANDING):  amitriptyline 40 milliGRAM(s) Oral at bedtime  atorvastatin 40 milliGRAM(s) Oral at bedtime  cholecalciferol 2000 Unit(s) Oral daily  dextrose 40% Gel 15 Gram(s) Oral once  dextrose 5%. 1000 milliLiter(s) (50 mL/Hr) IV Continuous <Continuous>  dextrose 5%. 1000 milliLiter(s) (100 mL/Hr) IV Continuous <Continuous>  dextrose 50% Injectable 25 Gram(s) IV Push once  dextrose 50% Injectable 12.5 Gram(s) IV Push once  dextrose 50% Injectable 25 Gram(s) IV Push once  epoetin georgina-epbx (RETACRIT) Injectable 65379 Unit(s) SubCutaneous every 7 days  folic acid 1 milliGRAM(s) Oral daily  furosemide   Injectable 40 milliGRAM(s) IV Push two times a day  gabapentin 300 milliGRAM(s) Oral at bedtime  glucagon  Injectable 1 milliGRAM(s) IntraMuscular once  hydrALAZINE 25 milliGRAM(s) Oral every 8 hours  insulin glargine Injectable (LANTUS) 12 Unit(s) SubCutaneous at bedtime  insulin lispro (ADMELOG) corrective regimen sliding scale   SubCutaneous three times a day before meals  insulin lispro (ADMELOG) corrective regimen sliding scale   SubCutaneous at bedtime  levothyroxine 100 MICROGram(s) Oral daily  metoprolol tartrate 25 milliGRAM(s) Oral every 6 hours  montelukast 10 milliGRAM(s) Oral at bedtime  multivitamin 1 Tablet(s) Oral daily  Nephro-nunu 1 Tablet(s) Oral daily  pantoprazole    Tablet 40 milliGRAM(s) Oral before breakfast  senna 2 Tablet(s) Oral at bedtime  sodium bicarbonate 1300 milliGRAM(s) Oral two times a day    MEDICATIONS  (PRN):  albuterol/ipratropium for Nebulization 3 milliLiter(s) Nebulizer every 6 hours PRN Shortness of Breath and/or Wheezing      LABS:                        9.6    5.22  )-----------( 91       ( 21 May 2021 07:03 )             30.7   05-    138  |  108<H>  |  61.0<H>  ----------------------------<  152<H>  4.8   |  20.0<L>  |  2.51<H>    Ca    9.1      21 May 2021 07:03    TPro  7.3  /  Alb  3.9  /  TBili  0.3<L>  /  DBili  x   /  AST  23  /  ALT  12  /  AlkPhos  117  05-19    LIVER FUNCTIONS - ( 19 May 2021 14:03 )  Alb: 3.9 g/dL / Pro: 7.3 g/dL / ALK PHOS: 117 U/L / ALT: 12 U/L / AST: 23 U/L / GGT: x                      CAPILLARY BLOOD GLUCOSE      POCT Blood Glucose.: 220 mg/dL (21 May 2021 10:49)  POCT Blood Glucose.: 192 mg/dL (21 May 2021 07:38)  POCT Blood Glucose.: 153 mg/dL (20 May 2021 20:58)  POCT Blood Glucose.: 204 mg/dL (20 May 2021 16:17)      CARDIAC MARKERS ( 20 May 2021 07:01 )  x     / 0.03 ng/mL / x     / x     / x      CARDIAC MARKERS ( 19 May 2021 14:03 )  x     / 0.04 ng/mL / x     / x     / x          Urinalysis Basic - ( 19 May 2021 20:52 )    Color: Yellow / Appearance: Clear / S.010 / pH: x  Gluc: x / Ketone: Negative  / Bili: Negative / Urobili: Negative mg/dL   Blood: x / Protein: 100 mg/dL / Nitrite: Negative   Leuk Esterase: Trace / RBC: 0-2 /HPF / WBC 0-2   Sq Epi: x / Non Sq Epi: Occasional / Bacteria: x        RADIOLOGY:    < from: CT Chest No Cont (21 @ 20:06) >  FINDINGS:    LUNGS AND AIRWAYS: Patent central airways.  Small to moderate left and moderate right pleural effusion. Bibasilar dependent consolidation/atelectasis and segmental consolidation is atelectasis in the right middle lobe. Correlate clinically for infection. Mild emphysematous change.  PLEURA: As above.  MEDIASTINUM AND AVANI: No lymphadenopathy.  VESSELS: Nonaneurysmal  HEART: Cardiomegaly. Cardiac vascular calcification. status post CABG.. No pericardial effusion.  CHEST WALL AND LOWER NECK: Median sternotomy.  VISUALIZED UPPER ABDOMEN: Cirrhotic hepatic morphology. Small rim calcified splenic cyst.  BONES: Stable    IMPRESSION:  Bilateral pleural effusions right larger than left.  Bilateral lower lobe and right middle lobe consolidation/atelectasis. Correlate clinically for infection.  Mild emphysema        < from: TTE Echo Complete w/o Contrast w/ Doppler (21 @ 21:01) >    Summary:   1. Left ventricular ejection fraction, by visual estimation, is 60 to 65%.   2. Normal global left ventricular systolic function.   3. The mitral in-flow pattern reveals no discernable A-wave, therefore no comment on diastolic function can be made.   4. There ismoderate concentric left ventricular hypertrophy.   5. The right ventricle is not well visualized. Systolic function appears grossly preserved.   6. Severely enlarged left atrium.   7. Moderately enlarged right atrium.   8. Moderate tricuspid regurgitation.   9. Trace mitral valve regurgitation.  10. Mitral valve mean gradient is 4.9 mmHg consistent with mild mitral stenosis.  11. Mild thickening of the anterior and posterior mitral valve leaflets.  12. Moderate mitral annular calcification.  13.A well-seated bioprosthetic valve is visualized in the aortic position. There is mild-moderate central regurgitation. Mean gradiet is 8.5 mm Hg and dimensionless index is 0.47, which are likely consistent with a normal funcitoning bioprosthetic aortic valve.  14. Mildly dilated pulmonary artery.  15. Estimated pulmonary artery systolic pressure is 53.1 mmHg assuming a right atrial pressure of 3 mmHg, which is consistent with moderate pulmonary hypertension.  16. There is no evidence of pericardial effusion.  17. Moderate pleural effusion in both left and right lateral regions.  18. Compared to a prior study from 10/19/20, there is now pulmonary hypertension and bilateral pleural effusions are now present.

## 2021-05-21 NOTE — PROGRESS NOTE ADULT - SUBJECTIVE AND OBJECTIVE BOX
Subjective - patient seen and evaluated bedside. Sitting comfortably in bed. Denies CP, SOB, HA, dizziness, n/v/d States "my breathing is somewhat better."    Review of Systems: negative x 10 systems except as noted above    Brief summary:  76yFemale with cirrhotic pleural effusion.       PAST MEDICAL & SURGICAL HISTORY:  Hypertension  well controlled    High cholesterol    Low back pain  chronic over a year    Asthma  well controlled, never intubated for exacerbation    Anemia, unspecified anemia type  uses O2 at 2L at home PRN, due to anemia    Aortic stenosis  s/p bovine AVR    Heart failure  Echo  10/2020 EF 60%, mild AR    CAD (coronary artery disease)  s/p OHS    CKD (chronic kidney disease)  Stage 4    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          albuterol/ipratropium for Nebulization 3 milliLiter(s) Nebulizer every 6 hours PRN  amitriptyline 40 milliGRAM(s) Oral at bedtime  atorvastatin 40 milliGRAM(s) Oral at bedtime  cholecalciferol 2000 Unit(s) Oral daily  dextrose 40% Gel 15 Gram(s) Oral once  dextrose 5%. 1000 milliLiter(s) IV Continuous <Continuous>  dextrose 5%. 1000 milliLiter(s) IV Continuous <Continuous>  dextrose 50% Injectable 25 Gram(s) IV Push once  dextrose 50% Injectable 12.5 Gram(s) IV Push once  dextrose 50% Injectable 25 Gram(s) IV Push once  epoetin georgina-epbx (RETACRIT) Injectable 53608 Unit(s) SubCutaneous every 7 days  folic acid 1 milliGRAM(s) Oral daily  furosemide   Injectable 40 milliGRAM(s) IV Push two times a day  gabapentin 300 milliGRAM(s) Oral at bedtime  glucagon  Injectable 1 milliGRAM(s) IntraMuscular once  hydrALAZINE 25 milliGRAM(s) Oral every 8 hours  insulin glargine Injectable (LANTUS) 12 Unit(s) SubCutaneous at bedtime  insulin lispro (ADMELOG) corrective regimen sliding scale   SubCutaneous three times a day before meals  insulin lispro (ADMELOG) corrective regimen sliding scale   SubCutaneous at bedtime  levothyroxine 100 MICROGram(s) Oral daily  metoprolol tartrate 25 milliGRAM(s) Oral every 6 hours  montelukast 10 milliGRAM(s) Oral at bedtime  multivitamin 1 Tablet(s) Oral daily  Nephro-nunu 1 Tablet(s) Oral daily  pantoprazole    Tablet 40 milliGRAM(s) Oral before breakfast  senna 2 Tablet(s) Oral at bedtime  sodium bicarbonate 1300 milliGRAM(s) Oral two times a day  MEDICATIONS  (PRN):  albuterol/ipratropium for Nebulization 3 milliLiter(s) Nebulizer every 6 hours PRN Shortness of Breath and/or Wheezing      Daily     Daily                               9.6    5.22  )-----------( 91       ( 21 May 2021 07:03 )             30.7   05-21    138  |  108<H>  |  61.0<H>  ----------------------------<  152<H>  4.8   |  20.0<L>  |  2.51<H>    Ca    9.1      21 May 2021 07:03    TPro  7.3  /  Alb  3.9  /  TBili  0.3<L>  /  DBili  x   /  AST  23  /  ALT  12  /  AlkPhos  117  05-19    CARDIAC MARKERS ( 20 May 2021 07:01 )  x     / 0.03 ng/mL / x     / x     / x      CARDIAC MARKERS ( 19 May 2021 14:03 )  x     / 0.04 ng/mL / x     / x     / x        PT/INR - ( 19 May 2021 18:03 )   PT: 14.1 sec;   INR: 1.23 ratio         PTT - ( 19 May 2021 18:03 )  PTT:32.6 sec      Objective:  T(C): 36.4 (05-21-21 @ 11:00), Max: 37.2 (05-20-21 @ 22:30)  HR: 81 (05-21-21 @ 11:00) (77 - 87)  BP: 134/74 (05-21-21 @ 11:00) (121/68 - 160/75)  RR: 18 (05-21-21 @ 11:00) (17 - 18)  SpO2: 90% (05-21-21 @ 11:00) (90% - 97%)  Wt(kg): --CAPILLARY BLOOD GLUCOSE      POCT Blood Glucose.: 220 mg/dL (21 May 2021 10:49)  POCT Blood Glucose.: 192 mg/dL (21 May 2021 07:38)  POCT Blood Glucose.: 153 mg/dL (20 May 2021 20:58)  POCT Blood Glucose.: 204 mg/dL (20 May 2021 16:17)  I&O's Summary    20 May 2021 07:01  -  21 May 2021 07:00  --------------------------------------------------------  IN: 640 mL / OUT: 300 mL / NET: 340 mL        Physical Exam  Neuro: A+O x 3, non-focal, speech clear and intact  Pulm: diminished at bases bilaterally   CV: RRR, +S1S2  Abd: soft, NT, ND, +BS  Ext: +DP Pulses b/l, , no edema      Imaging:  CXR: < from: CT Chest No Cont (05.19.21 @ 20:06) >  IMPRESSION:  Bilateral pleural effusions right larger than left.  Bilateral lower lobe and right middle lobe consolidation/atelectasis. Correlate clinically for infection.  Mild emphysema    < end of copied text >

## 2021-05-22 NOTE — PROGRESS NOTE ADULT - SUBJECTIVE AND OBJECTIVE BOX
[facility] Division of Hospital Medicine    Chief Complaint:   CHF exacerbation    SUBJECTIVE: reports feeling better, on new complains    OVERNIGHT EVENTS: none reported, -400 cc UOP over last 24 hr    Patient denies chest pain, SOB, abd pain, N/V, fever, chills, dysuria or any other complaints. All remainder ROS negative.     MEDICATIONS  (STANDING):  amitriptyline 40 milliGRAM(s) Oral at bedtime  atorvastatin 40 milliGRAM(s) Oral at bedtime  cholecalciferol 2000 Unit(s) Oral daily  dextrose 40% Gel 15 Gram(s) Oral once  dextrose 5%. 1000 milliLiter(s) (50 mL/Hr) IV Continuous <Continuous>  dextrose 5%. 1000 milliLiter(s) (100 mL/Hr) IV Continuous <Continuous>  dextrose 50% Injectable 25 Gram(s) IV Push once  dextrose 50% Injectable 12.5 Gram(s) IV Push once  dextrose 50% Injectable 25 Gram(s) IV Push once  epoetin georgina-epbx (RETACRIT) Injectable 11277 Unit(s) SubCutaneous every 7 days  folic acid 1 milliGRAM(s) Oral daily  furosemide   Injectable 80 milliGRAM(s) IV Push two times a day  gabapentin 300 milliGRAM(s) Oral at bedtime  glucagon  Injectable 1 milliGRAM(s) IntraMuscular once  hydrALAZINE 25 milliGRAM(s) Oral every 8 hours  insulin glargine Injectable (LANTUS) 12 Unit(s) SubCutaneous at bedtime  insulin lispro (ADMELOG) corrective regimen sliding scale   SubCutaneous three times a day before meals  insulin lispro (ADMELOG) corrective regimen sliding scale   SubCutaneous at bedtime  levothyroxine 100 MICROGram(s) Oral daily  metoprolol tartrate 25 milliGRAM(s) Oral every 6 hours  montelukast 10 milliGRAM(s) Oral at bedtime  multivitamin 1 Tablet(s) Oral daily  Nephro-nunu 1 Tablet(s) Oral daily  pantoprazole    Tablet 40 milliGRAM(s) Oral before breakfast  senna 2 Tablet(s) Oral at bedtime  sodium bicarbonate 1300 milliGRAM(s) Oral two times a day  spironolactone 12.5 milliGRAM(s) Oral daily    MEDICATIONS  (PRN):  albuterol/ipratropium for Nebulization 3 milliLiter(s) Nebulizer every 6 hours PRN Shortness of Breath and/or Wheezing        I&O's Summary    21 May 2021 07:01  -  22 May 2021 07:00  --------------------------------------------------------  IN: 60 mL / OUT: 500 mL / NET: -440 mL        PHYSICAL EXAM:  Vital Signs Last 24 Hrs  T(C): 36.8 (22 May 2021 10:00), Max: 36.9 (21 May 2021 20:59)  T(F): 98.2 (22 May 2021 10:00), Max: 98.5 (21 May 2021 20:59)  HR: 83 (22 May 2021 10:00) (76 - 97)  BP: 153/73 (22 May 2021 10:00) (139/62 - 153/73)  BP(mean): --  RR: 18 (22 May 2021 10:00) (16 - 18)  SpO2: 98% (22 May 2021 10:00) (96% - 100%)        CONSTITUTIONAL: NAD, well-developed, well-groomed  ENMT: Moist oral mucosa, no pharyngeal injection or exudates; normal dentition; No JVD  RESPIRATORY: Normal respiratory effort; fine crackles b/l on bases, no wheezing  CARDIOVASCULAR: irregular rate and rhythm, with soft systolic murmur in R and LUSB, trace extremity edema; Peripheral pulses are 2+ bilaterally, R side chest port  ABDOMEN: somewhat distention, nontender to palpation, normoactive bowel sounds, no rebound/guarding; No palpable hepatosplenomegaly  MUSCLOSKELETAL:  no clubbing or cyanosis of digits; no joint swelling or tenderness to palpation  PSYCH: A+O to person, place, and time; affect appropriate  NEUROLOGY: CN 2-12 are intact and symmetric; no gross sensory deficits; was observed moving all 4 ext against gravity cooperating with exam.   SKIN: No rashes; no palpable lesions    LABS:                        9.6    5.22  )-----------( 91       ( 21 May 2021 07:03 )             30.7     05-22    139  |  104  |  67.0<H>  ----------------------------<  143<H>  4.7   |  24.0  |  2.67<H>    Ca    8.9      22 May 2021 10:19                CAPILLARY BLOOD GLUCOSE      POCT Blood Glucose.: 158 mg/dL (22 May 2021 11:13)  POCT Blood Glucose.: 126 mg/dL (22 May 2021 08:15)  POCT Blood Glucose.: 138 mg/dL (21 May 2021 21:02)  POCT Blood Glucose.: 191 mg/dL (21 May 2021 16:02)        RADIOLOGY & ADDITIONAL TESTS:  Results Reviewed:   Imaging Personally Reviewed:  Electrocardiogram Personally Reviewed:

## 2021-05-22 NOTE — PROGRESS NOTE ADULT - ASSESSMENT
ASSESSMENT:   76 year old Female with a medical history of HTN, HLD, CAD s/p remote CABG Bio AVR, solitary episode of Af w/o recurrence, ILR in place, CRI, MDS transfusion dependent, cirrhosis secondary to Hep C (contracted from a blood transfusion when she was an infant) with esophageal varices and prior banding, recent finding of hepatic mass/hepatoma s/p embolization, came to the ER for worsening dyspnea for several days worse with activity, orthopnea and elevated BP.    Recent admission at [facility] s/p fall last week resulting in a "hematoma" of her head as per patient. CT head was negative as per chart. Patient was found to be anemic and got 2 units of prbc. Pt stated that she cannot be on ASA or any blood thinners due to anemia and bleeding risk. Pt has a port for blood transfusions and iron infusions by her hematologist since she has required chronic transfusions. As per patient, she has received "over 60 units of blood" since her open heart surgery a few years ago.    CT chest performed for SOB/DUPONT showing small-moderate b/l pleural effusions R>L.

## 2021-05-22 NOTE — PROGRESS NOTE ADULT - ASSESSMENT
76y Female with HTN HLD CAD s/p remote CABG, Bio AVR solitary episode of Af w/o recurrence, ILR in place, CRI, MDS transfusion dependent, cirrhosis sec to chronic hep c with esophageal varices and prior banding, hepatocellular carcinoma,s/p embolization with embozene particles on 4/5/21, and recent admission to [facility] after fall with head trauma with also transfused 2 untis PRBC during that admission, this time came to the ER for worsening dyspnea for several days worse with activity, orthopnea and elevated BP with hypoxic respiratory falure requiring BIPAP in ED, She was found to have acute decompensated heart failure. She was started on IV furosemide. Cardiology team consulted.       #Decompensated Heart Failure with preserved EF  -cardiology consult noted  -c/w Furosemide 80mg Iv q12hrs  -Echo 5/20/2021: LVEF 60-65%, G1DD   -Continue supplemental O2 at 3L/min   -Trend renal function closely  - c/w hydralazine, bb, d/c spironolactone for now, off ace/arb due to CKD, off ASA due anemia    #Hypertension   -  hydralazine as above    #Diabetes Mellitus   -Hypoglycemia Protocol, LDISS, Accuchecks AC&HS  -Increase Lantus 12u subQ qhs   -Diet: Consistent Carbs w/ Evening Snack    #CAD, S/p CABG  -Continue Lipitor 40mg po qhs    #MDS   -Recent transfusion at [facility] - 2units pRBCs  -Transfusion dependent   -No AC as per cardiology, patient bleeds easily     #Cirrhosis 2/2 hepatitis C c/b hepatocellular cardcinoma s/p embolization  #Hepatocellular carcinoma   - d/c  Spironolactone for now, Furosemide as above  -Trend K levels closely    #Chronic Kidney disease  -Cr close to baseline  -Avoid nephrotoxins  -Trend renal function   -Nephrology consult appreciated     #Hyperkalemia, now resolved  - c/w to monitor with daily BMP     #Hypothyroidism  -Continue levothyroxine 100mcg po daily     #VTE prophylaxis -SCDs due transfusion dependent anemia  #Advanced Directives -Full Code,-HCP Daniela Collier - updated over phone   # Dispo - will stay over weekend, requires IV diuretics 76y Female with HTN HLD CAD s/p remote CABG, Bio AVR solitary episode of Af w/o recurrence, ILR in place, CRI, MDS transfusion dependent, cirrhosis sec to chronic hep c with esophageal varices and prior banding, hepatocellular carcinoma,s/p embolization with embozene particles on 4/5/21, and recent admission to [facility] after fall with head trauma with also transfused 2 untis PRBC during that admission, this time came to the ER for worsening dyspnea for several days worse with activity, orthopnea and elevated BP with hypoxic respiratory falure requiring BIPAP in ED, She was found to have acute decompensated heart failure. She was started on IV furosemide. Cardiology team consulted.       #Decompensated Heart Failure with preserved EF  -cardiology consult noted  -c/w Furosemide 80mg Iv q12hrs  -Echo 5/20/2021: LVEF 60-65%, G1DD   -Continue supplemental O2 at 3L/min   -Trend renal function closely  - c/w hydralazine, bb, d/c spironolactone for now, off ace/arb due to CKD, off ASA due anemia    #Hypertension   -  hydralazine as above    #Diabetes Mellitus   -c/w 12u subQ qhs, MDSS, will adjust base on POC  - c/w lyrical and gabapentin    #CAD, S/p CABG  -Continue Lipitor 40mg po qhs    #MDS   -Recent transfusion at [facility] - 2units pRBCs  -Transfusion dependent   -No AC as per cardiology, patient bleeds easily     #Cirrhosis 2/2 hepatitis C c/b hepatocellular cardcinoma s/p embolization  #Hepatocellular carcinoma   - d/c  Spironolactone for now, Furosemide as above  -Trend K levels closely    #Chronic Kidney disease  -Cr close to baseline  -Avoid nephrotoxins  -Trend renal function   -Nephrology consult appreciated     #Hyperkalemia, now resolved  - c/w to monitor with daily BMP     #Hypothyroidism  -Continue levothyroxine 100mcg po daily     #VTE prophylaxis -SCDs due transfusion dependent anemia  #Advanced Directives -Full Code,-HCP Daniela Collier - updated over phone   # Dispo - will stay over weekend, requires IV diuretics

## 2021-05-22 NOTE — PROGRESS NOTE ADULT - PROBLEM SELECTOR PLAN 1
5/22 chest xray reveals improving effusions  Continue BID Lasix  Montior Creatinine  Wean O2 as tolerated  Patient with multiple prior pleural effusions, responds well to diuresis. Plan to continue IV diuresis for now with daily CXR and hold off on chest tube for now.  If patient further decompensates or if effusions appear to be worsening on CXR, will re-consider chest tube.   Further plan as per primary team.   Will continue to follow along.  D/W Dr Mcbride

## 2021-05-22 NOTE — PROGRESS NOTE ADULT - SUBJECTIVE AND OBJECTIVE BOX
NEPHROLOGY INTERVAL HPI/OVERNIGHT EVENTS:  pt lying flat, comfortable  no acute sob noted    MEDICATIONS  (STANDING):  amitriptyline 40 milliGRAM(s) Oral at bedtime  atorvastatin 40 milliGRAM(s) Oral at bedtime  cholecalciferol 2000 Unit(s) Oral daily  dextrose 40% Gel 15 Gram(s) Oral once  dextrose 5%. 1000 milliLiter(s) (50 mL/Hr) IV Continuous <Continuous>  dextrose 5%. 1000 milliLiter(s) (100 mL/Hr) IV Continuous <Continuous>  dextrose 50% Injectable 25 Gram(s) IV Push once  dextrose 50% Injectable 12.5 Gram(s) IV Push once  dextrose 50% Injectable 25 Gram(s) IV Push once  epoetin georgina-epbx (RETACRIT) Injectable 94189 Unit(s) SubCutaneous every 7 days  folic acid 1 milliGRAM(s) Oral daily  furosemide   Injectable 40 milliGRAM(s) IV Push two times a day  gabapentin 300 milliGRAM(s) Oral at bedtime  glucagon  Injectable 1 milliGRAM(s) IntraMuscular once  hydrALAZINE 25 milliGRAM(s) Oral every 8 hours  insulin glargine Injectable (LANTUS) 12 Unit(s) SubCutaneous at bedtime  insulin lispro (ADMELOG) corrective regimen sliding scale   SubCutaneous three times a day before meals  insulin lispro (ADMELOG) corrective regimen sliding scale   SubCutaneous at bedtime  levothyroxine 100 MICROGram(s) Oral daily  metoprolol tartrate 25 milliGRAM(s) Oral every 6 hours  montelukast 10 milliGRAM(s) Oral at bedtime  multivitamin 1 Tablet(s) Oral daily  Nephro-nunu 1 Tablet(s) Oral daily  pantoprazole    Tablet 40 milliGRAM(s) Oral before breakfast  senna 2 Tablet(s) Oral at bedtime  sodium bicarbonate 1300 milliGRAM(s) Oral two times a day  spironolactone 12.5 milliGRAM(s) Oral daily    MEDICATIONS  (PRN):  albuterol/ipratropium for Nebulization 3 milliLiter(s) Nebulizer every 6 hours PRN Shortness of Breath and/or Wheezing      Allergies    ACE inhibitors (Other)  Bactrim (Nephrotoxicity)  Biaxin (Joint Pain)  morphine (Short breath)  NSAIDs (Other)        Vital Signs Last 24 Hrs  T(C): 36.4 (22 May 2021 04:53), Max: 36.9 (21 May 2021 20:59)  T(F): 97.5 (22 May 2021 04:53), Max: 98.5 (21 May 2021 20:59)  HR: 97 (22 May 2021 04:53) (76 - 97)  BP: 139/62 (22 May 2021 04:53) (134/74 - 146/70)  BP(mean): --  RR: 18 (22 May 2021 04:53) (18 - 18)  SpO2: 97% (22 May 2021 04:53) (90% - 100%)    PHYSICAL EXAM:  GENERAL: Fatigued  ENMT: Dry mucous membranes  NECK: Supple, +JVD  NERVOUS SYSTEM:  Alert & Oriented X3,  intact and symmetric  CHEST/LUNG: Diminished BS at bases bilaterally (R>L)  HEART: No rub  ABDOMEN: Soft, Nontender, Nondistended; BS+  EXTREMITIES:  2+ Peripheral Pulses, + LE edema better  SKIN: No rashes or lesions    LABS:                        9.6    5.22  )-----------( 91       ( 21 May 2021 07:03 )             30.7     05-21    138  |  108<H>  |  61.0<H>  ----------------------------<  152<H>  4.8   |  20.0<L>  |  2.51<H>    Ca    9.1      21 May 2021 07:03              RADIOLOGY & ADDITIONAL TESTS:  < from: Xray Chest 1 View- PORTABLE-Routine (Xray Chest 1 View- PORTABLE-Routine in AM.) (05.21.21 @ 07:29) >     EXAM:  XR CHEST PORTABLE ROUTINE 1V                          PROCEDURE DATE:  05/21/2021          INTERPRETATION:  Clinical history: 76-year-old female, follow-up pleural effusion.    Two views of the chest are compared to radiographs and chest CTof 5/19/2021.    FINDINGS: Right [id number] with the tip in the right atrium and no pneumothorax, unchanged.    Mild cardiomegaly and large bilateral pleural effusions/adjacent atelectasis/consolidation, grossly unchanged.    Patient is post CABG and mitral valve surgery.    Atelectasis/infiltrate in the middle lobe, unchanged. Mild, central pulmonary venous congestion, unchanged    No pneumothorax or acute osseous finding.    IMPRESSION:  Large bilateral pleural effusions/adjacent consolidation/atelectasis, grossly unchanged.    Atelectasis/infiltrate in the middle lobe, unchanged.    < end of copied text >

## 2021-05-22 NOTE — PROGRESS NOTE ADULT - SUBJECTIVE AND OBJECTIVE BOX
[facility]                                   [address] [place] [address]                                                      PHONE: [phone]                                                         FAX: [phone]  [email or web address]  ---------------------------------------------------------------------------------------------------------------------------------    Overnight events/patient complaints:  Patient remains short of breath.  Urine output documented as 500cc.      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 1000 milliLiter(s) (50 mL/Hr) IV Continuous <Continuous>  dextrose 5%. 1000 milliLiter(s) (100 mL/Hr) IV Continuous <Continuous>  dextrose 50% Injectable 25 Gram(s) IV Push once  dextrose 50% Injectable 12.5 Gram(s) IV Push once  dextrose 50% Injectable 25 Gram(s) IV Push once  epoetin georgina-epbx (RETACRIT) Injectable 41558 Unit(s) SubCutaneous every 7 days  folic acid 1 milliGRAM(s) Oral daily  furosemide   Injectable 40 milliGRAM(s) IV Push two times a day  gabapentin 300 milliGRAM(s) Oral at bedtime  glucagon  Injectable 1 milliGRAM(s) IntraMuscular once  hydrALAZINE 25 milliGRAM(s) Oral every 8 hours  insulin glargine Injectable (LANTUS) 12 Unit(s) SubCutaneous at bedtime  insulin lispro (ADMELOG) corrective regimen sliding scale   SubCutaneous three times a day before meals  insulin lispro (ADMELOG) corrective regimen sliding scale   SubCutaneous at bedtime  levothyroxine 100 MICROGram(s) Oral daily  metoprolol tartrate 25 milliGRAM(s) Oral every 6 hours  montelukast 10 milliGRAM(s) Oral at bedtime  multivitamin 1 Tablet(s) Oral daily  Nephro-nunu 1 Tablet(s) Oral daily  pantoprazole    Tablet 40 milliGRAM(s) Oral before breakfast  senna 2 Tablet(s) Oral at bedtime  sodium bicarbonate 1300 milliGRAM(s) Oral two times a day  spironolactone 12.5 milliGRAM(s) Oral daily    MEDICATIONS  (PRN):  albuterol/ipratropium for Nebulization 3 milliLiter(s) Nebulizer every 6 hours PRN Shortness of Breath and/or Wheezing      Vital Signs Last 24 Hrs  T(C): 36.4 (22 May 2021 04:53), Max: 36.9 (21 May 2021 20:59)  T(F): 97.5 (22 May 2021 04:53), Max: 98.5 (21 May 2021 20:59)  HR: 97 (22 May 2021 04:53) (76 - 97)  BP: 139/62 (22 May 2021 04:53) (134/74 - 146/70)  BP(mean): --  RR: 18 (22 May 2021 04:53) (18 - 18)  SpO2: 97% (22 May 2021 04:53) (90% - 100%)  ICU Vital Signs Last 24 Hrs  [facility]  I&O's Detail    21 May 2021 07:01  -  22 May 2021 07:00  --------------------------------------------------------  IN:    Oral Fluid: 60 mL  Total IN: 60 mL    OUT:    Voided (mL): 500 mL  Total OUT: 500 mL    Total NET: -440 mL        I&O's Summary    21 May 2021 07:01  -  22 May 2021 07:00  --------------------------------------------------------  IN: 60 mL / OUT: 500 mL / NET: -440 mL      Drug Dosing Weight  [facility]      PHYSICAL EXAM:  General: Appears well developed, well nourished alert and cooperative.  HEENT: Head; normocephalic, atraumatic.  Eyes: Pupils reactive, cornea wnl.  Neck: Supple, no nodes adenopathy, no NVD or carotid bruit or thyromegaly.  CARDIOVASCULAR: irregular S1 and S2, 1/6 kulwant/decres murmur at rusb  LUNGS: +rales rales, rhonchi or wheeze. Normal breath sounds bilaterally.  ABDOMEN: Soft, nontender without mass or organomegaly. bowel sounds normoactive.  EXTREMITIES: No clubbing, cyanosis or edema. Distal pulses wnl.   SKIN: warm and dry with normal turgor.  NEURO: Alert/oriented x 3/normal motor exam. No pathologic reflexes.    PSYCH: normal affect.    LABS:                        9.6    5.22  )-----------( 91       ( 21 May 2021 07:03 )             30.7     05-21    138  |  108<H>  |  61.0<H>  ----------------------------<  152<H>  4.8   |  20.0<L>  |  2.51<H>    Ca    9.1      21 May 2021 07:03      NICHELLE GILL            RADIOLOGY & ADDITIONAL STUDIES:    INTERPRETATION OF TELEMETRY (personally reviewed):    ECG:    ECHO:    STRESS TEST:    CARDIAC CATHETERIZATION:    ASSESSMENT AND PLAN:  In summary, NICHELLE GILL is an 76y Female with past medical history significant for HFpEF, afib not on AC due to anemia, CAD s/p CABG and bioavr p/w ADHF    Acute on chronic HFpEF- increase to 60mg iv lasix bid  -strict I/O  -daily weights  -goal of net negative 1-2L    HLD- continue atrovastatin    HTN- c/w norvasc and hydralazine      Thank you for allowing [facility] to participate in the care of this patient.  Please feel free to call with any questions or concerns.

## 2021-05-22 NOTE — PROGRESS NOTE ADULT - ASSESSMENT
CKD(IV): pt at baseline renal functiont  CHF with chronic fluid overload; B/L pleural effusions---> + pre renal component  - avoid potential nephrotoxins  - cont IV Lasix, Aldactone (keep azotemic)  - (?) thoracentesis  - follow labs    Anemia: +CKD + MDS (transfusion dependent) + GI loss  - await Fe stores   - added ARACELI  - PRBCs as needed  - BGI workup in progress

## 2021-05-22 NOTE — PROGRESS NOTE ADULT - SUBJECTIVE AND OBJECTIVE BOX
Subjective: "I am feeling better!" NAD denies cp, sob.     Vital Signs:  Vital Signs Last 24 Hrs  T(C): 36.9 (05-22-21 @ 17:58), Max: 36.9 (05-21-21 @ 20:59)  T(F): 98.4 (05-22-21 @ 17:58), Max: 98.5 (05-21-21 @ 20:59)  HR: 87 (05-22-21 @ 17:58) (76 - 97)  BP: 130/70 (05-22-21 @ 17:58) (130/70 - 153/73)  RR: 18 (05-22-21 @ 17:58) (16 - 18)  SpO2: 100% (05-22-21 @ 17:58) (96% - 100%) on 3L    Relevant labs, radiology and Medications reviewed    Pertinent Physical Exam  Gen: WN/WD NAD  Neuro: A+Ox3, nonfocal  Pulm: diminsihed left > right few crackles L  CV: RRR  LE: no edema    CXR: decreasing pleural effusions b/l

## 2021-05-23 NOTE — PROGRESS NOTE ADULT - ASSESSMENT
76y Female with HTN HLD CAD s/p remote CABG, Bio AVR solitary episode of Af w/o recurrence, ILR in place, CRI, MDS transfusion dependent, cirrhosis sec to chronic hep c with esophageal varices and prior banding, hepatocellular carcinoma,s/p embolization with embozene particles on 4/5/21, and recent admission to [facility] after fall with head trauma with also transfused 2 untis PRBC during that admission, this time came to the ER for worsening dyspnea for several days worse with activity, orthopnea and elevated BP with hypoxic respiratory falure requiring BIPAP in ED, She was found to have acute decompensated heart failure. She was started on IV furosemide. Cardiology team consulted. CT surgery consulted for therapeutic thoracocentesis but it was deferred given improvement with diuretics.        #Decompensated Heart Failure with preserved EF  -cardiology consult noted  -c/w Furosemide 60 mg Iv q12hrs  -Echo 5/20/2021: LVEF 60-65%, G1DD   -Continue supplemental O2 at 3L/min   -Trend renal function closely  - c/w hydralazine, bb, off ace/arb  spironolactone due to CKD, off ASA due anemia    #Hypertension   -  hydralazine as above    #Diabetes Mellitus   -c/w 12u subQ qhs, MDSS, will adjust base on POC  - c/w lyrical and gabapentin    #CAD, S/p CABG  -Continue Lipitor 40mg po qhs    #MDS   -Recent transfusion at [facility] - 2units pRBCs  -Transfusion dependent   -No AC as per cardiology, patient bleeds easily     #Cirrhosis 2/2 hepatitis C c/b hepatocellular cardcinoma s/p embolization  #Hepatocellular carcinoma   - d/c  Spironolactone for now, Furosemide as above  -Trend K levels closely    #Chronic Kidney disease  -Cr uptrending >> adjusted Furosemide as above  -Avoid nephrotoxins  -Trend renal function   -Nephrology consult appreciated     #Hyperkalemia, now resolved  - c/w to monitor with daily BMP     #Hypothyroidism  -Continue levothyroxine 100mcg po daily     #VTE prophylaxis -SCDs due transfusion dependent anemia  #Advanced Directives -Full Code,-HCP Daniela Collier - updated over phone   # Dispo - needs iv diuretics

## 2021-05-23 NOTE — PROGRESS NOTE ADULT - SUBJECTIVE AND OBJECTIVE BOX
NEPHROLOGY INTERVAL HPI/OVERNIGHT EVENTS:  pt comfortable  no acute distress noted    MEDICATIONS  (STANDING):  amitriptyline 40 milliGRAM(s) Oral at bedtime  atorvastatin 40 milliGRAM(s) Oral at bedtime  cholecalciferol 2000 Unit(s) Oral daily  dextrose 40% Gel 15 Gram(s) Oral once  dextrose 5%. 1000 milliLiter(s) (50 mL/Hr) IV Continuous <Continuous>  dextrose 5%. 1000 milliLiter(s) (100 mL/Hr) IV Continuous <Continuous>  dextrose 50% Injectable 25 Gram(s) IV Push once  dextrose 50% Injectable 12.5 Gram(s) IV Push once  dextrose 50% Injectable 25 Gram(s) IV Push once  epoetin georgina-epbx (RETACRIT) Injectable 41330 Unit(s) SubCutaneous every 7 days  folic acid 1 milliGRAM(s) Oral daily  furosemide   Injectable 80 milliGRAM(s) IV Push two times a day  gabapentin 300 milliGRAM(s) Oral at bedtime  glucagon  Injectable 1 milliGRAM(s) IntraMuscular once  hydrALAZINE 25 milliGRAM(s) Oral every 8 hours  insulin glargine Injectable (LANTUS) 12 Unit(s) SubCutaneous at bedtime  insulin lispro (ADMELOG) corrective regimen sliding scale   SubCutaneous three times a day before meals  insulin lispro (ADMELOG) corrective regimen sliding scale   SubCutaneous at bedtime  levothyroxine 100 MICROGram(s) Oral daily  metoprolol tartrate 25 milliGRAM(s) Oral every 6 hours  montelukast 10 milliGRAM(s) Oral at bedtime  multivitamin 1 Tablet(s) Oral daily  Nephro-nunu 1 Tablet(s) Oral daily  pantoprazole    Tablet 40 milliGRAM(s) Oral before breakfast  senna 2 Tablet(s) Oral at bedtime  sodium bicarbonate 1300 milliGRAM(s) Oral two times a day    MEDICATIONS  (PRN):  albuterol/ipratropium for Nebulization 3 milliLiter(s) Nebulizer every 6 hours PRN Shortness of Breath and/or Wheezing      Allergies    ACE inhibitors (Other)  Bactrim (Nephrotoxicity)  Biaxin (Joint Pain)  morphine (Short breath)  NSAIDs (Other)          Vital Signs Last 24 Hrs  T(C): 36.6 (23 May 2021 05:17), Max: 36.9 (22 May 2021 17:58)  T(F): 97.8 (23 May 2021 05:17), Max: 98.4 (22 May 2021 17:58)  HR: 94 (23 May 2021 06:19) (79 - 94)  BP: 117/69 (23 May 2021 06:19) (117/69 - 153/77)  BP(mean): --  RR: 18 (23 May 2021 05:17) (18 - 18)  SpO2: 100% (23 May 2021 05:17) (97% - 100%)    PHYSICAL EXAM:  GENERAL: Chronically ill appearing  ENMT: Dry mucous membranes  NECK: Supple, +JVD  NERVOUS SYSTEM:  Alert & Oriented X3,  intact and symmetric  CHEST/LUNG: Diminished BS at bases bilaterally (R>L)  HEART: No rub  ABDOMEN: Soft, Nontender, Nondistended; BS+  EXTREMITIES:  2+ Peripheral Pulses, + LE edema better  SKIN: No rashes or lesions    LABS:    05-22    139  |  104  |  67.0<H>  ----------------------------<  143<H>  4.7   |  24.0  |  2.67<H>    Ca    8.9      22 May 2021 10:19              RADIOLOGY & ADDITIONAL TESTS:

## 2021-05-23 NOTE — PROGRESS NOTE ADULT - PROBLEM SELECTOR PLAN 1
5/22 chest xray reveals improving effusions  Continue BID Lasix  Montior Creatinine  Wean O2 as tolerated  Patient with multiple prior pleural effusions, responds well to diuresis. Plan to continue IV diuresis for now with daily CXR and hold off on chest tube > pt w/o symptoms  If patient further decompensates or if effusions appear to be worsening on CXR, will re-consider chest tube.   Further plan as per primary team.   Will continue to follow along.  D/W Dr Mcbride

## 2021-05-23 NOTE — PROGRESS NOTE ADULT - SUBJECTIVE AND OBJECTIVE BOX
Nursing Transfer Note      10/26/2017     [id number]    Transfer via bed    Transfer with brian    Transported by pct    Medicines sent: no    Chart send with patient: Yes    Notified: son                           [facility]                                   [address] [place] [address]                                                      PHONE: [phone]                                                         FAX: [phone]  [email or web address]  ---------------------------------------------------------------------------------------------------------------------------------    Overnight events/patient complaints:  Patient feeling well.  Breathing is improving.     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 1000 milliLiter(s) (50 mL/Hr) IV Continuous <Continuous>  dextrose 5%. 1000 milliLiter(s) (100 mL/Hr) IV Continuous <Continuous>  dextrose 50% Injectable 25 Gram(s) IV Push once  dextrose 50% Injectable 12.5 Gram(s) IV Push once  dextrose 50% Injectable 25 Gram(s) IV Push once  epoetin georgina-epbx (RETACRIT) Injectable 62264 Unit(s) SubCutaneous every 7 days  folic acid 1 milliGRAM(s) Oral daily  furosemide   Injectable 80 milliGRAM(s) IV Push two times a day  gabapentin 300 milliGRAM(s) Oral at bedtime  glucagon  Injectable 1 milliGRAM(s) IntraMuscular once  hydrALAZINE 25 milliGRAM(s) Oral every 8 hours  insulin glargine Injectable (LANTUS) 12 Unit(s) SubCutaneous at bedtime  insulin lispro (ADMELOG) corrective regimen sliding scale   SubCutaneous three times a day before meals  insulin lispro (ADMELOG) corrective regimen sliding scale   SubCutaneous at bedtime  iron sucrose IVPB 250 milliGRAM(s) IV Intermittent <User Schedule>  levothyroxine 100 MICROGram(s) Oral daily  metoprolol tartrate 25 milliGRAM(s) Oral every 6 hours  montelukast 10 milliGRAM(s) Oral at bedtime  multivitamin 1 Tablet(s) Oral daily  Nephro-nunu 1 Tablet(s) Oral daily  pantoprazole    Tablet 40 milliGRAM(s) Oral before breakfast  senna 2 Tablet(s) Oral at bedtime  sodium bicarbonate 1300 milliGRAM(s) Oral two times a day    MEDICATIONS  (PRN):  albuterol/ipratropium for Nebulization 3 milliLiter(s) Nebulizer every 6 hours PRN Shortness of Breath and/or Wheezing      Vital Signs Last 24 Hrs  T(C): 36.6 (23 May 2021 05:17), Max: 36.9 (22 May 2021 17:58)  T(F): 97.8 (23 May 2021 05:17), Max: 98.4 (22 May 2021 17:58)  HR: 94 (23 May 2021 06:19) (79 - 94)  BP: 117/69 (23 May 2021 06:19) (117/69 - 153/77)  BP(mean): --  RR: 18 (23 May 2021 05:17) (18 - 18)  SpO2: 100% (23 May 2021 05:17) (97% - 100%)  ICU Vital Signs Last 24 Hrs  NICHELLEISAAC GILL  I&O's Detail    I&O's Summary    Drug Dosing Weight  NICHELLE GILL      PHYSICAL EXAM:  PHYSICAL EXAM:  General: Appears well developed, well nourished alert and cooperative.  HEENT: Head; normocephalic, atraumatic.  Eyes: Pupils reactive, cornea wnl.  Neck: Supple, no nodes adenopathy, no NVD or carotid bruit or thyromegaly.  CARDIOVASCULAR: irregular S1 and S2, 1/6 kulwant/decres murmur at rusb  LUNGS: -rales rales, rhonchi or wheeze. Normal breath sounds bilaterally.  ABDOMEN: Soft, nontender without mass or organomegaly. bowel sounds normoactive.  EXTREMITIES: No clubbing, cyanosis or edema. Distal pulses wnl.   SKIN: warm and dry with normal turgor.  NEURO: Alert/oriented x 3/normal motor exam. No pathologic reflexes.    PSYCH: normal affect.        LABS:    05-22    139  |  104  |  67.0<H>  ----------------------------<  143<H>  4.7   |  24.0  |  2.67<H>    Ca    8.9      22 May 2021 10:19      NICHELLE GILL            RADIOLOGY & ADDITIONAL STUDIES:    INTERPRETATION OF TELEMETRY (personally reviewed):    ECG:    ECHO:    STRESS TEST:    CARDIAC CATHETERIZATION:    ASSESSMENT AND PLAN:  In summary, NICHELLE GILL is an 76y Female with past medical history significant for HFpEF, afib not on AC due to anemia, CAD s/p CABG and bioavr p/w ADHF    Acute on chronic HFpEF- continue 60mg iv lasix bid  -strict I/O  -daily weights  -goal of net negative 1-2L    HLD- continue atrovastatin    HTN- c/w norvasc and hydralazine    Thank you for allowing [facility] to participate in the care of this patient.  Please feel free to call with any questions or concerns.

## 2021-05-23 NOTE — PROGRESS NOTE ADULT - SUBJECTIVE AND OBJECTIVE BOX
Subjective: "JHello, Im better"  Sitting up in bed, states her breathing is better "but not perfect"      V/S  T(C): 36.5 (05-23-21 @ 11:27), Max: 36.9 (05-22-21 @ 17:58)  HR: 90 (05-23-21 @ 11:27) (79 - 94)  BP: 145/73 (05-23-21 @ 11:27) (117/69 - 153/77)  RR: 18 (05-23-21 @ 11:27) (18 - 18)  SpO2: 97% (05-23-21 @ 11:27) (97% - 100%)      MEDICATIONS  (STANDING):  amitriptyline 40 milliGRAM(s) Oral at bedtime  atorvastatin 40 milliGRAM(s) Oral at bedtime  cholecalciferol 2000 Unit(s) Oral daily  dextrose 40% Gel 15 Gram(s) Oral once  dextrose 5%. 1000 milliLiter(s) (50 mL/Hr) IV Continuous <Continuous>  dextrose 5%. 1000 milliLiter(s) (100 mL/Hr) IV Continuous <Continuous>  dextrose 50% Injectable 25 Gram(s) IV Push once  dextrose 50% Injectable 12.5 Gram(s) IV Push once  dextrose 50% Injectable 25 Gram(s) IV Push once  epoetin georgina-epbx (RETACRIT) Injectable 92576 Unit(s) SubCutaneous every 7 days  folic acid 1 milliGRAM(s) Oral daily  furosemide   Injectable 80 milliGRAM(s) IV Push two times a day  gabapentin 300 milliGRAM(s) Oral at bedtime  glucagon  Injectable 1 milliGRAM(s) IntraMuscular once  hydrALAZINE 25 milliGRAM(s) Oral every 8 hours  insulin glargine Injectable (LANTUS) 12 Unit(s) SubCutaneous at bedtime  insulin lispro (ADMELOG) corrective regimen sliding scale   SubCutaneous three times a day before meals  insulin lispro (ADMELOG) corrective regimen sliding scale   SubCutaneous at bedtime  iron sucrose IVPB 250 milliGRAM(s) IV Intermittent <User Schedule>  levothyroxine 100 MICROGram(s) Oral daily  metoprolol tartrate 25 milliGRAM(s) Oral every 6 hours  montelukast 10 milliGRAM(s) Oral at bedtime  multivitamin 1 Tablet(s) Oral daily  Nephro-nunu 1 Tablet(s) Oral daily  pantoprazole    Tablet 40 milliGRAM(s) Oral before breakfast  senna 2 Tablet(s) Oral at bedtime  sodium bicarbonate 1300 milliGRAM(s) Oral two times a day      05-23    142  |  104  |  74.0<H>  ----------------------------<  182<H>  4.3   |  26.0  |  2.85<H>    Ca    8.5<L>      23 May 2021 10:13  Phos  5.3     05-23  Mg     2.4     05-23                         CAPILLARY BLOOD GLUCOSE      POCT Blood Glucose.: 191 mg/dL (23 May 2021 11:48)  POCT Blood Glucose.: 116 mg/dL (23 May 2021 07:58)  POCT Blood Glucose.: 207 mg/dL (22 May 2021 21:41)  POCT Blood Glucose.: 162 mg/dL (22 May 2021 16:45)           CXR: < from: Xray Chest 1 View- PORTABLE-Routine (Xray Chest 1 View- PORTABLE-Routine in AM.) (05.22.21 @ 06:36) >  Mediport catheter tip in SVC RIGHT atrium junction..  The lungs show decreasing LEFT greater than RIGHT haziness overlying lung bases likely indicating residual pleural effusions and/or basilar airspace disease.    The  heart is enlarged in transverse diameter. No hilar mass.  Status post median sternotomy.     Visualized osseous structures are intact.    < end of copied text >        Physical Exam:    Gen: WN/WD NAD    Neuro: A+Ox3, nonfocal    Pulm: diminsihed left > right few crackles L Supplemental O2 2 lpm NC    CV: RRR    LE: no edema              PAST MEDICAL & SURGICAL HISTORY:  Hypertension  well controlled    High cholesterol    Low back pain  chronic over a year    Asthma  well controlled, never intubated for exacerbation    Anemia, unspecified anemia type  uses O2 at 2L at home PRN, due to anemia    Aortic stenosis  s/p bovine AVR    Heart failure  Echo  10/2020 EF 60%, mild AR    CAD (coronary artery disease)  s/p OHS    CKD (chronic kidney disease)  Stage 4    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

## 2021-05-23 NOTE — PROGRESS NOTE ADULT - ASSESSMENT
CKD(IV): pt seems at baseline renal function  CHF with chronic fluid overload; B/L pleural effusions  - avoid potential nephrotoxins  - cont IV Lasix, Aldactone (keep azotemic)  - (?) thoracentesis  - follow labs    Anemia: +CKD + MDS (transfusion dependent) + GI loss  Low Fe stores  - cont ARACELI  - add IV Fe  - PRBCs as needed  - GI follow up

## 2021-05-23 NOTE — PROGRESS NOTE ADULT - ASSESSMENT
ASSESSMENT:   76 year old Female with a medical history of HTN, HLD, CAD s/p remote CABG Bio AVR, solitary episode of Af w/o recurrence, ILR in place, CRI, MDS transfusion dependent, cirrhosis secondary to Hep C (contracted from a blood transfusion when she was an infant) with esophageal varices and prior banding, recent finding of hepatic mass/hepatoma s/p embolization, came to the ER for worsening dyspnea for several days worse with activity, orthopnea and elevated BP.    Recent admission at [facility] s/p fall last week resulting in a "hematoma" of her head as per patient. CT head was negative as per chart. Patient was found to be anemic and got 2 units of prbc. Pt stated that she cannot be on ASA or any blood thinners due to anemia and bleeding risk. Pt has a port for blood transfusions and iron infusions by her hematologist since she has required chronic transfusions. As per patient, she has received "over 60 units of blood" since her open heart surgery a few years ago.    CT chest performed for SOB/DUPONT showing small-moderate b/l pleural effusions R>L.   5/23 Decreasing oxygen requirements>being diuresed

## 2021-05-24 NOTE — PROGRESS NOTE ADULT - SUBJECTIVE AND OBJECTIVE BOX
NEPHROLOGY INTERVAL HPI/OVERNIGHT EVENTS:  pt still doing well  no acute sob, cp, n/v/d noted    MEDICATIONS  (STANDING):  amitriptyline 40 milliGRAM(s) Oral at bedtime  atorvastatin 40 milliGRAM(s) Oral at bedtime  cholecalciferol 2000 Unit(s) Oral daily  dextrose 40% Gel 15 Gram(s) Oral once  dextrose 5%. 1000 milliLiter(s) (50 mL/Hr) IV Continuous <Continuous>  dextrose 5%. 1000 milliLiter(s) (100 mL/Hr) IV Continuous <Continuous>  dextrose 50% Injectable 25 Gram(s) IV Push once  dextrose 50% Injectable 12.5 Gram(s) IV Push once  dextrose 50% Injectable 25 Gram(s) IV Push once  epoetin georgina-epbx (RETACRIT) Injectable 69247 Unit(s) SubCutaneous every 7 days  folic acid 1 milliGRAM(s) Oral daily  furosemide   Injectable 60 milliGRAM(s) IV Push two times a day  gabapentin 300 milliGRAM(s) Oral at bedtime  glucagon  Injectable 1 milliGRAM(s) IntraMuscular once  hydrALAZINE 25 milliGRAM(s) Oral every 8 hours  insulin glargine Injectable (LANTUS) 12 Unit(s) SubCutaneous at bedtime  insulin lispro (ADMELOG) corrective regimen sliding scale   SubCutaneous three times a day before meals  insulin lispro (ADMELOG) corrective regimen sliding scale   SubCutaneous at bedtime  iron sucrose IVPB 250 milliGRAM(s) IV Intermittent <User Schedule>  levothyroxine 100 MICROGram(s) Oral daily  metoprolol tartrate 25 milliGRAM(s) Oral every 6 hours  montelukast 10 milliGRAM(s) Oral at bedtime  multivitamin 1 Tablet(s) Oral daily  Nephro-nunu 1 Tablet(s) Oral daily  pantoprazole    Tablet 40 milliGRAM(s) Oral before breakfast  senna 2 Tablet(s) Oral at bedtime  sodium bicarbonate 1300 milliGRAM(s) Oral two times a day    MEDICATIONS  (PRN):  albuterol/ipratropium for Nebulization 3 milliLiter(s) Nebulizer every 6 hours PRN Shortness of Breath and/or Wheezing      Allergies    ACE inhibitors (Other)  Bactrim (Nephrotoxicity)  Biaxin (Joint Pain)  morphine (Short breath)  NSAIDs (Other)        Vital Signs Last 24 Hrs  T(C): 36.4 (24 May 2021 05:13), Max: 36.8 (23 May 2021 16:57)  T(F): 97.6 (24 May 2021 05:13), Max: 98.2 (23 May 2021 16:57)  HR: 85 (24 May 2021 05:13) (85 - 96)  BP: 137/73 (24 May 2021 05:13) (137/73 - 156/78)  BP(mean): --  RR: 18 (24 May 2021 05:13) (18 - 18)  SpO2: 97% (24 May 2021 05:13) (92% - 97%)    PHYSICAL EXAM:  GENERAL: Comfortable; minimal sob at rest  ENMT: Dry mucous membranes  NECK: Supple, +JVD  NERVOUS SYSTEM:  Alert & Oriented X3,  intact and symmetric  CHEST/LUNG: Diminished BS at bases bilaterally  HEART: No rub  ABDOMEN: Soft, Nontender, Nondistended; BS+  EXTREMITIES:  2+ Peripheral Pulses, + improved LE edema   SKIN: No rashes or lesions    LABS:                        8.9    5.39  )-----------( 108      ( 24 May 2021 08:10 )             29.4     05-24    144  |  106  |  73.0<H>  ----------------------------<  69<L>  3.6   |  28.0  |  2.81<H>    Ca    8.6      24 May 2021 08:10  Phos  5.3     05-23  Mg     2.4     05-23                RADIOLOGY & ADDITIONAL TESTS:  < from: Xray Chest 1 View- PORTABLE-Routine (Xray Chest 1 View- PORTABLE-Routine in AM.) (05.22.21 @ 06:36) >   EXAM:  XR CHEST PORTABLE ROUTINE 1V                          PROCEDURE DATE:  05/22/2021          INTERPRETATION:  Portable chest radiograph    CLINICAL INFORMATION: Pleural effusion. Follow-up    TECHNIQUE:  Portable  AP view of the chest was obtained.    COMPARISON: 5/21/2021 chest available for review.    FINDINGS:  Mediport catheter tip in SVC RIGHT atrium junction..  The lungs show decreasing LEFT greater than RIGHT haziness overlying lung bases likely indicating residual pleural effusions and/or basilar airspace disease.    The  heart is enlarged in transverse diameter. No hilar mass.  Status post median sternotomy.     Visualized osseous structures are intact.    IMPRESSION: Decreasing LEFT greater than RIGHT and residual pleural effusions and/or basilar airspace disease..      < end of copied text >

## 2021-05-24 NOTE — PROGRESS NOTE ADULT - PROBLEM SELECTOR PLAN 1
5/22 chest xray reveals improving effusions  Continue BID Lasix  Montior Creatinine  Wean O2 as tolerated IV diuresis for now with daily CXR and hold off on chest tube > pt w/o symptoms  If patient further decompensates or if effusions appear to be worsening on CXR, will re-consider chest tube.   Further plan as per primary team.   Will continue to follow along.  D/W Dr Mcbride

## 2021-05-24 NOTE — PROGRESS NOTE ADULT - ASSESSMENT
CKD(IV): + azotemic component  CHF with chronic fluid overload; B/L pleural effusions---> latest X ray improved  - cont to avoid potential nephrotoxins  - cont IV Lasix, Aldactone to keep azotemic  - follow labs    Anemia: +CKD + MDS (transfusion dependent) + GI loss  Low Fe stores  - cont ARACELI and IV Fe  - PRBCs as needed  - GI follow up

## 2021-05-24 NOTE — PROGRESS NOTE ADULT - SUBJECTIVE AND OBJECTIVE BOX
Subjective:      V/S  T(C): 36.6 (05-24-21 @ 16:53), Max: 36.6 (05-24-21 @ 10:49)  HR: 89 (05-24-21 @ 16:53) (85 - 95)  BP: 145/67 (05-24-21 @ 16:53) (128/83 - 145/67)  ABP: --  ABP(mean): --  RR: 18 (05-24-21 @ 16:53) (18 - 18)  SpO2: 97% (05-24-21 @ 16:53) (97% - 98%)  Wt(kg): --  CVP(mm Hg): --  CO: --  CI: --  PA: --                                                Tele:    CHEST TUBE:                           OUTPUT:             per 24 hours     Drainage:    AIR LEAKS:  [ ] YES [ ] NO      MEDICATIONS  (STANDING):  amitriptyline 40 milliGRAM(s) Oral at bedtime  atorvastatin 40 milliGRAM(s) Oral at bedtime  cholecalciferol 2000 Unit(s) Oral daily  dextrose 40% Gel 15 Gram(s) Oral once  dextrose 5%. 1000 milliLiter(s) (50 mL/Hr) IV Continuous <Continuous>  dextrose 5%. 1000 milliLiter(s) (100 mL/Hr) IV Continuous <Continuous>  dextrose 50% Injectable 25 Gram(s) IV Push once  dextrose 50% Injectable 12.5 Gram(s) IV Push once  dextrose 50% Injectable 25 Gram(s) IV Push once  epoetin georgina-epbx (RETACRIT) Injectable 60146 Unit(s) SubCutaneous every 7 days  folic acid 1 milliGRAM(s) Oral daily  furosemide   Injectable 60 milliGRAM(s) IV Push two times a day  gabapentin 300 milliGRAM(s) Oral at bedtime  glucagon  Injectable 1 milliGRAM(s) IntraMuscular once  hydrALAZINE 25 milliGRAM(s) Oral every 8 hours  insulin glargine Injectable (LANTUS) 12 Unit(s) SubCutaneous at bedtime  insulin lispro (ADMELOG) corrective regimen sliding scale   SubCutaneous three times a day before meals  insulin lispro (ADMELOG) corrective regimen sliding scale   SubCutaneous at bedtime  iron sucrose IVPB 250 milliGRAM(s) IV Intermittent <User Schedule>  levothyroxine 100 MICROGram(s) Oral daily  metoprolol tartrate 25 milliGRAM(s) Oral every 6 hours  montelukast 10 milliGRAM(s) Oral at bedtime  multivitamin 1 Tablet(s) Oral daily  Nephro-nunu 1 Tablet(s) Oral daily  pantoprazole    Tablet 40 milliGRAM(s) Oral before breakfast  senna 2 Tablet(s) Oral at bedtime      05-24    144  |  106  |  73.0<H>  ----------------------------<  69<L>  3.6   |  28.0  |  2.81<H>    Ca    8.6      24 May 2021 08:10  Phos  5.3     05-23  Mg     2.4     05-23                                 8.9    5.39  )-----------( 108      ( 24 May 2021 08:10 )             29.4                 CAPILLARY BLOOD GLUCOSE      POCT Blood Glucose.: 184 mg/dL (24 May 2021 16:54)  POCT Blood Glucose.: 137 mg/dL (24 May 2021 11:57)  POCT Blood Glucose.: 85 mg/dL (24 May 2021 07:50)  POCT Blood Glucose.: 124 mg/dL (23 May 2021 22:25)           CXR:      Physical Exam:    Neuro:     Pulm:     CV:    Abd:     Extremities:     Incision:              PAST MEDICAL & SURGICAL HISTORY:  Hypertension  well controlled    High cholesterol    Low back pain  chronic over a year    Asthma  well controlled, never intubated for exacerbation    Anemia, unspecified anemia type  uses O2 at 2L at home PRN, due to anemia    Aortic stenosis  s/p bovine AVR    Heart failure  Echo  10/2020 EF 60%, mild AR    CAD (coronary artery disease)  s/p OHS    CKD (chronic kidney disease)  Stage 4    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

## 2021-05-24 NOTE — PROGRESS NOTE ADULT - SUBJECTIVE AND OBJECTIVE BOX
[facility] - [facility]                                   [address] [place] [address]                                                      PHONE: [phone]                                                         FAX: [phone]  [email or web address]  ---------------------------------------------------------------------------------------------------------------------------------    Overnight events/patient complaints:  NAD feeling well today     ACE inhibitors (Other)  Bactrim (Nephrotoxicity)  Biaxin (Joint Pain)  morphine (Short breath)  NSAIDs (Other)    MEDICATIONS  (STANDING):  amitriptyline 40 milliGRAM(s) Oral at bedtime  atorvastatin 40 milliGRAM(s) Oral at bedtime  cholecalciferol 2000 Unit(s) Oral daily  dextrose 40% Gel 15 Gram(s) Oral once  dextrose 5%. 1000 milliLiter(s) (50 mL/Hr) IV Continuous <Continuous>  dextrose 5%. 1000 milliLiter(s) (100 mL/Hr) IV Continuous <Continuous>  dextrose 50% Injectable 25 Gram(s) IV Push once  dextrose 50% Injectable 12.5 Gram(s) IV Push once  dextrose 50% Injectable 25 Gram(s) IV Push once  epoetin georgina-epbx (RETACRIT) Injectable 65844 Unit(s) SubCutaneous every 7 days  folic acid 1 milliGRAM(s) Oral daily  furosemide   Injectable 60 milliGRAM(s) IV Push two times a day  gabapentin 300 milliGRAM(s) Oral at bedtime  glucagon  Injectable 1 milliGRAM(s) IntraMuscular once  hydrALAZINE 25 milliGRAM(s) Oral every 8 hours  insulin glargine Injectable (LANTUS) 12 Unit(s) SubCutaneous at bedtime  insulin lispro (ADMELOG) corrective regimen sliding scale   SubCutaneous three times a day before meals  insulin lispro (ADMELOG) corrective regimen sliding scale   SubCutaneous at bedtime  iron sucrose IVPB 250 milliGRAM(s) IV Intermittent <User Schedule>  levothyroxine 100 MICROGram(s) Oral daily  metoprolol tartrate 25 milliGRAM(s) Oral every 6 hours  montelukast 10 milliGRAM(s) Oral at bedtime  multivitamin 1 Tablet(s) Oral daily  Nephro-nunu 1 Tablet(s) Oral daily  pantoprazole    Tablet 40 milliGRAM(s) Oral before breakfast  senna 2 Tablet(s) Oral at bedtime  sodium bicarbonate 1300 milliGRAM(s) Oral two times a day    MEDICATIONS  (PRN):  albuterol/ipratropium for Nebulization 3 milliLiter(s) Nebulizer every 6 hours PRN Shortness of Breath and/or Wheezing      Vital Signs Last 24 Hrs  T(C): 36.4 (24 May 2021 05:13), Max: 36.8 (23 May 2021 16:57)  T(F): 97.6 (24 May 2021 05:13), Max: 98.2 (23 May 2021 16:57)  HR: 85 (24 May 2021 05:13) (85 - 96)  BP: 137/73 (24 May 2021 05:13) (137/73 - 156/78)  BP(mean): --  RR: 18 (24 May 2021 05:13) (18 - 18)  SpO2: 97% (24 May 2021 05:13) (92% - 97%)  ICU Vital Signs Last 24 Hrs  NICHELLE GILL  I&O's Detail    23 May 2021 07:01  -  24 May 2021 07:00  --------------------------------------------------------  IN:  Total IN: 0 mL    OUT:    Voided (mL): 450 mL  Total OUT: 450 mL    Total NET: -450 mL        I&O's Summary    23 May 2021 07:01  -  24 May 2021 07:00  --------------------------------------------------------  IN: 0 mL / OUT: 450 mL / NET: -450 mL      Drug Dosing Weight  [facility]      PHYSICAL EXAM:  General: Appears well developed, well nourished alert and cooperative.  HEENT: Head; normocephalic, atraumatic.  Eyes: Pupils reactive, cornea wnl.  Neck: Supple, no nodes adenopathy, no NVD or carotid bruit or thyromegaly.  CARDIOVASCULAR: Normal S1 and S2, 1/6 murmur, rub, gallop or lift.   LUNGS: Decrease BS B/L   ABDOMEN: Soft, nontender without mass or organomegaly. bowel sounds normoactive.  EXTREMITIES: No clubbing, cyanosis or edema. Distal pulses wnl.   SKIN: warm and dry with normal turgor.  NEURO: Alert/oriented x 3/normal motor exam. No pathologic reflexes.    PSYCH: normal affect.        LABS:                        8.9    5.39  )-----------( 108      ( 24 May 2021 08:10 )             29.4     05-24    144  |  106  |  73.0<H>  ----------------------------<  69<L>  3.6   |  28.0  |  2.81<H>    Ca    8.6      24 May 2021 08:10  Phos  5.3     05-23  Mg     2.4     05-23      [facility]            RADIOLOGY & ADDITIONAL STUDIES:    INTERPRETATION OF TELEMETRY (personally reviewed):    Summary:   1. Left ventricular ejection fraction, by visual estimation, is 60 to 65%.   2. Normal global left ventricular systolic function.   3. The mitral in-flow pattern reveals no discernable A-wave, therefore no comment on diastolic function can be made.   4. There ismoderate concentric left ventricular hypertrophy.   5. The right ventricle is not well visualized. Systolic function appears grossly preserved.   6. Severely enlarged left atrium.   7. Moderately enlarged right atrium.   8. Moderate tricuspid regurgitation.   9. Trace mitral valve regurgitation.  10. Mitral valve mean gradient is 4.9 mmHg consistent with mild mitral stenosis.  11. Mild thickening of the anterior and posterior mitral valve leaflets.  12. Moderate mitral annular calcification.  13.A well-seated bioprosthetic valve is visualized in the aortic position. There is mild-moderate central regurgitation. Mean gradiet is 8.5 mm Hg and dimensionless index is 0.47, which are likely consistent with a normal funcitoning bioprosthetic aortic valve.  14. Mildly dilated pulmonary artery.  15. Estimated pulmonary artery systolic pressure is 53.1 mmHg assuming a right atrial pressure of 3 mmHg, which is consistent with moderate pulmonary hypertension.  16. There is no evidence of pericardial effusion.  17. Moderate pleural effusion in both left and right lateral regions.  18. Compared to a prior study from 10/19/20, there is now pulmonary hypertension and bilateral pleural effusions are now present.    MD Renay Electronicallysigned on 5/20/2021 at 1:03:42 PM        VENTRICLES: LVEF 60% with PATRICIA 0.95 cm sq with mean gradient of 39 mm Hg  CORONARY VESSELS: The coronary circulation is right dominant.  LM:   --  LM: Normal.  LAD:   --  LAD: Angiography showed minor luminal irregularities with no  flow limiting lesions.  CX:   --  Mid circumflex: There was a 95 % stenosis.  RCA:   --  Distal RCA: There was a 95 % stenosis in the distal third of the  vessel segment.  --  RPDA: There was a 70 % stenosis at the ostium of the vessel segment.  --  RPLS: There was a 70 % stenosis at the ostium of the vessel segment.  COMPLICATIONS: There were no complications.  DIAGNOSTIC IMPRESSIONS: Severe LCX and RCA disease with AS  DIAGNOSTIC RECOMMENDATIONS: Eval for AS and CABG  INTERVENTIONAL IMPRESSIONS: Severe LCX and RCA disease with AS  INTERVENTIONAL RECOMMENDATIONS: Eval for AS and CABG  Prepared and signed by  Nathan Swanson MD  Signed 02/01/2016 16:20:34  HEMODYNAMIC TABLES  Pressures:  Baseline  Pressures:  - HR: 79    ASSESSMENT AND PLAN:  In summary, NICHELLE GILL is an 76y Female with past medical history significant for CKD CAD/CABG/AVR who presents with Dyspnea c/w HFpEF, recurrent AF not AC candidate due to bleeding risk, s/p ILR, CAd s/p remote CABG/Bio AVR normal EF mod MR, CRI awaiting ? HD, cirrhosis /  hepatoma s/p embolization, MDS transfusion dependent, HTN, HLD with AF; clinicaly imrpoving; TTE stable see above     Plan  1.  Continue Lasix therapy IV BID and monitor renal panel   2.  Continue current optimal medical therapy   3.  Renal panel   4.  CT surgery following and no intervention at this time for plural effusion

## 2021-05-24 NOTE — PROGRESS NOTE ADULT - SUBJECTIVE AND OBJECTIVE BOX
CC: chf (24 May 2021 11:05)    HPI:  76y Female with HTN HLD CAD s/p remote CABG Bio AVR, solitary episode of Af w/o recurrence, ILR in place, CRI, MDS transfusion dependent, cirrhosis sec to hep c with esophageal varices and prior banding, recent finding of hepatic mass/hepatoma s/p embolization, came to the ER for worsening dyspnea for several days worse with activity, orthopnea and elevated BP.     INTERVAL HPI/OVERNIGHT EVENTS: Patient seen and examined sitting up in bed.  Patient reports SOB improved.  Patient complains of headache, secondary to concussion patient has from fall prior to admission.  Patient denies any dizziness, CP, abdominal pain, nausea, vomiting, dysuria.  Other ROS reviewed and are negative.      Vital Signs Last 24 Hrs  T(C): 36.4 (24 May 2021 05:13), Max: 36.8 (23 May 2021 16:57)  T(F): 97.6 (24 May 2021 05:13), Max: 98.2 (23 May 2021 16:57)  HR: 85 (24 May 2021 05:13) (85 - 96)  BP: 137/73 (24 May 2021 05:13) (137/73 - 156/78)  BP(mean): --  RR: 18 (24 May 2021 05:13) (18 - 18)  SpO2: 97% (24 May 2021 05:13) (92% - 97%)  I&O's Detail    23 May 2021 07:01  -  24 May 2021 07:00  --------------------------------------------------------  IN:  Total IN: 0 mL    OUT:    Voided (mL): 450 mL  Total OUT: 450 mL    Total NET: -450 mL      PHYSICAL EXAM:  GENERAL: NAD  HEAD:  Atraumatic, Normocephalic  NECK: Supple, No JVD, Normal thyroid  NERVOUS SYSTEM:  Alert & Oriented X3, Good concentration; Motor Strength 5/5 B/L upper and lower extremities  CHEST/LUNG: Bibasilar crackles  HEART: Regular rate and rhythm; No murmurs, rubs, or gallops  ABDOMEN: Soft, Nontender, Nondistended; Bowel sounds present  EXTREMITIES:  2+ Peripheral Pulses, No clubbing, cyanosis, or edema                            8.9    5.39  )-----------( 108      ( 24 May 2021 08:10 )             29.4     24 May 2021 08:10    144    |  106    |  73.0   ----------------------------<  69     3.6     |  28.0   |  2.81     Ca    8.6        24 May 2021 08:10  Phos  5.3       23 May 2021 10:13  Mg     2.4       23 May 2021 10:13        CAPILLARY BLOOD GLUCOSE  POCT Blood Glucose.: 137 mg/dL (24 May 2021 11:57)  POCT Blood Glucose.: 85 mg/dL (24 May 2021 07:50)  POCT Blood Glucose.: 124 mg/dL (23 May 2021 22:25)  POCT Blood Glucose.: 198 mg/dL (23 May 2021 16:45)          MEDICATIONS  (STANDING):  amitriptyline 40 milliGRAM(s) Oral at bedtime  atorvastatin 40 milliGRAM(s) Oral at bedtime  cholecalciferol 2000 Unit(s) Oral daily  dextrose 40% Gel 15 Gram(s) Oral once  dextrose 5%. 1000 milliLiter(s) (50 mL/Hr) IV Continuous <Continuous>  dextrose 5%. 1000 milliLiter(s) (100 mL/Hr) IV Continuous <Continuous>  dextrose 50% Injectable 25 Gram(s) IV Push once  dextrose 50% Injectable 12.5 Gram(s) IV Push once  dextrose 50% Injectable 25 Gram(s) IV Push once  epoetin georgina-epbx (RETACRIT) Injectable 42444 Unit(s) SubCutaneous every 7 days  folic acid 1 milliGRAM(s) Oral daily  furosemide   Injectable 60 milliGRAM(s) IV Push two times a day  gabapentin 300 milliGRAM(s) Oral at bedtime  glucagon  Injectable 1 milliGRAM(s) IntraMuscular once  hydrALAZINE 25 milliGRAM(s) Oral every 8 hours  insulin glargine Injectable (LANTUS) 12 Unit(s) SubCutaneous at bedtime  insulin lispro (ADMELOG) corrective regimen sliding scale   SubCutaneous three times a day before meals  insulin lispro (ADMELOG) corrective regimen sliding scale   SubCutaneous at bedtime  iron sucrose IVPB 250 milliGRAM(s) IV Intermittent <User Schedule>  levothyroxine 100 MICROGram(s) Oral daily  metoprolol tartrate 25 milliGRAM(s) Oral every 6 hours  montelukast 10 milliGRAM(s) Oral at bedtime  multivitamin 1 Tablet(s) Oral daily  Nephro-nunu 1 Tablet(s) Oral daily  pantoprazole    Tablet 40 milliGRAM(s) Oral before breakfast  senna 2 Tablet(s) Oral at bedtime    MEDICATIONS  (PRN):  albuterol/ipratropium for Nebulization 3 milliLiter(s) Nebulizer every 6 hours PRN Shortness of Breath and/or Wheezing      RADIOLOGY & ADDITIONAL TESTS:  < from: Xray Chest 1 View- PORTABLE-Urgent (Xray Chest 1 View- PORTABLE-Urgent .) (05.24.21 @ 11:40) >   EXAM:  XR CHEST PORTABLE URGENT 1V                          PROCEDURE DATE:  05/24/2021          INTERPRETATION:  Clinical history: 76-year-old female, CHF.    Two expiratory/kyphotic views are compared to 5/22/2021 and are correlated with the chest CT of 5/19/2021.    FINDINGS: Mild cardiomegaly, unchanged. Mild, central pulmonary venous congestion, improved.    Improvement in bilateral lower lobe atelectasis/effusions, hemidiaphragms are now clearly visualized.    Right-sided [id number]-t-Pvgs with the tip in the chest right atrium, unchanged. Post sternotomy/CABG. Cardiac loop recorder is again evident    IMPRESSION:  Mild, central pulmonary venous congestion, improved.    Clearing at the bases            BRENNA BREEN DO; Attending Radiologist  This document has been electronically signed. May 24 2021 11:56AM    < end of copied text >

## 2021-05-24 NOTE — PROGRESS NOTE ADULT - ASSESSMENT
76y Female with HTN HLD CAD s/p remote CABG, Bio AVR solitary episode of Af w/o recurrence, ILR in place, CRI, MDS transfusion dependent, cirrhosis sec to chronic hep c with esophageal varices and prior banding, hepatocellular carcinoma,s/p embolization with embozene particles on 4/5/21, and recent admission to [facility] after fall with head trauma with also transfused 2 untis PRBC during that admission, this time came to the ER for worsening dyspnea for several days worse with activity, orthopnea and elevated BP with hypoxic respiratory falure requiring BIPAP in ED, She was found to have acute decompensated heart failure. She was started on IV furosemide. Cardiology team consulted. CT surgery consulted for therapeutic thoracocentesis but it was deferred given improvement with diuretics.        #Decompensated Heart Failure with preserved EF  -cardiology following  -c/w Furosemide 60 mg IV q12hrs  -Echo 5/20/2021: LVEF 60-65%, G1DD   -Continue supplemental O2 at 3L/min   -Trend renal function closely  - c/w hydralazine, bb, off ace/arb  spironolactone due to CKD, off ASA due anemia    #Hypertension   -  hydralazine as above    #Diabetes Mellitus   -c/w 12u subQ qhs, MDSS, will adjust base on POC  - c/w lyrica and gabapentin    #CAD, S/p CABG  -Continue Lipitor 40mg po qhs    #MDS   -Recent transfusion at [facility] - 2units pRBCs  -Transfusion dependent   -No AC as per cardiology, patient bleeds easily     #Cirrhosis 2/2 hepatitis C c/b hepatocellular carcinoma s/p embolization  #Hepatocellular carcinoma   - d/c  Spironolactone for now, Furosemide as above  -Trend K levels closely  - GI to f/u    #Chronic Kidney disease  -Cr 2.8 >> continue Furosemide   -Avoid nephrotoxins  -Trend renal function   -Nephrology following    #Hyperkalemia, now resolved  - c/w to monitor with daily BMP     #Hypothyroidism  -Continue levothyroxine 100mcg po daily     #VTE prophylaxis -SCDs due transfusion dependent anemia  #Advanced Directives -Full Code-HCP Daniela Collier    # Dispo - needs iv diuretics 76y Female with HTN HLD CAD s/p remote CABG, Bio AVR solitary episode of Af w/o recurrence, ILR in place, CRI, MDS transfusion dependent, cirrhosis sec to chronic hep c with esophageal varices and prior banding, hepatocellular carcinoma,s/p embolization with embozene particles on 4/5/21, and recent admission to [facility] after fall with head trauma with also transfused 2 untis PRBC during that admission, this time came to the ER for worsening dyspnea for several days worse with activity, orthopnea and elevated BP with hypoxic respiratory falure requiring BIPAP in ED, She was found to have acute decompensated heart failure. She was started on IV furosemide. Cardiology team consulted. CT surgery consulted for therapeutic thoracocentesis but it was deferred given improvement with diuretics.        #Decompensated Heart Failure with preserved EF  -cardiology following  -c/w Furosemide 60 mg IV q12hrs  -Echo 5/20/2021: LVEF 60-65%, G1DD   -Continue supplemental O2 at 3L/min   -Trend renal function closely  - c/w hydralazine, bb, off ace/arb  spironolactone due to CKD, off ASA due anemia    #Hypertension   -  hydralazine as above    #Diabetes Mellitus   -c/w 12u subQ qhs, MDSS, will adjust base on POC  - c/w lyrica and gabapentin    #CAD, S/p CABG  -Continue Lipitor 40mg po qhs    #MDS   -Recent transfusion at [facility] - 2units pRBCs  -Transfusion dependent   -No AC as per cardiology, patient bleeds easily     #Cirrhosis 2/2 hepatitis C c/b hepatocellular carcinoma s/p embolization  #Hepatocellular carcinoma   - d/c  Spironolactone for now, Furosemide as above  - Discussed with Dr. Lloyd for evalution for EGD/Colonoscopy while in house.    #Chronic Kidney disease  -Cr 2.8 >> continue Furosemide   -Avoid nephrotoxins  -Trend renal function   -Nephrology following    #Hyperkalemia, now resolved  - c/w to monitor with daily BMP     #Hypothyroidism  -Continue levothyroxine 100mcg po daily     #VTE prophylaxis -SCDs due transfusion dependent anemia  #Advanced Directives -Full Code-HCP Daniela Chauchristianokings    # Dispo - needs iv diuretics and potentially EGD/Colonoscopy while in house

## 2021-05-24 NOTE — PROGRESS NOTE ADULT - ASSESSMENT
ASSESSMENT:   76 year old Female with a medical history of HTN, HLD, CAD s/p remote CABG Bio AVR, solitary episode of Af w/o recurrence, ILR in place, CRI, MDS transfusion dependent, cirrhosis secondary to Hep C (contracted from a blood transfusion when she was an infant) with esophageal varices and prior banding, recent finding of hepatic mass/hepatoma s/p embolization, came to the ER for worsening dyspnea for several days worse with activity, orthopnea and elevated BP.    Recent admission at [facility] s/p fall last week resulting in a "hematoma" of her head as per patient. CT head was negative as per chart. Patient was found to be anemic and got 2 units of prbc. Pt stated that she cannot be on ASA or any blood thinners due to anemia and bleeding risk. Pt has a port for blood transfusions and iron infusions by her hematologist since she has required chronic transfusions. As per patient, she has received "over 60 units of blood" since her open heart surgery a few years ago.    CT chest performed for SOB/DUPONT showing small-moderate b/l pleural effusions R>L.   5/23 Decreasing oxygen requirements>being diuresed

## 2021-05-25 NOTE — PROGRESS NOTE ADULT - SUBJECTIVE AND OBJECTIVE BOX
NEPHROLOGY INTERVAL HPI/OVERNIGHT EVENTS:  pt continues to improve  no cp, sob, n/v/d    MEDICATIONS  (STANDING):  amitriptyline 40 milliGRAM(s) Oral at bedtime  atorvastatin 40 milliGRAM(s) Oral at bedtime  cholecalciferol 2000 Unit(s) Oral daily  dextrose 40% Gel 15 Gram(s) Oral once  dextrose 5%. 1000 milliLiter(s) (50 mL/Hr) IV Continuous <Continuous>  dextrose 5%. 1000 milliLiter(s) (100 mL/Hr) IV Continuous <Continuous>  dextrose 50% Injectable 25 Gram(s) IV Push once  dextrose 50% Injectable 12.5 Gram(s) IV Push once  dextrose 50% Injectable 25 Gram(s) IV Push once  epoetin georgina-epbx (RETACRIT) Injectable 84129 Unit(s) SubCutaneous every 7 days  folic acid 1 milliGRAM(s) Oral daily  gabapentin 300 milliGRAM(s) Oral at bedtime  glucagon  Injectable 1 milliGRAM(s) IntraMuscular once  hydrALAZINE 25 milliGRAM(s) Oral every 8 hours  insulin glargine Injectable (LANTUS) 12 Unit(s) SubCutaneous at bedtime  insulin lispro (ADMELOG) corrective regimen sliding scale   SubCutaneous three times a day before meals  insulin lispro (ADMELOG) corrective regimen sliding scale   SubCutaneous at bedtime  iron sucrose IVPB 250 milliGRAM(s) IV Intermittent <User Schedule>  levothyroxine 100 MICROGram(s) Oral daily  metoprolol tartrate 25 milliGRAM(s) Oral every 6 hours  montelukast 10 milliGRAM(s) Oral at bedtime  multivitamin 1 Tablet(s) Oral daily  Nephro-nunu 1 Tablet(s) Oral daily  pantoprazole    Tablet 40 milliGRAM(s) Oral before breakfast  senna 2 Tablet(s) Oral at bedtime  torsemide 30 milliGRAM(s) Oral two times a day    MEDICATIONS  (PRN):  albuterol/ipratropium for Nebulization 3 milliLiter(s) Nebulizer every 6 hours PRN Shortness of Breath and/or Wheezing      Allergies    ACE inhibitors (Other)  Bactrim (Nephrotoxicity)  Biaxin (Joint Pain)  morphine (Short breath)  NSAIDs (Other)        Vital Signs Last 24 Hrs  T(C): 37.2 (24 May 2021 22:11), Max: 37.2 (24 May 2021 22:11)  T(F): 98.9 (24 May 2021 22:11), Max: 98.9 (24 May 2021 22:11)  HR: 96 (25 May 2021 01:27) (85 - 96)  BP: 147/67 (25 May 2021 01:27) (128/83 - 157/70)  BP(mean): --  RR: 20 (24 May 2021 22:11) (18 - 20)  SpO2: 98% (24 May 2021 23:11) (97% - 98%)    PHYSICAL EXAM:  GENERAL: Deconditioned  ENMT: Dry mucous membranes  NECK: Supple, +JVD  NERVOUS SYSTEM:  Alert & Oriented X3,  intact and symmetric  CHEST/LUNG: Diminished BS at bases bilaterally  HEART: No rub  ABDOMEN: Soft, Nontender, Nondistended; BS+  EXTREMITIES:  2+ Peripheral Pulses, + improved LE edema   SKIN: No rashes or lesions    LABS:                        8.9    5.39  )-----------( 108      ( 24 May 2021 08:10 )             29.4     05-25    143  |  105  |  70.0<H>  ----------------------------<  121<H>  3.8   |  27.0  |  2.86<H>    Ca    8.6      25 May 2021 08:02  Phos  4.3     05-25  Mg     2.1     05-25          Magnesium, Serum: 2.1 mg/dL (05-25 @ 08:02)  Phosphorus Level, Serum: 4.3 mg/dL (05-25 @ 08:02)      RADIOLOGY & ADDITIONAL TESTS:  < from: Xray Chest 1 View- PORTABLE-Urgent (Xray Chest 1 View- PORTABLE-Urgent .) (05.24.21 @ 11:40) >   EXAM:  XR CHEST PORTABLE URGENT 1V                          PROCEDURE DATE:  05/24/2021          INTERPRETATION:  Clinical history: 76-year-old female, CHF.    Two expiratory/kyphotic views are compared to 5/22/2021 and are correlated with the chest CT of 5/19/2021.    FINDINGS: Mild cardiomegaly, unchanged. Mild, central pulmonary venous congestion, improved.    Improvement in bilateral lower lobe atelectasis/effusions, hemidiaphragms are now clearly visualized.    Right-sided [id number] with the tip in the chest right atrium, unchanged. Post sternotomy/CABG. Cardiac loop recorder is again evident    IMPRESSION:  Mild, central pulmonary venous congestion, improved.    Clearing at the bases    < end of copied text >

## 2021-05-25 NOTE — CONSULT NOTE ADULT - SUBJECTIVE AND OBJECTIVE BOX
HPI:  pt. 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 no cp, no leg edema. pt and her daughter stated that she was admitted at [facility] last week for about 4 days after she fell at her pcp office, CT head was negative, was found to be anemic and got 2 units of prbc. Pt. stated that she cannot be on aspirin or any blood thinners due to anemia and bleeding risk. Patient improved with diuretics for the pulmonary edema. Doing better now. GI called for inpatient EGD and colonoscopy.     PAST MEDICAL & SURGICAL HISTORY:  Hypertension  well controlled    High cholesterol    Low back pain  chronic over a year    Asthma  well controlled, never intubated for exacerbation    Anemia, unspecified anemia type  uses O2 at 2L at home PRN, due to anemia    Aortic stenosis  s/p bovine AVR    Heart failure  Echo  10/2020 EF 60%, mild AR    CAD (coronary artery disease)  s/p OHS    CKD (chronic kidney disease)  Stage 4    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    Hemorrhoids    Internal hemorrhoids    Liver carcinoma  Dx 2021    History of tonsillectomy  as a child    S/P CABG x 3      H/O aortic valve replacement  bovine, 2016    Port-A-Cath in place  2019, right upper chest    History of loop recorder  2017        ROS:  No Heartburn, regurgitation, dysphagia, odynophagia.  No dyspepsia  No abdominal pain.    No Nausea, vomiting.  No Bleeding.  No hematemesis.   No diarrhea.    No hematochesia.  No weight loss, anorexia.  No edema.      MEDICATIONS  (STANDING):  amitriptyline 40 milliGRAM(s) Oral at bedtime  atorvastatin 40 milliGRAM(s) Oral at bedtime  cholecalciferol 2000 Unit(s) Oral daily  dextrose 40% Gel 15 Gram(s) Oral once  dextrose 5%. 1000 milliLiter(s) (50 mL/Hr) IV Continuous <Continuous>  dextrose 5%. 1000 milliLiter(s) (100 mL/Hr) IV Continuous <Continuous>  dextrose 50% Injectable 25 Gram(s) IV Push once  dextrose 50% Injectable 12.5 Gram(s) IV Push once  dextrose 50% Injectable 25 Gram(s) IV Push once  epoetin georgina-epbx (RETACRIT) Injectable 60463 Unit(s) SubCutaneous every 7 days  folic acid 1 milliGRAM(s) Oral daily  gabapentin 300 milliGRAM(s) Oral at bedtime  glucagon  Injectable 1 milliGRAM(s) IntraMuscular once  hydrALAZINE 25 milliGRAM(s) Oral every 8 hours  insulin glargine Injectable (LANTUS) 12 Unit(s) SubCutaneous at bedtime  insulin lispro (ADMELOG) corrective regimen sliding scale   SubCutaneous three times a day before meals  insulin lispro (ADMELOG) corrective regimen sliding scale   SubCutaneous at bedtime  iron sucrose IVPB 250 milliGRAM(s) IV Intermittent <User Schedule>  levothyroxine 100 MICROGram(s) Oral daily  metoprolol tartrate 25 milliGRAM(s) Oral every 6 hours  montelukast 10 milliGRAM(s) Oral at bedtime  multivitamin 1 Tablet(s) Oral daily  Nephro-nunu 1 Tablet(s) Oral daily  pantoprazole    Tablet 40 milliGRAM(s) Oral before breakfast  senna 2 Tablet(s) Oral at bedtime  torsemide 30 milliGRAM(s) Oral two times a day    MEDICATIONS  (PRN):  albuterol/ipratropium for Nebulization 3 milliLiter(s) Nebulizer every 6 hours PRN Shortness of Breath and/or Wheezing      Allergies    ACE inhibitors (Other)  Bactrim (Nephrotoxicity)  Biaxin (Joint Pain)  morphine (Short breath)  NSAIDs (Other)    Intolerances        SOCIAL HISTORY:NC    ENDOSCOPIC/GI HISTORY:EGD/colonoscopy in the past    FAMILY HISTORY:  Family history of diabetes mellitus (Sibling)        Vital Signs Last 24 Hrs  T(C): 36.6 (25 May 2021 11:28), Max: 37.2 (24 May 2021 22:11)  T(F): 97.8 (25 May 2021 11:28), Max: 98.9 (24 May 2021 22:11)  HR: 76 (25 May 2021 14:55) (76 - 96)  BP: 138/72 (25 May 2021 14:55) (138/72 - 165/81)  BP(mean): --  RR: 18 (25 May 2021 11:28) (18 - 20)  SpO2: 97% (25 May 2021 11:28) (97% - 98%)    PHYSICAL EXAM:    GENERAL: NAD, well-groomed, well-developed  HEAD:  Atraumatic, Normocephalic  EYES: EOMI, PERRLA, conjunctiva and sclera clear  ENMT: No tonsillar erythema, exudates, or enlargement; Moist mucous membranes, Good dentition, No lesions  NECK: Supple, No JVD, Normal thyroid  CHEST/LUNG: Clear to percussion bilaterally; No rales, rhonchi, wheezing, or rubs  HEART: Regular rate and rhythm; No murmurs, rubs, or gallops  ABDOMEN: Soft, Nontender, Nondistended; Bowel sounds present  EXTREMITIES:  2+ Peripheral Pulses, No clubbing, cyanosis, or edema  LYMPH: No lymphadenopathy noted  SKIN: No rashes or lesions      LABS:                        8.9    5.39  )-----------( 108      ( 24 May 2021 08:10 )             29.4         143  |  105  |  70.0<H>  ----------------------------<  121<H>  3.8   |  27.0  |  2.86<H>    Ca    8.6      25 May 2021 08:02  Phos  4.3       Mg     2.1                  < from: TTE Echo Complete w/o Contrast w/ Doppler (21 @ 21:01) >    EXAM:  ECHO TTE WO CON COMP W DOPP      PROCEDURE DATE:  May 19 2021   .      INTERPRETATION:  REPORT:  TRANSTHORACIC ECHOCARDIOGRAM REPORT        Patient Name:   NICHELLE GILL Patient Location: [facility] Rec #:  [id number]   Accession #:      [id number]  Account #:      [id number]              Height:           59.1 in 150.0 cm  YOB: 1945              Weight:           170.0 lb 77.10 kg  Patient Age:    76 years              BSA:              1.72 m²  Patient Gender: F                     BP:               181/83 mmHg      Date of Exam:        2021 9:01:05 PM  Sonographer:         Fina Dodson  Referring Physician: Yarely Davalos MD    Procedure:     2D Echo/Doppler/Color Doppler Complete.  Indications:   Cardiomyopathy, unspecified - I42.9  Diagnosis:     Cardiomyopathy, unspecified - I42.9  Study Details: Technically adequate study.        2D AND M-MODE MEASUREMENTS (normal ranges within parentheses):  Left                 Normal   Aorta/Left            Normal  Ventricle:                    Atrium:  IVSd (2D):    1.41  (0.7-1.1) Aortic Root    2.33  (2.4-3.7)                 cm             (2D):           cm  LVPWd (2D):   1.34  (0.7-1.1) Left Atrium    4.79  (1.9-4.0)  cm             (2D):           cm  LVIDd (2D):   3.72  (3.4-5.7) LA Volume      65.0                 cm             Index         ml/m²  LVIDs (2D):   2.47            Right Ventricle:                 cm             RVd (2D):        3.23 cm  LV FS (2D):   33.6   (>25%)   TAPSE:           1.01 cm                  %  LV EF (2D):   63 %   (>55%)  Relative Wall 0.72   (<0.42)  Thickness    LV SYSTOLIC FUNCTION BY 2D PLANIMETRY (MOD):  EF-A4C View: 69.0 % EF-A2C View: 66.9 % EF-Biplane: 66 %    SPECTRAL DOPPLER ANALYSIS (where applicable):  Mitral Valve:  MV Mean Grad: 4.9 mmHg    Aortic Valve: AoV Max Anibal: 2.09 m/s AoV Peak P.5 mmHg AoV Mean P.5 mmHg    LVOT Vmax: 0.90 m/s LVOT VTI: 0.197 m LVOT Diameter: 1.71 cm    AoV Area, Vmax: 0.99 cm² AoV Area, VTI: 1.07 cm² AoV Area, Vmn: 1.12 cm²  Ao VTI: 0.422  Aortic Insufficiency:  AI Half-time: 308 msec    Tricuspid Valve and PA/RV Systolic Pressure: TR Max Velocity: 3.54 m/s RA Pressure: 3 mmHg RVSP/PASP: 53.1 mmHg      PHYSICIAN INTERPRETATION:  Left Ventricle: The left ventricular internal cavity size is normal.  Global LV systolic function was normal. Left ventricular ejection fraction, by visual estimation, is 60 to 65%. Abnormal (paradoxical) septal motion consistent with post-operative status. The mitral in-flow pattern reveals no discernable A-wave, therefore no comment on diastolic function can be made.  Right Ventricle: The right ventricle is not well visualized. Systolic function appears grossly preserved. TV S' 0.0 m/s.  Left Atrium: Severely enlarged left atrium.  Right Atrium: Moderately enlarged right atrium.  Pericardium: There is no evidence of pericardial effusion. There is a moderate pleural effusion in both left and right lateral regions.  Mitral Valve: Mildthickening of the anterior and posterior mitral valve leaflets. There is moderate mitral annular calcification. Mitral valve mean gradient is 4.9 mmHg consistent with mild mitral stenosis. Trace mitral valve regurgitation is seen.  Tricuspid Valve: The tricuspid valve is normal in structure. Moderate tricuspid regurgitation is visualized. Estimated pulmonary artery systolic pressure is 53.1 mmHg assuming a right atrial pressure of 3 mmHg, which is consistent with moderate pulmonary hypertension.  Aortic Valve: Mild to moderate aortic valve regurgitation is seen. A well-seated bioprosthetic valve is visualized in the aortic position. There is mild-moderate central regurgitation. Mean gradiet is 8.5 mm Hg and dimensionless index is 0.47, which are likely consistent with a normal funcitoning bioprosthetic aortic valve.  Pulmonic Valve: The pulmonic valve was not well visualized. Trace pulmonic valve regurgitation.  Aorta: The aortic root and ascending aorta are structurally normal, with no evidence of dilitation. The aortic arch was not well visualized.  Pulmonary Artery: The pulmonary artery is mildly dilated.  Venous: The inferior vena cava was normal sized, with respiratory size variation greater than 50%.  In comparison to the previous echocardiogram(s): Prior examinations are available and were reviewed for comparison purposes. Compared to a prior study from 10/19/20, there is now pulmonary hypertension and bilateral pleural effusions are now present.      Summary:   1. Left ventricular ejection fraction, by visual estimation, is 60 to 65%.   2. Normal global left ventricular systolic function.   3. The mitral in-flow pattern reveals no discernable A-wave, therefore no comment on diastolic function can be made.   4. There ismoderate concentric left ventricular hypertrophy.   5. The right ventricle is not well visualized. Systolic function appears grossly preserved.   6. Severely enlarged left atrium.   7. Moderately enlarged right atrium.   8. Moderate tricuspid regurgitation.   9. Trace mitral valve regurgitation.  10. Mitral valve mean gradient is 4.9 mmHg consistent with mild mitral stenosis.  11. Mild thickening of the anterior and posterior mitral valve leaflets.  12. Moderate mitral annular calcification.  13.A well-seated bioprosthetic valve is visualized in the aortic position. There is mild-moderate central regurgitation. Mean gradiet is 8.5 mm Hg and dimensionless index is 0.47, which are likely consistent with a normal funcitoning bioprosthetic aortic valve.  14. Mildly dilated pulmonary artery.  15. Estimated pulmonary artery systolic pressure is 53.1 mmHg assuming a right atrial pressure of 3 mmHg, which is consistent with moderate pulmonary hypertension.  16. There is no evidence of pericardial effusion.  17. Moderate pleural effusion in both left and right lateral regions.  18. Compared to a prior study from 10/19/20, there is now pulmonary hypertension and bilateral pleural effusions are now present.    MD Renay Electronicallysigned on 2021 at 1:03:42 PM        *** Final ***              WISAM SILVA   This document has been electronically signed. May 19 2021  9:01PM    < end of copied text >        RADIOLOGY & ADDITIONAL STUDIES:  < from: CT Chest No Cont (21 @ 20:06) >     EXAM:  CT CHEST                          PROCEDURE DATE:  2021          INTERPRETATION:  CLINICAL INFORMATION: Short of breath    COMPARISON: 2019    CONTRAST/COMPLICATIONS:  IV Contrast: NONE  Oral Contrast: NONE  Complications: None reported at time of study completion    PROCEDURE:  CT of the Chest was performed.  Sagittal and coronal reformats were performed.    FINDINGS:    LUNGS AND AIRWAYS: Patent central airways.  Small to moderate left and moderate right pleural effusion. Bibasilar dependent consolidation/atelectasis and segmental consolidation is atelectasis in the right middle lobe. Correlate clinically for infection. Mild emphysematous change.  PLEURA: As above.  MEDIASTINUM AND AVANI: No lymphadenopathy.  VESSELS: Nonaneurysmal  HEART: Cardiomegaly. Cardiac vascular calcification. status post CABG.. No pericardial effusion.  CHEST WALL AND LOWER NECK: Median sternotomy.  VISUALIZED UPPER ABDOMEN: Cirrhotic hepatic morphology. Small rim calcified splenic cyst.  BONES: Stable    IMPRESSION:  Bilateral pleural effusions right larger than left.  Bilateral lower lobe and right middle lobe consolidation/atelectasis. Correlate clinically for infection.  Mild emphysema            CHASITY COE MD; Attending Radiologist  This document has been electronically signed. May 19 2021  8:22PM    < end of copied text >

## 2021-05-25 NOTE — PROGRESS NOTE ADULT - SUBJECTIVE AND OBJECTIVE BOX
[facility]                                   [address] [place] [address]                                                      PHONE: [phone]                                                         FAX: [phone]  [email or web address]  ---------------------------------------------------------------------------------------------------------------------------------    Overnight events/patient complaints:      ACE inhibitors (Other)  Bactrim (Nephrotoxicity)  Biaxin (Joint Pain)  morphine (Short breath)  NSAIDs (Other)    MEDICATIONS  (STANDING):  amitriptyline 40 milliGRAM(s) Oral at bedtime  atorvastatin 40 milliGRAM(s) Oral at bedtime  cholecalciferol 2000 Unit(s) Oral daily  dextrose 40% Gel 15 Gram(s) Oral once  dextrose 5%. 1000 milliLiter(s) (50 mL/Hr) IV Continuous <Continuous>  dextrose 5%. 1000 milliLiter(s) (100 mL/Hr) IV Continuous <Continuous>  dextrose 50% Injectable 25 Gram(s) IV Push once  dextrose 50% Injectable 12.5 Gram(s) IV Push once  dextrose 50% Injectable 25 Gram(s) IV Push once  epoetin georgina-epbx (RETACRIT) Injectable 65567 Unit(s) SubCutaneous every 7 days  folic acid 1 milliGRAM(s) Oral daily  furosemide   Injectable 60 milliGRAM(s) IV Push two times a day  gabapentin 300 milliGRAM(s) Oral at bedtime  glucagon  Injectable 1 milliGRAM(s) IntraMuscular once  hydrALAZINE 25 milliGRAM(s) Oral every 8 hours  insulin glargine Injectable (LANTUS) 12 Unit(s) SubCutaneous at bedtime  insulin lispro (ADMELOG) corrective regimen sliding scale   SubCutaneous three times a day before meals  insulin lispro (ADMELOG) corrective regimen sliding scale   SubCutaneous at bedtime  iron sucrose IVPB 250 milliGRAM(s) IV Intermittent <User Schedule>  levothyroxine 100 MICROGram(s) Oral daily  metoprolol tartrate 25 milliGRAM(s) Oral every 6 hours  montelukast 10 milliGRAM(s) Oral at bedtime  multivitamin 1 Tablet(s) Oral daily  Nephro-nunu 1 Tablet(s) Oral daily  pantoprazole    Tablet 40 milliGRAM(s) Oral before breakfast  senna 2 Tablet(s) Oral at bedtime    MEDICATIONS  (PRN):  albuterol/ipratropium for Nebulization 3 milliLiter(s) Nebulizer every 6 hours PRN Shortness of Breath and/or Wheezing      Vital Signs Last 24 Hrs  T(C): 37.2 (24 May 2021 22:11), Max: 37.2 (24 May 2021 22:11)  T(F): 98.9 (24 May 2021 22:11), Max: 98.9 (24 May 2021 22:11)  HR: 96 (25 May 2021 01:27) (85 - 96)  BP: 147/67 (25 May 2021 01:27) (128/83 - 157/70)  BP(mean): --  RR: 20 (24 May 2021 22:11) (18 - 20)  SpO2: 98% (24 May 2021 23:11) (97% - 98%)  ICU Vital Signs Last 24 Hrs  [facility]  I&O's Detail    I&O's Summary    Drug Dosing Weight  [facility]      PHYSICAL EXAM:  General: Appears well developed, well nourished alert and cooperative.  HEENT: Head; normocephalic, atraumatic.  Eyes: Pupils reactive, cornea wnl.  Neck: Supple, no nodes adenopathy, no NVD or carotid bruit or thyromegaly.  CARDIOVASCULAR: Normal S1 and S2, No murmur, rub, gallop or lift.   LUNGS: No rales, rhonchi or wheeze. Normal breath sounds bilaterally.  ABDOMEN: Soft, nontender without mass or organomegaly. bowel sounds normoactive.  EXTREMITIES: No clubbing, cyanosis or edema. Distal pulses wnl.   SKIN: warm and dry with normal turgor.  NEURO: Alert/oriented x 3/normal motor exam. No pathologic reflexes.    PSYCH: normal affect.        LABS:                        8.9    5.39  )-----------( 108      ( 24 May 2021 08:10 )             29.4     05-24    144  |  106  |  73.0<H>  ----------------------------<  69<L>  3.6   |  28.0  |  2.81<H>    Ca    8.6      24 May 2021 08:10  Phos  5.3     05-23  Mg     2.4     05-23      [facility]      RADIOLOGY & ADDITIONAL STUDIES:    INTERPRETATION OF TELEMETRY (personally reviewed):    Summary:   1. Left ventricular ejection fraction, by visual estimation, is 60 to 65%.   2. Normal global left ventricular systolic function.   3. The mitral in-flow pattern reveals no discernable A-wave, therefore no comment on diastolic function can be made.   4. There ismoderate concentric left ventricular hypertrophy.   5. The right ventricle is not well visualized. Systolic function appears grossly preserved.   6. Severely enlarged left atrium.   7. Moderately enlarged right atrium.   8. Moderate tricuspid regurgitation.   9. Trace mitral valve regurgitation.  10. Mitral valve mean gradient is 4.9 mmHg consistent with mild mitral stenosis.  11. Mild thickening of the anterior and posterior mitral valve leaflets.  12. Moderate mitral annular calcification.  13.A well-seated bioprosthetic valve is visualized in the aortic position. There is mild-moderate central regurgitation. Mean gradiet is 8.5 mm Hg and dimensionless index is 0.47, which are likely consistent with a normal funcitoning bioprosthetic aortic valve.  14. Mildly dilated pulmonary artery.  15. Estimated pulmonary artery systolic pressure is 53.1 mmHg assuming a right atrial pressure of 3 mmHg, which is consistent with moderate pulmonary hypertension.  16. There is no evidence of pericardial effusion.  17. Moderate pleural effusion in both left and right lateral regions.  18. Compared to a prior study from 10/19/20, there is now pulmonary hypertension and bilateral pleural effusions are now present.    MD Renay Electronicallysigned on 5/20/2021 at 1:03:42 PM        VENTRICLES: LVEF 60% with PATRICIA 0.95 cm sq with mean gradient of 39 mm Hg  CORONARY VESSELS: The coronary circulation is right dominant.  LM:   --  LM: Normal.  LAD:   --  LAD: Angiography showed minor luminal irregularities with no  flow limiting lesions.  CX:   --  Mid circumflex: There was a 95 % stenosis.  RCA:   --  Distal RCA: There was a 95 % stenosis in the distal third of the  vessel segment.  --  RPDA: There was a 70 % stenosis at the ostium of the vessel segment.  --  RPLS: There was a 70 % stenosis at the ostium of the vessel segment.  COMPLICATIONS: There were no complications.  DIAGNOSTIC IMPRESSIONS: Severe LCX and RCA disease with AS  DIAGNOSTIC RECOMMENDATIONS: Eval for AS and CABG  INTERVENTIONAL IMPRESSIONS: Severe LCX and RCA disease with AS  INTERVENTIONAL RECOMMENDATIONS: Eval for AS and CABG  Prepared and signed by  Nathan Swanosn MD  Signed 02/01/2016 16:20:34  HEMODYNAMIC TABLES  Pressures:  Baseline  Pressures:  - HR: 79    ASSESSMENT AND PLAN:  In summary, NICHELLE GILL is an 76y Female with past medical history significant for CKD CAD/CABG/AVR who presents with Dyspnea c/w HFpEF, recurrent AF not AC candidate due to bleeding risk, s/p ILR, CAd s/p remote CABG/Bio AVR normal EF mod MR, CRI awaiting ? HD, cirrhosis /  hepatoma s/p embolization, MDS transfusion dependent, HTN, HLD with AF; clinically improving TTE stable see above     Plan  1.  Continue Lasix therapy IV BID and monitor renal panel   2.  Continue current optimal medical therapy   3.  Renal panel   4.  Out patient cardio MEMS   5.  Change to Torsemide BID 30 mg po; D/W with renal today

## 2021-05-25 NOTE — PHYSICAL THERAPY INITIAL EVALUATION ADULT - IMPAIRMENTS FOUND, PT EVAL
aerobic capacity/endurance/gait, locomotion, and balance/neuromotor development and sensory integration

## 2021-05-25 NOTE — PROGRESS NOTE ADULT - SUBJECTIVE AND OBJECTIVE BOX
CC: chf (25 May 2021 09:58)    HPI:  76y Female with HTN HLD CAD s/p remote CABg Bio AVR solitary episode of Af w/o recurrence, ILR in place, CRI, MDS transfusion dependent, cirrhosis sec to hep c with esophageal varices and prior banding, recent finding of hepatic mass/hepatoma s/p embolization, came to the ER for worsening dyspnea for several days worse with activity, orthopnea and elevated BP.    INTERVAL HPI/OVERNIGHT EVENTS:  Patient seen and examined sitting up in bed.  Patient reports SOB improving.  Patient denies any headache, dizziness, CP, abdominal pain, nausea, vomiting.  Other ROS reviewed and are negative.    Vital Signs Last 24 Hrs  T(C): 36.6 (25 May 2021 11:28), Max: 37.2 (24 May 2021 22:11)  T(F): 97.8 (25 May 2021 11:28), Max: 98.9 (24 May 2021 22:11)  HR: 88 (25 May 2021 12:45) (85 - 96)  BP: 158/80 (25 May 2021 12:45) (145/67 - 165/81)  BP(mean): --  RR: 18 (25 May 2021 11:28) (18 - 20)  SpO2: 97% (25 May 2021 11:28) (97% - 98%)  I&O's Detail      PHYSICAL EXAM:  GENERAL: NAD  HEAD:  Atraumatic, Normocephalic  NECK: Supple, No JVD, Normal thyroid  NERVOUS SYSTEM:  Alert & Oriented X3, Good concentration; Motor Strength 5/5 B/L upper and lower extremities  CHEST/LUNG: Bibasilar crackles  HEART: Regular rate and rhythm; No murmurs, rubs, or gallops  ABDOMEN: Soft, Nontender, Nondistended; Bowel sounds present  EXTREMITIES:  2+ Peripheral Pulses                          8.9    5.39  )-----------( 108      ( 24 May 2021 08:10 )             29.4     25 May 2021 08:02    143    |  105    |  70.0   ----------------------------<  121    3.8     |  27.0   |  2.86     Ca    8.6        25 May 2021 08:02  Phos  4.3       25 May 2021 08:02  Mg     2.1       25 May 2021 08:02        CAPILLARY BLOOD GLUCOSE  POCT Blood Glucose.: 132 mg/dL (25 May 2021 11:51)  POCT Blood Glucose.: 128 mg/dL (25 May 2021 08:54)  POCT Blood Glucose.: 125 mg/dL (25 May 2021 08:19)  POCT Blood Glucose.: 226 mg/dL (24 May 2021 22:20)  POCT Blood Glucose.: 184 mg/dL (24 May 2021 16:54)          MEDICATIONS  (STANDING):  amitriptyline 40 milliGRAM(s) Oral at bedtime  atorvastatin 40 milliGRAM(s) Oral at bedtime  cholecalciferol 2000 Unit(s) Oral daily  dextrose 40% Gel 15 Gram(s) Oral once  dextrose 5%. 1000 milliLiter(s) (50 mL/Hr) IV Continuous <Continuous>  dextrose 5%. 1000 milliLiter(s) (100 mL/Hr) IV Continuous <Continuous>  dextrose 50% Injectable 25 Gram(s) IV Push once  dextrose 50% Injectable 12.5 Gram(s) IV Push once  dextrose 50% Injectable 25 Gram(s) IV Push once  epoetin georgina-epbx (RETACRIT) Injectable 33630 Unit(s) SubCutaneous every 7 days  folic acid 1 milliGRAM(s) Oral daily  gabapentin 300 milliGRAM(s) Oral at bedtime  glucagon  Injectable 1 milliGRAM(s) IntraMuscular once  hydrALAZINE 25 milliGRAM(s) Oral every 8 hours  insulin glargine Injectable (LANTUS) 12 Unit(s) SubCutaneous at bedtime  insulin lispro (ADMELOG) corrective regimen sliding scale   SubCutaneous three times a day before meals  insulin lispro (ADMELOG) corrective regimen sliding scale   SubCutaneous at bedtime  iron sucrose IVPB 250 milliGRAM(s) IV Intermittent <User Schedule>  levothyroxine 100 MICROGram(s) Oral daily  metoprolol tartrate 25 milliGRAM(s) Oral every 6 hours  montelukast 10 milliGRAM(s) Oral at bedtime  multivitamin 1 Tablet(s) Oral daily  Nephro-nunu 1 Tablet(s) Oral daily  pantoprazole    Tablet 40 milliGRAM(s) Oral before breakfast  senna 2 Tablet(s) Oral at bedtime  torsemide 30 milliGRAM(s) Oral two times a day    MEDICATIONS  (PRN):  albuterol/ipratropium for Nebulization 3 milliLiter(s) Nebulizer every 6 hours PRN Shortness of Breath and/or Wheezing      RADIOLOGY & ADDITIONAL TESTS:  < from: Xray Chest 1 View- PORTABLE-Routine (Xray Chest 1 View- PORTABLE-Routine in AM.) (05.25.21 @ 06:51) >   EXAM:  XR CHEST PORTABLE ROUTINE 1V                          PROCEDURE DATE:  05/25/2021          INTERPRETATION:  Portable chest radiograph    CLINICAL INFORMATION: Pleural effusion assessment.    TECHNIQUE:  Portable  AP view of the chest was obtained.    COMPARISON: 5/24/2021 chest available for review.    FINDINGS:    The lungs are clear of airspace consolidations or effusions. No pneumothorax.    There is mild cardiomegaly.  Status post median sternotomy.  Mediport catheter tip in SVC.    Visualized osseous structures are intact.    IMPRESSION: Mild Cardiomegaly. No gross airspace consolidation or effusion..                ARIEL LANE MD; Attending Radiologist  This document has been electronically signed. May 25 2021 12:33PM    < end of copied text >   CC: chf (25 May 2021 09:58)    HPI:  76y Female with HTN HLD CAD s/p remote CABg Bio AVR solitary episode of Af w/o recurrence, ILR in place, CRI, MDS transfusion dependent, cirrhosis sec to hep c with esophageal varices and prior banding, recent finding of hepatic mass/hepatoma s/p embolization, came to the ER for worsening dyspnea for several days worse with activity, orthopnea and elevated BP.    INTERVAL HPI/OVERNIGHT EVENTS:  Patient seen and examined sitting up in bed.  Patient reports SOB improving.  Patient denies any headache, dizziness, CP, abdominal pain, nausea, vomiting.  Other ROS reviewed and are negative.    Vital Signs Last 24 Hrs  T(C): 36.6 (25 May 2021 11:28), Max: 37.2 (24 May 2021 22:11)  T(F): 97.8 (25 May 2021 11:28), Max: 98.9 (24 May 2021 22:11)  HR: 88 (25 May 2021 12:45) (85 - 96)  BP: 158/80 (25 May 2021 12:45) (145/67 - 165/81)  BP(mean): --  RR: 18 (25 May 2021 11:28) (18 - 20)  SpO2: 97% (25 May 2021 11:28) (97% - 98%)  I&O's Detail      PHYSICAL EXAM:  GENERAL: NAD  HEAD:  Atraumatic, Normocephalic  NECK: Supple, No JVD, Normal thyroid  NERVOUS SYSTEM:  Alert & Oriented X3, Good concentration; Motor Strength 5/5 B/L upper and lower extremities  CHEST/LUNG: fine bibasilar crackles has improved.  HEART: Regular rate and rhythm; No murmurs, rubs, or gallops  ABDOMEN: Soft, Nontender, Nondistended; Bowel sounds present  EXTREMITIES:  2+ Peripheral Pulses, No clubbing, cyanosis, or edema                          8.9    5.39  )-----------( 108      ( 24 May 2021 08:10 )             29.4     25 May 2021 08:02    143    |  105    |  70.0   ----------------------------<  121    3.8     |  27.0   |  2.86     Ca    8.6        25 May 2021 08:02  Phos  4.3       25 May 2021 08:02  Mg     2.1       25 May 2021 08:02        CAPILLARY BLOOD GLUCOSE  POCT Blood Glucose.: 132 mg/dL (25 May 2021 11:51)  POCT Blood Glucose.: 128 mg/dL (25 May 2021 08:54)  POCT Blood Glucose.: 125 mg/dL (25 May 2021 08:19)  POCT Blood Glucose.: 226 mg/dL (24 May 2021 22:20)  POCT Blood Glucose.: 184 mg/dL (24 May 2021 16:54)          MEDICATIONS  (STANDING):  amitriptyline 40 milliGRAM(s) Oral at bedtime  atorvastatin 40 milliGRAM(s) Oral at bedtime  cholecalciferol 2000 Unit(s) Oral daily  dextrose 40% Gel 15 Gram(s) Oral once  dextrose 5%. 1000 milliLiter(s) (50 mL/Hr) IV Continuous <Continuous>  dextrose 5%. 1000 milliLiter(s) (100 mL/Hr) IV Continuous <Continuous>  dextrose 50% Injectable 25 Gram(s) IV Push once  dextrose 50% Injectable 12.5 Gram(s) IV Push once  dextrose 50% Injectable 25 Gram(s) IV Push once  epoetin georgina-epbx (RETACRIT) Injectable 56187 Unit(s) SubCutaneous every 7 days  folic acid 1 milliGRAM(s) Oral daily  gabapentin 300 milliGRAM(s) Oral at bedtime  glucagon  Injectable 1 milliGRAM(s) IntraMuscular once  hydrALAZINE 25 milliGRAM(s) Oral every 8 hours  insulin glargine Injectable (LANTUS) 12 Unit(s) SubCutaneous at bedtime  insulin lispro (ADMELOG) corrective regimen sliding scale   SubCutaneous three times a day before meals  insulin lispro (ADMELOG) corrective regimen sliding scale   SubCutaneous at bedtime  iron sucrose IVPB 250 milliGRAM(s) IV Intermittent <User Schedule>  levothyroxine 100 MICROGram(s) Oral daily  metoprolol tartrate 25 milliGRAM(s) Oral every 6 hours  montelukast 10 milliGRAM(s) Oral at bedtime  multivitamin 1 Tablet(s) Oral daily  Nephro-nunu 1 Tablet(s) Oral daily  pantoprazole    Tablet 40 milliGRAM(s) Oral before breakfast  senna 2 Tablet(s) Oral at bedtime  torsemide 30 milliGRAM(s) Oral two times a day    MEDICATIONS  (PRN):  albuterol/ipratropium for Nebulization 3 milliLiter(s) Nebulizer every 6 hours PRN Shortness of Breath and/or Wheezing      RADIOLOGY & ADDITIONAL TESTS:  < from: Xray Chest 1 View- PORTABLE-Routine (Xray Chest 1 View- PORTABLE-Routine in AM.) (05.25.21 @ 06:51) >   EXAM:  XR CHEST PORTABLE ROUTINE 1V                          PROCEDURE DATE:  05/25/2021          INTERPRETATION:  Portable chest radiograph    CLINICAL INFORMATION: Pleural effusion assessment.    TECHNIQUE:  Portable  AP view of the chest was obtained.    COMPARISON: 5/24/2021 chest available for review.    FINDINGS:    The lungs are clear of airspace consolidations or effusions. No pneumothorax.    There is mild cardiomegaly.  Status post median sternotomy.  Mediport catheter tip in SVC.    Visualized osseous structures are intact.    IMPRESSION: Mild Cardiomegaly. No gross airspace consolidation or effusion..                ARIEL LANE MD; Attending Radiologist  This document has been electronically signed. May 25 2021 12:33PM    < end of copied text >

## 2021-05-25 NOTE — PROGRESS NOTE ADULT - ASSESSMENT
76y Female with HTN HLD CAD s/p remote CABG, Bio AVR solitary episode of Af w/o recurrence, ILR in place, CRI, MDS transfusion dependent, cirrhosis sec to chronic hep c with esophageal varices and prior banding, hepatocellular carcinoma,s/p embolization with embozene particles on 4/5/21, and recent admission to [facility] after fall with head trauma with also transfused 2 untis PRBC during that admission, this time came to the ER for worsening dyspnea for several days worse with activity, orthopnea and elevated BP with hypoxic respiratory falure requiring BIPAP in ED, She was found to have acute decompensated heart failure. She was started on IV furosemide. Cardiology team consulted. CT surgery consulted for therapeutic thoracocentesis but it was deferred given improvement with diuretics.        #Decompensated Heart Failure with preserved EF  -cardiology following  -IV Furosemide changed to PO Torsemide  -Echo 5/20/2021: LVEF 60-65%, G1DD   -Continue supplemental O2 at 3L/min weaning as able  -Trend renal function closely  - c/w hydralazine, bb, off ace/arb  spironolactone due to CKD, off ASA due anemia    #Hypertension   -  hydralazine as above    #Diabetes Mellitus   -c/w 12u subQ qhs, MDSS, will adjust base on POC  - c/w lyrica and gabapentin    #CAD, S/p CABG  -Continue Lipitor 40mg po qhs    #MDS   -Recent transfusion at [facility] - 2units pRBCs  -Transfusion dependent   -No AC as per cardiology, patient bleeds easily     #Cirrhosis 2/2 hepatitis C c/b hepatocellular carcinoma s/p embolization  #Hepatocellular carcinoma   - d/c  Spironolactone for now, Furosemide changed to Po Torsemide  - Discussed with Dr. Lloyd for evalution for EGD/Colonoscopy while in house.    #Chronic Kidney disease  -Cr 2.8 >> continue Torsemide   -Avoid nephrotoxins  -Trend renal function   -Nephrology following    #Hyperkalemia, now resolved  - c/w to monitor with daily BMP     #Hypothyroidism  -Continue levothyroxine 100mcg po daily     #VTE prophylaxis -SCDs due transfusion dependent anemia  #Advanced Directives -Full Code-HCP Daniela Collier    # Dispo - Awaiting potentially EGD/Colonoscopy while in house 76y Female with HTN HLD CAD s/p remote CABG, Bio AVR solitary episode of Af w/o recurrence, ILR in place, CRI, MDS transfusion dependent, cirrhosis sec to chronic hep c with esophageal varices and prior banding, hepatocellular carcinoma,s/p embolization with embozene particles on 4/5/21, and recent admission to [facility] after fall with head trauma with also transfused 2 untis PRBC during that admission, this time came to the ER for worsening dyspnea for several days worse with activity, orthopnea and elevated BP with hypoxic respiratory falure requiring BIPAP in ED, She was found to have acute decompensated heart failure. She was started on IV furosemide. Cardiology team consulted. CT surgery consulted for therapeutic thoracocentesis but it was deferred given improvement with diuretics.        #Decompensated Heart Failure with preserved EF  -cardiology following  -IV Furosemide changed to PO Torsemide  -Echo 5/20/2021: LVEF 60-65%, G1DD   -Continue supplemental O2 at 3L/min weaning as able  -Trend renal function closely  - c/w hydralazine, bb, off ace/arb  spironolactone due to CKD, off ASA due anemia    #Hypertension   -  hydralazine as above    #Diabetes Mellitus   -c/w 12u subQ qhs, MDSS, will adjust base on POC  - c/w lyrica and gabapentin    #CAD, S/p CABG  -Continue Lipitor 40mg po qhs    #MDS   -Recent transfusion at [facility] - 2units pRBCs  -Transfusion dependent   -No AC as per cardiology, patient bleeds easily     #Cirrhosis 2/2 hepatitis C c/b hepatocellular carcinoma s/p embolization  #Hepatocellular carcinoma   - d/c  Spironolactone for now, Furosemide changed to Po Torsemide  - Discussed with Dr. Lloyd for evalution for EGD/Colonoscopy while in house>> plan for EGD in am    #Chronic Kidney disease  -Cr 2.8 >> continue Torsemide   -Avoid nephrotoxins  -Trend renal function   -Nephrology following    #Hyperkalemia, now resolved  - c/w to monitor with daily BMP     #Hypothyroidism  -Continue levothyroxine 100mcg po daily     #VTE prophylaxis -SCDs due transfusion dependent anemia  #Advanced Directives -Full Code-HCP Daniela Collier    # Dispo - Awaiting potentially EGD/Colonoscopy while in house

## 2021-05-25 NOTE — PROGRESS NOTE ADULT - NUTRITIONAL ASSESSMENT
I personally seen and examined patient, author reviewed available labs, imaging, consultants recommendations, discussed results and plan of care with patient and patient family ( daughter). I personally edited above note, agree with above physical exam, assessment and plan.

## 2021-05-25 NOTE — CONSULT NOTE ADULT - ASSESSMENT
Patient with history of Cirrhosis, HCC, Anemia, esophageal varices, and now CHF. CHF is improving. At this time, we will schedule her for EGD tomorrow. If EGD is ok, then colonoscopy day after that if possible.  COVID testing, CBC, CMP, Pt/INR ordered.

## 2021-05-25 NOTE — PROGRESS NOTE ADULT - ASSESSMENT
CKD(IV): + azotemia superimposed  CHF with chronic fluid overload; B/L pleural effusions---> clinically better  - cont to avoid potential nephrotoxins  - transition to oral diuretic dosing and observe  - follow labs    Anemia: +CKD + MDS (transfusion dependent) + GI loss  Low Fe stores  - cont ARACELI and IV Fe  - PRBCs as needed

## 2021-05-25 NOTE — PROGRESS NOTE ADULT - SUBJECTIVE AND OBJECTIVE BOX
Subjective:     Vital Signs:  Vital Signs Last 24 Hrs  T(C): 36.6 (05-25-21 @ 11:28), Max: 37.2 (05-24-21 @ 22:11)  T(F): 97.8 (05-25-21 @ 11:28), Max: 98.9 (05-24-21 @ 22:11)  HR: 88 (05-25-21 @ 12:45) (85 - 96)  BP: 158/80 (05-25-21 @ 12:45) (145/67 - 165/81)  RR: 18 (05-25-21 @ 11:28) (18 - 20)  SpO2: 97% (05-25-21 @ 11:28) (97% - 98%) on (O2)    Telemetry/Alarms:    Relevant labs, radiology and Medications reviewed    Pertinent Physical Exam  Gen: WN/WD NAD  Neuro: A+Ox3, nonfocal  Pulm: diminished at left bases  CV: RRR  LE: no edema    CXR: < from: Xray Chest 1 View- PORTABLE-Routine (Xray Chest 1 View- PORTABLE-Routine in AM.) (05.25.21 @ 06:51) >    FINDINGS:    The lungs are clear of airspace consolidations or effusions. No pneumothorax.    There is mild cardiomegaly.  Status post median sternotomy.  Mediport catheter tip in SVC.    Visualized osseous structures are intact.    IMPRESSION: Mild Cardiomegaly. No gross airspace consolidation or effusion..    < end of copied text >

## 2021-05-25 NOTE — PROGRESS NOTE ADULT - PROBLEM SELECTOR PLAN 1
5/25 chest xray reveals improving effusions  Switched to BID torsemide  Montior Creatinine  Continue to hold off on chest tube > pt w/o symptoms  Monitor CXR  If patient further decompensates or if effusions appear to be worsening on CXR, will re-consider chest tube.   Further plan as per primary team.   Will continue to follow along.  D/W Dr Mcbride

## 2021-05-25 NOTE — PROGRESS NOTE ADULT - ASSESSMENT
ASSESSMENT:   76 year old Female with a medical history of HTN, HLD, CAD s/p remote CABG Bio AVR, solitary episode of Af w/o recurrence, ILR in place, CRI, MDS transfusion dependent, cirrhosis secondary to Hep C (contracted from a blood transfusion when she was an infant) with esophageal varices and prior banding, recent finding of hepatic mass/hepatoma s/p embolization, came to the ER for worsening dyspnea for several days worse with activity, orthopnea and elevated BP.    Recent admission at [facility] s/p fall last week resulting in a "hematoma" of her head as per patient. CT head was negative as per chart. Patient was found to be anemic and got 2 units of prbc. Pt stated that she cannot be on ASA or any blood thinners due to anemia and bleeding risk. Pt has a port for blood transfusions and iron infusions by her hematologist since she has required chronic transfusions. As per patient, she has received "over 60 units of blood" since her open heart surgery a few years ago.    CT chest performed for SOB/DUPONT showing small-moderate b/l pleural effusions R>L.   5/23 Decreasing oxygen requirements>being diuresed  5/25 CXR improving slowly, continue diuretics (changed to torsemide today)

## 2021-05-26 NOTE — PROGRESS NOTE ADULT - SUBJECTIVE AND OBJECTIVE BOX
CC: chf (26 May 2021 09:08)    HPI:  76y Female with HTN HLD CAD s/p remote CABg Bio AVR solitary episode of Af w/o recurrence, ILR in place, CRI, MDS transfusion dependent, cirrhosis sec to hep c with esophageal varices and prior banding, recent finding of hepatic mass/hepatoma s/p embolization, came to the ER for worsening dyspnea for several days worse with activity, orthopnea and elevated BP.    INTERVAL HPI/OVERNIGHT EVENTS: Patient seen and examined lying in bed.  Patient s/p EGD this am.  Patient denies any headache, dizziness, SOB, CP, abdominal pain, nausea, vomiting, dysuria.  Other ROS reviewed and are negative.    Vital Signs Last 24 Hrs  T(C): 36.5 (26 May 2021 12:49), Max: 36.7 (25 May 2021 17:52)  T(F): 97.7 (26 May 2021 12:49), Max: 98.1 (25 May 2021 17:52)  HR: 100 (26 May 2021 12:49) (76 - 100)  BP: 130/70 (26 May 2021 12:49) (130/70 - 174/78)  BP(mean): --  RR: 16 (26 May 2021 12:49) (16 - 18)  SpO2: 97% (26 May 2021 12:49) (96% - 97%)  I&O's Detail    25 May 2021 07:01  -  26 May 2021 07:00  --------------------------------------------------------  IN:  Total IN: 0 mL    OUT:    Voided (mL): 550 mL  Total OUT: 550 mL    Total NET: -550 mL      PHYSICAL EXAM:  GENERAL: NAD  HEAD:  Atraumatic, Normocephalic  NECK: Supple, No JVD, Normal thyroid  NERVOUS SYSTEM:  Alert & Oriented X3, Good concentration; Motor Strength 5/5 B/L upper and lower extremities  CHEST/LUNG: Clear to auscultation bilaterally  HEART: Regular rate and rhythm; No murmurs, rubs, or gallops  ABDOMEN: Soft, Nontender, Nondistended; Bowel sounds present  EXTREMITIES:  2+ Peripheral Pulses, No clubbing, cyanosis, or edema                                  8.7    5.32  )-----------( 111      ( 26 May 2021 07:08 )             28.5     26 May 2021 07:08    144    |  107    |  66.0   ----------------------------<  80     3.4     |  28.0   |  2.63     Ca    8.2        26 May 2021 07:08  Phos  4.3       25 May 2021 08:02  Mg     2.1       25 May 2021 08:02    TPro  6.3    /  Alb  3.4    /  TBili  0.3    /  DBili  x      /  AST  23     /  ALT  12     /  AlkPhos  106    26 May 2021 07:08    PT/INR - ( 26 May 2021 07:08 )   PT: 14.6 sec;   INR: 1.27 ratio         PTT - ( 26 May 2021 07:08 )  PTT:32.5 sec    CAPILLARY BLOOD GLUCOSE  POCT Blood Glucose.: 100 mg/dL (26 May 2021 12:56)  POCT Blood Glucose.: 95 mg/dL (26 May 2021 08:18)  POCT Blood Glucose.: 201 mg/dL (25 May 2021 21:04)  POCT Blood Glucose.: 183 mg/dL (25 May 2021 16:54)    LIVER FUNCTIONS - ( 26 May 2021 07:08 )  Alb: 3.4 g/dL / Pro: 6.3 g/dL / ALK PHOS: 106 U/L / ALT: 12 U/L / AST: 23 U/L / GGT: x               MEDICATIONS  (STANDING):  amitriptyline 40 milliGRAM(s) Oral at bedtime  atorvastatin 40 milliGRAM(s) Oral at bedtime  chlorhexidine 2% Cloths 1 Application(s) Topical daily  cholecalciferol 2000 Unit(s) Oral daily  dextrose 40% Gel 15 Gram(s) Oral once  dextrose 5%. 1000 milliLiter(s) (100 mL/Hr) IV Continuous <Continuous>  dextrose 5%. 1000 milliLiter(s) (50 mL/Hr) IV Continuous <Continuous>  dextrose 50% Injectable 25 Gram(s) IV Push once  dextrose 50% Injectable 12.5 Gram(s) IV Push once  dextrose 50% Injectable 25 Gram(s) IV Push once  epoetin georgina-epbx (RETACRIT) Injectable 86197 Unit(s) SubCutaneous every 7 days  folic acid 1 milliGRAM(s) Oral daily  gabapentin 300 milliGRAM(s) Oral at bedtime  glucagon  Injectable 1 milliGRAM(s) IntraMuscular once  hydrALAZINE 25 milliGRAM(s) Oral every 8 hours  insulin glargine Injectable (LANTUS) 12 Unit(s) SubCutaneous at bedtime  insulin lispro (ADMELOG) corrective regimen sliding scale   SubCutaneous three times a day before meals  insulin lispro (ADMELOG) corrective regimen sliding scale   SubCutaneous at bedtime  iron sucrose IVPB 250 milliGRAM(s) IV Intermittent <User Schedule>  levothyroxine 100 MICROGram(s) Oral daily  metoprolol tartrate 25 milliGRAM(s) Oral every 6 hours  montelukast 10 milliGRAM(s) Oral at bedtime  multivitamin 1 Tablet(s) Oral daily  Nephro-nunu 1 Tablet(s) Oral daily  pantoprazole    Tablet 40 milliGRAM(s) Oral before breakfast  polyethylene glycol/electrolyte Solution. 4000 milliLiter(s) Oral once  senna 2 Tablet(s) Oral at bedtime  torsemide 30 milliGRAM(s) Oral two times a day    MEDICATIONS  (PRN):  albuterol/ipratropium for Nebulization 3 milliLiter(s) Nebulizer every 6 hours PRN Shortness of Breath and/or Wheezing       CC: chf (26 May 2021 09:08)    HPI:  76y Female with HTN HLD CAD s/p remote CABg Bio AVR solitary episode of Af w/o recurrence, ILR in place, CRI, MDS transfusion dependent, cirrhosis sec to hep c with esophageal varices and prior banding, recent finding of hepatic mass/hepatoma s/p embolization, came to the ER for worsening dyspnea for several days worse with activity, orthopnea and elevated BP.    INTERVAL HPI/OVERNIGHT EVENTS: Patient seen and examined lying in bed.  Patient s/p EGD this am.  Patient denies any headache, dizziness, SOB, CP, abdominal pain, nausea, vomiting, dysuria.  Other ROS reviewed and are negative.    Vital Signs Last 24 Hrs  T(C): 36.5 (26 May 2021 12:49), Max: 36.7 (25 May 2021 17:52)  T(F): 97.7 (26 May 2021 12:49), Max: 98.1 (25 May 2021 17:52)  HR: 100 (26 May 2021 12:49) (76 - 100)  BP: 130/70 (26 May 2021 12:49) (130/70 - 174/78)  BP(mean): --  RR: 16 (26 May 2021 12:49) (16 - 18)  SpO2: 97% (26 May 2021 12:49) (96% - 97%)  I&O's Detail    25 May 2021 07:01  -  26 May 2021 07:00  --------------------------------------------------------  IN:  Total IN: 0 mL    OUT:    Voided (mL): 550 mL  Total OUT: 550 mL    Total NET: -550 mL      PHYSICAL EXAM:  GENERAL: NAD  HEAD:  Atraumatic, Normocephalic  NECK: Supple, No JVD, Normal thyroid  NERVOUS SYSTEM:  Alert & Oriented X3, Good concentration; Motor Strength 5/5 B/L upper and lower extremities  CHEST/LUNG: Clear to auscultation bilaterally  HEART: Regular rate and rhythm; No murmurs, rubs, or gallops, L side chest port, no JVD  ABDOMEN: Soft, Nontender, Nondistended; Bowel sounds present  EXTREMITIES:  2+ Peripheral Pulses, No clubbing, cyanosis, or edema                                  8.7    5.32  )-----------( 111      ( 26 May 2021 07:08 )             28.5     26 May 2021 07:08    144    |  107    |  66.0   ----------------------------<  80     3.4     |  28.0   |  2.63     Ca    8.2        26 May 2021 07:08  Phos  4.3       25 May 2021 08:02  Mg     2.1       25 May 2021 08:02    TPro  6.3    /  Alb  3.4    /  TBili  0.3    /  DBili  x      /  AST  23     /  ALT  12     /  AlkPhos  106    26 May 2021 07:08    PT/INR - ( 26 May 2021 07:08 )   PT: 14.6 sec;   INR: 1.27 ratio         PTT - ( 26 May 2021 07:08 )  PTT:32.5 sec    CAPILLARY BLOOD GLUCOSE  POCT Blood Glucose.: 100 mg/dL (26 May 2021 12:56)  POCT Blood Glucose.: 95 mg/dL (26 May 2021 08:18)  POCT Blood Glucose.: 201 mg/dL (25 May 2021 21:04)  POCT Blood Glucose.: 183 mg/dL (25 May 2021 16:54)    LIVER FUNCTIONS - ( 26 May 2021 07:08 )  Alb: 3.4 g/dL / Pro: 6.3 g/dL / ALK PHOS: 106 U/L / ALT: 12 U/L / AST: 23 U/L / GGT: x               MEDICATIONS  (STANDING):  amitriptyline 40 milliGRAM(s) Oral at bedtime  atorvastatin 40 milliGRAM(s) Oral at bedtime  chlorhexidine 2% Cloths 1 Application(s) Topical daily  cholecalciferol 2000 Unit(s) Oral daily  dextrose 40% Gel 15 Gram(s) Oral once  dextrose 5%. 1000 milliLiter(s) (100 mL/Hr) IV Continuous <Continuous>  dextrose 5%. 1000 milliLiter(s) (50 mL/Hr) IV Continuous <Continuous>  dextrose 50% Injectable 25 Gram(s) IV Push once  dextrose 50% Injectable 12.5 Gram(s) IV Push once  dextrose 50% Injectable 25 Gram(s) IV Push once  epoetin georgina-epbx (RETACRIT) Injectable 53662 Unit(s) SubCutaneous every 7 days  folic acid 1 milliGRAM(s) Oral daily  gabapentin 300 milliGRAM(s) Oral at bedtime  glucagon  Injectable 1 milliGRAM(s) IntraMuscular once  hydrALAZINE 25 milliGRAM(s) Oral every 8 hours  insulin glargine Injectable (LANTUS) 12 Unit(s) SubCutaneous at bedtime  insulin lispro (ADMELOG) corrective regimen sliding scale   SubCutaneous three times a day before meals  insulin lispro (ADMELOG) corrective regimen sliding scale   SubCutaneous at bedtime  iron sucrose IVPB 250 milliGRAM(s) IV Intermittent <User Schedule>  levothyroxine 100 MICROGram(s) Oral daily  metoprolol tartrate 25 milliGRAM(s) Oral every 6 hours  montelukast 10 milliGRAM(s) Oral at bedtime  multivitamin 1 Tablet(s) Oral daily  Nephro-nunu 1 Tablet(s) Oral daily  pantoprazole    Tablet 40 milliGRAM(s) Oral before breakfast  polyethylene glycol/electrolyte Solution. 4000 milliLiter(s) Oral once  senna 2 Tablet(s) Oral at bedtime  torsemide 30 milliGRAM(s) Oral two times a day    MEDICATIONS  (PRN):  albuterol/ipratropium for Nebulization 3 milliLiter(s) Nebulizer every 6 hours PRN Shortness of Breath and/or Wheezing

## 2021-05-26 NOTE — DIETITIAN INITIAL EVALUATION ADULT. - PROBLEM SELECTOR PLAN 1
Clinically pt. volume over loaded and has B/L pleural effusions, will be on iv lasix 40 mg bid for now, ct chest ordered to better evaluate pleural effusions and further plan as per findings, if significant may need ct surgery consult. weight daily, monitor I/O. o2 support, mike on board. follow echo.    addendum : pt's ct chest report reviewed, ct surgery consult requested for pleural effusions. dr. izaguirre

## 2021-05-26 NOTE — PROGRESS NOTE ADULT - ASSESSMENT
· Assessment	  CKD(IV): + azotemia superimposed  CHF with chronic fluid overload; B/L pleural effusions---> clinically better  - cont to avoid potential nephrotoxins  - transitioned  to oral diuretic dosing ( Torsemide )   - follow labs    Anemia: +CKD + MDS (transfusion dependent) + GI loss  Low Fe stores  - cont ARACELI and IV Fe  - PRBCs as needed

## 2021-05-26 NOTE — PROGRESS NOTE ADULT - NUTRITIONAL ASSESSMENT
I personally seen and examined patient, author reviewed available labs, imaging, consultants recommendations, discussed results and plan of care with patient and patient family ( daughter). I personally edited above note, agree with above physical exam, assessment and plan. I personally seen and examined patient, author reviewed available labs, imaging, consultants recommendations, discussed results and plan of care with patient and patient family ( daughter - at [facility] ). I personally edited above note, agree with above physical exam, assessment and plan.

## 2021-05-26 NOTE — PROGRESS NOTE ADULT - SUBJECTIVE AND OBJECTIVE BOX
NEPHROLOGY INTERVAL HPI/OVERNIGHT EVENTS:    Seen post EGD   Findings noted   Less edema , Less SOB     MEDICATIONS  (STANDING):  amitriptyline 40 milliGRAM(s) Oral at bedtime  atorvastatin 40 milliGRAM(s) Oral at bedtime  chlorhexidine 2% Cloths 1 Application(s) Topical daily  cholecalciferol 2000 Unit(s) Oral daily  dextrose 40% Gel 15 Gram(s) Oral once  dextrose 5%. 1000 milliLiter(s) (100 mL/Hr) IV Continuous <Continuous>  dextrose 5%. 1000 milliLiter(s) (50 mL/Hr) IV Continuous <Continuous>  dextrose 50% Injectable 25 Gram(s) IV Push once  dextrose 50% Injectable 12.5 Gram(s) IV Push once  dextrose 50% Injectable 25 Gram(s) IV Push once  epoetin georgina-epbx (RETACRIT) Injectable 11520 Unit(s) SubCutaneous every 7 days  folic acid 1 milliGRAM(s) Oral daily  gabapentin 300 milliGRAM(s) Oral at bedtime  glucagon  Injectable 1 milliGRAM(s) IntraMuscular once  hydrALAZINE 25 milliGRAM(s) Oral every 8 hours  insulin glargine Injectable (LANTUS) 12 Unit(s) SubCutaneous at bedtime  insulin lispro (ADMELOG) corrective regimen sliding scale   SubCutaneous three times a day before meals  insulin lispro (ADMELOG) corrective regimen sliding scale   SubCutaneous at bedtime  levothyroxine 100 MICROGram(s) Oral daily  metoprolol tartrate 25 milliGRAM(s) Oral every 6 hours  montelukast 10 milliGRAM(s) Oral at bedtime  multivitamin 1 Tablet(s) Oral daily  Nephro-nunu 1 Tablet(s) Oral daily  pantoprazole    Tablet 40 milliGRAM(s) Oral before breakfast  polyethylene glycol/electrolyte Solution. 4000 milliLiter(s) Oral once  senna 2 Tablet(s) Oral at bedtime  torsemide 30 milliGRAM(s) Oral two times a day    MEDICATIONS  (PRN):  albuterol/ipratropium for Nebulization 3 milliLiter(s) Nebulizer every 6 hours PRN Shortness of Breath and/or Wheezing      Allergies    ACE inhibitors (Other)  Bactrim (Nephrotoxicity)  Biaxin (Joint Pain)  morphine (Short breath)  NSAIDs (Other)    Intolerances          Vital Signs Last 24 Hrs  T(C): 36.5 (26 May 2021 12:49), Max: 36.7 (25 May 2021 17:52)  T(F): 97.7 (26 May 2021 12:49), Max: 98.1 (25 May 2021 17:52)  HR: 100 (26 May 2021 12:49) (80 - 100)  BP: 130/70 (26 May 2021 12:49) (130/70 - 174/78)  BP(mean): --  RR: 16 (26 May 2021 12:49) (16 - 18)  SpO2: 97% (26 May 2021 12:49) (96% - 97%)  Daily     Daily   I&O's Detail    25 May 2021 07:01  -  26 May 2021 07:00  --------------------------------------------------------  IN:  Total IN: 0 mL    OUT:    Voided (mL): 550 mL  Total OUT: 550 mL    Total NET: -550 mL        I&O's Summary    25 May 2021 07:01  -  26 May 2021 07:00  --------------------------------------------------------  IN: 0 mL / OUT: 550 mL / NET: -550 mL      PHYSICAL EXAM:  GENERAL: Deconditioned  ENMT: Dry mucous membranes  NECK: Supple, +JVD  NERVOUS SYSTEM:  Alert & Oriented X3,  intact and symmetric  CHEST/LUNG: Diminished BS at bases bilaterally  HEART: No rub  ABDOMEN: Soft, Nontender, Nondistended; BS+  EXTREMITIES:  2+ Peripheral Pulses, + improved LE edema   LABS:                        8.7    5.32  )-----------( 111      ( 26 May 2021 07:08 )             28.5     05-26    144  |  107  |  66.0<H>  ----------------------------<  80  3.4<L>   |  28.0  |  2.63<H>    Ca    8.2<L>      26 May 2021 07:08  Phos  4.3     05-25  Mg     2.1     05-25    TPro  6.3<L>  /  Alb  3.4  /  TBili  0.3<L>  /  DBili  x   /  AST  23  /  ALT  12  /  AlkPhos  106  05-26    PT/INR - ( 26 May 2021 07:08 )   PT: 14.6 sec;   INR: 1.27 ratio         PTT - ( 26 May 2021 07:08 )  PTT:32.5 sec            RADIOLOGY & ADDITIONAL TESTS:

## 2021-05-26 NOTE — PROGRESS NOTE ADULT - ASSESSMENT
Assessment  HFpEF currently euvolemic on GDMT  CAD s/p CABG Bio AVR mild MS  chronic AF with CVR  CRI  cirrhosis/hepatoma  htn      Rec  proceed with EGD/colonscopy, cardiac status optimal for this low risk procedure  when GI workup complete will arrange for cardiomems insertion given recurrent HFpEF/CRI

## 2021-05-26 NOTE — DIETITIAN INITIAL EVALUATION ADULT. - PERTINENT LABORATORY DATA
05-26 Na144 mmol/L Glu 80 mg/dL K+ 3.4 mmol/L<L> Cr  2.63 mg/dL<H> BUN 66.0 mg/dL<H> Phos n/a   Alb 3.4 g/dL PAB n/a

## 2021-05-26 NOTE — PROGRESS NOTE ADULT - ASSESSMENT
76y Female with HTN HLD CAD s/p remote CABG, Bio AVR solitary episode of Af w/o recurrence, ILR in place, CRI, MDS transfusion dependent, cirrhosis sec to chronic hep c with esophageal varices and prior banding, hepatocellular carcinoma,s/p embolization with embozene particles on 4/5/21, and recent admission to [facility] after fall with head trauma with also transfused 2 untis PRBC during that admission, this time came to the ER for worsening dyspnea for several days worse with activity, orthopnea and elevated BP with hypoxic respiratory falure requiring BIPAP in ED, She was found to have acute decompensated heart failure. She was started on IV furosemide. Cardiology team consulted. CT surgery consulted for therapeutic thoracocentesis but it was deferred given improvement with diuretics.        #Decompensated Heart Failure with preserved EF  -cardiology following  -IV Furosemide changed to PO Torsemide  -Echo 5/20/2021: LVEF 60-65%, G1DD   -Continue supplemental O2 at 3L/min weaning as able  -Trend renal function closely  - c/w hydralazine, bb, off ace/arb  spironolactone due to CKD, off ASA due anemia  -Cardiomems as per Cardiology after GI work up complete    #Hypertension   -  hydralazine as above    #Diabetes Mellitus   -c/w 12u subQ qhs, MDSS, will adjust base on POC  - c/w lyrica and gabapentin    #CAD, S/p CABG  -Continue Lipitor 40mg po qhs    #MDS   -Recent transfusion at [facility] - 2units pRBCs  -Transfusion dependent   -No AC as per cardiology, patient bleeds easily     #Cirrhosis 2/2 hepatitis C c/b hepatocellular carcinoma s/p embolization  #Hepatocellular carcinoma   - d/c  Spironolactone for now, Furosemide changed to Po Torsemide  - Discussed with Dr. Lloyd for evaluation for EGD/Colonoscopy while in house>> EGD - with no varices; Colonoscopy scheduled for 5/27/21     #Chronic Kidney disease  -Cr 2.8 >> continue Torsemide   -Avoid nephrotoxins  -Trend renal function   -Nephrology following    #Hyperkalemia, now resolved  - c/w to monitor with daily BMP     #Hypothyroidism  -Continue levothyroxine 100mcg po daily     #VTE prophylaxis -SCDs due transfusion dependent anemia  #Advanced Directives -Full Code-HCP Daniela Collier    # Dispo - Awaiting Colonoscopy 5/27/21 and cardiomems 5/28/21; Will need outpatient vestibular therapy on dc 76y Female with HTN HLD CAD s/p remote CABG, Bio AVR solitary episode of Af w/o recurrence, ILR in place, CRI, MDS transfusion dependent, cirrhosis sec to chronic hep c with esophageal varices and prior banding, hepatocellular carcinoma,s/p embolization with embozene particles on 4/5/21, and recent admission to [facility] after fall with head trauma with also transfused 2 untis PRBC during that admission, this time came to the ER for worsening dyspnea for several days worse with activity, orthopnea and elevated BP with hypoxic respiratory falure requiring BIPAP in ED, She was found to have acute decompensated heart failure. She was started on IV furosemide. Cardiology team consulted. CT surgery consulted for therapeutic thoracocentesis but it was deferred given improvement with diuretics.   Primary gastroenterologist seen pt in house and it was decided to perform EGD/Colonoscopy while she is in house and now optimized from volume stand point. EGD on 5/26 which  mild hypertensive gastropathy, no varices, with biopsy obtained.        #Decompensated Heart Failure with preserved EF  -cardiology following  - tolerating Torsemide 30 bid  -Echo 5/20/2021: LVEF 60-65%, G1DD   -Continue wean supplemental O2 at 3L/min weaning as able  -c/w Trend renal function closely  - c/w hydralazine, bb, off ace/arb  spironolactone due to CKD, off ASA due anemia  -Cardiomems as per Cardiology after GI work is complete    #Cirrhosis 2/2 hepatitis C c/b hepatocellular carcinoma s/p embolization  #Hepatocellular carcinoma   - d/c  Spironolactone for now, Furosemide changed to Po Torsemide  - EGD on 5/26 which  mild hypertensive gastropathy, no varices, with biopsy obtained. Colonoscopy scheduled for 5/27/21     #Hypertension   -  hydralazine as above    #Diabetes Mellitus   -c/w 12u subQ qhs, MDSS, will adjust base on POC  - c/w lyrica and gabapentin    #CAD, S/p CABG  -Continue Lipitor 40mg po qhs    #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   -Transfusion dependent, will transfuse with goal Hgb > 8, continue to monitor with daily cbc  - c/w Iron infusions  -No AC as per cardiology, patient bleeds easily     #Mild MARGUERITE on Chronic Kidney disease stage 4, has improved   - diuretics as above  -Avoid nephrotoxins  -Nephrology following    #Hyperkalemia, now resolved  - c/w to monitor with daily BMP     #Hypothyroidism  -Continue levothyroxine 100mcg po daily     #VTE prophylaxis -SCDs due transfusion dependent anemia  #Advanced Directives -Full Code-HCP Daniela Collier    # Dispo - Awaiting Colonoscopy 5/27/21 and cardiomems 5/28/21; Will need outpatient vestibular therapy on dc

## 2021-05-26 NOTE — BRIEF OPERATIVE NOTE - OPERATION/FINDINGS
Patient had no esophageal varices. Stomach showed mild hypertensive gastropathy.  In the antrum there was a 5 mm nodule/white patch on antral fold just right of the pylorus which was biopsies. Then random biopsies of antrum and body to r/o HP. D1, D2 to r/o celiac. No bleeding. no masses Patient had no esophageal varices. Stomach showed mild hypertensive gastropathy.  In the antrum there was a 5 mm nodule/white patch on antral fold just right of the pylorus which was biopsied. Then random biopsies of antrum and body to r/o HP. D1, D2 normal . Biopsies to  r/o celiac. No bleeding. no masses

## 2021-05-26 NOTE — DIETITIAN INITIAL EVALUATION ADULT. - OTHER INFO
76y Female with HTN, HLD, CAD s/p remote CABG Bio AVR solitary episode of Af w/o recurrence, ILR in place, CRI, MDS transfusion dependent, cirrhosis sec to hep c with esophageal varices and prior banding, recent finding of hepatic mass/hepatoma s/p embolization, came to the ER for worsening dyspnea for several days worse with activity, orthopnea and elevated BP. pt and her daughter stated that she was admitted at [facility] last week for about 4 days after she fell at her pcp office was found to be anemic. Pt. has port for blood transfusions and iron infusions by her hematologist. Pt found with B/L pleural effusions 2/2 decompensated CHF. Pt NPO and off the floor at time fo interview for EGD today, possible colonoscopy tomorrow per GI.

## 2021-05-26 NOTE — PROGRESS NOTE ADULT - SUBJECTIVE AND OBJECTIVE BOX
[place] [facility]ARDIOVASCULAR - [facility], THE [facility]                                   [address] [place] [address]                                                      PHONE: [phone]                                                         FAX: [phone]  [email or web address]  ---------------------------------------------------------------------------------------------------------------------------------    Overnight events/patient complaints: pt for GEGD /colonscopy, lying flat no complaints on nasal O2      ACE inhibitors (Other)  Bactrim (Nephrotoxicity)  Biaxin (Joint Pain)  morphine (Short breath)  NSAIDs (Other)    MEDICATIONS  (STANDING):  amitriptyline 40 milliGRAM(s) Oral at bedtime  atorvastatin 40 milliGRAM(s) Oral at bedtime  cholecalciferol 2000 Unit(s) Oral daily  dextrose 40% Gel 15 Gram(s) Oral once  dextrose 5%. 1000 milliLiter(s) (100 mL/Hr) IV Continuous <Continuous>  dextrose 5%. 1000 milliLiter(s) (50 mL/Hr) IV Continuous <Continuous>  dextrose 50% Injectable 25 Gram(s) IV Push once  dextrose 50% Injectable 12.5 Gram(s) IV Push once  dextrose 50% Injectable 25 Gram(s) IV Push once  epoetin georgina-epbx (RETACRIT) Injectable 43651 Unit(s) SubCutaneous every 7 days  folic acid 1 milliGRAM(s) Oral daily  gabapentin 300 milliGRAM(s) Oral at bedtime  glucagon  Injectable 1 milliGRAM(s) IntraMuscular once  hydrALAZINE 25 milliGRAM(s) Oral every 8 hours  insulin glargine Injectable (LANTUS) 12 Unit(s) SubCutaneous at bedtime  insulin lispro (ADMELOG) corrective regimen sliding scale   SubCutaneous three times a day before meals  insulin lispro (ADMELOG) corrective regimen sliding scale   SubCutaneous at bedtime  iron sucrose IVPB 250 milliGRAM(s) IV Intermittent <User Schedule>  levothyroxine 100 MICROGram(s) Oral daily  metoprolol tartrate 25 milliGRAM(s) Oral every 6 hours  montelukast 10 milliGRAM(s) Oral at bedtime  multivitamin 1 Tablet(s) Oral daily  Nephro-nunu 1 Tablet(s) Oral daily  pantoprazole    Tablet 40 milliGRAM(s) Oral before breakfast  potassium chloride    Tablet ER 40 milliEquivalent(s) Oral once  senna 2 Tablet(s) Oral at bedtime  torsemide 30 milliGRAM(s) Oral two times a day    MEDICATIONS  (PRN):  albuterol/ipratropium for Nebulization 3 milliLiter(s) Nebulizer every 6 hours PRN Shortness of Breath and/or Wheezing      Vital Signs Last 24 Hrs  T(C): 36.7 (26 May 2021 04:25), Max: 36.7 (25 May 2021 17:52)  T(F): 98 (26 May 2021 04:25), Max: 98.1 (25 May 2021 17:52)  HR: 87 (26 May 2021 04:25) (76 - 91)  BP: 152/73 (26 May 2021 04:25) (138/72 - 174/78)  BP(mean): --  RR: 18 (26 May 2021 04:25) (18 - 18)  SpO2: 96% (26 May 2021 04:25) (96% - 97%)  Daily     Daily   ICU Vital Signs Last 24 Hrs  NICHELLE GILL  I&O's Detail    25 May 2021 07:01  -  26 May 2021 07:00  --------------------------------------------------------  IN:  Total IN: 0 mL    OUT:    Voided (mL): 550 mL  Total OUT: 550 mL    Total NET: -550 mL        I&O's Summary    25 May 2021 07:01  -  26 May 2021 07:00  --------------------------------------------------------  IN: 0 mL / OUT: 550 mL / NET: -550 mL      Drug Dosing Weight  NICHELLE [facility]      PHYSICAL EXAM:  General: Appears well developed, well nourished alert and cooperative.  HEENT: Head; normocephalic, atraumatic.  Eyes: Pupils reactive, cornea wnl.  Neck: Supple, no nodes adenopathy, no NVD or carotid bruit or thyromegaly.  CARDIOVASCULAR: Normal S1 and S2, No murmur, rub, gallop or lift.   LUNGS: basilar rales  ABDOMEN: Soft, nontender without mass or organomegaly. bowel sounds normoactive.  EXTREMITIES: No clubbing, cyanosis or edema. Distal pulses wnl.   SKIN: warm and dry with normal turgor.  NEURO: Alert/oriented x 3/normal motor exam. No pathologic reflexes.    PSYCH: normal affect.        LABS:                        8.7    5.32  )-----------( 111      ( 26 May 2021 07:08 )             28.5     05-26    144  |  107  |  66.0<H>  ----------------------------<  80  3.4<L>   |  28.0  |  2.63<H>    Ca    8.2<L>      26 May 2021 07:08  Phos  4.3     05-25  Mg     2.1     05-25    TPro  6.3<L>  /  Alb  3.4  /  TBili  0.3<L>  /  DBili  x   /  AST  23  /  ALT  12  /  AlkPhos  106  05-26    [facility]      PT/INR - ( 26 May 2021 07:08 )   PT: 14.6 sec;   INR: 1.27 ratio         PTT - ( 26 May 2021 07:08 )  PTT:32.5 sec      RADIOLOGY & ADDITIONAL STUDIES:    INTERPRETATION OF TELEMETRY (personally reviewed):    ECG:< from: 12 Lead ECG (05.19.21 @ 13:52) >    Diagnosis Line *** Poor data quality, interpretation may be adversely affected  Atrial fibrillation  Non-specific intra-ventricular conduction delay  ST & T wave abnormality, consider inferolateral ischemia  Abnormal ECG    Confirmed by Alex Cormier ([id number]) on 5/19/2021 3:57:04 PM    < end of copied text >      ECHO:< from: TTE Echo Complete w/o Contrast w/ Doppler (05.19.21 @ 21:01) >    Summary:   1. Left ventricular ejection fraction, by visual estimation, is 60 to 65%.   2. Normal global left ventricular systolic function.   3. The mitral in-flow pattern reveals no discernable A-wave, therefore no comment on diastolic function can be made.   4. There ismoderate concentric left ventricular hypertrophy.   5. The right ventricle is not well visualized. Systolic function appears grossly preserved.   6. Severely enlarged left atrium.   7. Moderately enlarged right atrium.   8. Moderate tricuspid regurgitation.   9. Trace mitral valve regurgitation.  10. Mitral valve mean gradient is 4.9 mmHg consistent with mild mitral stenosis.  11. Mild thickening of the anterior and posterior mitral valve leaflets.  12. Moderate mitral annular calcification.  13.A well-seated bioprosthetic valve is visualized in the aortic position. There is mild-moderate central regurgitation. Mean gradiet is 8.5 mm Hg and dimensionless index is 0.47, which are likely consistent with a normal funcitoning bioprosthetic aortic valve.  14. Mildly dilated pulmonary artery.  15. Estimated pulmonary artery systolic pressure is 53.1 mmHg assuming a right atrial pressure of 3 mmHg, which is consistent with moderate pulmonary hypertension.  16. There is no evidence of pericardial effusion.  17. Moderate pleural effusion in both left and right lateral regions.  18. Compared to a prior study from 10/19/20, there is now pulmonary hypertension and bilateral pleural effusions are now present.    MD Renay Electronicallysigned on 5/20/2021 at 1:03:42 PM      < from: Xray Chest 1 View- PORTABLE-Routine (Xray Chest 1 View- PORTABLE-Routine in AM.) (05.25.21 @ 06:51) >  FINDINGS:    The lungs are clear of airspace consolidations or effusions. No pneumothorax.    There is mild cardiomegaly.  Status post median sternotomy.  Mediport catheter tip in SVC.    Visualized osseous structures are intact.    IMPRESSION: Mild Cardiomegaly. No gross airspace consolidation or effusion..                ARIEL LANE MD; Attending Radiologist  This document has been electronically signed. May 25 2021 12:33PM    < end of copied text >

## 2021-05-27 NOTE — BRIEF OPERATIVE NOTE - COMMENTS
await pathology. No further GI w/u at this time
A/P  hx of Hep C, cirrhosis, hepatoma  anemia, - mild hypertensive gastropathy. NO varices  call in one week for biopsy results   colonoscopy tomorrow- clear liquids today

## 2021-05-27 NOTE — BRIEF OPERATIVE NOTE - OPERATION/FINDINGS
very small AVMs in distal rectum ablated using APC and very focal in location, friable colonic mucosa throughout and very focal are (3-4cm) of mild colitis in the mid ascending with subepithelial ecchymosis and small erosions-biopsied. Normal TI for 15 cm and numerous sigmoid diverticuli

## 2021-05-27 NOTE — PROGRESS NOTE ADULT - SUBJECTIVE AND OBJECTIVE BOX
[facility] Division of Hospital Medicine    Chief Complaint:  chf exacerbation    SUBJECTIVE: reports no complains    OVERNIGHT EVENTS: none reported    Patient denies chest pain, SOB, abd pain, N/V, fever, chills, dysuria or any other complaints. All remainder ROS negative.     MEDICATIONS  (STANDING):  amitriptyline 40 milliGRAM(s) Oral at bedtime  atorvastatin 40 milliGRAM(s) Oral at bedtime  chlorhexidine 2% Cloths 1 Application(s) Topical daily  cholecalciferol 2000 Unit(s) Oral daily  dextrose 40% Gel 15 Gram(s) Oral once  dextrose 5%. 1000 milliLiter(s) (50 mL/Hr) IV Continuous <Continuous>  dextrose 5%. 1000 milliLiter(s) (100 mL/Hr) IV Continuous <Continuous>  dextrose 50% Injectable 25 Gram(s) IV Push once  dextrose 50% Injectable 12.5 Gram(s) IV Push once  dextrose 50% Injectable 25 Gram(s) IV Push once  epoetin georgina-epbx (RETACRIT) Injectable 53922 Unit(s) SubCutaneous every 7 days  folic acid 1 milliGRAM(s) Oral daily  gabapentin 300 milliGRAM(s) Oral at bedtime  glucagon  Injectable 1 milliGRAM(s) IntraMuscular once  hydrALAZINE 25 milliGRAM(s) Oral every 8 hours  insulin glargine Injectable (LANTUS) 12 Unit(s) SubCutaneous at bedtime  insulin lispro (ADMELOG) corrective regimen sliding scale   SubCutaneous three times a day before meals  insulin lispro (ADMELOG) corrective regimen sliding scale   SubCutaneous at bedtime  levothyroxine 100 MICROGram(s) Oral daily  metoprolol tartrate 25 milliGRAM(s) Oral every 6 hours  montelukast 10 milliGRAM(s) Oral at bedtime  multivitamin 1 Tablet(s) Oral daily  Nephro-nunu 1 Tablet(s) Oral daily  pantoprazole    Tablet 40 milliGRAM(s) Oral before breakfast  senna 2 Tablet(s) Oral at bedtime    MEDICATIONS  (PRN):  albuterol/ipratropium for Nebulization 3 milliLiter(s) Nebulizer every 6 hours PRN Shortness of Breath and/or Wheezing        I&O's Summary      PHYSICAL EXAM:  Vital Signs Last 24 Hrs  T(C): 36.7 (27 May 2021 17:20), Max: 37.1 (26 May 2021 21:31)  T(F): 98.1 (27 May 2021 17:20), Max: 98.7 (26 May 2021 21:31)  HR: 85 (27 May 2021 17:20) (78 - 100)  BP: 138/54 (27 May 2021 17:20) (138/54 - 178/80)  BP(mean): --  RR: 18 (27 May 2021 17:20) (18 - 18)  SpO2: 99% (27 May 2021 17:20) (95% - 99%)      CONSTITUTIONAL: NAD, well-developed, well-groomed  ENMT: Moist oral mucosa, no pharyngeal injection or exudates; normal dentition; No JVD  RESPIRATORY: Normal respiratory effort; clear sound b/l,  no wheezing  CARDIOVASCULAR: irregular rate and rhythm, with soft systolic murmur in R and LUSB, trace extremity edema; Peripheral pulses are 2+ bilaterally, R side chest port  ABDOMEN: somewhat distention, nontender to palpation, normoactive bowel sounds, no rebound/guarding; No palpable hepatosplenomegaly  MUSCLOSKELETAL:  no clubbing or cyanosis of digits; no joint swelling or tenderness to palpation  PSYCH: A+O to person, place, and time; affect appropriate  NEUROLOGY: CN 2-12 are intact and symmetric; no gross sensory deficits; was observed moving all 4 ext against gravity cooperating with exam.   SKIN: No rashes; no palpable lesions    LABS:                        9.4    5.79  )-----------( 118      ( 27 May 2021 08:56 )             30.1     05-27    144  |  108<H>  |  62.6<H>  ----------------------------<  104<H>  4.3   |  23.0  |  2.85<H>    Ca    8.8      27 May 2021 08:56  Mg     2.0     05-27    TPro  6.3<L>  /  Alb  3.4  /  TBili  0.3<L>  /  DBili  x   /  AST  23  /  ALT  12  /  AlkPhos  106  05-26    PT/INR - ( 26 May 2021 07:08 )   PT: 14.6 sec;   INR: 1.27 ratio         PTT - ( 26 May 2021 07:08 )  PTT:32.5 sec          CAPILLARY BLOOD GLUCOSE      POCT Blood Glucose.: 99 mg/dL (27 May 2021 13:26)  POCT Blood Glucose.: 120 mg/dL (27 May 2021 07:55)  POCT Blood Glucose.: 176 mg/dL (26 May 2021 21:33)        RADIOLOGY & ADDITIONAL TESTS:  Results Reviewed:   Imaging Personally Reviewed:  Electrocardiogram Personally Reviewed:

## 2021-05-27 NOTE — CHART NOTE - NSCHARTNOTEFT_GEN_A_CORE
No further intervention from thoracic surgery standpoint at this time. No plans for chest tube. Thoracic surgery to sign off at this time. Please re-consult as necessary.

## 2021-05-27 NOTE — PROGRESS NOTE ADULT - ASSESSMENT
76y Female with HTN HLD CAD s/p remote CABG, Bio AVR solitary episode of Af w/o recurrence, ILR in place, CRI, MDS transfusion dependent, cirrhosis sec to chronic hep c with esophageal varices and prior banding, hepatocellular carcinoma,s/p embolization with embozene particles on 4/5/21, and recent admission to [facility] after fall with head trauma with also transfused 2 untis PRBC during that admission, this time came to the ER for worsening dyspnea for several days worse with activity, orthopnea and elevated BP with hypoxic respiratory falure requiring BIPAP in ED, She was found to have acute decompensated heart failure. She was started on IV furosemide. Cardiology team consulted. CT surgery consulted for therapeutic thoracocentesis but it was deferred given improvement with diuretics.   Primary gastroenterologist seen pt in house and it was decided to perform EGD/Colonoscopy while she is in house and now optimized from volume stand point. EGD on 5/26 which  mild hypertensive gastropathy, no varices, with biopsy obtained. 5/27 Colonoscopy was done.        #Decompensated Heart Failure with preserved EF, euvolemic on exam  -cardiology following  - holding diuretics for now given Cr   -Echo 5/20/2021: LVEF 60-65%, G1DD   -Continue wean supplemental O2 at 3L/min weaning as able  -c/w Trend renal function closely  - c/w hydralazine, bb, off ace/arb  spironolactone due to CKD, off ASA due anemia  -Cardiomems as per Cardiology after GI work is complete    #Cirrhosis 2/2 hepatitis C c/b hepatocellular carcinoma s/p embolization  #Hepatocellular carcinoma   - d/c  Spironolactone for now, Furosemide changed to Po Torsemide  - EGD on 5/26 which  mild hypertensive gastropathy, no varices, with biopsy obtained  - Colonoscopy  5/27/21 noted     #Hypertension   -  hydralazine as above    #Diabetes Mellitus   -c/w 12u subQ qhs, MDSS, will adjust base on POC  - c/w lyrica and gabapentin    #CAD, S/p CABG  -Continue Lipitor 40mg po qhs    #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   -Transfusion dependent, will transfuse with goal Hgb > 8, continue to monitor with daily cbc  - c/w Iron infusions  -No AC as per cardiology, patient bleeds easily     #Mild MARGUERITE on Chronic Kidney disease stage 4, has improved   - diuretics as above  -Avoid nephrotoxins  -Nephrology following    #Hyperkalemia, now resolved  - c/w to monitor with daily BMP     #Hypothyroidism  -Continue levothyroxine 100mcg po daily     #VTE prophylaxis -SCDs due transfusion dependent anemia  #Advanced Directives -Full Code-HCP Daniela Collier    # Dispo - planned for cardiomem by cardiology team

## 2021-05-27 NOTE — PROGRESS NOTE ADULT - ASSESSMENT
CKD(IV): + azotemia superimposed  Renal function has been somewhat stable Cr 2.6- 2.8  CHF with chronic fluid overload; B/L pleural effusions---> clinically better  - cont to avoid potential nephrotoxins  - Would cont diuretics was on Torsemide  - follow labs    Anemia: +CKD + MDS (transfusion dependent) + GI loss  Low Fe stores  - cont ARACELI and IV Fe  - PRBCs as needed    Will follow

## 2021-05-27 NOTE — PROGRESS NOTE ADULT - SUBJECTIVE AND OBJECTIVE BOX
NEPHROLOGY INTERVAL HPI/OVERNIGHT EVENTS:    Examined  No distress feeling beetr    MEDICATIONS  (STANDING):  amitriptyline 40 milliGRAM(s) Oral at bedtime  atorvastatin 40 milliGRAM(s) Oral at bedtime  chlorhexidine 2% Cloths 1 Application(s) Topical daily  cholecalciferol 2000 Unit(s) Oral daily  dextrose 40% Gel 15 Gram(s) Oral once  dextrose 5%. 1000 milliLiter(s) (50 mL/Hr) IV Continuous <Continuous>  dextrose 5%. 1000 milliLiter(s) (100 mL/Hr) IV Continuous <Continuous>  dextrose 50% Injectable 25 Gram(s) IV Push once  dextrose 50% Injectable 12.5 Gram(s) IV Push once  dextrose 50% Injectable 25 Gram(s) IV Push once  epoetin georgina-epbx (RETACRIT) Injectable 86833 Unit(s) SubCutaneous every 7 days  folic acid 1 milliGRAM(s) Oral daily  gabapentin 300 milliGRAM(s) Oral at bedtime  glucagon  Injectable 1 milliGRAM(s) IntraMuscular once  hydrALAZINE 25 milliGRAM(s) Oral every 8 hours  insulin glargine Injectable (LANTUS) 12 Unit(s) SubCutaneous at bedtime  insulin lispro (ADMELOG) corrective regimen sliding scale   SubCutaneous three times a day before meals  insulin lispro (ADMELOG) corrective regimen sliding scale   SubCutaneous at bedtime  levothyroxine 100 MICROGram(s) Oral daily  metoprolol tartrate 25 milliGRAM(s) Oral every 6 hours  montelukast 10 milliGRAM(s) Oral at bedtime  multivitamin 1 Tablet(s) Oral daily  Nephro-nunu 1 Tablet(s) Oral daily  pantoprazole    Tablet 40 milliGRAM(s) Oral before breakfast  senna 2 Tablet(s) Oral at bedtime    MEDICATIONS  (PRN):  albuterol/ipratropium for Nebulization 3 milliLiter(s) Nebulizer every 6 hours PRN Shortness of Breath and/or Wheezing      Allergies    ACE inhibitors (Other)  Bactrim (Nephrotoxicity)  Biaxin (Joint Pain)  morphine (Short breath)  NSAIDs (Other)    Intolerances        Vital Signs Last 24 Hrs  T(C): 36.3 (27 May 2021 04:51), Max: 37.1 (26 May 2021 21:31)  T(F): 97.4 (27 May 2021 04:51), Max: 98.7 (26 May 2021 21:31)  HR: 78 (27 May 2021 04:51) (78 - 100)  BP: 157/84 (27 May 2021 04:51) (140/74 - 178/80)  BP(mean): --  RR: 18 (27 May 2021 04:51) (18 - 18)  SpO2: 98% (27 May 2021 04:51) (94% - 98%)  Daily     Daily     PHYSICAL EXAM:  GENERAL: Deconditioned  NECK: Supple, +JVD  NERVOUS SYSTEM:  Alert & Oriented X3  CHEST/LUNG: Diminished BS B/L  HEART: No rub  ABDOMEN: Soft, Nontender, Nondistended; BS+  EXTREMITIES:  + improved LE edema     LABS:                        9.4    5.79  )-----------( 118      ( 27 May 2021 08:56 )             30.1     05-27    144  |  108<H>  |  62.6<H>  ----------------------------<  104<H>  4.3   |  23.0  |  2.85<H>    Ca    8.8      27 May 2021 08:56  Mg     2.0     05-27    TPro  6.3<L>  /  Alb  3.4  /  TBili  0.3<L>  /  DBili  x   /  AST  23  /  ALT  12  /  AlkPhos  106  05-26    PT/INR - ( 26 May 2021 07:08 )   PT: 14.6 sec;   INR: 1.27 ratio         PTT - ( 26 May 2021 07:08 )  PTT:32.5 sec    Magnesium, Serum: 2.0 mg/dL (05-27 @ 08:56)          RADIOLOGY & ADDITIONAL TESTS:

## 2021-05-28 NOTE — PROGRESS NOTE ADULT - ASSESSMENT
76y Female with HTN HLD CAD s/p remote CABG, Bio AVR solitary episode of Af w/o recurrence, ILR in place, CRI, MDS transfusion dependent, cirrhosis sec to chronic hep c with esophageal varices and prior banding, hepatocellular carcinoma,s/p embolization with embozene particles on 4/5/21, and recent admission to [facility] after fall with head trauma with also transfused 2 untis PRBC during that admission, this time came to the ER for worsening dyspnea for several days worse with activity, orthopnea and elevated BP with hypoxic respiratory falure requiring BIPAP in ED, She was found to have acute decompensated heart failure. She was started on IV furosemide. Cardiology team consulted. CT surgery consulted for therapeutic thoracocentesis but it was deferred given improvement with diuretics.   Primary gastroenterologist seen pt in house and it was decided to perform EGD/Colonoscopy while she is in house and now optimized from volume stand point. EGD on 5/26 which  mild hypertensive gastropathy, no varices, with biopsy obtained. 5/27 Colonoscopy was done.  5/28 got CardioMems implant uneventfully.         #Decompensated Heart Failure with preserved EF, euvolemic on exam, no s/p CardioMems implant uneventfully.  -cardiology following  - holding diuretics for now given Cr   -Echo 5/20/2021: LVEF 60-65%, G1DD   -Continue wean supplemental O2 at 3L/min weaning as able  -c/w Trend renal function closely  - c/w hydralazine, bb, off ace/arb  spironolactone due to CKD, off ASA due anemia    #Cirrhosis 2/2 hepatitis C c/b hepatocellular carcinoma s/p embolization  #Hepatocellular carcinoma   - d/c  Spironolactone for now, Furosemide changed to Po Torsemide  - EGD on 5/26 which  mild hypertensive gastropathy, no varices, with biopsy obtained  - Colonoscopy  5/27/21 noted     #Hypertension   -  hydralazine as above    #Diabetes Mellitus   -c/w 12u subQ qhs, MDSS, will adjust base on POC  - c/w lyrica and gabapentin    #CAD, S/p CABG  -Continue Lipitor 40mg po qhs    #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   -Transfusion dependent, will transfuse with goal Hgb > 8, continue to monitor with daily cbc  - c/w Iron infusions  -No AC as per cardiology, patient bleeds easily     #Mild MARGUERITE on Chronic Kidney disease stage 4, has improved   - diuretics as above  -Avoid nephrotoxins  -Nephrology following    #Hyperkalemia, now resolved  - c/w to monitor with daily BMP     #Hypothyroidism  -Continue levothyroxine 100mcg po daily     #VTE prophylaxis -SCDs due transfusion dependent anemia  #Advanced Directives -Full Code-HCP Daniela Collier    # Dispo - planned for cardiomem by cardiology team

## 2021-05-28 NOTE — PROGRESS NOTE ADULT - SUBJECTIVE AND OBJECTIVE BOX
[facility] Division of Hospital Medicine    Chief Complaint:  chf exacerbation    SUBJECTIVE: reports no complains, seen after procedure    OVERNIGHT EVENTS: none reported    Patient denies chest pain, SOB, abd pain, N/V, fever, chills, dysuria or any other complaints. All remainder ROS negative.     MEDICATIONS  (STANDING):  amitriptyline 40 milliGRAM(s) Oral at bedtime  atorvastatin 40 milliGRAM(s) Oral at bedtime  chlorhexidine 2% Cloths 1 Application(s) Topical daily  cholecalciferol 2000 Unit(s) Oral daily  dextrose 40% Gel 15 Gram(s) Oral once  dextrose 5%. 1000 milliLiter(s) (50 mL/Hr) IV Continuous <Continuous>  dextrose 5%. 1000 milliLiter(s) (100 mL/Hr) IV Continuous <Continuous>  dextrose 50% Injectable 25 Gram(s) IV Push once  dextrose 50% Injectable 12.5 Gram(s) IV Push once  dextrose 50% Injectable 25 Gram(s) IV Push once  epoetin georgina-epbx (RETACRIT) Injectable 00855 Unit(s) SubCutaneous every 7 days  folic acid 1 milliGRAM(s) Oral daily  gabapentin 300 milliGRAM(s) Oral at bedtime  glucagon  Injectable 1 milliGRAM(s) IntraMuscular once  hydrALAZINE 25 milliGRAM(s) Oral every 8 hours  insulin glargine Injectable (LANTUS) 12 Unit(s) SubCutaneous at bedtime  insulin lispro (ADMELOG) corrective regimen sliding scale   SubCutaneous three times a day before meals  insulin lispro (ADMELOG) corrective regimen sliding scale   SubCutaneous at bedtime  levothyroxine 100 MICROGram(s) Oral daily  metoprolol tartrate 25 milliGRAM(s) Oral every 6 hours  montelukast 10 milliGRAM(s) Oral at bedtime  multivitamin 1 Tablet(s) Oral daily  Nephro-nunu 1 Tablet(s) Oral daily  pantoprazole    Tablet 40 milliGRAM(s) Oral before breakfast  senna 2 Tablet(s) Oral at bedtime  torsemide 20 milliGRAM(s) Oral daily    MEDICATIONS  (PRN):  albuterol/ipratropium for Nebulization 3 milliLiter(s) Nebulizer every 6 hours PRN Shortness of Breath and/or Wheezing        I&O's Summary      PHYSICAL EXAM:  Vital Signs Last 24 Hrs  T(C): 36.7 (28 May 2021 17:21), Max: 36.9 (28 May 2021 05:07)  T(F): 98.1 (28 May 2021 17:21), Max: 98.5 (28 May 2021 05:07)  HR: 86 (28 May 2021 17:21) (79 - 90)  BP: 145/68 (28 May 2021 17:21) (132/75 - 171/68)  BP(mean): --  RR: 18 (28 May 2021 17:21) (14 - 19)  SpO2: 98% (28 May 2021 17:21) (96% - 100%)      CONSTITUTIONAL: NAD, well-developed, well-groomed  ENMT: Moist oral mucosa, no pharyngeal injection or exudates; normal dentition; No JVD  RESPIRATORY: Normal respiratory effort; clear sound b/l,  no wheezing  CARDIOVASCULAR: irregular rate and rhythm, with soft systolic murmur in R and LUSB, trace extremity edema; Peripheral pulses are 2+ bilaterally, R side chest port, R groin w/o apparent bleeding so far  ABDOMEN: somewhat distention, nontender to palpation, normoactive bowel sounds, no rebound/guarding; No palpable hepatosplenomegaly  MUSCLOSKELETAL:  no clubbing or cyanosis of digits; no joint swelling or tenderness to palpation  PSYCH: A+O to person, place, and time; affect appropriate  NEUROLOGY: CN 2-12 are intact and symmetric; no gross sensory deficits; was observed moving all 4 ext against gravity cooperating with exam.   SKIN: No rashes; no palpable lesions    LABS:                        8.6    10.66 )-----------( 114      ( 28 May 2021 06:47 )             28.6 05-28    142  |  107  |  57.7<H>  ----------------------------<  128<H>  4.4   |  23.0  |  3.06<H>    Ca    8.6      28 May 2021 06:47  Mg     2.0     05-27                CAPILLARY BLOOD GLUCOSE      POCT Blood Glucose.: 224 mg/dL (28 May 2021 16:28)  POCT Blood Glucose.: 123 mg/dL (28 May 2021 13:16)  POCT Blood Glucose.: 134 mg/dL (28 May 2021 07:45)  POCT Blood Glucose.: 84 mg/dL (27 May 2021 22:09)        RADIOLOGY & ADDITIONAL TESTS:  Results Reviewed:   Imaging Personally Reviewed:  Electrocardiogram Personally Reviewed:

## 2021-05-28 NOTE — PROGRESS NOTE ADULT - ASSESSMENT
CKD(IV): + azotemia superimposed  CHF with chronic fluid overload; B/L pleural effusions---> clinically better  - cont to avoid potential nephrotoxins  - transitioned  to oral diuretic dosing ( Torsemide ) , now held ?   - Will resume Torsemide at decreased dose   - Need to accept some degree of enhanced azotemia to avoid decompensated fluid overload     Anemia: +CKD + MDS (transfusion dependent) + GI loss  Low Fe stores  - cont ARACELI and IV Fe  - PRBCs as needed  - GI work up noted

## 2021-05-28 NOTE — PROGRESS NOTE ADULT - SUBJECTIVE AND OBJECTIVE BOX
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  Pt has been exeperiencing several days of increasing DUPONT, PNFD orthopnea and elevated BP.  Discussion with renal re HD lately as per daughter, not on HD as of yet.  In ER pt initially on BIPAP with elevated BP, now normotensive, and on 2L NC. Patient had had episodes of recurrent heart failure with preserved EF. She follows with Dr. Penny (cards). She was referred for Cardiomems implant for management of fluid volume status. Now status post CardioMems implant.          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  Hemorrhoids  Internal hemorrhoids  Liver carcinoma Dx 1/2021  History of tonsillectomy as a child  S/P CABG x 3 2016  H/O aortic valve replacement bovine, 2016  Port-A-Cath in place 7/2019, right upper chest  History of loop recorder 2017      FAMILY HISTORY:  Family history of diabetes mellitus (Sibling)      Home Medications:  Aller-Juhi 10 mg oral tablet: 1 tab(s) orally once a day, As Needed (19 May 2021 15:37)  amitriptyline 10 mg oral tablet: 4 tab(s) orally once a day (at bedtime) (19 May 2021 15:37)  amLODIPine 5 mg oral tablet: 1 tab(s) orally once a day (19 May 2021 15:37)  Aranesp Albumin Free: 300 mcg once weekly at MD&#x27;s office as needed for hgb&lt;11 (19 May 2021 15:37)  carvedilol 6.25 mg oral tablet: 1 tab(s) orally every 12 hours (19 May 2021 15:37)  Dulcolax Stool Softener 100 mg oral capsule: 1 cap(s) orally 2 times a day (19 May 2021 17:13)  folic acid 1 mg oral tablet: 1 tab(s) orally once a day (19 May 2021 15:37)  gabapentin 300 mg oral capsule: 1 cap(s) orally once a day (at bedtime) (19 May 2021 15:37)  levothyroxine 100 mcg (0.1 mg) oral tablet: 1 tab(s) orally once a day (19 May 2021 15:37)  magnesium oxide 400 mg (241.3 mg elemental magnesium) oral tablet: 1 tab(s) orally once a day (at bedtime) (19 May 2021 15:37)  Metamucil: 2 tsp orally once a day (at bedtime) (19 May 2021 17:13)  montelukast 10 mg oral tablet: 1 tab(s) orally once a day (at bedtime) (19 May 2021 15:37)  NovoLOG 100 units/mL injectable solution: per sliding scale x3 per day before meals (19 May 2021 15:37)  pantoprazole 40 mg oral delayed release tablet: 1 tab(s) orally 2 times a day (19 May 2021 17:13)  pravastatin 80 mg oral tablet: 1 tab(s) orally once a day (at bedtime) (19 May 2021 15:37)  ProAir HFA 90 mcg/inh inhalation aerosol: 2 puff(s) inhaled every 6 hours, As Needed (19 May 2021 17:13)  Probiotic Formula oral capsule: 1 cap(s) orally once a day (in the morning) (19 May 2021 17:13)  torsemide 20 mg oral tablet: 1 tab(s) orally once a day (in the morning) (19 May 2021 17:13)  Tresiba 100 units/mL subcutaneous solution: 20 unit(s) subcutaneous once a day (19 May 2021 17:13)  Vitamin D3 2000 intl units (50 mcg) oral tablet: 1  orally once a day (19 May 2021 17:13)                8.6    10.66 )-----------( 114      ( 28 May 2021 06:47 )             28.6     05-28    142  |  107  |  57.7<H>  ----------------------------<  128<H>  4.4   |  23.0  |  3.06<H>    Ca    8.6      28 May 2021 06:47  Mg     2.0     05-27      General: No fatigue, no fevers/chills  Respiratory: No dyspnea, no cough, no wheeze  CV: No chest pain, no palpitations  Abd: No nausea  Neuro: No headache, no dizziness  ACE inhibitors (Other)  Bactrim (Nephrotoxicity)  Biaxin (Joint Pain)  morphine (Short breath)  NSAIDs (Other)      Objective:  Vital Signs Last 24 Hrs  T(C): 36.7 (28 May 2021 09:17), Max: 36.9 (28 May 2021 05:07)  T(F): 98 (28 May 2021 09:17), Max: 98.5 (28 May 2021 05:07)  HR: 85 (28 May 2021 11:15) (81 - 90)  BP: 156/71 (28 May 2021 11:15) (134/70 - 168/75)  BP(mean): --  RR: 16 (28 May 2021 11:15) (16 - 18)  SpO2: 100% (28 May 2021 11:15) (96% - 100%)    CM: SR  Neuro: A&OX3, CN 2-12 intact  HEENT: NC, AT  Lungs: CTA B/L  CV: S1, S2, no murmur, RRR  Abd: Soft  Right Groin: Soft, no bleeding, no hematoma  Extremity: + distal pulses  EKG:  Atrial Fibrillation     DIAGNOSTICS:     < from: TTE Echo Complete w/o Contrast w/ Doppler (05.19.21 @ 21:01) >  Summary:   1. Left ventricular ejection fraction, by visual estimation, is 60 to 65%.   2. Normal global left ventricular systolic function.   3. The mitral in-flow pattern reveals no discernable A-wave, therefore no comment on diastolic function can be made.   4. There ismoderate concentric left ventricular hypertrophy.   5. The right ventricle is not well visualized. Systolic function appears grossly preserved.   6. Severely enlarged left atrium.   7. Moderately enlarged right atrium.   8. Moderate tricuspid regurgitation.   9. Trace mitral valve regurgitation.  10. Mitral valve mean gradient is 4.9 mmHg consistent with mild mitral stenosis.  11. Mild thickening of the anterior and posterior mitral valve leaflets.  12. Moderate mitral annular calcification.  13.A well-seated bioprosthetic valve is visualized in the aortic position. There is mild-moderate central regurgitation. Mean gradiet is 8.5 mm Hg and dimensionless index is 0.47, which are likely consistent with a normal funcitoning bioprosthetic aortic valve.  14. Mildly dilated pulmonary artery.  15. Estimated pulmonary artery systolic pressure is 53.1 mmHg assuming a right atrial pressure of 3 mmHg, which is consistent with moderate pulmonary hypertension.  16. There is no evidence of pericardial effusion.  17. Moderate pleural effusion in both left and right lateral regions.  18. Compared to a prior study from 10/19/20, there is now pulmonary hypertension and bilateral pleural effusions are now present.    MD Renay Electronicallysigned on 5/20/2021 at 1:03:42 PM    *** Final ***    WISAM SILVA   This document has been electronically signed. 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  Pt has been exeperiencing several days of increasing DUPONT, PNFD orthopnea and elevated BP.  Discussion with renal re HD lately as per daughter, not on HD as of yet.  In ER pt initially on BIPAP with elevated BP, now normotensive, and on 2L NC. Patient had had episodes of recurrent heart failure with preserved EF. She follows with Dr. Penny (cards). She was referred for Cardiomems implant for management of fluid volume status. Now status post CardioMems implant.          -post cardiac cath orders  -right groin precautions  -R groin site  dressing CDI no bleeding no hematoma noted, site soft non tender, positive pedal pulses bilat  -right groin hemostasis achieved with manual pull   -bed rest x3 hours; if site stable then can be returned back to floor   -continue current medical therapy  -follow with Dr. 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  Pt has been exeperiencing several days of increasing DUPONT, PNFD orthopnea and elevated BP.  Discussion with renal re HD lately as per daughter, not on HD as of yet.  In ER pt initially on BIPAP with elevated BP, now normotensive, and on 2L NC. Patient had had episodes of recurrent heart failure with preserved EF. She follows with Dr. Penny (cards). She was referred for Cardiomems implant for management of fluid volume status. Now status post CardioMems implant.     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  Hemorrhoids  Internal hemorrhoids  Liver carcinoma Dx 1/2021  History of tonsillectomy as a child  S/P CABG x 3 2016  H/O aortic valve replacement bovine, 2016  Port-A-Cath in place 7/2019, right upper chest  History of loop recorder 2017      FAMILY HISTORY:  Family history of diabetes mellitus (Sibling)      Home Medications:  Aller-Juhi 10 mg oral tablet: 1 tab(s) orally once a day, As Needed (19 May 2021 15:37)  amitriptyline 10 mg oral tablet: 4 tab(s) orally once a day (at bedtime) (19 May 2021 15:37)  amLODIPine 5 mg oral tablet: 1 tab(s) orally once a day (19 May 2021 15:37)  Aranesp Albumin Free: 300 mcg once weekly at MD&#x27;s office as needed for hgb&lt;11 (19 May 2021 15:37)  carvedilol 6.25 mg oral tablet: 1 tab(s) orally every 12 hours (19 May 2021 15:37)  Dulcolax Stool Softener 100 mg oral capsule: 1 cap(s) orally 2 times a day (19 May 2021 17:13)  folic acid 1 mg oral tablet: 1 tab(s) orally once a day (19 May 2021 15:37)  gabapentin 300 mg oral capsule: 1 cap(s) orally once a day (at bedtime) (19 May 2021 15:37)  levothyroxine 100 mcg (0.1 mg) oral tablet: 1 tab(s) orally once a day (19 May 2021 15:37)  magnesium oxide 400 mg (241.3 mg elemental magnesium) oral tablet: 1 tab(s) orally once a day (at bedtime) (19 May 2021 15:37)  Metamucil: 2 tsp orally once a day (at bedtime) (19 May 2021 17:13)  montelukast 10 mg oral tablet: 1 tab(s) orally once a day (at bedtime) (19 May 2021 15:37)  NovoLOG 100 units/mL injectable solution: per sliding scale x3 per day before meals (19 May 2021 15:37)  pantoprazole 40 mg oral delayed release tablet: 1 tab(s) orally 2 times a day (19 May 2021 17:13)  pravastatin 80 mg oral tablet: 1 tab(s) orally once a day (at bedtime) (19 May 2021 15:37)  ProAir HFA 90 mcg/inh inhalation aerosol: 2 puff(s) inhaled every 6 hours, As Needed (19 May 2021 17:13)  Probiotic Formula oral capsule: 1 cap(s) orally once a day (in the morning) (19 May 2021 17:13)  torsemide 20 mg oral tablet: 1 tab(s) orally once a day (in the morning) (19 May 2021 17:13)  Tresiba 100 units/mL subcutaneous solution: 20 unit(s) subcutaneous once a day (19 May 2021 17:13)  Vitamin D3 2000 intl units (50 mcg) oral tablet: 1  orally once a day (19 May 2021 17:13)                8.6    10.66 )-----------( 114      ( 28 May 2021 06:47 )             28.6     05-28    142  |  107  |  57.7<H>  ----------------------------<  128<H>  4.4   |  23.0  |  3.06<H>    Ca    8.6      28 May 2021 06:47  Mg     2.0     05-27      General: No fatigue, no fevers/chills  Respiratory: No dyspnea, no cough, no wheeze  CV: No chest pain, no palpitations  Abd: No nausea  Neuro: No headache, no dizziness  ACE inhibitors (Other)  Bactrim (Nephrotoxicity)  Biaxin (Joint Pain)  morphine (Short breath)  NSAIDs (Other)      Objective:  Vital Signs Last 24 Hrs  T(C): 36.7 (28 May 2021 09:17), Max: 36.9 (28 May 2021 05:07)  T(F): 98 (28 May 2021 09:17), Max: 98.5 (28 May 2021 05:07)  HR: 85 (28 May 2021 11:15) (81 - 90)  BP: 156/71 (28 May 2021 11:15) (134/70 - 168/75)  BP(mean): --  RR: 16 (28 May 2021 11:15) (16 - 18)  SpO2: 100% (28 May 2021 11:15) (96% - 100%)    CM: SR  Neuro: A&OX3, CN 2-12 intact  HEENT: NC, AT  Lungs: CTA B/L  CV: S1, S2, no murmur, RRR  Abd: Soft  Right Groin: Soft, no bleeding, no hematoma  Extremity: + distal pulses  EKG:  Atrial Fibrillation     DIAGNOSTICS:     < from: TTE Echo Complete w/o Contrast w/ Doppler (05.19.21 @ 21:01) >  Summary:   1. Left ventricular ejection fraction, by visual estimation, is 60 to 65%.   2. Normal global left ventricular systolic function.   3. The mitral in-flow pattern reveals no discernable A-wave, therefore no comment on diastolic function can be made.   4. There ismoderate concentric left ventricular hypertrophy.   5. The right ventricle is not well visualized. Systolic function appears grossly preserved.   6. Severely enlarged left atrium.   7. Moderately enlarged right atrium.   8. Moderate tricuspid regurgitation.   9. Trace mitral valve regurgitation.  10. Mitral valve mean gradient is 4.9 mmHg consistent with mild mitral stenosis.  11. Mild thickening of the anterior and posterior mitral valve leaflets.  12. Moderate mitral annular calcification.  13.A well-seated bioprosthetic valve is visualized in the aortic position. There is mild-moderate central regurgitation. Mean gradiet is 8.5 mm Hg and dimensionless index is 0.47, which are likely consistent with a normal funcitoning bioprosthetic aortic valve.  14. Mildly dilated pulmonary artery.  15. Estimated pulmonary artery systolic pressure is 53.1 mmHg assuming a right atrial pressure of 3 mmHg, which is consistent with moderate pulmonary hypertension.  16. There is no evidence of pericardial effusion.  17. Moderate pleural effusion in both left and right lateral regions.  18. Compared to a prior study from 10/19/20, there is now pulmonary hypertension and bilateral pleural effusions are now present.    MD Renay Electronicallysigned on 5/20/2021 at 1:03:42 PM    *** Final ***    WISAM SILVA   This document has been electronically signed. 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  Pt has been exeperiencing several days of increasing DUPONT, PNFD orthopnea and elevated BP.  Discussion with renal re HD lately as per daughter, not on HD as of yet.  In ER pt initially on BIPAP with elevated BP, now normotensive, and on 2L NC. Patient had had episodes of recurrent heart failure with preserved EF. She follows with Dr. Penny (cards). She was referred for Cardiomems implant for management of fluid volume status. Now status post CardioMems implant.          -post cardiac cath orders  -right groin precautions  -R groin site  dressing CDI no bleeding no hematoma noted, site soft non tender, positive pedal pulses bilat  -right groin sheath pulled in lab; hemostasis achieved with manual compression   -bed rest x3 hours; if site stable then can be returned back to floor   -continue current medical therapy  -follow with Dr. Penny in two weeks post hospital discharge   -renal follow up appreciated, recommending continuing with diuretics on Torsemide at home  -rest of care per medicine

## 2021-05-28 NOTE — PROGRESS NOTE ADULT - SUBJECTIVE AND OBJECTIVE BOX
Pre Procedure Cardiac Cath NP Note      Narrative:      76y Female with HTN HLD CAD s/p remote CABg Bio AVR solitary episode of Af w/o recurrence, ILR in place, CRI, MDS transfusion dependent, 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  Pt has been exeperiencing several days of increasing DUPONT, PNFD orthopnea and elevated BP.  Discussion with renal re HD lately as per daughter, not on HD as of yet.  In ER pt initially on BIPAP with elevated BP, now normotensive, and on 2L NC. Patient had had episodes of recurrent heart failure with preserved EF. She follows with Dr. Penny (cards). She was referred for Cardiomems implant for management of fluid volume status.     	  MEDICATIONS:  hydrALAZINE 25 milliGRAM(s) Oral every 8 hours  metoprolol tartrate 25 milliGRAM(s) Oral every 6 hours  albuterol/ipratropium for Nebulization 3 milliLiter(s) Nebulizer every 6 hours PRN  montelukast 10 milliGRAM(s) Oral at bedtime  amitriptyline 40 milliGRAM(s) Oral at bedtime  gabapentin 300 milliGRAM(s) Oral at bedtime  pantoprazole    Tablet 40 milliGRAM(s) Oral before breakfast  senna 2 Tablet(s) Oral at bedtime  atorvastatin 40 milliGRAM(s) Oral at bedtime  dextrose 40% Gel 15 Gram(s) Oral once  dextrose 50% Injectable 25 Gram(s) IV Push once  dextrose 50% Injectable 12.5 Gram(s) IV Push once  dextrose 50% Injectable 25 Gram(s) IV Push once  glucagon  Injectable 1 milliGRAM(s) IntraMuscular once  insulin glargine Injectable (LANTUS) 12 Unit(s) SubCutaneous at bedtime  insulin lispro (ADMELOG) corrective regimen sliding scale   SubCutaneous three times a day before meals  insulin lispro (ADMELOG) corrective regimen sliding scale   SubCutaneous at bedtime  levothyroxine 100 MICROGram(s) Oral daily  chlorhexidine 2% Cloths 1 Application(s) Topical daily  cholecalciferol 2000 Unit(s) Oral daily  dextrose 5%. 1000 milliLiter(s) IV Continuous <Continuous>  dextrose 5%. 1000 milliLiter(s) IV Continuous <Continuous>  epoetin georgina-epbx (RETACRIT) Injectable 08996 Unit(s) SubCutaneous every 7 days  folic acid 1 milliGRAM(s) Oral daily  multivitamin 1 Tablet(s) Oral daily  Nephro-nunu 1 Tablet(s) Oral daily                        8.6    10.66 )-----------( 114      ( 28 May 2021 06:47 )             28.6     05-28    142  |  107  |  57.7<H>  ----------------------------<  128<H>  4.4   |  23.0  |  3.06<H>    Ca    8.6      28 May 2021 06:47  Mg     2.0     05-27      PHYSICAL EXAM:    T(C): 36.7 (05-28-21 @ 09:17), Max: 36.9 (05-28-21 @ 05:07)  HR: 86 (05-28-21 @ 11:00) (81 - 90)  BP: 156/76 (05-28-21 @ 11:00) (134/70 - 168/75)  RR: 17 (05-28-21 @ 11:00) (17 - 18)  SpO2: 100% (05-28-21 @ 11:00) (96% - 100%)    Constitutional: A & O x 3  HEENT:   Normal oral mucosa, PERRL, EOMI	  Cardiovascular: Normal S1 S2, No JVD, No murmurs, No edema  Respiratory: Lungs clear to auscultation	  Gastrointestinal:  Soft, Non-tender, + BS	  Skin: No rashes, No ecchymoses, No cyanosis  Neurologic: Non-focal  Extremities: Normal range of motion, No clubbing, cyanosis or edema  Vascular: Peripheral pulses palpable 2+ bilaterally    TELEMETRY: 	Atrial Fibrillation     ECG:  	  < from: 12 Lead ECG (05.19.21 @ 13:52) >  Atrial fibrillation  Non-specific intra-ventricular conduction delay  ST & T wave abnormality, consider inferolateral ischemia  Abnormal ECG    Confirmed by Alex Cormier ([id number]) on 5/19/2021 3:57:04 PM    < end of copied text >    RADIOLOGY:   DIAGNOSTIC TESTING:  [X ] Echocardiogram:  < from: TTE Echo Complete w/o Contrast w/ Doppler (05.19.21 @ 21:01) >  Summary:   1. Left ventricular ejection fraction, by visual estimation, is 60 to 65%.   2. Normal global left ventricular systolic function.   3. The mitral in-flow pattern reveals no discernable A-wave, therefore no comment on diastolic function can be made.   4. There ismoderate concentric left ventricular hypertrophy.   5. The right ventricle is not well visualized. Systolic function appears grossly preserved.   6. Severely enlarged left atrium.   7. Moderately enlarged right atrium.   8. Moderate tricuspid regurgitation.   9. Trace mitral valve regurgitation.  10. Mitral valve mean gradient is 4.9 mmHg consistent with mild mitral stenosis.  11. Mild thickening of the anterior and posterior mitral valve leaflets.  12. Moderate mitral annular calcification.  13.A well-seated bioprosthetic valve is visualized in the aortic position. There is mild-moderate central regurgitation. Mean gradiet is 8.5 mm Hg and dimensionless index is 0.47, which are likely consistent with a normal funcitoning bioprosthetic aortic valve.  14. Mildly dilated pulmonary artery.  15. Estimated pulmonary artery systolic pressure is 53.1 mmHg assuming a right atrial pressure of 3 mmHg, which is consistent with moderate pulmonary hypertension.  16. There is no evidence of pericardial effusion.  17. Moderate pleural effusion in both left and right lateral regions.  18. Compared to a prior study from 10/19/20, there is now pulmonary hypertension and bilateral pleural effusions are now present.    MD Renay Electronicallysigned on 5/20/2021 at 1:03:42 PM    *** Final ***      WISAM SILVA   This document has been electronically signed. May 19 2021  9:01PM    < end of copied text >       ASSESSMENT:    76y Female with HTN HLD CAD s/p remote CABg Bio AVR solitary episode of Af w/o recurrence, ILR in place, CRI, MDS transfusion dependent, 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  Pt has been exeperiencing several days of increasing DUPONT, PNFD orthopnea and elevated BP.  Discussion with renal re HD lately as per daughter, not on HD as of yet.  In ER pt initially on BIPAP with elevated BP, now normotensive, and on 2L NC. Patient had had episodes of recurrent heart failure with preserved EF. She follows with Dr. Penny (cards). She was referred for CardioMems implant for management of fluid volume status.       -consent to be obtained  -procedure discussed with patient; risks and benefits explained, questions answered  -labs and ECG reviewed

## 2021-05-28 NOTE — PROGRESS NOTE ADULT - SUBJECTIVE AND OBJECTIVE BOX
NEPHROLOGY INTERVAL HPI/OVERNIGHT EVENTS:    Interim noted   S/P Cardio - Mems today   Diuretic held     MEDICATIONS  (STANDING):  amitriptyline 40 milliGRAM(s) Oral at bedtime  atorvastatin 40 milliGRAM(s) Oral at bedtime  chlorhexidine 2% Cloths 1 Application(s) Topical daily  cholecalciferol 2000 Unit(s) Oral daily  dextrose 40% Gel 15 Gram(s) Oral once  dextrose 5%. 1000 milliLiter(s) (50 mL/Hr) IV Continuous <Continuous>  dextrose 5%. 1000 milliLiter(s) (100 mL/Hr) IV Continuous <Continuous>  dextrose 50% Injectable 25 Gram(s) IV Push once  dextrose 50% Injectable 12.5 Gram(s) IV Push once  dextrose 50% Injectable 25 Gram(s) IV Push once  epoetin georgina-epbx (RETACRIT) Injectable 16000 Unit(s) SubCutaneous every 7 days  folic acid 1 milliGRAM(s) Oral daily  gabapentin 300 milliGRAM(s) Oral at bedtime  glucagon  Injectable 1 milliGRAM(s) IntraMuscular once  hydrALAZINE 25 milliGRAM(s) Oral every 8 hours  insulin glargine Injectable (LANTUS) 12 Unit(s) SubCutaneous at bedtime  insulin lispro (ADMELOG) corrective regimen sliding scale   SubCutaneous three times a day before meals  insulin lispro (ADMELOG) corrective regimen sliding scale   SubCutaneous at bedtime  levothyroxine 100 MICROGram(s) Oral daily  metoprolol tartrate 25 milliGRAM(s) Oral every 6 hours  montelukast 10 milliGRAM(s) Oral at bedtime  multivitamin 1 Tablet(s) Oral daily  Nephro-nunu 1 Tablet(s) Oral daily  pantoprazole    Tablet 40 milliGRAM(s) Oral before breakfast  senna 2 Tablet(s) Oral at bedtime    MEDICATIONS  (PRN):  albuterol/ipratropium for Nebulization 3 milliLiter(s) Nebulizer every 6 hours PRN Shortness of Breath and/or Wheezing      Allergies    ACE inhibitors (Other)  Bactrim (Nephrotoxicity)  Biaxin (Joint Pain)  morphine (Short breath)  NSAIDs (Other)    Intolerances        Vital Signs Last 24 Hrs  T(C): 36.7 (28 May 2021 09:17), Max: 36.9 (28 May 2021 05:07)  T(F): 98 (28 May 2021 09:17), Max: 98.5 (28 May 2021 05:07)  HR: 79 (28 May 2021 13:41) (79 - 90)  BP: 132/75 (28 May 2021 13:41) (132/75 - 171/68)  BP(mean): --  RR: 18 (28 May 2021 13:41) (14 - 19)  SpO2: 96% (28 May 2021 13:41) (96% - 100%)  Daily     Daily   I&O's Detail    I&O's Summary    GENERAL: Deconditioned  ENMT: Dry mucous membranes  NECK: Supple, +JVD  NERVOUS SYSTEM:  Alert & Oriented X3,  intact and symmetric  CHEST/LUNG: Diminished BS at bases bilaterally  HEART: No rub  ABDOMEN: Soft, Nontender, Nondistended; BS+  EXTREMITIES:  2+ Peripheral Pulses, + improved LE edema     LABS:                        8.6    10.66 )-----------( 114      ( 28 May 2021 06:47 )             28.6     05-28    142  |  107  |  57.7<H>  ----------------------------<  128<H>  4.4   |  23.0  |  3.06<H>    Ca    8.6      28 May 2021 06:47  Mg     2.0     05-27                  RADIOLOGY & ADDITIONAL TESTS:

## 2021-05-28 NOTE — PROGRESS NOTE ADULT - PROBLEM SELECTOR PLAN 1
multifactorial with cirrhosis, liver cancer, renal insufficiency and MDS. No active GI bleeding and now s/p EGD and colonoscopy with mild PHG and some distal tiny rectal AVMs treated with APC and tiny area of foacl colitis. No futher GO w/u needed at this time. F/U with Dr. Plaza as outpatient-routine multifactorial with cirrhosis, liver cancer, renal insufficiency and MDS. No active GI bleeding and now s/p EGD and colonoscopy with mild PHG and some distal tiny rectal AVMs treated with APC and tiny area of foacl colitis. No futher GO w/u needed at this time. F/U with Dr. Lloyd as outpatient-routine

## 2021-05-28 NOTE — PROGRESS NOTE ADULT - SUBJECTIVE AND OBJECTIVE BOX
Pt seen and examined f/u for anemia, cirrhosis, chronic hepC and HCC    Patient seenin cath after having cardiomems placed. She feels well with no complaints. H/H is esentially stable with Hgb of 9.4 yesterday probably spuriously high.    REVIEW OF SYSTEMS:    CONSTITUTIONAL: No fever, weight loss, or fatigue  EYES: No eye pain, visual disturbances, or discharge  ENMT:  No difficulty hearing, tinnitus, vertigo; No sinus or throat pain  RESPIRATORY: No cough, wheezing, chills or hemoptysis; No shortness of breath  CARDIOVASCULAR: No chest pain, palpitations, dizziness, or leg swelling  GASTROINTESTINAL: No abdominal or epigastric pain. No nausea, vomiting, or hematemesis; No diarrhea or constipation. No melena or hematochezia.    MEDICATIONS:  MEDICATIONS  (STANDING):  amitriptyline 40 milliGRAM(s) Oral at bedtime  atorvastatin 40 milliGRAM(s) Oral at bedtime  chlorhexidine 2% Cloths 1 Application(s) Topical daily  cholecalciferol 2000 Unit(s) Oral daily  dextrose 40% Gel 15 Gram(s) Oral once  dextrose 5%. 1000 milliLiter(s) (50 mL/Hr) IV Continuous <Continuous>  dextrose 5%. 1000 milliLiter(s) (100 mL/Hr) IV Continuous <Continuous>  dextrose 50% Injectable 25 Gram(s) IV Push once  dextrose 50% Injectable 12.5 Gram(s) IV Push once  dextrose 50% Injectable 25 Gram(s) IV Push once  epoetin georgina-epbx (RETACRIT) Injectable 63249 Unit(s) SubCutaneous every 7 days  folic acid 1 milliGRAM(s) Oral daily  gabapentin 300 milliGRAM(s) Oral at bedtime  glucagon  Injectable 1 milliGRAM(s) IntraMuscular once  hydrALAZINE 25 milliGRAM(s) Oral every 8 hours  insulin glargine Injectable (LANTUS) 12 Unit(s) SubCutaneous at bedtime  insulin lispro (ADMELOG) corrective regimen sliding scale   SubCutaneous three times a day before meals  insulin lispro (ADMELOG) corrective regimen sliding scale   SubCutaneous at bedtime  levothyroxine 100 MICROGram(s) Oral daily  metoprolol tartrate 25 milliGRAM(s) Oral every 6 hours  montelukast 10 milliGRAM(s) Oral at bedtime  multivitamin 1 Tablet(s) Oral daily  Nephro-nunu 1 Tablet(s) Oral daily  pantoprazole    Tablet 40 milliGRAM(s) Oral before breakfast  senna 2 Tablet(s) Oral at bedtime  torsemide 20 milliGRAM(s) Oral daily    MEDICATIONS  (PRN):  albuterol/ipratropium for Nebulization 3 milliLiter(s) Nebulizer every 6 hours PRN Shortness of Breath and/or Wheezing      Allergies    ACE inhibitors (Other)  Bactrim (Nephrotoxicity)  Biaxin (Joint Pain)  morphine (Short breath)  NSAIDs (Other)    Intolerances        Vital Signs Last 24 Hrs  T(C): 36.7 (28 May 2021 09:17), Max: 36.9 (28 May 2021 05:07)  T(F): 98 (28 May 2021 09:17), Max: 98.5 (28 May 2021 05:07)  HR: 79 (28 May 2021 13:41) (79 - 90)  BP: 132/75 (28 May 2021 13:41) (132/75 - 171/68)  BP(mean): --  RR: 18 (28 May 2021 13:41) (14 - 19)  SpO2: 96% (28 May 2021 13:41) (96% - 100%)      PHYSICAL EXAM:    General: Well developed; well nourished; in no acute distress  HEENT: MMM, conjunctiva and sclera clear  Gastrointestinal:Abdomen: Soft non-tender non-distended; Normal bowel sounds; No hepatosplenomegaly  Extremities: no cyanosis, clubbing or edema.  Skin: Warm and dry. No obvious rash    LABS:      CBC Full  -  ( 28 May 2021 06:47 )  WBC Count : 10.66 K/uL  RBC Count : 2.96 M/uL  Hemoglobin : 8.6 g/dL  Hematocrit : 28.6 %  Platelet Count - Automated : 114 K/uL  Mean Cell Volume : 96.6 fl  Mean Cell Hemoglobin : 29.1 pg  Mean Cell Hemoglobin Concentration : 30.1 gm/dL  Auto Neutrophil # : x  Auto Lymphocyte # : x  Auto Monocyte # : x  Auto Eosinophil # : x  Auto Basophil # : x  Auto Neutrophil % : x  Auto Lymphocyte % : x  Auto Monocyte % : x  Auto Eosinophil % : x  Auto Basophil % : x    05-28    142  |  107  |  57.7<H>  ----------------------------<  128<H>  4.4   |  23.0  |  3.06<H>    Ca    8.6      28 May 2021 06:47  Mg     2.0     05-27                        RADIOLOGY & ADDITIONAL STUDIES (The following images were personally reviewed)

## 2021-05-29 NOTE — CHART NOTE - NSCHARTNOTEFT_GEN_A_CORE
Pt Now status post CardioMems implant 5/28  via Right Groin-Sutures removed, tegaderm drsg reapplied-site soft. nontender, without any bleeding or hematoma formation. + pedal pulses

## 2021-05-29 NOTE — PROGRESS NOTE ADULT - SUBJECTIVE AND OBJECTIVE BOX
[place] [facility] - [facility], THE HEART [facility]                                   [address] [place] [address]                                                      PHONE: [phone]                                                         FAX: [phone]  [email or web address]  ---------------------------------------------------------------------------------------------------------------------------------    Overnight events/patient complaints: Feels ok , no SOB at rest but reports hypoxia when O2 removed.  No palpitations, CP or dizziness.      ACE inhibitors (Other)  Bactrim (Nephrotoxicity)  Biaxin (Joint Pain)  morphine (Short breath)  NSAIDs (Other)    MEDICATIONS  (STANDING):  amitriptyline 40 milliGRAM(s) Oral at bedtime  atorvastatin 40 milliGRAM(s) Oral at bedtime  chlorhexidine 2% Cloths 1 Application(s) Topical daily  cholecalciferol 2000 Unit(s) Oral daily  dextrose 40% Gel 15 Gram(s) Oral once  dextrose 5%. 1000 milliLiter(s) (50 mL/Hr) IV Continuous <Continuous>  dextrose 5%. 1000 milliLiter(s) (100 mL/Hr) IV Continuous <Continuous>  dextrose 50% Injectable 25 Gram(s) IV Push once  dextrose 50% Injectable 12.5 Gram(s) IV Push once  dextrose 50% Injectable 25 Gram(s) IV Push once  epoetin georgina-epbx (RETACRIT) Injectable 94286 Unit(s) SubCutaneous every 7 days  folic acid 1 milliGRAM(s) Oral daily  gabapentin 300 milliGRAM(s) Oral at bedtime  glucagon  Injectable 1 milliGRAM(s) IntraMuscular once  hydrALAZINE 25 milliGRAM(s) Oral every 8 hours  insulin glargine Injectable (LANTUS) 12 Unit(s) SubCutaneous at bedtime  insulin lispro (ADMELOG) corrective regimen sliding scale   SubCutaneous three times a day before meals  insulin lispro (ADMELOG) corrective regimen sliding scale   SubCutaneous at bedtime  levothyroxine 100 MICROGram(s) Oral daily  metoprolol tartrate 25 milliGRAM(s) Oral every 6 hours  montelukast 10 milliGRAM(s) Oral at bedtime  multivitamin 1 Tablet(s) Oral daily  Nephro-nunu 1 Tablet(s) Oral daily  pantoprazole    Tablet 40 milliGRAM(s) Oral before breakfast  senna 2 Tablet(s) Oral at bedtime  torsemide 20 milliGRAM(s) Oral daily    MEDICATIONS  (PRN):  acetaminophen   Tablet .. 650 milliGRAM(s) Oral every 6 hours PRN Temp greater or equal to 38.5C (101.3F), Mild Pain (1 - 3)  albuterol/ipratropium for Nebulization 3 milliLiter(s) Nebulizer every 6 hours PRN Shortness of Breath and/or Wheezing      Vital Signs Last 24 Hrs  T(C): 36.7 (29 May 2021 05:05), Max: 36.7 (28 May 2021 17:21)  T(F): 98 (29 May 2021 05:05), Max: 98.1 (28 May 2021 17:21)  HR: 89 (28 May 2021 23:02) (79 - 89)  BP: 108/64 (29 May 2021 05:05) (108/64 - 171/68)  BP(mean): --  RR: 17 (29 May 2021 05:05) (14 - 19)  SpO2: 97% (29 May 2021 05:05) (96% - 100%)  ICU Vital Signs Last 24 Hrs  [facility]  I&O's Detail    I&O's Summary    Drug Dosing Weight  [facility]      PHYSICAL EXAM:  General: Appears well developed, well nourished alert and cooperative.  HEENT: Head; normocephalic, atraumatic.  Eyes: Pupils reactive, cornea wnl.  Neck: Supple, no nodes adenopathy, no NVD or carotid bruit or thyromegaly.  CARDIOVASCULAR: Normal S1 and S2, No murmur, rub, gallop or lift.   LUNGS: No rales, rhonchi or wheeze. Normal breath sounds bilaterally.  ABDOMEN: Soft, nontender without mass or organomegaly. bowel sounds normoactive.  EXTREMITIES: No clubbing, cyanosis or edema. Distal pulses wnl.   SKIN: warm and dry with normal turgor.  NEURO: Alert/oriented x 3/normal motor exam. No pathologic reflexes.    PSYCH: normal affect.        LABS:                        8.2    6.13  )-----------( 100      ( 29 May 2021 07:51 )             27.4     05-29    142  |  107  |  59.3<H>  ----------------------------<  126<H>  4.1   |  22.0  |  3.29<H>    Ca    8.3<L>      29 May 2021 07:51  Mg     1.9     05-29      [facility]            RADIOLOGY & ADDITIONAL STUDIES:    INTERPRETATION OF TELEMETRY (personally reviewed): AF, rate controlled        ASSESSMENT AND PLAN:  permanent AF, rate controlled  Transfusion dependent MDS/anemia  Acute on chronic diastolic CHF- s/p Cardiomems yesterday  Acute on chronic kidney failure  Still volume overloaded, would resume loop diuretic- may need to accept increased azotemia                     [place] [facility] - [facility], THE HEART [facility]                                   [address] [place] [address]                                                      PHONE: [phone]                                                         FAX: [phone]  [email or web address]  ---------------------------------------------------------------------------------------------------------------------------------    Overnight events/patient complaints: Feels ok , no SOB at rest but reports hypoxia when O2 removed.  No palpitations, CP or dizziness.      ACE inhibitors (Other)  Bactrim (Nephrotoxicity)  Biaxin (Joint Pain)  morphine (Short breath)  NSAIDs (Other)    MEDICATIONS  (STANDING):  amitriptyline 40 milliGRAM(s) Oral at bedtime  atorvastatin 40 milliGRAM(s) Oral at bedtime  chlorhexidine 2% Cloths 1 Application(s) Topical daily  cholecalciferol 2000 Unit(s) Oral daily  dextrose 40% Gel 15 Gram(s) Oral once  dextrose 5%. 1000 milliLiter(s) (50 mL/Hr) IV Continuous <Continuous>  dextrose 5%. 1000 milliLiter(s) (100 mL/Hr) IV Continuous <Continuous>  dextrose 50% Injectable 25 Gram(s) IV Push once  dextrose 50% Injectable 12.5 Gram(s) IV Push once  dextrose 50% Injectable 25 Gram(s) IV Push once  epoetin georgina-epbx (RETACRIT) Injectable 87822 Unit(s) SubCutaneous every 7 days  folic acid 1 milliGRAM(s) Oral daily  gabapentin 300 milliGRAM(s) Oral at bedtime  glucagon  Injectable 1 milliGRAM(s) IntraMuscular once  hydrALAZINE 25 milliGRAM(s) Oral every 8 hours  insulin glargine Injectable (LANTUS) 12 Unit(s) SubCutaneous at bedtime  insulin lispro (ADMELOG) corrective regimen sliding scale   SubCutaneous three times a day before meals  insulin lispro (ADMELOG) corrective regimen sliding scale   SubCutaneous at bedtime  levothyroxine 100 MICROGram(s) Oral daily  metoprolol tartrate 25 milliGRAM(s) Oral every 6 hours  montelukast 10 milliGRAM(s) Oral at bedtime  multivitamin 1 Tablet(s) Oral daily  Nephro-nunu 1 Tablet(s) Oral daily  pantoprazole    Tablet 40 milliGRAM(s) Oral before breakfast  senna 2 Tablet(s) Oral at bedtime  torsemide 20 milliGRAM(s) Oral daily    MEDICATIONS  (PRN):  acetaminophen   Tablet .. 650 milliGRAM(s) Oral every 6 hours PRN Temp greater or equal to 38.5C (101.3F), Mild Pain (1 - 3)  albuterol/ipratropium for Nebulization 3 milliLiter(s) Nebulizer every 6 hours PRN Shortness of Breath and/or Wheezing      Vital Signs Last 24 Hrs  T(C): 36.7 (29 May 2021 05:05), Max: 36.7 (28 May 2021 17:21)  T(F): 98 (29 May 2021 05:05), Max: 98.1 (28 May 2021 17:21)  HR: 89 (28 May 2021 23:02) (79 - 89)  BP: 108/64 (29 May 2021 05:05) (108/64 - 171/68)  BP(mean): --  RR: 17 (29 May 2021 05:05) (14 - 19)  SpO2: 97% (29 May 2021 05:05) (96% - 100%)  ICU Vital Signs Last 24 Hrs  [facility]  I&O's Detail    I&O's Summary    Drug Dosing Weight  [facility]      PHYSICAL EXAM:  General: Appears well developed, well nourished alert and cooperative.  HEENT: Head; normocephalic, atraumatic.  Eyes: Pupils reactive, cornea wnl.  Neck: Supple, no nodes adenopathy, no NVD or carotid bruit or thyromegaly.  CARDIOVASCULAR: Normal S1 and S2, No murmur, rub, gallop or lift.   LUNGS: No rales, rhonchi or wheeze. Normal breath sounds bilaterally.  ABDOMEN: Soft, nontender without mass or organomegaly. bowel sounds normoactive.  EXTREMITIES: No clubbing, cyanosis or edema. Distal pulses wnl.   SKIN: warm and dry with normal turgor.  NEURO: Alert/oriented x 3/normal motor exam. No pathologic reflexes.    PSYCH: normal affect.        LABS:                        8.2    6.13  )-----------( 100      ( 29 May 2021 07:51 )             27.4     05-29    142  |  107  |  59.3<H>  ----------------------------<  126<H>  4.1   |  22.0  |  3.29<H>    Ca    8.3<L>      29 May 2021 07:51  Mg     1.9     05-29      [facility]            RADIOLOGY & ADDITIONAL STUDIES:    INTERPRETATION OF TELEMETRY (personally reviewed): AF, rate controlled        ASSESSMENT AND PLAN:  permanent AF, rate controlled.  Can d/c tele.  Transfusion dependent MDS/anemia  Acute on chronic diastolic CHF- s/p Cardiomems yesterday  Acute on chronic kidney failure  Still volume overloaded, would resume loop diuretic- may need to accept increased azotemia

## 2021-05-29 NOTE — PROGRESS NOTE ADULT - ASSESSMENT
76y Female with HTN HLD CAD s/p remote CABG, Bio AVR solitary episode of Af w/o recurrence, ILR in place, CRI, MDS transfusion dependent, cirrhosis sec to chronic hep c with esophageal varices and prior banding, hepatocellular carcinoma,s/p embolization with embozene particles on 4/5/21, and recent admission to [facility] after fall with head trauma with also transfused 2 untis PRBC during that admission, this time came to the ER for worsening dyspnea for several days worse with activity, orthopnea and elevated BP with hypoxic respiratory falure requiring BIPAP in ED, She was found to have acute decompensated heart failure. She was started on IV furosemide. Cardiology team consulted. CT surgery consulted for therapeutic thoracocentesis but it was deferred given improvement with diuretics.   Primary gastroenterologist seen pt in house and it was decided to perform EGD/Colonoscopy while she is in house and now optimized from volume stand point. EGD on 5/26 which  mild hypertensive gastropathy, no varices, with biopsy obtained. 5/27 Colonoscopy was done.  5/28 got CardioMems implant uneventfully.         #Decompensated Heart Failure with preserved EF, euvolemic on exam, no s/p CardioMems implant uneventfully.  -cardiology following  -Echo 5/20/2021: LVEF 60-65%, G1DD   -On 2 L NC (home requirement   -c/w Trend renal function closely  - c/w hydralazine, bb, off ace/arb  spironolactone due to CKD, off ASA due anemia  -Torsemide 20 mg daily for now     #Cirrhosis 2/2 hepatitis C c/b hepatocellular carcinoma s/p embolization  #Hepatocellular carcinoma   - d/c  Spironolactone for now, Furosemide changed to Po Torsemide  - EGD on 5/26 which  mild hypertensive gastropathy, no varices, with biopsy obtained  - Colonoscopy  5/27/21 noted     #Hypertension   -  hydralazine as above    #Diabetes Mellitus   -c/w 12u subQ qhs, MDSS, will adjust base on POC  - c/w lyrica and gabapentin    #CAD, S/p CABG  -Continue Lipitor 40mg po qhs    #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   -Transfusion dependent, will transfuse with goal Hgb > 8, continue to monitor with daily cbc  - c/w Iron infusions  -No AC as per cardiology, patient bleeds easily     #MARGUERITE on Chronic Kidney disease stage 4, has improved   - diuretics as above  -Avoid nephrotoxins  -Nephrology following    #Hyperkalemia, now resolved  - c/w to monitor with daily BMP     #Hypothyroidism  -Continue levothyroxine 100mcg po daily     #VTE prophylaxis -SCDs due transfusion dependent anemia  #Advanced Directives -Full Code-HCP Daniela Collier    # Dispo - dsc pending renal stabilization and cardiac optimization

## 2021-05-29 NOTE — PROGRESS NOTE ADULT - SUBJECTIVE AND OBJECTIVE BOX
[facility] Division of Hospital Medicine    Chief Complaint:  chf exacerbation    SUBJECTIVE: reports no complains, seen after procedure    OVERNIGHT EVENTS: none reported  Pt examined resting comfortably in bed  s/p cardiomems   Stable on 2 L nc  Wishes to discuss management with her nephrologist before proceeding with any further changes.   Patient denies chest pain, SOB, abd pain, N/V, fever, chills, dysuria or any other complaints. All remainder ROS negative.     MEDICATIONS  (STANDING):  amitriptyline 40 milliGRAM(s) Oral at bedtime  atorvastatin 40 milliGRAM(s) Oral at bedtime  chlorhexidine 2% Cloths 1 Application(s) Topical daily  cholecalciferol 2000 Unit(s) Oral daily  dextrose 40% Gel 15 Gram(s) Oral once  dextrose 5%. 1000 milliLiter(s) (50 mL/Hr) IV Continuous <Continuous>  dextrose 5%. 1000 milliLiter(s) (100 mL/Hr) IV Continuous <Continuous>  dextrose 50% Injectable 25 Gram(s) IV Push once  dextrose 50% Injectable 12.5 Gram(s) IV Push once  dextrose 50% Injectable 25 Gram(s) IV Push once  epoetin georgina-epbx (RETACRIT) Injectable 62817 Unit(s) SubCutaneous every 7 days  folic acid 1 milliGRAM(s) Oral daily  gabapentin 300 milliGRAM(s) Oral at bedtime  glucagon  Injectable 1 milliGRAM(s) IntraMuscular once  hydrALAZINE 25 milliGRAM(s) Oral every 8 hours  insulin glargine Injectable (LANTUS) 12 Unit(s) SubCutaneous at bedtime  insulin lispro (ADMELOG) corrective regimen sliding scale   SubCutaneous three times a day before meals  insulin lispro (ADMELOG) corrective regimen sliding scale   SubCutaneous at bedtime  levothyroxine 100 MICROGram(s) Oral daily  metoprolol tartrate 25 milliGRAM(s) Oral every 6 hours  montelukast 10 milliGRAM(s) Oral at bedtime  multivitamin 1 Tablet(s) Oral daily  Nephro-nunu 1 Tablet(s) Oral daily  pantoprazole    Tablet 40 milliGRAM(s) Oral before breakfast  senna 2 Tablet(s) Oral at bedtime  torsemide 20 milliGRAM(s) Oral daily    MEDICATIONS  (PRN):  albuterol/ipratropium for Nebulization 3 milliLiter(s) Nebulizer every 6 hours PRN Shortness of Breath and/or Wheezing        I&O's Summary      PHYSICAL EXAM:  Vital Signs Last 24 Hrs  T(C): 36.7 (29 May 2021 05:05), Max: 36.7 (29 May 2021 05:05)  T(F): 98 (29 May 2021 05:05), Max: 98 (29 May 2021 05:05)  HR: 89 (28 May 2021 23:02) (89 - 89)  BP: 108/64 (29 May 2021 05:05) (108/64 - 157/76)  BP(mean): --  RR: 17 (29 May 2021 05:05) (17 - 17)  SpO2: 98% (29 May 2021 09:59) (97% - 98%)      CONSTITUTIONAL: NAD, well-developed, well-groomed obese female   ENMT: Moist oral mucosa, no pharyngeal injection or exudates; normal dentition; No JVD  RESPIRATORY: Normal respiratory effort; clear sound b/l,  no wheezing  CARDIOVASCULAR: irregular rate and rhythm, with soft systolic murmur in R and LUSB, midline sternotomy scar,  b/ LE edema; Peripheral pulses are 2+ bilaterally, R side chest port, R groin w/o apparent bleeding so far  ABDOMEN: somewhat distention, nontender to palpation, normoactive bowel sounds, no rebound/guarding; No palpable hepatosplenomegaly  MUSCLOSKELETAL:  no clubbing or cyanosis of digits; no joint swelling or tenderness to palpation  PSYCH: A+O to person, place, and time; affect appropriate  NEUROLOGY: CN 2-12 are intact and symmetric; no gross sensory deficits; was observed moving all 4 ext against gravity cooperating with exam.   SKIN: No rashes; no palpable lesions    LABS:                                   8.2    6.13  )-----------( 100      ( 29 May 2021 07:51 )             27.4   05-29    142  |  107  |  59.3<H>  ----------------------------<  126<H>  4.1   |  22.0  |  3.29<H>    Ca    8.3<L>      29 May 2021 07:51  Mg     1.9     05-29                  CAPILLARY BLOOD GLUCOSE      POCT Blood Glucose.: 224 mg/dL (28 May 2021 16:28)  POCT Blood Glucose.: 123 mg/dL (28 May 2021 13:16)  POCT Blood Glucose.: 134 mg/dL (28 May 2021 07:45)  POCT Blood Glucose.: 84 mg/dL (27 May 2021 22:09)

## 2021-05-29 NOTE — PROGRESS NOTE ADULT - SUBJECTIVE AND OBJECTIVE BOX
NEPHROLOGY INTERVAL HPI/OVERNIGHT EVENTS:    Examined  No distress  Afebrile    MEDICATIONS  (STANDING):  amitriptyline 40 milliGRAM(s) Oral at bedtime  atorvastatin 40 milliGRAM(s) Oral at bedtime  chlorhexidine 2% Cloths 1 Application(s) Topical daily  cholecalciferol 2000 Unit(s) Oral daily  dextrose 40% Gel 15 Gram(s) Oral once  dextrose 5%. 1000 milliLiter(s) (50 mL/Hr) IV Continuous <Continuous>  dextrose 5%. 1000 milliLiter(s) (100 mL/Hr) IV Continuous <Continuous>  dextrose 50% Injectable 25 Gram(s) IV Push once  dextrose 50% Injectable 12.5 Gram(s) IV Push once  dextrose 50% Injectable 25 Gram(s) IV Push once  epoetin georgina-epbx (RETACRIT) Injectable 41458 Unit(s) SubCutaneous every 7 days  folic acid 1 milliGRAM(s) Oral daily  gabapentin 300 milliGRAM(s) Oral at bedtime  glucagon  Injectable 1 milliGRAM(s) IntraMuscular once  hydrALAZINE 25 milliGRAM(s) Oral every 8 hours  insulin glargine Injectable (LANTUS) 12 Unit(s) SubCutaneous at bedtime  insulin lispro (ADMELOG) corrective regimen sliding scale   SubCutaneous three times a day before meals  insulin lispro (ADMELOG) corrective regimen sliding scale   SubCutaneous at bedtime  levothyroxine 100 MICROGram(s) Oral daily  metoprolol tartrate 25 milliGRAM(s) Oral every 6 hours  montelukast 10 milliGRAM(s) Oral at bedtime  multivitamin 1 Tablet(s) Oral daily  Nephro-nunu 1 Tablet(s) Oral daily  pantoprazole    Tablet 40 milliGRAM(s) Oral before breakfast  senna 2 Tablet(s) Oral at bedtime  torsemide 20 milliGRAM(s) Oral daily    MEDICATIONS  (PRN):  acetaminophen   Tablet .. 650 milliGRAM(s) Oral every 6 hours PRN Temp greater or equal to 38.5C (101.3F), Mild Pain (1 - 3)  albuterol/ipratropium for Nebulization 3 milliLiter(s) Nebulizer every 6 hours PRN Shortness of Breath and/or Wheezing      Allergies    ACE inhibitors (Other)  Bactrim (Nephrotoxicity)  Biaxin (Joint Pain)  morphine (Short breath)  NSAIDs (Other)    Intolerances        Vital Signs Last 24 Hrs  T(C): 36.7 (29 May 2021 05:05), Max: 36.7 (28 May 2021 17:21)  T(F): 98 (29 May 2021 05:05), Max: 98.1 (28 May 2021 17:21)  HR: 89 (28 May 2021 23:02) (79 - 89)  BP: 108/64 (29 May 2021 05:05) (108/64 - 171/68)  BP(mean): --  RR: 17 (29 May 2021 05:05) (14 - 19)  SpO2: 98% (29 May 2021 09:59) (96% - 100%)  Daily     Daily     PHYSICAL EXAM:  GENERAL: NAD, deconditioned  ENMT: Dry mucous membranes  NECK: Supple, +JVD  NERVOUS SYSTEM:  Alert & Oriented X3  CHEST/LUNG: Diminished BS at bases B/L  HEART: +S1S2 No rub  ABDOMEN: Soft, Nontender, Nondistended; BS+  EXTREMITIES:  + improved LE edema     LABS:                        8.2    6.13  )-----------( 100      ( 29 May 2021 07:51 )             27.4     05-29    142  |  107  |  59.3<H>  ----------------------------<  126<H>  4.1   |  22.0  |  3.29<H>    Ca    8.3<L>      29 May 2021 07:51  Mg     1.9     05-29          Magnesium, Serum: 1.9 mg/dL (05-29 @ 07:51)          RADIOLOGY & ADDITIONAL TESTS:

## 2021-05-29 NOTE — CHART NOTE - NSCHARTNOTEFT_GEN_A_CORE
Pt is currently refusing nocturnal BIPAP at this time for the 6th consecutive night since May 22nd.  Pt states she wears 2lpm nc at home at night and continues to refuse nocturnal BIPAP.  Pt resting comfortably at this time with BIPAP at bedside.  Pt was explained the risks and benefits at this time and pt was encouraged to call if she changes her mind.  ALBIN Martin aware as well. Pt maintained on 2lpm nc at this time.

## 2021-05-30 NOTE — PROGRESS NOTE ADULT - ASSESSMENT
CKD(IV): + azotemia superimposed  CHF with chronic fluid overload; B/L pleural effusions---> clinically better  BNP 8868  MARGUERITE likely 2/2 diuretics serum Cr 2.8--> 3--> 3.2  (Awaiting today labs)  - Noted s/p Cardiomems   - cont to avoid potential nephrotoxins  - cont Torsemide 20mg Q day (decreased dose) --> restarted   - Need to accept some degree of enhanced azotemia to avoid decompensated fluid overload     Anemia: +CKD + MDS (transfusion dependent) + GI loss  Low Fe stores  - cont ARACELI and IV Fe  - PRBCs as needed  - GI work up noted     Will follow

## 2021-05-30 NOTE — PROGRESS NOTE ADULT - SUBJECTIVE AND OBJECTIVE BOX
NEPHROLOGY INTERVAL HPI/OVERNIGHT EVENTS:    Examined  No distress  Afebrile    MEDICATIONS  (STANDING):  amitriptyline 40 milliGRAM(s) Oral at bedtime  atorvastatin 40 milliGRAM(s) Oral at bedtime  chlorhexidine 2% Cloths 1 Application(s) Topical daily  cholecalciferol 2000 Unit(s) Oral daily  dextrose 40% Gel 15 Gram(s) Oral once  dextrose 5%. 1000 milliLiter(s) (50 mL/Hr) IV Continuous <Continuous>  dextrose 5%. 1000 milliLiter(s) (100 mL/Hr) IV Continuous <Continuous>  dextrose 50% Injectable 25 Gram(s) IV Push once  dextrose 50% Injectable 12.5 Gram(s) IV Push once  dextrose 50% Injectable 25 Gram(s) IV Push once  epoetin georgina-epbx (RETACRIT) Injectable 12292 Unit(s) SubCutaneous every 7 days  folic acid 1 milliGRAM(s) Oral daily  gabapentin 300 milliGRAM(s) Oral at bedtime  glucagon  Injectable 1 milliGRAM(s) IntraMuscular once  hydrALAZINE 25 milliGRAM(s) Oral every 8 hours  insulin glargine Injectable (LANTUS) 12 Unit(s) SubCutaneous at bedtime  insulin lispro (ADMELOG) corrective regimen sliding scale   SubCutaneous three times a day before meals  insulin lispro (ADMELOG) corrective regimen sliding scale   SubCutaneous at bedtime  levothyroxine 100 MICROGram(s) Oral daily  metoprolol tartrate 25 milliGRAM(s) Oral every 6 hours  montelukast 10 milliGRAM(s) Oral at bedtime  multivitamin 1 Tablet(s) Oral daily  Nephro-nunu 1 Tablet(s) Oral daily  pantoprazole    Tablet 40 milliGRAM(s) Oral before breakfast  senna 2 Tablet(s) Oral at bedtime  torsemide 20 milliGRAM(s) Oral daily    MEDICATIONS  (PRN):  acetaminophen   Tablet .. 650 milliGRAM(s) Oral every 6 hours PRN Temp greater or equal to 38.5C (101.3F), Mild Pain (1 - 3)  albuterol/ipratropium for Nebulization 3 milliLiter(s) Nebulizer every 6 hours PRN Shortness of Breath and/or Wheezing      Allergies    ACE inhibitors (Other)  Bactrim (Nephrotoxicity)  Biaxin (Joint Pain)  morphine (Short breath)  NSAIDs (Other)    Intolerances        Vital Signs Last 24 Hrs  T(C): 36.8 (30 May 2021 05:33), Max: 37 (29 May 2021 19:25)  T(F): 98.2 (30 May 2021 05:33), Max: 98.6 (29 May 2021 19:25)  HR: 89 (30 May 2021 05:33) (88 - 102)  BP: 160/69 (30 May 2021 05:33) (157/73 - 163/67)  BP(mean): --  RR: 18 (30 May 2021 05:33) (18 - 18)  SpO2: 96% (30 May 2021 05:33) (95% - 98%)  Daily     Daily     PHYSICAL EXAM:  GENERAL: NAD  NECK: Supple, +JVD  NERVOUS SYSTEM:  Alert & Oriented X3  CHEST/LUNG: Diminished BS at bases B/L  HEART: +S1S2 No rub  ABDOMEN: Soft, Nontender, Nondistended; BS+  EXTREMITIES:  + improved LE edema     LABS:                        8.2    6.13  )-----------( 100      ( 29 May 2021 07:51 )             27.4     05-29    142  |  107  |  59.3<H>  ----------------------------<  126<H>  4.1   |  22.0  |  3.29<H>    Ca    8.3<L>      29 May 2021 07:51  Mg     1.9     05-29                  RADIOLOGY & ADDITIONAL TESTS:

## 2021-05-30 NOTE — PROGRESS NOTE ADULT - SUBJECTIVE AND OBJECTIVE BOX
[facility], THE HEART CENTER                                   [address] [address]                                                      PHONE: [phone]                                                         FAX: [phone]  [email or web address]  ---------------------------------------------------------------------------------------------------------------------------------    Overnight events/patient complaints: No acute concerns    ECHO: Summary:   1. Left ventricular ejection fraction, by visual estimation, is 60 to 65%.   2. Normal global left ventricular systolic function.   3. The mitral in-flow pattern reveals no discernable A-wave, therefore no comment on diastolic function can be made.   4. There is moderate concentric left ventricular hypertrophy.   5. The right ventricle is not well visualized. Systolic function appears grossly preserved.   6. Severely enlarged left atrium. Moderately enlarged right atrium.   7. A well-seated bioprosthetic valve is visualized in the aortic position. There is mild-moderate central regurgitation. Mean gradient is 8.5 mm Hg and dimensionless index is 0.47, which are likely consistent with a normal funcitoning bioprosthetic aortic valve   8. Moderate tricuspid regurgitation. Trace mitral valve regurgitation. Mitral valve mean gradient is 4.9 mmHg consistent with mild mitral stenosis. Mild thickening of the anterior and posterior mitral valve leaflets. Moderate mitral annular calcification.   9. Mildly dilated pulmonary artery. Estimated pulmonary artery systolic pressure is 53.1 mmHg assuming a right atrial pressure of 3 mmHg, which is consistent with moderate pulmonary hypertension  10. Moderate pleural effusion in both left and right lateral regions.  11. Compared to a prior study from 10/19/20, there is now pulmonary hypertension and bilateral pleural effusions are now present.  MD Renay Electronicallysigned on 5/20/2021 at 1:03:42 PM    Allergies   ACE inhibitors (Other)  Bactrim (Nephrotoxicity)  Biaxin (Joint Pain)  morphine (Short breath)  NSAIDs (Other)    MEDICATIONS  (STANDING):  amitriptyline 40 milliGRAM(s) Oral at bedtime  atorvastatin 40 milliGRAM(s) Oral at bedtime  chlorhexidine 2% Cloths 1 Application(s) Topical daily  cholecalciferol 2000 Unit(s) Oral daily  dextrose 40% Gel 15 Gram(s) Oral once  dextrose 5%. 1000 milliLiter(s) (50 mL/Hr) IV Continuous <Continuous>  dextrose 5%. 1000 milliLiter(s) (100 mL/Hr) IV Continuous <Continuous>  dextrose 50% Injectable 25 Gram(s) IV Push once  dextrose 50% Injectable 12.5 Gram(s) IV Push once  dextrose 50% Injectable 25 Gram(s) IV Push once  epoetin georgina-epbx (RETACRIT) Injectable 94269 Unit(s) SubCutaneous every 7 days  folic acid 1 milliGRAM(s) Oral daily  gabapentin 300 milliGRAM(s) Oral at bedtime  glucagon  Injectable 1 milliGRAM(s) IntraMuscular once  hydrALAZINE 25 milliGRAM(s) Oral every 8 hours  insulin glargine Injectable (LANTUS) 12 Unit(s) SubCutaneous at bedtime  insulin lispro (ADMELOG) corrective regimen sliding scale   SubCutaneous three times a day before meals  insulin lispro (ADMELOG) corrective regimen sliding scale   SubCutaneous at bedtime  levothyroxine 100 MICROGram(s) Oral daily  metoprolol tartrate 25 milliGRAM(s) Oral every 6 hours  montelukast 10 milliGRAM(s) Oral at bedtime  multivitamin 1 Tablet(s) Oral daily  Nephro-nunu 1 Tablet(s) Oral daily  pantoprazole    Tablet 40 milliGRAM(s) Oral before breakfast  senna 2 Tablet(s) Oral at bedtime  torsemide 20 milliGRAM(s) Oral daily    MEDICATIONS  (PRN):  acetaminophen   Tablet .. 650 milliGRAM(s) Oral every 6 hours PRN Temp greater or equal to 38.5C (101.3F), Mild Pain (1 - 3)  albuterol/ipratropium for Nebulization 3 milliLiter(s) Nebulizer every 6 hours PRN Shortness of Breath and/or Wheezing      Vital Signs Last 24 Hrs  T(C): 36.8 (30 May 2021 05:33), Max: 37 (29 May 2021 19:25)  T(F): 98.2 (30 May 2021 05:33), Max: 98.6 (29 May 2021 19:25)  HR: 89 (30 May 2021 05:33) (88 - 102)  BP: 160/69 (30 May 2021 05:33) (157/73 - 163/67)  BP(mean): --  RR: 18 (30 May 2021 05:33) (18 - 18)  SpO2: 96% (30 May 2021 05:33) (95% - 98%)  ICU Vital Signs Last 24 Hrs    PHYSICAL EXAM:  General: Appears well developed, well nourished alert and cooperative.  HEENT: Head; normocephalic, atraumatic.Pupils reactive, cornea wnl. Neck supple, no nodes adenopathy, no JVD  CARDIOVASCULAR: Normal S1 and S2, 2/6 NELI, no rub, gallop or lift.   LUNGS: No rales, rhonchi or wheeze. Normal breath sounds bilaterally.  ABDOMEN: Soft, nontender without mass or organomegaly. bowel sounds normoactive.  EXTREMITIES: No clubbing, cyanosis, (+) edema.   SKIN: warm and dry with normal turgor.  NEURO: Alert/oriented x 3/normal motor exam.   PSYCH: normal affect.        LABS:                        8.2    6.13  )-----------( 100      ( 29 May 2021 07:51 )             27.4     05-29    142  |  107  |  59.3<H>  ----------------------------<  126<H>  4.1   |  22.0  |  3.29<H>    Ca    8.3<L>      29 May 2021 07:51  Mg     1.9     05-29      NICHELLE GILL            RADIOLOGY & ADDITIONAL STUDIES:    ASSESSMENT AND PLAN:  In summary, NICHELLE GILL is a 76y Female with past medical history significant for CKD CAD/CABG/AVR, permanent AF not on AC 2/2 bleeding risk, CKD, cirrhosis/ hepatoma s/p embolization, MDS transfusion dependent, HTN, HLD who presents with dyspnea c/w HFpEF now s/p cardioMEMs     # permanent AF, rate controlled.      # Acute on chronic diastolic CHF- s/p Cardiomems, continue torsemide 20mg daily; still volume overloaded, will need to accept increased azotemia    # Acute on chronic kidney failure follow-up per renal      Thank you for allowing [facility] to participate in the care of this patient.  Please feel free to call with any questions or concerns.                  [facility], THE [facility]ENTER                                   [address] [address]                                                      PHONE: [phone]                                                         FAX: [phone]  [email or web address]  ---------------------------------------------------------------------------------------------------------------------------------    Overnight events/patient complaints: No acute concerns; remains on O2     ECHO: Summary:   1. Left ventricular ejection fraction, by visual estimation, is 60 to 65%.   2. Normal global left ventricular systolic function.   3. The mitral in-flow pattern reveals no discernable A-wave, therefore no comment on diastolic function can be made.   4. There is moderate concentric left ventricular hypertrophy.   5. The right ventricle is not well visualized. Systolic function appears grossly preserved.   6. Severely enlarged left atrium. Moderately enlarged right atrium.   7. A well-seated bioprosthetic valve is visualized in the aortic position. There is mild-moderate central regurgitation. Mean gradient is 8.5 mm Hg and dimensionless index is 0.47, which are likely consistent with a normal funcitoning bioprosthetic aortic valve   8. Moderate tricuspid regurgitation. Trace mitral valve regurgitation. Mitral valve mean gradient is 4.9 mmHg consistent with mild mitral stenosis. Mild thickening of the anterior and posterior mitral valve leaflets. Moderate mitral annular calcification.   9. Mildly dilated pulmonary artery. Estimated pulmonary artery systolic pressure is 53.1 mmHg assuming a right atrial pressure of 3 mmHg, which is consistent with moderate pulmonary hypertension  10. Moderate pleural effusion in both left and right lateral regions.  11. Compared to a prior study from 10/19/20, there is now pulmonary hypertension and bilateral pleural effusions are now present.  MD Renay Electronicallysigned on 5/20/2021 at 1:03:42 PM    Allergies   ACE inhibitors (Other)  Bactrim (Nephrotoxicity)  Biaxin (Joint Pain)  morphine (Short breath)  NSAIDs (Other)    MEDICATIONS  (STANDING):  amitriptyline 40 milliGRAM(s) Oral at bedtime  atorvastatin 40 milliGRAM(s) Oral at bedtime  chlorhexidine 2% Cloths 1 Application(s) Topical daily  cholecalciferol 2000 Unit(s) Oral daily  dextrose 40% Gel 15 Gram(s) Oral once  dextrose 5%. 1000 milliLiter(s) (50 mL/Hr) IV Continuous <Continuous>  dextrose 5%. 1000 milliLiter(s) (100 mL/Hr) IV Continuous <Continuous>  dextrose 50% Injectable 25 Gram(s) IV Push once  dextrose 50% Injectable 12.5 Gram(s) IV Push once  dextrose 50% Injectable 25 Gram(s) IV Push once  epoetin georgina-epbx (RETACRIT) Injectable 98314 Unit(s) SubCutaneous every 7 days  folic acid 1 milliGRAM(s) Oral daily  gabapentin 300 milliGRAM(s) Oral at bedtime  glucagon  Injectable 1 milliGRAM(s) IntraMuscular once  hydrALAZINE 25 milliGRAM(s) Oral every 8 hours  insulin glargine Injectable (LANTUS) 12 Unit(s) SubCutaneous at bedtime  insulin lispro (ADMELOG) corrective regimen sliding scale   SubCutaneous three times a day before meals  insulin lispro (ADMELOG) corrective regimen sliding scale   SubCutaneous at bedtime  levothyroxine 100 MICROGram(s) Oral daily  metoprolol tartrate 25 milliGRAM(s) Oral every 6 hours  montelukast 10 milliGRAM(s) Oral at bedtime  multivitamin 1 Tablet(s) Oral daily  Nephro-nunu 1 Tablet(s) Oral daily  pantoprazole    Tablet 40 milliGRAM(s) Oral before breakfast  senna 2 Tablet(s) Oral at bedtime  torsemide 20 milliGRAM(s) Oral daily    MEDICATIONS  (PRN):  acetaminophen   Tablet .. 650 milliGRAM(s) Oral every 6 hours PRN Temp greater or equal to 38.5C (101.3F), Mild Pain (1 - 3)  albuterol/ipratropium for Nebulization 3 milliLiter(s) Nebulizer every 6 hours PRN Shortness of Breath and/or Wheezing      Vital Signs Last 24 Hrs  T(C): 36.8 (30 May 2021 05:33), Max: 37 (29 May 2021 19:25)  T(F): 98.2 (30 May 2021 05:33), Max: 98.6 (29 May 2021 19:25)  HR: 89 (30 May 2021 05:33) (88 - 102)  BP: 160/69 (30 May 2021 05:33) (157/73 - 163/67)  BP(mean): --  RR: 18 (30 May 2021 05:33) (18 - 18)  SpO2: 96% (30 May 2021 05:33) (95% - 98%)  ICU Vital Signs Last 24 Hrs    PHYSICAL EXAM:  General: Appears well developed, well nourished alert and cooperative.  HEENT: Head; normocephalic, atraumatic.Pupils reactive, cornea wnl. Neck supple, no nodes adenopathy, no JVD  CARDIOVASCULAR: Normal S1 and S2, 2/6 NELI, no rub, gallop or lift.   LUNGS: No rales, rhonchi or wheeze. Normal breath sounds anteriorly and decreased at the bases   ABDOMEN: Soft, nontender without mass or organomegaly. bowel sounds normoactive.  EXTREMITIES: No clubbing, cyanosis, (-) edema.   SKIN: warm and dry with normal turgor.  NEURO: Alert/oriented x 3/normal motor exam.   PSYCH: normal affect.        LABS:                        8.2    6.13  )-----------( 100      ( 29 May 2021 07:51 )             27.4     05-29    142  |  107  |  59.3<H>  ----------------------------<  126<H>  4.1   |  22.0  |  3.29<H>    Ca    8.3<L>      29 May 2021 07:51  Mg     1.9     05-29      ASSESSMENT AND PLAN:  In summary, NICHELLE GILL is a 76y Female with past medical history significant for CKD CAD/CABG/AVR, permanent AF not on AC 2/2 bleeding risk, CKD, cirrhosis/ hepatoma s/p embolization, MDS transfusion dependent, HTN, HLD who presents with dyspnea c/w HFpEF now s/p cardioMEMs     # permanent AF, rate controlled.    - continue metoprolol at present dose     # Acute on chronic diastolic CHF- s/p Cardiomems, continue torsemide 20mg daily; still volume overloaded, will need to accept some degree of azotemia  - proBNP with am labs tomorrow     # Acute on chronic kidney failure follow-up per renal  - avoid nephrotoxic agents and daily renal indices       Thank you for allowing [facility] to participate in the care of this patient.  Please feel free to call with any questions or concerns.

## 2021-05-30 NOTE — PROGRESS NOTE ADULT - SUBJECTIVE AND OBJECTIVE BOX
[facility] Division of Hospital Medicine    Chief Complaint:  chf exacerbation    SUBJECTIVE: reports no complains, seen after procedure    OVERNIGHT EVENTS: none reported  Pt examined resting comfortably in bed  Discussed with renal, given pts current improvement IHD not indicated at this time   Patient denies chest pain, SOB, abd pain, N/V, fever, chills, dysuria or any other complaints. All remainder ROS negative.     MEDICATIONS  (STANDING):  amitriptyline 40 milliGRAM(s) Oral at bedtime  atorvastatin 40 milliGRAM(s) Oral at bedtime  chlorhexidine 2% Cloths 1 Application(s) Topical daily  cholecalciferol 2000 Unit(s) Oral daily  dextrose 40% Gel 15 Gram(s) Oral once  dextrose 5%. 1000 milliLiter(s) (50 mL/Hr) IV Continuous <Continuous>  dextrose 5%. 1000 milliLiter(s) (100 mL/Hr) IV Continuous <Continuous>  dextrose 50% Injectable 25 Gram(s) IV Push once  dextrose 50% Injectable 12.5 Gram(s) IV Push once  dextrose 50% Injectable 25 Gram(s) IV Push once  epoetin georgina-epbx (RETACRIT) Injectable 59595 Unit(s) SubCutaneous every 7 days  folic acid 1 milliGRAM(s) Oral daily  gabapentin 300 milliGRAM(s) Oral at bedtime  glucagon  Injectable 1 milliGRAM(s) IntraMuscular once  hydrALAZINE 25 milliGRAM(s) Oral every 8 hours  insulin glargine Injectable (LANTUS) 12 Unit(s) SubCutaneous at bedtime  insulin lispro (ADMELOG) corrective regimen sliding scale   SubCutaneous three times a day before meals  insulin lispro (ADMELOG) corrective regimen sliding scale   SubCutaneous at bedtime  levothyroxine 100 MICROGram(s) Oral daily  metoprolol tartrate 25 milliGRAM(s) Oral every 6 hours  montelukast 10 milliGRAM(s) Oral at bedtime  multivitamin 1 Tablet(s) Oral daily  Nephro-nunu 1 Tablet(s) Oral daily  pantoprazole    Tablet 40 milliGRAM(s) Oral before breakfast  senna 2 Tablet(s) Oral at bedtime  torsemide 10 milliGRAM(s) Oral daily    MEDICATIONS  (PRN):  acetaminophen   Tablet .. 650 milliGRAM(s) Oral every 6 hours PRN Temp greater or equal to 38.5C (101.3F), Mild Pain (1 - 3)  albuterol/ipratropium for Nebulization 3 milliLiter(s) Nebulizer every 6 hours PRN Shortness of Breath and/or Wheezing          I&O's Summary      PHYSICAL EXAM:  Vital Signs Last 24 Hrs  T(C): 36.8 (30 May 2021 11:40), Max: 37 (29 May 2021 19:25)  T(F): 98.2 (30 May 2021 11:40), Max: 98.6 (29 May 2021 19:25)  HR: 88 (30 May 2021 11:40) (88 - 102)  BP: 122/67 (30 May 2021 11:40) (122/67 - 163/67)  BP(mean): --  RR: 18 (30 May 2021 11:40) (18 - 18)  SpO2: 93% (30 May 2021 11:40) (93% - 98%)      CONSTITUTIONAL: NAD, well-developed, well-groomed obese female   ENMT: Moist oral mucosa, no pharyngeal injection or exudates; normal dentition; No JVD  RESPIRATORY: Normal respiratory effort; clear sound b/l,  no wheezing  CARDIOVASCULAR: irregular rate and rhythm, with soft systolic murmur in R and LUSB, midline sternotomy scar,  b/ LE edema; Peripheral pulses are 2+ bilaterally, R side chest port, R groin w/o apparent bleeding so far  ABDOMEN: somewhat distention, nontender to palpation, normoactive bowel sounds, no rebound/guarding; No palpable hepatosplenomegaly  MUSCLOSKELETAL:  no clubbing or cyanosis of digits; no joint swelling or tenderness to palpation  PSYCH: A+O to person, place, and time; affect appropriate  NEUROLOGY: CN 2-12 are intact and symmetric; no gross sensory deficits; was observed moving all 4 ext against gravity cooperating with exam.   SKIN: No rashes; no palpable lesions    LABS:                                              8.8    7.83  )-----------( 106      ( 30 May 2021 11:18 )             28.7   05-30    142  |  108<H>  |  67.5<H>  ----------------------------<  177<H>  4.2   |  22.0  |  3.53<H>    Ca    8.7      30 May 2021 11:18  Mg     1.9     05-29          CAPILLARY BLOOD GLUCOSE      POCT Blood Glucose.: 165 mg/dL (30 May 2021 16:16)  POCT Blood Glucose.: 195 mg/dL (30 May 2021 11:02)  POCT Blood Glucose.: 172 mg/dL (30 May 2021 07:43)  POCT Blood Glucose.: 215 mg/dL (29 May 2021 22:23)

## 2021-05-30 NOTE — PROGRESS NOTE ADULT - ASSESSMENT
76y Female with HTN HLD CAD s/p remote CABG, Bio AVR solitary episode of Af w/o recurrence, ILR in place, CRI, MDS transfusion dependent, cirrhosis sec to chronic hep c with esophageal varices and prior banding, hepatocellular carcinoma,s/p embolization with embozene particles on 4/5/21, and recent admission to [facility] after fall with head trauma with also transfused 2 untis PRBC during that admission, this time came to the ER for worsening dyspnea for several days worse with activity, orthopnea and elevated BP with hypoxic respiratory falure requiring BIPAP in ED, She was found to have acute decompensated heart failure. She was started on IV furosemide. Cardiology team consulted. CT surgery consulted for therapeutic thoracocentesis but it was deferred given improvement with diuretics.   Primary gastroenterologist seen pt in house and it was decided to perform EGD/Colonoscopy while she is in house and now optimized from volume stand point. EGD on 5/26 which  mild hypertensive gastropathy, no varices, with biopsy obtained. 5/27 Colonoscopy was done.  5/28 got CardioMems implant uneventfully.         #Decompensated Heart Failure with preserved EF, euvolemic on exam, no s/p CardioMems implant uneventfully.  -cardiology following  -Echo 5/20/2021: LVEF 60-65%, G1DD   -On 2 L NC (home requirement   -c/w Trend renal function closely  - c/w hydralazine, bb, off ace/arb  spironolactone due to CKD, off ASA due anemia  -Torsemide 20 mg daily for now     #Cirrhosis 2/2 hepatitis C c/b hepatocellular carcinoma s/p embolization  #Hepatocellular carcinoma   - d/c  Spironolactone for now, Furosemide changed to Po Torsemide  - EGD on 5/26 which  mild hypertensive gastropathy, no varices, with biopsy obtained  - Colonoscopy  5/27/21 noted     #Hypertension   -  hydralazine as above    #Diabetes Mellitus   -c/w 12u subQ qhs, MDSS, will adjust base on POC  - c/w lyrica and gabapentin    #CAD, S/p CABG  -Continue Lipitor 40mg po qhs    #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   -Transfusion dependent, will transfuse with goal Hgb > 8, continue to monitor with daily cbc  - c/w Iron infusions  -No AC as per cardiology, patient bleeds easily     #MARGUERITE on Chronic Kidney disease stage 4, has improved   - diuretics as above  -Avoid nephrotoxins  -Nephrology following    #Hyperkalemia, now resolved  - c/w to monitor with daily BMP     #Hypothyroidism  -Continue levothyroxine 100mcg po daily     #VTE prophylaxis -SCDs due transfusion dependent anemia  #Advanced Directives -Full Code-HCP Daniela Collier    # Dispo - dsc pending renal stabilization and cardiac optimization

## 2021-05-31 NOTE — PROGRESS NOTE ADULT - SUBJECTIVE AND OBJECTIVE BOX
[facility] Division of Hospital Medicine    Chief Complaint:  chf exacerbation    SUBJECTIVE:   Pt examined resting in bed  Concerned that she is has O2 sats in the high 80s off O2, counsled taht unless he is feeling dyspneic O2 sats acceptable > 88%   Advised to continue using O2 @ @2 L (home rate)  Discussed with renal, appreciate recs, will plan on dc in am pending SW for home services       MEDICATIONS  (STANDING):  amitriptyline 40 milliGRAM(s) Oral at bedtime  atorvastatin 40 milliGRAM(s) Oral at bedtime  chlorhexidine 2% Cloths 1 Application(s) Topical daily  cholecalciferol 2000 Unit(s) Oral daily  dextrose 40% Gel 15 Gram(s) Oral once  dextrose 5%. 1000 milliLiter(s) (50 mL/Hr) IV Continuous <Continuous>  dextrose 5%. 1000 milliLiter(s) (100 mL/Hr) IV Continuous <Continuous>  dextrose 50% Injectable 25 Gram(s) IV Push once  dextrose 50% Injectable 12.5 Gram(s) IV Push once  dextrose 50% Injectable 25 Gram(s) IV Push once  epoetin georgina-epbx (RETACRIT) Injectable 80684 Unit(s) SubCutaneous every 7 days  folic acid 1 milliGRAM(s) Oral daily  gabapentin 300 milliGRAM(s) Oral at bedtime  glucagon  Injectable 1 milliGRAM(s) IntraMuscular once  hydrALAZINE 25 milliGRAM(s) Oral every 8 hours  insulin glargine Injectable (LANTUS) 12 Unit(s) SubCutaneous at bedtime  insulin lispro (ADMELOG) corrective regimen sliding scale   SubCutaneous three times a day before meals  insulin lispro (ADMELOG) corrective regimen sliding scale   SubCutaneous at bedtime  levothyroxine 100 MICROGram(s) Oral daily  metoprolol tartrate 25 milliGRAM(s) Oral every 6 hours  montelukast 10 milliGRAM(s) Oral at bedtime  multivitamin 1 Tablet(s) Oral daily  Nephro-nunu 1 Tablet(s) Oral daily  pantoprazole    Tablet 40 milliGRAM(s) Oral before breakfast  senna 2 Tablet(s) Oral at bedtime  torsemide 10 milliGRAM(s) Oral daily    MEDICATIONS  (PRN):  acetaminophen   Tablet .. 650 milliGRAM(s) Oral every 6 hours PRN Temp greater or equal to 38.5C (101.3F), Mild Pain (1 - 3)  albuterol/ipratropium for Nebulization 3 milliLiter(s) Nebulizer every 6 hours PRN Shortness of Breath and/or Wheezing          I&O's Summary      PHYSICAL EXAM:  Vital Signs Last 24 Hrs  T(C): 36.6 (31 May 2021 11:24), Max: 36.6 (31 May 2021 11:24)  T(F): 97.9 (31 May 2021 11:24), Max: 97.9 (31 May 2021 11:24)  HR: 81 (31 May 2021 11:24) (70 - 88)  BP: 145/80 (31 May 2021 11:24) (129/67 - 145/80)  BP(mean): --  RR: 18 (31 May 2021 11:24) (18 - 18)  SpO2: 100% (31 May 2021 11:24) (95% - 100%)    CONSTITUTIONAL: NAD, well-developed, well-groomed obese female   ENMT: Moist oral mucosa, no pharyngeal injection or exudates; normal dentition; No JVD  RESPIRATORY: Normal respiratory effort; clear sound b/l,  no wheezing  CARDIOVASCULAR: irregular rate and rhythm, with soft systolic murmur in R and LUSB, midline sternotomy scar,  b/ LE edema; Peripheral pulses are 2+ bilaterally, R side chest port, R groin w/o apparent bleeding so far  ABDOMEN: somewhat distention, nontender to palpation, normoactive bowel sounds, no rebound/guarding; No palpable hepatosplenomegaly  MUSCLOSKELETAL:  no clubbing or cyanosis of digits; no joint swelling or tenderness to palpation  PSYCH: A+O to person, place, and time; affect appropriate  NEUROLOGY: CN 2-12 are intact and symmetric; no gross sensory deficits; was observed moving all 4 ext against gravity cooperating with exam.   SKIN: No rashes; no palpable lesions    LABS:                          8.8    7.83  )-----------( 106      ( 30 May 2021 11:18 )             28.7   05-30    142  |  108<H>  |  67.5<H>  ----------------------------<  177<H>  4.2   |  22.0  |  3.53<H>    Ca    8.7      30 May 2021 11:18            CAPILLARY BLOOD GLUCOSE      POCT Blood Glucose.: 165 mg/dL (30 May 2021 16:16)  POCT Blood Glucose.: 195 mg/dL (30 May 2021 11:02)  POCT Blood Glucose.: 172 mg/dL (30 May 2021 07:43)  POCT Blood Glucose.: 215 mg/dL (29 May 2021 22:23)    5/30/31 CXR  Right chest port reidentified in position. Low lung volumes. No change cardiomegaly vascular congestion bilateral airspace disease and small hazy effusions. Loop recorder reidentified left base.    5/19/21 TTE   1. Left ventricular ejection fraction, by visual estimation, is 60 to 65%.   2. Normal global left ventricular systolic function.   3. The mitral in-flow pattern reveals no discernable A-wave, therefore no comment on diastolic function can be made.   4. There is moderate concentric left ventricular hypertrophy.   5. The right ventricle is not well visualized. Systolic function appears grossly preserved.   6. Severely enlarged left atrium.   7. Moderately enlarged right atrium.   8. Moderate tricuspid regurgitation.   9. Trace mitral valve regurgitation.  10. Mitral valve mean gradient is 4.9 mmHg consistent with mild mitral stenosis.  11. Mild thickening of the anterior and posterior mitral valve leaflets.  12. Moderate mitral annular calcification.  13. A well-seated bioprosthetic valve is visualized in the aortic position. There is mild-moderate central regurgitation. Mean gradiet is 8.5 mm Hg and dimensionless index is 0.47, which are likely consistent with a normal funcitoning bioprosthetic aortic valve.  14. Mildly dilated pulmonary artery.  15. Estimated pulmonary artery systolic pressure is 53.1 mmHg assuming a right atrial pressure of 3 mmHg, which is consistent with moderate pulmonary hypertension.  16. There is no evidence of pericardial effusion.  17. Moderate pleural effusion in both left and right lateral regions.  18. Compared to a prior study from 10/19/20, there is now pulmonary hypertension and bilateral pleural effusions are now present.

## 2021-05-31 NOTE — PROGRESS NOTE ADULT - SUBJECTIVE AND OBJECTIVE BOX
NEPHROLOGY INTERVAL HPI/OVERNIGHT EVENTS:  pt resting  no acute sob noted  tolerating diet    MEDICATIONS  (STANDING):  amitriptyline 40 milliGRAM(s) Oral at bedtime  atorvastatin 40 milliGRAM(s) Oral at bedtime  chlorhexidine 2% Cloths 1 Application(s) Topical daily  cholecalciferol 2000 Unit(s) Oral daily  dextrose 40% Gel 15 Gram(s) Oral once  dextrose 5%. 1000 milliLiter(s) (50 mL/Hr) IV Continuous <Continuous>  dextrose 5%. 1000 milliLiter(s) (100 mL/Hr) IV Continuous <Continuous>  dextrose 50% Injectable 25 Gram(s) IV Push once  dextrose 50% Injectable 12.5 Gram(s) IV Push once  dextrose 50% Injectable 25 Gram(s) IV Push once  epoetin georgina-epbx (RETACRIT) Injectable 76291 Unit(s) SubCutaneous every 7 days  folic acid 1 milliGRAM(s) Oral daily  gabapentin 300 milliGRAM(s) Oral at bedtime  glucagon  Injectable 1 milliGRAM(s) IntraMuscular once  hydrALAZINE 25 milliGRAM(s) Oral every 8 hours  insulin glargine Injectable (LANTUS) 12 Unit(s) SubCutaneous at bedtime  insulin lispro (ADMELOG) corrective regimen sliding scale   SubCutaneous three times a day before meals  insulin lispro (ADMELOG) corrective regimen sliding scale   SubCutaneous at bedtime  levothyroxine 100 MICROGram(s) Oral daily  metoprolol tartrate 25 milliGRAM(s) Oral every 6 hours  montelukast 10 milliGRAM(s) Oral at bedtime  multivitamin 1 Tablet(s) Oral daily  Nephro-nunu 1 Tablet(s) Oral daily  pantoprazole    Tablet 40 milliGRAM(s) Oral before breakfast  senna 2 Tablet(s) Oral at bedtime  torsemide 10 milliGRAM(s) Oral daily    MEDICATIONS  (PRN):  acetaminophen   Tablet .. 650 milliGRAM(s) Oral every 6 hours PRN Temp greater or equal to 38.5C (101.3F), Mild Pain (1 - 3)  albuterol/ipratropium for Nebulization 3 milliLiter(s) Nebulizer every 6 hours PRN Shortness of Breath and/or Wheezing      Allergies    ACE inhibitors (Other)  Bactrim (Nephrotoxicity)  Biaxin (Joint Pain)  morphine (Short breath)  NSAIDs (Other)          Vital Signs Last 24 Hrs  T(C): 36.4 (31 May 2021 05:00), Max: 36.8 (30 May 2021 11:40)  T(F): 97.5 (31 May 2021 05:00), Max: 98.2 (30 May 2021 11:40)  HR: 70 (31 May 2021 05:00) (70 - 88)  BP: 129/67 (31 May 2021 05:00) (122/67 - 129/67)  BP(mean): --  RR: 18 (31 May 2021 05:00) (18 - 18)  SpO2: 96% (31 May 2021 05:00) (93% - 96%)    PHYSICAL EXAM:  GENERAL: Comfortable  ENMT: Dry mucous membranes  NECK: Supple, +JVD  NERVOUS SYSTEM:  Alert & Oriented X3,  intact and symmetric  CHEST/LUNG: Diminished BS at bases bilaterally  HEART: No rub  ABDOMEN: Soft, Nontender, Nondistended; BS+  EXTREMITIES:  2+ Peripheral Pulses, + improved LE edema   SKIN: No rashes or lesions    LABS:                        8.8    7.83  )-----------( 106      ( 30 May 2021 11:18 )             28.7     05-30    142  |  108<H>  |  67.5<H>  ----------------------------<  177<H>  4.2   |  22.0  |  3.53<H>    Ca    8.7      30 May 2021 11:18  Mg     1.9     05-29              RADIOLOGY & ADDITIONAL TESTS:  < from: Xray Chest 1 View- PORTABLE-Routine (Xray Chest 1 View- PORTABLE-Routine in AM.) (05.30.21 @ 03:00) >     EXAM:  XR CHEST PORTABLE ROUTINE 1V                          PROCEDURE DATE:  05/30/2021          INTERPRETATION:  Follow-up.    AP chest.    Prior 5/29/2021.    IMPRESSION:  Right chest port reidentified in position. Low lung volumes. No change cardiomegaly vascular congestion bilateral airspace disease and small hazy effusions. Loop recorder reidentified left base.    < end of copied text >

## 2021-05-31 NOTE — PROGRESS NOTE ADULT - ASSESSMENT
CKD(IV): at baseline  CHF with chronic fluid overload; B/L pleural effusions  - cont to avoid potential nephrotoxins  - adjust oral diuretics as needed----> will need to maintain a degree of azotemia  - follow labs  - "long term" plan will need to include dialysis    Anemia: +CKD + MDS (transfusion dependent) + GI loss  Low Fe stores s/p IV Fe  - cont ARACELI   - PRBCs as needed    RO: serum phos ok  - monitor Ca+. phos for now

## 2021-05-31 NOTE — PROGRESS NOTE ADULT - SUBJECTIVE AND OBJECTIVE BOX
[facility]                                   [address] [place] [address]                                                      PHONE: [phone]                                                         FAX: [phone]  [email or web address]  ---------------------------------------------------------------------------------------------------------------------------------    Overnight events/patient complaints: mild SOB; on NC O2. Patient has home O2.      ACE inhibitors (Other)  Bactrim (Nephrotoxicity)  Biaxin (Joint Pain)  morphine (Short breath)  NSAIDs (Other)    MEDICATIONS  (STANDING):  amitriptyline 40 milliGRAM(s) Oral at bedtime  atorvastatin 40 milliGRAM(s) Oral at bedtime  chlorhexidine 2% Cloths 1 Application(s) Topical daily  cholecalciferol 2000 Unit(s) Oral daily  dextrose 40% Gel 15 Gram(s) Oral once  dextrose 5%. 1000 milliLiter(s) (50 mL/Hr) IV Continuous <Continuous>  dextrose 5%. 1000 milliLiter(s) (100 mL/Hr) IV Continuous <Continuous>  dextrose 50% Injectable 25 Gram(s) IV Push once  dextrose 50% Injectable 12.5 Gram(s) IV Push once  dextrose 50% Injectable 25 Gram(s) IV Push once  epoetin georgina-epbx (RETACRIT) Injectable 80984 Unit(s) SubCutaneous <User Schedule>  folic acid 1 milliGRAM(s) Oral daily  gabapentin 300 milliGRAM(s) Oral at bedtime  glucagon  Injectable 1 milliGRAM(s) IntraMuscular once  hydrALAZINE 25 milliGRAM(s) Oral every 8 hours  insulin glargine Injectable (LANTUS) 12 Unit(s) SubCutaneous at bedtime  insulin lispro (ADMELOG) corrective regimen sliding scale   SubCutaneous three times a day before meals  insulin lispro (ADMELOG) corrective regimen sliding scale   SubCutaneous at bedtime  levothyroxine 100 MICROGram(s) Oral daily  metoprolol tartrate 25 milliGRAM(s) Oral every 6 hours  montelukast 10 milliGRAM(s) Oral at bedtime  multivitamin 1 Tablet(s) Oral daily  Nephro-nunu 1 Tablet(s) Oral daily  pantoprazole    Tablet 40 milliGRAM(s) Oral before breakfast  senna 2 Tablet(s) Oral at bedtime  torsemide 10 milliGRAM(s) Oral daily    MEDICATIONS  (PRN):  acetaminophen   Tablet .. 650 milliGRAM(s) Oral every 6 hours PRN Temp greater or equal to 38.5C (101.3F), Mild Pain (1 - 3)  albuterol/ipratropium for Nebulization 3 milliLiter(s) Nebulizer every 6 hours PRN Shortness of Breath and/or Wheezing      Vital Signs Last 24 Hrs  T(C): 36.4 (31 May 2021 05:00), Max: 36.8 (30 May 2021 11:40)  T(F): 97.5 (31 May 2021 05:00), Max: 98.2 (30 May 2021 11:40)  HR: 70 (31 May 2021 05:00) (70 - 88)  BP: 129/67 (31 May 2021 05:00) (122/67 - 129/67)  BP(mean): --  RR: 18 (31 May 2021 05:00) (18 - 18)  SpO2: 96% (31 May 2021 05:00) (93% - 96%)  ICU Vital Signs Last 24 Hrs  [facility]  I&O's Detail    I&O's Summary    Drug Dosing Weight  [facility]      PHYSICAL EXAM:  General: NAD.  HEENT: Head; normocephalic.  Eyes: Pupils reactive.  Neck: Supple.  CARDIOVASCULAR: Irregular.   LUNGS: Mild decreased breath sounds bilaterally.  ABDOMEN: Soft.  EXTREMITIES: No clubbing, cyanosis or edema.  SKIN: warm.  NEURO: Alert/oriented x 3.    PSYCH: normal affect.        LABS:                        8.8    7.83  )-----------( 106      ( 30 May 2021 11:18 )             28.7     05-30    142  |  108<H>  |  67.5<H>  ----------------------------<  177<H>  4.2   |  22.0  |  3.53<H>    Ca    8.7      30 May 2021 11:18      [facility]      RADIOLOGY & ADDITIONAL STUDIES:    INTERPRETATION OF TELEMETRY (personally reviewed): off    ECG: AF 89    ECHO:   1. Left ventricular ejection fraction, by visual estimation, is 60 to 65%.   2. Normal global left ventricular systolic function.   3. The mitral in-flow pattern reveals no discernable A-wave, therefore no comment on diastolic function can be made.   4. There is moderate concentric left ventricular hypertrophy.   5. The right ventricle is not well visualized. Systolic function appears grossly preserved.   6. Severely enlarged left atrium.   7. Moderately enlarged right atrium.   8. Moderate tricuspid regurgitation.   9. Trace mitral valve regurgitation.  10. Mitral valve mean gradient is 4.9 mmHg consistent with mild mitral stenosis.  11. Mild thickening of the anterior and posterior mitral valve leaflets.  12. Moderate mitral annular calcification.  13. A well-seated bioprosthetic valve is visualized in the aortic position. There is mild-moderate central regurgitation. Mean gradiet is 8.5 mm Hg and dimensionless index is 0.47, which are likely consistent with a normal funcitoning bioprosthetic aortic valve.  14. Mildly dilated pulmonary artery.  15. Estimated pulmonary artery systolic pressure is 53.1 mmHg assuming a right atrial pressure of 3 mmHg, which is consistent with moderate pulmonary hypertension.  16. There is no evidence of pericardial effusion.  17. Moderate pleural effusion in both left and right lateral regions.  18. Compared to a prior study from 10/19/20, there is now pulmonary hypertension and bilateral pleural effusions are now present.    MD Renay Electronically signed on 5/20/2021 at 1:03:42 PM    ASSESSMENT AND PLAN:  76y Female with past medical history significant for CKD, CAD/CABG/AVR, permanent AF not on AC 2/2 bleeding risk, cirrhosis/ hepatoma s/p embolization, MDS transfusion dependent, HTN, HLD who presents with dyspnea c/w HFpEF now s/p cardioMEMs on Friday.    1. Chronic permanent AF. Rate controlled. Continue BB.  2. Acute on chronic diastolic CHF- s/p Cardiomems; continue with torsemide; still some volume overload. Monitor renal function.  3. Appreciate renal follow up.

## 2021-05-31 NOTE — PROGRESS NOTE ADULT - ASSESSMENT
76y Female with HTN HLD CAD s/p remote CABG, Bio AVR solitary episode of Af w/o recurrence, ILR in place, CRI, MDS transfusion dependent, cirrhosis sec to chronic hep c with esophageal varices and prior banding, hepatocellular carcinoma,s/p embolization with embozene particles on 4/5/21, and recent admission to [facility] after fall with head trauma with also transfused 2 untis PRBC during that admission admitted with fluid overload in setting of increased pulm HTN. Now s/p EGD/colonoscopy and Cardiomems placement. Currently medically improved following aggressive diuresis with dsc panning in effect       Decompensated Heart Failure with preserved EF, euvolemic on exam, no s/p CardioMems implant uneventfully.  Pulmonary HTN  cardiology following  Echo 5/20/2021: LVEF 60-65%, G1DD   On 2 L NC (home requirement   c/w Trend renal function closely  c/w hydralazine, bb, off ace/arb  spironolactone due to CKD, off ASA due anemia  Torsemide 20 mg daily for now     Cirrhosis 2/2 hepatitis C c/b hepatocellular carcinoma s/p embolization  Hepatocellular carcinoma   d/c  Spironolactone for now, Furosemide changed to Po Torsemide  EGD on 5/26 which  mild hypertensive gastropathy, no varices, with biopsy obtained  Colonoscopy  5/27/21 noted     Hypertension   hydralazine as above    Diabetes Mellitus   c/w 12u subQ qhs, MDSS, will adjust base on POC  c/w lyrica and gabapentin    CAD, S/p CABG  Continue Lipitor 40mg po qhs    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   Transfusion dependent, will transfuse with goal Hgb > 8, continue to monitor with daily cbc  c/w Iron infusions  No AC as per cardiology, patient bleeds easily     MARGUERITE on Chronic Kidney disease stage 4, has improved   diuretics as above  Will eventually require HD given likelihood progression of CKD  Avoid nephrotoxins  Nephrology following    Hyperkalemia, now resolved   c/w to monitor with daily BMP     Hypothyroidism  -Continue levothyroxine 100mcg po daily     #VTE prophylaxis -SCDs due transfusion dependent anemia  #Advanced Directives -Full Code-HCP Daniela Collier    # Dispo - dsc pending home care and medications availability ([facility] holding dsc plan today)

## 2021-06-01 NOTE — CHART NOTE - NSCHARTNOTEFT_GEN_A_CORE
Notified by RN pt c/o generalized itching. Pt is a 76y Female PMH HTN, HLD CAD s/p CABG, CKD stage 4, anemia 2* MDS, Cirrhosis 2* Hep C with esophageal varices and prior banding, recent finding of hepatic mass/hepatoma s/p embolization admitted for decompensated HFpEF. Pt reports generalized itching started this AM, pt stated has not ceased and asked for cream. No rash. Pt without c/o SOB, dysphagia, perioral numbness or tingling, CP at this time. Pt has not taken any new medications, used new soap/lotion, or consumed any new foods. VSS T 98.3 F oral, /72, P 86, RR 18, O2 sat 98% on 2LNC. Ordered benadryl 25mg IVP x1 dose and reassess for relief. RN to continue to monitor and escalate PRN. Notified by RN pt c/o generalized itching. Pt is a 76y Female PMH HTN, HLD CAD s/p CABG, CKD stage 4, anemia 2* MDS, Cirrhosis 2* Hep C with esophageal varices and prior banding, recent finding of hepatic mass/hepatoma s/p embolization admitted for decompensated HFpEF. Pt reports generalized itching started this AM, pt stated has not ceased and asked for cream. No rash. Lungs: decreased breath sounds B/L lower lobes, otherwise no wheezing, rhonchi or rales. Cardiac: +s1/s2, RRR. Pt without c/o SOB, dysphagia, perioral numbness or tingling, CP at this time. Pt has not taken any new medications, used new soap/lotion, or consumed any new foods. VSS T 98.3 F oral, /72, P 86, RR 18, O2 sat 98% on 2LNC. Ordered benadryl 25mg IVP x1 dose and reassess for relief. RN to continue to monitor and escalate PRN.

## 2021-06-01 NOTE — PROGRESS NOTE ADULT - ASSESSMENT
76y Female with HTN HLD CAD s/p remote CABG, Bio AVR solitary episode of Af w/o recurrence, ILR in place, CRI, MDS transfusion dependent, cirrhosis sec to chronic hep c with esophageal varices and prior banding, hepatocellular carcinoma,s/p embolization with embozene particles on 4/5/21, and recent admission to [facility] after fall with head trauma with also transfused 2 untis PRBC during that admission admitted with fluid overload in setting of increased pulm HTN. Now s/p EGD/colonoscopy and Cardiomems placement. Currently medically improved following aggressive diuresis with dsc planning in effect       Decompensated Heart Failure with preserved EF, euvolemic on exam, no s/p CardioMems implant uneventfully.  Pulmonary HTN  Acutely worsening   Echo 5/20/2021: LVEF 60-65%, G1DD   O2 requirements increased, now on Bipap for increased work of breathing   c/w Trend renal function closely  c/w hydralazine, bb, off ace/arb  spironolactone due to CKD, off ASA due anemia  Torsemide decreased to 10 mg daily, now with decreasing urinary output  Will discuss restarting high dose IV diretics  Repeat CT chest (w/o contrast)    Cirrhosis 2/2 hepatitis C c/b hepatocellular carcinoma s/p embolization  Hepatocellular carcinoma   d/c  Spironolactone for now, Furosemide changed to Po Torsemide  EGD on 5/26 which  mild hypertensive gastropathy, no varices, with biopsy obtained  Colonoscopy  5/27/21 noted     Hypertension   hydralazine as above    Diabetes Mellitus   c/w 12u subQ qhs, MDSS, will adjust base on POC  c/w lyrica and gabapentin    CAD, S/p CABG  Continue Lipitor 40mg po qhs    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   Transfusion dependent, will transfuse with goal Hgb > 8, continue to monitor with daily cbc  c/w Iron infusions  No AC as per cardiology, patient bleeds easily     MARGUERITE on Chronic Kidney disease stage 4, has improved   diuretics as above  Will eventually require HD given likelihood progression of CKD  Avoid nephrotoxins  Nephrology following    Hyperkalemia, now resolved   c/w to monitor with daily BMP     Hypothyroidism  -Continue levothyroxine 100mcg po daily     #VTE prophylaxis -SCDs due transfusion dependent anemia  #Advanced Directives -Full Code-HCP Daniela Collier    # Dispo - dsc pending home care and medications availability

## 2021-06-01 NOTE — PROGRESS NOTE ADULT - ASSESSMENT
76y Female with HTN HLD CAD s/p remote CABG, Bio AVR solitary episode of Af w/o recurrence, ILR in place, CRI, MDS transfusion dependent, cirrhosis sec to chronic hep c with esophageal varices and prior banding, hepatocellular carcinoma,s/p embolization with embozene particles on 4/5/21, and recent admission to [facility] after fall with head trauma with also transfused 2 untis PRBC during that admission admitted with fluid overload in setting of increased pulm HTN. Now s/p EGD/colonoscopy and Cardiomems placement. Currently medically improved following aggressive diuresis with dsc planning in effect       Decompensated Heart Failure with preserved EF, euvolemic on exam, no s/p CardioMems implant uneventfully.  Pulmonary HTN  cardiology following  Echo 5/20/2021: LVEF 60-65%, G1DD   On 2 L NC (home requirement)   c/w Trend renal function closely  c/w hydralazine, bb, off ace/arb  spironolactone due to CKD, off ASA due anemia  Torsemide 20 mg daily for now     Cirrhosis 2/2 hepatitis C c/b hepatocellular carcinoma s/p embolization  Hepatocellular carcinoma   d/c  Spironolactone for now, Furosemide changed to Po Torsemide  EGD on 5/26 which  mild hypertensive gastropathy, no varices, with biopsy obtained  Colonoscopy  5/27/21 noted     Hypertension   hydralazine as above    Diabetes Mellitus   c/w 12u subQ qhs, MDSS, will adjust base on POC  c/w lyrica and gabapentin    CAD, S/p CABG  Continue Lipitor 40mg po qhs    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   Transfusion dependent, will transfuse with goal Hgb > 8, continue to monitor with daily cbc  c/w Iron infusions  No AC as per cardiology, patient bleeds easily     MARGUERITE on Chronic Kidney disease stage 4, has improved   diuretics as above  Will eventually require HD given likelihood progression of CKD  Avoid nephrotoxins  Nephrology following    Hyperkalemia, now resolved   c/w to monitor with daily BMP     Hypothyroidism  -Continue levothyroxine 100mcg po daily     #VTE prophylaxis -SCDs due transfusion dependent anemia  #Advanced Directives -Full Code-HCP Daniela Collier    # Dispo - dsc pending home care and medications availability

## 2021-06-01 NOTE — PROGRESS NOTE ADULT - SUBJECTIVE AND OBJECTIVE BOX
Patient seen and examined    Comfortable when seen, had become dysp earlier  UO donaldo    REVIEW OF SYSTEMS:    CONSTITUTIONAL: No F/C  RESPIRATORY: No cough,  No SOB at present at rest  CARDIOVASCULAR: No CP/palpitations,    GASTROINTESTINAL: No abdominal pain  or NVD   GENITOURINARY: No UTI sx  NEUROLOGICAL: No headaches, NO wk/numbness  MUSCULOSKELETAL:   EXTREMITIES : no swelling,    Vital Signs Last 24 Hrs  T(C): 36.6 (01 Jun 2021 14:38), Max: 36.8 (01 Jun 2021 04:53)  T(F): 97.9 (01 Jun 2021 14:38), Max: 98.2 (01 Jun 2021 04:53)  HR: 80 (01 Jun 2021 14:38) (64 - 85)  BP: 130/82 (01 Jun 2021 14:38) (108/57 - 152/69)  BP(mean): --  RR: 18 (01 Jun 2021 14:38) (18 - 18)  SpO2: 98% (01 Jun 2021 14:38) (90% - 100%)    PHYSICAL EXAM:    GENERAL: NAD,   EYES:  conjunctiva and sclera clear  NECK: Supple, No JVD/Bruit  NERVOUS SYSTEM:  A/O x3,   CHEST:  No rales or rhonchi  HEART:  RRR, No murmur  ABDOMEN: Soft, NT/ND BS+  EXTREMITIES:  tr Edema;  SKIN: No rashes    LABS:      06-01    143  |  108<H>  |  73.8<H>  ----------------------------<  142<H>  3.8   |  21.0<L>  |  3.68<H>    Ca    8.3<L>      01 Jun 2021 08:09        MEDICATIONS  (STANDING):  acetaminophen   Tablet .. PRN  albuterol/ipratropium for Nebulization PRN  amitriptyline  atorvastatin  chlorhexidine 2% Cloths  cholecalciferol  dextrose 40% Gel  dextrose 5%.  dextrose 5%.  dextrose 50% Injectable  dextrose 50% Injectable  dextrose 50% Injectable  epoetin georgina-epbx (RETACRIT) Injectable  folic acid  gabapentin  glucagon  Injectable  hydrALAZINE  insulin glargine Injectable (LANTUS)  insulin lispro (ADMELOG) corrective regimen sliding scale  insulin lispro (ADMELOG) corrective regimen sliding scale  levothyroxine  metoprolol tartrate  montelukast  multivitamin  Nephro-nunu  pantoprazole    Tablet  senna  torsemide

## 2021-06-01 NOTE — PROGRESS NOTE ADULT - SUBJECTIVE AND OBJECTIVE BOX
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    INTERVAL HPI / OVERNIGHT EVENTS:    This am pt examined was comfortable on NC, reported that she was unable to sleep well last night  Initially planned on dsc today, however shortly after pt had increased work of breathing (as per RT) and was started on BiPap   Decreasing urinary output  Patient denies any headache, dizziness, CP, abdominal pain, nausea, vomiting.  Other ROS reviewed and are negative.    Vital Signs Last 24 Hrs  Vital Signs Last 24 Hrs  T(C): 36.6 (01 Jun 2021 14:38), Max: 36.8 (01 Jun 2021 04:53)  T(F): 97.9 (01 Jun 2021 14:38), Max: 98.2 (01 Jun 2021 04:53)  HR: 80 (01 Jun 2021 14:38) (64 - 85)  BP: 130/82 (01 Jun 2021 14:38) (108/57 - 152/69)  BP(mean): --  RR: 18 (01 Jun 2021 14:38) (18 - 18)  SpO2: 98% (01 Jun 2021 14:38) (90% - 100%)    PHYSICAL EXAM:  GENERAL: NAD  HEAD:  Atraumatic, Normocephalic  NECK: Supple, No JVD, Normal thyroid  NERVOUS SYSTEM:  Alert & Oriented X3, Good concentration; Motor Strength 5/5 B/L upper and lower extremities  CHEST/LUNG: Coarse BS bilaterally  HEART: Regular rate and rhythm; No murmurs, rubs, or gallops  ABDOMEN: Soft, Nontender, Nondistended; Bowel sounds present  EXTREMITIES:  2+ Peripheral Pulses, No clubbing, cyanosis, or edema          06-01    143  |  108<H>  |  73.8<H>  ----------------------------<  142<H>  3.8   |  21.0<L>  |  3.68<H>    Ca    8.3<L>      01 Jun 2021 08:09        MEDICATIONS  (STANDING):  amitriptyline 40 milliGRAM(s) Oral at bedtime  atorvastatin 40 milliGRAM(s) Oral at bedtime  chlorhexidine 2% Cloths 1 Application(s) Topical daily  cholecalciferol 2000 Unit(s) Oral daily  dextrose 40% Gel 15 Gram(s) Oral once  dextrose 5%. 1000 milliLiter(s) (50 mL/Hr) IV Continuous <Continuous>  dextrose 5%. 1000 milliLiter(s) (100 mL/Hr) IV Continuous <Continuous>  dextrose 50% Injectable 25 Gram(s) IV Push once  dextrose 50% Injectable 12.5 Gram(s) IV Push once  dextrose 50% Injectable 25 Gram(s) IV Push once  epoetin georgina-epbx (RETACRIT) Injectable 54442 Unit(s) SubCutaneous <User Schedule>  folic acid 1 milliGRAM(s) Oral daily  gabapentin 300 milliGRAM(s) Oral at bedtime  glucagon  Injectable 1 milliGRAM(s) IntraMuscular once  hydrALAZINE 25 milliGRAM(s) Oral every 8 hours  insulin glargine Injectable (LANTUS) 12 Unit(s) SubCutaneous at bedtime  insulin lispro (ADMELOG) corrective regimen sliding scale   SubCutaneous three times a day before meals  insulin lispro (ADMELOG) corrective regimen sliding scale   SubCutaneous at bedtime  levothyroxine 100 MICROGram(s) Oral daily  metoprolol tartrate 25 milliGRAM(s) Oral every 6 hours  montelukast 10 milliGRAM(s) Oral at bedtime  multivitamin 1 Tablet(s) Oral daily  Nephro-nunu 1 Tablet(s) Oral daily  pantoprazole    Tablet 40 milliGRAM(s) Oral before breakfast  senna 2 Tablet(s) Oral at bedtime  torsemide 10 milliGRAM(s) Oral daily    MEDICATIONS  (PRN):  acetaminophen   Tablet .. 650 milliGRAM(s) Oral every 6 hours PRN Temp greater or equal to 38.5C (101.3F), Mild Pain (1 - 3)  albuterol/ipratropium for Nebulization 3 milliLiter(s) Nebulizer every 6 hours PRN Shortness of Breath and/or Wheezing      MEDICATIONS  (PRN):  acetaminophen   Tablet .. 650 milliGRAM(s) Oral every 6 hours PRN Temp greater or equal to 38.5C (101.3F), Mild Pain (1 - 3)  albuterol/ipratropium for Nebulization 3 milliLiter(s) Nebulizer every 6 hours PRN Shortness of Breath and/or Wheezing

## 2021-06-01 NOTE — PROGRESS NOTE ADULT - SUBJECTIVE AND OBJECTIVE BOX
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    INTERVAL HPI/OVERNIGHT EVENTS: Patient seen and examined sitting up in bed. Patient reports she was SOB this am, asked for Bipap to be placed on.  Patient reports feeling better now.  Patient also reports she has not been urinating as much as usual.  Patient denies any headache, dizziness, CP, abdominal pain, nausea, vomiting.  Other ROS reviewed and are negative.    Vital Signs Last 24 Hrs  T(C): 36.8 (01 Jun 2021 04:53), Max: 36.8 (01 Jun 2021 04:53)  T(F): 98.2 (01 Jun 2021 04:53), Max: 98.2 (01 Jun 2021 04:53)  HR: 64 (01 Jun 2021 04:53) (64 - 85)  BP: 124/58 (01 Jun 2021 04:53) (108/57 - 152/69)  BP(mean): --  RR: 18 (01 Jun 2021 04:53) (18 - 18)  SpO2: 100% (01 Jun 2021 11:29) (90% - 100%)  I&O's Detail      PHYSICAL EXAM:  GENERAL: NAD  HEAD:  Atraumatic, Normocephalic  NECK: Supple, No JVD, Normal thyroid  NERVOUS SYSTEM:  Alert & Oriented X3, Good concentration; Motor Strength 5/5 B/L upper and lower extremities  CHEST/LUNG: Coarse BS bilaterally  HEART: Regular rate and rhythm; No murmurs, rubs, or gallops  ABDOMEN: Soft, Nontender, Nondistended; Bowel sounds present  EXTREMITIES:  2+ Peripheral Pulses, No clubbing, cyanosis, or edema          01 Jun 2021 08:09    143    |  108    |  73.8   ----------------------------<  142    3.8     |  21.0   |  3.68     Ca    8.3        01 Jun 2021 08:09        CAPILLARY BLOOD GLUCOSE  POCT Blood Glucose.: 319 mg/dL (01 Jun 2021 12:17)  POCT Blood Glucose.: 149 mg/dL (01 Jun 2021 07:57)  POCT Blood Glucose.: 138 mg/dL (31 May 2021 22:45)  POCT Blood Glucose.: 219 mg/dL (31 May 2021 16:30)          MEDICATIONS  (STANDING):  amitriptyline 40 milliGRAM(s) Oral at bedtime  atorvastatin 40 milliGRAM(s) Oral at bedtime  chlorhexidine 2% Cloths 1 Application(s) Topical daily  cholecalciferol 2000 Unit(s) Oral daily  dextrose 40% Gel 15 Gram(s) Oral once  dextrose 5%. 1000 milliLiter(s) (50 mL/Hr) IV Continuous <Continuous>  dextrose 5%. 1000 milliLiter(s) (100 mL/Hr) IV Continuous <Continuous>  dextrose 50% Injectable 25 Gram(s) IV Push once  dextrose 50% Injectable 12.5 Gram(s) IV Push once  dextrose 50% Injectable 25 Gram(s) IV Push once  epoetin georgina-epbx (RETACRIT) Injectable 28060 Unit(s) SubCutaneous <User Schedule>  folic acid 1 milliGRAM(s) Oral daily  gabapentin 300 milliGRAM(s) Oral at bedtime  glucagon  Injectable 1 milliGRAM(s) IntraMuscular once  hydrALAZINE 25 milliGRAM(s) Oral every 8 hours  insulin glargine Injectable (LANTUS) 12 Unit(s) SubCutaneous at bedtime  insulin lispro (ADMELOG) corrective regimen sliding scale   SubCutaneous three times a day before meals  insulin lispro (ADMELOG) corrective regimen sliding scale   SubCutaneous at bedtime  levothyroxine 100 MICROGram(s) Oral daily  metoprolol tartrate 25 milliGRAM(s) Oral every 6 hours  montelukast 10 milliGRAM(s) Oral at bedtime  multivitamin 1 Tablet(s) Oral daily  Nephro-nunu 1 Tablet(s) Oral daily  pantoprazole    Tablet 40 milliGRAM(s) Oral before breakfast  senna 2 Tablet(s) Oral at bedtime  torsemide 10 milliGRAM(s) Oral daily    MEDICATIONS  (PRN):  acetaminophen   Tablet .. 650 milliGRAM(s) Oral every 6 hours PRN Temp greater or equal to 38.5C (101.3F), Mild Pain (1 - 3)  albuterol/ipratropium for Nebulization 3 milliLiter(s) Nebulizer every 6 hours PRN Shortness of Breath and/or Wheezing

## 2021-06-01 NOTE — CHART NOTE - NSCHARTNOTEFT_GEN_A_CORE
Pt transiently required Bipap this am  Currently comfortable on NC  D/W Renal and cardiology  Will obtain CT chest and pending result will determine diuretic dosing for tomorrow  Lasix 40 mg x 1 IV ordered PRN in event pt develops dyspnea or requires Bipap today prior to dose adjustment in am

## 2021-06-01 NOTE — PROGRESS NOTE ADULT - SUBJECTIVE AND OBJECTIVE BOX
[facility]                                   [address] [place] [address]                                                      PHONE: [phone]                                                         FAX: [phone]  [email or web address]  ---------------------------------------------------------------------------------------------------------------------------------    Overnight events/patient complaints:  NAD feeling well today     ACE inhibitors (Other)  Bactrim (Nephrotoxicity)  Biaxin (Joint Pain)  morphine (Short breath)  NSAIDs (Other)    MEDICATIONS  (STANDING):  amitriptyline 40 milliGRAM(s) Oral at bedtime  atorvastatin 40 milliGRAM(s) Oral at bedtime  chlorhexidine 2% Cloths 1 Application(s) Topical daily  cholecalciferol 2000 Unit(s) Oral daily  dextrose 40% Gel 15 Gram(s) Oral once  dextrose 5%. 1000 milliLiter(s) (50 mL/Hr) IV Continuous <Continuous>  dextrose 5%. 1000 milliLiter(s) (100 mL/Hr) IV Continuous <Continuous>  dextrose 50% Injectable 25 Gram(s) IV Push once  dextrose 50% Injectable 12.5 Gram(s) IV Push once  dextrose 50% Injectable 25 Gram(s) IV Push once  epoetin georgina-epbx (RETACRIT) Injectable 75640 Unit(s) SubCutaneous <User Schedule>  folic acid 1 milliGRAM(s) Oral daily  gabapentin 300 milliGRAM(s) Oral at bedtime  glucagon  Injectable 1 milliGRAM(s) IntraMuscular once  hydrALAZINE 25 milliGRAM(s) Oral every 8 hours  insulin glargine Injectable (LANTUS) 12 Unit(s) SubCutaneous at bedtime  insulin lispro (ADMELOG) corrective regimen sliding scale   SubCutaneous three times a day before meals  insulin lispro (ADMELOG) corrective regimen sliding scale   SubCutaneous at bedtime  levothyroxine 100 MICROGram(s) Oral daily  metoprolol tartrate 25 milliGRAM(s) Oral every 6 hours  montelukast 10 milliGRAM(s) Oral at bedtime  multivitamin 1 Tablet(s) Oral daily  Nephro-nunu 1 Tablet(s) Oral daily  pantoprazole    Tablet 40 milliGRAM(s) Oral before breakfast  senna 2 Tablet(s) Oral at bedtime  torsemide 10 milliGRAM(s) Oral daily    MEDICATIONS  (PRN):  acetaminophen   Tablet .. 650 milliGRAM(s) Oral every 6 hours PRN Temp greater or equal to 38.5C (101.3F), Mild Pain (1 - 3)  albuterol/ipratropium for Nebulization 3 milliLiter(s) Nebulizer every 6 hours PRN Shortness of Breath and/or Wheezing      Vital Signs Last 24 Hrs  T(C): 36.8 (01 Jun 2021 04:53), Max: 36.8 (01 Jun 2021 04:53)  T(F): 98.2 (01 Jun 2021 04:53), Max: 98.2 (01 Jun 2021 04:53)  HR: 64 (01 Jun 2021 04:53) (64 - 85)  BP: 124/58 (01 Jun 2021 04:53) (108/57 - 152/69)  BP(mean): --  RR: 18 (01 Jun 2021 04:53) (18 - 18)  SpO2: 90% (01 Jun 2021 04:53) (90% - 100%)  ICU Vital Signs Last 24 Hrs  [facility]  I&O's Detail    I&O's Summary    Drug Dosing Weight  [facility]      PHYSICAL EXAM:  General: Appears well developed, well nourished alert and cooperative.  HEENT: Head; normocephalic, atraumatic.  Eyes: Pupils reactive, cornea wnl.  Neck: Supple, no nodes adenopathy, no NVD or carotid bruit or thyromegaly.  CARDIOVASCULAR: Normal S1 and S2, No murmur, rub, gallop or lift.   LUNGS: Decrease BS B/L   ABDOMEN: Soft, nontender without mass or organomegaly. bowel sounds normoactive.  EXTREMITIES: No clubbing, cyanosis or edema. Distal pulses wnl.   SKIN: warm and dry with normal turgor.  NEURO: Alert/oriented x 3/normal motor exam. No pathologic reflexes.    PSYCH: normal affect.        LABS:                        8.8    7.83  )-----------( 106      ( 30 May 2021 11:18 )             28.7     06-01    143  |  108<H>  |  73.8<H>  ----------------------------<  142<H>  3.8   |  21.0<L>  |  3.68<H>    Ca    8.3<L>      01 Jun 2021 08:09      [facility]    RADIOLOGY & ADDITIONAL STUDIES:    INTERPRETATION OF TELEMETRY (personally reviewed):        Summary:   1. Left ventricular ejection fraction, by visual estimation, is 60 to 65%.   2. Normal global left ventricular systolic function.   3. The mitral in-flow pattern reveals no discernable A-wave, therefore no comment on diastolic function can be made.   4. There ismoderate concentric left ventricular hypertrophy.   5. The right ventricle is not well visualized. Systolic function appears grossly preserved.   6. Severely enlarged left atrium.   7. Moderately enlarged right atrium.   8. Moderate tricuspid regurgitation.   9. Trace mitral valve regurgitation.  10. Mitral valve mean gradient is 4.9 mmHg consistent with mild mitral stenosis.  11. Mild thickening of the anterior and posterior mitral valve leaflets.  12. Moderate mitral annular calcification.  13.A well-seated bioprosthetic valve is visualized in the aortic position. There is mild-moderate central regurgitation. Mean gradiet is 8.5 mm Hg and dimensionless index is 0.47, which are likely consistent with a normal funcitoning bioprosthetic aortic valve.  14. Mildly dilated pulmonary artery.  15. Estimated pulmonary artery systolic pressure is 53.1 mmHg assuming a right atrial pressure of 3 mmHg, which is consistent with moderate pulmonary hypertension.  16. There is no evidence of pericardial effusion.  17. Moderate pleural effusion in both left and right lateral regions.  18. Compared to a prior study from 10/19/20, there is now pulmonary hypertension and bilateral pleural effusions are now present.    MD Renay Electronicallysigned on 5/20/2021 at 1:03:42 PM        VENTRICLES: LVEF 60% with PATRICIA 0.95 cm sq with mean gradient of 39 mm Hg  CORONARY VESSELS: The coronary circulation is right dominant.  LM:   --  LM: Normal.  LAD:   --  LAD: Angiography showed minor luminal irregularities with no  flow limiting lesions.  CX:   --  Mid circumflex: There was a 95 % stenosis.  RCA:   --  Distal RCA: There was a 95 % stenosis in the distal third of the  vessel segment.  --  RPDA: There was a 70 % stenosis at the ostium of the vessel segment.  --  RPLS: There was a 70 % stenosis at the ostium of the vessel segment.  COMPLICATIONS: There were no complications.  DIAGNOSTIC IMPRESSIONS: Severe LCX and RCA disease with AS  DIAGNOSTIC RECOMMENDATIONS: Eval for AS and CABG  INTERVENTIONAL IMPRESSIONS: Severe LCX and RCA disease with AS  INTERVENTIONAL RECOMMENDATIONS: Eval for AS and CABG  Prepared and signed by  Nathan Swanson MD  Signed 02/01/2016 16:20:34  HEMODYNAMIC TABLES  Pressures:  Baseline  Pressures:  - HR: 79    ASSESSMENT AND PLAN:  In summary, NICHELLE GILL is an 76y Female with past medical history significant for CKD CAD/CABG/AVR who presents with Dyspnea c/w HFpEF, recurrent AF not AC candidate due to bleeding risk, s/p ILR, CAd s/p remote CABG/Bio AVR normal EF mod MR, CRI awaiting ? HD, cirrhosis /  hepatoma s/p embolization, MDS transfusion dependent, HTN, HLD with AF; clinically improving TTE stable see above; s/p cardio MEMS      Plan  1.  Continue current optimal medical therapy   2.  Agree with Torsemide therapy and dosing as per renal   3.  Renal follow up

## 2021-06-01 NOTE — PROGRESS NOTE ADULT - ASSESSMENT
CKD(IV): at baseline but now creat steadily increasing  Likely pre renal 2/2 CHF/PAH and more so diuretics  CXR yesterday improving  UO has decreased, check bladder scan  CHF with chronic fluid overload; B/L pleural effusions  - cont to avoid potential nephrotoxins  - Hold Torsemide till CT scan done and clarifies need for diuretics  - follow labs  - "long term" plan will need to include dialysis    Anemia: +CKD + MDS (transfusion dependent) + GI loss  Low Fe stores s/p IV Fe  - cont ARACELI   - PRBCs as needed    RO: serum phos ok  - monitor Ca+. phos for now

## 2021-06-02 NOTE — PROGRESS NOTE ADULT - ASSESSMENT
CKD(IV): baseline 3.5mg/dL  CHF with chronic fluid overload; B/L pleural effusions---> decompensated with increased L effusion  - cont to avoid potential nephrotoxins  - will add IV Lasix today and observe  - consider thoracentesis (?)  - depending upon clinical course may need HD/UF for ongoing management    Anemia: +CKD + MDS (transfusion dependent) + GI loss  Low Fe stores s/p IV Fe  Hepatic mass c/w HCC  - d/c ARACELI in setting of neoplastic process  - PRBCs as needed    RO: serum phos ok  - monitor

## 2021-06-02 NOTE — PROGRESS NOTE ADULT - PROBLEM SELECTOR PLAN 1
POCUS performed with pt in upright position, no significant window to perform thoracentesis or pigtail insertion at this time  CT chest reviewed with Dr. Sheridan  f/u AM CXR  cont IV diuresis as renal function tolerates  may requiring pigtail in next few days if effusions worsen  d/w Dr. Sheridan/Dr. Lloyd

## 2021-06-02 NOTE — PROGRESS NOTE ADULT - ASSESSMENT
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

## 2021-06-02 NOTE — PROGRESS NOTE ADULT - SUBJECTIVE AND OBJECTIVE BOX
CC: chf (02 Jun 2021 10:43)    HPI:  76y Female with HTN HLD CAD s/p remote CABg Bio AVR solitary episode of Af w/o recurrence, ILR in place, CRI, MDS transfusion dependent, cirrhosis sec to hep c with esophageal varices and prior banding, recent finding of hepatic mass/hepatoma s/p embolization, came to the ER for worsening dyspnea for several days worse with activity, orthopnea and elevated BP.     INTERVAL HPI/OVERNIGHT EVENTS:  Patient seen and examined sitting up in bed.  Patient complaining of SOB last night, improved with bipap and Lasix.  Patient also complaining of back pain.  Patient denies any headache, dizziness, CP, abdominal pain, nausea, vomiting.  Other ROS reviewed and are negative.    Vital Signs Last 24 Hrs  T(C): 36.8 (02 Jun 2021 10:46), Max: 36.9 (02 Jun 2021 01:33)  T(F): 98.2 (02 Jun 2021 10:46), Max: 98.4 (02 Jun 2021 01:33)  HR: 84 (02 Jun 2021 10:46) (79 - 89)  BP: 137/64 (02 Jun 2021 10:46) (130/75 - 155/72)  BP(mean): --  RR: 20 (02 Jun 2021 10:46) (18 - 20)  SpO2: 96% (02 Jun 2021 10:46) (96% - 99%)  I&O's Detail      PHYSICAL EXAM:  GENERAL: NAD  HEAD:  Atraumatic, Normocephalic  NECK: Supple, No JVD, Normal thyroid  NERVOUS SYSTEM:  Alert & Oriented X3, Good concentration; Motor Strength 5/5 B/L upper and lower extremities  CHEST/LUNG: Bibasilar crackles  HEART: Regular rate and rhythm; No murmurs, rubs, or gallops  ABDOMEN: Soft, Nontender, Nondistended; Bowel sounds present  EXTREMITIES:  2+ Peripheral Pulses, No clubbing, cyanosis, or edema                              8.5    7.07  )-----------( 107      ( 02 Jun 2021 06:35 )             28.2     02 Jun 2021 06:35    143    |  109    |  76.0   ----------------------------<  183    3.7     |  20.0   |  3.66     Ca    8.8        02 Jun 2021 06:35    TPro  6.9    /  Alb  3.6    /  TBili  0.3    /  DBili  x      /  AST  34     /  ALT  20     /  AlkPhos  121    02 Jun 2021 06:35      CAPILLARY BLOOD GLUCOSE  POCT Blood Glucose.: 304 mg/dL (02 Jun 2021 10:58)  POCT Blood Glucose.: 178 mg/dL (02 Jun 2021 07:31)  POCT Blood Glucose.: 218 mg/dL (01 Jun 2021 22:28)  POCT Blood Glucose.: 100 mg/dL (01 Jun 2021 17:36)    LIVER FUNCTIONS - ( 02 Jun 2021 06:35 )  Alb: 3.6 g/dL / Pro: 6.9 g/dL / ALK PHOS: 121 U/L / ALT: 20 U/L / AST: 34 U/L / GGT: x               MEDICATIONS  (STANDING):  amitriptyline 40 milliGRAM(s) Oral at bedtime  atorvastatin 40 milliGRAM(s) Oral at bedtime  chlorhexidine 2% Cloths 1 Application(s) Topical daily  cholecalciferol 2000 Unit(s) Oral daily  dextrose 40% Gel 15 Gram(s) Oral once  dextrose 5%. 1000 milliLiter(s) (50 mL/Hr) IV Continuous <Continuous>  dextrose 5%. 1000 milliLiter(s) (100 mL/Hr) IV Continuous <Continuous>  dextrose 50% Injectable 25 Gram(s) IV Push once  dextrose 50% Injectable 12.5 Gram(s) IV Push once  dextrose 50% Injectable 25 Gram(s) IV Push once  epoetin georgina-epbx (RETACRIT) Injectable 11480 Unit(s) SubCutaneous <User Schedule>  folic acid 1 milliGRAM(s) Oral daily  furosemide   Injectable 40 milliGRAM(s) IV Push two times a day  gabapentin 300 milliGRAM(s) Oral at bedtime  glucagon  Injectable 1 milliGRAM(s) IntraMuscular once  hydrALAZINE 25 milliGRAM(s) Oral every 8 hours  insulin glargine Injectable (LANTUS) 12 Unit(s) SubCutaneous at bedtime  insulin lispro (ADMELOG) corrective regimen sliding scale   SubCutaneous three times a day before meals  insulin lispro (ADMELOG) corrective regimen sliding scale   SubCutaneous at bedtime  levothyroxine 100 MICROGram(s) Oral daily  metoprolol tartrate 25 milliGRAM(s) Oral every 6 hours  montelukast 10 milliGRAM(s) Oral at bedtime  multivitamin 1 Tablet(s) Oral daily  Nephro-nunu 1 Tablet(s) Oral daily  pantoprazole    Tablet 40 milliGRAM(s) Oral before breakfast  senna 2 Tablet(s) Oral at bedtime    MEDICATIONS  (PRN):  acetaminophen   Tablet .. 650 milliGRAM(s) Oral every 6 hours PRN Temp greater or equal to 38.5C (101.3F), Mild Pain (1 - 3)  albuterol/ipratropium for Nebulization 3 milliLiter(s) Nebulizer every 6 hours PRN Shortness of Breath and/or Wheezing      RADIOLOGY & ADDITIONAL TESTS:  < from: Xray Chest 1 View- PORTABLE-Urgent (Xray Chest 1 View- PORTABLE-Urgent .) (06.02.21 @ 01:47) >   EXAM:  XR CHEST PORTABLE URGENT 1V                          PROCEDURE DATE:  06/02/2021          INTERPRETATION:  Clinical history: 76-year-old female, bilateral pleural effusions, shortness of breath.    Two expiratory/kyphotic views are correlated with the CT of 6/1/2021 and demonstrate mild cardiomegaly and mild to moderate pulmonary venous congestion/perihilar interstitial edema/infiltrate, grossly unchanged.    Large bilateral pleural effusions, better characterized on CT.    Right-sided [id number] with tip in the right atrium, unchanged.    No acute osseous findings and no pneumothorax.    IMPRESSION:  Mild to moderate pulmonary venous congestion/interstitial infiltrate/edema, grossly unchanged.    Large bilateral pleural effusions, better characterized on CT            BRENNA BREEN DO; Attending Radiologist  This document has been electronically signed. Jun 2 2021 11:24AM    < end of copied text >

## 2021-06-02 NOTE — PROGRESS NOTE ADULT - ATTENDING COMMENTS
Discussed with renal and CT surgery in detail  Pt for CT and increased diuresis with albumin x 1 > US without large pocket  Plan to continue aggressive diuresis and if renal function worsens may need to consider HD for volume status management Discussed with renal and CT surgery in detail  Pt for CT and increased diuresis with albumin x 1 > US without large pocket  Plan to continue aggressive diuresis and if renal function worsens may need to consider HD for volume status management  Pt counseled, however continues to refuse Venodyne LE b/l (unable to start AC) for DVT ppx

## 2021-06-02 NOTE — PROGRESS NOTE ADULT - SUBJECTIVE AND OBJECTIVE BOX
Surgeon: Gabino    Consult requesting by: Dr. Villa    HISTORY OF PRESENT ILLNESS:  76F, known to thoracic surgery for evaluation of pleural effusions, pmhx HTN HLD CAD s/p remote CABG Bio AVR solitary episode of AF w/o recurrence, ILR in place, CRI, MDS transfusion dependent, cirrhosis sec to hep c with esophageal varices and prior banding, recent finding of hepatic mass/hepatoma s/p embolization admitted 5/19 with orthopnea, elevated BP, worsening dyspnea over course of a week. pt was found to have b/l pleural effusions, started on diuretics and thoracic surgery consulted for drainage.  At that time, pt with improving O2 requirements and improved CXR so no intervention required and thoracic surgery signed off. During hospital course, pt with MARGUERITE on CKD requiring decreased diuretic doses, now with worsening dyspnea persistent O2 requirements, CXR with inc pulmonary edema, CT with no improvement in right effusion and increased left. Thoracic surgery reconsulted for possible pigtails.     PAST MEDICAL & SURGICAL HISTORY:  Hypertension  well controlled  High cholesterol  Low back pain  chronic over a year  Asthma  well controlled, never intubated for exacerbation  Anemia, unspecified anemia type  uses O2 at 2L at home PRN, due to anemia  Aortic stenosis  s/p bovine AVR  Heart failure  Echo  10/2020 EF 60%, mild AR  CAD (coronary artery disease)  s/p OHS  CKD (chronic kidney disease)  Stage 4  Chronic hepatitis C  Sustained viremic response documented  Cirrhosis  2/2 hepatitis  Hemorrhoids  Internal hemorrhoids  Liver carcinoma  Dx 1/2021  History of tonsillectomy  as a child  S/P CABG x 3  2016  H/O aortic valve replacement  bovine, 2016  Port-A-Cath in place  7/2019, right upper chest    History of loop recorder  2017    MEDICATIONS  (STANDING):  amitriptyline 40 milliGRAM(s) Oral at bedtime  atorvastatin 40 milliGRAM(s) Oral at bedtime  chlorhexidine 2% Cloths 1 Application(s) Topical daily  cholecalciferol 2000 Unit(s) Oral daily  dextrose 40% Gel 15 Gram(s) Oral once  dextrose 5%. 1000 milliLiter(s) (50 mL/Hr) IV Continuous <Continuous>  dextrose 5%. 1000 milliLiter(s) (100 mL/Hr) IV Continuous <Continuous>  dextrose 50% Injectable 25 Gram(s) IV Push once  dextrose 50% Injectable 12.5 Gram(s) IV Push once  dextrose 50% Injectable 25 Gram(s) IV Push once  epoetin georgina-epbx (RETACRIT) Injectable 00597 Unit(s) SubCutaneous <User Schedule>  folic acid 1 milliGRAM(s) Oral daily  furosemide   Injectable 40 milliGRAM(s) IV Push once  gabapentin 300 milliGRAM(s) Oral at bedtime  glucagon  Injectable 1 milliGRAM(s) IntraMuscular once  hydrALAZINE 25 milliGRAM(s) Oral every 8 hours  insulin glargine Injectable (LANTUS) 12 Unit(s) SubCutaneous at bedtime  insulin lispro (ADMELOG) corrective regimen sliding scale   SubCutaneous three times a day before meals  insulin lispro (ADMELOG) corrective regimen sliding scale   SubCutaneous at bedtime  levothyroxine 100 MICROGram(s) Oral daily  metoprolol tartrate 25 milliGRAM(s) Oral every 6 hours  montelukast 10 milliGRAM(s) Oral at bedtime  Nephro-nunu 1 Tablet(s) Oral daily  pantoprazole    Tablet 40 milliGRAM(s) Oral before breakfast  senna 2 Tablet(s) Oral at bedtime    MEDICATIONS  (PRN):  acetaminophen   Tablet .. 650 milliGRAM(s) Oral every 6 hours PRN Temp greater or equal to 38.5C (101.3F), Mild Pain (1 - 3)  albuterol/ipratropium for Nebulization 3 milliLiter(s) Nebulizer every 6 hours PRN Shortness of Breath and/or Wheezing  oxyCODONE    IR 5 milliGRAM(s) Oral every 6 hours PRN Moderate Pain (4 - 6)    Allergies  ACE inhibitors (Other)  Bactrim (Nephrotoxicity)  Biaxin (Joint Pain)  morphine (Short breath)  NSAIDs (Other)    SOCIAL HISTORY:  Former Smoker, 0.5PPD X 20 years, Quit >20 years ago.  Denies any alcohol use / abuse.  Denies any drug use / abuse.     FAMILY HISTORY:  Family history of diabetes mellitus (Sibling)    Review of Systems  CONSTITUTIONAL:  Fevers[ ] chills[ ] sweats[ ] fatigue[ ] weight loss[ ] weight gain [ ]                                     NEGATIVE [ x]   NEURO:  parathesias[ ] seizures [ ]  syncope [ ]  confusion [ ]                                                                                NEGATIVE[x ]   EYES: glasses[ ]  blurry vision[ ]  discharge[ ] pain[ ] glaucoma [ ]                                                                          NEGATIVE[ x]   ENMT:  difficulty hearing [ ]  vertigo[ ]  dysphagia[ ] epistaxis[ ] recent dental work [ ]                                    NEGATIVE[x ]   CV:  chest pain[ ] palpitations[ ] DUPONT [x ] diaphoresis [ ]                                                                                           NEGATIVE[ ]   RESPIRATORY:  wheezing[ ] SOB[x ] cough [x ] sputum[ ] hemoptysis[ ]                                                                  NEGATIVE[ ]   GI:  nausea[ ]  vommiting [ ]  diarrhea[ ] constipation [ ] melena [ ]                                                                      NEGATIVE[X ]   : hematuria[ ]  dysuria[ ] urgency[ ] incontinence[ ]                                                                                            NEGATIVE [ x]   MUSKULOSKELETAL:  arthritis[ ]  joint swelling [ ] muscle weakness [ ]                                                             NEGATIVE [ x]   SKIN/BREAST:  rash[ ] itching [ ]  hair loss[ ] masses[ ]                                                                                       NEGATIVE [ x]   PSYCH:  dementia [ ] depression [ ] anxiety[ ]                                                                                                       NEGATIVE [ x]    HEME/LYMPH:  bruises easily[ ] enlarged lymph nodes[ ] tender lymph nodes[ ]                                                NEGATIVE [ x]   ENDOCRINE:  cold intolerance[ ] heat intolerance[ ] polydipsia[ ]                                                                           NEGATIVE [ x]     PHYSICAL EXAM  Vital Signs Last 24 Hrs  T(C): 36.8 (02 Jun 2021 10:46), Max: 36.9 (02 Jun 2021 01:33)  T(F): 98.2 (02 Jun 2021 10:46), Max: 98.4 (02 Jun 2021 01:33)  HR: 84 (02 Jun 2021 10:46) (82 - 89)  BP: 137/64 (02 Jun 2021 10:46) (130/75 - 155/72)  RR: 20 (02 Jun 2021 10:46) (18 - 20)  SpO2: 96% (02 Jun 2021 10:46) (96% - 99%)    Gen:  NAD  Neuro: A&Ox3, non focal, speech clear and intact  CV: S1S2 RRR  Pulm: diminished BS b/l L>R, with b/l crackles  Abd: +BS soft NT ND  Ext: no LE edema or cyanosis, WWP 2+ DP and radial pulses b/l                                                          LABS:                        8.5    7.07  )-----------( 107      ( 02 Jun 2021 06:35 )             28.2     06-02    143  |  109<H>  |  76.0<H>  ----------------------------<  183<H>  3.7   |  20.0<L>  |  3.66<H>  Ca    8.8      02 Jun 2021 06:35  TPro  6.9  /  Alb  3.6  /  TBili  0.3<L>  /  DBili  x   /  AST  34<H>  /  ALT  20  /  AlkPhos  121<H>  06-02

## 2021-06-02 NOTE — PROGRESS NOTE ADULT - SUBJECTIVE AND OBJECTIVE BOX
NEPHROLOGY INTERVAL HPI/OVERNIGHT EVENTS:  pt still with mild sob and cough  also notes some pleuritic cp    MEDICATIONS  (STANDING):  amitriptyline 40 milliGRAM(s) Oral at bedtime  atorvastatin 40 milliGRAM(s) Oral at bedtime  chlorhexidine 2% Cloths 1 Application(s) Topical daily  cholecalciferol 2000 Unit(s) Oral daily  dextrose 40% Gel 15 Gram(s) Oral once  dextrose 5%. 1000 milliLiter(s) (50 mL/Hr) IV Continuous <Continuous>  dextrose 5%. 1000 milliLiter(s) (100 mL/Hr) IV Continuous <Continuous>  dextrose 50% Injectable 25 Gram(s) IV Push once  dextrose 50% Injectable 12.5 Gram(s) IV Push once  dextrose 50% Injectable 25 Gram(s) IV Push once  epoetin georgina-epbx (RETACRIT) Injectable 84410 Unit(s) SubCutaneous <User Schedule>  folic acid 1 milliGRAM(s) Oral daily  gabapentin 300 milliGRAM(s) Oral at bedtime  glucagon  Injectable 1 milliGRAM(s) IntraMuscular once  hydrALAZINE 25 milliGRAM(s) Oral every 8 hours  insulin glargine Injectable (LANTUS) 12 Unit(s) SubCutaneous at bedtime  insulin lispro (ADMELOG) corrective regimen sliding scale   SubCutaneous three times a day before meals  insulin lispro (ADMELOG) corrective regimen sliding scale   SubCutaneous at bedtime  levothyroxine 100 MICROGram(s) Oral daily  metoprolol tartrate 25 milliGRAM(s) Oral every 6 hours  montelukast 10 milliGRAM(s) Oral at bedtime  multivitamin 1 Tablet(s) Oral daily  Nephro-nunu 1 Tablet(s) Oral daily  pantoprazole    Tablet 40 milliGRAM(s) Oral before breakfast  senna 2 Tablet(s) Oral at bedtime    MEDICATIONS  (PRN):  acetaminophen   Tablet .. 650 milliGRAM(s) Oral every 6 hours PRN Temp greater or equal to 38.5C (101.3F), Mild Pain (1 - 3)  albuterol/ipratropium for Nebulization 3 milliLiter(s) Nebulizer every 6 hours PRN Shortness of Breath and/or Wheezing      Allergies    ACE inhibitors (Other)  Bactrim (Nephrotoxicity)  Biaxin (Joint Pain)  morphine (Short breath)  NSAIDs (Other)          Vital Signs Last 24 Hrs  T(C): 36.7 (02 Jun 2021 05:06), Max: 36.9 (02 Jun 2021 01:33)  T(F): 98 (02 Jun 2021 05:06), Max: 98.4 (02 Jun 2021 01:33)  HR: 85 (02 Jun 2021 05:06) (79 - 89)  BP: 130/75 (02 Jun 2021 05:06) (130/75 - 155/72)  BP(mean): --  RR: 20 (02 Jun 2021 05:06) (18 - 20)  SpO2: 96% (02 Jun 2021 05:06) (96% - 100%)    PHYSICAL EXAM:  GENERAL: Mild dyspnea  ENMT: Dry mucous membranes  NECK: Supple, +JVD  NERVOUS SYSTEM:  Alert & Oriented X3,  intact and symmetric  CHEST/LUNG: Diminished BS at bases, crackles  HEART: No rub  ABDOMEN: Soft, Nontender, Nondistended; BS+  EXTREMITIES: + improved LE edema   SKIN: No rashes or lesions    LABS:                        8.5    7.07  )-----------( 107      ( 02 Jun 2021 06:35 )             28.2     06-02    143  |  109<H>  |  76.0<H>  ----------------------------<  183<H>  3.7   |  20.0<L>  |  3.66<H>    Ca    8.8      02 Jun 2021 06:35    TPro  6.9  /  Alb  3.6  /  TBili  0.3<L>  /  DBili  x   /  AST  34<H>  /  ALT  20  /  AlkPhos  121<H>  06-02    Phosphorus Level, Serum: 4.3 mg/dL (05.25.21 )        RADIOLOGY & ADDITIONAL TESTS:  < from: CT Chest No Cont (06.01.21 @ 16:15) >     EXAM:  CT CHEST                          PROCEDURE DATE:  06/01/2021          INTERPRETATION:  CLINICAL INFORMATION: Shortness of breath. Pulmonary hypertension. Heart failure.    COMPARISON: 05/19/2021.    CONTRAST/COMPLICATIONS:  IV Contrast: NONE  Oral Contrast: NONE  Complications: None reported at time of study completion    PROCEDURE:  CT of the Chest was performed.  Sagittal and coronal reformats were performed.    FINDINGS:    LUNGS AND AIRWAYS: Patent central airways.  Compression atelectasis dependent lower lobes. A few septal lines in the upper lobes. Groundglass opacity bilaterally that may be related to pulmonary edema. Underlying mild emphysema in the upper lobes, right more than left.  PLEURA: Mild to moderate bilateral pleural effusions, progressed in the left side and essentially unchanged on the right side.  MEDIASTINUM AND AVANI: No lymphadenopathy.  VESSELS: Vascular calcification including the coronary arteries. Borderline prominent main pulmonary artery measuring 3cm, not larger than the ascending thoracic aorta at this level. Evidence of a central line with tip in the right atrium. Monitoring device within the left lower lobe pulmonary artery. This is a new finding.  HEART: Mild cardiomegaly. Aortic valve prosthesis. No pericardial effusion.  CHEST WALL AND LOWER NECK: Loop recorder within the subcutaneous soft tissues of left anterior chest wall.  VISUALIZED UPPER ABDOMEN: Cirrhotic morphology. Approximately 2.1 cm sized right hepatic mass, segment 7,  characterized as HCC on prior MR imaging. Splenomegaly. Prominent soft tissue at the level of tail of the pancreas is likely related to collateral vasculature considering the appearance on prior imaging.  BONES: Prior sternotomy. Degenerative changes.    IMPRESSION:  Mild to moderate bilateral pleural effusions, stable on the right and increased on the left side.    Cardiomegaly and aortic valve prosthesis.    New pulmonary artery monitoring device.    Cirrhotic morphology with portal hypertension and a 2.1 cm sized right hepatic mass lesion, previously characterized as HCC.    Additional findings as above.    < end of copied text >

## 2021-06-02 NOTE — PROGRESS NOTE ADULT - ASSESSMENT
76y Female with HTN HLD CAD s/p remote CABG, Bio AVR solitary episode of Af w/o recurrence, ILR in place, CRI, MDS transfusion dependent, cirrhosis sec to chronic hep c with esophageal varices and prior banding, hepatocellular carcinoma,s/p embolization with embozene particles on 4/5/21, and recent admission to [facility] after fall with head trauma with also transfused 2 untis PRBC during that admission admitted with fluid overload in setting of increased pulm HTN. Now s/p EGD/colonoscopy and Cardiomems placement.       Decompensated Heart Failure with preserved EF, euvolemic on exam, no s/p CardioMems implant uneventfully.  Pulmonary HTN  Acutely worsening   Echo 5/20/2021: LVEF 60-65%, G1DD   O2 requirements increased intermittently, requested Bipap for increased work of breathing last night  c/w Trend renal function closely  c/w hydralazine, bb, off ace/arb  spironolactone due to CKD, off ASA due anemia  decreasing urinary output  IV Lasix restarted BID  Repeat CT chest showed bilateral effusions, L>R.  Will consult CTS for possible thoracentesis.    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   Transfusion dependent, will transfuse with goal Hgb > 8, continue to monitor with daily cbc  c/w Iron infusions  No AC as per cardiology, patient bleeds easily     MARGUERITE on Chronic Kidney disease stage 4, has improved   diuretics as above  Will eventually require HD given likelihood progression of CKD  Avoid nephrotoxins  Nephrology following    Hyperkalemia, now resolved   c/w to monitor with daily BMP     Hypothyroidism  -Continue levothyroxine 100mcg po daily     #VTE prophylaxis -SCDs due transfusion dependent anemia  #Advanced Directives -Full Code-HCP Daniela Collier    # Dispo - unclear at this time

## 2021-06-02 NOTE — CHART NOTE - NSCHARTNOTEFT_GEN_A_CORE
Notified by RN pt c/o SOB. PT administered PRN Duoneb without improvement in pt SOB. Pt is a 76y Female PMH HTN, HLD CAD s/p CABG, CKD stage 4, anemia 2* MDS, Cirrhosis 2* Hep C with esophageal varices and prior banding, recent finding of hepatic mass/hepatoma s/p embolization admitted for decompensated HFpEF (EF 60-65% on TTE 5/19). Patient awake, A&O x3 lying in bed reports "I feel short of breath now, I was fine before." Patient sating 96% on 3L/min via NC, RR 20 even and nonlabored. Patient denies CP, chest pressure, cough, abd pain or discomfort.     PHYSICAL EXAM  VS:  T(F): 98.4 F oral  HR: 82   BP: 150/76   RR: 20 even and unlabored   SpO2: 96% on oxygen @3L/min via NC    GENERAL: calm, NAD  CHEST/LUNG: +rales B/L lower lobes. No wheezing, rhonchi B/L. Pt able to speak in full sentences, respirations even and unlabored.  HEART: Regular rate and rhythm; No rubs.  ABDOMEN: Soft, Nontender, Nondistended. Bowel sounds present x4 quads  EXTREMITIES:  2+ Peripheral Pulses, No clubbing, cyanosis, or edema.  NERVOUS SYSTEM:  Alert & Oriented X3    A/P: 77 y/o F admitted for decompensated HFpEF seen for c/o SOB  - STAT CXR: no change when compared to prior CXR. B/L pleural effusions L>R.   - Instructed RN to administer PRN Lasix 40mg IVP x1 dose ordered by PMD  - Pt with Purewick in place, monitor U/O.  - Instructed RT to apply nocturnal BiPAP as ordered  - Will reassess for improvement of symptoms.  - PMD will be notified in AM.  - RN instructed to continue to monitor pt and notify provider of any changes in pt status.

## 2021-06-02 NOTE — PROGRESS NOTE ADULT - SUBJECTIVE AND OBJECTIVE BOX
[facility] - [facility], THE [facility]                                   [address] [place] [address]                                                      PHONE: [phone]                                                         FAX: [phone]  [email or web address]  ---------------------------------------------------------------------------------------------------------------------------------    Overnight events/patient complaints: patient seen at bedside. She still complains of cough and SOB.       ACE inhibitors (Other)  Bactrim (Nephrotoxicity)  Biaxin (Joint Pain)  morphine (Short breath)  NSAIDs (Other)    MEDICATIONS  (STANDING):  amitriptyline 40 milliGRAM(s) Oral at bedtime  atorvastatin 40 milliGRAM(s) Oral at bedtime  chlorhexidine 2% Cloths 1 Application(s) Topical daily  cholecalciferol 2000 Unit(s) Oral daily  dextrose 40% Gel 15 Gram(s) Oral once  dextrose 5%. 1000 milliLiter(s) (50 mL/Hr) IV Continuous <Continuous>  dextrose 5%. 1000 milliLiter(s) (100 mL/Hr) IV Continuous <Continuous>  dextrose 50% Injectable 25 Gram(s) IV Push once  dextrose 50% Injectable 12.5 Gram(s) IV Push once  dextrose 50% Injectable 25 Gram(s) IV Push once  epoetin georgina-epbx (RETACRIT) Injectable 24838 Unit(s) SubCutaneous <User Schedule>  folic acid 1 milliGRAM(s) Oral daily  furosemide   Injectable 40 milliGRAM(s) IV Push two times a day  gabapentin 300 milliGRAM(s) Oral at bedtime  glucagon  Injectable 1 milliGRAM(s) IntraMuscular once  hydrALAZINE 25 milliGRAM(s) Oral every 8 hours  insulin glargine Injectable (LANTUS) 12 Unit(s) SubCutaneous at bedtime  insulin lispro (ADMELOG) corrective regimen sliding scale   SubCutaneous three times a day before meals  insulin lispro (ADMELOG) corrective regimen sliding scale   SubCutaneous at bedtime  levothyroxine 100 MICROGram(s) Oral daily  metoprolol tartrate 25 milliGRAM(s) Oral every 6 hours  montelukast 10 milliGRAM(s) Oral at bedtime  multivitamin 1 Tablet(s) Oral daily  Nephro-nunu 1 Tablet(s) Oral daily  pantoprazole    Tablet 40 milliGRAM(s) Oral before breakfast  senna 2 Tablet(s) Oral at bedtime    MEDICATIONS  (PRN):  acetaminophen   Tablet .. 650 milliGRAM(s) Oral every 6 hours PRN Temp greater or equal to 38.5C (101.3F), Mild Pain (1 - 3)  albuterol/ipratropium for Nebulization 3 milliLiter(s) Nebulizer every 6 hours PRN Shortness of Breath and/or Wheezing      Vital Signs Last 24 Hrs  T(C): 36.7 (02 Jun 2021 05:06), Max: 36.9 (02 Jun 2021 01:33)  T(F): 98 (02 Jun 2021 05:06), Max: 98.4 (02 Jun 2021 01:33)  HR: 85 (02 Jun 2021 05:06) (79 - 89)  BP: 130/75 (02 Jun 2021 05:06) (130/75 - 155/72)  BP(mean): --  RR: 20 (02 Jun 2021 05:06) (18 - 20)  SpO2: 96% (02 Jun 2021 05:06) (96% - 100%)  ICU Vital Signs Last 24 Hrs  [facility]  I&O's Detail    Drug Dosing Weight  [facility]      PHYSICAL EXAM:  General: Appears well developed, well nourished alert and cooperative.  HEENT: Head; normocephalic, atraumatic.  Eyes: Pupils reactive, cornea wnl.  Neck: Supple, no nodes adenopathy, no NVD or carotid bruit or thyromegaly.  CARDIOVASCULAR: Normal S1 and S2, No murmur, rub, gallop or lift.   LUNGS: reduced breath sounds b/l   ABDOMEN: Soft, nontender without mass or organomegaly. bowel sounds normoactive.  EXTREMITIES: No clubbing, cyanosis or edema. Distal pulses wnl.   SKIN: warm and dry with normal turgor.  NEURO: Alert/oriented x 3/normal motor exam. No pathologic reflexes.    PSYCH: normal affect.        LABS:                        8.5    7.07  )-----------( 107      ( 02 Jun 2021 06:35 )             28.2     06-02    143  |  109<H>  |  76.0<H>  ----------------------------<  183<H>  3.7   |  20.0<L>  |  3.66<H>    Ca    8.8      02 Jun 2021 06:35    TPro  6.9  /  Alb  3.6  /  TBili  0.3<L>  /  DBili  x   /  AST  34<H>  /  ALT  20  /  AlkPhos  121<H>  06-02    NICHELLE GILL      ASSESSMENT AND PLAN:  In summary, NICHELLE GILL is an 76y Female with past medical history significant for CKD CAD/CABG/AVR who presents with Dyspnea c/w HFpEF, recurrent AF not AC candidate due to bleeding risk, s/p ILR, CAd s/p remote CABG/Bio AVR normal EF mod MR, CRI awaiting ? HD, cirrhosis /  hepatoma s/p embolization, MDS transfusion dependent, HTN, HLD with AF; clinically improving TTE stable see above; s/p cardio MEMS      Plan  1.  Continue current cardiac therapy   2.  Will defer diuretic therapy to nephrology given her advanced CKD and potential need for HD    Please call back if additional cardiac issues arise.

## 2021-06-02 NOTE — CHART NOTE - NSCHARTNOTEFT_GEN_A_CORE
Patient refused nocturnal BIPAP. Educated on risk and benefits. Machine at bedside on standby. No respiratory distress noted, vitals stable.  ALBIN Sharma and BRIAN Evans aware.

## 2021-06-02 NOTE — CHART NOTE - NSCHARTNOTEFT_GEN_A_CORE
Source: Patient [ ]  Family [ ]   other [x] RN; Pt sleeping soundly    Current Diet:   Diet, DASH/TLC:   Sodium & Cholesterol Restricted  Consistent Carbohydrate {Evening Snack} (CSTCHOSN)  For patients receiving Renal Replacement - No Protein Restr, No Conc K, No Conc Phos, Low  Sodium (RENAL) (05-27-21 @ 13:24)    Patient reports [ ] nausea  [ ] vomiting [ ] diarrhea [ ] constipation  [ ]chewing problems [ ] swallowing issues  [ ] other:     PO intake:  < 50% [ ]   50-75%  [x]   %  [ ]  other :    Source for PO intake [ ] Patient [ ] family [ ] chart [x] staff [ ] other    Current Weight:   (6/2)   169.7 lbs    % Weight Change: No recent weight documented     Pertinent Medications: MEDICATIONS  (STANDING):  albumin human 25% IVPB 50 milliLiter(s) IV Intermittent once  amitriptyline 40 milliGRAM(s) Oral at bedtime  atorvastatin 40 milliGRAM(s) Oral at bedtime  chlorhexidine 2% Cloths 1 Application(s) Topical daily  cholecalciferol 2000 Unit(s) Oral daily  dextrose 40% Gel 15 Gram(s) Oral once  dextrose 5%. 1000 milliLiter(s) (50 mL/Hr) IV Continuous <Continuous>  dextrose 5%. 1000 milliLiter(s) (100 mL/Hr) IV Continuous <Continuous>  dextrose 50% Injectable 25 Gram(s) IV Push once  dextrose 50% Injectable 12.5 Gram(s) IV Push once  dextrose 50% Injectable 25 Gram(s) IV Push once  epoetin georgina-epbx (RETACRIT) Injectable 99855 Unit(s) SubCutaneous <User Schedule>  folic acid 1 milliGRAM(s) Oral daily  furosemide   Injectable 40 milliGRAM(s) IV Push once  gabapentin 300 milliGRAM(s) Oral at bedtime  glucagon  Injectable 1 milliGRAM(s) IntraMuscular once  hydrALAZINE 25 milliGRAM(s) Oral every 8 hours  insulin glargine Injectable (LANTUS) 12 Unit(s) SubCutaneous at bedtime  insulin lispro (ADMELOG) corrective regimen sliding scale   SubCutaneous three times a day before meals  insulin lispro (ADMELOG) corrective regimen sliding scale   SubCutaneous at bedtime  levothyroxine 100 MICROGram(s) Oral daily  metoprolol tartrate 25 milliGRAM(s) Oral every 6 hours  montelukast 10 milliGRAM(s) Oral at bedtime  multivitamin 1 Tablet(s) Oral daily  Nephro-nunu 1 Tablet(s) Oral daily  pantoprazole    Tablet 40 milliGRAM(s) Oral before breakfast  senna 2 Tablet(s) Oral at bedtime    MEDICATIONS  (PRN):  acetaminophen   Tablet .. 650 milliGRAM(s) Oral every 6 hours PRN Temp greater or equal to 38.5C (101.3F), Mild Pain (1 - 3)  albuterol/ipratropium for Nebulization 3 milliLiter(s) Nebulizer every 6 hours PRN Shortness of Breath and/or Wheezing  oxyCODONE    IR 5 milliGRAM(s) Oral every 6 hours PRN Moderate Pain (4 - 6)    Pertinent Labs: CBC Full  -  ( 02 Jun 2021 06:35 )  WBC Count : 7.07 K/uL  RBC Count : 2.87 M/uL  Hemoglobin : 8.5 g/dL  Hematocrit : 28.2 %  Platelet Count - Automated : 107 K/uL  Mean Cell Volume : 98.3 fl  Mean Cell Hemoglobin : 29.6 pg  Mean Cell Hemoglobin Concentration : 30.1 gm/dL  06-02 Na143 mmol/L Glu 183 mg/dL<H> K+ 3.7 mmol/L Cr  3.66 mg/dL<H> BUN 76.0 mg/dL<H> Phos n/a   Alb 3.6 g/dL PAB n/a       Skin: No skin breakdown/edema noted     Nutrition focused physical exam conducted - found signs of malnutrition [ ]absent [x]present    Subcutaneous fat loss: [ ] Orbital fat pads region, [ ]Buccal fat region, [ ]Triceps region,  [ ]Ribs region    Muscle wasting: [x]Temples region, [ ]Clavicle region, [ ]Shoulder region, [ ]Scapula region, [ ]Interosseous region,  [ ]thigh region, [ ]Calf region    Estimated Needs:   [x] no change since previous assessment  [ ] recalculated:     Current Nutrition Diagnosis: Pt remains at nutrition risk secondary to increased nutrient needs related to increased physiological demand to maintain nutrition status as evidenced by renal insufficiency, CHF, B/L pleural effusions. Pt s/p colonoscopy (5/27) found mild PHG and some distal tiny rectal AVMs treated with APC and tiny area of foacl colitis. Pt tolerating diet woth fair to good PO intake.     Recommendations:   1) D/c MVI daily (giving Nephro-nunu daily as well)  2) Add Nepro BID  3) Monitor weights daily for trend/accuracy     Monitoring and Evaluation:   [x] PO intake [ ] Tolerance to diet prescription [X] Weights  [X] Follow up per protocol [X] Labs:

## 2021-06-03 NOTE — PROGRESS NOTE ADULT - ASSESSMENT
CKD(IV): baseline 3.5mg/dL  CHF with chronic fluid overload; B/L pleural effusions---> decompensated with increased L effusion  - cont to avoid potential nephrotoxins  - monitor on IV diuretics---> if not significantly improved over the next 24 hours will need to initiate HD (pt and pt's daughter aware)    Anemia: +CKD + MDS (transfusion dependent) + GI loss  Low Fe stores s/p IV Fe  Hepatic mass c/w HCC  - no ARACELI in setting of neoplastic process unless cleared by Heme/Onc  - PRBCs as needed    RO: serum phos ok  - monitor labs

## 2021-06-03 NOTE — PROGRESS NOTE ADULT - ASSESSMENT
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 Small on POCUS B/L

## 2021-06-03 NOTE — PROGRESS NOTE ADULT - SUBJECTIVE AND OBJECTIVE BOX
Subjective: I feel ok" NAD lying in bed.     Vital Signs:  Vital Signs Last 24 Hrs  T(C): 36.4 (06-03-21 @ 15:43), Max: 36.7 (06-03-21 @ 04:11)  T(F): 97.5 (06-03-21 @ 15:43), Max: 98 (06-03-21 @ 04:11)  HR: 78 (06-03-21 @ 17:10) (78 - 88)  BP: 138/76 (06-03-21 @ 17:10) (124/64 - 146/70)  RR: 18 (06-03-21 @ 17:10) (18 - 20)  SpO2: 95% (06-03-21 @ 17:10) (94% - 98%) on 2L    Relevant labs, radiology and Medications reviewed    Pertinent Physical Exam  Gen: *WN/WD NAD  Neuro: A+Ox3, nonfocal  Pulm: R crackles, L clear  CV: RRR  LE: no edema    CXR: < from: Xray Chest 1 View- PORTABLE-Routine (Xray Chest 1 View- PORTABLE-Routine in AM.) (06.03.21 @ 06:34) >    FINDINGS:    The lungs show  mild haziness overlying the hemithoraces which may indicate residual small effusions. Upper lobes clear. No pneumothorax.    The  heart is enlarged in transverse diameter. No hilar mass.  Status post median sternotomy.  Cardiomemes device overlies proximal LEFT pulmonary artery.  Mediport catheter tip in SVC.     Visualized osseous structures are intact.    IMPRESSION:   Suspect residual small bilateral effusions..  Confirmatory lateral or decubitus radiographs recommended.    < end of copied text >

## 2021-06-03 NOTE — PROGRESS NOTE ADULT - PROBLEM SELECTOR PLAN 1
POCUS performed with pt in upright position 6/2, no significant window to perform thoracentesis or pigtail insertion at this time  CT chest reviewed with Dr. Sheridan  cont IV diuresis as renal function tolerates  may requiring pigtail in next few days if effusions worsen, will follow  d/w Dr. Sheridan/Dr. Lloyd

## 2021-06-03 NOTE — PROGRESS NOTE ADULT - SUBJECTIVE AND OBJECTIVE BOX
[facility] Division of Hospital Medicine    Chief Complaint:  Volume overload    SUBJECTIVE / OVERNIGHT EVENTS:  Pt examined resting comfortably in bed  Yesterday pm pt agreed for CT placement after discussing with daughters, however, when CT surgery did bedside US to confirm effusion pocket was significantly smaller  Discussed with cardiology regarding sever pulm HTN and difficulty controlling fluid status as well as dyspnea  Pt may require HD for fluid overload if minimal response to diuretic therapy   Patient denies chest pain, SOB, abd pain, N/V, fever, chills, dysuria or any other complaints. All remainder ROS negative.       MEDICATIONS  (STANDING):  amitriptyline 40 milliGRAM(s) Oral at bedtime  atorvastatin 40 milliGRAM(s) Oral at bedtime  chlorhexidine 2% Cloths 1 Application(s) Topical daily  cholecalciferol 2000 Unit(s) Oral daily  dextrose 40% Gel 15 Gram(s) Oral once  dextrose 5%. 1000 milliLiter(s) (50 mL/Hr) IV Continuous <Continuous>  dextrose 5%. 1000 milliLiter(s) (100 mL/Hr) IV Continuous <Continuous>  dextrose 50% Injectable 25 Gram(s) IV Push once  dextrose 50% Injectable 12.5 Gram(s) IV Push once  dextrose 50% Injectable 25 Gram(s) IV Push once  epoetin georgina-epbx (RETACRIT) Injectable 82616 Unit(s) SubCutaneous <User Schedule>  folic acid 1 milliGRAM(s) Oral daily  furosemide   Injectable 40 milliGRAM(s) IV Push two times a day  gabapentin 300 milliGRAM(s) Oral at bedtime  glucagon  Injectable 1 milliGRAM(s) IntraMuscular once  hydrALAZINE 25 milliGRAM(s) Oral every 8 hours  insulin glargine Injectable (LANTUS) 12 Unit(s) SubCutaneous at bedtime  insulin lispro (ADMELOG) corrective regimen sliding scale   SubCutaneous three times a day before meals  insulin lispro (ADMELOG) corrective regimen sliding scale   SubCutaneous at bedtime  levothyroxine 100 MICROGram(s) Oral daily  metoprolol tartrate 25 milliGRAM(s) Oral every 6 hours  montelukast 10 milliGRAM(s) Oral at bedtime  Nephro-nunu 1 Tablet(s) Oral daily  pantoprazole    Tablet 40 milliGRAM(s) Oral before breakfast  senna 2 Tablet(s) Oral at bedtime    MEDICATIONS  (PRN):  acetaminophen   Tablet .. 650 milliGRAM(s) Oral every 6 hours PRN Temp greater or equal to 38.5C (101.3F), Mild Pain (1 - 3)  albuterol/ipratropium for Nebulization 3 milliLiter(s) Nebulizer every 6 hours PRN Shortness of Breath and/or Wheezing  oxyCODONE    IR 5 milliGRAM(s) Oral every 6 hours PRN Moderate Pain (4 - 6)        I&O's Summary      PHYSICAL EXAM:  Vital Signs Last 24 Hrs  T(C): 36.4 (03 Jun 2021 11:45), Max: 36.7 (02 Jun 2021 17:59)  T(F): 97.6 (03 Jun 2021 11:45), Max: 98.1 (02 Jun 2021 17:59)  HR: 88 (03 Jun 2021 11:45) (67 - 88)  BP: 146/70 (03 Jun 2021 11:45) (124/64 - 152/73)  BP(mean): --  RR: 18 (03 Jun 2021 11:45) (18 - 20)  SpO2: 95% (03 Jun 2021 11:45) (94% - 98%)      PHYSICAL EXAM:  GENERAL: NAD, obese female lying in bed on NC  HEAD:  Atraumatic, Normocephalic  NECK: Supple, No JVD, Normal thyroid, no cervical lymphadenopathy   NERVOUS SYSTEM:  Alert & Oriented X3, Good concentration; Motor Strength 5/5 B/L upper and lower extremities  CHEST/LUNG: decreased breath sounds at bases with scant crackles (b/l)   HEART: Regular rate and rhythm; pronounced S1, S2No murmurs, rubs, or gallops  ABDOMEN: Soft, Nontender, Nondistended; Bowel sounds present  EXTREMITIES:  2+ Peripheral Pulses, No clubbing, cyanosis, or edema      LABS:                        8.8    6.45  )-----------( 105      ( 03 Jun 2021 08:18 )             28.5     06-03    143  |  108<H>  |  73.1<H>  ----------------------------<  116<H>  4.0   |  22.0  |  3.89<H>    Ca    8.8      03 Jun 2021 08:18  Mg     1.9     06-03    TPro  7.1  /  Alb  3.8  /  TBili  0.3<L>  /  DBili  x   /  AST  35<H>  /  ALT  19  /  AlkPhos  116  06-03              CAPILLARY BLOOD GLUCOSE      POCT Blood Glucose.: 239 mg/dL (03 Jun 2021 11:37)  POCT Blood Glucose.: 124 mg/dL (03 Jun 2021 07:56)  POCT Blood Glucose.: 144 mg/dL (02 Jun 2021 23:09)  POCT Blood Glucose.: 97 mg/dL (02 Jun 2021 16:38)        RADIOLOGY & ADDITIONAL TESTS:  5/28/21 Rt heart Cath  DIAGNOSTIC IMPRESSIONS:   Severely elevated pulmonary pressures.  Mildly elevated wedge pressure.  Normal cardiac output.  Successful implant of the cardiomems device into the left lower pulmonary  artery.      5/19/21 TTE  Summary:   1. Left ventricular ejection fraction, by visual estimation, is 60 to 65%.   2. Normal global left ventricular systolic function.   3. The mitral in-flow pattern reveals no discernable A-wave, therefore no comment on diastolic function can be made.   4. There is moderate concentric left ventricular hypertrophy.   5. The right ventricle is not well visualized. Systolic function appears grossly preserved.   6. Severely enlarged left atrium.   7. Moderately enlarged right atrium.   8. Moderate tricuspid regurgitation.   9. Trace mitral valve regurgitation.  10. Mitral valve mean gradient is 4.9 mmHg consistent with mild mitral stenosis.  11. Mild thickening of the anterior and posterior mitral valve leaflets.  12. Moderate mitral annular calcification.  13. A well-seated bioprosthetic valve is visualized in the aortic position. There is mild-moderate central regurgitation. Mean gradient is 8.5 mm Hg and dimensionless index is 0.47, which are likely consistent with a normal functioning bioprosthetic aortic valve.  14. Mildly dilated pulmonary artery.  15. Estimated pulmonary artery systolic pressure is 53.1 mmHg assuming a right atrial pressure of 3 mmHg, which is consistent with moderate pulmonary hypertension.  16. There is no evidence of pericardial effusion.  17. Moderate pleural effusion in both left and right lateral regions.  18. Compared to a prior study from 10/19/20, there is now pulmonary hypertension and bilateral pleural effusions are now present.      6/1/21 CT chest  Mild to moderate bilateral pleural effusions, stable on the right and increased on the left side.  Cardiomegaly and aortic valve prosthesis.  New pulmonary artery monitoring device.  Cirrhotic morphology with portal hypertension and a 2.1 cm sized right hepatic mass lesion, previously characterized as HCC.

## 2021-06-03 NOTE — PROGRESS NOTE ADULT - ASSESSMENT
76y Female with HTN HLD CAD s/p remote CABG, Bio AVR solitary episode of Af w/o recurrence, ILR in place, CRI, MDS transfusion dependent, cirrhosis sec to chronic hep c with esophageal varices and prior banding, hepatocellular carcinoma,s/p embolization with embozene particles on 4/5/21, and recent admission to [facility] after fall with head trauma with also transfused 2 untis PRBC during that admission admitted with fluid overload in setting of increased pulm HTN. Now s/p EGD/colonoscopy and Cardiomems placement.       Decompensated Heart Failure with preserved EF, euvolemic on exam, no s/p CardioMems implant uneventfully.  Pulmonary HTN  Acutely worsening   Echo 5/20/2021: LVEF 60-65%, G1DD   O2 requirements increased intermittently, requested Bipap for increased work of breathing last night  c/w Trend renal function closely  c/w hydralazine, bb, off ace/arb  spironolactone due to CKD, off ASA due anemia  decreasing urinary output  IV Lasix restarted BID  Repeat CT chest showed bilateral effusions, L>R.  Appreciate CT recs, no CT at present  s/p Albumin x 1 followed by IV lasix  If fails to improve with aggressive diuresis (as above) will need to initiate HD     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   Transfusion dependent, will transfuse with goal Hgb > 8, continue to monitor with daily cbc  c/w Iron infusions  No AC as per cardiology, patient bleeds easily     MARGUERITE on Chronic Kidney disease stage 4, has improved   diuretics as above  Will eventually require HD given likelihood progression of CKD   Avoid nephrotoxins  Nephrology following    Hyperkalemia, now resolved   c/w to monitor with daily BMP     Hypothyroidism  -Continue levothyroxine 100mcg po daily     #VTE prophylaxis -SCDs due transfusion dependent anemia (pt non compliant> states that she will not develop  DVT despite extensive counseling)    #Advanced Directives -Full Code-HCP Daniela Collier    # Dispo - Pending fluid overload improvement

## 2021-06-03 NOTE — PROGRESS NOTE ADULT - SUBJECTIVE AND OBJECTIVE BOX
NEPHROLOGY INTERVAL HPI/OVERNIGHT EVENTS:  pt c/o "back pain" with inspiration  Mild sob  no cp, n/v/d    MEDICATIONS  (STANDING):  amitriptyline 40 milliGRAM(s) Oral at bedtime  atorvastatin 40 milliGRAM(s) Oral at bedtime  chlorhexidine 2% Cloths 1 Application(s) Topical daily  cholecalciferol 2000 Unit(s) Oral daily  dextrose 40% Gel 15 Gram(s) Oral once  dextrose 5%. 1000 milliLiter(s) (50 mL/Hr) IV Continuous <Continuous>  dextrose 5%. 1000 milliLiter(s) (100 mL/Hr) IV Continuous <Continuous>  dextrose 50% Injectable 25 Gram(s) IV Push once  dextrose 50% Injectable 12.5 Gram(s) IV Push once  dextrose 50% Injectable 25 Gram(s) IV Push once  epoetin georgina-epbx (RETACRIT) Injectable 00537 Unit(s) SubCutaneous <User Schedule>  folic acid 1 milliGRAM(s) Oral daily  furosemide   Injectable 40 milliGRAM(s) IV Push two times a day  gabapentin 300 milliGRAM(s) Oral at bedtime  glucagon  Injectable 1 milliGRAM(s) IntraMuscular once  hydrALAZINE 25 milliGRAM(s) Oral every 8 hours  insulin glargine Injectable (LANTUS) 12 Unit(s) SubCutaneous at bedtime  insulin lispro (ADMELOG) corrective regimen sliding scale   SubCutaneous three times a day before meals  insulin lispro (ADMELOG) corrective regimen sliding scale   SubCutaneous at bedtime  levothyroxine 100 MICROGram(s) Oral daily  metoprolol tartrate 25 milliGRAM(s) Oral every 6 hours  montelukast 10 milliGRAM(s) Oral at bedtime  Nephro-nunu 1 Tablet(s) Oral daily  pantoprazole    Tablet 40 milliGRAM(s) Oral before breakfast  senna 2 Tablet(s) Oral at bedtime    MEDICATIONS  (PRN):  acetaminophen   Tablet .. 650 milliGRAM(s) Oral every 6 hours PRN Temp greater or equal to 38.5C (101.3F), Mild Pain (1 - 3)  albuterol/ipratropium for Nebulization 3 milliLiter(s) Nebulizer every 6 hours PRN Shortness of Breath and/or Wheezing  oxyCODONE    IR 5 milliGRAM(s) Oral every 6 hours PRN Moderate Pain (4 - 6)      Allergies    ACE inhibitors (Other)  Bactrim (Nephrotoxicity)  Biaxin (Joint Pain)  morphine (Short breath)  NSAIDs (Other)          Vital Signs Last 24 Hrs  T(C): 36.7 (03 Jun 2021 04:11), Max: 36.7 (02 Jun 2021 17:59)  T(F): 98 (03 Jun 2021 04:11), Max: 98.1 (02 Jun 2021 17:59)  HR: 83 (03 Jun 2021 05:36) (67 - 87)  BP: 124/64 (03 Jun 2021 04:11) (124/64 - 152/73)  BP(mean): --  RR: 20 (03 Jun 2021 04:11) (19 - 20)  SpO2: 94% (03 Jun 2021 05:36) (94% - 98%)    PHYSICAL EXAM:  GENERAL: Chronically frail and debiliated  ENMT: Dry mucous membranes  NECK: Supple, +JVD  NERVOUS SYSTEM:  Alert & Oriented X3,  intact and symmetric  CHEST/LUNG: Diminished BS at bases, crackles  HEART: No rub  ABDOMEN: Soft, Nontender, Nondistended; BS+  EXTREMITIES: + improved LE edema   SKIN: No rashes or lesions      LABS:                        8.8    6.45  )-----------( 105      ( 03 Jun 2021 08:18 )             28.5     06-03    143  |  108<H>  |  73.1<H>  ----------------------------<  116<H>  4.0   |  22.0  |  3.89<H>        Ca    8.8      03 Jun 2021 08:18  Mg     1.9     06-03    TPro  7.1  /  Alb  3.8  /  TBili  0.3<L>  /  DBili  x   /  AST  35<H>  /  ALT  19  /  AlkPhos  116  06-03        Magnesium, Serum: 1.9 mg/dL (06-03 @ 08:18)      RADIOLOGY & ADDITIONAL TESTS:

## 2021-06-03 NOTE — PROGRESS NOTE ADULT - PROBLEM SELECTOR PROBLEM 1
Pleural effusion
Pleural effusion, bilateral
Anemia
Pleural effusion
Pleural effusion, bilateral

## 2021-06-04 NOTE — CONSULT NOTE ADULT - SUBJECTIVE AND OBJECTIVE BOX
VASCULAR SURGERY CONSULT    Consulting surgical team: Vascular Surgery  Consulting attending: Rufus Sierra MD  Patient seen and examined: 06-04-21 @ 16:53      HPI:  pt. 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 no cp, no leg edema. pt and her daughter stated that she was admitted at [facility] last week for about 4 days after she fell at her pcp office, CT head was negative, was found to be anemic and got 2 units of prbc. Pt. stated that she cannot be on aspirin or any blood thinners due to anemia and bleeding risk. I spoke to cardiologist dr. Shelton who saw pt. in the ER and also recommended no Aspirin, heparin/ lovenox for pt. as she bleeds easily. Pt. has port for blood transfusions and iron infusions by her hematologist. no abd. pain, no n/v/d, no melena, no hematemesis, no hemoptysis, no sig. cough, no fever, no sick contact. Initially in the ER pt. was on bipap but off the bipap at the time of admission and is on o2 via NC.  (19 May 2021 18:10)    Vascular Surgery consulted for Shiley placement today.     PAST MEDICAL HISTORY:  Diabetes    Hypertension    High cholesterol    Low back pain    Asthma    Anemia, unspecified anemia type    Aortic stenosis    Hepatitis C    Heart failure    CAD (coronary artery disease)    CKD (chronic kidney disease)    Chronic hepatitis C    Cirrhosis    Other hemorrhoid    Hemorrhoids    Type 2 diabetes mellitus    Internal hemorrhoids    Liver carcinoma        PAST SURGICAL HISTORY:  History of tonsillectomy    S/P CABG x 3    H/O aortic valve replacement    Port-A-Cath in place    History of loop recorder        ALLERGIES:  ACE inhibitors (Other)  Bactrim (Nephrotoxicity)  Biaxin (Joint Pain)  morphine (Short breath)  NSAIDs (Other)      MEDICATIONS  (STANDING):  amitriptyline 40 milliGRAM(s) Oral at bedtime  atorvastatin 40 milliGRAM(s) Oral at bedtime  chlorhexidine 2% Cloths 1 Application(s) Topical daily  chlorhexidine 4% Liquid 1 Application(s) Topical <User Schedule>  cholecalciferol 2000 Unit(s) Oral daily  dextrose 40% Gel 15 Gram(s) Oral once  dextrose 5%. 1000 milliLiter(s) (50 mL/Hr) IV Continuous <Continuous>  dextrose 5%. 1000 milliLiter(s) (100 mL/Hr) IV Continuous <Continuous>  dextrose 50% Injectable 25 Gram(s) IV Push once  dextrose 50% Injectable 12.5 Gram(s) IV Push once  dextrose 50% Injectable 25 Gram(s) IV Push once  epoetin georgina-epbx (RETACRIT) Injectable 57456 Unit(s) SubCutaneous <User Schedule>  folic acid 1 milliGRAM(s) Oral daily  furosemide   Injectable 40 milliGRAM(s) IV Push two times a day  gabapentin 300 milliGRAM(s) Oral at bedtime  glucagon  Injectable 1 milliGRAM(s) IntraMuscular once  hydrALAZINE 25 milliGRAM(s) Oral every 8 hours  insulin glargine Injectable (LANTUS) 12 Unit(s) SubCutaneous at bedtime  insulin lispro (ADMELOG) corrective regimen sliding scale   SubCutaneous three times a day before meals  insulin lispro (ADMELOG) corrective regimen sliding scale   SubCutaneous at bedtime  levothyroxine 100 MICROGram(s) Oral daily  metoprolol tartrate 25 milliGRAM(s) Oral every 6 hours  montelukast 10 milliGRAM(s) Oral at bedtime  Nephro-nunu 1 Tablet(s) Oral daily  pantoprazole    Tablet 40 milliGRAM(s) Oral before breakfast  senna 2 Tablet(s) Oral at bedtime    MEDICATIONS  (PRN):  acetaminophen   Tablet .. 650 milliGRAM(s) Oral every 6 hours PRN Temp greater or equal to 38.5C (101.3F), Mild Pain (1 - 3)  albuterol/ipratropium for Nebulization 3 milliLiter(s) Nebulizer every 6 hours PRN Shortness of Breath and/or Wheezing  oxyCODONE    IR 5 milliGRAM(s) Oral every 6 hours PRN Moderate Pain (4 - 6)  sodium chloride 0.9% lock flush 10 milliLiter(s) IV Push every 1 hour PRN Pre/post blood products, medications, blood draw, and to maintain line patency      VITALS & I/Os:  Vital Signs Last 24 Hrs  T(C): 36.9 (04 Jun 2021 16:18), Max: 36.9 (04 Jun 2021 16:18)  T(F): 98.4 (04 Jun 2021 16:18), Max: 98.4 (04 Jun 2021 16:18)  HR: 105 (04 Jun 2021 16:18) (78 - 105)  BP: 112/60 (04 Jun 2021 16:18) (112/60 - 138/76)  BP(mean): --  RR: 18 (04 Jun 2021 16:18) (18 - 18)  SpO2: 100% (04 Jun 2021 16:18) (91% - 100%)  CAPILLARY BLOOD GLUCOSE      POCT Blood Glucose.: 143 mg/dL (04 Jun 2021 16:29)  POCT Blood Glucose.: 180 mg/dL (04 Jun 2021 11:28)  POCT Blood Glucose.: 197 mg/dL (04 Jun 2021 07:42)  POCT Blood Glucose.: 182 mg/dL (03 Jun 2021 22:08)      I&O's Summary    03 Jun 2021 07:01  -  04 Jun 2021 07:00  --------------------------------------------------------  IN: 720 mL / OUT: 250 mL / NET: 470 mL    04 Jun 2021 07:01  -  04 Jun 2021 16:53  --------------------------------------------------------  IN: 560 mL / OUT: 250 mL / NET: 310 mL          GEN: NAD, alert and oriented x 3  HEENT: WNL  CHEST: Symmetrical chest rise, breath sounds CTAB  HEART: RRR, non-muffled heart sounds  ABD: Soft, non-tender, non-distended  EXT/VASC:         LABS:                        8.5    7.20  )-----------( 108      ( 04 Jun 2021 08:00 )             28.4     06-04    138  |  105  |  77.6<H>  ----------------------------<  180<H>  4.5   |  21.0<L>  |  4.07<H>    Ca    8.5<L>      04 Jun 2021 08:00  Mg     1.9     06-03    TPro  7.1  /  Alb  3.8  /  TBili  0.3<L>  /  DBili  x   /  AST  35<H>  /  ALT  19  /  AlkPhos  116  06-03                    IMAGING:

## 2021-06-04 NOTE — PROCEDURE NOTE - NSPROCDETAILS_GEN_ALL_CORE
guidewire recovered
location identified, draped/prepped, sterile technique used, needle inserted/introduced/positive blood return obtained via catheter

## 2021-06-04 NOTE — PROGRESS NOTE ADULT - ASSESSMENT
CKD(IV): baseline 3.5mg/dL  CHF with chronic fluid overload; B/L pleural effusions---> decompensated with increased L effusion  - cont to avoid potential nephrotoxins  - Currently developing uremic sequelae and diuretic resistance   - Labs are under-reflective of degree of renal dysfunction due to her liver disease  - I had a long conversation w/ the patient , she is agreeable to start HD   - Vascular contacted to place a marifer today   - See HD orders for today   - Check Hep B and C and PPD     Anemia: +CKD + MDS (transfusion dependent) + GI loss  Low Fe stores s/p IV Fe  Hepatic mass c/w HCC  - no ARACELI in setting of neoplastic process unless cleared by Heme/Onc  - PRBCs as needed    RO: serum phos ok  - monitor labs

## 2021-06-04 NOTE — PROGRESS NOTE ADULT - ASSESSMENT
76y Female with HTN HLD CAD s/p remote CABG, Bio AVR solitary episode of Af w/o recurrence, ILR in place, CRI, MDS transfusion dependent, cirrhosis sec to chronic hep c with esophageal varices and prior banding, hepatocellular carcinoma,s/p embolization with embozene particles on 4/5/21, and recent admission to [facility] after fall with head trauma with also transfused 2 untis PRBC during that admission admitted with fluid overload in setting of increased pulm HTN. Now s/p EGD/colonoscopy and Cardiomems placement.       Decompensated Heart Failure with preserved EF, euvolemic on exam, no s/p CardioMems implant uneventfully.  Pulmonary HTN  Acutely worsening   Echo 5/20/2021: LVEF 60-65%, G1DD   O2 requirements increased intermittently, requested Bipap for increased work of breathing last night  c/w Trend renal function closely  c/w hydralazine, bb, off ace/arb  spironolactone due to CKD, off ASA due anemia  decreasing urinary output  IV Lasix restarted BID  Repeat CT chest showed bilateral effusions, L>R.  Appreciate CT recs, no CT at present  s/p Albumin x 1 followed by IV lasix  If fails to improve with aggressive diuresis (as above) will need to initiate HD     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   Transfusion dependent, will transfuse with goal Hgb > 8, continue to monitor with daily cbc  c/w Iron infusions  No AC as per cardiology, patient bleeds easily     MARGUERITE on Chronic Kidney disease stage 4, has improved   diuretics as above  Will eventually require HD given likelihood progression of CKD   Avoid nephrotoxins  Nephrology following    Hyperkalemia, now resolved   c/w to monitor with daily BMP     Hypothyroidism  -Continue levothyroxine 100mcg po daily     #VTE prophylaxis -SCDs due transfusion dependent anemia (pt non compliant> states that she will not develop  DVT despite extensive counseling)    #Advanced Directives -Full Code-HCP Daniela Collier    # Dispo - Pending fluid overload improvement  76y Female with HTN HLD CAD s/p remote CABG, Bio AVR solitary episode of Af w/o recurrence, ILR in place, CRI, MDS transfusion dependent, cirrhosis sec to chronic hep c with esophageal varices and prior banding, hepatocellular carcinoma,s/p embolization with embozene particles on 4/5/21, and recent admission to [facility] after fall with head trauma with also transfused 2 untis PRBC during that admission admitted with fluid overload in setting of increased pulm HTN. Now s/p EGD/colonoscopy and Cardiomems placement. Despite appropriate medical management she has failed to improve with worsening renal function now necessitating HD       Decompensated Heart Failure with preserved EF, euvolemic on exam, no s/p CardioMems implant uneventfully.  Pulmonary HTN  Continue to remain overloaded despite aggressive diuresis   Echo 5/20/2021: LVEF 60-65%, G1DD   O2 requirements increased intermittently, Bipap PRN  c/w hydralazine, bb, off ace/arb  spironolactone due to CKD, off ASA due anemia  decreasing urinary output  Now starting HD for volume control given renal failure    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 Appreciate recs          Hyperkalemia, now resolved   c/w to monitor with daily BMP     Hypothyroidism  -Continue levothyroxine 100mcg po daily     #VTE prophylaxis -SCDs due transfusion dependent anemia (pt non compliant> states that she will not develop  DVT despite extensive counseling)    #Advanced Directives -Full Code-HCP Daniela Collier    # Dispo - Pending fluid overload improvement

## 2021-06-04 NOTE — CONSULT NOTE ADULT - ASSESSMENT
76y Female with HTN HLD CAD s/p remote CABG, Bio AVR solitary episode of Af w/o recurrence, ILR in place, CRI, MDS transfusion dependent, cirrhosis sec to chronic hep c with esophageal varices and prior banding, hepatocellular carcinoma,s/p embolization with embozene particles on 4/5/21, and recent admission to [facility] after fall with head trauma with also transfused 2 untis PRBC during that admission admitted with fluid overload in setting of increased pulm HTN. Now s/p EGD/colonoscopy and Cardiomems placement.   Vascular Surgery consulted today for Shiley placement today.     Due to previous surgery on R femoral artery, and the placement of a mediport in the R IJ to the SVC, shiley was placed on the LFV.     Discussed with Dr. Rice whom agrees.

## 2021-06-04 NOTE — CHART NOTE - NSCHARTNOTEFT_GEN_A_CORE
Pt s/p HD today after placement of femoral Shiley by vascular  Repeat Hb 8.0.  Will continue to monitor and transfuse to maintain Hb > 8 (pt with CAD)

## 2021-06-04 NOTE — CHART NOTE - NSCHARTNOTEFT_GEN_A_CORE
No intervention at this time from thoracic surgery standpoint. Thoracic surgery to sign off. Please re-consult as necessary.

## 2021-06-04 NOTE — PROGRESS NOTE ADULT - SUBJECTIVE AND OBJECTIVE BOX
[facility] Division of Hospital Medicine    Chief Complaint:  Volume overload    SUBJECTIVE / OVERNIGHT EVENTS:  Pt examined resting comfortably in bed  Yesterday pm pt agreed for CT placement after discussing with daughters, however, when CT surgery did bedside US to confirm effusion pocket was significantly smaller  Discussed with cardiology regarding sever pulm HTN and difficulty controlling fluid status as well as dyspnea  Pt may require HD for fluid overload if minimal response to diuretic therapy   Patient denies chest pain, SOB, abd pain, N/V, fever, chills, dysuria or any other complaints. All remainder ROS negative.       MEDICATIONS  (STANDING):  amitriptyline 40 milliGRAM(s) Oral at bedtime  atorvastatin 40 milliGRAM(s) Oral at bedtime  chlorhexidine 2% Cloths 1 Application(s) Topical daily  cholecalciferol 2000 Unit(s) Oral daily  dextrose 40% Gel 15 Gram(s) Oral once  dextrose 5%. 1000 milliLiter(s) (50 mL/Hr) IV Continuous <Continuous>  dextrose 5%. 1000 milliLiter(s) (100 mL/Hr) IV Continuous <Continuous>  dextrose 50% Injectable 25 Gram(s) IV Push once  dextrose 50% Injectable 12.5 Gram(s) IV Push once  dextrose 50% Injectable 25 Gram(s) IV Push once  epoetin georgina-epbx (RETACRIT) Injectable 33423 Unit(s) SubCutaneous <User Schedule>  folic acid 1 milliGRAM(s) Oral daily  furosemide   Injectable 40 milliGRAM(s) IV Push two times a day  gabapentin 300 milliGRAM(s) Oral at bedtime  glucagon  Injectable 1 milliGRAM(s) IntraMuscular once  hydrALAZINE 25 milliGRAM(s) Oral every 8 hours  insulin glargine Injectable (LANTUS) 12 Unit(s) SubCutaneous at bedtime  insulin lispro (ADMELOG) corrective regimen sliding scale   SubCutaneous three times a day before meals  insulin lispro (ADMELOG) corrective regimen sliding scale   SubCutaneous at bedtime  levothyroxine 100 MICROGram(s) Oral daily  metoprolol tartrate 25 milliGRAM(s) Oral every 6 hours  montelukast 10 milliGRAM(s) Oral at bedtime  Nephro-nunu 1 Tablet(s) Oral daily  pantoprazole    Tablet 40 milliGRAM(s) Oral before breakfast  senna 2 Tablet(s) Oral at bedtime    MEDICATIONS  (PRN):  acetaminophen   Tablet .. 650 milliGRAM(s) Oral every 6 hours PRN Temp greater or equal to 38.5C (101.3F), Mild Pain (1 - 3)  albuterol/ipratropium for Nebulization 3 milliLiter(s) Nebulizer every 6 hours PRN Shortness of Breath and/or Wheezing  oxyCODONE    IR 5 milliGRAM(s) Oral every 6 hours PRN Moderate Pain (4 - 6)        I&O's Summary      PHYSICAL EXAM:  Vital Signs Last 24 Hrs  T(C): 36.4 (03 Jun 2021 11:45), Max: 36.7 (02 Jun 2021 17:59)  T(F): 97.6 (03 Jun 2021 11:45), Max: 98.1 (02 Jun 2021 17:59)  HR: 88 (03 Jun 2021 11:45) (67 - 88)  BP: 146/70 (03 Jun 2021 11:45) (124/64 - 152/73)  BP(mean): --  RR: 18 (03 Jun 2021 11:45) (18 - 20)  SpO2: 95% (03 Jun 2021 11:45) (94% - 98%)      PHYSICAL EXAM:  GENERAL: NAD, obese female lying in bed on NC  HEAD:  Atraumatic, Normocephalic  NECK: Supple, No JVD, Normal thyroid, no cervical lymphadenopathy   NERVOUS SYSTEM:  Alert & Oriented X3, Good concentration; Motor Strength 5/5 B/L upper and lower extremities  CHEST/LUNG: decreased breath sounds at bases with scant crackles (b/l)   HEART: Regular rate and rhythm; pronounced S1, S2No murmurs, rubs, or gallops  ABDOMEN: Soft, Nontender, Nondistended; Bowel sounds present  EXTREMITIES:  2+ Peripheral Pulses, No clubbing, cyanosis, or edema      LABS:                        8.8    6.45  )-----------( 105      ( 03 Jun 2021 08:18 )             28.5     06-03    143  |  108<H>  |  73.1<H>  ----------------------------<  116<H>  4.0   |  22.0  |  3.89<H>    Ca    8.8      03 Jun 2021 08:18  Mg     1.9     06-03    TPro  7.1  /  Alb  3.8  /  TBili  0.3<L>  /  DBili  x   /  AST  35<H>  /  ALT  19  /  AlkPhos  116  06-03              CAPILLARY BLOOD GLUCOSE      POCT Blood Glucose.: 239 mg/dL (03 Jun 2021 11:37)  POCT Blood Glucose.: 124 mg/dL (03 Jun 2021 07:56)  POCT Blood Glucose.: 144 mg/dL (02 Jun 2021 23:09)  POCT Blood Glucose.: 97 mg/dL (02 Jun 2021 16:38)        RADIOLOGY & ADDITIONAL TESTS:  5/28/21 Rt heart Cath  DIAGNOSTIC IMPRESSIONS:   Severely elevated pulmonary pressures.  Mildly elevated wedge pressure.  Normal cardiac output.  Successful implant of the cardiomems device into the left lower pulmonary  artery.      5/19/21 TTE  Summary:   1. Left ventricular ejection fraction, by visual estimation, is 60 to 65%.   2. Normal global left ventricular systolic function.   3. The mitral in-flow pattern reveals no discernable A-wave, therefore no comment on diastolic function can be made.   4. There is moderate concentric left ventricular hypertrophy.   5. The right ventricle is not well visualized. Systolic function appears grossly preserved.   6. Severely enlarged left atrium.   7. Moderately enlarged right atrium.   8. Moderate tricuspid regurgitation.   9. Trace mitral valve regurgitation.  10. Mitral valve mean gradient is 4.9 mmHg consistent with mild mitral stenosis.  11. Mild thickening of the anterior and posterior mitral valve leaflets.  12. Moderate mitral annular calcification.  13. A well-seated bioprosthetic valve is visualized in the aortic position. There is mild-moderate central regurgitation. Mean gradient is 8.5 mm Hg and dimensionless index is 0.47, which are likely consistent with a normal functioning bioprosthetic aortic valve.  14. Mildly dilated pulmonary artery.  15. Estimated pulmonary artery systolic pressure is 53.1 mmHg assuming a right atrial pressure of 3 mmHg, which is consistent with moderate pulmonary hypertension.  16. There is no evidence of pericardial effusion.  17. Moderate pleural effusion in both left and right lateral regions.  18. Compared to a prior study from 10/19/20, there is now pulmonary hypertension and bilateral pleural effusions are now present.      6/1/21 CT chest  Mild to moderate bilateral pleural effusions, stable on the right and increased on the left side.  Cardiomegaly and aortic valve prosthesis.  New pulmonary artery monitoring device.  Cirrhotic morphology with portal hypertension and a 2.1 cm sized right hepatic mass lesion, previously characterized as HCC.                                 [facility] Division of Hospital Medicine    Chief Complaint:  Volume overload    SUBJECTIVE / OVERNIGHT EVENTS:  Pt examined resting in bed  From 1900 yesterday to 1100 today only 250 ml of urine despite aggressive diuresis  D/W renal, planned for starting HD today and repeat possibly tomorrow  Patient denies chest pain, SOB, abd pain, N/V, fever, chills, dysuria or any other complaints. All remainder ROS negative.       MEDICATIONS  (STANDING):  amitriptyline 40 milliGRAM(s) Oral at bedtime  atorvastatin 40 milliGRAM(s) Oral at bedtime  chlorhexidine 2% Cloths 1 Application(s) Topical daily  chlorhexidine 4% Liquid 1 Application(s) Topical <User Schedule>  cholecalciferol 2000 Unit(s) Oral daily  dextrose 40% Gel 15 Gram(s) Oral once  dextrose 5%. 1000 milliLiter(s) (50 mL/Hr) IV Continuous <Continuous>  dextrose 5%. 1000 milliLiter(s) (100 mL/Hr) IV Continuous <Continuous>  dextrose 50% Injectable 25 Gram(s) IV Push once  dextrose 50% Injectable 12.5 Gram(s) IV Push once  dextrose 50% Injectable 25 Gram(s) IV Push once  epoetin georgina-epbx (RETACRIT) Injectable 04685 Unit(s) SubCutaneous <User Schedule>  folic acid 1 milliGRAM(s) Oral daily  furosemide   Injectable 40 milliGRAM(s) IV Push two times a day  gabapentin 300 milliGRAM(s) Oral at bedtime  glucagon  Injectable 1 milliGRAM(s) IntraMuscular once  hydrALAZINE 25 milliGRAM(s) Oral every 8 hours  insulin glargine Injectable (LANTUS) 12 Unit(s) SubCutaneous at bedtime  insulin lispro (ADMELOG) corrective regimen sliding scale   SubCutaneous three times a day before meals  insulin lispro (ADMELOG) corrective regimen sliding scale   SubCutaneous at bedtime  levothyroxine 100 MICROGram(s) Oral daily  metoprolol tartrate 25 milliGRAM(s) Oral every 6 hours  montelukast 10 milliGRAM(s) Oral at bedtime  Nephro-nunu 1 Tablet(s) Oral daily  pantoprazole    Tablet 40 milliGRAM(s) Oral before breakfast  senna 2 Tablet(s) Oral at bedtime    MEDICATIONS  (PRN):  acetaminophen   Tablet .. 650 milliGRAM(s) Oral every 6 hours PRN Temp greater or equal to 38.5C (101.3F), Mild Pain (1 - 3)  albuterol/ipratropium for Nebulization 3 milliLiter(s) Nebulizer every 6 hours PRN Shortness of Breath and/or Wheezing  oxyCODONE    IR 5 milliGRAM(s) Oral every 6 hours PRN Moderate Pain (4 - 6)  sodium chloride 0.9% lock flush 10 milliLiter(s) IV Push every 1 hour PRN Pre/post blood products, medications, blood draw, and to maintain line patency        I&O's Summary      PHYSICAL EXAM:  Vital Signs Last 24 Hrs  T(C): 36.9 (04 Jun 2021 16:18), Max: 36.9 (04 Jun 2021 16:18)  T(F): 98.4 (04 Jun 2021 16:18), Max: 98.4 (04 Jun 2021 16:18)  HR: 105 (04 Jun 2021 16:18) (78 - 105)  BP: 112/60 (04 Jun 2021 16:18) (112/60 - 138/76)  BP(mean): --  RR: 18 (04 Jun 2021 16:18) (18 - 18)  SpO2: 100% (04 Jun 2021 16:18) (91% - 100%)    PHYSICAL EXAM:  GENERAL: NAD, obese female lying in bed on NC  HEAD:  Atraumatic, Normocephalic  NECK: Supple, No JVD, Normal thyroid, no cervical lymphadenopathy   NERVOUS SYSTEM:  Alert & Oriented X3, Good concentration; Motor Strength 5/5 B/L upper and lower extremities  CHEST/LUNG: decreased breath sounds at bases with scant crackles (b/l)   HEART: Regular rate and rhythm; pronounced S1, S2No murmurs, rubs, or gallops  ABDOMEN: Soft, Nontender, Nondistended; Bowel sounds present  EXTREMITIES:  2+ Peripheral Pulses, No clubbing, cyanosis, or edema      LABS:                                   8.5    7.20  )-----------( 108      ( 04 Jun 2021 08:00 )             28.4   06-04    138  |  105  |  77.6<H>  ----------------------------<  180<H>  4.5   |  21.0<L>  |  4.07<H>    Ca    8.5<L>      04 Jun 2021 08:00  Mg     1.9     06-03    TPro  7.1  /  Alb  3.8  /  TBili  0.3<L>  /  DBili  x   /  AST  35<H>  /  ALT  19  /  AlkPhos  116  06-03              CAPILLARY BLOOD GLUCOSE      POCT Blood Glucose.: 143 mg/dL (04 Jun 2021 16:29)  POCT Blood Glucose.: 180 mg/dL (04 Jun 2021 11:28)  POCT Blood Glucose.: 197 mg/dL (04 Jun 2021 07:42)  POCT Blood Glucose.: 182 mg/dL (03 Jun 2021 22:08)          RADIOLOGY & ADDITIONAL TESTS:  5/28/21 Rt heart Cath  DIAGNOSTIC IMPRESSIONS:   Severely elevated pulmonary pressures.  Mildly elevated wedge pressure.  Normal cardiac output.  Successful implant of the cardiomems device into the left lower pulmonary  artery.      5/19/21 TTE  Summary:   1. Left ventricular ejection fraction, by visual estimation, is 60 to 65%.   2. Normal global left ventricular systolic function.   3. The mitral in-flow pattern reveals no discernable A-wave, therefore no comment on diastolic function can be made.   4. There is moderate concentric left ventricular hypertrophy.   5. The right ventricle is not well visualized. Systolic function appears grossly preserved.   6. Severely enlarged left atrium.   7. Moderately enlarged right atrium.   8. Moderate tricuspid regurgitation.   9. Trace mitral valve regurgitation.  10. Mitral valve mean gradient is 4.9 mmHg consistent with mild mitral stenosis.  11. Mild thickening of the anterior and posterior mitral valve leaflets.  12. Moderate mitral annular calcification.  13. A well-seated bioprosthetic valve is visualized in the aortic position. There is mild-moderate central regurgitation. Mean gradient is 8.5 mm Hg and dimensionless index is 0.47, which are likely consistent with a normal functioning bioprosthetic aortic valve.  14. Mildly dilated pulmonary artery.  15. Estimated pulmonary artery systolic pressure is 53.1 mmHg assuming a right atrial pressure of 3 mmHg, which is consistent with moderate pulmonary hypertension.  16. There is no evidence of pericardial effusion.  17. Moderate pleural effusion in both left and right lateral regions.  18. Compared to a prior study from 10/19/20, there is now pulmonary hypertension and bilateral pleural effusions are now present.      6/1/21 CT chest  Mild to moderate bilateral pleural effusions, stable on the right and increased on the left side.  Cardiomegaly and aortic valve prosthesis.  New pulmonary artery monitoring device.  Cirrhotic morphology with portal hypertension and a 2.1 cm sized right hepatic mass lesion, previously characterized as HCC.

## 2021-06-04 NOTE — PROGRESS NOTE ADULT - SUBJECTIVE AND OBJECTIVE BOX
NEPHROLOGY INTERVAL HPI/OVERNIGHT EVENTS:    + Malaise   + edema   UO only 250 ml on the current Lasix     MEDICATIONS  (STANDING):  amitriptyline 40 milliGRAM(s) Oral at bedtime  atorvastatin 40 milliGRAM(s) Oral at bedtime  chlorhexidine 2% Cloths 1 Application(s) Topical daily  cholecalciferol 2000 Unit(s) Oral daily  dextrose 40% Gel 15 Gram(s) Oral once  dextrose 5%. 1000 milliLiter(s) (50 mL/Hr) IV Continuous <Continuous>  dextrose 5%. 1000 milliLiter(s) (100 mL/Hr) IV Continuous <Continuous>  dextrose 50% Injectable 25 Gram(s) IV Push once  dextrose 50% Injectable 12.5 Gram(s) IV Push once  dextrose 50% Injectable 25 Gram(s) IV Push once  epoetin georgina-epbx (RETACRIT) Injectable 13358 Unit(s) SubCutaneous <User Schedule>  folic acid 1 milliGRAM(s) Oral daily  furosemide   Injectable 40 milliGRAM(s) IV Push two times a day  gabapentin 300 milliGRAM(s) Oral at bedtime  glucagon  Injectable 1 milliGRAM(s) IntraMuscular once  hydrALAZINE 25 milliGRAM(s) Oral every 8 hours  insulin glargine Injectable (LANTUS) 12 Unit(s) SubCutaneous at bedtime  insulin lispro (ADMELOG) corrective regimen sliding scale   SubCutaneous three times a day before meals  insulin lispro (ADMELOG) corrective regimen sliding scale   SubCutaneous at bedtime  levothyroxine 100 MICROGram(s) Oral daily  metoprolol tartrate 25 milliGRAM(s) Oral every 6 hours  montelukast 10 milliGRAM(s) Oral at bedtime  Nephro-nunu 1 Tablet(s) Oral daily  pantoprazole    Tablet 40 milliGRAM(s) Oral before breakfast  senna 2 Tablet(s) Oral at bedtime    MEDICATIONS  (PRN):  acetaminophen   Tablet .. 650 milliGRAM(s) Oral every 6 hours PRN Temp greater or equal to 38.5C (101.3F), Mild Pain (1 - 3)  albuterol/ipratropium for Nebulization 3 milliLiter(s) Nebulizer every 6 hours PRN Shortness of Breath and/or Wheezing  oxyCODONE    IR 5 milliGRAM(s) Oral every 6 hours PRN Moderate Pain (4 - 6)      Allergies    ACE inhibitors (Other)  Bactrim (Nephrotoxicity)  Biaxin (Joint Pain)  morphine (Short breath)  NSAIDs (Other)    Intolerances          Vital Signs Last 24 Hrs  T(C): 36.6 (04 Jun 2021 10:39), Max: 36.8 (04 Jun 2021 04:25)  T(F): 97.9 (04 Jun 2021 10:39), Max: 98.2 (04 Jun 2021 04:25)  HR: 85 (04 Jun 2021 10:39) (78 - 101)  BP: 126/69 (04 Jun 2021 10:39) (120/70 - 138/76)  BP(mean): --  RR: 18 (04 Jun 2021 10:39) (18 - 18)  SpO2: 91% (04 Jun 2021 10:39) (91% - 98%)  Daily     Daily   I&O's Detail    03 Jun 2021 07:01  -  04 Jun 2021 07:00  --------------------------------------------------------  IN:    Oral Fluid: 720 mL  Total IN: 720 mL    OUT:    Voided (mL): 250 mL  Total OUT: 250 mL    Total NET: 470 mL      04 Jun 2021 07:01  -  04 Jun 2021 13:36  --------------------------------------------------------  IN:    Oral Fluid: 360 mL  Total IN: 360 mL    OUT:    Voided (mL): 250 mL  Total OUT: 250 mL    Total NET: 110 mL        I&O's Summary    03 Jun 2021 07:01  -  04 Jun 2021 07:00  --------------------------------------------------------  IN: 720 mL / OUT: 250 mL / NET: 470 mL    04 Jun 2021 07:01  -  04 Jun 2021 13:36  --------------------------------------------------------  IN: 360 mL / OUT: 250 mL / NET: 110 mL      PHYSICAL EXAM:  GENERAL: Chronically frail and debiliated  ENMT: Dry mucous membranes  NECK: Supple, +JVD    CHEST/LUNG: Diminished BS at bases, crackles  HEART: No rub  ABDOMEN: Soft, Nontender, Nondistended; BS+  EXTREMITIES: +  LE edema , no asterixis   LABS:                        8.5    7.20  )-----------( 108      ( 04 Jun 2021 08:00 )             28.4     06-04    138  |  105  |  77.6<H>  ----------------------------<  180<H>  4.5   |  21.0<L>  |  4.07<H>    Ca    8.5<L>      04 Jun 2021 08:00  Mg     1.9     06-03    TPro  7.1  /  Alb  3.8  /  TBili  0.3<L>  /  DBili  x   /  AST  35<H>  /  ALT  19  /  AlkPhos  116  06-03                RADIOLOGY & ADDITIONAL TESTS:

## 2021-06-05 NOTE — PROGRESS NOTE ADULT - ASSESSMENT
CKD(IV): baseline 3.5mg/dL  CHF with chronic fluid overload; B/L pleural effusions---> decompensated with increased L effusion  - cont to avoid potential nephrotoxins  - Currently developing uremic sequelae and diuretic resistance   - Labs are under-reflective of degree of renal dysfunction due to her liver disease  - I had a long conversation w/ the patient , she is agreeable to start HD   - Vascular contacted to place eventual permacath   - See HD orders for today , session # 2 today   - Follow up Hep B and C and PPD     Anemia: +CKD + MDS (transfusion dependent) + GI loss  Low Fe stores s/p IV Fe  Hepatic mass c/w HCC  - no ARACELI in setting of neoplastic process unless cleared by Heme/Onc  - PRBCs as needed    RO: serum phos ok  - monitor labs CKD(IV): baseline 3.5mg/dL  CHF with chronic fluid overload; B/L pleural effusions---> decompensated with increased L effusion  - cont to avoid potential nephrotoxins  - Currently developing uremic sequelae and diuretic resistance   - Labs are under-reflective of degree of renal dysfunction due to her liver disease  - I had a long conversation w/ the patient , she is agreeable to start HD   - Vascular contacted to place eventual permacath   - See HD orders for today , session # 2 today   - Follow up Hep B and C and PPD   _ D/C IV Lasix , change to Torsemide 20 mg po daily     Anemia: +CKD + MDS (transfusion dependent) + GI loss  Low Fe stores s/p IV Fe  Hepatic mass c/w HCC  - no ARACELI in setting of neoplastic process unless cleared by Heme/Onc  - PRBCs as needed    RO: serum phos ok  - monitor labs

## 2021-06-05 NOTE — PROGRESS NOTE ADULT - SUBJECTIVE AND OBJECTIVE BOX
[facility] Division of Hospital Medicine    Chief Complaint:  Volume overload    SUBJECTIVE / OVERNIGHT EVENTS:  Pt examined resting in bed  s/p femoral Shiley yesterday and HD  Currently undergoing second HD session  Clinically appears much improved,   Hb noted, will transfuse 1 unit PBC with HD   Patient denies chest pain, SOB, abd pain, N/V, fever, chills, dysuria or any other complaints. All remainder ROS negative.       MEDICATIONS  (STANDING):  amitriptyline 40 milliGRAM(s) Oral at bedtime  atorvastatin 40 milliGRAM(s) Oral at bedtime  chlorhexidine 2% Cloths 1 Application(s) Topical daily  chlorhexidine 4% Liquid 1 Application(s) Topical <User Schedule>  cholecalciferol 2000 Unit(s) Oral daily  dextrose 40% Gel 15 Gram(s) Oral once  dextrose 5%. 1000 milliLiter(s) (50 mL/Hr) IV Continuous <Continuous>  dextrose 5%. 1000 milliLiter(s) (100 mL/Hr) IV Continuous <Continuous>  dextrose 50% Injectable 25 Gram(s) IV Push once  dextrose 50% Injectable 12.5 Gram(s) IV Push once  dextrose 50% Injectable 25 Gram(s) IV Push once  folic acid 1 milliGRAM(s) Oral daily  gabapentin 300 milliGRAM(s) Oral at bedtime  glucagon  Injectable 1 milliGRAM(s) IntraMuscular once  hydrALAZINE 25 milliGRAM(s) Oral every 8 hours  insulin glargine Injectable (LANTUS) 12 Unit(s) SubCutaneous at bedtime  insulin lispro (ADMELOG) corrective regimen sliding scale   SubCutaneous three times a day before meals  insulin lispro (ADMELOG) corrective regimen sliding scale   SubCutaneous at bedtime  levothyroxine 100 MICROGram(s) Oral daily  metoprolol tartrate 25 milliGRAM(s) Oral every 6 hours  montelukast 10 milliGRAM(s) Oral at bedtime  Nephro-nunu 1 Tablet(s) Oral daily  pantoprazole    Tablet 40 milliGRAM(s) Oral before breakfast  senna 2 Tablet(s) Oral at bedtime  torsemide 20 milliGRAM(s) Oral daily    MEDICATIONS  (PRN):  acetaminophen   Tablet .. 650 milliGRAM(s) Oral every 6 hours PRN Temp greater or equal to 38.5C (101.3F), Mild Pain (1 - 3)  albuterol/ipratropium for Nebulization 3 milliLiter(s) Nebulizer every 6 hours PRN Shortness of Breath and/or Wheezing  oxyCODONE    IR 5 milliGRAM(s) Oral every 6 hours PRN Moderate Pain (4 - 6)  sodium chloride 0.9% lock flush 10 milliLiter(s) IV Push every 1 hour PRN Pre/post blood products, medications, blood draw, and to maintain line patency        I&O's Summary      PHYSICAL EXAM:  Vital Signs Last 24 Hrs  T(C): 36.7 (05 Jun 2021 12:10), Max: 36.9 (04 Jun 2021 16:18)  T(F): 98 (05 Jun 2021 12:10), Max: 98.4 (04 Jun 2021 16:18)  HR: 92 (05 Jun 2021 12:10) (88 - 105)  BP: 125/50 (05 Jun 2021 12:10) (106/38 - 141/51)  BP(mean): --  RR: 19 (05 Jun 2021 12:10) (18 - 20)  SpO2: 99% (05 Jun 2021 12:10) (94% - 100%)    PHYSICAL EXAM:  GENERAL: NAD, obese female lying in bed on NC  HEAD:  Atraumatic, Normocephalic  NECK: Supple, No JVD, Normal thyroid, no cervical lymphadenopathy   NERVOUS SYSTEM:  Alert & Oriented X3, Good concentration; Motor Strength 5/5 B/L upper and lower extremities  CHEST/LUNG: Improving aeration at bases, scant crackles  HEART: Regular rate and rhythm; pronounced S1, S2No murmurs, rubs, or gallops  ABDOMEN: Soft, Nontender, Nondistended; Bowel sounds present  EXTREMITIES:  2+ Peripheral Pulses, No clubbing, cyanosis, femoral Shiley present      LABS:                                              7.9    5.71  )-----------( 85       ( 05 Jun 2021 08:35 )             25.4   06-05    138  |  103  |  59.3<H>  ----------------------------<  101<H>  3.6   |  24.0  |  3.79<H>    Ca    7.9<L>      05 Jun 2021 08:35  Phos  3.9     06-05                      RADIOLOGY & ADDITIONAL TESTS:  5/28/21 Rt heart Cath  DIAGNOSTIC IMPRESSIONS:   Severely elevated pulmonary pressures.  Mildly elevated wedge pressure.  Normal cardiac output.  Successful implant of the cardiomems device into the left lower pulmonary  artery.      5/19/21 TTE  Summary:   1. Left ventricular ejection fraction, by visual estimation, is 60 to 65%.   2. Normal global left ventricular systolic function.   3. The mitral in-flow pattern reveals no discernable A-wave, therefore no comment on diastolic function can be made.   4. There is moderate concentric left ventricular hypertrophy.   5. The right ventricle is not well visualized. Systolic function appears grossly preserved.   6. Severely enlarged left atrium.   7. Moderately enlarged right atrium.   8. Moderate tricuspid regurgitation.   9. Trace mitral valve regurgitation.  10. Mitral valve mean gradient is 4.9 mmHg consistent with mild mitral stenosis.  11. Mild thickening of the anterior and posterior mitral valve leaflets.  12. Moderate mitral annular calcification.  13. A well-seated bioprosthetic valve is visualized in the aortic position. There is mild-moderate central regurgitation. Mean gradient is 8.5 mm Hg and dimensionless index is 0.47, which are likely consistent with a normal functioning bioprosthetic aortic valve.  14. Mildly dilated pulmonary artery.  15. Estimated pulmonary artery systolic pressure is 53.1 mmHg assuming a right atrial pressure of 3 mmHg, which is consistent with moderate pulmonary hypertension.  16. There is no evidence of pericardial effusion.  17. Moderate pleural effusion in both left and right lateral regions.  18. Compared to a prior study from 10/19/20, there is now pulmonary hypertension and bilateral pleural effusions are now present.      6/1/21 CT chest  Mild to moderate bilateral pleural effusions, stable on the right and increased on the left side.  Cardiomegaly and aortic valve prosthesis.  New pulmonary artery monitoring device.  Cirrhotic morphology with portal hypertension and a 2.1 cm sized right hepatic mass lesion, previously characterized as HCC.

## 2021-06-05 NOTE — PROGRESS NOTE ADULT - SUBJECTIVE AND OBJECTIVE BOX
NEPHROLOGY INTERVAL HPI/OVERNIGHT EVENTS:    S/P first HD   Feels better   Some oozing from L femoral site   Has stopped   Afebrile   Am Hgb pending     MEDICATIONS  (STANDING):  amitriptyline 40 milliGRAM(s) Oral at bedtime  atorvastatin 40 milliGRAM(s) Oral at bedtime  chlorhexidine 2% Cloths 1 Application(s) Topical daily  chlorhexidine 4% Liquid 1 Application(s) Topical <User Schedule>  cholecalciferol 2000 Unit(s) Oral daily  dextrose 40% Gel 15 Gram(s) Oral once  dextrose 5%. 1000 milliLiter(s) (50 mL/Hr) IV Continuous <Continuous>  dextrose 5%. 1000 milliLiter(s) (100 mL/Hr) IV Continuous <Continuous>  dextrose 50% Injectable 25 Gram(s) IV Push once  dextrose 50% Injectable 12.5 Gram(s) IV Push once  dextrose 50% Injectable 25 Gram(s) IV Push once  epoetin georgina-epbx (RETACRIT) Injectable 22353 Unit(s) SubCutaneous <User Schedule>  folic acid 1 milliGRAM(s) Oral daily  furosemide   Injectable 40 milliGRAM(s) IV Push two times a day  gabapentin 300 milliGRAM(s) Oral at bedtime  glucagon  Injectable 1 milliGRAM(s) IntraMuscular once  hydrALAZINE 25 milliGRAM(s) Oral every 8 hours  insulin glargine Injectable (LANTUS) 12 Unit(s) SubCutaneous at bedtime  insulin lispro (ADMELOG) corrective regimen sliding scale   SubCutaneous three times a day before meals  insulin lispro (ADMELOG) corrective regimen sliding scale   SubCutaneous at bedtime  levothyroxine 100 MICROGram(s) Oral daily  metoprolol tartrate 25 milliGRAM(s) Oral every 6 hours  montelukast 10 milliGRAM(s) Oral at bedtime  Nephro-nunu 1 Tablet(s) Oral daily  pantoprazole    Tablet 40 milliGRAM(s) Oral before breakfast  senna 2 Tablet(s) Oral at bedtime    MEDICATIONS  (PRN):  acetaminophen   Tablet .. 650 milliGRAM(s) Oral every 6 hours PRN Temp greater or equal to 38.5C (101.3F), Mild Pain (1 - 3)  albuterol/ipratropium for Nebulization 3 milliLiter(s) Nebulizer every 6 hours PRN Shortness of Breath and/or Wheezing  oxyCODONE    IR 5 milliGRAM(s) Oral every 6 hours PRN Moderate Pain (4 - 6)  sodium chloride 0.9% lock flush 10 milliLiter(s) IV Push every 1 hour PRN Pre/post blood products, medications, blood draw, and to maintain line patency      Allergies    ACE inhibitors (Other)  Bactrim (Nephrotoxicity)  Biaxin (Joint Pain)  morphine (Short breath)  NSAIDs (Other)    Intolerances          Vital Signs Last 24 Hrs  T(C): 36.8 (2021 04:33), Max: 36.9 (2021 16:18)  T(F): 98.2 (2021 04:33), Max: 98.4 (2021 16:18)  HR: 99 (:33) (85 - 105)  BP: 134/60 (2021 04:33) (106/38 - 141/51)  BP(mean): --  RR: 18 (2021 04:33) (18 - 18)  SpO2: 94% (2021 04:33) (91% - 100%)  Daily     Daily Weight in k.3 (2021 18:10)  I&O's Detail    2021 07:01  -  2021 07:00  --------------------------------------------------------  IN:    Oral Fluid: 560 mL  Total IN: 560 mL    OUT:    Other (mL): 500 mL    Voided (mL): 250 mL  Total OUT: 750 mL    Total NET: -190 mL        I&O's Summary    2021 07:01  -  2021 07:00  --------------------------------------------------------  IN: 560 mL / OUT: 750 mL / NET: -190 mL        PHYSICAL EXAM:  GENERAL: Chronically frail and debiliated  ENMT: Dry mucous membranes  NECK: Supple, +JVD  CHEST/LUNG: Diminished BS at bases, crackles  HEART: No rub  ABDOMEN: Soft, Nontender, Nondistended; BS+  EXT + edema , + L femoral marifer , No active bleeding     LABS:                        8.0    x     )-----------( x        ( 2021 19:18 )             26.5     06-04    138  |  105  |  77.6<H>  ----------------------------<  180<H>  4.5   |  21.0<L>  |  4.07<H>    Ca    8.5<L>      2021 08:00                  RADIOLOGY & ADDITIONAL TESTS:

## 2021-06-05 NOTE — PROGRESS NOTE ADULT - ASSESSMENT
76y Female with HTN HLD CAD s/p remote CABG, Bio AVR solitary episode of Af w/o recurrence, ILR in place, CRI, MDS transfusion dependent, cirrhosis sec to chronic hep c with esophageal varices and prior banding, hepatocellular carcinoma,s/p embolization with embozene particles on 4/5/21, and recent admission to [facility] after fall with head trauma with also transfused 2 untis PRBC during that admission admitted with fluid overload in setting of increased pulm HTN. Now s/p EGD/colonoscopy and Cardiomems placement. Despite appropriate medical management she has failed to improve with worsening renal function now necessitating HD       Decompensated Heart Failure with preserved EF, euvolemic on exam, no s/p CardioMems implant uneventfully.  Pulmonary HTN  Now started on HD with improving resp status  Remains acutely dyspneic, Bipap PRN  c/w hydralazine, bb, off ace/arb  spironolactone due to CKD, off ASA due anemia  decreasing urinary output  Now starting HD for volume control given renal failure    Cirrhosis 2/2 hepatitis C c/b hepatocellular carcinoma s/p embolization  Hepatocellular carcinoma   d/c  Spironolactone for now, dc lasix, started on torsemide 20 mg daily   EGD on 5/26 which  mild hypertensive gastropathy, no varices, with biopsy obtained  Colonoscopy  5/27/21 noted     Hypertension   hydralazine as above    Diabetes Mellitus   c/w 12u subQ qhs, MDSS, will adjust base on POC  c/w lyrica and gabapentin    CAD, S/p CABG  Continue Lipitor 40mg po qhs    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 Appreciate recs      Hyperkalemia, now resolved   c/w to monitor with daily BMP     Hypothyroidism  -Continue levothyroxine 100mcg po daily     #VTE prophylaxis -SCDs due transfusion dependent anemia (pt non compliant> states that she will not develop  DVT despite extensive counseling)    #Advanced Directives -Full Code-HCP Daniela Collier    # Dispo - Pending fluid overload improvement

## 2021-06-06 NOTE — PROGRESS NOTE ADULT - SUBJECTIVE AND OBJECTIVE BOX
[facility] Division of Hospital Medicine    Chief Complaint:  Volume overload    SUBJECTIVE / OVERNIGHT EVENTS:  Pt examined resting in bed  s/p 1 units PRBC with appropriate response  Planned for HD tomorrow   Patient denies chest pain, SOB, abd pain, N/V, fever, chills, dysuria or any other complaints. All remainder ROS negative.     HPI  76y Female with HTN HLD CAD s/p remote CABG Bio AVR solitary episode of Af w/o recurrence, ILR in place, CRI, MDS transfusion dependent, cirrhosis sec to hep c with esophageal varices and prior banding, recent finding of hepatic mass/hepatoma s/p embolization, came to the ER for worsening dyspnea for several days worse with activity, orthopnea and elevated BP. She was stare on aggressive diuresis and underwent Cardiomems implantation however she remained dyspnea with minimal improvement in b/l effusions and pulm vascular congestion. Despite IV diuresis she has had decreasing urinary output with worsening renal function and has now been started on HD via left femoral Shiley catheter She as transfused with 1 unit PRBC due to worsening anemia (cannot get epo due to liver mass) and is currently much improved        MEDICATIONS  (STANDING):  amitriptyline 40 milliGRAM(s) Oral at bedtime  atorvastatin 40 milliGRAM(s) Oral at bedtime  chlorhexidine 2% Cloths 1 Application(s) Topical daily  chlorhexidine 4% Liquid 1 Application(s) Topical <User Schedule>  cholecalciferol 2000 Unit(s) Oral daily  dextrose 40% Gel 15 Gram(s) Oral once  dextrose 5%. 1000 milliLiter(s) (50 mL/Hr) IV Continuous <Continuous>  dextrose 5%. 1000 milliLiter(s) (100 mL/Hr) IV Continuous <Continuous>  dextrose 50% Injectable 25 Gram(s) IV Push once  dextrose 50% Injectable 12.5 Gram(s) IV Push once  dextrose 50% Injectable 25 Gram(s) IV Push once  folic acid 1 milliGRAM(s) Oral daily  gabapentin 300 milliGRAM(s) Oral at bedtime  glucagon  Injectable 1 milliGRAM(s) IntraMuscular once  hydrALAZINE 25 milliGRAM(s) Oral every 8 hours  insulin glargine Injectable (LANTUS) 12 Unit(s) SubCutaneous at bedtime  insulin lispro (ADMELOG) corrective regimen sliding scale   SubCutaneous three times a day before meals  insulin lispro (ADMELOG) corrective regimen sliding scale   SubCutaneous at bedtime  levothyroxine 100 MICROGram(s) Oral daily  metoprolol tartrate 25 milliGRAM(s) Oral every 6 hours  montelukast 10 milliGRAM(s) Oral at bedtime  Nephro-nunu 1 Tablet(s) Oral daily  pantoprazole    Tablet 40 milliGRAM(s) Oral before breakfast  senna 2 Tablet(s) Oral at bedtime  torsemide 20 milliGRAM(s) Oral daily        PHYSICAL EXAM:  Vital Signs Last 24 Hrs  T(C): 36.7 (05 Jun 2021 12:10), Max: 36.9 (04 Jun 2021 16:18)  T(F): 98 (05 Jun 2021 12:10), Max: 98.4 (04 Jun 2021 16:18)  HR: 92 (05 Jun 2021 12:10) (88 - 105)  BP: 125/50 (05 Jun 2021 12:10) (106/38 - 141/51)  BP(mean): --  RR: 19 (05 Jun 2021 12:10) (18 - 20)  SpO2: 99% (05 Jun 2021 12:10) (94% - 100%)    PHYSICAL EXAM:  GENERAL: NAD, obese female lying in bed on NC  HEAD:  Atraumatic, Normocephalic  NECK: Supple, No JVD, Normal thyroid, no cervical lymphadenopathy   NERVOUS SYSTEM:  Alert & Oriented X3, Good concentration; Motor Strength 5/5 B/L upper and lower extremities  CHEST/LUNG: Improving aeration at bases, scant crackles  HEART: Regular rate and rhythm; pronounced S1, S2No murmurs, rubs, or gallops  ABDOMEN: Soft, Nontender, Nondistended; Bowel sounds present  EXTREMITIES:  2+ Peripheral Pulses, No clubbing, cyanosis, femoral Shiley present      LABS:                                              9.4    6.01  )-----------( 72       ( 06 Jun 2021 08:46 )             29.2   06-06    138  |  100  |  43.3<H>  ----------------------------<  78  3.9   |  27.0  |  2.90<H>    Ca    8.4<L>      06 Jun 2021 08:46  Phos  3.1     06-06    TPro  7.0  /  Alb  3.7  /  TBili  0.5  /  DBili  0.2  /  AST  30  /  ALT  16  /  AlkPhos  115  06-06                                          RADIOLOGY & ADDITIONAL TESTS:  5/28/21 Rt heart Cath  DIAGNOSTIC IMPRESSIONS:   Severely elevated pulmonary pressures.  Mildly elevated wedge pressure.  Normal cardiac output.  Successful implant of the cardiomems device into the left lower pulmonary  artery.      5/19/21 TTE  Summary:   1. Left ventricular ejection fraction, by visual estimation, is 60 to 65%.   2. Normal global left ventricular systolic function.   3. The mitral in-flow pattern reveals no discernable A-wave, therefore no comment on diastolic function can be made.   4. There is moderate concentric left ventricular hypertrophy.   5. The right ventricle is not well visualized. Systolic function appears grossly preserved.   6. Severely enlarged left atrium.   7. Moderately enlarged right atrium.   8. Moderate tricuspid regurgitation.   9. Trace mitral valve regurgitation.  10. Mitral valve mean gradient is 4.9 mmHg consistent with mild mitral stenosis.  11. Mild thickening of the anterior and posterior mitral valve leaflets.  12. Moderate mitral annular calcification.  13. A well-seated bioprosthetic valve is visualized in the aortic position. There is mild-moderate central regurgitation. Mean gradient is 8.5 mm Hg and dimensionless index is 0.47, which are likely consistent with a normal functioning bioprosthetic aortic valve.  14. Mildly dilated pulmonary artery.  15. Estimated pulmonary artery systolic pressure is 53.1 mmHg assuming a right atrial pressure of 3 mmHg, which is consistent with moderate pulmonary hypertension.  16. There is no evidence of pericardial effusion.  17. Moderate pleural effusion in both left and right lateral regions.  18. Compared to a prior study from 10/19/20, there is now pulmonary hypertension and bilateral pleural effusions are now present.      6/1/21 CT chest  Mild to moderate bilateral pleural effusions, stable on the right and increased on the left side.  Cardiomegaly and aortic valve prosthesis.  New pulmonary artery monitoring device.  Cirrhotic morphology with portal hypertension and a 2.1 cm sized right hepatic mass lesion, previously characterized as HCC.                                 [facility] Division of Hospital Medicine    Chief Complaint:  Volume overload    SUBJECTIVE / OVERNIGHT EVENTS:  Pt examined resting in bed  s/p 1 units PRBC with appropriate response  Planned for HD tomorrow   Patient denies chest pain, SOB, abd pain, N/V, fever, chills, dysuria or any other complaints. All remainder ROS negative.     HPI  76y Female with HTN HLD CAD s/p remote CABG Bio AVR solitary episode of Af w/o recurrence, ILR in place, CRI, MDS transfusion dependent, cirrhosis sec to hep c with esophageal varices and prior banding, recent finding of hepatic mass/hepatoma s/p embolization, came to the ER for worsening dyspnea for several days worse with activity, orthopnea and elevated BP. She was stare on aggressive diuresis and underwent Cardiomems implantation however she remained dyspnea with minimal improvement in b/l effusions and pulm vascular congestion. Despite IV diuresis she has had decreasing urinary output with worsening renal function and has now been started on HD via left femoral Shiley catheter She as transfused with 1 unit PRBC due to worsening anemia (cannot get epo due to liver mass) and is currently much improved        MEDICATIONS  (STANDING):  amitriptyline 40 milliGRAM(s) Oral at bedtime  atorvastatin 40 milliGRAM(s) Oral at bedtime  chlorhexidine 2% Cloths 1 Application(s) Topical daily  chlorhexidine 4% Liquid 1 Application(s) Topical <User Schedule>  cholecalciferol 2000 Unit(s) Oral daily  dextrose 40% Gel 15 Gram(s) Oral once  dextrose 5%. 1000 milliLiter(s) (50 mL/Hr) IV Continuous <Continuous>  dextrose 5%. 1000 milliLiter(s) (100 mL/Hr) IV Continuous <Continuous>  dextrose 50% Injectable 25 Gram(s) IV Push once  dextrose 50% Injectable 12.5 Gram(s) IV Push once  dextrose 50% Injectable 25 Gram(s) IV Push once  folic acid 1 milliGRAM(s) Oral daily  gabapentin 300 milliGRAM(s) Oral at bedtime  glucagon  Injectable 1 milliGRAM(s) IntraMuscular once  hydrALAZINE 25 milliGRAM(s) Oral every 8 hours  insulin glargine Injectable (LANTUS) 12 Unit(s) SubCutaneous at bedtime  insulin lispro (ADMELOG) corrective regimen sliding scale   SubCutaneous three times a day before meals  insulin lispro (ADMELOG) corrective regimen sliding scale   SubCutaneous at bedtime  levothyroxine 100 MICROGram(s) Oral daily  metoprolol tartrate 25 milliGRAM(s) Oral every 6 hours  montelukast 10 milliGRAM(s) Oral at bedtime  Nephro-nunu 1 Tablet(s) Oral daily  pantoprazole    Tablet 40 milliGRAM(s) Oral before breakfast  senna 2 Tablet(s) Oral at bedtime  torsemide 20 milliGRAM(s) Oral daily        PHYSICAL EXAM:  Vital Signs Last 24 Hrs  T(C): 36.7 (05 Jun 2021 12:10), Max: 36.9 (04 Jun 2021 16:18)  T(F): 98 (05 Jun 2021 12:10), Max: 98.4 (04 Jun 2021 16:18)  HR: 92 (05 Jun 2021 12:10) (88 - 105)  BP: 125/50 (05 Jun 2021 12:10) (106/38 - 141/51)  BP(mean): --  RR: 19 (05 Jun 2021 12:10) (18 - 20)  SpO2: 99% (05 Jun 2021 12:10) (94% - 100%)    PHYSICAL EXAM:  GENERAL: NAD, obese female lying in bed on NC  HEAD:  Atraumatic, Normocephalic  NECK: Supple, No JVD, Normal thyroid, no cervical lymphadenopathy   NERVOUS SYSTEM:  Alert & Oriented X3, Good concentration; Motor Strength 5/5 B/L upper and lower extremities  CHEST/LUNG: Improving aeration at bases, scant crackles  HEART: Regular rate and rhythm; pronounced S1, S2No murmurs, rubs, or gallops  ABDOMEN: Soft, Nontender, Nondistended; Bowel sounds present  EXTREMITIES:  2+ Peripheral Pulses, No clubbing, cyanosis, femoral Shiley present      LABS:                                              9.4    6.01  )-----------( 72       ( 06 Jun 2021 08:46 )             29.2   06-06    138  |  100  |  43.3<H>  ----------------------------<  78  3.9   |  27.0  |  2.90<H>    Ca    8.4<L>      06 Jun 2021 08:46  Phos  3.1     06-06    TPro  7.0  /  Alb  3.7  /  TBili  0.5  /  DBili  0.2  /  AST  30  /  ALT  16  /  AlkPhos  115  06-06                                          RADIOLOGY & ADDITIONAL TESTS:  5/28/21 Rt heart Cath  DIAGNOSTIC IMPRESSIONS:   Severely elevated pulmonary pressures.  Mildly elevated wedge pressure.  Normal cardiac output.  Successful implant of the cardiomems device into the left lower pulmonary  artery.      5/19/21 TTE  Summary:   1. Left ventricular ejection fraction, by visual estimation, is 60 to 65%.   2. Normal global left ventricular systolic function.   3. The mitral in-flow pattern reveals no discernable A-wave, therefore no comment on diastolic function can be made.   4. There is moderate concentric left ventricular hypertrophy.   5. The right ventricle is not well visualized. Systolic function appears grossly preserved.   6. Severely enlarged left atrium.   7. Moderately enlarged right atrium.   8. Moderate tricuspid regurgitation.   9. Trace mitral valve regurgitation.  10. Mitral valve mean gradient is 4.9 mmHg consistent with mild mitral stenosis.  11. Mild thickening of the anterior and posterior mitral valve leaflets.  12. Moderate mitral annular calcification.  13. A well-seated bioprosthetic valve is visualized in the aortic position. There is mild-moderate central regurgitation. Mean gradient is 8.5 mm Hg and dimensionless index is 0.47, which are likely consistent with a normal functioning bioprosthetic aortic valve.  14. Mildly dilated pulmonary artery.  15. Estimated pulmonary artery systolic pressure is 53.1 mmHg assuming a right atrial pressure of 3 mmHg, which is consistent with moderate pulmonary hypertension.  16. There is no evidence of pericardial effusion.  17. Moderate pleural effusion in both left and right lateral regions.  18. Compared to a prior study from 10/19/20, there is now pulmonary hypertension and bilateral pleural effusions are now present.      6/1/21 CT chest  Mild to moderate bilateral pleural effusions, stable on the right and increased on the left side.  Cardiomegaly and aortic valve prosthesis.  New pulmonary artery monitoring device.  Cirrhotic morphology with portal hypertension and a 2.1 cm sized right hepatic mass lesion, previously characterized as HCC.

## 2021-06-06 NOTE — PROGRESS NOTE ADULT - ASSESSMENT
76y Female with HTN HLD CAD s/p remote CABG, Bio AVR solitary episode of Af w/o recurrence, ILR in place, CRI, MDS transfusion dependent, cirrhosis sec to chronic hep c with esophageal varices and prior banding, hepatocellular carcinoma,s/p embolization with embozene particles on 4/5/21, and recent admission to [facility] after fall with head trauma with also transfused 2 untis PRBC during that admission admitted with fluid overload in setting of increased pulm HTN. Now s/p EGD/colonoscopy and Cardiomems placement. Despite appropriate medical management she has failed to improve with worsening renal function now necessitating HD       Decompensated Heart Failure with preserved EF, euvolemic on exam,  Severe pulmonary HTN s/p CardioMems placement   Continue HD with improving resp status  Improving dyspnea, Bipap PRN   Continue GDMT     Cirrhosis 2/2 hepatitis C c/b hepatocellular carcinoma s/p embolization  Hepatocellular carcinoma   d/c  Spironolactone for now, dc lasix, started on torsemide 20 mg daily   EGD on 5/26 which  mild hypertensive gastropathy, no varices, with biopsy obtained  Colonoscopy  5/27/21 noted     Hypertension   hydralazine as above    Diabetes Mellitus   c/w 12u subQ qhs, MDSS, will adjust base on POC  c/w lyrica and gabapentin    CAD, S/p CABG  Continue Lipitor 40mg po qhs  decreasing urinary output  On HD for volume control given renal failure    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 Appreciate recs      Hyperkalemia, now resolved   c/w to monitor with daily BMP     Hypothyroidism  -Continue levothyroxine 100mcg po daily     #VTE prophylaxis -SCDs due transfusion dependent anemia (pt non compliant> states that she will not develop  DVT despite extensive counseling)    #Advanced Directives -Full Code-HCP Daniela Collier    # Dispo - Pending fluid overload improvement

## 2021-06-06 NOTE — PROGRESS NOTE ADULT - ASSESSMENT
CKD(IV): baseline 3.5mg/dL  CHF with chronic fluid overload; B/L pleural effusions---> decompensated with increased L effusion  - cont to avoid potential nephrotoxins  - Currently developing uremic sequelae and diuretic resistance   - Labs are under-reflective of degree of renal dysfunction due to her liver disease  - I had a long conversation w/ the patient , she is agreeable to start HD   - Vascular contacted to place eventual permacath   - Clinically improving with initiation of HD ---> session # 3 tomorrow   - Follow up Hep B and C and PPD   _ Changed  to Torsemide 20 mg po daily     Anemia: +CKD + MDS (transfusion dependent) + GI loss  Low Fe stores s/p IV Fe  Hepatic mass c/w HCC  - no ARACELI in setting of neoplastic process unless cleared by Heme/Onc  - PRBCs as needed    RO: serum phos ok  - monitor labs

## 2021-06-06 NOTE — PROGRESS NOTE ADULT - SUBJECTIVE AND OBJECTIVE BOX
NEPHROLOGY INTERVAL HPI/OVERNIGHT EVENTS:    feels better   S/P second session of HD   (-) 1 kg   No new events    meds reviewed    ml     MEDICATIONS  (STANDING):  amitriptyline 40 milliGRAM(s) Oral at bedtime  atorvastatin 40 milliGRAM(s) Oral at bedtime  chlorhexidine 2% Cloths 1 Application(s) Topical daily  chlorhexidine 4% Liquid 1 Application(s) Topical <User Schedule>  cholecalciferol 2000 Unit(s) Oral daily  dextrose 40% Gel 15 Gram(s) Oral once  dextrose 5%. 1000 milliLiter(s) (50 mL/Hr) IV Continuous <Continuous>  dextrose 5%. 1000 milliLiter(s) (100 mL/Hr) IV Continuous <Continuous>  dextrose 50% Injectable 25 Gram(s) IV Push once  dextrose 50% Injectable 12.5 Gram(s) IV Push once  dextrose 50% Injectable 25 Gram(s) IV Push once  folic acid 1 milliGRAM(s) Oral daily  gabapentin 300 milliGRAM(s) Oral at bedtime  glucagon  Injectable 1 milliGRAM(s) IntraMuscular once  hydrALAZINE 25 milliGRAM(s) Oral every 8 hours  insulin glargine Injectable (LANTUS) 12 Unit(s) SubCutaneous at bedtime  insulin lispro (ADMELOG) corrective regimen sliding scale   SubCutaneous three times a day before meals  insulin lispro (ADMELOG) corrective regimen sliding scale   SubCutaneous at bedtime  levothyroxine 100 MICROGram(s) Oral daily  metoprolol tartrate 25 milliGRAM(s) Oral every 6 hours  montelukast 10 milliGRAM(s) Oral at bedtime  Nephro-nunu 1 Tablet(s) Oral daily  pantoprazole    Tablet 40 milliGRAM(s) Oral before breakfast  senna 2 Tablet(s) Oral at bedtime  torsemide 20 milliGRAM(s) Oral daily    MEDICATIONS  (PRN):  acetaminophen   Tablet .. 650 milliGRAM(s) Oral every 6 hours PRN Temp greater or equal to 38.5C (101.3F), Mild Pain (1 - 3)  albuterol/ipratropium for Nebulization 3 milliLiter(s) Nebulizer every 6 hours PRN Shortness of Breath and/or Wheezing  oxyCODONE    IR 5 milliGRAM(s) Oral every 6 hours PRN Moderate Pain (4 - 6)  sodium chloride 0.9% lock flush 10 milliLiter(s) IV Push every 1 hour PRN Pre/post blood products, medications, blood draw, and to maintain line patency      Allergies    ACE inhibitors (Other)  Bactrim (Nephrotoxicity)  Biaxin (Joint Pain)  morphine (Short breath)  NSAIDs (Other)    Intolerances        Vital Signs Last 24 Hrs  T(C): 37.2 (2021 21:19), Max: 37.2 (2021 21:19)  T(F): 98.9 (2021 21:19), Max: 98.9 (2021 21:19)  HR: 93 (2021 06:13) (92 - 106)  BP: 138/62 (2021 06:13) (125/50 - 169/73)  BP(mean): --  RR: 19 (2021 21:19) (19 - 20)  SpO2: 97% (2021 00:29) (95% - 100%)  Daily     Daily Weight in k (2021 15:20)  I&O's Detail    2021 07:01  -  2021 07:00  --------------------------------------------------------  IN:    Oral Fluid: 300 mL  Total IN: 300 mL    OUT:    Other (mL): 1000 mL    Voided (mL): 200 mL  Total OUT: 1200 mL    Total NET: -900 mL        I&O's Summary    2021 07:01  -  2021 07:00  --------------------------------------------------------  IN: 300 mL / OUT: 1200 mL / NET: -900 mL        PHYSICAL EXAM:  PHYSICAL EXAM:  GENERAL: Chronically frail and debiliated  ENMT: Dry mucous membranes  NECK: Supple, +JVD  CHEST/LUNG: Diminished BS at bases, crackles  HEART: No rub  ABDOMEN: Soft, Nontender, Nondistended; BS+  EXT + edema , + L femoral marifer , No active bleeding     LABS:                        9.4    6.01  )-----------( 72       ( 2021 08:46 )             29.2     06-05    138  |  103  |  59.3<H>  ----------------------------<  101<H>  3.6   |  24.0  |  3.79<H>    Ca    7.9<L>      2021 08:35  Phos  3.9     06-05                  RADIOLOGY & ADDITIONAL TESTS:

## 2021-06-07 NOTE — PROGRESS NOTE ADULT - ASSESSMENT
CKD(IV): baseline 3.5mg/dL  CHF with chronic fluid overload; B/L pleural effusions---> decompensated with increased L effusion  - cont to avoid potential nephrotoxins  - Currently developing uremic sequelae and diuretic resistance   - Labs are under-reflective of degree of renal dysfunction due to her liver disease  - I had a long conversation w/ the patient , she is agreeable to start HD   - Clinically improving with initiation of HD ---> session # 3 today   - Follow up Hep B and C and PPD   - Changed  to Torsemide 20 mg po daily   I discussed w/ Vascular surgery , they are planning permacath Wed 6/9    Anemia: +CKD + MDS (transfusion dependent) + GI loss  Low Fe stores s/p IV Fe  Hepatic mass c/w HCC  - no ARACELI in setting of neoplastic process unless cleared by Heme/Onc  - PRBCs as needed    RO: serum phos ok  - monitor labs

## 2021-06-07 NOTE — PROGRESS NOTE ADULT - SUBJECTIVE AND OBJECTIVE BOX
NEPHROLOGY INTERVAL HPI/OVERNIGHT EVENTS:    Feeling better   No new events   No SOB or CP     MEDICATIONS  (STANDING):  amitriptyline 40 milliGRAM(s) Oral at bedtime  atorvastatin 40 milliGRAM(s) Oral at bedtime  chlorhexidine 2% Cloths 1 Application(s) Topical daily  chlorhexidine 4% Liquid 1 Application(s) Topical <User Schedule>  cholecalciferol 2000 Unit(s) Oral daily  dextrose 40% Gel 15 Gram(s) Oral once  dextrose 5%. 1000 milliLiter(s) (50 mL/Hr) IV Continuous <Continuous>  dextrose 5%. 1000 milliLiter(s) (100 mL/Hr) IV Continuous <Continuous>  dextrose 50% Injectable 25 Gram(s) IV Push once  dextrose 50% Injectable 12.5 Gram(s) IV Push once  dextrose 50% Injectable 25 Gram(s) IV Push once  folic acid 1 milliGRAM(s) Oral daily  gabapentin 300 milliGRAM(s) Oral at bedtime  glucagon  Injectable 1 milliGRAM(s) IntraMuscular once  hydrALAZINE 25 milliGRAM(s) Oral every 8 hours  insulin glargine Injectable (LANTUS) 12 Unit(s) SubCutaneous at bedtime  insulin lispro (ADMELOG) corrective regimen sliding scale   SubCutaneous three times a day before meals  insulin lispro (ADMELOG) corrective regimen sliding scale   SubCutaneous at bedtime  levothyroxine 100 MICROGram(s) Oral daily  metoprolol tartrate 25 milliGRAM(s) Oral every 6 hours  montelukast 10 milliGRAM(s) Oral at bedtime  Nephro-nunu 1 Tablet(s) Oral daily  pantoprazole    Tablet 40 milliGRAM(s) Oral before breakfast  senna 2 Tablet(s) Oral at bedtime  torsemide 20 milliGRAM(s) Oral daily    MEDICATIONS  (PRN):  acetaminophen   Tablet .. 650 milliGRAM(s) Oral every 6 hours PRN Temp greater or equal to 38.5C (101.3F), Mild Pain (1 - 3)  albuterol/ipratropium for Nebulization 3 milliLiter(s) Nebulizer every 6 hours PRN Shortness of Breath and/or Wheezing  diphenhydrAMINE 25 milliGRAM(s) Oral every 6 hours PRN Rash and/or Itching  oxyCODONE    IR 5 milliGRAM(s) Oral every 6 hours PRN Moderate Pain (4 - 6)  sodium chloride 0.9% lock flush 10 milliLiter(s) IV Push every 1 hour PRN Pre/post blood products, medications, blood draw, and to maintain line patency      Allergies    ACE inhibitors (Other)  Bactrim (Nephrotoxicity)  Biaxin (Joint Pain)  morphine (Short breath)  NSAIDs (Other)    Intolerances          Vital Signs Last 24 Hrs  T(C): 36.6 (2021 11:11), Max: 36.8 (2021 05:24)  T(F): 97.8 (2021 11:11), Max: 98.2 (2021 05:24)  HR: 95 (2021 11:11) (83 - 95)  BP: 127/53 (2021 11:11) (125/71 - 144/67)  BP(mean): --  RR: 18 (2021 11:11) (18 - 19)  SpO2: 99% (2021 11:11) (97% - 99%)  Daily     Daily Weight in k.5 (2021 11:11)  I&O's Detail    2021 07:  -  2021 15:34  --------------------------------------------------------  IN:  Total IN: 0 mL    OUT:    Other (mL): 1000 mL  Total OUT: 1000 mL    Total NET: -1000 mL        I&O's Summary    2021 07:01  -  2021 15:34  --------------------------------------------------------  IN: 0 mL / OUT: 1000 mL / NET: -1000 mL    PHYSICAL EXAM:  GENERAL: Chronically frail and debiliated  ENMT: Dry mucous membranes  NECK: Supple, +JVD  CHEST/LUNG: Diminished BS at bases, crackles  HEART: No rub  ABDOMEN: Soft, Nontender, Nondistended; BS+  EXT + edema , + L femoral marifer , No active bleeding       LABS:                        9.3    5.57  )-----------( 75       ( 2021 08:20 )             30.0     06-    138  |  101  |  48.3<H>  ----------------------------<  84  4.1   |  26.0  |  3.17<H>    Ca    8.4<L>      2021 08:20  Phos  2.9       Mg     1.9         TPro  7.0  /  Alb  3.7  /  TBili  0.5  /  DBili  0.2  /  AST  30  /  ALT  16  /  AlkPhos  115  -        Phosphorus Level, Serum: 2.9 mg/dL ( @ 08:20)  Magnesium, Serum: 1.9 mg/dL ( @ 08:20)          RADIOLOGY & ADDITIONAL TESTS:

## 2021-06-07 NOTE — PROGRESS NOTE ADULT - SUBJECTIVE AND OBJECTIVE BOX
CC: Pulm HTN (06 Jun 2021 11:39)    HPI:  76y Female with HTN HLD CAD s/p remote CABG Bio AVR solitary episode of Af w/o recurrence, ILR in place, CRI, MDS transfusion dependent, cirrhosis sec to hep c with esophageal varices and prior banding, recent finding of hepatic mass/hepatoma s/p embolization, came to the ER for worsening dyspnea for several days worse with activity, orthopnea and elevated BP.     INTERVAL HPI/OVERNIGHT EVENTS: Patient seen and examined lying in bed.  Patient s/p HD this am.  Patient complaining of pruritis.  Patient denies any headache, dizziness, SOB, CP, abdominal pain, nausea, vomiting.  Other ROS reviewed and are negative.     Vital Signs Last 24 Hrs  T(C): 36.6 (07 Jun 2021 11:11), Max: 36.8 (07 Jun 2021 05:24)  T(F): 97.8 (07 Jun 2021 11:11), Max: 98.2 (07 Jun 2021 05:24)  HR: 95 (07 Jun 2021 11:11) (83 - 95)  BP: 127/53 (07 Jun 2021 11:11) (125/71 - 144/67)  BP(mean): --  RR: 18 (07 Jun 2021 11:11) (18 - 19)  SpO2: 99% (07 Jun 2021 11:11) (97% - 99%)  I&O's Detail    07 Jun 2021 07:01  -  07 Jun 2021 13:58  --------------------------------------------------------  IN:  Total IN: 0 mL    OUT:    Other (mL): 1000 mL  Total OUT: 1000 mL    Total NET: -1000 mL      PHYSICAL EXAM:  GENERAL: NAD  HEAD:  Atraumatic, Normocephalic  NECK: Supple, No JVD, Normal thyroid  NERVOUS SYSTEM:  Alert & Oriented X3, Good concentration; Motor Strength 5/5 B/L upper and lower extremities  CHEST/LUNG: Clear to auscultation bilaterally, Right CW port  HEART: Regular rate and rhythm; No murmurs, rubs, or gallops  ABDOMEN: Soft, Nontender, Nondistended; Bowel sounds present  EXTREMITIES:  2+ Peripheral Pulses, No clubbing, cyanosis, or edema; LLE kalin                                9.3    5.57  )-----------( 75       ( 07 Jun 2021 08:20 )             30.0     07 Jun 2021 08:20    138    |  101    |  48.3   ----------------------------<  84     4.1     |  26.0   |  3.17     Ca    8.4        07 Jun 2021 08:20  Phos  2.9       07 Jun 2021 08:20  Mg     1.9       07 Jun 2021 08:20    TPro  7.0    /  Alb  3.7    /  TBili  0.5    /  DBili  0.2    /  AST  30     /  ALT  16     /  AlkPhos  115    06 Jun 2021 08:46      CAPILLARY BLOOD GLUCOSE  POCT Blood Glucose.: 173 mg/dL (07 Jun 2021 11:22)  POCT Blood Glucose.: 94 mg/dL (07 Jun 2021 07:29)  POCT Blood Glucose.: 149 mg/dL (06 Jun 2021 22:10)  POCT Blood Glucose.: 135 mg/dL (06 Jun 2021 16:39)    LIVER FUNCTIONS - ( 06 Jun 2021 08:46 )  Alb: 3.7 g/dL / Pro: 7.0 g/dL / ALK PHOS: 115 U/L / ALT: 16 U/L / AST: 30 U/L / GGT: x               MEDICATIONS  (STANDING):  amitriptyline 40 milliGRAM(s) Oral at bedtime  atorvastatin 40 milliGRAM(s) Oral at bedtime  chlorhexidine 2% Cloths 1 Application(s) Topical daily  chlorhexidine 4% Liquid 1 Application(s) Topical <User Schedule>  cholecalciferol 2000 Unit(s) Oral daily  dextrose 40% Gel 15 Gram(s) Oral once  dextrose 5%. 1000 milliLiter(s) (50 mL/Hr) IV Continuous <Continuous>  dextrose 5%. 1000 milliLiter(s) (100 mL/Hr) IV Continuous <Continuous>  dextrose 50% Injectable 25 Gram(s) IV Push once  dextrose 50% Injectable 12.5 Gram(s) IV Push once  dextrose 50% Injectable 25 Gram(s) IV Push once  folic acid 1 milliGRAM(s) Oral daily  gabapentin 300 milliGRAM(s) Oral at bedtime  glucagon  Injectable 1 milliGRAM(s) IntraMuscular once  hydrALAZINE 25 milliGRAM(s) Oral every 8 hours  insulin glargine Injectable (LANTUS) 12 Unit(s) SubCutaneous at bedtime  insulin lispro (ADMELOG) corrective regimen sliding scale   SubCutaneous three times a day before meals  insulin lispro (ADMELOG) corrective regimen sliding scale   SubCutaneous at bedtime  levothyroxine 100 MICROGram(s) Oral daily  metoprolol tartrate 25 milliGRAM(s) Oral every 6 hours  montelukast 10 milliGRAM(s) Oral at bedtime  Nephro-nunu 1 Tablet(s) Oral daily  pantoprazole    Tablet 40 milliGRAM(s) Oral before breakfast  senna 2 Tablet(s) Oral at bedtime  torsemide 20 milliGRAM(s) Oral daily    MEDICATIONS  (PRN):  acetaminophen   Tablet .. 650 milliGRAM(s) Oral every 6 hours PRN Temp greater or equal to 38.5C (101.3F), Mild Pain (1 - 3)  albuterol/ipratropium for Nebulization 3 milliLiter(s) Nebulizer every 6 hours PRN Shortness of Breath and/or Wheezing  oxyCODONE    IR 5 milliGRAM(s) Oral every 6 hours PRN Moderate Pain (4 - 6)  sodium chloride 0.9% lock flush 10 milliLiter(s) IV Push every 1 hour PRN Pre/post blood products, medications, blood draw, and to maintain line patency       Patient is a 76y old  Female who presents with a chief complaint of chf (07 Jun 2021 13:58)    Patient seen and examined at bedside.    ALLERGIES:  ACE inhibitors (Other)  Bactrim (Nephrotoxicity)  Biaxin (Joint Pain)  morphine (Short breath)  NSAIDs (Other)    MEDICATIONS  (STANDING):  amitriptyline 40 milliGRAM(s) Oral at bedtime  atorvastatin 40 milliGRAM(s) Oral at bedtime  chlorhexidine 2% Cloths 1 Application(s) Topical daily  chlorhexidine 4% Liquid 1 Application(s) Topical <User Schedule>  cholecalciferol 2000 Unit(s) Oral daily  dextrose 40% Gel 15 Gram(s) Oral once  dextrose 5%. 1000 milliLiter(s) (50 mL/Hr) IV Continuous <Continuous>  dextrose 5%. 1000 milliLiter(s) (100 mL/Hr) IV Continuous <Continuous>  dextrose 50% Injectable 25 Gram(s) IV Push once  dextrose 50% Injectable 12.5 Gram(s) IV Push once  dextrose 50% Injectable 25 Gram(s) IV Push once  folic acid 1 milliGRAM(s) Oral daily  gabapentin 300 milliGRAM(s) Oral at bedtime  glucagon  Injectable 1 milliGRAM(s) IntraMuscular once  hydrALAZINE 25 milliGRAM(s) Oral every 8 hours  insulin glargine Injectable (LANTUS) 12 Unit(s) SubCutaneous at bedtime  insulin lispro (ADMELOG) corrective regimen sliding scale   SubCutaneous three times a day before meals  insulin lispro (ADMELOG) corrective regimen sliding scale   SubCutaneous at bedtime  levothyroxine 100 MICROGram(s) Oral daily  metoprolol tartrate 25 milliGRAM(s) Oral every 6 hours  montelukast 10 milliGRAM(s) Oral at bedtime  Nephro-nunu 1 Tablet(s) Oral daily  pantoprazole    Tablet 40 milliGRAM(s) Oral before breakfast  senna 2 Tablet(s) Oral at bedtime  torsemide 20 milliGRAM(s) Oral daily    MEDICATIONS  (PRN):  acetaminophen   Tablet .. 650 milliGRAM(s) Oral every 6 hours PRN Temp greater or equal to 38.5C (101.3F), Mild Pain (1 - 3)  albuterol/ipratropium for Nebulization 3 milliLiter(s) Nebulizer every 6 hours PRN Shortness of Breath and/or Wheezing  diphenhydrAMINE 25 milliGRAM(s) Oral every 6 hours PRN Rash and/or Itching  oxyCODONE    IR 5 milliGRAM(s) Oral every 6 hours PRN Moderate Pain (4 - 6)  sodium chloride 0.9% lock flush 10 milliLiter(s) IV Push every 1 hour PRN Pre/post blood products, medications, blood draw, and to maintain line patency    Vital Signs Last 24 Hrs  T(F): 97.8 (07 Jun 2021 11:11), Max: 98.2 (07 Jun 2021 05:24)  HR: 95 (07 Jun 2021 11:11) (83 - 95)  BP: 127/53 (07 Jun 2021 11:11) (125/71 - 144/67)  RR: 18 (07 Jun 2021 11:11) (18 - 19)  SpO2: 99% (07 Jun 2021 11:11) (97% - 99%)  I&O's Summary    07 Jun 2021 07:01  -  07 Jun 2021 14:47  --------------------------------------------------------  IN: 0 mL / OUT: 1000 mL / NET: -1000 mL      PHYSICAL EXAM:  GENERAL: NAD, laying in bed aaox3  HEAD:  Atraumatic, Normocephalic  NECK: Supple, No JVD, Normal thyroid  NERVOUS SYSTEM:  Good concentration  CHEST/LUNG: Clear to auscultation bilaterally, Right CW port  HEART: +s1/s2  ABDOMEN: Soft, Nontender, Nondistended; Bowel sounds present  EXTREMITIES:  2+ Peripheral Pulses, No clubbing, cyanosis; [facility]      LABS:                        9.3    5.57  )-----------( 75       ( 07 Jun 2021 08:20 )             30.0     06-07    138  |  101  |  48.3  ----------------------------<  84  4.1   |  26.0  |  3.17    Ca    8.4      07 Jun 2021 08:20  Phos  2.9     06-07  Mg     1.9     06-07    TPro  7.0  /  Alb  3.7  /  TBili  0.5  /  DBili  0.2  /  AST  30  /  ALT  16  /  AlkPhos  115  06-06    eGFR if Non African American: 14 mL/min/1.73M2 (06-07-21 @ 08:20)  eGFR if : 16 mL/min/1.73M2 (06-07-21 @ 08:20)    Glucose  POCT Blood Glucose.: 173 mg/dL (07 Jun 2021 11:22)  POCT Blood Glucose.: 94 mg/dL (07 Jun 2021 07:29)  POCT Blood Glucose.: 149 mg/dL (06 Jun 2021 22:10)  POCT Blood Glucose.: 135 mg/dL (06 Jun 2021 16:39)    COVID-19 PCR: NotDetec (05-25-21 @ 15:47)  COVID-19 PCR: NotDetec (05-19-21 @ 15:18)    RADIOLOGY & ADDITIONAL TESTS:  - no new tests

## 2021-06-07 NOTE — PROGRESS NOTE ADULT - ASSESSMENT
76y Female with HTN HLD CAD s/p remote CABG, Bio AVR solitary episode of Af w/o recurrence, ILR in place, CRI, MDS transfusion dependent, cirrhosis sec to chronic hep c with esophageal varices and prior banding, hepatocellular carcinoma,s/p embolization with embozene particles on 4/5/21, and recent admission to [facility] after fall with head trauma with also transfused 2 untis PRBC during that admission admitted with fluid overload in setting of increased pulm HTN. Now s/p EGD/colonoscopy and Cardiomems placement. Despite appropriate medical management she has failed to improve with worsening renal function now necessitating HD.       Decompensated Heart Failure with preserved EF, euvolemic on exam,  Severe pulmonary HTN s/p CardioMems placement   Continue HD with improving resp status  Improving dyspnea, Bipap PRN   Continue Metoprolol and Torsemide    Cirrhosis 2/2 hepatitis C c/b hepatocellular carcinoma s/p embolization  Hepatocellular carcinoma   d/c  Spironolactone for now, Lasix changed to torsemide 20 mg daily   EGD on 5/26 which  mild hypertensive gastropathy, no varices, with biopsy obtained  Colonoscopy  5/27/21 noted     Hypertension   hydralazine as above    Diabetes Mellitus   c/w 12u subQ qhs, MDSS, will adjust base on POC  c/w lyrica and gabapentin    CAD, S/p CABG  Continue Lipitor 40mg po qhs  decreasing urinary output  On HD for volume control given renal failure    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   Transfusion dependent, s/p PRBC (goal > 8) - Hgb stable  No AC as per cardiology, patient bleeds easily     MARGUERITE on Chronic Kidney disease stage  Continue HD   Vascular/renal on board. Appreciate recs - Permacath 6/9/21      Hyperkalemia, now resolved   c/w to monitor with daily BMP     Hypothyroidism  -Continue levothyroxine 100mcg po daily     #VTE prophylaxis -SCDs due transfusion dependent anemia (pt non compliant> states that she will not develop  DVT despite extensive counseling)    #Advanced Directives -Full Code-HCP Daniela Collier    # Dispo - Pending fluid overload improvement  76y Female with HTN HLD CAD s/p remote CABG, Bio AVR solitary episode of Af w/o recurrence, ILR in place, CRI, MDS transfusion dependent, cirrhosis sec to chronic hep c with esophageal varices and prior banding, hepatocellular carcinoma,s/p embolization with embozene particles on 4/5/21, and recent admission to [facility] after fall with head trauma with also transfused 2 untis PRBC during that admission admitted with fluid overload in setting of increased pulm HTN. Now s/p EGD/colonoscopy and Cardiomems placement. Despite appropriate medical management she has failed to improve with worsening renal function now necessitating HD. awaiting permacath on wednesday 6/9/2021    #Acute on Chronic Diastolic Decompensated Heart Failure with preserved EF  - w/ Severe pulmonary HTN s/p CardioMems placement   - HD per nephro  - cardio consult appreciated   - Bipap PRN   - Metoprolol and Torsemide    #Cirrhosis 2/2 hepatitis C in pt w/ hepatocellular carcinoma s/p embolization  - d/c  Spironolactone for now  - torsemide     #Hypertension- Essential   - hydralazine metoprolol  - monitor blood pressure     #Diabetes Mellitus w/ neuropathy  - monitor fingersticks  - lantus and sliding scale   - lyrica and gabapentin  - a1c on 5.2    #CAD, S/p CABG  - statin     #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   - s/p PRBC (goal > 8) - Hgb stable  - No AC as per cardiology, patient bleeds easily     #MARGUERITE Turning to ESRD now on HD   - vascular consult appreciated  - nephrology consult appreciated     #Hypothyroidism  - levothyroxine      #DVT Prophylaxis   - venodynes - due transfusion dependent anemia (pt non compliant> states that she will not develop  DVT despite extensive counseling)      called and spoke to patient daughter Daniela [phone]. all questions answered

## 2021-06-07 NOTE — CHART NOTE - NSCHARTNOTEFT_GEN_A_CORE
Source: Patient [ ]  Family [ ]   other [x] EMR    Current Diet:   Diet, DASH/TLC:   Sodium & Cholesterol Restricted  Consistent Carbohydrate {Evening Snack} (CSTCHOSN)  For patients receiving Renal Replacement - No Protein Restr, No Conc K, No Conc Phos, Low  Sodium (RENAL)  Supplement Feeding Modality:  Oral  Nepro Cans or Servings Per Day:  1       Frequency:  Two Times a day (06-02-21 @ 15:06)    Patient reports [ ] nausea  [ ] vomiting [ ] diarrhea [ ] constipation  [ ]chewing problems [ ] swallowing issues  [ ] other: denies    PO intake:  < 50% [ ]   50-75%  [ ]   %  [x]  other :    Source for PO intake [ ] Patient [ ] family [x] chart [ ] staff [ ] other    Current Weight:   (7/7)   175.2 lbs  (7/5)   167.5 lbs  (7/4)   174.8 lbs  (7/2)   169.7 lbs    % Weight Change: Unclear accuracy of weights, will continue to monitor     Pertinent Medications: MEDICATIONS  (STANDING):  amitriptyline 40 milliGRAM(s) Oral at bedtime  atorvastatin 40 milliGRAM(s) Oral at bedtime  chlorhexidine 2% Cloths 1 Application(s) Topical daily  chlorhexidine 4% Liquid 1 Application(s) Topical <User Schedule>  cholecalciferol 2000 Unit(s) Oral daily  dextrose 40% Gel 15 Gram(s) Oral once  dextrose 5%. 1000 milliLiter(s) (50 mL/Hr) IV Continuous <Continuous>  dextrose 5%. 1000 milliLiter(s) (100 mL/Hr) IV Continuous <Continuous>  dextrose 50% Injectable 25 Gram(s) IV Push once  dextrose 50% Injectable 12.5 Gram(s) IV Push once  dextrose 50% Injectable 25 Gram(s) IV Push once  folic acid 1 milliGRAM(s) Oral daily  gabapentin 300 milliGRAM(s) Oral at bedtime  glucagon  Injectable 1 milliGRAM(s) IntraMuscular once  hydrALAZINE 25 milliGRAM(s) Oral every 8 hours  insulin glargine Injectable (LANTUS) 12 Unit(s) SubCutaneous at bedtime  insulin lispro (ADMELOG) corrective regimen sliding scale   SubCutaneous three times a day before meals  insulin lispro (ADMELOG) corrective regimen sliding scale   SubCutaneous at bedtime  levothyroxine 100 MICROGram(s) Oral daily  metoprolol tartrate 25 milliGRAM(s) Oral every 6 hours  montelukast 10 milliGRAM(s) Oral at bedtime  Nephro-nunu 1 Tablet(s) Oral daily  pantoprazole    Tablet 40 milliGRAM(s) Oral before breakfast  senna 2 Tablet(s) Oral at bedtime  torsemide 20 milliGRAM(s) Oral daily    MEDICATIONS  (PRN):  acetaminophen   Tablet .. 650 milliGRAM(s) Oral every 6 hours PRN Temp greater or equal to 38.5C (101.3F), Mild Pain (1 - 3)  albuterol/ipratropium for Nebulization 3 milliLiter(s) Nebulizer every 6 hours PRN Shortness of Breath and/or Wheezing  diphenhydrAMINE 25 milliGRAM(s) Oral every 6 hours PRN Rash and/or Itching  oxyCODONE    IR 5 milliGRAM(s) Oral every 6 hours PRN Moderate Pain (4 - 6)  sodium chloride 0.9% lock flush 10 milliLiter(s) IV Push every 1 hour PRN Pre/post blood products, medications, blood draw, and to maintain line patency    Pertinent Labs: CBC Full  -  ( 07 Jun 2021 08:20 )  WBC Count : 5.57 K/uL  RBC Count : 3.12 M/uL  Hemoglobin : 9.3 g/dL  Hematocrit : 30.0 %  Platelet Count - Automated : 75 K/uL  Mean Cell Volume : 96.2 fl  Mean Cell Hemoglobin : 29.8 pg  Mean Cell Hemoglobin Concentration : 31.0 gm/dL  06-07 Na138 mmol/L Glu 84 mg/dL K+ 4.1 mmol/L Cr  3.17 mg/dL<H> BUN 48.3 mg/dL<H> Phos 2.9 mg/dL Alb n/a   PAB n/a       Skin: L. groin surgical incision     Nutrition focused physical exam conducted - found signs of malnutrition [ ]absent [x]present    Subcutaneous fat loss: [ ] Orbital fat pads region, [ ]Buccal fat region, [ ]Triceps region,  [ ]Ribs region    Muscle wasting: [x]Temples region, [x]Clavicle region, [ ]Shoulder region, [ ]Scapula region, [ ]Interosseous region,  [ ]thigh region, [ ]Calf region    Estimated Needs:   [x] no change since previous assessment  [ ] recalculated:     Current Nutrition Diagnosis: Pt remains at nutrition risk secondary to increased nutrient needs related to increased physiological demand to maintain nutrition status as evidenced by renal insufficiency, CHF, B/L pleural effusions. Pt continues with good PO intake completing 75% of meals. Pt continues with worsening renal function now requiring HD, awaiting permacath on 6/9. Pt uninterested in nutrition education at this time, renal therapeutic diet literature left at bedside, RD to remain available for verbal ed PRN.     Recommendations:   1) Continue diet order   2) Continue Nephro-nunu, vit D, folic acid daily   3) Monitor weights daily for trend/accuracy   4) RD to remain available    Monitoring and Evaluation:   [x] PO intake [x] Tolerance to diet prescription [X] Weights  [X] Follow up per protocol [X] Labs:

## 2021-06-07 NOTE — PROGRESS NOTE ADULT - ATTENDING COMMENTS
I have personally seen and examined patient on the above date.  I discussed the case with BALDEMAR Villa and I agree with findings and plan as detailed per note above, which I have amended where appropriate.  The above note was modified in entirety by me.

## 2021-06-08 NOTE — PROGRESS NOTE ADULT - ASSESSMENT
CKD(IV): baseline 3.5mg/dL  CHF with chronic fluid overload; B/L pleural effusions---> decompensated with increased L effusion  - cont to avoid potential nephrotoxins  - Currently developing uremic sequelae and diuretic resistance   - Labs are under-reflective of degree of renal dysfunction due to her liver disease  - I had a long conversation w/ the patient , she is agreeable to start HD   - Clinically improving with initiation of HD   - Follow up Hep B and C and PPD   - Changed  to Torsemide 20 mg po daily   I discussed w/ Vascular surgery , they are planning permacath Wed 6/9  HD again tomorrow     Anemia: +CKD + MDS (transfusion dependent) + GI loss  Low Fe stores s/p IV Fe  Hepatic mass c/w HCC  - no ARACELI in setting of neoplastic process unless cleared by Heme/Onc  - PRBCs as needed    RO: serum phos ok  - monitor labs

## 2021-06-08 NOTE — PROGRESS NOTE ADULT - SUBJECTIVE AND OBJECTIVE BOX
NEPHROLOGY INTERVAL HPI/OVERNIGHT EVENTS:    feels better with HD   permacath tomorrow   meds noted     MEDICATIONS  (STANDING):  amitriptyline 40 milliGRAM(s) Oral at bedtime  atorvastatin 40 milliGRAM(s) Oral at bedtime  chlorhexidine 2% Cloths 1 Application(s) Topical daily  chlorhexidine 4% Liquid 1 Application(s) Topical <User Schedule>  cholecalciferol 2000 Unit(s) Oral daily  dextrose 40% Gel 15 Gram(s) Oral once  dextrose 5%. 1000 milliLiter(s) (50 mL/Hr) IV Continuous <Continuous>  dextrose 5%. 1000 milliLiter(s) (100 mL/Hr) IV Continuous <Continuous>  dextrose 50% Injectable 25 Gram(s) IV Push once  dextrose 50% Injectable 12.5 Gram(s) IV Push once  dextrose 50% Injectable 25 Gram(s) IV Push once  folic acid 1 milliGRAM(s) Oral daily  gabapentin 300 milliGRAM(s) Oral at bedtime  glucagon  Injectable 1 milliGRAM(s) IntraMuscular once  hydrALAZINE 25 milliGRAM(s) Oral every 8 hours  insulin glargine Injectable (LANTUS) 12 Unit(s) SubCutaneous at bedtime  insulin lispro (ADMELOG) corrective regimen sliding scale   SubCutaneous three times a day before meals  insulin lispro (ADMELOG) corrective regimen sliding scale   SubCutaneous at bedtime  levothyroxine 100 MICROGram(s) Oral daily  metoprolol tartrate 25 milliGRAM(s) Oral every 6 hours  montelukast 10 milliGRAM(s) Oral at bedtime  Nephro-nunu 1 Tablet(s) Oral daily  pantoprazole    Tablet 40 milliGRAM(s) Oral before breakfast  senna 2 Tablet(s) Oral at bedtime  torsemide 20 milliGRAM(s) Oral daily    MEDICATIONS  (PRN):  acetaminophen   Tablet .. 650 milliGRAM(s) Oral every 6 hours PRN Temp greater or equal to 38.5C (101.3F), Mild Pain (1 - 3)  albuterol/ipratropium for Nebulization 3 milliLiter(s) Nebulizer every 6 hours PRN Shortness of Breath and/or Wheezing  diphenhydrAMINE 25 milliGRAM(s) Oral every 6 hours PRN Rash and/or Itching  oxyCODONE    IR 5 milliGRAM(s) Oral every 6 hours PRN Moderate Pain (4 - 6)  sodium chloride 0.9% lock flush 10 milliLiter(s) IV Push every 1 hour PRN Pre/post blood products, medications, blood draw, and to maintain line patency      Allergies    ACE inhibitors (Other)  Bactrim (Nephrotoxicity)  Biaxin (Joint Pain)  morphine (Short breath)  NSAIDs (Other)    Intolerances          Vital Signs Last 24 Hrs  T(C): 36.7 (08 Jun 2021 11:22), Max: 36.7 (08 Jun 2021 11:22)  T(F): 98.1 (08 Jun 2021 11:22), Max: 98.1 (08 Jun 2021 11:22)  HR: 89 (08 Jun 2021 11:22) (76 - 97)  BP: 118/71 (08 Jun 2021 11:22) (114/65 - 118/71)  BP(mean): --  RR: 18 (08 Jun 2021 11:22) (18 - 18)  SpO2: 100% (08 Jun 2021 11:22) (93% - 100%)  Daily     Daily   I&O's Detail    07 Jun 2021 07:01  -  08 Jun 2021 07:00  --------------------------------------------------------  IN:  Total IN: 0 mL    OUT:    Other (mL): 1000 mL  Total OUT: 1000 mL    Total NET: -1000 mL        I&O's Summary    07 Jun 2021 07:01  -  08 Jun 2021 07:00  --------------------------------------------------------  IN: 0 mL / OUT: 1000 mL / NET: -1000 mL        PHYSICAL EXAM:    GENERAL: Chronically frail and debiliated  ENMT: Dry mucous membranes  NECK: Supple, +JVD  CHEST/LUNG: Diminished BS at bases, crackles  HEART: No rub  ABDOMEN: Soft, Nontender, Nondistended; BS+  EXT + edema , + L femoral marifer , No active bleeding    LABS:                        8.6    6.06  )-----------( 61       ( 08 Jun 2021 08:36 )             27.3     06-08    139  |  101  |  36.0<H>  ----------------------------<  110<H>  3.6   |  27.0  |  2.89<H>    Ca    8.5<L>      08 Jun 2021 08:36  Phos  3.6     06-08  Mg     1.9     06-07          Phosphorus Level, Serum: 3.6 mg/dL (06-08 @ 08:36)          RADIOLOGY & ADDITIONAL TESTS:

## 2021-06-08 NOTE — PROGRESS NOTE ADULT - SUBJECTIVE AND OBJECTIVE BOX
No acute events overnight.     MEDICATIONS  (STANDING):  amitriptyline 40 milliGRAM(s) Oral at bedtime  atorvastatin 40 milliGRAM(s) Oral at bedtime  chlorhexidine 2% Cloths 1 Application(s) Topical daily  chlorhexidine 4% Liquid 1 Application(s) Topical <User Schedule>  cholecalciferol 2000 Unit(s) Oral daily  dextrose 40% Gel 15 Gram(s) Oral once  dextrose 5%. 1000 milliLiter(s) (50 mL/Hr) IV Continuous <Continuous>  dextrose 5%. 1000 milliLiter(s) (100 mL/Hr) IV Continuous <Continuous>  dextrose 50% Injectable 25 Gram(s) IV Push once  dextrose 50% Injectable 12.5 Gram(s) IV Push once  dextrose 50% Injectable 25 Gram(s) IV Push once  folic acid 1 milliGRAM(s) Oral daily  gabapentin 300 milliGRAM(s) Oral at bedtime  glucagon  Injectable 1 milliGRAM(s) IntraMuscular once  hydrALAZINE 25 milliGRAM(s) Oral every 8 hours  insulin glargine Injectable (LANTUS) 12 Unit(s) SubCutaneous at bedtime  insulin lispro (ADMELOG) corrective regimen sliding scale   SubCutaneous three times a day before meals  insulin lispro (ADMELOG) corrective regimen sliding scale   SubCutaneous at bedtime  levothyroxine 100 MICROGram(s) Oral daily  metoprolol tartrate 25 milliGRAM(s) Oral every 6 hours  montelukast 10 milliGRAM(s) Oral at bedtime  Nephro-nunu 1 Tablet(s) Oral daily  pantoprazole    Tablet 40 milliGRAM(s) Oral before breakfast  senna 2 Tablet(s) Oral at bedtime  torsemide 20 milliGRAM(s) Oral daily    MEDICATIONS  (PRN):  acetaminophen   Tablet .. 650 milliGRAM(s) Oral every 6 hours PRN Temp greater or equal to 38.5C (101.3F), Mild Pain (1 - 3)  albuterol/ipratropium for Nebulization 3 milliLiter(s) Nebulizer every 6 hours PRN Shortness of Breath and/or Wheezing  diphenhydrAMINE 25 milliGRAM(s) Oral every 6 hours PRN Rash and/or Itching  oxyCODONE    IR 5 milliGRAM(s) Oral every 6 hours PRN Moderate Pain (4 - 6)  sodium chloride 0.9% lock flush 10 milliLiter(s) IV Push every 1 hour PRN Pre/post blood products, medications, blood draw, and to maintain line patency      Vital Signs Last 24 Hrs  T(C): 36.4 (08 Jun 2021 05:39), Max: 36.6 (07 Jun 2021 11:11)  T(F): 97.5 (08 Jun 2021 05:39), Max: 97.8 (07 Jun 2021 11:11)  HR: 76 (08 Jun 2021 05:39) (76 - 97)  BP: 114/65 (08 Jun 2021 05:39) (114/65 - 134/60)  BP(mean): --  RR: 18 (08 Jun 2021 05:39) (18 - 18)  SpO2: 97% (08 Jun 2021 05:39) (93% - 99%)    GEN: NAD, alert and oriented x 3  HEENT: WNL  CHEST: Symmetrical chest rise, breath sounds CTAB  HEART: RRR, non-muffled heart sounds  ABD: Soft, non-tender, non-distended  L femoral shiley in place      I&O's Detail    07 Jun 2021 07:01  -  08 Jun 2021 06:26  --------------------------------------------------------  IN:  Total IN: 0 mL    OUT:    Other (mL): 1000 mL  Total OUT: 1000 mL    Total NET: -1000 mL          LABS:                        9.3    5.57  )-----------( 75       ( 07 Jun 2021 08:20 )             30.0     06-07    138  |  101  |  48.3<H>  ----------------------------<  84  4.1   |  26.0  |  3.17<H>    Ca    8.4<L>      07 Jun 2021 08:20  Phos  2.9     06-07  Mg     1.9     06-07    TPro  7.0  /  Alb  3.7  /  TBili  0.5  /  DBili  0.2  /  AST  30  /  ALT  16  /  AlkPhos  115  06-06          RADIOLOGY & ADDITIONAL STUDIES:

## 2021-06-08 NOTE — CHART NOTE - NSCHARTNOTEFT_GEN_A_CORE
Vascular Surgery Follow up    Brief PA note    Patient seen and examined.  C/O left groin pressure.  Left groin with clean dressing present, left femoral shiley present.  No surrounding erythema, no purulence, no hematoma     - No active concerns pertaining to left groin access site.  - Keep shiley in place for now  - Keep NPO at midnight for Permacath placement in the AM

## 2021-06-08 NOTE — PROGRESS NOTE ADULT - SUBJECTIVE AND OBJECTIVE BOX
Patient is a 76y old  Female who presents with a chief complaint of chf (08 Jun 2021 06:24)      Patient seen and examined at bedside.      ALLERGIES:  ACE inhibitors (Other)  Bactrim (Nephrotoxicity)  Biaxin (Joint Pain)  morphine (Short breath)  NSAIDs (Other)    MEDICATIONS  (STANDING):  amitriptyline 40 milliGRAM(s) Oral at bedtime  atorvastatin 40 milliGRAM(s) Oral at bedtime  chlorhexidine 2% Cloths 1 Application(s) Topical daily  chlorhexidine 4% Liquid 1 Application(s) Topical <User Schedule>  cholecalciferol 2000 Unit(s) Oral daily  dextrose 40% Gel 15 Gram(s) Oral once  dextrose 5%. 1000 milliLiter(s) (50 mL/Hr) IV Continuous <Continuous>  dextrose 5%. 1000 milliLiter(s) (100 mL/Hr) IV Continuous <Continuous>  dextrose 50% Injectable 25 Gram(s) IV Push once  dextrose 50% Injectable 12.5 Gram(s) IV Push once  dextrose 50% Injectable 25 Gram(s) IV Push once  folic acid 1 milliGRAM(s) Oral daily  gabapentin 300 milliGRAM(s) Oral at bedtime  glucagon  Injectable 1 milliGRAM(s) IntraMuscular once  hydrALAZINE 25 milliGRAM(s) Oral every 8 hours  insulin glargine Injectable (LANTUS) 12 Unit(s) SubCutaneous at bedtime  insulin lispro (ADMELOG) corrective regimen sliding scale   SubCutaneous three times a day before meals  insulin lispro (ADMELOG) corrective regimen sliding scale   SubCutaneous at bedtime  levothyroxine 100 MICROGram(s) Oral daily  metoprolol tartrate 25 milliGRAM(s) Oral every 6 hours  montelukast 10 milliGRAM(s) Oral at bedtime  Nephro-nunu 1 Tablet(s) Oral daily  pantoprazole    Tablet 40 milliGRAM(s) Oral before breakfast  senna 2 Tablet(s) Oral at bedtime  torsemide 20 milliGRAM(s) Oral daily    MEDICATIONS  (PRN):  acetaminophen   Tablet .. 650 milliGRAM(s) Oral every 6 hours PRN Temp greater or equal to 38.5C (101.3F), Mild Pain (1 - 3)  albuterol/ipratropium for Nebulization 3 milliLiter(s) Nebulizer every 6 hours PRN Shortness of Breath and/or Wheezing  diphenhydrAMINE 25 milliGRAM(s) Oral every 6 hours PRN Rash and/or Itching  oxyCODONE    IR 5 milliGRAM(s) Oral every 6 hours PRN Moderate Pain (4 - 6)  sodium chloride 0.9% lock flush 10 milliLiter(s) IV Push every 1 hour PRN Pre/post blood products, medications, blood draw, and to maintain line patency    Vital Signs Last 24 Hrs  T(F): 97.5 (08 Jun 2021 05:39), Max: 97.8 (07 Jun 2021 11:11)  HR: 76 (08 Jun 2021 05:39) (76 - 97)  BP: 114/65 (08 Jun 2021 05:39) (114/65 - 127/53)  RR: 18 (08 Jun 2021 05:39) (18 - 18)  SpO2: 97% (08 Jun 2021 05:39) (93% - 99%)  I&O's Summary    07 Jun 2021 07:01  -  08 Jun 2021 07:00  --------------------------------------------------------  IN: 0 mL / OUT: 1000 mL / NET: -1000 mL    PHYSICAL EXAM:  GENERAL: NAD, laying in bed aaox3  HEAD:  Atraumatic, Normocephalic  NECK: Supple, No JVD, Normal thyroid  NERVOUS SYSTEM:  Good concentration  CHEST/LUNG: Clear to auscultation bilaterally, Right CW port  HEART: +s1/s2  ABDOMEN: Soft, Nontender, Nondistended; Bowel sounds present  EXTREMITIES:  2+ Peripheral Pulses, No clubbing, cyanosis; [facility]    LABS:                        9.3    5.57  )-----------( 75       ( 07 Jun 2021 08:20 )             30.0     06-07    138  |  101  |  48.3  ----------------------------<  84  4.1   |  26.0  |  3.17    Ca    8.4      07 Jun 2021 08:20  Phos  2.9     06-07  Mg     1.9     06-07    TPro  7.0  /  Alb  3.7  /  TBili  0.5  /  DBili  0.2  /  AST  30  /  ALT  16  /  AlkPhos  115  06-06    eGFR if Non African American: 14 mL/min/1.73M2 (06-07-21 @ 08:20)  eGFR if : 16 mL/min/1.73M2 (06-07-21 @ 08:20)    Glucose  POCT Blood Glucose.: 176 mg/dL (07 Jun 2021 23:01)  POCT Blood Glucose.: 77 mg/dL (07 Jun 2021 16:24)  POCT Blood Glucose.: 173 mg/dL (07 Jun 2021 11:22)    COVID-19 PCR: NotDetec (05-25-21 @ 15:47)  COVID-19 PCR: NotDetec (05-19-21 @ 15:18)    RADIOLOGY & ADDITIONAL TESTS:  - no new tests

## 2021-06-08 NOTE — PROGRESS NOTE ADULT - ASSESSMENT
76y Female with HTN HLD CAD s/p remote CABG, Bio AVR solitary episode of Af w/o recurrence, ILR in place, CRI, MDS transfusion dependent, cirrhosis sec to chronic hep c with esophageal varices and prior banding, hepatocellular carcinoma,s/p embolization with embozene particles on 4/5/21, and recent admission to [facility] after fall with head trauma with also transfused 2 untis PRBC during that admission admitted with fluid overload in setting of increased pulm HTN. Now s/p EGD/colonoscopy and Cardiomems placement. Despite appropriate medical management she has failed to improve with worsening renal function now necessitating HD. awaiting permacath on wednesday 6/9/2021    #Acute on Chronic Diastolic Decompensated Heart Failure with preserved EF  - w/ Severe pulmonary HTN s/p CardioMems placement   - HD per nephro  - cardio consult appreciated   - Bipap PRN   - Metoprolol and Torsemide    #Cirrhosis 2/2 hepatitis C in pt w/ hepatocellular carcinoma s/p embolization  - d/c  Spironolactone for now  - torsemide     #Hypertension- Essential   - hydralazine metoprolol  - monitor blood pressure     #Diabetes Mellitus w/ neuropathy  - monitor fingersticks  - lantus and sliding scale   - lyrica and gabapentin  - a1c on 5.2    #CAD, S/p CABG  - statin     #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   - s/p PRBC (goal > 8) - Hgb stable  - No AC as per cardiology, patient bleeds easily     #MARGUERITE Turning to ESRD now on HD   - vascular consult appreciated  - nephrology consult appreciated     #Hypothyroidism  - levothyroxine      #DVT Prophylaxis   - venodynes - due transfusion dependent anemia (pt non compliant> states that she will not develop  DVT despite extensive counseling)      called and spoke to patient daughter Daniela [phone]. all questions answered. pending permacath placement in am

## 2021-06-09 NOTE — CHART NOTE - NSCHARTNOTEFT_GEN_A_CORE
Pt is S/P  R IJ permcath                                 Tolerated procedure well  Offers no complaints  Due for HD today    Vital Signs Last 24 Hrs  T(C): 36.7 (09 Jun 2021 07:12), Max: 36.7 (08 Jun 2021 11:22)  T(F): 98 (09 Jun 2021 07:12), Max: 98.1 (08 Jun 2021 11:22)  HR: 97 (09 Jun 2021 08:56) (77 - 97)  BP: 161/76 (09 Jun 2021 08:56) (112/74 - 165/70)  BP(mean): --  RR: 18 (09 Jun 2021 08:56) (18 - 18)  SpO2: 99% (09 Jun 2021 08:56) (98% - 100%)  I&O's Detail    Physical Exam:  General: NAD, resting comfortably in bed. L IJ permcath in place, no evidence of hematoma or bleeding  Pulmonary: Nonlabored breathing, no respiratory distress, diminished at bases  Cardiovascular: Normal S1, S2  Abdominal: soft, NT/ND  Extremities: WWP      LABS:                        8.6    6.58  )-----------( 67       ( 09 Jun 2021 07:30 )             27.6     06-09    137  |  101  |  47.4<H>  ----------------------------<  134<H>  4.1   |  26.0  |  3.31<H>    Ca    8.4<L>      09 Jun 2021 07:30  Phos  3.6     06-08        CAPILLARY BLOOD GLUCOSE  POCT Blood Glucose.: 179 mg/dL (08 Jun 2021 21:43)  POCT Blood Glucose.: 168 mg/dL (08 Jun 2021 16:43)  POCT Blood Glucose.: 179 mg/dL (08 Jun 2021 11:17)      Radiology and Additional Studies:    Assessment:[id number]:  pt. 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 no cp, no leg edema. pt and her daughter stated that she was admitted at [facility] last week for about 4 days after she fell at her pcp office, CT head was negative, was found to be anemic and got 2 units of prbc. Pt. stated that she cannot be on aspirin or any blood thinners due to anemia and bleeding risk. I spoke to cardiologist dr. Shelton who saw pt. in the ER and also recommended no Aspirin, heparin/ lovenox for pt. as she bleeds easily. Pt. has port for blood transfusions and iron infusions by her hematologist. no abd. pain, no n/v/d, no melena, no hematemesis, no hemoptysis, no sig. cough, no fever, no sick contact. Initially in the ER pt. was on bipap but off the bipap at the time of admission and is on o2 via NC.  (19 May 2021 18:10)  Pt with wosening renal function requiring HD   S/P ISABELLA main    Plan:  OK to use L LIZETH permcath  Vascular will rebeka Ennis after successful HD

## 2021-06-09 NOTE — PROGRESS NOTE ADULT - SUBJECTIVE AND OBJECTIVE BOX
Vascular Surgery Brief PA note    Patient seen and examined this AM.    Left Femoral vein Shiley catheter present, dressing clean  Right Mediport     - PermCath to be placed today  - will remove shiley later today

## 2021-06-09 NOTE — CHART NOTE - NSCHARTNOTEFT_GEN_A_CORE
HPI/Interval Events: Patient now s/p left IJ Permacath placement. No acute intervening events. Patient feels okay, with no significant complaints. Tolerated HD via Permacath. No f/c/n/v, chest pain, SOB.    MEDICATIONS  (STANDING):  amitriptyline 40 milliGRAM(s) Oral at bedtime  atorvastatin 40 milliGRAM(s) Oral at bedtime  chlorhexidine 2% Cloths 1 Application(s) Topical daily  chlorhexidine 4% Liquid 1 Application(s) Topical <User Schedule>  cholecalciferol 2000 Unit(s) Oral daily  dextrose 40% Gel 15 Gram(s) Oral once  dextrose 5%. 1000 milliLiter(s) (50 mL/Hr) IV Continuous <Continuous>  dextrose 5%. 1000 milliLiter(s) (100 mL/Hr) IV Continuous <Continuous>  dextrose 50% Injectable 12.5 Gram(s) IV Push once  dextrose 50% Injectable 25 Gram(s) IV Push once  dextrose 50% Injectable 25 Gram(s) IV Push once  folic acid 1 milliGRAM(s) Oral daily  gabapentin 300 milliGRAM(s) Oral at bedtime  glucagon  Injectable 1 milliGRAM(s) IntraMuscular once  hydrALAZINE 25 milliGRAM(s) Oral every 8 hours  insulin glargine Injectable (LANTUS) 12 Unit(s) SubCutaneous at bedtime  insulin lispro (ADMELOG) corrective regimen sliding scale   SubCutaneous three times a day before meals  insulin lispro (ADMELOG) corrective regimen sliding scale   SubCutaneous at bedtime  levothyroxine 100 MICROGram(s) Oral daily  metoprolol tartrate 25 milliGRAM(s) Oral every 6 hours  montelukast 10 milliGRAM(s) Oral at bedtime  Nephro-nunu 1 Tablet(s) Oral daily  pantoprazole    Tablet 40 milliGRAM(s) Oral before breakfast  senna 2 Tablet(s) Oral at bedtime  torsemide 20 milliGRAM(s) Oral daily    MEDICATIONS  (PRN):  acetaminophen   Tablet .. 650 milliGRAM(s) Oral every 6 hours PRN Temp greater or equal to 38.5C (101.3F), Mild Pain (1 - 3)  albuterol/ipratropium for Nebulization 3 milliLiter(s) Nebulizer every 6 hours PRN Shortness of Breath and/or Wheezing  diphenhydrAMINE 25 milliGRAM(s) Oral every 6 hours PRN Rash and/or Itching  oxyCODONE    IR 5 milliGRAM(s) Oral every 6 hours PRN Moderate Pain (4 - 6)  sodium chloride 0.9% lock flush 10 milliLiter(s) IV Push every 1 hour PRN Pre/post blood products, medications, blood draw, and to maintain line patency      Vital Signs Last 24 Hrs  T(C): 36.6 (09 Jun 2021 14:01), Max: 36.7 (09 Jun 2021 05:15)  T(F): 97.8 (09 Jun 2021 14:01), Max: 98 (09 Jun 2021 05:15)  HR: 94 (09 Jun 2021 14:01) (77 - 97)  BP: 149/77 (09 Jun 2021 14:01) (112/74 - 165/70)  BP(mean): --  RR: 18 (09 Jun 2021 14:01) (18 - 18)  SpO2: 100% (09 Jun 2021 14:01) (97% - 100%)    Gen: patient laying in bed, A&Ox3, NAD  HEENT: EOMI / PERRL b/l. Left permacath in place, dressing clean and intact, small amount of old strikethrough  Pulm: regular respiratory rate, no distress  CV: RRR  GI: Abdominal incisions c/d/i. Abdomen soft, non-distended, TTP around incision sites w/o rebound or guarding  Neurological: no gross sensory / motor deficits  Ext: Warm, pink, no edema or lesions noted. Left femoral Shiley in place, site c/d/i      I&O's Detail      LABS:                        8.6    6.58  )-----------( 67       ( 09 Jun 2021 07:30 )             27.6     06-09    137  |  101  |  47.4<H>  ----------------------------<  134<H>  4.1   |  26.0  |  3.31<H>    Ca    8.4<L>      09 Jun 2021 07:30  Phos  3.6     06-08            A/P: 76y Female w/PMH of CHF with fluid overload requiring HD, now s/p L IJ Permacath placement. Stable post-op and tolerating HD via Permacath.    -Left femoral Shiley pulled; direct pressure held for about 5 minutes and occlusive dressing placed. Small amount of bleeding noted on initial removal; no significant bleeding or hematoma noted  -Will continue to monitor left groin  -Will follow-up with Nephro for duration of expected HD requirement HPI/Interval Events: Patient now s/p left IJ Permacath placement. No acute intervening events. Patient feels okay, with no significant complaints. Tolerated HD via Permacath. No f/c/n/v, chest pain, SOB.    MEDICATIONS  (STANDING):  amitriptyline 40 milliGRAM(s) Oral at bedtime  atorvastatin 40 milliGRAM(s) Oral at bedtime  chlorhexidine 2% Cloths 1 Application(s) Topical daily  chlorhexidine 4% Liquid 1 Application(s) Topical <User Schedule>  cholecalciferol 2000 Unit(s) Oral daily  dextrose 40% Gel 15 Gram(s) Oral once  dextrose 5%. 1000 milliLiter(s) (50 mL/Hr) IV Continuous <Continuous>  dextrose 5%. 1000 milliLiter(s) (100 mL/Hr) IV Continuous <Continuous>  dextrose 50% Injectable 12.5 Gram(s) IV Push once  dextrose 50% Injectable 25 Gram(s) IV Push once  dextrose 50% Injectable 25 Gram(s) IV Push once  folic acid 1 milliGRAM(s) Oral daily  gabapentin 300 milliGRAM(s) Oral at bedtime  glucagon  Injectable 1 milliGRAM(s) IntraMuscular once  hydrALAZINE 25 milliGRAM(s) Oral every 8 hours  insulin glargine Injectable (LANTUS) 12 Unit(s) SubCutaneous at bedtime  insulin lispro (ADMELOG) corrective regimen sliding scale   SubCutaneous three times a day before meals  insulin lispro (ADMELOG) corrective regimen sliding scale   SubCutaneous at bedtime  levothyroxine 100 MICROGram(s) Oral daily  metoprolol tartrate 25 milliGRAM(s) Oral every 6 hours  montelukast 10 milliGRAM(s) Oral at bedtime  Nephro-nunu 1 Tablet(s) Oral daily  pantoprazole    Tablet 40 milliGRAM(s) Oral before breakfast  senna 2 Tablet(s) Oral at bedtime  torsemide 20 milliGRAM(s) Oral daily    MEDICATIONS  (PRN):  acetaminophen   Tablet .. 650 milliGRAM(s) Oral every 6 hours PRN Temp greater or equal to 38.5C (101.3F), Mild Pain (1 - 3)  albuterol/ipratropium for Nebulization 3 milliLiter(s) Nebulizer every 6 hours PRN Shortness of Breath and/or Wheezing  diphenhydrAMINE 25 milliGRAM(s) Oral every 6 hours PRN Rash and/or Itching  oxyCODONE    IR 5 milliGRAM(s) Oral every 6 hours PRN Moderate Pain (4 - 6)  sodium chloride 0.9% lock flush 10 milliLiter(s) IV Push every 1 hour PRN Pre/post blood products, medications, blood draw, and to maintain line patency      Vital Signs Last 24 Hrs  T(C): 36.6 (09 Jun 2021 14:01), Max: 36.7 (09 Jun 2021 05:15)  T(F): 97.8 (09 Jun 2021 14:01), Max: 98 (09 Jun 2021 05:15)  HR: 94 (09 Jun 2021 14:01) (77 - 97)  BP: 149/77 (09 Jun 2021 14:01) (112/74 - 165/70)  BP(mean): --  RR: 18 (09 Jun 2021 14:01) (18 - 18)  SpO2: 100% (09 Jun 2021 14:01) (97% - 100%)    Gen: patient laying in bed, A&Ox3, NAD  HEENT: EOMI / PERRL b/l. Left permacath in place, dressing clean and intact, small amount of old strikethrough  Pulm: regular respiratory rate, no distress  CV: RRR  GI: Abdomen soft, non-distended, nontender  Neurological: no gross sensory / motor deficits  Ext: Warm, pink, no edema or lesions noted. Left femoral Shiley in place, site c/d/i      I&O's Detail      LABS:                        8.6    6.58  )-----------( 67       ( 09 Jun 2021 07:30 )             27.6     06-09    137  |  101  |  47.4<H>  ----------------------------<  134<H>  4.1   |  26.0  |  3.31<H>    Ca    8.4<L>      09 Jun 2021 07:30  Phos  3.6     06-08            A/P: 76y Female w/PMH of CHF with fluid overload requiring HD, now s/p L IJ Permacath placement. Stable post-op and tolerating HD via Permacath.    -Left femoral Shiley pulled; direct pressure held for about 5 minutes and occlusive dressing placed. Small amount of bleeding noted on initial removal; no significant bleeding or hematoma noted  -Will continue to monitor left groin  -Will follow-up with Nephro for duration of expected HD requirement

## 2021-06-09 NOTE — BRIEF OPERATIVE NOTE - NSICDXBRIEFPREOP_GEN_ALL_CORE_FT
PRE-OP DIAGNOSIS:  Acute on chronic diastolic congestive heart failure 09-Jun-2021 08:58:45  Jagdeep Almonte  
PRE-OP DIAGNOSIS:  Anemia of chronic disease 26-May-2021 10:48:47  Rachel Barr  Drop in hemoglobin 27-May-2021 10:51:41  Nathan Wang  
PRE-OP DIAGNOSIS:  Anemia of chronic disease 26-May-2021 10:48:47  Rachel Barr  Chronic hepatitis C 26-May-2021 10:48:57  Rachel Barr  Chronic hepatitis C with cirrhosis 26-May-2021 10:49:02  Rachel Barr  Liver lesion 26-May-2021 10:49:35  Rachel Barr

## 2021-06-09 NOTE — PROGRESS NOTE ADULT - SUBJECTIVE AND OBJECTIVE BOX
Patient is a 76y old  Female who presents with a chief complaint of chf (09 Jun 2021 07:54)    Patient seen and examined at bedside.     ALLERGIES:  ACE inhibitors (Other)  Bactrim (Nephrotoxicity)  Biaxin (Joint Pain)  morphine (Short breath)  NSAIDs (Other)    MEDICATIONS  (STANDING):  amitriptyline 40 milliGRAM(s) Oral at bedtime  atorvastatin 40 milliGRAM(s) Oral at bedtime  chlorhexidine 2% Cloths 1 Application(s) Topical daily  chlorhexidine 4% Liquid 1 Application(s) Topical <User Schedule>  cholecalciferol 2000 Unit(s) Oral daily  dextrose 40% Gel 15 Gram(s) Oral once  dextrose 5%. 1000 milliLiter(s) (50 mL/Hr) IV Continuous <Continuous>  dextrose 5%. 1000 milliLiter(s) (100 mL/Hr) IV Continuous <Continuous>  dextrose 50% Injectable 12.5 Gram(s) IV Push once  dextrose 50% Injectable 25 Gram(s) IV Push once  dextrose 50% Injectable 25 Gram(s) IV Push once  folic acid 1 milliGRAM(s) Oral daily  gabapentin 300 milliGRAM(s) Oral at bedtime  glucagon  Injectable 1 milliGRAM(s) IntraMuscular once  hydrALAZINE 25 milliGRAM(s) Oral every 8 hours  insulin glargine Injectable (LANTUS) 12 Unit(s) SubCutaneous at bedtime  insulin lispro (ADMELOG) corrective regimen sliding scale   SubCutaneous three times a day before meals  insulin lispro (ADMELOG) corrective regimen sliding scale   SubCutaneous at bedtime  levothyroxine 100 MICROGram(s) Oral daily  metoprolol tartrate 25 milliGRAM(s) Oral every 6 hours  montelukast 10 milliGRAM(s) Oral at bedtime  Nephro-nunu 1 Tablet(s) Oral daily  pantoprazole    Tablet 40 milliGRAM(s) Oral before breakfast  senna 2 Tablet(s) Oral at bedtime  torsemide 20 milliGRAM(s) Oral daily    MEDICATIONS  (PRN):  acetaminophen   Tablet .. 650 milliGRAM(s) Oral every 6 hours PRN Temp greater or equal to 38.5C (101.3F), Mild Pain (1 - 3)  albuterol/ipratropium for Nebulization 3 milliLiter(s) Nebulizer every 6 hours PRN Shortness of Breath and/or Wheezing  diphenhydrAMINE 25 milliGRAM(s) Oral every 6 hours PRN Rash and/or Itching  oxyCODONE    IR 5 milliGRAM(s) Oral every 6 hours PRN Moderate Pain (4 - 6)  sodium chloride 0.9% lock flush 10 milliLiter(s) IV Push every 1 hour PRN Pre/post blood products, medications, blood draw, and to maintain line patency    Vital Signs Last 24 Hrs  T(F): 97.8 (09 Jun 2021 10:35), Max: 98.1 (08 Jun 2021 11:22)  HR: 92 (09 Jun 2021 10:35) (77 - 97)  BP: 131/85 (09 Jun 2021 10:35) (112/74 - 165/70)  RR: 18 (09 Jun 2021 10:35) (18 - 18)  SpO2: 97% (09 Jun 2021 10:35) (97% - 100%)  I&O's Summary    PHYSICAL EXAM:  General: NAD, A/O x 3 +left permacath +right port  ENT: MMM, no thrush  Neck: Supple, No JVD  Lungs: good air entry, non-labored breathing  Cardio: +s1/s2  Abdomen: Soft, Nontender, Nondistended; Bowel sounds present  Extremities: No calf tenderness    LABS:                        8.6    6.58  )-----------( 67       ( 09 Jun 2021 07:30 )             27.6     06-09    137  |  101  |  47.4  ----------------------------<  134  4.1   |  26.0  |  3.31    Ca    8.4      09 Jun 2021 07:30  Phos  3.6     06-08  Mg     1.9     06-07    eGFR if Non African American: 13 mL/min/1.73M2 (06-09-21 @ 07:30)  eGFR if African American: 15 mL/min/1.73M2 (06-09-21 @ 07:30)    Glucose  POCT Blood Glucose.: 179 mg/dL (08 Jun 2021 21:43)  POCT Blood Glucose.: 168 mg/dL (08 Jun 2021 16:43)  POCT Blood Glucose.: 179 mg/dL (08 Jun 2021 11:17)    COVID-19 PCR: NotDetec (06-09-21 @ 01:06)  COVID-19 PCR: NotDetec (05-25-21 @ 15:47)  COVID-19 PCR: NotDetec (05-19-21 @ 15:18)    RADIOLOGY & ADDITIONAL TESTS:  - no new tests

## 2021-06-09 NOTE — CHART NOTE - NSCHARTNOTESELECT_GEN_ALL_CORE
Cardiology Cath Lab NP Note/Event Note
Event Note
Event Note
Post-op Check/Event Note
Thoracic Surgery PA/Event Note
Event Note
Nutrition Services
Nutrition Services
Sign off/Event Note
Thoracic Surgery/Event Note

## 2021-06-09 NOTE — BRIEF OPERATIVE NOTE - NSICDXBRIEFPROCEDURE_GEN_ALL_CORE_FT
PROCEDURES:  EGD, with cold biopsy 26-May-2021 10:48:25  Rachel Barr  EGD with biopsy 26-May-2021 10:57:22  Rachel Barr  
PROCEDURES:  Colonoscopy, with lesion ablation using argon plasma coagulation, bipolar cautery, or radiofrequency ablation 27-May-2021 10:50:03  Nathan Wang  
PROCEDURES:  Insertion, catheter, dialysis, tunneled, with imaging guidance 09-Jun-2021 08:58:05  Jagdeep Almonte

## 2021-06-09 NOTE — BRIEF OPERATIVE NOTE - ASSISTANT(S)
"              After Visit Summary   11/10/2017    Camilla Henao    MRN: [id number]           Patient Information     Date Of Birth          1994        Visit Information        Provider Department      11/10/2017 10:20 AM Gen Moreno DO [facility]        Today's Diagnoses     Gender dysphoria in adolescent and adult          Care Instructions    Here is the plan from today's visit    1. Gender dysphoria in adolescent and adult  - testosterone cypionate (DEPOTESTOTERONE) 200 MG/ML injection; Inject 0.13 mLs (26 mg) into the muscle once a week  Dispense: 2 mL; Refill: 5  - needle, disp, 18G X 1\" MISC; Use to draw up hormones once weekly  Dispense: 25 each; Refill: 11  - Needle, Disp, 25G X 5/8\" MISC; 1 Units once a week  Dispense: 25 each; Refill: 11  - syringe, disposable, 1 ML MISC; Use once weekly to draw up hormones  Dispense: 25 each; Refill: 11    Start with 0.13mL weekly.    Let us know if mood changes.      Plan for support group and therapist to check in with.       Follow up plan: in 1-2 months      Thank you for coming to [facility] today.  Lab Testing:  **If you had lab testing today and your results are reassuring or normal they will be mailed to you or sent through [employer] within 7 days.   **If the lab tests need quick action we will call you with the results.  The phone number we will call with results is # [phone] (home) . If this is not the best number please call our clinic and change the number.  Medication Refills:  If you need any refills please call your pharmacy and they will contact us.   If you need to  your refill at a new pharmacy, please contact the new pharmacy directly. The new pharmacy will help you get your medications transferred faster.   Scheduling:  If you have any concerns about today's visit or wish to schedule another appointment please call our office during normal business hours [phone] (8-5:00 M-F)  If a referral "
was made to a [facility] and you don't get a call from central scheduling please call [phone].  If a Mammogram was ordered for you at [facility] call [phone] to schedule or change your appointment.  If you had an XRay/CT/Ultrasound/MRI ordered the number is [phone] to schedule or change your radiology appointment.   Medical Concerns:  If you have urgent medical concerns please call [phone] at any time of the day.  If you have a medical emergency please call [phone].         Individual Mental Health Practitioners   Therapist Children Adolescents Adults Family Other   Jaden Anell, [facility], DCSW  [address]  [facility] [address]  Work Phone: [phone]   YES YES    Rosanna De Los Santos PsyD,   [address]  [place]  [address]  PH: [phone]   YES YES    Macie Amezcua PsyD, [facility]  [facility]  [address]  [place], [place]   PH:[phone] YES YES YES YES Business consulting   Bijan Beach, PhD, LP  [address]  [place] [address]  PH: [phone]  [employer]  Older Adolescents YES  On busline   [facility]  [address]  [place] [address]  PH: [phone]  [employer]  YES YES YES Busline   TOÑITO DiazEd, [facility], [facility]  [address]  [place], [place]   [phone]  [email or web address]   YES YES  Experienced in Trans Veterans    Sharron Buck MA, LMFT, CST  [address]  [place], [place] [address] [phone]  [employer]  YES YES YES Sex positive therapy    JEFFERY Trejo MFA, Jamie, LP   [address]  [address]  [place], [place] [id number] [phone]  Sexfromthecenter.[email or web address]   YES YES Sexuality groups  [facility]        Mental Health Clinics   [facility]   Patients: Children, Teen, Adult, Families, Couples  [address]  [place], [place] [id number] [phone] 
fax  [facility]  Patients: Children, adolescents, adults  Locations:  [address]  [place] [address]  [address]  [place] [address]  [address]  [place] [address]  [address]  [place] [address]    Appointment Scheduling   [phone]  [email or web address]  [facility]  All ages- Also offers in-home therapy and sliding fee  [address]  [place] [address]   Phone: [phone]  TherOx    [facility] [place] [address]  Intake line [phone]  [email or web address].org/programsservices/counseling/transgender-mental-health/    Transgender Mental Health Team  Therapists Children Adolescents Adults Family Other information Jimenez(Octavio) Arthur Freire, MSW,LICSW YES YES YES  Cuban and English  [facility] Location- [facility]   Tena Granados MA, LMFT YES YES YES  [facility] Location   EVIE Piedra, LICSW   YES YES EMDR  [place] Location     Benji Erickson MA, LP YES YES YES YES [facility] Location- [facility] Sexual medicine-Psychology  [email or web address]  [place] Location   [address] [place] [address]  Phone [phone]  Therapists Children Adolescents Adults Family Other   Jena Ibanez PsyD, LP   YES YES-couples    Jono Dean PsyD, LP   YES     Melvi Dos Santos, PhD, LP   YES       [place] Location  [address] [place] [address]  Phone: [phone]  Therapists Children Adolescents Adults Family Other   Ina Smiley PsyD, LP   YES YES    Liz Fonseca PsyD, LP   YES       [facility]  Patients-Queer and Trans Youth and Family counseling serving ages 12-26  [address] [place] [address]  Phone: [phone]  [email or web address], 
Ltd  [facility]  [address]  [place] [address]  Main line: [phone]  [email or web address]    [facility] for Sexual Health/ Program in Human Sexuality  [address]  [place] [address]  PH: [phone]   [email or web address].[facility]/clinic-center-sexual-health/transgender-health-services    Therapists Children Adolescents Adults Family Other information   Suha Mejía, PsDeondre YES YES YES     Katie Coburn, PhD YES YES YES     Shabana Dean, Jamie  YES YES     Madelin Quinn, PhD YES YES YES     Sung Garland, PhD, MPH YES YES   Sexual and Gender minority health   Multiple other Post-Doctoral fellows practicing at this location as well           Additional Provider Directories  [facility]- Provider Directory for all services  [email or web address]://[email or web address]/resources-for-you/provider-directory/    [facility] lesbian degroot bisexual transgender & allied mental health providers' network  http://www.lgbttherapists.[email or web address]    Provider Directory for Health Related resources in [place]  http://mnlgbtqdirectory.org    Directory for kink, Polyamory, gender non-conforming aware Providers  http://www.kinkaware.org            Follow-ups after your visit        Your next 10 appointments already scheduled     Apr 03, 2018  1:00 PM CDT   (Arrive by 12:45 PM)   New Plastic Surgery with Annamarie Glover MD   [facility] ([facility] [place])    [address]  4th [address]  [facility] [address] [phone]           Do not wear perfume.              Who to contact     Please call your clinic at [phone] to:    Ask questions about your health    Make or cancel appointments    Discuss your medicines    Learn about your test results    Speak to your doctor   If you have compliments or concerns about an experience at your clinic, or if you wish to file a complaint, please contact [facility] 
"[place] [facility] Patient Relations at [phone] or email us at [email or web address]         Additional Information About Your Visit        [employer] Information     [employer] gives you secure access to your electronic health record. If you see a primary care provider, you can also send messages to your care team and make appointments. If you have questions, please call your primary care clinic.  If you do not have a primary care provider, please call [phone] and they will assist you.      [employer] is an electronic gateway that provides easy, online access to your medical records. With [employer], you can request a clinic appointment, read your test results, renew a prescription or communicate with your care team.     To access your existing account, please contact your [facility] or call [phone] for assistance.        Care EveryWhere ID     This is your Care EveryWhere ID. This could be used by other organizations to access your [facility] medical records  [id number]        Your Vitals Were     Pulse Temperature Respirations Pulse Oximetry BMI (Body Mass Index)       77 97.5  F (36.4  C) (Oral) 18 99% 27.59 kg/m2        Blood Pressure from Last 3 Encounters:   11/10/17 113/75   08/30/17 116/77   08/03/17 108/74    Weight from Last 3 Encounters:   11/10/17 157 lb (71.2 kg)   08/30/17 154 lb 12.8 oz (70.2 kg)   08/03/17 161 lb 12.8 oz (73.4 kg)              Today, you had the following     No orders found for display         Where to get your medicines      These medications were sent to [facility] [place], [place] - [address]  [address] [facility] [address]     Phone:  [phone]     needle (disp) 18G X 1\" Misc    Needle (Disp) 25G X 5/8\" Misc    syringe (disposable) 1 ML Misc         Some of these will need a paper prescription and others can be bought over the counter.  Ask your nurse if you have questions.     Bring a paper "
Jagdeep Almonte MD
"prescription for each of these medications     testosterone cypionate 200 MG/ML injection          Primary Care Provider Office Phone # Fax #    Gen Moreno [phone]       [facility] [address] [facility] [address]        Equal Access to Services     FAM JOHNSTON : Ronaldo mathews hadasho Soomaali, waaxda luqadaha, qaybta kaalmada adeegyada, waxchano perez haydemetrio pinedalulalbert noel. So [facility] [phone].    ATENCIÓN: Si habla español, tiene a preciado disposición servicios gratuitos de asistencia lingüística. Veronica al [phone].    We comply with applicable federal civil rights laws and [place] laws. We do not discriminate on the basis of race, color, national origin, age, disability, sex, sexual orientation, or gender identity.            Thank you!     Thank you for choosing [facility]  for your care. Our goal is always to provide you with excellent care. Hearing back from our patients is one way we can continue to improve our services. Please take a few minutes to complete the written survey that you may receive in the mail after your visit with us. Thank you!             Your Updated Medication List - Protect others around you: Learn how to safely use, store and throw away your medicines at www.disposemymeds.org.          This list is accurate as of: 11/10/17 10:52 AM.  Always use your most recent med list.                   Brand Name Dispense Instructions for use Diagnosis    * FLUOXETINE HCL PO      Take 20 mg by mouth daily        * FLUOXETINE HCL PO      Take 10 mg by mouth daily        INVEGA PO      Take 117 mg by mouth Every 28 days        MIRTAZAPINE PO      Take 30 mg by mouth At Bedtime        needle (disp) 18G X 1\" Misc     25 each    Use to draw up hormones once weekly    Gender dysphoria in adolescent and adult       Needle (Disp) 25G X 5/8\" Misc     25 each    1 Units once a week    Gender dysphoria in adolescent and adult    "
Bart Sanchez
felicia
   * risperiDONE 4 MG tablet    risperDAL     Take 1 tablet (4 mg) by mouth At Bedtime        * risperDAL 0.5 MG tablet   Generic drug:  risperiDONE     60 tablet    Take 1 tablet (0.5 mg) by mouth 2 times daily Prn auditory hallucinations        syringe (disposable) 1 ML Misc     25 each    Use once weekly to draw up hormones    Gender dysphoria in adolescent and adult       testosterone cypionate 200 MG/ML injection    DEPOTESTOTERONE    2 mL    Inject 0.13 mLs (26 mg) into the muscle once a week    Gender dysphoria in adolescent and adult       VITAMIN D-3 PO      Take 5,000 Units by mouth        * Notice:  This list has 4 medication(s) that are the same as other medications prescribed for you. Read the directions carefully, and ask your doctor or other care provider to review them with you.

## 2021-06-09 NOTE — PROGRESS NOTE ADULT - SUBJECTIVE AND OBJECTIVE BOX
NEPHROLOGY INTERVAL HPI/OVERNIGHT EVENTS:  pt clinically stable  no acute distress    MEDICATIONS  (STANDING):  amitriptyline 40 milliGRAM(s) Oral at bedtime  atorvastatin 40 milliGRAM(s) Oral at bedtime  chlorhexidine 2% Cloths 1 Application(s) Topical daily  chlorhexidine 4% Liquid 1 Application(s) Topical <User Schedule>  cholecalciferol 2000 Unit(s) Oral daily  dextrose 40% Gel 15 Gram(s) Oral once  dextrose 5%. 1000 milliLiter(s) (50 mL/Hr) IV Continuous <Continuous>  dextrose 5%. 1000 milliLiter(s) (100 mL/Hr) IV Continuous <Continuous>  dextrose 50% Injectable 12.5 Gram(s) IV Push once  dextrose 50% Injectable 25 Gram(s) IV Push once  dextrose 50% Injectable 25 Gram(s) IV Push once  folic acid 1 milliGRAM(s) Oral daily  gabapentin 300 milliGRAM(s) Oral at bedtime  glucagon  Injectable 1 milliGRAM(s) IntraMuscular once  hydrALAZINE 25 milliGRAM(s) Oral every 8 hours  insulin glargine Injectable (LANTUS) 12 Unit(s) SubCutaneous at bedtime  insulin lispro (ADMELOG) corrective regimen sliding scale   SubCutaneous three times a day before meals  insulin lispro (ADMELOG) corrective regimen sliding scale   SubCutaneous at bedtime  levothyroxine 100 MICROGram(s) Oral daily  metoprolol tartrate 25 milliGRAM(s) Oral every 6 hours  montelukast 10 milliGRAM(s) Oral at bedtime  Nephro-nunu 1 Tablet(s) Oral daily  pantoprazole    Tablet 40 milliGRAM(s) Oral before breakfast  senna 2 Tablet(s) Oral at bedtime  torsemide 20 milliGRAM(s) Oral daily    MEDICATIONS  (PRN):  acetaminophen   Tablet .. 650 milliGRAM(s) Oral every 6 hours PRN Temp greater or equal to 38.5C (101.3F), Mild Pain (1 - 3)  albuterol/ipratropium for Nebulization 3 milliLiter(s) Nebulizer every 6 hours PRN Shortness of Breath and/or Wheezing  diphenhydrAMINE 25 milliGRAM(s) Oral every 6 hours PRN Rash and/or Itching  oxyCODONE    IR 5 milliGRAM(s) Oral every 6 hours PRN Moderate Pain (4 - 6)  sodium chloride 0.9% lock flush 10 milliLiter(s) IV Push every 1 hour PRN Pre/post blood products, medications, blood draw, and to maintain line patency      Allergies    ACE inhibitors (Other)  Bactrim (Nephrotoxicity)  Biaxin (Joint Pain)  morphine (Short breath)  NSAIDs (Other)          Vital Signs Last 24 Hrs  T(C): 36.6 (09 Jun 2021 10:35), Max: 36.7 (09 Jun 2021 05:15)  T(F): 97.8 (09 Jun 2021 10:35), Max: 98 (09 Jun 2021 05:15)  HR: 92 (09 Jun 2021 10:35) (77 - 97)  BP: 131/85 (09 Jun 2021 10:35) (112/74 - 165/70)  BP(mean): --  RR: 18 (09 Jun 2021 10:35) (18 - 18)  SpO2: 97% (09 Jun 2021 10:35) (97% - 100%)    PHYSICAL EXAM:  GENERAL: Weak  ENMT: Dry mucous membranes  NECK: Supple, +JVD  NERVOUS SYSTEM:  Alert & Oriented X3,  intact and symmetric  CHEST/LUNG: Diminished BS at bases, crackles  HEART: No rub  ABDOMEN: Soft, Nontender, Nondistended; BS+  EXTREMITIES: +less LE edema   SKIN: No rashes or lesions    LABS:                        8.6    6.58  )-----------( 67       ( 09 Jun 2021 07:30 )             27.6     06-09    137  |  101  |  47.4<H>  ----------------------------<  134<H>  4.1   |  26.0  |  3.31<H>    Ca    8.4<L>      09 Jun 2021 07:30  Phos  3.6     06-08              RADIOLOGY & ADDITIONAL TESTS:  < from: Xray Chest 1 View- PORTABLE-Routine (Xray Chest 1 View- PORTABLE-Routine .) (06.07.21 @ 18:03) >   EXAM:  XR CHEST PORTABLE ROUTINE 1V                          PROCEDURE DATE:  06/07/2021          INTERPRETATION:  Portable chest radiograph    CLINICAL INFORMATION: Pulmonary hypertension    TECHNIQUE:  Portable  AP view of the chest was obtained.    COMPARISON: 6/3/2021 chest available for review.    FINDINGS: Mediport catheter tip in SVC.    The lungs show mild haziness overlying the hemithoraces which may indicate pleural effusions. No airspace consolidation.  . No pneumothorax.    The  heart is enlarged in transverse diameter. No hilar mass.  Status post median sternotomy. Cardiomemes device overlies LEFT pulmonary artery.     Visualized osseous structures are intact.    IMPRESSION:   Mild haziness overlying the hemithoraces concerning for bilateral effusions.  Cardiomegaly..  Confirmatory lateral or bilateral decubitus radiographs recommended.    < end of copied text >

## 2021-06-09 NOTE — PROGRESS NOTE ADULT - ASSESSMENT
76y Female with HTN HLD CAD s/p remote CABG, Bio AVR solitary episode of Af w/o recurrence, ILR in place, CRI, MDS transfusion dependent, cirrhosis sec to chronic hep c with esophageal varices and prior banding, hepatocellular carcinoma,s/p embolization with embozene particles on 4/5/21, and recent admission to [facility] after fall with head trauma with also transfused 2 untis PRBC during that admission admitted with fluid overload in setting of increased pulm HTN. Now s/p EGD/colonoscopy and Cardiomems placement. Despite appropriate medical management she has failed to improve with worsening renal function now necessitating HD. s/p permacath placement today via vascular surgery    #Acute on Chronic Diastolic Decompensated Heart Failure with preserved EF  - w/ Severe pulmonary HTN s/p CardioMems placement   - HD per nephro  - cardio consult appreciated   - Bipap PRN   - Metoprolol and Torsemide    #Cirrhosis 2/2 hepatitis C in pt w/ hepatocellular carcinoma s/p embolization  - d/c  Spironolactone for now  - torsemide     #Hypertension- Essential   - hydralazine metoprolol  - monitor blood pressure     #Diabetes Mellitus w/ neuropathy  - monitor fingersticks  - lantus and sliding scale   - lyrica and gabapentin  - a1c on 5.2    #CAD, S/p CABG  - statin     #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   - s/p PRBC (goal > 8) - Hgb stable  - No AC as per cardiology, patient bleeds easily     #ESRD on HD   - vascular consult appreciated  - s/p permacath  - nephrology consult appreciated - HD per nephro    #Hypothyroidism  - levothyroxine      #DVT Prophylaxis   - venodynes - due transfusion dependent anemia (pt non compliant> states that she will not develop  DVT despite extensive counseling)      called and spoke to patient daughter Daniela [phone]. all questions answered.

## 2021-06-09 NOTE — BRIEF OPERATIVE NOTE - OPERATION/FINDINGS
Left IJ tunneled dialysis catheter placed under fluoroscopic guidance, tip of catheter visualized in right atrium. Catheter secured with 2-0 Prolene suture; access site closed with Dermabond

## 2021-06-09 NOTE — BRIEF OPERATIVE NOTE - NSICDXBRIEFPOSTOP_GEN_ALL_CORE_FT
POST-OP DIAGNOSIS:  Acute on chronic diastolic congestive heart failure 09-Jun-2021 08:58:51  Jagdeep Almonte  
POST-OP DIAGNOSIS:  Portal hypertensive gastropathy 26-May-2021 10:57:50  Rachel Barr  
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

## 2021-06-09 NOTE — PROGRESS NOTE ADULT - SUBJECTIVE AND OBJECTIVE BOX
Patient is a 76y old  Female who presents with a chief complaint of chf (09 Jun 2021 06:47)  During hospitalization pt with worsening renal function requiring initiation of HD  Presents for L IJ permcath placement with Dr Sahu  Offers no complaints this am    ASA  3  Mallampati  2      MEDICATIONS  (STANDING):  amitriptyline 40 milliGRAM(s) Oral at bedtime  atorvastatin 40 milliGRAM(s) Oral at bedtime  chlorhexidine 2% Cloths 1 Application(s) Topical daily  chlorhexidine 4% Liquid 1 Application(s) Topical <User Schedule>  cholecalciferol 2000 Unit(s) Oral daily  dextrose 40% Gel 15 Gram(s) Oral once  dextrose 5%. 1000 milliLiter(s) (50 mL/Hr) IV Continuous <Continuous>  dextrose 5%. 1000 milliLiter(s) (100 mL/Hr) IV Continuous <Continuous>  dextrose 50% Injectable 12.5 Gram(s) IV Push once  dextrose 50% Injectable 25 Gram(s) IV Push once  dextrose 50% Injectable 25 Gram(s) IV Push once  folic acid 1 milliGRAM(s) Oral daily  gabapentin 300 milliGRAM(s) Oral at bedtime  glucagon  Injectable 1 milliGRAM(s) IntraMuscular once  hydrALAZINE 25 milliGRAM(s) Oral every 8 hours  insulin glargine Injectable (LANTUS) 12 Unit(s) SubCutaneous at bedtime  insulin lispro (ADMELOG) corrective regimen sliding scale   SubCutaneous three times a day before meals  insulin lispro (ADMELOG) corrective regimen sliding scale   SubCutaneous at bedtime  levothyroxine 100 MICROGram(s) Oral daily  metoprolol tartrate 25 milliGRAM(s) Oral every 6 hours  montelukast 10 milliGRAM(s) Oral at bedtime  Nephro-nunu 1 Tablet(s) Oral daily  pantoprazole    Tablet 40 milliGRAM(s) Oral before breakfast  senna 2 Tablet(s) Oral at bedtime  torsemide 20 milliGRAM(s) Oral daily    MEDICATIONS  (PRN):  acetaminophen   Tablet .. 650 milliGRAM(s) Oral every 6 hours PRN Temp greater or equal to 38.5C (101.3F), Mild Pain (1 - 3)  albuterol/ipratropium for Nebulization 3 milliLiter(s) Nebulizer every 6 hours PRN Shortness of Breath and/or Wheezing  diphenhydrAMINE 25 milliGRAM(s) Oral every 6 hours PRN Rash and/or Itching  oxyCODONE    IR 5 milliGRAM(s) Oral every 6 hours PRN Moderate Pain (4 - 6)  sodium chloride 0.9% lock flush 10 milliLiter(s) IV Push every 1 hour PRN Pre/post blood products, medications, blood draw, and to maintain line patency    Allergies    ACE inhibitors (Other)  Bactrim (Nephrotoxicity)  Biaxin (Joint Pain)  morphine (Short breath)  NSAIDs (Other)    Intolerances    Vital Signs Last 24 Hrs  T(C): 36.7 (09 Jun 2021 07:12), Max: 36.7 (08 Jun 2021 11:22)  T(F): 98 (09 Jun 2021 07:12), Max: 98.1 (08 Jun 2021 11:22)  HR: 95 (09 Jun 2021 07:12) (77 - 95)  BP: 165/70 (09 Jun 2021 07:12) (112/74 - 165/70)  BP(mean): --  RR: 18 (09 Jun 2021 07:12) (18 - 18)  SpO2: 100% (09 Jun 2021 07:12) (98% - 100%)  I&O's Detail      Physical Exam:  General: NAD, resting comfortably in bed  Pulmonary: Nonlabored breathing, no respiratory distress, diminished at bases  Cardiovascular: normal S1, S2  Abdominal: soft, NT/ND  Extremities: WWP, L groin Shiley in place. R [facility] accessed.      LABS:                        8.6    6.58  )-----------( 67       ( 09 Jun 2021 07:30 )             27.6     06-08    139  |  101  |  36.0<H>  ----------------------------<  110<H>  3.6   |  27.0  |  2.89<H>    Ca    8.5<L>      08 Jun 2021 08:36  Phos  3.6     06-08  Mg     1.9     06-07        CAPILLARY BLOOD GLUCOSE  POCT Blood Glucose.: 179 mg/dL (08 Jun 2021 21:43)  POCT Blood Glucose.: 168 mg/dL (08 Jun 2021 16:43)  POCT Blood Glucose.: 179 mg/dL (08 Jun 2021 11:17)  POCT Blood Glucose.: 116 mg/dL (08 Jun 2021 08:24)    Radiology and Additional Studies:    Assessment and Plan: 76y Female Acute diastolic congestive heart failure with acute on chronic renal failure now requiring HD    NPO for procedure  Last HD via Left Shiley 6/7  Consent obtained by MD

## 2021-06-09 NOTE — BRIEF OPERATIVE NOTE - SPECIMENS
None
antrum- nodule/white patch, antrum and body  r/o HP,  D 2- R/O celiac
area of mild colitis in the mid ascending colon

## 2021-06-09 NOTE — PROGRESS NOTE ADULT - ASSESSMENT
CKD(IV): baseline 3.5mg/dL  CHF with chronic fluid overload; B/L pleural effusions---> refractory to medical therapy  - cont HD with UF as tolerates    Anemia: +CKD + MDS (transfusion dependent) + GI loss  Low Fe stores s/p IV Fe  Hepatic mass c/w HCC  - no ARACELI in setting of neoplastic process unless cleared by Heme/Onc  - PRBCs as needed    RO: serum phos ok  - monitor labs

## 2021-06-10 PROBLEM — B18.2 CHRONIC HEPATITIS C WITH CIRRHOSIS: Status: ACTIVE | Noted: 2019-09-04

## 2021-06-10 PROBLEM — K76.9 LIVER LESION, RIGHT LOBE: Status: ACTIVE | Noted: 2021-01-01

## 2021-06-10 PROBLEM — C22.0 HCC (HEPATOCELLULAR CARCINOMA): Status: ACTIVE | Noted: 2021-01-01

## 2021-06-10 NOTE — DISCHARGE NOTE PROVIDER - NSDCMRMEDTOKEN_GEN_ALL_CORE_FT
Aller-Juhi 10 mg oral tablet: 1 tab(s) orally once a day, As Needed  amitriptyline 10 mg oral tablet: 4 tab(s) orally once a day (at bedtime)  amLODIPine 5 mg oral tablet: 1 tab(s) orally once a day  Aranesp Albumin Free: 300 mcg once weekly at MD&#x27;s office as needed for hgb&lt;11  carvedilol 6.25 mg oral tablet: 1 tab(s) orally every 12 hours  Dulcolax Stool Softener 100 mg oral capsule: 1 cap(s) orally 2 times a day  folic acid 1 mg oral tablet: 1 tab(s) orally once a day  gabapentin 300 mg oral capsule: 1 cap(s) orally once a day (at bedtime)  levothyroxine 100 mcg (0.1 mg) oral tablet: 1 tab(s) orally once a day  magnesium oxide 400 mg (241.3 mg elemental magnesium) oral tablet: 1 tab(s) orally once a day (at bedtime)  Metamucil: 2 tsp orally once a day (at bedtime)  montelukast 10 mg oral tablet: 1 tab(s) orally once a day (at bedtime)  NovoLOG 100 units/mL injectable solution: per sliding scale x3 per day before meals  pantoprazole 40 mg oral delayed release tablet: 1 tab(s) orally 2 times a day  pravastatin 80 mg oral tablet: 1 tab(s) orally once a day (at bedtime)  ProAir HFA 90 mcg/inh inhalation aerosol: 2 puff(s) inhaled every 6 hours, As Needed  Probiotic Formula oral capsule: 1 cap(s) orally once a day (in the morning)  torsemide 20 mg oral tablet: 1 tab(s) orally once a day (in the morning)  Tresiba 100 units/mL subcutaneous solution: 20 unit(s) subcutaneous once a day  Vitamin D3 2000 intl units (50 mcg) oral tablet: 1  orally once a day   amitriptyline 10 mg oral tablet: 4 tab(s) orally once a day (at bedtime)  Aranesp Albumin Free: 300 mcg once weekly at MD&#x27;s office as needed for hgb&lt;11  atorvastatin 40 mg oral tablet: 1 tab(s) orally once a day (at bedtime)  folic acid 1 mg oral tablet: 1 tab(s) orally once a day  gabapentin 300 mg oral capsule: 1 cap(s) orally once a day (at bedtime)  hydrALAZINE 25 mg oral tablet: 1 tab(s) orally every 8 hours  Lantus Solostar Pen 100 units/mL subcutaneous solution: 12 unit(s) subcutaneous once a day (at bedtime)   levothyroxine 100 mcg (0.1 mg) oral tablet: 1 tab(s) orally once a day  Metoprolol Succinate  mg oral tablet, extended release: 1 tab(s) orally once a day   montelukast 10 mg oral tablet: 1 tab(s) orally once a day (at bedtime)  Nephro-Bharat oral tablet: 1 tab(s) orally once a day   pantoprazole 40 mg oral delayed release tablet: 1 tab(s) orally 2 times a day  ProAir HFA 90 mcg/inh inhalation aerosol: 2 puff(s) inhaled every 6 hours, As Needed  torsemide 20 mg oral tablet: 1 tab(s) orally once a day (in the morning)  Vitamin D3 2000 intl units (50 mcg) oral tablet: 1  orally once a day

## 2021-06-10 NOTE — DISCHARGE NOTE PROVIDER - NSDCFUSCHEDAPPT_GEN_ALL_CORE_FT
[facility] ; 06/11/2021 ; NSBREA MONROYD-[facility]  [facility] ; 06/11/2021 ; NISHA Rad  Comm

## 2021-06-10 NOTE — PROGRESS NOTE ADULT - REASON FOR ADMISSION
Decompensated heart failure   Pleural effusions
Volume overload
chf
Pulm HTN
chf
Pulm HTN
chf
Pleural Effusions
chf

## 2021-06-10 NOTE — PHYSICAL THERAPY INITIAL EVALUATION ADULT - PRECAUTIONS/LIMITATIONS, REHAB EVAL
oxygen therapy device and L/min
vertigo, 2 l via NC/cardiac precautions/fall precautions/oxygen therapy device and L/min

## 2021-06-10 NOTE — PROGRESS NOTE ADULT - SUBJECTIVE AND OBJECTIVE BOX
NEPHROLOGY INTERVAL HPI/OVERNIGHT EVENTS:  pt clinically stable  no acute distress  tolerated HD yesterday    MEDICATIONS  (STANDING):  amitriptyline 40 milliGRAM(s) Oral at bedtime  atorvastatin 40 milliGRAM(s) Oral at bedtime  chlorhexidine 2% Cloths 1 Application(s) Topical daily  chlorhexidine 4% Liquid 1 Application(s) Topical <User Schedule>  cholecalciferol 2000 Unit(s) Oral daily  dextrose 40% Gel 15 Gram(s) Oral once  dextrose 5%. 1000 milliLiter(s) (50 mL/Hr) IV Continuous <Continuous>  dextrose 5%. 1000 milliLiter(s) (100 mL/Hr) IV Continuous <Continuous>  dextrose 50% Injectable 25 Gram(s) IV Push once  dextrose 50% Injectable 12.5 Gram(s) IV Push once  dextrose 50% Injectable 25 Gram(s) IV Push once  folic acid 1 milliGRAM(s) Oral daily  gabapentin 300 milliGRAM(s) Oral at bedtime  glucagon  Injectable 1 milliGRAM(s) IntraMuscular once  hydrALAZINE 25 milliGRAM(s) Oral every 8 hours  insulin glargine Injectable (LANTUS) 12 Unit(s) SubCutaneous at bedtime  insulin lispro (ADMELOG) corrective regimen sliding scale   SubCutaneous three times a day before meals  insulin lispro (ADMELOG) corrective regimen sliding scale   SubCutaneous at bedtime  levothyroxine 100 MICROGram(s) Oral daily  metoprolol tartrate 25 milliGRAM(s) Oral every 6 hours  montelukast 10 milliGRAM(s) Oral at bedtime  Nephro-nunu 1 Tablet(s) Oral daily  pantoprazole    Tablet 40 milliGRAM(s) Oral before breakfast  senna 2 Tablet(s) Oral at bedtime  torsemide 20 milliGRAM(s) Oral daily    MEDICATIONS  (PRN):  acetaminophen   Tablet .. 650 milliGRAM(s) Oral every 6 hours PRN Temp greater or equal to 38.5C (101.3F), Mild Pain (1 - 3)  albuterol/ipratropium for Nebulization 3 milliLiter(s) Nebulizer every 6 hours PRN Shortness of Breath and/or Wheezing  diphenhydrAMINE 25 milliGRAM(s) Oral every 6 hours PRN Rash and/or Itching  sodium chloride 0.9% lock flush 10 milliLiter(s) IV Push every 1 hour PRN Pre/post blood products, medications, blood draw, and to maintain line patency      Allergies    ACE inhibitors (Other)  Bactrim (Nephrotoxicity)  Biaxin (Joint Pain)  morphine (Short breath)  NSAIDs (Other)        Vital Signs Last 24 Hrs  T(C): 36.7 (10 Franco 2021 11:38), Max: 37.3 (09 Jun 2021 17:57)  T(F): 98 (10 Franco 2021 11:38), Max: 99.1 (09 Jun 2021 17:57)  HR: 96 (10 Franco 2021 11:38) (93 - 102)  BP: 125/74 (10 Franco 2021 11:38) (112/72 - 149/77)  BP(mean): --  RR: 18 (10 Franco 2021 11:38) (16 - 19)  SpO2: 99% (10 Franco 2021 11:38) (99% - 100%)    PHYSICAL EXAM:  GENERAL: Chronically ill appearing but comfortable  ENMT: Dry mucous membranes  NECK: Supple, +JVD  NERVOUS SYSTEM:  Alert & Oriented X3,  intact and symmetric  CHEST/LUNG: Diminished BS   HEART: No rub  ABDOMEN: Soft, Nontender, Nondistended; BS+  EXTREMITIES: + LE edema improved  SKIN: No rashes or lesions    LABS:                        8.6    6.58  )-----------( 67       ( 09 Jun 2021 07:30 )             27.6     06-09    137  |  101  |  47.4<H>  ----------------------------<  134<H>  4.1   |  26.0  |  3.31<H>    Ca    8.4<L>      09 Jun 2021 07:30              RADIOLOGY & ADDITIONAL TESTS:

## 2021-06-10 NOTE — PROGRESS NOTE ADULT - ASSESSMENT
A/P: 76y Female w/PMH of CHF with fluid overload requiring HD, now s/p L IJ Permacath placement on 6/9. Stable and tolerating HD via Permacath.    -Continue HD as indicated by Nephrology  -Continue to monitor Permacath site  -Remainder of management per Primary team  -As patient is tolerating HD via Permacath, no further recommendations from Vascular Surgery and will sign off. Please re-engage should any new questions or concerns arise  -Please have patient follow-up with Dr. Sahu in Vascular Surgery clinic 2 weeks following discharge

## 2021-06-10 NOTE — PHYSICAL THERAPY INITIAL EVALUATION ADULT - GENERAL OBSERVATIONS, REHAB EVAL
Pt received in the semi mckenzie position, NAD.
Pt. greeted resting in bed + primafit, Iv lock, O2 via NC, agreeable to PT

## 2021-06-10 NOTE — DISCHARGE NOTE PROVIDER - CARE PROVIDERS DIRECT ADDRESSES
[email or web address],[email or web address]

## 2021-06-10 NOTE — PROGRESS NOTE ADULT - ASSESSMENT
CKD(IV): baseline 3.5mg/dL  CHF with chronic fluid overload; B/L pleural effusions---> refractory to medical therapy  - cont HD   - pt accepted to [place] [facility] outpatient unit     Anemia: +CKD + MDS (transfusion dependent) + GI loss  Low Fe stores s/p IV Fe  Hepatic mass c/w HCC  -  ARACELI if cleared by Heme/Onc (Dr Jacobsen)  - PRBCs as needed    RO: serum phos ok  - monitor labs

## 2021-06-10 NOTE — PROGRESS NOTE ADULT - SUBJECTIVE AND OBJECTIVE BOX
HPI/OVERNIGHT EVENTS: NAEON. Patient notes some discomfort in neck at site of Permacath, but otherwise has no new complaints. Tolerated HD via Permacath on 6/9. No f/c/n/v, chest pain, SOB.    MEDICATIONS  (STANDING):  amitriptyline 40 milliGRAM(s) Oral at bedtime  atorvastatin 40 milliGRAM(s) Oral at bedtime  chlorhexidine 2% Cloths 1 Application(s) Topical daily  chlorhexidine 4% Liquid 1 Application(s) Topical <User Schedule>  cholecalciferol 2000 Unit(s) Oral daily  dextrose 40% Gel 15 Gram(s) Oral once  dextrose 5%. 1000 milliLiter(s) (50 mL/Hr) IV Continuous <Continuous>  dextrose 5%. 1000 milliLiter(s) (100 mL/Hr) IV Continuous <Continuous>  dextrose 50% Injectable 25 Gram(s) IV Push once  dextrose 50% Injectable 12.5 Gram(s) IV Push once  dextrose 50% Injectable 25 Gram(s) IV Push once  folic acid 1 milliGRAM(s) Oral daily  gabapentin 300 milliGRAM(s) Oral at bedtime  glucagon  Injectable 1 milliGRAM(s) IntraMuscular once  hydrALAZINE 25 milliGRAM(s) Oral every 8 hours  insulin glargine Injectable (LANTUS) 12 Unit(s) SubCutaneous at bedtime  insulin lispro (ADMELOG) corrective regimen sliding scale   SubCutaneous three times a day before meals  insulin lispro (ADMELOG) corrective regimen sliding scale   SubCutaneous at bedtime  levothyroxine 100 MICROGram(s) Oral daily  metoprolol tartrate 25 milliGRAM(s) Oral every 6 hours  montelukast 10 milliGRAM(s) Oral at bedtime  Nephro-nunu 1 Tablet(s) Oral daily  pantoprazole    Tablet 40 milliGRAM(s) Oral before breakfast  senna 2 Tablet(s) Oral at bedtime  torsemide 20 milliGRAM(s) Oral daily    MEDICATIONS  (PRN):  acetaminophen   Tablet .. 650 milliGRAM(s) Oral every 6 hours PRN Temp greater or equal to 38.5C (101.3F), Mild Pain (1 - 3)  albuterol/ipratropium for Nebulization 3 milliLiter(s) Nebulizer every 6 hours PRN Shortness of Breath and/or Wheezing  diphenhydrAMINE 25 milliGRAM(s) Oral every 6 hours PRN Rash and/or Itching  sodium chloride 0.9% lock flush 10 milliLiter(s) IV Push every 1 hour PRN Pre/post blood products, medications, blood draw, and to maintain line patency      Vital Signs Last 24 Hrs  T(C): 36.7 (10 Franco 2021 04:51), Max: 37.3 (09 Jun 2021 17:57)  T(F): 98 (10 Franco 2021 04:51), Max: 99.1 (09 Jun 2021 17:57)  HR: 102 (10 Franco 2021 04:51) (90 - 102)  BP: 127/68 (10 Franco 2021 04:51) (112/72 - 165/70)  BP(mean): --  RR: 18 (10 Franco 2021 04:51) (16 - 19)  SpO2: 100% (10 Franco 2021 04:51) (97% - 100%)    Gen: patient laying in bed, A&Ox3, NAD  HEENT: EOMI / PERRL b/l. Left permacath in place, dressing clean and intact, small amount of old strikethrough  Pulm: regular respiratory rate, no distress  CV: RRR  GI: Abdomen soft, non-distended, nontender  Neurological: no gross sensory / motor deficits  Ext: Warm, pink, no edema or lesions noted. Prior left femoral Shiley site healing; no hematoma or active bleeding noted. Strikethrough noted on dressing without evidence of recent bleeding      I&O's Detail      LABS:                        8.6    6.58  )-----------( 67       ( 09 Jun 2021 07:30 )             27.6     06-09    137  |  101  |  47.4<H>  ----------------------------<  134<H>  4.1   |  26.0  |  3.31<H>    Ca    8.4<L>      09 Jun 2021 07:30  Phos  3.6     06-08

## 2021-06-10 NOTE — DISCHARGE NOTE PROVIDER - CARE PROVIDER_API CALL
ManvarSingh, Pallavi B (MD)  Vascular Surgery  [address], [address] [place] [address]  Phone: [phone]  Fax: [phone]  Follow Up Time: 2 weeks   ManvarSingh, Pallavi B (MD)  Vascular Surgery  [address], [address]st [place] [address]  Phone: [phone]  Fax: [phone]  Follow Up Time: 2 weeks    Jarvis Castillo)  Nephrology  [address], [place] [address]  Phone: [phone]  Fax: [phone]  Follow Up Time:     Primary Care Doctor,   Phone: (   )    -  Fax: (   )    -  Follow Up Time:     Kingston Tobar; MPH)  Cardiology; Internal Medicine  [address] [place] [address]  Phone: [phone]  Fax: [phone]  Follow Up Time:

## 2021-06-10 NOTE — DISCHARGE NOTE PROVIDER - HOSPITAL COURSE
76y Female with HTN HLD CAD s/p remote CABG, Bio AVR solitary episode of Af w/o recurrence, ILR in place, CRI, MDS transfusion dependent, cirrhosis sec to chronic hep c with esophageal varices and prior banding, hepatocellular carcinoma,s/p embolization with embozene particles on 4/5/21, and recent admission to [facility] after fall with head trauma with also transfused 2 untis PRBC during that admission admitted with fluid overload in setting of increased pulm HTN. Now s/p EGD/colonoscopy and Cardiomems placement. Despite appropriate medical management she has failed to improve with worsening renal function now necessitating HD. s/p permacath placement via vascular surgery. patient now stable for discharge home with outpatient dialysis.     Time spent on patients discharge 32 minutes

## 2021-06-10 NOTE — PHYSICAL THERAPY INITIAL EVALUATION ADULT - ADDITIONAL COMMENTS
Pt.  resides in the private house with 2 steps to enter, (+) rails, ambulation with RW vs SAC modified independent PTA. Pt. on home O2 and daughter available to assist as needed upon D/C home
as per pt: resides in the private house with 2 steps to enter, (+) rails, ambulation with RW, (-) driving, home O2 as needed, daughter available to assist as needed upon D/C home

## 2021-06-10 NOTE — PHYSICAL THERAPY INITIAL EVALUATION ADULT - DIAGNOSIS, PT EVAL
Impaired Functional Mobility due to generalized weakness, (+) CHF.
PT will not follow as pt. is modified independent with all functional skills.

## 2021-06-10 NOTE — PROGRESS NOTE ADULT - ASSESSMENT
76y Female with HTN HLD CAD s/p remote CABG, Bio AVR solitary episode of Af w/o recurrence, ILR in place, CRI, MDS transfusion dependent, cirrhosis sec to chronic hep c with esophageal varices and prior banding, hepatocellular carcinoma,s/p embolization with embozene particles on 4/5/21, and recent admission to [facility] after fall with head trauma with also transfused 2 untis PRBC during that admission admitted with fluid overload in setting of increased pulm HTN. Now s/p EGD/colonoscopy and Cardiomems placement. Despite appropriate medical management she has failed to improve with worsening renal function now necessitating HD. s/p permacath placement via vascular surgery. patient now stable for discharge home with outpatient dialysis.     #Acute on Chronic Diastolic Decompensated Heart Failure with preserved EF  - w/ Severe pulmonary HTN s/p CardioMems placement   - HD per nephro  - cardio consult appreciated   - Metoprolol and Torsemide    #Cirrhosis 2/2 hepatitis C in pt w/ hepatocellular carcinoma s/p embolization  - d/c  Spironolactone for now  - torsemide     #Hypertension- Essential   - hydralazine metoprolol  - monitor blood pressure     #Diabetes Mellitus w/ neuropathy  - monitor fingersticks  - lantus and sliding scale   - lyrica and gabapentin  - a1c on 5.2    #CAD, S/p CABG  - statin     #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   - s/p PRBC (goal > 8) - Hgb stable  - No AC as per cardiology, patient bleeds easily     #ESRD on HD   - vascular consult appreciated  - s/p permacath  - nephrology consult appreciated - HD per nephro    #Hypothyroidism  - levothyroxine      #DVT Prophylaxis   - venodynes - due transfusion dependent anemia (pt non compliant> states that she will not develop  DVT despite extensive counseling)      called and spoke to patient daughter Daniela [phone]. all questions answered. discharge home today

## 2021-06-10 NOTE — DISCHARGE NOTE NURSING/CASE MANAGEMENT/SOCIAL WORK - PATIENT PORTAL LINK FT
You can access the FollowMyHealth Patient Portal offered by [facility] by registering at the following website: [email or web address]. By joining [facility]’s FollowMyHealth portal, you will also be able to view your health information using other applications (apps) compatible with our system.

## 2021-06-10 NOTE — PROGRESS NOTE ADULT - PROVIDER SPECIALTY LIST ADULT
Internal Medicine
Internal Medicine
Nephrology
Thoracic Surgery
Cardiology
Hospitalist
Nephrology
Thoracic Surgery
Cardiology
Cardiology
Electrophysiology
Hospitalist
Internal Medicine
Nephrology
Vascular Surgery
Cardiology
Hospitalist
Hospitalist
Internal Medicine
Internal Medicine
Nephrology
Thoracic Surgery
Vascular Surgery
Vascular Surgery
Cardiology
Cardiology
Hospitalist
Hospitalist
Internal Medicine
Nephrology
Thoracic Surgery
Hospitalist
Nephrology
Thoracic Surgery
Gastroenterology

## 2021-06-10 NOTE — PROGRESS NOTE ADULT - NSICDXPILOT_GEN_ALL_CORE
[place]

## 2021-06-10 NOTE — PROGRESS NOTE ADULT - SUBJECTIVE AND OBJECTIVE BOX
Patient is a 76y old  Female who presents with a chief complaint of chf (10 Franco 2021 13:14)    Patient seen and examined at bedside.    ALLERGIES:  ACE inhibitors (Other)  Bactrim (Nephrotoxicity)  Biaxin (Joint Pain)  morphine (Short breath)  NSAIDs (Other)    MEDICATIONS  (STANDING):  amitriptyline 40 milliGRAM(s) Oral at bedtime  atorvastatin 40 milliGRAM(s) Oral at bedtime  chlorhexidine 2% Cloths 1 Application(s) Topical daily  chlorhexidine 4% Liquid 1 Application(s) Topical <User Schedule>  cholecalciferol 2000 Unit(s) Oral daily  dextrose 40% Gel 15 Gram(s) Oral once  dextrose 5%. 1000 milliLiter(s) (50 mL/Hr) IV Continuous <Continuous>  dextrose 5%. 1000 milliLiter(s) (100 mL/Hr) IV Continuous <Continuous>  dextrose 50% Injectable 25 Gram(s) IV Push once  dextrose 50% Injectable 12.5 Gram(s) IV Push once  dextrose 50% Injectable 25 Gram(s) IV Push once  folic acid 1 milliGRAM(s) Oral daily  gabapentin 300 milliGRAM(s) Oral at bedtime  glucagon  Injectable 1 milliGRAM(s) IntraMuscular once  hydrALAZINE 25 milliGRAM(s) Oral every 8 hours  insulin glargine Injectable (LANTUS) 12 Unit(s) SubCutaneous at bedtime  insulin lispro (ADMELOG) corrective regimen sliding scale   SubCutaneous three times a day before meals  insulin lispro (ADMELOG) corrective regimen sliding scale   SubCutaneous at bedtime  levothyroxine 100 MICROGram(s) Oral daily  metoprolol tartrate 25 milliGRAM(s) Oral every 6 hours  montelukast 10 milliGRAM(s) Oral at bedtime  Nephro-nunu 1 Tablet(s) Oral daily  pantoprazole    Tablet 40 milliGRAM(s) Oral before breakfast  senna 2 Tablet(s) Oral at bedtime  torsemide 20 milliGRAM(s) Oral daily    MEDICATIONS  (PRN):  acetaminophen   Tablet .. 650 milliGRAM(s) Oral every 6 hours PRN Temp greater or equal to 38.5C (101.3F), Mild Pain (1 - 3)  albuterol/ipratropium for Nebulization 3 milliLiter(s) Nebulizer every 6 hours PRN Shortness of Breath and/or Wheezing  diphenhydrAMINE 25 milliGRAM(s) Oral every 6 hours PRN Rash and/or Itching  sodium chloride 0.9% lock flush 10 milliLiter(s) IV Push every 1 hour PRN Pre/post blood products, medications, blood draw, and to maintain line patency    Vital Signs Last 24 Hrs  T(F): 98 (10 Franco 2021 11:38), Max: 99.1 (09 Jun 2021 17:57)  HR: 96 (10 Franco 2021 11:38) (93 - 102)  BP: 125/74 (10 Franco 2021 11:38) (112/72 - 149/77)  RR: 18 (10 Franco 2021 11:38) (16 - 19)  SpO2: 99% (10 Franco 2021 11:38) (99% - 100%)  I&O's Summary    PHYSICAL EXAM:  General: NAD, A/O x 3 +left permacath +right port  ENT: MMM, no thrush  Neck: Supple, No JVD  Lungs: good air entry, non-labored breathing  Cardio: +s1/s2  Abdomen: Soft, Nontender, Nondistended; Bowel sounds present  Extremities: No calf tenderness    LABS:                        8.6    6.58  )-----------( 67       ( 09 Jun 2021 07:30 )             27.6     06-09    137  |  101  |  47.4  ----------------------------<  134  4.1   |  26.0  |  3.31    Ca    8.4      09 Jun 2021 07:30  Phos  3.6     06-08    eGFR if Non African American: 13 mL/min/1.73M2 (06-09-21 @ 07:30)  eGFR if African American: 15 mL/min/1.73M2 (06-09-21 @ 07:30)    Glucose  POCT Blood Glucose.: 188 mg/dL (10 Franco 2021 12:01)  POCT Blood Glucose.: 168 mg/dL (10 Franco 2021 08:06)  POCT Blood Glucose.: 187 mg/dL (09 Jun 2021 22:21)  POCT Blood Glucose.: 224 mg/dL (09 Jun 2021 16:47)    COVID-19 PCR: NotDetec (06-09-21 @ 01:06)  COVID-19 PCR: NotDetec (05-25-21 @ 15:47)  COVID-19 PCR: NotDetec (05-19-21 @ 15:18)    RADIOLOGY & ADDITIONAL TESTS:  - no new tests

## 2021-06-10 NOTE — DISCHARGE NOTE PROVIDER - NSDCCPCAREPLAN_GEN_ALL_CORE_FT
PRINCIPAL DISCHARGE DIAGNOSIS  Diagnosis: Acute diastolic congestive heart failure  Assessment and Plan of Treatment: - now a new HD start  - follow up with cardiology and nephrology  - take medications as rx       PRINCIPAL DISCHARGE DIAGNOSIS  Diagnosis: Acute diastolic congestive heart failure  Assessment and Plan of Treatment: - now a new HD start  - follow up with cardiology and nephrology  - take medications as rx  - use home 0xygen

## 2021-06-10 NOTE — PHYSICAL THERAPY INITIAL EVALUATION ADULT - PERTINENT HX OF CURRENT PROBLEM, REHAB EVAL
76y Female w/PMH of CHF with fluid overload requiring HD, now s/p L IJ Permacath placement on 6/9
presented to ER for worsening dyspnea worse with activity, orthopnea and elevated BP

## 2021-06-10 NOTE — PHYSICAL THERAPY INITIAL EVALUATION ADULT - ACTIVE RANGE OF MOTION EXAMINATION, REHAB EVAL
no Active ROM deficits were identified
assessed during functional mobility, resistance not applied/bilateral upper extremity Active ROM was WFL (within functional limits)/bilateral  lower extremity Active ROM was WFL (within functional limits)

## 2021-06-10 NOTE — DISCHARGE NOTE PROVIDER - PROVIDER TOKENS
PROVIDER:[TOKEN:[[id number]],FOLLOWUP:[2 weeks]] PROVIDER:[TOKEN:[[id number]],FOLLOWUP:[2 weeks]],PROVIDER:[TOKEN:[[id number]]],FREE:[LAST:[Primary Care Doctor],PHONE:[(   )    -],FAX:[(   )    -]],PROVIDER:[TOKEN:[[id number]]] Improved

## 2021-06-10 NOTE — PHYSICAL THERAPY INITIAL EVALUATION ADULT - REHAB POTENTIAL, PT EVAL
PT will not follow as pt. is modified independent with all functional skills./none
good, to achieve stated therapy goals

## 2021-06-10 NOTE — DISCHARGE NOTE NURSING/CASE MANAGEMENT/SOCIAL WORK - NSDCFUADDAPPT_GEN_ALL_CORE_FT
[facility] Dialysis Unit MWF 6:00 PM chair.  Please arrive 10 minutes early to complete paperwork.    [address] [place] [address] Derick  [facility] [id number]

## 2021-06-11 PROBLEM — Z99.2 DEPENDENT ON HEMODIALYSIS: Status: ACTIVE | Noted: 2021-01-01

## 2021-06-11 PROBLEM — R93.5 ABNORMAL ABDOMINAL MRI: Status: ACTIVE | Noted: 2021-01-01

## 2021-06-11 PROBLEM — R53.83 FATIGUE: Status: ACTIVE | Noted: 2021-01-01

## 2021-06-11 PROBLEM — Z99.81 SUPPLEMENTAL OXYGEN DEPENDENT: Status: ACTIVE | Noted: 2021-01-01

## 2021-07-01 NOTE — ED ADULT NURSE NOTE - INTERVENTIONS DEFINITIONS
[place] to call system/Call bell, personal items and telephone within reach/Physically safe environment: no spills, clutter or unnecessary equipment/Monitor for mental status changes and reorient to person, place, and time/Provide visual clues: red socks

## 2021-07-01 NOTE — ED PROVIDER NOTE - PHYSICAL EXAMINATION
General: well appearing, NAD  Head: NC, AT, 2L per NC in place  EENT: EOMI, no scleral icterus  Cardiac: RRR, no apparent murmurs, no lower extremity edema  Respiratory: CTABL, no respiratory distress   Abdomen: soft, ND, NT, nonperitonitic  MSK/Vascular: full ROM, soft compartments, warm extremities  Neuro: AAOx3, sensation to light touch intact  Psych: calm, cooperative

## 2021-07-01 NOTE — H&P ADULT - HISTORY OF PRESENT ILLNESS
75 y/o female who started HD about 3 weeks ago and has left upper chest wall catheter, also has Rt. upper chest wall port for 2 years for her access ( as per pt. her veins are bad ), blood transfusions ( has transfusion dependant anemia ) , had dialysis yesterday ( on M, W, F ) but since last night was having chills. Today pt's daughter called pt's visiting nurse who asked family to check pt's temp. As per pt. her temp was 104. pt. was sent to the ER. pt. reports no cough, no phlegm, no sick contact, no cp, no sob, no abd. pain, no n/v/d. no change in mental status. pt. is no toxic looking at the time of admission and is afebrile. pt. makes urine 2 x daily.  Pt's cardiologist Dr. Shelton has recommended no AC for pt. as she bleeds easily.  75 y/o female who started HD about 3 weeks ago and has left upper chest wall dialysis catheter, also has Rt. upper chest wall port for 2 years for her access ( as per pt. her veins are bad ), blood transfusions ( has transfusion dependant anemia ) , had dialysis yesterday ( on M, W, F ) but since last night was having chills. Today pt's daughter called pt's visiting nurse who asked family to check pt's temp. As per pt. her temp was 104. pt. was sent to the ER. pt. reports no cough, no phlegm, no sick contact, no cp, no sob, no abd. pain, no n/v/d. no change in mental status. pt. is no toxic looking at the time of admission and is afebrile. pt. makes urine 2 x daily.  Pt's cardiologist Dr. Shelton has recommended no AC for pt. as she bleeds easily.

## 2021-07-01 NOTE — ED PROVIDER NOTE - PROGRESS NOTE DETAILS
Bridger: Dr. Multani discussed case with Dr. Rubio. Bridger: I rechecked the patient. She is feeling clammy.

## 2021-07-01 NOTE — H&P ADULT - ASSESSMENT
75 y/o female who started HD about 3 weeks ago and has left upper chest wall dialysis catheter, also has Rt. upper chest wall port for 2 years for her access ( as per pt. her veins are bad ), blood transfusions ( has transfusion dependant anemia ) , had dialysis yesterday ( on M, W, F ) but since last night was having chills. Today pt's daughter called pt's visiting nurse who asked family to check pt's temp. As per pt. her temp was 104. pt. was sent to the ER. pt. reports no cough, no phlegm, no sick contact, no cp, no sob, no abd. pain, no n/v/d. no change in mental status. pt. is no toxic looking at the time of admission and is afebrile. pt. makes urine 2 x daily.  Pt's cardiologist Dr. Shelton has recommended no AC for pt. as she bleeds easily.     - Acute febrile illness, source ? follow blood , urine cx, Bacteremia ? will get ct chest , has port and dialysis catheter, pneumonia ? will keep on zosyn, pt. got vanco and zosyn in the ER, follow RVP, will consult ID.     - ESRD on HD, dr. vergara called for dialysis.     - DM type 2 with long term current insulin use with hyperglycemia , will keep on lantus and admelog.     - Essential htn, will keep on hydralazine. med adjustment as per bp response.    - s/p bovine AVR. no cp, no sob.     - h/o chf, not chf exacerbation, Dialysis in am.     - compression boots for dvt prophylaxis, pt's cardiologist has recommended no AC or aspirin as pt. bleeds easily.      75 y/o female who started HD about 3 weeks ago and has left upper chest wall dialysis catheter, also has Rt. upper chest wall port for 2 years for her access ( as per pt. her veins are bad ), blood transfusions ( has transfusion dependant anemia ) , had dialysis yesterday ( on M, W, F ) but since last night was having chills. Today pt's daughter called pt's visiting nurse who asked family to check pt's temp. As per pt. her temp was 104. pt. was sent to the ER. pt. reports no cough, no phlegm, no sick contact, no cp, no sob, no abd. pain, no n/v/d. no change in mental status. pt. is no toxic looking at the time of admission and is afebrile. pt. makes urine 2 x daily.  Pt's cardiologist Dr. Shelton has recommended no AC for pt. as she bleeds easily.     - Acute febrile illness, source ? follow blood , urine cx, Bacteremia ? will get ct chest , has port and dialysis catheter, pneumonia ? will keep on zosyn, pt. got vanco and zosyn in the ER, follow RVP, will consult ID.     - ESRD on HD, dr. vergara called for dialysis.     - DM type 2 with long term current insulin use with hyperglycemia , will keep on lantus and admelog.     - Essential htn, will keep on hydralazine. med adjustment as per bp response.    - anemia, chronic and likely multifactorial , Hb 10.8 , stable compared to old labs.     - s/p bovine AVR. no cp, no sob.     - h/o chf, not chf exacerbation, Dialysis in am.     - Thrombocytopenia , chronic , today 98, stable compared to old labs, close f/u on cbc.     - compression boots for dvt prophylaxis, pt's cardiologist has recommended no AC or aspirin as pt. bleeds easily.

## 2021-07-01 NOTE — ED PROVIDER NOTE - ATTENDING CONTRIBUTION TO CARE
77 yo F with hx of ESRD on dialysis M/W/F for the last few weeks presents to ED with reported fever to 104F at home today with chills.  Pt denies any assoc cough, abd pain, N/V//D or urinary s/s.  Pt urinates approx twice daily.  Pt has dialysis catheter L chest wall and a power port catheter to R chest wall.  On arrival pt awake and alert with fever to 102.7.  Code Sepsis activated.  on exam awake and alert in NAD, mm moist, Neck supple, Cotr Tachy, Lungs clear b/l, abd soft, NT, Ext no edema, FROM, Neuro non-focal.  Rx IVF, fever control, IV Abx, pan Cx and will admit

## 2021-07-01 NOTE — H&P ADULT - NSHPPHYSICALEXAM_GEN_ALL_CORE
Vital Signs Last 24 Hrs  T(C): 37.1 (01 Jul 2021 22:13), Max: 37.1 (01 Jul 2021 22:13)  T(F): 98.8 (01 Jul 2021 22:13), Max: 98.8 (01 Jul 2021 22:13)  HR: 58 (01 Jul 2021 22:13) (58 - 69)  BP: 109/52 (01 Jul 2021 22:13) (104/48 - 109/52)  BP(mean): --  RR: 24 (01 Jul 2021 22:13) (24 - 24)  SpO2: 100% (01 Jul 2021 22:13) (100% - 100%)    General: Well developed female lying in bed not in distress.   HEENT: AT, NC. PERRL. intact EOM. no throat erythema or exudate.   Neck: supple. no JVD.   Chest: CTA bilaterally. left upper chest wall dialysis catheter no surrounding erythema, discharge,  also has Rt. upper chest wall port, intact and no surrounding erythema.  Heart: S1,S2. RRR. no heart murmur. no edema.   Abdomen: soft. non-tender. obese, + BS.   Ext: no calf tenderness on either side, pulses intact, moves all ext. independently.  Neuro: AAO x3. no focal weakness. no speech disorder, cns intact.  Skin: warm and dry, no obvious rash, no cyanosis.   psych : pleasant, co-operative, no si/hi.

## 2021-07-01 NOTE — ED ADULT NURSE NOTE - OBJECTIVE STATEMENT
received pt in critical care.  anox4 pt was sent in by home health RN. pt states she was at dialysis yesterday when she felt nauseas and " not well". pt receives dialysis m,w,f. denies sob or chest pain. pt states she had a fever at home 100.4. denies pain at this time. has dialysis port placed in left chest wall 4 weeks ago.  right sided cardiac anne access. code sepsis called. 2 ivs placed. able to move all extremities well. placed on cardiac monitor. rr even and unlabored. pt educated on plan of care, pt able to successfully teach back plan of care to RN, RN will continue to reeducate pt during hospital stay. received pt in critical care.  anox4 pt was sent in by home health RN. pt states she was at dialysis yesterday when she felt nauseous, cold and " not well". pt receives dialysis m,w,f. denies sob or chest pain. pt states she had a fever at home 100.4. denies pain at this time. has dialysis port placed in left chest wall 4 weeks ago.  right sided power port. code sepsis called. able to move all extremities well. placed on cardiac monitor. rr even and unlabored. pt educated on plan of care, pt able to successfully teach back plan of care to RN, RN will continue to reeducate pt during hospital stay.

## 2021-07-01 NOTE — H&P ADULT - NSICDXPASTMEDICALHX_GEN_ALL_CORE_FT
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    Heart failure Echo  10/2020 EF 60%, mild AR    Hemorrhoids     High cholesterol     Hypertension well controlled    Internal hemorrhoids     Liver carcinoma Dx 1/2021    Low back pain chronic over a year

## 2021-07-01 NOTE — ED PROVIDER NOTE - OBJECTIVE STATEMENT
76 year old female w hx of AS s/p AVR, HTN, ESRD MWF, CAD, liver cancer, on 2L at home, who presents with fever. Pt has been on HD for about 3 weeks. She had a full HD session yesterday 6/30. This morning however she began to have chills with malaise and nausea. Her home nurse noted she was febrile. Pt brought to ED for further evaluation. Code Sepsis called at arrival. Pt feels "clammy" but otherwise has no complaints. No chest pain, shortness of breath, nausea, or vomiting. Voids 2x day.

## 2021-07-01 NOTE — ED ADULT TRIAGE NOTE - CHIEF COMPLAINT QUOTE
Pt. sent here by home nurse for r/o sepsis. Pt. febrile and hypotensive in ED. pt. has dialysis port placed in left chest wall 4 weeks ago. Pt. on 2LNC at home.  Code sepsis called. MD Styles at bedside.

## 2021-07-01 NOTE — ED PROVIDER NOTE - NS ED ROS FT
Constitutional: no fever, +chills, +malaise, +diaphoresis  Head: NC, AT   Eyes: no redness   ENMT: no nasal congestion/drainage, no sore throat   CV: no chest pain, no edema  Resp: no cough, no dyspnea  GI: no abdominal pain, no nausea, no vomiting, no diarrhea  : no dysuria, no hematuria   Skin: +dialysis catheter L chest, power port catheter R chest, neither with any erythema/exudate  Neuro: no LOC, no headache, no sensory deficits, no weakness

## 2021-07-02 NOTE — CONSULT NOTE ADULT - ASSESSMENT
77 y/o female who started HD about 3 weeks ago and has left upper chest wall dialysis catheter, also has Rt. upper chest wall port for 2 years for her access ( as per pt. her veins are bad ), blood transfusions ( has transfusion dependant anemia ) , had dialysis yesterday ( on M, W, F ) but since last night was having chills. Today pt's daughter called pt's visiting nurse who asked family to check pt's temp. As per pt. her temp was 104. pt. was sent to the ER. pt. reports no cough, no phlegm, no sick contact, no cp, no sob, no abd. pain, no n/v/d. no change in mental status. pt. is no toxic looking at the time of admission and is afebrile. pt. makes urine 2 x daily.  Pt's cardiologist Dr. Shelton has recommended no AC for pt. as she bleeds easily.     Leukocytosis  Fever  Bacteremia r/o port/catheter infection  ESRD on HD    - BCX + Cons, concerning for port and/or permacath infection, will likely require removal  - Continue zosyn 3.375 g iv q12h  - vanco by level, today 14, will re dose  - repeat BCx x 2  - TTE , may need BREANN given presence of bovine valve  - Ct a/p  - check UA if able to provide specimen  - Trend Fever  - Trend Leukocytosis    d/w attending, pharmacy  Will Follow

## 2021-07-02 NOTE — CONSULT NOTE ADULT - SUBJECTIVE AND OBJECTIVE BOX
[facility]  INFECTIOUS DISEASES AND INTERNAL MEDICINE at [place]  =======================================================  Omi Brink MD  Diplomates American Board of Internal Medicine and Infectious Diseases  Tel: [phone]      Fax: [phone]  =======================================================      [id number]  NICHELLE GILL    CC: Patient is a 76y old  Female who presents with a chief complaint of fever (01 Jul 2021 23:32)      76y  Female       Past Medical & Surgical Hx:  PAST MEDICAL & SURGICAL HISTORY:  Hypertension  well controlled    High cholesterol    Low back pain  chronic over a year    Asthma  well controlled, never intubated for exacerbation    Anemia, unspecified anemia type  uses O2 at 2L at home PRN, due to anemia    Aortic stenosis  s/p bovine AVR    Heart failure  Echo  10/2020 EF 60%, mild AR    CAD (coronary artery disease)  s/p OHS    CKD (chronic kidney disease)  Stage 4, now on dialysis.    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            Social Hx:    FAMILY HISTORY:  Family history of diabetes mellitus (Sibling)        Allergies    ACE inhibitors (Other)  Bactrim (Nephrotoxicity)  Biaxin (Joint Pain)  morphine (Short breath)  NSAIDs (Other)    Intolerances             REVIEW OF SYSTEMS:  CONSTITUTIONAL:  No Fever or chills  HEENT:  No diplopia or blurred vision.  No earache, sore throat or runny nose.  CARDIOVASCULAR:  No pressure, squeezing, strangling, tightness, heaviness or aching about the chest, neck, axilla or epigastrium.  RESPIRATORY:  No cough, shortness of breath  GASTROINTESTINAL:  No nausea, vomiting or diarrhea.  GENITOURINARY:  No dysuria, frequency or urgency. No Blood in urine  MUSCULOSKELETAL:  no joint aches, no muscle pain  SKIN:  No change in skin, hair or nails.  NEUROLOGIC:  No Headaches, seizures or weakness.  PSYCHIATRIC:  No disorder of thought or mood.  ENDOCRINE:  No heat or cold intolerance  HEMATOLOGICAL:  No easy bruising or bleeding.       Physical Exam:    GEN: NAD, pleasant  HEENT: normocephalic and atraumatic. EOMI. PERRL.  Anicteric  NECK: Supple.   LUNGS: Clear to auscultation.  HEART: Regular rate and rhythm without murmur.  ABDOMEN: Soft, nontender, and nondistended.  Positive bowel sounds.    : No CVA tenderness  EXTREMITIES: Without any edema.  MSK: No joint swelling  NEUROLOGIC: Cranial nerves II through XII are grossly intact. No Focal Deficits  PSYCHIATRIC: Appropriate affect .  SKIN: No Rash    Height (cm): 149.9 (07-01 @ 19:46)  Weight (kg): 90.673 (07-01 @ 19:46)  BMI (kg/m2): 40.4 (07-01 @ 19:46)  BSA (m2): 1.84 (07-01 @ 19:46)    Vitals:    T(F): 98.6 (02 Jul 2021 07:26), Max: 98.8 (01 Jul 2021 22:13)  HR: 80 (02 Jul 2021 07:26)  BP: 106/39 (02 Jul 2021 07:26)  RR: 19 (02 Jul 2021 07:26)  SpO2: 97% (02 Jul 2021 07:26) (96% - 100%)  temp max in last 48H T(F): , Max: 98.8 (07-01-21 @ 22:13)    Current Antibiotics:  piperacillin/tazobactam IVPB.. 3.375 Gram(s) IV Intermittent every 12 hours    Other medications:  amitriptyline 40 milliGRAM(s) Oral at bedtime  atorvastatin 40 milliGRAM(s) Oral at bedtime  cholecalciferol 2000 Unit(s) Oral daily  dextrose 40% Gel 15 Gram(s) Oral once  dextrose 5%. 1000 milliLiter(s) IV Continuous <Continuous>  dextrose 5%. 1000 milliLiter(s) IV Continuous <Continuous>  dextrose 50% Injectable 25 Gram(s) IV Push once  dextrose 50% Injectable 12.5 Gram(s) IV Push once  dextrose 50% Injectable 25 Gram(s) IV Push once  folic acid 1 milliGRAM(s) Oral daily  gabapentin 300 milliGRAM(s) Oral at bedtime  glucagon  Injectable 1 milliGRAM(s) IntraMuscular once  hydrALAZINE 25 milliGRAM(s) Oral every 8 hours  insulin glargine Injectable (LANTUS) 8 Unit(s) SubCutaneous at bedtime  insulin lispro (ADMELOG) corrective regimen sliding scale   SubCutaneous three times a day before meals  insulin lispro (ADMELOG) corrective regimen sliding scale   SubCutaneous at bedtime  lactobacillus acidophilus 1 Tablet(s) Oral two times a day with meals  levothyroxine 100 MICROGram(s) Oral daily  Nephro-nunu 1 Tablet(s) Oral daily  pantoprazole    Tablet 40 milliGRAM(s) Oral every 12 hours                            9.6    10.80 )-----------( 81       ( 02 Jul 2021 04:19 )             30.6     07-02    130<L>  |  92<L>  |  40.8<H>  ----------------------------<  269<H>  3.7   |  21.0<L>  |  4.98<H>    Ca    8.6      02 Jul 2021 04:19    TPro  8.3  /  Alb  3.8  /  TBili  0.6  /  DBili  x   /  AST  67<H>  /  ALT  30  /  AlkPhos  133<H>  07-01    RECENT CULTURES:  07-02 @ 01:00          Nik      WBC Count: 10.80 K/uL (07-02-21 @ 04:19)  WBC Count: 13.14 K/uL (07-01-21 @ 20:13)    Creatinine, Serum: 4.98 mg/dL (07-02-21 @ 04:19)  Creatinine, Serum: 4.79 mg/dL (07-01-21 @ 20:13)             SARS-CoV-2: Nik (07-02-21 @ 01:00)  Rapid RVP Result: Krissytegudelia (07-02-21 @ 01:00)    COVID-19 PCR: Nik (06-09-21 @ 01:06)   [facility]  INFECTIOUS DISEASES AND INTERNAL MEDICINE at [place]  =======================================================  Omi Brink MD  Diplomates American Board of Internal Medicine and Infectious Diseases  Tel: [phone]      Fax: [phone]  =======================================================      [id number]  NICHELLE GILL    CC: Patient is a 76y old  Female who presents with a chief complaint of fever (01 Jul 2021 23:32)      77 y/o female who started HD about 3 weeks ago and has left upper chest wall dialysis catheter, also has Rt. upper chest wall port for 2 years for her access ( as per pt. her veins are bad ), blood transfusions ( has transfusion dependant anemia ) , had dialysis yesterday ( on M, W, F ) but since last night was having chills. Today pt's daughter called pt's visiting nurse who asked family to check pt's temp. As per pt. her temp was 104. pt. was sent to the ER. pt. reports no cough, no phlegm, no sick contact, no cp, no sob, no abd. pain, no n/v/d. no change in mental status. pt. is no toxic looking at the time of admission and is afebrile. pt. makes urine 2 x daily.  Pt's cardiologist Dr. Shelton has recommended no AC for pt. as she bleeds easily.     ID above reviewed  reports fever chills and nausea x 2 days  has nor urinated yet but denies urinary symptoms  no cough diarrhea  no sick contacts/travel  no issues with port -reports permacath intermittently bleeds    reports CABG and AVR replacement about 5y ago at [place]  right chest port placed for access used for blood draws, PRBC and injections    Past Medical & Surgical Hx:  PAST MEDICAL & SURGICAL HISTORY:  Hypertension  well controlled    High cholesterol    Low back pain  chronic over a year    Asthma  well controlled, never intubated for exacerbation    Anemia, unspecified anemia type  uses O2 at 2L at home PRN, due to anemia    Aortic stenosis  s/p bovine AVR    Heart failure  Echo  10/2020 EF 60%, mild AR    CAD (coronary artery disease)  s/p OHS    CKD (chronic kidney disease)  Stage 4, now on dialysis.    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            Social Hx: former smoker    FAMILY HISTORY:  Family history of diabetes mellitus (Sibling)        Allergies    ACE inhibitors (Other)  Bactrim (Nephrotoxicity)  Biaxin (Joint Pain)  morphine (Short breath)  NSAIDs (Other)    Intolerances             REVIEW OF SYSTEMS:  CONSTITUTIONAL:  + Fever no chills  HEENT:  No diplopia or blurred vision.  No earache, sore throat or runny nose.  CARDIOVASCULAR:  No pressure, squeezing, strangling, tightness, heaviness or aching about the chest, neck, axilla or epigastrium.  RESPIRATORY:  No cough, shortness of breath  GASTROINTESTINAL:  No nausea, vomiting or diarrhea.  GENITOURINARY:  No dysuria, frequency or urgency. No Blood in urine  MUSCULOSKELETAL:  no joint aches, no muscle pain  SKIN:  No change in skin, hair or nails.  NEUROLOGIC:  No Headaches, seizures or weakness.  PSYCHIATRIC:  No disorder of thought or mood.  ENDOCRINE:  No heat or cold intolerance  HEMATOLOGICAL:  No easy bruising or bleeding.       Physical Exam:    GEN: NAD, pleasant  HEENT: normocephalic and atraumatic. EOMI. PERRL.  Anicteric  NECK: Supple.   LUNGS: Clear to auscultation.  HEART: Regular rate and rhythm + murmur. midline scar well healed, right chest port accessed, left chest permacath with surrounding coagulated blood, no erythema  ABDOMEN: Soft, nontender, and nondistended.  Positive bowel sounds.    : No CVA tenderness  EXTREMITIES: Without any edema.  MSK: No joint swelling  NEUROLOGIC: Cranial nerves II through XII are grossly intact. No Focal Deficits  PSYCHIATRIC: Appropriate affect .  SKIN: excoriations due to pruritis, ecchymoses FAIZAN TRINH    Height (cm): 149.9 (07-01 @ 19:46)  Weight (kg): 90.673 (07-01 @ 19:46)  BMI (kg/m2): 40.4 (07-01 @ 19:46)  BSA (m2): 1.84 (07-01 @ 19:46)    Vitals:    T(F): 98.6 (02 Jul 2021 07:26), Max: 98.8 (01 Jul 2021 22:13)  HR: 80 (02 Jul 2021 07:26)  BP: 106/39 (02 Jul 2021 07:26)  RR: 19 (02 Jul 2021 07:26)  SpO2: 97% (02 Jul 2021 07:26) (96% - 100%)  temp max in last 48H T(F): , Max: 98.8 (07-01-21 @ 22:13)    Current Antibiotics:  piperacillin/tazobactam IVPB.. 3.375 Gram(s) IV Intermittent every 12 hours    Other medications:  amitriptyline 40 milliGRAM(s) Oral at bedtime  atorvastatin 40 milliGRAM(s) Oral at bedtime  cholecalciferol 2000 Unit(s) Oral daily  dextrose 40% Gel 15 Gram(s) Oral once  dextrose 5%. 1000 milliLiter(s) IV Continuous <Continuous>  dextrose 5%. 1000 milliLiter(s) IV Continuous <Continuous>  dextrose 50% Injectable 25 Gram(s) IV Push once  dextrose 50% Injectable 12.5 Gram(s) IV Push once  dextrose 50% Injectable 25 Gram(s) IV Push once  folic acid 1 milliGRAM(s) Oral daily  gabapentin 300 milliGRAM(s) Oral at bedtime  glucagon  Injectable 1 milliGRAM(s) IntraMuscular once  hydrALAZINE 25 milliGRAM(s) Oral every 8 hours  insulin glargine Injectable (LANTUS) 8 Unit(s) SubCutaneous at bedtime  insulin lispro (ADMELOG) corrective regimen sliding scale   SubCutaneous three times a day before meals  insulin lispro (ADMELOG) corrective regimen sliding scale   SubCutaneous at bedtime  lactobacillus acidophilus 1 Tablet(s) Oral two times a day with meals  levothyroxine 100 MICROGram(s) Oral daily  Nephro-nunu 1 Tablet(s) Oral daily  pantoprazole    Tablet 40 milliGRAM(s) Oral every 12 hours                            9.6    10.80 )-----------( 81       ( 02 Jul 2021 04:19 )             30.6     07-02    130<L>  |  92<L>  |  40.8<H>  ----------------------------<  269<H>  3.7   |  21.0<L>  |  4.98<H>    Ca    8.6      02 Jul 2021 04:19    TPro  8.3  /  Alb  3.8  /  TBili  0.6  /  DBili  x   /  AST  67<H>  /  ALT  30  /  AlkPhos  133<H>  07-01    RECENT CULTURES:  07-02 @ 01:00          NotDetec      WBC Count: 10.80 K/uL (07-02-21 @ 04:19)  WBC Count: 13.14 K/uL (07-01-21 @ 20:13)    Creatinine, Serum: 4.98 mg/dL (07-02-21 @ 04:19)  Creatinine, Serum: 4.79 mg/dL (07-01-21 @ 20:13)             SARS-CoV-2: NotDetec (07-02-21 @ 01:00)  Rapid RVP Result: NotDetec (07-02-21 @ 01:00)    COVID-19 PCR: NotDetec (06-09-21 @ 01:06)    < from: CT Chest No Cont (07.02.21 @ 00:43) >  PROCEDURE DATE:  07/02/2021        ******PRELIMINARY REPORT******    ******PRELIMINARY REPORT******          INTERPRETATION:  no evidence of pneumonia.      < end of copied text >

## 2021-07-02 NOTE — CONSULT NOTE ADULT - SUBJECTIVE AND OBJECTIVE BOX
Patient is a 76y old  Female who presents with a chief complaint of fever (02 Jul 2021 11:42)      HPI:  75 y/o female who started HD about 3 weeks ago and has left upper chest wall dialysis catheter, also has Rt. upper chest wall port for 2 years for her access ( as per pt. her veins are bad ), blood transfusions ( has transfusion dependant anemia ) , had dialysis yesterday ( on M, W, F ) but since last night was having chills. Today pt's daughter called pt's visiting nurse who asked family to check pt's temp. As per pt. her temp was 104. pt. was sent to the ER. pt. reports no cough, no phlegm, no sick contact, no cp, no sob, no abd. pain, no n/v/d. no change in mental status. pt. is no toxic looking at the time of admission and is afebrile. pt. makes urine 2 x daily.  Pt's cardiologist Dr. Shelton has recommended no AC for pt. as she bleeds easily.  (01 Jul 2021 23:32)    Above noted   ID consult noted , they want cultures from ports  Pt has no focal complaints   Due for HD today , normally goes to [facility] [place]   Covid negative   CT chest , No PNA   Started on Zosyn and Vanco   Lactate 1.7  Afebrile here       PAST MEDICAL & SURGICAL HISTORY:  Hypertension  well controlled    High cholesterol    Low back pain  chronic over a year    Asthma  well controlled, never intubated for exacerbation    Anemia, unspecified anemia type  uses O2 at 2L at home PRN, due to anemia    Aortic stenosis  s/p bovine AVR    Heart failure  Echo  10/2020 EF 60%, mild AR    CAD (coronary artery disease)  s/p OHS    CKD (chronic kidney disease)  Stage 4, now on dialysis.    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        FAMILY HISTORY:  Family history of diabetes mellitus (Sibling)        Social History:    MEDICATIONS  (STANDING):  amitriptyline 40 milliGRAM(s) Oral at bedtime  atorvastatin 40 milliGRAM(s) Oral at bedtime  cholecalciferol 2000 Unit(s) Oral daily  dextrose 40% Gel 15 Gram(s) Oral once  dextrose 5%. 1000 milliLiter(s) (50 mL/Hr) IV Continuous <Continuous>  dextrose 5%. 1000 milliLiter(s) (100 mL/Hr) IV Continuous <Continuous>  dextrose 50% Injectable 25 Gram(s) IV Push once  dextrose 50% Injectable 12.5 Gram(s) IV Push once  dextrose 50% Injectable 25 Gram(s) IV Push once  folic acid 1 milliGRAM(s) Oral daily  gabapentin 300 milliGRAM(s) Oral at bedtime  glucagon  Injectable 1 milliGRAM(s) IntraMuscular once  hydrALAZINE 25 milliGRAM(s) Oral every 8 hours  insulin glargine Injectable (LANTUS) 8 Unit(s) SubCutaneous at bedtime  insulin lispro (ADMELOG) corrective regimen sliding scale   SubCutaneous three times a day before meals  insulin lispro (ADMELOG) corrective regimen sliding scale   SubCutaneous at bedtime  lactobacillus acidophilus 1 Tablet(s) Oral two times a day with meals  levothyroxine 100 MICROGram(s) Oral daily  Nephro-nunu 1 Tablet(s) Oral daily  pantoprazole    Tablet 40 milliGRAM(s) Oral every 12 hours  piperacillin/tazobactam IVPB.. 3.375 Gram(s) IV Intermittent every 12 hours    MEDICATIONS  (PRN):  acetaminophen   Tablet .. 650 milliGRAM(s) Oral every 6 hours PRN Temp greater or equal to 38C (100.4F)  ALBUTerol    0.083% 2.5 milliGRAM(s) Nebulizer every 6 hours PRN Shortness of Breath and/or Wheezing      Allergies    ACE inhibitors (Other)  Bactrim (Nephrotoxicity)  Biaxin (Joint Pain)  morphine (Short breath)  NSAIDs (Other)    Intolerances        Vital Signs Last 24 Hrs  T(C): 37 (02 Jul 2021 07:26), Max: 37.1 (01 Jul 2021 22:13)  T(F): 98.6 (02 Jul 2021 07:26), Max: 98.8 (01 Jul 2021 22:13)  HR: 80 (02 Jul 2021 07:26) (58 - 80)  BP: 106/39 (02 Jul 2021 07:26) (99/45 - 109/52)  BP(mean): --  RR: 19 (02 Jul 2021 07:26) (19 - 24)  SpO2: 97% (02 Jul 2021 07:26) (96% - 100%)  Daily Height in cm: 149.86 (01 Jul 2021 19:46)    Daily   I&O's Detail    I&O's Summary      PHYSICAL EXAM:    GENERAL: NAD, Nontoxic   HEAD:  Atraumatic, Normocephalic. anicteric   NECK: Supple, R mediport clean , L permacath and tunnel clean , nontender   CHEST/LUNG: Clear / EAE . No wheeze   HEART: Regular rate and rhythm; Norub   ABDOMEN: Soft, Nontender, Nondistended; Bowel sounds present  EXTREMITIES:  edema much better , no ulcers or cellulitis           LABS:                        9.6    10.80 )-----------( 81       ( 02 Jul 2021 04:19 )             30.6     07-02    130<L>  |  92<L>  |  40.8<H>  ----------------------------<  269<H>  3.7   |  21.0<L>  |  4.98<H>    Ca    8.6      02 Jul 2021 04:19    TPro  8.3  /  Alb  3.8  /  TBili  0.6  /  DBili  x   /  AST  67<H>  /  ALT  30  /  AlkPhos  133<H>  07-01    PT/INR - ( 01 Jul 2021 21:01 )   PT: 15.8 sec;   INR: 1.38 ratio         PTT - ( 01 Jul 2021 21:01 )  PTT:71.5 sec      rad< from: CT Chest No Cont (07.02.21 @ 00:43) >    ******PRELIMINARY REPORT******    ******PRELIMINARY REPORT******           EXAM:  CT CHEST                          PROCEDURE DATE:  07/02/2021        ******PRELIMINARY REPORT******    ******PRELIMINARY REPORT******          INTERPRETATION:  no evidence of pneumonia.        ******PRELIMINARY REPORT******    ******PRELIMINARY REPORT******            GIDEON GUAJARDO DO; Attending Radiologist    < end of copied text >  < from: Xray Chest 1 View- PORTABLE-Routine (Xray Chest 1 View- PORTABLE-Routine in AM.) (06.03.21 @ 06:34) >     EXAM:  XR CHEST PORTABLE ROUTINE 1V                          PROCEDURE DATE:  06/03/2021          INTERPRETATION:  Portable chest radiograph    CLINICAL INFORMATION: Pulmonary hypertension. Pleural effusion.    TECHNIQUE:  Portable  AP view of thechest was obtained.    COMPARISON: No previous examinations are available for review.    FINDINGS:    The lungs show  mild haziness overlying the hemithoraces which may indicate residual small effusions. Upper lobes clear. No pneumothorax.    The  heart is enlarged in transverse diameter. No hilar mass.  Status post median sternotomy.  Cardiomemes device overlies proximal LEFT pulmonary artery.  Mediport catheter tip in SVC.     Visualized osseous structures are intact.    IMPRESSION:   Suspect residual small bilateral effusions..  Confirmatory lateral or decubitus radiographs recommended.            ARIEL LANE MD; Attending Radiologist  This document has been electronically signed. Franco  3 2021 12:47PM    < end of copied text >        RADIOLOGY & ADDITIONAL TESTS:

## 2021-07-02 NOTE — PROGRESS NOTE ADULT - ASSESSMENT
76y Female with HTN HLD CAD s/p remote CABG, Bio AVR solitary episode of Af w/o recurrence, ILR in place, CRI, MDS transfusion dependent, cirrhosis sec to chronic hep c with esophageal varices and prior banding, hepatocellular carcinoma,s/p embolization with embozene particles on 4/5/21, and recent admission to [facility] after fall with head trauma recently dsc after being started on HD for worsening renal function with management of pulm HTN and fluid overload state admtt with rigors and fever    Fevers with rigors   Bacteremia with GPC in clusters  ID consulted, appreciate recs  Abx dosing with HD  Complicated bacteremia as she has a port that has been accessed multiple times recently (pt has refused venous blood draws and directed blood draws from port previously) as well as HD access     Heart Failure with preserved EF, euvolemic on exam, s/p CardioMem, Pulmonary HTN  Continue home meds    ESRD on HD  HD today, preHD midodrine    DM type 2 with long term current insulin use with hyperglycemia  Continue lantus and admelog.     HTN  Continue home meds (hold prior HD)    Anemia   ch disease with MDS  Transfusion dependant  Stable     s/p bovine AVR  Will need to d/w ID and cardio about risk for Infective endocarditis (will need to repeat blood cultures till clears    Thrombocytopenia  Chronic, today 98, stable compared to old labs, close f/u on cbc.     Compression boots for dvt prophylaxis  pt's cardiologist has recommended no AC or aspirin as pt. bleeds easily.     Dvt ppx : Venodyne

## 2021-07-02 NOTE — PROVIDER CONTACT NOTE (OTHER) - SITUATION
76F A&Ox4. S/P code sepsis on 7/1. R/O Bacteremia 2/2 LCW Permacath and RCW Port-A-cath. S/P HD today 1.5 L removed.

## 2021-07-02 NOTE — PROGRESS NOTE ADULT - SUBJECTIVE AND OBJECTIVE BOX
Cheif complaint    HPI      ROS : As above       Physical Exam :  Gen : Well appearing frail/obese male/female sitting/lying comfortably in bed, in acute/no acute distress  HEENT : NCAT, PERRLA, conjuctival pallor/pink, clear/icteric sclera, dry/moist mucous membranes, no cervical lymphadenopathy  CVS : S1,S2, no MRG appreciated  Resp : CTA b/l with good air entry, no rhonchi/wheezes appreciated  Abd: Soft, non tender, No rebound/guarding, No Hepatosplenomegaly, BS +ve in all 4 quadrants  Ext: No clubbing/cysanosis of digits, No pedal edema b/l  Neuro/Psych : No focal defcicits appreciated, AAOx3 with normal affect and behaviour      CXR :     Micro:           Assessment:        Diet  DVT ppx:     Mendoza catheter:    Peripheral / Central line:    Advanced Directives:  Next of kin:    Disposition: Anticipated LOS      [facility] Division of Hospital Medicine    Chief Complaint:  Rigors    SUBJECTIVE / OVERNIGHT EVENTS:  Pt admitted with rigors  Blood cultures with GPC in clusters  D/W ID and renal  Continue to make urine significantly reduced now)   Patient denies chest pain, SOB, abd pain, N/V,  dysuria or any other complaints. All remainder ROS negative.     MEDICATIONS  (STANDING):  amitriptyline 40 milliGRAM(s) Oral at bedtime  atorvastatin 40 milliGRAM(s) Oral at bedtime  cholecalciferol 2000 Unit(s) Oral daily  dextrose 40% Gel 15 Gram(s) Oral once  dextrose 5%. 1000 milliLiter(s) (50 mL/Hr) IV Continuous <Continuous>  dextrose 5%. 1000 milliLiter(s) (100 mL/Hr) IV Continuous <Continuous>  dextrose 50% Injectable 25 Gram(s) IV Push once  dextrose 50% Injectable 12.5 Gram(s) IV Push once  dextrose 50% Injectable 25 Gram(s) IV Push once  folic acid 1 milliGRAM(s) Oral daily  gabapentin 300 milliGRAM(s) Oral at bedtime  glucagon  Injectable 1 milliGRAM(s) IntraMuscular once  hydrALAZINE 25 milliGRAM(s) Oral every 8 hours  insulin glargine Injectable (LANTUS) 8 Unit(s) SubCutaneous at bedtime  insulin lispro (ADMELOG) corrective regimen sliding scale   SubCutaneous three times a day before meals  insulin lispro (ADMELOG) corrective regimen sliding scale   SubCutaneous at bedtime  lactobacillus acidophilus 1 Tablet(s) Oral two times a day with meals  levothyroxine 100 MICROGram(s) Oral daily  Nephro-nunu 1 Tablet(s) Oral daily  pantoprazole    Tablet 40 milliGRAM(s) Oral every 12 hours  piperacillin/tazobactam IVPB.. 3.375 Gram(s) IV Intermittent every 12 hours  vancomycin  IVPB 1000 milliGRAM(s) IV Intermittent once    MEDICATIONS  (PRN):  acetaminophen   Tablet .. 650 milliGRAM(s) Oral every 6 hours PRN Temp greater or equal to 38C (100.4F)  ALBUTerol    0.083% 2.5 milliGRAM(s) Nebulizer every 6 hours PRN Shortness of Breath and/or Wheezing        I&O's Summary    02 Jul 2021 07:01  -  02 Jul 2021 19:44  --------------------------------------------------------  IN: 0 mL / OUT: 1600 mL / NET: -1600 mL        PHYSICAL EXAM:  Vital Signs Last 24 Hrs  T(C): 37.4 (02 Jul 2021 19:19), Max: 38.8 (02 Jul 2021 14:25)  T(F): 99.4 (02 Jul 2021 19:19), Max: 101.8 (02 Jul 2021 14:25)  HR: 90 (02 Jul 2021 19:19) (58 - 90)  BP: 134/66 (02 Jul 2021 19:19) (94/43 - 134/66)  BP(mean): --  RR: 20 (02 Jul 2021 19:19) (19 - 24)  SpO2: 98% (02 Jul 2021 19:36) (84% - 100%)        CONSTITUTIONAL: NAD, well-developed, well-groomed   ENMT: Moist oral mucosa, no pharyngeal injection or exudates; normal dentition  RESPIRATORY: Normal respiratory effort; lungs are clear to auscultation bilaterally  CARDIOVASCULAR: Regular rate and rhythm, normal S1 and S2, no murmur/rub/gallop; No lower extremity edema; Peripheral pulses are 2+ bilaterally  ABDOMEN: Nontender to palpation, normoactive bowel sounds, no rebound/guarding; No hepatosplenomegaly  MUSCLOSKELETAL:  no clubbing or cyanosis of digits; no joint swelling or tenderness to palpation  PSYCH: A+O to person, place, and time; affect appropriate  NEUROLOGY: CN 2-12 are intact and symmetric; no gross sensory deficits;   SKIN: No rashes; no palpable lesions    LABS:                        9.6    10.80 )-----------( 81       ( 02 Jul 2021 04:19 )             30.6     07-02    130<L>  |  92<L>  |  40.8<H>  ----------------------------<  269<H>  3.7   |  21.0<L>  |  4.98<H>    Ca    8.6      02 Jul 2021 04:19    TPro  8.3  /  Alb  3.8  /  TBili  0.6  /  DBili  x   /  AST  67<H>  /  ALT  30  /  AlkPhos  133<H>  07-01    PT/INR - ( 01 Jul 2021 21:01 )   PT: 15.8 sec;   INR: 1.38 ratio         PTT - ( 01 Jul 2021 21:01 )  PTT:71.5 sec          Culture - Blood (collected 01 Jul 2021 20:17)  Source: .Blood Blood-Peripheral  Gram Stain (02 Jul 2021 15:30):    Growth in aerobic and anaerobic bottles: Gram Positive Cocci in Clusters    ***Blood Panel PCR results on this specimen are available    approximately 3 hours after the Gram stain result.***    Gram stain, PCR, and/or culture results may not always    correspond due to difference in methodologies.    ************************************************************    This PCR assay was performed using [employer].    The following targets are tested for: Enterococcus,    vancomycin resistant enterococci, Listeria monocytogenes,    coagulase negative staphylococci, S. aureus,    methicillin resistant S. aureus, Streptococcus agalactiae    (Group B), S. pneumoniae, S. pyogenes (Group A),    Acinetobacter baumannii, Enterobacter cloacae, E. coli,    Klebsiella oxytoca, K. pneumoniae, Proteus sp.,    Serratia marcescens, Haemophilus influenzae,    Neisseria meningitidis, Pseudomonas aeruginosa, Candida    albicans, C. glabrata, C krusei, C parapsilosis,    C. tropicalis and the KPC resistance gene.    Gram Stain performed by:    [facility]    [address] [place] [address]    .    TYPE: (C=Critical, N=Notification, A=Abnormal) C    TESTS:  _ GS    DATE/TIME CALLED: _ 07/02/2021 12:37:16    CALLED TO: Ting Bo RN    READ BACK (2 Patient Identifiers)(Y/N): _ Y    READ BACK VALUES (Y/N): _ Y    CALLED BY: Ting Armstrong  Organism: Blood Culture PCR (02 Jul 2021 13:32)  Organism: Blood Culture PCR (02 Jul 2021 13:32)      CAPILLARY BLOOD GLUCOSE      POCT Blood Glucose.: 135 mg/dL (02 Jul 2021 16:18)  POCT Blood Glucose.: 321 mg/dL (02 Jul 2021 12:04)  POCT Blood Glucose.: 266 mg/dL (02 Jul 2021 08:06)  POCT Blood Glucose.: 219 mg/dL (02 Jul 2021 00:27)        RADIOLOGY & ADDITIONAL TESTS:  Results Reviewed  5/28/21 Rt heart Cath  DIAGNOSTIC IMPRESSIONS:   Severely elevated pulmonary pressures.  Mildly elevated wedge pressure.  Normal cardiac output.  Successful implant of the cardiomems device into the left lower pulmonary  artery.      5/19/21 TTE  Summary:   1. Left ventricular ejection fraction, by visual estimation, is 60 to 65%.   2. Normal global left ventricular systolic function.   3. The mitral in-flow pattern reveals no discernable A-wave, therefore no comment on diastolic function can be made.   4. There is moderate concentric left ventricular hypertrophy.   5. The right ventricle is not well visualized. Systolic function appears grossly preserved.   6. Severely enlarged left atrium.   7. Moderately enlarged right atrium.   8. Moderate tricuspid regurgitation.   9. Trace mitral valve regurgitation.  10. Mitral valve mean gradient is 4.9 mmHg consistent with mild mitral stenosis.  11. Mild thickening of the anterior and posterior mitral valve leaflets.  12. Moderate mitral annular calcification.  13. A well-seated bioprosthetic valve is visualized in the aortic position. There is mild-moderate central regurgitation. Mean gradient is 8.5 mm Hg and dimensionless index is 0.47, which are likely consistent with a normal functioning bioprosthetic aortic valve.  14. Mildly dilated pulmonary artery.  15. Estimated pulmonary artery systolic pressure is 53.1 mmHg assuming a right atrial pressure of 3 mmHg, which is consistent with moderate pulmonary hypertension.  16. There is no evidence of pericardial effusion.  17. Moderate pleural effusion in both left and right lateral regions.  18. Compared to a prior study from 10/19/20, there is now pulmonary hypertension and bilateral pleural effusions are now present.      6/1/21 CT chest  Mild to moderate bilateral pleural effusions, stable on the right and increased on the left side.  Cardiomegaly and aortic valve prosthesis.  New pulmonary artery monitoring device.  Cirrhotic morphology with portal hypertension and a 2.1 cm sized right hepatic mass lesion, previously characterized as HCC.

## 2021-07-03 NOTE — PROGRESS NOTE ADULT - SUBJECTIVE AND OBJECTIVE BOX
[facility]  ----------------------------------------  The patient was seen at bedside. Patient with bacteremia. Reports feeling tired. Denied chest pain or dyspnea.    Vital Signs Last 24 Hrs  T(C): 36.8 (2021 08:00), Max: 39.4 (2021 20:50)  T(F): 98.3 (2021 08:00), Max: 102.9 (2021 20:50)  HR: 84 (2021 12:11) (71 - 90)  BP: 121/69 (2021 12:11) (94/43 - 134/66)  BP(mean): --  RR: 18 (2021 08:00) (18 - 20)  SpO2: 94% (2021 08:00) (84% - 100%)    CAPILLARY BLOOD GLUCOSE  POCT Blood Glucose.: 224 mg/dL (2021 11:38)  POCT Blood Glucose.: 235 mg/dL (2021 08:14)  POCT Blood Glucose.: 223 mg/dL (2021 21:52)  POCT Blood Glucose.: 135 mg/dL (2021 16:18)    PHYSICAL EXAMINATION:  ----------------------------------------  General appearance: No acute distress, Awake, Alert  HEENT: Normocephalic, Atraumatic, Conjunctiva clear, EOMI  Neck: Supple, No JVD, No tenderness  Lungs: Breath sound equal bilaterally, No wheezes, No rales  Cardiovascular: S1S2, Regular rhythm  Abdomen: Soft, Nontender, Nondistended, No guarding/rebound, Positive bowel sounds  Extremities: No clubbing, No cyanosis, No edema, No calf tenderness  Neuro: Strength equal bilaterally, No tremors  Psychiatric: Appropriate mood, Normal affect    LABORATORY STUDIES:  ----------------------------------------             10.0   10.59 )-----------( 77       ( 2021 07:56 )             32.1     07-03    133<L>  |  94<L>  |  25.3<H>  ----------------------------<  219<H>  3.7   |  25.0  |  3.17<H>    Ca    8.7      2021 07:56  Phos  5.1     -    TPro  8.3  /  Alb  3.8  /  TBili  0.6  /  DBili  x   /  AST  67<H>  /  ALT  30  /  AlkPhos  133<H>      LIVER FUNCTIONS - ( 2021 20:13 )  Alb: 3.8 g/dL / Pro: 8.3 g/dL / ALK PHOS: 133 U/L / ALT: 30 U/L / AST: 67 U/L / GGT: x           PT/INR - ( 2021 21:01 )   PT: 15.8 sec;   INR: 1.38 ratio    PTT - ( 2021 21:01 )  PTT:71.5 sec    Urinalysis Basic - ( 2021 00:50 )  Color: Juanita / Appearance: Slightly Turbid / S.020 / pH: x  Gluc: x / Ketone: Trace  / Bili: Moderate / Urobili: 1 mg/dL   Blood: x / Protein: 100 mg/dL / Nitrite: Negative   Leuk Esterase: Small / RBC: 0-2 /HPF / WBC 11-25   Sq Epi: x / Non Sq Epi: Many / Bacteria: Moderate    Culture - Blood (collected 2021 14:04)  Source: .Blood Blood-Catheter  Gram Stain (2021 12:37):    Growth in anaerobic bottle: Gram Positive Cocci in Clusters    Gram Stain performed by:    [facility]    [address] [place] [id number]    .    TYPE: (C=Critical, N=Notification, A=Abnormal) C    TESTS:  _ GS    DATE/TIME CALLED: _ 2021 12:33:23    CALLED TO: Ting Vital RN    READ BACK (2 Patient Identifiers)(Y/N): _ Y    READ BACK VALUES (Y/N): _ Y    CALLED BY: Ting Armstrong    Culture - Blood (collected 2021 20:17)  Source: .Blood Blood-Peripheral  Gram Stain (2021 15:30):    Growth in aerobic and anaerobic bottles: Gram Positive Cocci in Clusters    ***Blood Panel PCR results on this specimen are available    approximately 3 hours after the Gram stain result.***    Gram stain, PCR, and/or culture results may not always    correspond due to difference in methodologies.    ************************************************************    This PCR assay was performed using [employer].    The following targets are tested for: Enterococcus,    vancomycin resistant enterococci, Listeria monocytogenes,    coagulase negative staphylococci, S. aureus,    methicillin resistant S. aureus, Streptococcus agalactiae    (Group B), S. pneumoniae, S. pyogenes (Group A),    Acinetobacter baumannii, Enterobacter cloacae, E. coli,    Klebsiella oxytoca, K. pneumoniae, Proteus sp.,    Serratia marcescens, Haemophilus influenzae,    Neisseria meningitidis, Pseudomonas aeruginosa, Candida    albicans, C. glabrata, C krusei, C parapsilosis,    C. tropicalis and the KPC resistance gene.    Gram Stain performed by:    [facility]    [address] [place] [address]    .    TYPE: (C=Critical, N=Notification, A=Abnormal) C    TESTS:  _     DATE/TIME CALLED: _ 2021 12:37:16    CALLED TO: Ting Bo RN    READ BACK (2 Patient Identifiers)(Y/N): _ Y    READ BACK VALUES (Y/N): _ Y    CALLED BY: Ting Armstrong  Organism: Blood Culture PCR (2021 13:32)  Organism: Blood Culture PCR (2021 13:32)    Culture - Blood (collected 2021 20:16)  Source: .Blood Blood-Peripheral  Gram Stain (2021 09:45):    Growth in aerobic and anaerobic bottles: Gram Positive Cocci in Clusters    Gram Stain performed by:    [facility]    [address] [place] [address]    .    TYPE: (C=Critical, N=Notification, A=Abnormal) C    TESTS:  _    DATE/TIME CALLED: _ 2021 09:41:58    CALLED TO: _ ALBIN VITAL    READ BACK (2 Patient Identifiers)(Y/N): _ Y    READ BACK VALUES (Y/N): _ Y    CALLED BY: Ting SG    MEDICATIONS  (STANDING):  amitriptyline 40 milliGRAM(s) Oral at bedtime  atorvastatin 40 milliGRAM(s) Oral at bedtime  cholecalciferol 2000 Unit(s) Oral daily  dextrose 40% Gel 15 Gram(s) Oral once  dextrose 5%. 1000 milliLiter(s) (50 mL/Hr) IV Continuous <Continuous>  dextrose 5%. 1000 milliLiter(s) (100 mL/Hr) IV Continuous <Continuous>  dextrose 50% Injectable 25 Gram(s) IV Push once  dextrose 50% Injectable 12.5 Gram(s) IV Push once  dextrose 50% Injectable 25 Gram(s) IV Push once  folic acid 1 milliGRAM(s) Oral daily  gabapentin 300 milliGRAM(s) Oral at bedtime  glucagon  Injectable 1 milliGRAM(s) IntraMuscular once  hydrALAZINE 25 milliGRAM(s) Oral every 8 hours  insulin glargine Injectable (LANTUS) 8 Unit(s) SubCutaneous at bedtime  insulin lispro (ADMELOG) corrective regimen sliding scale   SubCutaneous three times a day before meals  insulin lispro (ADMELOG) corrective regimen sliding scale   SubCutaneous at bedtime  lactobacillus acidophilus 1 Tablet(s) Oral two times a day with meals  levothyroxine 100 MICROGram(s) Oral daily  Nephro-nunu 1 Tablet(s) Oral daily  pantoprazole    Tablet 40 milliGRAM(s) Oral every 12 hours  piperacillin/tazobactam IVPB.. 3.375 Gram(s) IV Intermittent every 12 hours  vancomycin  IVPB 1000 milliGRAM(s) IV Intermittent <User Schedule>    MEDICATIONS  (PRN):  acetaminophen   Tablet .. 650 milliGRAM(s) Oral every 6 hours PRN Temp greater or equal to 38C (100.4F)  ALBUTerol    0.083% 2.5 milliGRAM(s) Nebulizer every 6 hours PRN Shortness of Breath and/or Wheezing      ASSESSMENT / PLAN:  ----------------------------------------  76y Female with HTN HLD CAD s/p remote CABG, Bio AVR solitary episode of Af w/o recurrence, ILR in place, CRI, MDS transfusion dependent, cirrhosis sec to chronic hep c with esophageal varices and prior banding, hepatocellular carcinoma,s/p embolization with embozene particles on 21, and recent admission to [facility] after fall with head trauma recently dsc after being started on HD for worsening renal function with management of pulm HTN and fluid overload state admtt with rigors and fever    Sepsis / Bacteremia - Blood cultures grew gram positive cocci in clusters with PCR noting coagulase negative Staph. Infectious Disease consultation noted. On piperacillin/tazobactam and vancomycin. Episode of fever noted yesterday, persistent leukocytosis improved from admission. Repeat blood culture results to be reviewed when available. History of bioprosthetic valve noted and may require transesophageal echocardiogram pending culture results.    ESRD - Hemodialysis as per Nephrology.    Anemia / Thrombocytopenia / MDS - Monitoring hemoglobin levels. The patient noted a history of transfusions about once a month depending on her blood counts.    Chronic diastolic heart failure -     Diabetes - Insulin coverage, close monitoring of blood glucose levels.    Hypothyroidism - On levothyroxine.    Hypertension - Close blood pressure monitoring. On hydralazine. [facility]  ----------------------------------------  The patient was seen at bedside. Patient with bacteremia. Reports feeling tired. Denied chest pain or dyspnea.    Vital Signs Last 24 Hrs  T(C): 36.8 (2021 08:00), Max: 39.4 (2021 20:50)  T(F): 98.3 (2021 08:00), Max: 102.9 (2021 20:50)  HR: 84 (2021 12:11) (71 - 90)  BP: 121/69 (2021 12:11) (94/43 - 134/66)  BP(mean): --  RR: 18 (2021 08:00) (18 - 20)  SpO2: 94% (2021 08:00) (84% - 100%)    CAPILLARY BLOOD GLUCOSE  POCT Blood Glucose.: 224 mg/dL (2021 11:38)  POCT Blood Glucose.: 235 mg/dL (2021 08:14)  POCT Blood Glucose.: 223 mg/dL (2021 21:52)  POCT Blood Glucose.: 135 mg/dL (2021 16:18)    PHYSICAL EXAMINATION:  ----------------------------------------  General appearance: No acute distress, Awake, Alert  HEENT: Normocephalic, Atraumatic, Conjunctiva clear, EOMI  Neck: Supple, No JVD, No tenderness  Lungs: Breath sound equal bilaterally, No wheezes, No rales  Cardiovascular: S1S2, Regular rhythm  Abdomen: Soft, Nontender, Nondistended, No guarding/rebound, Positive bowel sounds  Extremities: No clubbing, No cyanosis, No edema, No calf tenderness  Neuro: Strength equal bilaterally, No tremors  Psychiatric: Appropriate mood, Normal affect    LABORATORY STUDIES:  ----------------------------------------             10.0   10.59 )-----------( 77       ( 2021 07:56 )             32.1     07-03    133<L>  |  94<L>  |  25.3<H>  ----------------------------<  219<H>  3.7   |  25.0  |  3.17<H>    Ca    8.7      2021 07:56  Phos  5.1     -    TPro  8.3  /  Alb  3.8  /  TBili  0.6  /  DBili  x   /  AST  67<H>  /  ALT  30  /  AlkPhos  133<H>      LIVER FUNCTIONS - ( 2021 20:13 )  Alb: 3.8 g/dL / Pro: 8.3 g/dL / ALK PHOS: 133 U/L / ALT: 30 U/L / AST: 67 U/L / GGT: x           PT/INR - ( 2021 21:01 )   PT: 15.8 sec;   INR: 1.38 ratio    PTT - ( 2021 21:01 )  PTT:71.5 sec    Urinalysis Basic - ( 2021 00:50 )  Color: Juanita / Appearance: Slightly Turbid / S.020 / pH: x  Gluc: x / Ketone: Trace  / Bili: Moderate / Urobili: 1 mg/dL   Blood: x / Protein: 100 mg/dL / Nitrite: Negative   Leuk Esterase: Small / RBC: 0-2 /HPF / WBC 11-25   Sq Epi: x / Non Sq Epi: Many / Bacteria: Moderate    Culture - Blood (collected 2021 14:04)  Source: .Blood Blood-Catheter  Gram Stain (2021 12:37):    Growth in anaerobic bottle: Gram Positive Cocci in Clusters    Gram Stain performed by:    [facility]    [address] [place] [id number]    .    TYPE: (C=Critical, N=Notification, A=Abnormal) C    TESTS:  _ GS    DATE/TIME CALLED: _ 2021 12:33:23    CALLED TO: Ting Vital RN    READ BACK (2 Patient Identifiers)(Y/N): _ Y    READ BACK VALUES (Y/N): _ Y    CALLED BY: Ting Armstrong    Culture - Blood (collected 2021 20:17)  Source: .Blood Blood-Peripheral  Gram Stain (2021 15:30):    Growth in aerobic and anaerobic bottles: Gram Positive Cocci in Clusters    ***Blood Panel PCR results on this specimen are available    approximately 3 hours after the Gram stain result.***    Gram stain, PCR, and/or culture results may not always    correspond due to difference in methodologies.    ************************************************************    This PCR assay was performed using [employer].    The following targets are tested for: Enterococcus,    vancomycin resistant enterococci, Listeria monocytogenes,    coagulase negative staphylococci, S. aureus,    methicillin resistant S. aureus, Streptococcus agalactiae    (Group B), S. pneumoniae, S. pyogenes (Group A),    Acinetobacter baumannii, Enterobacter cloacae, E. coli,    Klebsiella oxytoca, K. pneumoniae, Proteus sp.,    Serratia marcescens, Haemophilus influenzae,    Neisseria meningitidis, Pseudomonas aeruginosa, Candida    albicans, C. glabrata, C krusei, C parapsilosis,    C. tropicalis and the KPC resistance gene.    Gram Stain performed by:    [facility]    [address] [place] [address]    .    TYPE: (C=Critical, N=Notification, A=Abnormal) C    TESTS:  _     DATE/TIME CALLED: _ 2021 12:37:16    CALLED TO: Ting Bo RN    READ BACK (2 Patient Identifiers)(Y/N): _ Y    READ BACK VALUES (Y/N): _ Y    CALLED BY: Ting Armstrong  Organism: Blood Culture PCR (2021 13:32)  Organism: Blood Culture PCR (2021 13:32)    Culture - Blood (collected 2021 20:16)  Source: .Blood Blood-Peripheral  Gram Stain (2021 09:45):    Growth in aerobic and anaerobic bottles: Gram Positive Cocci in Clusters    Gram Stain performed by:    [facility]    [address] [place] [address]    .    TYPE: (C=Critical, N=Notification, A=Abnormal) C    TESTS:  _    DATE/TIME CALLED: _ 2021 09:41:58    CALLED TO: _ ALBIN VITAL    READ BACK (2 Patient Identifiers)(Y/N): _ Y    READ BACK VALUES (Y/N): _ Y    CALLED BY: Ting SG    MEDICATIONS  (STANDING):  amitriptyline 40 milliGRAM(s) Oral at bedtime  atorvastatin 40 milliGRAM(s) Oral at bedtime  cholecalciferol 2000 Unit(s) Oral daily  dextrose 40% Gel 15 Gram(s) Oral once  dextrose 5%. 1000 milliLiter(s) (50 mL/Hr) IV Continuous <Continuous>  dextrose 5%. 1000 milliLiter(s) (100 mL/Hr) IV Continuous <Continuous>  dextrose 50% Injectable 25 Gram(s) IV Push once  dextrose 50% Injectable 12.5 Gram(s) IV Push once  dextrose 50% Injectable 25 Gram(s) IV Push once  folic acid 1 milliGRAM(s) Oral daily  gabapentin 300 milliGRAM(s) Oral at bedtime  glucagon  Injectable 1 milliGRAM(s) IntraMuscular once  hydrALAZINE 25 milliGRAM(s) Oral every 8 hours  insulin glargine Injectable (LANTUS) 8 Unit(s) SubCutaneous at bedtime  insulin lispro (ADMELOG) corrective regimen sliding scale   SubCutaneous three times a day before meals  insulin lispro (ADMELOG) corrective regimen sliding scale   SubCutaneous at bedtime  lactobacillus acidophilus 1 Tablet(s) Oral two times a day with meals  levothyroxine 100 MICROGram(s) Oral daily  Nephro-nunu 1 Tablet(s) Oral daily  pantoprazole    Tablet 40 milliGRAM(s) Oral every 12 hours  piperacillin/tazobactam IVPB.. 3.375 Gram(s) IV Intermittent every 12 hours  vancomycin  IVPB 1000 milliGRAM(s) IV Intermittent <User Schedule>    MEDICATIONS  (PRN):  acetaminophen   Tablet .. 650 milliGRAM(s) Oral every 6 hours PRN Temp greater or equal to 38C (100.4F)  ALBUTerol    0.083% 2.5 milliGRAM(s) Nebulizer every 6 hours PRN Shortness of Breath and/or Wheezing      ASSESSMENT / PLAN:  ----------------------------------------  76y Female with HTN HLD CAD s/p remote CABG, Bio AVR solitary episode of Af w/o recurrence, ILR in place, CRI, MDS transfusion dependent, cirrhosis sec to chronic hep c with esophageal varices and prior banding, hepatocellular carcinoma,s/p embolization with embozene particles on 21, and recent admission to [facility] after fall with head trauma recently dsc after being started on HD for worsening renal function with management of pulm HTN and fluid overload state admtt with rigors and fever    Sepsis / Bacteremia - Blood cultures grew gram positive cocci in clusters with PCR noting coagulase negative Staph. Infectious Disease consultation noted. On piperacillin/tazobactam and vancomycin. Episode of fever noted yesterday, persistent leukocytosis improved from admission. Repeat blood culture results to be reviewed when available. History of bioprosthetic valve noted and may require transesophageal echocardiogram pending culture results.    ESRD - Hemodialysis as per Nephrology.    Anemia / Thrombocytopenia / MDS - Monitoring hemoglobin levels. The patient noted a history of transfusions about once a month depending on her blood counts.    Chronic diastolic heart failure - On hydralazine. On hemodialysis.    Diabetes - Insulin coverage, close monitoring of blood glucose levels.    Hypothyroidism - On levothyroxine.    Hypertension - Close blood pressure monitoring. On hydralazine.

## 2021-07-03 NOTE — CHART NOTE - NSCHARTNOTEFT_GEN_A_CORE
Pt febrile to 101.8 F (rectal) @ 15:48  Administered Tylenol 650mg at the time   Called by RN, pt still febrile to 102.2F (rectal) @ 17:21  Pt seen and examined at bedside  Pt states she is feeling warm    Vital Signs Last 24 Hrs  T(C): 39 (03 Jul 2021 17:21), Max: 39.4 (02 Jul 2021 20:50)  T(F): 102.2 (03 Jul 2021 17:21), Max: 102.9 (02 Jul 2021 20:50)  HR: 100 (03 Jul 2021 15:48) (71 - 100)  BP: 149/83 (03 Jul 2021 15:48) (100/60 - 149/83)  BP(mean): --  RR: 18 (03 Jul 2021 08:00) (18 - 20)  SpO2: 95% (03 Jul 2021 15:48) (84% - 99%)    PHYSICAL EXAM:  GENERAL: Pt lying in bed comfortably in NAD  HEAD:  Atraumatic   CHEST/LUNG: Clear to auscultation bilaterally; Unlabored respirations  HEART: Regular rate and rhythm  ABDOMEN: Bowel sounds present; Soft, Nontender, Nondistended  EXTREMITIES:  No LE edema   NERVOUS SYSTEM:  Alert & Oriented X3, speech clear. Answers questions appropriately  SKIN: Diaphoretic, warm to touch, flushed      A/P: 76y Female with HTN HLD CAD s/p remote CABG, Bio AVR solitary episode of Af w/o recurrence, ILR in place, CRI, MDS transfusion dependent, cirrhosis sec to chronic hep c with esophageal varices and prior banding, hepatocellular carcinoma, s/p embolization with embozene particles on 4/5/21, and recent admission to [facility] after fall with head trauma recently dc'd after being started on HD for worsening renal function with management of pulm HTN and fluid overload state admitted with rigors and fever. Pt dx with sepsis / bacteremia - Blood cultures grew gram positive cocci in clusters with PCR noting coagulase negative Staph.   - Repeat Bcx pending from <48hrs   - C/w abx therapy   - Cooling blanket ordered STAT  - Ofirmev 1g IVP x 1 STAT   - Will continue to monitor

## 2021-07-03 NOTE — PROGRESS NOTE ADULT - ASSESSMENT
ESRD / Cirrhosis - HD MWF   Midodrine prior to HD   See orders    Anemia - No Epogen due to h/o liver ca    Fever   Gram (+) sepsis   Blood cultures repeated today   CT 7/3 - no focal collection   CT Chest JEAN , CT abd - no focal collection   Renal dose Zosyn and vanco ( level 22 today )   Abx as per ID       RO - Not on binders - add Phoslo

## 2021-07-03 NOTE — CHART NOTE - NSCHARTNOTEFT_GEN_A_CORE
PA NOTE-MEDICINE (LATE ENTRY)    Called by RN due to pt c/o nausea x admission  Pt admitted for Infectious Process Sepsis Workup  Pt is 77 y/o female with extensive Medical history who started HD about 3 weeks ago and has left upper chest wall dialysis catheter, along with Rt. upper chest wall port for 2 years for Chronic Blood tranfusion Access ( has transfusion dependant anemia ) , had dialysis then developed Fever and Chills (HD: on M, W, F ).    Sepsis workup sent including Bl CX    ID on case vanco/Zosyn received     Denies: CP SOB Diarrhea Actual Vomiting  Ate Breakfast and Lunch yesterday with + BM  No Flatulence   PMHX: - ESRD on HD, - DM2, HTN, chronic anemia, s/p bovine AVR., chf, Thrombocytopenia (See H & P for Remaining)    T(C): 37.7 (2021 23:42), Max: 39.4 (2021 20:50)  T(F): 99.9 (2021 23:42), Max: 102.9 (2021 20:50)  HR: 75 (2021 23:42) (58 - 90)  BP: 100/60 (2021 23:42) (94/43 - 134/66)  RR: 20 (2021 23:42) (18 - 20)  SpO2: 99% (2021 23:42) (84% - 100%)    General: WD Obese female lying in bed NAD but states she still feels Nauseous     Cardiac; S1S2  Lungs: CTA B/L  Abd: Protuberant NT No Guarding, Rigidity, Rebound + Hyperactive BS   Integument: No Palor Warm/Dry     A/P Pt C/O Nausea without Vomiting  BMP Stat (Pts baseline Na was Low)  Zofran 4 mg IVP x 1   Pt awaiting CT Abd/Pelvis  Continue to Monitor Pt   Call PA if any changes in Pt status  Will Follow lab/Ct Scans     Addendum:   Pt states Zofran not effective Still Nauseous   Gave Compazine IVP x 1       07-03    133<L>  |  95<L>  |  20.8<H>  ----------------------------<  213<H>     ( Na improved from previous)  3.6   |  25.0  |  2.79<H>      Urinalysis Basic - ( 2021 00:50 )    Color: Juanita / Appearance: Slightly Turbid / S.020 / pH: x  Gluc: x / Ketone: Trace  / Bili: Moderate / Urobili: 1 mg/dL   Blood: x / Protein: 100 mg/dL / Nitrite: Negative   Leuk Esterase: Small / RBC: 0-2 /HPF / WBC 11-25   Sq Epi: x / Non Sq Epi: Many / Bacteria: Moderate         Sent urine CX  Pt already received Zosyn/Vanco    CT Abd/Pelvis: PA NOTE-MEDICINE (LATE ENTRY)    Called by RN due to pt c/o nausea x admission  Pt admitted for Infectious Process Sepsis Workup  Pt is 75 y/o female with extensive Medical history who started HD about 3 weeks ago and has left upper chest wall dialysis catheter, along with Rt. upper chest wall port for 2 years for Chronic Blood tranfusion Access ( has transfusion dependant anemia ) , had dialysis then developed Fever and Chills (HD: on M, W, F ).    Sepsis workup sent including Bl CX    ID on case vanco/Zosyn received     Denies: CP SOB Diarrhea Actual Vomiting  Ate Breakfast and Lunch yesterday with + BM  No Flatulence   PMHX: - ESRD on HD, - DM2, HTN, chronic anemia, s/p bovine AVR., chf, Thrombocytopenia (See H & P for Remaining)    T(C): 37.7 (2021 23:42), Max: 39.4 (2021 20:50)  T(F): 99.9 (2021 23:42), Max: 102.9 (2021 20:50)  HR: 75 (2021 23:42) (58 - 90)  BP: 100/60 (2021 23:42) (94/43 - 134/66)  RR: 20 (2021 23:42) (18 - 20)  SpO2: 99% (2021 23:42) (84% - 100%)    General: WD Obese female lying in bed NAD but states she still feels Nauseous     Cardiac; S1S2  Lungs: CTA B/L  Abd: Protuberant NT No Guarding, Rigidity, Rebound + Hyperactive BS   Integument: No Palor Warm/Dry     A/P Pt C/O Nausea without Vomiting  BMP Stat (Pts baseline Na was Low)  Zofran 4 mg IVP x 1   Pt awaiting CT Abd/Pelvis  Continue to Monitor Pt   Call PA if any changes in Pt status  Will Follow lab/Ct Scans     Addendum:   Pt states Zofran not effective Still Nauseous   Gave Compazine IVP x 1       07-03    133<L>  |  95<L>  |  20.8<H>  ----------------------------<  213<H>     ( Na improved from previous)  3.6   |  25.0  |  2.79<H>      Urinalysis Basic - ( 2021 00:50 )    Color: Juanita / Appearance: Slightly Turbid / S.020 / pH: x  Gluc: x / Ketone: Trace  / Bili: Moderate / Urobili: 1 mg/dL   Blood: x / Protein: 100 mg/dL / Nitrite: Negative   Leuk Esterase: Small / RBC: 0-2 /HPF / WBC 11-25   Sq Epi: x / Non Sq Epi: Many / Bacteria: Moderate         Sent urine CX  Pt already received Zosyn/Vanco    CT Abd/Pelvis:   EXAM:  CT ABDOMEN AND PELVIS                         PROCEDURE DATE:  2021      ******PRELIMINARY REPORT******    ******PRELIMINARY REPORT******          INTERPRETATION:  No acute emergent findings.  GIDEON GUAJARDO DO; Attending Radiologist    Rechecked Pt-Now Sleeping  Continue to Monitor PA NOTE-MEDICINE (LATE ENTRY)    Called by RN due to Fever 102.9 po and  pt c/o nausea x admission  Pt admitted for Infectious Process Sepsis Workup  Pt is 77 y/o female with extensive Medical history who started HD about 3 weeks ago and has left upper chest wall dialysis catheter, along with Rt. upper chest wall port for 2 years for Chronic Blood tranfusion Access ( has transfusion dependant anemia ) , had dialysis then developed Fever and Chills (HD: on M, W, F ).    Sepsis workup sent including Bl CX    ID on case vanco/Zosyn received     Denies: CP SOB Diarrhea Actual Vomiting  Ate Breakfast and Lunch yesterday with + BM  No Flatulence   PMHX: - ESRD on HD, - DM2, HTN, chronic anemia, s/p bovine AVR., chf, Thrombocytopenia (See H & P for Remaining)    T(C): 37.7 (2021 23:42), Max: 39.4 (2021 20:50)  T(F): Max: 102.9 (2021 20:50)  HR: 75 (2021 23:42) (58 - 90)  BP: 100/60 (2021 23:42) (94/43 - 134/66)  RR: 20 (2021 23:42) (18 - 20)  SpO2: 99% (2021 23:42) (84% - 100%)    General: WD Obese female lying in bed NAD but states she still feels Nauseous     Cardiac; S1S2  Lungs: CTA B/L  Abd: Protuberant NT No Guarding, Rigidity, Rebound + Hyperactive BS   Integument: No Palor Warm/Dry     A/P Pt with Fever 102.9 po (already W/U for Sepsis and on ABX) and Pt C/O Nausea without Vomiting  BMP Stat (Pts baseline Na was Low)  Zofran 4 mg IVP x 1    cc x 1   Pt awaiting CT Abd/Pelvis  Continue to Monitor Pt   Call PA if any changes in Pt status  Will Follow lab/Ct Scans     Addendum:   Pt states Zofran not effective Still Nauseous   Gave Compazine IVP x 1       07-03    133<L>  |  95<L>  |  20.8<H>  ----------------------------<  213<H>     ( Na improved from previous)  3.6   |  25.0  |  2.79<H>      Urinalysis Basic - ( 2021 00:50 )    Color: Juanita / Appearance: Slightly Turbid / S.020 / pH: x  Gluc: x / Ketone: Trace  / Bili: Moderate / Urobili: 1 mg/dL   Blood: x / Protein: 100 mg/dL / Nitrite: Negative   Leuk Esterase: Small / RBC: 0-2 /HPF / WBC 11-25   Sq Epi: x / Non Sq Epi: Many / Bacteria: Moderate         Sent urine CX  Pt already received Zosyn/Vanco    CT Abd/Pelvis:   EXAM:  CT ABDOMEN AND PELVIS                         PROCEDURE DATE:  2021      ******PRELIMINARY REPORT******    ******PRELIMINARY REPORT******          INTERPRETATION:  No acute emergent findings.  GIDEON GUAJARDO DO; Attending Radiologist    Pt Afebrile: T 99 po  Rechecked Pt-Now Sleeping  Continue to Monitor

## 2021-07-03 NOTE — PROVIDER CONTACT NOTE (CRITICAL VALUE NOTIFICATION) - ASSESSMENT
Patient febrile on admission. Afebrile this shift at this time. NO acute distress noted. Vitals stable.

## 2021-07-03 NOTE — PROGRESS NOTE ADULT - SUBJECTIVE AND OBJECTIVE BOX
NEPHROLOGY INTERVAL HPI/OVERNIGHT EVENTS:    Feels the same   Blood Cx (=) GPC clusters  ID follow up and Abx noted   Repeat blood cultures being done today   SBP betetr   (-) 1.6 Kg with HD 7/2     MEDICATIONS  (STANDING):  amitriptyline 40 milliGRAM(s) Oral at bedtime  atorvastatin 40 milliGRAM(s) Oral at bedtime  cholecalciferol 2000 Unit(s) Oral daily  dextrose 40% Gel 15 Gram(s) Oral once  dextrose 5%. 1000 milliLiter(s) (50 mL/Hr) IV Continuous <Continuous>  dextrose 5%. 1000 milliLiter(s) (100 mL/Hr) IV Continuous <Continuous>  dextrose 50% Injectable 25 Gram(s) IV Push once  dextrose 50% Injectable 12.5 Gram(s) IV Push once  dextrose 50% Injectable 25 Gram(s) IV Push once  folic acid 1 milliGRAM(s) Oral daily  gabapentin 300 milliGRAM(s) Oral at bedtime  glucagon  Injectable 1 milliGRAM(s) IntraMuscular once  hydrALAZINE 25 milliGRAM(s) Oral every 8 hours  insulin glargine Injectable (LANTUS) 8 Unit(s) SubCutaneous at bedtime  insulin lispro (ADMELOG) corrective regimen sliding scale   SubCutaneous three times a day before meals  insulin lispro (ADMELOG) corrective regimen sliding scale   SubCutaneous at bedtime  lactobacillus acidophilus 1 Tablet(s) Oral two times a day with meals  levothyroxine 100 MICROGram(s) Oral daily  Nephro-nunu 1 Tablet(s) Oral daily  pantoprazole    Tablet 40 milliGRAM(s) Oral every 12 hours  piperacillin/tazobactam IVPB.. 3.375 Gram(s) IV Intermittent every 12 hours  vancomycin  IVPB 1000 milliGRAM(s) IV Intermittent <User Schedule>    MEDICATIONS  (PRN):  acetaminophen   Tablet .. 650 milliGRAM(s) Oral every 6 hours PRN Temp greater or equal to 38C (100.4F)  ALBUTerol    0.083% 2.5 milliGRAM(s) Nebulizer every 6 hours PRN Shortness of Breath and/or Wheezing      Allergies    ACE inhibitors (Other)  Bactrim (Nephrotoxicity)  Biaxin (Joint Pain)  morphine (Short breath)  NSAIDs (Other)    Intolerances        Vital Signs Last 24 Hrs  T(C): 36.4 (2021 04:27), Max: 39.4 (2021 20:50)  T(F): 97.6 (2021 04:27), Max: 102.9 (2021 20:50)  HR: 71 (2021 04:27) (71 - 90)  BP: 116/60 (2021 04:27) (94/43 - 134/66)  BP(mean): --  RR: 19 (2021 04:27) (18 - 20)  SpO2: 98% (2021 04:27) (84% - 100%)  Daily     Daily Weight in k.5 (2021 18:05)  I&O's Detail    2021 07:01  -  2021 07:00  --------------------------------------------------------  IN:  Total IN: 0 mL    OUT:    Other (mL): 1600 mL  Total OUT: 1600 mL    Total NET: -1600 mL        I&O's Summary    2021 07:01  -  2021 07:00  --------------------------------------------------------  IN: 0 mL / OUT: 1600 mL / NET: -1600 mL        PHYSICAL EXAM:    GENERAL: NAD, Nontoxic   HEAD:  Atraumatic, Normocephalic. anicteric   NECK: Supple, R mediport clean , L permacath and tunnel clean , nontender   CHEST/LUNG: Clear / EAE . No wheeze   HEART: Regular rate and rhythm; Norub   ABDOMEN: Soft, Nontender, Nondistended; Bowel sounds present  EXTREMITIES:  edema much better , no ulcers or cellulitis     LABS:                        10.0   10.59 )-----------( 77       ( 2021 07:56 )             32.1     07-03    133<L>  |  94<L>  |  25.3<H>  ----------------------------<  219<H>  3.7   |  25.0  |  3.17<H>    Ca    8.7      2021 07:56  Phos  5.1         TPro  8.3  /  Alb  3.8  /  TBili  0.6  /  DBili  x   /  AST  67<H>  /  ALT  30  /  AlkPhos  133<H>      PT/INR - ( 2021 21:01 )   PT: 15.8 sec;   INR: 1.38 ratio         PTT - ( 2021 21:01 )  PTT:71.5 sec  Urinalysis Basic - ( 2021 00:50 )    Color: Juanita / Appearance: Slightly Turbid / S.020 / pH: x  Gluc: x / Ketone: Trace  / Bili: Moderate / Urobili: 1 mg/dL   Blood: x / Protein: 100 mg/dL / Nitrite: Negative   Leuk Esterase: Small / RBC: 0-2 /HPF / WBC 11-25   Sq Epi: x / Non Sq Epi: Many / Bacteria: Moderate      Phosphorus Level, Serum: 5.1 mg/dL ( @ 07:56)    `< from: CT Chest No Cont (21 @ 00:43) >   EXAM:  CT CHEST                          PROCEDURE DATE:  2021          INTERPRETATION:  CLINICAL INFORMATION: Fever to 104    COMPARISON: Chest x-ray 2021, chest CT 2021    CONTRAST/COMPLICATIONS:  IV Contrast: NONE  Oral Contrast:NONE  Complications: None reported at time of study completion    PROCEDURE:  CT of the Chest was performed.  Sagittal and coronal reformats were performed.    FINDINGS:    LUNGS AND AIRWAYS: The central airways are patent. The lungs are clear. Mild background emphysema.  PLEURA: No pleural abnormality.  VESSELS: Right chest wall infusion port is seen with the catheter tip in the SVC. Left IJ double-lumen catheter tip in the SVC. Aortic atherosclerosis without aneurysm. Left pulmonary artery pressure monitor device.  HEART: Mild cardiomegaly. No pericardial effusion. Coronary, mitral annular, and aortic valve calcification.  MEDIASTINUM AND AVANI: No adenopathy.  CHEST WALL AND LOWER NECK: Cardiac loop recorder. No masses.  VISUALIZED UPPER ABDOMEN: Cirrhosis and stable right lobe liver lesion. Upper abdominal varices.  BONES: Status post sternotomy. No acute bony abnormality.    IMPRESSION:  *  No acute chest pathology.            LUIS NEAL MD; Attending Radiologist  This document has been electronically signed. 2021  6:19PM    < end of copied text >  < from: CT Abdomen and Pelvis No Cont (21 @ 00:18) >     EXAM:  CT ABDOMEN AND PELVIS                          PROCEDURE DATE:  2021          INTERPRETATION:  CLINICAL INFORMATION: Fever    COMPARISON: Noncontrast CT scan of the abdomen and pelvis from 10/5/2017    CONTRAST/COMPLICATIONS:  IV Contrast: NONE  This limits evaluation of vascular structures.  Oral Contrast: NONE  Complications: None reported at time of study completion    PROCEDURE:  CT of the Abdomen and Pelvis was performed.  Sagittal and coronal reformats were performed.    FINDINGS:  LOWER CHEST: Cardiomegaly. Small bilateral pleural effusions which were not seen previously. Status post sternotomy and CABG. Calcification mitral valve annulus and coronary artery calcifications. Partially imaged catheter with its tip in the right atrium.    LIVER: Nodularity to the contour the liver raising concern for cirrhosis. 2.1 cm hepatic cyst at the border of segments 6 and 7 on series 3 image 44.  BILE DUCTS: Normal caliber.  GALLBLADDER: 2.4 cm gallstone without gross inflammation.  SPLEEN: Splenomegaly measuring up to 15.9 cm. In light of cirrhosis, portal hypertension cannot be excluded. Rounded calcification in the posterior aspect of spleen. The spleen is lobulated posterior to this which is grossly stable.  PANCREAS: Within normal limits.  ADRENALS: Nodularity to the right adrenal gland which is grossly stable.  KIDNEYS/URETERS: Within normal limits.    BLADDER: Within normal limits.  REPRODUCTIVE ORGANS: Mildly heterogeneous uterus with punctate calcifications which is stable. This may represent underlying leiomyomas.    BOWEL: No bowel obstruction. Appendix unremarkable. Colonic diverticuli predominantly in the descending and sigmoid colon without gross inflammation.  PERITONEUM: No ascites.  VESSELS: Vascularcalcifications. Upper abdominal varices. Prominent ovarian veins bilaterally which has mildly increased.  RETROPERITONEUM/LYMPH NODES: No lymphadenopathy.  ABDOMINAL WALL: Within normal limits.  BONES: Changes of bone.    IMPRESSION:  No focal collection identified.    Cirrhosis with splenomegaly and varices suggesting portal hypertension. Prominence of the ovarian veins has increased.    2.1 cm cyst at the border of segments 6 and 7 of the liver. This is measuring simple fluid in density although this was not seen on 10/5/2017. Correlation with right upper quadrant ultrasound is recommended to ensure this appears as a simple cyst.    Cardiomegaly. Interval development of bilateral mild pleural effusions.    Additional chronic findings.              HOLDEN MEYERS MD; Attending Radiologist  This document has been electronically signed. Jul  3 2021 10:17AM    < end of copied text >

## 2021-07-04 NOTE — PROGRESS NOTE ADULT - SUBJECTIVE AND OBJECTIVE BOX
[facility]  ----------------------------------------  The patient was seen at bedside. Patient with bacteremia. Offers no complaints. Denied chest pain or dyspnea.    Vital Signs Last 24 Hrs  T(C): 36.5 (2021 10:50), Max: 39 (2021 17:21)  T(F): 97.7 (2021 10:50), Max: 102.2 (2021 17:21)  HR: 75 (2021 13:48) (72 - 100)  BP: 130/76 (2021 13:48) (114/59 - 149/83)  BP(mean): --  RR: 18 (2021 10:50) (18 - 20)  SpO2: 96% (2021 10:50) (92% - 99%)    CAPILLARY BLOOD GLUCOSE  POCT Blood Glucose.: 278 mg/dL (2021 11:50)  POCT Blood Glucose.: 288 mg/dL (2021 09:00)  POCT Blood Glucose.: 245 mg/dL (2021 21:55)  POCT Blood Glucose.: 271 mg/dL (2021 17:31)    PHYSICAL EXAMINATION:  ----------------------------------------  General appearance: No acute distress, Awake, Alert  HEENT: Normocephalic, Atraumatic, Conjunctiva clear, EOMI  Neck: Supple, No JVD, No tenderness  Lungs: Breath sound equal bilaterally, No wheezes, No rales  Cardiovascular: S1S2, Regular rhythm, Right chemoport, Left hemodialysis catheter  Abdomen: Soft, Nontender, Nondistended, No guarding/rebound, Positive bowel sounds  Extremities: No clubbing, No cyanosis, No edema, No calf tenderness  Neuro: Strength equal bilaterally, No tremors  Psychiatric: Appropriate mood, Normal affect    LABORATORY STUDIES:  ----------------------------------------             10.0   10.59 )-----------( 77       ( 2021 07:56 )             32.1     07-03    133<L>  |  94<L>  |  25.3<H>  ----------------------------<  219<H>  3.7   |  25.0  |  3.17<H>    Ca    8.7      2021 07:56  Phos  5.1     07-03    Urinalysis Basic - ( 2021 00:50 )  Color: Juanita / Appearance: Slightly Turbid / S.020 / pH: x  Gluc: x / Ketone: Trace  / Bili: Moderate / Urobili: 1 mg/dL   Blood: x / Protein: 100 mg/dL / Nitrite: Negative   Leuk Esterase: Small / RBC: 0-2 /HPF / WBC 11-25   Sq Epi: x / Non Sq Epi: Many / Bacteria: Moderate    Culture - Urine (collected 2021 05:31)  Source: .Urine Clean Catch (Midstream)  Final Report (2021 02:13):    No growth    Culture - Blood (collected 2021 14:04)  Source: .Blood Blood-Catheter  Gram Stain (2021 13:30):    Growth in aerobic and anaerobic bottles: Gram Positive Cocci in Clusters    Gram Stain performed by:    [facility]    [address] [place] [id number]    .    TYPE: (C=Critical, N=Notification, A=Abnormal) C    TESTS:  _GS    DATE/TIME CALLED: _ 2021 12:33:23    CALLED TO: _ Maude Vital RN    READ BACK (2 Patient Identifiers)(Y/N): _ Y    READ BACK VALUES (Y/N): _ Y    CALLED BY: Ting Armstrong    Culture - Blood (collected 2021 20:17)  Source: .Blood Blood-Peripheral  Gram Stain (2021 15:30):    Growth in aerobic and anaerobic bottles: Gram Positive Cocci in Clusters    ***Blood Panel PCR results on this specimen are available    approximately 3 hours after the Gram stain result.***    Gram stain, PCR, and/or culture results may not always    correspond due to difference in methodologies.    ************************************************************    This PCR assay was performed using [employer].    The following targets are tested for: Enterococcus,    vancomycin resistant enterococci, Listeria monocytogenes,    coagulase negative staphylococci, S. aureus,    methicillin resistant S. aureus, Streptococcus agalactiae    (Group B), S. pneumoniae, S. pyogenes (Group A),    Acinetobacter baumannii, Enterobacter cloacae, E. coli,    Klebsiella oxytoca, K. pneumoniae, Proteus sp.,    Serratia marcescens, Haemophilus influenzae,    Neisseria meningitidis, Pseudomonas aeruginosa, Candida    albicans, C. glabrata, C krusei, C parapsilosis,    C. tropicalis and the KPC resistance gene.    Gram Stain performed by:    [facility]    [address] [place] [address]    .    TYPE: (C=Critical, N=Notification, A=Abnormal) C    TESTS:  _ GS    DATE/TIME CALLED: _ 2021 12:37:16    CALLED TO: _ Dayan Bo RN    READ BACK (2 Patient Identifiers)(Y/N): _ Y    READ BACK VALUES (Y/N): _ Y    CALLED BY: Ting Armstrong  Final Report (2021 20:02):    Growth in aerobic and anaerobic bottles: Staphylococcus epidermidis    Coag Negative Staphylococcus    Single set isolate, possible contaminant. Contact    Microbiology if susceptibility testing clinically    indicated.  Organism: Blood Culture PCR (2021 20:02)  Organism: Blood Culture PCR (2021 13:32)    Culture - Blood (collected 2021 20:16)  Source: .Blood Blood-Peripheral  Gram Stain (2021 09:45):    Growth in aerobic and anaerobic bottles: Gram Positive Cocci in Clusters    Gram Stain performed by:    [facility]    [address] [place] [id number]    .    TYPE: (C=Critical, N=Notification, A=Abnormal) C    TESTS:  _GS    DATE/TIME CALLED: _ 2021 09:41:58    CALLED TO: _ ALBIN VITAL    READ BACK (2 Patient Identifiers)(Y/N): _ Y    READ BACK VALUES (Y/N): _ Y    CALLED BY: Ting OBRIEN  Preliminary Report (2021 12:29):    Growth in aerobic and anaerobic bottles: Staphylococcus epidermidis    Susceptibility to follow.        MEDICATIONS  (STANDING):  amitriptyline 40 milliGRAM(s) Oral at bedtime  atorvastatin 40 milliGRAM(s) Oral at bedtime  cholecalciferol 2000 Unit(s) Oral daily  dextrose 40% Gel 15 Gram(s) Oral once  dextrose 5%. 1000 milliLiter(s) (50 mL/Hr) IV Continuous <Continuous>  dextrose 5%. 1000 milliLiter(s) (100 mL/Hr) IV Continuous <Continuous>  dextrose 50% Injectable 25 Gram(s) IV Push once  dextrose 50% Injectable 12.5 Gram(s) IV Push once  dextrose 50% Injectable 25 Gram(s) IV Push once  folic acid 1 milliGRAM(s) Oral daily  gabapentin 300 milliGRAM(s) Oral at bedtime  glucagon  Injectable 1 milliGRAM(s) IntraMuscular once  hydrALAZINE 25 milliGRAM(s) Oral every 8 hours  insulin glargine Injectable (LANTUS) 8 Unit(s) SubCutaneous at bedtime  insulin lispro (ADMELOG) corrective regimen sliding scale   SubCutaneous three times a day before meals  insulin lispro (ADMELOG) corrective regimen sliding scale   SubCutaneous at bedtime  lactobacillus acidophilus 1 Tablet(s) Oral two times a day with meals  levothyroxine 100 MICROGram(s) Oral daily  Nephro-nunu 1 Tablet(s) Oral daily  pantoprazole    Tablet 40 milliGRAM(s) Oral every 12 hours  piperacillin/tazobactam IVPB.. 3.375 Gram(s) IV Intermittent every 12 hours  vancomycin  IVPB 1000 milliGRAM(s) IV Intermittent <User Schedule>    MEDICATIONS  (PRN):  acetaminophen   Tablet .. 650 milliGRAM(s) Oral every 6 hours PRN Temp greater or equal to 38C (100.4F)  ALBUTerol    0.083% 2.5 milliGRAM(s) Nebulizer every 6 hours PRN Shortness of Breath and/or Wheezing      ASSESSMENT / PLAN:  ----------------------------------------  76y Female with HTN HLD CAD s/p remote CABG, Bio AVR solitary episode of Af w/o recurrence, ILR in place, CRI, MDS transfusion dependent, cirrhosis sec to chronic hep c with esophageal varices and prior banding, hepatocellular carcinoma,s/p embolization with embozene particles on 21, and recent admission to [facility] after fall with head trauma recently dsc after being started on HD for worsening renal function with management of pulm HTN and fluid overload state admtt with rigors and fever    Sepsis / Bacteremia - Blood cultures grew Staphylococcus epidermidis. On piperacillin/tazobactam and vancomycin. Episode of fever noted yesterday but afebrile today. Repeat blood culture results to be reviewed when available. Repeat blood culture results to be reviewed when available. History of bioprosthetic valve noted and may require transesophageal echocardiogram pending culture results. May also require removal of the catheters as well.    ESRD - Hemodialysis as per Nephrology.    Anemia / Thrombocytopenia / MDS - Monitoring hemoglobin levels. The patient noted a history of transfusions about once a month depending on her blood counts.    Chronic diastolic heart failure - On hydralazine. On hemodialysis.    Diabetes - Insulin coverage, close monitoring of blood glucose levels.    Hypothyroidism - On levothyroxine.    Hypertension - Close blood pressure monitoring. On hydralazine.    Discussed with the patient and her daughter at the bedside.

## 2021-07-04 NOTE — PROGRESS NOTE ADULT - ASSESSMENT
ESRD / Cirrhosis - HD MWF   Midodrine prior to HD   See orders     Anemia - No Epogen due to h/o liver ca    Fever   Gram (+) sepsis   Blood cultures repeated 7/3   CT 7/3 - no focal collection   CT Chest JEAN , CT abd - no focal collection   Renal dose Zosyn and vanco ( level 22 today ) ---> Vanco level in am   Abx as per ID       RO - Not on binders - added Phoslo , Ca x phos in am

## 2021-07-04 NOTE — PROGRESS NOTE ADULT - ASSESSMENT
75 y/o female who started HD about 3 weeks ago and has left upper chest wall dialysis catheter, also has Rt. upper chest wall port for 2 years for her access ( as per pt. her veins are bad ), blood transfusions ( has transfusion dependant anemia ) , had dialysis yesterday ( on M, W, F ) but since last night was having chills. Today pt's daughter called pt's visiting nurse who asked family to check pt's temp. As per pt. her temp was 104. pt. was sent to the ER. pt. reports no cough, no phlegm, no sick contact, no cp, no sob, no abd. pain, no n/v/d. no change in mental status. pt. is no toxic looking at the time of admission and is afebrile. pt. makes urine 2 x daily.  Pt's cardiologist Dr. Shelton has recommended no AC for pt. as she bleeds easily.     Leukocytosis  Fever  Bacteremia r/o port/catheter infection  ESRD on HD    - all BCX + Cons Staph epi including cultures from catheter, concerning for  permacath and/or port infection-suggest port and permacath removal  - dc zosyn  - c/w vanco by level with HD  - TTE no vegetations, suggest BREANN due to persistent + BCX and presence of AV  - Ct a/p 2.1cm liver lesion-->[facility]    - Trend Fever  - Trend Leukocytosis    d/w attending  Will Follow

## 2021-07-04 NOTE — PROGRESS NOTE ADULT - SUBJECTIVE AND OBJECTIVE BOX
[facility]  INFECTIOUS DISEASES AND INTERNAL MEDICINE at [place]  =======================================================  Omi Brink MD  Diplomates American Board of Internal Medicine and Infectious Diseases  Tel: [phone]      Fax: [phone]  =======================================================    NICHELLE GILL [id number]    Follow up: bacteremia  pt feeling weak  afebrile  all BCX +    has cardio mems and loop recorder      Allergies:  ACE inhibitors (Other)  Bactrim (Nephrotoxicity)  Biaxin (Joint Pain)  morphine (Short breath)  NSAIDs (Other)           REVIEW OF SYSTEMS:  CONSTITUTIONAL:  No Fever or chills  HEENT:   No diplopia or blurred vision.  No earache, sore throat or runny nose.  CARDIOVASCULAR:  No pressure, squeezing, strangling, tightness, heaviness or aching about the chest, neck, axilla or epigastrium.  RESPIRATORY:  No cough, shortness of breath  GASTROINTESTINAL:  No nausea, vomiting or diarrhea.  GENITOURINARY:  No dysuria, frequency or urgency. No Blood in urine  MUSCULOSKELETAL:  no joint aches, no muscle pain  SKIN:  No change in skin, hair or nails.  NEUROLOGIC:  No Headaches, seizures or weakness.  PSYCHIATRIC:  No disorder of thought or mood.  ENDOCRINE:  No heat or cold intolerance  HEMATOLOGICAL:  No easy bruising or bleeding.       Physical Exam:  GEN: NAD, pleasant  HEENT: normocephalic and atraumatic. EOMI. PERRL.  Anicteric   NECK: Supple.   LUNGS: Clear to auscultation.  HEART: Regular rate and rhythm without murmur. right chest mediport, left chest permacath  ABDOMEN: Soft, nontender, and nondistended.  Positive bowel sounds.    : No CVA tenderness  EXTREMITIES: Without any edema.  MSK: no joint swelling  NEUROLOGIC: Cranial nerves II through XII are grossly intact. No focal deficits  PSYCHIATRIC: Appropriate affect .  SKIN: No Rash      Vitals:    T(F): 97.7 (04 Jul 2021 10:50), Max: 99.9 (03 Jul 2021 19:48)  HR: 75 (04 Jul 2021 13:48)  BP: 130/76 (04 Jul 2021 13:48)  RR: 18 (04 Jul 2021 10:50)  SpO2: 96% (04 Jul 2021 10:50) (92% - 99%)  temp max in last 48H T(F): , Max: 102.9 (07-02-21 @ 20:50)    Current Antibiotics:  piperacillin/tazobactam IVPB.. 3.375 Gram(s) IV Intermittent every 12 hours  vancomycin  IVPB 1000 milliGRAM(s) IV Intermittent <User Schedule>    Other medications:  amitriptyline 40 milliGRAM(s) Oral at bedtime  atorvastatin 40 milliGRAM(s) Oral at bedtime  cholecalciferol 2000 Unit(s) Oral daily  dextrose 40% Gel 15 Gram(s) Oral once  dextrose 5%. 1000 milliLiter(s) IV Continuous <Continuous>  dextrose 5%. 1000 milliLiter(s) IV Continuous <Continuous>  dextrose 50% Injectable 25 Gram(s) IV Push once  dextrose 50% Injectable 12.5 Gram(s) IV Push once  dextrose 50% Injectable 25 Gram(s) IV Push once  folic acid 1 milliGRAM(s) Oral daily  gabapentin 300 milliGRAM(s) Oral at bedtime  glucagon  Injectable 1 milliGRAM(s) IntraMuscular once  hydrALAZINE 25 milliGRAM(s) Oral every 8 hours  insulin glargine Injectable (LANTUS) 8 Unit(s) SubCutaneous at bedtime  insulin lispro (ADMELOG) corrective regimen sliding scale   SubCutaneous three times a day before meals  insulin lispro (ADMELOG) corrective regimen sliding scale   SubCutaneous at bedtime  lactobacillus acidophilus 1 Tablet(s) Oral two times a day with meals  levothyroxine 100 MICROGram(s) Oral daily  Nephro-nunu 1 Tablet(s) Oral daily  pantoprazole    Tablet 40 milliGRAM(s) Oral every 12 hours                            10.0   10.59 )-----------( 77       ( 03 Jul 2021 07:56 )             32.1     07-03    133<L>  |  94<L>  |  25.3<H>  ----------------------------<  219<H>  3.7   |  25.0  |  3.17<H>    Ca    8.7      03 Jul 2021 07:56  Phos  5.1     07-03      RECENT CULTURES:  07-03 @ 07:56 .Blood Blood-Peripheral       Growth in aerobic bottle: Gram Positive Cocci in Clusters  Gram Stain performed by:  [facility]  [address] [place] [address]  .  TYPE: (C=Critical, N=Notification, A=Abnormal) C  TESTS:  _ GS  DATE/TIME CALLED: _ 07/04/2021 14:49:52  CALLED TO: Ting MOSER  READ BACK (2 Patient Identifiers)(Y/N): _ Y  READ BACK VALUES (Y/N): _ Y  CALLED BY: Ting OBRIEN      07-03 @ 05:31 .Urine Clean Catch (Midstream)     No growth        07-02 @ 14:04 .Blood Blood-Catheter       Growth in aerobic and anaerobic bottles: Gram Positive Cocci in Clusters  Gram Stain performed by:  [facility]  [address] [place] [address]  .  TYPE: (C=Critical, N=Notification, A=Abnormal) C  TESTS:  _GS  DATE/TIME CALLED: _ 07/03/2021 12:33:23  CALLED TO: Ting Vital RN  READ BACK (2 Patient Identifiers)(Y/N): _ Y  READ BACK VALUES (Y/N): _ Y  CALLED BY: Ting Armstrong      07-02 @ 01:00          NotLazaro  07-01 @ 20:17 .Blood Blood-Peripheral Blood Culture PCR    Growth in aerobic and anaerobic bottles: Staphylococcus epidermidis  Coag Negative Staphylococcus  Single set isolate, possible contaminant. Contact  Microbiology if susceptibility testing clinically  indicated.    Growth in aerobic and anaerobic bottles: Gram Positive Cocci in Clusters  ***Blood Panel PCR results on this specimen are available  approximately 3 hours after the Gram stain result.***  Gram stain, PCR, and/or culture results may not always  correspond due to difference in methodologies.  ************************************************************  This PCR assay was performed using [employer].  The following targets are tested for: Enterococcus,  vancomycin resistant enterococci, Listeria monocytogenes,  coagulase negative staphylococci, S. aureus,  methicillin resistant S. aureus, Streptococcus agalactiae  (Group B), S. pneumoniae, S. pyogenes (Group A),  Acinetobacter baumannii, Enterobacter cloacae, E. coli,  Klebsiella oxytoca, K. pneumoniae, Proteus sp.,  Serratia marcescens, Haemophilus influenzae,  Neisseria meningitidis, Pseudomonas aeruginosa, Candida  albicans, C. glabrata, C krusei, C parapsilosis,  C. tropicalis and the KPC resistance gene.  Gram Stain performed by:  [facility]  [address] [place] [address]  .  TYPE: (C=Critical, N=Notification, A=Abnormal) C  TESTS:  _   DATE/TIME CALLED: _ 07/02/2021 12:37:16  CALLED TO: Ting Bo RN  READ BACK (2 Patient Identifiers)(Y/N): _ Y  READ BACK VALUES (Y/N): _ Y  CALLED BY: Ting Armstrong      07-01 @ 20:16 .Blood Blood-Peripheral     Growth in aerobic and anaerobic bottles: Staphylococcus epidermidis  Susceptibility to follow.    Growth in aerobic and anaerobic bottles: Gram Positive Cocci in Clusters  Gram Stain performed by:  [facility]  [address] [place] [address]  .  TYPE: (C=Critical, N=Notification, A=Abnormal) C  TESTS:  _  DATE/TIME CALLED: _ 07/03/2021 09:41:58  CALLED TO: _ ALBIN VITAL  READ BACK (2 Patient Identifiers)(Y/N): _ Y  READ BACK VALUES (Y/N): _ Y  CALLED BY: _ CAMILLE          WBC Count: 10.59 K/uL (07-03-21 @ 07:56)  WBC Count: 10.80 K/uL (07-02-21 @ 04:19)  WBC Count: 13.14 K/uL (07-01-21 @ 20:13)    Creatinine, Serum: 3.17 mg/dL (07-03-21 @ 07:56)  Creatinine, Serum: 2.79 mg/dL (07-03-21 @ 00:48)  Creatinine, Serum: 4.98 mg/dL (07-02-21 @ 04:19)  Creatinine, Serum: 4.79 mg/dL (07-01-21 @ 20:13)             SARS-CoV-2: NotDetec (07-02-21 @ 01:00)  Rapid RVP Result: NotDetec (07-02-21 @ 01:00)    COVID-19 PCR: NotDetec (06-09-21 @ 01:06)    < from: CT Abdomen and Pelvis No Cont (07.03.21 @ 00:18) >  IMPRESSION:  No focal collection identified.    Cirrhosis with splenomegaly and varices suggesting portal hypertension. Prominence of the ovarian veins has increased.    2.1 cm cyst at the border of segments 6 and 7 of the liver. This is measuring simple fluid in density although this was not seen on 10/5/2017. Correlation with right upper quadrant ultrasound is recommended to ensure this appears as a simple cyst.    Cardiomegaly. Interval development of bilateral mild pleural effusions.    Additional chronic findings.      < end of copied text >

## 2021-07-04 NOTE — PROGRESS NOTE ADULT - SUBJECTIVE AND OBJECTIVE BOX
NEPHROLOGY INTERVAL HPI/OVERNIGHT EVENTS:    feels better   fever curve improved   Urine Cx (-)  No SOB or CP     MEDICATIONS  (STANDING):  amitriptyline 40 milliGRAM(s) Oral at bedtime  atorvastatin 40 milliGRAM(s) Oral at bedtime  cholecalciferol 2000 Unit(s) Oral daily  dextrose 40% Gel 15 Gram(s) Oral once  dextrose 5%. 1000 milliLiter(s) (50 mL/Hr) IV Continuous <Continuous>  dextrose 5%. 1000 milliLiter(s) (100 mL/Hr) IV Continuous <Continuous>  dextrose 50% Injectable 25 Gram(s) IV Push once  dextrose 50% Injectable 12.5 Gram(s) IV Push once  dextrose 50% Injectable 25 Gram(s) IV Push once  folic acid 1 milliGRAM(s) Oral daily  gabapentin 300 milliGRAM(s) Oral at bedtime  glucagon  Injectable 1 milliGRAM(s) IntraMuscular once  hydrALAZINE 25 milliGRAM(s) Oral every 8 hours  insulin glargine Injectable (LANTUS) 8 Unit(s) SubCutaneous at bedtime  insulin lispro (ADMELOG) corrective regimen sliding scale   SubCutaneous three times a day before meals  insulin lispro (ADMELOG) corrective regimen sliding scale   SubCutaneous at bedtime  lactobacillus acidophilus 1 Tablet(s) Oral two times a day with meals  levothyroxine 100 MICROGram(s) Oral daily  Nephro-nunu 1 Tablet(s) Oral daily  pantoprazole    Tablet 40 milliGRAM(s) Oral every 12 hours  piperacillin/tazobactam IVPB.. 3.375 Gram(s) IV Intermittent every 12 hours  vancomycin  IVPB 1000 milliGRAM(s) IV Intermittent <User Schedule>    MEDICATIONS  (PRN):  acetaminophen   Tablet .. 650 milliGRAM(s) Oral every 6 hours PRN Temp greater or equal to 38C (100.4F)  ALBUTerol    0.083% 2.5 milliGRAM(s) Nebulizer every 6 hours PRN Shortness of Breath and/or Wheezing      Allergies    ACE inhibitors (Other)  Bactrim (Nephrotoxicity)  Biaxin (Joint Pain)  morphine (Short breath)  NSAIDs (Other)    Intolerances            Vital Signs Last 24 Hrs  T(C): 37.7 (2021 05:00), Max: 39 (2021 17:21)  T(F): 99.9 (2021 05:00), Max: 102.2 (2021 17:21)  HR: 77 (2021 05:00) (72 - 100)  BP: 138/73 (2021 05:00) (121/69 - 149/83)  BP(mean): --  RR: 18 (2021 05:00) (18 - 20)  SpO2: 92% (2021 05:00) (92% - 99%)  Daily     Daily   I&O's Detail    I&O's Summary      PHYSICAL EXAM:  GENERAL: NAD, Nontoxic   HEAD:  Atraumatic, Normocephalic. anicteric   NECK: Supple, R mediport clean , L permacath and tunnel clean , nontender   CHEST/LUNG: Clear / EAE . No wheeze   HEART: Regular rate and rhythm; Norub   ABDOMEN: Soft, Nontender, Nondistended; Bowel sounds present  EXTREMITIES:  edema much better , no ulcers or cellulitis     GENERAL: NAD, well-groomed, well-developed  HEAD:  Atraumatic, Normocephalic  EYES: EOMI, PERRLA, conjunctiva and sclera clear  ENMT: No tonsillar erythema, exudates, or enlargement; Moist mucous membranes, Good dentition, No lesions  NECK: Supple, No JVD, Normal thyroid  NERVOUS SYSTEM:  Alert & Oriented X3, Good concentration; Motor Strength 5/5 B/L upper and lower extremities; DTRs 2+ intact and symmetric  CHEST/LUNG: Clear to percussion bilaterally; No rales, rhonchi, wheezing, or rubs  HEART: Regular rate and rhythm; No murmurs, rubs, or gallops  ABDOMEN: Soft, Nontender, Nondistended; Bowel sounds present  EXTREMITIES:  2+ Peripheral Pulses, No clubbing, cyanosis, or edema  LYMPH: No lymphadenopathy noted  SKIN: No rashes or lesions    LABS:                        10.0   10.59 )-----------( 77       ( 2021 07:56 )             32.1     07-03    133<L>  |  94<L>  |  25.3<H>  ----------------------------<  219<H>  3.7   |  25.0  |  3.17<H>    Ca    8.7      2021 07:56  Phos  5.1     07-03        Urinalysis Basic - ( 2021 00:50 )    Color: Juanita / Appearance: Slightly Turbid / S.020 / pH: x  Gluc: x / Ketone: Trace  / Bili: Moderate / Urobili: 1 mg/dL   Blood: x / Protein: 100 mg/dL / Nitrite: Negative   Leuk Esterase: Small / RBC: 0-2 /HPF / WBC 11-25   Sq Epi: x / Non Sq Epi: Many / Bacteria: Moderate              RADIOLOGY & ADDITIONAL TESTS:

## 2021-07-05 NOTE — PROGRESS NOTE ADULT - SUBJECTIVE AND OBJECTIVE BOX
[facility]  [facility]  The patient was seen at bedside. Patient with bacteremia. Offers no complaints.    Vital Signs Last 24 Hrs  T(C): 36.7 (05 Jul 2021 08:36), Max: 37.6 (05 Jul 2021 05:18)  T(F): 98.1 (05 Jul 2021 08:36), Max: 99.6 (05 Jul 2021 05:18)  HR: 76 (05 Jul 2021 08:36) (67 - 84)  BP: 152/65 (05 Jul 2021 08:36) (124/73 - 152/65)  BP(mean): --  RR: 18 (05 Jul 2021 08:36) (18 - 18)  SpO2: 99% (05 Jul 2021 08:36) (97% - 100%)    CAPILLARY BLOOD GLUCOSE  POCT Blood Glucose.: 175 mg/dL (05 Jul 2021 09:00)  POCT Blood Glucose.: 305 mg/dL (04 Jul 2021 21:16)  POCT Blood Glucose.: 295 mg/dL (04 Jul 2021 17:21)  POCT Blood Glucose.: 278 mg/dL (04 Jul 2021 11:50)    PHYSICAL EXAMINATION:  ----------------------------------------  General appearance: No acute distress, Awake, Alert  HEENT: Normocephalic, Atraumatic, Conjunctiva clear, EOMI  Neck: Supple, No JVD, No tenderness  Lungs: Breath sound equal bilaterally, No wheezes, No rales  Cardiovascular: S1S2, Regular rhythm, Right chemoport, Left hemodialysis catheter  Abdomen: Soft, Nontender, Nondistended, No guarding/rebound, Positive bowel sounds  Extremities: No clubbing, No cyanosis, No edema, No calf tenderness  Neuro: Strength equal bilaterally, No tremors  Psychiatric: Appropriate mood, Normal affect    LABORATORY STUDIES:  ----------------------------------------             9.9    12.75 )-----------( 118      ( 05 Jul 2021 07:54 )             30.7     07-05    132<L>  |  91<L>  |  55.1<H>  ----------------------------<  193<H>  3.7   |  22.0  |  5.71<H>    Ca    8.2<L>      05 Jul 2021 07:58  Phos  7.1     07-05    Culture - Blood (collected 03 Jul 2021 07:56)  Source: .Blood Blood-Venous  Gram Stain (04 Jul 2021 14:46):    Growth in aerobic bottle: Gram Positive Cocci in Clusters    Gram Stain performed by:    [facility]    [address] [place] [address]    .    TYPE: (C=Critical, N=Notification, A=Abnormal) C    TESTS:  _ GS    DATE/TIME CALLED: _ 07/04/2021 14:45:38    CALLED TO: Ting MOSER    READ BACK (2 Patient Identifiers)(Y/N): _ Y    READ BACK VALUES (Y/N): _ Y    CALLED BY: _ CAMILLE    Culture - Blood (collected 03 Jul 2021 07:56)  Source: .Blood Blood-Peripheral  Gram Stain (04 Jul 2021 14:50):    Growth in aerobic bottle: Gram Positive Cocci in Clusters    Gram Stain performed by:    [facility]    [address] [place] [address]    .    TYPE: (C=Critical, N=Notification, A=Abnormal) C    TESTS:  _ GS    DATE/TIME CALLED: _ 07/04/2021 14:49:52    CALLED TO: Ting MOSER    READ BACK (2 Patient Identifiers)(Y/N): _ Y    READ BACK VALUES (Y/N): _ Y    CALLED BY: _ SG    Culture - Urine (collected 03 Jul 2021 05:31)  Source: .Urine Clean Catch (Midstream)  Final Report (04 Jul 2021 02:13):    No growth    Culture - Blood (collected 02 Jul 2021 14:04)  Source: .Blood Blood-Catheter  Gram Stain (03 Jul 2021 13:30):    Growth in aerobic and anaerobic bottles: Gram Positive Cocci in Clusters    Gram Stain performed by:    [facility]    [address] [place] [address]    .    TYPE: (C=Critical, N=Notification, A=Abnormal) C    TESTS:  _GS    DATE/TIME CALLED: _ 07/03/2021 12:33:23    CALLED TO: Ting Ferrara RN    READ BACK (2 Patient Identifiers)(Y/N): _ Y    READ BACK VALUES (Y/N): _ Y    CALLED BY: Ting Armstrong  Final Report (04 Jul 2021 20:19):    Growth in aerobic and anaerobic bottles: Staphylococcus epidermidis    Coag Negative Staphylococcus    Single set isolate, possible contaminant. Contact    Microbiology if susceptibility testing clinically    indicated.    MEDICATIONS  (STANDING):  amitriptyline 40 milliGRAM(s) Oral at bedtime  atorvastatin 40 milliGRAM(s) Oral at bedtime  cholecalciferol 2000 Unit(s) Oral daily  dextrose 40% Gel 15 Gram(s) Oral once  dextrose 5%. 1000 milliLiter(s) (50 mL/Hr) IV Continuous <Continuous>  dextrose 5%. 1000 milliLiter(s) (100 mL/Hr) IV Continuous <Continuous>  dextrose 50% Injectable 25 Gram(s) IV Push once  dextrose 50% Injectable 12.5 Gram(s) IV Push once  dextrose 50% Injectable 25 Gram(s) IV Push once  folic acid 1 milliGRAM(s) Oral daily  gabapentin 300 milliGRAM(s) Oral at bedtime  glucagon  Injectable 1 milliGRAM(s) IntraMuscular once  hydrALAZINE 25 milliGRAM(s) Oral every 8 hours  insulin glargine Injectable (LANTUS) 8 Unit(s) SubCutaneous at bedtime  insulin lispro (ADMELOG) corrective regimen sliding scale   SubCutaneous three times a day before meals  insulin lispro (ADMELOG) corrective regimen sliding scale   SubCutaneous at bedtime  lactobacillus acidophilus 1 Tablet(s) Oral two times a day with meals  levothyroxine 100 MICROGram(s) Oral daily  Nephro-nunu 1 Tablet(s) Oral daily  pantoprazole    Tablet 40 milliGRAM(s) Oral every 12 hours  sodium zirconium cyclosilicate 5 Gram(s) Oral daily  vancomycin  IVPB 1000 milliGRAM(s) IV Intermittent <User Schedule>    MEDICATIONS  (PRN):  acetaminophen   Tablet .. 650 milliGRAM(s) Oral every 6 hours PRN Temp greater or equal to 38C (100.4F)  ALBUTerol    0.083% 2.5 milliGRAM(s) Nebulizer every 6 hours PRN Shortness of Breath and/or Wheezing      ASSESSMENT / PLAN:  ----------------------------------------  76y Female with HTN HLD CAD s/p remote CABG, Bio AVR solitary episode of Af w/o recurrence, ILR in place, CRI, MDS transfusion dependent, cirrhosis sec to chronic hep c with esophageal varices and prior banding, hepatocellular carcinoma,s/p embolization with embozene particles on 4/5/21, and recent admission to [facility] after fall with head trauma recently dsc after being started on HD for worsening renal function with management of pulm HTN and fluid overload state admtt with rigors and fever    Sepsis / Bacteremia - Repeat blood cultures grew Staphylococcus epidermidis. On vancomycin. Discussed with Vascular Surgery for removal of chemoport and Permacath. Discussed with Nephrology. To monitor patient off hemodialysis after catheter removal. The patient has a history of bioprosthetic valve and Cardiology consulted for transesophageal echocardiogram.    ESRD - Was on hemodialysis via permacath. To monitor need for resumption after catheter is removed.    Anemia / Thrombocytopenia / MDS - Monitoring hemoglobin levels. The patient noted a history of transfusions about once a month depending on her blood counts.    Chronic diastolic heart failure - On hydralazine. On hemodialysis.    Diabetes - Insulin coverage, close monitoring of blood glucose levels.    Hypothyroidism - On levothyroxine.    Hypertension - Close blood pressure monitoring. On hydralazine.

## 2021-07-05 NOTE — CHART NOTE - NSCHARTNOTEFT_GEN_A_CORE
prepped and draped left chest wall  removed 2 prolene sutures attaching permacath to left chest wall  removed permacath intact   pressure held and sterile dressing applied  hemostasis achieved

## 2021-07-05 NOTE — CONSULT NOTE ADULT - ASSESSMENT
77 y/o female who is admitted and found with bacteremia, consult for removal of subcutaneous infusion port. Patient reportedly difficult access hence need for infusion port. As this port has not had issues, and is subcutaneous and at a decreased risk of infection, may not need urgent removal.    #Bacteremia with mediport   - Recommend removal of permacath as its placement is more recent.   - Will need culture from mediport (and labeled appropriately on order) after removal of permacath.   - Will f/u labs and culture.     Plan discussed with Dr. Orellana who agrees.

## 2021-07-05 NOTE — CONSULT NOTE ADULT - SUBJECTIVE AND OBJECTIVE BOX
VASCULAR SURGERY CONSULT  ==============================================================================================================    Vascular surgery consulted for removal of PermCath in bacteremic patient. HPI as below. Patient with positive blood cultures on July 1st to 3rd, GPC in clusters with older cultures returning as staph epi. No other HD access at this time, but per primary, Nephro to trial off HD for a few days. No complaints of pain at site of catheter insertion. No outward signs of infection.     HPI:  77 y/o female who started HD about 3 weeks ago and has left upper chest wall dialysis catheter, also has Rt. upper chest wall port for 2 years for her access ( as per pt. her veins are bad ), blood transfusions ( has transfusion dependant anemia ) , had dialysis yesterday ( on M, W, F ) but since last night was having chills. Today pt's daughter called pt's visiting nurse who asked family to check pt's temp. As per pt. her temp was 104. pt. was sent to the ER. pt. reports no cough, no phlegm, no sick contact, no cp, no sob, no abd. pain, no n/v/d. no change in mental status. pt. is no toxic looking at the time of admission and is afebrile. pt. makes urine 2 x daily.  Pt's cardiologist Dr. Shelton has recommended no AC for pt. as she bleeds easily.  (01 Jul 2021 23:32)      PAST MEDICAL & SURGICAL HISTORY:  Hypertension  well controlled    High cholesterol    Low back pain  chronic over a year    Asthma  well controlled, never intubated for exacerbation    Anemia, unspecified anemia type  uses O2 at 2L at home PRN, due to anemia    Aortic stenosis  s/p bovine AVR    Heart failure  Echo  10/2020 EF 60%, mild AR    CAD (coronary artery disease)  s/p OHS    CKD (chronic kidney disease)  Stage 4, now on dialysis.    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      Home Meds: Home Medications:  amitriptyline 10 mg oral tablet: 4 tab(s) orally once a day (at bedtime) (19 May 2021 15:37)  Aranesp Albumin Free: 300 mcg once weekly at MD&#x27;s office as needed for hgb&lt;11 (19 May 2021 15:37)  folic acid 1 mg oral tablet: 1 tab(s) orally once a day (19 May 2021 15:37)  gabapentin 300 mg oral capsule: 1 cap(s) orally once a day (at bedtime) (19 May 2021 15:37)  levothyroxine 100 mcg (0.1 mg) oral tablet: 1 tab(s) orally once a day (19 May 2021 15:37)  montelukast 10 mg oral tablet: 1 tab(s) orally once a day (at bedtime) (19 May 2021 15:37)  pantoprazole 40 mg oral delayed release tablet: 1 tab(s) orally 2 times a day (19 May 2021 17:13)  ProAir HFA 90 mcg/inh inhalation aerosol: 2 puff(s) inhaled every 6 hours, As Needed (19 May 2021 17:13)  torsemide 20 mg oral tablet: 1 tab(s) orally once a day (in the morning) (19 May 2021 17:13)  Vitamin D3 2000 intl units (50 mcg) oral tablet: 1  orally once a day (19 May 2021 17:13)    Allergies: Allergies    ACE inhibitors (Other)  Bactrim (Nephrotoxicity)  Biaxin (Joint Pain)  morphine (Short breath)  NSAIDs (Other)    Intolerances      Soc:   Advanced Directives: Presumed Full Code     CURRENT MEDICATIONS:   --------------------------------------------------------------------------------------  Neurologic Medications  acetaminophen   Tablet .. 650 milliGRAM(s) Oral every 6 hours PRN Temp greater or equal to 38C (100.4F)  amitriptyline 40 milliGRAM(s) Oral at bedtime  gabapentin 300 milliGRAM(s) Oral at bedtime    Respiratory Medications  ALBUTerol    0.083% 2.5 milliGRAM(s) Nebulizer every 6 hours PRN Shortness of Breath and/or Wheezing    Cardiovascular Medications  hydrALAZINE 25 milliGRAM(s) Oral every 8 hours    Gastrointestinal Medications  cholecalciferol 2000 Unit(s) Oral daily  dextrose 5%. 1000 milliLiter(s) IV Continuous <Continuous>  dextrose 5%. 1000 milliLiter(s) IV Continuous <Continuous>  folic acid 1 milliGRAM(s) Oral daily  Nephro-nunu 1 Tablet(s) Oral daily  pantoprazole    Tablet 40 milliGRAM(s) Oral every 12 hours    Genitourinary Medications    Hematologic/Oncologic Medications    Antimicrobial/Immunologic Medications  vancomycin  IVPB 1000 milliGRAM(s) IV Intermittent <User Schedule>    Endocrine/Metabolic Medications  atorvastatin 40 milliGRAM(s) Oral at bedtime  dextrose 40% Gel 15 Gram(s) Oral once  dextrose 50% Injectable 25 Gram(s) IV Push once  dextrose 50% Injectable 12.5 Gram(s) IV Push once  dextrose 50% Injectable 25 Gram(s) IV Push once  glucagon  Injectable 1 milliGRAM(s) IntraMuscular once  insulin glargine Injectable (LANTUS) 8 Unit(s) SubCutaneous at bedtime  insulin lispro (ADMELOG) corrective regimen sliding scale   SubCutaneous three times a day before meals  insulin lispro (ADMELOG) corrective regimen sliding scale   SubCutaneous at bedtime  levothyroxine 100 MICROGram(s) Oral daily    Topical/Other Medications  lactobacillus acidophilus 1 Tablet(s) Oral two times a day with meals  sodium zirconium cyclosilicate 5 Gram(s) Oral daily    --------------------------------------------------------------------------------------    VITAL SIGNS, INS/OUTS (last 24 hours):  --------------------------------------------------------------------------------------  ICU Vital Signs Last 24 Hrs  T(C): 36.7 (05 Jul 2021 16:48), Max: 37.6 (05 Jul 2021 05:18)  T(F): 98 (05 Jul 2021 16:48), Max: 99.6 (05 Jul 2021 05:18)  HR: 87 (05 Jul 2021 16:48) (67 - 93)  BP: 132/69 (05 Jul 2021 16:48) (127/69 - 153/73)  BP(mean): --  ABP: --  ABP(mean): --  RR: 18 (05 Jul 2021 16:48) (18 - 18)  SpO2: 98% (05 Jul 2021 16:48) (98% - 100%)    I&O's Summary    05 Jul 2021 07:01  -  05 Jul 2021 21:11  --------------------------------------------------------  IN: 0 mL / OUT: 1500 mL / NET: -1500 mL      --------------------------------------------------------------------------------------    EXAM:  General/Neuro  Exam: Normal, NAD, alert, oriented x 3, no focal deficits.     Respiratory  Exam: Unlabored, no conversational dyspnea.     Cardiovascular  Exam: S1, S2.  Regular rate and rhythm. Left chest wall with permacath     GI  Exam: Abdomen soft, Non-tender, Non-distended.        Extremities  Exam: Extremities warm, pink, well-perfused.      Derm:  Exam: Good skin turgor, no skin breakdown.        LABS  --------------------------------------------------------------------------------------  Labs:  CAPILLARY BLOOD GLUCOSE      POCT Blood Glucose.: 189 mg/dL (05 Jul 2021 21:07)  POCT Blood Glucose.: 232 mg/dL (05 Jul 2021 16:51)  POCT Blood Glucose.: 181 mg/dL (05 Jul 2021 12:56)  POCT Blood Glucose.: 175 mg/dL (05 Jul 2021 09:00)  POCT Blood Glucose.: 305 mg/dL (04 Jul 2021 21:16)                          9.9    12.75 )-----------( 118      ( 05 Jul 2021 07:54 )             30.7         07-05    132<L>  |  91<L>  |  55.1<H>  ----------------------------<  193<H>  3.7   |  22.0  |  5.71<H>      Calcium, Total Serum: 8.2 mg/dL (07-05-21 @ 07:58)      LFTs:         Coags:                Culture - Blood (collected 03 Jul 2021 07:56)  Source: .Blood Blood-Venous  Gram Stain (04 Jul 2021 14:46):    Growth in aerobic bottle: Gram Positive Cocci in Clusters    Gram Stain performed by:    [facility]    [address] [place] [id number]    .    TYPE: (C=Critical, N=Notification, A=Abnormal) C    TESTS:  _ GS    DATE/TIME CALLED: _ 07/04/2021 14:45:38    CALLED TO: Ting MOSER    READ BACK (2 Patient Identifiers)(Y/N): _ Y    READ BACK VALUES (Y/N): _ Y    CALLED BY: _ CAMILLE    Culture - Blood (collected 03 Jul 2021 07:56)  Source: .Blood Blood-Peripheral  Gram Stain (04 Jul 2021 14:50):    Growth in aerobic bottle: Gram Positive Cocci in Clusters    Gram Stain performed by:    [facility]    [address] [place] [address]    .    TYPE: (C=Critical, N=Notification, A=Abnormal) C    TESTS:  _ GS    DATE/TIME CALLED: _ 07/04/2021 14:49:52    CALLED TO: _ ALBIN MOSER    READ BACK (2 Patient Identifiers)(Y/N): _ Y    READ BACK VALUES (Y/N): _ Y    CALLED BY: _ CAMILLE    Culture - Urine (collected 03 Jul 2021 05:31)  Source: .Urine Clean Catch (Midstream)  Final Report (04 Jul 2021 02:13):    No growth       VASCULAR SURGERY CONSULT  ==============================================================================================================    Vascular surgery consulted for removal of PermCath in bacteremic patient. HPI as below. Permacath placed on 6/9/2021 with Dr. Arboleda. Patient with positive blood cultures on July 1st to 3rd, GPC in clusters with older cultures returning as staph epi. No other HD access at this time, but per primary, Nephro to trial off HD for a few days. No complaints of pain at site of catheter insertion. No outward signs of infection.     HPI:  75 y/o female who started HD about 3 weeks ago and has left upper chest wall dialysis catheter, also has Rt. upper chest wall port for 2 years for her access ( as per pt. her veins are bad ), blood transfusions ( has transfusion dependant anemia ) , had dialysis yesterday ( on M, W, F ) but since last night was having chills. Today pt's daughter called pt's visiting nurse who asked family to check pt's temp. As per pt. her temp was 104. pt. was sent to the ER. pt. reports no cough, no phlegm, no sick contact, no cp, no sob, no abd. pain, no n/v/d. no change in mental status. pt. is no toxic looking at the time of admission and is afebrile. pt. makes urine 2 x daily.  Pt's cardiologist Dr. Shelton has recommended no AC for pt. as she bleeds easily.  (01 Jul 2021 23:32)      PAST MEDICAL & SURGICAL HISTORY:  Hypertension  well controlled    High cholesterol    Low back pain  chronic over a year    Asthma  well controlled, never intubated for exacerbation    Anemia, unspecified anemia type  uses O2 at 2L at home PRN, due to anemia    Aortic stenosis  s/p bovine AVR    Heart failure  Echo  10/2020 EF 60%, mild AR    CAD (coronary artery disease)  s/p OHS    CKD (chronic kidney disease)  Stage 4, now on dialysis.    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      Home Meds: Home Medications:  amitriptyline 10 mg oral tablet: 4 tab(s) orally once a day (at bedtime) (19 May 2021 15:37)  Aranesp Albumin Free: 300 mcg once weekly at MD&#x27;s office as needed for hgb&lt;11 (19 May 2021 15:37)  folic acid 1 mg oral tablet: 1 tab(s) orally once a day (19 May 2021 15:37)  gabapentin 300 mg oral capsule: 1 cap(s) orally once a day (at bedtime) (19 May 2021 15:37)  levothyroxine 100 mcg (0.1 mg) oral tablet: 1 tab(s) orally once a day (19 May 2021 15:37)  montelukast 10 mg oral tablet: 1 tab(s) orally once a day (at bedtime) (19 May 2021 15:37)  pantoprazole 40 mg oral delayed release tablet: 1 tab(s) orally 2 times a day (19 May 2021 17:13)  ProAir HFA 90 mcg/inh inhalation aerosol: 2 puff(s) inhaled every 6 hours, As Needed (19 May 2021 17:13)  torsemide 20 mg oral tablet: 1 tab(s) orally once a day (in the morning) (19 May 2021 17:13)  Vitamin D3 2000 intl units (50 mcg) oral tablet: 1  orally once a day (19 May 2021 17:13)    Allergies: Allergies    ACE inhibitors (Other)  Bactrim (Nephrotoxicity)  Biaxin (Joint Pain)  morphine (Short breath)  NSAIDs (Other)    Intolerances      Soc:   Advanced Directives: Presumed Full Code     CURRENT MEDICATIONS:   --------------------------------------------------------------------------------------  Neurologic Medications  acetaminophen   Tablet .. 650 milliGRAM(s) Oral every 6 hours PRN Temp greater or equal to 38C (100.4F)  amitriptyline 40 milliGRAM(s) Oral at bedtime  gabapentin 300 milliGRAM(s) Oral at bedtime    Respiratory Medications  ALBUTerol    0.083% 2.5 milliGRAM(s) Nebulizer every 6 hours PRN Shortness of Breath and/or Wheezing    Cardiovascular Medications  hydrALAZINE 25 milliGRAM(s) Oral every 8 hours    Gastrointestinal Medications  cholecalciferol 2000 Unit(s) Oral daily  dextrose 5%. 1000 milliLiter(s) IV Continuous <Continuous>  dextrose 5%. 1000 milliLiter(s) IV Continuous <Continuous>  folic acid 1 milliGRAM(s) Oral daily  Nephro-nunu 1 Tablet(s) Oral daily  pantoprazole    Tablet 40 milliGRAM(s) Oral every 12 hours    Genitourinary Medications    Hematologic/Oncologic Medications    Antimicrobial/Immunologic Medications  vancomycin  IVPB 1000 milliGRAM(s) IV Intermittent <User Schedule>    Endocrine/Metabolic Medications  atorvastatin 40 milliGRAM(s) Oral at bedtime  dextrose 40% Gel 15 Gram(s) Oral once  dextrose 50% Injectable 25 Gram(s) IV Push once  dextrose 50% Injectable 12.5 Gram(s) IV Push once  dextrose 50% Injectable 25 Gram(s) IV Push once  glucagon  Injectable 1 milliGRAM(s) IntraMuscular once  insulin glargine Injectable (LANTUS) 8 Unit(s) SubCutaneous at bedtime  insulin lispro (ADMELOG) corrective regimen sliding scale   SubCutaneous three times a day before meals  insulin lispro (ADMELOG) corrective regimen sliding scale   SubCutaneous at bedtime  levothyroxine 100 MICROGram(s) Oral daily    Topical/Other Medications  lactobacillus acidophilus 1 Tablet(s) Oral two times a day with meals  sodium zirconium cyclosilicate 5 Gram(s) Oral daily    --------------------------------------------------------------------------------------    VITAL SIGNS, INS/OUTS (last 24 hours):  --------------------------------------------------------------------------------------  ICU Vital Signs Last 24 Hrs  T(C): 36.7 (05 Jul 2021 16:48), Max: 37.6 (05 Jul 2021 05:18)  T(F): 98 (05 Jul 2021 16:48), Max: 99.6 (05 Jul 2021 05:18)  HR: 87 (05 Jul 2021 16:48) (67 - 93)  BP: 132/69 (05 Jul 2021 16:48) (127/69 - 153/73)  BP(mean): --  ABP: --  ABP(mean): --  RR: 18 (05 Jul 2021 16:48) (18 - 18)  SpO2: 98% (05 Jul 2021 16:48) (98% - 100%)    I&O's Summary    05 Jul 2021 07:01  -  05 Jul 2021 21:11  --------------------------------------------------------  IN: 0 mL / OUT: 1500 mL / NET: -1500 mL      --------------------------------------------------------------------------------------    EXAM:  General/Neuro  Exam: Normal, NAD, alert, oriented x 3, no focal deficits.     Respiratory  Exam: Unlabored, no conversational dyspnea.     Cardiovascular  Exam: S1, S2.  Regular rate and rhythm. Left chest wall with permacath     GI  Exam: Abdomen soft, Non-tender, Non-distended.        Extremities  Exam: Extremities warm, pink, well-perfused.      Derm:  Exam: Good skin turgor, no skin breakdown.        LABS  --------------------------------------------------------------------------------------  Labs:  CAPILLARY BLOOD GLUCOSE      POCT Blood Glucose.: 189 mg/dL (05 Jul 2021 21:07)  POCT Blood Glucose.: 232 mg/dL (05 Jul 2021 16:51)  POCT Blood Glucose.: 181 mg/dL (05 Jul 2021 12:56)  POCT Blood Glucose.: 175 mg/dL (05 Jul 2021 09:00)  POCT Blood Glucose.: 305 mg/dL (04 Jul 2021 21:16)                          9.9    12.75 )-----------( 118      ( 05 Jul 2021 07:54 )             30.7         07-05    132<L>  |  91<L>  |  55.1<H>  ----------------------------<  193<H>  3.7   |  22.0  |  5.71<H>      Calcium, Total Serum: 8.2 mg/dL (07-05-21 @ 07:58)      LFTs:         Coags:                Culture - Blood (collected 03 Jul 2021 07:56)  Source: .Blood Blood-Venous  Gram Stain (04 Jul 2021 14:46):    Growth in aerobic bottle: Gram Positive Cocci in Clusters    Gram Stain performed by:    [facility]    [address] [place] [id number]    .    TYPE: (C=Critical, N=Notification, A=Abnormal) C    TESTS:  _ GS    DATE/TIME CALLED: _ 07/04/2021 14:45:38    CALLED TO: Ting MOSER    READ BACK (2 Patient Identifiers)(Y/N): _ Y    READ BACK VALUES (Y/N): _ Y    CALLED BY: _ CAMILLE    Culture - Blood (collected 03 Jul 2021 07:56)  Source: .Blood Blood-Peripheral  Gram Stain (04 Jul 2021 14:50):    Growth in aerobic bottle: Gram Positive Cocci in Clusters    Gram Stain performed by:    [facility]    [address] [place] [id number]    .    TYPE: (C=Critical, N=Notification, A=Abnormal) C    TESTS:  _ GS    DATE/TIME CALLED: _ 07/04/2021 14:49:52    CALLED TO: _ ALBIN MOSER    READ BACK (2 Patient Identifiers)(Y/N): _ Y    READ BACK VALUES (Y/N): _ Y    CALLED BY: _ SG    Culture - Urine (collected 03 Jul 2021 05:31)  Source: .Urine Clean Catch (Midstream)  Final Report (04 Jul 2021 02:13):    No growth

## 2021-07-05 NOTE — CONSULT NOTE ADULT - ASSESSMENT
75 y/o female who is admitted and found with bacteremia, consult for removal of permacath. No other HD access though nephro to trial off of HD for next few days.    #Bacteremia  - Will remove Permacath at bedside  - To follow along for potential need for HD access     Plan discussed with Dr. Castellano who agrees.    75 y/o female who is admitted and found with bacteremia, consult for removal of permacath. No other HD access though nephro to trial off of HD for next few days.    #Bacteremia with Permacath  - Will remove Permacath at bedside  - To follow along for potential need for HD access     Plan discussed with Dr. Castellano who agrees.

## 2021-07-05 NOTE — PROGRESS NOTE ADULT - SUBJECTIVE AND OBJECTIVE BOX
NEPHROLOGY INTERVAL HPI/OVERNIGHT EVENTS:    Seen at HD   Blood Cx  and 7/3 + staph growth   ID dosing Vanco    MEDICATIONS  (STANDING):  amitriptyline 40 milliGRAM(s) Oral at bedtime  atorvastatin 40 milliGRAM(s) Oral at bedtime  cholecalciferol 2000 Unit(s) Oral daily  dextrose 40% Gel 15 Gram(s) Oral once  dextrose 5%. 1000 milliLiter(s) (50 mL/Hr) IV Continuous <Continuous>  dextrose 5%. 1000 milliLiter(s) (100 mL/Hr) IV Continuous <Continuous>  dextrose 50% Injectable 25 Gram(s) IV Push once  dextrose 50% Injectable 12.5 Gram(s) IV Push once  dextrose 50% Injectable 25 Gram(s) IV Push once  folic acid 1 milliGRAM(s) Oral daily  gabapentin 300 milliGRAM(s) Oral at bedtime  glucagon  Injectable 1 milliGRAM(s) IntraMuscular once  hydrALAZINE 25 milliGRAM(s) Oral every 8 hours  insulin glargine Injectable (LANTUS) 8 Unit(s) SubCutaneous at bedtime  insulin lispro (ADMELOG) corrective regimen sliding scale   SubCutaneous three times a day before meals  insulin lispro (ADMELOG) corrective regimen sliding scale   SubCutaneous at bedtime  lactobacillus acidophilus 1 Tablet(s) Oral two times a day with meals  levothyroxine 100 MICROGram(s) Oral daily  Nephro-nunu 1 Tablet(s) Oral daily  pantoprazole    Tablet 40 milliGRAM(s) Oral every 12 hours  vancomycin  IVPB 1000 milliGRAM(s) IV Intermittent <User Schedule>    MEDICATIONS  (PRN):  acetaminophen   Tablet .. 650 milliGRAM(s) Oral every 6 hours PRN Temp greater or equal to 38C (100.4F)  ALBUTerol    0.083% 2.5 milliGRAM(s) Nebulizer every 6 hours PRN Shortness of Breath and/or Wheezing      Allergies    ACE inhibitors (Other)  Bactrim (Nephrotoxicity)  Biaxin (Joint Pain)  morphine (Short breath)  NSAIDs (Other)    Intolerances      Vital Signs Last 24 Hrs  T(C): 36.7 (2021 08:36), Max: 37.6 (2021 05:18)  T(F): 98.1 (2021 08:36), Max: 99.6 (2021 05:18)  HR: 76 (2021 08:36) (67 - 84)  BP: 152/65 (2021 08:36) (124/73 - 152/65)  BP(mean): --  RR: 18 (2021 08:36) (18 - 18)  SpO2: 99% (2021 08:36) (97% - 100%)  Daily     Daily Weight in k.1 (2021 08:36)  I&O's Detail    I&O's Summary      PHYSICAL EXAM:  PHYSICAL EXAM:  GENERAL: NAD, Nontoxic   HEAD:  Atraumatic, Normocephalic. anicteric   NECK: Supple, R mediport clean , L permacath and tunnel clean , nontender   CHEST/LUNG: Clear / EAE . No wheeze   HEART: Regular rate and rhythm; Norub   ABDOMEN: Soft, Nontender, Nondistended; Bowel sounds present  EXTREMITIES:  edema much better , no ulcers or cellulitis     GENERAL: NAD, well-groomed, well-developed  HEAD:  Atraumatic, Normocephalic  EYES: EOMI, PERRLA, conjunctiva and sclera clear  ENMT: No tonsillar erythema, exudates, or enlargement; Moist mucous membranes, Good dentition, No lesions  NECK: Supple, No JVD, Normal thyroid  NERVOUS SYSTEM:  Alert & Oriented X3, Good concentration; Motor Strength 5/5 B/L upper and lower extremities; DTRs 2+ intact and symmetric  CHEST/LUNG: Clear to percussion bilaterally; No rales, rhonchi, wheezing, or rubs  HEART: Regular rate and rhythm; No murmurs, rubs, or gallops  ABDOMEN: Soft, Nontender, Nondistended; Bowel sounds present  EXTREMITIES:  2+ Peripheral Pulses, No clubbing, cyanosis, or edema  LYMPH: No lymphadenopathy noted  SKIN: No rashes or lesions    LABS:                        9.9    12.75 )-----------( 118      ( 2021 07:54 )             30.7     07-05    132<L>  |  91<L>  |  55.1<H>  ----------------------------<  193<H>  3.7   |  22.0  |  5.71<H>    Ca    8.2<L>      2021 07:58  Phos  7.1     07-          Phosphorus Level, Serum: 7.1 mg/dL ( @ 07:58)          RADIOLOGY & ADDITIONAL TESTS:

## 2021-07-05 NOTE — CONSULT NOTE ADULT - SUBJECTIVE AND OBJECTIVE BOX
VASCULAR SURGERY CONSULT  ===============================================================================    Acute Care surgery consulted for removal of Mediport in bacteremic patient. HPI as below. Patient with positive blood cultures on July 1st to 3rd, GPC in clusters with older cultures returning as staph epi. Patient reports this was placed many years ago, but unsure by whom. No records found at this facility for its placement. Infusion port had been used for frequent transfused given MDS, per patient. No pain at site of port.     HPI:  75 y/o female who started HD about 3 weeks ago and has left upper chest wall dialysis catheter, also has Rt. upper chest wall port for 2 years for her access ( as per pt. her veins are bad ), blood transfusions ( has transfusion dependant anemia ) , had dialysis yesterday ( on M, W, F ) but since last night was having chills. Today pt's daughter called pt's visiting nurse who asked family to check pt's temp. As per pt. her temp was 104. pt. was sent to the ER. pt. reports no cough, no phlegm, no sick contact, no cp, no sob, no abd. pain, no n/v/d. no change in mental status. pt. is no toxic looking at the time of admission and is afebrile. pt. makes urine 2 x daily.  Pt's cardiologist Dr. Shelton has recommended no AC for pt. as she bleeds easily.  (01 Jul 2021 23:32)      PAST MEDICAL & SURGICAL HISTORY:  Hypertension  well controlled    High cholesterol    Low back pain  chronic over a year    Asthma  well controlled, never intubated for exacerbation    Anemia, unspecified anemia type  uses O2 at 2L at home PRN, due to anemia    Aortic stenosis  s/p bovine AVR    Heart failure  Echo  10/2020 EF 60%, mild AR    CAD (coronary artery disease)  s/p OHS    CKD (chronic kidney disease)  Stage 4, now on dialysis.    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      Home Meds: Home Medications:  amitriptyline 10 mg oral tablet: 4 tab(s) orally once a day (at bedtime) (19 May 2021 15:37)  Aranesp Albumin Free: 300 mcg once weekly at MD&#x27;s office as needed for hgb&lt;11 (19 May 2021 15:37)  folic acid 1 mg oral tablet: 1 tab(s) orally once a day (19 May 2021 15:37)  gabapentin 300 mg oral capsule: 1 cap(s) orally once a day (at bedtime) (19 May 2021 15:37)  levothyroxine 100 mcg (0.1 mg) oral tablet: 1 tab(s) orally once a day (19 May 2021 15:37)  montelukast 10 mg oral tablet: 1 tab(s) orally once a day (at bedtime) (19 May 2021 15:37)  pantoprazole 40 mg oral delayed release tablet: 1 tab(s) orally 2 times a day (19 May 2021 17:13)  ProAir HFA 90 mcg/inh inhalation aerosol: 2 puff(s) inhaled every 6 hours, As Needed (19 May 2021 17:13)  torsemide 20 mg oral tablet: 1 tab(s) orally once a day (in the morning) (19 May 2021 17:13)  Vitamin D3 2000 intl units (50 mcg) oral tablet: 1  orally once a day (19 May 2021 17:13)    Allergies: Allergies    ACE inhibitors (Other)  Bactrim (Nephrotoxicity)  Biaxin (Joint Pain)  morphine (Short breath)  NSAIDs (Other)    Intolerances      Soc:   Advanced Directives: Presumed Full Code     CURRENT MEDICATIONS:   --------------------------------------------------------------------------------------  Neurologic Medications  acetaminophen   Tablet .. 650 milliGRAM(s) Oral every 6 hours PRN Temp greater or equal to 38C (100.4F)  amitriptyline 40 milliGRAM(s) Oral at bedtime  gabapentin 300 milliGRAM(s) Oral at bedtime    Respiratory Medications  ALBUTerol    0.083% 2.5 milliGRAM(s) Nebulizer every 6 hours PRN Shortness of Breath and/or Wheezing    Cardiovascular Medications  hydrALAZINE 25 milliGRAM(s) Oral every 8 hours    Gastrointestinal Medications  cholecalciferol 2000 Unit(s) Oral daily  dextrose 5%. 1000 milliLiter(s) IV Continuous <Continuous>  dextrose 5%. 1000 milliLiter(s) IV Continuous <Continuous>  folic acid 1 milliGRAM(s) Oral daily  Nephro-nunu 1 Tablet(s) Oral daily  pantoprazole    Tablet 40 milliGRAM(s) Oral every 12 hours    Genitourinary Medications    Hematologic/Oncologic Medications    Antimicrobial/Immunologic Medications  vancomycin  IVPB 1000 milliGRAM(s) IV Intermittent <User Schedule>    Endocrine/Metabolic Medications  atorvastatin 40 milliGRAM(s) Oral at bedtime  dextrose 40% Gel 15 Gram(s) Oral once  dextrose 50% Injectable 25 Gram(s) IV Push once  dextrose 50% Injectable 12.5 Gram(s) IV Push once  dextrose 50% Injectable 25 Gram(s) IV Push once  glucagon  Injectable 1 milliGRAM(s) IntraMuscular once  insulin glargine Injectable (LANTUS) 8 Unit(s) SubCutaneous at bedtime  insulin lispro (ADMELOG) corrective regimen sliding scale   SubCutaneous three times a day before meals  insulin lispro (ADMELOG) corrective regimen sliding scale   SubCutaneous at bedtime  levothyroxine 100 MICROGram(s) Oral daily    Topical/Other Medications  lactobacillus acidophilus 1 Tablet(s) Oral two times a day with meals  sodium zirconium cyclosilicate 5 Gram(s) Oral daily    --------------------------------------------------------------------------------------    VITAL SIGNS, INS/OUTS (last 24 hours):  --------------------------------------------------------------------------------------  ICU Vital Signs Last 24 Hrs  T(C): 36.7 (05 Jul 2021 16:48), Max: 37.6 (05 Jul 2021 05:18)  T(F): 98 (05 Jul 2021 16:48), Max: 99.6 (05 Jul 2021 05:18)  HR: 87 (05 Jul 2021 16:48) (67 - 93)  BP: 132/69 (05 Jul 2021 16:48) (127/69 - 153/73)  BP(mean): --  ABP: --  ABP(mean): --  RR: 18 (05 Jul 2021 16:48) (18 - 18)  SpO2: 98% (05 Jul 2021 16:48) (98% - 100%)    I&O's Summary    05 Jul 2021 07:01  -  05 Jul 2021 21:11  --------------------------------------------------------  IN: 0 mL / OUT: 1500 mL / NET: -1500 mL      --------------------------------------------------------------------------------------    EXAM:  General/Neuro  Exam: Normal, NAD, alert, oriented x 3, no focal deficits.     Respiratory  Exam: Unlabored, no conversational dyspnea.     Cardiovascular  Exam: S1, S2.  Regular rate and rhythm. Left chest wall with permacath     GI  Exam: Abdomen soft, Non-tender, Non-distended.        Extremities  Exam: Extremities warm, pink, well-perfused.      Derm:  Exam: Good skin turgor, no skin breakdown.        LABS  --------------------------------------------------------------------------------------  Labs:  CAPILLARY BLOOD GLUCOSE      POCT Blood Glucose.: 189 mg/dL (05 Jul 2021 21:07)  POCT Blood Glucose.: 232 mg/dL (05 Jul 2021 16:51)  POCT Blood Glucose.: 181 mg/dL (05 Jul 2021 12:56)  POCT Blood Glucose.: 175 mg/dL (05 Jul 2021 09:00)  POCT Blood Glucose.: 305 mg/dL (04 Jul 2021 21:16)                          9.9    12.75 )-----------( 118      ( 05 Jul 2021 07:54 )             30.7         07-05    132<L>  |  91<L>  |  55.1<H>  ----------------------------<  193<H>  3.7   |  22.0  |  5.71<H>      Calcium, Total Serum: 8.2 mg/dL (07-05-21 @ 07:58)      LFTs:         Coags:                Culture - Blood (collected 03 Jul 2021 07:56)  Source: .Blood Blood-Venous  Gram Stain (04 Jul 2021 14:46):    Growth in aerobic bottle: Gram Positive Cocci in Clusters    Gram Stain performed by:    [facility]    [address] [place] [address]    .    TYPE: (C=Critical, N=Notification, A=Abnormal) C    TESTS:  _ GS    DATE/TIME CALLED: _ 07/04/2021 14:45:38    CALLED TO: Ting MOSER    READ BACK (2 Patient Identifiers)(Y/N): _ Y    READ BACK VALUES (Y/N): _ Y    CALLED BY: Ting OBRIEN    Culture - Blood (collected 03 Jul 2021 07:56)  Source: .Blood Blood-Peripheral  Gram Stain (04 Jul 2021 14:50):    Growth in aerobic bottle: Gram Positive Cocci in Clusters    Gram Stain performed by:    [facility]    [address] [place] [address]    .    TYPE: (C=Critical, N=Notification, A=Abnormal) C    TESTS:  _ GS    DATE/TIME CALLED: _ 07/04/2021 14:49:52    CALLED TO: _ ALBIN MOSER    READ BACK (2 Patient Identifiers)(Y/N): _ Y    READ BACK VALUES (Y/N): _ Y    CALLED BY: _ SG    Culture - Urine (collected 03 Jul 2021 05:31)  Source: .Urine Clean Catch (Midstream)  Final Report (04 Jul 2021 02:13):    No growth

## 2021-07-05 NOTE — CONSULT NOTE ADULT - SUBJECTIVE AND OBJECTIVE BOX
[facility]ly [facility]                                   [address] [place] [address]                                                      PHONE: [phone]                                                         FAX: [phone]  [email or web address]  ---------------------------------------------------------------------------------------------------------------------------------    HPI:  NICHELLE GILL is an 76y Female with ESRD one HD for 1 month, Severe AS s/p bioprosthetic AVR, HFpEF s/p cardiomems p/w fevers.  She was seen in our office on Wednesday.  After the visit, she started to get fevers and chills.  She came to the ER and was found to have staph epi bacteremia.  Cultures positive from catheter site, too.  She was placed on vancomycin.     +fevers and chills; no N/V/diarrhea    PAST MEDICAL & SURGICAL HISTORY:  Hypertension  well controlled    High cholesterol    Low back pain  chronic over a year    Asthma  well controlled, never intubated for exacerbation    Anemia, unspecified anemia type  uses O2 at 2L at home PRN, due to anemia    Aortic stenosis  s/p bovine AVR    Heart failure  Echo  10/2020 EF 60%, mild AR    CAD (coronary artery disease)  s/p OHS    CKD (chronic kidney disease)  Stage 4, now on dialysis.    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 1000 milliLiter(s) (50 mL/Hr) IV Continuous <Continuous>  dextrose 5%. 1000 milliLiter(s) (100 mL/Hr) IV Continuous <Continuous>  dextrose 50% Injectable 25 Gram(s) IV Push once  dextrose 50% Injectable 12.5 Gram(s) IV Push once  dextrose 50% Injectable 25 Gram(s) IV Push once  folic acid 1 milliGRAM(s) Oral daily  gabapentin 300 milliGRAM(s) Oral at bedtime  glucagon  Injectable 1 milliGRAM(s) IntraMuscular once  hydrALAZINE 25 milliGRAM(s) Oral every 8 hours  insulin glargine Injectable (LANTUS) 8 Unit(s) SubCutaneous at bedtime  insulin lispro (ADMELOG) corrective regimen sliding scale   SubCutaneous three times a day before meals  insulin lispro (ADMELOG) corrective regimen sliding scale   SubCutaneous at bedtime  lactobacillus acidophilus 1 Tablet(s) Oral two times a day with meals  levothyroxine 100 MICROGram(s) Oral daily  Nephro-nunu 1 Tablet(s) Oral daily  pantoprazole    Tablet 40 milliGRAM(s) Oral every 12 hours  sodium zirconium cyclosilicate 5 Gram(s) Oral daily  vancomycin  IVPB 1000 milliGRAM(s) IV Intermittent <User Schedule>    MEDICATIONS  (PRN):  acetaminophen   Tablet .. 650 milliGRAM(s) Oral every 6 hours PRN Temp greater or equal to 38C (100.4F)  ALBUTerol    0.083% 2.5 milliGRAM(s) Nebulizer every 6 hours PRN Shortness of Breath and/or Wheezing      Family History: Pt denies hx of early cad, SCD, or congenital heart disease.      Social History:  Cigarettes:   former                 Alchohol:   no              Illicit Drug Abuse:  no    ROS:    Extensively Reviewed with pertinents as per HPI the remainder were negative.      Vital Signs Last 24 Hrs  T(C): 36.7 (05 Jul 2021 08:36), Max: 37.6 (05 Jul 2021 05:18)  T(F): 98.1 (05 Jul 2021 08:36), Max: 99.6 (05 Jul 2021 05:18)  HR: 76 (05 Jul 2021 08:36) (67 - 84)  BP: 152/65 (05 Jul 2021 08:36) (124/73 - 152/65)  BP(mean): --  RR: 18 (05 Jul 2021 08:36) (18 - 18)  SpO2: 99% (05 Jul 2021 08:36) (97% - 100%)  ICU Vital Signs Last 24 Hrs  [facility]  I&O's Detail    I&O's Summary    Drug Dosing Weight  [facility]      PHYSICAL EXAM:  General: Appears well developed, well nourished alert and cooperative.  HEENT: Head; normocephalic, atraumatic.  Eyes: Pupils reactive, cornea wnl.  Neck: Supple, no nodes adenopathy, no NVD or carotid bruit or thyromegaly.  CARDIOVASCULAR: Normal S1 and S2, No murmur, rub, gallop or lift.   LUNGS: No rales, rhonchi or wheeze. Normal breath sounds bilaterally.  ABDOMEN: Soft, nontender without mass or organomegaly. bowel sounds normoactive.  EXTREMITIES: No clubbing, cyanosis or edema. Distal pulses wnl.   SKIN: warm and dry with normal turgor.  NEURO: Alert/oriented x 3/normal motor exam. No pathologic reflexes.    PSYCH: normal affect.        LABS:                        9.9    12.75 )-----------( 118      ( 05 Jul 2021 07:54 )             30.7     07-05    132<L>  |  91<L>  |  55.1<H>  ----------------------------<  193<H>  3.7   |  22.0  |  5.71<H>    Ca    8.2<L>      05 Jul 2021 07:58  Phos  7.1     07-05      [facility]            RADIOLOGY & ADDITIONAL STUDIES:    INTERPRETATION OF TELEMETRY (personally reviewed):    ECG:    ECHO:     Summary:   1. Left ventricular ejection fraction, by visual estimation, is 60 to 65%.   2. Severely enlargedleft atrium.   3. The mitral in-flow pattern reveals no discernable A-wave, therefore no comment on diastolic function can be made.   4. There is moderate concentric left ventricular hypertrophy.   5. Mildly reduced RV systolic function.   6. Mildly enlarged right atrium.   7. Mitral valve mean gradient is 4.0 mmHg consistent with mild mitral stenosis.   8. Moderate mitral annular calcification.   9. No evidence of mitral valve regurgitation.  10. Moderate tricuspid regurgitation.  11. Estimated pulmonary artery systolic pressure is 48.7 mmHg assuming a right atrial pressure of 3 mmHg, which is consistent with mild pulmonary hypertension.  12. Bioprosthesis in the aortic position.  13. Mild aortic regurgitation.  14. Peak transaortic gradient equals 30.1 mmHg, mean transaortic gradient equals 14.6 mmHg, the calculated aortic valve area equals 1.21 cm² by the continuity equation consistent with moderate aortic stenosis.  15. Well seated bioprosthetic valve in the aortic position. Dimensionless index of 0.42 and AT of 88 msec, consistent with normal functioning bioprosthetic aortic prosthesis.      No evidence of valvular vegetation in the setting of bacteremia; would recommend BREANN for further evaluation.  16. Compared to prior imaging on 5/20/2021, there no change in left ventricular function. No evidence of cardiac masses, thrombus or vegetations; in the setting of bacteremia with bioprosthetic AVR would consider BREANN for further evaluation.    Aisha Chanel D.O., Electronically signed on 7/3/2021 at 5:51:24 PM    STRESS TEST:    CARDIAC CATHETERIZATION:    Assessment and Plan:  In summary, NICHELLE GILL is an 76y Female with past medical history significant for bioAVR, HFpEF, ESRD on HD p/w fevers and chills found to have staph epi bacteremia.    Bacteremia- on vancomycin for staph epi bactermia.  Will arrange for BREANN.  Please keep NPO after midnight.    HLD- continue atorvastatin 40mg qhs    HFpEF- fluid management with HD      Thank you for allowing [facility] to participate in the care of this patient.  Please feel free to call with any questions or concerns.

## 2021-07-05 NOTE — PROGRESS NOTE ADULT - ASSESSMENT
ESRD / Cirrhosis - HD MWF   Midodrine prior to HD   Due to sepsis , pt is going to need line free interval   After HD today , will add fluid restrict and daily lokelma     Anemia - No Epogen due to h/o liver ca    Fever   Gram (+) sepsis   Blood cultures repeated 7/3   CT 7/3 - no focal collection   CT Chest JEAN , CT abd - no focal collection   Vancomycin as per ID   Vascular to be contacted to remove lines       RO - Not on binders - added Phoslo

## 2021-07-06 NOTE — CONSULT NOTE ADULT - ASSESSMENT
77 y/o female who started HD about 3 weeks ago and had left upper chest wall dialysis catheter, also has Rt. upper chest wall port for 2 years for her access for  blood transfusions ( has transfusion dependant anemia ) presented to ER with chills and fever of 104. Otherwise asymptomatic and at her baseline. In-hospital workup was significant for staph epidermidis bacteremia as well as native mitral valve and bioprosthetic aortic valve ([facility], 2yrs ago per patient. Prolonged complicated post-op course per daughter) endocarditis. HD catheter removed, though mediport remains pending culture. CT surgery was consulted for surgical evaluation for endocarditis.

## 2021-07-06 NOTE — CHART NOTE - NSCHARTNOTEFT_GEN_A_CORE
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

## 2021-07-06 NOTE — PROGRESS NOTE ADULT - ASSESSMENT
ESRD / Cirrhosis - HD MWF --> Had HD Mon 7/5   Midodrine prior to HD   Due to sepsis , pt is going to need line free interval   L permacath removed   Added  fluid restrict and daily lokelma   Follow labs     Anemia - No Epogen due to h/o liver ca    Fever   Gram (+) sepsis   Blood cultures repeated 7/3   CT 7/3 - no focal collection   CT Chest JEAN , CT abd - no focal collection   Vancomycin as per ID   BREANN today       RO - Not on binders - added Phoslo

## 2021-07-06 NOTE — PROGRESS NOTE ADULT - SUBJECTIVE AND OBJECTIVE BOX
Department of Cardiology                                                                  [facility]/[place] [address] [facility]                                                            Telephone: [phone]. Fax:[phone]                                                                                     Pre-BREANN Note        Narrative: 77yo female with h/o Htn, HLD, CAD/VHD with prior CABG and bio-AVR, post-op AF with no recurrence, ILR, MDS with anemia/thrombocytopenia and intermittent transfusion maintenance, HCC s/p embolization, recent adm to [facility] s/p fall and head trauma and progressed to ESRD and started HD in setting of volume overload and PHTN, D/C and now re-presents to [facility] with fevers and found to have Staph epi bacteremia, currently on IV A/B course, HD catheter removed by vascular surgery team yesterday and cath sent for culture, TTE without evidence of vegetation, plan for BREANN today to better assess and R/O infective endocarditis.      ASA and Mallampati: Per Anesthesia    	  MEDICATIONS:  hydrALAZINE 25 milliGRAM(s) Oral every 8 hours  vancomycin  IVPB 1000 milliGRAM(s) IV Intermittent <User Schedule>  ALBUTerol    0.083% 2.5 milliGRAM(s) Nebulizer every 6 hours PRN  acetaminophen   Tablet .. 650 milliGRAM(s) Oral every 6 hours PRN  amitriptyline 40 milliGRAM(s) Oral at bedtime  gabapentin 300 milliGRAM(s) Oral at bedtime  pantoprazole    Tablet 40 milliGRAM(s) Oral every 12 hours  atorvastatin 40 milliGRAM(s) Oral at bedtime  dextrose 40% Gel 15 Gram(s) Oral once  dextrose 50% Injectable 25 Gram(s) IV Push once  dextrose 50% Injectable 25 Gram(s) IV Push once  dextrose 50% Injectable 12.5 Gram(s) IV Push once  glucagon  Injectable 1 milliGRAM(s) IntraMuscular once  insulin glargine Injectable (LANTUS) 8 Unit(s) SubCutaneous at bedtime  insulin lispro (ADMELOG) corrective regimen sliding scale   SubCutaneous three times a day before meals  insulin lispro (ADMELOG) corrective regimen sliding scale   SubCutaneous at bedtime  levothyroxine 100 MICROGram(s) Oral daily  cholecalciferol 2000 Unit(s) Oral daily  dextrose 5%. 1000 milliLiter(s) IV Continuous <Continuous>  dextrose 5%. 1000 milliLiter(s) IV Continuous <Continuous>  folic acid 1 milliGRAM(s) Oral daily  Nephro-nunu 1 Tablet(s) Oral daily        PHYSICAL EXAM:    T(C): 36.6 (07-06-21 @ 10:50), Max: 37.2 (07-05-21 @ 22:56)  HR: 86 (07-06-21 @ 10:50) (83 - 93)  BP: 111/64 (07-06-21 @ 10:50) (111/64 - 152/80)  RR: 18 (07-06-21 @ 10:50) (18 - 18)  SpO2: 99% (07-06-21 @ 10:50) (97% - 99%)  Wt(kg): --    I&O's Summary    05 Jul 2021 07:01  -  06 Jul 2021 07:00  --------------------------------------------------------  IN: 0 mL / OUT: 1700 mL / NET: -1700 mL      Constitutional: A & O x 3  HEENT:   Normal oral mucosa, PERRL, EOMI	  Cardiovascular: Normal S1 S2, No JVD, No murmurs, No edema  Respiratory: Lungs clear to auscultation	  Gastrointestinal:  Soft, Non-tender, + BS	  Skin: No rashes, No ecchymoses, No cyanosis  Neurologic: Non-focal  Extremities: Normal range of motion, No clubbing, cyanosis or edema  Vascular: Peripheral pulses palpable 2+ bilaterally    TELEMETRY: 	    ECG:  	    Echocardiogram 7/3/21    Summary:   1. Left ventricular ejection fraction, by visual estimation, is 60 to 65%.   2. Severely enlargedleft atrium.   3. The mitral in-flow pattern reveals no discernable A-wave, therefore no comment on diastolic function can be made.   4. There is moderate concentric left ventricular hypertrophy.   5. Mildly reduced RV systolic function.   6. Mildly enlarged right atrium.   7. Mitral valve mean gradient is 4.0 mmHg consistent with mild mitral stenosis.   8. Moderate mitral annular calcification.   9. No evidence of mitral valve regurgitation.  10. Moderate tricuspid regurgitation.  11. Estimated pulmonary artery systolic pressure is 48.7 mmHg assuming a right atrial pressure of 3 mmHg, which is consistent with mild pulmonary hypertension.  12. Bioprosthesis in the aortic position.  13. Mild aortic regurgitation.  14. Peak transaortic gradientequals 30.1 mmHg, mean transaortic gradient equals 14.6 mmHg, the calculated aortic valve area equals 1.21 cm² by the continuity equation consistent with moderate aortic stenosis.  15. Well seated bioprosthetic valve in the aortic position. Dimensionless index of 0.42 and AT of 88 msec, consistent with normal functioning bioprosthetic aortic prosthesis.      No evidence of valvular vegetation in the setting of bacteremia; would recommend BREANN for further evaluation.  16. Compared to prior imaging on 5/20/2021, there no change in left ventricular function. No evidence of cardiac masses, thrombus or vegetations; in the setting of bacteremia with bioprosthetic AVR would consider BREANN for further evaluation.        LABS:	 	                          9.9    12.75 )-----------( 118      ( 05 Jul 2021 07:54 )             30.7     07-06    130<L>  |  91<L>  |  37.0<H>  ----------------------------<  281<H>  3.7   |  23.0  |  4.15<H>    Ca    8.1<L>      06 Jul 2021 07:40  Phos  7.1     07-05      ASSESSMENT: 77yo female with extensive co-morbidities now adm with staph epi bacteremia, recent temp HD cath placement now removed, plan for BREANN today to R/O infective endocarditis.     -BREANN as ordered  -Pre-procedure care per protocol  -Labs and ECG reviewed  -maintained NPO  -Procedure discussed with patient; risks and benefits explained; questions answered  -consent to be obtained by Echo cardiologist and anesthesia                                                                         Department of Cardiology                                                                  [facility]/[place] [address] [address] [facility]                                                            Telephone: [phone]. Fax:[phone]                                                                                     Pre-BREANN Note        Narrative: 77yo female with h/o Htn, HLD, CAD/VHD with prior CABG and bio-AVR, post-op AF with no recurrence, CKD 4-5, MDS with anemia/thrombocytopenia and intermittent transfusion maintenance (mediport access RACW), HCC s/p embolization, adm [facility] end of May 2021 with ADHF and severe PHTN in the setting of ESRD, initially had LFV shiley placed on 6/6/21 to start HD, subsequently removed and LIJ tunneled permacath placed on 6/10/21 and started routine HD at out-pt facility, of note, during adm was also found to be anemic and received PRBCs and underwent APC of non-bleeding small AVM, recent adm to [facility] s/p fall and head trauma w/u and D/C, now re-presents to [facility] with fevers and found to have Staph epi bacteremia, currently on IV A/B course, HD catheter removed by vascular surgery team yesterday and cath sent for culture, TTE without evidence of vegetation, plan for BREANN today to better assess and R/O infective endocarditis.      ASA and Mallampati: Per Anesthesia    	  MEDICATIONS:  hydrALAZINE 25 milliGRAM(s) Oral every 8 hours  vancomycin  IVPB 1000 milliGRAM(s) IV Intermittent <User Schedule>  ALBUTerol    0.083% 2.5 milliGRAM(s) Nebulizer every 6 hours PRN  acetaminophen   Tablet .. 650 milliGRAM(s) Oral every 6 hours PRN  amitriptyline 40 milliGRAM(s) Oral at bedtime  gabapentin 300 milliGRAM(s) Oral at bedtime  pantoprazole    Tablet 40 milliGRAM(s) Oral every 12 hours  atorvastatin 40 milliGRAM(s) Oral at bedtime  dextrose 40% Gel 15 Gram(s) Oral once  dextrose 50% Injectable 25 Gram(s) IV Push once  dextrose 50% Injectable 25 Gram(s) IV Push once  dextrose 50% Injectable 12.5 Gram(s) IV Push once  glucagon  Injectable 1 milliGRAM(s) IntraMuscular once  insulin glargine Injectable (LANTUS) 8 Unit(s) SubCutaneous at bedtime  insulin lispro (ADMELOG) corrective regimen sliding scale   SubCutaneous three times a day before meals  insulin lispro (ADMELOG) corrective regimen sliding scale   SubCutaneous at bedtime  levothyroxine 100 MICROGram(s) Oral daily  cholecalciferol 2000 Unit(s) Oral daily  dextrose 5%. 1000 milliLiter(s) IV Continuous <Continuous>  dextrose 5%. 1000 milliLiter(s) IV Continuous <Continuous>  folic acid 1 milliGRAM(s) Oral daily  Nephro-nunu 1 Tablet(s) Oral daily        PHYSICAL EXAM:    T(C): 36.6 (07-06-21 @ 10:50), Max: 37.2 (07-05-21 @ 22:56)  HR: 86 (07-06-21 @ 10:50) (83 - 93)  BP: 111/64 (07-06-21 @ 10:50) (111/64 - 152/80)  RR: 18 (07-06-21 @ 10:50) (18 - 18)  SpO2: 99% (07-06-21 @ 10:50) (97% - 99%)  Wt(kg): --    I&O's Summary    05 Jul 2021 07:01  -  06 Jul 2021 07:00  --------------------------------------------------------  IN: 0 mL / OUT: 1700 mL / NET: -1700 mL      Constitutional: A & O x 3, generalized weakness  Cardiovascular: Normal S1 S2, soft murmur  Respiratory: Lungs clear to auscultation	  Gastrointestinal:  Soft, Non-tender, + BS	  Skin: No rashes, No ecchymoses, No cyanosis  Neurologic: Non-focal  Extremities: Normal range of motion, No clubbing, cyanosis or edema  Vascular: Peripheral pulses palpable bilaterally    TELEMETRY: 	    ECG:  	    Echocardiogram 7/3/21    Summary:   1. Left ventricular ejection fraction, by visual estimation, is 60 to 65%.   2. Severely enlargedleft atrium.   3. The mitral in-flow pattern reveals no discernable A-wave, therefore no comment on diastolic function can be made.   4. There is moderate concentric left ventricular hypertrophy.   5. Mildly reduced RV systolic function.   6. Mildly enlarged right atrium.   7. Mitral valve mean gradient is 4.0 mmHg consistent with mild mitral stenosis.   8. Moderate mitral annular calcification.   9. No evidence of mitral valve regurgitation.  10. Moderate tricuspid regurgitation.  11. Estimated pulmonary artery systolic pressure is 48.7 mmHg assuming a right atrial pressure of 3 mmHg, which is consistent with mild pulmonary hypertension.  12. Bioprosthesis in the aortic position.  13. Mild aortic regurgitation.  14. Peak transaortic gradientequals 30.1 mmHg, mean transaortic gradient equals 14.6 mmHg, the calculated aortic valve area equals 1.21 cm² by the continuity equation consistent with moderate aortic stenosis.  15. Well seated bioprosthetic valve in the aortic position. Dimensionless index of 0.42 and AT of 88 msec, consistent with normal functioning bioprosthetic aortic prosthesis.      No evidence of valvular vegetation in the setting of bacteremia; would recommend BREANN for further evaluation.  16. Compared to prior imaging on 5/20/2021, there no change in left ventricular function. No evidence of cardiac masses, thrombus or vegetations; in the setting of bacteremia with bioprosthetic AVR would consider BREANN for further evaluation.        LABS:	 	                          9.9    12.75 )-----------( 118      ( 05 Jul 2021 07:54 )             30.7     07-06    130<L>  |  91<L>  |  37.0<H>  ----------------------------<  281<H>  3.7   |  23.0  |  4.15<H>    Ca    8.1<L>      06 Jul 2021 07:40  Phos  7.1     07-05      ASSESSMENT: 77yo female with extensive co-morbidities now adm with staph epi bacteremia, definitive source not yet identified, LIJ permacatadry removed 7/5/21, plan for BREANN today to R/O infective endocarditis.     -BREANN as ordered  -Pre-procedure care per protocol  -Labs and ECG reviewed  -maintained NPO  -Procedure discussed with patient; risks and benefits explained; questions answered  -consent to be obtained by Echo cardiologist and anesthesia  -Cont A/B course and treatment of bacteremia by primary team                                                                          Department of Cardiology                                                                  [facility]/[place] [address] [address] [facility]                                                            Telephone: [phone]. Fax:[phone]                                                                                     Pre-BREANN Note        Narrative: 75yo female with h/o Htn, HLD, CAD/VHD with prior CABG and bio-AVR, AF (not on A/C given hematologic disease), CKD 4-5, MDS with anemia/thrombocytopenia and intermittent transfusion maintenance (mediport access RACW), HCC s/p embolization, adm [facility] end of May 2021 with ADHF and severe PHTN in the setting of ESRD, initially had LFV shiley placed on 6/6/21 to start HD, subsequently removed and LIJ tunneled permacath placed on 6/10/21 and started routine HD at out-pt facility, of note, during adm was also found to be anemic and received PRBCs and underwent APC of non-bleeding small AVM, recent adm to [facility] s/p fall and head trauma w/u and D/C, now re-presents to [facility] with fevers and found to have Staph epi bacteremia, currently on IV A/B course, HD catheter removed by vascular surgery team yesterday and cath sent for culture, TTE without evidence of vegetation, plan for BREANN today to better assess and R/O infective endocarditis.      ASA and Mallampati: Per Anesthesia    	  MEDICATIONS:  hydrALAZINE 25 milliGRAM(s) Oral every 8 hours  vancomycin  IVPB 1000 milliGRAM(s) IV Intermittent <User Schedule>  ALBUTerol    0.083% 2.5 milliGRAM(s) Nebulizer every 6 hours PRN  acetaminophen   Tablet .. 650 milliGRAM(s) Oral every 6 hours PRN  amitriptyline 40 milliGRAM(s) Oral at bedtime  gabapentin 300 milliGRAM(s) Oral at bedtime  pantoprazole    Tablet 40 milliGRAM(s) Oral every 12 hours  atorvastatin 40 milliGRAM(s) Oral at bedtime  dextrose 40% Gel 15 Gram(s) Oral once  dextrose 50% Injectable 25 Gram(s) IV Push once  dextrose 50% Injectable 25 Gram(s) IV Push once  dextrose 50% Injectable 12.5 Gram(s) IV Push once  glucagon  Injectable 1 milliGRAM(s) IntraMuscular once  insulin glargine Injectable (LANTUS) 8 Unit(s) SubCutaneous at bedtime  insulin lispro (ADMELOG) corrective regimen sliding scale   SubCutaneous three times a day before meals  insulin lispro (ADMELOG) corrective regimen sliding scale   SubCutaneous at bedtime  levothyroxine 100 MICROGram(s) Oral daily  cholecalciferol 2000 Unit(s) Oral daily  dextrose 5%. 1000 milliLiter(s) IV Continuous <Continuous>  dextrose 5%. 1000 milliLiter(s) IV Continuous <Continuous>  folic acid 1 milliGRAM(s) Oral daily  Nephro-nunu 1 Tablet(s) Oral daily        PHYSICAL EXAM:    T(C): 36.6 (07-06-21 @ 10:50), Max: 37.2 (07-05-21 @ 22:56)  HR: 86 (07-06-21 @ 10:50) (83 - 93)  BP: 111/64 (07-06-21 @ 10:50) (111/64 - 152/80)  RR: 18 (07-06-21 @ 10:50) (18 - 18)  SpO2: 99% (07-06-21 @ 10:50) (97% - 99%)  Wt(kg): --    I&O's Summary    05 Jul 2021 07:01  -  06 Jul 2021 07:00  --------------------------------------------------------  IN: 0 mL / OUT: 1700 mL / NET: -1700 mL      Constitutional: A & O x 3, generalized weakness  Cardiovascular: Normal S1 S2, soft murmur  Respiratory: scattered bibasilar crackles	  Gastrointestinal:  Soft, Non-tender, + BS	  Skin: No rashes, No ecchymoses, No cyanosis  Neurologic: Non-focal  Extremities: Normal range of motion, No clubbing, cyanosis or edema  Vascular: Peripheral pulses palpable bilaterally    TELEMETRY: AF 90's	      Echocardiogram 7/3/21    Summary:   1. Left ventricular ejection fraction, by visual estimation, is 60 to 65%.   2. Severely enlargedleft atrium.   3. The mitral in-flow pattern reveals no discernable A-wave, therefore no comment on diastolic function can be made.   4. There is moderate concentric left ventricular hypertrophy.   5. Mildly reduced RV systolic function.   6. Mildly enlarged right atrium.   7. Mitral valve mean gradient is 4.0 mmHg consistent with mild mitral stenosis.   8. Moderate mitral annular calcification.   9. No evidence of mitral valve regurgitation.  10. Moderate tricuspid regurgitation.  11. Estimated pulmonary artery systolic pressure is 48.7 mmHg assuming a right atrial pressure of 3 mmHg, which is consistent with mild pulmonary hypertension.  12. Bioprosthesis in the aortic position.  13. Mild aortic regurgitation.  14. Peak transaortic gradientequals 30.1 mmHg, mean transaortic gradient equals 14.6 mmHg, the calculated aortic valve area equals 1.21 cm² by the continuity equation consistent with moderate aortic stenosis.  15. Well seated bioprosthetic valve in the aortic position. Dimensionless index of 0.42 and AT of 88 msec, consistent with normal functioning bioprosthetic aortic prosthesis.      No evidence of valvular vegetation in the setting of bacteremia; would recommend BREANN for further evaluation.  16. Compared to prior imaging on 5/20/2021, there no change in left ventricular function. No evidence of cardiac masses, thrombus or vegetations; in the setting of bacteremia with bioprosthetic AVR would consider BREANN for further evaluation.        LABS:	 	                          9.9    12.75 )-----------( 118      ( 05 Jul 2021 07:54 )             30.7     07-06    130<L>  |  91<L>  |  37.0<H>  ----------------------------<  281<H>  3.7   |  23.0  |  4.15<H>    Ca    8.1<L>      06 Jul 2021 07:40  Phos  7.1     07-05      ASSESSMENT: 75yo female with extensive co-morbidities now adm with staph epi bacteremia, definitive source not yet identified, LIJ permacath removed 7/5/21, plan for BREANN today to R/O infective endocarditis.     -BREANN as ordered  -Pre-procedure care per protocol  -Labs and ECG reviewed  -maintained NPO  -Procedure discussed with patient; risks and benefits explained; questions answered  -consent to be obtained by Echo cardiologist and anesthesia  -Cont A/B course and treatment of bacteremia by primary team                                                                          Department of Cardiology                                                                  [facility]/[place] [address] [address] [facility]                                                            Telephone: [phone]. Fax:[phone]                                                                                     Pre-BREANN Note        Narrative: 77yo female with h/o Htn, HLD, CAD/VHD with prior CABG and bio-AVR, AF (not on A/C given hematologic disease), CKD 4-5, MDS with anemia/thrombocytopenia and intermittent transfusion maintenance (mediport access RACW), HCC s/p embolization, adm [facility] end of May 2021 with ADHF and severe PHTN in the setting of ESRD, cardiomems device placed, initially had LFV shiley placed on 6/6/21 to start HD, subsequently removed and LIJ tunneled permacath placed on 6/10/21 and started routine HD at out-pt facility, of note, during adm was also found to be anemic and received PRBCs and underwent APC of non-bleeding small AVM, recent adm to [facility] s/p fall and head trauma w/u and D/C, now re-presents to [facility] with fevers and found to have Staph epi bacteremia, currently on IV A/B course, HD catheter removed by vascular surgery team yesterday and cath sent for culture, TTE without evidence of vegetation, plan for BREANN today to better assess and R/O infective endocarditis.      ASA and Mallampati: Per Anesthesia    	  MEDICATIONS:  hydrALAZINE 25 milliGRAM(s) Oral every 8 hours  vancomycin  IVPB 1000 milliGRAM(s) IV Intermittent <User Schedule>  ALBUTerol    0.083% 2.5 milliGRAM(s) Nebulizer every 6 hours PRN  acetaminophen   Tablet .. 650 milliGRAM(s) Oral every 6 hours PRN  amitriptyline 40 milliGRAM(s) Oral at bedtime  gabapentin 300 milliGRAM(s) Oral at bedtime  pantoprazole    Tablet 40 milliGRAM(s) Oral every 12 hours  atorvastatin 40 milliGRAM(s) Oral at bedtime  dextrose 40% Gel 15 Gram(s) Oral once  dextrose 50% Injectable 25 Gram(s) IV Push once  dextrose 50% Injectable 25 Gram(s) IV Push once  dextrose 50% Injectable 12.5 Gram(s) IV Push once  glucagon  Injectable 1 milliGRAM(s) IntraMuscular once  insulin glargine Injectable (LANTUS) 8 Unit(s) SubCutaneous at bedtime  insulin lispro (ADMELOG) corrective regimen sliding scale   SubCutaneous three times a day before meals  insulin lispro (ADMELOG) corrective regimen sliding scale   SubCutaneous at bedtime  levothyroxine 100 MICROGram(s) Oral daily  cholecalciferol 2000 Unit(s) Oral daily  dextrose 5%. 1000 milliLiter(s) IV Continuous <Continuous>  dextrose 5%. 1000 milliLiter(s) IV Continuous <Continuous>  folic acid 1 milliGRAM(s) Oral daily  Nephro-nunu 1 Tablet(s) Oral daily        PHYSICAL EXAM:    T(C): 36.6 (07-06-21 @ 10:50), Max: 37.2 (07-05-21 @ 22:56)  HR: 86 (07-06-21 @ 10:50) (83 - 93)  BP: 111/64 (07-06-21 @ 10:50) (111/64 - 152/80)  RR: 18 (07-06-21 @ 10:50) (18 - 18)  SpO2: 99% (07-06-21 @ 10:50) (97% - 99%)  Wt(kg): --    I&O's Summary    05 Jul 2021 07:01  -  06 Jul 2021 07:00  --------------------------------------------------------  IN: 0 mL / OUT: 1700 mL / NET: -1700 mL      Constitutional: A & O x 3, generalized weakness  Cardiovascular: Normal S1 S2, soft murmur  Respiratory: scattered bibasilar crackles	  Gastrointestinal:  Soft, Non-tender, + BS	  Skin: No rashes, No ecchymoses, No cyanosis  Neurologic: Non-focal  Extremities: Normal range of motion, No clubbing, cyanosis or edema  Vascular: Peripheral pulses palpable bilaterally    TELEMETRY: AF 90's	      Echocardiogram 7/3/21    Summary:   1. Left ventricular ejection fraction, by visual estimation, is 60 to 65%.   2. Severely enlargedleft atrium.   3. The mitral in-flow pattern reveals no discernable A-wave, therefore no comment on diastolic function can be made.   4. There is moderate concentric left ventricular hypertrophy.   5. Mildly reduced RV systolic function.   6. Mildly enlarged right atrium.   7. Mitral valve mean gradient is 4.0 mmHg consistent with mild mitral stenosis.   8. Moderate mitral annular calcification.   9. No evidence of mitral valve regurgitation.  10. Moderate tricuspid regurgitation.  11. Estimated pulmonary artery systolic pressure is 48.7 mmHg assuming a right atrial pressure of 3 mmHg, which is consistent with mild pulmonary hypertension.  12. Bioprosthesis in the aortic position.  13. Mild aortic regurgitation.  14. Peak transaortic gradientequals 30.1 mmHg, mean transaortic gradient equals 14.6 mmHg, the calculated aortic valve area equals 1.21 cm² by the continuity equation consistent with moderate aortic stenosis.  15. Well seated bioprosthetic valve in the aortic position. Dimensionless index of 0.42 and AT of 88 msec, consistent with normal functioning bioprosthetic aortic prosthesis.      No evidence of valvular vegetation in the setting of bacteremia; would recommend BREANN for further evaluation.  16. Compared to prior imaging on 5/20/2021, there no change in left ventricular function. No evidence of cardiac masses, thrombus or vegetations; in the setting of bacteremia with bioprosthetic AVR would consider BREANN for further evaluation.        LABS:	 	                          9.9    12.75 )-----------( 118      ( 05 Jul 2021 07:54 )             30.7     07-06    130<L>  |  91<L>  |  37.0<H>  ----------------------------<  281<H>  3.7   |  23.0  |  4.15<H>    Ca    8.1<L>      06 Jul 2021 07:40  Phos  7.1     07-05      ASSESSMENT: 77yo female with extensive co-morbidities now adm with staph epi bacteremia, definitive source not yet identified, ISABELLA batista removed 7/5/21, plan for BREANN today to R/O infective endocarditis.     -BREANN as ordered  -Pre-procedure care per protocol  -Labs and ECG reviewed  -maintained NPO  -Procedure discussed with patient; risks and benefits explained; questions answered  -consent to be obtained by Echo cardiologist and anesthesia  -Cont A/B course and treatment of bacteremia by primary team

## 2021-07-06 NOTE — PROGRESS NOTE ADULT - SUBJECTIVE AND OBJECTIVE BOX
[facility], [facility]                                   [address] [address]                                                      PHONE: [phone]                                                         FAX: [phone]  --------------------------------------------------------------------------------------------------------    BREANN performed     1. Left ventricular ejection fraction, by visual estimation, is 55 to 60%.   2. Normal global left ventricular systolic function.   3. Moderate to severe left atrial enlargement.   4. Moderately enlarged right atrium.   5. Normal right ventricular size and function.   6. 1 cm X 0.6 cm echodensity noted on the posterior mitral valve annulus and posterior leaflet of the mitral valve. Moderate mitral regurgitation is noted.   7. Moderate aortic regurgitation.   8. Moderate-severe tricuspid regurgitation.   9. S/p bioprosthetic aortic valve. Large [1.4 cm X 0.6 cm] echodensity noted attached to the left coronary cusp of the bioprosthetic aortic valve. There is marked thickening of the intervalvular fibrosa with likely abscess in the aorto-mitral continuity.    Full report to follow    Endocarditis of the mitral, bioprosthetic aortic and likely abscess of the intervalvular fibrosa    Rec:  Will have CTS evaluate  Antibiotics as per ID    d/w Dr. Mg    Results discussed with pt in detail

## 2021-07-06 NOTE — CONSULT NOTE ADULT - PROBLEM SELECTOR RECOMMENDATION 9
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     Continue vancomycin as per ID. Pending mediport cultures.   Off HD at the moment. Renal team involved.     Patient would need re-op with AV/MV replacement.     Dr. Adair to review case and imaging and determine if patient is a surgical candidate.  Formal plan to follow.

## 2021-07-06 NOTE — PROGRESS NOTE ADULT - SUBJECTIVE AND OBJECTIVE BOX
NICHELLE GILL  ----------------------------------------  The patient was seen at bedside. Patient with bacteremia. Reported feeling tired.    Vital Signs Last 24 Hrs  T(C): 36.6 (06 Jul 2021 10:50), Max: 37.2 (05 Jul 2021 22:56)  T(F): 97.8 (06 Jul 2021 10:50), Max: 98.9 (05 Jul 2021 22:56)  HR: 86 (06 Jul 2021 10:50) (83 - 93)  BP: 111/64 (06 Jul 2021 10:50) (111/64 - 153/73)  BP(mean): --  RR: 18 (06 Jul 2021 10:50) (18 - 18)  SpO2: 99% (06 Jul 2021 10:50) (97% - 99%)    CAPILLARY BLOOD GLUCOSE  POCT Blood Glucose.: 302 mg/dL (06 Jul 2021 08:35)  POCT Blood Glucose.: 189 mg/dL (05 Jul 2021 21:07)  POCT Blood Glucose.: 232 mg/dL (05 Jul 2021 16:51)  POCT Blood Glucose.: 181 mg/dL (05 Jul 2021 12:56)    PHYSICAL EXAMINATION:  ----------------------------------------  General appearance: No acute distress, Awake, Alert  HEENT: Normocephalic, Atraumatic, Conjunctiva clear, EOMI  Neck: Supple, No JVD, No tenderness  Lungs: Breath sound equal bilaterally, No wheezes, No rales  Cardiovascular: S1S2, Regular rhythm, Right chemoport  Abdomen: Soft, Nontender, Nondistended, No guarding/rebound, Positive bowel sounds  Extremities: No clubbing, No cyanosis, No edema, No calf tenderness  Neuro: Strength equal bilaterally, No tremors  Psychiatric: Appropriate mood, Normal affect    LABORATORY STUDIES:  ----------------------------------------             9.9    12.75 )-----------( 118      ( 05 Jul 2021 07:54 )             30.7     07-06    130<L>  |  91<L>  |  37.0<H>  ----------------------------<  281<H>  3.7   |  23.0  |  4.15<H>    Ca    8.1<L>      06 Jul 2021 07:40  Phos  7.1     07-05    Culture - Blood (collected 05 Jul 2021 07:59)  Source: .Blood Blood-Venous  Gram Stain (06 Jul 2021 10:57):    Growth in anaerobic bottle: Gram Positive Cocci in Clusters    .    Gram Stain performed by:    [facility]    [address] [place] [id number]    .    TYPE: (C=Critical, N=Notification, A=Abnormal) C    TESTS:  _ GS    DATE/TIME CALLED: _ 07/06/2021 10:56:36    CALLED TO: Ting Grace RN    READ BACK (2 Patient Identifiers)(Y/N): _ Y    READ BACK VALUES (Y/N): _ Y    CALLED BY: _ [email or web address]    MEDICATIONS  (STANDING):  amitriptyline 40 milliGRAM(s) Oral at bedtime  atorvastatin 40 milliGRAM(s) Oral at bedtime  cholecalciferol 2000 Unit(s) Oral daily  dextrose 40% Gel 15 Gram(s) Oral once  dextrose 5%. 1000 milliLiter(s) (50 mL/Hr) IV Continuous <Continuous>  dextrose 5%. 1000 milliLiter(s) (100 mL/Hr) IV Continuous <Continuous>  dextrose 50% Injectable 25 Gram(s) IV Push once  dextrose 50% Injectable 12.5 Gram(s) IV Push once  dextrose 50% Injectable 25 Gram(s) IV Push once  folic acid 1 milliGRAM(s) Oral daily  gabapentin 300 milliGRAM(s) Oral at bedtime  glucagon  Injectable 1 milliGRAM(s) IntraMuscular once  hydrALAZINE 25 milliGRAM(s) Oral every 8 hours  insulin glargine Injectable (LANTUS) 8 Unit(s) SubCutaneous at bedtime  insulin lispro (ADMELOG) corrective regimen sliding scale   SubCutaneous three times a day before meals  insulin lispro (ADMELOG) corrective regimen sliding scale   SubCutaneous at bedtime  lactobacillus acidophilus 1 Tablet(s) Oral two times a day with meals  levothyroxine 100 MICROGram(s) Oral daily  Nephro-nunu 1 Tablet(s) Oral daily  pantoprazole    Tablet 40 milliGRAM(s) Oral every 12 hours  sodium zirconium cyclosilicate 5 Gram(s) Oral daily  vancomycin  IVPB 1000 milliGRAM(s) IV Intermittent <User Schedule>    MEDICATIONS  (PRN):  acetaminophen   Tablet .. 650 milliGRAM(s) Oral every 6 hours PRN Temp greater or equal to 38C (100.4F)  ALBUTerol    0.083% 2.5 milliGRAM(s) Nebulizer every 6 hours PRN Shortness of Breath and/or Wheezing      ASSESSMENT / PLAN:  ----------------------------------------  76y Female with HTN HLD CAD s/p remote CABG, Bio AVR solitary episode of Af w/o recurrence, ILR in place, CRI, MDS transfusion dependent, cirrhosis sec to chronic hep c with esophageal varices and prior banding, hepatocellular carcinoma,s/p embolization with embozene particles on 4/5/21, and recent admission to [facility] after fall with head trauma recently dsc after being started on HD for worsening renal function with management of pulm HTN and fluid overload state admtt with rigors and fever    Sepsis / Bacteremia - Blood cultures grew Staphylococcus epidermidis, Most recent repeat blood culture positive as well. On vancomycin. Hemodialysis cathter removed by Vascular Surgery yesterday. Blood culture sample to be obtained from the Mdiport.  The patient has a history of bioprosthetic valve and is planned for transesophageal echocardiogram.    ESRD - To monitor renal functions off hemodialysis for now.    Anemia / Thrombocytopenia / MDS - Monitoring hemoglobin levels. The patient noted a history of transfusions about once a month depending on her blood counts.    Chronic diastolic heart failure - On hydralazine.    Diabetes - Insulin coverage, close monitoring of blood glucose levels.    Hypothyroidism - On levothyroxine.    Hypertension - Close blood pressure monitoring. On hydralazine.

## 2021-07-06 NOTE — CONSULT NOTE ADULT - ATTENDING COMMENTS
Above H& P reviewed and independently verified. 76 year old lady with extensive medical history including AVR/CABG 2016 with prolonged post op course, liver cancer, cirrhosis, and recent renal failure requiring dialysis. She developed prosthetic valve endocarditis and mitral valve endocarditis with large abscess in aorto-mitral curtain. She has been help off anticoagulation including aspirin due to high risk of bleeding and easy bruisability. Given the presence of cirrhosis, renal failure and inability to tolerate anticoagulation, she is at prohibitive risk for very complex reoperative double valve surgery and is not an operative candidate. I discussed this in detail with her and her daughter Daniela, and answered all their questions.

## 2021-07-06 NOTE — PROGRESS NOTE ADULT - SUBJECTIVE AND OBJECTIVE BOX
Department of Cardiology                                                                  [facility]/[place] [address] [address] [facility]                                                            Telephone: [phone]. Fax:[phone]                                                                         Post-Procedure Note: BREANN      Narrative: 75yo female with staph epi bacteremia now s/p BREANN and verbally reported to have large vegetation on MV, bio AVR and abscess at the AV-MV continuum. Further management to be dtermined by the primary team in consultation with cardiology and CTS.        MEDICATIONS:  hydrALAZINE 25 milliGRAM(s) Oral every 8 hours  vancomycin  IVPB 1000 milliGRAM(s) IV Intermittent <User Schedule>  ALBUTerol    0.083% 2.5 milliGRAM(s) Nebulizer every 6 hours PRN  acetaminophen   Tablet .. 650 milliGRAM(s) Oral every 6 hours PRN  amitriptyline 40 milliGRAM(s) Oral at bedtime  gabapentin 300 milliGRAM(s) Oral at bedtime  pantoprazole    Tablet 40 milliGRAM(s) Oral every 12 hours  atorvastatin 40 milliGRAM(s) Oral at bedtime  dextrose 40% Gel 15 Gram(s) Oral once  dextrose 50% Injectable 25 Gram(s) IV Push once  dextrose 50% Injectable 25 Gram(s) IV Push once  dextrose 50% Injectable 12.5 Gram(s) IV Push once  glucagon  Injectable 1 milliGRAM(s) IntraMuscular once  insulin glargine Injectable (LANTUS) 8 Unit(s) SubCutaneous at bedtime  insulin lispro (ADMELOG) corrective regimen sliding scale   SubCutaneous three times a day before meals  insulin lispro (ADMELOG) corrective regimen sliding scale   SubCutaneous at bedtime  levothyroxine 100 MICROGram(s) Oral daily  cholecalciferol 2000 Unit(s) Oral daily  dextrose 5%. 1000 milliLiter(s) IV Continuous <Continuous>  dextrose 5%. 1000 milliLiter(s) IV Continuous <Continuous>  folic acid 1 milliGRAM(s) Oral daily  Nephro-nunu 1 Tablet(s) Oral daily        PHYSICAL EXAM:    T(C): 36.6 (07-06-21 @ 13:25), Max: 37.2 (07-05-21 @ 22:56)  HR: 92 (07-06-21 @ 13:25) (83 - 92)  BP: 158/71 (07-06-21 @ 13:25) (111/64 - 158/71)  RR: 16 (07-06-21 @ 13:25) (16 - 18)  SpO2: 95% (07-06-21 @ 13:25) (95% - 99%)  Wt(kg): --    I&O's Summary    05 Jul 2021 07:01  -  06 Jul 2021 07:00  --------------------------------------------------------  IN: 0 mL / OUT: 1700 mL / NET: -1700 mL        Daily Height in cm: 149.86 (06 Jul 2021 13:25)      BREANN report pending, verbal report as stated above.     ASSESSMENT: s/p BREANN with large vegetation MV, bio AVR and AV-MV continuum.  Further management to be dtermined by the primary team in consultation with cardiology and CTS.        -Post BREANN orders per protocol  -preliminary verbal report as stated  -further eval for possible surgical intervention to be coordinated   -transfer back to in-pt unit once recovery period completed                                                                           Department of Cardiology                                                                  [facility]/[place] [address] [address] [facility]                                                            Telephone: [phone]. Fax:[phone]                                                                         Post-Procedure Note: BREANN      Narrative: 77yo female with staph epi bacteremia now s/p BREANN and verbally reported to have large vegetation on MV, bio AVR and abscess at the AV-MV continuum. Further management to be dtermined by the primary team in consultation with cardiology and CTS.        MEDICATIONS:  hydrALAZINE 25 milliGRAM(s) Oral every 8 hours  vancomycin  IVPB 1000 milliGRAM(s) IV Intermittent <User Schedule>  ALBUTerol    0.083% 2.5 milliGRAM(s) Nebulizer every 6 hours PRN  acetaminophen   Tablet .. 650 milliGRAM(s) Oral every 6 hours PRN  amitriptyline 40 milliGRAM(s) Oral at bedtime  gabapentin 300 milliGRAM(s) Oral at bedtime  pantoprazole    Tablet 40 milliGRAM(s) Oral every 12 hours  atorvastatin 40 milliGRAM(s) Oral at bedtime  dextrose 40% Gel 15 Gram(s) Oral once  dextrose 50% Injectable 25 Gram(s) IV Push once  dextrose 50% Injectable 25 Gram(s) IV Push once  dextrose 50% Injectable 12.5 Gram(s) IV Push once  glucagon  Injectable 1 milliGRAM(s) IntraMuscular once  insulin glargine Injectable (LANTUS) 8 Unit(s) SubCutaneous at bedtime  insulin lispro (ADMELOG) corrective regimen sliding scale   SubCutaneous three times a day before meals  insulin lispro (ADMELOG) corrective regimen sliding scale   SubCutaneous at bedtime  levothyroxine 100 MICROGram(s) Oral daily  cholecalciferol 2000 Unit(s) Oral daily  dextrose 5%. 1000 milliLiter(s) IV Continuous <Continuous>  dextrose 5%. 1000 milliLiter(s) IV Continuous <Continuous>  folic acid 1 milliGRAM(s) Oral daily  Nephro-nunu 1 Tablet(s) Oral daily        PHYSICAL EXAM:    T(C): 36.6 (07-06-21 @ 13:25), Max: 37.2 (07-05-21 @ 22:56)  HR: 92 (07-06-21 @ 13:25) (83 - 92)  BP: 158/71 (07-06-21 @ 13:25) (111/64 - 158/71)  RR: 16 (07-06-21 @ 13:25) (16 - 18)  SpO2: 95% (07-06-21 @ 13:25) (95% - 99%)  Wt(kg): --    I&O's Summary    05 Jul 2021 07:01  -  06 Jul 2021 07:00  --------------------------------------------------------  IN: 0 mL / OUT: 1700 mL / NET: -1700 mL        Daily Height in cm: 149.86 (06 Jul 2021 13:25)      BREANN report pending, verbal report as stated above.     ASSESSMENT: 77yo female with h/o Htn, HLD, CAD/VHD with prior CABG and bio-AVR, AF (not on A/C given hematologic disease), CKD 4-5, MDS with anemia/thrombocytopenia and intermittent transfusion maintenance (mediport access RACW), HCC s/p embolization, adm [facility] end of May 2021 with ADHF and severe PHTN in the setting of ESRD, cardiomems device placed, initially had LFV shiley placed on 6/6/21 to start HD, subsequently removed and LIJ tunneled permacath placed on 6/10/21 and started routine HD at out-pt facility, of note, during adm was also found to be anemic and received PRBCs and underwent APC of non-bleeding small AVM, recent adm to [facility] s/p fall and head trauma w/u and D/C, now re-presents to [facility] with fevers and found to have Staph epi bacteremia, currently on IV A/B course, HD catheter removed by vascular surgery team yesterday and cath sent for culture, TTE without evidence of vegetation, plan for BREANN today to better assess and R/O infective endocarditis.      s/p BREANN with large vegetation MV, bio AVR and AV-MV continuum.  Further management to be dtermined by the primary team in consultation with cardiology and CTS.        -Post BREANN orders per protocol  -preliminary verbal report as stated  -further eval for possible surgical intervention to be coordinated   -transfer back to in-pt unit once recovery period completed

## 2021-07-06 NOTE — CONSULT NOTE ADULT - SUBJECTIVE AND OBJECTIVE BOX
HPI - 75 y/o female who started HD about 3 weeks ago and had left upper chest wall dialysis catheter, also has Rt. upper chest wall port for 2 years for her access for  blood transfusions ( has transfusion dependant anemia ) presented to ER with chills and fever of 104. Otherwise asymptomatic and at her baseline. In-hospital workup was significant for staph epidermidis bacteremia as well as native mitral valve and bioprosthetic aortic valve (SFH, 2yrs ago per patient. Prolonged complicated post-op course per daughter) endocarditis. HD catheter removed, though mediport remains pending culture. CT surgery was consulted for surgical evaluation for endocarditis.     Subjective - "I feel okay" No complaints at this time. Denies CP,SOB, n/v/d, HA, dizziness, abd pain     Review of Systems: negative x 10 systems except as noted above    Brief summary:  76yFemale POD#     Significant/[id number] events:      PAST MEDICAL & SURGICAL HISTORY:  Hypertension  well controlled    High cholesterol    Low back pain  chronic over a year    Asthma  well controlled, never intubated for exacerbation    Anemia, unspecified anemia type  uses O2 at 2L at home PRN, due to anemia    Aortic stenosis  s/p bovine AVR    Heart failure  Echo  10/2020 EF 60%, mild AR    CAD (coronary artery disease)  s/p OHS    CKD (chronic kidney disease)  Stage 4, now on dialysis.    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          acetaminophen   Tablet .. 650 milliGRAM(s) Oral every 6 hours PRN  ALBUTerol    0.083% 2.5 milliGRAM(s) Nebulizer every 6 hours PRN  amitriptyline 40 milliGRAM(s) Oral at bedtime  atorvastatin 40 milliGRAM(s) Oral at bedtime  cholecalciferol 2000 Unit(s) Oral daily  dextrose 40% Gel 15 Gram(s) Oral once  dextrose 5%. 1000 milliLiter(s) IV Continuous <Continuous>  dextrose 5%. 1000 milliLiter(s) IV Continuous <Continuous>  dextrose 50% Injectable 25 Gram(s) IV Push once  dextrose 50% Injectable 25 Gram(s) IV Push once  dextrose 50% Injectable 12.5 Gram(s) IV Push once  folic acid 1 milliGRAM(s) Oral daily  gabapentin 300 milliGRAM(s) Oral at bedtime  glucagon  Injectable 1 milliGRAM(s) IntraMuscular once  hydrALAZINE 25 milliGRAM(s) Oral every 8 hours  insulin glargine Injectable (LANTUS) 8 Unit(s) SubCutaneous at bedtime  insulin lispro (ADMELOG) corrective regimen sliding scale   SubCutaneous three times a day before meals  insulin lispro (ADMELOG) corrective regimen sliding scale   SubCutaneous at bedtime  lactobacillus acidophilus 1 Tablet(s) Oral two times a day with meals  levothyroxine 100 MICROGram(s) Oral daily  Nephro-nunu 1 Tablet(s) Oral daily  pantoprazole    Tablet 40 milliGRAM(s) Oral every 12 hours  sodium zirconium cyclosilicate 5 Gram(s) Oral daily  vancomycin  IVPB 1000 milliGRAM(s) IV Intermittent <User Schedule>  MEDICATIONS  (PRN):  acetaminophen   Tablet .. 650 milliGRAM(s) Oral every 6 hours PRN Temp greater or equal to 38C (100.4F)  ALBUTerol    0.083% 2.5 milliGRAM(s) Nebulizer every 6 hours PRN Shortness of Breath and/or Wheezing    Height (cm): 149.9 (07-06 @ 13:25)  Weight (kg): 74.843 (07-06 @ 13:25)  BMI (kg/m2): 33.3 (07-06 @ 13:25)  BSA (m2): 1.7 (07-06 @ 13:25)  Daily Height in cm: 149.86 (06 Jul 2021 13:25)    Daily                               9.9    12.75 )-----------( 118      ( 05 Jul 2021 07:54 )             30.7   07-06    130<L>  |  91<L>  |  37.0<H>  ----------------------------<  281<H>  3.7   |  23.0  |  4.15<H>    Ca    8.1<L>      06 Jul 2021 07:40  Phos  7.1     07-05              Objective:  T(C): 36.6 (07-06-21 @ 13:25), Max: 37.2 (07-05-21 @ 22:56)  HR: 92 (07-06-21 @ 13:25) (83 - 92)  BP: 158/71 (07-06-21 @ 13:25) (111/64 - 158/71)  RR: 16 (07-06-21 @ 13:25) (16 - 18)  SpO2: 95% (07-06-21 @ 13:25) (95% - 99%)  Wt(kg): --CAPILLARY BLOOD GLUCOSE      POCT Blood Glucose.: 175 mg/dL (06 Jul 2021 11:50)  POCT Blood Glucose.: 302 mg/dL (06 Jul 2021 08:35)  POCT Blood Glucose.: 189 mg/dL (05 Jul 2021 21:07)  POCT Blood Glucose.: 232 mg/dL (05 Jul 2021 16:51)  I&O's Summary    05 Jul 2021 07:01  -  06 Jul 2021 07:00  --------------------------------------------------------  IN: 0 mL / OUT: 1700 mL / NET: -1700 mL        Physical Exam  Neuro: A+O x 3, non-focal, speech clear and intact  Pulm: CTA, equal bilaterally  CV: RRR,+S1S2 mediport noted in place RT upper chest wall   Abd: soft, NT, ND, +BS  Ext: +DP Pulses b/l,  no edema      Imaging:  < from: BREANN Echo Doppler (07.06.21 @ 14:41) >  Summary:   1. Left ventricular ejection fraction, by visual estimation, is 55 to 60%.   2. Normal global left ventricular systolic function.   3. Moderate to severe left atrial enlargement.   4. Moderately enlarged right atrium.   5. Normal right ventricular size and function.   6. 1 cm X 0.6 cm echodensity noted on the posterior mitral valve annulus and posterior leaflet of the mitral valve. Moderate mitral regurgitation is noted.   7. Moderate aortic regurgitation.   8. Moderate-severe tricuspid regurgitation.   9. S/p bioprosthetic aortic valve. Large [1.4 cm X 0.6 cm] echodensity noted attached to the left coronary cusp of the bioprosthetic aortic valve. There is marked thickening of the intervalvular fibrosa with likely abscess in the aorto-mitral continuity.    < end of copied text >

## 2021-07-06 NOTE — PROGRESS NOTE ADULT - SUBJECTIVE AND OBJECTIVE BOX
NEPHROLOGY INTERVAL HPI/OVERNIGHT EVENTS:    Feels the same   L permacath removed   For BREANN today   Afebrile   No SOB or CP     MEDICATIONS  (STANDING):  amitriptyline 40 milliGRAM(s) Oral at bedtime  atorvastatin 40 milliGRAM(s) Oral at bedtime  cholecalciferol 2000 Unit(s) Oral daily  dextrose 40% Gel 15 Gram(s) Oral once  dextrose 5%. 1000 milliLiter(s) (50 mL/Hr) IV Continuous <Continuous>  dextrose 5%. 1000 milliLiter(s) (100 mL/Hr) IV Continuous <Continuous>  dextrose 50% Injectable 25 Gram(s) IV Push once  dextrose 50% Injectable 25 Gram(s) IV Push once  dextrose 50% Injectable 12.5 Gram(s) IV Push once  folic acid 1 milliGRAM(s) Oral daily  gabapentin 300 milliGRAM(s) Oral at bedtime  glucagon  Injectable 1 milliGRAM(s) IntraMuscular once  hydrALAZINE 25 milliGRAM(s) Oral every 8 hours  insulin glargine Injectable (LANTUS) 8 Unit(s) SubCutaneous at bedtime  insulin lispro (ADMELOG) corrective regimen sliding scale   SubCutaneous three times a day before meals  insulin lispro (ADMELOG) corrective regimen sliding scale   SubCutaneous at bedtime  lactobacillus acidophilus 1 Tablet(s) Oral two times a day with meals  levothyroxine 100 MICROGram(s) Oral daily  Nephro-nunu 1 Tablet(s) Oral daily  pantoprazole    Tablet 40 milliGRAM(s) Oral every 12 hours  sodium zirconium cyclosilicate 5 Gram(s) Oral daily  vancomycin  IVPB 1000 milliGRAM(s) IV Intermittent <User Schedule>    MEDICATIONS  (PRN):  acetaminophen   Tablet .. 650 milliGRAM(s) Oral every 6 hours PRN Temp greater or equal to 38C (100.4F)  ALBUTerol    0.083% 2.5 milliGRAM(s) Nebulizer every 6 hours PRN Shortness of Breath and/or Wheezing      Allergies    ACE inhibitors (Other)  Bactrim (Nephrotoxicity)  Biaxin (Joint Pain)  morphine (Short breath)  NSAIDs (Other)    Intolerances        Vital Signs Last 24 Hrs  T(C): 36.6 (06 Jul 2021 13:25), Max: 37.2 (05 Jul 2021 22:56)  T(F): 97.8 (06 Jul 2021 13:25), Max: 98.9 (05 Jul 2021 22:56)  HR: 92 (06 Jul 2021 13:25) (83 - 92)  BP: 158/71 (06 Jul 2021 13:25) (111/64 - 158/71)  BP(mean): --  RR: 16 (06 Jul 2021 13:25) (16 - 18)  SpO2: 95% (06 Jul 2021 13:25) (95% - 99%)  Daily Height in cm: 149.86 (06 Jul 2021 13:25)    Daily     PHYSICAL EXAM:    GENERAL: NAD, Nontoxic   HEAD:  Atraumatic, Normocephalic. anicteric   NECK: Supple, R mediport clean , L permacath removed   CHEST/LUNG: Clear / EAE . No wheeze   HEART: Regular rate and rhythm; Norub   ABDOMEN: Soft, Nontender, Nondistended; Bowel sounds present  EXTREMITIES:  edema much better , no ulcers or cellulitis   LABS:                        9.9    12.75 )-----------( 118      ( 05 Jul 2021 07:54 )             30.7     07-06    130<L>  |  91<L>  |  37.0<H>  ----------------------------<  281<H>  3.7   |  23.0  |  4.15<H>    Ca    8.1<L>      06 Jul 2021 07:40  Phos  7.1     07-05                  RADIOLOGY & ADDITIONAL TESTS:

## 2021-07-07 NOTE — PROGRESS NOTE ADULT - ASSESSMENT
ESRD / Cirrhosis - HD MWF --> Had HD Mon 7/5, creatinine 4.48  Has no PC or Hd qccess   Hold Hd today  Can dialyze 2/week as needed via femoral cath Mon/Fri and remove catheter on Monday  Midodrine prior to HD   Added  fluid restrict and daily lokelma - shall decrease dose  Follow labs     Anemia - No Epogen due to h/o liver ca; Has MDS - recd freq PRBC    Fever   Gram (+) sepsis   Blood cultures repeated 7/3   CT 7/3 - no focal collection   CT Chest JEAN , CT abd - no focal collection   d/w ID -Vancomycin as per ID, May need Gent also but at high risk of MARGUERITE 2/2 Aminoglycosides  will need to v closely watch levels        RO - Not on binders - added Phoslo

## 2021-07-07 NOTE — PROGRESS NOTE ADULT - SUBJECTIVE AND OBJECTIVE BOX
[facility], [facility]                                   [address] [place] [address]                                                      PHONE: [phone]                                                         FAX: [phone]  [email or web address]  ---------------------------------------------------------------------------------------------------------------------------------    Overnight events/patient complaints:       Feels weak fatigue  BREANN done yesterday confirmed endocarditis of bio AVR and MV   Seen by CT surgery inoperable discussed in detail with team      < from: BREANN Echo Doppler (07.06.21 @ 14:41) >  Summary:   1. Left ventricular ejection fraction, by visual estimation, is 55 to 60%.   2. Normal global left ventricular systolic function.   3. Moderate to severe left atrial enlargement.   4. Moderately enlarged right atrium.   5. Normal right ventricular size and function.   6. 1 cm X 0.6 cm echodensity noted on the posterior mitral valve annulus and posterior leaflet of the mitral valve. Moderate mitral regurgitation is noted.   7. Moderate aortic regurgitation.   8. Moderate-severe tricuspid regurgitation.   9. S/p bioprosthetic aortic valve. Large [1.4 cm X 0.6 cm] echodensity noted attached to the left coronary cusp of the bioprosthetic aortic valve. There is marked thickening of the intervalvular fibrosa with likely abscess in the aorto-mitral continuity.    < end of copied text >  No CP or SOB overnight, serial cardiac markers , patient presently in the ,     PAST MEDICAL & SURGICAL HISTORY:  Hypertension  well controlled    High cholesterol    Low back pain  chronic over a year    Asthma  well controlled, never intubated for exacerbation    Anemia, unspecified anemia type  uses O2 at 2L at home PRN, due to anemia    Aortic stenosis  s/p bovine AVR    Heart failure  Echo  10/2020 EF 60%, mild AR    CAD (coronary artery disease)  s/p OHS    CKD (chronic kidney disease)  Stage 4, now on dialysis.    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      ACE inhibitors (Other)  Bactrim (Nephrotoxicity)  Biaxin (Joint Pain)  morphine (Short breath)  NSAIDs (Other)    MEDICATIONS  (STANDING):  amitriptyline 40 milliGRAM(s) Oral at bedtime  atorvastatin 40 milliGRAM(s) Oral at bedtime  cholecalciferol 2000 Unit(s) Oral daily  dextrose 40% Gel 15 Gram(s) Oral once  dextrose 5%. 1000 milliLiter(s) (50 mL/Hr) IV Continuous <Continuous>  dextrose 5%. 1000 milliLiter(s) (100 mL/Hr) IV Continuous <Continuous>  dextrose 50% Injectable 25 Gram(s) IV Push once  dextrose 50% Injectable 12.5 Gram(s) IV Push once  dextrose 50% Injectable 25 Gram(s) IV Push once  folic acid 1 milliGRAM(s) Oral daily  gabapentin 300 milliGRAM(s) Oral at bedtime  glucagon  Injectable 1 milliGRAM(s) IntraMuscular once  hydrALAZINE 25 milliGRAM(s) Oral every 8 hours  insulin glargine Injectable (LANTUS) 8 Unit(s) SubCutaneous at bedtime  insulin lispro (ADMELOG) corrective regimen sliding scale   SubCutaneous three times a day before meals  insulin lispro (ADMELOG) corrective regimen sliding scale   SubCutaneous at bedtime  lactobacillus acidophilus 1 Tablet(s) Oral two times a day with meals  levothyroxine 100 MICROGram(s) Oral daily  Nephro-nunu 1 Tablet(s) Oral daily  pantoprazole    Tablet 40 milliGRAM(s) Oral every 12 hours  sodium zirconium cyclosilicate 5 Gram(s) Oral daily  vancomycin  IVPB 1000 milliGRAM(s) IV Intermittent <User Schedule>    MEDICATIONS  (PRN):  acetaminophen   Tablet .. 650 milliGRAM(s) Oral every 6 hours PRN Temp greater or equal to 38C (100.4F)  ALBUTerol    0.083% 2.5 milliGRAM(s) Nebulizer every 6 hours PRN Shortness of Breath and/or Wheezing  benzocaine 15 mG/menthol 3.6 mG (Sugar-Free) Lozenge 1 Lozenge Oral four times a day PRN Sore Throat      Vital Signs Last 24 Hrs  T(C): 37 (07 Jul 2021 09:52), Max: 37 (07 Jul 2021 09:52)  T(F): 98.6 (07 Jul 2021 09:52), Max: 98.6 (07 Jul 2021 09:52)  HR: 86 (07 Jul 2021 09:52) (86 - 92)  BP: 121/73 (07 Jul 2021 09:52) (111/64 - 159/79)  BP(mean): --  RR: 18 (07 Jul 2021 09:52) (16 - 18)  SpO2: 94% (07 Jul 2021 09:52) (94% - 99%)  ICU Vital Signs Last 24 Hrs  NICHELLE GILL  I&O's Detail    I&O's Summary    Drug Dosing Weight  NICHELLE GILL    REVIEW OF SYSTEMS:    Constitutional: No fever, weight loss or fatigue  Eyes: No eye pain, visual disturbances, or discharge  ENMT:  No difficulty hearing, tinnitus, vertigo; No sinus or throat pain  Neck: No pain or stiffness  Respiratory: No cough, wheezing, chills or hemoptysis  Cardiovascular: No chest pain, palpitations, shortness of breath, dizziness or leg swelling  Gastrointestinal: No abdominal or epigastric pain. No nausea, vomiting or hematemesis; No diarrhea or constipation. No melena or hematochezia.  Genitourinary: No dysuria, frequency, hematuria or incontinence  Rectal: No pain, hemorrhoids or incontinence  Neurological: No headaches, memory loss, loss of strength, numbness or tremors  Skin: No itching, burning, rashes or lesions   Lymph Nodes: No enlarged glands  Endocrine: No heat or cold intolerance; No hair loss  Musculoskeletal: No joint pain or swelling; No muscle, back or extremity pain  Psychiatric: No depression, anxiety, mood swings or difficulty sleeping  Heme/Lymph: No easy bruising or bleeding gums  Allergy and Immunologic: No hives or eczema    PHYSICAL EXAM:  General: Appears well developed, well nourished alert and cooperative.  HEENT: Head; normocephalic, atraumatic.  Eyes: Pupils reactive, cornea wnl.  Neck: Supple, no nodes adenopathy, no NVD or carotid bruit or thyromegaly.  CARDIOVASCULAR: Normal S1 and S2, No murmur, rub, gallop or lift.   LUNGS: No rales, rhonchi or wheeze. Normal breath sounds bilaterally.  ABDOMEN: Soft, nontender without mass or organomegaly. bowel sounds normoactive.  EXTREMITIES: No clubbing, cyanosis or edema. Distal pulses wnl.   SKIN: warm and dry with normal turgor.  NEURO: Alert/oriented x 3/normal motor exam. No pathologic reflexes.    PSYCH: normal affect.        LABS:                        9.2    11.82 )-----------( 131      ( 07 Jul 2021 08:49 )             29.4     07-07    133<L>  |  93<L>  |  55.1<H>  ----------------------------<  250<H>  3.7   |  24.0  |  4.48<H>    Ca    8.3<L>      07 Jul 2021 08:49  Phos  7.0     07-07      [facility]            RADIOLOGY & ADDITIONAL STUDIES:    INTERPRETATION OF TELEMETRY (personally reviewed):        ACTIVE PROBLEMS:  HEALTH ISSUES - PROBLEM Dx:  Infectious endocarditis  Infectious endocarditis

## 2021-07-07 NOTE — CHART NOTE - NSCHARTNOTEFT_GEN_A_CORE
Case reviewed by Dr. Adair.  Pt is not an operative candidate given comorbidities and inability to tolerate anticoagulation.  Dr. Adair discussed this at length with the patient and the family.  Will sign off.  Please reconsult as necessary.

## 2021-07-07 NOTE — PROGRESS NOTE ADULT - SUBJECTIVE AND OBJECTIVE BOX
SUBJECTIVE:    No acute complaints this AM    Vital Signs Last 24 Hrs  T(C): 36.7 (07 Jul 2021 05:33), Max: 36.9 (06 Jul 2021 16:35)  T(F): 98.1 (07 Jul 2021 05:33), Max: 98.5 (06 Jul 2021 21:01)  HR: 88 (07 Jul 2021 05:33) (86 - 92)  BP: 128/90 (07 Jul 2021 05:33) (111/64 - 159/79)  BP(mean): --  RR: 18 (07 Jul 2021 05:33) (16 - 18)  SpO2: 98% (07 Jul 2021 05:33) (95% - 99%)    PHYSICAL EXAM:  Constitutional: Patient well nourish. well developed.  Respiratory: Nonlabored on RA  Cardiovascular: RRR  Gastrointestinal: Abdomen soft, non distended, + BS, non tenderness  Extremities:  No edema, no calf tenderrness    I&O's Summary    I&O's Detail      MEDICATIONS  (STANDING):  amitriptyline 40 milliGRAM(s) Oral at bedtime  atorvastatin 40 milliGRAM(s) Oral at bedtime  cholecalciferol 2000 Unit(s) Oral daily  dextrose 40% Gel 15 Gram(s) Oral once  dextrose 5%. 1000 milliLiter(s) (50 mL/Hr) IV Continuous <Continuous>  dextrose 5%. 1000 milliLiter(s) (100 mL/Hr) IV Continuous <Continuous>  dextrose 50% Injectable 25 Gram(s) IV Push once  dextrose 50% Injectable 12.5 Gram(s) IV Push once  dextrose 50% Injectable 25 Gram(s) IV Push once  folic acid 1 milliGRAM(s) Oral daily  gabapentin 300 milliGRAM(s) Oral at bedtime  glucagon  Injectable 1 milliGRAM(s) IntraMuscular once  hydrALAZINE 25 milliGRAM(s) Oral every 8 hours  insulin glargine Injectable (LANTUS) 8 Unit(s) SubCutaneous at bedtime  insulin lispro (ADMELOG) corrective regimen sliding scale   SubCutaneous three times a day before meals  insulin lispro (ADMELOG) corrective regimen sliding scale   SubCutaneous at bedtime  lactobacillus acidophilus 1 Tablet(s) Oral two times a day with meals  levothyroxine 100 MICROGram(s) Oral daily  Nephro-nunu 1 Tablet(s) Oral daily  pantoprazole    Tablet 40 milliGRAM(s) Oral every 12 hours  sodium zirconium cyclosilicate 5 Gram(s) Oral daily  vancomycin  IVPB 1000 milliGRAM(s) IV Intermittent <User Schedule>    MEDICATIONS  (PRN):  acetaminophen   Tablet .. 650 milliGRAM(s) Oral every 6 hours PRN Temp greater or equal to 38C (100.4F)  ALBUTerol    0.083% 2.5 milliGRAM(s) Nebulizer every 6 hours PRN Shortness of Breath and/or Wheezing  benzocaine 15 mG/menthol 3.6 mG (Sugar-Free) Lozenge 1 Lozenge Oral four times a day PRN Sore Throat      LABS:    07-06    130<L>  |  91<L>  |  37.0<H>  ----------------------------<  281<H>  3.7   |  23.0  |  4.15<H>    Ca    8.1<L>      06 Jul 2021 07:40    RADIOLOGY & ADDITIONAL STUDIES:

## 2021-07-07 NOTE — PROGRESS NOTE ADULT - SUBJECTIVE AND OBJECTIVE BOX
[facility]  [facility]  The patient was seen at bedside. Patient with bacteremia and endocarditis. Reports feeling upset about the diagnosis of endocarditis.    Vital Signs Last 24 Hrs  T(C): 37 (07 Jul 2021 09:52), Max: 37 (07 Jul 2021 09:52)  T(F): 98.6 (07 Jul 2021 09:52), Max: 98.6 (07 Jul 2021 09:52)  HR: 86 (07 Jul 2021 09:52) (86 - 92)  BP: 121/73 (07 Jul 2021 09:52) (121/73 - 159/79)  BP(mean): --  RR: 18 (07 Jul 2021 09:52) (16 - 18)  SpO2: 94% (07 Jul 2021 09:52) (94% - 99%)    CAPILLARY BLOOD GLUCOSE  POCT Blood Glucose.: 261 mg/dL (07 Jul 2021 11:20)  POCT Blood Glucose.: 219 mg/dL (07 Jul 2021 07:40)  POCT Blood Glucose.: 271 mg/dL (06 Jul 2021 21:46)  POCT Blood Glucose.: 219 mg/dL (06 Jul 2021 16:53)    PHYSICAL EXAMINATION:  ----------------------------------------  General appearance: No acute distress, Awake, Alert  HEENT: Normocephalic, Atraumatic, Conjunctiva clear, EOMI  Neck: Supple, No JVD, No tenderness  Lungs: Breath sound equal bilaterally, No wheezes, No rales  Cardiovascular: S1S2, Regular rhythm, Right Mediport  Abdomen: Soft, Nontender, Nondistended, No guarding/rebound, Positive bowel sounds  Extremities: No clubbing, No cyanosis, No edema, No calf tenderness  Neuro: Strength equal bilaterally, No tremors  Psychiatric: Appropriate mood, Normal affect    LABORATORY STUDIES:  ----------------------------------------             9.2    11.82 )-----------( 131      ( 07 Jul 2021 08:49 )             29.4     07-07    133<L>  |  93<L>  |  55.1<H>  ----------------------------<  250<H>  3.7   |  24.0  |  4.48<H>    Ca    8.3<L>      07 Jul 2021 08:49  Phos  7.0     07-07    Culture - Blood (collected 05 Jul 2021 07:59)  Source: .Blood Blood-Venous  Gram Stain (06 Jul 2021 10:57):    Growth in anaerobic bottle: Gram Positive Cocci in Clusters    .    Gram Stain performed by:    [facility]    [address] [place] [id number]    .    TYPE: (C=Critical, N=Notification, A=Abnormal) C    TESTS:  _ GS    DATE/TIME CALLED: _ 07/06/2021 10:56:36    CALLED TO: Ting Grace RN    READ BACK (2 Patient Identifiers)(Y/N): _ Y    READ BACK VALUES (Y/N): _ Y    CALLED BY: _ dcashin    Culture - Blood (collected 05 Jul 2021 07:59)  Source: .Blood Blood-Peripheral  Preliminary Report (07 Jul 2021 09:00):    No growth at 48 hours    MEDICATIONS  (STANDING):  amitriptyline 40 milliGRAM(s) Oral at bedtime  atorvastatin 40 milliGRAM(s) Oral at bedtime  cholecalciferol 2000 Unit(s) Oral daily  dextrose 40% Gel 15 Gram(s) Oral once  dextrose 5%. 1000 milliLiter(s) (50 mL/Hr) IV Continuous <Continuous>  dextrose 5%. 1000 milliLiter(s) (100 mL/Hr) IV Continuous <Continuous>  dextrose 50% Injectable 25 Gram(s) IV Push once  dextrose 50% Injectable 12.5 Gram(s) IV Push once  dextrose 50% Injectable 25 Gram(s) IV Push once  folic acid 1 milliGRAM(s) Oral daily  gabapentin 300 milliGRAM(s) Oral at bedtime  glucagon  Injectable 1 milliGRAM(s) IntraMuscular once  hydrALAZINE 25 milliGRAM(s) Oral every 8 hours  insulin glargine Injectable (LANTUS) 8 Unit(s) SubCutaneous at bedtime  insulin lispro (ADMELOG) corrective regimen sliding scale   SubCutaneous three times a day before meals  insulin lispro (ADMELOG) corrective regimen sliding scale   SubCutaneous at bedtime  lactobacillus acidophilus 1 Tablet(s) Oral two times a day with meals  levothyroxine 100 MICROGram(s) Oral daily  Nephro-nunu 1 Tablet(s) Oral daily  pantoprazole    Tablet 40 milliGRAM(s) Oral every 12 hours  sodium zirconium cyclosilicate 5 Gram(s) Oral daily    MEDICATIONS  (PRN):  acetaminophen   Tablet .. 650 milliGRAM(s) Oral every 6 hours PRN Temp greater or equal to 38C (100.4F)  ALBUTerol    0.083% 2.5 milliGRAM(s) Nebulizer every 6 hours PRN Shortness of Breath and/or Wheezing  benzocaine 15 mG/menthol 3.6 mG (Sugar-Free) Lozenge 1 Lozenge Oral four times a day PRN Sore Throat      ASSESSMENT / PLAN:  ----------------------------------------  76y Female with HTN HLD CAD s/p remote CABG, Bio AVR solitary episode of Af w/o recurrence, ILR in place, CRI, MDS transfusion dependent, cirrhosis sec to chronic hep c with esophageal varices and prior banding, hepatocellular carcinoma,s/p embolization with embozene particles on 4/5/21, and recent admission to [facility] after fall with head trauma recently dsc after being started on HD for worsening renal function with management of pulm HTN and fluid overload state admtt with rigors and fever    Sepsis / Bacteremia - Blood cultures grew Staphylococcus epidermidis. Hemodialysis cathter removed by Vascular Surgery with recommendations to obtain blood culture sample from the Mediport to determine necessity for removal. Further culture from the port was not thought to be necessary by Infectious Disease.    Endocarditis - Cardiothoracic Surgery recommendations noted, the patient is not thought to be a candidate for surgical intervention. To follow up with Infectious Disease. Palliative Care to see the patient as well.    ESRD - To monitor renal functions off hemodialysis for now.    Anemia / Thrombocytopenia / MDS - Monitoring hemoglobin levels. The patient noted a history of transfusions about once a month depending on her blood counts.    Chronic diastolic heart failure - On hydralazine.    Diabetes - Insulin coverage, close monitoring of blood glucose levels.    Hypothyroidism - On levothyroxine.    Hypertension - Close blood pressure monitoring. On hydralazine.    Discussed with the patient's daughter Daniela on the telephone. [facility]  ----------------------------------------  The patient was seen at bedside. Patient with bacteremia and endocarditis. Reports feeling upset about the diagnosis of endocarditis.    Vital Signs Last 24 Hrs  T(C): 37 (07 Jul 2021 09:52), Max: 37 (07 Jul 2021 09:52)  T(F): 98.6 (07 Jul 2021 09:52), Max: 98.6 (07 Jul 2021 09:52)  HR: 86 (07 Jul 2021 09:52) (86 - 92)  BP: 121/73 (07 Jul 2021 09:52) (121/73 - 159/79)  BP(mean): --  RR: 18 (07 Jul 2021 09:52) (16 - 18)  SpO2: 94% (07 Jul 2021 09:52) (94% - 99%)    CAPILLARY BLOOD GLUCOSE  POCT Blood Glucose.: 261 mg/dL (07 Jul 2021 11:20)  POCT Blood Glucose.: 219 mg/dL (07 Jul 2021 07:40)  POCT Blood Glucose.: 271 mg/dL (06 Jul 2021 21:46)  POCT Blood Glucose.: 219 mg/dL (06 Jul 2021 16:53)    PHYSICAL EXAMINATION:  ----------------------------------------  General appearance: No acute distress, Awake, Alert  HEENT: Normocephalic, Atraumatic, Conjunctiva clear, EOMI  Neck: Supple, No JVD, No tenderness  Lungs: Breath sound equal bilaterally, No wheezes, No rales  Cardiovascular: S1S2, Regular rhythm, Right Mediport  Abdomen: Soft, Nontender, Nondistended, No guarding/rebound, Positive bowel sounds  Extremities: No clubbing, No cyanosis, No edema, No calf tenderness  Neuro: Strength equal bilaterally, No tremors  Psychiatric: Appropriate mood, Normal affect    LABORATORY STUDIES:  ----------------------------------------             9.2    11.82 )-----------( 131      ( 07 Jul 2021 08:49 )             29.4     07-07    133<L>  |  93<L>  |  55.1<H>  ----------------------------<  250<H>  3.7   |  24.0  |  4.48<H>    Ca    8.3<L>      07 Jul 2021 08:49  Phos  7.0     07-07    Culture - Blood (collected 05 Jul 2021 07:59)  Source: .Blood Blood-Venous  Gram Stain (06 Jul 2021 10:57):    Growth in anaerobic bottle: Gram Positive Cocci in Clusters    .    Gram Stain performed by:    [facility]    [address] [place] [id number]    .    TYPE: (C=Critical, N=Notification, A=Abnormal) C    TESTS:  _ GS    DATE/TIME CALLED: _ 07/06/2021 10:56:36    CALLED TO: Ting Grace RN    READ BACK (2 Patient Identifiers)(Y/N): _ Y    READ BACK VALUES (Y/N): _ Y    CALLED BY: _ dcashin    Culture - Blood (collected 05 Jul 2021 07:59)  Source: .Blood Blood-Peripheral  Preliminary Report (07 Jul 2021 09:00):    No growth at 48 hours    MEDICATIONS  (STANDING):  amitriptyline 40 milliGRAM(s) Oral at bedtime  atorvastatin 40 milliGRAM(s) Oral at bedtime  cholecalciferol 2000 Unit(s) Oral daily  dextrose 40% Gel 15 Gram(s) Oral once  dextrose 5%. 1000 milliLiter(s) (50 mL/Hr) IV Continuous <Continuous>  dextrose 5%. 1000 milliLiter(s) (100 mL/Hr) IV Continuous <Continuous>  dextrose 50% Injectable 25 Gram(s) IV Push once  dextrose 50% Injectable 12.5 Gram(s) IV Push once  dextrose 50% Injectable 25 Gram(s) IV Push once  folic acid 1 milliGRAM(s) Oral daily  gabapentin 300 milliGRAM(s) Oral at bedtime  glucagon  Injectable 1 milliGRAM(s) IntraMuscular once  hydrALAZINE 25 milliGRAM(s) Oral every 8 hours  insulin glargine Injectable (LANTUS) 8 Unit(s) SubCutaneous at bedtime  insulin lispro (ADMELOG) corrective regimen sliding scale   SubCutaneous three times a day before meals  insulin lispro (ADMELOG) corrective regimen sliding scale   SubCutaneous at bedtime  lactobacillus acidophilus 1 Tablet(s) Oral two times a day with meals  levothyroxine 100 MICROGram(s) Oral daily  Nephro-nunu 1 Tablet(s) Oral daily  pantoprazole    Tablet 40 milliGRAM(s) Oral every 12 hours  sodium zirconium cyclosilicate 5 Gram(s) Oral daily    MEDICATIONS  (PRN):  acetaminophen   Tablet .. 650 milliGRAM(s) Oral every 6 hours PRN Temp greater or equal to 38C (100.4F)  ALBUTerol    0.083% 2.5 milliGRAM(s) Nebulizer every 6 hours PRN Shortness of Breath and/or Wheezing  benzocaine 15 mG/menthol 3.6 mG (Sugar-Free) Lozenge 1 Lozenge Oral four times a day PRN Sore Throat      ASSESSMENT / PLAN:  ----------------------------------------  76y Female with HTN HLD CAD s/p remote CABG, Bio AVR solitary episode of Af w/o recurrence, ILR in place, CRI, MDS transfusion dependent, cirrhosis sec to chronic hep c with esophageal varices and prior banding, hepatocellular carcinoma,s/p embolization with embozene particles on 4/5/21, and recent admission to [facility] after fall with head trauma recently dsc after being started on HD for worsening renal function with management of pulm HTN and fluid overload state admtt with rigors and fever    Sepsis / Bacteremia - Blood cultures grew Staphylococcus epidermidis. Hemodialysis cathter removed by Vascular Surgery. Mediport in place as the patient has poor intravenous access and has required the port for treatments with her hematologist for MDS.    Endocarditis - Cardiothoracic Surgery recommendations noted, the patient is not thought to be a candidate for surgical intervention. To follow up with Infectious Disease. Palliative Care to see the patient as well.    ESRD - To monitor renal functions off hemodialysis for now.    Anemia / Thrombocytopenia / MDS - Monitoring hemoglobin levels. The patient noted a history of transfusions about once a month depending on her blood counts.    Chronic diastolic heart failure - On hydralazine.    Diabetes - Insulin coverage, close monitoring of blood glucose levels.    Hypothyroidism - On levothyroxine.    Hypertension - Close blood pressure monitoring. On hydralazine.    Discussed with the patient's daughter Daniela on the telephone.

## 2021-07-07 NOTE — PROGRESS NOTE ADULT - ASSESSMENT
ID Antibiotic therapy  Not candidate for OHS as discussed with CT surgery   Given the presence of cirrhosis, renal failure and inability to tolerate anticoagulation, she is at prohibitive risk for very complex reoperative double valve surgery and is not an operative candidate.  Palliative Hospice care  Will follow    MICHELLE ANDINO MD

## 2021-07-07 NOTE — DIETITIAN INITIAL EVALUATION ADULT. - PERTINENT LABORATORY DATA
07-06 Na130 mmol/L<L> Glu 281 mg/dL<H> K+ 3.7 mmol/L Cr  4.15 mg/dL<H> BUN 37.0 mg/dL<H> Phos n/a   Alb n/a   PAB n/a

## 2021-07-07 NOTE — PROGRESS NOTE ADULT - SUBJECTIVE AND OBJECTIVE BOX
Patient seen and examined    Lying comfortably, on NC O2  obv worried and concerned    REVIEW OF SYSTEMS:    CONSTITUTIONAL: No F/C    RESPIRATORY: No cough or SOB  CARDIOVASCULAR: No CP/palpitations,    GASTROINTESTINAL: No abdominal pain , NVD   GENITOURINARY: No UTI sx  NEUROLOGICAL: No headaches/wk/numbness  MUSCULOSKELETAL:   EXTREMITIES : no swelling,    Vital Signs Last 24 Hrs  T(C): 37 (07 Jul 2021 09:52), Max: 37 (07 Jul 2021 09:52)  T(F): 98.6 (07 Jul 2021 09:52), Max: 98.6 (07 Jul 2021 09:52)  HR: 84 (07 Jul 2021 13:28) (84 - 92)  BP: 123/63 (07 Jul 2021 13:28) (121/73 - 159/79)  BP(mean): --  RR: 18 (07 Jul 2021 09:52) (18 - 18)  SpO2: 94% (07 Jul 2021 09:52) (94% - 99%)    PHYSICAL EXAM:    GENERAL: NAD, Pleasant obese  EYES:  conjunctiva and sclera clear  NECK: Supple, No JVD/Bruit  NERVOUS SYSTEM:  A/O x3,   CHEST:  CTA ,No rales or rhonchi  HEART:  RRR, No murmurs  ABDOMEN: Soft, NT/ND BS+  EXTREMITIES:  No Edema;  SKIN: No rashes    LABS:                        9.2    11.82 )-----------( 131      ( 07 Jul 2021 08:49 )             29.4     07-07    133<L>  |  93<L>  |  55.1<H>  ----------------------------<  250<H>  3.7   |  24.0  |  4.48<H>    Ca    8.3<L>      07 Jul 2021 08:49  Phos  7.0     07-07        MEDICATIONS  (STANDING):  acetaminophen   Tablet .. PRN  ALBUTerol    0.083% PRN  amitriptyline  atorvastatin  benzocaine 15 mG/menthol 3.6 mG (Sugar-Free) Lozenge PRN  cholecalciferol  dextrose 40% Gel  dextrose 5%.  dextrose 5%.  dextrose 50% Injectable  dextrose 50% Injectable  dextrose 50% Injectable  folic acid  gabapentin  glucagon  Injectable  heparin   Injectable  hydrALAZINE  insulin glargine Injectable (LANTUS)  insulin lispro (ADMELOG) corrective regimen sliding scale  insulin lispro (ADMELOG) corrective regimen sliding scale  lactobacillus acidophilus  levothyroxine  Nephro-nunu  pantoprazole    Tablet  sodium zirconium cyclosilicate

## 2021-07-07 NOTE — DIETITIAN INITIAL EVALUATION ADULT. - ORAL INTAKE PTA/DIET HISTORY
Pt on HD with poor po intake. Pt c/o of hospital food and has not gone over a menu. Pt upset about her health. Reviewed renal HD diet with pt. Pt appears to understand diet fairly well.

## 2021-07-07 NOTE — DIETITIAN INITIAL EVALUATION ADULT. - OTHER INFO
76y Female with HTN HLD CAD s/p remote CABG, Bio AVR solitary episode of Af w/o recurrence, ILR in place, CRI, MDS transfusion dependent, cirrhosis sec to chronic hep c with esophageal varices and prior banding, hepatocellular carcinoma, s/p embolization with embozene particles on 4/5/21, and recent admission to [facility] after fall with head trauma recently dsc after being started on HD for worsening renal function with management of pulm HTN and fluid overload.

## 2021-07-07 NOTE — PROGRESS NOTE ADULT - ASSESSMENT
77 y/o female who started HD about 3 weeks ago and has left upper chest wall dialysis catheter, also has Rt. upper chest wall port for 2 years for her access ( as per pt. her veins are bad ), blood transfusions ( has transfusion dependant anemia ) , had dialysis yesterday ( on M, W, F ) but since last night was having chills. Today pt's daughter called pt's visiting nurse who asked family to check pt's temp. As per pt. her temp was 104. pt. was sent to the ER. pt. reports no cough, no phlegm, no sick contact, no cp, no sob, no abd. pain, no n/v/d. no change in mental status. pt. is no toxic looking at the time of admission and is afebrile. pt. makes urine 2 x daily.  Pt's cardiologist Dr. Shelton has recommended no AC for pt. as she bleeds easily.     Leukocytosis  Fever  Bacteremia r/o port/catheter infection  ESRD on HD  endocarditis    - pt likely developed catheter/port infection and subsequent endocarditis  - all BCX + Cons Staph epi including cultures from catheter, BCX 7/5 2nd set so far NGTD  - TTE no vegetations, BREANN large MV and bioprosthetic valve vegetations, abscess in aortomitral curtain  - evaluated by CTsx and not considered an operative candidate due to comorbidities  - permacath removed , pt will likely require temporary catheter prior to placement of permanent HD access  - port which is likely also infected not removed due to concern for fibrosis and inability to replace once removed  - in the absence of source control (valve surgery, port removal), antibiotics alone unlikely to be curative and patient will remain at risk for embolization, heart failure, worsening sepsis, metastatic infection, death.  IF blood cultures do clear with antibiotics alone, the patient will require 6 weeks of prolonged IV abx followed by lifelong oral antibiotic suppression thereafter-in addition she will remain at risk for recurrence of infection, antibiotic associated complications, nephrotoxicity   - agree with palliative care eval   - repeat BCX x 2  - c/w vanco by level with HD-held today will redose based on level in AM  - Ct a/p 2.1cm liver lesion-->prior h/o HCC    - Trend Fever  - Trend Leukocytosis    d/w attending, pharmacy, renal, pt, brother at bedside and daughters on phone  Will Follow   77 y/o female who started HD about 3 weeks ago and has left upper chest wall dialysis catheter, also has Rt. upper chest wall port for 2 years for her access ( as per pt. her veins are bad ), blood transfusions ( has transfusion dependant anemia ) , had dialysis yesterday ( on M, W, F ) but since last night was having chills. Today pt's daughter called pt's visiting nurse who asked family to check pt's temp. As per pt. her temp was 104. pt. was sent to the ER. pt. reports no cough, no phlegm, no sick contact, no cp, no sob, no abd. pain, no n/v/d. no change in mental status. pt. is no toxic looking at the time of admission and is afebrile. pt. makes urine 2 x daily.  Pt's cardiologist Dr. Shelton has recommended no AC for pt. as she bleeds easily.     Leukocytosis  Fever  Bacteremia r/o port/catheter infection  ESRD on HD  endocarditis    - pt likely developed catheter/port infection and subsequent endocarditis  - all BCX + Cons Staph epi including cultures from catheter, BCX 7/5 2nd set so far NGTD  - TTE no vegetations, BREANN large MV and bioprosthetic valve vegetations, abscess in aortomitral curtain  - evaluated by CTsx and not considered an operative candidate due to comorbidities  - permacath removed , pt will likely require temporary catheter prior to placement of permanent HD access  - port which is likely also infected not removed due to concern for fibrosis and inability to replace once removed  - in the absence of source control (valve surgery, port removal), antibiotics alone unlikely to be curative and patient will remain at risk for embolization, heart failure, worsening sepsis, metastatic infection, death.  IF blood cultures do clear with antibiotics alone, the patient will require 6 weeks of prolonged IV abx followed by lifelong oral antibiotic suppression thereafter-in addition she will remain at risk for recurrence of infection, antibiotic associated complications, nephrotoxicity   - agree with palliative care eval   - repeat BCX x 2  - c/w vanco by level with HD-held today will redose based on level in AM  - defer gent for now-nephrotoxic  - Ct a/p 2.1cm liver lesion-->prior h/o HCC    - Trend Fever  - Trend Leukocytosis    d/w attending, pharmacy, renal, pt, brother at bedside and daughters on phone  Will Follow

## 2021-07-07 NOTE — PROGRESS NOTE ADULT - SUBJECTIVE AND OBJECTIVE BOX
[facility]  INFECTIOUS DISEASES AND INTERNAL MEDICINE at [place]  =======================================================  Omi Brink MD  Diplomates American Board of Internal Medicine and Infectious Diseases  Tel: [phone]      Fax: [phone]  =======================================================    NICHELLE GILL [id number]    Follow up: endocarditis  pt seen in AM  very tearful and anxious as she was told she is not a candidate for surgery      Allergies:  ACE inhibitors (Other)  Bactrim (Nephrotoxicity)  Biaxin (Joint Pain)  morphine (Short breath)  NSAIDs (Other)           REVIEW OF SYSTEMS:  CONSTITUTIONAL:  No Fever or chills  HEENT:   No diplopia or blurred vision.  No earache, sore throat or runny nose.  CARDIOVASCULAR:  No pressure, squeezing, strangling, tightness, heaviness or aching about the chest, neck, axilla or epigastrium.  RESPIRATORY:  No cough, shortness of breath  GASTROINTESTINAL:  No nausea, vomiting or diarrhea.  GENITOURINARY:  No dysuria, frequency or urgency. No Blood in urine  MUSCULOSKELETAL:  no joint aches, no muscle pain  SKIN:  No change in skin, hair or nails.  NEUROLOGIC:  No Headaches, seizures or weakness.  PSYCHIATRIC:  No disorder of thought or mood.  ENDOCRINE:  No heat or cold intolerance  HEMATOLOGICAL:  No easy bruising or bleeding.       Physical Exam:  GEN: NAD, pleasant  HEENT: normocephalic and atraumatic. EOMI. PERRL.  Anicteric   NECK: Supple.   LUNGS: Clear to auscultation.  HEART: Regular rate and rhythm + murmur. rt chest port intact  ABDOMEN: Soft, nontender, and nondistended.  Positive bowel sounds.    : No CVA tenderness  EXTREMITIES: Without any edema.  MSK: no joint swelling  NEUROLOGIC: Cranial nerves II through XII are grossly intact. No focal deficits  PSYCHIATRIC: Appropriate affect .  SKIN: No Rash      Vitals:    T(F): 100.6 (07 Jul 2021 17:18), Max: 100.6 (07 Jul 2021 17:18)  HR: 95 (07 Jul 2021 17:18)  BP: 150/73 (07 Jul 2021 17:18)  RR: 18 (07 Jul 2021 17:18)  SpO2: 98% (07 Jul 2021 17:18) (94% - 98%)  temp max in last 48H T(F): , Max: 100.6 (07-07-21 @ 17:18)    Current Antibiotics:    Other medications:  amitriptyline 40 milliGRAM(s) Oral at bedtime  atorvastatin 40 milliGRAM(s) Oral at bedtime  cholecalciferol 2000 Unit(s) Oral daily  dextrose 40% Gel 15 Gram(s) Oral once  dextrose 5%. 1000 milliLiter(s) IV Continuous <Continuous>  dextrose 5%. 1000 milliLiter(s) IV Continuous <Continuous>  dextrose 50% Injectable 25 Gram(s) IV Push once  dextrose 50% Injectable 12.5 Gram(s) IV Push once  dextrose 50% Injectable 25 Gram(s) IV Push once  folic acid 1 milliGRAM(s) Oral daily  gabapentin 300 milliGRAM(s) Oral at bedtime  glucagon  Injectable 1 milliGRAM(s) IntraMuscular once  heparin   Injectable 5000 Unit(s) SubCutaneous every 12 hours  hydrALAZINE 25 milliGRAM(s) Oral every 8 hours  insulin glargine Injectable (LANTUS) 8 Unit(s) SubCutaneous at bedtime  insulin lispro (ADMELOG) corrective regimen sliding scale   SubCutaneous three times a day before meals  insulin lispro (ADMELOG) corrective regimen sliding scale   SubCutaneous at bedtime  lactobacillus acidophilus 1 Tablet(s) Oral two times a day with meals  levothyroxine 100 MICROGram(s) Oral daily  Nephro-nunu 1 Tablet(s) Oral daily  pantoprazole    Tablet 40 milliGRAM(s) Oral every 12 hours  sodium zirconium cyclosilicate 5 Gram(s) Oral daily                            9.2    11.82 )-----------( 131      ( 07 Jul 2021 08:49 )             29.4     07-07    133<L>  |  93<L>  |  55.1<H>  ----------------------------<  250<H>  3.7   |  24.0  |  4.48<H>    Ca    8.3<L>      07 Jul 2021 08:49  Phos  7.0     07-07      RECENT CULTURES:  07-05 @ 07:59 .Blood Blood-Peripheral     No growth at 48 hours    Growth in anaerobic bottle: Gram Positive Cocci in Clusters  .  Gram Stain performed by:  [facility]  [address] [place] [address]  .  TYPE: (C=Critical, N=Notification, A=Abnormal) C  TESTS:  _ GS  DATE/TIME CALLED: _ 07/06/2021 10:56:36  CALLED TO: Ting Grace RN  READ BACK (2 Patient Identifiers)(Y/N): _ Y  READ BACK VALUES (Y/N): _ Y  CALLED BY: Ting simpson      07-03 @ 07:56 .Blood Blood-Peripheral     Growth in aerobic bottle: Staphylococcus epidermidis  See previous culture [id number]    Growth in aerobic bottle: Gram Positive Cocci in Clusters  Gram Stain performed by:  [facility]  [address] [place] [address]  .  TYPE: (C=Critical, N=Notification, A=Abnormal) C  TESTS:  _ GS  DATE/TIME CALLED: _ 07/04/2021 14:49:52  CALLED TO: Ting MOSER  READ BACK (2 Patient Identifiers)(Y/N): _ Y  READ BACK VALUES (Y/N): _ Y  CALLED BY: Ting OBRIEN      07-03 @ 05:31 .Urine Clean Catch (Midstream)     No growth        07-02 @ 14:04 .Blood Blood-Catheter     Growth in aerobic and anaerobic bottles: Staphylococcus epidermidis  Coag Negative Staphylococcus  Single set isolate, possible contaminant. Contact  Microbiology if susceptibility testing clinically  indicated.    Growth in aerobic and anaerobic bottles: Gram Positive Cocci in Clusters  Gram Stain performed by:  [facility]  [address] [place] [address]  .  TYPE: (C=Critical, N=Notification, A=Abnormal) C  TESTS:  _GS  DATE/TIME CALLED: _ 07/03/2021 12:33:23  CALLED TO: Ting Vital RN  READ BACK (2 Patient Identifiers)(Y/N): _ Y  READ BACK VALUES (Y/N): _ Y  CALLED BY: Ting Armstrong      07-02 @ 01:00          [facility]  07-01 @ 20:17 .Blood Blood-Peripheral Blood Culture PCR    Growth in aerobic and anaerobic bottles: Staphylococcus epidermidis  Coag Negative Staphylococcus  Single set isolate, possible contaminant. Contact  Microbiology if susceptibility testing clinically  indicated.    Growth in aerobic and anaerobic bottles: Gram Positive Cocci in Clusters  ***Blood Panel PCR results on this specimen are available  approximately 3 hours after the Gram stain result.***  Gram stain, PCR, and/or culture results may not always  correspond due to difference in methodologies.  ************************************************************  This PCR assay was performed using [employer].  The following targets are tested for: Enterococcus,  vancomycin resistant enterococci, Listeria monocytogenes,  coagulase negative staphylococci, S. aureus,  methicillin resistant S. aureus, Streptococcus agalactiae  (Group B), S. pneumoniae, S. pyogenes (Group A),  Acinetobacter baumannii, Enterobacter cloacae, E. coli,  Klebsiella oxytoca, K. pneumoniae, Proteus sp.,  Serratia marcescens, Haemophilus influenzae,  Neisseria meningitidis, Pseudomonas aeruginosa, Candida  albicans, C. glabrata, C krusei, C parapsilosis,  C. tropicalis and the KPC resistance gene.  Gram Stain performed by:  [facility]  [address] [place] [address]  .  TYPE: (C=Critical, N=Notification, A=Abnormal) C  TESTS:  _ GS  DATE/TIME CALLED: _ 07/02/2021 12:37:16  CALLED TO: Ting Bo RN  READ BACK (2 Patient Identifiers)(Y/N): _ Y  READ BACK VALUES (Y/N): _ Y  CALLED BY: Ting Armstrong      07-01 @ 20:16 .Blood Blood-Peripheral Staphylococcus epidermidis    Growth in aerobic and anaerobic bottles: Staphylococcus epidermidis    Growth in aerobic and anaerobic bottles: Gram Positive Cocci in Clusters  < from: [facility] Echo Doppler (07.06.21 @ 14:41) >  Summary:   1. Left ventricular ejection fraction, by visual estimation, is 55 to 60%.   2. Normal global left ventricular systolic function.   3. Moderate to severe left atrial enlargement.   4. Moderately enlarged right atrium.   5. Normal right ventricular size and function.   6. 1 cm X 0.6 cm echodensity noted on the posterior mitral valve annulus and posterior leaflet of the mitral valve. Moderate mitral regurgitation is noted.   7. Moderate aortic regurgitation.   8. Moderate-severe tricuspid regurgitation.   9. S/p bioprosthetic aortic valve. Large [1.4 cm X 0.6 cm] echodensity noted attached to the left coronary cusp of the bioprosthetic aortic valve. There is marked thickening of the intervalvular fibrosa with likely abscess in the aorto-mitral continuity.    < end of copied text >  Gram Stain performed by:  [facility]  [address] [place] [id number]  .  TYPE: (C=Critical, N=Notification, A=Abnormal) C  TESTS:  _GS  DATE/TIME CALLED: _ 07/03/2021 09:41:58  CALLED TO: _ ALBIN VITAL  READ BACK (2 Patient Identifiers)(Y/N): _ Y  READ BACK VALUES (Y/N): _ Y  CALLED BY: _ CAMILLE          WBC Count: 11.82 K/uL (07-07-21 @ 08:49)  WBC Count: 12.75 K/uL (07-05-21 @ 07:54)  WBC Count: 10.59 K/uL (07-03-21 @ 07:56)    Creatinine, Serum: 4.48 mg/dL (07-07-21 @ 08:49)  Creatinine, Serum: 4.15 mg/dL (07-06-21 @ 07:40)  Creatinine, Serum: 5.71 mg/dL (07-05-21 @ 07:58)  Creatinine, Serum: 3.17 mg/dL (07-03-21 @ 07:56)  Creatinine, Serum: 2.79 mg/dL (07-03-21 @ 00:48)             SARS-CoV-2: NotDetec (07-02-21 @ 01:00)  Rapid RVP Result: NotDetec (07-02-21 @ 01:00)    COVID-19 PCR: NotDetec (07-06-21 @ 10:48)  COVID-19 PCR: NotDetec (06-09-21 @ 01:06)

## 2021-07-07 NOTE — PROGRESS NOTE ADULT - ASSESSMENT
75 y/o female who is admitted and found with bacteremia, consult for removal of subcutaneous infusion port. Patient reportedly difficult access hence need for infusion port. As this port has not had issues, and is subcutaneous and at a decreased risk of infection, may not need urgent removal.    - Will need culture from mediport (and labeled appropriately on order) after removal of permacath.     Plan discussed with Dr. Orellana

## 2021-07-08 NOTE — CONSULT NOTE ADULT - CONSULT REQUESTED DATE/TIME
05-Jul-2021 12:00
02-Jul-2021 11:42
02-Jul-2021
06-Jul-2021 16:37
05-Jul-2021 12:00
05-Jul-2021 12:24
08-Jul-2021 13:41

## 2021-07-08 NOTE — CONSULT NOTE ADULT - ASSESSMENT
76F with hx of CAD s/p CABG, HFpEF, bioprosthetic AVR, cirrhosis 2/2 hep C, HCC s/p embolization, AFib (no AC), ESRD on HD (MWF, started ~3 weeks ago), MDS (transfusion dependent), s/p mediport (placed ~2yrs ago), recent hospitalization for acute decompensated CHF/pHTN admitted for fever and found with staph epidermidis bacteremia complicated further by endocarditis with vegetation on bioprosthetic AV with suspicion of abscess and vegetation on MV, deemed poor operative candidate by CTS. Palliative consulted for support and ongoing goc.     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, Suspicion of Abscess  - s/p removal of permacath  - mediport remains in place given poor IV access   - seen by CTS, poor operative candidate  - on antibiotics per ID  - appreciated ID input: in absence of source control, long term antibiotics will not likely be curative and pt remains high risk of associated complications from endocarditis itself and adverse effects of antibiotics    ESRD on HD  - started on HD ~3 weeks ago  - s/p permacath removal  - monitoring renal indices to determine necessity for HD    MDS   - transfusion dependent due to chronic anemia via mediport (placed 2 yrs ago)    Debility  - Moderate independence for ADLs at baseline    Advance Care Planning  - HCPs are primary daughter Daniela [phone] and alternate daughter Marianela [phone]    Palliative Care Encounter  Met with pt at bedside to introduce palliative care. She was very emotional and tearful given her complicated clinical circumstances. 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 She has a big support system with 4 daughters, close relatives and friends. She acknowledged her overall guarded prognosis given comorbidities concurrent with bacteremia/endocarditis not a surgical candidate. Her daughters are contacting close friends/family to notify them of the situation. She remains undecided on next steps right now. Our conversation was cut short as friends came to visit and pt wanted to spend time with them. Emotional support provided. Palliative team will continue to support and assist in ongoing goc.    76F with hx of CAD s/p CABG, HFpEF, bioprosthetic AVR, cirrhosis 2/2 hep C, HCC s/p embolization, AFib (no AC), ESRD on HD (MWF, started ~3 weeks ago), MDS (transfusion dependent), s/p mediport (placed ~2yrs ago), recent hospitalization for acute decompensated CHF/pHTN admitted for fever and found with staph epidermidis bacteremia complicated further by endocarditis with vegetation on bioprosthetic AV with suspicion of abscess and vegetation on MV, deemed poor operative candidate by CTS. Palliative consulted for support and ongoing goc.     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, Suspicion of Abscess  - s/p removal of permacath  - mediport remains in place given poor IV access   - seen by CTS, poor operative candidate  - on antibiotics per ID  - appreciated ID input: in absence of source control, long term antibiotics will not likely be curative and pt remains high risk of associated complications from endocarditis itself and adverse effects of antibiotics    ESRD on HD  - started on HD ~3 weeks ago  - s/p permacath removal  - monitoring renal indices to determine necessity for HD    MDS   - transfusion dependent due to chronic anemia via mediport (placed 2 yrs ago)    Debility  - mostly dependent for ADLs at baseline    Advance Care Planning  - HCPs are primary daughter Daniela [phone] and alternate daughter Marianela [phone]    Palliative Care Encounter  Met with pt at bedside to introduce palliative care. She was very emotional and tearful given her complicated clinical circumstances. 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 She has a big support system with 4 daughters, close relatives and friends. She acknowledged her overall guarded prognosis given comorbidities concurrent with bacteremia/endocarditis not a surgical candidate. Her daughters are contacting close friends/family to notify them of the situation. She remains undecided on next steps right now. Our conversation was cut short as friends came to visit and pt wanted to spend time with them. Emotional support provided. Palliative team will continue to support and assist in ongoing goc.     Revisited in afternoon. Coordinated family meeting for tomorrow 7/9 @ 11AM with patient and her 4 daughters.

## 2021-07-08 NOTE — PROGRESS NOTE ADULT - ASSESSMENT
ESRD / Cirrhosis - HD MWF --> Had HD Mo stable  on 7/5, creatinine 4.48  Has no PC or Hd qccess; Creat 4.23   Hold Hd today again  Can dialyze 2/week as needed via femoral cath Mon/Fri and remove catheter on Monday  Midodrine prior to HD   Added  fluid restrict and daily lokelma - shall d/c now    Follow labs     Anemia - No Epogen due to h/o liver ca; Has MDS - recd freq PRBC    Fever   Gram (+) sepsis with CNS, cultures remain +  Blood cultures repeated 7/3   CT 7/3 - no focal collection   CT Chest JEAN , CT abd - no focal collection   d/w ID -Vancomycin as per ID, May need Gent also but at high risk of MARGUERITE 2/2 Aminoglycosides  all these issues d/w patient again today  will need to v closely watch levels    RO - Not on binders - added Phoslo

## 2021-07-08 NOTE — PROVIDER CONTACT NOTE (CRITICAL VALUE NOTIFICATION) - ACTION/TREATMENT ORDERED:
Further orders pending.
ID working with pharmacy for appropriate antibiotic tx
Continue current antibiotic tx

## 2021-07-08 NOTE — PROGRESS NOTE ADULT - SUBJECTIVE AND OBJECTIVE BOX
Formerly [facility]                              [address] [address]                                                 PHONE: [phone]                                                 FAX: [phone]  -----------------------------------------------------------------------------------------------  Pt seen and examined. FU for  shortness of breath     Overnight events/Complaints: Pt feels well.     Vital Signs Last 24 Hrs  T(C): 37.1 (08 Jul 2021 10:28), Max: 38.1 (07 Jul 2021 17:18)  T(F): 98.8 (08 Jul 2021 10:28), Max: 100.6 (07 Jul 2021 17:18)  HR: 90 (08 Jul 2021 10:28) (82 - 95)  BP: 123/68 (08 Jul 2021 10:28) (123/63 - 150/73)  BP(mean): --  RR: 18 (08 Jul 2021 10:28) (18 - 18)  SpO2: 98% (08 Jul 2021 10:28) (96% - 100%)  I&O's Summary      PHYSICAL EXAM:  Constitutional: Appears well; alert and co-operative  HEENT:     Head: Normocephalic and atraumatic  Neck: supple. No JVD  Cardiovascular: regular S1 S2  Respiratory: Lungs clear to auscultation; no crepitations, no wheeze, Right Mediport  Gastrointestinal:  Soft, Non-tender, + BS	  Musculoskeletal: Normal range of motion. No edema  Skin: Warm and dry. No cyanosis . No diaphoresis.  Neurologic: Alert oriented to time place and person. Normal strength and no tremor.        LABS:                        9.1    13.50 )-----------( 150      ( 08 Jul 2021 08:27 )             29.0     07-08    132<L>  |  95<L>  |  72.0<H>  ----------------------------<  233<H>  3.5   |  19.0<L>  |  4.23<H>    Ca    7.5<L>      08 Jul 2021 08:27  Phos  7.0     07-07    RADIOLOGY & ADDITIONAL STUDIES: (reviewed)  CXR was independently visualized/reviewed  and demonstrated:     CARDIOLOGY TESTING:(reviewed)     12 lead EKG independently visualized/reviewed  and demonstrated    BREANN   1. Left ventricular ejection fraction, by visual estimation, is 55 to 60%.   2. Normal global left ventricular systolic function.   3. Moderate to severe left atrial enlargement.   4. Moderately enlarged right atrium.   5. Normal right ventricular size and function.   6. 1 cm X 0.6 cm echodensity noted on the posterior mitral valve annulus and posterior leaflet of the mitral valve. Moderate mitral regurgitation is noted.   7. Moderate aortic regurgitation.   8. Moderate-severe tricuspid regurgitation.   9. S/p bioprosthetic aortic valve. Large [1.4 cm X 0.6 cm] echodensity noted attached to the left coronary cusp of the bioprosthetic aortic valve. There is marked thickening of the intervalvular fibrosa with likely abscess in the aorto-mitral continuity.    MEDICATIONS:(reviewed)  MEDICATIONS  (STANDING):  amitriptyline 40 milliGRAM(s) Oral at bedtime  atorvastatin 40 milliGRAM(s) Oral at bedtime  cholecalciferol 2000 Unit(s) Oral daily  dextrose 40% Gel 15 Gram(s) Oral once  dextrose 5%. 1000 milliLiter(s) (50 mL/Hr) IV Continuous <Continuous>  dextrose 5%. 1000 milliLiter(s) (100 mL/Hr) IV Continuous <Continuous>  dextrose 50% Injectable 25 Gram(s) IV Push once  dextrose 50% Injectable 12.5 Gram(s) IV Push once  dextrose 50% Injectable 25 Gram(s) IV Push once  folic acid 1 milliGRAM(s) Oral daily  gabapentin 300 milliGRAM(s) Oral at bedtime  glucagon  Injectable 1 milliGRAM(s) IntraMuscular once  heparin   Injectable 5000 Unit(s) SubCutaneous every 12 hours  hydrALAZINE 25 milliGRAM(s) Oral every 8 hours  insulin glargine Injectable (LANTUS) 8 Unit(s) SubCutaneous at bedtime  insulin lispro (ADMELOG) corrective regimen sliding scale   SubCutaneous three times a day before meals  insulin lispro (ADMELOG) corrective regimen sliding scale   SubCutaneous at bedtime  lactobacillus acidophilus 1 Tablet(s) Oral two times a day with meals  levothyroxine 100 MICROGram(s) Oral daily  Nephro-nunu 1 Tablet(s) Oral daily  pantoprazole    Tablet 40 milliGRAM(s) Oral every 12 hours  sodium zirconium cyclosilicate 5 Gram(s) Oral daily  vancomycin  IVPB 500 milliGRAM(s) IV Intermittent once      ASSESSMENT AND PLAN:    76y Female with past medical history significant for bioAVR, HFpEF, ESRD on HD p/w fevers and chills found to have staph epi bacteremia.    ID Antibiotic therapy  Not candidate for OHS as discussed with CT surgery due to prohibitive risk for very complex reoperative double valve surgery.  HD and volume management as per ID

## 2021-07-08 NOTE — PROGRESS NOTE ADULT - ASSESSMENT
77 y/o female who started HD about 3 weeks ago and has left upper chest wall dialysis catheter, also has Rt. upper chest wall port for 2 years for her access ( as per pt. her veins are bad ), blood transfusions ( has transfusion dependant anemia ) , had dialysis yesterday ( on M, W, F ) but since last night was having chills. Today pt's daughter called pt's visiting nurse who asked family to check pt's temp. As per pt. her temp was 104. pt. was sent to the ER. pt. reports no cough, no phlegm, no sick contact, no cp, no sob, no abd. pain, no n/v/d. no change in mental status. pt. is no toxic looking at the time of admission and is afebrile. pt. makes urine 2 x daily.  Pt's cardiologist Dr. Shelton has recommended no AC for pt. as she bleeds easily.     Leukocytosis  Fever  Bacteremia r/o port/catheter infection  ESRD on HD  endocarditis    - pt likely developed catheter/port infection and subsequent endocarditis  - all BCX + Cons Staph epi including cultures from catheter, BCX 7/5 2nd set so far NGTD. BCx 7/7 +  - TTE no vegetations, BREANN large MV and bioprosthetic valve vegetations, abscess in aortomitral curtain  - evaluated by CTsx and not considered an operative candidate due to comorbidities  - permacath removed , pt will likely require temporary catheter prior to placement of permanent HD access  - port which is likely also infected not removed due to concern for fibrosis and inability to replace once removed  - in the absence of source control (valve surgery, port removal), antibiotics alone unlikely to be curative and patient will remain at risk for embolization, heart failure, worsening sepsis, metastatic infection, death.  IF blood cultures do clear with antibiotics alone, the patient will require 6 weeks of prolonged IV abx followed by lifelong oral antibiotic suppression thereafter-in addition she will remain at risk for recurrence of infection, antibiotic associated complications, nephrotoxicity   - agree with palliative care eval   - repeat BCX x 48h  - c/w vanco by level with HD-held today will redose based on level in AM  - addition of 2nd agent such as AG or quinolones associated with risk of nephrotoxicity. Defer rifampin as pt is still bacteremic  - Ct a/p 2.1cm liver lesion-->prior h/o HCC  - discussed above with pt-she is very overwhelmed by her condition and said she would like to see how she is feeling after a few days. She has discussed with her PCP and daughters and they are all supportive of what ever decision she makes. She would like to discuss with palliative care  - Trend Fever  - Trend Leukocytosis    d/w palliative care, pharmacy, renal  Will Follow   75 y/o female who started HD about 3 weeks ago and has left upper chest wall dialysis catheter, also has Rt. upper chest wall port for 2 years for her access ( as per pt. her veins are bad ), blood transfusions ( has transfusion dependant anemia ) , had dialysis yesterday ( on M, W, F ) but since last night was having chills. Today pt's daughter called pt's visiting nurse who asked family to check pt's temp. As per pt. her temp was 104. pt. was sent to the ER. pt. reports no cough, no phlegm, no sick contact, no cp, no sob, no abd. pain, no n/v/d. no change in mental status. pt. is no toxic looking at the time of admission and is afebrile. pt. makes urine 2 x daily.  Pt's cardiologist Dr. Shelton has recommended no AC for pt. as she bleeds easily.     Leukocytosis  Fever  Bacteremia r/o port/catheter infection  ESRD on HD  endocarditis    - pt likely developed catheter/port infection and subsequent endocarditis  - all BCX + Cons Staph epi including cultures from catheter, BCX 7/5 2nd set so far NGTD. 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  IF blood cultures do clear with antibiotics alone, the patient will require 6 weeks of prolonged IV abx followed by lifelong oral antibiotic suppression thereafter-in addition she will remain at risk for recurrence of infection, antibiotic associated complications, nephrotoxicity   - agree with palliative care eval   - repeat BCX x 48h  - c/w vanco by level with HD-held today will redose based on level in AM  - addition of 2nd agent such as AG or quinolones associated with risk of nephrotoxicity. Defer rifampin as pt is still bacteremic  - Ct a/p 2.1cm liver lesion-->prior h/o HCC  - discussed above with pt-she is very overwhelmed by her condition and said she would like to see how she is feeling after a few days. She has discussed with her PCP and daughters and they are all supportive of what ever decision she makes. She would like to discuss with palliative care  - Trend Fever  - Trend Leukocytosis    d/w palliative care, pharmacy, renal  Will Follow   77 y/o female who started HD about 3 weeks ago and has left upper chest wall dialysis catheter, also has Rt. upper chest wall port for 2 years for her access ( as per pt. her veins are bad ), blood transfusions ( has transfusion dependant anemia ) , had dialysis yesterday ( on M, W, F ) but since last night was having chills. Today pt's daughter called pt's visiting nurse who asked family to check pt's temp. As per pt. her temp was 104. pt. was sent to the ER. pt. reports no cough, no phlegm, no sick contact, no cp, no sob, no abd. pain, no n/v/d. no change in mental status. pt. is no toxic looking at the time of admission and is afebrile. pt. makes urine 2 x daily.  Pt's cardiologist Dr. Shelton has recommended no AC for pt. as she bleeds easily.     Leukocytosis  Fever  Bacteremia r/o port/catheter infection  ESRD on HD  endocarditis    - pt likely developed catheter/port infection and subsequent endocarditis  - all BCX + Cons Staph epi including cultures from catheter, BCX 7/5 2nd set so far NGTD. 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  IF blood cultures do clear with antibiotics alone, the patient will require 6 weeks of prolonged IV abx followed by lifelong oral antibiotic suppression thereafter-in addition she will remain at risk for recurrence of infection, antibiotic associated complications, nephrotoxicity   - agree with palliative care eval   - repeat BCX x 48h  - c/w vanco by level with HD-level 16 redpse 500 mg x 1 will redose based on level in AM  - addition of 2nd agent such as AG or quinolones associated with risk of nephrotoxicity. Defer rifampin as pt is still bacteremic  - Ct a/p 2.1cm liver lesion-->prior h/o HCC  - discussed above with pt-she is very overwhelmed by her condition and said she would like to see how she is feeling after a few days. She has discussed with her PCP and daughters and they are all supportive of what ever decision she makes. She would like to discuss with palliative care  - Trend Fever  - Trend Leukocytosis    d/w palliative care, pharmacy, renal  Will Follow

## 2021-07-08 NOTE — PROGRESS NOTE ADULT - SUBJECTIVE AND OBJECTIVE BOX
[facility]  INFECTIOUS DISEASES AND INTERNAL MEDICINE at [place]  =======================================================  Omi Brink MD  Diplomates American Board of Internal Medicine and Infectious Diseases  Tel: [phone]      Fax: [phone]  =======================================================    NICHELLE GILL [id number]    Follow up: Endocarditis  Pt denied any complaints  informed the pt that her BCX 7/7/21 are still +  discussed addition of other agents which may worsen renal function  pt stated 'what is the point of all these antibiotics when they are not going to get rid of the problem?'            Allergies:  ACE inhibitors (Other)  Bactrim (Nephrotoxicity)  Biaxin (Joint Pain)  morphine (Short breath)  NSAIDs (Other)           REVIEW OF SYSTEMS:  CONSTITUTIONAL:  No Fever or chills  HEENT:   No diplopia or blurred vision.  No earache, sore throat or runny nose.  CARDIOVASCULAR:  No pressure, squeezing, strangling, tightness, heaviness or aching about the chest, neck, axilla or epigastrium.  RESPIRATORY:  No cough, shortness of breath  GASTROINTESTINAL:  No nausea, vomiting or diarrhea.  GENITOURINARY:  No dysuria, frequency or urgency. No Blood in urine  MUSCULOSKELETAL:  no joint aches, no muscle pain  SKIN:  No change in skin, hair or nails.  NEUROLOGIC:  No Headaches, seizures or weakness.  PSYCHIATRIC:  No disorder of thought or mood.  ENDOCRINE:  No heat or cold intolerance  HEMATOLOGICAL:  No easy bruising or bleeding.       Physical Exam:  GEN: NAD, pleasant  HEENT: normocephalic and atraumatic. EOMI. PERRL.  Anicteric   NECK: Supple.   LUNGS: Clear to auscultation.  HEART: Regular rate and rhythm +t murmur. right port  ABDOMEN: Soft, nontender, and nondistended.  Positive bowel sounds.    : No CVA tenderness  EXTREMITIES: Without any edema.  MSK: no joint swelling  NEUROLOGIC: Cranial nerves II through XII are grossly intact. No focal deficits  PSYCHIATRIC: Appropriate affect .  SKIN: No Rash      Vitals:    T(F): 98.8 (08 Jul 2021 10:28), Max: 100.6 (07 Jul 2021 17:18)  HR: 88 (08 Jul 2021 13:41)  BP: 129/65 (08 Jul 2021 13:41)  RR: 18 (08 Jul 2021 10:28)  SpO2: 98% (08 Jul 2021 10:28) (96% - 100%)  temp max in last 48H T(F): , Max: 100.6 (07-07-21 @ 17:18)    Current Antibiotics:    Other medications:  amitriptyline 40 milliGRAM(s) Oral at bedtime  atorvastatin 40 milliGRAM(s) Oral at bedtime  cholecalciferol 2000 Unit(s) Oral daily  dextrose 40% Gel 15 Gram(s) Oral once  dextrose 5%. 1000 milliLiter(s) IV Continuous <Continuous>  dextrose 5%. 1000 milliLiter(s) IV Continuous <Continuous>  dextrose 50% Injectable 25 Gram(s) IV Push once  dextrose 50% Injectable 12.5 Gram(s) IV Push once  dextrose 50% Injectable 25 Gram(s) IV Push once  folic acid 1 milliGRAM(s) Oral daily  gabapentin 300 milliGRAM(s) Oral at bedtime  glucagon  Injectable 1 milliGRAM(s) IntraMuscular once  heparin   Injectable 5000 Unit(s) SubCutaneous every 12 hours  hydrALAZINE 25 milliGRAM(s) Oral every 8 hours  insulin glargine Injectable (LANTUS) 8 Unit(s) SubCutaneous at bedtime  insulin lispro (ADMELOG) corrective regimen sliding scale   SubCutaneous three times a day before meals  insulin lispro (ADMELOG) corrective regimen sliding scale   SubCutaneous at bedtime  lactobacillus acidophilus 1 Tablet(s) Oral two times a day with meals  levothyroxine 100 MICROGram(s) Oral daily  Nephro-nunu 1 Tablet(s) Oral daily  pantoprazole    Tablet 40 milliGRAM(s) Oral every 12 hours  sodium zirconium cyclosilicate 5 Gram(s) Oral daily                            9.1    13.50 )-----------( 150      ( 08 Jul 2021 08:27 )             29.0     07-08    132<L>  |  95<L>  |  72.0<H>  ----------------------------<  233<H>  3.5   |  19.0<L>  |  4.23<H>    Ca    7.5<L>      08 Jul 2021 08:27  Phos  7.0     07-07      RECENT CULTURES:  07-07 @ 13:08 .Blood Blood-Venous       Growth in aerobic and anaerobic bottles: Gram Positive Cocci in Clusters  .  Gram Stain performed by:  [facility]  [address] [place] [address]  .  TYPE: (C=Critical, N=Notification, A=Abnormal) C  TESTS: _ GS  DATE/TIME CALLED: _ 07/08/2021 09:23:12  CALLED TO: Ting Grcae  READ BACK (2 Patient Identifiers)(Y/N): _ Y  READ BACK VALUES (Y/N): _ Y  CALLED BY: [email or web address]      07-07 @ 13:07 .Blood Blood-Peripheral       Growth in aerobic and anaerobic bottles: Gram Positive Cocci in Clusters  .  Gram Stain performed by:  [facility]  [address] [place] [address]  .  TYPE: (C=Critical, N=Notification, A=Abnormal) C  TESTS: _ gs  DATE/TIME CALLED: _ 07/08/2021 09:22:00  CALLED TO: Ting Reyna RN  READ BACK (2 Patient Identifiers)(Y/N): _ Y  READ BACK VALUES (Y/N): _ Y  CALLED BY: [email or web address]      07-05 @ 07:59 .Blood Blood-Peripheral     No growth at 48 hours    Growth in anaerobic bottle: Gram Positive Cocci in Clusters  .  Gram Stain performed by:  [facility]  [address] [place] [address]  .  TYPE: (C=Critical, N=Notification, A=Abnormal) C  TESTS:  _ GS  DATE/TIME CALLED: _ 07/06/2021 10:56:36  CALLED TO: Ting Grace RN  READ BACK (2 Patient Identifiers)(Y/N): _ Y  READ BACK VALUES (Y/N): _ Y  CALLED BY: [email or web address]      07-03 @ 07:56 .Blood Blood-Peripheral     Growth in aerobic bottle: Staphylococcus epidermidis  See previous culture [id number]    Growth in aerobic bottle: Gram Positive Cocci in Clusters  Gram Stain performed by:  [facility]  [address] [place] [address]  .  TYPE: (C=Critical, N=Notification, A=Abnormal) C  TESTS:  _ GS  DATE/TIME CALLED: _ 07/04/2021 14:49:52  CALLED TO: _ ALBIN MOSER  READ BACK (2 Patient Identifiers)(Y/N): _ Y  READ BACK VALUES (Y/N): _ Y  CALLED BY: _ CAMILLE      07-03 @ 05:31 .Urine Clean Catch (Midstream)     No growth        07-02 @ 14:04 .Blood Blood-Catheter     Growth in aerobic and anaerobic bottles: Staphylococcus epidermidis  Coag Negative Staphylococcus  Single set isolate, possible contaminant. Contact  Microbiology if susceptibility testing clinically  indicated.    Growth in aerobic and anaerobic bottles: Gram Positive Cocci in Clusters  Gram Stain performed by:  [facility]  [address] [place] [address]  .  TYPE: (C=Critical, N=Notification, A=Abnormal) C  TESTS:  _GS  DATE/TIME CALLED: _ 07/03/2021 12:33:23  CALLED TO: _ Maude Vital RN  READ BACK (2 Patient Identifiers)(Y/N): _ Y  READ BACK VALUES (Y/N): _ Y  CALLED BY: Ting Armstrong      07-02 @ 01:00          NotLazaro  07-01 @ 20:17 .Blood Blood-Peripheral Blood Culture PCR    Growth in aerobic and anaerobic bottles: Staphylococcus epidermidis  Coag Negative Staphylococcus  Single set isolate, possible contaminant. Contact  Microbiology if susceptibility testing clinically  indicated.    Growth in aerobic and anaerobic bottles: Gram Positive Cocci in Clusters  ***Blood Panel PCR results on this specimen are available  approximately 3 hours after the Gram stain result.***  Gram stain, PCR, and/or culture results may not always  correspond due to difference in methodologies.  ************************************************************  This PCR assay was performed using [employer].  The following targets are tested for: Enterococcus,  vancomycin resistant enterococci, Listeria monocytogenes,  coagulase negative staphylococci, S. aureus,  methicillin resistant S. aureus, Streptococcus agalactiae  (Group B), S. pneumoniae, S. pyogenes (Group A),  Acinetobacter baumannii, Enterobacter cloacae, E. coli,  Klebsiella oxytoca, K. pneumoniae, Proteus sp.,  Serratia marcescens, Haemophilus influenzae,  Neisseria meningitidis, Pseudomonas aeruginosa, Candida  albicans, C. glabrata, C krusei, C parapsilosis,  C. tropicalis and the KPC resistance gene.  Gram Stain performed by:  [facility]  [address] [place] [address]  .  TYPE: (C=Critical, N=Notification, A=Abnormal) C  TESTS:  _ GS  DATE/TIME CALLED: _ 07/02/2021 12:37:16  CALLED TO: Ting Bo RN  READ BACK (2 Patient Identifiers)(Y/N): _ Y  READ BACK VALUES (Y/N): _ Y  CALLED BY: Ting Armstrong      07-01 @ 20:16 .Blood Blood-Peripheral Staphylococcus epidermidis    Growth in aerobic and anaerobic bottles: Staphylococcus epidermidis    Growth in aerobic and anaerobic bottles: Gram Positive Cocci in Clusters  Gram Stain performed by:  [facility]  [address] [place] [address]  .  TYPE: (C=Critical, N=Notification, A=Abnormal) C  TESTS:  _GS  DATE/TIME CALLED: _ 07/03/2021 09:41:58  CALLED TO: Ting VITAL  READ BACK (2 Patient Identifiers)(Y/N): _ Y  READ BACK VALUES (Y/N): _ Y  CALLED BY: _ CAMILLE          WBC Count: 13.50 K/uL (07-08-21 @ 08:27)  WBC Count: 11.82 K/uL (07-07-21 @ 08:49)  WBC Count: 12.75 K/uL (07-05-21 @ 07:54)    Creatinine, Serum: 4.23 mg/dL (07-08-21 @ 08:27)  Creatinine, Serum: 4.48 mg/dL (07-07-21 @ 08:49)  Creatinine, Serum: 4.15 mg/dL (07-06-21 @ 07:40)  Creatinine, Serum: 5.71 mg/dL (07-05-21 @ 07:58)             SARS-CoV-2: NotDetec (07-02-21 @ 01:00)  Rapid RVP Result: NotDetec (07-02-21 @ 01:00)    COVID-19 PCR: NotDetec (07-06-21 @ 10:48)  COVID-19 PCR: NotDetec (06-09-21 @ 01:06)

## 2021-07-08 NOTE — PROGRESS NOTE ADULT - SUBJECTIVE AND OBJECTIVE BOX
[facility]  ----------------------------------------  The patient was seen at bedside. Patient with endocarditis. Offers no complaints. Denied chest pain or dyspnea.    Vital Signs Last 24 Hrs  T(C): 37.1 (08 Jul 2021 10:28), Max: 38.1 (07 Jul 2021 17:18)  T(F): 98.8 (08 Jul 2021 10:28), Max: 100.6 (07 Jul 2021 17:18)  HR: 90 (08 Jul 2021 10:28) (82 - 95)  BP: 123/68 (08 Jul 2021 10:28) (123/63 - 150/73)  BP(mean): --  RR: 18 (08 Jul 2021 10:28) (18 - 18)  SpO2: 98% (08 Jul 2021 10:28) (96% - 100%)    CAPILLARY BLOOD GLUCOSE  POCT Blood Glucose.: 214 mg/dL (08 Jul 2021 11:26)  POCT Blood Glucose.: 266 mg/dL (08 Jul 2021 07:51)  POCT Blood Glucose.: 201 mg/dL (07 Jul 2021 21:39)  POCT Blood Glucose.: 256 mg/dL (07 Jul 2021 17:13)    PHYSICAL EXAMINATION:  ----------------------------------------  General appearance: No acute distress, Awake, Alert  HEENT: Normocephalic, Atraumatic, Conjunctiva clear, EOMI  Neck: Supple, No JVD, No tenderness  Lungs: Breath sound equal bilaterally, No wheezes, No rales  Cardiovascular: S1S2, Regular rhythm, Right Mediport  Abdomen: Soft, Nontender, Nondistended, No guarding/rebound, Positive bowel sounds  Extremities: No clubbing, No cyanosis, No edema, No calf tenderness  Neuro: Strength equal bilaterally, No tremors  Psychiatric: Appropriate mood, Normal affect    LABORATORY STUDIES:  ----------------------------------------             9.1    13.50 )-----------( 150      ( 08 Jul 2021 08:27 )             29.0     07-08    132<L>  |  95<L>  |  72.0<H>  ----------------------------<  233<H>  3.5   |  19.0<L>  |  4.23<H>    Ca    7.5<L>      08 Jul 2021 08:27  Phos  7.0     07-07    Culture - Blood (collected 07 Jul 2021 13:08)  Source: .Blood Blood-Venous  Gram Stain (08 Jul 2021 09:23):    Growth in aerobic and anaerobic bottles: Gram Positive Cocci in Clusters    .    Gram Stain performed by:    [facility]    [address] [place] [address]    .    TYPE: (C=Critical, N=Notification, A=Abnormal) C    TESTS: _ GS    DATE/TIME CALLED: _ 07/08/2021 09:23:12    CALLED TO: Ting Grace    READ BACK (2 Patient Identifiers)(Y/N): _ Y    READ BACK VALUES (Y/N): _ Y    CALLED BY: _ dcashin    Culture - Blood (collected 07 Jul 2021 13:07)  Source: .Blood Blood-Peripheral  Gram Stain (08 Jul 2021 09:41):    Growth in aerobic and anaerobic bottles: Gram Positive Cocci in Clusters    .    Gram Stain performed by:    [facility]    [address] [place] [address]    .    TYPE: (C=Critical, N=Notification, A=Abnormal) C    TESTS: _ gs    DATE/TIME CALLED: _ 07/08/2021 09:22:00    CALLED TO: Ting Reyna RN    READ BACK (2 Patient Identifiers)(Y/N): _ Y    READ BACK VALUES (Y/N): _ Y    CALLED BY: Ting dcashin    MEDICATIONS  (STANDING):  amitriptyline 40 milliGRAM(s) Oral at bedtime  atorvastatin 40 milliGRAM(s) Oral at bedtime  cholecalciferol 2000 Unit(s) Oral daily  dextrose 40% Gel 15 Gram(s) Oral once  dextrose 5%. 1000 milliLiter(s) (50 mL/Hr) IV Continuous <Continuous>  dextrose 5%. 1000 milliLiter(s) (100 mL/Hr) IV Continuous <Continuous>  dextrose 50% Injectable 25 Gram(s) IV Push once  dextrose 50% Injectable 12.5 Gram(s) IV Push once  dextrose 50% Injectable 25 Gram(s) IV Push once  folic acid 1 milliGRAM(s) Oral daily  gabapentin 300 milliGRAM(s) Oral at bedtime  glucagon  Injectable 1 milliGRAM(s) IntraMuscular once  heparin   Injectable 5000 Unit(s) SubCutaneous every 12 hours  hydrALAZINE 25 milliGRAM(s) Oral every 8 hours  insulin glargine Injectable (LANTUS) 8 Unit(s) SubCutaneous at bedtime  insulin lispro (ADMELOG) corrective regimen sliding scale   SubCutaneous three times a day before meals  insulin lispro (ADMELOG) corrective regimen sliding scale   SubCutaneous at bedtime  lactobacillus acidophilus 1 Tablet(s) Oral two times a day with meals  levothyroxine 100 MICROGram(s) Oral daily  Nephro-nunu 1 Tablet(s) Oral daily  pantoprazole    Tablet 40 milliGRAM(s) Oral every 12 hours  sodium zirconium cyclosilicate 5 Gram(s) Oral daily  vancomycin  IVPB 500 milliGRAM(s) IV Intermittent once    MEDICATIONS  (PRN):  acetaminophen   Tablet .. 650 milliGRAM(s) Oral every 6 hours PRN Temp greater or equal to 38C (100.4F)  ALBUTerol    0.083% 2.5 milliGRAM(s) Nebulizer every 6 hours PRN Shortness of Breath and/or Wheezing  benzocaine 15 mG/menthol 3.6 mG (Sugar-Free) Lozenge 1 Lozenge Oral four times a day PRN Sore Throat      ASSESSMENT / PLAN:  ----------------------------------------  76y Female with HTN HLD CAD s/p remote CABG, Bio AVR solitary episode of Af w/o recurrence, ILR in place, CRI, MDS transfusion dependent, cirrhosis sec to chronic hep c with esophageal varices and prior banding, hepatocellular carcinoma,s/p embolization with embozene particles on 4/5/21, and recent admission to [facility] after fall with head trauma recently dsc after being started on HD for worsening renal function with management of pulm HTN and fluid overload state admtt with rigors and fever    Sepsis / Bacteremia - Blood cultures grew Staphylococcus epidermidis and hemodialysis cathter was previously removed. Mediport in place as the patient has poor intravenous access and has required the port for treatments with her hematologist for MDS. Following repeat blood cultures.    Endocarditis - Cardiothoracic Surgery recommendations noted, the patient is not thought to be a candidate for surgical intervention. To follow up with Infectious Disease in regards to antibiotics. Palliative Care to see the patient as well.    ESRD - To monitor renal functions to determine necessity of resumption of hemodialysis.    Anemia / Thrombocytopenia / MDS - Monitoring hemoglobin levels. The patient noted a history of transfusions in the past.    Chronic diastolic heart failure - On hydralazine.    Diabetes - Insulin coverage, close monitoring of blood glucose levels.    Hypothyroidism - On levothyroxine.    Hypertension - Close blood pressure monitoring. On hydralazine.

## 2021-07-08 NOTE — PROVIDER CONTACT NOTE (CRITICAL VALUE NOTIFICATION) - SITUATION
Patient with persistently positive blood cultures.
Patient blood cultures in anaerobic and aerobic bottles positive gram cocci in clusters
Patient on iv antibiotics for positive blood cultures

## 2021-07-08 NOTE — CONSULT NOTE ADULT - TIME BILLING
D/W pt, RN, hospitalist LUCIANO Samano Dr.
Review of H& P, review of images and coordination of care.

## 2021-07-08 NOTE — CONSULT NOTE ADULT - CONSULT REASON
Endocarditis
Removal of Mediport d/t bacteremia
Removal of Permacath
Bacteremia
ESRD , fever
fever
support and ongoing goc

## 2021-07-08 NOTE — PROGRESS NOTE ADULT - SUBJECTIVE AND OBJECTIVE BOX
Patient seen and examined    I&O's Summary    08 Jul 2021 07:01  -  08 Jul 2021 20:02  --------------------------------------------------------  IN: 0 mL / OUT: 100 mL / NET: -100 mL    patient very upset with Limited choices of treatment available  Seen along with Dr. Cuba FELDMAN    REVIEW OF SYSTEMS:    CONSTITUTIONAL: No F/C    RESPIRATORY: No cough or SOB  CARDIOVASCULAR: No CP/palpitations,    GASTROINTESTINAL: No abdominal pain , NVD   GENITOURINARY: No UTI sx  NEUROLOGICAL: No headaches/wk/numbness  MUSCULOSKELETAL:   EXTREMITIES : no swelling,    Vital Signs Last 24 Hrs  T(C): 38.4 (08 Jul 2021 19:00), Max: 38.7 (08 Jul 2021 17:47)  T(F): 101.1 (08 Jul 2021 19:00), Max: 101.7 (08 Jul 2021 17:47)  HR: 82 (08 Jul 2021 19:30) (82 - 90)  BP: 128/79 (08 Jul 2021 19:30) (123/68 - 132/75)  BP(mean): --  RR: 18 (08 Jul 2021 19:30) (18 - 18)  SpO2: 97% (08 Jul 2021 19:30) (96% - 100%)    PHYSICAL EXAM:    GENERAL: NAD,   EYES:  conjunctiva and sclera clear  NECK: Supple, No JVD/Bruit  NERVOUS SYSTEM:  A/O x3,   CHEST:  CTA ,No rales or rhonchi  HEART:  RRR, gr2 murmurs  ABDOMEN: Soft, NT/ND BS+  EXTREMITIES:  + Edema;  SKIN: No rashes    LABS:                        9.1    13.50 )-----------( 150      ( 08 Jul 2021 08:27 )             29.0     07-08    132<L>  |  95<L>  |  72.0<H>  ----------------------------<  233<H>  3.5   |  19.0<L>  |  4.23<H>    Ca    7.5<L>      08 Jul 2021 08:27  Phos  7.0     07-07        MEDICATIONS  (STANDING):  acetaminophen   Tablet .. PRN  acetaminophen   Tablet .. PRN  ALBUTerol    0.083% PRN  amitriptyline  atorvastatin  benzocaine 15 mG/menthol 3.6 mG (Sugar-Free) Lozenge PRN  cholecalciferol  dextrose 40% Gel  dextrose 5%.  dextrose 5%.  dextrose 50% Injectable  dextrose 50% Injectable  dextrose 50% Injectable  folic acid  gabapentin  glucagon  Injectable  heparin   Injectable  hydrALAZINE  insulin glargine Injectable (LANTUS)  insulin lispro (ADMELOG) corrective regimen sliding scale  insulin lispro (ADMELOG) corrective regimen sliding scale  lactobacillus acidophilus  levothyroxine  Nephro-nunu  pantoprazole    Tablet  sodium zirconium cyclosilicate

## 2021-07-08 NOTE — CONSULT NOTE ADULT - SUBJECTIVE AND OBJECTIVE BOX
[facility] PALLIATIVE MEDICINE CONSULT    CC: Patient is a 76y old  Female who presents with a chief complaint of fever (08 Jul 2021 11:35)    HPI:  Mrs. Collier is a 76 year old female with PMHx of CAD s/p CABG, HFpEF, bioprosthetic AVR, cirrhosis 2/2 hep C, HCC s/p embolization, AFib (no AC), ESRD on HD (MWF, started ~3 weeks ago), MDS (transfusion dependent), s/p mediport (placed ~2yrs ago), recent hospitalization 5/19-6/10 for acute decompensated CHF/pHTN and started on HD at that time s/p permacath presented with fever and chills. Admitted for infectious/sepsis workup. Complicated hospital course including Staph epidermidis bacteremia with subsequent BREANN revealing large vegetations on bioprosthetic AV with suspicion of abscess and vegetation on native MV, s/p removal of permacath but mediport remain in place due to likely surrounding fibrosis and poor vein access at baseline. Evaluated by CTS and deemed a poor candidate for any surgical intervention. Per ID, without proper source control antibiotic alone is unlikely to be curative and pt remains high risk of associated complications and reinfection. Palliative consulted for support and ongoing goc.     Pt seen and examined earlier at bedside. She was very emotional and tearful, overwhelmed with her clinical situation.      Present Symptoms:   Dyspnea:  No   Nausea/Vomiting:  No  Anxiety:  Yes    Depression: Yes due to clinical status  Fatigue:  No  Loss of appetite:  No  Pain: No          Review of Systems: As per HPI.  All others negative     PERTINENT PMH REVIEWED: Yes     PAST MEDICAL & SURGICAL HISTORY:  Hypertension  well controlled    High cholesterol    Low back pain  chronic over a year    Asthma  well controlled, never intubated for exacerbation    Anemia, unspecified anemia type  uses O2 at 2L at home PRN, due to anemia    Aortic stenosis  s/p bovine AVR    Heart failure  Echo  10/2020 EF 60%, mild AR    CAD (coronary artery disease)  s/p OHS    CKD (chronic kidney disease)  Stage 4, now on dialysis.    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        SOCIAL HISTORY:    Admitted from:  home   - lives with two daughters Demarcus  - children: 4 daughters    Baseline ADLs (prior to admission)  Stearns: []Total  [x] Moderate []Dependent    Palliative Performance Status Version 2: 40%  http://npcrc.org/files/news/palliative_performance_scale_[email or web address].pdf      ADVANCE DIRECTIVES:   DNR YES NO  Completed on:                     MOLST  YES NO   Completed on:  Living Will  YES NO   Completed on:    DECISION MAKER(s):  [x] Health Care Proxy(s)  [] Surrogate(s)  [] Guardian             Per pt, has HCP with primary poxy as daughter aDniela and Alternate as Marianela      FAMILY HISTORY:  Family history of diabetes mellitus (Sibling)         Allergies    ACE inhibitors (Other)  Bactrim (Nephrotoxicity)  Biaxin (Joint Pain)  morphine (Short breath)  NSAIDs (Other)    Intolerances        MEDICATIONS  (STANDING):  amitriptyline 40 milliGRAM(s) Oral at bedtime  atorvastatin 40 milliGRAM(s) Oral at bedtime  cholecalciferol 2000 Unit(s) Oral daily  dextrose 40% Gel 15 Gram(s) Oral once  dextrose 5%. 1000 milliLiter(s) (50 mL/Hr) IV Continuous <Continuous>  dextrose 5%. 1000 milliLiter(s) (100 mL/Hr) IV Continuous <Continuous>  dextrose 50% Injectable 25 Gram(s) IV Push once  dextrose 50% Injectable 12.5 Gram(s) IV Push once  dextrose 50% Injectable 25 Gram(s) IV Push once  folic acid 1 milliGRAM(s) Oral daily  gabapentin 300 milliGRAM(s) Oral at bedtime  glucagon  Injectable 1 milliGRAM(s) IntraMuscular once  heparin   Injectable 5000 Unit(s) SubCutaneous every 12 hours  hydrALAZINE 25 milliGRAM(s) Oral every 8 hours  insulin glargine Injectable (LANTUS) 8 Unit(s) SubCutaneous at bedtime  insulin lispro (ADMELOG) corrective regimen sliding scale   SubCutaneous three times a day before meals  insulin lispro (ADMELOG) corrective regimen sliding scale   SubCutaneous at bedtime  lactobacillus acidophilus 1 Tablet(s) Oral two times a day with meals  levothyroxine 100 MICROGram(s) Oral daily  Nephro-nunu 1 Tablet(s) Oral daily  pantoprazole    Tablet 40 milliGRAM(s) Oral every 12 hours  sodium zirconium cyclosilicate 5 Gram(s) Oral daily    MEDICATIONS  (PRN):  acetaminophen   Tablet .. 650 milliGRAM(s) Oral every 6 hours PRN Temp greater or equal to 38C (100.4F)  ALBUTerol    0.083% 2.5 milliGRAM(s) Nebulizer every 6 hours PRN Shortness of Breath and/or Wheezing  benzocaine 15 mG/menthol 3.6 mG (Sugar-Free) Lozenge 1 Lozenge Oral four times a day PRN Sore Throat      PHYSICAL EXAM:    Vital Signs Last 24 Hrs  T(C): 37.1 (08 Jul 2021 10:28), Max: 38.1 (07 Jul 2021 17:18)  T(F): 98.8 (08 Jul 2021 10:28), Max: 100.6 (07 Jul 2021 17:18)  HR: 90 (08 Jul 2021 10:28) (82 - 95)  BP: 123/68 (08 Jul 2021 10:28) (123/68 - 150/73)  BP(mean): --  RR: 18 (08 Jul 2021 10:28) (18 - 18)  SpO2: 98% (08 Jul 2021 10:28) (96% - 100%)    General: Resting comfortably. No acute distress.   HEENT: MMM   Lungs: comfortable. non-labored. O2NC  CV: +s1/s2. rrr +murmur  GI:+ bowel sound. abdomen soft, non-tender, non-distended.  MSK: Moves all 4 extremities.  No cyanosis or edema. weakness.   Neuro: nonfocal. Awake and alert, oriented x4. Interactive.   Skin: warm and dry.      LABS:                        9.1    13.50 )-----------( 150      ( 08 Jul 2021 08:27 )             29.0     07-08    132<L>  |  95<L>  |  72.0<H>  ----------------------------<  233<H>  3.5   |  19.0<L>  |  4.23<H>    Ca    7.5<L>      08 Jul 2021 08:27  Phos  7.0     07-07      RADIOLOGY & ADDITIONAL STUDIES:      CT Chest 7/2/2021  FINDINGS:    LUNGS AND AIRWAYS: The central airways are patent. The lungs are clear. Mild background emphysema.  PLEURA: No pleural abnormality.  VESSELS: Right chest wall infusion port is seen with the catheter tip in the SVC. Left IJ double-lumen catheter tip in the SVC. Aortic atherosclerosis without aneurysm. Left pulmonary artery pressure monitor device.  HEART: Mild cardiomegaly. No pericardial effusion. Coronary, mitral annular, and aortic valve calcification.  MEDIASTINUM AND AVANI: No adenopathy.  CHEST WALL AND LOWER NECK: Cardiac loop recorder. No masses.  VISUALIZED UPPER ABDOMEN: Cirrhosis and stable right lobe liver lesion. Upper abdominal varices.  BONES: Status post sternotomy. No acute bony abnormality.    IMPRESSION:  *  No acute chest pathology.    CT A/P 7/3/2021  FINDINGS:  LOWER CHEST: Cardiomegaly. Small bilateral pleural effusions which were not seen previously. Status post sternotomy and CABG. Calcification mitral valve annulus and coronary artery calcifications. Partially imaged catheter with its tip in the right atrium.    LIVER: Nodularity to the contour the liver raising concern for cirrhosis. 2.1 cm hepatic cyst at the border of segments 6 and 7 on series 3 image 44.  BILE DUCTS: Normal caliber.  GALLBLADDER: 2.4 cm gallstone without gross inflammation.  SPLEEN: Splenomegaly measuring up to 15.9 cm. In light of cirrhosis, portal hypertension cannot be excluded. Rounded calcification in the posterior aspect of spleen. The spleen is lobulated posterior to this which is grossly stable.  PANCREAS: Within normal limits.  ADRENALS: Nodularity to the right adrenal gland which is grossly stable.  KIDNEYS/URETERS: Within normal limits.    BLADDER: Within normal limits.  REPRODUCTIVE ORGANS: Mildly heterogeneous uterus with punctate calcifications which is stable. This may represent underlying leiomyomas.    BOWEL: No bowel obstruction. Appendix unremarkable. Colonic diverticuli predominantly in the descending and sigmoid colon without gross inflammation.  PERITONEUM: No ascites.  VESSELS: Vascular calcifications. Upper abdominal varices. Prominent ovarian veins bilaterally which has mildly increased.  RETROPERITONEUM/LYMPH NODES: No lymphadenopathy.  ABDOMINAL WALL: Within normal limits.  BONES: Changes of bone.    IMPRESSION:  No focal collection identified.    Cirrhosis with splenomegaly and varices suggesting portal hypertension. Prominence of the ovarian veins has increased.    2.1 cm cyst at the border of segments 6 and 7 of the liver. This is measuring simple fluid in density although this was not seen on 10/5/2017. Correlation with right upper quadrant ultrasound is recommended to ensure this appears as a simple cyst.    Cardiomegaly. Interval development of bilateral mild pleural effusions.    Additional chronic findings.    Abdominal US 7/5/2021  FINDINGS: This study was technically difficult due to bowel gas.    LIVER: 13.8 cm in length. Heterogeneous, coarsened echotexture, in keeping with known cirrhosis. Again noted, 2.2 x 2.9 x 2.2 cm mildly heterogeneously hyperechoic lesion in the right lobe.  BILIARY: No intrahepatic or extrahepatic biliary dilatation. Visualized common bile duct measuring up to 0.3 cm.  GALLBLADDER: Gallstone. Wall thickening. No obvious pericholecystic fluid. Negative sonographic Hou sign. Patient was premedicated.Patient was not premedicated.  PANCREAS: Poorly visualized due to bowel gas.  RIGHT KIDNEY: 9.2 cm in length. No hydronephrosis or obvious renal stone. Mildly increased echogenicity, which may be due to parenchymal disease.  ADDITIONAL: No ascites. Again noted, right pleural effusion.    IMPRESSION:    Cirrhosis. Right hepatic lesion as described, corresponding to the treated lesion on the previous MRI. Recommend clinical correlation to assess superimposed infection. If clinically indicated, follow-up contrast enhanced MRI may be obtained for further evaluation.    Cholelithiasis. Nonspecific gallbladder wall thickening. Negative sonography Hou's sign. It is unclear whether the patient was premedicated. If there is clinical suspicion for acute cholecystitis, a hepatobiliary scan may be obtained for further evaluation.    BREANN 7/6/2021  PHYSICIAN INTERPRETATION:  Left Ventricle: The left ventricular internal cavity size is normal. Left ventricular wall thickness is normal.  Global LV systolic function was normal. Left ventricular ejection fraction, by visual estimation, is 55 to 60%.  Right Ventricle: Normal right ventricular size and function.  Left Atrium: Moderate to severe left atrial enlargement. No spontaneous echo contrast noted in the left atrium and left atrial appendage. The interatrial septum is normal with no obvious shunting by color flow. The left atrial appendage is normal. No thrombus seen.  Right Atrium: Moderately enlarged right atrium.  Pericardium: There is no evidence of pericardial effusion.  Mitral Valve: 1 cm X 0.6 cm echodensity noted on the posterior mitral valve annulus and posterior leaflet of the mitral valve. Moderate mitral regurgitation is noted.  Tricuspid Valve: Moderate-severe tricuspid regurgitation is visualized.  Aortic Valve: Moderate aortic valve regurgitation is seen. S/p bioprosthetic aortic valve. Large [1.4 cm X 0.6 cm] echodensity noted attached to the left coronary cusp of the bioprosthetic aortic valve. There is marked thickening of the intervalvular fibrosa with likely abscess in the aorto-mitral continuity.  Aorta: The aortic root and ascending aorta are structurally normal, with no evidence of dilitation.    Summary:   1. Left ventricular ejection fraction, by visual estimation, is 55 to 60%.   2. Normal global left ventricular systolic function.   3. Moderate to severe left atrial enlargement.   4. Moderately enlarged right atrium.   5. Normal right ventricular size and function.   6. 1 cm X 0.6 cm echodensity noted on the posterior mitral valve annulus and posterior leaflet of the mitral valve. Moderate mitral regurgitation is noted.   7. Moderate aortic regurgitation.   8. Moderate-severe tricuspid regurgitation.   9. S/p bioprosthetic aortic valve. Large [1.4 cm X 0.6 cm] echodensity noted attached to the left coronary cusp of the bioprosthetic aortic valve. There is marked thickening of the intervalvular fibrosa with likely abscess in the aorto-mitral continuity.    MD Mickey Electronically signed on 7/6/2021 at 3:08:46 PM        *** Final ***      Thank you for the opportunity to assist with the care of this patient.   Palliative Medicine Consult Service [phone].

## 2021-07-09 NOTE — PROGRESS NOTE ADULT - SUBJECTIVE AND OBJECTIVE BOX
[facility]  INFECTIOUS DISEASES AND INTERNAL MEDICINE at [place]  =======================================================  Omi Brink MD  Diplomates American Board of Internal Medicine and Infectious Diseases  Tel: [phone]      Fax: [phone]  =======================================================    NICHELLE GILL [id number]    Follow up: bacteremia, endocarditis  febrile yesterday  c/o dry cough and sore throat  feeling very tired  diarrhea x 1      Allergies:  ACE inhibitors (Other)  Bactrim (Nephrotoxicity)  Biaxin (Joint Pain)  morphine (Short breath)  NSAIDs (Other)           REVIEW OF SYSTEMS:  CONSTITUTIONAL:  No Fever or chills +tired  HEENT:   No diplopia or blurred vision.  No earache, sore throat or runny nose.  CARDIOVASCULAR:  No pressure, squeezing, strangling, tightness, heaviness or aching about the chest, neck, axilla or epigastrium.  RESPIRATORY:  + cough, no shortness of breath  GASTROINTESTINAL:  No nausea, vomiting +diarrhea.  GENITOURINARY:  No dysuria, frequency or urgency. No Blood in urine  MUSCULOSKELETAL:  no joint aches, no muscle pain  SKIN:  No change in skin, hair or nails.  NEUROLOGIC:  No Headaches, seizures or weakness.  PSYCHIATRIC:  No disorder of thought or mood.  ENDOCRINE:  No heat or cold intolerance  HEMATOLOGICAL:  No easy bruising or bleeding.       Physical Exam:  GEN: NAD, pleasant on o2  HEENT: normocephalic and atraumatic. EOMI. PERRL.  Anicteric   NECK: Supple.   LUNGS: Clear to auscultation.  HEART: Regular rate and rhythm +murmur. right chest port  ABDOMEN: Soft, nontender, and nondistended.  Positive bowel sounds.    : No CVA tenderness  EXTREMITIES: Without any edema.  MSK: no joint swelling  NEUROLOGIC: Cranial nerves II through XII are grossly intact. No focal deficits  PSYCHIATRIC: Appropriate affect .  SKIN: No Rash      Vitals:    T(F): 99.7 (09 Jul 2021 13:43), Max: 101.7 (08 Jul 2021 17:47)  HR: 82 (09 Jul 2021 13:43)  BP: 102/59 (09 Jul 2021 13:43)  RR: 16 (09 Jul 2021 13:43)  SpO2: 99% (09 Jul 2021 13:43) (97% - 100%)  temp max in last 48H T(F): , Max: 101.7 (07-08-21 @ 17:47)    Current Antibiotics:  vancomycin  IVPB 500 milliGRAM(s) IV Intermittent once    Other medications:  amitriptyline 40 milliGRAM(s) Oral at bedtime  atorvastatin 40 milliGRAM(s) Oral at bedtime  cholecalciferol 2000 Unit(s) Oral daily  dextrose 40% Gel 15 Gram(s) Oral once  dextrose 5%. 1000 milliLiter(s) IV Continuous <Continuous>  dextrose 5%. 1000 milliLiter(s) IV Continuous <Continuous>  dextrose 50% Injectable 25 Gram(s) IV Push once  dextrose 50% Injectable 12.5 Gram(s) IV Push once  dextrose 50% Injectable 25 Gram(s) IV Push once  folic acid 1 milliGRAM(s) Oral daily  gabapentin 300 milliGRAM(s) Oral at bedtime  glucagon  Injectable 1 milliGRAM(s) IntraMuscular once  heparin   Injectable 5000 Unit(s) SubCutaneous every 12 hours  hydrALAZINE 25 milliGRAM(s) Oral every 8 hours  insulin glargine Injectable (LANTUS) 8 Unit(s) SubCutaneous at bedtime  insulin lispro (ADMELOG) corrective regimen sliding scale   SubCutaneous three times a day before meals  insulin lispro (ADMELOG) corrective regimen sliding scale   SubCutaneous at bedtime  lactobacillus acidophilus 1 Tablet(s) Oral two times a day with meals  levothyroxine 100 MICROGram(s) Oral daily  Nephro-nunu 1 Tablet(s) Oral daily  pantoprazole    Tablet 40 milliGRAM(s) Oral every 12 hours  sodium bicarbonate 650 milliGRAM(s) Oral three times a day                            9.0    16.04 )-----------( 145      ( 09 Jul 2021 07:36 )             27.8     07-09    137  |  108<H>  |  70.0<H>  ----------------------------<  197<H>  3.1<L>   |  14.0<L>  |  3.83<H>    Ca    6.0<LL>      09 Jul 2021 07:36      RECENT CULTURES:  07-07 @ 13:08 .Blood Blood-Venous       Growth in aerobic and anaerobic bottles: Gram Positive Cocci in Clusters  .  Gram Stain performed by:  [facility]  [address] [place] [address]  .  TYPE: (C=Critical, N=Notification, A=Abnormal) C  TESTS: _ GS  DATE/TIME CALLED: _ 07/08/2021 09:23:12  CALLED TO: Ting Grace  READ BACK (2 Patient Identifiers)(Y/N): _ Y  READ BACK VALUES (Y/N): _ Y  CALLED BY: [email or web address] [email or web address]      07-07 @ 13:07 .Blood Blood-Peripheral     Growth in aerobic and anaerobic bottles: Staphylococcus epidermidis  Susceptibility to follow.    Growth in aerobic and anaerobic bottles: Gram Positive Cocci in Clusters  .  Gram Stain performed by:  [facility]  [address] [place] [address]  .  TYPE: (C=Critical, N=Notification, A=Abnormal) C  TESTS: _ gs  DATE/TIME CALLED: _ 07/08/2021 09:22:00  CALLED TO: Ting Reyna RN  READ BACK (2 Patient Identifiers)(Y/N): _ Y  READ BACK VALUES (Y/N): _ Y  CALLED BY: [email or web address] [email or web address]      07-05 @ 07:59 .Blood Blood-Peripheral Staphylococcus epidermidis    No growth at 48 hours    Growth in anaerobic bottle: Gram Positive Cocci in Clusters  .  Gram Stain performed by:  [facility]  [address] [place] [address]  .  TYPE: (C=Critical, N=Notification, A=Abnormal) C  TESTS:  _ GS  DATE/TIME CALLED: _ 07/06/2021 10:56:36  CALLED TO: Ting Grace RN  READ BACK (2 Patient Identifiers)(Y/N): _ Y  READ BACK VALUES (Y/N): _ Y  CALLED BY: Ting Therapydiain      07-03 @ 07:56 .Blood Blood-Peripheral     Growth in aerobic bottle: Staphylococcus epidermidis  See previous culture [id number]    Growth in aerobic bottle: Gram Positive Cocci in Clusters  Gram Stain performed by:  [facility]  [address] [place] [address]  .  TYPE: (C=Critical, N=Notification, A=Abnormal) C  TESTS:  _ GS  DATE/TIME CALLED: _ 07/04/2021 14:49:52  CALLED TO: Ting MOSER  READ BACK (2 Patient Identifiers)(Y/N): _ Y  READ BACK VALUES (Y/N): _ Y  CALLED BY: _ CAMILLE      07-03 @ 05:31 .Urine Clean Catch (Midstream)     No growth        07-02 @ 14:04 .Blood Blood-Catheter     Growth in aerobic and anaerobic bottles: Staphylococcus epidermidis  Coag Negative Staphylococcus  Single set isolate, possible contaminant. Contact  Microbiology if susceptibility testing clinically  indicated.    Growth in aerobic and anaerobic bottles: Gram Positive Cocci in Clusters  Gram Stain performed by:  [facility]  [address] [place] [address]  .  TYPE: (C=Critical, N=Notification, A=Abnormal) C  TESTS:  _GS  DATE/TIME CALLED: _ 07/03/2021 12:33:23  CALLED TO: _ Maude Vital RN  READ BACK (2 Patient Identifiers)(Y/N): _ Y  READ BACK VALUES (Y/N): _ Y  CALLED BY: Ting Armstrong      07-02 @ 01:00          NotLazaro  07-01 @ 20:17 .Blood Blood-Peripheral Blood Culture PCR    Growth in aerobic and anaerobic bottles: Staphylococcus epidermidis  Coag Negative Staphylococcus  Single set isolate, possible contaminant. Contact  Microbiology if susceptibility testing clinically  indicated.    Growth in aerobic and anaerobic bottles: Gram Positive Cocci in Clusters  ***Blood Panel PCR results on this specimen are available  approximately 3 hours after the Gram stain result.***  Gram stain, PCR, and/or culture results may not always  correspond due to difference in methodologies.  ************************************************************  This PCR assay was performed using [employer].  The following targets are tested for: Enterococcus,  vancomycin resistant enterococci, Listeria monocytogenes,  coagulase negative staphylococci, S. aureus,  methicillin resistant S. aureus, Streptococcus agalactiae  (Group B), S. pneumoniae, S. pyogenes (Group A),  Acinetobacter baumannii, Enterobacter cloacae, E. coli,  Klebsiella oxytoca, K. pneumoniae, Proteus sp.,  Serratia marcescens, Haemophilus influenzae,  Neisseria meningitidis, Pseudomonas aeruginosa, Candida  albicans, C. glabrata, C krusei, C parapsilosis,  C. tropicalis and the KPC resistance gene.  Gram Stain performed by:  [facility]  [address] [place] [address]  .  TYPE: (C=Critical, N=Notification, A=Abnormal) C  TESTS:  _ GS  DATE/TIME CALLED: _ 07/02/2021 12:37:16  CALLED TO: Ting Bo RN  READ BACK (2 Patient Identifiers)(Y/N): _ Y  READ BACK VALUES (Y/N): _ Y  CALLED BY: Ting Armstrong      07-01 @ 20:16 .Blood Blood-Peripheral Staphylococcus epidermidis    Growth in aerobic and anaerobic bottles: Staphylococcus epidermidis    Growth in aerobic and anaerobic bottles: Gram Positive Cocci in Clusters  Gram Stain performed by:  [facility]  [address] [place] [address]  .  TYPE: (C=Critical, N=Notification, A=Abnormal) C  TESTS:  _GS  DATE/TIME CALLED: _ 07/03/2021 09:41:58  CALLED TO: _ ALBIN VITAL  READ BACK (2 Patient Identifiers)(Y/N): _ Y  READ BACK VALUES (Y/N): _ Y  CALLED BY: _ CAMILLE          WBC Count: 16.04 K/uL (07-09-21 @ 07:36)  WBC Count: 13.50 K/uL (07-08-21 @ 08:27)  WBC Count: 11.82 K/uL (07-07-21 @ 08:49)  WBC Count: 12.75 K/uL (07-05-21 @ 07:54)    Creatinine, Serum: 3.83 mg/dL (07-09-21 @ 07:36)  Creatinine, Serum: 4.23 mg/dL (07-08-21 @ 08:27)  Creatinine, Serum: 4.48 mg/dL (07-07-21 @ 08:49)  Creatinine, Serum: 4.15 mg/dL (07-06-21 @ 07:40)  Creatinine, Serum: 5.71 mg/dL (07-05-21 @ 07:58)             SARS-CoV-2: NotDetec (07-02-21 @ 01:00)  Rapid RVP Result: NotDetec (07-02-21 @ 01:00)    COVID-19 PCR: NotDetec (07-06-21 @ 10:48)

## 2021-07-09 NOTE — PROGRESS NOTE ADULT - ASSESSMENT
CKD(IV): HD initiated due to refractory CHF  Now SBE s/p removal of PC  Recurrent (+) Blood cultures  - HD currently on hold and patient comfortable---> no acute need for dialysis today  - d/c Lokelma  - cont antibiotics as per ID  - if requires dialysis prior to PC reinsertion then will need temporary femoral line  - add NaHCO3    RO: hypocalcemia (?) accuracy  - repeat level

## 2021-07-09 NOTE — PROGRESS NOTE ADULT - SUBJECTIVE AND OBJECTIVE BOX
NEPHROLOGY INTERVAL HPI/OVERNIGHT EVENTS:  Pt feels "terrible"  appetite poor  no cp, sob, n/v/d  + pruritis    MEDICATIONS  (STANDING):  amitriptyline 40 milliGRAM(s) Oral at bedtime  atorvastatin 40 milliGRAM(s) Oral at bedtime  cholecalciferol 2000 Unit(s) Oral daily  dextrose 40% Gel 15 Gram(s) Oral once  dextrose 5%. 1000 milliLiter(s) (50 mL/Hr) IV Continuous <Continuous>  dextrose 5%. 1000 milliLiter(s) (100 mL/Hr) IV Continuous <Continuous>  dextrose 50% Injectable 25 Gram(s) IV Push once  dextrose 50% Injectable 12.5 Gram(s) IV Push once  dextrose 50% Injectable 25 Gram(s) IV Push once  folic acid 1 milliGRAM(s) Oral daily  gabapentin 300 milliGRAM(s) Oral at bedtime  glucagon  Injectable 1 milliGRAM(s) IntraMuscular once  heparin   Injectable 5000 Unit(s) SubCutaneous every 12 hours  hydrALAZINE 25 milliGRAM(s) Oral every 8 hours  insulin glargine Injectable (LANTUS) 8 Unit(s) SubCutaneous at bedtime  insulin lispro (ADMELOG) corrective regimen sliding scale   SubCutaneous three times a day before meals  insulin lispro (ADMELOG) corrective regimen sliding scale   SubCutaneous at bedtime  lactobacillus acidophilus 1 Tablet(s) Oral two times a day with meals  levothyroxine 100 MICROGram(s) Oral daily  Nephro-nunu 1 Tablet(s) Oral daily  pantoprazole    Tablet 40 milliGRAM(s) Oral every 12 hours  sodium zirconium cyclosilicate 5 Gram(s) Oral daily    MEDICATIONS  (PRN):  acetaminophen   Tablet .. 650 milliGRAM(s) Oral every 6 hours PRN Temp greater or equal to 38C (100.4F)  acetaminophen   Tablet .. 325 milliGRAM(s) Oral every 4 hours PRN Temp greater or equal to 38C (100.4F)  ALBUTerol    0.083% 2.5 milliGRAM(s) Nebulizer every 6 hours PRN Shortness of Breath and/or Wheezing  benzocaine 15 mG/menthol 3.6 mG (Sugar-Free) Lozenge 1 Lozenge Oral four times a day PRN Sore Throat      Allergies    ACE inhibitors (Other)  Bactrim (Nephrotoxicity)  Biaxin (Joint Pain)  morphine (Short breath)  NSAIDs (Other)          Vital Signs Last 24 Hrs  T(C): 36.8 (09 Jul 2021 10:08), Max: 38.7 (08 Jul 2021 17:47)  T(F): 98.3 (09 Jul 2021 10:08), Max: 101.7 (08 Jul 2021 17:47)  HR: 94 (09 Jul 2021 10:08) (82 - 95)  BP: 108/44 (09 Jul 2021 10:08) (108/44 - 132/75)  BP(mean): --  RR: 18 (09 Jul 2021 10:08) (18 - 18)  SpO2: 98% (09 Jul 2021 10:08) (97% - 100%)    PHYSICAL EXAM:  GENERAL: Chronically ill appearing  ENMT: Dry mucous membranes  NECK: Supple, +JVD  NERVOUS SYSTEM:  Alert & Oriented X3,  intact and symmetric  CHEST/LUNG: Diminished BS   HEART: No rub  ABDOMEN: Soft, Nontender, Nondistended; BS+  EXTREMITIES: + LE edema   SKIN: No rashes or lesions    LABS:                        9.0    16.04 )-----------( 145      ( 09 Jul 2021 07:36 )             27.8     07-09    137  |  108<H>  |  70.0<H>  ----------------------------<  197<H>  3.1<L>   |  14.0<L>  |  3.83<H>        Ca    6.0<LL>      09 Jul 2021 07:36              RADIOLOGY & ADDITIONAL TESTS:

## 2021-07-09 NOTE — PROGRESS NOTE ADULT - CONVERSATION DETAILS
Family meeting at bedside with pt and 4 daughters including HCPs Daniela and Marianela.     Reviewed comorbidities (ESRD, MDS, CAD, CHF, debility) and hospital course thus far including bacteremia, endocarditis (nonoperative candidate) with persistent positive blood cultures and now fever spikes in past 24hours.  Pt and family aware of overall poor prognosis given that we are not able to fix the underlying problem (vegetations on valves) and that antibiotics is a temporary measures with hopes of trying to contain the infection to give her more time. We also explored the potential adverse effects of antibiotics in itself such as including but not limited to renal failure and other infections.      Explored options includin. continue all medical interventions including antibiotics and dialysis (if warranted) versus  2. shifting focus of treatment to comfort measures with support of hospice - goal of relieving pain and symptom burden, optimizing quality of life in the time she has    Pt and family very emotional and tearful. Pt continues to feel overwhelmed but at this time wants to continue with IV antibiotics and open to continuing dialysis if needed. I reassured the pt that at any time she can change her mind if she feels that she has had enough or feels clinically worse and no longer wants to continue with these aggressive treatments. Pt acknowledged understanding.     Advance directives reviewed including risk and benefits of CPR and mechanical intubation. Pt states that her cardiologist also spoke to her earlier today about this as well. Pt does not want to prolong her pain and suffering. She wishes to pass naturally when her heart stops. She confirms wishes for DNR/DNI, verbal consent given for MOLST.     Emotional support provided. All questions answered. All 4 daughters in support of her decision.

## 2021-07-09 NOTE — PROGRESS NOTE ADULT - ASSESSMENT
76F with hx of CAD s/p CABG, HFpEF, bioprosthetic AVR, cirrhosis 2/2 hep C, HCC s/p embolization, AFib (no AC), ESRD on HD (MWF, started ~3 weeks ago), MDS (transfusion dependent), s/p mediport (placed ~2yrs ago), recent hospitalization for acute decompensated CHF/pHTN admitted for fever and found with staph epidermidis bacteremia complicated further by endocarditis with vegetation on bioprosthetic AV with suspicion of abscess and vegetation on MV, deemed poor operative candidate by CTS.      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, Suspicion of Abscess  - s/p removal of permacath  - mediport remains in place given poor IV access   - seen by CTS, poor operative candidate  - on antibiotics per ID  - per ID, in the absence of source control, long term antibiotics will not likely be curative and pt remains high risk of associated complications from endocarditis itself and adverse effects of antibiotics    ESRD on HD  - started on HD ~3 weeks ago  - s/p permacath removal  - monitoring renal indices to determine necessity for HD  - nephrology following    MDS   - transfusion dependent due to chronic anemia via mediport (placed 2 yrs ago)    Debility  - mostly dependent for ADLs at baseline    Advance Care Planning  - HCP: daughter (1st) Daniela [phone] and alternate daughter Marianela [phone]    Palliative Care Encounter  See GOC above.  Family meeting today at 11AM.  Extensive discussion with pt/family today. At this time, pt wishes to continue with IV antibiotic and dialysis if needed. Pt/family understands overall prognosis is poor and high risk of further clinical decompensation despite maximal efforts.   Advance directives reviewed. MOLST completed for DNR/DNI.   Emotional support provided.

## 2021-07-09 NOTE — PROGRESS NOTE ADULT - SUBJECTIVE AND OBJECTIVE BOX
[facility] PALLIATIVE MEDICINE FOLLOW UP VISIT    INTERVAL HPI/OVERNIGHT EVENTS:  Source if other than patient:  []Family   [x]Team     Febrile overnight.   Seen and examined this morning, family at bedside.   Pt states feeling "terrible" today, very tired.     Present Symptoms:   Dyspnea:  No   Nausea/Vomiting:  No  Anxiety:   No  Depression: Yes    Fatigue: Yes    Loss of appetite: Yes   Constipation: small BM yesterday  Pain:  No    MEDICATIONS  (STANDING):  amitriptyline 40 milliGRAM(s) Oral at bedtime  atorvastatin 40 milliGRAM(s) Oral at bedtime  cholecalciferol 2000 Unit(s) Oral daily  dextrose 40% Gel 15 Gram(s) Oral once  dextrose 5%. 1000 milliLiter(s) (50 mL/Hr) IV Continuous <Continuous>  dextrose 5%. 1000 milliLiter(s) (100 mL/Hr) IV Continuous <Continuous>  dextrose 50% Injectable 25 Gram(s) IV Push once  dextrose 50% Injectable 12.5 Gram(s) IV Push once  dextrose 50% Injectable 25 Gram(s) IV Push once  folic acid 1 milliGRAM(s) Oral daily  gabapentin 300 milliGRAM(s) Oral at bedtime  glucagon  Injectable 1 milliGRAM(s) IntraMuscular once  heparin   Injectable 5000 Unit(s) SubCutaneous every 12 hours  hydrALAZINE 25 milliGRAM(s) Oral every 8 hours  insulin glargine Injectable (LANTUS) 8 Unit(s) SubCutaneous at bedtime  insulin lispro (ADMELOG) corrective regimen sliding scale   SubCutaneous three times a day before meals  insulin lispro (ADMELOG) corrective regimen sliding scale   SubCutaneous at bedtime  lactobacillus acidophilus 1 Tablet(s) Oral two times a day with meals  levothyroxine 100 MICROGram(s) Oral daily  Nephro-nunu 1 Tablet(s) Oral daily  pantoprazole    Tablet 40 milliGRAM(s) Oral every 12 hours  sodium bicarbonate 650 milliGRAM(s) Oral three times a day  vancomycin  IVPB 500 milliGRAM(s) IV Intermittent once    MEDICATIONS  (PRN):  acetaminophen   Tablet .. 325 milliGRAM(s) Oral every 4 hours PRN Temp greater or equal to 38C (100.4F)  acetaminophen   Tablet .. 650 milliGRAM(s) Oral every 6 hours PRN Temp greater or equal to 38C (100.4F)  ALBUTerol    0.083% 2.5 milliGRAM(s) Nebulizer every 6 hours PRN Shortness of Breath and/or Wheezing  benzocaine 15 mG/menthol 3.6 mG (Sugar-Free) Lozenge 1 Lozenge Oral four times a day PRN Sore Throat  hydrOXYzine hydrochloride 25 milliGRAM(s) Oral two times a day PRN Itching      PHYSICAL EXAM:    Vital Signs Last 24 Hrs  T(C): 37.6 (09 Jul 2021 13:43), Max: 38.7 (08 Jul 2021 17:47)  T(F): 99.7 (09 Jul 2021 13:43), Max: 101.7 (08 Jul 2021 17:47)  HR: 82 (09 Jul 2021 13:43) (82 - 95)  BP: 102/59 (09 Jul 2021 13:43) (102/59 - 132/75)  BP(mean): --  RR: 16 (09 Jul 2021 13:43) (16 - 18)  SpO2: 99% (09 Jul 2021 13:43) (97% - 100%)    Palliative Performance Status Version 2: 40%  http://npcrc.org/files/news/palliative_performance_scale_[email or web address].pdf    General: Resting comfortably. No acute distress.    HEENT: mucous membrane moist   Lungs: comfortable. non-labored.   CV: +s1/s2. Regular rate and rhythm. +murmur  GI: +bowel sound. abdomen soft, non-tender, non-distended   MSK: No cyanosis or edema. weakness.    Neuro: nonfocal. Awake and alert, orientedx4. Interactive   Skin: warm and dry.     LABS:                          9.0    16.04 )-----------( 145      ( 09 Jul 2021 07:36 )             27.8     07-09    137  |  108<H>  |  70.0<H>  ----------------------------<  197<H>  3.1<L>   |  14.0<L>  |  3.83<H>    Ca    6.0<LL>      09 Jul 2021 07:36    RADIOLOGY & ADDITIONAL STUDIES: Reviewed

## 2021-07-09 NOTE — PROGRESS NOTE ADULT - ASSESSMENT
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 candidate for hospice inn?

## 2021-07-09 NOTE — PROGRESS NOTE ADULT - SUBJECTIVE AND OBJECTIVE BOX
[facility]                                   [address] [place] [address]                                                      PHONE: [phone]                                                         FAX: [phone]  [email or web address]  ---------------------------------------------------------------------------------------------------------------------------------    Overnight events/patient complaints: BREANN reviewed, events noted, lengthy discussion this am with Marlene regarding end of life, palliative family meeting this am, tele AF with CVR      ACE inhibitors (Other)  Bactrim (Nephrotoxicity)  Biaxin (Joint Pain)  morphine (Short breath)  NSAIDs (Other)    MEDICATIONS  (STANDING):  amitriptyline 40 milliGRAM(s) Oral at bedtime  atorvastatin 40 milliGRAM(s) Oral at bedtime  cholecalciferol 2000 Unit(s) Oral daily  dextrose 40% Gel 15 Gram(s) Oral once  dextrose 5%. 1000 milliLiter(s) (50 mL/Hr) IV Continuous <Continuous>  dextrose 5%. 1000 milliLiter(s) (100 mL/Hr) IV Continuous <Continuous>  dextrose 50% Injectable 25 Gram(s) IV Push once  dextrose 50% Injectable 12.5 Gram(s) IV Push once  dextrose 50% Injectable 25 Gram(s) IV Push once  folic acid 1 milliGRAM(s) Oral daily  gabapentin 300 milliGRAM(s) Oral at bedtime  glucagon  Injectable 1 milliGRAM(s) IntraMuscular once  heparin   Injectable 5000 Unit(s) SubCutaneous every 12 hours  hydrALAZINE 25 milliGRAM(s) Oral every 8 hours  insulin glargine Injectable (LANTUS) 8 Unit(s) SubCutaneous at bedtime  insulin lispro (ADMELOG) corrective regimen sliding scale   SubCutaneous three times a day before meals  insulin lispro (ADMELOG) corrective regimen sliding scale   SubCutaneous at bedtime  lactobacillus acidophilus 1 Tablet(s) Oral two times a day with meals  levothyroxine 100 MICROGram(s) Oral daily  Nephro-nunu 1 Tablet(s) Oral daily  pantoprazole    Tablet 40 milliGRAM(s) Oral every 12 hours  sodium zirconium cyclosilicate 5 Gram(s) Oral daily    MEDICATIONS  (PRN):  acetaminophen   Tablet .. 650 milliGRAM(s) Oral every 6 hours PRN Temp greater or equal to 38C (100.4F)  acetaminophen   Tablet .. 325 milliGRAM(s) Oral every 4 hours PRN Temp greater or equal to 38C (100.4F)  ALBUTerol    0.083% 2.5 milliGRAM(s) Nebulizer every 6 hours PRN Shortness of Breath and/or Wheezing  benzocaine 15 mG/menthol 3.6 mG (Sugar-Free) Lozenge 1 Lozenge Oral four times a day PRN Sore Throat      Vital Signs Last 24 Hrs  T(C): 36.8 (09 Jul 2021 04:31), Max: 38.7 (08 Jul 2021 17:47)  T(F): 98.3 (09 Jul 2021 04:31), Max: 101.7 (08 Jul 2021 17:47)  HR: 95 (09 Jul 2021 04:31) (82 - 95)  BP: 108/60 (09 Jul 2021 04:31) (108/60 - 132/75)  BP(mean): --  RR: 18 (09 Jul 2021 04:31) (18 - 18)  SpO2: 100% (09 Jul 2021 04:31) (97% - 100%)  Daily     Daily   ICU Vital Signs Last 24 Hrs  MARLENE GILL  I&O's Detail    08 Jul 2021 07:01  -  09 Jul 2021 07:00  --------------------------------------------------------  IN:  Total IN: 0 mL    OUT:    Voided (mL): 100 mL  Total OUT: 100 mL    Total NET: -100 mL        I&O's Summary    08 Jul 2021 07:01  -  09 Jul 2021 07:00  --------------------------------------------------------  IN: 0 mL / OUT: 100 mL / NET: -100 mL      Drug Dosing Weight  [facility]      PHYSICAL EXAM:  General: Appears well developed, well nourished alert and cooperative.  HEENT: Head; normocephalic, atraumatic.  Eyes: Pupils reactive, cornea wnl.  Neck: Supple, no nodes adenopathy, no NVD or carotid bruit or thyromegaly.  CARDIOVASCULAR: Normal S1 and S2, soft systolic flow murmur  LUNGS: No rales, rhonchi or wheeze. Normal breath sounds bilaterally.  ABDOMEN: Soft, nontender without mass or organomegaly. bowel sounds normoactive.  EXTREMITIES: No clubbing, cyanosis or edema. Distal pulses wnl.   SKIN: warm and dry with normal turgor.  NEURO: Alert/oriented x 3/normal motor exam. No pathologic reflexes.    PSYCH: normal affect.        LABS:                        9.0    16.04 )-----------( 145      ( 09 Jul 2021 07:36 )             27.8     07-09    137  |  108<H>  |  70.0<H>  ----------------------------<  197<H>  3.1<L>   |  14.0<L>  |  3.83<H>    Ca    6.0<LL>      09 Jul 2021 07:36      [facility]            RADIOLOGY & ADDITIONAL STUDIES:    INTERPRETATION OF TELEMETRY (personally reviewed):    ECG:    ECHO:< from: BREANN Echo Doppler (07.06.21 @ 14:41) >    Summary:   1. Left ventricular ejection fraction, by visual estimation, is 55 to 60%.   2. Normal global left ventricular systolic function.   3. Moderate to severe left atrial enlargement.   4. Moderately enlarged right atrium.   5. Normal right ventricular size and function.   6. 1 cm X 0.6 cm echodensity noted on the posterior mitral valve annulus and posterior leaflet of the mitral valve. Moderate mitral regurgitation is noted.   7. Moderate aortic regurgitation.   8. Moderate-severe tricuspid regurgitation.   9. S/p bioprosthetic aortic valve. Large [1.4 cm X 0.6 cm] echodensity noted attached to the left coronary cusp of the bioprosthetic aortic valve. There is marked thickening of the intervalvular fibrosa with likely abscess in the aorto-mitral continuity.    MD Mickey Electronically signed on 7/6/2021 at 3:08:46 PM        *** Final ***              YOBANI KAPOOR MD; Attending Cardiologist  This document has been electronically signed. Jul 6 2021  2:41PM    < end of copied text >      STRESS TEST:

## 2021-07-09 NOTE — PROGRESS NOTE ADULT - ASSESSMENT
75 y/o female who started HD about 3 weeks ago and has left upper chest wall dialysis catheter, also has Rt. upper chest wall port for 2 years for her access ( as per pt. her veins are bad ), blood transfusions ( has transfusion dependant anemia ) , had dialysis yesterday ( on M, W, F ) but since last night was having chills. Today pt's daughter called pt's visiting nurse who asked family to check pt's temp. As per pt. her temp was 104. pt. was sent to the ER. pt. reports no cough, no phlegm, no sick contact, no cp, no sob, no abd. pain, no n/v/d. no change in mental status. pt. is no toxic looking at the time of admission and is afebrile. pt. makes urine 2 x daily.  Pt's cardiologist Dr. Shelton has recommended no AC for pt. as she bleeds easily.     Leukocytosis  Fever  Bacteremia r/o port/catheter infection  ESRD on HD  endocarditis    - pt likely developed catheter/port infection and subsequent endocarditis  - all BCX + Cons Staph epi including cultures from catheter, BCX 7/5 2nd set so far NGTD. 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  IF blood cultures do clear with antibiotics alone, the patient will require 6 weeks of prolonged IV abx followed by lifelong oral antibiotic suppression thereafter-in addition she will remain at risk for recurrence of infection, antibiotic associated complications, nephrotoxicity   - family meeting today with palliative, at this time plan to continue with abx   - repeat BCX x q48h  - c/w vanco by level 17 redpse 500 mg x 1 will redose based on level in AM  - addition of 2nd agent such as AG or quinolones associated with risk of nephrotoxicity. Can consider adding cipro. Check Qtc first due to DDI with amitryptiline. Defer rifampin as pt is still bacteremic  - monitor for diarrhea  - poor prognosis    d/w pharmacy, pt, daughters at bedside  Will Follow

## 2021-07-09 NOTE — PROGRESS NOTE ADULT - SUBJECTIVE AND OBJECTIVE BOX
[facility]  [facility]  The patient was seen at bedside. Patient with endocarditis. Offers no complaints.    Vital Signs Last 24 Hrs  T(C): 36.8 (09 Jul 2021 10:08), Max: 38.7 (08 Jul 2021 17:47)  T(F): 98.3 (09 Jul 2021 10:08), Max: 101.7 (08 Jul 2021 17:47)  HR: 94 (09 Jul 2021 10:08) (82 - 95)  BP: 108/44 (09 Jul 2021 10:08) (108/44 - 132/75)  BP(mean): --  RR: 18 (09 Jul 2021 10:08) (18 - 18)  SpO2: 98% (09 Jul 2021 10:08) (97% - 100%)    CAPILLARY BLOOD GLUCOSE  POCT Blood Glucose.: 288 mg/dL (09 Jul 2021 09:17)  POCT Blood Glucose.: 262 mg/dL (08 Jul 2021 20:54)  POCT Blood Glucose.: 258 mg/dL (08 Jul 2021 17:12)    PHYSICAL EXAMINATION:  ----------------------------------------  General appearance: No acute distress  Neck: Supple, No JVD  Lungs: No wheezes, No rales  Cardiovascular: S1S2, Regular rhythm, Right Mediport  Abdomen: Soft, Nontender, Nondistended, Positive bowel sounds  Extremities: No clubbing, No cyanosis, No edema, No calf tenderness    LABORATORY STUDIES:  ----------------------------------------             9.0    16.04 )-----------( 145      ( 09 Jul 2021 07:36 )             27.8     07-09    137  |  108<H>  |  70.0<H>  ----------------------------<  197<H>  3.1<L>   |  14.0<L>  |  3.83<H>    Ca    6.0<LL>      09 Jul 2021 07:36    Culture - Blood (collected 07 Jul 2021 13:08)  Source: .Blood Blood-Venous  Gram Stain (08 Jul 2021 09:23):    Growth in aerobic and anaerobic bottles: Gram Positive Cocci in Clusters    .    Gram Stain performed by:    [facility]    [address] [place] [address]    .    TYPE: (C=Critical, N=Notification, A=Abnormal) C    TESTS: _ GS    DATE/TIME CALLED: _ 07/08/2021 09:23:12    CALLED TO: Ting Grace    READ BACK (2 Patient Identifiers)(Y/N): _ Y    READ BACK VALUES (Y/N): _ Y    CALLED BY: Ting simpson    Culture - Blood (collected 07 Jul 2021 13:07)  Source: .Blood Blood-Peripheral  Gram Stain (08 Jul 2021 09:41):    Growth in aerobic and anaerobic bottles: Gram Positive Cocci in Clusters    .    Gram Stain performed by:    [facility]    [address] [place] [address]    .    TYPE: (C=Critical, N=Notification, A=Abnormal) C    TESTS: _ gs    DATE/TIME CALLED: _ 07/08/2021 09:22:00    CALLED TO: Ting Reyna RN    READ BACK (2 Patient Identifiers)(Y/N): _ Y    READ BACK VALUES (Y/N): _ Y    CALLED BY: Ting simpson    MEDICATIONS  (STANDING):  amitriptyline 40 milliGRAM(s) Oral at bedtime  atorvastatin 40 milliGRAM(s) Oral at bedtime  cholecalciferol 2000 Unit(s) Oral daily  dextrose 40% Gel 15 Gram(s) Oral once  dextrose 5%. 1000 milliLiter(s) (50 mL/Hr) IV Continuous <Continuous>  dextrose 5%. 1000 milliLiter(s) (100 mL/Hr) IV Continuous <Continuous>  dextrose 50% Injectable 25 Gram(s) IV Push once  dextrose 50% Injectable 12.5 Gram(s) IV Push once  dextrose 50% Injectable 25 Gram(s) IV Push once  folic acid 1 milliGRAM(s) Oral daily  gabapentin 300 milliGRAM(s) Oral at bedtime  glucagon  Injectable 1 milliGRAM(s) IntraMuscular once  heparin   Injectable 5000 Unit(s) SubCutaneous every 12 hours  hydrALAZINE 25 milliGRAM(s) Oral every 8 hours  insulin glargine Injectable (LANTUS) 8 Unit(s) SubCutaneous at bedtime  insulin lispro (ADMELOG) corrective regimen sliding scale   SubCutaneous three times a day before meals  insulin lispro (ADMELOG) corrective regimen sliding scale   SubCutaneous at bedtime  lactobacillus acidophilus 1 Tablet(s) Oral two times a day with meals  levothyroxine 100 MICROGram(s) Oral daily  Nephro-nunu 1 Tablet(s) Oral daily  pantoprazole    Tablet 40 milliGRAM(s) Oral every 12 hours  sodium bicarbonate 650 milliGRAM(s) Oral three times a day    MEDICATIONS  (PRN):  acetaminophen   Tablet .. 650 milliGRAM(s) Oral every 6 hours PRN Temp greater or equal to 38C (100.4F)  acetaminophen   Tablet .. 325 milliGRAM(s) Oral every 4 hours PRN Temp greater or equal to 38C (100.4F)  ALBUTerol    0.083% 2.5 milliGRAM(s) Nebulizer every 6 hours PRN Shortness of Breath and/or Wheezing  benzocaine 15 mG/menthol 3.6 mG (Sugar-Free) Lozenge 1 Lozenge Oral four times a day PRN Sore Throat  hydrOXYzine hydrochloride 25 milliGRAM(s) Oral two times a day PRN Itching      ASSESSMENT / PLAN:  ----------------------------------------  76y Female with HTN HLD CAD s/p remote CABG, Bio AVR solitary episode of Af w/o recurrence, ILR in place, CRI, MDS transfusion dependent, cirrhosis sec to chronic hep c with esophageal varices and prior banding, hepatocellular carcinoma,s/p embolization with embozene particles on 4/5/21, and recent admission to [facility] after fall with head trauma recently dsc after being started on HD for worsening renal function with management of pulm HTN and fluid overload state admtt with rigors and fever  The patient was found to have Staph bacteremia and the Permacath was removed. Transesophageal echocardiogram noted evidence of endocarditis on the mitral and aortic valve but the patient was thought not to be a surgical candidate.    Sepsis / Bacteremia - Blood cultures grew Staphylococcus epidermidis with repeat blood cultures continuing to show bacteremia. Hemodialysis cathter was previously removed. Mediport in place as the patient has poor intravenous access and has required the port for treatments with her hematologist for MDS. Infectious Disease follow up.    Endocarditis - Cardiothoracic Surgery recommendations noted, the patient is not thought to be a candidate for surgical intervention. Palliative Care discussed with the family.    ESRD - To monitor renal functions to determine necessity of resumption of hemodialysis. Bicarbonate initiated.    Anemia / Thrombocytopenia / MDS - Monitoring hemoglobin levels. The patient noted a history of transfusions in the past.    Chronic diastolic heart failure - On hydralazine.    Diabetes - Insulin coverage, close monitoring of blood glucose levels.    Hypothyroidism - On levothyroxine.    Hypertension - Close blood pressure monitoring. On hydralazine.    Discussed with the patient's daughters at the bedside. [facility]  ----------------------------------------  The patient was seen at bedside. Patient with endocarditis. Offers no complaints.    Vital Signs Last 24 Hrs  T(C): 36.8 (09 Jul 2021 10:08), Max: 38.7 (08 Jul 2021 17:47)  T(F): 98.3 (09 Jul 2021 10:08), Max: 101.7 (08 Jul 2021 17:47)  HR: 94 (09 Jul 2021 10:08) (82 - 95)  BP: 108/44 (09 Jul 2021 10:08) (108/44 - 132/75)  BP(mean): --  RR: 18 (09 Jul 2021 10:08) (18 - 18)  SpO2: 98% (09 Jul 2021 10:08) (97% - 100%)    CAPILLARY BLOOD GLUCOSE  POCT Blood Glucose.: 288 mg/dL (09 Jul 2021 09:17)  POCT Blood Glucose.: 262 mg/dL (08 Jul 2021 20:54)  POCT Blood Glucose.: 258 mg/dL (08 Jul 2021 17:12)    PHYSICAL EXAMINATION:  ----------------------------------------  General appearance: No acute distress  Neck: Supple, No JVD  Lungs: No wheezes, No rales  Cardiovascular: S1S2, Regular rhythm, Right Mediport  Abdomen: Soft, Nontender, Nondistended, Positive bowel sounds  Extremities: No clubbing, No cyanosis, No edema, No calf tenderness    LABORATORY STUDIES:  ----------------------------------------             9.0    16.04 )-----------( 145      ( 09 Jul 2021 07:36 )             27.8     07-09    137  |  108<H>  |  70.0<H>  ----------------------------<  197<H>  3.1<L>   |  14.0<L>  |  3.83<H>    Ca    6.0<LL>      09 Jul 2021 07:36    Culture - Blood (collected 07 Jul 2021 13:08)  Source: .Blood Blood-Venous  Gram Stain (08 Jul 2021 09:23):    Growth in aerobic and anaerobic bottles: Gram Positive Cocci in Clusters    .    Gram Stain performed by:    [facility]    [address] [place] [address]    .    TYPE: (C=Critical, N=Notification, A=Abnormal) C    TESTS: _ GS    DATE/TIME CALLED: _ 07/08/2021 09:23:12    CALLED TO: Ting Grace    READ BACK (2 Patient Identifiers)(Y/N): _ Y    READ BACK VALUES (Y/N): _ Y    CALLED BY: Ting simpson    Culture - Blood (collected 07 Jul 2021 13:07)  Source: .Blood Blood-Peripheral  Gram Stain (08 Jul 2021 09:41):    Growth in aerobic and anaerobic bottles: Gram Positive Cocci in Clusters    .    Gram Stain performed by:    [facility]    [address] [place] [address]    .    TYPE: (C=Critical, N=Notification, A=Abnormal) C    TESTS: _ gs    DATE/TIME CALLED: _ 07/08/2021 09:22:00    CALLED TO: Ting Reyna RN    READ BACK (2 Patient Identifiers)(Y/N): _ Y    READ BACK VALUES (Y/N): _ Y    CALLED BY: Ting simpson    MEDICATIONS  (STANDING):  amitriptyline 40 milliGRAM(s) Oral at bedtime  atorvastatin 40 milliGRAM(s) Oral at bedtime  cholecalciferol 2000 Unit(s) Oral daily  dextrose 40% Gel 15 Gram(s) Oral once  dextrose 5%. 1000 milliLiter(s) (50 mL/Hr) IV Continuous <Continuous>  dextrose 5%. 1000 milliLiter(s) (100 mL/Hr) IV Continuous <Continuous>  dextrose 50% Injectable 25 Gram(s) IV Push once  dextrose 50% Injectable 12.5 Gram(s) IV Push once  dextrose 50% Injectable 25 Gram(s) IV Push once  folic acid 1 milliGRAM(s) Oral daily  gabapentin 300 milliGRAM(s) Oral at bedtime  glucagon  Injectable 1 milliGRAM(s) IntraMuscular once  heparin   Injectable 5000 Unit(s) SubCutaneous every 12 hours  hydrALAZINE 25 milliGRAM(s) Oral every 8 hours  insulin glargine Injectable (LANTUS) 8 Unit(s) SubCutaneous at bedtime  insulin lispro (ADMELOG) corrective regimen sliding scale   SubCutaneous three times a day before meals  insulin lispro (ADMELOG) corrective regimen sliding scale   SubCutaneous at bedtime  lactobacillus acidophilus 1 Tablet(s) Oral two times a day with meals  levothyroxine 100 MICROGram(s) Oral daily  Nephro-nunu 1 Tablet(s) Oral daily  pantoprazole    Tablet 40 milliGRAM(s) Oral every 12 hours  sodium bicarbonate 650 milliGRAM(s) Oral three times a day    MEDICATIONS  (PRN):  acetaminophen   Tablet .. 650 milliGRAM(s) Oral every 6 hours PRN Temp greater or equal to 38C (100.4F)  acetaminophen   Tablet .. 325 milliGRAM(s) Oral every 4 hours PRN Temp greater or equal to 38C (100.4F)  ALBUTerol    0.083% 2.5 milliGRAM(s) Nebulizer every 6 hours PRN Shortness of Breath and/or Wheezing  benzocaine 15 mG/menthol 3.6 mG (Sugar-Free) Lozenge 1 Lozenge Oral four times a day PRN Sore Throat  hydrOXYzine hydrochloride 25 milliGRAM(s) Oral two times a day PRN Itching      ASSESSMENT / PLAN:  ----------------------------------------  76y Female with HTN HLD CAD s/p remote CABG, Bio AVR solitary episode of Af w/o recurrence, ILR in place, CRI, MDS transfusion dependent, cirrhosis sec to chronic hep c with esophageal varices and prior banding, hepatocellular carcinoma,s/p embolization with embozene particles on 4/5/21, and recent admission to [facility] after fall with head trauma recently dsc after being started on HD for worsening renal function with management of pulm HTN and fluid overload state admtt with rigors and fever  The patient was found to have Staphylococcus bacteremia on multiple cultures and the Permacath was removed. Transesophageal echocardiogram also noted evidence of endocarditis on the mitral and aortic valves but the patient was thought not to be a surgical candidate.    Sepsis / Bacteremia - Blood cultures grew Staphylococcus epidermidis with repeat blood cultures continuing to show bacteremia. Hemodialysis cathter was previously removed. Mediport in place as the patient has poor intravenous access and has required the port for treatments with her hematologist for MDS. Infectious Disease follow up.    Endocarditis - Cardiothoracic Surgery recommendations noted, the patient is not thought to be a candidate for surgical intervention. Palliative Care discussed with the family.    ESRD - To monitor renal functions to determine necessity of resumption of hemodialysis. Bicarbonate initiated.    Anemia / Thrombocytopenia / MDS - Monitoring hemoglobin levels. The patient noted a history of transfusions in the past.    Chronic diastolic heart failure - On hydralazine.    Diabetes - Insulin coverage, close monitoring of blood glucose levels.    Hypothyroidism - On levothyroxine.    Hypertension - Close blood pressure monitoring. On hydralazine.    Discussed with the patient's daughters at the bedside.

## 2021-07-10 NOTE — PROGRESS NOTE ADULT - SUBJECTIVE AND OBJECTIVE BOX
NEPHROLOGY INTERVAL HPI/OVERNIGHT EVENTS:  pt continues to feel poorly  no energy; + itching  no acute sob noted    MEDICATIONS  (STANDING):  amitriptyline 40 milliGRAM(s) Oral at bedtime  atorvastatin 40 milliGRAM(s) Oral at bedtime  cholecalciferol 2000 Unit(s) Oral daily  dextrose 40% Gel 15 Gram(s) Oral once  dextrose 5%. 1000 milliLiter(s) (50 mL/Hr) IV Continuous <Continuous>  dextrose 5%. 1000 milliLiter(s) (100 mL/Hr) IV Continuous <Continuous>  dextrose 50% Injectable 25 Gram(s) IV Push once  dextrose 50% Injectable 25 Gram(s) IV Push once  dextrose 50% Injectable 12.5 Gram(s) IV Push once  folic acid 1 milliGRAM(s) Oral daily  gabapentin 300 milliGRAM(s) Oral at bedtime  glucagon  Injectable 1 milliGRAM(s) IntraMuscular once  heparin   Injectable 5000 Unit(s) SubCutaneous every 12 hours  hydrALAZINE 25 milliGRAM(s) Oral every 8 hours  insulin glargine Injectable (LANTUS) 8 Unit(s) SubCutaneous at bedtime  insulin lispro (ADMELOG) corrective regimen sliding scale   SubCutaneous three times a day before meals  insulin lispro (ADMELOG) corrective regimen sliding scale   SubCutaneous at bedtime  lactobacillus acidophilus 1 Tablet(s) Oral two times a day with meals  levothyroxine 100 MICROGram(s) Oral daily  Nephro-nunu 1 Tablet(s) Oral daily  pantoprazole    Tablet 40 milliGRAM(s) Oral every 12 hours  sodium bicarbonate 650 milliGRAM(s) Oral three times a day    MEDICATIONS  (PRN):  acetaminophen   Tablet .. 650 milliGRAM(s) Oral every 6 hours PRN Temp greater or equal to 38C (100.4F)  acetaminophen   Tablet .. 325 milliGRAM(s) Oral every 4 hours PRN Temp greater or equal to 38C (100.4F)  ALBUTerol    0.083% 2.5 milliGRAM(s) Nebulizer every 6 hours PRN Shortness of Breath and/or Wheezing  benzocaine 15 mG/menthol 3.6 mG (Sugar-Free) Lozenge 1 Lozenge Oral four times a day PRN Sore Throat  hydrOXYzine hydrochloride 25 milliGRAM(s) Oral two times a day PRN Itching      Allergies    ACE inhibitors (Other)  Bactrim (Nephrotoxicity)  Biaxin (Joint Pain)  morphine (Short breath)  NSAIDs (Other)          Vital Signs Last 24 Hrs  T(C): 36.6 (10 Jul 2021 04:20), Max: 38 (09 Jul 2021 20:16)  T(F): 97.8 (10 Jul 2021 04:20), Max: 100.4 (09 Jul 2021 20:16)  HR: 72 (10 Jul 2021 04:20) (72 - 94)  BP: 116/60 (10 Jul 2021 04:20) (102/59 - 125/74)  BP(mean): --  RR: 17 (10 Jul 2021 04:20) (16 - 19)  SpO2: 100% (10 Jul 2021 04:20) (96% - 100%)    PHYSICAL EXAM:  GENERAL: Frail, weak  ENMT: Dry mucous membranes  NECK: Supple, +JVD  NERVOUS SYSTEM:  Alert & Oriented X3,  intact and symmetric  CHEST/LUNG: Diminished BS   HEART: No rub  ABDOMEN: Soft, Nontender, Nondistended; BS+  EXTREMITIES: + LE edema stable  SKIN: No rashes or lesions    LABS:                        9.0    16.04 )-----------( 145      ( 09 Jul 2021 07:36 )             27.8     07-09    137  |  108<H>  |  70.0<H>  ----------------------------<  197<H>  3.1<L>   |  14.0<L>  |  3.83<H>    Ca    6.0<LL>      09 Jul 2021 07:36              RADIOLOGY & ADDITIONAL TESTS:

## 2021-07-10 NOTE — PROGRESS NOTE ADULT - SUBJECTIVE AND OBJECTIVE BOX
[facility] Division of Hospital Medicine    Chief Complaint:  fever, endocarditis.    SUBJECTIVE / OVERNIGHT EVENTS: Patient seen and examined. States she feels lousy. Understand that she has a poor prognosis and that there is no surgery for her.  She states she is trying to take it day by day.     Patient denies chest pain, SOB, abd pain, N/V, fever, chills, dysuria or any other complaints. All remainder ROS negative.     MEDICATIONS  (STANDING):  amitriptyline 40 milliGRAM(s) Oral at bedtime  atorvastatin 40 milliGRAM(s) Oral at bedtime  cholecalciferol 2000 Unit(s) Oral daily  DAPTOmycin IVPB 600 milliGRAM(s) IV Intermittent every 48 hours  dextrose 40% Gel 15 Gram(s) Oral once  dextrose 5%. 1000 milliLiter(s) (50 mL/Hr) IV Continuous <Continuous>  dextrose 5%. 1000 milliLiter(s) (100 mL/Hr) IV Continuous <Continuous>  dextrose 50% Injectable 25 Gram(s) IV Push once  dextrose 50% Injectable 12.5 Gram(s) IV Push once  dextrose 50% Injectable 25 Gram(s) IV Push once  folic acid 1 milliGRAM(s) Oral daily  gabapentin 300 milliGRAM(s) Oral at bedtime  glucagon  Injectable 1 milliGRAM(s) IntraMuscular once  heparin   Injectable 5000 Unit(s) SubCutaneous every 12 hours  hydrALAZINE 25 milliGRAM(s) Oral every 8 hours  insulin glargine Injectable (LANTUS) 8 Unit(s) SubCutaneous at bedtime  insulin lispro (ADMELOG) corrective regimen sliding scale   SubCutaneous three times a day before meals  insulin lispro (ADMELOG) corrective regimen sliding scale   SubCutaneous at bedtime  lactobacillus acidophilus 1 Tablet(s) Oral two times a day with meals  levothyroxine 100 MICROGram(s) Oral daily  Nephro-nunu 1 Tablet(s) Oral daily  pantoprazole    Tablet 40 milliGRAM(s) Oral every 12 hours  sodium bicarbonate 650 milliGRAM(s) Oral three times a day    MEDICATIONS  (PRN):  acetaminophen   Tablet .. 650 milliGRAM(s) Oral every 6 hours PRN Temp greater or equal to 38C (100.4F)  acetaminophen   Tablet .. 325 milliGRAM(s) Oral every 4 hours PRN Temp greater or equal to 38C (100.4F)  ALBUTerol    0.083% 2.5 milliGRAM(s) Nebulizer every 6 hours PRN Shortness of Breath and/or Wheezing  benzocaine 15 mG/menthol 3.6 mG (Sugar-Free) Lozenge 1 Lozenge Oral four times a day PRN Sore Throat  hydrOXYzine hydrochloride 25 milliGRAM(s) Oral two times a day PRN Itching        I&O's Summary      PHYSICAL EXAM:  Vital Signs Last 24 Hrs  T(C): 36.3 (10 Jul 2021 08:42), Max: 38 (09 Jul 2021 20:16)  T(F): 97.4 (10 Jul 2021 08:42), Max: 100.4 (09 Jul 2021 20:16)  HR: 76 (10 Jul 2021 08:42) (72 - 94)  BP: 120/65 (10 Jul 2021 08:42) (102/59 - 125/74)  BP(mean): --  RR: 18 (10 Jul 2021 08:42) (16 - 19)  SpO2: 99% (10 Jul 2021 08:42) (96% - 100%)        CONSTITUTIONAL: NAD, chronically ill appearing  ENMT: Moist oral mucosa, no pharyngeal injection or exudates;   RESPIRATORY: Normal respiratory effort; lungs are clear to auscultation bilaterally  CARDIOVASCULAR: Regular rate and rhythm, normal S1 and S2,  No lower extremity edema;   ABDOMEN: Nontender to palpation, normoactive bowel sounds, no rebound/guarding  MUSCLOSKELETAL:  no joint swelling or tenderness to palpation  PSYCH: A+O to person, place, and time  NEUROLOGY: CN 2-12 are intact and symmetric  SKIN: No rashes; no palpable lesions    LABS:                        9.0    16.04 )-----------( 145      ( 09 Jul 2021 07:36 )             27.8     07-10    129<L>  |  88<L>  |  108.0<H>  ----------------------------<  204<H>  4.8   |  20.0<L>  |  5.65<H>    Ca    8.7      10 Jul 2021 08:37  Phos  8.7     07-10                Culture - Blood (collected 07 Jul 2021 13:08)  Source: .Blood Blood-Venous  Gram Stain (08 Jul 2021 09:23):    Growth in aerobic and anaerobic bottles: Gram Positive Cocci in Clusters    .    Gram Stain performed by:    [facility]    [address] [place] [id number]    .    TYPE: (C=Critical, N=Notification, A=Abnormal) C    TESTS: _ GS    DATE/TIME CALLED: _ 07/08/2021 09:23:12    CALLED TO: Ting Grace    READ BACK (2 Patient Identifiers)(Y/N): _ Y    READ BACK VALUES (Y/N): _ Y    CALLED BY: Ting simpson  Final Report (09 Jul 2021 15:17):    Growth in aerobic and anaerobic bottles: Staphylococcus epidermidis    "Susceptibilities not performed"    Culture - Blood (collected 07 Jul 2021 13:07)  Source: .Blood Blood-Peripheral  Gram Stain (08 Jul 2021 09:41):    Growth in aerobic and anaerobic bottles: Gram Positive Cocci in Clusters    .    Gram Stain performed by:    [facility]    [address] [place] [address]    .    TYPE: (C=Critical, N=Notification, A=Abnormal) C    TESTS: _ gs    DATE/TIME CALLED: _ 07/08/2021 09:22:00    CALLED TO: Ting Reyna RN    READ BACK (2 Patient Identifiers)(Y/N): _ Y    READ BACK VALUES (Y/N): _ Y    CALLED BY: Ting simpson  Final Report (10 Jul 2021 08:51):    Growth in aerobic and anaerobic bottles: Staphylococcus epidermidis  Organism: Staphylococcus epidermidis (10 Jul 2021 08:51)  Organism: Staphylococcus epidermidis (10 Jul 2021 08:51)      CAPILLARY BLOOD GLUCOSE      POCT Blood Glucose.: 268 mg/dL (10 Jul 2021 11:46)  POCT Blood Glucose.: 217 mg/dL (10 Jul 2021 08:14)  POCT Blood Glucose.: 212 mg/dL (09 Jul 2021 21:15)  POCT Blood Glucose.: 247 mg/dL (09 Jul 2021 17:55)  POCT Blood Glucose.: 215 mg/dL (09 Jul 2021 13:32)        RADIOLOGY & ADDITIONAL TESTS:  Results Reviewed:   Imaging Personally Reviewed:  Electrocardiogram Personally Reviewed:

## 2021-07-10 NOTE — PHARMACOTHERAPY INTERVENTION NOTE - COMMENTS
Vancomycin level ordered
vancomycin level 16, redosed vancomycin 500 mg x1, level ordered in AM.
vancomycin level 22, HD being held, vancomycin held for today, level ordered in AM
vancomycin redosed 500 mg x 1, level ordered in AM
Pt started on daptomycin; Recommended ordering baseline CPK
s/w LUCIANO JOHNSTON to place Vancomycin order on HD days

## 2021-07-10 NOTE — PROGRESS NOTE ADULT - SUBJECTIVE AND OBJECTIVE BOX
[facility]  INFECTIOUS DISEASES AND INTERNAL MEDICINE at [place]  =======================================================  Omi Brink MD  Diplomates American Board of Internal Medicine and Infectious Diseases  Tel: [phone]      Fax: [phone]  =======================================================    NICHELLE GILL [id number]    Follow up: bacteremia, endocarditis  febrile yesterday  c/o dry cough and sore throat  feeling very tired  diarrhea x 1      Allergies:  ACE inhibitors (Other)  Bactrim (Nephrotoxicity)  Biaxin (Joint Pain)  morphine (Short breath)  NSAIDs (Other)           REVIEW OF SYSTEMS:  CONSTITUTIONAL:  No Fever or chills +tired  HEENT:   No diplopia or blurred vision.  No earache, sore throat or runny nose.  CARDIOVASCULAR:  No pressure, squeezing, strangling, tightness, heaviness or aching about the chest, neck, axilla or epigastrium.  RESPIRATORY:  + cough, no shortness of breath  GASTROINTESTINAL:  No nausea, vomiting +diarrhea.  GENITOURINARY:  No dysuria, frequency or urgency. No Blood in urine  MUSCULOSKELETAL:  no joint aches, no muscle pain  SKIN:  No change in skin, hair or nails.  NEUROLOGIC:  No Headaches, seizures or weakness.  PSYCHIATRIC:  No disorder of thought or mood.  ENDOCRINE:  No heat or cold intolerance  HEMATOLOGICAL:  No easy bruising or bleeding.       Physical Exam:  GEN: NAD, pleasant on o2  HEENT: normocephalic and atraumatic. EOMI. PERRL.  Anicteric   NECK: Supple.   LUNGS: Clear to auscultation.  HEART: Regular rate and rhythm +murmur. right chest port  ABDOMEN: Soft, nontender, and nondistended.  Positive bowel sounds.    : No CVA tenderness  EXTREMITIES: Without any edema.  MSK: no joint swelling  NEUROLOGIC: Cranial nerves II through XII are grossly intact. No focal deficits  PSYCHIATRIC: Appropriate affect .  SKIN: No Rash      Vitals:     Vital Signs Last 24 Hrs  T(C): 36.3 (10 Jul 2021 08:42), Max: 38 (09 Jul 2021 20:16)  T(F): 97.4 (10 Jul 2021 08:42), Max: 100.4 (09 Jul 2021 20:16)  HR: 76 (10 Jul 2021 08:42) (72 - 94)  BP: 120/65 (10 Jul 2021 08:42) (102/59 - 125/74)  BP(mean): --  RR: 18 (10 Jul 2021 08:42) (16 - 19)  SpO2: 99% (10 Jul 2021 08:42) (96% - 100%)  Current Antibiotics:  vancomycin  IVPB 500 milliGRAM(s) IV Intermittent once    Other medications:  amitriptyline 40 milliGRAM(s) Oral at bedtime  atorvastatin 40 milliGRAM(s) Oral at bedtime  cholecalciferol 2000 Unit(s) Oral daily  dextrose 40% Gel 15 Gram(s) Oral once  dextrose 5%. 1000 milliLiter(s) IV Continuous <Continuous>  dextrose 5%. 1000 milliLiter(s) IV Continuous <Continuous>  dextrose 50% Injectable 25 Gram(s) IV Push once  dextrose 50% Injectable 12.5 Gram(s) IV Push once  dextrose 50% Injectable 25 Gram(s) IV Push once  folic acid 1 milliGRAM(s) Oral daily  gabapentin 300 milliGRAM(s) Oral at bedtime  glucagon  Injectable 1 milliGRAM(s) IntraMuscular once  heparin   Injectable 5000 Unit(s) SubCutaneous every 12 hours  hydrALAZINE 25 milliGRAM(s) Oral every 8 hours  insulin glargine Injectable (LANTUS) 8 Unit(s) SubCutaneous at bedtime  insulin lispro (ADMELOG) corrective regimen sliding scale   SubCutaneous three times a day before meals  insulin lispro (ADMELOG) corrective regimen sliding scale   SubCutaneous at bedtime  lactobacillus acidophilus 1 Tablet(s) Oral two times a day with meals  levothyroxine 100 MICROGram(s) Oral daily  07-10    129<L>  |  88<L>  |  108.0<H>  ----------------------------<  204<H>  4.8   |  20.0<L>  |  5.65<H>    Ca    8.7      10 Jul 2021 08:37  Phos  8.7     07-10                                         9.0    16.04 )-----------( 145      ( 09 Jul 2021 07:36 )             27.8       RECENT CULTURES:  07-07 @ 13:08 .Blood Blood-Venous       Growth in aerobic and anaerobic bottles: Gram Positive Cocci in Clusters  .  Gram Stain performed by:  [facility]  [address] [place] [address]  .  TYPE: (C=Critical, N=Notification, A=Abnormal) C  TESTS: _ GS  DATE/TIME CALLED: _ 07/08/2021 09:23:12  CALLED TO: Ting Grace  READ BACK (2 Patient Identifiers)(Y/N): _ Y  READ BACK VALUES (Y/N): _ Y  CALLED BY: Ting Classiqsin      07-07 @ 13:07 .Blood Blood-Peripheral     Growth in aerobic and anaerobic bottles: Staphylococcus epidermidis  Susceptibility to follow.    Growth in aerobic and anaerobic bottles: Gram Positive Cocci in Clusters  .  Gram Stain performed by:  [facility]  [address] [place] [address]  .  TYPE: (C=Critical, N=Notification, A=Abnormal) C  TESTS: _ gs  DATE/TIME CALLED: _ 07/08/2021 09:22:00  CALLED TO: Ting Reyna RN  READ BACK (2 Patient Identifiers)(Y/N): _ Y  READ BACK VALUES (Y/N): _ Y  CALLED BY: Ting Classiqsin      07-05 @ 07:59 .Blood Blood-Peripheral Staphylococcus epidermidis    No growth at 48 hours    Growth in anaerobic bottle: Gram Positive Cocci in Clusters  .  Gram Stain performed by:  [facility]  [address] [place] [address]  .  TYPE: (C=Critical, N=Notification, A=Abnormal) C  TESTS:  _ GS  DATE/TIME CALLED: _ 07/06/2021 10:56:36  CALLED TO: Ting Grace RN  READ BACK (2 Patient Identifiers)(Y/N): _ Y  READ BACK VALUES (Y/N): _ Y  CALLED BY: Ting Classiqsin      07-03 @ 07:56 .Blood Blood-Peripheral     Growth in aerobic bottle: Staphylococcus epidermidis  See previous culture [id number]    Growth in aerobic bottle: Gram Positive Cocci in Clusters  Gram Stain performed by:  [facility]  [address] [place] [address]  .  TYPE: (C=Critical, N=Notification, A=Abnormal) C  TESTS:  _ GS  DATE/TIME CALLED: _ 07/04/2021 14:49:52  CALLED TO: Ting MOSER  READ BACK (2 Patient Identifiers)(Y/N): _ Y  READ BACK VALUES (Y/N): _ Y  CALLED BY: _ CAMILLE      07-03 @ 05:31 .Urine Clean Catch (Midstream)     No growth        07-02 @ 14:04 .Blood Blood-Catheter     Growth in aerobic and anaerobic bottles: Staphylococcus epidermidis  Coag Negative Staphylococcus  Single set isolate, possible contaminant. Contact  Microbiology if susceptibility testing clinically  indicated.    Growth in aerobic and anaerobic bottles: Gram Positive Cocci in Clusters  Gram Stain performed by:  [facility]  [address] [place] [address]  .  TYPE: (C=Critical, N=Notification, A=Abnormal) C  TESTS:  _GS  DATE/TIME CALLED: _ 07/03/2021 12:33:23  CALLED TO: _ Maude Vital RN  READ BACK (2 Patient Identifiers)(Y/N): _ Y  READ BACK VALUES (Y/N): _ Y  CALLED BY: Ting Armstrong      07-02 @ 01:00          NotLazaro  07-01 @ 20:17 .Blood Blood-Peripheral Blood Culture PCR    Growth in aerobic and anaerobic bottles: Staphylococcus epidermidis  Coag Negative Staphylococcus  Single set isolate, possible contaminant. Contact  Microbiology if susceptibility testing clinically  indicated.    Growth in aerobic and anaerobic bottles: Gram Positive Cocci in Clusters  ***Blood Panel PCR results on this specimen are available  approximately 3 hours after the Gram stain result.***  Gram stain, PCR, and/or culture results may not always  correspond due to difference in methodologies.  ************************************************************  This PCR assay was performed using [employer].  The following targets are tested for: Enterococcus,  vancomycin resistant enterococci, Listeria monocytogenes,  coagulase negative staphylococci, S. aureus,  methicillin resistant S. aureus, Streptococcus agalactiae  (Group B), S. pneumoniae, S. pyogenes (Group A),  Acinetobacter baumannii, Enterobacter cloacae, E. coli,  Klebsiella oxytoca, K. pneumoniae, Proteus sp.,  Serratia marcescens, Haemophilus influenzae,  Neisseria meningitidis, Pseudomonas aeruginosa, Candida  albicans, C. glabrata, C krusei, C parapsilosis,  C. tropicalis and the KPC resistance gene.  Gram Stain performed by:  [facility]  [address] [place] [address]  .  TYPE: (C=Critical, N=Notification, A=Abnormal) C  TESTS:  _ GS  DATE/TIME CALLED: _ 07/02/2021 12:37:16  CALLED TO: _ Dayan Bo RN  READ BACK (2 Patient Identifiers)(Y/N): _ Y  READ BACK VALUES (Y/N): _ Y  CALLED BY: Ting Armstrong      07-01 @ 20:16 .Blood Blood-Peripheral Staphylococcus epidermidis    Growth in aerobic and anaerobic bottles: Staphylococcus epidermidis    Growth in aerobic and anaerobic bottles: Gram Positive Cocci in Clusters  Gram Stain performed by:  [facility]  [address] [place] [address]  .  TYPE: (C=Critical, N=Notification, A=Abnormal) C  TESTS:  _GS  DATE/TIME CALLED: _ 07/03/2021 09:41:58  CALLED TO: _ ALBIN VITAL  READ BACK (2 Patient Identifiers)(Y/N): _ Y  READ BACK VALUES (Y/N): _ Y  CALLED BY: _ CAMILLE          WBC Count: 16.04 K/uL (07-09-21 @ 07:36)  WBC Count: 13.50 K/uL (07-08-21 @ 08:27)  WBC Count: 11.82 K/uL (07-07-21 @ 08:49)  WBC Count: 12.75 K/uL (07-05-21 @ 07:54)    Creatinine, Serum: 3.83 mg/dL (07-09-21 @ 07:36)  Creatinine, Serum: 4.23 mg/dL (07-08-21 @ 08:27)  Creatinine, Serum: 4.48 mg/dL (07-07-21 @ 08:49)  Creatinine, Serum: 4.15 mg/dL (07-06-21 @ 07:40)  Creatinine, Serum: 5.71 mg/dL (07-05-21 @ 07:58)             SARS-CoV-2: NotDetec (07-02-21 @ 01:00)  Rapid RVP Result: NotDetec (07-02-21 @ 01:00)    COVID-19 PCR: NotDetec (07-06-21 @ 10:48)

## 2021-07-10 NOTE — PROGRESS NOTE ADULT - ASSESSMENT
77 y/o female who started HD about 3 weeks ago and has left upper chest wall dialysis catheter, also has Rt. upper chest wall port for 2 years for her access ( as per pt. her veins are bad ), blood transfusions ( has transfusion dependant anemia ) , had dialysis yesterday ( on M, W, F ) but since last night was having chills. Today pt's daughter called pt's visiting nurse who asked family to check pt's temp. As per pt. her temp was 104. pt. was sent to the ER. pt. reports no cough, no phlegm, no sick contact, no cp, no sob, no abd. pain, no n/v/d. no change in mental status. pt. is no toxic looking at the time of admission and is afebrile. pt. makes urine 2 x daily.  Pt's cardiologist Dr. Shelton has recommended no AC for pt. as she bleeds easily.     Leukocytosis  Fever  Bacteremia r/o port/catheter infection  ESRD on HD  endocarditis    - pt likely developed catheter/port infection and subsequent endocarditis  - all BCX + Cons Staph epi including cultures from catheter, BCX 7/5 2nd set so far NGTD. 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  IF blood cultures do clear with antibiotics alone, the patient will require 6 weeks of prolonged IV abx followed by lifelong oral antibiotic suppression thereafter-in addition she will remain at risk for recurrence of infection, antibiotic associated complications, nephrotoxicity      -AS ABOVE PERSISTENT BACTEREMIA UNABLE TO REMOVE PORT   WILL DC VANCO AND START DAPTOMYCIN RENALLY ADJUSTED   -  WILL FOLLOW UP WITH FURTHER RECOMMENDATIONS

## 2021-07-10 NOTE — PROGRESS NOTE ADULT - ASSESSMENT
CKD(IV): HD initiated due to refractory CHF  Now SBE s/p removal of PC  Recurrent (+) Blood cultures  - HD currently on hold   - cont antibiotics as per ID  - cont NaHCO3  - if requires dialysis  will need temporary femoral line  - trend labs    RO: hypocalcemia   - await repeat labs    Anemia: +CKD  - no ARACELI now given concerns for possible hepatic malignancy (unless cleared by Heme/Onc)  - consider PRBCs as needed

## 2021-07-10 NOTE — PROGRESS NOTE ADULT - ASSESSMENT
76y Female with HTN HLD CAD s/p remote CABG, Bio AVR solitary episode of Af w/o recurrence, ILR in place, CRI, MDS transfusion dependent, cirrhosis sec to chronic hep c with esophageal varices and prior banding, hepatocellular carcinoma,s/p embolization with embozene particles on 4/5/21, and recent admission to [facility] after fall with head trauma recently dsc after being started on HD for worsening renal function with management of pulm HTN and fluid overload state admtt with rigors and fever  The patient was found to have Staphylococcus bacteremia on multiple cultures and the Permacath was removed. Transesophageal echocardiogram also noted evidence of endocarditis on the mitral and aortic valves but the patient was thought not to be a surgical candidate.    Sepsis  due to Staphylococcus Bacteremia due to Endocarditis  - Blood cultures grew Staphylococcus epidermidis with repeat blood cultures continuing to show bacteremia.   - Hemodialysis cathter was previously removed. HD on hold for now for line holiday.  - Mediport in place as the patient has poor intravenous access and has required the port for treatments with her hematologist for MDS.  -  Infectious Disease follow up.  - Was on Vancomycin with daily Vanco levels but now changed to Dapto IV per ID  -  Cardiothoracic Surgery recommendations noted, the patient is not thought to be a candidate for surgical intervention.   - Palliative Care discussed with the family.    ESRD  HD on hold for now for line holiday  - Continue To monitor renal functions to determine necessity of resumption of hemodialysis.   - Bicarbonate initiated.  - Nephrology following    Anemia / Thrombocytopenia / MDS   - Monitoring hemoglobin levels. The patient noted a history of transfusions in the past.    Chronic diastolic heart failure   - On hydralazine.    Hyponatremia  Na 129, HD on hold  - no additional recs per Nephrology  - will monitor daily    Diabetes   - Insulin coverage, close monitoring of blood glucose levels.    Hypothyroidism   - On levothyroxine.    Hypertension   - Close blood pressure monitoring. On hydralazine.    dispo: pall care following. Remain inpatient. On going discussions regarding hospice.

## 2021-07-10 NOTE — PHARMACOTHERAPY INTERVENTION NOTE - NSPHARMCOMMASP
ASP - Lab/ test recommended
ASP - Dose optimization/Non-Renal dose adjustment
ASP - Lab/ test recommended
ASP - Renal dose adjustment
ASP - Lab/ test recommended
ASP - Lab/ test recommended

## 2021-07-11 NOTE — PROVIDER CONTACT NOTE (CRITICAL VALUE NOTIFICATION) - TEST AND RESULT REPORTED:
Blood Cultures
Blood cultures drawn 7/7 bpotistive for gram positive cocci in clusters
BC+ Gram positive cocci and clusters
Blood cx drawn 7/5 Anaerobic bottle gram positive cocci in clusters

## 2021-07-11 NOTE — PROGRESS NOTE ADULT - SUBJECTIVE AND OBJECTIVE BOX
NEPHROLOGY INTERVAL HPI/OVERNIGHT EVENTS:  pt very anxious overnight and had trouble sleeping  no acute cp, sob, n/v/d noted    MEDICATIONS  (STANDING):  amitriptyline 40 milliGRAM(s) Oral at bedtime  atorvastatin 40 milliGRAM(s) Oral at bedtime  cholecalciferol 2000 Unit(s) Oral daily  DAPTOmycin IVPB 600 milliGRAM(s) IV Intermittent every 48 hours  dextrose 40% Gel 15 Gram(s) Oral once  dextrose 5%. 1000 milliLiter(s) (50 mL/Hr) IV Continuous <Continuous>  dextrose 5%. 1000 milliLiter(s) (100 mL/Hr) IV Continuous <Continuous>  dextrose 50% Injectable 25 Gram(s) IV Push once  dextrose 50% Injectable 12.5 Gram(s) IV Push once  dextrose 50% Injectable 25 Gram(s) IV Push once  folic acid 1 milliGRAM(s) Oral daily  gabapentin 300 milliGRAM(s) Oral at bedtime  glucagon  Injectable 1 milliGRAM(s) IntraMuscular once  heparin   Injectable 5000 Unit(s) SubCutaneous every 12 hours  hydrALAZINE 25 milliGRAM(s) Oral every 8 hours  insulin glargine Injectable (LANTUS) 8 Unit(s) SubCutaneous at bedtime  insulin lispro (ADMELOG) corrective regimen sliding scale   SubCutaneous three times a day before meals  insulin lispro (ADMELOG) corrective regimen sliding scale   SubCutaneous at bedtime  lactobacillus acidophilus 1 Tablet(s) Oral two times a day with meals  levothyroxine 100 MICROGram(s) Oral daily  Nephro-nunu 1 Tablet(s) Oral daily  pantoprazole    Tablet 40 milliGRAM(s) Oral every 12 hours  sodium bicarbonate 650 milliGRAM(s) Oral three times a day    MEDICATIONS  (PRN):  acetaminophen   Tablet .. 650 milliGRAM(s) Oral every 6 hours PRN Temp greater or equal to 38C (100.4F)  acetaminophen   Tablet .. 325 milliGRAM(s) Oral every 4 hours PRN Temp greater or equal to 38C (100.4F)  ALBUTerol    0.083% 2.5 milliGRAM(s) Nebulizer every 6 hours PRN Shortness of Breath and/or Wheezing  benzocaine 15 mG/menthol 3.6 mG (Sugar-Free) Lozenge 1 Lozenge Oral four times a day PRN Sore Throat  hydrOXYzine hydrochloride 25 milliGRAM(s) Oral two times a day PRN Itching      Allergies    ACE inhibitors (Other)  Bactrim (Nephrotoxicity)  Biaxin (Joint Pain)  morphine (Short breath)  NSAIDs (Other)          Vital Signs Last 24 Hrs  T(C): 36.5 (11 Jul 2021 05:38), Max: 37.1 (10 Jul 2021 19:06)  T(F): 97.7 (11 Jul 2021 05:38), Max: 98.8 (10 Jul 2021 19:06)  HR: 70 (11 Jul 2021 05:38) (70 - 80)  BP: 119/57 (11 Jul 2021 05:38) (119/57 - 145/77)  BP(mean): --  RR: 18 (11 Jul 2021 05:38) (18 - 18)  SpO2: 98% (11 Jul 2021 05:38) (98% - 99%)    PHYSICAL EXAM:  GENERAL: Debilitated  ENMT: Dry mucous membranes  NECK: Supple, +JVD  NERVOUS SYSTEM:  Alert & Oriented X3,  intact and symmetric  CHEST/LUNG: Diminished BS   HEART: No rub  ABDOMEN: Soft, Nontender, Nondistended; BS+  EXTREMITIES: +dependent edema   SKIN: No rashes or lesions    LABS:                        9.0    16.04 )-----------( 145      ( 09 Jul 2021 07:36 )             27.8     07-10    129<L>  |  88<L>  |  108.0<H>  ----------------------------<  204<H>  4.8   |  20.0<L>  |  5.65<H>    Ca    8.7      10 Jul 2021 08:37  Phos  8.7     07-10          Phosphorus Level, Serum: 8.7 mg/dL (07-10 @ 08:37)      RADIOLOGY & ADDITIONAL TESTS:

## 2021-07-11 NOTE — PROGRESS NOTE ADULT - ASSESSMENT
CKD(IV): HD initiated due to refractory CHF  Now SBE s/p removal of PC  Recurrent (+) Blood cultures  Pt has had no dialysis over last several days, in part due to lack of venous access   - cont antibiotics as per ID  - cont NaHCO3  - trend labs---> will likely need to restart dialysis in next 24 hours and will require temporary femoral vein catheter placement    RO: hypocalcemia ---> improved on repeat  Hyperphosphatemia noted  - add Renvela    Anemia: +CKD  - no ARACELI due to possible hepatic malignancy (unless cleared by Heme/Onc)  - consider PRBCs as needed

## 2021-07-11 NOTE — PROGRESS NOTE ADULT - ASSESSMENT
76y Female with HTN HLD CAD s/p remote CABG, Bio AVR solitary episode of Af w/o recurrence, ILR in place, CRI, MDS transfusion dependent, cirrhosis sec to chronic hep c with esophageal varices and prior banding, hepatocellular carcinoma,s/p embolization with embozene particles on 4/5/21, and recent admission to [facility] after fall with head trauma recently dsc after being started on HD for worsening renal function with management of pulm HTN and fluid overload state admtt with rigors and fever  The patient was found to have Staphylococcus bacteremia on multiple cultures and the Permacath was removed. Transesophageal echocardiogram also noted evidence of endocarditis on the mitral and aortic valves but the patient was thought not to be a surgical candidate. Dialysis has been on hold due to permacath removal and persistent bacteremia. Patient now wishes to transition to comfort care, she does not wish to restart dialysis.     Sepsis  due to Staphylococcus Bacteremia due to Endocarditis  - Blood cultures grew Staphylococcus epidermidis with repeat blood cultures continuing to show bacteremia.   - Hemodialysis cathter was previously removed. HD on hold for now for line holiday.  - Mediport in place as the patient has poor intravenous access and has required the port for treatments with her hematologist for MDS.  -  Infectious Disease follow up noted was on Vancomycin with daily Vanco levels but now changed to Dapto IV per ID.  -  Cardiothoracic Surgery recommendations noted, the patient is not thought to be a candidate for surgical intervention.   - Palliative Care discussed with the family.  - Patient now wishes to pursue comfort measures only.     ESRD  HD on hold for now for line holiday  - Nephrology following.  - Patient does not wish to restart HD. Now comfort measures.    Anemia / Thrombocytopenia / MDS   -  The patient noted a history of transfusions in the past.    Chronic diastolic heart failure   - On hydralazine.    Hyponatremia  Na 129, HD on hold  - no additional recs per Nephrology    Diabetes   - DC Insulin coverage and BG monitor to focus on comfort.     Hypothyroidism   - On levothyroxine.    Hypertension   -  On hydralazine.    dispo: Hospice referral ordered.   Code status: DNR/DNI and comfort care  Discussed with Daughter Daniela.

## 2021-07-11 NOTE — CHART NOTE - NSCHARTNOTEFT_GEN_A_CORE
PA note  Called at 2150 that patient was having trouble falling a sleep. Melatonin 3mg ordered.  Called back at 0140 that patient was still unable to fall asleep most likely due to anxiety due to her very poor prognosis. According to the notes she has been extremely anxious, but she still wants treatment and   not to be put on Hospice or comfort care. Xanax .25mg was ordered.   VSS: BP:145/77, P: 80, T:98.8. 2018 18:00 2018 18:05

## 2021-07-11 NOTE — PROGRESS NOTE ADULT - SUBJECTIVE AND OBJECTIVE BOX
[facility] Division of Hospital Medicine    Chief Complaint:  fever, endocarditis    SUBJECTIVE / OVERNIGHT EVENTS: Patient seen and examined. She reports she is very tired today and wants to sleep. She does not want to have her blood drawn anymore, She does not want to restart dialysis tomorrow and even wants to stop antibiotics. She wants to be comfortable and is in agreement for comfort measures.       MEDICATIONS  (STANDING):  amitriptyline 40 milliGRAM(s) Oral at bedtime  atorvastatin 40 milliGRAM(s) Oral at bedtime  cholecalciferol 2000 Unit(s) Oral daily  DAPTOmycin IVPB 600 milliGRAM(s) IV Intermittent every 48 hours  dextrose 40% Gel 15 Gram(s) Oral once  dextrose 5%. 1000 milliLiter(s) (50 mL/Hr) IV Continuous <Continuous>  dextrose 5%. 1000 milliLiter(s) (100 mL/Hr) IV Continuous <Continuous>  dextrose 50% Injectable 25 Gram(s) IV Push once  dextrose 50% Injectable 12.5 Gram(s) IV Push once  dextrose 50% Injectable 25 Gram(s) IV Push once  folic acid 1 milliGRAM(s) Oral daily  gabapentin 300 milliGRAM(s) Oral at bedtime  glucagon  Injectable 1 milliGRAM(s) IntraMuscular once  hydrALAZINE 25 milliGRAM(s) Oral every 8 hours  lactobacillus acidophilus 1 Tablet(s) Oral two times a day with meals  levothyroxine 100 MICROGram(s) Oral daily  Nephro-nunu 1 Tablet(s) Oral daily  pantoprazole    Tablet 40 milliGRAM(s) Oral every 12 hours  sevelamer carbonate 800 milliGRAM(s) Oral three times a day with meals  sodium bicarbonate 650 milliGRAM(s) Oral three times a day    MEDICATIONS  (PRN):  acetaminophen   Tablet .. 650 milliGRAM(s) Oral every 6 hours PRN Temp greater or equal to 38C (100.4F)  acetaminophen   Tablet .. 325 milliGRAM(s) Oral every 4 hours PRN Temp greater or equal to 38C (100.4F)  ALBUTerol    0.083% 2.5 milliGRAM(s) Nebulizer every 6 hours PRN Shortness of Breath and/or Wheezing  benzocaine 15 mG/menthol 3.6 mG (Sugar-Free) Lozenge 1 Lozenge Oral four times a day PRN Sore Throat  glycopyrrolate Injectable 0.4 milliGRAM(s) IV Push four times a day PRN Secretions  hydrOXYzine hydrochloride 25 milliGRAM(s) Oral two times a day PRN Itching        I&O's Summary      PHYSICAL EXAM:  Vital Signs Last 24 Hrs  T(C): 36.5 (11 Jul 2021 05:38), Max: 37.1 (10 Jul 2021 19:06)  T(F): 97.7 (11 Jul 2021 05:38), Max: 98.8 (10 Jul 2021 19:06)  HR: 70 (11 Jul 2021 05:38) (70 - 80)  BP: 119/57 (11 Jul 2021 05:38) (119/57 - 145/77)  BP(mean): --  RR: 18 (11 Jul 2021 05:38) (18 - 18)  SpO2: 98% (11 Jul 2021 05:38) (98% - 98%)        CONSTITUTIONAL: NAD, 2L NC  ENMT: Moist oral mucosa, no pharyngeal injection or exudates  RESPIRATORY: Normal respiratory effort; lungs are clear to auscultation bilaterally  CARDIOVASCULAR: Regular rate and rhythm, normal S1 and S2, No lower extremity edema;  ABDOMEN: Nontender to palpation, normoactive bowel sounds, no rebound/guarding;   MUSCLOSKELETAL:  no joint swelling or tenderness to palpation  PSYCH: A+O to person, place, and time; affect appropriate  NEUROLOGY: CN 2-12 are intact and symmetric; no gross sensory deficits;   SKIN: No rashes; no palpable lesions    LABS:    07-10    129<L>  |  88<L>  |  108.0<H>  ----------------------------<  204<H>  4.8   |  20.0<L>  |  5.65<H>    Ca    8.7      10 Jul 2021 08:37  Phos  8.7     07-10                CAPILLARY BLOOD GLUCOSE      POCT Blood Glucose.: 360 mg/dL (11 Jul 2021 07:56)  POCT Blood Glucose.: 250 mg/dL (10 Jul 2021 20:58)  POCT Blood Glucose.: 223 mg/dL (10 Jul 2021 17:25)  POCT Blood Glucose.: 268 mg/dL (10 Jul 2021 11:46)        RADIOLOGY & ADDITIONAL TESTS:  Results Reviewed:   Imaging Personally Reviewed:  Electrocardiogram Personally Reviewed:

## 2021-07-12 NOTE — DISCHARGE NOTE PROVIDER - NSDCFUSCHEDAPPT_GEN_ALL_CORE_FT
[facility] ; 07/16/2021 ; NSBREA MONROYD[employer]  [facility] ; 07/16/2021 ; NISHA Rad  Comm

## 2021-07-12 NOTE — DISCHARGE NOTE PROVIDER - NSDCCPCAREPLAN_GEN_ALL_CORE_FT
PRINCIPAL DISCHARGE DIAGNOSIS  Diagnosis: Sepsis  Assessment and Plan of Treatment: due to endocarditis  dc to inpatient hospice

## 2021-07-12 NOTE — HOSPICE CARE NOTE - CONVESATION DETAILS
Hospice referral received - patient has been approved for a bed at the [facility]. Writer[facility] RN telephoned patient's daughter, Daniela. All aspects of inpatient hospice services provided at the [facility] explained with good return understanding. All questions answered; emotional support provided. Daughter Daniela agreed to have patient transferred to the [facility]; she will follow the ambulance there and sign hospice consent forms.     This writer will request a 2pm ambulance.     Updated Dr. Potter and Dr. Abida Clarke as to the above.

## 2021-07-12 NOTE — DISCHARGE NOTE NURSING/CASE MANAGEMENT/SOCIAL WORK - PATIENT PORTAL LINK FT
You can access the FollowMyHealth Patient Portal offered by [facility] by registering at the following website: [email or web address]. By joining [employer]’s FollowMyHealth portal, you will also be able to view your health information using other applications (apps) compatible with our system.

## 2021-07-12 NOTE — DISCHARGE NOTE PROVIDER - NSDCMRMEDTOKEN_GEN_ALL_CORE_FT
HYDROmorphone:   hydrOXYzine hydrochloride 25 mg oral tablet: 1 tab(s) orally 2 times a day, As needed, Itching  pantoprazole 40 mg intravenous injection: 40 milligram(s) intravenous once a day  ProAir HFA 90 mcg/inh inhalation aerosol: 2 puff(s) inhaled every 6 hours, As Needed

## 2021-07-12 NOTE — PROGRESS NOTE ADULT - ASSESSMENT
76F with hx of CAD s/p CABG, HFpEF, bioprosthetic AVR, cirrhosis 2/2 hep C, HCC s/p embolization, AFib (no AC), ESRD on HD (MWF, started ~3 weeks ago), MDS (transfusion dependent), s/p mediport (placed ~2yrs ago), recent hospitalization for acute decompensated CHF/pHTN admitted for fever and found with staph epidermidis bacteremia complicated further by endocarditis with vegetation on bioprosthetic AV with suspicion of abscess and vegetation on MV, deemed poor operative candidate by CTS, progressive clinical deterioration and ultimately pt/family opted for transition to full comfort measures.     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   Spoke with daughters including HCP Daniela at bedside.   Family confirm mother's wishes for comfort measures and would like to pursue  hospice likely inpatient facility for ongoing symptom control at end of life.  Emotional support provided and all questions answered.

## 2021-07-12 NOTE — PROGRESS NOTE ADULT - SUBJECTIVE AND OBJECTIVE BOX
chart reviewed  pt and family have decided to stop all aggressive measures, including dialysis, and will begin transitioning to hospice  will no longer follow  please recall if situation changes

## 2021-07-12 NOTE — PROGRESS NOTE ADULT - PROVIDER SPECIALTY LIST ADULT
Cardiology
Hospitalist
Infectious Disease
Nephrology
Cardiology
Cardiology
Hospitalist
Infectious Disease
Nephrology
Palliative Care
Palliative Care
Cardiology
Cardiology
Hospitalist
Hospitalist
Internal Medicine
Nephrology
Nephrology
Surgery
Cardiology
Hospitalist
Infectious Disease
Infectious Disease
Nephrology
Nephrology
Hospitalist
Infectious Disease
Nephrology
Nephrology

## 2021-07-12 NOTE — PROGRESS NOTE ADULT - REASON FOR ADMISSION
fever

## 2021-07-12 NOTE — PROGRESS NOTE ADULT - TIME BILLING
D/W refugio, hospitalist Dr. Potter, coordination with hospice liaison
D/W hospitalist, RN, family meeting, review of chart and labs

## 2021-07-12 NOTE — DISCHARGE NOTE PROVIDER - HOSPITAL COURSE
76y Female with HTN HLD CAD s/p remote CABG, Bio AVR solitary episode of Af w/o recurrence, ILR in place, CRI, MDS transfusion dependent, cirrhosis sec to chronic hep c with esophageal varices and prior banding, hepatocellular carcinoma,s/p embolization with embozene particles on 4/5/21, and recent admission to [facility] after fall with head trauma recently dsc after being started on HD for worsening renal function with management of pulm HTN and fluid overload state admited with rigors and fever.  The patient was found to have Staphylococcus bacteremia on multiple cultures and the Permacath was removed. Transesophageal echocardiogram also noted evidence of endocarditis on the mitral and aortic valves but the patient was thought not to be a surgical candidate. Dialysis has been on hold due to permacath removal and persistent bacteremia. Patient now wishes to transition to comfort care, she does not wish to restart dialysis. She is agreeable to inpatient hospice.     Vital Signs Last 24 Hrs  T(C): 36.4 (12 Jul 2021 08:40), Max: 36.4 (12 Jul 2021 08:40)  T(F): 97.5 (12 Jul 2021 08:40), Max: 97.5 (12 Jul 2021 08:40)  HR: 55 (12 Jul 2021 08:40) (55 - 55)  BP: 141/62 (12 Jul 2021 08:40) (141/62 - 141/62)  RR: 18 (12 Jul 2021 08:40) (18 - 18)  SpO2: 95% (12 Jul 2021 08:40) (95% - 95%)    PHYSICAL EXAM:  Constitutional: No acute distress  HEENT: AT/NC, EOMI, PERRLA, Normal conjunctiva, no pharyngreal erythema, moist oral mucosa  Respiratory: CTA BL,  Cardiovascular: RRR,   Gastrointestinal: soft, Non-tender, Non-distended + Bowel sounds, no rebound or guarding  Musculoskeletal: No joint edema  Neurological: CN 2-12 grossly intact, no focal deficits  Skin: warm, dry and intact  Psychiatric: normal mood and affect    33 minutes spent on discharge

## 2021-07-12 NOTE — PROGRESS NOTE ADULT - SUBJECTIVE AND OBJECTIVE BOX
[facility] PALLIATIVE MEDICINE FOLLOW UP VISIT    INTERVAL HPI/OVERNIGHT EVENTS:  Source if other than patient:  []Family   [x]Team     Pt on comfort measures after goc yesterday.   Tachypneic this morning s/p IV dilaudid.   Daughters at bedside.     Present Symptoms:   Dyspnea:  No   Nausea/Vomiting:  No  Anxiety:   No  Depression: unable  Fatigue: Yes    Loss of appetite: minimal intake per family  Pain:  No    Unable to perform ROS due to mentation.    MEDICATIONS  (STANDING):  dextrose 5%. 1000 milliLiter(s) (50 mL/Hr) IV Continuous <Continuous>  dextrose 5%. 1000 milliLiter(s) (100 mL/Hr) IV Continuous <Continuous>  glucagon  Injectable 1 milliGRAM(s) IntraMuscular once  pantoprazole  Injectable 40 milliGRAM(s) IV Push daily    MEDICATIONS  (PRN):  acetaminophen   Tablet .. 650 milliGRAM(s) Oral every 6 hours PRN Temp greater or equal to 38C (100.4F)  acetaminophen   Tablet .. 325 milliGRAM(s) Oral every 4 hours PRN Temp greater or equal to 38C (100.4F)  ALBUTerol    0.083% 2.5 milliGRAM(s) Nebulizer every 6 hours PRN Shortness of Breath and/or Wheezing  benzocaine 15 mG/menthol 3.6 mG (Sugar-Free) Lozenge 1 Lozenge Oral four times a day PRN Sore Throat  diphenhydrAMINE   Injectable 25 milliGRAM(s) IV Push every 6 hours PRN Itching  glycopyrrolate Injectable 0.4 milliGRAM(s) IV Push four times a day PRN Secretions  HYDROmorphone  Injectable 0.5 milliGRAM(s) IV Push every 2 hours PRN Severe Pain (7 - 10)  hydrOXYzine hydrochloride 25 milliGRAM(s) Oral two times a day PRN Itching    PHYSICAL EXAM:  Vital Signs Last 24 Hrs  T(C): 36.4 (12 Jul 2021 08:40), Max: 36.4 (12 Jul 2021 08:40)  T(F): 97.5 (12 Jul 2021 08:40), Max: 97.5 (12 Jul 2021 08:40)  HR: 55 (12 Jul 2021 08:40) (55 - 55)  BP: 141/62 (12 Jul 2021 08:40) (141/62 - 141/62)  BP(mean): --  RR: 18 (12 Jul 2021 08:40) (18 - 18)  SpO2: 95% (12 Jul 2021 08:40) (95% - 95%)    Palliative Performance Status Version 2: 20%  [email or web address]    General: Resting comfortably. No acute distress.    HEENT: mmm  Lungs: comfortable. non-labored. NC  CV: +s1/s2. rrr. +murmur  GI: +bowel sound. abdomen soft, non-tender, non-distended   MSK: No cyanosis or edema. weakness.    Neuro: nonfocal.lethargic   Skin: warm and dry.     LABS: reviewed  RADIOLOGY & ADDITIONAL STUDIES: Reviewed

## 2021-07-12 NOTE — PROGRESS NOTE ADULT - NSICDXPILOT_GEN_ALL_CORE
[place]

## 2021-07-12 NOTE — CHART NOTE - NSCHARTNOTESELECT_GEN_ALL_CORE
Event Note
vascular surgery/Event Note
Event Note
Event Note
vascular surgery/Event Note

## 2021-07-12 NOTE — CHART NOTE - NSCHARTNOTEFT_GEN_A_CORE
patient does not wish to pursue HD any more at this time.   confirmed with patient and daughter bedside  understands risks of not continuing with HD  vascular surgery will sign off

## 2021-07-14 NOTE — DIETITIAN INITIAL EVALUATION ADULT. - REASON FOR ADMISSION
chf Quality 111:Pneumonia Vaccination Status For Older Adults: Pneumococcal Vaccination not Administered or Previously Received, Reason not Otherwise Specified Quality 226: Preventive Care And Screening: Tobacco Use: Screening And Cessation Intervention: Patient screened for tobacco use and is an ex/non-smoker Quality 110: Preventive Care And Screening: Influenza Immunization: Influenza Immunization not Administered because Patient Refused. Quality 402: Tobacco Use And Help With Quitting Among Adolescents: Patient screened for tobacco and never smoked Quality 134: Screening For Clinical Depression And Follow-Up Plan: The patient was screened for depression and the screen was positive and follow up plan documented Quality 130: Documentation Of Current Medications In The Medical Record: Current Medications Documented Detail Level: Detailed Quality 431: Preventive Care And Screening: Unhealthy Alcohol Use - Screening: Patient screened for unhealthy alcohol use using a single question and scores less than 2 times per year

## 2021-07-16 ENCOUNTER — APPOINTMENT (OUTPATIENT)
Dept: INTERVENTIONAL RADIOLOGY/VASCULAR | Facility: CLINIC | Age: 76
End: 2021-07-16

## 2021-08-20 NOTE — DISCHARGE NOTE NURSING/CASE MANAGEMENT/SOCIAL WORK - NSPROEXTENSIONSOFSELF_GEN_A_NUR
Addended by: OLIVIA BUTLER on: 8/20/2021 03:44 PM     Modules accepted: Orders    
eyeglasses/dentures

## 2021-10-05 PROBLEM — N18.6 ESRD ON DIALYSIS: Status: ACTIVE | Noted: 2021-10-05

## 2021-10-05 NOTE — PHYSICAL EXAM
[Normal Rate and Rhythm] : normal rate and rhythm [2+] : left 2+ [Ankle Swelling (On Exam)] : not present [Varicose Veins Of Lower Extremities] : not present [] : not present [Abdomen Tenderness] : ~T ~M No abdominal tenderness [No Rash or Lesion] : No rash or lesion [Alert] : alert [Oriented to Person] : oriented to person [Oriented to Place] : oriented to place [Oriented to Time] : oriented to time [Calm] : calm [de-identified] : NAD [de-identified] : supple, no masses [de-identified] : unlabored breathing; L chest catheter site clean, dry, without erythema or drainage [de-identified] : FROM of all 4 extremities\par

## 2021-10-05 NOTE — ASSESSMENT
[FreeTextEntry1] : 77 yo F with history of HTN, hypercholesterolemia, diabetes, CAD, anemia, and ESRD on HD recently started on HD while hospitalized via L IJ tunneled dialysis catheter.\par \par

## 2022-01-11 NOTE — ED ADULT NURSE NOTE - TEMPLATE LIST FOR HEAD TO TOE ASSESSMENT
General Solaraze Counseling:  I discussed with the patient the risks of Solaraze including but not limited to erythema, scaling, itching, weeping, crusting, and pain.

## 2022-01-13 NOTE — PATIENT PROFILE ADULT. - PRO INTERPRETER NEED 2
Patient resolved from [address] Transitions episode on 01/13/22. Discussed COVID-19 related testing which was available at this time. Test results were positive. Patient informed of results, if available? Previously informed. Patient/family has been provided the following resources and education related to COVID-19:                         Signs, symptoms and red flags related to COVID-19            [facility] exposure and quarantine guidelines            [facility] exposure contact - [phone]            Contact for their local Department of Health                 Patient currently reports that the following symptoms have improved:  no worsening symptoms. No further outreach scheduled with this CTN/ACM/LPN/HC/ MA. Episode of Care resolved. Patient has this CTN/ACM/LPN/HC/MA contact information if future needs arise. English

## 2022-03-01 NOTE — H&P ADULT - OPH GEN HX ROS MEA POS PC
A ([facility] - Catheter 6fr Jl4 Crv 100cm Radopq Braid Slct Sup) catheter was inserted.   diplopia/blurred vision L/blurred vision R

## 2022-05-06 NOTE — DIETITIAN INITIAL EVALUATION ADULT. - CURRENT DIET ORDER MEETS ESTIMATED NUTRIENT REQUIREMENTS
Constitutional: No fever or chills  Eyes: No visual changes  HEENT: No throat pain  CV: No chest pain  Resp: No SOB no cough  GI: No abd pain, nausea or vomiting  : + dysuria, penile discharge   MSK: No musculoskeletal pain  Skin: No rash  Neuro: No headache Statement Selected

## 2022-06-27 NOTE — ED PROVIDER NOTE - CHIEF COMPLAINT
The patient is a 75y Female complaining of abnormal lab result.
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  He is currently being trialed off medication.  THere are no symptoms of hypothyroidism.  He is growing well.  TSH is mildly elevated but has not worsened from 6 weeks off to 3 months off.  I explained that there is good likelihood he will have to go back on, but because clinically he is doing well, T4 remains normal, and TSH is not >10 I have agreed to hold off and continue to closely monitor.  Again reviewed symptoms of hypothyroidism.

## 2022-07-29 NOTE — ED ADULT TRIAGE NOTE - HEIGHT IN FEET
Pt comes in c/o abdominal pain x 1 week. Pt states that it did go away three days ago but the bloating and diarrhea stayed and worsened today. 5

## 2022-09-14 NOTE — ED ADULT NURSE NOTE - GENITOURINARY ASSESSMENT
Outcome: LEFT MESSAGE FOR PATIENT TO SCHEDULE AN AWV        Please transfer to [facility] at [phone] when patient returns call.            Attempt # 1   WDL

## 2022-11-28 NOTE — PATIENT PROFILE ADULT. - MENTAL HEALTH CONDITIONS/SYMPTOMS, PROFILE
[facility] ENCOUNTER:        Kvng Stockton  1999  male    CHIEF COMPLAINT:    Chief Complaint   Patient presents with   • Dizziness         HPI:    Is a 23-year-old male presenting to emergency room with dizziness since Wednesday 6 days ago.  Patient states that this happens every day on and off with moving around or changing positions at times when he stands up he feels dizzy.  Patient's mother present at bedside, states that he was worked up for vertigo 2 years ago and had as needed meclizine which he took yesterday which did not help.  Patient states that this feels different this time that his episode of vertigo from 2 years ago.  Mother is concerned and requesting a CT scan to rule out a tumor.  Patient states he is generally healthy, he smokes marijuana daily but it has not made him dizzy in the past.  Denies any nausea or vomiting, neck pain, palpitations, chest pain or shortness of breath, headache, changes in vision, tinnitus, numbness or tingling.  Reports congestion for the past few days, without cough or fevers.            ALLERGIES:    ALLERGIES:  No Known Allergies    CURRENT MEDICATIONS:    (Not in a hospital admission)      PAST MEDICAL HISTORY:    Past Medical History:   Diagnosis Date   • OCD (obsessive compulsive disorder) 12/5/2018   • Phobia, social 12/5/2018       SURGICAL HISTORY:    Past Surgical History:   Procedure Laterality Date   • Appendectomy  2017   • Hand surgery Right 2017   • Nose surgery         SOCIAL HISTORY:    Social History     Tobacco Use   • Smoking status: Never Smoker   • Smokeless tobacco: Current User   • Tobacco comment: Vape   Substance Use Topics   • Alcohol use: No   • Drug use: Yes     Frequency: 7.0 times per week     Types: Marijuana     Comment: daily-started 2015       FAMILY HISTORY:    Family History   Problem Relation Age of Onset   • Anemia Mother    • Diabetes Maternal Grandfather    • Hypertension Maternal Grandfather           REVIEW OF SYSTEMS:    Review of Systems   HENT: Negative.    Eyes: Negative.    Cardiovascular: Negative.    Respiratory: Negative.    Endocrine: Negative.    Skin: Negative.    Musculoskeletal: Negative.    Gastrointestinal: Negative.    Genitourinary: Negative.    Neurological: Positive for dizziness.   Psychiatric/Behavioral: Negative.    All other systems reviewed and are negative.       PHYSICAL EXAM:   ED Triage Vitals [11/28/22 0804]   /74   Heart Rate 80   Resp 16   Temp 98 °F (36.7 °C)   SpO2 100 %      Physical Exam  Vitals and nursing note reviewed.   Constitutional:       General: He is not in acute distress.     Appearance: Normal appearance. He is not toxic-appearing.   HENT:      Head: Normocephalic and atraumatic.      Right Ear: Tympanic membrane, ear canal and external ear normal.      Left Ear: Tympanic membrane, ear canal and external ear normal.      Nose: Nose normal.      Mouth/Throat:      Mouth: Mucous membranes are moist.      Pharynx: Oropharynx is clear. No oropharyngeal exudate.   Eyes:      General:         Right eye: No discharge.         Left eye: No discharge.      Conjunctiva/sclera: Conjunctivae normal.      Pupils: Pupils are equal, round, and reactive to light.   Neck:      Trachea: No tracheal deviation.   Cardiovascular:      Rate and Rhythm: Normal rate and regular rhythm.      Heart sounds: Normal heart sounds.   Pulmonary:      Effort: Pulmonary effort is normal. No respiratory distress.      Breath sounds: Normal breath sounds. No wheezing.   Abdominal:      General: Abdomen is flat. Bowel sounds are normal. There is no distension.      Palpations: Abdomen is soft.      Tenderness: There is no abdominal tenderness.   Musculoskeletal:         General: Normal range of motion.      Cervical back: Normal range of motion and neck supple.   Skin:     General: Skin is warm and dry.      Capillary Refill: Capillary refill takes less than 2 seconds.   Neurological:       General: No focal deficit present.      Mental Status: He is alert and oriented to person, place, and time.      Gait: Gait is intact.   Psychiatric:         Mood and Affect: Affect normal.          EKG done at 0814 shows NSR, HR 71.    RADIOLOGY AND LAB RESULTS:    Results for orders placed or performed during the hospital encounter of 11/28/22   Comprehensive Metabolic Panel   Result Value    Fasting Status     Sodium 141    Potassium 4.7    Chloride 108    Carbon Dioxide 29    Anion Gap 9    Glucose 99    BUN 15    Creatinine 0.86    Glomerular Filtration Rate >90     Comment: eGFR results = or >60 mL/min/1.73m2 = Normal kidney function. Estimated GFR calculated using the CKD-EPI-R (2021) equation that does not include race in the creatinine calculation.    BUN/ Creatinine Ratio 17    Calcium 9.3    Bilirubin, Total 0.6    GOT/AST 11    GPT/ALT 26    Alkaline Phosphatase 59    Albumin 4.1    Protein, Total 6.6    Globulin 2.5    A/G Ratio 1.6   Magnesium   Result Value    Magnesium 2.1   SARS-COV-2/INFLUENZA BY PCR   Result Value    Rapid SARS-COV-2 by PCR Not Detected    Influenza A by PCR Not Detected    Influenza B by PCR Not Detected    Isolation Guidelines      Comment: Do not use this test result as the sole decision-maker for discontinuation of isolation.   Clinical evaluation should be considered for other respiratory illness requiring transmission-based isolation.    -    No fever (<99.0 F/37.2 C) for at least 24 hours without the use of fever-reducing medications    AND  -    Respiratory symptoms have improved or resolved (e.g. cough, shortness of breath)     AND  -    COVID-19 negative test    See COVID-19 Deisolation Resource Guide    Procedural Comment      Comment: SARS-COV-2 nucleic acid has not been detected indicating the absence of COVID-19.    This test was performed using the [employer] Xpert Xpress SARS-CoV-2/Flu/RSV RT-PCR test that has been given Emergency Use Authorization (EUA) by the  [place] Food and Drug Administration (FDA).  These tests are considered definitive and do not need to be confirmed by another method.   Thyroid Stimulating Hormone   Result Value    TSH 0.192 (L)   CBC with Automated Differential (performable only)   Result Value    WBC 7.6    RBC 4.29 (L)    HGB 13.5    HCT 40.6    MCV 94.6    MCH 31.5    MCHC 33.3    RDW-CV 12.3    RDW-SD 42.7        NRBC 0    Neutrophil, Percent 60    Lymphocytes, Percent 26    Mono, Percent 9    Eosinophils, Percent 4    Basophils, Percent 1    Immature Granulocytes 0    Absolute Neutrophils 4.6    Absolute Lymphocytes 1.9    Absolute Monocytes 0.7    Absolute Eosinophils  0.3    Absolute Basophils 0.1    Absolute Immmature Granulocytes 0.0   Free T3   Result Value    T3, Free 2.7   Free T4   Result Value    T4, Free 0.8   Electrocardiogram 12-Lead   Result Value    Ventricular Rate EKG/Min (BPM) 71    Atrial Rate (BPM) 71    DC-Interval (MSEC) 148    QRS-Interval (MSEC) 92    QT-Interval (MSEC) 372    QTc 404    P Axis (Degrees) 50    R Axis (Degrees) 89    T Axis (Degrees) 53    REPORT TEXT      Normal sinus rhythm  Normal ECG  When compared with ECG of  16-NOV-2020 08:29,  No significant change was found  Confirmed by JACKELINE REYES MD ([id number]) on 11/28/2022 10:07:24 AM         CT HEAD WO CONTRAST   Final Result      No CT evidence of an acute intracranial abnormality.      Electronically Signed by: CR PERDUE M.D.    Signed on: 11/28/2022 11:31 AM                ED COURSE & MEDICAL DECISION MAKING:   ED Course as of 11/28/22 1432   Mon Nov 28, 2022   1035 I discussed with patient and his mother results.  Patient states he is feeling better, he was able to ambulate in hallway with a steady gait but states that he still feels some vertigo like \"bouncing from left to right\" in his head while walking.  Treatment with meclizine and lorazepam, he states he took meclizine yesterday but is willing to try lorazepam here today.  Agreed.  [PM]      ED Course User Index  [PM] Nydia Miller, GRETEL     Vitals:    11/28/22 0939 11/28/22 1038 11/28/22 1118 11/28/22 1449   BP: 121/80 120/75  122/78   BP Location:    LUE - Left upper extremity   Patient Position:    Sitting/High-[place]   Pulse: 61 (!) 53  72   Resp:  12 14 18   Temp:       TempSrc:       SpO2: 99% 98% 100% 100%   Weight:       Height:         MDM    This is a 23-year-old male who presented to emergency room with his mother with complaints of dizziness.  Reports this has been going on for 6 days.  Patient with previous episodes of vertigo about 2 years ago with negative work-up, has been treated with meclizine as needed but has not had much vertigo since.  Mother is concerned for vertigo/brain tumor and requesting CT scan today.  Patient's vitals remained stable including negative orthostatic vital signs.  Patient was treated with a liter of IV fluid bolus.  I offered patient meclizine but he states he took 1 yesterday and it did not work for him at all.  CBC and CMP are reassuring, CT head is negative for intracranial abnormality.  On exam there is no infection below in the ears bilaterally.  Patient was able to ambulate with a steady gait but still felt little dizzy while walking around.  Patient was given 0.5 mg of lorazepam orally.  I discussed with patient plan for discharge home with outpatient follow-up with neurology and using lorazepam and meclizine as needed for dizziness.  Discussed with him not to operate motor vehicle while taking lorazepam and he agrees.  Patient is a daily marijuana smoker but states that he has been smoking for years and it never affected him in that way.  Patient agreed with  plan for discharge and outpatient follow-up and requested discharge home.  Patient discharged in stable condition, nontoxic-appearing and nonlabored respirations.      DIAGNOSIS:   1. Dizziness    2. Marijuana use              PLAN: Charge home with outpatient  follow-up.  PRESCRIPTIONS:  Discharge Medication List as of 11/28/2022  2:38 PM      START taking these medications    Details   LORazepam (ATIVAN) 0.5 MG tablet Take 1 tablet by mouth every 6 hours as needed (dizziness).Eprescribe, Disp-12 tablet, R-0      meclizine (ANTIVERT) 25 MG tablet Take 1 tablet by mouth 3 times daily as needed for Dizziness.Eprescribe, Disp-15 tablet, R-0               Patient was seen in conjunction with the physician, Dr. Parham.       JAMESON CARTER [facility]                   Nydia Miller, CNP  11/28/22 5846     none

## 2023-03-28 NOTE — PROGRESS NOTE ADULT - SUBJECTIVE AND OBJECTIVE BOX
Anemia      HPI:  73 y/o female with hx of CKD-4, HTN, HLD, Bio-AVR, recurrent anemia w/ baseline hbg 7-8 s/p 28 PRBC transfusions s/p 3 BMB with no clear etiology of anemia, s/p EGD/Colonoscopy/Capsular endoscopies in past, HCV, Cirrhosis, Diastolic CHF, DM-2, recent prolonged hospital course in 7/19 for right upper chest wound infection related to port, MARGUEIRTE on CKD, metabolic acidosis, hyperkalemia and Heart block requiring transient HD. Patient was Dcd to home with home PT and family oversight. She states shes been doing well, using cane and no falls. Shes been eating well, and has followed up some of her Physicians. She was noted to have down trending Hbg again and was told she would likely need PRBC transfusion soon. Over the past 2 days she has been having increasing SOB with exertion, and generalized weakness. No CP, N/V/F/C. She went to see her PMD who advised she come to the ED. In the ED patient with hbg of 5.3. She denies any BRBPR or black stools. Cr. in baseline range. She was seen by Cardiology ED. Will be admitted for symptomatic anemia. (07 Aug 2019 00:14)    Interval History:  Patient was seen and examined at bedside. Complaining of throat pain while swallowing.  no sinus symptoms. no dysphagia. constipation. no BM yet. seen by GI. cleared for DC if h/h stable  Denies fever chills, nausea, vomiting, abdominal pain, chest pain, palpitations, headache, dizziness and visual symptoms. SOB at baseline. uses home oxygen.     ROS:  as per HPI    PHYSICAL EXAM:  Vital Signs Last 24 Hrs  T(C): 36.9 (15 Aug 2019 08:41), Max: 37.1 (14 Aug 2019 16:23)  T(F): 98.4 (15 Aug 2019 08:41), Max: 98.7 (14 Aug 2019 16:23)  HR: 64 (15 Aug 2019 08:41) (64 - 90)  BP: 152/72 (15 Aug 2019 08:41) (150/74 - 168/73)  BP(mean): --    SpO2: 95% (15 Aug 2019 08:41) (94% - 97%)    I&O's Detail    14 Aug 2019 07:01  -  15 Aug 2019 07:00  --------------------------------------------------------  IN:  Total IN: 0 mL    OUT:    Voided: 1300 mL  Total OUT: 1300 mL    Total NET: -1300 mL    CAPILLARY BLOOD GLUCOSE      POCT Blood Glucose.: 130 mg/dL (15 Aug 2019 07:41)  POCT Blood Glucose.: 127 mg/dL (14 Aug 2019 20:58)  POCT Blood Glucose.: 150 mg/dL (14 Aug 2019 19:01)  POCT Blood Glucose.: 161 mg/dL (14 Aug 2019 16:51)  POCT Blood Glucose.: 125 mg/dL (14 Aug 2019 11:50)  RR: 19 (15 Aug 2019 08:41) (18 - 19)      GENERAL: Not in distress. Alert    HEENT:  Normocephalic and atraumatic. PEARLA,EOMI, throat clear as far seen unable to see oropharynx, no oral thrush  NECK: Supple.  No JVD.    CARDIOVASCULAR: RRR S1, S2. No murmur/rubs/gallop  LUNGS: BLAE reduced, no rales, no wheezing, no rhonchi.    ABDOMEN: ND. Soft,  NT, no guarding / rebound / rigidity. BS normoactive. No CVA tenderness.    BACK: No spine tenderness.  EXTREMITIES: no cyanosis, no clubbing, no edema. no leg or calf TP  SKIN: no rash. No skin break or ulcer.  NEUROLOGIC: AAO*3.strength is symmetric, sensation intact, speech fluent.    PSYCHIATRIC: Calm.  No agitation.    LABS:                           9.0    4.51  )-----------( 154      ( 14 Aug 2019 07:41 )             29.0       08-14    143  |  103  |  44.0<H>  ----------------------------<  97  3.7   |  26.0  |  1.28    Ca    8.8      14 Aug 2019 07:41    TPro  7.2  /  Alb  3.4  /  TBili  0.4  /  DBili  x   /  AST  67<H>  /  ALT  39<H>  /  AlkPhos  102  08-14         RADIOLOGY & ADDITIONAL STUDIES:  Reviewed Pain Refusal Text: I offered to prescribe pain medication but the patient refused to take this medication.

## 2023-04-06 NOTE — PATIENT PROFILE ADULT. - SOCIAL CONCERNS
Verbal/written post procedure instructions were given to patient/caregiver./Instructed patient/caregiver to follow-up with primary care physician./Instructed patient/caregiver regarding signs and symptoms of infection./Elevate the injured extremity as instructed./Keep the cast/splint/dressing clean and dry. None

## 2023-05-31 NOTE — ED ADULT TRIAGE NOTE - SOURCE OF INFORMATION
Patient Soolantra Pregnancy And Lactation Text: This medication is Pregnancy Category C. This medication is considered safe during breast feeding.

## 2023-07-24 NOTE — PROGRESS NOTE ADULT - ASSESSMENT
I left a message to  to return call.   CKD(IV): + azotemia superimposed  CHF with chronic fluid overload; B/L pleural effusions---> clinically better  BNP 8868  MARGUERITE likely 2/2 diuretics serum Cr 2.8--> 3--> 3.2  - Noted s/p Cardiomems yesterday  - cont to avoid potential nephrotoxins  - cont Torsemide 20mg Q day (decreased dose) --> restarted yest  - Need to accept some degree of enhanced azotemia to avoid decompensated fluid overload     Anemia: +CKD + MDS (transfusion dependent) + GI loss  Low Fe stores  - cont ARACELI and IV Fe  - PRBCs as needed  - GI work up noted     Will follow

## 2023-08-08 NOTE — DIETITIAN INITIAL EVALUATION ADULT. - DIET TYPE
GENERAL PRE-PROCEDURE:   Procedure:  Tunneled central venous catheter placement  Date/Time:  8/8/2023 4:33 PM    Verbal consent obtained?: Yes    Written consent obtained?: Yes    Risks and benefits: Risks, benefits and alternatives were discussed    Consent given by:  Patient  Patient states understanding of procedure being performed: Yes    Patient's understanding of procedure matches consent: Yes    Procedure consent matches procedure scheduled: Yes    Expected level of sedation:  Moderate  Appropriately NPO:  Yes  ASA Class:  2  Mallampati  :  Grade 1- soft palate, uvula, tonsillar pillars, and posterior pharyngeal wall visible  Lungs:  Lungs clear with good breath sounds bilaterally and wheezes  Heart:  Normal heart sounds and rate  History & Physical reviewed:  History and physical reviewed and no updates needed  Statement of review:  I have reviewed the lab findings, diagnostic data, medications, and the plan for sedation     Nepro once daily/supplement (specify)

## 2023-08-23 NOTE — DISCHARGE NOTE PROVIDER - NSDCCONDITION_GEN_ALL_CORE
Stable Calcipotriene Pregnancy And Lactation Text: The use of this medication during pregnancy or lactation is not recommended as there is insufficient data.

## 2023-08-31 NOTE — ED ADULT NURSE NOTE - IN THE PAST YEAR, HOW OFTEN HAVE YOU USED TOBACCO PRODUCTS?
Physical Therapy    Visit Type: treatment  SUBJECTIVE  Patient agreed to participate in therapy this date.  RN in agreement to work with patient for therapy session.  \"Where am I going?\"    Patient observed to rub his left knee when sitting in the chair (does not rate or report pain)     OBJECTIVE     Cognitive Status   Level of Consciousness   - alert  Arousal Alertness   - appropriate responses to stimuli  Affect/Behavior    - confused  Attention Span    - Attention: impaired   - Attention impairment: distractibility and reduced memory  Following Direction   - follows one step commands with repetition    Patient Activity Tolerance: 1 to 1 activity to rest         Bed Mobility  - Supine to sit: supervision, with verbal cues  Supervision for safety. Cues for sequencing with repetition needed to follow directions. Increased time to complete  Transfers  Assistive devices: 2-wheeled walker, gait belt  - Sit to stand: minimal assist, with tactile cues, with verbal cues  - Stand to sit: minimal assist, with tactile cues, with verbal cues  Performed from edge of bed and recliner chair. Increased time to initiate transfers as patient distracted by socks and pants. Cues for hand placement, sequencing and walker use with repetition needed to follow directions. Minimal assist for force generation, steadying and walker stabilization.     Ambulation / Gait  - Assistive device: two-wheeled walker and gait belt  - Distance (feet unless otherwise indicated): 12  - Assist Level: minimal assist, with tactile cues and with verbal cues  - Surface: even  Minimal assist for steadying and walker management. Cues for walker use. Patient demonstrates decreased weight bearing through the left LE, short step length and flexed posture. Patient declines further distance.        Interventions     Training provided: activity tolerance, bed mobility training, body mechanics, transfer training, gait training, safety training and use of assistive  device  Education on PT plan/goals  Skilled input: Verbal instruction/cues and tactile instruction/cues  Verbal Consent: Writer verbally educated and received verbal consent for hand placement, positioning of patient, and techniques to be performed today from patient for clothing adjustments for techniques, hand placement and palpation for techniques and therapist position for techniques as described above and how they are pertinent to the patient's plan of care.         ASSESSMENT   Progress: slow progress.  Interferring components: decreased activity tolerance and cognitive deficits    Discharge needs based on today's assessment:   - Current level of function: slightly below baseline level of function   - Therapy needs at discharge: therapy 1-3 times per week   - Activities of daily living (ADLs) requiring support at discharge: bed mobility, transfers and ambulation   - Impairments that require further therapy intervention: activity tolerance, balance, strength and safety awareness    AM-PAC  - Generalized Prior Level of Function: Needs a little help ([facility] 12-21)       Key: MOD A=moderate assistance, IND/MOD I=independent/modified independent  - Generalized Current Level of Function     - Current Mobility Score: 17       AM-PAC Scoring Key= >21 Modified Independent; 20-21 Supervision; 18-19 Minimal assist; 13-18 Moderate assist; 9-12 Max assist; <9 Total assist        PLAN (while hospitalized)  Suggestions for next session as indicated: Bed mobility, transfers, gait training with 2WW and wheelchair follow    PT Frequency: 3-5 x per week      PT/OT Mobility Equipment for Discharge: continue to assess  PT/OT ADL Equipment for Discharge: continue to assess        GOALS  Review Date: 9/4/2023  Long Term Goals: (to be met by time of discharge from hospital)  Sit to supine: Patient will complete sit to supine supervision.  Supine to sit: Patient will complete supine to sit supervision.  Sit to stand: Patient will  complete sit to stand transfer with supervision.   Stand to sit: Patient will complete stand to sit transfer with supervision.   Ambulation (even): Patient will ambulate on even surface for 150 feet with supervision.     Documented in the chart in the following areas: Assessment/Plan.      Patient at End of Session:   Location: in chair  Safety measures: alarm system in place/re-engaged, call light within reach, equipment intact, lines intact and sitter present  Handoff to: nurse assistant      Therapy procedure time and total treatment time can be found documented on the Time Entry flowsheet   Never

## 2023-09-28 NOTE — HISTORY OF PRESENT ILLNESS
[FreeTextEntry1] : 77 yo F with history of HTN, hypercholesterolemia, diabetes, CAD, anemia, and ESRD on HD recently started on HD while hospitalized via L IJ tunneled dialysis catheter. Pt has a R sided mediport in place. Denies fever, chills, pain over catheter site. No new complaints. Catheter is functioning well.
(3) walks occasionally

## 2023-10-05 NOTE — PATIENT PROFILE ADULT - NSPROPTRIGHTNOTIFY_GEN_A_NUR
Patient called to cancel her visit today due to starting a new medication and not feeling well. Patient rescheduled for tomorrow.
declines

## 2023-10-31 NOTE — H&P ADULT - PROBLEM/PLAN-4
1st check to see if patient has seen the results.  If not then  CALL patient with results and Document verification.  Schedule follow-up if needed.  [phone]  A1c improved from previous.  Keep up the great work! DISPLAY PLAN FREE TEXT

## 2023-11-20 NOTE — PHYSICAL THERAPY INITIAL EVALUATION ADULT - LEVEL OF CONSCIOUSNESS, REHAB EVAL
PAST MEDICAL HISTORY:  Other specified disorders of kidney and ureter     Restrictive airway disease     Varicose veins     Vocal cord cancer 1985     alert

## 2024-02-24 NOTE — ED PROVIDER NOTE - AGGRAVATING FACTORS
Patient received alert, confused. Calling out throughout the night. Repositioned as per protocol, 2 large soft stools this shift, wound care as per order.    Patient weaned to 1L via nasal cannula, unable to wean more per parameters. Blood pressure within normal limits - please refer to flow sheet.    none

## 2024-05-04 NOTE — ED ADULT TRIAGE NOTE - STATUS:
PICC Placement Note    PRE-PROCEDURE VERIFICATION    Correct Procedure: yes  Time out completed with assistant CHRISTI Fernandez, RN, [facility] and all persons present in agreement with time out.  Risks and benefits reviewed with patient and informed consent obtained prior to assessment and procedure.     Correct Site: yes  Temperature: Temp: 97.6 °F (36.4 °C), Temperature Source:    Recent Labs     05/04/24  0454   BUN 27*   PLT 65*   WBC 3.1*     Allergies: Patient has no known allergies.  Education materials for PICC Care given to patient or family.    PROCEDURE DETAIL  A double lumen PICC line was started for Irritant/vesicant medication. The following documentation is in addition to the PICC properties in the lines/airways flowsheet:    Lot #: [id number]  Xylocaine used: Yes  Mid-Arm Circumference: 36 (cm)  Internal Catheter Length: 39 (cm)  External Catheter Length: 0 (cm)  Total Catheter Length: 39 (cm)  Vein Selection for PICC: right basilic  Central Line Insertion Bundle followed: Yes  Complication Related to Insertion: none    Both the insertion guidewire and ECG guidewire were removed intact all ports have positive blood return and were flush well with normal saline.    The location of the tip of the PICC is verified using ECG technology.  The tip is in the SVC per ECG reading.  See image below.     Line is okay to use: yes            Gracie Valentin RN   [employer]

## 2024-09-03 NOTE — PROGRESS NOTE ADULT - SUBJECTIVE AND OBJECTIVE BOX
[facility] Division of Hospital Medicine    Chief Complaint:  chf exacerbation    SUBJECTIVE: reports feeling about the same    OVERNIGHT EVENTS: none reported    Patient denies chest pain, SOB, abd pain, N/V, fever, chills, dysuria or any other complaints. All remainder ROS negative.     MEDICATIONS  (STANDING):  amitriptyline 40 milliGRAM(s) Oral at bedtime  atorvastatin 40 milliGRAM(s) Oral at bedtime  cholecalciferol 2000 Unit(s) Oral daily  dextrose 40% Gel 15 Gram(s) Oral once  dextrose 5%. 1000 milliLiter(s) (50 mL/Hr) IV Continuous <Continuous>  dextrose 5%. 1000 milliLiter(s) (100 mL/Hr) IV Continuous <Continuous>  dextrose 50% Injectable 25 Gram(s) IV Push once  dextrose 50% Injectable 12.5 Gram(s) IV Push once  dextrose 50% Injectable 25 Gram(s) IV Push once  epoetin georgina-epbx (RETACRIT) Injectable 69586 Unit(s) SubCutaneous every 7 days  folic acid 1 milliGRAM(s) Oral daily  furosemide   Injectable 80 milliGRAM(s) IV Push two times a day  gabapentin 300 milliGRAM(s) Oral at bedtime  glucagon  Injectable 1 milliGRAM(s) IntraMuscular once  hydrALAZINE 25 milliGRAM(s) Oral every 8 hours  insulin glargine Injectable (LANTUS) 12 Unit(s) SubCutaneous at bedtime  insulin lispro (ADMELOG) corrective regimen sliding scale   SubCutaneous three times a day before meals  insulin lispro (ADMELOG) corrective regimen sliding scale   SubCutaneous at bedtime  iron sucrose IVPB 250 milliGRAM(s) IV Intermittent <User Schedule>  levothyroxine 100 MICROGram(s) Oral daily  metoprolol tartrate 25 milliGRAM(s) Oral every 6 hours  montelukast 10 milliGRAM(s) Oral at bedtime  multivitamin 1 Tablet(s) Oral daily  Nephro-nunu 1 Tablet(s) Oral daily  pantoprazole    Tablet 40 milliGRAM(s) Oral before breakfast  senna 2 Tablet(s) Oral at bedtime  sodium bicarbonate 1300 milliGRAM(s) Oral two times a day    MEDICATIONS  (PRN):  albuterol/ipratropium for Nebulization 3 milliLiter(s) Nebulizer every 6 hours PRN Shortness of Breath and/or Wheezing        I&O's Summary      PHYSICAL EXAM:  Vital Signs Last 24 Hrs  T(C): 36.5 (23 May 2021 11:27), Max: 36.9 (22 May 2021 17:58)  T(F): 97.7 (23 May 2021 11:27), Max: 98.4 (22 May 2021 17:58)  HR: 90 (23 May 2021 11:27) (79 - 94)  BP: 145/73 (23 May 2021 11:27) (117/69 - 153/77)  BP(mean): --  RR: 18 (23 May 2021 11:27) (18 - 18)  SpO2: 97% (23 May 2021 11:27) (97% - 100%)      CONSTITUTIONAL: NAD, well-developed, well-groomed  ENMT: Moist oral mucosa, no pharyngeal injection or exudates; normal dentition; No JVD  RESPIRATORY: Normal respiratory effort; fine crackles b/l on bases, no wheezing  CARDIOVASCULAR: irregular rate and rhythm, with soft systolic murmur in R and LUSB, trace extremity edema; Peripheral pulses are 2+ bilaterally, R side chest port  ABDOMEN: somewhat distention, nontender to palpation, normoactive bowel sounds, no rebound/guarding; No palpable hepatosplenomegaly  MUSCLOSKELETAL:  no clubbing or cyanosis of digits; no joint swelling or tenderness to palpation  PSYCH: A+O to person, place, and time; affect appropriate  NEUROLOGY: CN 2-12 are intact and symmetric; no gross sensory deficits; was observed moving all 4 ext against gravity cooperating with exam.   SKIN: No rashes; no palpable lesions    LABS:    05-23    142  |  104  |  74.0<H>  ----------------------------<  182<H>  4.3   |  26.0  |  2.85<H>    Ca    8.5<L>      23 May 2021 10:13  Phos  5.3     05-23  Mg     2.4     05-23                CAPILLARY BLOOD GLUCOSE      POCT Blood Glucose.: 191 mg/dL (23 May 2021 11:48)  POCT Blood Glucose.: 116 mg/dL (23 May 2021 07:58)  POCT Blood Glucose.: 207 mg/dL (22 May 2021 21:41)  POCT Blood Glucose.: 162 mg/dL (22 May 2021 16:45)        RADIOLOGY & ADDITIONAL TESTS:  Results Reviewed:   Imaging Personally Reviewed:  Electrocardiogram Personally Reviewed: Home oxygen certification.  Patient is off floor for walking portion of test.

## 2024-10-02 NOTE — ASU PREOP CHECKLIST - AICD PRESENT
Quality 431: Preventive Care And Screening: Unhealthy Alcohol Use - Screening: Patient not identified as an unhealthy alcohol user when screened for unhealthy alcohol use using a systematic screening method Quality 130: Documentation Of Current Medications In The Medical Record: Current Medications Documented Detail Level: Detailed Quality 226: Preventive Care And Screening: Tobacco Use: Screening And Cessation Intervention: Patient screened for tobacco use, is a smoker AND did not receive Cessation Counseling during measurement period or in the six months prior to the measurement period no

## 2025-01-31 NOTE — GOALS OF CARE CONVERSATION - ADVANCED CARE PLANNING - CONVERSATION DETAILS
Discussed current treatment options with patient at bedside and later with Daughter Daniela over the phone. Patient tired of having blood drawn and she does not want to restart dialysis. she wants to focus on comfort and spending time with her family. She would like to pursue comfort measures status. No blood draws, no additional testing, no dialysis and she wants to stop antibiotics.   Updated daughter Daniela who is in agreement. Patient moved to private room. Patient and family in agreement to hospice referral. Patient notified and verbalized understanding of below results.    Patient states he has not been testing his blood sugars at home as he need to  more test strips. He was planning on picking them up once he picks up all of his refills and new meds; which he has not done yet.    Patient declined referral to diabetic educator and declined referral to Endocrinology.     Patient did agree to start Metformin; he would like this sent to [facility] in [place].    Patient did agree to starting a statin medication for his cholesterol; he would like this sent to [facility] in [place] as well.    Patient declined testing for his elevated hemoglobin. He also declined the referral to a sleep doctor; however after discussion he said he will think about the sleep doctor referral and call us if he changes his mind.       Please send metformin and statin medications to preferred pharmacy.